# Patient Record
Sex: MALE | Race: WHITE | NOT HISPANIC OR LATINO | Employment: OTHER | URBAN - METROPOLITAN AREA
[De-identification: names, ages, dates, MRNs, and addresses within clinical notes are randomized per-mention and may not be internally consistent; named-entity substitution may affect disease eponyms.]

---

## 2017-02-06 ENCOUNTER — APPOINTMENT (OUTPATIENT)
Dept: LAB | Facility: CLINIC | Age: 72
End: 2017-02-06
Payer: MEDICARE

## 2017-02-06 DIAGNOSIS — I73.9 PERIPHERAL VASCULAR DISEASE (HCC): ICD-10-CM

## 2017-02-06 DIAGNOSIS — E11.29 TYPE 2 DIABETES MELLITUS WITH OTHER DIABETIC KIDNEY COMPLICATION (HCC): ICD-10-CM

## 2017-02-06 DIAGNOSIS — Z12.11 ENCOUNTER FOR SCREENING FOR MALIGNANT NEOPLASM OF COLON: ICD-10-CM

## 2017-02-06 DIAGNOSIS — I10 ESSENTIAL (PRIMARY) HYPERTENSION: ICD-10-CM

## 2017-02-06 DIAGNOSIS — I25.10 ATHEROSCLEROTIC HEART DISEASE OF NATIVE CORONARY ARTERY WITHOUT ANGINA PECTORIS: ICD-10-CM

## 2017-02-06 LAB
ALBUMIN SERPL BCP-MCNC: 3.9 G/DL (ref 3.5–5)
ALP SERPL-CCNC: 68 U/L (ref 46–116)
ALT SERPL W P-5'-P-CCNC: 37 U/L (ref 12–78)
ANION GAP SERPL CALCULATED.3IONS-SCNC: 10 MMOL/L (ref 4–13)
AST SERPL W P-5'-P-CCNC: 24 U/L (ref 5–45)
BILIRUB SERPL-MCNC: 0.66 MG/DL (ref 0.2–1)
BUN SERPL-MCNC: 14 MG/DL (ref 5–25)
CALCIUM SERPL-MCNC: 9 MG/DL (ref 8.3–10.1)
CHLORIDE SERPL-SCNC: 100 MMOL/L (ref 100–108)
CHOLEST SERPL-MCNC: 127 MG/DL (ref 50–200)
CO2 SERPL-SCNC: 24 MMOL/L (ref 21–32)
CREAT SERPL-MCNC: 1.35 MG/DL (ref 0.6–1.3)
EST. AVERAGE GLUCOSE BLD GHB EST-MCNC: 151 MG/DL
GFR SERPL CREATININE-BSD FRML MDRD: 52 ML/MIN/1.73SQ M
GLUCOSE SERPL-MCNC: 120 MG/DL (ref 65–140)
HBA1C MFR BLD: 6.9 % (ref 4.2–6.3)
HDLC SERPL-MCNC: 49 MG/DL (ref 40–60)
LDLC SERPL CALC-MCNC: 62 MG/DL (ref 0–100)
POTASSIUM SERPL-SCNC: 4.6 MMOL/L (ref 3.5–5.3)
PROT SERPL-MCNC: 7.3 G/DL (ref 6.4–8.2)
SODIUM SERPL-SCNC: 134 MMOL/L (ref 136–145)
TRIGL SERPL-MCNC: 79 MG/DL

## 2017-02-06 PROCEDURE — 83036 HEMOGLOBIN GLYCOSYLATED A1C: CPT

## 2017-02-06 PROCEDURE — 80061 LIPID PANEL: CPT

## 2017-02-06 PROCEDURE — 36415 COLL VENOUS BLD VENIPUNCTURE: CPT

## 2017-02-06 PROCEDURE — 80053 COMPREHEN METABOLIC PANEL: CPT

## 2017-02-07 ENCOUNTER — GENERIC CONVERSION - ENCOUNTER (OUTPATIENT)
Dept: OTHER | Facility: OTHER | Age: 72
End: 2017-02-07

## 2017-02-07 ENCOUNTER — ALLSCRIPTS OFFICE VISIT (OUTPATIENT)
Dept: OTHER | Facility: OTHER | Age: 72
End: 2017-02-07

## 2017-02-07 DIAGNOSIS — I25.10 ATHEROSCLEROTIC HEART DISEASE OF NATIVE CORONARY ARTERY WITHOUT ANGINA PECTORIS: ICD-10-CM

## 2017-02-07 DIAGNOSIS — I73.9 PERIPHERAL VASCULAR DISEASE (HCC): ICD-10-CM

## 2017-02-07 DIAGNOSIS — E11.29 TYPE 2 DIABETES MELLITUS WITH OTHER DIABETIC KIDNEY COMPLICATION (HCC): ICD-10-CM

## 2017-02-07 DIAGNOSIS — E11.22 TYPE 2 DIABETES MELLITUS WITH DIABETIC CHRONIC KIDNEY DISEASE (HCC): ICD-10-CM

## 2017-02-07 DIAGNOSIS — I10 ESSENTIAL (PRIMARY) HYPERTENSION: ICD-10-CM

## 2017-02-07 DIAGNOSIS — B35.1 TINEA UNGUIUM: ICD-10-CM

## 2017-02-07 DIAGNOSIS — E11.59 TYPE 2 DIABETES MELLITUS WITH OTHER CIRCULATORY COMPLICATIONS (HCC): ICD-10-CM

## 2017-02-07 DIAGNOSIS — I70.213 ATHEROSCLEROSIS OF NATIVE ARTERY OF BOTH LOWER EXTREMITIES WITH INTERMITTENT CLAUDICATION (HCC): ICD-10-CM

## 2017-02-13 ENCOUNTER — ALLSCRIPTS OFFICE VISIT (OUTPATIENT)
Dept: OTHER | Facility: OTHER | Age: 72
End: 2017-02-13

## 2017-02-15 ENCOUNTER — HOSPITAL ENCOUNTER (OUTPATIENT)
Dept: RADIOLOGY | Facility: HOSPITAL | Age: 72
Discharge: HOME | End: 2017-02-15
Attending: PODIATRIST
Payer: MEDICARE

## 2017-02-15 DIAGNOSIS — B35.1 TINEA UNGUIUM: ICD-10-CM

## 2017-02-15 DIAGNOSIS — I70.213 ATHEROSCLEROSIS OF NATIVE ARTERY OF BOTH LOWER EXTREMITIES WITH INTERMITTENT CLAUDICATION (HCC): ICD-10-CM

## 2017-02-15 PROCEDURE — 93923 UPR/LXTR ART STDY 3+ LVLS: CPT

## 2017-02-15 PROCEDURE — 93925 LOWER EXTREMITY STUDY: CPT

## 2017-04-18 ENCOUNTER — ALLSCRIPTS OFFICE VISIT (OUTPATIENT)
Dept: OTHER | Facility: OTHER | Age: 72
End: 2017-04-18

## 2017-05-01 ENCOUNTER — TRANSCRIBE ORDERS (OUTPATIENT)
Dept: ADMINISTRATIVE | Facility: HOSPITAL | Age: 72
End: 2017-05-01

## 2017-05-01 ENCOUNTER — APPOINTMENT (OUTPATIENT)
Dept: LAB | Facility: HOSPITAL | Age: 72
End: 2017-05-01
Attending: FAMILY MEDICINE
Payer: MEDICARE

## 2017-05-01 DIAGNOSIS — I73.9 PERIPHERAL VASCULAR DISEASE (HCC): ICD-10-CM

## 2017-05-01 DIAGNOSIS — E11.59 TYPE 2 DIABETES MELLITUS WITH OTHER CIRCULATORY COMPLICATIONS (HCC): ICD-10-CM

## 2017-05-01 DIAGNOSIS — E11.22 TYPE 2 DIABETES MELLITUS WITH DIABETIC CHRONIC KIDNEY DISEASE (HCC): ICD-10-CM

## 2017-05-01 DIAGNOSIS — I10 ESSENTIAL (PRIMARY) HYPERTENSION: ICD-10-CM

## 2017-05-01 DIAGNOSIS — E11.29 TYPE 2 DIABETES MELLITUS WITH OTHER DIABETIC KIDNEY COMPLICATION (HCC): ICD-10-CM

## 2017-05-01 DIAGNOSIS — I25.10 ATHEROSCLEROTIC HEART DISEASE OF NATIVE CORONARY ARTERY WITHOUT ANGINA PECTORIS: ICD-10-CM

## 2017-05-01 LAB
ALBUMIN SERPL BCP-MCNC: 3.6 G/DL (ref 3.5–5)
ALP SERPL-CCNC: 68 U/L (ref 46–116)
ALT SERPL W P-5'-P-CCNC: 28 U/L (ref 12–78)
ANION GAP SERPL CALCULATED.3IONS-SCNC: 8 MMOL/L (ref 4–13)
AST SERPL W P-5'-P-CCNC: 23 U/L (ref 5–45)
BILIRUB SERPL-MCNC: 0.7 MG/DL (ref 0.2–1)
BUN SERPL-MCNC: 16 MG/DL (ref 5–25)
CALCIUM SERPL-MCNC: 9.2 MG/DL (ref 8.3–10.1)
CHLORIDE SERPL-SCNC: 102 MMOL/L (ref 100–108)
CO2 SERPL-SCNC: 26 MMOL/L (ref 21–32)
CREAT SERPL-MCNC: 1.45 MG/DL (ref 0.6–1.3)
EST. AVERAGE GLUCOSE BLD GHB EST-MCNC: 160 MG/DL
GFR SERPL CREATININE-BSD FRML MDRD: 47.8 ML/MIN/1.73SQ M
GLUCOSE P FAST SERPL-MCNC: 92 MG/DL (ref 65–99)
HBA1C MFR BLD: 7.2 % (ref 4.2–6.3)
POTASSIUM SERPL-SCNC: 5.2 MMOL/L (ref 3.5–5.3)
PROT SERPL-MCNC: 6.7 G/DL (ref 6.4–8.2)
SODIUM SERPL-SCNC: 136 MMOL/L (ref 136–145)

## 2017-05-01 PROCEDURE — 83036 HEMOGLOBIN GLYCOSYLATED A1C: CPT

## 2017-05-01 PROCEDURE — 80053 COMPREHEN METABOLIC PANEL: CPT

## 2017-05-01 PROCEDURE — 36415 COLL VENOUS BLD VENIPUNCTURE: CPT

## 2017-05-02 ENCOUNTER — GENERIC CONVERSION - ENCOUNTER (OUTPATIENT)
Dept: OTHER | Facility: OTHER | Age: 72
End: 2017-05-02

## 2017-05-08 ENCOUNTER — ALLSCRIPTS OFFICE VISIT (OUTPATIENT)
Dept: OTHER | Facility: OTHER | Age: 72
End: 2017-05-08

## 2017-05-08 DIAGNOSIS — Z12.11 ENCOUNTER FOR SCREENING FOR MALIGNANT NEOPLASM OF COLON: ICD-10-CM

## 2017-05-08 DIAGNOSIS — I25.10 ATHEROSCLEROTIC HEART DISEASE OF NATIVE CORONARY ARTERY WITHOUT ANGINA PECTORIS: ICD-10-CM

## 2017-05-11 ENCOUNTER — APPOINTMENT (OUTPATIENT)
Dept: LAB | Facility: CLINIC | Age: 72
End: 2017-05-11
Payer: MEDICARE

## 2017-05-11 ENCOUNTER — TRANSCRIBE ORDERS (OUTPATIENT)
Dept: LAB | Facility: CLINIC | Age: 72
End: 2017-05-11

## 2017-05-11 DIAGNOSIS — I25.10 ATHEROSCLEROTIC HEART DISEASE OF NATIVE CORONARY ARTERY WITHOUT ANGINA PECTORIS: ICD-10-CM

## 2017-05-11 DIAGNOSIS — Z12.11 ENCOUNTER FOR SCREENING FOR MALIGNANT NEOPLASM OF COLON: ICD-10-CM

## 2017-05-11 LAB
ANION GAP SERPL CALCULATED.3IONS-SCNC: 7 MMOL/L (ref 4–13)
BUN SERPL-MCNC: 11 MG/DL (ref 5–25)
CALCIUM SERPL-MCNC: 9 MG/DL (ref 8.3–10.1)
CHLORIDE SERPL-SCNC: 102 MMOL/L (ref 100–108)
CO2 SERPL-SCNC: 24 MMOL/L (ref 21–32)
CREAT SERPL-MCNC: 1.23 MG/DL (ref 0.6–1.3)
GFR SERPL CREATININE-BSD FRML MDRD: 57.8 ML/MIN/1.73SQ M
GLUCOSE SERPL-MCNC: 113 MG/DL (ref 65–140)
POTASSIUM SERPL-SCNC: 4.8 MMOL/L (ref 3.5–5.3)
SODIUM SERPL-SCNC: 133 MMOL/L (ref 136–145)

## 2017-05-11 PROCEDURE — 80048 BASIC METABOLIC PNL TOTAL CA: CPT

## 2017-05-11 PROCEDURE — 36415 COLL VENOUS BLD VENIPUNCTURE: CPT

## 2017-05-12 ENCOUNTER — GENERIC CONVERSION - ENCOUNTER (OUTPATIENT)
Dept: OTHER | Facility: OTHER | Age: 72
End: 2017-05-12

## 2017-05-30 ENCOUNTER — ALLSCRIPTS OFFICE VISIT (OUTPATIENT)
Dept: OTHER | Facility: OTHER | Age: 72
End: 2017-05-30

## 2017-06-27 ENCOUNTER — ALLSCRIPTS OFFICE VISIT (OUTPATIENT)
Dept: OTHER | Facility: OTHER | Age: 72
End: 2017-06-27

## 2017-07-16 DIAGNOSIS — E11.59 TYPE 2 DIABETES MELLITUS WITH OTHER CIRCULATORY COMPLICATIONS (HCC): ICD-10-CM

## 2017-07-16 DIAGNOSIS — I73.9 PERIPHERAL VASCULAR DISEASE (HCC): ICD-10-CM

## 2017-07-16 DIAGNOSIS — E11.29 TYPE 2 DIABETES MELLITUS WITH OTHER DIABETIC KIDNEY COMPLICATION (HCC): ICD-10-CM

## 2017-07-16 DIAGNOSIS — I10 ESSENTIAL (PRIMARY) HYPERTENSION: ICD-10-CM

## 2017-07-16 DIAGNOSIS — I25.10 ATHEROSCLEROTIC HEART DISEASE OF NATIVE CORONARY ARTERY WITHOUT ANGINA PECTORIS: ICD-10-CM

## 2017-07-16 DIAGNOSIS — I70.213 ATHEROSCLEROSIS OF NATIVE ARTERY OF BOTH LOWER EXTREMITIES WITH INTERMITTENT CLAUDICATION (HCC): ICD-10-CM

## 2017-07-16 DIAGNOSIS — E11.22 TYPE 2 DIABETES MELLITUS WITH DIABETIC CHRONIC KIDNEY DISEASE (HCC): ICD-10-CM

## 2017-07-19 ENCOUNTER — APPOINTMENT (OUTPATIENT)
Dept: LAB | Facility: CLINIC | Age: 72
End: 2017-07-19
Payer: MEDICARE

## 2017-07-19 ENCOUNTER — TRANSCRIBE ORDERS (OUTPATIENT)
Dept: LAB | Facility: CLINIC | Age: 72
End: 2017-07-19

## 2017-07-19 ENCOUNTER — GENERIC CONVERSION - ENCOUNTER (OUTPATIENT)
Dept: OTHER | Facility: OTHER | Age: 72
End: 2017-07-19

## 2017-07-19 DIAGNOSIS — I10 ESSENTIAL (PRIMARY) HYPERTENSION: ICD-10-CM

## 2017-07-19 DIAGNOSIS — I73.9 PERIPHERAL VASCULAR DISEASE (HCC): ICD-10-CM

## 2017-07-19 DIAGNOSIS — I25.10 ATHEROSCLEROTIC HEART DISEASE OF NATIVE CORONARY ARTERY WITHOUT ANGINA PECTORIS: ICD-10-CM

## 2017-07-19 LAB
ANION GAP SERPL CALCULATED.3IONS-SCNC: 7 MMOL/L (ref 4–13)
BASOPHILS # BLD AUTO: 0.02 THOUSANDS/ΜL (ref 0–0.1)
BASOPHILS NFR BLD AUTO: 0 % (ref 0–1)
BUN SERPL-MCNC: 12 MG/DL (ref 5–25)
CALCIUM SERPL-MCNC: 9 MG/DL (ref 8.3–10.1)
CHLORIDE SERPL-SCNC: 101 MMOL/L (ref 100–108)
CO2 SERPL-SCNC: 24 MMOL/L (ref 21–32)
CREAT SERPL-MCNC: 1.32 MG/DL (ref 0.6–1.3)
EOSINOPHIL # BLD AUTO: 0.2 THOUSAND/ΜL (ref 0–0.61)
EOSINOPHIL NFR BLD AUTO: 3 % (ref 0–6)
ERYTHROCYTE [DISTWIDTH] IN BLOOD BY AUTOMATED COUNT: 15.8 % (ref 11.6–15.1)
GFR SERPL CREATININE-BSD FRML MDRD: 53.3 ML/MIN/1.73SQ M
GLUCOSE P FAST SERPL-MCNC: 137 MG/DL (ref 65–99)
HCT VFR BLD AUTO: 32.6 % (ref 36.5–49.3)
HGB BLD-MCNC: 10.8 G/DL (ref 12–17)
INR PPP: 1.04 (ref 0.86–1.16)
LYMPHOCYTES # BLD AUTO: 0.85 THOUSANDS/ΜL (ref 0.6–4.47)
LYMPHOCYTES NFR BLD AUTO: 14 % (ref 14–44)
MCH RBC QN AUTO: 27.7 PG (ref 26.8–34.3)
MCHC RBC AUTO-ENTMCNC: 33.1 G/DL (ref 31.4–37.4)
MCV RBC AUTO: 84 FL (ref 82–98)
MONOCYTES # BLD AUTO: 0.57 THOUSAND/ΜL (ref 0.17–1.22)
MONOCYTES NFR BLD AUTO: 9 % (ref 4–12)
NEUTROPHILS # BLD AUTO: 4.58 THOUSANDS/ΜL (ref 1.85–7.62)
NEUTS SEG NFR BLD AUTO: 74 % (ref 43–75)
NRBC BLD AUTO-RTO: 0 /100 WBCS
PLATELET # BLD AUTO: 234 THOUSANDS/UL (ref 149–390)
PMV BLD AUTO: 9.1 FL (ref 8.9–12.7)
POTASSIUM SERPL-SCNC: 4.6 MMOL/L (ref 3.5–5.3)
PROTHROMBIN TIME: 13.6 SECONDS (ref 12.1–14.4)
RBC # BLD AUTO: 3.9 MILLION/UL (ref 3.88–5.62)
SODIUM SERPL-SCNC: 132 MMOL/L (ref 136–145)
WBC # BLD AUTO: 6.24 THOUSAND/UL (ref 4.31–10.16)

## 2017-07-19 PROCEDURE — 85610 PROTHROMBIN TIME: CPT

## 2017-07-19 PROCEDURE — 85025 COMPLETE CBC W/AUTO DIFF WBC: CPT

## 2017-07-19 PROCEDURE — 36415 COLL VENOUS BLD VENIPUNCTURE: CPT

## 2017-07-19 PROCEDURE — 80048 BASIC METABOLIC PNL TOTAL CA: CPT

## 2017-07-25 ENCOUNTER — GENERIC CONVERSION - ENCOUNTER (OUTPATIENT)
Dept: OTHER | Facility: OTHER | Age: 72
End: 2017-07-25

## 2017-07-25 ENCOUNTER — HOSPITAL ENCOUNTER (OUTPATIENT)
Dept: NON INVASIVE DIAGNOSTICS | Facility: HOSPITAL | Age: 72
Discharge: HOME | End: 2017-07-25
Attending: INTERNAL MEDICINE | Admitting: INTERNAL MEDICINE
Payer: MEDICARE

## 2017-07-25 VITALS
WEIGHT: 190 LBS | OXYGEN SATURATION: 99 % | DIASTOLIC BLOOD PRESSURE: 59 MMHG | HEIGHT: 71 IN | HEART RATE: 57 BPM | TEMPERATURE: 97.4 F | SYSTOLIC BLOOD PRESSURE: 133 MMHG | BODY MASS INDEX: 26.6 KG/M2 | RESPIRATION RATE: 16 BRPM

## 2017-07-25 DIAGNOSIS — I70.213 ATHEROSCLER OF NATIVE ARTERY OF BOTH LEGS WITH INTERMIT CLAUDICATION (HCC): ICD-10-CM

## 2017-07-25 LAB
KCT BLD-ACNC: 166 SEC (ref 89–137)
SPECIMEN SOURCE: ABNORMAL

## 2017-07-25 PROCEDURE — C1887 CATHETER, GUIDING: HCPCS

## 2017-07-25 PROCEDURE — C1769 GUIDE WIRE: HCPCS

## 2017-07-25 PROCEDURE — 37224 HB FEM/POPL REVAS W/TLA: CPT

## 2017-07-25 PROCEDURE — C1894 INTRO/SHEATH, NON-LASER: HCPCS

## 2017-07-25 PROCEDURE — C1725 CATH, TRANSLUMIN NON-LASER: HCPCS

## 2017-07-25 PROCEDURE — 85347 COAGULATION TIME ACTIVATED: CPT

## 2017-07-25 RX ORDER — CARVEDILOL 6.25 MG/1
6.25 TABLET ORAL 2 TIMES DAILY WITH MEALS
COMMUNITY
End: 2018-01-24 | Stop reason: SDUPTHER

## 2017-07-25 RX ORDER — ONDANSETRON 2 MG/ML
4 INJECTION INTRAMUSCULAR; INTRAVENOUS EVERY 6 HOURS PRN
Status: CANCELLED | OUTPATIENT
Start: 2017-07-25

## 2017-07-25 RX ORDER — SODIUM CHLORIDE 9 MG/ML
100 INJECTION, SOLUTION INTRAVENOUS CONTINUOUS
Status: CANCELLED | OUTPATIENT
Start: 2017-07-25

## 2017-07-25 RX ORDER — GABAPENTIN 300 MG/1
100 CAPSULE ORAL 2 TIMES DAILY
COMMUNITY
End: 2018-01-24 | Stop reason: SDUPTHER

## 2017-07-25 RX ORDER — CLOPIDOGREL BISULFATE 75 MG/1
300 TABLET ORAL DAILY
Status: DISCONTINUED | OUTPATIENT
Start: 2017-07-25 | End: 2017-07-26 | Stop reason: HOSPADM

## 2017-07-25 RX ORDER — SIMVASTATIN 40 MG
40 TABLET ORAL
COMMUNITY
End: 2018-01-24 | Stop reason: SDUPTHER

## 2017-07-25 RX ORDER — GLIMEPIRIDE 4 MG/1
4 TABLET ORAL
COMMUNITY
End: 2018-01-24 | Stop reason: SDUPTHER

## 2017-07-25 RX ORDER — CILOSTAZOL 100 MG/1
100 TABLET ORAL 2 TIMES DAILY
COMMUNITY
End: 2018-02-15 | Stop reason: SDUPTHER

## 2017-07-25 RX ORDER — FENTANYL CITRATE 50 UG/ML
INJECTION, SOLUTION INTRAMUSCULAR; INTRAVENOUS CODE/TRAUMA/SEDATION MEDICATION
Status: DISCONTINUED | OUTPATIENT
Start: 2017-07-25 | End: 2017-07-26 | Stop reason: HOSPADM

## 2017-07-25 RX ORDER — MIDAZOLAM HYDROCHLORIDE 1 MG/ML
INJECTION INTRAMUSCULAR; INTRAVENOUS CODE/TRAUMA/SEDATION MEDICATION
Status: DISCONTINUED | OUTPATIENT
Start: 2017-07-25 | End: 2017-07-26 | Stop reason: HOSPADM

## 2017-07-25 RX ORDER — NITROGLYCERIN 80 MG/1
1 PATCH TRANSDERMAL DAILY
COMMUNITY
End: 2018-01-24 | Stop reason: SDUPTHER

## 2017-07-25 RX ORDER — LISINOPRIL 5 MG/1
5 TABLET ORAL DAILY
COMMUNITY
End: 2018-01-24 | Stop reason: SDUPTHER

## 2017-07-25 RX ORDER — OMEPRAZOLE 40 MG/1
40 CAPSULE, DELAYED RELEASE ORAL DAILY
COMMUNITY
End: 2018-02-15 | Stop reason: SDUPTHER

## 2017-07-25 RX ORDER — ASPIRIN 81 MG/1
162 TABLET, CHEWABLE ORAL DAILY
Status: CANCELLED | OUTPATIENT
Start: 2017-07-25

## 2017-07-25 RX ORDER — NITROGLYCERIN 0.4 MG/1
0.4 TABLET SUBLINGUAL
COMMUNITY
End: 2018-02-15 | Stop reason: SDUPTHER

## 2017-07-25 RX ORDER — HEPARIN SODIUM 1000 [USP'U]/ML
INJECTION, SOLUTION INTRAVENOUS; SUBCUTANEOUS CODE/TRAUMA/SEDATION MEDICATION
Status: DISCONTINUED | OUTPATIENT
Start: 2017-07-25 | End: 2017-07-26 | Stop reason: HOSPADM

## 2017-07-25 RX ADMIN — FENTANYL CITRATE 25 MCG: 50 INJECTION, SOLUTION INTRAMUSCULAR; INTRAVENOUS at 09:13

## 2017-07-25 RX ADMIN — HEPARIN SODIUM 5000 UNITS: 1000 INJECTION INTRAVENOUS; SUBCUTANEOUS at 09:49

## 2017-07-25 RX ADMIN — MIDAZOLAM HYDROCHLORIDE 0.5 MG: 1 INJECTION, SOLUTION INTRAMUSCULAR; INTRAVENOUS at 09:18

## 2017-07-25 RX ADMIN — MIDAZOLAM HYDROCHLORIDE 1 MG: 1 INJECTION, SOLUTION INTRAMUSCULAR; INTRAVENOUS at 09:13

## 2017-07-25 RX ADMIN — IODIXANOL 75 ML: 320 INJECTION, SOLUTION INTRAVASCULAR at 12:26

## 2017-07-25 RX ADMIN — CLOPIDOGREL BISULFATE 300 MG: 75 TABLET ORAL at 10:48

## 2017-07-25 RX ADMIN — FENTANYL CITRATE 25 MCG: 50 INJECTION, SOLUTION INTRAMUSCULAR; INTRAVENOUS at 09:18

## 2017-08-05 ENCOUNTER — TRANSCRIBE ORDERS (OUTPATIENT)
Dept: ADMINISTRATIVE | Facility: HOSPITAL | Age: 72
End: 2017-08-05

## 2017-08-05 ENCOUNTER — APPOINTMENT (OUTPATIENT)
Dept: LAB | Facility: HOSPITAL | Age: 72
End: 2017-08-05
Attending: FAMILY MEDICINE
Payer: MEDICARE

## 2017-08-05 DIAGNOSIS — E11.29 TYPE 2 DIABETES MELLITUS WITH OTHER DIABETIC KIDNEY COMPLICATION (HCC): ICD-10-CM

## 2017-08-05 DIAGNOSIS — E11.29 TYPE 2 DIABETES MELLITUS WITH OTHER DIABETIC KIDNEY COMPLICATION, WITHOUT LONG-TERM CURRENT USE OF INSULIN (HCC): ICD-10-CM

## 2017-08-05 DIAGNOSIS — E11.22 TYPE 2 DIABETES MELLITUS WITH ESRD (END-STAGE RENAL DISEASE) (HCC): ICD-10-CM

## 2017-08-05 DIAGNOSIS — E11.22 TYPE 2 DIABETES MELLITUS WITH DIABETIC CHRONIC KIDNEY DISEASE (HCC): ICD-10-CM

## 2017-08-05 DIAGNOSIS — N18.6 TYPE 2 DIABETES MELLITUS WITH ESRD (END-STAGE RENAL DISEASE) (HCC): ICD-10-CM

## 2017-08-05 DIAGNOSIS — I10 ESSENTIAL HYPERTENSION, MALIGNANT: Primary | ICD-10-CM

## 2017-08-05 DIAGNOSIS — I10 ESSENTIAL (PRIMARY) HYPERTENSION: ICD-10-CM

## 2017-08-05 LAB
ALBUMIN SERPL BCP-MCNC: 3.7 G/DL (ref 3.5–5)
ALP SERPL-CCNC: 68 U/L (ref 46–116)
ALT SERPL W P-5'-P-CCNC: 27 U/L (ref 12–78)
ANION GAP SERPL CALCULATED.3IONS-SCNC: 10 MMOL/L (ref 4–13)
AST SERPL W P-5'-P-CCNC: 26 U/L (ref 5–45)
BILIRUB SERPL-MCNC: 0.8 MG/DL (ref 0.2–1)
BUN SERPL-MCNC: 16 MG/DL (ref 5–25)
CALCIUM SERPL-MCNC: 9.2 MG/DL (ref 8.3–10.1)
CHLORIDE SERPL-SCNC: 102 MMOL/L (ref 100–108)
CO2 SERPL-SCNC: 25 MMOL/L (ref 21–32)
CREAT SERPL-MCNC: 1.37 MG/DL (ref 0.6–1.3)
EST. AVERAGE GLUCOSE BLD GHB EST-MCNC: 171 MG/DL
GFR SERPL CREATININE-BSD FRML MDRD: 51 ML/MIN/1.73SQ M
GLUCOSE P FAST SERPL-MCNC: 148 MG/DL (ref 65–99)
HBA1C MFR BLD: 7.6 % (ref 4.2–6.3)
POTASSIUM SERPL-SCNC: 4.9 MMOL/L (ref 3.5–5.3)
PROT SERPL-MCNC: 6.7 G/DL (ref 6.4–8.2)
SODIUM SERPL-SCNC: 137 MMOL/L (ref 136–145)

## 2017-08-05 PROCEDURE — 36415 COLL VENOUS BLD VENIPUNCTURE: CPT

## 2017-08-05 PROCEDURE — 80053 COMPREHEN METABOLIC PANEL: CPT

## 2017-08-05 PROCEDURE — 83036 HEMOGLOBIN GLYCOSYLATED A1C: CPT

## 2017-08-07 ENCOUNTER — GENERIC CONVERSION - ENCOUNTER (OUTPATIENT)
Dept: OTHER | Facility: OTHER | Age: 72
End: 2017-08-07

## 2017-08-14 ENCOUNTER — ALLSCRIPTS OFFICE VISIT (OUTPATIENT)
Dept: OTHER | Facility: OTHER | Age: 72
End: 2017-08-14

## 2017-09-05 ENCOUNTER — ALLSCRIPTS OFFICE VISIT (OUTPATIENT)
Dept: OTHER | Facility: OTHER | Age: 72
End: 2017-09-05

## 2017-09-13 ENCOUNTER — APPOINTMENT (OUTPATIENT)
Dept: LAB | Facility: CLINIC | Age: 72
End: 2017-09-13
Payer: MEDICARE

## 2017-09-13 DIAGNOSIS — I25.10 ATHEROSCLEROTIC HEART DISEASE OF NATIVE CORONARY ARTERY WITHOUT ANGINA PECTORIS: ICD-10-CM

## 2017-09-13 DIAGNOSIS — I70.213 ATHEROSCLEROSIS OF NATIVE ARTERY OF BOTH LOWER EXTREMITIES WITH INTERMITTENT CLAUDICATION (HCC): ICD-10-CM

## 2017-09-13 DIAGNOSIS — E11.22 TYPE 2 DIABETES MELLITUS WITH DIABETIC CHRONIC KIDNEY DISEASE (HCC): ICD-10-CM

## 2017-09-13 DIAGNOSIS — I73.9 PERIPHERAL VASCULAR DISEASE (HCC): ICD-10-CM

## 2017-09-13 DIAGNOSIS — E11.59 TYPE 2 DIABETES MELLITUS WITH OTHER CIRCULATORY COMPLICATIONS (HCC): ICD-10-CM

## 2017-09-13 DIAGNOSIS — E11.29 TYPE 2 DIABETES MELLITUS WITH OTHER DIABETIC KIDNEY COMPLICATION (HCC): ICD-10-CM

## 2017-09-13 LAB
ANION GAP SERPL CALCULATED.3IONS-SCNC: 7 MMOL/L (ref 4–13)
BUN SERPL-MCNC: 11 MG/DL (ref 5–25)
CALCIUM SERPL-MCNC: 8.6 MG/DL (ref 8.3–10.1)
CHLORIDE SERPL-SCNC: 99 MMOL/L (ref 100–108)
CO2 SERPL-SCNC: 26 MMOL/L (ref 21–32)
CREAT SERPL-MCNC: 1.18 MG/DL (ref 0.6–1.3)
GFR SERPL CREATININE-BSD FRML MDRD: 61 ML/MIN/1.73SQ M
GLUCOSE P FAST SERPL-MCNC: 143 MG/DL (ref 65–99)
POTASSIUM SERPL-SCNC: 4.7 MMOL/L (ref 3.5–5.3)
SODIUM SERPL-SCNC: 132 MMOL/L (ref 136–145)

## 2017-09-13 PROCEDURE — 80048 BASIC METABOLIC PNL TOTAL CA: CPT

## 2017-09-13 PROCEDURE — 36415 COLL VENOUS BLD VENIPUNCTURE: CPT

## 2017-09-15 ENCOUNTER — GENERIC CONVERSION - ENCOUNTER (OUTPATIENT)
Dept: OTHER | Facility: OTHER | Age: 72
End: 2017-09-15

## 2017-09-20 ENCOUNTER — ALLSCRIPTS OFFICE VISIT (OUTPATIENT)
Dept: OTHER | Facility: OTHER | Age: 72
End: 2017-09-20

## 2017-09-20 DIAGNOSIS — Z12.5 ENCOUNTER FOR SCREENING FOR MALIGNANT NEOPLASM OF PROSTATE: ICD-10-CM

## 2017-09-20 DIAGNOSIS — E11.22 TYPE 2 DIABETES MELLITUS WITH DIABETIC CHRONIC KIDNEY DISEASE (HCC): ICD-10-CM

## 2017-11-07 ENCOUNTER — GENERIC CONVERSION - ENCOUNTER (OUTPATIENT)
Dept: OTHER | Facility: OTHER | Age: 72
End: 2017-11-07

## 2017-11-07 ENCOUNTER — APPOINTMENT (OUTPATIENT)
Dept: LAB | Facility: CLINIC | Age: 72
End: 2017-11-07
Payer: MEDICARE

## 2017-11-07 DIAGNOSIS — E11.29 TYPE 2 DIABETES MELLITUS WITH OTHER DIABETIC KIDNEY COMPLICATION (HCC): ICD-10-CM

## 2017-11-07 DIAGNOSIS — E11.59 TYPE 2 DIABETES MELLITUS WITH OTHER CIRCULATORY COMPLICATIONS (HCC): ICD-10-CM

## 2017-11-07 DIAGNOSIS — E11.22 TYPE 2 DIABETES MELLITUS WITH DIABETIC CHRONIC KIDNEY DISEASE (HCC): ICD-10-CM

## 2017-11-07 DIAGNOSIS — Z12.5 ENCOUNTER FOR SCREENING FOR MALIGNANT NEOPLASM OF PROSTATE: ICD-10-CM

## 2017-11-07 LAB
ALBUMIN SERPL BCP-MCNC: 3.6 G/DL (ref 3.5–5)
ALP SERPL-CCNC: 70 U/L (ref 46–116)
ALT SERPL W P-5'-P-CCNC: 31 U/L (ref 12–78)
ANION GAP SERPL CALCULATED.3IONS-SCNC: 8 MMOL/L (ref 4–13)
AST SERPL W P-5'-P-CCNC: 21 U/L (ref 5–45)
BILIRUB SERPL-MCNC: 0.57 MG/DL (ref 0.2–1)
BUN SERPL-MCNC: 12 MG/DL (ref 5–25)
CALCIUM SERPL-MCNC: 8.9 MG/DL (ref 8.3–10.1)
CHLORIDE SERPL-SCNC: 98 MMOL/L (ref 100–108)
CO2 SERPL-SCNC: 25 MMOL/L (ref 21–32)
CREAT SERPL-MCNC: 1.24 MG/DL (ref 0.6–1.3)
CREAT UR-MCNC: 26.6 MG/DL
EST. AVERAGE GLUCOSE BLD GHB EST-MCNC: 160 MG/DL
GFR SERPL CREATININE-BSD FRML MDRD: 58 ML/MIN/1.73SQ M
GLUCOSE P FAST SERPL-MCNC: 172 MG/DL (ref 65–99)
HBA1C MFR BLD: 7.2 % (ref 4.2–6.3)
MICROALBUMIN UR-MCNC: 5.3 MG/L (ref 0–20)
MICROALBUMIN/CREAT 24H UR: 20 MG/G CREATININE (ref 0–30)
POTASSIUM SERPL-SCNC: 4.7 MMOL/L (ref 3.5–5.3)
PROT SERPL-MCNC: 6.9 G/DL (ref 6.4–8.2)
PSA SERPL-MCNC: 1.8 NG/ML (ref 0–4)
SODIUM SERPL-SCNC: 131 MMOL/L (ref 136–145)

## 2017-11-07 PROCEDURE — 36415 COLL VENOUS BLD VENIPUNCTURE: CPT

## 2017-11-07 PROCEDURE — G0103 PSA SCREENING: HCPCS

## 2017-11-07 PROCEDURE — 80053 COMPREHEN METABOLIC PANEL: CPT

## 2017-11-07 PROCEDURE — 82570 ASSAY OF URINE CREATININE: CPT

## 2017-11-07 PROCEDURE — 83036 HEMOGLOBIN GLYCOSYLATED A1C: CPT

## 2017-11-07 PROCEDURE — 82043 UR ALBUMIN QUANTITATIVE: CPT

## 2017-11-14 ENCOUNTER — ALLSCRIPTS OFFICE VISIT (OUTPATIENT)
Dept: OTHER | Facility: OTHER | Age: 72
End: 2017-11-14

## 2017-11-14 DIAGNOSIS — E11.40 TYPE 2 DIABETES MELLITUS WITH DIABETIC NEUROPATHY (HCC): ICD-10-CM

## 2017-11-15 NOTE — PROGRESS NOTES
Assessment    1  Atherosclerosis of native artery of both lower extremities with intermittent claudication (440 21) (I70 213)   2  Benign essential hypertension (401 1) (I10)   3  CAD (coronary artery disease) (414 00) (I25 10)   4  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 mellitus (250 40,585 2) (E11 22,N18 2)   5  GE reflux (530 81) (K21 9)   6  Type 2 diabetes mellitus with diabetic neuropathy (250 60,357 2) (E11 40)   7  Type 2 diabetes mellitus with other circulatory complications (965 77) (Z83 05)   8  Type 2 diabetes mellitus with other kidney complication (860 08) (B65 28)   9  Encounter for preventive health examination (V70 0) (Z00 00)    Plan  Atherosclerosis of native artery of both lower extremities with intermittent claudication,CAD (coronary artery disease)    · Clopidogrel Bisulfate 75 MG Oral Tablet; Take 1 tablet daily  Benign essential hypertension    · Carvedilol 12 5 MG Oral Tablet; Take 1 tablet twice a day  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 mellitus    · Lisinopril 2 5 MG Oral Tablet; TAKE 1 TABLET DAILY  Diabetic neuropathy    · Gabapentin 300 MG Oral Capsule; TAKE 1 CAPSULE 3 TIMES DAILY  GE reflux    · Omeprazole 40 MG Oral Capsule Delayed Release; 1 every day  Type 2 diabetes mellitus with diabetic neuropathy    · (1) COMPREHENSIVE METABOLIC PANEL; Status:Active; Requested for:14Nov2017;    · (1) HEMOGLOBIN A1C; Status:Active; Requested for:14Nov2017;   Type 2 diabetes mellitus with other kidney complication    · Acarbose 25 MG Oral Tablet; 1 pill before meals but 2 pills before dinner   · Glimepiride 4 MG Oral Tablet (Amaryl); TAKE ONE TABLET BY MOUTH TWICEA DAY   · MetFORMIN HCl ER (MOD) 500 MG Oral Tablet Extended Release 24 Hour; 4PO QD    Discussion/Summary  The patient was counseled regarding risk factor reductions,-- impressions  Possible side effects of new medications were reviewed with the patient/guardian today  The treatment plan was reviewed with the patient/guardian   The patient/guardian understands and agrees with the treatment plan      Chief Complaint  pt present for 3 month follow up on diabetes, and review blood work done on 11/7/17  af/rma      History of Present Illness  The patient states he has been doing well with his coronary artery disease symptoms since the last visit  Comorbid Illnesses: hypertension  Symptoms: denies fatigue  The patient is being seen for follow-up for and better, largest meal is dinner diabetes mellitus type II  The patient is currently asymptomatic  The patient is being seen for follow-up of gastroesophageal reflux disease  The patient reports doing well and takes every other day  Interval symptoms:  denies abdominal pain  The patient presents for follow-up of essential hypertension  The patient states he has been doing well with his blood pressure control since the last visit  Comorbid Illnesses: coronary artery disease  Symptoms: denies chest pain  Medications: the patient is adherent with his medication regimen  Review of Systems   Constitutional: no fever-- and-- no chills  Cardiovascular: no chest pain-- and-- no extremity edema  Respiratory: no shortness of breath-- and-- no orthopnea  Neurological: no dizziness  Active Problems    1  Allergic rhinitis (477 9) (J30 9)   2  Arthropathy (716 90) (M12 9)   3  Atherosclerosis of native artery of both lower extremities with intermittent claudication (440 21) (I70 213)   4  Benign essential hypertension (401 1) (I10)   5  BMI 25 0-25 9,adult (V85 21) (Z68 25)   6  CAD (coronary artery disease) (414 00) (I25 10)   7  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 mellitus (250 40,585 2) (E11 22,N18 2)   8  Colon cancer screening (V76 51) (Z12 11)   9  Depression screening (V79 0) (Z13 89)   10  Diabetic neuropathy (250 60,357 2) (E11 40)   11  GE reflux (530 81) (K21 9)   12  Immunization due (V05 9) (Z23)   13  Lumbar radiculopathy (724 4) (M54 16)   14  Onychomycosis (110 1) (B35 1)   15  Pain in both feet (729 5) (M79 671,M79 672)   16  Peripheral arterial disease (443 9) (I73 9)   17  Prostate cancer screening (V76 44) (Z12 5)   18  Rosacea (695 3) (L71 9)   19  Screening for cardiovascular, respiratory, and genitourinary diseases  (V81 2,V81 4,V81 6) (Z13 6,Z13 83,Z13 89)   20  Screening for neurological condition (V80 09) (Z13 89)   21  Seborrheic dermatitis (690 10) (L21 9)   22  Type 2 diabetes mellitus with diabetic neuropathy (250 60,357 2) (E11 40)   23  Type 2 diabetes mellitus with other circulatory complications (316 42) (V49 21)   24  Type 2 diabetes mellitus with other kidney complication (121 92) (X54 91)    Past Medical History  1  History of Acquired Hallux Valgus (735 0)   2  History of Acute upper respiratory infection (465 9) (J06 9)   3  History of Atrophy of nail (703 8) (L60 3)   4  History of Bursitis of hip, unspecified laterality   5  History of Callus (700) (L84)   6  History of Callus (700) (L84)   7  History of Callus (700) (L84)   8  History of Cellulitis (682 9) (L03 90)   9  History of Deformity of ankle and foot, acquired (736 70) (M21 969)   10  History of Difficulty walking (719 7) (R26 2)   11  History of Dysesthesia (782 0) (R20 8)   12  History of Edema (782 3) (R60 9)   13  History of Hammer toe, unspecified laterality (735 4) (M20 40)   14  History of acute bronchitis (V12 69) (Z87 09)   15  History of allergic rhinitis (V12 69) (Z87 09)   16  History of anemia (V12 3) (Z86 2)   17  History of backache (V13 59) (Z87 39)   18  History of bursitis (V13 59) (Z87 39)   19  History of pneumonia (V12 61) (Z87 01)   20  History of tendinitis (V13 59) (Z87 39)   21  History of tinea corporis (V12 09) (Z86 19)   22  History of viral warts (V12 09) (Z86 19)   23  History of Late Effects Of Sprain Or Strain (905 7)   24  History of Leg swelling (729 81) (M79 89)   25  History of Limb pain (729 5) (M79 609)   26   History of Limb pain (729 5) (M79 609)   27  History of Metatarsalgia, unspecified laterality (726 70) (M77 40)   28  History of Onychomycosis of toenail (110 1) (B35 1)   29  History of Open wound of foot, excluding toe(s) (892 0) (S91 309A)   30  History of Pain in both feet (729 5) (M79 671,M79 672)   31  History of Pain in both feet (729 5) (M79 671,M79 672)   32  History of Pain in both feet (729 5) (M79 671,M79 672)   33  History of Pain in both feet (729 5) (M79 671,M79 672)   34  History of Pes planus, unspecified laterality (734) (M21 40)   35  History of Piriformis muscle pain (729 1) (M79 1)   36  History of Shoulder joint pain, unspecified laterality   37  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)   38  History of Type 2 diabetes mellitus (250 00) (E11 9)    Surgical History  1  History of Bypass Graft Using Vein: Femoral-popliteal   2  History of Cataract Surgery   3  History of Foot Surgery   4  History of Heart Surgery   5  History of Leg Repair   6  History of Rotator Cuff Repair   7  History of Shoulder Surgery    Family History  Mother    1  Family history of Aortic Aneurysm  Father    2  Family history of acute myocardial infarction (V17 3) (Z82 49)  Sister    3  Family history of acute myocardial infarction (V17 3) (Z82 49)    The family history was reviewed and updated today  Social History     · Being A Social Drinker   · Denied: History of Drug Use   · Never A Smoker   · Retired From Work  The social history was reviewed and updated today  Current Meds   1  Acarbose 25 MG Oral Tablet; TAKE 1 TABLET 3 TIMES DAILY WITH THE FIRST BITE OF EACH MAIN MEAL; Therapy: 35Ucr3536 to (Evaluate:86Qku8869); Last Rx:11Gzs0429 Ordered   2  Carvedilol 12 5 MG Oral Tablet; Take 1 tablet twice a day; Therapy: 11PLP9593 to (Last Rx:87Kyn9370) Ordered   3  Clopidogrel Bisulfate 75 MG Oral Tablet; Take 1 tablet daily;  Therapy: 27AIN1484 to (Last Rx:44Rrz5845)  Requested for: 38UEQ5715 Ordered   4  Fluocinolone Acetonide 0 025 % External Cream; Therapy: 60ZVT5226 to Recorded   5  Gabapentin 300 MG Oral Capsule; TAKE 1 CAPSULE 3 TIMES DAILY; Therapy: 92GIY5654 to (Birdie Jean)  Requested for: 83IEH8597; Last Rx:10Mar2017 Ordered   6  Glimepiride 4 MG Oral Tablet; TAKE ONE TABLET BY MOUTH TWICE A DAY; Therapy: 31ODJ6397 to (Evaluate:12Jan2018)  Requested for: 04XUL0486; Last Rx:17Jan2017 Ordered   7  Halobetasol Propionate 0 05 % External Cream; Therapy: 89LLL7409 to Recorded   8  Lisinopril 2 5 MG Oral Tablet; TAKE 1 TABLET DAILY; Therapy: 79EWZ8954 to (Evaluate:45Yke4778)  Requested for: 96JGH4633; Last Rx:15Dpv3496 Ordered   9  MetFORMIN HCl ER (MOD) 500 MG Oral Tablet Extended Release 24 Hour; 4 PO QD; Therapy: 64CQO4197 to (Last Rx:69Pyl9849)  Requested for: 47Xgs8665 Ordered   10  Nitrostat 0 4 MG Sublingual Tablet Sublingual; PLACE 1 TABLET UNDER THE TONGUE  EVERY 5 MINUTES FOR UP TO 3 DOSES AS NEEDED FOR CHEST PAIN  CALL  911 IF PAIN PERSISTS; Therapy: 23QBO2510 to (Jose David Cochran)  Requested for: 71Iny3551; Last  Rx:42Yzv7953 Ordered   11  Omeprazole 40 MG Oral Capsule Delayed Release; 1 every day; Therapy: 31FST3699 to (Evaluate:18Feb2018)  Requested for: 21Oct2017; Last  Rx:21Oct2017 Ordered   12  Simvastatin 40 MG Oral Tablet; take 1 tablet every day; Therapy: 56Zqs2862 to (Evaluate:11Oct2018)  Requested for: 82EGA0945; Last  Rx:16Oct2017 Ordered   13  TRUEplus Lancets 28G Miscellaneous; test glucose once daily <250 00>; Therapy: 68JYJ7622 to (SOVVJUEY:66BVS7429)  Requested for: 48ZLE3773; Last  Rx:29Jan2015 Ordered   14  TrueTrack Test In Vitro Strip; test 1x/d as directed, <250 00>; Therapy: 75WKH5174 to (Evaluate:30Jan2016)  Requested for: 12PQA1692; Last  Rx:29Jan2015 Ordered    Allergies  1   No Known Drug Allergies    Vitals  Vital Signs    Recorded: 74LZQ3460 10:20AM   Temperature 96 F   Heart Rate 76   Respiration 16   Systolic 625   Diastolic 68   Height 5 ft 11 in Weight 182 lb    BMI Calculated 25 38   BSA Calculated 2 03       Physical Exam   Constitutional  General appearance: No acute distress, well appearing and well nourished  Eyes  Conjunctiva and lids: No swelling, erythema, or discharge  Pupils and irises: Equal, round and reactive to light  Ears, Nose, Mouth, and Throat  External inspection of ears and nose: Normal    Otoscopic examination: Tympanic membrance translucent with normal light reflex  Canals patent without erythema  Nasal mucosa, septum, and turbinates: Normal without edema or erythema  Oropharynx: Normal with no erythema, edema, exudate or lesions  Pulmonary  Respiratory effort: No increased work of breathing or signs of respiratory distress  Auscultation of lungs: Clear to auscultation, equal breath sounds bilaterally, no wheezes, no rales, no rhonci  Cardiovascular  Palpation of heart: Normal PMI, no thrills  Auscultation of heart: Normal rate and rhythm, normal S1 and S2, without murmurs  Examination of extremities for edema and/or varicosities: Normal    Carotid pulses: Normal    Abdomen  Abdomen: Non-tender, no masses  -- SEB done, prostate normal   Liver and spleen: No hepatomegaly or splenomegaly  Lymphatic  Palpation of lymph nodes in neck: No lymphadenopathy  Musculoskeletal  Gait and station: Normal    Digits and nails: Normal without clubbing or cyanosis  Skin  Skin and subcutaneous tissue: Normal without rashes or lesions  Neurologic  Sensation: No sensory loss     Psychiatric  Mood and affect: Normal          Provider Comments    awv-sfor diarrhea if possiblestablestableimproving but adjust acarbose, cost??AWV-s      Future Appointments    Date/Time Provider Specialty Site   02/15/2018 08:30 AM [address] [address]   02/14/2018 09:00 AM John Melgar DPM Podiatry [facility]       Signatures   Electronically signed by : Alexa Cast DO; Nov 14 2017 12:49PM EST (Author)

## 2017-11-15 NOTE — PROGRESS NOTES
Assessment    1  Atherosclerosis of native artery of both lower extremities with intermittent claudication (440 21) (I70 213)   2  Diabetic neuropathy (250 60,357 2) (E11 40)   3  Onychomycosis (110 1) (B35 1)   4  Pain in both feet (729 5) (M79 671,M79 672)    Plan     · *VB - Foot Exam; Status:Complete;   Done: 87JIS9581 09:11AM   · Follow-up visit in 9 weeks Evaluation and Treatment  Follow-up  Status: Hold For -Scheduling  Requested for: 79SPX5267   · Diabetes Foot Exam Performed; Status:Complete;   Done: 95ILS8966 09:11AM   · Inspect your feet daily ; Status:Complete;   Done: 79UJK6707 09:11AM       Follow-up visit in 1 month Evaluation and Treatment  Follow-up  Status: Hold For - Scheduling  Requested for: 16NQH0574 Ordered; For: Lumbar radiculopathy;  Ordered By: Vianney Shin  Performed:   Due: 92ENP0804   Discussion/Summary    Discussed proper diabetic foot care daily foot checks  The patient was counseled regarding instructions for management,-- patient and family education,-- importance of compliance with treatment  The treatment plan was reviewed with the patient/guardian  The patient/guardian understands and agrees with the treatment plan      Chief Complaint  Patient has painful calluses and thick painful nails that hurt when walking and wearing shoes  History of Present Illness  HPI: Patient is a diabetic  History of burning and numbness in feet  Active Problems    1  Allergic rhinitis (477 9) (J30 9)   2  Arthropathy (716 90) (M12 9)   3  Atherosclerosis of native artery of both lower extremities with intermittent claudication (440 21) (I70 213)   4  Benign essential hypertension (401 1) (I10)   5  BMI 25 0-25 9,adult (V85 21) (Z68 25)   6  CAD (coronary artery disease) (414 00) (I25 10)   7  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 mellitus (250 40,585 2) (E11 22,N18 2)   8  Colon cancer screening (V76 51) (Z12 11)   9  Depression screening (V79 0) (Z13 89)   10   Diabetic neuropathy (250 60,357 2) (E11 40)   11  GE reflux (530 81) (K21 9)   12  Immunization due (V05 9) (Z23)   13  Lumbar radiculopathy (724 4) (M54 16)   14  Peripheral arterial disease (443 9) (I73 9)   15  Prostate cancer screening (V76 44) (Z12 5)   16  Rosacea (695 3) (L71 9)   17  Screening for cardiovascular, respiratory, and genitourinary diseases  (V81 2,V81 4,V81 6) (Z13 6,Z13 83,Z13 89)   18  Screening for neurological condition (V80 09) (Z13 89)   19  Seborrheic dermatitis (690 10) (L21 9)   20  Type 2 diabetes mellitus with diabetic neuropathy (250 60,357 2) (E11 40)   21  Type 2 diabetes mellitus with other circulatory complications (092 56) (C22 55)   22   Type 2 diabetes mellitus with other kidney complication (375 33) (A08 39)    Past Medical History   · History of Acquired Hallux Valgus (735 0)   · History of Acute upper respiratory infection (465 9) (J06 9)   · History of Atrophy of nail (703 8) (L60 3)   · History of Bursitis of hip, unspecified laterality   · History of Callus (700) (L84)   · History of Callus (700) (L84)   · History of Callus (700) (L84)   · History of Cellulitis (682 9) (L03 90)   · History of Deformity of ankle and foot, acquired (736 70) (M21 969)   · History of Difficulty walking (719 7) (R26 2)   · History of Dysesthesia (782 0) (R20 8)   · History of Edema (782 3) (R60 9)   · History of Hammer toe, unspecified laterality (735 4) (M20 40)   · History of acute bronchitis (V12 69) (Z87 09)   · History of allergic rhinitis (V12 69) (Z87 09)   · History of anemia (V12 3) (Z86 2)   · History of backache (V13 59) (Z87 39)   · History of bursitis (V13 59) (Z87 39)   · History of pneumonia (V12 61) (Z87 01)   · History of tendinitis (V13 59) (Z87 39)   · History of tinea corporis (V12 09) (Z86 19)   · History of viral warts (V12 09) (Z86 19)   · History of Late Effects Of Sprain Or Strain (905 7)   · History of Leg swelling (729 81) (M79 89)   · History of Limb pain (729 5) (M79 609)   · History of Limb pain (729 5) (M79 609)   · History of Metatarsalgia, unspecified laterality (726 70) (M77 40)   · History of Onychomycosis of toenail (110 1) (B35 1)   · History of Open wound of foot, excluding toe(s) (892 0) (S91 309A)   · History of Pain in both feet (729 5) (M79 671,M79 672)   · History of Pain in both feet (729 5) (M79 671,M79 672)   · History of Pain in both feet (729 5) (M79 671,M79 672)   · History of Pain in both feet (729 5) (M79 671,M79 672)   · History of Pes planus, unspecified laterality (734) (M21 40)   · History of Piriformis muscle pain (729 1) (M79 1)   · History of Shoulder joint pain, unspecified laterality   · 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)   · History of Type 2 diabetes mellitus (250 00) (E11 9)    Surgical History   · History of Bypass Graft Using Vein: Femoral-popliteal   · History of Cataract Surgery   · History of Foot Surgery   · History of Heart Surgery   · History of Leg Repair   · History of Rotator Cuff Repair   · History of Shoulder Surgery    The surgical history was reviewed and updated today  Family History  Mother    · Family history of Aortic Aneurysm  Father    · Family history of acute myocardial infarction (V17 3) (Z82 49)  Sister    · Family history of acute myocardial infarction (V17 3) (Z82 49)    The family history was reviewed and updated today  Social History     · Being A Social Drinker   · Denied: History of Drug Use   · Never A Smoker   · Retired From Work  The social history was reviewed and updated today  Current Meds   1  Acarbose 25 MG Oral Tablet; TAKE 1 TABLET 3 TIMES DAILY WITH THE FIRST BITE OF EACH MAIN MEAL; Therapy: 40Duv3652 to (Evaluate:58Pwr3185); Last Rx:55Cvd2767 Ordered   2  Carvedilol 12 5 MG Oral Tablet; Take 1 tablet twice a day; Therapy: 69HPQ6457 to (Last Rx:94Khx5025) Ordered   3  Clopidogrel Bisulfate 75 MG Oral Tablet; Take 1 tablet daily;  Therapy: 06CPI8021 to (Last Rx: 44SFO4928)  Requested for: 88CSU3017 Ordered   4  Fluocinolone Acetonide 0 025 % External Cream; Therapy: 43QRL2025 to Recorded   5  Gabapentin 300 MG Oral Capsule; TAKE 1 CAPSULE 3 TIMES DAILY; Therapy: 88DHR0643 to (Brianna Hamilton)  Requested for: 00JTF1266; Last Rx:10Mar2017 Ordered   6  Glimepiride 4 MG Oral Tablet; TAKE ONE TABLET BY MOUTH TWICE A DAY; Therapy: 25ZLC8122 to (Evaluate:12Jan2018)  Requested for: 77LNK0814; Last Rx:17Jan2017 Ordered   7  Halobetasol Propionate 0 05 % External Cream; Therapy: 44VTR2650 to Recorded   8  Lisinopril 2 5 MG Oral Tablet; TAKE 1 TABLET DAILY; Therapy: 45PMN3810 to (Evaluate:73Otw8767)  Requested for: 27ZYP9955; Last Rx:69Mkr8752 Ordered   9  MetFORMIN HCl ER (MOD) 500 MG Oral Tablet Extended Release 24 Hour; 4 PO QD; Therapy: 42LQQ9713 to (Last Rx:42Ovt3194)  Requested for: 15Vfm7473 Ordered   10  Nitrostat 0 4 MG Sublingual Tablet Sublingual; PLACE 1 TABLET UNDER THE TONGUE  EVERY 5 MINUTES FOR UP TO 3 DOSES AS NEEDED FOR CHEST PAIN  CALL  911 IF PAIN PERSISTS; Therapy: 91DHP5412 to (Jose David Cochran)  Requested for: 30Mys5612; Last  Rx:46Lns0826 Ordered   11  Omeprazole 40 MG Oral Capsule Delayed Release; 1 every day; Therapy: 28CDQ3292 to (Evaluate:18Feb2018)  Requested for: 21Oct2017; Last  Rx:21Oct2017 Ordered   12  Simvastatin 40 MG Oral Tablet; take 1 tablet every day; Therapy: 14Ejg8812 to (Evaluate:11Oct2018)  Requested for: 43IJN7720; Last  Rx:16Oct2017 Ordered   13  TRUEplus Lancets 28G Miscellaneous; test glucose once daily <250 00>; Therapy: 23ZUQ1858 to (QNBKYISW:48NAK8858)  Requested for: 22LND5689; Last  Rx:29Jan2015 Ordered   14  TrueTrack Test In Vitro Strip; test 1x/d as directed, <250 00>; Therapy: 16ZJM4579 to (Evaluate:30Jan2016)  Requested for: 39HOW9788; Last  Rx:29Jan2015 Ordered    The medication list was reviewed and updated today  Allergies  1   No Known Drug Allergies    Vitals   Recorded: 99IVN2191 25:23PE   Systolic 228 Diastolic 68   Height 5 ft 11 in   Weight 182 lb    BMI Calculated 25 38   BSA Calculated 2 03       Physical Exam   Constitutional: no acute distress, well appearing and well nourished  Cardiovascular: abnormal dorsalis pedis pulse,-- abnormal posterior tibialis pulse-- and-- abnormal capillary refill  Orthopedic/Biomechanical: abnormal foot type-- and-- hammertoe(s)  Skin: abnormal texture-- and-- keratoses  Neurologic: decreased response to light touch,-- decreased vibration sensation-- and-- decreased response to monofilament testing, but-- normal DTRs  Psychiatric: oriented to person, place, and time  Left Foot: Appearance: Normal except as noted: a deformity (ball of foot and distal fifth metatarsal )  Fifth toe deformities include hammer toe  Tenderness: None except the plantar aspect of the foot  All nails thick discolored dystrophic, positive subungual debris, positive pain on palpation  Special Tests: pre-ulcerative hyperkeratotic lesions fifth metatarsal head bilateral  Negative ulceration negative erythema  Right Foot: Appearance: Normal except as noted: a deformity (distal fifth metatarsal )  Plus one edema negative erythema moderate hyperpigmentation and venous stasis bilateral negative calf tenderness  Special Tests: no signs of infection  Moderate xerosis bilateral    Left Ankle: ROM: limited ROM in all planes   Right Ankle: ROM: limited ROM in all planes   Neurological Exam: Light touch was decreased bilaterally  Vibratory sensation was decreased in both first metatarsophalangeal joints  Response to monofilament test was absent bilaterally  Deep tendon reflexes: achilles reflex 1/4 bilaterally  Vascular Exam: performed Dorsalis pedis pulses were 1/4 bilaterally  Posterior tibial pulses were 1/4 bilaterally  Elevation Pallor: diminished bilaterally  Capillary refill time was Q  9, findings bilateral  Negative digital hair noted, positive abnormal cooling  All pre-ulcer, lesions debrided  Today, but-- between 1-3 seconds bilaterally  Edema: mild bilaterally-- and-- All mycotic nails debrided  Today  The patient was given orthotics to help control his feet and at as cushioning and padding on the bottom of his feet q9 findings  Toenails: All of the toenails were elongated,-- hypertrophied,-- discolored,-- ingrown,-- shown to have subungual debris-- and-- tender  Socks and shoes removed, Right Foot Findings: dry  The sensory exam showed diminished vibratory sensation at the level of the toes, but-- normal position sense at the level of the toes  Diminished tactile sensation with monofilament testing throughout the right foot  Socks and shoes removed, Left Foot Findings: dry and with a(n) callus  The sensory exam showed diminished vibratory sensation at the level of the toes, but-- normal position sense at the level of the toes  Diminished tactile sensation with monofilament testing throughout the left foot  Pulses:  1+ in the posterior tibialis on the right  1+ in the dorsalis pedis on the right  Capillary refills findings on the left were delayed in the toes  Pulses:  1+ in the posterior tibialis on the left  1+ in the dorsalis pedis on the left  Assign Risk Category: 2: Loss of protective sensation with or without weakness, deformity, callus, pre-ulcer, or history of ulceration  High risk  Hyperkeratosis: present on both first sub metatarsals,-- present on both fifth sub metatarsals-- and-- Pre-ulcerative lesion, submetatarsal one to 5, bilateral    Shoe Gear Evaluation: performed ()  Recommendation(s): custom inlays  Procedure  Aseptic debridement and planing of nails Ã10, with nail nipper and nail , no complicationsAseptic debridement of pre-trophic hyperkeratotic lesions Ã2 plantar feet bilateral  Debridement with #10 scalpel debrided nonviable tissue down to healthy viable tissue   No complications jonathan feet bilateral      Future Appointments    Date/Time Provider Specialty Site   11/14/2017 10:00 AM Tamanna Mclean, [address] [place] [address]   02/14/2018 09:00 AM Theodore Gardiner DPM Podiat[facility]       Signatures   Electronically signed by :  Sravani Isabel DPM; Nov 14 2017  9:12AM EST                       (Author)

## 2017-11-29 ENCOUNTER — GENERIC CONVERSION - ENCOUNTER (OUTPATIENT)
Dept: OTHER | Facility: OTHER | Age: 72
End: 2017-11-29

## 2018-01-11 NOTE — MISCELLANEOUS
Message   Recorded as Task   Date: 05/02/2016 04:29 PM, Created By: Laquita Monique   Task Name: Follow Up   Assigned To: Felipe Travis   Regarding Patient: Roseanne Tate, Status: Active   Sravan Bailey - 02 May 2016 4:29 PM     TASK CREATED  Caller: Self; Other; [phone] (Home)  Patient would like doctors opinion on which medication to get, between JANUVIA 25 MG or INVOKANA 100 MG  Patient says they are both the same price  The patient uses [employer]  Perfecto Tintah - 02 May 2016 4:46 PM     TASK REASSIGNED: Previously Assigned To [address]  please advise  Felipe Travis - 04 May 2016 10:10 PM     TASK EDITED  If he is willing, i can escribe Januvia 50mg/d to replace the onglyza 5mg/d   Virginia Newsome - 03 May 2016 8:40 AM     TASK EDITED  LM RC  Perfecto Victoriano - 03 May 2016 9:14 AM     TASK REASSIGNED: Previously Assigned To [address]  pt agreed  please send Januvia to [facility] mail order     Felipe Travis - 03 May 2016 11:03 PM     TASK EDITED  done        Plan  Type 2 diabetes mellitus with renal manifestations, controlled    · Onglyza 5 MG Oral Tablet   · Januvia 50 MG Oral Tablet; 1 every day    Signatures   Electronically signed by : Yareli Garcia DO; May  3 2016 11:03PM EST                       (Author)

## 2018-01-11 NOTE — RESULT NOTES
Verified Results  (1) TISSUE EXAM [id number] 12:23PM Kyle Colon     Test Name Result Flag Reference   LAB AP CASE REPORT (Report)     Surgical Pathology Report             Case: [id number]                   Authorizing Provider: Vicki Truong DO    Collected:      08/15/2016 1223        Pathologist:      Rosalie Lane MD        Received:      08/17/2016 1243        Specimen:  Skin, Other, Left forearm   LAB AP FINAL DIAGNOSIS      A  Skin, Left forearm, biopsy:  - Squamous cell carcinoma  Interpretation performed at [facility], [address] [place] [address]        Electronically signed by Rosalie Lane MD on 8/24/2016 at 3:16 PM   LAB AP SURGICAL ADDITIONAL INFORMATION (Report)     These tests were developed and their performance characteristics   determined by [facility] or [address] [place] [address]  They may not be cleared or approved by the [place]  Food and   Drug Administration  The FDA has determined that such clearance or   approval is not necessary  These tests are used for clinical purposes  They should not be regarded as investigational or for research  This   laboratory has been approved by [place] [address], designated as a high-complexity   laboratory and is qualified to perform these tests  LAB AP GROSS DESCRIPTION (Report)     A  The specimen is received in formalin, labeled with the patient's name   and hospital number, and is designated skin punch biopsy left forearm  The specimen consists of a tan portion of skin measuring 0 6 cm in   diameter, excised to a depth of 0 2-0 3 cm  The skin surface appears   keratotic  The margin is inked blue  The skin surface is inked red  The   specimen is bisected perpendicular to the margin/skin surface  Entirely   submitted  One cassette  Note: The estimated total formalin fixation time based upon information   provided by the submitting clinician and the standard processing schedule   is over 72 hours      MAC

## 2018-01-12 VITALS — BODY MASS INDEX: 26.04 KG/M2 | WEIGHT: 186 LBS | HEART RATE: 72 BPM | HEIGHT: 71 IN | RESPIRATION RATE: 16 BRPM

## 2018-01-12 VITALS
HEIGHT: 71 IN | SYSTOLIC BLOOD PRESSURE: 134 MMHG | HEART RATE: 76 BPM | DIASTOLIC BLOOD PRESSURE: 68 MMHG | BODY MASS INDEX: 25.48 KG/M2 | RESPIRATION RATE: 16 BRPM | TEMPERATURE: 96 F | WEIGHT: 182 LBS

## 2018-01-12 NOTE — RESULT NOTES
Verified Results  (1) BASIC METABOLIC PROFILE [id number] 11:31AM Samm Koo Order Number: [id number]     Test Name Result Flag Reference   GLUCOSE,RANDM 113 mg/dL     If the patient is fasting, the ADA then defines impaired fasting glucose as > 100 mg/dL and diabetes as > or equal to 123 mg/dL  SODIUM 133 mmol/L L 136-145   POTASSIUM 4 8 mmol/L  3 5-5 3   CHLORIDE 102 mmol/L  100-108   CARBON DIOXIDE 24 mmol/L  21-32   ANION GAP (CALC) 7 mmol/L  4-13   BLOOD UREA NITROGEN 11 mg/dL  5-25   CREATININE 1 23 mg/dL  0 60-1 30   Standardized to IDMS reference method   CALCIUM 9 0 mg/dL  8 3-10 1   eGFR Non-African American 57 8 ml/min/1 73sq m     [facility] Disease Education Program recommendations are as follows:  GFR calculation is accurate only with a steady state creatinine  Chronic Kidney disease less than 60 ml/min/1 73 sq  meters  Kidney failure less than 15 ml/min/1 73 sq  meters

## 2018-01-12 NOTE — RESULT NOTES
Verified Results  (1) COMPREHENSIVE METABOLIC PANEL [id number] 70:12ZE Lino Primrose Order Number: [id number]     Order Number: [id number]  National Kidney Disease Education Program recommendations are as follows:  GFR calculation is accurate only with a steady state creatinine  Chronic Kidney disease less than 60 ml/min/1 73 sq  meters  Kidney failure less than 15 ml/min/1 73 sq  meters  Test Name Result Flag Reference   GLUCOSE,RANDM 200 mg/dL H    If the patient is fasting, the ADA then defines impaired fasting glucose as > 100 mg/dL and diabetes as > or equal to 123 mg/dL  SODIUM 135 mmol/L L 136-145   POTASSIUM 4 7 mmol/L  3 5-5 3   CHLORIDE 105 mmol/L  100-108   CARBON DIOXIDE 25 mmol/L  21-32   ANION GAP (CALC) 5 mmol/L  4-13   BLOOD UREA NITROGEN 12 mg/dL  5-25   CREATININE 1 25 mg/dL  0 60-1 30   Standardized to IDMS reference method   CALCIUM 8 9 mg/dL  8 3-10 1   BILI, TOTAL 0 61 mg/dL  0 20-1 00   ALK PHOSPHATAS 85 U/L     ALT (SGPT) 30 U/L  12-78   AST(SGOT) 15 U/L  5-45   ALBUMIN 3 8 g/dL  3 5-5 0   TOTAL PROTEIN 7 0 g/dL  6 4-8 2   eGFR Non-African American 56 9 ml/min/1 73sq m         Discussion/Summary   Morning sugar is high    Kidneys and minerals and liver are normal   Dr Salma Truong

## 2018-01-13 VITALS
TEMPERATURE: 96.9 F | WEIGHT: 182 LBS | HEART RATE: 72 BPM | HEIGHT: 71 IN | SYSTOLIC BLOOD PRESSURE: 136 MMHG | BODY MASS INDEX: 25.48 KG/M2 | DIASTOLIC BLOOD PRESSURE: 70 MMHG | RESPIRATION RATE: 16 BRPM

## 2018-01-13 VITALS
WEIGHT: 190 LBS | DIASTOLIC BLOOD PRESSURE: 77 MMHG | BODY MASS INDEX: 26.6 KG/M2 | SYSTOLIC BLOOD PRESSURE: 129 MMHG | HEIGHT: 71 IN

## 2018-01-13 VITALS
SYSTOLIC BLOOD PRESSURE: 150 MMHG | HEIGHT: 71 IN | OXYGEN SATURATION: 97 % | HEART RATE: 77 BPM | BODY MASS INDEX: 26.19 KG/M2 | DIASTOLIC BLOOD PRESSURE: 70 MMHG | WEIGHT: 187.1 LBS

## 2018-01-13 VITALS
BODY MASS INDEX: 25.48 KG/M2 | WEIGHT: 182 LBS | HEIGHT: 71 IN | SYSTOLIC BLOOD PRESSURE: 130 MMHG | DIASTOLIC BLOOD PRESSURE: 68 MMHG

## 2018-01-13 VITALS
SYSTOLIC BLOOD PRESSURE: 120 MMHG | TEMPERATURE: 97 F | HEIGHT: 71 IN | DIASTOLIC BLOOD PRESSURE: 72 MMHG | BODY MASS INDEX: 26.6 KG/M2 | HEART RATE: 88 BPM | WEIGHT: 190 LBS | RESPIRATION RATE: 16 BRPM

## 2018-01-13 VITALS
SYSTOLIC BLOOD PRESSURE: 126 MMHG | BODY MASS INDEX: 25.9 KG/M2 | DIASTOLIC BLOOD PRESSURE: 70 MMHG | RESPIRATION RATE: 16 BRPM | HEIGHT: 71 IN | HEART RATE: 75 BPM | WEIGHT: 185 LBS

## 2018-01-13 NOTE — RESULT NOTES
Verified Results  (1) CBC/PLT/DIFF [id number] 08:31AM [place] Order Number: [id number]     Test Name Result Flag Reference   WBC COUNT 6 24 Thousand/uL  4 31-10 16   RBC COUNT 3 90 Million/uL  3 88-5 62   HEMOGLOBIN 10 8 g/dL L 12 0-17 0   HEMATOCRIT 32 6 % L 36 5-49 3   MCV 84 fL  82-98   MCH 27 7 pg  26 8-34 3   MCHC 33 1 g/dL  31 4-37 4   RDW 15 8 % H 11 6-15 1   MPV 9 1 fL  8 9-12 7   PLATELET COUNT 817 Thousands/uL  149-390   nRBC AUTOMATED 0 /100 WBCs     NEUTROPHILS RELATIVE PERCENT 74 %  43-75   LYMPHOCYTES RELATIVE PERCENT 14 %  14-44   MONOCYTES RELATIVE PERCENT 9 %  4-12   EOSINOPHILS RELATIVE PERCENT 3 %  0-6   BASOPHILS RELATIVE PERCENT 0 %  0-1   NEUTROPHILS ABSOLUTE COUNT 4 58 Thousands/? ??L  1 85-7 62   LYMPHOCYTES ABSOLUTE COUNT 0 85 Thousands/? ??L  0 60-4 47   MONOCYTES ABSOLUTE COUNT 0 57 Thousand/? ??L  0 17-1 22   EOSINOPHILS ABSOLUTE COUNT 0 20 Thousand/? ??L  0 00-0 61   BASOPHILS ABSOLUTE COUNT 0 02 Thousands/? ??L  0 00-0 10

## 2018-01-13 NOTE — RESULT NOTES
Verified Results  (1) COMPREHENSIVE METABOLIC PANEL [id number] 34:91LB [employer] Order Number: [id number]     Test Name Result Flag Reference   GLUCOSE,RANDM 120 mg/dL     If the patient is fasting, the ADA then defines impaired fasting glucose as > 100 mg/dL and diabetes as > or equal to 123 mg/dL  SODIUM 134 mmol/L L 136-145   POTASSIUM 4 6 mmol/L  3 5-5 3   CHLORIDE 100 mmol/L  100-108   CARBON DIOXIDE 24 mmol/L  21-32   ANION GAP (CALC) 10 mmol/L  4-13   BLOOD UREA NITROGEN 14 mg/dL  5-25   CREATININE 1 35 mg/dL H 0 60-1 30   Standardized to IDMS reference method   CALCIUM 9 0 mg/dL  8 3-10 1   BILI, TOTAL 0 66 mg/dL  0 20-1 00   ALK PHOSPHATAS 68 U/L     ALT (SGPT) 37 U/L  12-78   AST(SGOT) 24 U/L  5-45   ALBUMIN 3 9 g/dL  3 5-5 0   TOTAL PROTEIN 7 3 g/dL  6 4-8 2   eGFR Non-African American 52 0 ml/min/1 73sq m     - Patient Instructions: This is a fasting blood test  Water, black tea or black coffee only after 9:00pm the night before test Drink 2 glasses of water the morning of test   National Kidney Disease Education Program recommendations are as follows:  GFR calculation is accurate only with a steady state creatinine  Chronic Kidney disease less than 60 ml/min/1 73 sq  meters  Kidney failure less than 15 ml/min/1 73 sq  meters  (1) LIPID PANEL FASTING W DIRECT LDL REFLEX [id number] 07:23AM K[employer]idy [employer] Order Number: [id number]     Test Name Result Flag Reference   CHOLESTEROL 127 mg/dL     LDL CHOLESTEROL CALCULATED 62 mg/dL  0-100   - Patient Instructions: This is a fasting blood test  Water, black tea or black coffee only after 9:00pm the night before test   Drink 2 glasses of water the morning of test     - Patient Instructions:  This is a fasting blood test  Water, black tea or black coffee only after 9:00pm the night before test Drink 2 glasses of water the morning of test   Triglyceride:         Normal              <150 mg/dl Borderline High    150-199 mg/dl       High               200-499 mg/dl       Very High          >499 mg/dl  Cholesterol:         Desirable        <200 mg/dl      Borderline High  200-239 mg/dl      High             >239 mg/dl  HDL Cholesterol:        High    >59 mg/dL      Low     <41 mg/dL  LDL Cholesterol:        Optimal          <100 mg/dl        Near Optimal     100-129 mg/dl        Above Optimal          Borderline High   130-159 mg/dl          High              160-189 mg/dl          Very High        >189 mg/dl  LDL CALCULATED:    This screening LDL is a calculated result  It does not have the accuracy of the Direct Measured LDL in the monitoring of patients with hyperlipidemia and/or statin therapy  Direct Measure LDL (GFO250) must be ordered separately in these patients  TRIGLYCERIDES 79 mg/dL  <=150   Specimen collection should occur prior to N-Acetylcysteine or Metamizole administration due to the potential for falsely depressed results  HDL,DIRECT 49 mg/dL  40-60   Specimen collection should occur prior to Metamizole administration due to the potential for falsely depressed results  (1) HEMOGLOBIN A1C [id number] 07:23AM Jose Sanders Order Number: [id number]     Test Name Result Flag Reference   HEMOGLOBIN A1C 6 9 % H 4 2-6 3   EST  AVG  GLUCOSE 151 mg/dl         Discussion/Summary   Please keep your office visit as scheduled     Dr Maximino Lackey

## 2018-01-13 NOTE — RESULT NOTES
Verified Results  * XR CHEST PA & LATERAL [id number] 12:00AM Leti Ugarte     Test Name Result Flag Reference   XR CHEST PA & LATERAL (Report)     CHEST      INDICATION: Cough  Evaluate for pneumonia     COMPARISON: 3/26/2014     VIEWS: Frontal and lateral projections; 2 images     FINDINGS:        Cardiomediastinal silhouette appears unremarkable  The lungs are clear  No pneumothorax or pleural effusion  Visualized osseous structures appear within normal limits for the patient's age  IMPRESSION:     No active pulmonary disease         Workstation performed: [id number]     Signed by:   Kelvin Mckeon MD   2/29/16       Discussion/Summary   Your chest xray is normal   - Dr Constanza Hamilton

## 2018-01-13 NOTE — RESULT NOTES
Verified Results  (1) COMPREHENSIVE METABOLIC PANEL [id number] 93:81ZQ Tonja Valencia Order Number: [id number]     Test Name Result Flag Reference   SODIUM 131 mmol/L L 136-145   POTASSIUM 4 7 mmol/L  3 5-5 3   CHLORIDE 98 mmol/L L 100-108   CARBON DIOXIDE 25 mmol/L  21-32   ANION GAP (CALC) 8 mmol/L  4-13   BLOOD UREA NITROGEN 12 mg/dL  5-25   CREATININE 1 24 mg/dL  0 60-1 30   Standardized to IDMS reference method   CALCIUM 8 9 mg/dL  8 3-10 1   BILI, TOTAL 0 57 mg/dL  0 20-1 00   ALK PHOSPHATAS 70 U/L     ALT (SGPT) 31 U/L  12-78   Specimen collection should occur prior to Sulfasalazine and/or Sulfapyridine administration due to the potential for falsely depressed results  AST(SGOT) 21 U/L  5-45   Specimen collection should occur prior to Sulfasalazine administration due to the potential for falsely depressed results  ALBUMIN 3 6 g/dL  3 5-5 0   TOTAL PROTEIN 6 9 g/dL  6 4-8 2   eGFR 58 ml/min/1 73sq m     National Kidney Disease Education Program recommendations are as follows:  GFR calculation is accurate only with a steady state creatinine  Chronic Kidney disease less than 60 ml/min/1 73 sq  meters  Kidney failure less than 15 ml/min/1 73 sq  meters  GLUCOSE FASTING 172 mg/dL H 65-99   Specimen collection should occur prior to Sulfasalazine administration due to the potential for falsely depressed results  Specimen collection should occur prior to Sulfapyridine administration due to the potential for falsely elevated results

## 2018-01-14 VITALS
TEMPERATURE: 98.2 F | HEIGHT: 71 IN | WEIGHT: 184 LBS | RESPIRATION RATE: 18 BRPM | SYSTOLIC BLOOD PRESSURE: 122 MMHG | DIASTOLIC BLOOD PRESSURE: 60 MMHG | HEART RATE: 76 BPM | BODY MASS INDEX: 25.76 KG/M2

## 2018-01-14 VITALS
WEIGHT: 187 LBS | DIASTOLIC BLOOD PRESSURE: 63 MMHG | SYSTOLIC BLOOD PRESSURE: 163 MMHG | BODY MASS INDEX: 26.18 KG/M2 | HEIGHT: 71 IN

## 2018-01-14 NOTE — PROCEDURES
Procedures by Johan Handy MD  at 7/25/2017 10:45 AM      Author:  Johan Handy MD Service:  Cardiology Author Type:  Physician    Filed:  7/25/2017 10:55 AM Date of Service:  7/25/2017 10:45 AM Status:  Signed    :  Johan Handy MD (Physician)           Raven Martin  [phone]  7/25/2017  Ta Moea, DO      Digital substracted images  of left lower extremity and PTA of left superficial femoral artery      Left common iliac artery angiography  Left superficial artery angiography  Left superficial artery PTA  Fluoroscopy      Cardiologist: Dr Johan Handy MD [facility]    Brief history: 71-year-old male with a past medical history significant for hypertension, coronary artery disease, PVD with previous stent placed in left superficial femoral artery about 5 years ago by me in 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 now came with the complaints about left lower extremity claudication and was found to have abnormal arterial duplex study  He was sent to us by podiatry and as patient was initially managed with medical therapy  and he continued to have symptoms haven't scheduled for left lower extremity angiography and possible PTA  Procedure Details  The risks, benefits, complications, including risk of stroke, heart attack, bleeding and even death but not limited to at along with treatment options, and expected outcomes were discussed with the patient  The patient and/or family concurred with the  proposed plan, giving informed consent  Patient was brought to the cath lab after IV hydration was begun and oral premedication was given  He was further sedated with midazolam and fentanyl  He was prepped and draped in the usual manner  Using the modified  Seldinger access technique, a 5 Belgian sheath was placed in the right common femoral artery without any complications     A contra catheter was placed with the help of J-wire and lower abdominal aorta and with the help of Glidewire a glide catheter was placed in the left common iliac artery  With the help of glide catheter  subtracted images of the left lower extremity were  obtained  We used the same Glidewire to cross tightly stenosed in-stent stenosis lesion in the mid left superficial femoral artery  As a glide catheter was not crossing over did a quick cross catheter was used to cross the lesion and Glidewire was exchanged  with stiff wire  Patient's 5 Tamazight sheath was exchanged with a Balkan sheath  He was given 5000 units of IV heparin  The lesion was dilated with a 5 mm into 60 mm balloon  Balloon inflations were done up to 16 atmospheric pressure  Decrease in the stenosis  of mid SFA from 90% to about 10%  No complications  And the procedure sheath was pulled manually after ACT was checked and FemoStop was applied    Equipment used:   1  Contra catheter  2  [place] catheter  3  Quick cross  4  5 x 60 mm Access UK balloon  5  Balkan sheath    Findings:    1  Abdominal Aorta: Abdominal aortography was not done to save dye as patient has mild CRI    2  Left lower extremity: Left common iliac artery and left external iliac artery is widely patent  Mild atherosclerotic plaque is seen in left common femoral artery as well as left deep profunda femoris artery  Left superficial femoral artery has  a stent placed in the mid male patient has in-stent 2 sequential lesions in the proximal 90% and distal one around 70%  Below the knee patient has only one vessel runoff  Patient's main artery spine is posterior tibial artery  Anterior tibial is 100% occluded and reconstituted at the ankle  Peroneal is very severely diseased  Only suitable artery goes to the ankle as well  as foot area supplying collaterals to the other 2 branches      3   As patient had significant stenosis of his previously placed stent which was successfully treated with a 5 mm into 60 mm balloon and balloon inflations were done for about 16 atmospheric pressure and 45 seconds  3 inflations were performed  No complication  stenosis was decreased to less than 10%  Estimated Blood Loss: Minimal    Complications:  None,  patient tolerated the procedure well  Recommendation: Continue medical therapy  Continue aggressive risk factor modification  Patient will be started on Plavix for about 3 months  Continue aspirin and other medications  Continue hydration  Will check BMP in about 7 days    Disposition: Hemodynamically stable and PACU - hemodynamically stable    Condition: Stable    Portions of the record may have been created with voice recognition software   Occasional wrong word or sound a like substitutions may have occurred due to the inherent limitations of voice recognition software   Read the chart  carefully and recognize, using context, where substitutions have occurred  Dr Lydia Abt, MD Irine Phoenix M D    Jul 25 2017 10:56AM [facility] Standard Time

## 2018-01-15 VITALS
HEART RATE: 88 BPM | HEIGHT: 71 IN | BODY MASS INDEX: 27.16 KG/M2 | SYSTOLIC BLOOD PRESSURE: 140 MMHG | DIASTOLIC BLOOD PRESSURE: 72 MMHG | RESPIRATION RATE: 20 BRPM | WEIGHT: 194 LBS | TEMPERATURE: 97.1 F

## 2018-01-15 NOTE — RESULT NOTES
Verified Results  (1) BASIC METABOLIC PROFILE [id number] 08:26AM Tamanna Mclean     Test Name Result Flag Reference   SODIUM 132 mmol/L L 136-145   POTASSIUM 4 7 mmol/L  3 5-5 3   CHLORIDE 99 mmol/L L 100-108   CARBON DIOXIDE 26 mmol/L  21-32   ANION GAP (CALC) 7 mmol/L  4-13   BLOOD UREA NITROGEN 11 mg/dL  5-25   CREATININE 1 18 mg/dL  0 60-1 30   Standardized to IDMS reference method   CALCIUM 8 6 mg/dL  8 3-10 1   eGFR 61 ml/min/1 73sq m     National Kidney Disease Education Program recommendations are as follows:  GFR calculation is accurate only with a steady state creatinine  Chronic Kidney disease less than 60 ml/min/1 73 sq  meters  Kidney failure less than 15 ml/min/1 73 sq  meters  GLUCOSE FASTING 143 mg/dL H 65-99   Specimen collection should occur prior to Sulfasalazine administration due to the potential for falsely depressed results  Specimen collection should occur prior to Sulfapyridine administration due to the potential for falsely elevated results

## 2018-01-15 NOTE — RESULT NOTES
Discussion/Summary   Oliva Petersen,   Please review your results with Dr Joelle Celestin at your upcoming visit  Dr Kanika Smith      Verified Results  (1) COMPREHENSIVE METABOLIC PANEL [id number] 88:71JV [employer] Order Number: [id number]     Test Name Result Flag Reference   SODIUM 137 mmol/L  136-145   POTASSIUM 4 9 mmol/L  3 5-5 3   CHLORIDE 102 mmol/L  100-108   CARBON DIOXIDE 25 mmol/L  21-32   ANION GAP (CALC) 10 mmol/L  4-13   BLOOD UREA NITROGEN 16 mg/dL  5-25   CREATININE 1 37 mg/dL H 0 60-1 30   Standardized to IDMS reference method   CALCIUM 9 2 mg/dL  8 3-10 1   BILI, TOTAL 0 80 mg/dL  0 20-1 00   ALK PHOSPHATAS 68 U/L     ALT (SGPT) 27 U/L  12-78   AST(SGOT) 26 U/L  5-45   ALBUMIN 3 7 g/dL  3 5-5 0   TOTAL PROTEIN 6 7 g/dL  6 4-8 2   eGFR 51 ml/min/1 73sq m     National Kidney Disease Education Program recommendations are as follows:  GFR calculation is accurate only with a steady state creatinine  Chronic Kidney disease less than 60 ml/min/1 73 sq  meters  Kidney failure less than 15 ml/min/1 73 sq  meters  GLUCOSE FASTING 148 mg/dL H 65-99     (1) HEMOGLOBIN A1C [id number] 08:55AM [employer] Order Number: [id number]     Test Name Result Flag Reference   HEMOGLOBIN A1C 7 6 % H 4 2-6 3   EST  AVG   GLUCOSE 171 mg/dl

## 2018-01-16 NOTE — RESULT NOTES
Verified Results  (1) COMPREHENSIVE METABOLIC PANEL [id number] 70:42BT Thomas Block Order Number: [id number]     Test Name Result Flag Reference   SODIUM 136 mmol/L  136-145   POTASSIUM 5 2 mmol/L  3 5-5 3   CHLORIDE 102 mmol/L  100-108   CARBON DIOXIDE 26 mmol/L  21-32   ANION GAP (CALC) 8 mmol/L  4-13   BLOOD UREA NITROGEN 16 mg/dL  5-25   CREATININE 1 45 mg/dL H 0 60-1 30   Standardized to IDMS reference method   CALCIUM 9 2 mg/dL  8 3-10 1   BILI, TOTAL 0 70 mg/dL  0 20-1 00   ALK PHOSPHATAS 68 U/L     ALT (SGPT) 28 U/L  12-78   AST(SGOT) 23 U/L  5-45   ALBUMIN 3 6 g/dL  3 5-5 0   TOTAL PROTEIN 6 7 g/dL  6 4-8 2   eGFR Non-African American 47 8 ml/min/1 73sq m     National Kidney Disease Education Program recommendations are as follows:  GFR calculation is accurate only with a steady state creatinine  Chronic Kidney disease less than 60 ml/min/1 73 sq  meters  Kidney failure less than 15 ml/min/1 73 sq  meters  GLUCOSE FASTING 92 mg/dL  65-99     (1) HEMOGLOBIN A1C [id number] 08:21AM Thomas Block Order Number: [id number]     Test Name Result Flag Reference   HEMOGLOBIN A1C 7 2 % H 4 2-6 3   EST  AVG   GLUCOSE 160 mg/dl

## 2018-01-17 NOTE — RESULT NOTES
Verified Results  (1) BASIC METABOLIC PROFILE [id number] 11:26AM Juan Olson    Order Number: [id number]      National Kidney Disease Education Program recommendations are as follows:  GFR calculation is accurate only with a steady state creatinine  Chronic Kidney disease less than 60 ml/min/1 73 sq  meters  Kidney failure less than 15 ml/min/1 73 sq  meters       Test Name Result Flag Reference   GLUCOSE,RANDM 203 mg/dL H    SODIUM 134 mmol/L L 136-145   POTASSIUM 5 0 mmol/L  3 5-5 3   CHLORIDE 102 mmol/L  100-108   CARBON DIOXIDE 23 mmol/L  21-32   ANION GAP (CALC) 9 mmol/L  4-13   BLOOD UREA NITROGEN 16 mg/dL  5-25   CREATININE 1 28 mg/dL  0 60-1 30   Standardized to IDMS reference method   CALCIUM 8 8 mg/dL  8 3-10 1   eGFR Non-African American 55 4 ml/min/1 73sq m

## 2018-01-22 VITALS
WEIGHT: 188 LBS | DIASTOLIC BLOOD PRESSURE: 62 MMHG | HEIGHT: 71 IN | SYSTOLIC BLOOD PRESSURE: 124 MMHG | HEART RATE: 72 BPM | TEMPERATURE: 96.6 F | BODY MASS INDEX: 26.32 KG/M2 | RESPIRATION RATE: 16 BRPM

## 2018-01-24 DIAGNOSIS — E11.59 TYPE 2 DIABETES MELLITUS WITH OTHER CIRCULATORY COMPLICATION, WITHOUT LONG-TERM CURRENT USE OF INSULIN (HCC): Primary | ICD-10-CM

## 2018-01-24 RX ORDER — METFORMIN HYDROCHLORIDE 500 MG/1
TABLET, FILM COATED, EXTENDED RELEASE ORAL
COMMUNITY
Start: 2014-01-06 | End: 2018-01-24 | Stop reason: SDUPTHER

## 2018-01-24 RX ORDER — GLUCOSAM/CHON-MSM1/C/MANG/BOSW 500-416.6
TABLET ORAL
COMMUNITY
Start: 2013-09-30 | End: 2018-02-15 | Stop reason: SDUPTHER

## 2018-01-24 RX ORDER — GLIMEPIRIDE 4 MG/1
1 TABLET ORAL 2 TIMES DAILY
COMMUNITY
Start: 2012-06-19 | End: 2018-01-24 | Stop reason: SDUPTHER

## 2018-01-24 RX ORDER — OMEPRAZOLE 40 MG/1
CAPSULE, DELAYED RELEASE ORAL DAILY
COMMUNITY
Start: 2016-02-01 | End: 2018-01-24 | Stop reason: SDUPTHER

## 2018-01-24 RX ORDER — LISINOPRIL 2.5 MG/1
1 TABLET ORAL DAILY
COMMUNITY
Start: 2017-09-20 | End: 2018-08-07 | Stop reason: SDUPTHER

## 2018-01-24 RX ORDER — CARVEDILOL 12.5 MG/1
TABLET ORAL
COMMUNITY
Start: 2017-08-14 | End: 2018-02-15 | Stop reason: SDUPTHER

## 2018-01-24 RX ORDER — GABAPENTIN 300 MG/1
1 CAPSULE ORAL 3 TIMES DAILY
COMMUNITY
Start: 2015-02-16 | End: 2018-02-15 | Stop reason: SDUPTHER

## 2018-01-24 RX ORDER — CILOSTAZOL 100 MG/1
TABLET ORAL
COMMUNITY
End: 2018-01-24 | Stop reason: SDUPTHER

## 2018-01-24 RX ORDER — CLOPIDOGREL BISULFATE 75 MG/1
TABLET ORAL
Refills: 0 | COMMUNITY
Start: 2017-11-24 | End: 2018-02-15

## 2018-01-24 RX ORDER — METFORMIN HYDROCHLORIDE 500 MG/1
TABLET, FILM COATED, EXTENDED RELEASE ORAL
COMMUNITY
Start: 2014-01-06 | End: 2018-02-15 | Stop reason: SDUPTHER

## 2018-01-24 RX ORDER — SIMVASTATIN 40 MG
1 TABLET ORAL DAILY
COMMUNITY
Start: 2012-08-23 | End: 2018-02-15 | Stop reason: SDUPTHER

## 2018-01-24 RX ORDER — FLUOCINOLONE ACETONIDE 0.25 MG/G
CREAM TOPICAL
COMMUNITY
Start: 2017-08-16 | End: 2018-02-15 | Stop reason: SDUPTHER

## 2018-01-24 RX ORDER — GLIMEPIRIDE 4 MG/1
4 TABLET ORAL 2 TIMES DAILY
Qty: 180 TABLET | Refills: 3 | Status: SHIPPED | OUTPATIENT
Start: 2018-01-24 | End: 2019-01-18 | Stop reason: SDUPTHER

## 2018-01-24 RX ORDER — ACARBOSE 25 MG/1
TABLET ORAL
COMMUNITY
Start: 2016-08-01 | End: 2018-02-15 | Stop reason: SDUPTHER

## 2018-01-24 RX ORDER — NITROGLYCERIN 0.4 MG/1
TABLET SUBLINGUAL
COMMUNITY
Start: 2010-07-26 | End: 2018-01-24 | Stop reason: SDUPTHER

## 2018-02-08 ENCOUNTER — APPOINTMENT (OUTPATIENT)
Dept: LAB | Facility: CLINIC | Age: 73
End: 2018-02-08
Payer: MEDICARE

## 2018-02-08 DIAGNOSIS — E11.40 TYPE 2 DIABETES MELLITUS WITH DIABETIC NEUROPATHY (HCC): ICD-10-CM

## 2018-02-08 LAB
ALBUMIN SERPL BCP-MCNC: 4 G/DL (ref 3.5–5)
ALP SERPL-CCNC: 84 U/L (ref 46–116)
ALT SERPL W P-5'-P-CCNC: 26 U/L (ref 12–78)
ANION GAP SERPL CALCULATED.3IONS-SCNC: 6 MMOL/L (ref 4–13)
AST SERPL W P-5'-P-CCNC: 16 U/L (ref 5–45)
BILIRUB SERPL-MCNC: 0.77 MG/DL (ref 0.2–1)
BUN SERPL-MCNC: 8 MG/DL (ref 5–25)
CALCIUM SERPL-MCNC: 8.8 MG/DL (ref 8.3–10.1)
CHLORIDE SERPL-SCNC: 105 MMOL/L (ref 100–108)
CO2 SERPL-SCNC: 27 MMOL/L (ref 21–32)
CREAT SERPL-MCNC: 1.16 MG/DL (ref 0.6–1.3)
EST. AVERAGE GLUCOSE BLD GHB EST-MCNC: 163 MG/DL
GFR SERPL CREATININE-BSD FRML MDRD: 62 ML/MIN/1.73SQ M
GLUCOSE P FAST SERPL-MCNC: 142 MG/DL (ref 65–99)
HBA1C MFR BLD: 7.3 % (ref 4.2–6.3)
POTASSIUM SERPL-SCNC: 4.4 MMOL/L (ref 3.5–5.3)
PROT SERPL-MCNC: 7.6 G/DL (ref 6.4–8.2)
SODIUM SERPL-SCNC: 138 MMOL/L (ref 136–145)

## 2018-02-08 PROCEDURE — 36415 COLL VENOUS BLD VENIPUNCTURE: CPT

## 2018-02-08 PROCEDURE — 80053 COMPREHEN METABOLIC PANEL: CPT

## 2018-02-08 PROCEDURE — 83036 HEMOGLOBIN GLYCOSYLATED A1C: CPT

## 2018-02-14 ENCOUNTER — OFFICE VISIT (OUTPATIENT)
Dept: PODIATRY | Facility: CLINIC | Age: 73
End: 2018-02-14
Payer: MEDICARE

## 2018-02-14 VITALS
HEIGHT: 71 IN | HEART RATE: 72 BPM | RESPIRATION RATE: 16 BRPM | SYSTOLIC BLOOD PRESSURE: 126 MMHG | DIASTOLIC BLOOD PRESSURE: 80 MMHG | WEIGHT: 190 LBS | BODY MASS INDEX: 26.6 KG/M2

## 2018-02-14 DIAGNOSIS — M79.672 PAIN IN BOTH FEET: ICD-10-CM

## 2018-02-14 DIAGNOSIS — I70.209 PERIPHERAL ARTERIOSCLEROSIS (HCC): ICD-10-CM

## 2018-02-14 DIAGNOSIS — E11.42 DIABETIC POLYNEUROPATHY ASSOCIATED WITH TYPE 2 DIABETES MELLITUS (HCC): ICD-10-CM

## 2018-02-14 DIAGNOSIS — M79.671 PAIN IN BOTH FEET: ICD-10-CM

## 2018-02-14 DIAGNOSIS — L84 CORNS: Primary | ICD-10-CM

## 2018-02-14 PROCEDURE — 11056 PARNG/CUTG B9 HYPRKR LES 2-4: CPT | Performed by: PODIATRIST

## 2018-02-14 NOTE — PROGRESS NOTES
Assessment/Plan:  Patient is diabetic  He has neuropathy  He has pain with ambulation  Plan  Diabetic foot exam performed  Plantar calluses debrided  Mycotic nails debrided  We recommend increasing gabapentin dosing to 600 mg b i d  Discussion/Summary  The treatment plan was reviewed with the patient/guardian  The patient/guardian understands and agrees with the treatment plan   The patient was counseled regarding instructions for management, patient and family education, importance of compliance with treatment  Chief Complaint  FOLLOW UP AND NAIL TRIM      History of Present Illness  HPI: Patient is a diabetic  Patient complains of to painful lumps on his feet  Active Problems   1  Acquired Hallux Valgus (735 0)   2  Allergic rhinitis (477 9) (J30 9)   3  Arthropathy (716 90) (M12 9)   4  Backache (724 5) (M54 9)   5  Benign essential hypertension (401 1) (I10)   6  CAD (coronary atherosclerotic disease) (414 00) (I25 10)   7  Callus (700) (L84)   8  Chronic kidney disease, stage 3 (585 3) (N18 3)   9  Colon cancer screening (V76 51) (Z12 11)   10  Deformity of ankle and foot, acquired (736 70) (M21 969)   11  Depression screening (V79 0) (Z13 89)   12  Diabetic neuropathy (250 60,357 2) (E11 40)   13  Encounter for prostate cancer screening (V76 44) (Z12 5)   14  Esophagitis, reflux (530 11) (K21 0)   15  Need for immunization against influenza (V04 81) (Z23)   16  Need for pneumococcal vaccination (V03 82) (Z23)   17  Onychomycosis of toenail (110 1) (B35 1)   18  Pain in both feet (729 5) (M79 671,M79 672)   19  Peripheral arterial disease (443 9) (I73 9)   20  Pneumonia (486) (J18 9)   21  Prostate cancer screening (V76 44) (Z12 5)   22  Rosacea (695 3) (L71 9)   23  Screening for genitourinary condition (V81 6) (Z13 89)   24  Screening for neurological condition (V80 09) (Z13 89)   25  Seborrheic dermatitis (690 10) (L21 9)   26   Type 2 diabetes mellitus with diabetic neuropathy (250 60,357 2) (E11 40)   27  Type 2 diabetes mellitus with other circulatory complications (599 84) (K69 17)   28  Type 2 diabetes mellitus with renal manifestations, controlled (250 40) (E11 29)     Past Medical History   · History of Acute upper respiratory infection (465 9) (J06 9)   · History of Atrophy of nail (703 8) (L60 3)   · History of Bursitis of hip, unspecified laterality (726 5) (M70 70)   · History of Callus (700) (L84)   · History of Callus (700) (L84)   · History of Difficulty walking (719 7) (R26 2)   · History of Edema (782 3) (R60 9)   · History of Hammer toe, unspecified laterality (735 4) (M20 40)   · History of acute bronchitis (V12 69) (Z87 09)   · History of allergic rhinitis (V12 69) (Z87 09)   · History of anemia (V12 3) (Z86 2)   · History of bursitis (V13 59) (Z87 39)   · History of tendinitis (V13 59) (Z87 39)   · History of tinea corporis (V12 09) (Z86 19)   · History of viral warts (V12 09) (Z86 19)   · History of Late Effects Of Sprain Or Strain (905 7)   · History of Limb pain (729 5) (M79 609)   · History of Limb pain (729 5) (M79 609)   · History of Metatarsalgia, unspecified laterality (726 70) (M77 40)   · History of Open wound of foot, excluding toe(s) (892 0) (S91 309A)   · History of Pain in both feet (729 5) (M79 671,M79 672)   · History of Pain in both feet (729 5) (M79 671,M79 672)   · History of Pes planus, unspecified laterality (734) (M21 40)   · History of Shoulder joint pain, unspecified laterality   · History of Type 2 diabetes mellitus (250 00) (E11 9)     Surgical History   · History of Bypass Graft Using Vein: Femoral-popliteal   · History of Cataract Surgery   · History of Foot Surgery   · History of Heart Surgery   · History of Leg Repair   · History of Rotator Cuff Repair   · History of Shoulder Surgery     The surgical history was reviewed and updated today         Family History  Mother    · Family history of Aortic Aneurysm  Father    · Family history of acute myocardial infarction (V17 3) (Z82 49)  Sister    · Family history of acute myocardial infarction (V17 3) (Z82 49)     The family history was reviewed and updated today  Social History   · Being A Social Drinker   · Denied: History of Drug Use   · Never A Smoker   · Retired From Work  The social history was reviewed and updated today  Current Meds   1  Carvedilol 6 25 MG Oral Tablet; 1 two times a day; Therapy: 74VRY4116 to (Last Rx:01Feb2016)  Requested for: 52DOJ3083 Ordered   2  Cilostazol 100 MG Oral Tablet; TAKE 1 TABLET TWICE DAILY  Requested for:   91LDX0390; Last Rx:14Jan2015 Ordered   3  Gabapentin 300 MG Oral Capsule; TAKE 1 CAPSULE 3 TIMES DAILY; Therapy: 11OPW9377 to (Evaluate:26Jan2017)  Requested for: 78UYN4459; Last   Rx:01Feb2016 Ordered   4  Glimepiride 4 MG Oral Tablet; TAKE ONE TABLET BY MOUTH TWICE A DAY; Therapy: 71RMJ3062 to (Evaluate:42Plc4763)  Requested for: 77VXK2815; Last   Rx:12Oct2015 Ordered   5  Lisinopril 10 MG Oral Tablet; 1/2 tab twice daily  Requested for: 95TTZ9979; Last   Rx:01Feb2016 Ordered   6  MetFORMIN HCl ER (OSM) 1000 MG Oral Tablet Extended Release 24 Hour; Take 1   tablet twice daily; Therapy: 83XKX0982 to (Evaluate:10Nov2015)  Requested for: 26MNC8133 Recorded   7  Nitrostat 0 4 MG Sublingual Tablet Sublingual; PLACE 1 TABLET UNDER THE TONGUE   EVERY 5 MINUTES FOR UP TO 3 DOSES AS NEEDED FOR CHEST PAIN  CALL   911 IF PAIN PERSISTS; Therapy: 85YBO2931 to (Dhiraj Campbell)  Requested for: 92ROF0111; Last   Rx:65Icb2062 Ordered   8  Omeprazole 40 MG Oral Capsule Delayed Release; 1 every day; Therapy: 40SDI2093 to (Evaluate:30Jhc7110); Last Rx:01Feb2016 Ordered   9  Onglyza 5 MG Oral Tablet; 1 every day; Therapy: 40OSB4738 to (Last Rx:14Jan2015)  Requested for: 26Oct2015 Ordered   10  Simvastatin 40 MG Oral Tablet; take one tablet by mouth every day;     Therapy: 74Ceg7759 to (Evaluate:62Dde7416)  Requested for: 47RUN9481; Last Rx:54Gir2448 Ordered   11  TRUEplus Lancets 28G Miscellaneous; test glucose once daily <250 00>; Therapy: 36EYD0771 to (DFLCEPRF:93IRB4354)  Requested for: 95SHJ0174; Last    Rx:29Jan2015 Ordered   12  TrueTrack Test In Vitro Strip; test 1x/d as directed, <250 00>; Therapy: 48FNF8264 to (Evaluate:30Jan2016)  Requested for: 55GCH9960; Last    Rx:29Jan2015 Ordered     The medication list was reviewed and updated today  Allergies   1  No Known Drug Allergies     Vitals    Recorded: 25Apr2016 11:31AM   Heart Rate 78   Respiration 16   Systolic 962   Diastolic 80   Height 5 ft 11 in   Weight 189 lb    BMI Calculated 26 36   BSA Calculated 2 06      Physical Exam  Left Foot: Appearance: a deformity (ball of foot and distal fifth metatarsal )  Fifth toe deformities include hammer toe  Tenderness: None except the plantar aspect of the foot  Right Foot: Appearance: a deformity (distal fifth metatarsal )  Left Ankle: ROM: limited ROM in all planes   Right Ankle: ROM: limited ROM in all planes   Neurological Exam: Light touch was decreased bilaterally  Vibratory sensation was decreased in both first metatarsophalangeal joints  Response to monofilament test was absent bilaterally  Deep tendon reflexes: achilles reflex 1/4 bilaterally  Vascular Exam: performed Dorsalis pedis pulses were 1/4 bilaterally  Posterior tibial pulses were 1/4 bilaterally  Elevation Pallor: diminished bilaterally  Capillary refill time was Q  9, findings bilateral  Negative digital hair noted, positive abnormal cooling  All pre-ulcer, lesions debrided  Today, but between 1-3 seconds bilaterally  Edema: mild bilaterally and All mycotic nails debrided  Today  The patient was given orthotics to help control his feet and at as cushioning and padding on the bottom of his feet q9 findings  Toenails:  All of the toenails were elongated, hypertrophied, discolored, ingrown, shown to have subungual debris and tender       Socks and shoes removed, the Right Foot: the foot was dry  The sensory exam showed diminished vibratory sensation at the level of the toes  Diminished tactile sensation with monofilament testing throughout the right foot  Socks and shoes removed, Left Foot: the foot was dry  The sensory exam showed diminished vibratory sensation at the level of the toes  Diminished tactile sensation with monofilament testing throughout the left foot  Pulses:   1+ in the posterior tibialis on the right   1+ in the dorsalis pedis on the right  Capillary refills findings on the left were delayed in the toes  Pulses:   1+ in the posterior tibialis on the left   1+ in the dorsalis pedis on the left  Assign Risk Category: 2: Loss of protective sensation with or without weakness, deformity, callus, pre-ulcer, or history of ulceration  High risk  Hyperkeratosis: present on both first sub metatarsals, present on both fifth sub metatarsals and Pre-ulcerative lesion, submetatarsal one to 5, bilateral    Shoe Gear Evaluation: performed ()  Recommendation(s): custom inlays  Procedure  All mycotic nails debrided  Bilateral lesions debrided  Patient tolerated all procedures without pain or complication  Patient's been educated on care of the diabetic al    No problem-specific Assessment & Plan notes found for this encounter  There are no diagnoses linked to this encounter  Subjective:      Patient ID: Giovanna Livingston is a 67 y o  male  Patient is diabetic  He presents for pedal evaluation  He is taking gabapentin  He still has some numbness in his feet        The following portions of the patient's history were reviewed and updated as appropriate: allergies, current medications, past family history, past medical history, past social history, past surgical history and problem list     Review of Systems      Objective:      Foot Exam    Right Foot/Ankle     Inspection and Palpation  Skin Exam: callus and skin intact; Neurovascular  Dorsalis pedis: 1+  Posterior tibial: 1+      Left Foot/Ankle      Inspection and Palpation  Skin Exam: callus and skin intact; Neurovascular  Dorsalis pedis: 1+  Posterior tibial: 1+        Physical Exam   Cardiovascular: Pulses are weak pulses  Pulses:       Dorsalis pedis pulses are 1+ on the right side, and 1+ on the left side  Posterior tibial pulses are 1+ on the right side, and 1+ on the left side  Feet:   Right Foot:   Skin Integrity: Positive for callus  Left Foot:   Skin Integrity: Positive for callus  Patient's shoes and socks removed  Right Foot/Ankle   Right Foot Inspection  Skin Exam: skin intact, callus and callus                          Toe Exam: erythema  Sensory   Vibration: diminished  Proprioception: diminished   Monofilament testing: diminished  Vascular    The right DP pulse is 1+  The right PT pulse is 1+  Left Foot/Ankle  Left Foot Inspection  Skin Exam: skin intact and callus                         Toe Exam: erythema                   Sensory   Vibration: diminished  Proprioception: diminished  Monofilament: diminished  Vascular  Capillary refills: < 3 seconds  The left DP pulse is 1+  The left PT pulse is 1+  Assign Risk Category:  Deformity present;  Loss of protective sensation; Weak pulses       Risk: 2

## 2018-02-15 ENCOUNTER — OFFICE VISIT (OUTPATIENT)
Dept: FAMILY MEDICINE CLINIC | Facility: CLINIC | Age: 73
End: 2018-02-15
Payer: MEDICARE

## 2018-02-15 VITALS
SYSTOLIC BLOOD PRESSURE: 124 MMHG | DIASTOLIC BLOOD PRESSURE: 66 MMHG | BODY MASS INDEX: 26.88 KG/M2 | WEIGHT: 192 LBS | HEART RATE: 84 BPM | HEIGHT: 71 IN | RESPIRATION RATE: 20 BRPM | TEMPERATURE: 96.6 F

## 2018-02-15 DIAGNOSIS — I10 BENIGN ESSENTIAL HYPERTENSION: ICD-10-CM

## 2018-02-15 DIAGNOSIS — N18.2 CKD STAGE 2 DUE TO TYPE 2 DIABETES MELLITUS (HCC): ICD-10-CM

## 2018-02-15 DIAGNOSIS — K21.9 GASTROESOPHAGEAL REFLUX DISEASE, ESOPHAGITIS PRESENCE NOT SPECIFIED: ICD-10-CM

## 2018-02-15 DIAGNOSIS — E11.42 DIABETIC POLYNEUROPATHY ASSOCIATED WITH TYPE 2 DIABETES MELLITUS (HCC): Primary | ICD-10-CM

## 2018-02-15 DIAGNOSIS — I25.119 CORONARY ARTERY DISEASE INVOLVING NATIVE CORONARY ARTERY OF NATIVE HEART WITH ANGINA PECTORIS (HCC): ICD-10-CM

## 2018-02-15 DIAGNOSIS — I70.209 PERIPHERAL ARTERIOSCLEROSIS (HCC): ICD-10-CM

## 2018-02-15 DIAGNOSIS — I73.9 PERIPHERAL ARTERIAL DISEASE (HCC): ICD-10-CM

## 2018-02-15 DIAGNOSIS — L21.9 SEBORRHEIC DERMATITIS: ICD-10-CM

## 2018-02-15 DIAGNOSIS — E11.40 TYPE 2 DIABETES MELLITUS WITH DIABETIC NEUROPATHY, WITHOUT LONG-TERM CURRENT USE OF INSULIN (HCC): ICD-10-CM

## 2018-02-15 DIAGNOSIS — E11.22 CKD STAGE 2 DUE TO TYPE 2 DIABETES MELLITUS (HCC): ICD-10-CM

## 2018-02-15 PROBLEM — I70.213 ATHEROSCLEROSIS OF NATIVE ARTERY OF BOTH LOWER EXTREMITIES WITH INTERMITTENT CLAUDICATION (HCC): Status: ACTIVE | Noted: 2017-02-07

## 2018-02-15 PROCEDURE — 99214 OFFICE O/P EST MOD 30 MIN: CPT | Performed by: FAMILY MEDICINE

## 2018-02-15 RX ORDER — OMEPRAZOLE 40 MG/1
40 CAPSULE, DELAYED RELEASE ORAL DAILY
Refills: 0
Start: 2018-02-15 | End: 2019-07-10 | Stop reason: SDUPTHER

## 2018-02-15 RX ORDER — CILOSTAZOL 100 MG/1
100 TABLET ORAL 2 TIMES DAILY
Refills: 0
Start: 2018-02-15 | End: 2018-07-11 | Stop reason: ALTCHOICE

## 2018-02-15 RX ORDER — CARVEDILOL 12.5 MG/1
12.5 TABLET ORAL 2 TIMES DAILY WITH MEALS
Qty: 180 TABLET | Refills: 3
Start: 2018-02-15 | End: 2018-04-06 | Stop reason: SDUPTHER

## 2018-02-15 RX ORDER — GABAPENTIN 300 MG/1
300 CAPSULE ORAL 3 TIMES DAILY
Qty: 270 CAPSULE | Refills: 1
Start: 2018-02-15 | End: 2018-05-15

## 2018-02-15 RX ORDER — ACARBOSE 25 MG/1
TABLET ORAL
Qty: 360 TABLET | Refills: 3
Start: 2018-02-15 | End: 2018-05-15 | Stop reason: SDUPTHER

## 2018-02-15 RX ORDER — METFORMIN HYDROCHLORIDE 500 MG/1
2000 TABLET, FILM COATED, EXTENDED RELEASE ORAL
Qty: 360 TABLET | Refills: 3
Start: 2018-02-15 | End: 2018-05-13

## 2018-02-15 RX ORDER — GLUCOSAM/CHON-MSM1/C/MANG/BOSW 500-416.6
TABLET ORAL
Refills: 0
Start: 2018-02-15

## 2018-02-15 RX ORDER — NITROGLYCERIN 0.4 MG/1
0.4 TABLET SUBLINGUAL
Qty: 90 TABLET | Refills: 0 | Status: SHIPPED | OUTPATIENT
Start: 2018-02-15 | End: 2018-02-16 | Stop reason: SDUPTHER

## 2018-02-15 RX ORDER — SIMVASTATIN 40 MG
40 TABLET ORAL DAILY
Refills: 0
Start: 2018-02-15 | End: 2019-03-27 | Stop reason: SDUPTHER

## 2018-02-15 RX ORDER — FLUOCINOLONE ACETONIDE 0.25 MG/G
CREAM TOPICAL 2 TIMES DAILY
Qty: 15 G | Refills: 0
Start: 2018-02-15 | End: 2018-09-13

## 2018-02-15 NOTE — PROGRESS NOTES
Assessment/Plan:     Diagnoses and all orders for this visit:    Diabetic polyneuropathy associated with type 2 diabetes mellitus ([place] [id number] )  -     Comprehensive metabolic panel; Future  -     Hemoglobin A1c; Future    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 mellitus ([place] [address] )    Peripheral arterial disease ([place] [address] )    Type 2 diabetes mellitus with diabetic neuropathy, without long-term current use of insulin (HCC)    Benign essential hypertension        Dm near goal, recheck 3m  Htn/cad/pad/ckd stable  Creat good at present      No Follow-up on file  Subjective:      Patient ID: Negrita Colunga is a 67 y o  male  Chief Complaint   Patient presents with    Follow-up       a1c slightly worse  Tolerates meds but does have diarrhea, nonbloody  No n/v    PAD stable  Off plavix now  Still on pletal?    Neuropathy stable  Has numbness but no pain  Tolerates gabapentin  Aware that could be increased in future for pain if needed    Dry skin  Rash left arm  Takes hot showers  Not using steroids on arm but has  Liquid soap  No moisturizer use  Has seen Dr Zach Garcia    Chol stable  Use of statins for the purposes of CVD/CVA risks reduction was advised according to USPSTF guidelines along with appropriate continued liver monitoring  No myalgias            The following portions of the patient's history were reviewed and updated as appropriate: allergies, current medications, past family history, past medical history, past social history, past surgical history and problem list     Review of Systems   Constitutional: Negative for fatigue  Cardiovascular: Negative for chest pain  Neurological: Negative for syncope           Current Outpatient Prescriptions   Medication Sig Dispense Refill    acarbose (PRECOSE) 25 mg tablet Take by mouth      carvedilol (COREG) 12 5 mg tablet Take by mouth      cilostazol (PLETAL) 100 mg tablet Take 100 mg by mouth 2 (two) times a day      fluocinolone (SYNALAR) 0 025 % cream Fluocinolone Acetonide 0  025 % External Cream   Quantity: 60;  Refills: 0       M D ;  Start 16-Aug-2017  Active      gabapentin (NEURONTIN) 300 mg capsule Take 1 capsule by mouth 3 (three) times a day      glimepiride (AMARYL) 4 mg tablet Take 1 tablet by mouth 2 (two) times a day for 90 days 180 tablet 3    glucose blood (TRUETRACK TEST) test strip TrueTrack Test In Vitro Strip  test 1x/d as directed, <250 00>   Quantity: 1;  Refills: 0      Thaddeus CORDON Blaine Dugan ;  Start 18-Dec-2012  Active  100 Strip Box      halobetasol (ULTRAVATE) 0 05 % cream APPLY TWO TIMES A DAY TO BACK, LEFT LEG  2    lisinopril (ZESTRIL) 2 5 mg tablet Take 1 tablet by mouth daily      metFORMIN (GLUMETZA) 500 MG (MOD) 24 hr tablet Take by mouth      nitroglycerin (NITROSTAT) 0 4 mg SL tablet Place 0 4 mg under the tongue every 5 (five) minutes as needed for chest pain      omeprazole (PriLOSEC) 40 MG capsule Take 40 mg by mouth daily      simvastatin (ZOCOR) 40 mg tablet Take 1 tablet by mouth daily      TRUEPLUS LANCETS 28G MISC TRUEplus Lancets 28G Miscellaneous  test glucose once daily <250 00>   Quantity: 1;  Refills: 0      Blaine Travis DO ;  Start 30-Sep-2013  Active  100 Miscellaneous Box       No current facility-administered medications for this visit  Objective:    /66   Pulse 84   Temp (!) 96 6 °F (35 9 °C)   Resp 20   Ht 5' 11" (1 803 m)   Wt 87 1 kg (192 lb)   BMI 26 78 kg/m²        Physical Exam   Constitutional: He appears well-developed  HENT:   Head: Normocephalic  Eyes: Conjunctivae are normal    Neck: Neck supple  Cardiovascular: Normal rate and intact distal pulses  Pulmonary/Chest: Effort normal  No respiratory distress  Abdominal: Soft  Musculoskeletal: He exhibits no edema or deformity  Neurological: He is alert  Skin: Skin is warm and dry  Psychiatric: His behavior is normal  Thought content normal    Nursing note and vitals reviewed               Danuta Duckworth DO

## 2018-02-16 DIAGNOSIS — I25.119 CORONARY ARTERY DISEASE INVOLVING NATIVE CORONARY ARTERY OF NATIVE HEART WITH ANGINA PECTORIS (HCC): ICD-10-CM

## 2018-02-16 RX ORDER — NITROGLYCERIN 0.4 MG/1
0.4 TABLET SUBLINGUAL
Qty: 100 TABLET | Refills: 3 | Status: ON HOLD | OUTPATIENT
Start: 2018-02-16 | End: 2018-06-19

## 2018-04-06 ENCOUNTER — OFFICE VISIT (OUTPATIENT)
Dept: FAMILY MEDICINE CLINIC | Facility: CLINIC | Age: 73
End: 2018-04-06
Payer: MEDICARE

## 2018-04-06 VITALS
RESPIRATION RATE: 16 BRPM | WEIGHT: 198 LBS | BODY MASS INDEX: 27.72 KG/M2 | TEMPERATURE: 96 F | DIASTOLIC BLOOD PRESSURE: 60 MMHG | HEART RATE: 72 BPM | SYSTOLIC BLOOD PRESSURE: 140 MMHG | HEIGHT: 71 IN

## 2018-04-06 DIAGNOSIS — I10 BENIGN ESSENTIAL HYPERTENSION: ICD-10-CM

## 2018-04-06 DIAGNOSIS — J20.9 ACUTE BRONCHITIS, UNSPECIFIED ORGANISM: Primary | ICD-10-CM

## 2018-04-06 DIAGNOSIS — E11.40 TYPE 2 DIABETES MELLITUS WITH DIABETIC NEUROPATHY, WITHOUT LONG-TERM CURRENT USE OF INSULIN (HCC): ICD-10-CM

## 2018-04-06 DIAGNOSIS — I25.119 CORONARY ARTERY DISEASE INVOLVING NATIVE CORONARY ARTERY OF NATIVE HEART WITH ANGINA PECTORIS (HCC): ICD-10-CM

## 2018-04-06 PROCEDURE — 99214 OFFICE O/P EST MOD 30 MIN: CPT | Performed by: FAMILY MEDICINE

## 2018-04-06 RX ORDER — CARVEDILOL 12.5 MG/1
12.5 TABLET ORAL 2 TIMES DAILY WITH MEALS
Qty: 180 TABLET | Refills: 1
Start: 2018-04-06 | End: 2018-04-11 | Stop reason: SDUPTHER

## 2018-04-06 RX ORDER — AZITHROMYCIN 250 MG/1
TABLET, FILM COATED ORAL
Qty: 6 TABLET | Refills: 0 | Status: SHIPPED | OUTPATIENT
Start: 2018-04-06 | End: 2018-04-11

## 2018-04-06 NOTE — PATIENT INSTRUCTIONS
Over-the-counter products may be used for your symptoms:    <<GUAIFENESIN>> is an expectorant that is useful for thick mucus  <<DEXTROMETHORPHAN>> is a cough suppressant that works in the brain to help reduce bothersome cough  <<ANTI-HISTAMINES>> are useful as allergy medications and to help dry up bothersome thin secretions  <<PSEUDOEPHEDRINE and PHENYLEPHRINE>> are stimulant decongestants that can be used for congestion and sinus pressure, however they be used with caution in those with high blood pressure or heart conditions  mucinex DM is a suggestion today for the next 7-10 days along with the antibiotic for Bronchitis    You need to lose the weight that you gained  Your blood pressure control is FAIR but not great

## 2018-04-06 NOTE — PROGRESS NOTES
Assessment/Plan:    No problem-specific Assessment & Plan notes found for this encounter  Dm not controlled and has gained wt, advised of risks  Wt loss needed  htn borderline  CAD stable  ckd stable  Bronchitis new     Diagnoses and all orders for this visit:    Acute bronchitis, unspecified organism  -     azithromycin (ZITHROMAX) 250 mg tablet; Take 500mg on day 1, 250mg on days 2-5    Benign essential hypertension  -     carvedilol (COREG) 12 5 mg tablet; Take 1 tablet (12 5 mg total) by mouth 2 (two) times a day with meals    Coronary artery disease involving native coronary artery of native heart with angina pectoris (HCC)    Type 2 diabetes mellitus with diabetic neuropathy, without long-term current use of insulin (HCC)              No Follow-up on file  Subjective:      Patient ID: Mary Corona is a 67 y o  male  Chief Complaint   Patient presents with   Valdene Min Like Symptoms     chest congestion--lj       HPI  Has gained wt  Some gas not bad  Cough, congested, chest especially, some nasal congestion, about 5d, getting worse, mucus green, no fever, no cp/sob/ha/dizziness  Noisy breathing  alkaseltzer  No sick contacts in home      Dm not at goal, a1c >7  htn not at goal    The following portions of the patient's history were reviewed and updated as appropriate: allergies, current medications, past family history, past medical history, past social history, past surgical history and problem list     Review of Systems   Respiratory: Negative for shortness of breath  Cardiovascular: Negative for chest pain           Current Outpatient Prescriptions   Medication Sig Dispense Refill    acarbose (PRECOSE) 25 mg tablet 1 before breakfast, 1 before lunch, 2 before dinner 360 tablet 3    carvedilol (COREG) 12 5 mg tablet Take 1 tablet (12 5 mg total) by mouth 2 (two) times a day with meals 180 tablet 1    cilostazol (PLETAL) 100 mg tablet Take 1 tablet (100 mg total) by mouth 2 (two) times a day  0  fluocinolone (SYNALAR) 0 025 % cream Apply topically 2 (two) times a day 15 g 0    gabapentin (NEURONTIN) 300 mg capsule Take 1 capsule (300 mg total) by mouth 3 (three) times a day 270 capsule 1    glimepiride (AMARYL) 4 mg tablet Take 1 tablet by mouth 2 (two) times a day for 90 days 180 tablet 3    glucose blood (TRUETRACK TEST) test strip 1 each by Other route daily Use as instructed for E11 29 100 each 3    halobetasol (ULTRAVATE) 0 05 % cream Bid to leg, per dermatologist 50 g 0    lisinopril (ZESTRIL) 2 5 mg tablet Take 1 tablet by mouth daily      metFORMIN (GLUMETZA) 500 MG (MOD) 24 hr tablet Take 4 tablets (2,000 mg total) by mouth daily with breakfast 360 tablet 3    nitroglycerin (NITROSTAT) 0 4 mg SL tablet Place 1 tablet (0 4 mg total) under the tongue every 5 (five) minutes as needed for chest pain 100 tablet 3    omeprazole (PriLOSEC) 40 MG capsule Take 1 capsule (40 mg total) by mouth daily  0    simvastatin (ZOCOR) 40 mg tablet Take 1 tablet (40 mg total) by mouth daily  0    TRUEPLUS LANCETS 28G MISC Test 1x/d, E11 29  0    azithromycin (ZITHROMAX) 250 mg tablet Take 500mg on day 1, 250mg on days 2-5 6 tablet 0     No current facility-administered medications for this visit  Objective:    /60   Pulse 72   Temp (!) 96 °F (35 6 °C)   Resp 16   Ht 5' 11" (1 803 m)   Wt 89 8 kg (198 lb)   BMI 27 62 kg/m²        Physical Exam   Constitutional: He appears well-developed  HENT:   Head: Normocephalic  Eyes: Conjunctivae are normal    Neck: Neck supple  Cardiovascular: Normal rate and intact distal pulses  Pulmonary/Chest: Effort normal  No respiratory distress  Coarse cough   Abdominal: Soft  Musculoskeletal: He exhibits no edema or deformity  Neurological: He is alert  Skin: Skin is warm and dry  Psychiatric: His behavior is normal  Thought content normal    Nursing note and vitals reviewed        Mucoid cough, occas wheeze, no rales       Ángela Links Thaddeus, DO

## 2018-04-11 DIAGNOSIS — I10 BENIGN ESSENTIAL HYPERTENSION: ICD-10-CM

## 2018-04-11 RX ORDER — CARVEDILOL 12.5 MG/1
TABLET ORAL
Qty: 180 TABLET | Refills: 3 | Status: SHIPPED | OUTPATIENT
Start: 2018-04-11 | End: 2019-03-27 | Stop reason: SDUPTHER

## 2018-04-19 ENCOUNTER — OFFICE VISIT (OUTPATIENT)
Dept: PODIATRY | Facility: CLINIC | Age: 73
End: 2018-04-19
Payer: MEDICARE

## 2018-04-19 VITALS
DIASTOLIC BLOOD PRESSURE: 78 MMHG | RESPIRATION RATE: 17 BRPM | SYSTOLIC BLOOD PRESSURE: 136 MMHG | HEART RATE: 74 BPM | HEIGHT: 71 IN | WEIGHT: 198 LBS | BODY MASS INDEX: 27.72 KG/M2

## 2018-04-19 DIAGNOSIS — M54.16 LUMBAR RADICULOPATHY: ICD-10-CM

## 2018-04-19 DIAGNOSIS — L84 CORNS: ICD-10-CM

## 2018-04-19 DIAGNOSIS — B35.1 ONYCHOMYCOSIS: ICD-10-CM

## 2018-04-19 DIAGNOSIS — M79.671 PAIN IN BOTH FEET: ICD-10-CM

## 2018-04-19 DIAGNOSIS — E11.42 DIABETIC POLYNEUROPATHY ASSOCIATED WITH TYPE 2 DIABETES MELLITUS (HCC): Primary | ICD-10-CM

## 2018-04-19 DIAGNOSIS — M79.672 PAIN IN BOTH FEET: ICD-10-CM

## 2018-04-19 DIAGNOSIS — I70.209 PERIPHERAL ARTERIOSCLEROSIS (HCC): ICD-10-CM

## 2018-04-19 PROCEDURE — 11056 PARNG/CUTG B9 HYPRKR LES 2-4: CPT | Performed by: PODIATRIST

## 2018-04-19 PROCEDURE — 99212 OFFICE O/P EST SF 10 MIN: CPT | Performed by: PODIATRIST

## 2018-04-19 RX ORDER — GABAPENTIN 600 MG/1
600 TABLET ORAL 2 TIMES DAILY
Qty: 60 TABLET | Refills: 1 | Status: SHIPPED | OUTPATIENT
Start: 2018-04-19 | End: 2018-10-09 | Stop reason: SDUPTHER

## 2018-04-19 NOTE — PROGRESS NOTES
Procedures patient is having increasing pain in his feet and legs  Patient is diabetic with known PID  He is getting leg cramps  Diabetic foot exam performed  We will order Doppler testing  Gabapentin will be 600 mg b i d    Foot Exam        Active Problems   1  Acquired Hallux Valgus (735 0)   2  Allergic rhinitis (477 9) (J30 9)   3  Arthropathy (716 90) (M12 9)   4  Backache (724 5) (M54 9)   5  Benign essential hypertension (401 1) (I10)   6  CAD (coronary atherosclerotic disease) (414 00) (I25 10)   7  Callus (700) (L84)   8  Chronic kidney disease, stage 3 (585 3) (N18 3)   9  Colon cancer screening (V76 51) (Z12 11)   10  Deformity of ankle and foot, acquired (736 70) (M21 969)   11  Depression screening (V79 0) (Z13 89)   12  Diabetic neuropathy (250 60,357 2) (E11 40)   13  Encounter for prostate cancer screening (V76 44) (Z12 5)   14  Esophagitis, reflux (530 11) (K21 0)   15  Need for immunization against influenza (V04 81) (Z23)   16  Need for pneumococcal vaccination (V03 82) (Z23)   17  Onychomycosis of toenail (110 1) (B35 1)   18  Pain in both feet (729 5) (M79 671,M79 672)   19  Peripheral arterial disease (443 9) (I73 9)   20  Pneumonia (486) (J18 9)   21  Prostate cancer screening (V76 44) (Z12 5)   22  Rosacea (695 3) (L71 9)   23  Screening for genitourinary condition (V81 6) (Z13 89)   24  Screening for neurological condition (V80 09) (Z13 89)   25  Seborrheic dermatitis (690 10) (L21 9)   26  Type 2 diabetes mellitus with diabetic neuropathy (250 60,357 2) (E11 40)   27  Type 2 diabetes mellitus with other circulatory complications (482 13) (J48 39)   28  Type 2 diabetes mellitus with renal manifestations, controlled (250 40) (E11 29)     Past Medical History   · History of Acute upper respiratory infection (465 9) (J06 9)   · History of Atrophy of nail (703 8) (L60 3)   · History of Bursitis of hip, unspecified laterality (726 5) (M70 70)   · History of Callus (700) (L84)   · History of Callus (700) (L84)   · History of Difficulty walking (719 7) (R26 2)   · History of Edema (782 3) (R60 9)   · History of Hammer toe, unspecified laterality (735 4) (M20 40)   · History of acute bronchitis (V12 69) (Z87 09)   · History of allergic rhinitis (V12 69) (Z87 09)   · History of anemia (V12 3) (Z86 2)   · History of bursitis (V13 59) (Z87 39)   · History of tendinitis (V13 59) (Z87 39)   · History of tinea corporis (V12 09) (Z86 19)   · History of viral warts (V12 09) (Z86 19)   · History of Late Effects Of Sprain Or Strain (905 7)   · History of Limb pain (729 5) (M79 609)   · History of Limb pain (729 5) (M79 609)   · History of Metatarsalgia, unspecified laterality (726 70) (M77 40)   · History of Open wound of foot, excluding toe(s) (892 0) (S91 309A)   · History of Pain in both feet (729 5) (M79 671,M79 672)   · History of Pain in both feet (729 5) (M79 671,M79 672)   · History of Pes planus, unspecified laterality (734) (M21 40)   · History of Shoulder joint pain, unspecified laterality   · History of Type 2 diabetes mellitus (250 00) (E11 9)     Surgical History   · History of Bypass Graft Using Vein: Femoral-popliteal   · History of Cataract Surgery   · History of Foot Surgery   · History of Heart Surgery   · History of Leg Repair   · History of Rotator Cuff Repair   · History of Shoulder Surgery     The surgical history was reviewed and updated today        Family History  Mother    · Family history of Aortic Aneurysm  Father    · Family history of acute myocardial infarction (V17 3) (Z80 55)  Sister    · Family history of acute myocardial infarction (V17 3) (Z82 49)     The family history was reviewed and updated today        Social History   · Being A Social Drinker   · Denied: History of Drug Use   · Never A Smoker   · Retired From Work  The social history was reviewed and updated today       Current Meds   1  Carvedilol 6 25 MG Oral Tablet; 1 two times a day;   Therapy: 66SOX6818 to (Last Rx:01Feb2016)  Requested for: 27OEK3607 Ordered   2  Cilostazol 100 MG Oral Tablet; TAKE 1 TABLET TWICE DAILY  Requested for:   10BDI6703; Last Rx:14Jan2015 Ordered   3  Gabapentin 300 MG Oral Capsule; TAKE 1 CAPSULE 3 TIMES DAILY;   Therapy: 43FYV4444 to (Evaluate:26Jan2017)  Requested for: 82XWV8456; Last   Rx:16Ldp6999 Ordered   4  Glimepiride 4 MG Oral Tablet; TAKE ONE TABLET BY MOUTH TWICE A DAY;   Therapy: 94YGW1937 to (Evaluate:87Diy8486)  Requested for: 48PDU9524; Last   Rx:12Oct2015 Ordered   5  Lisinopril 10 MG Oral Tablet; 1/2 tab twice daily  Requested for: 50KOM3861; Last   Rx:01Feb2016 Ordered   6  MetFORMIN HCl ER (OSM) 1000 MG Oral Tablet Extended Release 24 Hour; Take 1   tablet twice daily;   Therapy: 09KRK0150 to (Evaluate:10Nov2015)  Requested for: 26Oct2015 Recorded   7  Nitrostat 0 4 MG Sublingual Tablet Sublingual; PLACE 1 TABLET UNDER THE TONGUE   EVERY 5 MINUTES FOR UP TO 3 DOSES AS NEEDED FOR CHEST PAIN  CALL   911 IF PAIN PERSISTS;   Therapy: 35ROU2067 to (Lethazeemel Duemagdiel)  Requested for: 20UEL2423; Last   Rx:07Dgz8790 Ordered   8  Omeprazole 40 MG Oral Capsule Delayed Release; 1 every day;   Therapy: 60KBW4648 to (Evaluate:15Lsv6713); Last Rx:72Uol5735 Ordered   9  Onglyza 5 MG Oral Tablet; 1 every day;   Therapy: 38IOM0357 to (Last Rx:14Jan2015)  Requested for: 97MQH8087 Ordered   10  Simvastatin 40 MG Oral Tablet; take one tablet by mouth every day;    Therapy: 76Ndl2243 to (Evaluate:26Jan2017)  Requested for: 94APM5742; Last    Rx:01Feb2016 Ordered   11  TRUEplus Lancets 28G Miscellaneous; test glucose once daily <250 00>;    Therapy: 57SOZ1859 to (Evaluate:13Jan2018)  Requested for: 99QOY7650; Last    RW:09LVK8435 Ordered   12  TrueTrack Test In Vitro Strip; test 1x/d as directed, <250 00>;    Therapy: 52ZMB3042 to (Evaluate:30Jan2016)  Requested for: 70GPA5592;  Last    Rx:29Jan2015 Ordered     The medication list was reviewed and updated today       Allergies   1  No Known Drug Allergies     Vitals       Heart Rate 78   Respiration 16   Systolic 707   Diastolic 80   Height 5 ft 11 in   Weight 189 lb    BMI Calculated 26 36   BSA Calculated 2 06      Physical Exam  Left Foot: Appearance: a deformity (ball of foot and distal fifth metatarsal )  Fifth toe deformities include hammer toe  Tenderness: None except the plantar aspect of the foot  Right Foot: Appearance: a deformity (distal fifth metatarsal )  Left Ankle: ROM: limited ROM in all planes   Right Ankle: ROM: limited ROM in all planes   Neurological Exam: Light touch was decreased bilaterally  Vibratory sensation was decreased in both first metatarsophalangeal joints  Response to monofilament test was absent bilaterally  Deep tendon reflexes: achilles reflex 1/4 bilaterally  Vascular Exam: performed Dorsalis pedis pulses were 1/4 bilaterally  Posterior tibial pulses were 1/4 bilaterally  Elevation Pallor: diminished bilaterally  Capillary refill time was Q  9, findings bilateral  Negative digital hair noted, positive abnormal cooling  All pre-ulcer, lesions debrided  Today, but between 1-3 seconds bilaterally  Edema: mild bilaterally and All mycotic nails debrided  Today  The patient was given orthotics to help control his feet and at as cushioning and padding on the bottom of his feet q9 findings  Toenails: All of the toenails were elongated, hypertrophied, discolored, ingrown, shown to have subungual debris and tender       Socks and shoes removed, the Right Foot: the foot was dry  The sensory exam showed diminished vibratory sensation at the level of the toes  Diminished tactile sensation with monofilament testing throughout the right foot    Socks and shoes removed, Left Foot: the foot was dry  The sensory exam showed diminished vibratory sensation at the level of the toes   Diminished tactile sensation with monofilament testing throughout the left foot    Pulses:   1+ in the posterior tibialis on the right   1+ in the dorsalis pedis on the right  Capillary refills findings on the left were delayed in the toes  Pulses:   1+ in the posterior tibialis on the left   1+ in the dorsalis pedis on the left  Assign Risk Category: 2: Loss of protective sensation with or without weakness, deformity, callus, pre-ulcer, or history of ulceration  High risk  Hyperkeratosis: present on both first sub metatarsals, present on both fifth sub metatarsals and Pre-ulcerative lesion, submetatarsal one to 5, bilateral    Shoe Gear Evaluation: performed ()  Recommendation(s): custom inlays       Procedure  All mycotic nails debrided  Bilateral lesions debrided  Patient tolerated all procedures without pain or complication  Patient's been educated on care of the diabetic al     No problem-specific Assessment & Plan notes found for this encounter          There are no diagnoses linked to this encounter        Subjective:       Patient ID: Renuka Mir is a 67 y o  male      Patient is diabetic  He presents for pedal evaluation  He is taking gabapentin  He still has some numbness in his feet        The following portions of the patient's history were reviewed and updated as appropriate: allergies, current medications, past family history, past medical history, past social history, past surgical history and problem list      Review of Systems       Objective:      Foot Exam     Right Foot/Ankle      Inspection and Palpation  Skin Exam: callus and skin intact;      Neurovascular  Dorsalis pedis: 1+  Posterior tibial: 1+        Left Foot/Ankle       Inspection and Palpation  Skin Exam: callus and skin intact;      Neurovascular  Dorsalis pedis: 1+  Posterior tibial: 1+        Physical Exam   Cardiovascular: Pulses are weak pulses  Pulses:       Dorsalis pedis pulses are 1+ on the right side, and 1+ on the left side  Posterior tibial pulses are 1+ on the right side, and 1+ on the left side     Feet:   Right Foot:   Skin Integrity: Positive for callus  Left Foot:   Skin Integrity: Positive for callus  Patient's shoes and socks removed  Right Foot/Ankle   Right Foot Inspection  Skin Exam: skin intact, callus and callus                           Toe Exam: erythema  Sensory   Vibration: diminished  Proprioception: diminished   Monofilament testing: diminished  Vascular     The right DP pulse is 1+  The right PT pulse is 1+       Left Foot/Ankle  Left Foot Inspection  Skin Exam: skin intact and callus                          Toe Exam: erythema                   Sensory   Vibration: diminished  Proprioception: diminished  Monofilament: diminished  Vascular  Capillary refills: < 3 seconds  The left DP pulse is 1+  The left PT pulse is 1+  Assign Risk Category:  Deformity present;  Loss of protective sensation; Weak pulses       Risk: 2

## 2018-05-08 ENCOUNTER — HOSPITAL ENCOUNTER (OUTPATIENT)
Dept: RADIOLOGY | Facility: HOSPITAL | Age: 73
Discharge: HOME | End: 2018-05-08
Attending: PODIATRIST
Payer: MEDICARE

## 2018-05-08 ENCOUNTER — APPOINTMENT (OUTPATIENT)
Dept: LAB | Facility: CLINIC | Age: 73
End: 2018-05-08
Payer: MEDICARE

## 2018-05-08 DIAGNOSIS — E11.42 DIABETIC POLYNEUROPATHY ASSOCIATED WITH TYPE 2 DIABETES MELLITUS (HCC): ICD-10-CM

## 2018-05-08 DIAGNOSIS — I70.209 PERIPHERAL ARTERIOSCLEROSIS (HCC): ICD-10-CM

## 2018-05-08 LAB
ALBUMIN SERPL BCP-MCNC: 3.7 G/DL (ref 3.5–5)
ALP SERPL-CCNC: 85 U/L (ref 46–116)
ALT SERPL W P-5'-P-CCNC: 20 U/L (ref 12–78)
ANION GAP SERPL CALCULATED.3IONS-SCNC: 8 MMOL/L (ref 4–13)
AST SERPL W P-5'-P-CCNC: 18 U/L (ref 5–45)
BILIRUB SERPL-MCNC: 0.66 MG/DL (ref 0.2–1)
BUN SERPL-MCNC: 9 MG/DL (ref 5–25)
CALCIUM SERPL-MCNC: 9.1 MG/DL (ref 8.3–10.1)
CHLORIDE SERPL-SCNC: 100 MMOL/L (ref 100–108)
CO2 SERPL-SCNC: 24 MMOL/L (ref 21–32)
CREAT SERPL-MCNC: 1.21 MG/DL (ref 0.6–1.3)
EST. AVERAGE GLUCOSE BLD GHB EST-MCNC: 194 MG/DL
GFR SERPL CREATININE-BSD FRML MDRD: 59 ML/MIN/1.73SQ M
GLUCOSE P FAST SERPL-MCNC: 147 MG/DL (ref 65–99)
HBA1C MFR BLD: 8.4 % (ref 4.2–6.3)
POTASSIUM SERPL-SCNC: 4.8 MMOL/L (ref 3.5–5.3)
PROT SERPL-MCNC: 6.8 G/DL (ref 6.4–8.2)
SODIUM SERPL-SCNC: 132 MMOL/L (ref 136–145)

## 2018-05-08 PROCEDURE — 93923 UPR/LXTR ART STDY 3+ LVLS: CPT

## 2018-05-08 PROCEDURE — 93922 UPR/L XTREMITY ART 2 LEVELS: CPT | Performed by: SURGERY

## 2018-05-08 PROCEDURE — 93925 LOWER EXTREMITY STUDY: CPT

## 2018-05-08 PROCEDURE — 80053 COMPREHEN METABOLIC PANEL: CPT

## 2018-05-08 PROCEDURE — 36415 COLL VENOUS BLD VENIPUNCTURE: CPT

## 2018-05-08 PROCEDURE — 83036 HEMOGLOBIN GLYCOSYLATED A1C: CPT

## 2018-05-08 PROCEDURE — 93925 LOWER EXTREMITY STUDY: CPT | Performed by: SURGERY

## 2018-05-09 ENCOUNTER — TELEPHONE (OUTPATIENT)
Dept: PODIATRY | Facility: CLINIC | Age: 73
End: 2018-05-09

## 2018-05-13 RX ORDER — METFORMIN HYDROCHLORIDE 500 MG/1
TABLET, EXTENDED RELEASE ORAL
COMMUNITY
Start: 2018-04-12 | End: 2018-05-15

## 2018-05-15 ENCOUNTER — OFFICE VISIT (OUTPATIENT)
Dept: FAMILY MEDICINE CLINIC | Facility: CLINIC | Age: 73
End: 2018-05-15
Payer: MEDICARE

## 2018-05-15 VITALS
BODY MASS INDEX: 27.02 KG/M2 | HEIGHT: 71 IN | RESPIRATION RATE: 16 BRPM | DIASTOLIC BLOOD PRESSURE: 62 MMHG | HEART RATE: 96 BPM | WEIGHT: 193 LBS | TEMPERATURE: 97.3 F | SYSTOLIC BLOOD PRESSURE: 130 MMHG

## 2018-05-15 DIAGNOSIS — Z12.11 SCREENING FOR COLON CANCER: ICD-10-CM

## 2018-05-15 DIAGNOSIS — Z12.11 COLON CANCER SCREENING: Primary | ICD-10-CM

## 2018-05-15 DIAGNOSIS — I70.213 ATHEROSCLEROSIS OF NATIVE ARTERY OF BOTH LOWER EXTREMITIES WITH INTERMITTENT CLAUDICATION (HCC): ICD-10-CM

## 2018-05-15 DIAGNOSIS — E11.40 TYPE 2 DIABETES MELLITUS WITH DIABETIC NEUROPATHY, WITHOUT LONG-TERM CURRENT USE OF INSULIN (HCC): ICD-10-CM

## 2018-05-15 PROBLEM — J20.9 ACUTE BRONCHITIS: Status: RESOLVED | Noted: 2018-04-06 | Resolved: 2018-05-15

## 2018-05-15 PROCEDURE — 99214 OFFICE O/P EST MOD 30 MIN: CPT | Performed by: FAMILY MEDICINE

## 2018-05-15 PROCEDURE — G0439 PPPS, SUBSEQ VISIT: HCPCS | Performed by: FAMILY MEDICINE

## 2018-05-15 RX ORDER — METFORMIN HYDROCHLORIDE 500 MG/1
2000 TABLET, FILM COATED, EXTENDED RELEASE ORAL
Qty: 360 TABLET | Refills: 3
Start: 2018-05-15 | End: 2018-07-10 | Stop reason: SDUPTHER

## 2018-05-15 RX ORDER — ACARBOSE 25 MG/1
TABLET ORAL
Qty: 450 TABLET | Refills: 3 | Status: SHIPPED | OUTPATIENT
Start: 2018-05-15 | End: 2019-02-21 | Stop reason: SDUPTHER

## 2018-05-15 NOTE — PROGRESS NOTES
Assessment/Plan:    No problem-specific Assessment & Plan notes found for this encounter  Dm worse  New PAD vs worsening on left, duplex reviewed  Diet/exercise/weight loss is recommended  Cad/chol stable  Use of statins for the purposes of CVD/CVA risks reduction was advised according to USPSTF guidelines along with appropriate continued liver monitoring   gerd stable  No n/v  Recent data suggesting increased risk of ischemic CVA and chronic kidney damage with PPI use was advised  Adjust acarbose,increase,recheck 3m       Diagnoses and all orders for this visit:    Colon cancer screening    Type 2 diabetes mellitus with diabetic neuropathy, without long-term current use of insulin (HCC)  -     metFORMIN (GLUMETZA) 500 MG (MOD) 24 hr tablet; Take 4 tablets (2,000 mg total) by mouth daily with breakfast  -     Comprehensive metabolic panel; Future  -     HEMOGLOBIN A1C W/ EAG ESTIMATION; Future  -     acarbose (PRECOSE) 25 mg tablet; 2 before breakfast, 1 before lunch, 2 before dinner [when eaten]    Atherosclerosis of native artery of both lower extremities with intermittent claudication (HCC)  -     Lipid Panel with Direct LDL reflex; Future    Other orders  -     Discontinue: metFORMIN (GLUCOPHAGE-XR) 500 mg 24 hr tablet;               Return in about 3 months (around 8/15/2018) for Recheck  Subjective:      Patient ID: Jack Duarte is a 67 y o  male      Chief Complaint   Patient presents with    Medicare Wellness Visit     klm lpn    Diabetes     review labs       HPI  htn stable  a1c is up  Diet is same  Some eating out lately  No cp or sob  Leg pain on left, new PAD found, report reviewed  Cad/chol stable  The following portions of the patient's history were reviewed and updated as appropriate: allergies, current medications, past family history, past medical history, past social history, past surgical history and problem list     Review of Systems   Constitutional: Negative for chills and fever    Neurological: Negative for syncope  Current Outpatient Prescriptions   Medication Sig Dispense Refill    acarbose (PRECOSE) 25 mg tablet 2 before breakfast, 1 before lunch, 2 before dinner [when eaten] 450 tablet 3    carvedilol (COREG) 12 5 mg tablet TAKE 1 TABLET TWICE DAILY 180 tablet 3    cilostazol (PLETAL) 100 mg tablet Take 1 tablet (100 mg total) by mouth 2 (two) times a day  0    fluocinolone (SYNALAR) 0 025 % cream Apply topically 2 (two) times a day 15 g 0    gabapentin (NEURONTIN) 600 MG tablet Take 1 tablet (600 mg total) by mouth 2 (two) times a day for 30 days 60 tablet 1    glucose blood (TRUETRACK TEST) test strip 1 each by Other route daily Use as instructed for E11 29 100 each 3    halobetasol (ULTRAVATE) 0 05 % cream Bid to leg, per dermatologist 50 g 0    lisinopril (ZESTRIL) 2 5 mg tablet Take 1 tablet by mouth daily      nitroglycerin (NITROSTAT) 0 4 mg SL tablet Place 1 tablet (0 4 mg total) under the tongue every 5 (five) minutes as needed for chest pain 100 tablet 3    omeprazole (PriLOSEC) 40 MG capsule Take 1 capsule (40 mg total) by mouth daily  0    simvastatin (ZOCOR) 40 mg tablet Take 1 tablet (40 mg total) by mouth daily  0    TRUEPLUS LANCETS 28G MISC Test 1x/d, E11 29  0    glimepiride (AMARYL) 4 mg tablet Take 1 tablet by mouth 2 (two) times a day for 90 days 180 tablet 3    metFORMIN (GLUMETZA) 500 MG (MOD) 24 hr tablet Take 4 tablets (2,000 mg total) by mouth daily with breakfast 360 tablet 3     No current facility-administered medications for this visit  Objective:    /62   Pulse 96   Temp (!) 97 3 °F (36 3 °C)   Resp 16   Ht 5' 11" (1 803 m)   Wt 87 5 kg (193 lb)   BMI 26 92 kg/m²        Physical Exam   Constitutional: He appears well-developed  HENT:   Head: Normocephalic  Eyes: Conjunctivae are normal    Neck: Neck supple  Cardiovascular: Normal rate and intact distal pulses      Pulmonary/Chest: Effort normal  No respiratory distress  Abdominal: Soft  Musculoskeletal: He exhibits no edema or deformity  Neurological: He is alert  Skin: Skin is warm and dry  Psychiatric: His behavior is normal  Thought content normal    Nursing note and vitals reviewed  Arelis Haile DO    HPI:  Jomar Diop is a 67 y o  male here for his Subsequent Wellness Visit  Patient Active Problem List   Diagnosis    Corns    Pain in both feet    [facility]ios[facility] [address]    Diabetic polyneuropathy associated with type 2 diabetes mellitus ([facility] [address] [facility]    Allergic rhinitis    Arthropathy    Atherosclerosis of native artery of both lower extremities with intermittent claudication (HCC)    Benign essential hypertension    CAD (coronary artery disease)    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 mellitus ([facility] [address] [facility]    Diabetic neuropathy ([facility] [address] [facility]    GE reflux    Lumbar radiculopathy    [facility]    Rosacea    Seborrheic dermatitis    Type 2 diabetes mellitus with diabetic neuropathy (HCC)    Type 2 diabetes mellitus ([facility] [address] [facility]    Onychomycosis     Past Medical History:   Diagnosis Date    Chronic kidney disease     chronic renal insufficiency    Coronary artery disease     Diabetes mellitus ([facility] [address] [facility]     Hypertension     Peripheral vascular disease ([facility] [address] [facility]     left SFA stent in 2010     No past surgical history on file  No family history on file    History   Smoking Status    Never Smoker   Smokeless Tobacco    Never Used     History   Alcohol use Not on file      History   Drug use: Unknown     /62   Pulse 96   Temp (!) 97 3 °F (36 3 °C)   Resp 16   Ht 5' 11" (1 803 m)   Wt 87 5 kg (193 lb)   BMI 26 92 kg/m²       Current Outpatient Prescriptions   Medication Sig Dispense Refill    acarbose (PRECOSE) 25 mg tablet 2 before breakfast, 1 before lunch, 2 before dinner [when eaten] 450 tablet 3    carvedilol (COREG) 12 5 mg tablet TAKE 1 TABLET TWICE DAILY 180 tablet 3    cilostazol (PLETAL) 100 mg tablet Take 1 tablet (100 mg total) by mouth 2 (two) times a day  0    fluocinolone (SYNALAR) 0 025 % cream Apply topically 2 (two) times a day 15 g 0    gabapentin (NEURONTIN) 600 MG tablet Take 1 tablet (600 mg total) by mouth 2 (two) times a day for 30 days 60 tablet 1    glucose blood (TRUETRACK TEST) test strip 1 each by Other route daily Use as instructed for E11 29 100 each 3    halobetasol (ULTRAVATE) 0 05 % cream Bid to leg, per dermatologist 50 g 0    lisinopril (ZESTRIL) 2 5 mg tablet Take 1 tablet by mouth daily      nitroglycerin (NITROSTAT) 0 4 mg SL tablet Place 1 tablet (0 4 mg total) under the tongue every 5 (five) minutes as needed for chest pain 100 tablet 3    omeprazole (PriLOSEC) 40 MG capsule Take 1 capsule (40 mg total) by mouth daily  0    simvastatin (ZOCOR) 40 mg tablet Take 1 tablet (40 mg total) by mouth daily  0    TRUEPLUS LANCETS 28G MISC Test 1x/d, E11 29  0    glimepiride (AMARYL) 4 mg tablet Take 1 tablet by mouth 2 (two) times a day for 90 days 180 tablet 3    metFORMIN (GLUMETZA) 500 MG (MOD) 24 hr tablet Take 4 tablets (2,000 mg total) by mouth daily with breakfast 360 tablet 3     No current facility-administered medications for this visit        No Known Allergies  Immunization History   Administered Date(s) Administered    Influenza Split High Dose Preservative Free IM 09/18/2012, 09/16/2013, 10/13/2014, 10/07/2016, 09/20/2017    Influenza TIV (IM) 09/21/2011, 09/25/2015    Pneumococcal Conjugate 13-Valent 10/12/2015    Pneumococcal Polysaccharide PPV23 08/25/2010    Tdap 02/22/2012    Zoster 09/18/2012       Patient Care Team:  Rajni Greco DO as PCP - General  MD Rajni Fischer, EM Fam MD Ferol Pew, PAMD Beverly Rivera MD      Medicare Screening Tests and Risk Assessments:  AWV Clinical     ISAR:   Previous hospitalizations?:  No       Once in a Lifetime Medicare Screening:   EKG performed?:  No    AAA screening performed? (if performed, please add date to Health Maintenance):  No       Medicare Screening Tests and Risk Assessment:   AAA Risk Assessment    Age over 72 (males only):  Yes Family history of AAA:  Yes   Osteoporosis Risk Assessment    None indicated:  Yes    HIV Risk Assessment    None indicated:  Yes        Drug and Alcohol Use:   Tobacco use    Cigarettes:  never smoker    Tobacco use duration    Tobacco Cessation Readiness    Alcohol use    Alcohol use:  occasional use    Alcohol Treatment Readiness   Illicit Drug Use    Drug use:  never        Diet & Exercise:   Diet   What is your diet?:  Low Carb   How many servings a day of the following:   Exercise    Do you currently exercise?:  yes    Frequency:  occasional    Type of exercise:  walking       Cognitive Impairment Screening:   Cognitive Impairment Screening    Do you have difficulty learning or retaining new information?:  No        Functional Ability/Level of Safety:   Hearing    Hearing difficulties:  No    Hearing aid:  No    Hearing Impairment Assessment    Current Activities    Help needed with the folllowing:     Transportation:  No   Managing Medications:  No Managing Money:  No   ADL    Fall Risk   Have you fallen in the last 12 months?:  No    Do you have any chronic conditions that may contribute to a fall?:  Diabetes, Neuopathy    Injury History       Home Safety:   Are there hazards in your environment?:  No   Home Safety Risk Factors   Unfamilar with surroundings:  No        Advanced Directives:   Advanced Directives    Living Will:  No Durable POA for healthcare:  No   Patient's End of Life Decisions    Reviewed with patient:  No        Urinary Incontinence:   Do you have urinary incontinence?:  No        Glaucoma:            Provider Screening     Preventative Screening/Counseling:   Cardiovascular Screening/Counseling:   (Labs Q5 years, EKG optional one-time)   General:  Risks and Benefits Discussed           Diabetes Screening/Counseling:   (2 tests/year if Pre-Diabetes or 1 test/year if no Diabetes)   General:  Screening Not Indicated           Colorectal Cancer Screening/Counseling:   (FOBT Q1 yr; Flex Sig Q4 yrs or Q10 yrs after Screening Colonoscopy; Screening Colonoscpy Q2 yrs High Risk or Q10 yrs Low Risk; Barium Enema Q2 yrs High Risk or Q4 yrs Low Risk)   General:  Risks and Benefits Discussed           Prostate Cancer Screening/Counseling:   (Annual)    General:  Risks and Benefits Discussed          Breast Cancer Screening/Counseling:   (Baseline Age 28 - 43; Annual Age 36+)         Cervical Cancer Screening/Counseling:   (Annual for High Risk or Childbearing Age with Abnormal Pap in Last 3 yrs; Every 2 all others)         Osteoporosis Screening/Counseling:   (Every 2 Yrs if at risk or more if medically necessary)   General:  Screening Not Indicated           AAA Screening/Counseling:   (Once per Lifetime with risk factors)    Family History of AAA:  Yes Age over 72 (males only):  Yes          Glaucoma Screening/Counseling:   (Annual)         HIV Screening/Counseling:   (Voluntary; Once annually for high risk OR 3 times for Pregnancy at diagnosis of IUP; 3rd trimester; and at Labor         Hepatitis C Screening:             Immunizations:   Pneumococcal (Once in a Lifetime):  Lifetime Vaccine Completed       Other Preventative Couseling (Non-Medicare Wellness Visit Required):       Referrals (Non-Medicare Wellness Visit Required):       Medical Equipment/Suppliers:           No exam data present  Reviewed Updated [facility] Prior Wellness Visits:   Last Medicare wellness visit information was reviewed, patient interviewed , no change since last AWVyes  Last Medicare wellness visit information was reviewed, patient interviewed and updates made to the record today yes    Assessment and Plan:  1  Colon cancer screening     2   Type 2 diabetes mellitus with diabetic neuropathy, without long-term current use of insulin (HCC)  metFORMIN (GLUMETZA) 500 MG (MOD) 24 hr tablet    Comprehensive metabolic panel    HEMOGLOBIN A1C W/ EAG ESTIMATION    acarbose (PRECOSE) 25 mg tablet   3   Atherosclerosis of native artery of both lower extremities with intermittent claudication ([facility] [address] )  Lipid Panel with Direct LDL reflex       Health Maintenance Due   Topic Date Due    COLONOSCOPY  1945    GLAUCOMA SCREENING 67+ YR  11/16/2011    OPHTHALMOLOGY EXAM  01/22/2017

## 2018-05-18 ENCOUNTER — OFFICE VISIT (OUTPATIENT)
Dept: VASCULAR SURGERY | Facility: CLINIC | Age: 73
End: 2018-05-18
Payer: MEDICARE

## 2018-05-18 VITALS
WEIGHT: 194 LBS | DIASTOLIC BLOOD PRESSURE: 78 MMHG | HEART RATE: 74 BPM | SYSTOLIC BLOOD PRESSURE: 118 MMHG | TEMPERATURE: 96.8 F | RESPIRATION RATE: 16 BRPM | HEIGHT: 71 IN | BODY MASS INDEX: 27.16 KG/M2

## 2018-05-18 DIAGNOSIS — M79.671 PAIN IN BOTH FEET: ICD-10-CM

## 2018-05-18 DIAGNOSIS — I70.213 ATHEROSCLEROSIS OF NATIVE ARTERY OF BOTH LOWER EXTREMITIES WITH INTERMITTENT CLAUDICATION (HCC): Primary | ICD-10-CM

## 2018-05-18 DIAGNOSIS — T82.898D OCCLUSION OF FEMOROPOPLITEAL BYPASS GRAFT, SUBSEQUENT ENCOUNTER: ICD-10-CM

## 2018-05-18 DIAGNOSIS — M79.672 PAIN IN BOTH FEET: ICD-10-CM

## 2018-05-18 PROBLEM — T82.898A: Status: ACTIVE | Noted: 2018-05-18

## 2018-05-18 PROCEDURE — 99204 OFFICE O/P NEW MOD 45 MIN: CPT | Performed by: PHYSICIAN ASSISTANT

## 2018-05-18 RX ORDER — ASPIRIN 81 MG/1
81 TABLET ORAL DAILY
Qty: 30 TABLET | Refills: 6 | Status: SHIPPED | OUTPATIENT
Start: 2018-05-18 | End: 2021-08-12 | Stop reason: SDUPTHER

## 2018-05-18 RX ORDER — CLOPIDOGREL BISULFATE 75 MG/1
75 TABLET ORAL DAILY
Qty: 30 TABLET | Refills: 3 | Status: ON HOLD | OUTPATIENT
Start: 2018-05-18 | End: 2018-06-19

## 2018-05-18 NOTE — LETTER
May 18, 2018     [facility], [address] [id number]    Patient: Mary Corona   YOB: 1945   Date of Visit: 5/18/2018     Dear Dr Yayo Zaidi      Thank you for referring Emily Olmedo to me for evaluation  Below are the relevant portions of my assessment and plan of care  If you have questions, please do not hesitate to call me  I look forward to following Romayne Orem along with you  Assessment/Plan:    Atherosclerosis of native artery of both lower extremities with intermittent claudication (HCC)  Peripheral arterial disease with lifestyle liming L calf claudication and occluded L SFA stent    Exam: Feet are warm; no rubor or major vascular skin changes; no wounds; + (weak, monophasic) dopperable signals in L foot  Rec: We discussed his complex vascular history, as well as the pathophysiology and treatment for arterial disease  In the absence of wounds and rest pain, treatment would be symptom driven  The patient and wife feel that the symptoms have progressed to the point that they wanted to discuss intervention with vascular surgeon  I re-iterated that the cornerstone treatment includes antiplatelet therapy, statin and regular walking program to develop collaterals  The patient is willing to start antiplts and he will continue to walk  Given moderate and progressive symptoms which they feel are lifestyle limited, we discussed diagnostic A-gram +/- intervention  Perhaps ISR could be attributed to not being on aspirin  However, I did explain that recurrent stenosis of L SFA stent further angioplasty may not be long term solution      -  Start  Aspirin 81 daily - as discussed he should remain on aspirin lifelong unless there is a reason he should not  -  Start clopidogrel 75 daily for now; should he have L SFA angioplasty, we could consider keeping it 6 -12 months      -  Continue with statin therapy  Aggressive RF modification  Good DM control is needed  -  LEFT lower extremity angiogram to be set up at earliest convenience with Vascular surgeon or Interventional Radiologist with possible eye toward intervention on left SFA  - eGRF is about 61; will add hydration brigid-procedurally as a precaution      -  Follow up after procedure             Sincerely,        Cheryle Gaw, PA-C        CC: Era Late, DPM    Progress Notes:

## 2018-05-18 NOTE — PATIENT INSTRUCTIONS
Peripheral  Arterial disease with moderate claudication LEFT leg  Occluded left SFA stent (duration unknown likely in the past 6 months)  R LE Fem-pop bypass chronically occluded [place] Mike around 2013)      Known severe peripheral arterial disease with interventions to both legs  Most recent intervention, balloon angioplasty to InStent restenosis of L SFA stent  Patient has not been on antiplatelet therapy  He is now at developed progressive claudication which he describes as Lifestyle limiting and would like evaluation for intervention      -  Start  Aspirin 81 daily - as discussed you should remain on aspirin lifelong unless there is a reason not to do so  -  Start clopidogrel 75 daily for now  I am concerned that recurrent stenosis (blockage) of the L SFA stent may require surgical intervention  However, we should start asprin and obtain angiogram for evaluation  Will set up for LEFT lower extremity angiogram at earliest convenience with   Vascular surgeon or Interventional Radiologist with possible eye toward intervention on left SFA  Call to be seen sooner if you develop any worsening pain, pain at rest or foot wounds

## 2018-05-18 NOTE — ASSESSMENT & PLAN NOTE
Peripheral arterial disease with lifestyle liming L calf claudication and occluded L SFA stent    Exam: Feet are warm; no rubor or major vascular skin changes; no wounds; + (weak, monophasic) dopperable signals in L foot  Rec: We discussed his complex vascular history, as well as the pathophysiology and treatment for arterial disease  In the absence of wounds and rest pain, treatment would be symptom driven  The patient and wife feel that the symptoms have progressed to the point that they wanted to discuss intervention with vascular surgeon  I re-iterated that the cornerstone treatment includes antiplatelet therapy, statin and regular walking program to develop collaterals  The patient is willing to start antiplts and he will continue to walk  Given moderate and progressive symptoms which they feel are lifestyle limited, we discussed diagnostic A-gram +/- intervention  Perhaps ISR could be attributed to not being on aspirin  However, I did explain that recurrent stenosis of L SFA stent further angioplasty may not be long term solution      -  Start  Aspirin 81 daily - as discussed he should remain on aspirin lifelong unless there is a reason he should not  -  Start clopidogrel 75 daily for now; should he have L SFA angioplasty, we could consider keeping it 6 -12 months      -  Continue with statin therapy  Aggressive RF modification  Good DM control is needed  -  LEFT lower extremity angiogram to be set up at earliest convenience with Vascular surgeon or Interventional Radiologist with possible eye toward intervention on left SFA  - eGRF is about 61; will add hydration brigid-procedurally as a precaution      -  Follow up after procedure

## 2018-05-18 NOTE — ASSESSMENT & PLAN NOTE
Known chronic occlusion of R Fem-pop bypass and prox and mid SFA with distal SFA reconstitution via collats  - Mildly symptomatic; stable exam, R ALEKSANDR R 0 70/98/89  - C/w OMT asa, Plavix, statin, cilostazol

## 2018-05-18 NOTE — PROGRESS NOTES
Assessment/Plan:    Atherosclerosis of native artery of both lower extremities with intermittent claudication (HCC)  Peripheral arterial disease with lifestyle liming L calf claudication and occluded L SFA stent    Exam: Feet are warm; no rubor or major vascular skin changes; no wounds; + (weak, monophasic) dopperable signals in L foot  Rec: We discussed his complex vascular history, as well as the pathophysiology and treatment for arterial disease  In the absence of wounds and rest pain, treatment would be symptom driven  The patient and wife feel that the symptoms have progressed to the point that they wanted to discuss intervention with vascular surgeon  I re-iterated that the cornerstone treatment includes antiplatelet therapy, statin and regular walking program to develop collaterals  The patient is willing to start antiplts and he will continue to walk  Given moderate and progressive symptoms which they feel are lifestyle limited, we discussed diagnostic A-gram +/- intervention  Perhaps ISR could be attributed to not being on aspirin  However, I did explain that recurrent stenosis of L SFA stent further angioplasty may not be long term solution      -  Start  Aspirin 81 daily - as discussed he should remain on aspirin lifelong unless there is a reason he should not  -  Start clopidogrel 75 daily for now; should he have L SFA angioplasty, we could consider keeping it 6 -12 months      -  Continue with statin therapy  Aggressive RF modification  Good DM control is needed  -  LEFT lower extremity angiogram to be set up at earliest convenience with Vascular surgeon or Interventional Radiologist with possible eye toward intervention on left SFA  - eGRF is about 61; will add hydration brigid-procedurally as a precaution      -  Follow up after procedure       Diagnoses and all orders for this visit:    Atherosclerosis of native artery of both lower extremities with intermittent claudication ([address] )  - aspirin (ECOTRIN LOW STRENGTH) 81 mg EC tablet; Take 1 tablet (81 mg total) by mouth daily  -     clopidogrel (PLAVIX) 75 mg tablet; Take 1 tablet (75 mg total) by mouth daily  -     IR abdominal angiography / intervention; Future  -     Basic metabolic panel; Future        Operative Scheduling Information:    Hospital: [facility] (anywhere, first avail)    Physician: Any VS; or IR, if no VS is available    Surgery: LEFT LE angiogram +/- intervention    Urgency: Standard -  Next weeks    Case Length: Normal - Level 2    Post-op Bed:    OR Table:    Equipment Needs: None known    Medication Instructions: No holds    Hydration: 100 mL for 2 to 3 hrs pre and post    Subjective:  " I am here for my results"      Patient ID: Samson Day is a 67 y o  male  Patient is new to our practice and was referred by Dr Lucia Styles  Pt had DENVER done 5/8/18  HPI     Perfecto Mack 63W CAD, DM requiring insulin, HLP, significant PAD prior R Fem-Pop bypass Alf May around 2013) which is known with be chronically occluded, bilateral SFA stents  The patient was referred for abnormal  LE arterial duplex suggesting occlusion of the L SFA stent  L SFA stent originally placed by Dr Lisa Nino about 6 years ago at [facility]  He underwent  angiography 7/25/2017 by Dr Lisa Nino who found ISR to left SFA which was treated with balloon angioplasties  Routine duplex surveillance now suggests high grade stenosis v  occlusion of mid SFA stent with  reconstitution in the distal SFA  The patient gives history of bilateral L> R claudication  He began getting L calf claudication with heavy activity around Dec 2017/Jan 2018 which has continued to progress to symptoms walking up one flight of steps  He is surprised by how much worse and more frequent the symptoms have become in a predicable pattern  His wife states that if they can't even walk flat at slow pace, since he has to stop every 10 minutes due to L calf pain  His pain is much sooner if he walks hills or does steps  He rests for 5 - 10 minutes and the pain resolves and he resumes walking  He also has some R calf discomfort but at much higher workload that isn't bothering him much  No wounds  No rest pain  No chest limiting symptoms  Pt reports that he is not on any antiplatelet therapy  He stopped aspirin to go on Plavix in 7/2017 which he took for 6 months  He didn't take dual antiplt due to bruising of arms  He was not instructed that he should be on lifelong aspirin  DENVER 05/08/18  R 0 70/98/89 Occlusion R Fem-pop bypass, prox and mid SFA with reconstitution distal SFA  L 0 54/71/43 High grade stenosis v occlusion of midSFA stent with reconstitution in distal SFA  DENVER 02/15/17  R 0 57/50/50 Chronic occlusion of Fem-pop, and prox and mid SFA with distal SFA reconstitution via collats  L 0 80/65/54 > 75% stenosis at mid and distal SFA stent  T-P disease  AT  flow is retrograde  AIC 8 4, SCr 1 21; no recent lipids    The following portions of the patient's history were reviewed and updated as appropriate: allergies, current medications, past family history, past medical history, past social history, past surgical history and problem list     + DM with neuropathy - we discussed foot care  No fevers  No chest pain, pressure, SOB  Review of Systems   Constitutional: Negative  HENT: Negative  Eyes: Negative  Respiratory: Negative  Cardiovascular: Negative  Gastrointestinal: Negative  Endocrine: Negative  Genitourinary: Negative  Musculoskeletal: Negative  Leg pain   Leg pain when walking   Skin: Negative  Allergic/Immunologic: Negative  Neurological: Negative  Hematological: Negative  Psychiatric/Behavioral: Negative          Objective:    /78 (BP Location: Left arm, Patient Position: Sitting)   Pulse 74   Temp (!) 96 8 °F (36 °C) (Tympanic)   Resp 16   Ht 5' 11" (1 803 m)   Wt 88 kg (194 lb)   BMI 27 06 kg/m²      Physical Exam   Constitutional: He is oriented to person, place, and time  He appears well-developed and well-nourished  He is cooperative  HENT:   Head: Normocephalic and atraumatic  Eyes: EOM are normal  Pupils are equal, round, and reactive to light  Neck: Trachea normal  Neck supple  No JVD present  No carotid bruits   Cardiovascular: Normal rate, regular rhythm, S1 normal and S2 normal   Exam reveals no gallop and no friction rub  Murmur (2/6 sm) heard  Pulses:       Carotid pulses are 2+ on the right side, and 2+ on the left side  Radial pulses are 2+ on the right side, and 2+ on the left side  Femoral pulses are 2+ on the right side, and 2+ on the left side  2 - 3 + R> L pitting LE edema (ankle and pre-tibial)    Good Femoral pulses bilaterally    There are no major vascular skin changes in the legs  Feet are warm; no wounds; no pulses in feet  R - fair to good AT/DP/PT signals  L - weak/monophasic AT/DP/PT signals     Pulmonary/Chest: Effort normal  No accessory muscle usage  No respiratory distress  He has no wheezes  He has rales  Resp: non-labored    Good air mvt; trace crackles at the bases   Abdominal: Soft  Bowel sounds are normal  He exhibits no distension  There is no hepatosplenomegaly  There is no tenderness  Musculoskeletal: Normal range of motion  He exhibits edema  He exhibits no deformity  Neurological: He is alert and oriented to person, place, and time  Grossly normal    Skin: Skin is warm and dry  Rash (L leg  mild eczema) noted  No lesion noted  No cyanosis  Nails show no clubbing  Psychiatric: He has a normal mood and affect  Nursing note and vitals reviewed          Vitals:    05/18/18 1109   BP: 118/78   BP Location: Left arm   Patient Position: Sitting   Pulse: 74   Resp: 16   Temp: (!) 96 8 °F (36 °C)   TempSrc: Tympanic   Weight: 88 kg (194 lb)   Height: 5' 11" (1 803 m)       Patient Active Problem List   Diagnosis    Corns  Pain in both feet    Peripheral arteriosclerosis ([address] [address] )    Diabetic polyneuropathy associated with type 2 diabetes mellitus ([facility])    Allergic rhinitis    Arthropathy    Atherosclerosis of native artery of both lower extremities with intermittent claudication ([facility])    Benign essential hypertension    CAD (coronary artery disease)    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 mellitus ([address] [place] [address] )    Diabetic neuropathy ([facility])    GE reflux    Lumbar radiculopathy    Peripheral arterial disease ([facility])    Rosacea    Seborrheic dermatitis    Type 2 diabetes mellitus with diabetic neuropathy ([facility])    Type 2 diabetes mellitus ([address] [place] [address] )    Onychomycosis       History reviewed  No pertinent surgical history  Family History   Problem Relation Age of Onset    Heart disease Father     Heart disease Sister     Heart disease Paternal Grandfather        Social History     Social History    Marital status: /Civil Union     Spouse name: N/A    Number of children: N/A    Years of education: N/A     Occupational History    Not on file       Social History Main Topics    Smoking status: Never Smoker    Smokeless tobacco: Never Used    Alcohol use Not on file    Drug use: Unknown    Sexual activity: Not on file     Other Topics Concern    Not on file     Social History Narrative    No narrative on file       No Known Allergies      Current Outpatient Prescriptions:     acarbose (PRECOSE) 25 mg tablet, 2 before breakfast, 1 before lunch, 2 before dinner [when eaten], Disp: 450 tablet, Rfl: 3    carvedilol (COREG) 12 5 mg tablet, TAKE 1 TABLET TWICE DAILY, Disp: 180 tablet, Rfl: 3    cilostazol (PLETAL) 100 mg tablet, Take 1 tablet (100 mg total) by mouth 2 (two) times a day, Disp: , Rfl: 0    fluocinolone (SYNALAR) 0 025 % cream, Apply topically 2 (two) times a day, Disp: 15 g, Rfl: 0    gabapentin (NEURONTIN) 600 MG tablet, Take 1 tablet (600 mg total) by mouth 2 (two) times a day for 30 days, Disp: 60 tablet, Rfl: 1    glucose blood (TRUETRACK TEST) test strip, 1 each by Other route daily Use as instructed for E11 29, Disp: 100 each, Rfl: 3    halobetasol (ULTRAVATE) 0 05 % cream, Bid to leg, per dermatologist, Disp: 50 g, Rfl: 0    lisinopril (ZESTRIL) 2 5 mg tablet, Take 1 tablet by mouth daily, Disp: , Rfl:     metFORMIN (GLUMETZA) 500 MG (MOD) 24 hr tablet, Take 4 tablets (2,000 mg total) by mouth daily with breakfast, Disp: 360 tablet, Rfl: 3    nitroglycerin (NITROSTAT) 0 4 mg SL tablet, Place 1 tablet (0 4 mg total) under the tongue every 5 (five) minutes as needed for chest pain, Disp: 100 tablet, Rfl: 3    omeprazole (PriLOSEC) 40 MG capsule, Take 1 capsule (40 mg total) by mouth daily, Disp: , Rfl: 0    simvastatin (ZOCOR) 40 mg tablet, Take 1 tablet (40 mg total) by mouth daily, Disp: , Rfl: 0    TRUEPLUS LANCETS 28G MISC, Test 1x/d, E11 29, Disp: , Rfl: 0    aspirin (ECOTRIN LOW STRENGTH) 81 mg EC tablet, Take 1 tablet (81 mg total) by mouth daily, Disp: 30 tablet, Rfl: 6    clopidogrel (PLAVIX) 75 mg tablet, Take 1 tablet (75 mg total) by mouth daily, Disp: 30 tablet, Rfl: 3    glimepiride (AMARYL) 4 mg tablet, Take 1 tablet by mouth 2 (two) times a day for 90 days, Disp: 180 tablet, Rfl: 3

## 2018-05-21 ENCOUNTER — TELEPHONE (OUTPATIENT)
Dept: VASCULAR SURGERY | Facility: CLINIC | Age: 73
End: 2018-05-21

## 2018-05-21 NOTE — TELEPHONE ENCOUNTER
Patient called, KVNG Garcia , wrote a script  For Plavix and pharmacy will not fill because patient is already on pletal    I will send Brenda a message to advise

## 2018-05-22 ENCOUNTER — TELEPHONE (OUTPATIENT)
Dept: VASCULAR SURGERY | Facility: CLINIC | Age: 73
End: 2018-05-22

## 2018-05-22 NOTE — TELEPHONE ENCOUNTER
LMOM for patient to call back and schedule belenam  Dr Diana Padron is able to do on 6/5/18 at [facility]  Patient is on Pletal  I have emailed her on if/when this needs to be held

## 2018-05-23 NOTE — TELEPHONE ENCOUNTER
I spoke to pt and confirmed date of Agram on 6/5/18 with Dr Qing Joyce at [facility]  He will go for pre admission labs ordered  I told him per Dr Qing Joyce he did not need Plavix and he could stop his Pletal  He can continue ASA  Pt understands all instructions

## 2018-05-24 ENCOUNTER — OFFICE VISIT (OUTPATIENT)
Dept: PODIATRY | Facility: CLINIC | Age: 73
End: 2018-05-24
Payer: MEDICARE

## 2018-05-24 VITALS
BODY MASS INDEX: 27.16 KG/M2 | SYSTOLIC BLOOD PRESSURE: 146 MMHG | WEIGHT: 194 LBS | HEIGHT: 71 IN | DIASTOLIC BLOOD PRESSURE: 79 MMHG

## 2018-05-24 DIAGNOSIS — I70.209 PERIPHERAL ARTERIOSCLEROSIS (HCC): ICD-10-CM

## 2018-05-24 DIAGNOSIS — E11.40 TYPE 2 DIABETES MELLITUS WITH DIABETIC NEUROPATHY, WITHOUT LONG-TERM CURRENT USE OF INSULIN (HCC): Primary | ICD-10-CM

## 2018-05-24 DIAGNOSIS — M79.671 PAIN IN BOTH FEET: ICD-10-CM

## 2018-05-24 DIAGNOSIS — B35.1 ONYCHOMYCOSIS: ICD-10-CM

## 2018-05-24 DIAGNOSIS — M12.9 ARTHROPATHY: ICD-10-CM

## 2018-05-24 DIAGNOSIS — M79.672 PAIN IN BOTH FEET: ICD-10-CM

## 2018-05-24 PROCEDURE — 99213 OFFICE O/P EST LOW 20 MIN: CPT | Performed by: PODIATRIST

## 2018-05-24 NOTE — PROGRESS NOTES
Assessment/Plan:  Patient has peripheral artery disease  He is working with vascular surgery  Patient has diabetic neuropathy  He has pain in his feet with ambulation  Plan  Continue gabapentin  Nails debrided  Calluses debrided without pain or complication  No problem-specific Assessment & Plan notes found for this encounter  There are no diagnoses linked to this encounter  Subjective:      Patient ID: Domingo Buck is a 67 y o  male      HPI    The following portions of the patient's history were reviewed and updated as appropriate: allergies, current medications, past family history, past medical history, past social history, past surgical history and problem list     Review of Systems      Objective:     Active Problems   1  Acquired Hallux Valgus (735 0)   2  Allergic rhinitis (477 9) (J30 9)   3  Arthropathy (716 90) (M12 9)   4  Backache (724 5) (M54 9)   5  Benign essential hypertension (401 1) (I10)   6  CAD (coronary atherosclerotic disease) (414 00) (I25 10)   7  Callus (700) (L84)   8  Chronic kidney disease, stage 3 (585 3) (N18 3)   9  Colon cancer screening (V76 51) (Z12 11)   10  Deformity of ankle and foot, acquired (736 70) (M21 969)   11  Depression screening (V79 0) (Z13 89)   12  Diabetic neuropathy (250 60,357 2) (E11 40)   13  Encounter for prostate cancer screening (V76 44) (Z12 5)   14  Esophagitis, reflux (530 11) (K21 0)   15  Need for immunization against influenza (V04 81) (Z23)   16  Need for pneumococcal vaccination (V03 82) (Z23)   17  Onychomycosis of toenail (110 1) (B35 1)   18  Pain in both feet (729 5) (M79 671,M79 672)   19  Peripheral arterial disease (443 9) (I73 9)   20  Pneumonia (486) (J18 9)   21  Prostate cancer screening (V76 44) (Z12 5)   22  Rosacea (695 3) (L71 9)   23  Screening for genitourinary condition (V81 6) (Z13 89)   24  Screening for neurological condition (V80 09) (Z13 89)   25  Seborrheic dermatitis (690 10) (L21 9)   26  Type 2 diabetes mellitus with diabetic neuropathy (250 60,357 2) (E11 40)   27  Type 2 diabetes mellitus with other circulatory complications (701 79) (J26 71)   28  Type 2 diabetes mellitus with renal manifestations, controlled (250 40) (E11 29)     Past Medical History   · History of Acute upper respiratory infection (465 9) (J06 9)   · History of Atrophy of nail (703 8) (L60 3)   · History of Bursitis of hip, unspecified laterality (726 5) (M70 70)   · History of Callus (700) (L84)   · History of Callus (700) (L84)   · History of Difficulty walking (719 7) (R26 2)   · History of Edema (782 3) (R60 9)   · History of Hammer toe, unspecified laterality (735 4) (M20 40)   · History of acute bronchitis (V12 69) (Z87 09)   · History of allergic rhinitis (V12 69) (Z87 09)   · History of anemia (V12 3) (Z86 2)   · History of bursitis (V13 59) (Z87 39)   · History of tendinitis (V13 59) (Z87 39)   · History of tinea corporis (V12 09) (Z86 19)   · History of viral warts (V12 09) (Z86 19)   · History of Late Effects Of Sprain Or Strain (905 7)   · History of Limb pain (729 5) (M79 609)   · History of Limb pain (729 5) (M79 609)   · History of Metatarsalgia, unspecified laterality (726 70) (M77 40)   · History of Open wound of foot, excluding toe(s) (892 0) (S91 309A)   · History of Pain in both feet (729 5) (M79 671,M79 672)   · History of Pain in both feet (729 5) (M79 671,M79 672)   · History of Pes planus, unspecified laterality (734) (M21 40)   · History of Shoulder joint pain, unspecified laterality   · History of Type 2 diabetes mellitus (250 00) (E11 9)     Surgical History   · History of Bypass Graft Using Vein: Femoral-popliteal   · History of Cataract Surgery   · History of Foot Surgery   · History of Heart Surgery   · History of Leg Repair   · History of Rotator Cuff Repair   · History of Shoulder Surgery     The surgical history was reviewed and updated today        Family History  Mother    · Family history of Aortic Aneurysm  Father    · Family history of acute myocardial infarction (V17 3) (Z82 49)  Sister    · Family history of acute myocardial infarction (V17 3) (Z82 49)     The family history was reviewed and updated today        Social History   · Being A Social Drinker   · Denied: History of Drug Use   · Never A Smoker   · Retired From Work  The social history was reviewed and updated today       Current Meds   1  Carvedilol 6 25 MG Oral Tablet; 1 two times a day;   Therapy: 54KYS4162 to (Last Rx:01Feb2016)  Requested for: 30IHR3527 Ordered   2  Cilostazol 100 MG Oral Tablet; TAKE 1 TABLET TWICE DAILY  Requested for:   96FTQ3762; Last Rx:14Jan2015 Ordered   3  Gabapentin 300 MG Oral Capsule; TAKE 1 CAPSULE 3 TIMES DAILY;   Therapy: 79AIV5090 to (Evaluate:26Jan2017)  Requested for: 44BFI5821; Last   Rx:01Feb2016 Ordered   4  Glimepiride 4 MG Oral Tablet; TAKE ONE TABLET BY MOUTH TWICE A DAY;   Therapy: 30UNX5689 to (Evaluate:29Les4939)  Requested for: 39SBC9020; Last   Rx:12Oct2015 Ordered   5  Lisinopril 10 MG Oral Tablet; 1/2 tab twice daily  Requested for: 41WBS7640; Last   Rx:01Feb2016 Ordered   6  MetFORMIN HCl ER (OSM) 1000 MG Oral Tablet Extended Release 24 Hour; Take 1   tablet twice daily;   Therapy: 14NMM0181 to (Evaluate:10Nov2015)  Requested for: 26Oct2015 Recorded   7  Nitrostat 0 4 MG Sublingual Tablet Sublingual; PLACE 1 TABLET UNDER THE TONGUE   EVERY 5 MINUTES FOR UP TO 3 DOSES AS NEEDED FOR CHEST PAIN  CALL   911 IF PAIN PERSISTS;   Therapy: 75FRP8625 to (Camposbecca Prather)  Requested for: 47LVL3170; Last   Rx:64Fbm4280 Ordered   8  Omeprazole 40 MG Oral Capsule Delayed Release; 1 every day;   Therapy: 96HXJ2269 to (Evaluate:87Hqz0311);  Last Rx:01Feb2016 Ordered   9  Onglyza 5 MG Oral Tablet; 1 every day;   Therapy: 07FJH9128 to (Last Rx:14Jan2015)  Requested for: 93LEB6636 Ordered   10  Simvastatin 40 MG Oral Tablet; take one tablet by mouth every day;    Therapy: 29Ybw9152 to (Evaluate:26Jan2017)  Requested for: 65EXL3102; Last    Rx:23Uzq6306 Ordered   11  TRUEplus Lancets 28G Miscellaneous; test glucose once daily <250 00>;    Therapy: 11OAA6965 to (Evaluate:13Jan2018)  Requested for: 14NOQ7332; Last    GY:30BWI4065 Ordered   12  TrueTrack Test In Vitro Strip; test 1x/d as directed, <250 00>;    Therapy: 65LOA7355 to (Evaluate:30Jan2016)  Requested for: 48ESD4907; Last    Rx:29Jan2015 Ordered     The medication list was reviewed and updated today       Allergies   1  No Known Drug Allergies     Vitals        Heart Rate 78   Respiration 16   Systolic 193   Diastolic 80   Height 5 ft 11 in   Weight 189 lb    BMI Calculated 26 36   BSA Calculated 2 06      Physical Exam  Left Foot: Appearance: a deformity (ball of foot and distal fifth metatarsal )  Fifth toe deformities include hammer toe  Tenderness: None except the plantar aspect of the foot  Right Foot: Appearance: a deformity (distal fifth metatarsal )  Left Ankle: ROM: limited ROM in all planes   Right Ankle: ROM: limited ROM in all planes   Neurological Exam: Light touch was decreased bilaterally  Vibratory sensation was decreased in both first metatarsophalangeal joints  Response to monofilament test was absent bilaterally  Deep tendon reflexes: achilles reflex 1/4 bilaterally  Vascular Exam: performed Dorsalis pedis pulses were 1/4 bilaterally  Posterior tibial pulses were 1/4 bilaterally  Elevation Pallor: diminished bilaterally  Capillary refill time was Q  9, findings bilateral  Negative digital hair noted, positive abnormal cooling  All pre-ulcer, lesions debrided  Today, but between 1-3 seconds bilaterally  Edema: mild bilaterally and All mycotic nails debrided  Today  The patient was given orthotics to help control his feet and at as cushioning and padding on the bottom of his feet q9 findings  Toenails:  All of the toenails were elongated, hypertrophied, discolored, ingrown, shown to have subungual debris and tender       Socks and shoes removed, the Right Foot: the foot was dry  The sensory exam showed diminished vibratory sensation at the level of the toes  Diminished tactile sensation with monofilament testing throughout the right foot    Socks and shoes removed, Left Foot: the foot was dry  The sensory exam showed diminished vibratory sensation at the level of the toes  Diminished tactile sensation with monofilament testing throughout the left foot    Pulses:   1+ in the posterior tibialis on the right   1+ in the dorsalis pedis on the right  Capillary refills findings on the left were delayed in the toes  Pulses:   1+ in the posterior tibialis on the left   1+ in the dorsalis pedis on the left  Assign Risk Category: 2: Loss of protective sensation with or without weakness, deformity, callus, pre-ulcer, or history of ulceration  High risk  Hyperkeratosis: present on both first sub metatarsals, present on both fifth sub metatarsals and Pre-ulcerative lesion, submetatarsal one to 5, bilateral    Shoe Gear Evaluation: performed ()  Recommendation(s): custom inlays       Procedure  All mycotic nails debrided  Bilateral lesions debrided  Patient tolerated all procedures without pain or complication  Patient's been educated on care of the diabetic al     No problem-specific Assessment & Plan notes found for this encounter          There are no diagnoses linked to this encounter        Subjective:       Patient ID: Pernell Covarrubias is a 67 y  o  male      Patient is diabetic  Henchris Crawley presents for pedal evaluation  Stevie Crawley is taking gabapentin  Stevie Crawley still has some numbness in his feet        The following portions of the patient's history were reviewed and updated as appropriate: allergies, current medications, past family history, past medical history, past social history, past surgical history and problem list      Review of Systems       Objective:      Foot Exam     Right Foot/Ankle      Inspection and Palpation  Skin Exam: callus and skin intact;      Neurovascular  Dorsalis pedis: 1+  Posterior tibial: 1+        Left Foot/Ankle       Inspection and Palpation  Skin Exam: callus and skin intact;      Neurovascular  Dorsalis pedis: 1+  Posterior tibial: 1+        Physical Exam   Cardiovascular: Pulses are weak pulses  Pulses:       Dorsalis pedis pulses are 1+ on the right side, and 1+ on the left side         Posterior tibial pulses are 1+ on the right side, and 1+ on the left side  Feet:   Right Foot:   Skin Integrity: Positive for callus  Left Foot:   Skin Integrity: Positive for callus  Patient's shoes and socks removed  Right Foot/Ankle   Right Foot Inspection  Skin Exam: skin intact, callus and callus               Toe Exam: erythema  Sensory   Vibration: diminished  Proprioception: diminished   Monofilament testing: diminished  Vascular     The right DP pulse is 1+  The right PT pulse is 1+       Left Foot/Ankle  Left Foot Inspection  Skin Exam: skin intact and callus              Toe Exam: erythema                   Sensory   Vibration: diminished  Proprioception: diminished  Monofilament: diminished  Vascular  Capillary refills: < 3 seconds  The left DP pulse is 1+  The left PT pulse is 1+  Assign Risk Category:  Deformity present;  Loss of protective sensation; Weak pulses       Risk: 2        Foot ExamPhysical Exam

## 2018-05-24 NOTE — TELEPHONE ENCOUNTER
It is ok, he doesn't need the clopidogrel (Plavix) and we planned to hold off on clopidogrel for now anyway     (Though it would be ok if he were on cilostazol and clopidogrel together as they work differently )

## 2018-05-31 ENCOUNTER — TELEPHONE (OUTPATIENT)
Dept: RADIOLOGY | Facility: HOSPITAL | Age: 73
End: 2018-05-31

## 2018-05-31 RX ORDER — SODIUM CHLORIDE 9 MG/ML
75 INJECTION, SOLUTION INTRAVENOUS CONTINUOUS
Status: CANCELLED | OUTPATIENT
Start: 2018-05-31

## 2018-06-01 ENCOUNTER — APPOINTMENT (OUTPATIENT)
Dept: LAB | Facility: CLINIC | Age: 73
End: 2018-06-01
Payer: MEDICARE

## 2018-06-01 DIAGNOSIS — I70.213 ATHEROSCLEROSIS OF NATIVE ARTERY OF BOTH LOWER EXTREMITIES WITH INTERMITTENT CLAUDICATION (HCC): ICD-10-CM

## 2018-06-01 LAB
ANION GAP SERPL CALCULATED.3IONS-SCNC: 6 MMOL/L (ref 4–13)
BUN SERPL-MCNC: 13 MG/DL (ref 5–25)
CALCIUM SERPL-MCNC: 9.3 MG/DL (ref 8.3–10.1)
CHLORIDE SERPL-SCNC: 105 MMOL/L (ref 100–108)
CO2 SERPL-SCNC: 27 MMOL/L (ref 21–32)
CREAT SERPL-MCNC: 1.48 MG/DL (ref 0.6–1.3)
GFR SERPL CREATININE-BSD FRML MDRD: 46 ML/MIN/1.73SQ M
GLUCOSE P FAST SERPL-MCNC: 132 MG/DL (ref 65–99)
POTASSIUM SERPL-SCNC: 5 MMOL/L (ref 3.5–5.3)
SODIUM SERPL-SCNC: 138 MMOL/L (ref 136–145)

## 2018-06-01 PROCEDURE — 36415 COLL VENOUS BLD VENIPUNCTURE: CPT

## 2018-06-01 PROCEDURE — 80048 BASIC METABOLIC PNL TOTAL CA: CPT

## 2018-06-04 ENCOUNTER — TELEPHONE (OUTPATIENT)
Dept: VASCULAR SURGERY | Facility: CLINIC | Age: 73
End: 2018-06-04

## 2018-06-04 NOTE — TELEPHONE ENCOUNTER
Called nephrology and spoke to Blank to set up clearance  She stated that she needs to look at the schedule and she will call us back with an appt date

## 2018-06-04 NOTE — TELEPHONE ENCOUNTER
Spoke with Dr Jamal Greene regarding pt's elevated creat level  Dr Jamal Greene would like us to get nephrology clearance and cancel a-gram until pt is cleared by nephrology  Informed pt, answered all questions, and told him that we will be in contact with him when we have the details of the neph clearance  Pt verbalized understanding

## 2018-06-05 ENCOUNTER — TELEPHONE (OUTPATIENT)
Dept: VASCULAR SURGERY | Facility: CLINIC | Age: 73
End: 2018-06-05

## 2018-06-05 NOTE — TELEPHONE ENCOUNTER
Received call back from [place]  Pt is scheduled for neph clearance 6/6 at 1:30  Will call pt to update him

## 2018-06-06 ENCOUNTER — OFFICE VISIT (OUTPATIENT)
Dept: NEPHROLOGY | Facility: CLINIC | Age: 73
End: 2018-06-06
Payer: MEDICARE

## 2018-06-06 VITALS
HEART RATE: 70 BPM | DIASTOLIC BLOOD PRESSURE: 70 MMHG | HEIGHT: 71 IN | SYSTOLIC BLOOD PRESSURE: 156 MMHG | BODY MASS INDEX: 27.02 KG/M2 | WEIGHT: 193 LBS

## 2018-06-06 DIAGNOSIS — N18.30 CKD (CHRONIC KIDNEY DISEASE) STAGE 3, GFR 30-59 ML/MIN (HCC): Primary | ICD-10-CM

## 2018-06-06 DIAGNOSIS — I12.9 HYPERTENSIVE CHRONIC KIDNEY DISEASE WITH STAGE 1 THROUGH STAGE 4 CHRONIC KIDNEY DISEASE, OR UNSPECIFIED CHRONIC KIDNEY DISEASE: ICD-10-CM

## 2018-06-06 DIAGNOSIS — I70.209 PERIPHERAL ARTERIOSCLEROSIS (HCC): ICD-10-CM

## 2018-06-06 PROCEDURE — 99204 OFFICE O/P NEW MOD 45 MIN: CPT | Performed by: INTERNAL MEDICINE

## 2018-06-06 NOTE — PATIENT INSTRUCTIONS
ASSESSMENT and PLAN:  1  Chronic kidney disease, stage III, baseline creatinine fluctuant between 1 2 and 1 5, current creatinine 1 48 with an estimated GFR 46  2  Extensive peripheral vascular disease, significant claudication a left lower extremity, anticipated angiogram  3  Diabetes, since 1994, no reported retinopathy  Last hemoglobin A1c 8 4  4  History of coronary disease with PTCA in the past  5  Hypertension, patient suggesting possible situational hypertension although recommend home blood pressure monitoring for adequate control, continue with current regimen for now      · Overall his kidney function is fairly stable, again baseline between 1 2 in 1 5  Stable for proceeding with angiogram   With pre hydrate with normal saline pre and post contrast study  · Avoid lisinopril 2 days prior to the angiogram  · Start checking blood pressures at home and records for diary  , call if blood pressure greater than 140/90  · Renal ultrasound for size and echogenicity  · Repeat BMP 2 weeks after angiogram to ensure stability  · Assuming renal function and blood pressure stable, follow-up in 6 months    ·

## 2018-06-06 NOTE — LETTER
June 6, 2018     [address], [address]  [facility] [address]    Patient: Elpidio Stafford   YOB: 1945   Date of Visit: 6/6/2018     Dear Dr Krissy Farley Recipients      Thank you for referring [place] to me for evaluation  Below are the relevant portions of my assessment and plan of care  If you have questions, please do not hesitate to call me  I look forward to following Yfn Marquez along with you  Sincerely,        Emma Oropeza,         CC: No Recipients    Progress Notes:    ASSESSMENT and PLAN:  1  Chronic kidney disease, stage III, baseline creatinine fluctuant between 1 2 and 1 5, current creatinine 1 48 with an estimated GFR 46  2  Extensive peripheral vascular disease, significant claudication a left lower extremity, anticipated angiogram  3  Diabetes, since 1994, no reported retinopathy  Last hemoglobin A1c 8 4  4  History of coronary disease with PTCA in the past  5  Hypertension, patient suggesting possible situational hypertension although recommend home blood pressure monitoring for adequate control, continue with current regimen for now      · Overall his kidney function is fairly stable, again baseline between 1 2 in 1 5  Stable for proceeding with angiogram   With pre hydrate with normal saline pre and post contrast study  · Avoid lisinopril 2 days prior to the angiogram  · Start checking blood pressures at home and records for diary  , call if blood pressure greater than 140/90  · Renal ultrasound for size and echogenicity  · Repeat BMP 2 weeks after angiogram to ensure stability  · Assuming renal function and blood pressure stable, follow-up in 6 months

## 2018-06-06 NOTE — PROGRESS NOTES
NEPHROLOGY OUTPATIENT CONSULTATION   Leila Gamboa 67 y o  male MRN: [id number]    Reason for consultation:  Elevated creatinine    ASSESSMENT and PLAN:  1  Chronic kidney disease, stage III, baseline creatinine fluctuant between 1 2 and 1 5, current creatinine 1 48 with an estimated GFR 46  2  Extensive peripheral vascular disease, significant claudication a left lower extremity, anticipated angiogram  3  Diabetes, since 1994, no reported retinopathy  Last hemoglobin A1c 8 4  4  History of coronary disease with PTCA in the past  5  Hypertension, patient suggesting possible situational hypertension although recommend home blood pressure monitoring for adequate control, continue with current regimen for now  · Overall his kidney function is fairly stable, again baseline between 1 2 in 1 5  Stable for proceeding with angiogram   With pre hydrate with normal saline pre and post contrast study  · Avoid lisinopril 2 days prior to the angiogram  · Start checking blood pressures at home and records for diary  , call if blood pressure greater than 140/90  · Renal ultrasound for size and echogenicity  · Repeat BMP 2 weeks after angiogram to ensure stability  · Assuming renal function and blood pressure stable, follow-up in 6 months  HISTORY OF PRESENT ILLNESS:  Pino Frederick as we know is a 67-year-old gentleman with a known history of peripheral vascular disease with an extensive past surgical history  Currently patient is complaining of significant left lower leg claudication and this anticipating an angiogram   Denies any chest pain shortness of breath or worsening lower extremity swelling  Denies any appetite changes, nausea vomiting diarrhea  Denies any chest pain or shortness of breath  Denies any dizziness or lightheadedness  Review of Systems   Constitutional: Negative for appetite change and fatigue  Eyes: Negative for visual disturbance  Respiratory: Negative for cough and shortness of breath  Cardiovascular: Positive for leg swelling  Negative for chest pain  Gastrointestinal: Positive for diarrhea  Negative for abdominal distention, abdominal pain and nausea  Genitourinary: Negative for dysuria, flank pain, frequency and hematuria  Musculoskeletal: Positive for arthralgias  Negative for back pain and myalgias  Skin: Negative for wound  Neurological: Negative for dizziness, syncope, weakness and light-headedness  Psychiatric/Behavioral: Negative for confusion  All other systems reviewed and are negative  PAST MEDICAL HISTORY:  Past Medical History:   Diagnosis Date    Chronic kidney disease     chronic renal insufficiency    Coronary artery disease     Diabetes mellitus ([facility] )     Hypertension     Peripheral vascular disease ([facility] )     left SFA stent in 2010       PAST SURGICAL HISTORY:  No past surgical history on file      ALLERGIES:  No Known Allergies    SOCIAL HISTORY:  History   Alcohol use Not on file     History   Drug use: Unknown     History   Smoking Status    Never Smoker   Smokeless Tobacco    Never Used       FAMILY HISTORY:  Family History   Problem Relation Age of Onset    Heart disease Father     Heart disease Sister     Heart disease Paternal Grandfather        MEDICATIONS:    Current Outpatient Prescriptions:     acarbose (PRECOSE) 25 mg tablet, 2 before breakfast, 1 before lunch, 2 before dinner [when eaten], Disp: 450 tablet, Rfl: 3    aspirin (ECOTRIN LOW STRENGTH) 81 mg EC tablet, Take 1 tablet (81 mg total) by mouth daily, Disp: 30 tablet, Rfl: 6    carvedilol (COREG) 12 5 mg tablet, TAKE 1 TABLET TWICE DAILY, Disp: 180 tablet, Rfl: 3    cilostazol (PLETAL) 100 mg tablet, Take 1 tablet (100 mg total) by mouth 2 (two) times a day, Disp: , Rfl: 0    clopidogrel (PLAVIX) 75 mg tablet, Take 1 tablet (75 mg total) by mouth daily, Disp: 30 tablet, Rfl: 3    fluocinolone (SYNALAR) 0 025 % cream, Apply topically 2 (two) times a day, Disp: 15 g, Rfl: 0   glucose blood (TRUETRACK TEST) test strip, 1 each by Other route daily Use as instructed for E11 29, Disp: 100 each, Rfl: 3    halobetasol (ULTRAVATE) 0 05 % cream, Bid to leg, per dermatologist, Disp: 50 g, Rfl: 0    lisinopril (ZESTRIL) 2 5 mg tablet, Take 1 tablet by mouth daily, Disp: , Rfl:     metFORMIN (GLUMETZA) 500 MG (MOD) 24 hr tablet, Take 4 tablets (2,000 mg total) by mouth daily with breakfast, Disp: 360 tablet, Rfl: 3    nitroglycerin (NITROSTAT) 0 4 mg SL tablet, Place 1 tablet (0 4 mg total) under the tongue every 5 (five) minutes as needed for chest pain, Disp: 100 tablet, Rfl: 3    omeprazole (PriLOSEC) 40 MG capsule, Take 1 capsule (40 mg total) by mouth daily, Disp: , Rfl: 0    simvastatin (ZOCOR) 40 mg tablet, Take 1 tablet (40 mg total) by mouth daily, Disp: , Rfl: 0    TRUEPLUS LANCETS 28G MISC, Test 1x/d, E11 29, Disp: , Rfl: 0    gabapentin (NEURONTIN) 600 MG tablet, Take 1 tablet (600 mg total) by mouth 2 (two) times a day for 30 days, Disp: 60 tablet, Rfl: 1    glimepiride (AMARYL) 4 mg tablet, Take 1 tablet by mouth 2 (two) times a day for 90 days, Disp: 180 tablet, Rfl: 3        PHYSICAL EXAM:  Vitals:    06/06/18 1329   BP: (!) 184/82   BP Location: Left arm   Patient Position: Sitting   Cuff Size: Adult   Pulse: 70   Weight: 87 5 kg (193 lb)   Height: 5' 11" (1 803 m)       Physical Exam   Constitutional: He is oriented to person, place, and time  No distress  HENT:   Head: Normocephalic  Eyes: Conjunctivae are normal  No scleral icterus  Neck: Neck supple  Cardiovascular: Normal rate and regular rhythm  Pulmonary/Chest: Effort normal and breath sounds normal    Abdominal: Soft  Bowel sounds are normal  He exhibits no distension  There is no tenderness  Musculoskeletal: He exhibits edema (Trace to 1+ edema)  Neurological: He is alert and oriented to person, place, and time  Skin: Skin is dry  Psychiatric: He has a normal mood and affect           Laboratory results:   Results for orders placed or performed in visit on 46/28/27   Basic metabolic panel   Result Value Ref Range    Sodium 138 136 - 145 mmol/L    Potassium 5 0 3 5 - 5 3 mmol/L    Chloride 105 100 - 108 mmol/L    CO2 27 21 - 32 mmol/L    Anion Gap 6 4 - 13 mmol/L    BUN 13 5 - 25 mg/dL    Creatinine 1 48 (H) 0 60 - 1 30 mg/dL    Glucose, Fasting 132 (H) 65 - 99 mg/dL    Calcium 9 3 8 3 - 10 1 mg/dL    eGFR 46 ml/min/1 73sq m

## 2018-06-07 ENCOUNTER — TELEPHONE (OUTPATIENT)
Dept: VASCULAR SURGERY | Facility: CLINIC | Age: 73
End: 2018-06-07

## 2018-06-07 DIAGNOSIS — I70.213 ATHEROSCLEROSIS OF NATIVE ARTERY OF BOTH LOWER EXTREMITIES WITH INTERMITTENT CLAUDICATION (HCC): Primary | ICD-10-CM

## 2018-06-07 NOTE — TELEPHONE ENCOUNTER
I spoke to pt and we confirmed date of Agram for 6/19/18 with Dr Coty Lo at [facility]  Pt will go for some blood work ordered prior  All other instructions reviewed and understood

## 2018-06-12 ENCOUNTER — APPOINTMENT (OUTPATIENT)
Dept: LAB | Facility: CLINIC | Age: 73
End: 2018-06-12
Payer: MEDICARE

## 2018-06-12 DIAGNOSIS — N18.30 CKD (CHRONIC KIDNEY DISEASE) STAGE 3, GFR 30-59 ML/MIN (HCC): ICD-10-CM

## 2018-06-12 DIAGNOSIS — I70.209 PERIPHERAL ARTERIOSCLEROSIS (HCC): ICD-10-CM

## 2018-06-12 DIAGNOSIS — I12.9 HYPERTENSIVE CHRONIC KIDNEY DISEASE WITH STAGE 1 THROUGH STAGE 4 CHRONIC KIDNEY DISEASE, OR UNSPECIFIED CHRONIC KIDNEY DISEASE: ICD-10-CM

## 2018-06-12 LAB
ANION GAP SERPL CALCULATED.3IONS-SCNC: 6 MMOL/L (ref 4–13)
BUN SERPL-MCNC: 10 MG/DL (ref 5–25)
CALCIUM SERPL-MCNC: 8.8 MG/DL (ref 8.3–10.1)
CHLORIDE SERPL-SCNC: 103 MMOL/L (ref 100–108)
CO2 SERPL-SCNC: 23 MMOL/L (ref 21–32)
CREAT SERPL-MCNC: 1.31 MG/DL (ref 0.6–1.3)
GFR SERPL CREATININE-BSD FRML MDRD: 54 ML/MIN/1.73SQ M
GLUCOSE P FAST SERPL-MCNC: 212 MG/DL (ref 65–99)
POTASSIUM SERPL-SCNC: 5.6 MMOL/L (ref 3.5–5.3)
SODIUM SERPL-SCNC: 132 MMOL/L (ref 136–145)

## 2018-06-12 PROCEDURE — 80048 BASIC METABOLIC PNL TOTAL CA: CPT

## 2018-06-12 PROCEDURE — 36415 COLL VENOUS BLD VENIPUNCTURE: CPT

## 2018-06-14 ENCOUNTER — TELEPHONE (OUTPATIENT)
Dept: RADIOLOGY | Facility: HOSPITAL | Age: 73
End: 2018-06-14

## 2018-06-14 RX ORDER — SODIUM CHLORIDE 9 MG/ML
100 INJECTION, SOLUTION INTRAVENOUS CONTINUOUS
Status: CANCELLED | OUTPATIENT
Start: 2018-06-14 | End: 2018-06-14

## 2018-06-14 NOTE — TELEPHONE ENCOUNTER
I called pt a few times and got his vm  So I left message to inform him that his Agram will be performed by one of the IR Drs  and not by Dr Ruthy Martinez as she is unavailable at that time  His date, time and instructions all remain the same  He will follow up with an appt with Dr Ruthy Martinez to review the Agram on 7/11/18

## 2018-06-19 ENCOUNTER — HOSPITAL ENCOUNTER (OUTPATIENT)
Dept: RADIOLOGY | Facility: HOSPITAL | Age: 73
Discharge: HOME HEALTH | End: 2018-06-19
Attending: RADIOLOGY | Admitting: RADIOLOGY
Payer: MEDICARE

## 2018-06-19 VITALS
HEART RATE: 62 BPM | RESPIRATION RATE: 18 BRPM | OXYGEN SATURATION: 99 % | DIASTOLIC BLOOD PRESSURE: 63 MMHG | TEMPERATURE: 97.5 F | WEIGHT: 190 LBS | BODY MASS INDEX: 26.6 KG/M2 | SYSTOLIC BLOOD PRESSURE: 142 MMHG | HEIGHT: 71 IN

## 2018-06-19 DIAGNOSIS — I70.213 ATHEROSCLEROSIS OF NATIVE ARTERY OF BOTH LOWER EXTREMITIES WITH INTERMITTENT CLAUDICATION (HCC): ICD-10-CM

## 2018-06-19 DIAGNOSIS — I70.209 PERIPHERAL ARTERIOSCLEROSIS (HCC): Primary | ICD-10-CM

## 2018-06-19 LAB
ERYTHROCYTE [DISTWIDTH] IN BLOOD BY AUTOMATED COUNT: 16.6 % (ref 11.6–15.1)
HCT VFR BLD AUTO: 34.8 % (ref 36.5–49.3)
HGB BLD-MCNC: 10.9 G/DL (ref 12–17)
KCT BLD-ACNC: 172 SEC (ref 89–137)
MCH RBC QN AUTO: 26.8 PG (ref 26.8–34.3)
MCHC RBC AUTO-ENTMCNC: 31.3 G/DL (ref 31.4–37.4)
MCV RBC AUTO: 86 FL (ref 82–98)
PLATELET # BLD AUTO: 192 THOUSANDS/UL (ref 149–390)
PMV BLD AUTO: 10.6 FL (ref 8.9–12.7)
RBC # BLD AUTO: 4.07 MILLION/UL (ref 3.88–5.62)
SPECIMEN SOURCE: ABNORMAL
WBC # BLD AUTO: 6.5 THOUSAND/UL (ref 4.31–10.16)

## 2018-06-19 PROCEDURE — C1874 STENT, COATED/COV W/DEL SYS: HCPCS

## 2018-06-19 PROCEDURE — 75625 CONTRAST EXAM ABDOMINL AORTA: CPT | Performed by: RADIOLOGY

## 2018-06-19 PROCEDURE — 85347 COAGULATION TIME ACTIVATED: CPT

## 2018-06-19 PROCEDURE — 99152 MOD SED SAME PHYS/QHP 5/>YRS: CPT | Performed by: RADIOLOGY

## 2018-06-19 PROCEDURE — 37226 PR REVASCULARIZE FEM/POP ARTERY,ANGIOPLASTY/STENT: CPT | Performed by: RADIOLOGY

## 2018-06-19 PROCEDURE — 75898 FOLLOW-UP ANGIOGRAPHY: CPT

## 2018-06-19 PROCEDURE — C1769 GUIDE WIRE: HCPCS

## 2018-06-19 PROCEDURE — 37226 HB FEM/POPL REVASC W/STENT: CPT

## 2018-06-19 PROCEDURE — 76937 US GUIDE VASCULAR ACCESS: CPT

## 2018-06-19 PROCEDURE — 36246 INS CATH ABD/L-EXT ART 2ND: CPT

## 2018-06-19 PROCEDURE — C1725 CATH, TRANSLUMIN NON-LASER: HCPCS

## 2018-06-19 PROCEDURE — 75710 ARTERY X-RAYS ARM/LEG: CPT

## 2018-06-19 PROCEDURE — C1887 CATHETER, GUIDING: HCPCS

## 2018-06-19 PROCEDURE — C1894 INTRO/SHEATH, NON-LASER: HCPCS

## 2018-06-19 PROCEDURE — 75710 ARTERY X-RAYS ARM/LEG: CPT | Performed by: RADIOLOGY

## 2018-06-19 PROCEDURE — C1760 CLOSURE DEV, VASC: HCPCS

## 2018-06-19 PROCEDURE — 85027 COMPLETE CBC AUTOMATED: CPT | Performed by: SURGERY

## 2018-06-19 PROCEDURE — 75630 X-RAY AORTA LEG ARTERIES: CPT

## 2018-06-19 PROCEDURE — 76937 US GUIDE VASCULAR ACCESS: CPT | Performed by: RADIOLOGY

## 2018-06-19 RX ORDER — SODIUM CHLORIDE 9 MG/ML
100 INJECTION, SOLUTION INTRAVENOUS CONTINUOUS
Status: DISPENSED | OUTPATIENT
Start: 2018-06-19 | End: 2018-06-19

## 2018-06-19 RX ORDER — CLOPIDOGREL BISULFATE 75 MG/1
75 TABLET ORAL DAILY
Qty: 30 TABLET | Refills: 3 | Status: SHIPPED | OUTPATIENT
Start: 2018-06-20 | End: 2018-09-13

## 2018-06-19 RX ORDER — FENTANYL CITRATE 50 UG/ML
INJECTION, SOLUTION INTRAMUSCULAR; INTRAVENOUS CODE/TRAUMA/SEDATION MEDICATION
Status: COMPLETED | OUTPATIENT
Start: 2018-06-19 | End: 2018-06-19

## 2018-06-19 RX ORDER — CLOPIDOGREL BISULFATE 75 MG/1
300 TABLET ORAL ONCE
Status: COMPLETED | OUTPATIENT
Start: 2018-06-19 | End: 2018-06-19

## 2018-06-19 RX ORDER — MIDAZOLAM HYDROCHLORIDE 1 MG/ML
INJECTION INTRAMUSCULAR; INTRAVENOUS CODE/TRAUMA/SEDATION MEDICATION
Status: COMPLETED | OUTPATIENT
Start: 2018-06-19 | End: 2018-06-19

## 2018-06-19 RX ORDER — HEPARIN SODIUM 1000 [USP'U]/ML
INJECTION, SOLUTION INTRAVENOUS; SUBCUTANEOUS CODE/TRAUMA/SEDATION MEDICATION
Status: COMPLETED | OUTPATIENT
Start: 2018-06-19 | End: 2018-06-19

## 2018-06-19 RX ORDER — ASPIRIN 81 MG/1
81 TABLET, CHEWABLE ORAL DAILY
Status: DISCONTINUED | OUTPATIENT
Start: 2018-06-19 | End: 2018-06-19 | Stop reason: HOSPADM

## 2018-06-19 RX ORDER — HYDRALAZINE HYDROCHLORIDE 20 MG/ML
INJECTION INTRAMUSCULAR; INTRAVENOUS CODE/TRAUMA/SEDATION MEDICATION
Status: COMPLETED | OUTPATIENT
Start: 2018-06-19 | End: 2018-06-19

## 2018-06-19 RX ADMIN — CLOPIDOGREL BISULFATE 300 MG: 75 TABLET ORAL at 14:36

## 2018-06-19 RX ADMIN — MIDAZOLAM 1 MG: 1 INJECTION INTRAMUSCULAR; INTRAVENOUS at 10:46

## 2018-06-19 RX ADMIN — FENTANYL CITRATE 25 MCG: 50 INJECTION, SOLUTION INTRAMUSCULAR; INTRAVENOUS at 11:49

## 2018-06-19 RX ADMIN — FENTANYL CITRATE 50 MCG: 50 INJECTION, SOLUTION INTRAMUSCULAR; INTRAVENOUS at 10:46

## 2018-06-19 RX ADMIN — ASPIRIN 81 MG 81 MG: 81 TABLET ORAL at 14:35

## 2018-06-19 RX ADMIN — HYDRALAZINE HYDROCHLORIDE 10 MG: 20 INJECTION INTRAMUSCULAR; INTRAVENOUS at 11:25

## 2018-06-19 RX ADMIN — MIDAZOLAM 0.5 MG: 1 INJECTION INTRAMUSCULAR; INTRAVENOUS at 11:44

## 2018-06-19 RX ADMIN — IODIXANOL 148 ML: 320 INJECTION, SOLUTION INTRAVASCULAR at 14:01

## 2018-06-19 RX ADMIN — SODIUM CHLORIDE 100 ML/HR: 0.9 INJECTION, SOLUTION INTRAVENOUS at 09:09

## 2018-06-19 RX ADMIN — FENTANYL CITRATE 25 MCG: 50 INJECTION, SOLUTION INTRAMUSCULAR; INTRAVENOUS at 12:32

## 2018-06-19 RX ADMIN — MIDAZOLAM 0.5 MG: 1 INJECTION INTRAMUSCULAR; INTRAVENOUS at 11:54

## 2018-06-19 RX ADMIN — FENTANYL CITRATE 25 MCG: 50 INJECTION, SOLUTION INTRAMUSCULAR; INTRAVENOUS at 11:44

## 2018-06-19 RX ADMIN — FENTANYL CITRATE 25 MCG: 50 INJECTION, SOLUTION INTRAMUSCULAR; INTRAVENOUS at 12:22

## 2018-06-19 RX ADMIN — HEPARIN SODIUM 5000 UNITS: 1000 INJECTION INTRAVENOUS; SUBCUTANEOUS at 11:36

## 2018-06-19 NOTE — PROGRESS NOTES
Vascular and Interventional Radiology Brief Note    Went to see patient in recovery  Unfortunately, the patient was discharged prior to my arrival, despite the order in the computer that he was to be seen by interventional radiology physician  Patient received his prescriptions and apparently had no questions  The right groin was intact without bleeding  I have tried to contact the patient for follow-up but have been unsuccessful at this time

## 2018-06-19 NOTE — BRIEF OP NOTE (RAD/CATH)
IR ABDOMINAL ANGIOGRAPHY / INTERVENTION  Procedure Note    PATIENT NAME: Yao Luther  : 1945  MRN: [id number]     Pre-op Diagnosis:   1  Atherosclerosis of native artery of both lower extremities with intermittent claudication (HCC)      Post-op Diagnosis:   1  Atherosclerosis of native artery of both lower extremities with intermittent claudication [facility])        Surgeon:   Cori Oates MD  Assistants:     No qualified resident was available, Resident is only observing    Estimated Blood Loss:  2 mL  Findings:   Patent aorta and bilateral external iliac arteries  Long segment occlusion of left superficial femoral artery extending from the proximal segment through the distal segment  The pre-existing distal left SFA stent is occluded  There is immediate reconstitution distal to the stent  Successful recanalization of left superficial femoral artery  Successful balloon angioplasty utilizing 7 mm balloon from the distal superficial femoral artery to the proximal segment  Improved patency following balloon to plasty, however the angiographic appearance was suboptimal   In addition, there was a dissection within the proximal superficial femoral artery  Successful placement of 6 mm drug eluting   zilver stents x3 from the distal superficial femoral artery to the proximal superficial femoral artery, post dilated with 6 mm balloon  Excellent angiographic appearance and blood flow through the left superficial femoral artery  There is in-line flow via a patent posterior tibial artery  There is extensive tibial disease involving the peroneal artery with multifocal occlusions and the anterior tibial artery which appears to be chronically occluded throughout its length  As the patient has a prior nonvascular exam that documents healing range within the toes, no tibial intervention was performed today  Right common femoral arteriotomy closed with Mynx device    There was immediate excellent hemostasis  Postprocedure pulse examination:    Right common femoral artery:  2+ above and below puncture site  Left common femoral artery:  2+  Right DP/PT:  Doppler, though DP is weak  Left DP/PT:  Dopplerable, improved    Patient will be taken to recovery for 4 hr observation bed rest   Patient will be discharged later today if he meets criteria  Patient will be initiated on clopidogrel with loading dose 300 mg orally today, and to continue clopidogrel 75 mg tomorrow  In addition, patient should continue to take aspirin 81 mg daily  Repeat noninvasive vascular imaging in one-month at clinic follow-up  Results discussed with Dr Mary Chung Doctor immediately following the procedure      Specimens:  None requested    Complications:  Nothing immediately apparent    Anesthesia: Conscious sedation and Local    Viktor Seymour MD     Date: 6/19/2018  Time: 1:18 PM

## 2018-06-19 NOTE — PROGRESS NOTES
Vascular and Interventional Radiology Pre Procedure Note    Diagnosis:  Left calf claudication, lifestyle limiting    Procedure: Aorto peripheral angiogram with possible intervention, including stenting, balloon angioplasty, thrombolysis    HPI:  56-year-old male with chronic history of peripheral arterial disease presenting with worsening left calf pain  Patient reports having left calf pain that developed in March of this year  This has subsequently progressed  Patient is now able to walk 1-2 blocks before having severe pain and having to stop  Patient cannot walk a mi  Once the patient stops and rests for 5 min, the pain resolves and he may continue walking again for 1-2 blocks  This has begun to limit his ability to perform tasks at home such as mowing the lawn or helping around the house  Patient denies rest pain and denies nonhealing wounds or ulcers of the left foot  Patient also reports having prior bypass of the right lower extremity that has subsequently occluded and is not being treated at this time  Patient was seen in the office by Dr Yadira Hamilton of vascular surgery  Noninvasive imaging demonstrated a high-grade stenosis versus occlusion of previously placed stent  Patient reports stent was placed in 2011 at an outside institution  The noninvasive exam demonstrated metatarsal pressures and great toe pressures within healing range  Ankle-brachial index of 0 54, previously 0 8  There is evidence of significant tibioperoneal disease on the noninvasive imaging  Today, patient denies chest pain, shortness of breath, fever, chills, nausea and vomiting  Exam:  General:  Awake, alert, oriented x3, no acute distress  Neck:  Soft, supple, nontender  Chest:  Nontender, no deformity  Abdomen:  Soft, nontender, nondistended, no guarding, no rebound  Bilateral groins:  Clean, dry, intact  Surgical scar overlying the right groin    Pulse examination:  Bilateral common femoral artery:  2+  Right dorsal pedal:  Absent  Right posterior tibial:  Doppler, monophasic versus biphasic  Left dorsal pedal:  Dopplerable, monophasic  Left posterior tibial:  Dopplerable, monophasic versus biphasic  Bilateral radial:  2+  Extremities:  Warm, well perfused, no clubbing, cyanosis, or edema  Capillary refill less than 2 sec  Cardiac:  S1-S2, regular rate and rhythm  Lungs:  Bilateral air entry, non labored breathing      Labs:  Hematology:  WBC 6 5, hemoglobin 10 9, hematocrit 34 8, platelets 254  Chemistry:  Sodium 132, potassium 5 6, chloride 103, carbon dioxide 23, BUN 10, creatinine 1 31, glucose 212  Coagulation:  07/19/2017 INR 1 04, prothrombin time 13 6    Imaging:  Noninvasive imaging 05/08/2018 demonstrates high-grade stenosis versus occlusion of left superficial femoral artery stent with reconstitution distally  There is tibioperoneal disease and a decreased acral brachial index of 0 54, down from 0 8  Great toe pressure 43 is felt to be within the healing range  Plan: Will proceed with aorto peripheral angiogram and possible intervention, including balloon angioplasty of the left superficial femoral artery stent  Suspect in stent restenosis given the findings and description  At this time, we will plan for intervention of the superficial femoral artery only, as the toe pressures appear to be within the healing range  This may change depending on visualization of the tibioperoneal vessels and following intervention  There is some risk of distal emboli which was discussed with the patient and may necessitate tPA thrombolysis or further intervention such as thrombectomy  I discussed the procedure, its details, risks, benefits and alternatives with the patient  All questions were answered  Patient elects to proceed with the procedure  H&P was reviewed

## 2018-06-19 NOTE — DISCHARGE INSTRUCTIONS
Follow-up in 1 month in the vascular Center  Will need repeat noninvasive imaging with treadmill           ARTERIOGRAM    WHAT YOU SHOULD KNOW:   An angiogram is a procedure to look at arteries in your body  Arteries are the blood vessels that carry blood from your heart to your body  AFTER YOU LEAVE:     Self-care:   · Limit activity: Rest for the remainder of the day of your procedure  Have some one with you until the next morning  Keep your arm or leg straight as much as possible  Rest as much as possible, sitting lying or reclining  Walk only to go to the bathroom, to bed or to eat  If the angiogram catheter was put in your leg, use the stairs as little as possible  No driving  · Keep your wound clean and dry  You may shower 24 hours after your procedure  The bandage you have on should fall off in 2-3 days  If there is any drainage from the puncture site, you should put on a clean bandage  · Watch for bleeding and bruising: It is normal to have a bruise and soreness where the angiogram catheter went in  · Diet:   · You may resume your regular diet, Sips of flat soda will help with mild nausea  · Drink more liquids than usual for the next 24 hours      · IMMEDIATELY Contact Interventional Radiology at [phone] [place] PATIENTS: Contact Interventional Radiology at [phone]) Italia Franz PATIENTS: Contact Interventional Radiology at [phone]) if any of the following occur:  · If your bruise gets larger or if you notice any active bleeding  APPLY DIRECT PRESSURE TO THE BLEEDING SITE  · If you notice increased swelling or have increased pain at the puncture site   · If you have any numbness or pain in the extremity of the puncture site   · If that extremity seems cold or pale      · You have fever greater than 101  · Persistent nausea or vomitting    Follow up with your primary healthcare provider  as directed: Write down your questions so you remember to ask them during your visits

## 2018-06-27 ENCOUNTER — TELEPHONE (OUTPATIENT)
Dept: VASCULAR SURGERY | Facility: CLINIC | Age: 73
End: 2018-06-27

## 2018-06-27 NOTE — TELEPHONE ENCOUNTER
Vascular and Interventional Radiology brief note    Called patient and discussed his progress over the phone  Patient denies any acute complications  Patient reports his calf pain has resolved and he has returned to his normal daily activities pain-free  Patient has no complaints  Patient reports continue to take his medications including the Plavix  He does not need any prescriptions  Patient understands he needs to follow-up 1 month after the procedure

## 2018-06-29 ENCOUNTER — TELEPHONE (OUTPATIENT)
Dept: FAMILY MEDICINE CLINIC | Facility: CLINIC | Age: 73
End: 2018-06-29

## 2018-06-29 NOTE — TELEPHONE ENCOUNTER
PA was done with [facility]s [facility]  Key [id number] Forms completed    Decision was Approved Case ID [id number]  No further action needed  Task Complete  rmkln

## 2018-07-03 ENCOUNTER — HOSPITAL ENCOUNTER (OUTPATIENT)
Dept: RADIOLOGY | Facility: HOSPITAL | Age: 73
Discharge: HOME | End: 2018-07-03
Attending: RADIOLOGY
Payer: MEDICARE

## 2018-07-03 DIAGNOSIS — I70.209 PERIPHERAL ARTERIOSCLEROSIS (HCC): ICD-10-CM

## 2018-07-03 PROCEDURE — 93923 UPR/LXTR ART STDY 3+ LVLS: CPT | Performed by: SURGERY

## 2018-07-03 PROCEDURE — 93923 UPR/LXTR ART STDY 3+ LVLS: CPT

## 2018-07-09 NOTE — PROGRESS NOTES
Assessment  1  Atherosclerotic peripheral vascular disease (440 20) (I70 209)   2  Diabetic neuropathy (250 60,357 2) (E11 40)   3  Onychomycosis (110 1) (B35 1)   4  Pain in both feet (729 5) (M79 671,M79 672)    Plan   · Follow-up visit in 9 weeks Evaluation and Treatment  Follow-up  Status: Hold For -  Scheduling  Requested for: 42Sng5875   · Inspect your feet and legs daily if you have vascular disease ; Status:Complete;   Done:  02KUB4435 09:06AM            Follow-up visit in 1 month Evaluation and Treatment  Follow-up  Status: Hold For - Scheduling  Requested for: 39YBJ1613  Ordered; For: Lumbar radiculopathy;  Ordered By: Guzman Enriquez  Performed:   Due: 09TIH5611     Discussion/Summary    Daily foot checks, stressed emollient use daily  The patient was counseled regarding instructions for management,-- patient and family education,-- importance of compliance with treatment  The treatment plan was reviewed with the patient/guardian  The patient/guardian understands and agrees with the treatment plan      Chief Complaint  Patient has painful calluses and thick painful nails that hurt when walking and wearing shoes  History of Present Illness  HPI: Patient is a diabetic  History of burning and numbness in feet  Active Problems  1  Allergic rhinitis (477 9) (J30 9)   2  Arthropathy (716 90) (M12 9)   3  Atherosclerosis of native artery of both lower extremities with intermittent claudication   (440 21) (I70 213)   4  Atherosclerotic peripheral vascular disease (440 20) (I70 209)   5  Benign essential hypertension (401 1) (I10)   6  BMI 25 0-25 9,adult (V85 21) (Z68 25)   7  CAD (coronary artery disease) (414 00) (I25 10)   8  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,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,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)   9  Colon cancer screening (V76 51) (Z12 11)   10  Depression screening (V79 0) (Z13 89)   11  Diabetic neuropathy (250 60,357 2) (E11 40)   12  Esophagitis, reflux (530 11) (K21 0)   13  Immunization due (V05 9) (Z23)   14  Lumbar radiculopathy (724 4) (M54 16)   15  Onychomycosis (110 1) (B35 1)   16  Pain in both feet (729 5) (M79 671,M79 672)   17  Peripheral arterial disease (443 9) (I73 9)   18  Prostate cancer screening (V76 44) (Z12 5)   19  Rosacea (695 3) (L71 9)   20  Screening for cardiovascular, respiratory, and genitourinary diseases    (V81 2,V81 4,V81 6) (Z13 6,Z13 83,Z13 89)   21  Screening for neurological condition (V80 09) (Z13 89)   22  Seborrheic dermatitis (690 10) (L21 9)   23  Type 2 diabetes mellitus with diabetic neuropathy (250 60,357 2) (E11 40)   24  Type 2 diabetes mellitus with other circulatory complications (975 68) (U56 21)   25   Type 2 diabetes mellitus with other kidney complication (669 24) (Q71 12)    Past Medical History   · History of Acquired Hallux Valgus (735 0)   · History of Acute upper respiratory infection (465 9) (J06 9)   · History of Atrophy of nail (703 8) (L60 3)   · History of Bursitis of hip, unspecified laterality   · History of Callus (700) (L84)   · History of Callus (700) (L84)   · History of Callus (700) (L84)   · History of Cellulitis (682 9) (L03 90)   · History of Deformity of ankle and foot, acquired (736 70) (M21 969)   · History of Difficulty walking (719 7) (R26 2)   · History of Dysesthesia (782 0) (R20 8)   · History of Edema (782 3) (R60 9)   · History of Hammer toe, unspecified laterality (735 4) (M20 40)   · History of acute bronchitis (V12 69) (Z87 09)   · History of allergic rhinitis (V12 69) (Z87 09)   · History of anemia (V12 3) (Z86 2)   · History of backache (V13 59) (Z87 39)   · History of bursitis (V13 59) (Z87 39)   · History of pneumonia (V12 61) (Z87 01)   · History of tendinitis (V13 59) (Z87 39)   · History of tinea corporis (V12 09) (Z86 19)   · History of viral warts (V12 09) (Z86 19)   · History of Late Effects Of Sprain Or Strain (905 7)   · History of Leg swelling (229 81) (M32 89)   · History of Limb pain (729 5) (M79 609)   · History of Limb pain (729 5) (M79 609)   · History of Metatarsalgia, unspecified laterality (726 70) (M77 40)   · History of Onychomycosis of toenail (110 1) (B35 1)   · History of Open wound of foot, excluding toe(s) (892 0) (S91 309A)   · History of Pain in both feet (729 5) (M79 671,M79 672)   · History of Pain in both feet (729 5) (M79 671,M79 672)   · History of Pain in both feet (729 5) (M79 671,M79 672)   · History of Pain in both feet (729 5) (M79 671,M79 672)   · History of Pes planus, unspecified laterality (734) (M21 40)   · History of Piriformis muscle pain (729 1) (M79 1)   · History of Shoulder joint pain, unspecified laterality   · 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)   · History of Type 2 diabetes mellitus (250 00) (E11 9)    Surgical History   · History of Bypass Graft Using Vein: Femoral-popliteal   · History of Cataract Surgery   · History of Foot Surgery   · History of Heart Surgery   · History of Leg Repair   · History of Rotator Cuff Repair   · History of Shoulder Surgery    The surgical history was reviewed and updated today  Family History  Mother    · Family history of Aortic Aneurysm  Father    · Family history of acute myocardial infarction (V17 3) (Z82 49)  Sister    · Family history of acute myocardial infarction (V17 3) (Z82 49)    The family history was reviewed and updated today  Social History   · Being A Social Drinker   · Denied: History of Drug Use   · Never A Smoker   · Retired From Work  The social history was reviewed and updated today  Current Meds   1  Acarbose 25 MG Oral Tablet; TAKE 1 TABLET 3 TIMES DAILY WITH THE FIRST BITE OF   EACH MAIN MEAL; Therapy: 39Ory2752 to (Evaluate:66Vfq7548); Last Rx:68Zpq3741 Ordered   2  Carvedilol 12 5 MG Oral Tablet; Take 1 tablet twice a day; Therapy: 58EJG9818 to (Last Rx:70Lll2606) Ordered   3  Clopidogrel Bisulfate 75 MG Oral Tablet;  Take 1 tablet daily; Therapy: 01LNV5950 to (Last Rx:59Afg0098)  Requested for: 35Kfg4323 Ordered   4  Gabapentin 300 MG Oral Capsule; TAKE 1 CAPSULE 3 TIMES DAILY; Therapy: 37LZS5164 to (Viktoria Malone)  Requested for: 40WDI6813; Last   Rx:10Mar2017 Ordered   5  Glimepiride 4 MG Oral Tablet; TAKE ONE TABLET BY MOUTH TWICE A DAY; Therapy: 07WVJ9033 to (Evaluate:12Jan2018)  Requested for: 52NLP2830; Last   Rx:17Jan2017 Ordered   6  Halobetasol Propionate 0 05 % External Cream;   Therapy: 70JGT5753 to Recorded   7  MetFORMIN HCl ER (MOD) 500 MG Oral Tablet Extended Release 24 Hour; 4 PO QD; Therapy: 21RJL5880 to (Last Rx:58Uqw9586)  Requested for: 57Cay5773 Ordered   8  Nitrostat 0 4 MG Sublingual Tablet Sublingual; PLACE 1 TABLET UNDER THE TONGUE   EVERY 5 MINUTES FOR UP TO 3 DOSES AS NEEDED FOR CHEST PAIN  CALL   911 IF PAIN PERSISTS; Therapy: 10TTC0861 to (Samnia Acosta)  Requested for: 31Glq3980; Last   Rx:17Sep2012 Ordered   9  Omeprazole 40 MG Oral Capsule Delayed Release; 1 every day; Therapy: 93IHY6112 to (Arianne Edwards)  Requested for: 86ZJQ8137; Last   Rx:03Jan2017 Ordered   10  Simvastatin 40 MG Oral Tablet; take one tablet by mouth every day; Therapy: 56Awz4978 to (Viktoria Malone)  Requested for: 82WFG1421; Last    Rx:10Mar2017 Ordered   11  TRUEplus Lancets 28G Miscellaneous; test glucose once daily <250 00>; Therapy: 04NGE8289 to (LWESRZAZ:86HDK9648)  Requested for: 48SRZ1663; Last    Rx:29Jan2015 Ordered   12  TrueTrack Test In Vitro Strip; test 1x/d as directed, <250 00>; Therapy: 76ZIA2789 to (Evaluate:30Jan2016)  Requested for: 55XIM4740; Last    Rx:29Jan2015 Ordered    The medication list was reviewed and updated today  Allergies  1   No Known Drug Allergies    Vitals   Recorded: 05Sep2017 08:58AM   Heart Rate 75   Respiration 16   Systolic 557   Diastolic 70   Height 5 ft 11 in   Weight 185 lb    BMI Calculated 25 8   BSA Calculated 2 04     Physical Exam    Constitutional: no acute distress, well appearing and well nourished  Cardiovascular: abnormal dorsalis pedis pulse,-- abnormal posterior tibialis pulse-- and-- abnormal capillary refill  Orthopedic/Biomechanical: abnormal foot type-- and-- hammertoe(s)  Skin: abnormal texture-- and-- keratoses  Neurologic: decreased response to light touch,-- decreased vibration sensation-- and-- decreased response to monofilament testing, but-- normal DTRs  Psychiatric: oriented to person, place, and time  Left Foot: Appearance: Normal except as noted: a deformity (ball of foot and distal fifth metatarsal )  Fifth toe deformities include hammer toe  Tenderness: None except the plantar aspect of the foot  All nails thick discolored dystrophic, positive subungual debris, positive pain on palpation  Special Tests: pre-ulcerative hyperkeratotic lesions fifth metatarsal head bilateral  Negative ulceration negative erythema  Right Foot: Appearance: Normal except as noted: a deformity (distal fifth metatarsal )  Mild xerosis negative erythema mild erythema no signs of infection  Special Tests: no maceration ulceration no signs of infection  Left Ankle: ROM: limited ROM in all planes   Right Ankle: ROM: limited ROM in all planes   Neurological Exam: Light touch was decreased bilaterally  Vibratory sensation was decreased in both first metatarsophalangeal joints  Response to monofilament test was absent bilaterally  Deep tendon reflexes: achilles reflex 1/4 bilaterally  Vascular Exam: performed Dorsalis pedis pulses were 1/4 bilaterally  Posterior tibial pulses were 1/4 bilaterally  Elevation Pallor: diminished bilaterally  Capillary refill time was Q  9, findings bilateral  Negative digital hair noted, positive abnormal cooling  All pre-ulcer, lesions debrided  Today, but-- between 1-3 seconds bilaterally  Edema: mild bilaterally-- and-- All mycotic nails debrided  Today   The patient was given orthotics to help Quality 131: Pain Assessment And Follow-Up: Pain assessment using a standardized tool is documented as negative, no follow-up plan required control his feet and at as cushioning and padding on the bottom of his feet q9 findings  Toenails: All of the toenails were elongated,-- hypertrophied,-- discolored,-- ingrown,-- shown to have subungual debris-- and-- tender  Diminished tactile sensation with monofilament testing throughout the right foot  Diminished tactile sensation with monofilament testing throughout the left foot  Pulses:   1+ in the posterior tibialis on the right   1+ in the dorsalis pedis on the right  Capillary refills findings on the left were delayed in the toes  Pulses:   1+ in the posterior tibialis on the left   1+ in the dorsalis pedis on the left  Assign Risk Category: 2: Loss of protective sensation with or without weakness, deformity, callus, pre-ulcer, or history of ulceration  High risk  Hyperkeratosis: present on both first sub metatarsals,-- present on both fifth sub metatarsals-- and-- Pre-ulcerative lesion, submetatarsal one to 5, bilateral    Shoe Gear Evaluation: performed ()  Recommendation(s): custom inlays  Procedure  Aseptic debridement and planing of nails ? 10, with nail nipper and nail , no complicationsdebridement of pre-trophic hyperkeratotic lesions ? 2 plantar feet bilateral  Debridement with #10 scalpel debrided nonviable tissue down to healthy viable tissue  No complications jonathan feet bilateral      Future Appointments    Date/Time Provider Specialty Site   09/20/2017 09:15 AM Crispin Arreguin[address]   11/14/2017 10:00 AM Crispin Arreguin[address]     Signatures   Electronically signed by :  Gino Tong DPM; Sep  5 2017  9:07AM EST                       (Author) Quality 431: Preventive Care And Screening: Unhealthy Alcohol Use - Screening: Patient screened for unhealthy alcohol use using a single question and scores less than 2 times per year Detail Level: Detailed Quality 226: Preventive Care And Screening: Tobacco Use: Screening And Cessation Intervention: Patient screened for tobacco and never smoked Quality 130: Documentation Of Current Medications In The Medical Record: Current Medications Documented

## 2018-07-10 DIAGNOSIS — E11.40 TYPE 2 DIABETES MELLITUS WITH DIABETIC NEUROPATHY, WITHOUT LONG-TERM CURRENT USE OF INSULIN (HCC): ICD-10-CM

## 2018-07-10 RX ORDER — METFORMIN HYDROCHLORIDE 500 MG/1
2000 TABLET, FILM COATED, EXTENDED RELEASE ORAL
Qty: 360 TABLET | Refills: 3 | Status: SHIPPED | OUTPATIENT
Start: 2018-07-10 | End: 2019-05-25 | Stop reason: SDUPTHER

## 2018-07-11 ENCOUNTER — OFFICE VISIT (OUTPATIENT)
Dept: VASCULAR SURGERY | Facility: CLINIC | Age: 73
End: 2018-07-11
Payer: MEDICARE

## 2018-07-11 VITALS
DIASTOLIC BLOOD PRESSURE: 70 MMHG | RESPIRATION RATE: 16 BRPM | HEIGHT: 71 IN | HEART RATE: 72 BPM | BODY MASS INDEX: 25.2 KG/M2 | TEMPERATURE: 97.5 F | WEIGHT: 180 LBS | SYSTOLIC BLOOD PRESSURE: 150 MMHG

## 2018-07-11 DIAGNOSIS — I70.213 ATHEROSCLEROSIS OF NATIVE ARTERY OF BOTH LOWER EXTREMITIES WITH INTERMITTENT CLAUDICATION (HCC): Primary | ICD-10-CM

## 2018-07-11 DIAGNOSIS — T82.898D OCCLUSION OF FEMOROPOPLITEAL BYPASS GRAFT, SUBSEQUENT ENCOUNTER: ICD-10-CM

## 2018-07-11 DIAGNOSIS — M54.16 LUMBAR RADICULOPATHY: ICD-10-CM

## 2018-07-11 PROCEDURE — 99215 OFFICE O/P EST HI 40 MIN: CPT | Performed by: SURGERY

## 2018-07-11 RX ORDER — CLOPIDOGREL BISULFATE 75 MG/1
75 TABLET ORAL DAILY
Qty: 90 TABLET | Refills: 4 | Status: SHIPPED | OUTPATIENT
Start: 2018-07-11 | End: 2019-01-22

## 2018-07-11 NOTE — PROGRESS NOTES
Assessment/Plan:    Pt is a 68 yo M w/ GERD, CKD, peripheral neuropathy, DM, HTN, CAD, PAD w/ hx of R fem-AK pop bypass ([facility]ik, occluded), and B SFA stenting, now s/p angiogram and recannalization of L SFA/stent occlusion 6/19/18 (Kj)    Atherosclerosis of native artery of both lower extremities with intermittent claudication (HCC)  Occlusion of femoropopliteal bypass graft, subsequent encounter  -     clopidogrel (PLAVIX) 75 mg tablet; Take 1 tablet (75 mg total) by mouth daily  -     VAS lower limb arterial duplex, complete bilateral; Future  -reviewed preprocedure LEADs which showed R: 0 7/98/89   L: 0 54/71/43 and B SFA occlusions  -reviewed postprocedure ABIs which showed L: 1 04/203/112  -patient is doing very well with complete resolution of LLE claudication  -will repeat LEADs in 3 mos  -f/u in 1 year or sooner if symptoms recur    Lumbar radiculopathy  -ambulation is now mostly limited by back pain and nerve pain; this is unrelated to his PAD and should be worked up separately    Medications  -will cont ASA/statin; will cont plavix for life for stent patency given past reocclusions and multiple interventions (new Rx given so that they can do mail order)    Subjective:      Patient ID: Samson Day is a 67 y o  male  Patient is s/p A-gram LLE omn 6/19  Patient had ALEKSANDR & waveforms on 7/3  Patient feels the LLE calf cramping has completely resolved  He has some numbness in the leg that has always been there  He denies foot coldness  He denies any open wounds sores  He takes ASA, Plavix and statin daily  He is no longer taking Pletal and wants to know if he should resume it  HPI:    Patient returns after successful agram   Patient initially seen by Brenda and tammy done by Kj  Patient has longstanding PAD with claudication  He has hx of R fem-AK pop bypass by Jermaine Crabtree which has since thrombosed  He has very mild symptoms on the right side    On the left, he was having life-limiting claudication  Since the procedure, he is symptoms free  He is able to mow the whole lawn  His walking is now limited by his lumbar back pain and generalized fatigue/deconditioning  No access site issues  Fam hx of AAA in his mom  Non-smoker        The following portions of the patient's history were reviewed and updated as appropriate: allergies, current medications, past family history, past medical history, past social history, past surgical history and problem list     Review of Systems   Constitutional: Positive for fatigue  HENT: Negative  Eyes: Negative  Respiratory: Negative  Cardiovascular: Negative  Gastrointestinal: Negative  Endocrine: Negative  Genitourinary: Negative  Musculoskeletal: Positive for back pain  Negative for gait problem  Skin: Negative  Allergic/Immunologic: Negative  Neurological: Negative  Hematological: Negative  Psychiatric/Behavioral: Negative  Objective:      /70 (BP Location: Left arm, Patient Position: Sitting, Cuff Size: Adult)   Pulse 72   Temp 97 5 °F (36 4 °C) (Tympanic)   Resp 16   Ht 5' 11" (1 803 m)   Wt 81 6 kg (180 lb)   BMI 25 10 kg/m²          Physical Exam   Constitutional: He is oriented to person, place, and time  He appears well-developed and well-nourished  HENT:   Head: Normocephalic and atraumatic  Eyes: Conjunctivae are normal    Neck: Normal range of motion  Neck supple  Cardiovascular: Normal rate, regular rhythm and normal heart sounds  No murmur heard  Pulses:       Radial pulses are 2+ on the right side, and 2+ on the left side  Popliteal pulses are 0 on the right side, and 0 on the left side  Dorsalis pedis pulses are 0 on the right side, and 0 on the left side  Posterior tibial pulses are 0 on the right side, and 2+ on the left side     No carotid bruits B  R fem access site C/D/I, no hematoma or ecchymosis   Pulmonary/Chest: Effort normal and breath sounds normal    Abdominal: Soft  He exhibits no distension and no mass  There is no tenderness  There is no rebound  Obese abdomen   Musculoskeletal: Normal range of motion  He exhibits edema (mild BLE edema, L>R, mostly lower legs and ankles and feet)  Neurological: He is alert and oriented to person, place, and time  Skin: Skin is warm and dry  Psychiatric: He has a normal mood and affect  His behavior is normal    Nursing note and vitals reviewed  Vitals:    07/11/18 1333   BP: 150/70   BP Location: Left arm   Patient Position: Sitting   Cuff Size: Adult   Pulse: 72   Resp: 16   Temp: 97 5 °F (36 4 °C)   TempSrc: Tympanic   Weight: 81 6 kg (180 lb)   Height: 5' 11" (1 803 m)       Patient Active Problem List   Diagnosis    Corns    Diabetic polyneuropathy associated with type 2 diabetes mellitus (HCC)    Allergic rhinitis    Arthropathy    Atherosclerosis of native artery of both lower extremities with intermittent claudication (HCC)    Benign essential hypertension    CAD (coronary artery disease)    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)    Diabetic neuropathy (HCC)    GE reflux    Lumbar radiculopathy    Rosacea    Seborrheic dermatitis    Type 2 diabetes mellitus with diabetic neuropathy (HCC)    Type 2 diabetes mellitus (HCC)    Onychomycosis    Occlusion of femoral-popliteal bypass graft ([facility] [address] )       History reviewed  No pertinent surgical history  Family History   Problem Relation Age of Onset    Heart disease Father     Heart disease Sister     Heart disease Paternal Grandfather        Social History     Social History    Marital status: /Civil Union     Spouse name: N/A    Number of children: N/A    Years of education: N/A     Occupational History    Not on file       Social History Main Topics    Smoking status: Never Smoker    Smokeless tobacco: Never Used    Alcohol use Not on file    Drug use: Unknown    Sexual activity: Not on file     Other Topics Concern    Not on file     Social History Narrative    No narrative on file       No Known Allergies      Current Outpatient Prescriptions:     acarbose (PRECOSE) 25 mg tablet, 2 before breakfast, 1 before lunch, 2 before dinner [when eaten], Disp: 450 tablet, Rfl: 3    aspirin (ECOTRIN LOW STRENGTH) 81 mg EC tablet, Take 1 tablet (81 mg total) by mouth daily, Disp: 30 tablet, Rfl: 6    carvedilol (COREG) 12 5 mg tablet, TAKE 1 TABLET TWICE DAILY, Disp: 180 tablet, Rfl: 3    clopidogrel (PLAVIX) 75 mg tablet, Take 1 tablet (75 mg total) by mouth daily, Disp: 30 tablet, Rfl: 3    fluocinolone (SYNALAR) 0 025 % cream, Apply topically 2 (two) times a day, Disp: 15 g, Rfl: 0    glucose blood (TRUETRACK TEST) test strip, 1 each by Other route daily Use as instructed for E11 29, Disp: 100 each, Rfl: 3    halobetasol (ULTRAVATE) 0 05 % cream, Bid to leg, per dermatologist, Disp: 50 g, Rfl: 0    lisinopril (ZESTRIL) 2 5 mg tablet, Take 1 tablet by mouth daily, Disp: , Rfl:     metFORMIN (GLUMETZA) 500 MG (MOD) 24 hr tablet, Take 4 tablets (2,000 mg total) by mouth daily with breakfast, Disp: 360 tablet, Rfl: 3    omeprazole (PriLOSEC) 40 MG capsule, Take 1 capsule (40 mg total) by mouth daily, Disp: , Rfl: 0    simvastatin (ZOCOR) 40 mg tablet, Take 1 tablet (40 mg total) by mouth daily, Disp: , Rfl: 0    TRUEPLUS LANCETS 28G MISC, Test 1x/d, E11 29, Disp: , Rfl: 0    clopidogrel (PLAVIX) 75 mg tablet, Take 1 tablet (75 mg total) by mouth daily, Disp: 90 tablet, Rfl: 4    gabapentin (NEURONTIN) 600 MG tablet, Take 1 tablet (600 mg total) by mouth 2 (two) times a day for 30 days, Disp: 60 tablet, Rfl: 1    glimepiride (AMARYL) 4 mg tablet, Take 1 tablet by mouth 2 (two) times a day for 90 days, Disp: 180 tablet, Rfl: 3

## 2018-07-11 NOTE — PATIENT INSTRUCTIONS
1) PAD  -you had a successful angiogram procedure with Dr Laurie Elise with excellent results  -try to increase your walking and activity  -continue to take you ASA and plavix indefinitely  -we will get a full ultrasound in 3 months  -f/u in 1 year or sooner if you develop new symptoms    Peripheral Vascular Disease   WHAT YOU NEED TO KNOW:   What is peripheral vascular disease (PVD)? PVD is a condition that causes decreased blood flow to your limbs because of blocked blood vessels  The blockage is usually caused by atherosclerosis  This is when material, such as cholesterol, sticks to the inside of your blood vessels and makes them narrow  What increases my risk for PVD? · Smoking    · Obesity or a lack of activity or exercise    · A medical condition, such as high blood pressure, high cholesterol, or diabetes    · History of a blood clot, kidney disease, or heart failure    · Older age    · A family history of peripheral artery disease, heart disease, or stroke  What are the signs and symptoms of PVD? · Painful cramps in your hip, thigh, or calf muscles, especially after you walk or climb stairs    · Burning pain in your hands, fingers, feet, or toes    · Shiny, tight, cold skin, and uneven hair growth on your skin    · A change in your skin color    · Sores on your skin that do not heal    · Weakness or numbness in your hands or feet  How is PVD diagnosed? Your healthcare provider will ask about your symptoms and examine you  You may need any of the following tests:  · Blood or urine tests  may be used to get information about how your body is working  · An ankle brachial index  compares the blood pressure in your arms to the blood pressure in your legs  A lower blood pressure in your legs may mean you have blocked blood vessels  · Doppler ultrasound  pictures may show narrow or blocked blood vessels  · Angiography  pictures may show blood flow and blockage   You may be given contrast liquid to help the pictures show up better  Tell the healthcare provider if you have ever had an allergic reaction to contrast liquid  How is PVD treated? · Medicines  may be given to help decrease your cholesterol level, open blood vessels, or prevent blood clots  · Procedures  may be used to open blocked blood vessels  Metal or plastic stents (tubes) may be put in where the artery was blocked to keep it open  Surgery may be used to place a new blood vessel near the one that is blocked, or replace the area of the blood vessel  How can I manage PVD? · Do not smoke  Nicotine and other chemicals in cigarettes and cigars can damage your blood vessels  Ask your healthcare provider for information if you currently smoke and need help to quit  E-cigarettes or smokeless tobacco still contain nicotine  Talk to your healthcare provider before you use these products  · Get regular exercise  Ask about the best exercise plan for you  Walking is a low-impact way to exercise and increase your blood flow  Stop and rest if you have pain in your legs  · Care for your feet  Look closely at your feet every day  Check for cracks or sores  Wash your feet daily with mild soap and dry them well  Do not walk barefoot in case you step on a hard or sharp object  · Change your sleep position  You may have pain in your legs or feet when you sleep  Raise the head of your bed 4 inches, or use pillows to prop your upper body higher than your legs  This may help more blood go to your feet, decreasing pain  · Protect and cushion your feet and hands  If you have ulcers on your feet, you may need to wear bandages with heel pads  You may also wear foam rubber booties  Hand or foot warmers may decrease pain in your hands or feet  How can I prevent PVD? · Eat a variety of healthy foods  Healthy foods include fruits, vegetables, whole-grain breads, low-fat dairy products, beans, lean meats, and fish   Ask if you need to be on a special diet     · Maintain a healthy weight  Ask your healthcare provider how much you should weigh  Ask him to help you create a weight loss plan if you are overweight  · Manage diabetes  Keep your blood sugar level in the correct range  Check your blood sugar level often  Ask your healthcare provider if you should make changes to your diet, exercise, or medications  Call 911 for any of the following:   · You have any of the following signs of a heart attack:      ¨ Squeezing, pressure, or pain in your chest that lasts longer than 5 minutes or returns    ¨ Discomfort or pain in your back, neck, jaw, stomach, or arm     ¨ Trouble breathing    ¨ Nausea or vomiting    ¨ Lightheadedness or a sudden cold sweat, especially with chest pain or trouble breathing    · You have any of the following signs of a stroke:      ¨ Numbness or drooping on one side of your face     ¨ Weakness in an arm or leg    ¨ Confusion or difficulty speaking    ¨ Dizziness, a severe headache, or vision loss  When should I seek immediate care? · Your arm or leg feels warm, tender, and painful  It may look swollen and red  · You have pain in your legs that does not go away with rest     · You have dark areas on the skin of your legs  · You cannot see out of one or both of your eyes  When should I contact my healthcare provider? · Your signs and symptoms get worse or do not get better, even after treatment  · You have a sore or ulcer that is not healing or gets worse  · You have questions or concerns about your condition or care  CARE AGREEMENT:   You have the right to help plan your care  Learn about your health condition and how it may be treated  Discuss treatment options with your caregivers to decide what care you want to receive  You always have the right to refuse treatment  The above information is an  only  It is not intended as medical advice for individual conditions or treatments   Talk to your doctor, nurse or pharmacist before following any medical regimen to see if it is safe and effective for you  © 2017 [address] Information is for End User's use only and may not be sold, redistributed or otherwise used for commercial purposes  All illustrations and images included in CareNotes® are the copyrighted property of [employer]  or [employer]

## 2018-07-30 ENCOUNTER — OFFICE VISIT (OUTPATIENT)
Dept: PODIATRY | Facility: CLINIC | Age: 73
End: 2018-07-30
Payer: MEDICARE

## 2018-07-30 VITALS
SYSTOLIC BLOOD PRESSURE: 152 MMHG | WEIGHT: 180 LBS | RESPIRATION RATE: 16 BRPM | HEIGHT: 71 IN | DIASTOLIC BLOOD PRESSURE: 71 MMHG | BODY MASS INDEX: 25.2 KG/M2 | HEART RATE: 71 BPM

## 2018-07-30 DIAGNOSIS — M79.671 PAIN IN BOTH FEET: ICD-10-CM

## 2018-07-30 DIAGNOSIS — I70.213 ATHEROSCLEROSIS OF NATIVE ARTERY OF BOTH LOWER EXTREMITIES WITH INTERMITTENT CLAUDICATION (HCC): ICD-10-CM

## 2018-07-30 DIAGNOSIS — M79.672 PAIN IN BOTH FEET: ICD-10-CM

## 2018-07-30 DIAGNOSIS — L84 CORNS: ICD-10-CM

## 2018-07-30 DIAGNOSIS — E11.40 TYPE 2 DIABETES MELLITUS WITH DIABETIC NEUROPATHY, WITHOUT LONG-TERM CURRENT USE OF INSULIN (HCC): Primary | ICD-10-CM

## 2018-07-30 PROCEDURE — 11056 PARNG/CUTG B9 HYPRKR LES 2-4: CPT | Performed by: PODIATRIST

## 2018-07-30 NOTE — PROGRESS NOTES
Assessment/Plan:  Patient has peripheral artery disease  He is working with vascular surgery  Patient has diabetic neuropathy  He has pain in his feet with ambulation      Plan  Continue gabapentin  Nails debrided    Calluses debrided without pain or complication  No problem-specific Assessment & Plan notes found for this encounter          There are no diagnoses linked to this encounter        Subjective:       Patient ID: Amando Medel is a 67 y o  male      HPI     The following portions of the patient's history were reviewed and updated as appropriate: allergies, current medications, past family history, past medical history, past social history, past surgical history and problem list      Review of Systems       Objective:     Active Problems   1  Acquired Hallux Valgus (735 0)   2  Allergic rhinitis (477 9) (J30 9)   3  Arthropathy (716 90) (M12 9)   4  Backache (724 5) (M54 9)   5  Benign essential hypertension (401 1) (I10)   6  CAD (coronary atherosclerotic disease) (414 00) (I25 10)   7  Callus (700) (L84)   8  Chronic kidney disease, stage 3 (585 3) (N18 3)   9  Colon cancer screening (V76 51) (Z12 11)   10  Deformity of ankle and foot, acquired (736 70) (M21 969)   11  Depression screening (V79 0) (Z13 89)   12  Diabetic neuropathy (250 60,357 2) (E11 40)   13  Encounter for prostate cancer screening (V76 44) (Z12 5)   14  Esophagitis, reflux (530 11) (K21 0)   15  Need for immunization against influenza (V04 81) (Z23)   16  Need for pneumococcal vaccination (V03 82) (Z23)   17  Onychomycosis of toenail (110 1) (B35 1)   18  Pain in both feet (729 5) (M79 671,M79 672)   19  Peripheral arterial disease (443 9) (I73 9)   20  Pneumonia (486) (J18 9)   21  Prostate cancer screening (V76 44) (Z12 5)   22  Rosacea (695 3) (L71 9)   23  Screening for genitourinary condition (V81 6) (Z13 89)   24  Screening for neurological condition (V80 09) (Z13 89)   25  Seborrheic dermatitis (690 10) (L21 9)   26  Type 2 diabetes mellitus with diabetic neuropathy (250 60,357 2) (E11 40)   27  Type 2 diabetes mellitus with other circulatory complications (268 70) (M58 33)   28  Type 2 diabetes mellitus with renal manifestations, controlled (250 40) (E11 29)     Past Medical History   · History of Acute upper respiratory infection (465 9) (J06 9)   · History of Atrophy of nail (703 8) (L60 3)   · History of Bursitis of hip, unspecified laterality (726 5) (M70 70)   · History of Callus (700) (L84)   · History of Callus (700) (L84)   · History of Difficulty walking (719 7) (R26 2)   · History of Edema (782 3) (R60 9)   · History of Hammer toe, unspecified laterality (735 4) (M20 40)   · History of acute bronchitis (V12 69) (Z87 09)   · History of allergic rhinitis (V12 69) (Z87 09)   · History of anemia (V12 3) (Z86 2)   · History of bursitis (V13 59) (Z87 39)   · History of tendinitis (V13 59) (Z87 39)   · History of tinea corporis (V12 09) (Z86 19)   · History of viral warts (V12 09) (Z86 19)   · History of Late Effects Of Sprain Or Strain (905 7)   · History of Limb pain (729 5) (M79 609)   · History of Limb pain (729 5) (M79 609)   · History of Metatarsalgia, unspecified laterality (726 70) (M77 40)   · History of Open wound of foot, excluding toe(s) (892 0) (S91 309A)   · History of Pain in both feet (729 5) (M79 671,M79 672)   · History of Pain in both feet (729 5) (M79 671,M79 672)   · History of Pes planus, unspecified laterality (734) (M21 40)   · History of Shoulder joint pain, unspecified laterality   · History of Type 2 diabetes mellitus (250 00) (E11 9)     Surgical History   · History of Bypass Graft Using Vein: Femoral-popliteal   · History of Cataract Surgery   · History of Foot Surgery   · History of Heart Surgery   · History of Leg Repair   · History of Rotator Cuff Repair   · History of Shoulder Surgery     The surgical history was reviewed and updated today        Family History  Mother  · Family history of Aortic Aneurysm  Father    · Family history of acute myocardial infarction (V17 3) (Z82 49)  Sister    · Family history of acute myocardial infarction (V17 3) (Z82 49)     The family history was reviewed and updated today        Social History   · Being A Social Drinker   · Denied: History of Drug Use   · Never A Smoker   · Retired From Work  The social history was reviewed and updated today       Current Meds   1  Carvedilol 6 25 MG Oral Tablet; 1 two times a day;   Therapy: 86OCC2944 to (Last Rx:01Feb2016)  Requested for: 95IQG7974 Ordered   2  Cilostazol 100 MG Oral Tablet; TAKE 1 TABLET TWICE DAILY  Requested for:   94UQJ2465; Last Rx:14Jan2015 Ordered   3  Gabapentin 300 MG Oral Capsule; TAKE 1 CAPSULE 3 TIMES DAILY;   Therapy: 56KTI4324 to (Evaluate:26Jan2017)  Requested for: 47FKG5574; Last   Rx:72Zfw7491 Ordered   4  Glimepiride 4 MG Oral Tablet; TAKE ONE TABLET BY MOUTH TWICE A DAY;   Therapy: 95YBD6169 to (Evaluate:83Xef3484)  Requested for: 90UPC3837; Last   Rx:12Oct2015 Ordered   5  Lisinopril 10 MG Oral Tablet; 1/2 tab twice daily  Requested for: 21INU4492; Last   Rx:01Feb2016 Ordered   6  MetFORMIN HCl ER (OSM) 1000 MG Oral Tablet Extended Release 24 Hour; Take 1   tablet twice daily;   Therapy: 02TJY6297 to (Evaluate:10Nov2015)  Requested for: 26Oct2015 Recorded   7  Nitrostat 0 4 MG Sublingual Tablet Sublingual; PLACE 1 TABLET UNDER THE TONGUE   EVERY 5 MINUTES FOR UP TO 3 DOSES AS NEEDED FOR CHEST PAIN  CALL   911 IF PAIN PERSISTS;   Therapy: 15JFH2529 to (Tauna Nyhan)  Requested for: 39LYO8108; Last   Rx:77Dcn4222 Ordered   8  Omeprazole 40 MG Oral Capsule Delayed Release; 1 every day;   Therapy: 40RVQ2178 to (Evaluate:55Xtc2616);  Last Rx:01Feb2016 Ordered   9  Onglyza 5 MG Oral Tablet; 1 every day;   Therapy: 80OKY9359 to (Last Rx:14Jan2015)  Requested for: 81ERZ8557 Ordered   10  Simvastatin 40 MG Oral Tablet; take one tablet by mouth every day;    Therapy: 94Wci7197 to (Evaluate:26Jan2017)  Requested for: 18VEV3740; Last    Rx:75Hdv4281 Ordered   11  TRUEplus Lancets 28G Miscellaneous; test glucose once daily <250 00>;    Therapy: 29HPP8315 to (Evaluate:13Jan2018)  Requested for: 76UGY4881; Last    TU:93DXN6994 Ordered   12  TrueTrack Test In Vitro Strip; test 1x/d as directed, <250 00>;    Therapy: 81UKA2556 to (Evaluate:30Jan2016)  Requested for: 98SQC3708; Last    Rx:29Jan2015 Ordered     The medication list was reviewed and updated today       Allergies   1  No Known Drug Allergies     Vitals        Heart Rate 78   Respiration 16   Systolic 547   Diastolic 80   Height 5 ft 11 in   Weight 189 lb    BMI Calculated 26 36   BSA Calculated 2 06      Physical Exam  Left Foot: Appearance: a deformity (ball of foot and distal fifth metatarsal )  Fifth toe deformities include hammer toe  Tenderness: None except the plantar aspect of the foot  Right Foot: Appearance: a deformity (distal fifth metatarsal )  Left Ankle: ROM: limited ROM in all planes   Right Ankle: ROM: limited ROM in all planes   Neurological Exam: Light touch was decreased bilaterally  Vibratory sensation was decreased in both first metatarsophalangeal joints  Response to monofilament test was absent bilaterally  Deep tendon reflexes: achilles reflex 1/4 bilaterally  Vascular Exam: performed Dorsalis pedis pulses were 1/4 bilaterally  Posterior tibial pulses were 1/4 bilaterally  Elevation Pallor: diminished bilaterally  Capillary refill time was Q  9, findings bilateral  Negative digital hair noted, positive abnormal cooling  All pre-ulcer, lesions debrided  Today, but between 1-3 seconds bilaterally  Edema: mild bilaterally and All mycotic nails debrided  Today  The patient was given orthotics to help control his feet and at as cushioning and padding on the bottom of his feet q9 findings  Toenails:  All of the toenails were elongated, hypertrophied, discolored, ingrown, shown to have subungual debris and tender       Socks and shoes removed, the Right Foot: the foot was dry  The sensory exam showed diminished vibratory sensation at the level of the toes  Diminished tactile sensation with monofilament testing throughout the right foot    Socks and shoes removed, Left Foot: the foot was dry  The sensory exam showed diminished vibratory sensation at the level of the toes  Diminished tactile sensation with monofilament testing throughout the left foot    Pulses:   1+ in the posterior tibialis on the right   1+ in the dorsalis pedis on the right  Capillary refills findings on the left were delayed in the toes  Pulses:   1+ in the posterior tibialis on the left   1+ in the dorsalis pedis on the left  Assign Risk Category: 2: Loss of protective sensation with or without weakness, deformity, callus, pre-ulcer, or history of ulceration  High risk  Hyperkeratosis: present on both first sub metatarsals, present on both fifth sub metatarsals and Pre-ulcerative lesion, submetatarsal one to 5, bilateral    Shoe Gear Evaluation: performed ()  Recommendation(s): custom inlays       Procedure  All mycotic nails debrided  Bilateral lesions debrided  Patient tolerated all procedures without pain or complication   Patient's been educated on care of the diabetic al

## 2018-08-01 DIAGNOSIS — I70.209 PERIPHERAL ARTERIOSCLEROSIS (HCC): ICD-10-CM

## 2018-08-01 DIAGNOSIS — T82.898D OCCLUSION OF FEMOROPOPLITEAL BYPASS GRAFT, SUBSEQUENT ENCOUNTER: ICD-10-CM

## 2018-08-01 DIAGNOSIS — I70.213 ATHEROSCLEROSIS OF NATIVE ARTERY OF BOTH LOWER EXTREMITIES WITH INTERMITTENT CLAUDICATION (HCC): ICD-10-CM

## 2018-08-02 ENCOUNTER — TELEPHONE (OUTPATIENT)
Dept: VASCULAR SURGERY | Facility: CLINIC | Age: 73
End: 2018-08-02

## 2018-08-02 RX ORDER — CLOPIDOGREL BISULFATE 75 MG/1
75 TABLET ORAL DAILY
Qty: 30 TABLET | Refills: 0 | OUTPATIENT
Start: 2018-08-02

## 2018-08-02 RX ORDER — CLOPIDOGREL BISULFATE 75 MG/1
75 TABLET ORAL DAILY
Qty: 90 TABLET | Refills: 0 | OUTPATIENT
Start: 2018-08-02

## 2018-08-02 NOTE — TELEPHONE ENCOUNTER
Pt called and said that his pharamacy is denying the Plavix stating that the insurance co has to investigate why he is on the medication  I called his insurance company and was told that it was being denied because he is already on Pletal  I told the insurance that he is no longer on the Pletal, he was switched to Plavix  They did not know this so they updated their records and sent it through to get approved  She said it can take up to 2 hours for the approval and I will know via fax  I called the pt and made him aware of this

## 2018-08-07 DIAGNOSIS — I10 BENIGN ESSENTIAL HYPERTENSION: Primary | ICD-10-CM

## 2018-08-07 RX ORDER — LISINOPRIL 2.5 MG/1
TABLET ORAL
Qty: 90 TABLET | Refills: 3 | Status: SHIPPED | OUTPATIENT
Start: 2018-08-07 | End: 2019-01-22 | Stop reason: SDUPTHER

## 2018-08-08 ENCOUNTER — APPOINTMENT (OUTPATIENT)
Dept: LAB | Facility: CLINIC | Age: 73
End: 2018-08-08
Payer: MEDICARE

## 2018-08-08 DIAGNOSIS — E11.40 TYPE 2 DIABETES MELLITUS WITH DIABETIC NEUROPATHY, WITHOUT LONG-TERM CURRENT USE OF INSULIN (HCC): ICD-10-CM

## 2018-08-08 DIAGNOSIS — I70.213 ATHEROSCLEROSIS OF NATIVE ARTERY OF BOTH LOWER EXTREMITIES WITH INTERMITTENT CLAUDICATION (HCC): ICD-10-CM

## 2018-08-08 LAB
ALBUMIN SERPL BCP-MCNC: 3.8 G/DL (ref 3.5–5)
ALP SERPL-CCNC: 73 U/L (ref 46–116)
ALT SERPL W P-5'-P-CCNC: 32 U/L (ref 12–78)
ANION GAP SERPL CALCULATED.3IONS-SCNC: 4 MMOL/L (ref 4–13)
AST SERPL W P-5'-P-CCNC: 24 U/L (ref 5–45)
BILIRUB SERPL-MCNC: 0.7 MG/DL (ref 0.2–1)
BUN SERPL-MCNC: 13 MG/DL (ref 5–25)
CALCIUM SERPL-MCNC: 9 MG/DL (ref 8.3–10.1)
CHLORIDE SERPL-SCNC: 104 MMOL/L (ref 100–108)
CHOLEST SERPL-MCNC: 118 MG/DL (ref 50–200)
CO2 SERPL-SCNC: 27 MMOL/L (ref 21–32)
CREAT SERPL-MCNC: 1.2 MG/DL (ref 0.6–1.3)
EST. AVERAGE GLUCOSE BLD GHB EST-MCNC: 163 MG/DL
GFR SERPL CREATININE-BSD FRML MDRD: 60 ML/MIN/1.73SQ M
GLUCOSE P FAST SERPL-MCNC: 139 MG/DL (ref 65–99)
HBA1C MFR BLD: 7.3 % (ref 4.2–6.3)
HDLC SERPL-MCNC: 43 MG/DL (ref 40–60)
LDLC SERPL CALC-MCNC: 57 MG/DL (ref 0–100)
POTASSIUM SERPL-SCNC: 5.2 MMOL/L (ref 3.5–5.3)
PROT SERPL-MCNC: 7.2 G/DL (ref 6.4–8.2)
SODIUM SERPL-SCNC: 135 MMOL/L (ref 136–145)
TRIGL SERPL-MCNC: 91 MG/DL

## 2018-08-08 PROCEDURE — 80061 LIPID PANEL: CPT

## 2018-08-08 PROCEDURE — 83036 HEMOGLOBIN GLYCOSYLATED A1C: CPT

## 2018-08-08 PROCEDURE — 80053 COMPREHEN METABOLIC PANEL: CPT

## 2018-08-08 PROCEDURE — 36415 COLL VENOUS BLD VENIPUNCTURE: CPT

## 2018-08-15 ENCOUNTER — OFFICE VISIT (OUTPATIENT)
Dept: FAMILY MEDICINE CLINIC | Facility: CLINIC | Age: 73
End: 2018-08-15
Payer: MEDICARE

## 2018-08-15 VITALS
SYSTOLIC BLOOD PRESSURE: 122 MMHG | HEIGHT: 71 IN | BODY MASS INDEX: 26.04 KG/M2 | TEMPERATURE: 96.7 F | HEART RATE: 74 BPM | DIASTOLIC BLOOD PRESSURE: 60 MMHG | WEIGHT: 186 LBS | RESPIRATION RATE: 20 BRPM

## 2018-08-15 DIAGNOSIS — I25.119 CORONARY ARTERY DISEASE INVOLVING NATIVE CORONARY ARTERY OF NATIVE HEART WITH ANGINA PECTORIS (HCC): ICD-10-CM

## 2018-08-15 DIAGNOSIS — E11.40 TYPE 2 DIABETES MELLITUS WITH DIABETIC NEUROPATHY, WITHOUT LONG-TERM CURRENT USE OF INSULIN (HCC): ICD-10-CM

## 2018-08-15 DIAGNOSIS — Z12.5 PROSTATE CANCER SCREENING: Primary | ICD-10-CM

## 2018-08-15 PROCEDURE — 99214 OFFICE O/P EST MOD 30 MIN: CPT | Performed by: FAMILY MEDICINE

## 2018-08-15 NOTE — PROGRESS NOTES
Assessment/Plan:    No problem-specific Assessment & Plan notes found for this encounter  Dm improving  ckd stable  Chol good  Cardio f/u for CAD  PAD stable     Diagnoses and all orders for this visit:    Prostate cancer screening  -     PSA, Total Screen; Future    Coronary artery disease involving native coronary artery of native heart with angina pectoris [facility])  -     Ambulatory referral to Cardiology; Future    Type 2 diabetes mellitus with diabetic neuropathy, without long-term current use of insulin (HCC)  -     Comprehensive metabolic panel; Future  -     Hemoglobin A1C; Future  -     Microalbumin / creatinine urine ratio              Return in about 3 months (around 11/15/2018) for Recheck  Subjective:      Patient ID: Dennis Alegria is a 67 y o  male  Chief Complaint   Patient presents with    Follow-up     3 month follow up labs in chart, drhlpn       HPI  C1B down to 7 3 from 8 4  Creat stable 1 2  Chol good  Has more gas but can live with it  Use of statins for the purposes of CVD/CVA risks reduction was advised according to USPSTF guidelines along with appropriate continued liver monitoring  The following portions of the patient's history were reviewed and updated as appropriate: allergies, current medications, past family history, past medical history, past social history, past surgical history and problem list     Review of Systems   Respiratory: Negative for shortness of breath  Cardiovascular: Positive for leg swelling  Negative for chest pain  Neurological: Negative for syncope           Current Outpatient Prescriptions   Medication Sig Dispense Refill    acarbose (PRECOSE) 25 mg tablet 2 before breakfast, 1 before lunch, 2 before dinner [when eaten] 450 tablet 3    aspirin (ECOTRIN LOW STRENGTH) 81 mg EC tablet Take 1 tablet (81 mg total) by mouth daily 30 tablet 6    carvedilol (COREG) 12 5 mg tablet TAKE 1 TABLET TWICE DAILY 180 tablet 3    clopidogrel (PLAVIX) 75 mg tablet Take 1 tablet (75 mg total) by mouth daily 90 tablet 4    fluocinolone (SYNALAR) 0 025 % cream Apply topically 2 (two) times a day 15 g 0    gabapentin (NEURONTIN) 600 MG tablet Take 1 tablet (600 mg total) by mouth 2 (two) times a day for 30 days 60 tablet 1    glimepiride (AMARYL) 4 mg tablet Take 1 tablet by mouth 2 (two) times a day for 90 days 180 tablet 3    glucose blood (TRUETRACK TEST) test strip 1 each by Other route daily Use as instructed for E11 29 100 each 3    halobetasol (ULTRAVATE) 0 05 % cream Bid to leg, per dermatologist 50 g 0    lisinopril (ZESTRIL) 2 5 mg tablet TAKE 1 TABLET EVERY DAY 90 tablet 3    metFORMIN (GLUMETZA) 500 MG (MOD) 24 hr tablet Take 4 tablets (2,000 mg total) by mouth daily with breakfast 360 tablet 3    omeprazole (PriLOSEC) 40 MG capsule Take 1 capsule (40 mg total) by mouth daily  0    simvastatin (ZOCOR) 40 mg tablet Take 1 tablet (40 mg total) by mouth daily  0    TRUEPLUS LANCETS 28G MISC Test 1x/d, E11 29  0    clopidogrel (PLAVIX) 75 mg tablet Take 1 tablet (75 mg total) by mouth daily (Patient not taking: Reported on 8/15/2018 ) 30 tablet 3     No current facility-administered medications for this visit  Objective:    /60   Pulse 74   Temp (!) 96 7 °F (35 9 °C)   Resp 20   Ht 5' 11" (1 803 m)   Wt 84 4 kg (186 lb)   BMI 25 94 kg/m²        Physical Exam   Constitutional: He appears well-developed  No distress  HENT:   Head: Normocephalic  Mouth/Throat: No oropharyngeal exudate  Eyes: Conjunctivae are normal  No scleral icterus  Neck: Neck supple  No thyromegaly present  Cardiovascular: Normal rate and intact distal pulses  No murmur heard  Pulmonary/Chest: Effort normal  No respiratory distress  He has no wheezes  Abdominal: Soft  He exhibits no distension  There is no tenderness  Musculoskeletal: He exhibits no edema, tenderness or deformity  Neurological: He is alert  He exhibits normal muscle tone     Skin: Skin is warm and dry  No rash noted  He is not diaphoretic  No pallor  Psychiatric: His behavior is normal  Thought content normal    Nursing note and vitals reviewed               Cecilia Bauer DO

## 2018-09-09 NOTE — PROGRESS NOTES
Progress Note - Cardiology Office  [facility]    Raven Martin 67 y o  male MRN: [id number]  : 1945  Encounter: [id number]      Assessment:     1  Benign essential hypertension    2  Coronary artery disease involving native coronary artery of native heart with angina pectoris ([facility] [address] [address] )    3  Atherosclerosis of native artery of both lower extremities with intermittent claudication ([facility] [address] )    4  Lumbar radiculopathy    5  Diabetic polyneuropathy associated with type 2 diabetes mellitus ([facility] [address] )    6  Dyslipidemia    7  Bruit of right carotid artery        Discussion Summary and Plan:  1  PVD with right femoropopliteal which is occluded and left SFA which has stent placed by me in , had repeat procedure done with known right SFA stent stenosis underwent balloon angioplasty  Now feeling much better  Continue antiplatelet lesion and dual platelet therapy  He can walk more distance  2  Coronary artery disease  Status post multivessel stenting  Patient now want to follow up with [facility] cardiology  He has no recent cardiac workup done  Due to multiple risk factors will update nuclear stress test   He may not get target heart rate due to significant PVD on the left side  Hence Lexiscan stress test is ordered  3  Hypertension  Well controlled  Continue current medications    4  Dyslipidemia  Patient follows up with PMD he's on simvastatin  He is tolerating that medication very well    5  CRI  Stable  He had CKD stage 2     6   Carotid bruit  Patient has right carotid bruit  Will check carotid Doppler  He does have history of significant peripheral vascular disease  Counseling :  A description of the counseling  All issues regarding tight sugar control, taking cholesterol medications, regular exercise, symptoms about coronary artery disease discussed with patient at length  Previous echo from  reviewed  Goals and Barriers    Patient's ability to self care: Yes  Medication side effect reviewed with patient in detail and all their questions answered to their satisfaction  HPI :     Francis Mclean is a 67y o  year old male who came for follow up  Patient has a past medical history significant for Coronary Artery artery disease, CK D stage III, Diabetes mellitus with renal manifestations, PVD with right femoropopliteal which has occluded and left SFA stent by me in 2010, hypertension, diabetic neuropathy who came to see us after his vascular study was found to be abnormal  For cardiac problem he generally sees Dr Shayy Samuels  His vascular study was ordered by his podiatrist as he was having pain in his foot  He also had a lot of back problems sometime pain radiates from his back to the thighs  Here a stent placed in his left SFA in 2010  It's already known that his right femoropopliteal is occluded  He has no fever no chills no nausea no vomiting no other significant complaint     09/13/2018  Above reviewed  Patient is doing well  He had underwent bilateral lower extremity angiography with PTA and balloon dilatation of right SFA for in stent stenosis  He has single vessel below the knee  He had a good result and is doing well  He denies any chest pain or any shortness of breath  No dizziness no lightheadedness no palpitations  He want to switch his cardiac care to us also  As he want to stay in [facility]  His compliant with his medication  He has his previous echo in [facility] done in 2014  Patient does have history of coronary artery disease and stenting done by me and Dr Shayy Samuels few years ago  He does not remember when did he have last stress test   His echo from 2014 is available  No fever no chills no nausea no vomiting no other significant complaint  since his angioplasty his leg feels lot better  He is able to walk more distance  He is very compliant with his medication  His blood pressure is acceptable    No other significant complaint    Review of Systems   Constitutional: Negative for activity change, chills, diaphoresis, fever and unexpected weight change  HENT: Negative for congestion  Eyes: Negative for discharge and redness  Respiratory: Negative for cough, chest tightness, shortness of breath and wheezing  Cardiovascular: Negative  Negative for chest pain, palpitations and leg swelling  Gastrointestinal: Negative for abdominal pain, diarrhea and nausea  Endocrine: Negative  Genitourinary: Negative for decreased urine volume and urgency  Musculoskeletal: Negative  Negative for arthralgias, back pain and gait problem  Skin: Negative for rash and wound  Allergic/Immunologic: Negative  Neurological: Negative for dizziness, seizures, syncope, weakness, light-headedness and headaches  Hematological: Negative  Psychiatric/Behavioral: Negative for agitation and confusion  The patient is not nervous/anxious          Historical Information   Past Medical History:   Diagnosis Date    Acquired hallux valgus     last assessed: 8/1/2013    Allergic rhinitis     Anemia 10/26/2010    Atrophy of nail     last assessed: 7/7/2015    Chronic kidney disease     chronic renal insufficiency    Coronary artery disease     Deformity of ankle and foot, acquired     last assessed: 2/22/2016    Diabetes mellitus ([facility] )     Difficulty walking     last assessed: 12/14/2015    Dysesthesia     last assessed: 11/25/2016    Hammer toe     unspecified laterality; last assessed: 8/1/2013    Hypertension     Onychomycosis of toenail     last assessed: 2/22/2016    Peripheral vascular disease ([facility] [address] )     left SFA stent in 2010    Pes planus     unspecified laterality; last assessed: 8/1/2013    Pneumonia     last assessed: 2/27/2016    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     last assessed: 8/15/2016    Type 2 diabetes mellitus ([facility] ) 07/26/2010    Viral warts     last assessed: 7/24/2015     Past Surgical History:   Procedure Laterality Date    CARDIAC CATHETERIZATION  07/29/2010    CATARACT EXTRACTION, BILATERAL Bilateral     R 1999, L 2008    FEMORAL ARTERY - POPLITEAL ARTERY BYPASS GRAFT  09/23/2013    FOOT SURGERY      bone spur removed    LEG SURGERY Bilateral     repair; L 8/20/2010 and 7/26/2012    ROTATOR CUFF REPAIR Left 2009    SHOULDER SURGERY Right 2005    collar bone     History   Alcohol Use    Yes     Comment: being a social drinker     History   Drug Use No     History   Smoking Status    Never Smoker   Smokeless Tobacco    Never Used     Family History:   Family History   Problem Relation Age of Onset    Heart disease Father     Heart attack Father         acute myocardial infarction    Heart disease Sister     Heart attack Sister         acute myocardial infarction    Heart disease Paternal Grandfather     Aortic aneurysm Mother        Meds/Allergies     No Known Allergies    Current Outpatient Prescriptions:     acarbose (PRECOSE) 25 mg tablet, 2 before breakfast, 1 before lunch, 2 before dinner [when eaten], Disp: 450 tablet, Rfl: 3    aspirin (ECOTRIN LOW STRENGTH) 81 mg EC tablet, Take 1 tablet (81 mg total) by mouth daily, Disp: 30 tablet, Rfl: 6    carvedilol (COREG) 12 5 mg tablet, TAKE 1 TABLET TWICE DAILY, Disp: 180 tablet, Rfl: 3    clopidogrel (PLAVIX) 75 mg tablet, Take 1 tablet (75 mg total) by mouth daily, Disp: 90 tablet, Rfl: 4    gabapentin (NEURONTIN) 600 MG tablet, Take 1 tablet (600 mg total) by mouth 2 (two) times a day for 30 days, Disp: 60 tablet, Rfl: 1    glucose blood (TRUETRACK TEST) test strip, 1 each by Other route daily Use as instructed for E11 29, Disp: 100 each, Rfl: 3    lisinopril (ZESTRIL) 2 5 mg tablet, TAKE 1 TABLET EVERY DAY, Disp: 90 tablet, Rfl: 3    metFORMIN (GLUMETZA) 500 MG (MOD) 24 hr tablet, Take 4 tablets (2,000 mg total) by mouth daily with breakfast, Disp: 360 tablet, Rfl: 3    omeprazole (PriLOSEC) 40 MG capsule, Take 1 capsule (40 mg total) by mouth daily, Disp: , Rfl: 0    simvastatin (ZOCOR) 40 mg tablet, Take 1 tablet (40 mg total) by mouth daily, Disp: , Rfl: 0    TRUEPLUS LANCETS 28G MISC, Test 1x/d, E11 29, Disp: , Rfl: 0    glimepiride (AMARYL) 4 mg tablet, Take 1 tablet by mouth 2 (two) times a day for 90 days, Disp: 180 tablet, Rfl: 3    Vitals: Blood pressure 138/74, pulse 74, weight 84 8 kg (187 lb)  Body mass index is 26 08 kg/m²  Vitals:    09/13/18 1441   Weight: 84 8 kg (187 lb)     BP Readings from Last 3 Encounters:   09/13/18 138/74   08/15/18 122/60   07/30/18 152/71         Physical Exam    Neurologic:  Alert & oriented x 3, no new focal deficits, Not in any acute distress,  Constitutional:  Well developed, well nourished, non-toxic appearance   Eyes:  Pupil equal and reacting to light, conjunctiva normal, No JVP, No LNP   HENT:  Atraumatic, oropharynx moist, Neck- normal range of motion, no tenderness,  Neck supple, 2/for right carotid bruit   Respiratory:  Bilateral air entry, mostly clear to auscultation  Cardiovascular: S1-S2 regular with a 2/6 ejection systolic murmur and S4 is present  GI:  Soft, nondistended, normal bowel sounds, nontender, no hepatosplenomegaly appreciated  Musculoskeletal:  No edema, no tenderness, no deformities  Skin:  Well hydrated, no rash   Lymphatic:  No lymphadenopathy noted   Extremities:  No edema and distal pulses are present      Diagnostic Studies Review Cardio:   no recent cardiac work    EKG:    Twelve lead EKG done on 09/13/2018 shows normal sinus rhythm heart rate 60 beats per minute  No significant ST changes    Cardiac testing:   Results for orders placed in visit on 04/15/14   Echo complete with contrast if indicated    Narrative [facility] [address] [place] [address]   Phone: [phone]   TRANSTHORACIC ECHOCARDIOGRAM   2D, M-MODE, DOPPLER, AND COLOR DOPPLER   Study date:  16-Apr-2014   Patient: Dez Eye LINH   MR number: [id number]   Account number: [de-identified]   : 1945   Age: 70 years   Gender: Male   Status: Inpatient   Location: [facility]  Echo lab   Height: 73 in   Weight: 140 8 lb   BP: 124/ 59 mmHg   Indications: Atrial Fibrillation   Diagnoses: 427 31 - ATRIAL FIBRILLATION   Sonographer:  Isela MERRILL   Referring Physician:  Lam Hays MD   Group:  [facility]   Interpreting Physician:  Jaquelin Steve MD   SUMMARY   LEFT VENTRICLE:   Systolic function was normal  Ejection fraction was estimated to be 62 %  There were no regional wall motion abnormalities  Wall thickness was mildly increased  MITRAL VALVE:   There was mild annular calcification  AORTIC VALVE:   There was mild regurgitation  HISTORY: PRIOR HISTORY: CAD, Cardiac Stent, PVD, PAD, Atrial Fibrillation,    HTN,   Diabetes   PROCEDURE: The procedure was performed in the echo lab  This was a routine   study  The transthoracic approach was used  The study included complete 2D   imaging, M-mode, complete spectral Doppler, and color Doppler  The heart rate   was 60 bpm, at the start of the study  Images were obtained from the   parasternal, apical, subcostal, and suprasternal notch acoustic windows  Image   quality was adequate  LEFT VENTRICLE: Size was normal  Systolic function was normal  Ejection   TRANSTHORACIC ECHOCARDIOGRAM   Patient: Ruchi Main   MR number: [id number]    ------ Page 2   fraction was estimated to be 62 %  There were no regional wall motion   abnormalities  Wall thickness was mildly increased  DOPPLER: Left ventricular   diastolic function parameters were normal    RIGHT VENTRICLE: The size was normal  Systolic function was normal  Wall   thickness was normal    LEFT ATRIUM: Size was normal    RIGHT ATRIUM: Size was normal    MITRAL VALVE: There was mild annular calcification  Valve structure was    normal    There was normal leaflet separation   DOPPLER: The transmitral velocity was   within the normal range  There was no evidence for stenosis  There was no   regurgitation  AORTIC VALVE: The valve was trileaflet  Leaflets exhibited normal thickness    and   normal cuspal separation  DOPPLER: Transaortic velocity was within the normal   range  There was no evidence for stenosis  There was mild regurgitation  TRICUSPID VALVE: The valve structure was normal  There was normal leaflet   separation  DOPPLER: The transtricuspid velocity was within the normal range  There was no evidence for stenosis  There was no regurgitation  PULMONIC VALVE: Leaflets exhibited normal thickness, no calcification, and   normal cuspal separation  DOPPLER: The transpulmonic velocity was within the   normal range  There was no regurgitation  PERICARDIUM: There was no pericardial effusion  AORTA: The root exhibited normal size  SYSTEMIC VEINS: IVC: The inferior vena cava was normal in size   Respirophasic   changes were normal    SYSTEM MEASUREMENT TABLES   2D   %FS: 33 56 %   AV Diam: 3 43 cm   EDV(Teich): 73 79 ml   EF(Teich): 62 8 %   ESV(Teich): 27 45 ml   HR_4Ch_Q: 60 45 BPM   IVSd: 1 23 cm   LA Area: 17 45 cm2   LA Diam: 3 88 cm   LVCO_4Ch_Q: 3 8 L/min   LVEF_4Ch_Q: 56 5 %   LVIDd: 4 09 cm   TRANSTHORACIC ECHOCARDIOGRAM   Patient: sAhly Brito   MR number: [id number]    ------ Page 3   LVIDs: 2 72 cm   LVLd_4Ch_Q: 9 61 cm   LVLs_4Ch_Q: 7 95 cm   LVPWd: 1 11 cm   LVSV_4Ch_Q: 62 92 ml   LVVED_4Ch_Q: 111 36 ml   LVVES_4Ch_Q: 48 45 ml   RA Area: 10 69 cm2   RVIDd: 3 54 cm   SV(Teich): 46 34 ml   CW   AR Dec [place]: 2 07 m/s2   AR Dec Time: 1874 44 ms   AR PHT: 543 59 ms   AR Vmax: 3 88 m/s   AR maxP 32 mmHg   MM   TAPSE: 2 53 cm   PW   AVC: 428 26 ms   MV A [place]: 0 54 m/s   MV Dec [place]: 2 38 m/s2   MV DecT: 253 3 ms   MV E [place]: 0 6 m/s   MV E/A Ratio: 1 12   MV PHT: 73 46 ms   MVA By PHT: 3 cm2   [facility] [place] [facility] Accredited Echocardiography Laboratory   Prepared and electronically signed by   Sergio Potter MD   Signed 16-Apr-2014 16:21:13      No results found for this or any previous visit  Lab Review   Lab Results   Component Value Date    WBC 6 50 06/19/2018    HGB 10 9 (L) 06/19/2018    HCT 34 8 (L) 06/19/2018    MCV 86 06/19/2018    RDW 16 6 (H) 06/19/2018     06/19/2018     BMP:  Lab Results   Component Value Date     (L) 08/08/2018    K 5 2 08/08/2018     08/08/2018    CO2 27 08/08/2018    ANIONGAP 8 04/17/2014    BUN 13 08/08/2018    CREATININE 1 20 08/08/2018    GLUCOSE 137 04/17/2014    GLUF 139 (H) 08/08/2018    CALCIUM 9 0 08/08/2018    EGFR 60 08/08/2018    MG 1 8 04/17/2014     LFT:  Lab Results   Component Value Date    AST 24 08/08/2018    ALT 32 08/08/2018    ALKPHOS 73 08/08/2018       Lab Results   Component Value Date    HGBA1C 7 3 (H) 08/08/2018     Lipid Profile:   Lab Results   Component Value Date    CHOL 106 07/19/2014    HDL 43 08/08/2018    LDLCALC 57 08/08/2018    TRIG 91 08/08/2018     Lab Results   Component Value Date    CHOL 106 07/19/2014       Dr Celeste Lion MD [facility]      "This note has been constructed using a voice recognition system  Therefore there may be syntax, spelling, and/or grammatical errors   Please call if you have any questions  "

## 2018-09-13 ENCOUNTER — OFFICE VISIT (OUTPATIENT)
Dept: CARDIOLOGY CLINIC | Facility: CLINIC | Age: 73
End: 2018-09-13
Payer: MEDICARE

## 2018-09-13 VITALS
WEIGHT: 187 LBS | HEART RATE: 74 BPM | BODY MASS INDEX: 26.08 KG/M2 | DIASTOLIC BLOOD PRESSURE: 74 MMHG | SYSTOLIC BLOOD PRESSURE: 138 MMHG

## 2018-09-13 DIAGNOSIS — I10 BENIGN ESSENTIAL HYPERTENSION: ICD-10-CM

## 2018-09-13 DIAGNOSIS — I25.119 CORONARY ARTERY DISEASE INVOLVING NATIVE CORONARY ARTERY OF NATIVE HEART WITH ANGINA PECTORIS (HCC): ICD-10-CM

## 2018-09-13 DIAGNOSIS — E11.42 DIABETIC POLYNEUROPATHY ASSOCIATED WITH TYPE 2 DIABETES MELLITUS (HCC): ICD-10-CM

## 2018-09-13 DIAGNOSIS — M54.16 LUMBAR RADICULOPATHY: ICD-10-CM

## 2018-09-13 DIAGNOSIS — E78.5 DYSLIPIDEMIA: ICD-10-CM

## 2018-09-13 DIAGNOSIS — R09.89 BRUIT OF RIGHT CAROTID ARTERY: ICD-10-CM

## 2018-09-13 DIAGNOSIS — I70.213 ATHEROSCLEROSIS OF NATIVE ARTERY OF BOTH LOWER EXTREMITIES WITH INTERMITTENT CLAUDICATION (HCC): ICD-10-CM

## 2018-09-13 PROCEDURE — 99214 OFFICE O/P EST MOD 30 MIN: CPT | Performed by: INTERNAL MEDICINE

## 2018-09-13 PROCEDURE — 93000 ELECTROCARDIOGRAM COMPLETE: CPT | Performed by: INTERNAL MEDICINE

## 2018-09-18 ENCOUNTER — HOSPITAL ENCOUNTER (OUTPATIENT)
Dept: RADIOLOGY | Facility: HOSPITAL | Age: 73
Discharge: HOME | End: 2018-09-18
Attending: INTERNAL MEDICINE
Payer: MEDICARE

## 2018-09-18 DIAGNOSIS — I10 BENIGN ESSENTIAL HYPERTENSION: ICD-10-CM

## 2018-09-18 DIAGNOSIS — I25.119 CORONARY ARTERY DISEASE INVOLVING NATIVE CORONARY ARTERY OF NATIVE HEART WITH ANGINA PECTORIS (HCC): ICD-10-CM

## 2018-09-18 DIAGNOSIS — R09.89 BRUIT OF RIGHT CAROTID ARTERY: ICD-10-CM

## 2018-09-18 PROCEDURE — 93880 EXTRACRANIAL BILAT STUDY: CPT

## 2018-09-18 PROCEDURE — 93880 EXTRACRANIAL BILAT STUDY: CPT | Performed by: SURGERY

## 2018-09-19 ENCOUNTER — TELEPHONE (OUTPATIENT)
Dept: CARDIOLOGY CLINIC | Facility: CLINIC | Age: 73
End: 2018-09-19

## 2018-09-19 NOTE — TELEPHONE ENCOUNTER
----- Message from Priscila Albright MD sent at 9/19/2018  2:35 PM EDT -----  Please call the patient regarding his carotid Doppler  No significant carotid stenosis noted

## 2018-09-24 ENCOUNTER — IMMUNIZATION (OUTPATIENT)
Dept: FAMILY MEDICINE CLINIC | Facility: CLINIC | Age: 73
End: 2018-09-24
Payer: MEDICARE

## 2018-09-24 DIAGNOSIS — Z23 NEED FOR VACCINATION WITH DIPHTHERIA-TETANUS-PERTUSSIS (DTP), HAEMOPHILUS INFLUENZAE TYPE B CONJUGATE, AND HEPATITIS B VACCINE: Primary | ICD-10-CM

## 2018-09-24 PROCEDURE — G0008 ADMIN INFLUENZA VIRUS VAC: HCPCS

## 2018-09-24 PROCEDURE — 90662 IIV NO PRSV INCREASED AG IM: CPT

## 2018-09-27 ENCOUNTER — TELEPHONE (OUTPATIENT)
Dept: CARDIOLOGY CLINIC | Facility: CLINIC | Age: 73
End: 2018-09-27

## 2018-09-27 ENCOUNTER — HOSPITAL ENCOUNTER (OUTPATIENT)
Dept: RADIOLOGY | Facility: HOSPITAL | Age: 73
Discharge: HOME | End: 2018-09-27
Attending: INTERNAL MEDICINE
Payer: MEDICARE

## 2018-09-27 ENCOUNTER — HOSPITAL ENCOUNTER (OUTPATIENT)
Dept: NON INVASIVE DIAGNOSTICS | Facility: HOSPITAL | Age: 73
Discharge: HOME | End: 2018-09-27
Attending: INTERNAL MEDICINE
Payer: MEDICARE

## 2018-09-27 DIAGNOSIS — E78.5 DYSLIPIDEMIA: ICD-10-CM

## 2018-09-27 DIAGNOSIS — I10 BENIGN ESSENTIAL HYPERTENSION: ICD-10-CM

## 2018-09-27 DIAGNOSIS — E11.42 DIABETIC POLYNEUROPATHY ASSOCIATED WITH TYPE 2 DIABETES MELLITUS (HCC): ICD-10-CM

## 2018-09-27 DIAGNOSIS — I70.213 ATHEROSCLEROSIS OF NATIVE ARTERY OF BOTH LOWER EXTREMITIES WITH INTERMITTENT CLAUDICATION (HCC): ICD-10-CM

## 2018-09-27 DIAGNOSIS — I25.119 CORONARY ARTERY DISEASE INVOLVING NATIVE CORONARY ARTERY OF NATIVE HEART WITH ANGINA PECTORIS (HCC): ICD-10-CM

## 2018-09-27 PROCEDURE — 93018 CV STRESS TEST I&R ONLY: CPT | Performed by: INTERNAL MEDICINE

## 2018-09-27 PROCEDURE — 78452 HT MUSCLE IMAGE SPECT MULT: CPT

## 2018-09-27 PROCEDURE — A9502 TC99M TETROFOSMIN: HCPCS

## 2018-09-27 PROCEDURE — 93016 CV STRESS TEST SUPVJ ONLY: CPT | Performed by: INTERNAL MEDICINE

## 2018-09-27 PROCEDURE — 78452 HT MUSCLE IMAGE SPECT MULT: CPT | Performed by: INTERNAL MEDICINE

## 2018-09-27 PROCEDURE — 93017 CV STRESS TEST TRACING ONLY: CPT

## 2018-09-27 RX ADMIN — REGADENOSON 0.4 MG: 0.08 INJECTION, SOLUTION INTRAVENOUS at 10:40

## 2018-09-27 NOTE — TELEPHONE ENCOUNTER
----- Message from Loco Perez MD sent at 9/27/2018  4:31 PM EDT -----  Pt's Patient's stress test is normal    Patient can keep regular appointment  Please call patient with the result

## 2018-09-28 LAB
CHEST PAIN STATEMENT: NORMAL
MAX DIASTOLIC BP: 72 MMHG
MAX HEART RATE: 106 BPM
MAX PREDICTED HEART RATE: 147 BPM
MAX. SYSTOLIC BP: 160 MMHG
PROTOCOL NAME: NORMAL
REASON FOR TERMINATION: NORMAL
TARGET HR FORMULA: NORMAL
TEST INDICATION: NORMAL
TIME IN EXERCISE PHASE: NORMAL

## 2018-10-09 ENCOUNTER — OFFICE VISIT (OUTPATIENT)
Dept: PODIATRY | Facility: CLINIC | Age: 73
End: 2018-10-09
Payer: MEDICARE

## 2018-10-09 VITALS
RESPIRATION RATE: 16 BRPM | HEART RATE: 71 BPM | BODY MASS INDEX: 26.18 KG/M2 | SYSTOLIC BLOOD PRESSURE: 134 MMHG | DIASTOLIC BLOOD PRESSURE: 65 MMHG | HEIGHT: 71 IN | WEIGHT: 187 LBS

## 2018-10-09 DIAGNOSIS — M79.672 PAIN IN BOTH FEET: ICD-10-CM

## 2018-10-09 DIAGNOSIS — M79.671 PAIN IN BOTH FEET: ICD-10-CM

## 2018-10-09 DIAGNOSIS — E11.42 DIABETIC POLYNEUROPATHY ASSOCIATED WITH TYPE 2 DIABETES MELLITUS (HCC): Primary | ICD-10-CM

## 2018-10-09 DIAGNOSIS — L84 CORNS: ICD-10-CM

## 2018-10-09 DIAGNOSIS — M54.16 LUMBAR RADICULOPATHY: ICD-10-CM

## 2018-10-09 DIAGNOSIS — B35.1 ONYCHOMYCOSIS: ICD-10-CM

## 2018-10-09 DIAGNOSIS — I70.213 ATHEROSCLEROSIS OF NATIVE ARTERY OF BOTH LOWER EXTREMITIES WITH INTERMITTENT CLAUDICATION (HCC): ICD-10-CM

## 2018-10-09 PROCEDURE — 11056 PARNG/CUTG B9 HYPRKR LES 2-4: CPT | Performed by: PODIATRIST

## 2018-10-09 PROCEDURE — 99211 OFF/OP EST MAY X REQ PHY/QHP: CPT | Performed by: PODIATRIST

## 2018-10-09 RX ORDER — GABAPENTIN 600 MG/1
600 TABLET ORAL 2 TIMES DAILY
Qty: 60 TABLET | Refills: 0 | Status: SHIPPED | OUTPATIENT
Start: 2018-10-09 | End: 2019-04-10 | Stop reason: SDUPTHER

## 2018-10-09 NOTE — PROGRESS NOTES
Procedures   Foot Exam       Assessment/Plan:  Patient has peripheral artery disease   He is working with vascular surgery  Payton Francisco has diabetic neuropathy   He has pain in his feet with ambulation      Plan   Continue gabapentin   Nails debrided   Calluses debrided without pain or complication  No problem-specific Assessment & Plan notes found for this encounter          There are no diagnoses linked to this encounter        Subjective:       Patient ID: Pernell Covarrubias is a 67 y  o  male      HPI     The following portions of the patient's history were reviewed and updated as appropriate: allergies, current medications, past family history, past medical history, past social history, past surgical history and problem list      Review of Systems       Objective:     Active Problems   1  Acquired Hallux Valgus (735 0)   2  Allergic rhinitis (477 9) (J30 9)   3  Arthropathy (716 90) (M12 9)   4  Backache (724 5) (M54 9)   5  Benign essential hypertension (401 1) (I10)   6  CAD (coronary atherosclerotic disease) (414 00) (I25 10)   7  Callus (700) (L84)   8  Chronic kidney disease, stage 3 (585 3) (N18 3)   9  Colon cancer screening (V76 51) (Z12 11)   10  Deformity of ankle and foot, acquired (736 70) (M21 969)   11  Depression screening (V79 0) (Z13 89)   12  Diabetic neuropathy (250 60,357 2) (E11 40)   13  Encounter for prostate cancer screening (V76 44) (Z12 5)   14  Esophagitis, reflux (530 11) (K21 0)   15  Need for immunization against influenza (V04 81) (Z23)   16  Need for pneumococcal vaccination (V03 82) (Z23)   17  Onychomycosis of toenail (110 1) (B35 1)   18  Pain in both feet (729 5) (M79 671,M79 672)   19  Peripheral arterial disease (443 9) (I73 9)   20  Pneumonia (486) (J18 9)   21  Prostate cancer screening (V76 44) (Z12 5)   22  Rosacea (695 3) (L71 9)   23  Screening for genitourinary condition (V81 6) (Z13 89)   24  Screening for neurological condition (V80 09) (Z13 89)   25  Seborrheic dermatitis (690 10) (L21 9)   26  Type 2 diabetes mellitus with diabetic neuropathy (250 60,357 2) (E11 40)   27  Type 2 diabetes mellitus with other circulatory complications (561 98) (L74 02)   28  Type 2 diabetes mellitus with renal manifestations, controlled (250 40) (E11 29)     Past Medical History   · History of Acute upper respiratory infection (465 9) (J06 9)   · History of Atrophy of nail (703 8) (L60 3)   · History of Bursitis of hip, unspecified laterality (726 5) (M70 70)   · History of Callus (700) (L84)   · History of Callus (700) (L84)   · History of Difficulty walking (719 7) (R26 2)   · History of Edema (782 3) (R60 9)   · History of Hammer toe, unspecified laterality (735 4) (M20 40)   · History of acute bronchitis (V12 69) (Z87 09)   · History of allergic rhinitis (V12 69) (Z87 09)   · History of anemia (V12 3) (Z86 2)   · History of bursitis (V13 59) (Z87 39)   · History of tendinitis (V13 59) (Z87 39)   · History of tinea corporis (V12 09) (Z86 19)   · History of viral warts (V12 09) (Z86 19)   · History of Late Effects Of Sprain Or Strain (905 7)   · History of Limb pain (729 5) (M79 609)   · History of Limb pain (729 5) (M79 609)   · History of Metatarsalgia, unspecified laterality (726 70) (M77 40)   · History of Open wound of foot, excluding toe(s) (892 0) (S91 309A)   · History of Pain in both feet (729 5) (M79 671,M79 672)   · History of Pain in both feet (729 5) (M79 671,M79 672)   · History of Pes planus, unspecified laterality (734) (M21 40)   · History of Shoulder joint pain, unspecified laterality   · History of Type 2 diabetes mellitus (250 00) (E11 9)     Surgical History   · History of Bypass Graft Using Vein: Femoral-popliteal   · History of Cataract Surgery   · History of Foot Surgery   · History of Heart Surgery   · History of Leg Repair   · History of Rotator Cuff Repair   · History of Shoulder Surgery     The surgical history was reviewed and updated today        Family History  Mother    · Family history of Aortic Aneurysm  Father    · Family history of acute myocardial infarction (V17 3) (Z80 55)  Sister    · Family history of acute myocardial infarction (V17 3) (Z82 49)     The family history was reviewed and updated today        Social History   · Being A Social Drinker   · Denied: History of Drug Use   · Never A Smoker   · Retired From Work  The social history was reviewed and updated today       Current Meds   1  Carvedilol 6 25 MG Oral Tablet; 1 two times a day;   Therapy: 90PJR5903 to (Last Rx:01Feb2016)  Requested for: 56LHM0603 Ordered   2  Cilostazol 100 MG Oral Tablet; TAKE 1 TABLET TWICE DAILY  Requested for:   49TVU6366; Last Rx:14Jan2015 Ordered   3  Gabapentin 300 MG Oral Capsule; TAKE 1 CAPSULE 3 TIMES DAILY;   Therapy: 77KZK7244 to (Evaluate:26Jan2017)  Requested for: 13XNX5992; Last   Rx:01Feb2016 Ordered   4  Glimepiride 4 MG Oral Tablet; TAKE ONE TABLET BY MOUTH TWICE A DAY;   Therapy: 36SKJ2451 to (Evaluate:98Qvc1796)  Requested for: 73GMX1946; Last   Rx:12Oct2015 Ordered   5  Lisinopril 10 MG Oral Tablet; 1/2 tab twice daily  Requested for: 68BXA6970; Last   Rx:01Feb2016 Ordered   6  MetFORMIN HCl ER (OSM) 1000 MG Oral Tablet Extended Release 24 Hour; Take 1   tablet twice daily;   Therapy: 40GYV9798 to (Evaluate:10Nov2015)  Requested for: 26Oct2015 Recorded   7  Nitrostat 0 4 MG Sublingual Tablet Sublingual; PLACE 1 TABLET UNDER THE TONGUE   EVERY 5 MINUTES FOR UP TO 3 DOSES AS NEEDED FOR CHEST PAIN  CALL   911 IF PAIN PERSISTS;   Therapy: 12QBC9407 to ([place])  Requested for: 96KYR1949; Last   Rx:33Mwn3000 Ordered   8  Omeprazole 40 MG Oral Capsule Delayed Release; 1 every day;   Therapy: 09IQY4851 to (Evaluate:85Dbw6915);  Last Rx:01Feb2016 Ordered   9  Onglyza 5 MG Oral Tablet; 1 every day;   Therapy: 41NDR5396 to (Last Rx:14Jan2015)  Requested for: 40SUP1356 Ordered   10  Simvastatin 40 MG Oral Tablet; take one tablet by mouth every day;    Therapy: 56Fri4938 to (Evaluate:26Jan2017)  Requested for: 85COI4276; Last    Rx:95Jlp1240 Ordered   11  TRUEplus Lancets 28G Miscellaneous; test glucose once daily <250 00>;    Therapy: 03HRJ2549 to (Evaluate:13Jan2018)  Requested for: 36QOG8186; Last    JF:60TBH5946 Ordered   12  TrueTrack Test In Vitro Strip; test 1x/d as directed, <250 00>;    Therapy: 84JBW4831 to (Evaluate:30Jan2016)  Requested for: 24IQN8626; Last    Rx:29Jan2015 Ordered     The medication list was reviewed and updated today       Allergies   1  No Known Drug Allergies     Vitals        Heart Rate 78   Respiration 16   Systolic 820   Diastolic 80   Height 5 ft 11 in   Weight 189 lb    BMI Calculated 26 36   BSA Calculated 2 06      Physical Exam  Left Foot: Appearance: a deformity (ball of foot and distal fifth metatarsal )  Fifth toe deformities include hammer toe  Tenderness: None except the plantar aspect of the foot  Right Foot: Appearance: a deformity (distal fifth metatarsal )  Left Ankle: ROM: limited ROM in all planes   Right Ankle: ROM: limited ROM in all planes   Neurological Exam: Light touch was decreased bilaterally  Vibratory sensation was decreased in both first metatarsophalangeal joints  Response to monofilament test was absent bilaterally  Deep tendon reflexes: achilles reflex 1/4 bilaterally  Vascular Exam: performed Dorsalis pedis pulses were 1/4 bilaterally  Posterior tibial pulses were 1/4 bilaterally  Elevation Pallor: diminished bilaterally  Capillary refill time was Q  9, findings bilateral  Negative digital hair noted, positive abnormal cooling  All pre-ulcer, lesions debrided  Today, but between 1-3 seconds bilaterally  Edema: mild bilaterally and All mycotic nails debrided  Today  The patient was given orthotics to help control his feet and at as cushioning and padding on the bottom of his feet q9 findings  Toenails:  All of the toenails were elongated, hypertrophied, discolored, ingrown, shown to have subungual debris and tender       Socks and shoes removed, the Right Foot: the foot was dry  The sensory exam showed diminished vibratory sensation at the level of the toes  Diminished tactile sensation with monofilament testing throughout the right foot    Socks and shoes removed, Left Foot: the foot was dry  The sensory exam showed diminished vibratory sensation at the level of the toes  Diminished tactile sensation with monofilament testing throughout the left foot    Pulses:   1+ in the posterior tibialis on the right   1+ in the dorsalis pedis on the right  Capillary refills findings on the left were delayed in the toes  Pulses:   1+ in the posterior tibialis on the left   1+ in the dorsalis pedis on the left  Assign Risk Category: 2: Loss of protective sensation with or without weakness, deformity, callus, pre-ulcer, or history of ulceration  High risk  Hyperkeratosis: present on both first sub metatarsals, present on both fifth sub metatarsals and Pre-ulcerative lesion, submetatarsal one to 5, bilateral    Shoe Gear Evaluation: performed ()  Recommendation(s): custom inlays       Procedure  All mycotic nails debrided  Bilateral lesions debrided  Patient tolerated all procedures without pain or complication   Patient's been educated on care of the diabetic al

## 2018-10-11 ENCOUNTER — HOSPITAL ENCOUNTER (OUTPATIENT)
Dept: RADIOLOGY | Facility: HOSPITAL | Age: 73
Discharge: HOME | End: 2018-10-11
Attending: SURGERY
Payer: MEDICARE

## 2018-10-11 DIAGNOSIS — T82.898D OCCLUSION OF FEMOROPOPLITEAL BYPASS GRAFT, SUBSEQUENT ENCOUNTER: ICD-10-CM

## 2018-10-11 DIAGNOSIS — I70.213 ATHEROSCLEROSIS OF NATIVE ARTERY OF BOTH LOWER EXTREMITIES WITH INTERMITTENT CLAUDICATION (HCC): ICD-10-CM

## 2018-10-11 PROCEDURE — 93923 UPR/LXTR ART STDY 3+ LVLS: CPT

## 2018-10-11 PROCEDURE — 93925 LOWER EXTREMITY STUDY: CPT | Performed by: SURGERY

## 2018-10-11 PROCEDURE — 93925 LOWER EXTREMITY STUDY: CPT

## 2018-10-11 PROCEDURE — 93922 UPR/L XTREMITY ART 2 LEVELS: CPT | Performed by: SURGERY

## 2018-11-08 ENCOUNTER — APPOINTMENT (OUTPATIENT)
Dept: LAB | Facility: CLINIC | Age: 73
End: 2018-11-08
Payer: MEDICARE

## 2018-11-08 DIAGNOSIS — E11.40 TYPE 2 DIABETES MELLITUS WITH DIABETIC NEUROPATHY, WITHOUT LONG-TERM CURRENT USE OF INSULIN (HCC): ICD-10-CM

## 2018-11-08 DIAGNOSIS — Z12.5 PROSTATE CANCER SCREENING: ICD-10-CM

## 2018-11-08 LAB
ALBUMIN SERPL BCP-MCNC: 3.9 G/DL (ref 3.5–5)
ALP SERPL-CCNC: 78 U/L (ref 46–116)
ALT SERPL W P-5'-P-CCNC: 29 U/L (ref 12–78)
ANION GAP SERPL CALCULATED.3IONS-SCNC: 6 MMOL/L (ref 4–13)
AST SERPL W P-5'-P-CCNC: 23 U/L (ref 5–45)
BILIRUB SERPL-MCNC: 0.84 MG/DL (ref 0.2–1)
BUN SERPL-MCNC: 13 MG/DL (ref 5–25)
CALCIUM SERPL-MCNC: 9 MG/DL (ref 8.3–10.1)
CHLORIDE SERPL-SCNC: 102 MMOL/L (ref 100–108)
CO2 SERPL-SCNC: 26 MMOL/L (ref 21–32)
CREAT SERPL-MCNC: 1.28 MG/DL (ref 0.6–1.3)
EST. AVERAGE GLUCOSE BLD GHB EST-MCNC: 160 MG/DL
GFR SERPL CREATININE-BSD FRML MDRD: 55 ML/MIN/1.73SQ M
GLUCOSE P FAST SERPL-MCNC: 129 MG/DL (ref 65–99)
HBA1C MFR BLD: 7.2 % (ref 4.2–6.3)
POTASSIUM SERPL-SCNC: 4.6 MMOL/L (ref 3.5–5.3)
PROT SERPL-MCNC: 7.3 G/DL (ref 6.4–8.2)
PSA SERPL-MCNC: 2 NG/ML (ref 0–4)
SODIUM SERPL-SCNC: 134 MMOL/L (ref 136–145)

## 2018-11-08 PROCEDURE — G0103 PSA SCREENING: HCPCS

## 2018-11-08 PROCEDURE — 80053 COMPREHEN METABOLIC PANEL: CPT

## 2018-11-08 PROCEDURE — 36415 COLL VENOUS BLD VENIPUNCTURE: CPT

## 2018-11-08 PROCEDURE — 83036 HEMOGLOBIN GLYCOSYLATED A1C: CPT

## 2018-11-09 ENCOUNTER — APPOINTMENT (OUTPATIENT)
Dept: LAB | Facility: CLINIC | Age: 73
End: 2018-11-09
Payer: MEDICARE

## 2018-11-09 LAB
CREAT UR-MCNC: 55.7 MG/DL
MICROALBUMIN UR-MCNC: 5.4 MG/L (ref 0–20)
MICROALBUMIN/CREAT 24H UR: 10 MG/G CREATININE (ref 0–30)

## 2018-11-09 PROCEDURE — 82043 UR ALBUMIN QUANTITATIVE: CPT | Performed by: FAMILY MEDICINE

## 2018-11-09 PROCEDURE — 82570 ASSAY OF URINE CREATININE: CPT | Performed by: FAMILY MEDICINE

## 2018-11-15 ENCOUNTER — OFFICE VISIT (OUTPATIENT)
Dept: FAMILY MEDICINE CLINIC | Facility: CLINIC | Age: 73
End: 2018-11-15
Payer: MEDICARE

## 2018-11-15 VITALS
SYSTOLIC BLOOD PRESSURE: 130 MMHG | HEART RATE: 72 BPM | WEIGHT: 182 LBS | HEIGHT: 71 IN | RESPIRATION RATE: 18 BRPM | BODY MASS INDEX: 25.48 KG/M2 | DIASTOLIC BLOOD PRESSURE: 70 MMHG | TEMPERATURE: 98 F

## 2018-11-15 DIAGNOSIS — I25.119 CORONARY ARTERY DISEASE INVOLVING NATIVE CORONARY ARTERY OF NATIVE HEART WITH ANGINA PECTORIS (HCC): ICD-10-CM

## 2018-11-15 DIAGNOSIS — E11.42 DIABETIC POLYNEUROPATHY ASSOCIATED WITH TYPE 2 DIABETES MELLITUS (HCC): Primary | ICD-10-CM

## 2018-11-15 DIAGNOSIS — N18.2 CKD STAGE 2 DUE TO TYPE 2 DIABETES MELLITUS (HCC): ICD-10-CM

## 2018-11-15 DIAGNOSIS — I10 BENIGN ESSENTIAL HYPERTENSION: ICD-10-CM

## 2018-11-15 DIAGNOSIS — E11.22 CKD STAGE 2 DUE TO TYPE 2 DIABETES MELLITUS (HCC): ICD-10-CM

## 2018-11-15 PROCEDURE — 99214 OFFICE O/P EST MOD 30 MIN: CPT | Performed by: FAMILY MEDICINE

## 2018-11-15 NOTE — PROGRESS NOTES
Assessment/Plan:    No problem-specific Assessment & Plan notes found for this encounter  megan done  Work on diet more for 3m to get a1c better  Chol/pad stable  Cad stable     Diagnoses and all orders for this visit:    Diabetic polyneuropathy associated with type 2 diabetes mellitus ([facility] )    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 mellitus ([facility] )    Coronary artery disease involving native coronary artery of native heart with angina pectoris ([facility] )    Benign essential hypertension              No Follow-up on file  Subjective:      Patient ID: Chi Teran is a 67 y o  male  Chief Complaint   Patient presents with    Follow-up     lab work review and medication check  [id number]       HPI  meds reviewed  Minimal lunch  a1c slightly better but not much  Has gas from meds  PAD stable    Can do more with diet, avoids sugar  Could exercise more  Precose is $$    The following portions of the patient's history were reviewed and updated as appropriate: allergies, current medications, past family history, past medical history, past social history, past surgical history and problem list     Review of Systems   Respiratory: Negative for shortness of breath  Cardiovascular: Negative for chest pain  Neurological: Negative for dizziness           Current Outpatient Prescriptions   Medication Sig Dispense Refill    acarbose (PRECOSE) 25 mg tablet 2 before breakfast, 1 before lunch, 2 before dinner [when eaten] 450 tablet 3    aspirin (ECOTRIN LOW STRENGTH) 81 mg EC tablet Take 1 tablet (81 mg total) by mouth daily 30 tablet 6    carvedilol (COREG) 12 5 mg tablet TAKE 1 TABLET TWICE DAILY 180 tablet 3    clopidogrel (PLAVIX) 75 mg tablet Take 1 tablet (75 mg total) by mouth daily 90 tablet 4    gabapentin (NEURONTIN) 600 MG tablet Take 1 tablet (600 mg total) by mouth 2 (two) times a day for 30 days 60 tablet 0    glimepiride (AMARYL) 4 mg tablet Take 1 tablet by mouth 2 (two) times a day for 90 days 180 tablet 3  glucose blood (TRUETRACK TEST) test strip 1 each by Other route daily Use as instructed for E11 29 100 each 3    lisinopril (ZESTRIL) 2 5 mg tablet TAKE 1 TABLET EVERY DAY 90 tablet 3    metFORMIN (GLUMETZA) 500 MG (MOD) 24 hr tablet Take 4 tablets (2,000 mg total) by mouth daily with breakfast 360 tablet 3    omeprazole (PriLOSEC) 40 MG capsule Take 1 capsule (40 mg total) by mouth daily  0    simvastatin (ZOCOR) 40 mg tablet Take 1 tablet (40 mg total) by mouth daily  0    TRUEPLUS LANCETS 28G MISC Test 1x/d, E11 29  0     No current facility-administered medications for this visit  Objective:    /70   Pulse 72   Temp 98 °F (36 7 °C)   Resp 18   Ht 5' 11" (1 803 m)   Wt 82 6 kg (182 lb)   BMI 25 38 kg/m²        Physical Exam   Constitutional: He appears well-developed  HENT:   Head: Normocephalic  Mouth/Throat: No oropharyngeal exudate  Eyes: Conjunctivae are normal  No scleral icterus  Neck: Neck supple  Cardiovascular: Normal rate and intact distal pulses  No murmur heard  Pulmonary/Chest: Effort normal  No respiratory distress  He has no wheezes  Abdominal: Soft  There is no tenderness  There is no rebound  Hernia confirmed negative in the right inguinal area and confirmed negative in the left inguinal area  Genitourinary: Prostate normal  No penile tenderness  Musculoskeletal: He exhibits no edema or deformity  Lymphadenopathy:     He has no cervical adenopathy  Neurological: He is alert  He exhibits normal muscle tone  Skin: Skin is warm and dry  No rash noted  No pallor  Psychiatric: His behavior is normal  Thought content normal    Nursing note and vitals reviewed               Declan Laura DO

## 2018-12-07 ENCOUNTER — OFFICE VISIT (OUTPATIENT)
Dept: NEPHROLOGY | Facility: CLINIC | Age: 73
End: 2018-12-07
Payer: MEDICARE

## 2018-12-07 VITALS
BODY MASS INDEX: 19.91 KG/M2 | SYSTOLIC BLOOD PRESSURE: 152 MMHG | DIASTOLIC BLOOD PRESSURE: 64 MMHG | HEIGHT: 71 IN | WEIGHT: 142.2 LBS

## 2018-12-07 DIAGNOSIS — I12.9 HYPERTENSIVE CHRONIC KIDNEY DISEASE WITH STAGE 1 THROUGH STAGE 4 CHRONIC KIDNEY DISEASE, OR UNSPECIFIED CHRONIC KIDNEY DISEASE: ICD-10-CM

## 2018-12-07 DIAGNOSIS — E11.22 CKD STAGE 3 DUE TO TYPE 2 DIABETES MELLITUS (HCC): Primary | ICD-10-CM

## 2018-12-07 DIAGNOSIS — N18.30 CKD STAGE 3 DUE TO TYPE 2 DIABETES MELLITUS (HCC): Primary | ICD-10-CM

## 2018-12-07 PROCEDURE — 99213 OFFICE O/P EST LOW 20 MIN: CPT | Performed by: INTERNAL MEDICINE

## 2018-12-07 NOTE — PATIENT INSTRUCTIONS
ASSESSMENT and PLAN:  1  Chronic kidney disease, stage III, baseline creatinine between 1 2 and 1 5, current creatinine 1 28 with an estimated GFR 55, underlying etiology is likely secondary to diabetes, peripheral vascular disease  2  Peripheral vascular disease status post stent placement  3  Diabetes, last hemoglobin A1c 7 2    · Overall renal function remains stable  · Blood pressure under good control  · Continue with low-dose ACE-inhibitor, no significant proteinuria  · Follow-up in approximately 1 year with repeat laboratory studies at that time

## 2018-12-07 NOTE — LETTER
December 7, 2018     [address] Marquez, [address]  [facility] [address]    Patient: Daren Raygoza   YOB: 1945   Date of Visit: 12/7/2018     Dear Dr Ac Koo      Thank you for referring Kadie Veronica to me for evaluation  Below are the relevant portions of my assessment and plan of care  If you have questions, please do not hesitate to call me  I look forward to following Daya Medeiros along with you  Sincerely,        Rashmi Pink, DO        CC: No Recipients    Progress Notes:    ASSESSMENT and PLAN:  1  Chronic kidney disease, stage III, baseline creatinine between 1 2 and 1 5, current creatinine 1 28 with an estimated GFR 55, underlying etiology is likely secondary to diabetes, peripheral vascular disease  2  Peripheral vascular disease status post stent placement  3  Diabetes, last hemoglobin A1c 7 2    · Overall renal function remains stable  · Blood pressure under good control  · Continue with low-dose ACE-inhibitor, no significant proteinuria  · Follow-up in approximately 1 year with repeat laboratory studies at that time

## 2018-12-07 NOTE — PROGRESS NOTES
NEPHROLOGY OUTPATIENT PROGRESS NOTE   Sumanth Hull 75 y o  male MRN: [id number]  Reason for visit:  Chronic kidney disease    ASSESSMENT and PLAN:  1  Chronic kidney disease, stage III, baseline creatinine between 1 2 and 1 5, current creatinine 1 28 with an estimated GFR 55, underlying etiology is likely secondary to diabetes, peripheral vascular disease  2  Peripheral vascular disease status post stent placement  3  Diabetes, last hemoglobin A1c 7 2    · Overall renal function remains stable  · Blood pressure under good control  · Continue with low-dose ACE-inhibitor, no significant proteinuria  · Follow-up in approximately 1 year with repeat laboratory studies at that time  SUBJECTIVE / INTERVAL HISTORY:  He has been doing well  Recently had a stent placed in June with improvement in left lower claudication  No wounds  Denies any chest pain shortness of breath nausea vomiting  Appetite has been stable      Review of Systems      OBJECTIVE:  /64 (BP Location: Left arm, Patient Position: Sitting, Cuff Size: Large)   Ht 5' 11" (1 803 m)   Wt 64 5 kg (142 lb 3 2 oz)   BMI 19 83 kg/m²   Vitals:    12/07/18 0859   Weight: 64 5 kg (142 lb 3 2 oz)       Physical Exam   Constitutional: No distress  HENT:   Head: Normocephalic  Eyes: No scleral icterus  Neck: Neck supple  Cardiovascular: Normal rate and regular rhythm  Pulmonary/Chest: Effort normal and breath sounds normal    Abdominal: Soft  He exhibits no distension  Musculoskeletal: He exhibits no edema  Neurological: He is alert  Skin: Skin is warm and dry  Psychiatric: He has a normal mood and affect           Medications:    Current Outpatient Prescriptions:     acarbose (PRECOSE) 25 mg tablet, 2 before breakfast, 1 before lunch, 2 before dinner [when eaten], Disp: 450 tablet, Rfl: 3    aspirin (ECOTRIN LOW STRENGTH) 81 mg EC tablet, Take 1 tablet (81 mg total) by mouth daily, Disp: 30 tablet, Rfl: 6    carvedilol (COREG) 12 5 mg tablet, TAKE 1 TABLET TWICE DAILY, Disp: 180 tablet, Rfl: 3    clopidogrel (PLAVIX) 75 mg tablet, Take 1 tablet (75 mg total) by mouth daily, Disp: 90 tablet, Rfl: 4    glucose blood (TRUETRACK TEST) test strip, 1 each by Other route daily Use as instructed for E11 29, Disp: 100 each, Rfl: 3    lisinopril (ZESTRIL) 2 5 mg tablet, TAKE 1 TABLET EVERY DAY, Disp: 90 tablet, Rfl: 3    metFORMIN (GLUMETZA) 500 MG (MOD) 24 hr tablet, Take 4 tablets (2,000 mg total) by mouth daily with breakfast (Patient taking differently: Take 1,000 mg by mouth 2 (two) times a day with meals  ), Disp: 360 tablet, Rfl: 3    omeprazole (PriLOSEC) 40 MG capsule, Take 1 capsule (40 mg total) by mouth daily, Disp: , Rfl: 0    simvastatin (ZOCOR) 40 mg tablet, Take 1 tablet (40 mg total) by mouth daily, Disp: , Rfl: 0    TRUEPLUS LANCETS 28G MISC, Test 1x/d, E11 29, Disp: , Rfl: 0    gabapentin (NEURONTIN) 600 MG tablet, Take 1 tablet (600 mg total) by mouth 2 (two) times a day for 30 days, Disp: 60 tablet, Rfl: 0    glimepiride (AMARYL) 4 mg tablet, Take 1 tablet by mouth 2 (two) times a day for 90 days, Disp: 180 tablet, Rfl: 3    Laboratory Results:  Results for orders placed or performed in visit on 11/08/18   Comprehensive metabolic panel   Result Value Ref Range    Sodium 134 (L) 136 - 145 mmol/L    Potassium 4 6 3 5 - 5 3 mmol/L    Chloride 102 100 - 108 mmol/L    CO2 26 21 - 32 mmol/L    ANION GAP 6 4 - 13 mmol/L    BUN 13 5 - 25 mg/dL    Creatinine 1 28 0 60 - 1 30 mg/dL    Glucose, Fasting 129 (H) 65 - 99 mg/dL    Calcium 9 0 8 3 - 10 1 mg/dL    AST 23 5 - 45 U/L    ALT 29 12 - 78 U/L    Alkaline Phosphatase 78 46 - 116 U/L    Total Protein 7 3 6 4 - 8 2 g/dL    Albumin 3 9 3 5 - 5 0 g/dL    Total Bilirubin 0 84 0 20 - 1 00 mg/dL    eGFR 55 ml/min/1 73sq m   Hemoglobin A1C   Result Value Ref Range    Hemoglobin A1C 7 2 (H) 4 2 - 6 3 %     mg/dl   PSA, Total Screen   Result Value Ref Range    PSA 2 0 0 0 - 4 0 ng/mL

## 2018-12-18 ENCOUNTER — OFFICE VISIT (OUTPATIENT)
Dept: PODIATRY | Facility: CLINIC | Age: 73
End: 2018-12-18
Payer: MEDICARE

## 2018-12-18 VITALS
SYSTOLIC BLOOD PRESSURE: 132 MMHG | BODY MASS INDEX: 19.88 KG/M2 | HEART RATE: 71 BPM | HEIGHT: 71 IN | WEIGHT: 142 LBS | RESPIRATION RATE: 16 BRPM | DIASTOLIC BLOOD PRESSURE: 65 MMHG

## 2018-12-18 DIAGNOSIS — M79.671 PAIN IN BOTH FEET: Primary | ICD-10-CM

## 2018-12-18 DIAGNOSIS — M54.16 LUMBAR RADICULOPATHY: ICD-10-CM

## 2018-12-18 DIAGNOSIS — I70.213 ATHEROSCLEROSIS OF NATIVE ARTERY OF BOTH LOWER EXTREMITIES WITH INTERMITTENT CLAUDICATION (HCC): ICD-10-CM

## 2018-12-18 DIAGNOSIS — E11.42 DIABETIC POLYNEUROPATHY ASSOCIATED WITH TYPE 2 DIABETES MELLITUS (HCC): ICD-10-CM

## 2018-12-18 DIAGNOSIS — L84 CORNS: ICD-10-CM

## 2018-12-18 DIAGNOSIS — B35.1 ONYCHOMYCOSIS: ICD-10-CM

## 2018-12-18 DIAGNOSIS — M79.672 PAIN IN BOTH FEET: Primary | ICD-10-CM

## 2018-12-18 PROCEDURE — 99212 OFFICE O/P EST SF 10 MIN: CPT | Performed by: PODIATRIST

## 2018-12-18 PROCEDURE — 11056 PARNG/CUTG B9 HYPRKR LES 2-4: CPT | Performed by: PODIATRIST

## 2018-12-18 RX ORDER — GABAPENTIN 600 MG/1
600 TABLET ORAL 2 TIMES DAILY
Qty: 180 TABLET | Refills: 0 | Status: SHIPPED | OUTPATIENT
Start: 2018-12-18 | End: 2019-02-21 | Stop reason: SDUPTHER

## 2018-12-18 NOTE — ADDENDUM NOTE
Addended by: Librado Mckinney on: 12/18/2018 09:55 AM     Modules accepted: Orders, Level of Service

## 2018-12-18 NOTE — PROGRESS NOTES
Procedures   Foot Exam     Assessment/Plan:  Patient has peripheral artery disease   He is working with vascular surgery  Saurabh Kelley has diabetic neuropathy   He has pain in his feet with ambulation      Plan   Continue gabapentin   Nails debrided   Calluses debrided without pain or complication  No problem-specific Assessment & Plan notes found for this encounter          There are no diagnoses linked to this encounter        Subjective:       Patient ID: Pernell Covarrubias is a 67 y  o  male      HPI     The following portions of the patient's history were reviewed and updated as appropriate: allergies, current medications, past family history, past medical history, past social history, past surgical history and problem list      Review of Systems       Objective:     Active Problems   1  Acquired Hallux Valgus (735 0)   2  Allergic rhinitis (477 9) (J30 9)   3  Arthropathy (716 90) (M12 9)   4  Backache (724 5) (M54 9)   5  Benign essential hypertension (401 1) (I10)   6  CAD (coronary atherosclerotic disease) (414 00) (I25 10)   7  Callus (700) (L84)   8  Chronic kidney disease, stage 3 (585 3) (N18 3)   9  Colon cancer screening (V76 51) (Z12 11)   10  Deformity of ankle and foot, acquired (736 70) (M21 969)   11  Depression screening (V79 0) (Z13 89)   12  Diabetic neuropathy (250 60,357 2) (E11 40)   13  Encounter for prostate cancer screening (V76 44) (Z12 5)   14  Esophagitis, reflux (530 11) (K21 0)   15  Need for immunization against influenza (V04 81) (Z23)   16  Need for pneumococcal vaccination (V03 82) (Z23)   17  Onychomycosis of toenail (110 1) (B35 1)   18  Pain in both feet (729 5) (M79 671,M79 672)   19  Peripheral arterial disease (443 9) (I73 9)   20  Pneumonia (486) (J18 9)   21  Prostate cancer screening (V76 44) (Z12 5)   22  Rosacea (695 3) (L71 9)   23  Screening for genitourinary condition (V81 6) (Z13 89)   24  Screening for neurological condition (V80 09) (Z13 89)   25  Seborrheic dermatitis (690 10) (L21 9)   26  Type 2 diabetes mellitus with diabetic neuropathy (250 60,357 2) (E11 40)   27  Type 2 diabetes mellitus with other circulatory complications (632 58) (P21 91)   28  Type 2 diabetes mellitus with renal manifestations, controlled (250 40) (E11 29)     Past Medical History   · History of Acute upper respiratory infection (465 9) (J06 9)   · History of Atrophy of nail (703 8) (L60 3)   · History of Bursitis of hip, unspecified laterality (726 5) (M70 70)   · History of Callus (700) (L84)   · History of Callus (700) (L84)   · History of Difficulty walking (719 7) (R26 2)   · History of Edema (782 3) (R60 9)   · History of Hammer toe, unspecified laterality (735 4) (M20 40)   · History of acute bronchitis (V12 69) (Z87 09)   · History of allergic rhinitis (V12 69) (Z87 09)   · History of anemia (V12 3) (Z86 2)   · History of bursitis (V13 59) (Z87 39)   · History of tendinitis (V13 59) (Z87 39)   · History of tinea corporis (V12 09) (Z86 19)   · History of viral warts (V12 09) (Z86 19)   · History of Late Effects Of Sprain Or Strain (905 7)   · History of Limb pain (729 5) (M79 609)   · History of Limb pain (729 5) (M79 609)   · History of Metatarsalgia, unspecified laterality (726 70) (M77 40)   · History of Open wound of foot, excluding toe(s) (892 0) (S91 309A)   · History of Pain in both feet (729 5) (M79 671,M79 672)   · History of Pain in both feet (729 5) (M79 671,M79 672)   · History of Pes planus, unspecified laterality (734) (M21 40)   · History of Shoulder joint pain, unspecified laterality   · History of Type 2 diabetes mellitus (250 00) (E11 9)     Surgical History   · History of Bypass Graft Using Vein: Femoral-popliteal   · History of Cataract Surgery   · History of Foot Surgery   · History of Heart Surgery   · History of Leg Repair   · History of Rotator Cuff Repair   · History of Shoulder Surgery     The surgical history was reviewed and updated today        Family History  Mother    · Family history of Aortic Aneurysm  Father    · Family history of acute myocardial infarction (V17 3) (Z80 55)  Sister    · Family history of acute myocardial infarction (V17 3) (Z82 49)     The family history was reviewed and updated today        Social History   · Being A Social Drinker   · Denied: History of Drug Use   · Never A Smoker   · Retired From Work  The social history was reviewed and updated today       Current Meds   1  Carvedilol 6 25 MG Oral Tablet; 1 two times a day;   Therapy: 31RQA3793 to (Last Rx:01Feb2016)  Requested for: 39DUP4981 Ordered   2  Cilostazol 100 MG Oral Tablet; TAKE 1 TABLET TWICE DAILY  Requested for:   64PKV2305; Last Rx:14Jan2015 Ordered   3  Gabapentin 300 MG Oral Capsule; TAKE 1 CAPSULE 3 TIMES DAILY;   Therapy: 34DNJ2745 to (Evaluate:26Jan2017)  Requested for: 49TRU7398; Last   Rx:01Feb2016 Ordered   4  Glimepiride 4 MG Oral Tablet; TAKE ONE TABLET BY MOUTH TWICE A DAY;   Therapy: 32EXU3128 to (Evaluate:99Wgu1569)  Requested for: 54EMI7348; Last   Rx:12Oct2015 Ordered   5  Lisinopril 10 MG Oral Tablet; 1/2 tab twice daily  Requested for: 52NKD7079; Last   Rx:01Feb2016 Ordered   6  MetFORMIN HCl ER (OSM) 1000 MG Oral Tablet Extended Release 24 Hour; Take 1   tablet twice daily;   Therapy: 39QXZ3148 to (Evaluate:10Nov2015)  Requested for: 26Oct2015 Recorded   7  Nitrostat 0 4 MG Sublingual Tablet Sublingual; PLACE 1 TABLET UNDER THE TONGUE   EVERY 5 MINUTES FOR UP TO 3 DOSES AS NEEDED FOR CHEST PAIN  CALL   911 IF PAIN PERSISTS;   Therapy: 80GNZ2603 to ([facility] [facility])  Requested for: 72JIB0074; Last   Rx:47Qhy6977 Ordered   8  Omeprazole 40 MG Oral Capsule Delayed Release; 1 every day;   Therapy: 34JOI3194 to (Evaluate:53Aqk4839);  Last Rx:01Feb2016 Ordered   9  Onglyza 5 MG Oral Tablet; 1 every day;   Therapy: 75BTW1783 to (Last Rx:14Jan2015)  Requested for: 58TKS7036 Ordered   10  Simvastatin 40 MG Oral Tablet; take one tablet by mouth every day;    Therapy: 10Vnh5370 to (Evaluate:26Jan2017)  Requested for: 28LXZ4387; Last    Rx:46Wji9248 Ordered   11  TRUEplus Lancets 28G Miscellaneous; test glucose once daily <250 00>;    Therapy: 45NVZ7520 to (Evaluate:13Jan2018)  Requested for: 65JPW3492; Last    XB:08XQG5216 Ordered   12  TrueTrack Test In Vitro Strip; test 1x/d as directed, <250 00>;    Therapy: 40VNU7931 to (Evaluate:30Jan2016)  Requested for: 26OCX1952; Last    Rx:29Jan2015 Ordered     The medication list was reviewed and updated today       Allergies   1  No Known Drug Allergies     Vitals        Heart Rate 78   Respiration 16   Systolic 867   Diastolic 80   Height 5 ft 11 in   Weight 189 lb    BMI Calculated 26 36   BSA Calculated 2 06      Physical Exam  Left Foot: Appearance: a deformity (ball of foot and distal fifth metatarsal )  Fifth toe deformities include hammer toe  Tenderness: None except the plantar aspect of the foot  Right Foot: Appearance: a deformity (distal fifth metatarsal )  Left Ankle: ROM: limited ROM in all planes   Right Ankle: ROM: limited ROM in all planes   Neurological Exam: Light touch was decreased bilaterally  Vibratory sensation was decreased in both first metatarsophalangeal joints  Response to monofilament test was absent bilaterally  Deep tendon reflexes: achilles reflex 1/4 bilaterally  Vascular Exam: performed Dorsalis pedis pulses were 1/4 bilaterally  Posterior tibial pulses were 1/4 bilaterally  Elevation Pallor: diminished bilaterally  Capillary refill time was Q  9, findings bilateral  Negative digital hair noted, positive abnormal cooling  All pre-ulcer, lesions debrided  Today, but between 1-3 seconds bilaterally  Edema: mild bilaterally and All mycotic nails debrided  Today  The patient was given orthotics to help control his feet and at as cushioning and padding on the bottom of his feet q9 findings  Toenails:  All of the toenails were elongated, hypertrophied, discolored, ingrown, shown to have subungual debris and tender       Socks and shoes removed, the Right Foot: the foot was dry  The sensory exam showed diminished vibratory sensation at the level of the toes  Diminished tactile sensation with monofilament testing throughout the right foot    Socks and shoes removed, Left Foot: the foot was dry  The sensory exam showed diminished vibratory sensation at the level of the toes  Diminished tactile sensation with monofilament testing throughout the left foot    Pulses:   1+ in the posterior tibialis on the right   1+ in the dorsalis pedis on the right  Capillary refills findings on the left were delayed in the toes  Pulses:   1+ in the posterior tibialis on the left   1+ in the dorsalis pedis on the left  Assign Risk Category: 2: Loss of protective sensation with or without weakness, deformity, callus, pre-ulcer, or history of ulceration  High risk  Hyperkeratosis: present on both first sub metatarsals, present on both fifth sub metatarsals and Pre-ulcerative lesion, submetatarsal one to 5, bilateral    Shoe Gear Evaluation: performed ()  Recommendation(s): custom inlays       Procedure  All mycotic nails debrided  Bilateral lesions debrided  Patient tolerated all procedures without pain or complication   Patient's been educated on care of the diabetic al

## 2019-01-15 ENCOUNTER — TRANSCRIBE ORDERS (OUTPATIENT)
Dept: ADMINISTRATIVE | Facility: HOSPITAL | Age: 74
End: 2019-01-15

## 2019-01-18 DIAGNOSIS — E11.59 TYPE 2 DIABETES MELLITUS WITH OTHER CIRCULATORY COMPLICATION, WITHOUT LONG-TERM CURRENT USE OF INSULIN (HCC): ICD-10-CM

## 2019-01-18 RX ORDER — GLIMEPIRIDE 4 MG/1
4 TABLET ORAL 2 TIMES DAILY
Qty: 180 TABLET | Refills: 1 | Status: SHIPPED | OUTPATIENT
Start: 2019-01-18 | End: 2019-07-20 | Stop reason: SDUPTHER

## 2019-01-20 NOTE — PROGRESS NOTES
Progress Note - Cardiology Office  [facility]    Emmanuelle Cole 75 y o  male MRN: [id number]  : 1945  Encounter: [id number]      Assessment:     1  Benign essential hypertension    2  Coronary artery disease involving native coronary artery of native heart with angina pectoris ([facility] )    3  Atherosclerosis of native artery of both lower extremities with intermittent claudication ([facility] )    4  Dyslipidemia    5  Type 2 diabetes mellitus with diabetic neuropathy, without long-term current use of insulin ([facility])    6  Carotid bruit    7  CKD stage 3 due to type 2 diabetes mellitus ([facility] )        Discussion Summary and Plan:  1  PVD with right femoropopliteal which is occluded and left SFA which has stent placed by me in , had repeat procedure done with known right SFA stent stenosis underwent balloon angioplasty  Now feeling much better  Continue antiplatelet lesion and dual platelet therapy  He can walk more distance  Below the knee patient has single-vessel runoff  No new issues     Lower extremity vascular study done on 2018 reviewed  2  Coronary artery disease  Status post multivessel stenting  Patient now want to follow up with [facility] cardiology  Nuclear stress test shows no ischemia  Normal LV systolic function  Stress test reviewed with the patient  3  Hypertension  Patient blood pressure has been elevated  Need to better control  Increase lisinopril to 5 mg daily  Prescriptions written  Labs from previous visit reviewed the creatinine was acceptable  4  Dyslipidemia  Patient follows up with PMD he's on simvastatin  He is tolerating that medication very well  Continue same doses    5  CRI  Stable  He had CKD stage 2     6   Carotid bruit     Carotid Doppler shows less than 50% stenosis on the left  No evidence of stenosis on the right side  Continue aspirin and statin  7  Diabetes mellitus  Acceptable HbA1c 7 2          Counseling :  A description of the counseling  All issues regarding tight sugar control, taking cholesterol medications, regular exercise, symptoms about coronary artery disease discussed with patient at length  Previous echo from 2014 reviewed  Goals and Barriers  Patient's ability to self care: Yes  Medication side effect reviewed with patient in detail and all their questions answered to their satisfaction  HPI :     Destin Shearer is a 75y o  year old male who came for follow up  Patient has a past medical history significant for Coronary Artery artery disease, CK D stage III, Diabetes mellitus with renal manifestations, PVD with right femoropopliteal which has occluded and left SFA stent by me in 2010, hypertension, diabetic neuropathy who came to see us after his vascular study was found to be abnormal  For cardiac problem he generally sees Dr Obdulia Payton  His vascular study was ordered by his podiatrist as he was having pain in his foot  He also had a lot of back problems sometime pain radiates from his back to the thighs  Here a stent placed in his left SFA in 2010  It's already known that his right femoropopliteal is occluded  He has no fever no chills no nausea no vomiting no other significant complaint       01/22/2019  Above reviewed  Patient came for follow-up  He is doing much better since his balloon dilatation of right SFA for in stent stenosis  He has single-vessel below the knees  He had a good result  He does have claudication if he walks a long distance  But he is able to walk significantly more than before  He denies any chest pain or any shortness of breath  With he is compliant with his medications  His blood pressure has been noted to be high  He was on lisinopril 2 5 mg daily  Has recent labs which are reviewed  No nausea no vomiting no fever no chills no other significant complaint  He is pretty compliant with his medications      Review of Systems   Constitutional: Negative for activity change, chills, diaphoresis, fever and unexpected weight change  HENT: Negative for congestion  Eyes: Negative for discharge and redness  Respiratory: Negative for cough, chest tightness, shortness of breath and wheezing  Cardiovascular: Negative  Negative for chest pain, palpitations and leg swelling  Gastrointestinal: Negative for abdominal pain, diarrhea and nausea  Endocrine: Negative  Genitourinary: Negative for decreased urine volume and urgency  Musculoskeletal: Negative  Negative for arthralgias, back pain and gait problem  Leg pain when he walks long distance, much better than before   Skin: Negative for rash and wound  Allergic/Immunologic: Negative  Neurological: Negative for dizziness, seizures, syncope, weakness, light-headedness and headaches  Hematological: Negative  Psychiatric/Behavioral: Negative for agitation and confusion  The patient is not nervous/anxious          Historical Information   Past Medical History:   Diagnosis Date    Acquired hallux valgus     last assessed: 8/1/2013    Allergic rhinitis     Anemia 10/26/2010    Atrophy of nail     last assessed: 7/7/2015    Chronic kidney disease     chronic renal insufficiency    Coronary artery disease     Deformity of ankle and foot, acquired     last assessed: 2/22/2016    Diabetes mellitus [facility] )     Difficulty walking     last assessed: 12/14/2015    Dysesthesia     last assessed: 11/25/2016    Hammer toe     unspecified laterality; last assessed: 8/1/2013    Hypertension     Onychomycosis of toenail     last assessed: 2/22/2016    Per[facility] vascular [facility] )     left SFA stent in 2010    Pes planus     unspecified laterality; last assessed: 8/1/2013    Pneumonia     last assessed: 2/27/2016    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     last assessed: 8/15/2016    Type 2 diabetes mellitus ([facility] ) 07/26/2010    Viral warts     last assessed: 7/24/2015     Past Surgical History:   Procedure Laterality Date    CARDIAC CATHETERIZATION  07/29/2010    CATARACT EXTRACTION, BILATERAL Bilateral     R 1999, L 2008    FEMORAL ARTERY - POPLITEAL ARTERY BYPASS GRAFT  09/23/2013    FOOT SURGERY      bone spur removed    LEG SURGERY Bilateral     repair; L 8/20/2010 and 7/26/2012    ROTATOR CUFF REPAIR Left 2009    SHOULDER SURGERY Right 2005    collar bone     History   Alcohol Use    Yes     Comment: being a social drinker     History   Drug Use No     History   Smoking Status    Never Smoker   Smokeless Tobacco    Never Used     Family History:   Family History   Problem Relation Age of Onset    Heart disease Father     Heart attack Father         acute myocardial infarction    Heart disease Sister     Heart attack Sister         acute myocardial infarction    Heart disease Paternal Grandfather     Aortic aneurysm Mother        Meds/Allergies     No Known Allergies    Current Outpatient Prescriptions:     acarbose (PRECOSE) 25 mg tablet, 2 before breakfast, 1 before lunch, 2 before dinner [when eaten], Disp: 450 tablet, Rfl: 3    aspirin (ECOTRIN LOW STRENGTH) 81 mg EC tablet, Take 1 tablet (81 mg total) by mouth daily, Disp: 30 tablet, Rfl: 6    carvedilol (COREG) 12 5 mg tablet, TAKE 1 TABLET TWICE DAILY, Disp: 180 tablet, Rfl: 3    gabapentin (NEURONTIN) 600 MG tablet, Take 1 tablet (600 mg total) by mouth 2 (two) times a day for 90 days, Disp: 180 tablet, Rfl: 0    glimepiride (AMARYL) 4 mg tablet, Take 1 tablet (4 mg total) by mouth 2 (two) times a day for 90 days, Disp: 180 tablet, Rfl: 1    glucose blood (TRUETRACK TEST) test strip, 1 each by Other route daily Use as instructed for E11 29, Disp: 100 each, Rfl: 3    lisinopril (ZESTRIL) 5 mg tablet, Take 1 tablet (5 mg total) by mouth daily, Disp: 90 tablet, Rfl: 3    metFORMIN (GLUMETZA) 500 MG (MOD) 24 hr tablet, Take 4 tablets (2,000 mg total) by mouth daily with breakfast (Patient taking differently: Take 1,000 mg by mouth 2 (two) times a day with meals  ), Disp: 360 tablet, Rfl: 3    omeprazole (PriLOSEC) 40 MG capsule, Take 1 capsule (40 mg total) by mouth daily, Disp: , Rfl: 0    simvastatin (ZOCOR) 40 mg tablet, Take 1 tablet (40 mg total) by mouth daily, Disp: , Rfl: 0    TRUEPLUS LANCETS 28G MISC, Test 1x/d, E11 29, Disp: , Rfl: 0    gabapentin (NEURONTIN) 600 MG tablet, Take 1 tablet (600 mg total) by mouth 2 (two) times a day for 30 days, Disp: 60 tablet, Rfl: 0    Vitals: Blood pressure 148/74, pulse 68, height 5' 9" (1 753 m), weight 88 2 kg (194 lb 6 4 oz), SpO2 99 %  Body mass index is 28 71 kg/m²  Vitals:    01/22/19 1552   Weight: 88 2 kg (194 lb 6 4 oz)     BP Readings from Last 3 Encounters:   01/22/19 148/74   12/18/18 132/65   12/07/18 152/64         Physical Exam   Constitutional: He is oriented to person, place, and time  He appears well-developed  No distress  HENT:   Head: Normocephalic and atraumatic  Eyes: Pupils are equal, round, and reactive to light  Neck: Normal range of motion  Neck supple  No JVD present  No thyromegaly present  Cardiovascular: Normal rate and regular rhythm  Murmur heard  S1-S2 regular with a 2/6 ejection systolic murmur  Pulmonary/Chest: Effort normal and breath sounds normal  No respiratory distress  He has no wheezes  He has no rales  Abdominal: Soft  Bowel sounds are normal  He exhibits no distension  There is no tenderness  There is no rebound  Musculoskeletal: Normal range of motion  He exhibits no edema or tenderness  Neurological: He is alert and oriented to person, place, and time  Skin: Skin is warm and dry  He is not diaphoretic  Psychiatric: He has a normal mood and affect  His behavior is normal  Judgment normal        Diagnostic Studies Review Cardio:    Nuclear stress test   Nuclear stress test done 09/27/2018 was a normal study with EF around 60%    No ischemia noted it was Lexiscan stress test     EKG: Twelve lead EKG done on 2018 shows normal sinus rhythm heart rate 60 beats per minute  No significant ST changes  Cardiac testing:   Results for orders placed in visit on 04/15/14   Echo complete with contrast if indicated    [address]   [address]ian Nogueira, [address] [facility]   Phone: [phone]   TRANSTHORACIC ECHOCARDIOGRAM   2D, M-MODE, DOPPLER, AND COLOR DOPPLER   Study date:  2014   Patient: Fabiana Tripathi   MR number: [id number]   Account number: [de-identified]   : [id number]   Age: 70 years   Gender: Male   Status: Inpatient   Location: [facility]  Echo lab   Height: 73 in   Weight: 140 8 lb   BP: 124/ 59 mmHg   Indications: Atrial Fibrillation   Diagnoses: 427 31 - ATRIAL FIBRILLATION   Sonographer:  Sriram MERRILL   Referring Physician:  Jennifer Loya MD   Group:  [facility]   Interpreting Physician:  Hollie Springer MD   SUMMARY   LEFT VENTRICLE:   Systolic function was normal  Ejection fraction was estimated to be 62 %  There were no regional wall motion abnormalities  Wall thickness was mildly increased  MITRAL VALVE:   There was mild annular calcification  AORTIC VALVE:   There was mild regurgitation  HISTORY: PRIOR HISTORY: CAD, Cardiac Stent, PVD, PAD, Atrial Fibrillation,    HTN,   Diabetes   PROCEDURE: The procedure was performed in the echo lab  This was a routine   study  The transthoracic approach was used  The study included complete 2D   imaging, M-mode, complete spectral Doppler, and color Doppler  The heart rate   was 60 bpm, at the start of the study  Images were obtained from the   parasternal, apical, subcostal, and suprasternal notch acoustic windows  Image   quality was adequate  LEFT VENTRICLE: Size was normal  Systolic function was normal  Ejection   TRANSTHORACIC ECHOCARDIOGRAM   Patient: Fabiana Tripathi   MR number: [id number]    ------ Page 2   fraction was estimated to be 62 %   There were no regional wall motion   abnormalities  Wall thickness was mildly increased  DOPPLER: Left ventricular   diastolic function parameters were normal    RIGHT VENTRICLE: The size was normal  Systolic function was normal  Wall   thickness was normal    LEFT ATRIUM: Size was normal    RIGHT ATRIUM: Size was normal    MITRAL VALVE: There was mild annular calcification  Valve structure was    normal    There was normal leaflet separation  DOPPLER: The transmitral velocity was   within the normal range  There was no evidence for stenosis  There was no   regurgitation  AORTIC VALVE: The valve was trileaflet  Leaflets exhibited normal thickness    and   normal cuspal separation  DOPPLER: Transaortic velocity was within the normal   range  There was no evidence for stenosis  There was mild regurgitation  TRICUSPID VALVE: The valve structure was normal  There was normal leaflet   separation  DOPPLER: The transtricuspid velocity was within the normal range  There was no evidence for stenosis  There was no regurgitation  PULMONIC VALVE: Leaflets exhibited normal thickness, no calcification, and   normal cuspal separation  DOPPLER: The transpulmonic velocity was within the   normal range  There was no regurgitation  PERICARDIUM: There was no pericardial effusion  AORTA: The root exhibited normal size  SYSTEMIC VEINS: IVC: The inferior vena cava was normal in size   Respirophasic   changes were normal    SYSTEM MEASUREMENT TABLES   2D   %FS: 33 56 %   AV Diam: 3 43 cm   EDV(Teich): 73 79 ml   EF(Teich): 62 8 %   ESV(Teich): 27 45 ml   HR_4Ch_Q: 60 45 BPM   IVSd: 1 23 cm   LA Area: 17 45 cm2   LA Diam: 3 88 cm   LVCO_4Ch_Q: 3 8 L/min   LVEF_4Ch_Q: 56 5 %   LVIDd: 4 09 cm   TRANSTHORACIC ECHOCARDIOGRAM   Patient: Avila Gibbs   MR number: [id number]    ------ Page 3   LVIDs: 2 72 cm   LVLd_4Ch_Q: 9 61 cm   LVLs_4Ch_Q: 7 95 cm   LVPWd: 1 11 cm   LVSV_4Ch_Q: 62 92 ml   LVVED_4Ch_Q: 111 36 ml   LVVES_4Ch_Q: 48 45 ml   RA Area: 10 69 cm2   RVIDd: 3 54 cm   SV(Teich): 46 34 ml   CW   AR Dec [place]: 2 07 m/s2   AR Dec Time: 1874 44 ms   AR PHT: 543 59 ms   AR Vmax: 3 88 m/s   AR maxP 32 mmHg   MM   TAPSE: 2 53 cm   PW   AVC: 428 26 ms   MV A [place]: 0 54 m/s   MV Dec [place]: 2 38 m/s2   MV DecT: 253 3 ms   MV E [place]: 0 6 m/s   MV E/A Ratio: 1 12   MV PHT: 73 46 ms   MVA By PHT: 3 cm2   [employer] Accredited Echocardiography Laboratory   Prepared and electronically signed by   Neil Max MD   Signed 2014 16:21:13      No results found for this or any previous visit  Lab Review   Lab Results   Component Value Date    WBC 6 50 2018    HGB 10 9 (L) 2018    HCT 34 8 (L) 2018    MCV 86 2018    RDW 16 6 (H) 2018     2018     BMP:  Lab Results   Component Value Date     2014    K 4 6 2018     2018    CO2 26 2018    ANIONGAP 8 2014    BUN 13 2018    CREATININE 1 28 2018    GLUCOSE 137 2014    GLUF 129 (H) 2018    CALCIUM 9 0 2018    EGFR 55 2018    MG 1 8 2014     LFT:  Lab Results   Component Value Date    AST 23 2018    ALT 29 2018    ALKPHOS 78 2018       Lab Results   Component Value Date    HGBA1C 7 2 (H) 2018     Lipid Profile:   Lab Results   Component Value Date    CHOL 106 2014    HDL 43 2018    LDLCALC 57 2018    TRIG 91 2018     Lab Results   Component Value Date    CHOL 106 2014       Dr Leopoldo Boas, MD [facility]      "This note has been constructed using a voice recognition system  Therefore there may be syntax, spelling, and/or grammatical errors   Please call if you have any questions  "

## 2019-01-22 ENCOUNTER — OFFICE VISIT (OUTPATIENT)
Dept: CARDIOLOGY CLINIC | Facility: CLINIC | Age: 74
End: 2019-01-22
Payer: MEDICARE

## 2019-01-22 VITALS
DIASTOLIC BLOOD PRESSURE: 74 MMHG | WEIGHT: 194.4 LBS | OXYGEN SATURATION: 99 % | SYSTOLIC BLOOD PRESSURE: 148 MMHG | HEIGHT: 69 IN | BODY MASS INDEX: 28.79 KG/M2 | HEART RATE: 68 BPM

## 2019-01-22 DIAGNOSIS — R09.89 BRUIT OF RIGHT CAROTID ARTERY: ICD-10-CM

## 2019-01-22 DIAGNOSIS — I10 BENIGN ESSENTIAL HYPERTENSION: ICD-10-CM

## 2019-01-22 DIAGNOSIS — E11.22 CKD STAGE 3 DUE TO TYPE 2 DIABETES MELLITUS (HCC): ICD-10-CM

## 2019-01-22 DIAGNOSIS — I25.119 CORONARY ARTERY DISEASE INVOLVING NATIVE CORONARY ARTERY OF NATIVE HEART WITH ANGINA PECTORIS (HCC): ICD-10-CM

## 2019-01-22 DIAGNOSIS — I70.213 ATHEROSCLEROSIS OF NATIVE ARTERY OF BOTH LOWER EXTREMITIES WITH INTERMITTENT CLAUDICATION (HCC): ICD-10-CM

## 2019-01-22 DIAGNOSIS — E78.5 DYSLIPIDEMIA: ICD-10-CM

## 2019-01-22 DIAGNOSIS — E11.40 TYPE 2 DIABETES MELLITUS WITH DIABETIC NEUROPATHY, WITHOUT LONG-TERM CURRENT USE OF INSULIN (HCC): ICD-10-CM

## 2019-01-22 DIAGNOSIS — N18.30 CKD STAGE 3 DUE TO TYPE 2 DIABETES MELLITUS (HCC): ICD-10-CM

## 2019-01-22 PROCEDURE — 99214 OFFICE O/P EST MOD 30 MIN: CPT | Performed by: INTERNAL MEDICINE

## 2019-01-22 RX ORDER — LISINOPRIL 5 MG/1
5 TABLET ORAL DAILY
Qty: 90 TABLET | Refills: 3 | Status: SHIPPED | OUTPATIENT
Start: 2019-01-22 | End: 2020-06-15 | Stop reason: SDUPTHER

## 2019-02-14 ENCOUNTER — APPOINTMENT (OUTPATIENT)
Dept: LAB | Facility: CLINIC | Age: 74
End: 2019-02-14
Payer: MEDICARE

## 2019-02-14 DIAGNOSIS — E11.42 DIABETIC POLYNEUROPATHY ASSOCIATED WITH TYPE 2 DIABETES MELLITUS (HCC): ICD-10-CM

## 2019-02-14 LAB
ALBUMIN SERPL BCP-MCNC: 4 G/DL (ref 3.5–5)
ALP SERPL-CCNC: 79 U/L (ref 46–116)
ALT SERPL W P-5'-P-CCNC: 30 U/L (ref 12–78)
ANION GAP SERPL CALCULATED.3IONS-SCNC: 7 MMOL/L (ref 4–13)
AST SERPL W P-5'-P-CCNC: 22 U/L (ref 5–45)
BILIRUB SERPL-MCNC: 0.77 MG/DL (ref 0.2–1)
BUN SERPL-MCNC: 17 MG/DL (ref 5–25)
CALCIUM SERPL-MCNC: 9 MG/DL (ref 8.3–10.1)
CHLORIDE SERPL-SCNC: 104 MMOL/L (ref 100–108)
CO2 SERPL-SCNC: 25 MMOL/L (ref 21–32)
CREAT SERPL-MCNC: 1.21 MG/DL (ref 0.6–1.3)
EST. AVERAGE GLUCOSE BLD GHB EST-MCNC: 177 MG/DL
GFR SERPL CREATININE-BSD FRML MDRD: 59 ML/MIN/1.73SQ M
GLUCOSE P FAST SERPL-MCNC: 190 MG/DL (ref 65–99)
HBA1C MFR BLD: 7.8 % (ref 4.2–6.3)
POTASSIUM SERPL-SCNC: 4.9 MMOL/L (ref 3.5–5.3)
PROT SERPL-MCNC: 7.2 G/DL (ref 6.4–8.2)
SODIUM SERPL-SCNC: 136 MMOL/L (ref 136–145)

## 2019-02-14 PROCEDURE — 36415 COLL VENOUS BLD VENIPUNCTURE: CPT

## 2019-02-14 PROCEDURE — 83036 HEMOGLOBIN GLYCOSYLATED A1C: CPT

## 2019-02-14 PROCEDURE — 80053 COMPREHEN METABOLIC PANEL: CPT

## 2019-02-21 ENCOUNTER — OFFICE VISIT (OUTPATIENT)
Dept: FAMILY MEDICINE CLINIC | Facility: CLINIC | Age: 74
End: 2019-02-21
Payer: MEDICARE

## 2019-02-21 VITALS
WEIGHT: 194.6 LBS | DIASTOLIC BLOOD PRESSURE: 70 MMHG | BODY MASS INDEX: 28.82 KG/M2 | HEART RATE: 72 BPM | RESPIRATION RATE: 18 BRPM | TEMPERATURE: 98.2 F | HEIGHT: 69 IN | SYSTOLIC BLOOD PRESSURE: 150 MMHG

## 2019-02-21 DIAGNOSIS — E11.40 TYPE 2 DIABETES MELLITUS WITH DIABETIC NEUROPATHY, WITHOUT LONG-TERM CURRENT USE OF INSULIN (HCC): ICD-10-CM

## 2019-02-21 DIAGNOSIS — N18.30 CKD STAGE 3 DUE TO TYPE 2 DIABETES MELLITUS (HCC): ICD-10-CM

## 2019-02-21 DIAGNOSIS — N18.6 TYPE 2 DIABETES MELLITUS WITH ESRD (END-STAGE RENAL DISEASE) (HCC): ICD-10-CM

## 2019-02-21 DIAGNOSIS — E11.22 TYPE 2 DIABETES MELLITUS WITH ESRD (END-STAGE RENAL DISEASE) (HCC): ICD-10-CM

## 2019-02-21 DIAGNOSIS — E11.22 CKD STAGE 3 DUE TO TYPE 2 DIABETES MELLITUS (HCC): ICD-10-CM

## 2019-02-21 DIAGNOSIS — I25.119 CORONARY ARTERY DISEASE INVOLVING NATIVE CORONARY ARTERY OF NATIVE HEART WITH ANGINA PECTORIS (HCC): Primary | ICD-10-CM

## 2019-02-21 DIAGNOSIS — I70.25 ATHEROSCLEROSIS OF NATIVE ARTERIES OF THE EXTREMITIES WITH ULCERATION (HCC): ICD-10-CM

## 2019-02-21 PROCEDURE — 99214 OFFICE O/P EST MOD 30 MIN: CPT | Performed by: FAMILY MEDICINE

## 2019-02-21 RX ORDER — DIPHENOXYLATE HYDROCHLORIDE AND ATROPINE SULFATE 2.5; .025 MG/1; MG/1
1 TABLET ORAL DAILY
COMMUNITY
End: 2021-08-12 | Stop reason: SDUPTHER

## 2019-02-21 RX ORDER — ACARBOSE 25 MG/1
75 TABLET ORAL
Qty: 810 TABLET | Refills: 3 | Status: SHIPPED | OUTPATIENT
Start: 2019-02-21 | End: 2019-05-24 | Stop reason: SDUPTHER

## 2019-02-21 RX ORDER — CHOLECALCIFEROL (VITAMIN D3) 10 MCG
1 TABLET ORAL DAILY
COMMUNITY
End: 2021-07-21 | Stop reason: HOSPADM

## 2019-02-21 NOTE — PATIENT INSTRUCTIONS
To better to control your diabetes, in addition to your diet efforts, the acarbose can be increased up to 100mg 3x/d before meals (to block carbs), as the maximum dose  We should try to get your blood pressure closer to 130/80 or less in the near future

## 2019-02-21 NOTE — PROGRESS NOTES
Assessment/Plan:    No problem-specific Assessment & Plan notes found for this encounter  Cad stable  DM slightly worse  htn near goal but advised close f/u  ckd stable     Diagnoses and all orders for this visit:    Coronary artery disease involving native coronary artery of native heart with angina pectoris ([facility] )    Type 2 diabetes mellitus with diabetic neuropathy, without long-term current use of insulin (HCC)  -     acarbose (PRECOSE) 25 mg tablet; Take 3 tablets (75 mg total) by mouth 3 (three) times a day with meals  -     Comprehensive metabolic panel; Future  -     Hemoglobin A1C; Future    Atherosclerosis of native arteries of the extremities with ulceration ([facility])    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) ([place] [address] )    CKD stage 3 due to type 2 diabetes mellitus ([place] [address] )    Other orders  -     b complex-vitamin C-folic acid (NEPHROCAPS) 1 mg capsule; Take 1 capsule by mouth daily  -     multivitamin (THERAGRAN) TABS; Take 1 tablet by mouth daily        Patient's shoes and socks removed  Right Foot/Ankle   Right Foot Inspection  Skin Exam: skin normal and skin intact no dry skin, no warmth, no callus, no erythema, no maceration, no abnormal color, no pre-ulcer, no ulcer and no callus                            Sensory       Monofilament testing: intact  Vascular    The right DP pulse is 2+  Left Foot/Ankle  Left Foot Inspection  Skin Exam: skin normal and skin intactno dry skin, no warmth, no erythema, no maceration, normal color, no pre-ulcer, no ulcer and no callus                                         Sensory       Monofilament: intact  Vascular    The left DP pulse is 2+  Assign Risk Category:  No deformity present; No loss of protective sensation; No weak pulses       Risk: 0    BMI Counseling: Body mass index is 28 74 kg/m²  Discussed the patient's BMI with him  The BMI is above average  BMI counseling and education was provided to the patient   Nutrition recommendations include decreasing overall calorie intake  Return in about 3 months (around 5/21/2019) for Recheck  Subjective:      Patient ID: Romero Monroe is a 75 y o  male  Chief Complaint   Patient presents with    GERD    Diabetes    Hypertension    Back Pain     [facility]       HPI  Labs reviewed  Has not been good with diet  Tolerating meds  No excess gas  Does not eat too much per pt, small lunch  Thought that taking acarbose w/o meds would work still  Recently had lisinopril 2 5 increased to 5mg for better bp control by cardio  Has had creatinine elevation near 1 5 in past with higher doses of lisinopril  Creatinine stable thus far  bp goal advised    The following portions of the patient's history were reviewed and updated as appropriate: allergies, current medications, past family history, past medical history, past social history, past surgical history and problem list     Review of Systems   Respiratory: Negative for shortness of breath  Cardiovascular: Negative for chest pain  Neurological: Negative for dizziness           Current Outpatient Medications   Medication Sig Dispense Refill    acarbose (PRECOSE) 25 mg tablet Take 3 tablets (75 mg total) by mouth 3 (three) times a day with meals 810 tablet 3    aspirin (ECOTRIN LOW STRENGTH) 81 mg EC tablet Take 1 tablet (81 mg total) by mouth daily 30 tablet 6    b complex-vitamin C-folic acid (NEPHROCAPS) 1 mg capsule Take 1 capsule by mouth daily      carvedilol (COREG) 12 5 mg tablet TAKE 1 TABLET TWICE DAILY 180 tablet 3    gabapentin (NEURONTIN) 600 MG tablet Take 1 tablet (600 mg total) by mouth 2 (two) times a day for 30 days 60 tablet 0    glimepiride (AMARYL) 4 mg tablet Take 1 tablet (4 mg total) by mouth 2 (two) times a day for 90 days 180 tablet 1    glucose blood (TRUETRACK TEST) test strip 1 each by Other route daily Use as instructed for E11 29 100 each 3    lisinopril (ZESTRIL) 5 mg tablet Take 1 tablet (5 mg total) by mouth daily 90 tablet 3    metFORMIN (GLUMETZA) 500 MG (MOD) 24 hr tablet Take 4 tablets (2,000 mg total) by mouth daily with breakfast (Patient taking differently: Take 1,000 mg by mouth 2 (two) times a day with meals  ) 360 tablet 3    multivitamin (THERAGRAN) TABS Take 1 tablet by mouth daily      omeprazole (PriLOSEC) 40 MG capsule Take 1 capsule (40 mg total) by mouth daily  0    simvastatin (ZOCOR) 40 mg tablet Take 1 tablet (40 mg total) by mouth daily  0    TRUEPLUS LANCETS 28G MISC Test 1x/d, E11 29  0     No current facility-administered medications for this visit  Objective:    /70   Pulse 72   Temp 98 2 °F (36 8 °C)   Resp 18   Ht 5' 9" (1 753 m)   Wt 88 3 kg (194 lb 9 6 oz)   BMI 28 74 kg/m²        Physical Exam   Constitutional: He appears well-developed  No distress  HENT:   Head: Normocephalic  Eyes: Conjunctivae are normal  No scleral icterus  Neck: Neck supple  Cardiovascular: Normal rate and intact distal pulses  Pulses are no weak pulses  No murmur heard  Pulses:       Dorsalis pedis pulses are 2+ on the right side, and 2+ on the left side  Pulmonary/Chest: Effort normal  No respiratory distress  Abdominal: Soft  There is no tenderness  Musculoskeletal: He exhibits no edema or deformity  Feet:   Right Foot:   Skin Integrity: Negative for ulcer, skin breakdown, erythema, warmth, callus or dry skin  Left Foot:   Skin Integrity: Negative for ulcer, skin breakdown, erythema, warmth, callus or dry skin  Neurological: He is alert  Skin: Skin is warm and dry  No pallor  Psychiatric: His behavior is normal  Thought content normal    Nursing note and vitals reviewed               Britton Klein DO

## 2019-02-26 ENCOUNTER — OFFICE VISIT (OUTPATIENT)
Dept: PODIATRY | Facility: CLINIC | Age: 74
End: 2019-02-26
Payer: MEDICARE

## 2019-02-26 VITALS
HEIGHT: 69 IN | DIASTOLIC BLOOD PRESSURE: 85 MMHG | BODY MASS INDEX: 28.73 KG/M2 | WEIGHT: 194 LBS | SYSTOLIC BLOOD PRESSURE: 135 MMHG | RESPIRATION RATE: 17 BRPM | HEART RATE: 78 BPM

## 2019-02-26 DIAGNOSIS — M79.672 PAIN IN BOTH FEET: ICD-10-CM

## 2019-02-26 DIAGNOSIS — L84 CORNS: ICD-10-CM

## 2019-02-26 DIAGNOSIS — E11.42 DIABETIC POLYNEUROPATHY ASSOCIATED WITH TYPE 2 DIABETES MELLITUS (HCC): Primary | ICD-10-CM

## 2019-02-26 DIAGNOSIS — M79.671 PAIN IN BOTH FEET: ICD-10-CM

## 2019-02-26 DIAGNOSIS — I70.213 ATHEROSCLEROSIS OF NATIVE ARTERY OF BOTH LOWER EXTREMITIES WITH INTERMITTENT CLAUDICATION (HCC): ICD-10-CM

## 2019-02-26 PROCEDURE — 11056 PARNG/CUTG B9 HYPRKR LES 2-4: CPT | Performed by: PODIATRIST

## 2019-02-26 NOTE — PROGRESS NOTES
Procedures   Foot Exam    Right Foot/Ankle     Inspection and Palpation  Skin Exam: callus; Left Foot/Ankle      Inspection and Palpation  Skin Exam: callus;                 Assessment/Plan:  Patient has peripheral artery disease   He is working with vascular surgery  Claude Gums has diabetic neuropathy   He has pain in his feet with ambulation      Plan   Continue gabapentin   Nails debrided   Calluses debrided without pain or complication  No problem-specific Assessment & Plan notes found for this encounter          There are no diagnoses linked to this encounter        Subjective:       Patient ID: Pernell Covarrubias is a 67 y  o  male      HPI     The following portions of the patient's history were reviewed and updated as appropriate: allergies, current medications, past family history, past medical history, past social history, past surgical history and problem list      Review of Systems       Objective:     Active Problems   1  Acquired Hallux Valgus (735 0)   2  Allergic rhinitis (477 9) (J30 9)   3  Arthropathy (716 90) (M12 9)   4  Backache (724 5) (M54 9)   5  Benign essential hypertension (401 1) (I10)   6  CAD (coronary atherosclerotic disease) (414 00) (I25 10)   7  Callus (700) (L84)   8  Chronic kidney disease, stage 3 (585 3) (N18 3)   9  Colon cancer screening (V76 51) (Z12 11)   10  Deformity of ankle and foot, acquired (736 70) (M21 969)   11  Depression screening (V79 0) (Z13 89)   12  Diabetic neuropathy (250 60,357 2) (E11 40)   13  Encounter for prostate cancer screening (V76 44) (Z12 5)   14  Esophagitis, reflux (530 11) (K21 0)   15  Need for immunization against influenza (V04 81) (Z23)   16  Need for pneumococcal vaccination (V03 82) (Z23)   17  Onychomycosis of toenail (110 1) (B35 1)   18  Pain in both feet (729 5) (M79 671,M79 672)   19  Peripheral arterial disease (443 9) (I73 9)   20  Pneumonia (486) (J18 9)   21  Prostate cancer screening (V76 44) (Z12 5)   22  Rosacea (695 3) (L71 9)   23  Screening for genitourinary condition (V81 6) (Z13 89)   24  Screening for neurological condition (V80 09) (Z13 89)   25  Seborrheic dermatitis (690 10) (L21 9)   26  Type 2 diabetes mellitus with diabetic neuropathy (250 60,357 2) (E11 40)   27  Type 2 diabetes mellitus with other circulatory complications (483 64) (I44 07)   28  Type 2 diabetes mellitus with renal manifestations, controlled (250 40) (E11 29)     Past Medical History   · History of Acute upper respiratory infection (465 9) (J06 9)   · History of Atrophy of nail (703 8) (L60 3)   · History of Bursitis of hip, unspecified laterality (726 5) (M70 70)   · History of Callus (700) (L84)   · History of Callus (700) (L84)   · History of Difficulty walking (719 7) (R26 2)   · History of Edema (782 3) (R60 9)   · History of Hammer toe, unspecified laterality (735 4) (M20 40)   · History of acute bronchitis (V12 69) (Z87 09)   · History of allergic rhinitis (V12 69) (Z87 09)   · History of anemia (V12 3) (Z86 2)   · History of bursitis (V13 59) (Z87 39)   · History of tendinitis (V13 59) (Z87 39)   · History of tinea corporis (V12 09) (Z86 19)   · History of viral warts (V12 09) (Z86 19)   · History of Late Effects Of Sprain Or Strain (905 7)   · History of Limb pain (729 5) (M79 609)   · History of Limb pain (729 5) (M79 609)   · History of Metatarsalgia, unspecified laterality (726 70) (M77 40)   · History of Open wound of foot, excluding toe(s) (892 0) (S91 309A)   · History of Pain in both feet (729 5) (M79 671,M79 672)   · History of Pain in both feet (729 5) (M79 671,M79 672)   · History of Pes planus, unspecified laterality (734) (M21 40)   · History of Shoulder joint pain, unspecified laterality   · History of Type 2 diabetes mellitus (250 00) (E11 9)     Surgical History   · History of Bypass Graft Using Vein: Femoral-popliteal   · History of Cataract Surgery   · History of Foot Surgery   · History of Heart Surgery   · History of Leg Repair   · History of Rotator Cuff Repair   · History of Shoulder Surgery     The surgical history was reviewed and updated today        Family History  Mother    · Family history of Aortic Aneurysm  Father    · Family history of acute myocardial infarction (V17 3) (Z80 55)  Sister    · Family history of acute myocardial infarction (V17 3) (Z82 49)     The family history was reviewed and updated today        Social History   · Being A Social Drinker   · Denied: History of Drug Use   · Never A Smoker   · Retired From Work  The social history was reviewed and updated today       Current Meds   1  Carvedilol 6 25 MG Oral Tablet; 1 two times a day;   Therapy: 00DLI3454 to (Last Rx:01Feb2016)  Requested for: 60ZGQ6610 Ordered   2  Cilostazol 100 MG Oral Tablet; TAKE 1 TABLET TWICE DAILY  Requested for:   51ZKJ5513; Last Rx:14Jan2015 Ordered   3  Gabapentin 300 MG Oral Capsule; TAKE 1 CAPSULE 3 TIMES DAILY;   Therapy: 46OPJ9886 to (Evaluate:26Jan2017)  Requested for: 94YZI1410; Last   Rx:01Feb2016 Ordered   4  Glimepiride 4 MG Oral Tablet; TAKE ONE TABLET BY MOUTH TWICE A DAY;   Therapy: 75NCV0740 to (Evaluate:81Vks2566)  Requested for: 30NZW2280; Last   Rx:12Oct2015 Ordered   5  Lisinopril 10 MG Oral Tablet; 1/2 tab twice daily  Requested for: 88LBI8813; Last   Rx:01Feb2016 Ordered   6  MetFORMIN HCl ER (OSM) 1000 MG Oral Tablet Extended Release 24 Hour; Take 1   tablet twice daily;   Therapy: 79VNG2595 to (Evaluate:10Nov2015)  Requested for: 26Oct2015 Recorded   7  Nitrostat 0 4 MG Sublingual Tablet Sublingual; PLACE 1 TABLET UNDER THE TONGUE   EVERY 5 MINUTES FOR UP TO 3 DOSES AS NEEDED FOR CHEST PAIN  CALL   911 IF PAIN PERSISTS;   Therapy: 45CEV6777 to (Annamary Horse)  Requested for: 02YMA4075; Last   Rx:39Zzb2528 Ordered   8  Omeprazole 40 MG Oral Capsule Delayed Release; 1 every day;   Therapy: 17RCL3643 to (Evaluate:62Jif7068);  Last Rx:01Feb2016 Ordered   9  Onglyza 5 MG Oral Tablet; 1 every day;   Therapy: 92LAW6406 to (Last Rx:14Jan2015)  Requested for: 58Apv1813 Ordered   10  Simvastatin 40 MG Oral Tablet; take one tablet by mouth every day;    Therapy: 26Mlx5568 to (Evaluate:26Jan2017)  Requested for: 69EJH0067; Last    Rx:27Dxg7717 Ordered   11  TRUEplus Lancets 28G Miscellaneous; test glucose once daily <250 00>;    Therapy: 45PSZ8540 to (Evaluate:13Jan2018)  Requested for: 13PDW1484; Last    ZE:05ZDZ6044 Ordered   12  TrueTrack Test In Vitro Strip; test 1x/d as directed, <250 00>;    Therapy: 33TXO4413 to (Evaluate:30Jan2016)  Requested for: 63MON7015; Last    Rx:29Jan2015 Ordered     The medication list was reviewed and updated today       Allergies   1  No Known Drug Allergies     Vitals     Physical Exam  Left Foot: Appearance: a deformity (ball of foot and distal fifth metatarsal )  Fifth toe deformities include hammer toe  Tenderness: None except the plantar aspect of the foot  Right Foot: Appearance: a deformity (distal fifth metatarsal )  Left Ankle: ROM: limited ROM in all planes   Right Ankle: ROM: limited ROM in all planes   Neurological Exam: Light touch was decreased bilaterally  Vibratory sensation was decreased in both first metatarsophalangeal joints  Response to monofilament test was absent bilaterally  Deep tendon reflexes: achilles reflex 1/4 bilaterally  Vascular Exam: performed Dorsalis pedis pulses were 1/4 bilaterally  Posterior tibial pulses were 1/4 bilaterally  Elevation Pallor: diminished bilaterally  Capillary refill time was Q  9, findings bilateral  Negative digital hair noted, positive abnormal cooling  All pre-ulcer, lesions debrided  Today, but between 1-3 seconds bilaterally  Edema: mild bilaterally and All mycotic nails debrided  Today  The patient was given orthotics to help control his feet and at as cushioning and padding on the bottom of his feet q9 findings  Toenails:  All of the toenails were elongated, hypertrophied, discolored, ingrown, shown to have subungual debris and tender       Socks and shoes removed, the Right Foot: the foot was dry  The sensory exam showed diminished vibratory sensation at the level of the toes  Diminished tactile sensation with monofilament testing throughout the right foot    Socks and shoes removed, Left Foot: the foot was dry  The sensory exam showed diminished vibratory sensation at the level of the toes  Diminished tactile sensation with monofilament testing throughout the left foot    Pulses:   1+ in the posterior tibialis on the right   1+ in the dorsalis pedis on the right  Capillary refills findings on the left were delayed in the toes  Pulses:   1+ in the posterior tibialis on the left   1+ in the dorsalis pedis on the left  Assign Risk Category: 2: Loss of protective sensation with or without weakness, deformity, callus, pre-ulcer, or history of ulceration  High risk  Hyperkeratosis: present on both first sub metatarsals, present on both fifth sub metatarsals and Pre-ulcerative lesion, submetatarsal one to 5, bilateral    Shoe Gear Evaluation: performed ()  Recommendation(s): custom inlays  Patient's shoes and socks removed  Right Foot/Ankle   Right Foot Inspection  Skin Exam: callus and callus                          Toe Exam: swelling and erythema  Sensory   Vibration: diminished  Proprioception: diminished     Vascular  Capillary refills: elevated      Left Foot/Ankle  Left Foot Inspection  Skin Exam: callus                         Toe Exam: swelling and erythema                   Sensory   Vibration: diminished  Proprioception: diminished    Vascular  Capillary refills: elevated    Assign Risk Category:  Deformity present; Loss of protective sensation; Weak pulses       Risk: 2         Procedure  All mycotic nails debrided  Bilateral lesions debrided  Patient tolerated all procedures without pain or complication   Patient's been educated on care of the diabetic al

## 2019-03-27 DIAGNOSIS — I10 BENIGN ESSENTIAL HYPERTENSION: ICD-10-CM

## 2019-03-27 DIAGNOSIS — I25.119 CORONARY ARTERY DISEASE INVOLVING NATIVE CORONARY ARTERY OF NATIVE HEART WITH ANGINA PECTORIS (HCC): ICD-10-CM

## 2019-03-27 RX ORDER — SIMVASTATIN 40 MG
40 TABLET ORAL DAILY
Qty: 90 TABLET | Refills: 1 | Status: SHIPPED | OUTPATIENT
Start: 2019-03-27 | End: 2019-08-14 | Stop reason: SDUPTHER

## 2019-03-27 RX ORDER — SIMVASTATIN 40 MG
40 TABLET ORAL DAILY
Qty: 90 TABLET | Refills: 1
Start: 2019-03-27 | End: 2019-03-27 | Stop reason: SDUPTHER

## 2019-03-27 RX ORDER — CARVEDILOL 12.5 MG/1
12.5 TABLET ORAL 2 TIMES DAILY
Qty: 180 TABLET | Refills: 1 | Status: SHIPPED | OUTPATIENT
Start: 2019-03-27 | End: 2019-08-14 | Stop reason: SDUPTHER

## 2019-03-27 NOTE — TELEPHONE ENCOUNTER
Please refill his Carvedilol 12 5mg and Simvastatin 40mg, please send to [facility] mail order

## 2019-03-29 ENCOUNTER — TELEPHONE (OUTPATIENT)
Dept: ADMINISTRATIVE | Facility: HOSPITAL | Age: 74
End: 2019-03-29

## 2019-03-29 DIAGNOSIS — I70.213 ATHEROSCLEROSIS OF NATIVE ARTERY OF BOTH LOWER EXTREMITIES WITH INTERMITTENT CLAUDICATION (HCC): Primary | ICD-10-CM

## 2019-04-10 DIAGNOSIS — E11.42 DIABETIC POLYNEUROPATHY ASSOCIATED WITH TYPE 2 DIABETES MELLITUS (HCC): ICD-10-CM

## 2019-04-10 RX ORDER — GABAPENTIN 600 MG/1
600 TABLET ORAL 2 TIMES DAILY
Qty: 180 TABLET | Refills: 0 | Status: SHIPPED | OUTPATIENT
Start: 2019-04-10 | End: 2019-09-24

## 2019-04-15 ENCOUNTER — HOSPITAL ENCOUNTER (OUTPATIENT)
Dept: RADIOLOGY | Facility: HOSPITAL | Age: 74
Discharge: HOME | End: 2019-04-15
Attending: SURGERY
Payer: MEDICARE

## 2019-04-15 DIAGNOSIS — I70.213 ATHEROSCLEROSIS OF NATIVE ARTERY OF BOTH LOWER EXTREMITIES WITH INTERMITTENT CLAUDICATION (HCC): ICD-10-CM

## 2019-04-15 PROCEDURE — 93923 UPR/LXTR ART STDY 3+ LVLS: CPT

## 2019-04-15 PROCEDURE — 93925 LOWER EXTREMITY STUDY: CPT

## 2019-04-16 PROCEDURE — 93925 LOWER EXTREMITY STUDY: CPT | Performed by: SURGERY

## 2019-04-16 PROCEDURE — 93922 UPR/L XTREMITY ART 2 LEVELS: CPT | Performed by: SURGERY

## 2019-04-22 ENCOUNTER — TELEPHONE (OUTPATIENT)
Dept: ADMINISTRATIVE | Facility: HOSPITAL | Age: 74
End: 2019-04-22

## 2019-05-07 ENCOUNTER — OFFICE VISIT (OUTPATIENT)
Dept: PODIATRY | Facility: CLINIC | Age: 74
End: 2019-05-07
Payer: MEDICARE

## 2019-05-07 VITALS
BODY MASS INDEX: 28.73 KG/M2 | DIASTOLIC BLOOD PRESSURE: 85 MMHG | RESPIRATION RATE: 16 BRPM | HEART RATE: 78 BPM | WEIGHT: 194 LBS | HEIGHT: 69 IN | SYSTOLIC BLOOD PRESSURE: 135 MMHG

## 2019-05-07 DIAGNOSIS — I70.213 ATHEROSCLEROSIS OF NATIVE ARTERY OF BOTH LOWER EXTREMITIES WITH INTERMITTENT CLAUDICATION (HCC): ICD-10-CM

## 2019-05-07 DIAGNOSIS — M79.671 PAIN IN BOTH FEET: ICD-10-CM

## 2019-05-07 DIAGNOSIS — L84 CORNS: ICD-10-CM

## 2019-05-07 DIAGNOSIS — M79.672 PAIN IN BOTH FEET: ICD-10-CM

## 2019-05-07 DIAGNOSIS — E11.42 DIABETIC POLYNEUROPATHY ASSOCIATED WITH TYPE 2 DIABETES MELLITUS (HCC): Primary | ICD-10-CM

## 2019-05-07 PROCEDURE — 11056 PARNG/CUTG B9 HYPRKR LES 2-4: CPT | Performed by: PODIATRIST

## 2019-05-17 ENCOUNTER — TELEPHONE (OUTPATIENT)
Dept: ADMINISTRATIVE | Facility: HOSPITAL | Age: 74
End: 2019-05-17

## 2019-05-17 ENCOUNTER — APPOINTMENT (OUTPATIENT)
Dept: LAB | Facility: CLINIC | Age: 74
End: 2019-05-17
Payer: MEDICARE

## 2019-05-17 ENCOUNTER — OFFICE VISIT (OUTPATIENT)
Dept: CARDIOLOGY CLINIC | Facility: CLINIC | Age: 74
End: 2019-05-17
Payer: MEDICARE

## 2019-05-17 ENCOUNTER — OFFICE VISIT (OUTPATIENT)
Dept: VASCULAR SURGERY | Facility: CLINIC | Age: 74
End: 2019-05-17
Payer: MEDICARE

## 2019-05-17 VITALS
HEIGHT: 70 IN | BODY MASS INDEX: 27.44 KG/M2 | OXYGEN SATURATION: 98 % | SYSTOLIC BLOOD PRESSURE: 134 MMHG | WEIGHT: 191.7 LBS | HEART RATE: 68 BPM | DIASTOLIC BLOOD PRESSURE: 70 MMHG

## 2019-05-17 VITALS
TEMPERATURE: 98.4 F | DIASTOLIC BLOOD PRESSURE: 64 MMHG | HEIGHT: 70 IN | HEART RATE: 74 BPM | BODY MASS INDEX: 27.77 KG/M2 | SYSTOLIC BLOOD PRESSURE: 118 MMHG | WEIGHT: 194 LBS | RESPIRATION RATE: 18 BRPM

## 2019-05-17 DIAGNOSIS — N18.6 TYPE 2 DIABETES MELLITUS WITH ESRD (END-STAGE RENAL DISEASE) (HCC): ICD-10-CM

## 2019-05-17 DIAGNOSIS — E11.22 CKD STAGE 3 DUE TO TYPE 2 DIABETES MELLITUS (HCC): ICD-10-CM

## 2019-05-17 DIAGNOSIS — I10 BENIGN ESSENTIAL HYPERTENSION: ICD-10-CM

## 2019-05-17 DIAGNOSIS — N18.30 CKD STAGE 3 DUE TO TYPE 2 DIABETES MELLITUS (HCC): ICD-10-CM

## 2019-05-17 DIAGNOSIS — M54.16 LUMBAR RADICULOPATHY: ICD-10-CM

## 2019-05-17 DIAGNOSIS — T82.898D OCCLUSION OF FEMOROPOPLITEAL BYPASS GRAFT, SUBSEQUENT ENCOUNTER: ICD-10-CM

## 2019-05-17 DIAGNOSIS — I70.213 ATHEROSCLEROSIS OF NATIVE ARTERY OF BOTH LOWER EXTREMITIES WITH INTERMITTENT CLAUDICATION (HCC): Primary | ICD-10-CM

## 2019-05-17 DIAGNOSIS — E11.22 TYPE 2 DIABETES MELLITUS WITH ESRD (END-STAGE RENAL DISEASE) (HCC): ICD-10-CM

## 2019-05-17 DIAGNOSIS — I70.213 ATHEROSCLEROSIS OF NATIVE ARTERY OF BOTH LOWER EXTREMITIES WITH INTERMITTENT CLAUDICATION (HCC): ICD-10-CM

## 2019-05-17 DIAGNOSIS — Z13.6 SCREENING FOR AAA (ABDOMINAL AORTIC ANEURYSM): ICD-10-CM

## 2019-05-17 DIAGNOSIS — I25.119 CORONARY ARTERY DISEASE INVOLVING NATIVE CORONARY ARTERY OF NATIVE HEART WITH ANGINA PECTORIS (HCC): ICD-10-CM

## 2019-05-17 DIAGNOSIS — E11.40 TYPE 2 DIABETES MELLITUS WITH DIABETIC NEUROPATHY, WITHOUT LONG-TERM CURRENT USE OF INSULIN (HCC): ICD-10-CM

## 2019-05-17 DIAGNOSIS — E78.5 DYSLIPIDEMIA: ICD-10-CM

## 2019-05-17 DIAGNOSIS — I70.25 ATHEROSCLEROSIS OF NATIVE ARTERIES OF THE EXTREMITIES WITH ULCERATION (HCC): ICD-10-CM

## 2019-05-17 LAB
ALBUMIN SERPL BCP-MCNC: 3.9 G/DL (ref 3.5–5)
ALP SERPL-CCNC: 80 U/L (ref 46–116)
ALT SERPL W P-5'-P-CCNC: 31 U/L (ref 12–78)
ANION GAP SERPL CALCULATED.3IONS-SCNC: 5 MMOL/L (ref 4–13)
AST SERPL W P-5'-P-CCNC: 23 U/L (ref 5–45)
BILIRUB SERPL-MCNC: 0.67 MG/DL (ref 0.2–1)
BUN SERPL-MCNC: 13 MG/DL (ref 5–25)
CALCIUM SERPL-MCNC: 9.1 MG/DL (ref 8.3–10.1)
CHLORIDE SERPL-SCNC: 104 MMOL/L (ref 100–108)
CO2 SERPL-SCNC: 25 MMOL/L (ref 21–32)
CREAT SERPL-MCNC: 1.44 MG/DL (ref 0.6–1.3)
EST. AVERAGE GLUCOSE BLD GHB EST-MCNC: 183 MG/DL
GFR SERPL CREATININE-BSD FRML MDRD: 47 ML/MIN/1.73SQ M
GLUCOSE P FAST SERPL-MCNC: 192 MG/DL (ref 65–99)
HBA1C MFR BLD: 8 % (ref 4.2–6.3)
POTASSIUM SERPL-SCNC: 5 MMOL/L (ref 3.5–5.3)
PROT SERPL-MCNC: 7.3 G/DL (ref 6.4–8.2)
SODIUM SERPL-SCNC: 134 MMOL/L (ref 136–145)

## 2019-05-17 PROCEDURE — 83036 HEMOGLOBIN GLYCOSYLATED A1C: CPT

## 2019-05-17 PROCEDURE — 80053 COMPREHEN METABOLIC PANEL: CPT

## 2019-05-17 PROCEDURE — 36415 COLL VENOUS BLD VENIPUNCTURE: CPT

## 2019-05-17 PROCEDURE — 99214 OFFICE O/P EST MOD 30 MIN: CPT | Performed by: INTERNAL MEDICINE

## 2019-05-17 PROCEDURE — 99214 OFFICE O/P EST MOD 30 MIN: CPT | Performed by: SURGERY

## 2019-05-17 RX ORDER — AMLODIPINE BESYLATE 2.5 MG/1
2.5 TABLET ORAL DAILY
Qty: 90 TABLET | Refills: 3 | Status: SHIPPED | OUTPATIENT
Start: 2019-05-17 | End: 2020-03-24

## 2019-05-17 RX ORDER — AMLODIPINE BESYLATE 2.5 MG/1
2.5 TABLET ORAL DAILY
Qty: 30 TABLET | Refills: 2 | Status: SHIPPED | OUTPATIENT
Start: 2019-05-17 | End: 2019-05-17 | Stop reason: SDUPTHER

## 2019-05-24 ENCOUNTER — OFFICE VISIT (OUTPATIENT)
Dept: FAMILY MEDICINE CLINIC | Facility: CLINIC | Age: 74
End: 2019-05-24
Payer: MEDICARE

## 2019-05-24 VITALS
BODY MASS INDEX: 27.72 KG/M2 | HEART RATE: 72 BPM | TEMPERATURE: 97.9 F | WEIGHT: 193.6 LBS | SYSTOLIC BLOOD PRESSURE: 136 MMHG | HEIGHT: 70 IN | DIASTOLIC BLOOD PRESSURE: 60 MMHG | RESPIRATION RATE: 18 BRPM

## 2019-05-24 DIAGNOSIS — I73.9 PAD (PERIPHERAL ARTERY DISEASE) (HCC): ICD-10-CM

## 2019-05-24 DIAGNOSIS — Z00.00 MEDICARE ANNUAL WELLNESS VISIT, SUBSEQUENT: ICD-10-CM

## 2019-05-24 DIAGNOSIS — I25.119 CORONARY ARTERY DISEASE INVOLVING NATIVE CORONARY ARTERY OF NATIVE HEART WITH ANGINA PECTORIS (HCC): ICD-10-CM

## 2019-05-24 DIAGNOSIS — E11.22 CKD STAGE 3 DUE TO TYPE 2 DIABETES MELLITUS (HCC): Primary | ICD-10-CM

## 2019-05-24 DIAGNOSIS — E11.22 TYPE 2 DIABETES MELLITUS WITH STAGE 3 CHRONIC KIDNEY DISEASE, WITHOUT LONG-TERM CURRENT USE OF INSULIN (HCC): ICD-10-CM

## 2019-05-24 DIAGNOSIS — I10 BENIGN ESSENTIAL HYPERTENSION: ICD-10-CM

## 2019-05-24 DIAGNOSIS — N18.30 TYPE 2 DIABETES MELLITUS WITH STAGE 3 CHRONIC KIDNEY DISEASE, WITHOUT LONG-TERM CURRENT USE OF INSULIN (HCC): ICD-10-CM

## 2019-05-24 DIAGNOSIS — N18.30 CKD STAGE 3 DUE TO TYPE 2 DIABETES MELLITUS (HCC): Primary | ICD-10-CM

## 2019-05-24 DIAGNOSIS — E11.40 TYPE 2 DIABETES MELLITUS WITH DIABETIC NEUROPATHY, WITHOUT LONG-TERM CURRENT USE OF INSULIN (HCC): ICD-10-CM

## 2019-05-24 PROCEDURE — 99214 OFFICE O/P EST MOD 30 MIN: CPT | Performed by: FAMILY MEDICINE

## 2019-05-24 PROCEDURE — G0439 PPPS, SUBSEQ VISIT: HCPCS | Performed by: FAMILY MEDICINE

## 2019-05-24 RX ORDER — ACARBOSE 100 MG/1
100 TABLET ORAL
Qty: 270 TABLET | Refills: 1 | Status: SHIPPED | OUTPATIENT
Start: 2019-05-24 | End: 2020-03-12 | Stop reason: SDUPTHER

## 2019-05-25 PROBLEM — Z00.00 MEDICARE ANNUAL WELLNESS VISIT, SUBSEQUENT: Status: ACTIVE | Noted: 2019-05-25

## 2019-05-25 PROBLEM — E11.22 TYPE 2 DIABETES MELLITUS WITH ESRD (END-STAGE RENAL DISEASE) (HCC): Status: RESOLVED | Noted: 2019-02-21 | Resolved: 2019-05-25

## 2019-05-25 PROBLEM — N18.6 TYPE 2 DIABETES MELLITUS WITH ESRD (END-STAGE RENAL DISEASE) (HCC): Status: RESOLVED | Noted: 2019-02-21 | Resolved: 2019-05-25

## 2019-05-25 PROBLEM — N18.30 TYPE 2 DIABETES MELLITUS WITH STAGE 3 CHRONIC KIDNEY DISEASE, WITHOUT LONG-TERM CURRENT USE OF INSULIN (HCC): Status: ACTIVE | Noted: 2019-05-25

## 2019-05-25 PROBLEM — I73.9 PAD (PERIPHERAL ARTERY DISEASE) (HCC): Status: ACTIVE | Noted: 2017-02-07

## 2019-05-25 PROBLEM — E11.22 TYPE 2 DIABETES MELLITUS WITH STAGE 3 CHRONIC KIDNEY DISEASE, WITHOUT LONG-TERM CURRENT USE OF INSULIN (HCC): Status: ACTIVE | Noted: 2019-05-25

## 2019-05-25 RX ORDER — METFORMIN HYDROCHLORIDE 500 MG/1
1000 TABLET, FILM COATED, EXTENDED RELEASE ORAL 2 TIMES DAILY WITH MEALS
Start: 2019-05-25 | End: 2019-07-06 | Stop reason: SDUPTHER

## 2019-05-29 ENCOUNTER — VBI (OUTPATIENT)
Dept: ADMINISTRATIVE | Facility: OTHER | Age: 74
End: 2019-05-29

## 2019-06-11 ENCOUNTER — HOSPITAL ENCOUNTER (OUTPATIENT)
Dept: RADIOLOGY | Facility: HOSPITAL | Age: 74
Discharge: HOME | End: 2019-06-11
Attending: SURGERY
Payer: MEDICARE

## 2019-06-11 DIAGNOSIS — I70.213 ATHEROSCLEROSIS OF NATIVE ARTERY OF BOTH LOWER EXTREMITIES WITH INTERMITTENT CLAUDICATION (HCC): ICD-10-CM

## 2019-06-11 DIAGNOSIS — Z13.6 SCREENING FOR AAA (ABDOMINAL AORTIC ANEURYSM): ICD-10-CM

## 2019-06-11 PROCEDURE — 93978 VASCULAR STUDY: CPT | Performed by: SURGERY

## 2019-06-11 PROCEDURE — 93978 VASCULAR STUDY: CPT

## 2019-07-06 DIAGNOSIS — E11.40 TYPE 2 DIABETES MELLITUS WITH DIABETIC NEUROPATHY, WITHOUT LONG-TERM CURRENT USE OF INSULIN (HCC): ICD-10-CM

## 2019-07-07 RX ORDER — METFORMIN HYDROCHLORIDE 500 MG/1
1000 TABLET, FILM COATED, EXTENDED RELEASE ORAL 2 TIMES DAILY WITH MEALS
Qty: 120 TABLET | Refills: 5 | Status: SHIPPED | OUTPATIENT
Start: 2019-07-07 | End: 2020-09-25 | Stop reason: SDUPTHER

## 2019-07-10 DIAGNOSIS — K21.9 GASTROESOPHAGEAL REFLUX DISEASE, ESOPHAGITIS PRESENCE NOT SPECIFIED: ICD-10-CM

## 2019-07-10 RX ORDER — OMEPRAZOLE 40 MG/1
40 CAPSULE, DELAYED RELEASE ORAL DAILY
Qty: 90 CAPSULE | Refills: 1 | Status: SHIPPED | OUTPATIENT
Start: 2019-07-10 | End: 2019-11-27 | Stop reason: SDUPTHER

## 2019-07-16 ENCOUNTER — OFFICE VISIT (OUTPATIENT)
Dept: PODIATRY | Facility: CLINIC | Age: 74
End: 2019-07-16
Payer: MEDICARE

## 2019-07-16 VITALS
DIASTOLIC BLOOD PRESSURE: 60 MMHG | HEART RATE: 78 BPM | HEIGHT: 70 IN | SYSTOLIC BLOOD PRESSURE: 121 MMHG | RESPIRATION RATE: 17 BRPM | WEIGHT: 193 LBS | BODY MASS INDEX: 27.63 KG/M2

## 2019-07-16 DIAGNOSIS — I73.9 PAD (PERIPHERAL ARTERY DISEASE) (HCC): ICD-10-CM

## 2019-07-16 DIAGNOSIS — M79.671 PAIN IN BOTH FEET: ICD-10-CM

## 2019-07-16 DIAGNOSIS — M79.672 PAIN IN BOTH FEET: ICD-10-CM

## 2019-07-16 DIAGNOSIS — L84 CORNS: ICD-10-CM

## 2019-07-16 DIAGNOSIS — E11.42 DIABETIC POLYNEUROPATHY ASSOCIATED WITH TYPE 2 DIABETES MELLITUS (HCC): Primary | ICD-10-CM

## 2019-07-16 PROCEDURE — 11056 PARNG/CUTG B9 HYPRKR LES 2-4: CPT | Performed by: PODIATRIST

## 2019-07-16 NOTE — PROGRESS NOTES
Assessment/Plan:  Patient has peripheral artery disease   He is working with vascular surgery  [facility] has diabetic neuropathy  [facility] has pain in his feet with ambulation      Plan   Continue gabapentin   Nails debrided   Calluses debrided without pain or complication  No problem-specific Assessment & Plan notes found for this encounter       Subjective:  Patient is diabetic  He has pain in his feet and toes with ambulation  No history of trauma      Patient ID: Pernell Covarrubias is a 73y  o  male      HPI     The following portions of the patient's history were reviewed and updated as appropriate: allergies, current medications, past family history, past medical history, past social history, past surgical history and problem list      Review of Systems       Objective:     Active Problems   1  Acquired Hallux Valgus (735 0)   2  Allergic rhinitis (477 9) (J30 9)   3  Arthropathy (716 90) (M12 9)   4  Backache (724 5) (M54 9)   5  Benign essential hypertension (401 1) (I10)   6  CAD (coronary atherosclerotic disease) (414 00) (I25 10)   7  Callus (700) (L84)   8  Chronic kidney disease, stage 3 (585 3) (N18 3)   9  Colon cancer screening (V76 51) (Z12 11)   10  Deformity of ankle and foot, acquired (736 70) (M21 969)   11  Depression screening (V79 0) (Z13 89)   12  Diabetic neuropathy (250 60,357 2) (E11 40)   13  Encounter for prostate cancer screening (V76 44) (Z12 5)   14  Esophagitis, reflux (530 11) (K21 0)   15  Need for immunization against influenza (V04 81) (Z23)   16  Need for pneumococcal vaccination (V03 82) (Z23)   17  Onychomycosis of toenail (110 1) (B35 1)   18  Pain in both feet (729 5) (M79 671,M79 672)   19  Peripheral arterial disease (443 9) (I73 9)   20  Pneumonia (486) (J18 9)   21  Prostate cancer screening (V76 44) (Z12 5)   22  Rosacea (695 3) (L71 9)   23  Screening for genitourinary condition (V81 6) (Z13 89)   24  Screening for neurological condition (V80 09) (Z13 89)   25  Seborrheic dermatitis (690 10) (L21 9)   26  Type 2 diabetes mellitus with diabetic neuropathy (250 60,357 2) (E11 40)   27  Type 2 diabetes mellitus with other circulatory complications (566 35) (U02 21)   28  Type 2 diabetes mellitus with renal manifestations, controlled (250 40) (E11 29)     Past Medical History   · History of Acute upper respiratory infection (465 9) (J06 9)   · History of Atrophy of nail (703 8) (L60 3)   · History of Bursitis of hip, unspecified laterality (726 5) (M70 70)   · History of Callus (700) (L84)   · History of Callus (700) (L84)   · History of Difficulty walking (719 7) (R26 2)   · History of Edema (782 3) (R60 9)   · History of Hammer toe, unspecified laterality (735 4) (M20 40)   · History of acute bronchitis (V12 69) (Z87 09)   · History of allergic rhinitis (V12 69) (Z87 09)   · History of anemia (V12 3) (Z86 2)   · History of bursitis (V13 59) (Z87 39)   · History of tendinitis (V13 59) (Z87 39)   · History of tinea corporis (V12 09) (Z86 19)   · History of viral warts (V12 09) (Z86 19)   · History of Late Effects Of Sprain Or Strain (905 7)   · History of Limb pain (729 5) (M79 609)   · History of Limb pain (729 5) (M79 609)   · History of Metatarsalgia, unspecified laterality (726 70) (M77 40)   · History of Open wound of foot, excluding toe(s) (892 0) (S91 309A)   · History of Pain in both feet (729 5) (M79 671,M79 672)   · History of Pain in both feet (729 5) (M79 671,M79 672)   · History of Pes planus, unspecified laterality (734) (M21 40)   · History of Shoulder joint pain, unspecified laterality   · History of Type 2 diabetes mellitus (250 00) (E11 9)     Surgical History   · History of Bypass Graft Using Vein: Femoral-popliteal   · History of Cataract Surgery   · History of Foot Surgery   · History of Heart Surgery   · History of Leg Repair   · History of Rotator Cuff Repair   · History of Shoulder Surgery     The surgical history was reviewed and updated today        Family History  Mother    · Family history of Aortic Aneurysm  Father    · Family history of acute myocardial infarction (V17 3) (Z80 55)  Sister    · Family history of acute myocardial infarction (V17 3) (Z82 49)     The family history was reviewed and updated today        Social History   · Being A Social Drinker   · Denied: History of Drug Use   · Never A Smoker   · Retired From Work  The social history was reviewed and updated today     The medication list was reviewed and updated today       Allergies   1  No Known Drug Allergies     Physical Exam  Left Foot: Appearance: a deformity (ball of foot and distal fifth metatarsal )  Fifth toe deformities include hammer toe  Tenderness: None except the plantar aspect of the foot  Right Foot: Appearance: a deformity (distal fifth metatarsal )  Left Ankle: ROM: limited ROM in all planes   Right Ankle: ROM: limited ROM in all planes   Neurological Exam: Light touch was decreased bilaterally  Vibratory sensation was decreased in both first metatarsophalangeal joints  Response to monofilament test was absent bilaterally  Deep tendon reflexes: achilles reflex 1/4 bilaterally  Vascular Exam: performed Dorsalis pedis pulses were 1/4 bilaterally  Posterior tibial pulses were 1/4 bilaterally  Elevation Pallor: diminished bilaterally  Capillary refill time was Q  9, findings bilateral  Negative digital hair noted, positive abnormal cooling  All pre-ulcer, lesions debrided  Today, but between 1-3 seconds bilaterally  Edema: mild bilaterally and All mycotic nails debrided  Today  The patient was given orthotics to help control his feet and at as cushioning and padding on the bottom of his feet q9 findings  Toenails: All of the toenails were elongated, hypertrophied, discolored, ingrown, shown to have subungual debris and tender       Socks and shoes removed, the Right Foot: the foot was dry     The sensory exam showed diminished vibratory sensation at the level of the toes  Diminished tactile sensation with monofilament testing throughout the right foot    Socks and shoes removed, Left Foot: the foot was dry  The sensory exam showed diminished vibratory sensation at the level of the toes  Diminished tactile sensation with monofilament testing throughout the left foot    Pulses:   1+ in the posterior tibialis on the right   1+ in the dorsalis pedis on the right  Capillary refills findings on the left were delayed in the toes  Pulses:   1+ in the posterior tibialis on the left   1+ in the dorsalis pedis on the left  Assign Risk Category: 2: Loss of protective sensation with or without weakness, deformity, callus, pre-ulcer, or history of ulceration  High risk  Hyperkeratosis: present on both first sub metatarsals, present on both fifth sub metatarsals and Pre-ulcerative lesion, submetatarsal one to 5, bilateral    Shoe Gear Evaluation: performed ()  Recommendation(s): custom inlays       Patient's shoes and socks removed  Right Foot/Ankle   Right Foot Inspection  Skin Exam: callus and callus               Toe Exam: swelling and erythema  Sensory   Vibration: diminished  Proprioception: diminished      Vascular  Capillary refills: elevated        Left Foot/Ankle  Left Foot Inspection  Skin Exam: callus              Toe Exam: swelling and erythema                   Sensory   Vibration: diminished  Proprioception: diminished     Vascular  Capillary refills: elevated     Assign Risk Category:  Deformity present; Loss of protective sensation; Weak pulses       Risk: 2

## 2019-07-20 DIAGNOSIS — E11.59 TYPE 2 DIABETES MELLITUS WITH OTHER CIRCULATORY COMPLICATION, WITHOUT LONG-TERM CURRENT USE OF INSULIN (HCC): ICD-10-CM

## 2019-07-22 RX ORDER — GLIMEPIRIDE 4 MG/1
4 TABLET ORAL 2 TIMES DAILY
Qty: 180 TABLET | Refills: 0 | Status: SHIPPED | OUTPATIENT
Start: 2019-07-22 | End: 2020-01-27 | Stop reason: SDUPTHER

## 2019-08-14 DIAGNOSIS — I10 BENIGN ESSENTIAL HYPERTENSION: ICD-10-CM

## 2019-08-14 DIAGNOSIS — I25.119 CORONARY ARTERY DISEASE INVOLVING NATIVE CORONARY ARTERY OF NATIVE HEART WITH ANGINA PECTORIS (HCC): ICD-10-CM

## 2019-08-14 RX ORDER — CARVEDILOL 12.5 MG/1
TABLET ORAL
Qty: 180 TABLET | Refills: 1 | Status: SHIPPED | OUTPATIENT
Start: 2019-08-14 | End: 2020-03-16

## 2019-08-14 RX ORDER — SIMVASTATIN 40 MG
40 TABLET ORAL DAILY
Qty: 90 TABLET | Refills: 1 | Status: SHIPPED | OUTPATIENT
Start: 2019-08-14 | End: 2020-03-12

## 2019-08-20 ENCOUNTER — APPOINTMENT (OUTPATIENT)
Dept: LAB | Facility: CLINIC | Age: 74
End: 2019-08-20
Payer: MEDICARE

## 2019-08-20 DIAGNOSIS — E11.40 TYPE 2 DIABETES MELLITUS WITH DIABETIC NEUROPATHY, WITHOUT LONG-TERM CURRENT USE OF INSULIN (HCC): ICD-10-CM

## 2019-08-20 LAB
ALBUMIN SERPL BCP-MCNC: 4.3 G/DL (ref 3.5–5)
ALP SERPL-CCNC: 69 U/L (ref 46–116)
ALT SERPL W P-5'-P-CCNC: 25 U/L (ref 12–78)
ANION GAP SERPL CALCULATED.3IONS-SCNC: 8 MMOL/L (ref 4–13)
AST SERPL W P-5'-P-CCNC: 17 U/L (ref 5–45)
BILIRUB SERPL-MCNC: 0.65 MG/DL (ref 0.2–1)
BUN SERPL-MCNC: 14 MG/DL (ref 5–25)
CALCIUM SERPL-MCNC: 9.2 MG/DL (ref 8.3–10.1)
CHLORIDE SERPL-SCNC: 104 MMOL/L (ref 100–108)
CO2 SERPL-SCNC: 24 MMOL/L (ref 21–32)
CREAT SERPL-MCNC: 1.16 MG/DL (ref 0.6–1.3)
EST. AVERAGE GLUCOSE BLD GHB EST-MCNC: 148 MG/DL
GFR SERPL CREATININE-BSD FRML MDRD: 62 ML/MIN/1.73SQ M
GLUCOSE P FAST SERPL-MCNC: 162 MG/DL (ref 65–99)
HBA1C MFR BLD: 6.8 % (ref 4.2–6.3)
POTASSIUM SERPL-SCNC: 4.7 MMOL/L (ref 3.5–5.3)
PROT SERPL-MCNC: 7.2 G/DL (ref 6.4–8.2)
SODIUM SERPL-SCNC: 136 MMOL/L (ref 136–145)

## 2019-08-20 PROCEDURE — 36415 COLL VENOUS BLD VENIPUNCTURE: CPT

## 2019-08-20 PROCEDURE — 80053 COMPREHEN METABOLIC PANEL: CPT

## 2019-08-20 PROCEDURE — 83036 HEMOGLOBIN GLYCOSYLATED A1C: CPT

## 2019-08-25 PROBLEM — L84 CORNS: Status: RESOLVED | Noted: 2018-02-14 | Resolved: 2019-08-25

## 2019-08-25 PROBLEM — M79.671 PAIN IN BOTH FEET: Status: RESOLVED | Noted: 2018-07-30 | Resolved: 2019-08-25

## 2019-08-25 PROBLEM — M79.672 PAIN IN BOTH FEET: Status: RESOLVED | Noted: 2018-07-30 | Resolved: 2019-08-25

## 2019-08-25 RX ORDER — BETAMETHASONE DIPROPIONATE 0.5 MG/G
CREAM TOPICAL
COMMUNITY
Start: 2019-08-15 | End: 2020-05-01 | Stop reason: ALTCHOICE

## 2019-08-26 ENCOUNTER — OFFICE VISIT (OUTPATIENT)
Dept: FAMILY MEDICINE CLINIC | Facility: CLINIC | Age: 74
End: 2019-08-26
Payer: MEDICARE

## 2019-08-26 VITALS
HEIGHT: 70 IN | HEART RATE: 68 BPM | SYSTOLIC BLOOD PRESSURE: 122 MMHG | BODY MASS INDEX: 25.34 KG/M2 | TEMPERATURE: 97.1 F | RESPIRATION RATE: 16 BRPM | WEIGHT: 177 LBS | DIASTOLIC BLOOD PRESSURE: 62 MMHG

## 2019-08-26 DIAGNOSIS — N18.2 CKD STAGE 2 DUE TO TYPE 2 DIABETES MELLITUS (HCC): ICD-10-CM

## 2019-08-26 DIAGNOSIS — E11.22 TYPE 2 DIABETES MELLITUS WITH STAGE 3 CHRONIC KIDNEY DISEASE, WITHOUT LONG-TERM CURRENT USE OF INSULIN (HCC): ICD-10-CM

## 2019-08-26 DIAGNOSIS — N18.30 TYPE 2 DIABETES MELLITUS WITH STAGE 3 CHRONIC KIDNEY DISEASE, WITHOUT LONG-TERM CURRENT USE OF INSULIN (HCC): ICD-10-CM

## 2019-08-26 DIAGNOSIS — E11.22 CKD STAGE 2 DUE TO TYPE 2 DIABETES MELLITUS (HCC): ICD-10-CM

## 2019-08-26 DIAGNOSIS — I73.9 PAD (PERIPHERAL ARTERY DISEASE) (HCC): ICD-10-CM

## 2019-08-26 DIAGNOSIS — I25.119 CORONARY ARTERY DISEASE INVOLVING NATIVE CORONARY ARTERY OF NATIVE HEART WITH ANGINA PECTORIS (HCC): ICD-10-CM

## 2019-08-26 DIAGNOSIS — I10 BENIGN ESSENTIAL HYPERTENSION: ICD-10-CM

## 2019-08-26 DIAGNOSIS — E11.42 DIABETIC POLYNEUROPATHY ASSOCIATED WITH TYPE 2 DIABETES MELLITUS (HCC): Primary | ICD-10-CM

## 2019-08-26 DIAGNOSIS — Z12.5 PROSTATE CANCER SCREENING: ICD-10-CM

## 2019-08-26 PROCEDURE — 99214 OFFICE O/P EST MOD 30 MIN: CPT | Performed by: FAMILY MEDICINE

## 2019-08-26 NOTE — PROGRESS NOTES
Assessment/Plan:    No problem-specific Assessment & Plan notes found for this encounter  Dm improved, tolerating precose, cont same TLC  htn stable, no edema on amlodipine  Creatinine very good for some reason, cont hydration and low dose lisinopril 5mg  PAD stable for now, denies claudication  DPN stable     Diagnoses and all orders for this visit:    Diabetic polyneuropathy associated with type 2 diabetes mellitus ([facility] )    Coronary artery disease involving native coronary artery of native heart with angina pectoris ([facility] )  -     Lipid Panel with Direct LDL reflex; Future    PAD (peripheral artery disease) ([facility])    Benign essential hypertension  -     Comprehensive metabolic panel; Future    Type 2 diabetes mellitus with stage 3 chronic kidney disease, without long-term current use of insulin ([facility])  -     Microalbumin / creatinine urine ratio; Future  -     Hemoglobin A1C; Future    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 mellitus ([facility])    Prostate cancer screening  -     PSA, Total Screen; Future    Other orders  -     betamethasone dipropionate (DIPROSONE) 0 05 % cream; Use as dir twice daily          Return in about 3 months (around 11/26/2019) for Recheck  Subjective:      Patient ID: Steven Garcia is a 75 y o  male  Chief Complaint   Patient presents with    Follow-up     BP check Rocio HARMON       HPI  Worked harder in past month on diet  Some wt loss  Staying active  Tolerating 100mg precose qac, some gas  a1c 6 8  norvasc 2 5mg added by cardiologist  occas dizziness, does not check sugar or bp, lasts 1 minute, better with rest  3-4x/week    The following portions of the patient's history were reviewed and updated as appropriate: allergies, current medications, past family history, past medical history, past social history, past surgical history and problem list     Review of Systems   Respiratory: Negative for shortness of breath  Cardiovascular: Negative for chest pain     Neurological: Positive for light-headedness  Negative for syncope  Current Outpatient Medications   Medication Sig Dispense Refill    acarbose (PRECOSE) 100 MG tablet Take 1 tablet (100 mg total) by mouth 3 (three) times a day with meals 270 tablet 1    amLODIPine (NORVASC) 2 5 mg tablet Take 1 tablet (2 5 mg total) by mouth daily 90 tablet 3    aspirin (ECOTRIN LOW STRENGTH) 81 mg EC tablet Take 1 tablet (81 mg total) by mouth daily 30 tablet 6    b complex-vitamin C-folic acid (NEPHROCAPS) 1 mg capsule Take 1 capsule by mouth daily      betamethasone dipropionate (DIPROSONE) 0 05 % cream Use as dir twice daily      carvedilol (COREG) 12 5 mg tablet TAKE 1 TABLET TWICE DAILY 180 tablet 1    gabapentin (NEURONTIN) 600 MG tablet Take 1 tablet (600 mg total) by mouth 2 (two) times a day for 90 days 180 tablet 0    glimepiride (AMARYL) 4 mg tablet Take 1 tablet (4 mg total) by mouth 2 (two) times a day for 126 days 180 tablet 0    glucose blood (TRUETRACK TEST) test strip 1 each by Other route daily Use as instructed for E11 29 100 each 3    lisinopril (ZESTRIL) 5 mg tablet Take 1 tablet (5 mg total) by mouth daily 90 tablet 3    metFORMIN (GLUMETZA) 500 MG (MOD) 24 hr tablet Take 2 tablets (1,000 mg total) by mouth 2 (two) times a day with meals 120 tablet 5    multivitamin (THERAGRAN) TABS Take 1 tablet by mouth daily      omeprazole (PriLOSEC) 40 MG capsule Take 1 capsule (40 mg total) by mouth daily 90 capsule 1    simvastatin (ZOCOR) 40 mg tablet TAKE 1 TABLET (40 MG TOTAL) BY MOUTH DAILY 90 tablet 1    TRUEPLUS LANCETS 28G MISC Test 1x/d, E11 29  0     No current facility-administered medications for this visit  Objective:    /62   Pulse 68   Temp (!) 97 1 °F (36 2 °C)   Resp 16   Ht 5' 10" (1 778 m)   Wt 80 3 kg (177 lb)   BMI 25 40 kg/m²        Physical Exam   Constitutional: He appears well-developed  No distress  HENT:   Head: Normocephalic     Right Ear: External ear normal    Left Ear: External ear normal    Eyes: Conjunctivae are normal  No scleral icterus  Neck: Neck supple  Carotid bruit is not present  Cardiovascular: Normal rate, regular rhythm, normal heart sounds and intact distal pulses  No murmur heard  Pulmonary/Chest: Effort normal and breath sounds normal  No respiratory distress  He has no wheezes  Abdominal: Soft  Bowel sounds are normal  He exhibits no distension  There is no tenderness  Musculoskeletal: He exhibits no edema or deformity  Neurological: He is alert  He exhibits normal muscle tone  Skin: Skin is warm and dry  No pallor  Psychiatric: His behavior is normal  Thought content normal    Nursing note and vitals reviewed               Dalia Colon DO

## 2019-09-21 NOTE — PROGRESS NOTES
Progress Note - Cardiology Office  [facility]    Luis Alfredo Artist 75 y o  male MRN: [id number]  : 1945  Encounter: [id number]      Assessment:     1  Benign essential hypertension    2  Coronary artery disease involving native coronary artery of native heart with angina pectoris ([facility] )    3  Dyslipidemia    4  Type 2 diabetes mellitus with stage 3 chronic kidney disease, without long-term current use of insulin ([facility] )    5  PAD (peripheral artery disease) ([facility] )    6  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 mellitus ([facility] )        Discussion Summary and Plan:  1  PVD with right femoropopliteal which is occluded and left SFA which has stent placed by me in , had repeat procedure done with known right SFA stent stenosis underwent balloon angioplasty  Now feeling much better  Continue antiplatelet lesion and dual platelet therapy  He can walk more distance  Below the knee patient has single-vessel runoff  No new issues     Lower extremity vascular study done on 2018 reviewed  Patient is referred to vascular surgery again  He had 100% occluded right SFA  2  Coronary artery disease  Status post multivessel stenting  Patient now want to follow up with [facility] cardiology  His nuclear stress test done in 2018 which shows no ischemia normal LV systolic function he has no symptoms of angina  3  Hypertension  Patient blood pressure has been up and down  Blood pressure need to be checked on the right arm all the time  Left arm has 20 mm pressure gradient  His potassium was high hence he is on low-dose lisinopril but added low-dose amlodipine patient has not been taking it continue Coreg as before  He will call us back with list of his medications  4  Dyslipidemia  Continue statins  Patient still taking simvastatin 40 mg  Cholesterol profile was acceptable in 2018   He is scheduled to follow up with his primary care doctor for annual visit       5  CRI  Stable  He had CKD stage 2  Monitor electrolytes labs from August 2019 reviewed  Potassium 4 7 creatinine stable 1  16  LFTs were normal     6   Carotid bruit     Carotid Doppler shows less than 50% stenosis on the left  No evidence of stenosis on the right side  He is on medical Rx with statins  7  Diabetes mellitus  Acceptable HbA1c 7 2  Counseling :  A description of the counseling  All issues regarding tight sugar control, taking cholesterol medications, regular exercise, symptoms about coronary artery disease discussed with patient at length  Previous echo from 2014 reviewed  Goals and Barriers  Patient's ability to self care: Yes  Medication side effect reviewed with patient in detail and all their questions answered to their satisfaction  HPI :     Raven Martin is a 75y o  year old male who came for follow up  Patient has a past medical history significant for Coronary Artery artery disease, CK D stage III, Diabetes mellitus with renal manifestations, PVD with right femoropopliteal which has occluded and left SFA stent by me in 2010, hypertension, diabetic neuropathy who came to see us after his vascular study was found to be abnormal  For cardiac problem he generally sees Dr Megha Harper  His vascular study was ordered by his podiatrist as he was having pain in his foot  He also had a lot of back problems sometime pain radiates from his back to the thighs  Here a stent placed in his left SFA in 2010  It's already known that his right femoropopliteal is occluded  He has no fever no chills no nausea no vomiting no other significant complaint       09/26/2019  Above reviewed  Patient came for follow-up  He still have issues with his legs  He is supposed to see the vascular surgery  Blood pressure is stable now around 140/70 as compared to few days ago  He is able to walk some distance now  He is pretty compliant with his medication    He may also have left subclavian stenosis  No nausea no vomiting no PND no orthopnea  His nuclear stress test done in September of 2018 was normal with EF around 60%  Review of Systems   Constitutional: Negative for activity change, chills, diaphoresis, fever and unexpected weight change  HENT: Negative for congestion  Eyes: Negative for discharge and redness  Respiratory: Negative for cough, chest tightness, shortness of breath and wheezing  Cardiovascular: Negative  Negative for chest pain, palpitations and leg swelling  Gastrointestinal: Negative for abdominal pain, diarrhea and nausea  Endocrine: Negative  Genitourinary: Negative for decreased urine volume and urgency  Musculoskeletal: Positive for gait problem  Negative for arthralgias and back pain  Due to PVD   Skin: Negative for rash and wound  Allergic/Immunologic: Negative  Neurological: Negative for dizziness, seizures, syncope, weakness, light-headedness and headaches  Hematological: Negative  Psychiatric/Behavioral: Negative for agitation and confusion  The patient is not nervous/anxious          Historical Information   Past Medical History:   Diagnosis Date    Acquired hallux valgus     last assessed: 8/1/2013    Allergic rhinitis     Anemia 10/26/2010    Atrophy of nail     last assessed: 7/7/2015    Chronic kidney disease     chronic renal insufficiency    Coronary artery disease     Deformity of ankle and foot, acquired     last assessed: 2/22/2016    Diabetes mellitus ([facility] )     Difficulty walking     last assessed: 12/14/2015    Dysesthesia     last assessed: 11/25/2016    Hammer toe     unspecified laterality; last assessed: 8/1/2013    Hypertension     Onychomycosis of toenail     last assessed: 2/22/2016    Peripheral vascular disease ([facility] )     left SFA stent in 2010    Pes planus     unspecified laterality; last assessed: 8/1/2013    Pneumonia     last assessed: 2/27/2016    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     last assessed: 8/15/2016    Type 2 diabetes mellitus ([facility] ) 07/26/2010    Viral warts     last assessed: 7/24/2015     Past Surgical History:   Procedure Laterality Date    CARDIAC CATHETERIZATION  07/29/2010    CATARACT EXTRACTION, BILATERAL Bilateral     R 1999, L 2008    FEMORAL ARTERY - POPLITEAL ARTERY BYPASS GRAFT  09/23/2013    FOOT SURGERY      bone spur removed    LEG SURGERY Bilateral     repair; L 8/20/2010 and 7/26/2012    ROTATOR CUFF REPAIR Left 2009    SHOULDER SURGERY Right 2005    collar bone     Social History     Substance and Sexual Activity   Alcohol Use Yes    Comment: being a social drinker     Social History     Substance and Sexual Activity   Drug Use No     Social History     Tobacco Use   Smoking Status Never Smoker   Smokeless Tobacco Never Used     Family History:   Family History   Problem Relation Age of Onset    Heart disease Father     Heart attack Father         acute myocardial infarction    Heart disease Sister     Heart attack Sister         acute myocardial infarction    Heart disease Paternal Grandfather     Aortic aneurysm Mother        Meds/Allergies     No Known Allergies    Current Outpatient Medications:     acarbose (PRECOSE) 100 MG tablet, Take 1 tablet (100 mg total) by mouth 3 (three) times a day with meals, Disp: 270 tablet, Rfl: 1    aspirin (ECOTRIN LOW STRENGTH) 81 mg EC tablet, Take 1 tablet (81 mg total) by mouth daily, Disp: 30 tablet, Rfl: 6    betamethasone dipropionate (DIPROSONE) 0 05 % cream, Use as dir twice daily, Disp: , Rfl:     carvedilol (COREG) 12 5 mg tablet, TAKE 1 TABLET TWICE DAILY, Disp: 180 tablet, Rfl: 1    gabapentin (NEURONTIN) 600 MG tablet, Take 1 tablet (600 mg total) by mouth 2 (two) times a day, Disp: 180 tablet, Rfl: 0    glimepiride (AMARYL) 4 mg tablet, Take 1 tablet (4 mg total) by mouth 2 (two) times a day for 126 days, Disp: 180 tablet, Rfl: 0    glucose blood (TRUETRACK TEST) test strip, 1 each by Other route daily Use as instructed for E11 29, Disp: 100 each, Rfl: 3    lisinopril (ZESTRIL) 5 mg tablet, Take 1 tablet (5 mg total) by mouth daily (Patient taking differently: Take 2 5 mg by mouth daily ), Disp: 90 tablet, Rfl: 3    metFORMIN (GLUMETZA) 500 MG (MOD) 24 hr tablet, Take 2 tablets (1,000 mg total) by mouth 2 (two) times a day with meals, Disp: 120 tablet, Rfl: 5    omeprazole (PriLOSEC) 40 MG capsule, Take 1 capsule (40 mg total) by mouth daily, Disp: 90 capsule, Rfl: 1    simvastatin (ZOCOR) 40 mg tablet, TAKE 1 TABLET (40 MG TOTAL) BY MOUTH DAILY, Disp: 90 tablet, Rfl: 1    TRUEPLUS LANCETS 28G MISC, Test 1x/d, E11 29, Disp: , Rfl: 0    amLODIPine (NORVASC) 2 5 mg tablet, Take 1 tablet (2 5 mg total) by mouth daily (Patient not taking: Reported on 9/26/2019), Disp: 90 tablet, Rfl: 3    b complex-vitamin C-folic acid (NEPHROCAPS) 1 mg capsule, Take 1 capsule by mouth daily, Disp: , Rfl:     multivitamin (THERAGRAN) TABS, Take 1 tablet by mouth daily, Disp: , Rfl:     Vitals: Blood pressure 140/70, pulse 66, height 5' 10" (1 778 m), weight 79 4 kg (175 lb), SpO2 99 %  Body mass index is 25 11 kg/m²  Vitals:    09/26/19 1532   Weight: 79 4 kg (175 lb)     BP Readings from Last 3 Encounters:   09/26/19 140/70   09/24/19 156/89   08/26/19 122/62         Physical Exam   Constitutional: He is oriented to person, place, and time  He appears well-developed and well-nourished  No distress  HENT:   Head: Normocephalic and atraumatic  Eyes: Pupils are equal, round, and reactive to light  Neck: Neck supple  No JVD present  No tracheal deviation present  No thyromegaly present  Cardiovascular: Normal rate, regular rhythm, S1 normal and S2 normal  Exam reveals no gallop, no S3, no S4, no distant heart sounds and no friction rub  Murmur heard  Systolic (ejection) murmur is present with a grade of 2/6    S1-S2 regular with a 2/6 ejection systolic murmur   Pulmonary/Chest: Effort normal and breath sounds normal  No respiratory distress  He has no wheezes  He has no rales  He exhibits no tenderness  Abdominal: Soft  Bowel sounds are normal  He exhibits no distension  There is no tenderness  Musculoskeletal: He exhibits no edema or deformity  Neurological: He is alert and oriented to person, place, and time  Skin: Skin is warm and dry  No rash noted  He is not diaphoretic  No pallor  Psychiatric: He has a normal mood and affect  His behavior is normal  Judgment normal        Diagnostic Studies Review Cardio:    Nuclear stress test   Nuclear stress test done 2018 was a normal study with EF around 60%  No ischemia noted it was Lexiscan stress test     EKG:    Twelve lead EKG done on 2018 shows normal sinus rhythm heart rate 60 beats per minute  No significant ST changes  Twelve lead EKG done 2019 shows normal sinus rhythm heart rate 60 beats per minute  No change from old EKG  Cardiac testing:   Results for orders placed in visit on 04/15/14   Echo complete with contrast if indicated    Narrative [address], [address] [place] [address]   Phone: [phone]   TRANSTHORACIC ECHOCARDIOGRAM   2D, M-MODE, DOPPLER, AND COLOR DOPPLER   Study date:  2014   Patient: Kerry Sanford   MR number: [id number]   Account number: [de-identified]   : [id number]   Age: 70 years   Gender: Male   Status: Inpatient   Location: [facility]  Echo lab   Height: 73 in   Weight: 140 8 lb   BP: 124/ 59 mmHg   Indications: Atrial Fibrillation   Diagnoses: 427 31 - ATRIAL FIBRILLATION   Sonographer:  Vania MERRILL   Referring Physician:  Amy Kulkarni MD   Group:  [facility]   Interpreting Physician:  Dl Gonsalez MD   SUMMARY   LEFT VENTRICLE:   Systolic function was normal  Ejection fraction was estimated to be 62 %  There were no regional wall motion abnormalities  Wall thickness was mildly increased     MITRAL VALVE:   There was mild annular calcification  AORTIC VALVE:   There was mild regurgitation  HISTORY: PRIOR HISTORY: CAD, Cardiac Stent, PVD, PAD, Atrial Fibrillation,    HTN,   Diabetes   PROCEDURE: The procedure was performed in the echo lab  This was a routine   study  The transthoracic approach was used  The study included complete 2D   imaging, M-mode, complete spectral Doppler, and color Doppler  The heart rate   was 60 bpm, at the start of the study  Images were obtained from the   parasternal, apical, subcostal, and suprasternal notch acoustic windows  Image   quality was adequate  LEFT VENTRICLE: Size was normal  Systolic function was normal  Ejection   TRANSTHORACIC ECHOCARDIOGRAM   Patient: Ruchi Main   MR number: [id number]    ------ Page 2   fraction was estimated to be 62 %  There were no regional wall motion   abnormalities  Wall thickness was mildly increased  DOPPLER: Left ventricular   diastolic function parameters were normal    RIGHT VENTRICLE: The size was normal  Systolic function was normal  Wall   thickness was normal    LEFT ATRIUM: Size was normal    RIGHT ATRIUM: Size was normal    MITRAL VALVE: There was mild annular calcification  Valve structure was    normal    There was normal leaflet separation  DOPPLER: The transmitral velocity was   within the normal range  There was no evidence for stenosis  There was no   regurgitation  AORTIC VALVE: The valve was trileaflet  Leaflets exhibited normal thickness    and   normal cuspal separation  DOPPLER: Transaortic velocity was within the normal   range  There was no evidence for stenosis  There was mild regurgitation  TRICUSPID VALVE: The valve structure was normal  There was normal leaflet   separation  DOPPLER: The transtricuspid velocity was within the normal range  There was no evidence for stenosis  There was no regurgitation     PULMONIC VALVE: Leaflets exhibited normal thickness, no calcification, and   normal cuspal separation  DOPPLER: The transpulmonic velocity was within the   normal range  There was no regurgitation  PERICARDIUM: There was no pericardial effusion  AORTA: The root exhibited normal size  SYSTEMIC VEINS: IVC: The inferior vena cava was normal in size  Respirophasic   changes were normal    SYSTEM MEASUREMENT TABLES   2D   %FS: 33 56 %   AV Diam: 3 43 cm   EDV(Teich): 73 79 ml   EF(Teich): 62 8 %   ESV(Teich): 27 45 ml   HR_4Ch_Q: 60 45 BPM   IVSd: 1 23 cm   LA Area: 17 45 cm2   LA Diam: 3 88 cm   LVCO_4Ch_Q: 3 8 L/min   LVEF_4Ch_Q: 56 5 %   LVIDd: 4 09 cm   TRANSTHORACIC ECHOCARDIOGRAM   Patient: Earl Barros   MR number: [id number]    ------ Page 3   LVIDs: 2 72 cm   LVLd_4Ch_Q: 9 61 cm   LVLs_4Ch_Q: 7 95 cm   LVPWd: 1 11 cm   LVSV_4Ch_Q: 62 92 ml   LVVED_4Ch_Q: 111 36 ml   LVVES_4Ch_Q: 48 45 ml   RA Area: 10 69 cm2   RVIDd: 3 54 cm   SV(Teich): 46 34 ml   CW   AR Dec [place]: 2 07 m/s2   AR Dec Time: 1874 44 ms   AR PHT: 543 59 ms   AR Vmax: 3 88 m/s   AR maxP 32 mmHg   MM   TAPSE: 2 53 cm   PW   AVC: 428 26 ms   MV A [place]: 0 54 m/s   MV Dec [place]: 2 38 m/s2   MV DecT: 253 3 ms   MV E Lud[place]: 0 6 m/s   MV E/A Ratio: 1 12   MV PHT: 73 46 ms   MVA By PHT: 3 cm2   [employer] Accredited Echocardiography Laboratory   Prepared and electronically signed by   Alejandra Owens MD   Signed 2014 16:21:13      No results found for this or any previous visit        Lab Review   Lab Results   Component Value Date    WBC 6 50 2018    HGB 10 9 (L) 2018    HCT 34 8 (L) 2018    MCV 86 2018    RDW 16 6 (H) 2018     2018     BMP:  Lab Results   Component Value Date     2014    K 4 7 2019     2019    CO2 24 2019    ANIONGAP 8 2014    BUN 14 2019    CREATININE 1 16 2019    GLUCOSE 137 2014    GLUF 162 (H) 2019    CALCIUM 9 2 2019    EGFR 62 2019    MG 1 8 2014     LFT:  Lab Results   Component Value Date    AST 17 08/20/2019    ALT 25 08/20/2019    ALKPHOS 69 08/20/2019       Lab Results   Component Value Date    HGBA1C 6 8 (H) 08/20/2019     Lipid Profile:   Lab Results   Component Value Date    CHOL 106 07/19/2014    HDL 43 08/08/2018    LDLCALC 57 08/08/2018    TRIG 91 08/08/2018     Lab Results   Component Value Date    CHOL 106 07/19/2014       Dr Manish Tracy MD [facility]      "This note has been constructed using a voice recognition system  Therefore there may be syntax, spelling, and/or grammatical errors   Please call if you have any questions  "

## 2019-09-24 ENCOUNTER — OFFICE VISIT (OUTPATIENT)
Dept: PODIATRY | Facility: CLINIC | Age: 74
End: 2019-09-24
Payer: MEDICARE

## 2019-09-24 VITALS — DIASTOLIC BLOOD PRESSURE: 89 MMHG | RESPIRATION RATE: 16 BRPM | HEART RATE: 68 BPM | SYSTOLIC BLOOD PRESSURE: 156 MMHG

## 2019-09-24 DIAGNOSIS — E11.42 DIABETIC POLYNEUROPATHY ASSOCIATED WITH TYPE 2 DIABETES MELLITUS (HCC): ICD-10-CM

## 2019-09-24 DIAGNOSIS — B35.1 ONYCHOMYCOSIS: ICD-10-CM

## 2019-09-24 DIAGNOSIS — L84 CORNS: ICD-10-CM

## 2019-09-24 DIAGNOSIS — M79.672 PAIN IN BOTH FEET: ICD-10-CM

## 2019-09-24 DIAGNOSIS — B07.0 PLANTAR WARTS: ICD-10-CM

## 2019-09-24 DIAGNOSIS — M79.671 PAIN IN BOTH FEET: ICD-10-CM

## 2019-09-24 DIAGNOSIS — I73.9 PAD (PERIPHERAL ARTERY DISEASE) (HCC): Primary | ICD-10-CM

## 2019-09-24 LAB
LEFT EYE DIABETIC RETINOPATHY: NORMAL
RIGHT EYE DIABETIC RETINOPATHY: NORMAL

## 2019-09-24 PROCEDURE — 11056 PARNG/CUTG B9 HYPRKR LES 2-4: CPT | Performed by: PODIATRIST

## 2019-09-24 PROCEDURE — 17110 DESTRUCTION B9 LES UP TO 14: CPT | Performed by: PODIATRIST

## 2019-09-24 PROCEDURE — 99212 OFFICE O/P EST SF 10 MIN: CPT | Performed by: PODIATRIST

## 2019-09-24 RX ORDER — GABAPENTIN 600 MG/1
600 TABLET ORAL 2 TIMES DAILY
Qty: 180 TABLET | Refills: 0 | Status: SHIPPED | OUTPATIENT
Start: 2019-09-24 | End: 2019-11-26 | Stop reason: SDUPTHER

## 2019-09-24 NOTE — PROGRESS NOTES
Assessment/Plan:  Patient has peripheral artery disease   He is working with vascular surgery  Gilsanjana Ring has diabetic neuropathy  Chasity Lopez has pain in his feet with ambulation  Plantar verruca left foot      Plan   Continue gabapentin   Nails debrided   Calluses debrided without pain or complication  Left lesion debrided  Cantharone applied        Subjective:  Patient is diabetic  Chasity Lopez has pain in his feet and toes with ambulation   No history of trauma      Patient ID: Pernell Covarrubias is a 73y  o  male      HPI     The following portions of the patient's history were reviewed and updated as appropriate: allergies, current medications, past family history, past medical history, past social history, past surgical history and problem list         Active Problems   1  Acquired Hallux Valgus (735 0)   2  Allergic rhinitis (477 9) (J30 9)   3  Arthropathy (716 90) (M12 9)   4  Backache (724 5) (M54 9)   5  Benign essential hypertension (401 1) (I10)   6  CAD (coronary atherosclerotic disease) (414 00) (I25 10)   7  Callus (700) (L84)   8  Chronic kidney disease, stage 3 (585 3) (N18 3)   9  Colon cancer screening (V76 51) (Z12 11)   10  Deformity of ankle and foot, acquired (736 70) (M21 969)   11  Depression screening (V79 0) (Z13 89)   12  Diabetic neuropathy (250 60,357 2) (E11 40)   13  Encounter for prostate cancer screening (V76 44) (Z12 5)   14  Esophagitis, reflux (530 11) (K21 0)   15  Need for immunization against influenza (V04 81) (Z23)   16  Need for pneumococcal vaccination (V03 82) (Z23)   17  Onychomycosis of toenail (110 1) (B35 1)   18  Pain in both feet (729 5) (M79 671,M79 672)   19  Peripheral arterial disease (443 9) (I73 9)   20  Pneumonia (486) (J18 9)   21  Prostate cancer screening (V76 44) (Z12 5)   22  Rosacea (695 3) (L71 9)   23  Screening for genitourinary condition (V81 6) (Z13 89)   24  Screening for neurological condition (V80 09) (Z13 89)   25  Seborrheic dermatitis (690 10) (L21 9)   26  Type 2 diabetes mellitus with diabetic neuropathy (250 60,357 2) (E11 40)   27  Type 2 diabetes mellitus with other circulatory complications (371 63) (A65 98)   28  Type 2 diabetes mellitus with renal manifestations, controlled (250 40) (E11 29)     Past Medical History   · History of Acute upper respiratory infection (465 9) (J06 9)   · History of Atrophy of nail (703 8) (L60 3)   · History of Bursitis of hip, unspecified laterality (726 5) (M70 70)   · History of Callus (700) (L84)   · History of Callus (700) (L84)   · History of Difficulty walking (719 7) (R26 2)   · History of Edema (782 3) (R60 9)   · History of Hammer toe, unspecified laterality (735 4) (M20 40)   · History of acute bronchitis (V12 69) (Z87 09)   · History of allergic rhinitis (V12 69) (Z87 09)   · History of anemia (V12 3) (Z86 2)   · History of bursitis (V13 59) (Z87 39)   · History of tendinitis (V13 59) (Z87 39)   · History of tinea corporis (V12 09) (Z86 19)   · History of viral warts (V12 09) (Z86 19)   · History of Late Effects Of Sprain Or Strain (905 7)   · History of Limb pain (729 5) (M79 609)   · History of Limb pain (729 5) (M79 609)   · History of Metatarsalgia, unspecified laterality (726 70) (M77 40)   · History of Open wound of foot, excluding toe(s) (892 0) (S91 309A)   · History of Pain in both feet (729 5) (M79 671,M79 672)   · History of Pain in both feet (729 5) (M79 671,M79 672)   · History of Pes planus, unspecified laterality (734) (M21 40)   · History of Shoulder joint pain, unspecified laterality   · History of Type 2 diabetes mellitus (250 00) (E11 9)     Surgical History   · History of Bypass Graft Using Vein: Femoral-popliteal   · History of Cataract Surgery   · History of Foot Surgery   · History of Heart Surgery   · History of Leg Repair   · History of Rotator Cuff Repair   · History of Shoulder Surgery     The surgical history was reviewed and updated today        Family History  Mother  · Family history of Aortic Aneurysm  Father    · Family history of acute myocardial infarction (V17 3) (Z82 49)  Sister    · Family history of acute myocardial infarction (V17 3) (Z82 49)     The family history was reviewed and updated today        Social History   · Being A Social Drinker   · Denied: History of Drug Use   · Never A Smoker   · Retired From Work  The social history was reviewed and updated today     The medication list was reviewed and updated today       Allergies   1  No Known Drug Allergies     Physical Exam  Left Foot: Appearance: a deformity (ball of foot and distal fifth metatarsal )  Fifth toe deformities include hammer toe  Tenderness: None except the plantar aspect of the foot  Right Foot: Appearance: a deformity (distal fifth metatarsal )  Left Ankle: ROM: limited ROM in all planes   Right Ankle: ROM: limited ROM in all planes   Neurological Exam: Light touch was decreased bilaterally  Vibratory sensation was decreased in both first metatarsophalangeal joints  Response to monofilament test was absent bilaterally  Deep tendon reflexes: achilles reflex 1/4 bilaterally  Vascular Exam: performed Dorsalis pedis pulses were 1/4 bilaterally  Posterior tibial pulses were 1/4 bilaterally  Elevation Pallor: diminished bilaterally  Capillary refill time was Q  9, findings bilateral  Negative digital hair noted, positive abnormal cooling  All pre-ulcer, lesions debrided  Today, but between 1-3 seconds bilaterally  Edema: mild bilaterally and All mycotic nails debrided  Today  The patient was given orthotics to help control his feet and at as cushioning and padding on the bottom of his feet q9 findings  Toenails: All of the toenails were elongated, hypertrophied, discolored, ingrown, shown to have subungual debris and tender       Socks and shoes removed, the Right Foot: the foot was dry  The sensory exam showed diminished vibratory sensation at the level of the toes   Diminished tactile sensation with monofilament testing throughout the right foot    Socks and shoes removed, Left Foot: the foot was dry  The sensory exam showed diminished vibratory sensation at the level of the toes  Diminished tactile sensation with monofilament testing throughout the left foot    Pulses:   1+ in the posterior tibialis on the right   1+ in the dorsalis pedis on the right  Capillary refills findings on the left were delayed in the toes  Pulses:   1+ in the posterior tibialis on the left   1+ in the dorsalis pedis on the left  Assign Risk Category: 2: Loss of protective sensation with or without weakness, deformity, callus, pre-ulcer, or history of ulceration  High risk  Hyperkeratosis: present on both first sub metatarsals, present on both fifth sub metatarsals and Pre-ulcerative lesion, submetatarsal one to 5, bilateral    Shoe Gear Evaluation: performed ()  Recommendation(s): custom inlays       Patient's shoes and socks removed  Right Foot/Ankle   Right Foot Inspection  Skin Exam: callus and callus               Toe Exam: swelling and erythema  Sensory   Vibration: diminished  Proprioception: diminished      Vascular  Capillary refills: elevated        Left Foot/Ankle  Left Foot Inspection  Skin Exam: callus    Submetatarsal 5 of the left foot demonstrates 0 5 centimeter squared plantar verruca               Toe Exam: swelling and erythema                   Sensory   Vibration: diminished  Proprioception: diminished     Vascular  Capillary refills: elevated     Assign Risk Category:  Deformity present; Loss of protective sensation; Weak pulses       Risk: 2

## 2019-09-26 ENCOUNTER — OFFICE VISIT (OUTPATIENT)
Dept: CARDIOLOGY CLINIC | Facility: CLINIC | Age: 74
End: 2019-09-26
Payer: MEDICARE

## 2019-09-26 VITALS
WEIGHT: 175 LBS | BODY MASS INDEX: 25.05 KG/M2 | DIASTOLIC BLOOD PRESSURE: 70 MMHG | HEART RATE: 66 BPM | SYSTOLIC BLOOD PRESSURE: 140 MMHG | HEIGHT: 70 IN | OXYGEN SATURATION: 99 %

## 2019-09-26 DIAGNOSIS — E11.22 CKD STAGE 2 DUE TO TYPE 2 DIABETES MELLITUS (HCC): ICD-10-CM

## 2019-09-26 DIAGNOSIS — E11.22 TYPE 2 DIABETES MELLITUS WITH STAGE 3 CHRONIC KIDNEY DISEASE, WITHOUT LONG-TERM CURRENT USE OF INSULIN (HCC): ICD-10-CM

## 2019-09-26 DIAGNOSIS — I73.9 PAD (PERIPHERAL ARTERY DISEASE) (HCC): ICD-10-CM

## 2019-09-26 DIAGNOSIS — I10 BENIGN ESSENTIAL HYPERTENSION: ICD-10-CM

## 2019-09-26 DIAGNOSIS — E78.5 DYSLIPIDEMIA: ICD-10-CM

## 2019-09-26 DIAGNOSIS — N18.2 CKD STAGE 2 DUE TO TYPE 2 DIABETES MELLITUS (HCC): ICD-10-CM

## 2019-09-26 DIAGNOSIS — I25.119 CORONARY ARTERY DISEASE INVOLVING NATIVE CORONARY ARTERY OF NATIVE HEART WITH ANGINA PECTORIS (HCC): ICD-10-CM

## 2019-09-26 DIAGNOSIS — N18.30 TYPE 2 DIABETES MELLITUS WITH STAGE 3 CHRONIC KIDNEY DISEASE, WITHOUT LONG-TERM CURRENT USE OF INSULIN (HCC): ICD-10-CM

## 2019-09-26 PROCEDURE — 99214 OFFICE O/P EST MOD 30 MIN: CPT | Performed by: INTERNAL MEDICINE

## 2019-10-02 ENCOUNTER — TELEPHONE (OUTPATIENT)
Dept: NEPHROLOGY | Facility: CLINIC | Age: 74
End: 2019-10-02

## 2019-10-15 ENCOUNTER — OFFICE VISIT (OUTPATIENT)
Dept: PODIATRY | Facility: CLINIC | Age: 74
End: 2019-10-15
Payer: MEDICARE

## 2019-10-15 VITALS
BODY MASS INDEX: 25.05 KG/M2 | HEART RATE: 66 BPM | HEIGHT: 70 IN | SYSTOLIC BLOOD PRESSURE: 142 MMHG | WEIGHT: 175 LBS | DIASTOLIC BLOOD PRESSURE: 68 MMHG

## 2019-10-15 DIAGNOSIS — I73.9 PAD (PERIPHERAL ARTERY DISEASE) (HCC): ICD-10-CM

## 2019-10-15 DIAGNOSIS — M79.672 PAIN IN BOTH FEET: ICD-10-CM

## 2019-10-15 DIAGNOSIS — M79.671 PAIN IN BOTH FEET: ICD-10-CM

## 2019-10-15 DIAGNOSIS — M21.962 ACQUIRED DEFORMITY OF LEFT FOOT: ICD-10-CM

## 2019-10-15 DIAGNOSIS — E11.42 DIABETIC POLYNEUROPATHY ASSOCIATED WITH TYPE 2 DIABETES MELLITUS (HCC): Primary | ICD-10-CM

## 2019-10-15 PROCEDURE — 99213 OFFICE O/P EST LOW 20 MIN: CPT | Performed by: PODIATRIST

## 2019-10-15 NOTE — PROGRESS NOTES
Assessment/Plan:  Deformity of foot  Diabetic neuropathy  Pain upon ambulation  Plantar verruca  Plan  Lesion debrided  Gentian violet silver nitrate applied  Patient will bandage daily  He will return for follow-up    No problem-specific Assessment & Plan notes found for this encounter  There are no diagnoses linked to this encounter  Subjective:  Patient did have painful reaction to last treatment  He presents for evaluation  Patient is diabetic      Past Medical History:   Diagnosis Date    Acquired hallux valgus     last assessed: 8/1/2013    Allergic rhinitis     Anemia 10/26/2010    Atrophy of nail     last assessed: 7/7/2015    Chronic kidney disease     chronic renal insufficiency    Coronary artery disease     Deformity of ankle and foot, acquired     last assessed: 2/22/2016    Diabetes mellitus ([facility] [address] )     Difficulty walking     last assessed: 12/14/2015    Dysesthesia     last assessed: 11/25/2016    Hammer toe     unspecified laterality; last assessed: 8/1/2013    Hypertension     Onychomycosis of toenail     last assessed: 2/22/2016    Peripheral vascular disease ([facility] [address] )     left SFA stent in 2010    Pes planus     unspecified laterality; last assessed: 8/1/2013    Pneumonia     last assessed: 2/27/2016    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     last assessed: 8/15/2016    Type 2 diabetes mellitus ([facility] [address] ) 07/26/2010    Viral warts     last assessed: 7/24/2015       Past Surgical History:   Procedure Laterality Date    CARDIAC CATHETERIZATION  07/29/2010    CATARACT EXTRACTION, BILATERAL Bilateral     R 1999, L 2008    FEMORAL ARTERY - POPLITEAL ARTERY BYPASS GRAFT  09/23/2013    FOOT SURGERY      bone spur removed    LEG SURGERY Bilateral     repair; L 8/20/2010 and 7/26/2012    ROTATOR CUFF REPAIR Left 2009    SHOULDER SURGERY Right 2005    collar bone       No Known Allergies      Current Outpatient Medications:     acarbose (PRECOSE) 100 MG tablet, Take 1 tablet (100 mg total) by mouth 3 (three) times a day with meals, Disp: 270 tablet, Rfl: 1    amLODIPine (NORVASC) 2 5 mg tablet, Take 1 tablet (2 5 mg total) by mouth daily (Patient not taking: Reported on 9/26/2019), Disp: 90 tablet, Rfl: 3    aspirin (ECOTRIN LOW STRENGTH) 81 mg EC tablet, Take 1 tablet (81 mg total) by mouth daily, Disp: 30 tablet, Rfl: 6    b complex-vitamin C-folic acid (NEPHROCAPS) 1 mg capsule, Take 1 capsule by mouth daily, Disp: , Rfl:     betamethasone dipropionate (DIPROSONE) 0 05 % cream, Use as dir twice daily, Disp: , Rfl:     carvedilol (COREG) 12 5 mg tablet, TAKE 1 TABLET TWICE DAILY, Disp: 180 tablet, Rfl: 1    gabapentin (NEURONTIN) 600 MG tablet, Take 1 tablet (600 mg total) by mouth 2 (two) times a day, Disp: 180 tablet, Rfl: 0    glimepiride (AMARYL) 4 mg tablet, Take 1 tablet (4 mg total) by mouth 2 (two) times a day for 126 days, Disp: 180 tablet, Rfl: 0    glucose blood (TRUETRACK TEST) test strip, 1 each by Other route daily Use as instructed for E11 29, Disp: 100 each, Rfl: 3    lisinopril (ZESTRIL) 5 mg tablet, Take 1 tablet (5 mg total) by mouth daily (Patient taking differently: Take 2 5 mg by mouth daily ), Disp: 90 tablet, Rfl: 3    metFORMIN (GLUMETZA) 500 MG (MOD) 24 hr tablet, Take 2 tablets (1,000 mg total) by mouth 2 (two) times a day with meals, Disp: 120 tablet, Rfl: 5    multivitamin (THERAGRAN) TABS, Take 1 tablet by mouth daily, Disp: , Rfl:     omeprazole (PriLOSEC) 40 MG capsule, Take 1 capsule (40 mg total) by mouth daily, Disp: 90 capsule, Rfl: 1    simvastatin (ZOCOR) 40 mg tablet, TAKE 1 TABLET (40 MG TOTAL) BY MOUTH DAILY, Disp: 90 tablet, Rfl: 1    TRUEPLUS LANCETS 28G MISC, Test 1x/d, E11 29, Disp: , Rfl: 0    Patient Active Problem List   Diagnosis    Diabetic polyneuropathy associated with type 2 diabetes mellitus (HCC)    Allergic rhinitis    Arthropathy    PAD (peripheral artery disease) (HCC)    Benign essential hypertension    CAD (coronary artery disease)    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)    Diabetic neuropathy (HCC)    GE reflux    Lumbar radiculopathy    Rosacea    Seborrheic dermatitis    Type 2 diabetes mellitus with diabetic neuropathy (HCC)    Onychomycosis    Occlusion of femoral-popliteal bypass graft (HCC)    Prostate cancer screening    Dyslipidemia    Screening for AAA (aortic abdominal aneurysm)    Medicare annual wellness visit, subsequent    Type 2 diabetes mellitus with stage 3 chronic kidney disease, without long-term current use of insulin ([facility])    Pain in both feet    Corns    Plantar warts          Patient ID: Orestes Garber is a 75 y o  male  HPI    The following portions of the patient's history were reviewed and updated as appropriate:     family history includes Aortic aneurysm in his mother; Heart attack in his father and sister; Heart disease in his father, paternal grandfather, and sister  reports that he has never smoked  He has never used smokeless tobacco  He reports that he drinks alcohol  He reports that he does not use drugs  Vitals:    10/15/19 1344   BP: 142/68   Pulse: 66       Review of Systems      Objective:  Patient's shoes and socks removed  Foot ExamPhysical Exam        Physical Exam  Left Foot: Appearance: a deformity (ball of foot and distal fifth metatarsal )  Fifth toe deformities include hammer toe  Tenderness: None except the plantar aspect of the foot  Right Foot: Appearance: a deformity (distal fifth metatarsal )  Left Ankle: ROM: limited ROM in all planes   Right Ankle: ROM: limited ROM in all planes   Neurological Exam: Light touch was decreased bilaterally  Vibratory sensation was decreased in both first metatarsophalangeal joints  Response to monofilament test was absent bilaterally  Deep tendon reflexes: achilles reflex 1/4 bilaterally     Vascular Exam: performed Dorsalis pedis pulses were 1/4 bilaterally  Posterior tibial pulses were 1/4 bilaterally  Elevation Pallor: diminished bilaterally  Capillary refill time was Q  9, findings bilateral  Negative digital hair noted, positive abnormal cooling  All pre-ulcer, lesions debrided  Today, but between 1-3 seconds bilaterally  Edema: mild bilaterally and All mycotic nails debrided  Today  The patient was given orthotics to help control his feet and at as cushioning and padding on the bottom of his feet q9 findings  Toenails: All of the toenails were elongated, hypertrophied, discolored, ingrown, shown to have subungual debris and tender       Socks and shoes removed, the Right Foot: the foot was dry  The sensory exam showed diminished vibratory sensation at the level of the toes  Diminished tactile sensation with monofilament testing throughout the right foot    Socks and shoes removed, Left Foot: the foot was dry  The sensory exam showed diminished vibratory sensation at the level of the toes  Diminished tactile sensation with monofilament testing throughout the left foot    Pulses:   1+ in the posterior tibialis on the right   1+ in the dorsalis pedis on the right  Capillary refills findings on the left were delayed in the toes  Pulses:   1+ in the posterior tibialis on the left   1+ in the dorsalis pedis on the left  Assign Risk Category: 2: Loss of protective sensation with or without weakness, deformity, callus, pre-ulcer, or history of ulceration  High risk  Hyperkeratosis: present on both first sub metatarsals, present on both fifth sub metatarsals and Pre-ulcerative lesion, submetatarsal one to 5, bilateral    Shoe Gear Evaluation: performed ()  Recommendation(s): custom inlays       Patient's shoes and socks removed  Right Foot/Ankle   Right Foot Inspection  Skin Exam: callus and callus               Toe Exam: swelling and erythema  Sensory   Vibration: diminished  Proprioception: diminished      Vascular  Capillary refills: elevated        Left Foot/Ankle  Left Foot Inspection  Skin Exam: callus  Submetatarsal 5 of the left foot demonstrates 0 5 centimeter squared plantar verruca  It is in capsulated in a bullae  Debrided    20% remaining               Toe Exam: swelling and erythema                   Sensory   Vibration: diminished  Proprioception: diminished     Vascular  Capillary refills: elevated     Assign Risk Category:  Deformity present; Loss of protective sensation; Weak pulses       Risk: 2          Procedures

## 2019-10-25 ENCOUNTER — CLINICAL SUPPORT (OUTPATIENT)
Dept: FAMILY MEDICINE CLINIC | Facility: CLINIC | Age: 74
End: 2019-10-25
Payer: MEDICARE

## 2019-10-25 DIAGNOSIS — Z23 IMMUNIZATION DUE: Primary | ICD-10-CM

## 2019-10-25 DIAGNOSIS — Z23 NEED FOR VACCINATION: Primary | ICD-10-CM

## 2019-10-25 PROCEDURE — 90662 IIV NO PRSV INCREASED AG IM: CPT

## 2019-10-25 PROCEDURE — G0008 ADMIN INFLUENZA VIRUS VAC: HCPCS

## 2019-11-26 ENCOUNTER — OFFICE VISIT (OUTPATIENT)
Dept: PODIATRY | Facility: CLINIC | Age: 74
End: 2019-11-26
Payer: MEDICARE

## 2019-11-26 VITALS
BODY MASS INDEX: 25.05 KG/M2 | HEIGHT: 70 IN | SYSTOLIC BLOOD PRESSURE: 142 MMHG | DIASTOLIC BLOOD PRESSURE: 68 MMHG | WEIGHT: 175 LBS

## 2019-11-26 DIAGNOSIS — M21.962 ACQUIRED DEFORMITY OF LEFT FOOT: ICD-10-CM

## 2019-11-26 DIAGNOSIS — B35.1 ONYCHOMYCOSIS: ICD-10-CM

## 2019-11-26 DIAGNOSIS — M79.671 PAIN IN BOTH FEET: ICD-10-CM

## 2019-11-26 DIAGNOSIS — I73.9 PAD (PERIPHERAL ARTERY DISEASE) (HCC): ICD-10-CM

## 2019-11-26 DIAGNOSIS — M79.672 PAIN IN BOTH FEET: ICD-10-CM

## 2019-11-26 DIAGNOSIS — E11.42 DIABETIC POLYNEUROPATHY ASSOCIATED WITH TYPE 2 DIABETES MELLITUS (HCC): ICD-10-CM

## 2019-11-26 DIAGNOSIS — E11.42 DIABETIC POLYNEUROPATHY ASSOCIATED WITH TYPE 2 DIABETES MELLITUS (HCC): Primary | ICD-10-CM

## 2019-11-26 PROCEDURE — 99213 OFFICE O/P EST LOW 20 MIN: CPT | Performed by: PODIATRIST

## 2019-11-26 RX ORDER — GABAPENTIN 600 MG/1
600 TABLET ORAL 2 TIMES DAILY
Qty: 180 TABLET | Refills: 0 | Status: SHIPPED | OUTPATIENT
Start: 2019-11-26 | End: 2020-04-10 | Stop reason: SDUPTHER

## 2019-11-26 NOTE — PROGRESS NOTES
Assessment/Plan:  Deformity of foot  Diabetic neuropathy  Pain upon ambulation  Plantar verruca  Pain upon ambulation  Mycosis of nail     Plan  Lesion debrided  all mycotic nails debrided without pain or complication  Foot exam performed  Patient educated on care of the diabetic foot      No problem-specific Assessment & Plan notes found for this encounter          There are no diagnoses linked to this encounter        Subjective:   patient is diabetic  He has pain upon ambulation  He has pain with shoe wear    No history of trauma          Past Medical History:   Diagnosis Date    Acquired hallux valgus       last assessed: 8/1/2013    Allergic rhinitis      Anemia 10/26/2010    Atrophy of nail       last assessed: 7/7/2015    Chronic kidney disease       chronic renal insufficiency    Coronary artery disease      Deformity of ankle and foot, acquired       last assessed: 2/22/2016    Diabetes mellitus ([facility] [address] )      Difficulty walking       last assessed: 12/14/2015    Dysesthesia       last assessed: 11/25/2016    Hammer toe       unspecified laterality; last assessed: 8/1/2013    Hypertension      Onychomycosis of toenail       last assessed: 2/22/2016    Peripheral vascular disease ([facility] [address] )       left SFA stent in 2010    Pes planus       unspecified laterality; last assessed: 8/1/2013    Pneumonia       last assessed: 2/27/2016    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       last assessed: 8/15/2016    Type 2 diabetes mellitus ([facility] [address] ) 07/26/2010    Viral warts       last assessed: 7/24/2015               Past Surgical History:   Procedure Laterality Date    CARDIAC CATHETERIZATION   07/29/2010    CATARACT EXTRACTION, BILATERAL Bilateral       R 1999, L 2008    FEMORAL ARTERY - POPLITEAL ARTERY BYPASS GRAFT   09/23/2013    FOOT SURGERY         bone spur removed    LEG SURGERY Bilateral       repair; L 8/20/2010 and 7/26/2012    ROTATOR CUFF REPAIR Left 2009    SHOULDER SURGERY Right 2005     collar bone         No Known Allergies        Current Outpatient Medications:     acarbose (PRECOSE) 100 MG tablet, Take 1 tablet (100 mg total) by mouth 3 (three) times a day with meals, Disp: 270 tablet, Rfl: 1    amLODIPine (NORVASC) 2 5 mg tablet, Take 1 tablet (2 5 mg total) by mouth daily (Patient not taking: Reported on 9/26/2019), Disp: 90 tablet, Rfl: 3    aspirin (ECOTRIN LOW STRENGTH) 81 mg EC tablet, Take 1 tablet (81 mg total) by mouth daily, Disp: 30 tablet, Rfl: 6    b complex-vitamin C-folic acid (NEPHROCAPS) 1 mg capsule, Take 1 capsule by mouth daily, Disp: , Rfl:     betamethasone dipropionate (DIPROSONE) 0 05 % cream, Use as dir twice daily, Disp: , Rfl:     carvedilol (COREG) 12 5 mg tablet, TAKE 1 TABLET TWICE DAILY, Disp: 180 tablet, Rfl: 1    gabapentin (NEURONTIN) 600 MG tablet, Take 1 tablet (600 mg total) by mouth 2 (two) times a day, Disp: 180 tablet, Rfl: 0    glimepiride (AMARYL) 4 mg tablet, Take 1 tablet (4 mg total) by mouth 2 (two) times a day for 126 days, Disp: 180 tablet, Rfl: 0    glucose blood (TRUETRACK TEST) test strip, 1 each by Other route daily Use as instructed for E11 29, Disp: 100 each, Rfl: 3    lisinopril (ZESTRIL) 5 mg tablet, Take 1 tablet (5 mg total) by mouth daily (Patient taking differently: Take 2 5 mg by mouth daily ), Disp: 90 tablet, Rfl: 3    metFORMIN (GLUMETZA) 500 MG (MOD) 24 hr tablet, Take 2 tablets (1,000 mg total) by mouth 2 (two) times a day with meals, Disp: 120 tablet, Rfl: 5    multivitamin (THERAGRAN) TABS, Take 1 tablet by mouth daily, Disp: , Rfl:     omeprazole (PriLOSEC) 40 MG capsule, Take 1 capsule (40 mg total) by mouth daily, Disp: 90 capsule, Rfl: 1    simvastatin (ZOCOR) 40 mg tablet, TAKE 1 TABLET (40 MG TOTAL) BY MOUTH DAILY, Disp: 90 tablet, Rfl: 1    TRUEPLUS LANCETS 28G MISC, Test 1x/d, E11 29, Disp: , Rfl: 0         Patient Active Problem List   Diagnosis    Diabetic polyneuropathy associated with type 2 diabetes mellitus (HCC)    Allergic rhinitis    Arthropathy    PAD (peripheral artery disease) (HCC)    Benign essential hypertension    CAD (coronary artery disease)    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)    Diabetic neuropathy (HCC)    GE reflux    Lumbar radiculopathy    Rosacea    Seborrheic dermatitis    Type 2 diabetes mellitus with diabetic neuropathy (HCC)    Onychomycosis    Occlusion of femoral-popliteal bypass graft (HCC)    Prostate cancer screening    Dyslipidemia    Screening for AAA (aortic abdominal aneurysm)    Medicare annual wellness visit, subsequent    Type 2 diabetes mellitus with stage 3 chronic kidney disease, without long-term current use of insulin (HCC)    Pain in both feet    Corns    Plantar warts             Patient ID: Stu Sandoval is a 75 y o  male      HPI     The following portions of the patient's history were reviewed and updated as appropriate:      family history includes Aortic aneurysm in his mother; Heart attack in his father and sister; Heart disease in his father, paternal grandfather, and sister        reports that he has never smoked  He has never used smokeless tobacco  He reports that he drinks alcohol  He reports that he does not use drugs          Vitals:     10/15/19 1344   BP: 142/68   Pulse: 66         Review of Systems       Objective:  Patient's shoes and socks removed  Foot ExamPhysical Exam          Physical Exam  Left Foot: Appearance: a deformity (ball of foot and distal fifth metatarsal )  Fifth toe deformities include hammer toe  Tenderness: None except the plantar aspect of the foot  Right Foot: Appearance: a deformity (distal fifth metatarsal )  Left Ankle: ROM: limited ROM in all planes   Right Ankle: ROM: limited ROM in all planes   Neurological Exam: Light touch was decreased bilaterally  Vibratory sensation was decreased in both first metatarsophalangeal joints   Response to monofilament test was absent bilaterally  Deep tendon reflexes: achilles reflex 1/4 bilaterally  Vascular Exam: performed Dorsalis pedis pulses were 1/4 bilaterally  Posterior tibial pulses were 1/4 bilaterally  Elevation Pallor: diminished bilaterally  Capillary refill time was Q  9, findings bilateral  Negative digital hair noted, positive abnormal cooling  All pre-ulcer, lesions debrided  Today, but between 1-3 seconds bilaterally  Edema: mild bilaterally and All mycotic nails debrided  Today  The patient was given orthotics to help control his feet and at as cushioning and padding on the bottom of his feet q9 findings  Toenails: All of the toenails were elongated, hypertrophied, discolored, ingrown, shown to have subungual debris and tender       Socks and shoes removed, the Right Foot: the foot was dry  The sensory exam showed diminished vibratory sensation at the level of the toes  Diminished tactile sensation with monofilament testing throughout the right foot    Socks and shoes removed, Left Foot: the foot was dry  The sensory exam showed diminished vibratory sensation at the level of the toes  Diminished tactile sensation with monofilament testing throughout the left foot    Pulses:   1+ in the posterior tibialis on the right   1+ in the dorsalis pedis on the right  Capillary refills findings on the left were delayed in the toes  Pulses:   1+ in the posterior tibialis on the left   1+ in the dorsalis pedis on the left  Assign Risk Category: 2: Loss of protective sensation with or without weakness, deformity, callus, pre-ulcer, or history of ulceration  High risk  Hyperkeratosis: present on both first sub metatarsals, present on both fifth sub metatarsals and Pre-ulcerative lesion, submetatarsal one to 5, bilateral    Shoe Gear Evaluation: performed ()  Recommendation(s): custom inlays       Patient's shoes and socks removed  Right Foot/Ankle   Right Foot Inspection  Skin Exam: callus and callus               Toe Exam: swelling and erythema  Sensory   Vibration: diminished  Proprioception: diminished      Vascular  Capillary refills: elevated        Left Foot/Ankle  Left Foot Inspection  Skin Exam: callus   Submetatarsal 5 of the left foot demonstrates 0 5 centimeter squared plantar verruca  It is in capsulated in a bullae  Debrided    20% remaining               Toe Exam: swelling and erythema                   Sensory   Vibration: diminished  Proprioception: diminished     Vascular  Capillary refills: elevated     Assign Risk Category:  Deformity present; Loss of protective sensation; Weak pulses       Risk: 2

## 2019-11-27 DIAGNOSIS — K21.9 GASTROESOPHAGEAL REFLUX DISEASE, ESOPHAGITIS PRESENCE NOT SPECIFIED: ICD-10-CM

## 2019-12-01 RX ORDER — OMEPRAZOLE 40 MG/1
CAPSULE, DELAYED RELEASE ORAL
Qty: 90 CAPSULE | Refills: 1 | Status: SHIPPED | OUTPATIENT
Start: 2019-12-01 | End: 2020-04-20

## 2019-12-03 ENCOUNTER — APPOINTMENT (OUTPATIENT)
Dept: LAB | Facility: CLINIC | Age: 74
End: 2019-12-03
Payer: MEDICARE

## 2019-12-03 DIAGNOSIS — E11.22 TYPE 2 DIABETES MELLITUS WITH STAGE 3 CHRONIC KIDNEY DISEASE, WITHOUT LONG-TERM CURRENT USE OF INSULIN (HCC): ICD-10-CM

## 2019-12-03 DIAGNOSIS — E11.22 CKD STAGE 3 DUE TO TYPE 2 DIABETES MELLITUS (HCC): ICD-10-CM

## 2019-12-03 DIAGNOSIS — N18.30 CKD STAGE 3 DUE TO TYPE 2 DIABETES MELLITUS (HCC): ICD-10-CM

## 2019-12-03 DIAGNOSIS — N18.30 TYPE 2 DIABETES MELLITUS WITH STAGE 3 CHRONIC KIDNEY DISEASE, WITHOUT LONG-TERM CURRENT USE OF INSULIN (HCC): ICD-10-CM

## 2019-12-03 DIAGNOSIS — I12.9 HYPERTENSIVE CHRONIC KIDNEY DISEASE WITH STAGE 1 THROUGH STAGE 4 CHRONIC KIDNEY DISEASE, OR UNSPECIFIED CHRONIC KIDNEY DISEASE: ICD-10-CM

## 2019-12-03 DIAGNOSIS — I10 BENIGN ESSENTIAL HYPERTENSION: ICD-10-CM

## 2019-12-03 DIAGNOSIS — I25.119 CORONARY ARTERY DISEASE INVOLVING NATIVE CORONARY ARTERY OF NATIVE HEART WITH ANGINA PECTORIS (HCC): ICD-10-CM

## 2019-12-03 DIAGNOSIS — Z12.5 PROSTATE CANCER SCREENING: ICD-10-CM

## 2019-12-03 LAB
ALBUMIN SERPL BCP-MCNC: 3.6 G/DL (ref 3.5–5)
ALP SERPL-CCNC: 81 U/L (ref 46–116)
ALT SERPL W P-5'-P-CCNC: 23 U/L (ref 12–78)
ANION GAP SERPL CALCULATED.3IONS-SCNC: 4 MMOL/L (ref 4–13)
AST SERPL W P-5'-P-CCNC: 16 U/L (ref 5–45)
BILIRUB SERPL-MCNC: 0.63 MG/DL (ref 0.2–1)
BUN SERPL-MCNC: 15 MG/DL (ref 5–25)
CALCIUM SERPL-MCNC: 9.2 MG/DL (ref 8.3–10.1)
CHLORIDE SERPL-SCNC: 107 MMOL/L (ref 100–108)
CHOLEST SERPL-MCNC: 151 MG/DL (ref 50–200)
CO2 SERPL-SCNC: 26 MMOL/L (ref 21–32)
CREAT SERPL-MCNC: 1.22 MG/DL (ref 0.6–1.3)
CREAT UR-MCNC: 18.3 MG/DL
ERYTHROCYTE [DISTWIDTH] IN BLOOD BY AUTOMATED COUNT: 14 % (ref 11.6–15.1)
EST. AVERAGE GLUCOSE BLD GHB EST-MCNC: 157 MG/DL
GFR SERPL CREATININE-BSD FRML MDRD: 58 ML/MIN/1.73SQ M
GLUCOSE P FAST SERPL-MCNC: 163 MG/DL (ref 65–99)
HBA1C MFR BLD: 7.1 % (ref 4.2–6.3)
HCT VFR BLD AUTO: 34.1 % (ref 36.5–49.3)
HDLC SERPL-MCNC: 51 MG/DL
HGB BLD-MCNC: 10.6 G/DL (ref 12–17)
LDLC SERPL CALC-MCNC: 81 MG/DL (ref 0–100)
MCH RBC QN AUTO: 28.8 PG (ref 26.8–34.3)
MCHC RBC AUTO-ENTMCNC: 31.1 G/DL (ref 31.4–37.4)
MCV RBC AUTO: 93 FL (ref 82–98)
MICROALBUMIN UR-MCNC: <5 MG/L (ref 0–20)
MICROALBUMIN/CREAT 24H UR: <27 MG/G CREATININE (ref 0–30)
PHOSPHATE SERPL-MCNC: 2.9 MG/DL (ref 2.3–4.1)
PLATELET # BLD AUTO: 233 THOUSANDS/UL (ref 149–390)
PMV BLD AUTO: 9.3 FL (ref 8.9–12.7)
POTASSIUM SERPL-SCNC: 4.8 MMOL/L (ref 3.5–5.3)
PROT SERPL-MCNC: 6.7 G/DL (ref 6.4–8.2)
PSA SERPL-MCNC: 4 NG/ML (ref 0–4)
PTH-INTACT SERPL-MCNC: 79.9 PG/ML (ref 18.4–80.1)
RBC # BLD AUTO: 3.68 MILLION/UL (ref 3.88–5.62)
SODIUM SERPL-SCNC: 137 MMOL/L (ref 136–145)
TRIGL SERPL-MCNC: 96 MG/DL
WBC # BLD AUTO: 6.36 THOUSAND/UL (ref 4.31–10.16)

## 2019-12-03 PROCEDURE — 80053 COMPREHEN METABOLIC PANEL: CPT

## 2019-12-03 PROCEDURE — 82570 ASSAY OF URINE CREATININE: CPT

## 2019-12-03 PROCEDURE — 82043 UR ALBUMIN QUANTITATIVE: CPT

## 2019-12-03 PROCEDURE — G0103 PSA SCREENING: HCPCS

## 2019-12-03 PROCEDURE — 83970 ASSAY OF PARATHORMONE: CPT

## 2019-12-03 PROCEDURE — 83036 HEMOGLOBIN GLYCOSYLATED A1C: CPT

## 2019-12-03 PROCEDURE — 80061 LIPID PANEL: CPT

## 2019-12-03 PROCEDURE — 36415 COLL VENOUS BLD VENIPUNCTURE: CPT

## 2019-12-03 PROCEDURE — 84100 ASSAY OF PHOSPHORUS: CPT

## 2019-12-03 PROCEDURE — 85027 COMPLETE CBC AUTOMATED: CPT

## 2019-12-05 ENCOUNTER — OFFICE VISIT (OUTPATIENT)
Dept: FAMILY MEDICINE CLINIC | Facility: CLINIC | Age: 74
End: 2019-12-05
Payer: MEDICARE

## 2019-12-05 VITALS
TEMPERATURE: 97.3 F | SYSTOLIC BLOOD PRESSURE: 124 MMHG | DIASTOLIC BLOOD PRESSURE: 62 MMHG | WEIGHT: 190 LBS | HEART RATE: 72 BPM | HEIGHT: 70 IN | BODY MASS INDEX: 27.2 KG/M2 | RESPIRATION RATE: 16 BRPM

## 2019-12-05 DIAGNOSIS — E11.40 TYPE 2 DIABETES MELLITUS WITH DIABETIC NEUROPATHY, WITHOUT LONG-TERM CURRENT USE OF INSULIN (HCC): ICD-10-CM

## 2019-12-05 DIAGNOSIS — E11.42 DIABETIC POLYNEUROPATHY ASSOCIATED WITH TYPE 2 DIABETES MELLITUS (HCC): Primary | ICD-10-CM

## 2019-12-05 DIAGNOSIS — I73.9 PAD (PERIPHERAL ARTERY DISEASE) (HCC): ICD-10-CM

## 2019-12-05 DIAGNOSIS — I10 BENIGN ESSENTIAL HYPERTENSION: ICD-10-CM

## 2019-12-05 DIAGNOSIS — R97.20 ELEVATED PSA: ICD-10-CM

## 2019-12-05 PROCEDURE — 99214 OFFICE O/P EST MOD 30 MIN: CPT | Performed by: FAMILY MEDICINE

## 2019-12-05 NOTE — PROGRESS NOTES
Assessment/Plan:    No problem-specific Assessment & Plan notes found for this encounter  Dm control fair at 7 1  He will work on TLC    psa elevation, 4 0, was 2 0 last year, agrees on urology eval    Pad stable, vasc surgeon f/u Dr Elizabeth Holden    htn stable    ckd stable     Diagnoses and all orders for this visit:    Diabetic polyneuropathy associated with type 2 diabetes mellitus ([address] )    Benign essential hypertension    Elevated PSA  -     Ambulatory referral to Urology; Future    PAD (peripheral artery disease) (HCC)    Type 2 diabetes mellitus with diabetic neuropathy, without long-term current use of insulin ([facility])  -     Comprehensive metabolic panel; Future  -     Hemoglobin A1C; Future          Return in about 3 months (around 3/5/2020) for Recheck  Subjective:      Patient ID: Cass Givens is a 75 y o  male  Chief Complaint   Patient presents with    Follow-up     ac/cma     Results    Blood Pressure Check    Diabetes       HPI    Was in podiatry office and THEY DID NOT TAKE HIS BP OR WEIGHT THOUGH IT IS RECORDED AS SUCH    Eating ok, not so good at times  Walking ok, does get cramps  Better with rest  Vascular surgeon in     The following portions of the patient's history were reviewed and updated as appropriate: allergies, current medications, past family history, past medical history, past social history, past surgical history and problem list     Review of Systems   Respiratory: Negative for shortness of breath  Cardiovascular: Negative for chest pain           Current Outpatient Medications   Medication Sig Dispense Refill    acarbose (PRECOSE) 100 MG tablet Take 1 tablet (100 mg total) by mouth 3 (three) times a day with meals 270 tablet 1    amLODIPine (NORVASC) 2 5 mg tablet Take 1 tablet (2 5 mg total) by mouth daily 90 tablet 3    aspirin (ECOTRIN LOW STRENGTH) 81 mg EC tablet Take 1 tablet (81 mg total) by mouth daily 30 tablet 6    b complex-vitamin C-folic acid (NEPHROCAPS) 1 mg capsule Take 1 capsule by mouth daily      betamethasone dipropionate (DIPROSONE) 0 05 % cream Use as dir twice daily      carvedilol (COREG) 12 5 mg tablet TAKE 1 TABLET TWICE DAILY 180 tablet 1    gabapentin (NEURONTIN) 600 MG tablet Take 1 tablet (600 mg total) by mouth 2 (two) times a day 180 tablet 0    glucose blood (TRUETRACK TEST) test strip 1 each by Other route daily Use as instructed for E11 29 100 each 3    lisinopril (ZESTRIL) 5 mg tablet Take 1 tablet (5 mg total) by mouth daily (Patient taking differently: Take 2 5 mg by mouth daily ) 90 tablet 3    metFORMIN (GLUMETZA) 500 MG (MOD) 24 hr tablet Take 2 tablets (1,000 mg total) by mouth 2 (two) times a day with meals 120 tablet 5    multivitamin (THERAGRAN) TABS Take 1 tablet by mouth daily      omeprazole (PriLOSEC) 40 MG capsule TAKE 1 CAPSULE EVERY DAY 90 capsule 1    simvastatin (ZOCOR) 40 mg tablet TAKE 1 TABLET (40 MG TOTAL) BY MOUTH DAILY 90 tablet 1    TRUEPLUS LANCETS 28G MISC Test 1x/d, E11 29  0    glimepiride (AMARYL) 4 mg tablet Take 1 tablet (4 mg total) by mouth 2 (two) times a day for 126 days 180 tablet 0     No current facility-administered medications for this visit  Objective:    /62   Pulse 72   Temp (!) 97 3 °F (36 3 °C)   Resp 16   Ht 5' 10" (1 778 m)   Wt 86 2 kg (190 lb)   BMI 27 26 kg/m²        Physical Exam   Constitutional: He appears well-developed  No distress  HENT:   Head: Normocephalic  Right Ear: External ear normal    Left Ear: External ear normal    Mouth/Throat: No oropharyngeal exudate  Eyes: Conjunctivae are normal  No scleral icterus  Neck: Neck supple  Cardiovascular: Normal rate, regular rhythm and intact distal pulses  Pulmonary/Chest: Effort normal and breath sounds normal  No respiratory distress  He has no wheezes  He has no rales  Abdominal: Soft  Bowel sounds are normal    Musculoskeletal: He exhibits no edema or deformity     Neurological: He is alert  Skin: Skin is warm and dry  No pallor  Psychiatric: His behavior is normal  Thought content normal    Nursing note and vitals reviewed               Jere Magallanes DO

## 2020-01-06 NOTE — PROGRESS NOTES
Assessment and plan:       1  Elevated PSA  - Patient had PSA elevation from 2 0 to 4 0 in 1 year  - I discussed with the patient the discovery of the PSA molecule and its original use in determining the return of prostate cancer after definitive therapy  I described the normal function of the PSA molecule in the reproductive process and also discussed the detection of PSA in the blood  We discussed the controversial history of PSA screening for prostate cancer in the [place] as well as the risk of over detection and over treatment of prostate cancer by way of PSA screening  The patient understands that PSA blood testing is an imperfect way to screen for prostate cancer and that elevated PSA levels in the blood may also be caused by infection, inflammation, prostatic trauma or manipulation, urological procedures, or by benign prostatic enlargement  The role of the digital rectal examination in prostate cancer screening was also discussed and I discussed with him that there is large interobserver variability in the findings of digital rectal examination  We discussed the continued workup of elevated PSA or abnormal digital rectal examination in the form of the performance of a prostate biopsy  The preparation for, and steps of, an office-based transrectal ultrasound of prostate biopsy were described to the patient  Benefits of obtaining tissue for pathologic analysis were discussed with him and the risks of prostate biopsy were also discussed at length  These risks include but are not limited to infection, bleeding, pain, sepsis with need for admission to hospital, risk of change in sexual function, and risk of diagnosis with prostate cancer  Alternatives to prostate biopsy in the form of continued PSA and SEB surveillance were also offered to him  All of his questions and concerns were answered and addressed with regard to that detailed above  Prostate examination today was benign      Going forward we will plan to see him back in 1 month with a PSA prior  2  Lower urinary tract symptoms  - Patient has mildly bothersome urgency and frequency  He has nocturia 3 times nightly which is his baseline   - He is not interested in treatment for the symptoms at this time  Rashawn Tyler PA-C      Chief Complaint     Chief Complaint   Patient presents with    Elevated PSA         History of Present Illness     Kerry Kulkarni is a 75 y o  male patient referred to [facility] by his primary care provider, Dr Nathen Kohler, for an elevated PSA  A copy of today's note will be sent to this provider in the name of continuity of care  The patient has been undergoing annual prostate cancer screenings with his primary care provider and was recently found to have a PSA of 4 0 on 12/03/2019  This is elevated from 2 0 1 year prior  Patient denies any family history of prostate cancer  He is urinating well  He does endorse some frequency and urgency  He has 3 episodes of nocturia nightly which he reports has been consistent throughout his whole life  He does report that he urinates very infrequently throughout the day  Bladder scan today was 79 mL but the patient urinated approximately 2 hours prior to his appointment  This is not a true PVR  Laboratory     Lab Results   Component Value Date    CREATININE 1 22 12/03/2019       Lab Results   Component Value Date    PSA 4 0 12/03/2019    PSA 2 0 11/08/2018    PSA 1 8 11/07/2017       Recent Results (from the past 1 hour(s))   POCT Measure PVR    Collection Time: 01/09/20  9:16 AM   Result Value Ref Range    POST-VOID RESIDUAL VOLUME, ML POC 79 mL         Review of Systems     Review of Systems   Constitutional: Negative for chills and fever  HENT: Negative  Eyes: Negative  Respiratory: Negative for shortness of breath  Cardiovascular: Negative for chest pain     Gastrointestinal: Negative for constipation, diarrhea, nausea and vomiting  Genitourinary: Negative for difficulty urinating, dysuria, enuresis, flank pain, frequency, hematuria and urgency  Musculoskeletal: Negative  Allergies     No Known Allergies    Physical Exam     Physical Exam   Constitutional: He is oriented to person, place, and time  He appears well-developed and well-nourished  No distress  HENT:   Head: Normocephalic and atraumatic  Right Ear: External ear normal    Left Ear: External ear normal    Nose: Nose normal    Eyes: Right eye exhibits no discharge  Left eye exhibits no discharge  No scleral icterus  Cardiovascular: Normal rate  Pulmonary/Chest: Effort normal    Genitourinary:   Genitourinary Comments: Digital rectal exam reveals an approximately 40g prostate that is smooth, nontender, and without nodules  Musculoskeletal:   Ambulates independently   Neurological: He is alert and oriented to person, place, and time  Skin: Skin is warm and dry  He is not diaphoretic  Psychiatric: He has a normal mood and affect  His behavior is normal  Judgment and thought content normal    Nursing note and vitals reviewed          Vital Signs     Vitals:    01/09/20 0913   BP: 112/66   BP Location: Left arm   Patient Position: Sitting   Cuff Size: Adult   Pulse: 76   Weight: 85 4 kg (188 lb 3 2 oz)   Height: 5' 10" (1 778 m)         Current Medications       Current Outpatient Medications:     acarbose (PRECOSE) 100 MG tablet, Take 1 tablet (100 mg total) by mouth 3 (three) times a day with meals, Disp: 270 tablet, Rfl: 1    amLODIPine (NORVASC) 2 5 mg tablet, Take 1 tablet (2 5 mg total) by mouth daily, Disp: 90 tablet, Rfl: 3    aspirin (ECOTRIN LOW STRENGTH) 81 mg EC tablet, Take 1 tablet (81 mg total) by mouth daily, Disp: 30 tablet, Rfl: 6    b complex-vitamin C-folic acid (NEPHROCAPS) 1 mg capsule, Take 1 capsule by mouth daily, Disp: , Rfl:     betamethasone dipropionate (DIPROSONE) 0 05 % cream, Use as dir twice daily, Disp: , Rfl:     carvedilol (COREG) 12 5 mg tablet, TAKE 1 TABLET TWICE DAILY, Disp: 180 tablet, Rfl: 1    gabapentin (NEURONTIN) 600 MG tablet, Take 1 tablet (600 mg total) by mouth 2 (two) times a day, Disp: 180 tablet, Rfl: 0    glucose blood (TRUETRACK TEST) test strip, 1 each by Other route daily Use as instructed for E11 29, Disp: 100 each, Rfl: 3    lisinopril (ZESTRIL) 5 mg tablet, Take 1 tablet (5 mg total) by mouth daily (Patient taking differently: Take 2 5 mg by mouth daily ), Disp: 90 tablet, Rfl: 3    metFORMIN (GLUMETZA) 500 MG (MOD) 24 hr tablet, Take 2 tablets (1,000 mg total) by mouth 2 (two) times a day with meals, Disp: 120 tablet, Rfl: 5    multivitamin (THERAGRAN) TABS, Take 1 tablet by mouth daily, Disp: , Rfl:     omeprazole (PriLOSEC) 40 MG capsule, TAKE 1 CAPSULE EVERY DAY, Disp: 90 capsule, Rfl: 1    simvastatin (ZOCOR) 40 mg tablet, TAKE 1 TABLET (40 MG TOTAL) BY MOUTH DAILY, Disp: 90 tablet, Rfl: 1    TRUEPLUS LANCETS 28G MISC, Test 1x/d, E11 29, Disp: , Rfl: 0    glimepiride (AMARYL) 4 mg tablet, Take 1 tablet (4 mg total) by mouth 2 (two) times a day for 126 days, Disp: 180 tablet, Rfl: 0      Active Problems     Patient Active Problem List   Diagnosis    Diabetic polyneuropathy associated with type 2 diabetes mellitus (HCC)    Allergic rhinitis    Arthropathy    PAD (peripheral artery disease) (HCC)    Benign essential hypertension    CAD (coronary artery disease)    CKD stage 3 due to type 2 diabetes mellitus (HCC)    Diabetic neuropathy (HCC)    GE reflux    Lumbar radiculopathy    Rosacea    Seborrheic dermatitis    Type 2 diabetes mellitus with diabetic neuropathy (HCC)    Onychomycosis    Occlusion of femoral-popliteal bypass graft (HCC)    Prostate cancer screening    Dyslipidemia    Screening for AAA (aortic abdominal aneurysm)    Medicare annual wellness visit, subsequent    Type 2 diabetes mellitus with stage 3 chronic kidney disease, without long-term current use of insulin ([facility] [address] [facility]    Pain in both feet    Corns    Plantar warts    Elevated PSA         Past Medical History     Past Medical History:   Diagnosis Date    Acquired hallux valgus     last assessed: 8/1/2013    Allergic rhinitis     Anemia 10/26/2010    Atrophy of nail     last assessed: 7/7/2015    Chronic kidney disease     chronic renal insufficiency    Coronary artery disease     Deformity of ankle and foot, acquired     last assessed: 2/22/2016    Diabetes mellitus ([facility] [address] [facility]     Difficulty walking     last assessed: 12/14/2015    Dysesthesia     last assessed: 11/25/2016    Hammer toe     unspecified laterality; last assessed: 8/1/2013    Hypertension     Onychomycosis of toenail     last assessed: 2/22/2016    Peripheral vascular disease ([facility] [address] )     left SFA stent in 2010    Pes planus     unspecified laterality; last assessed: 8/1/2013    Pneumonia     last assessed: 2/27/2016    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     last assessed: 8/15/2016    Type 2 diabetes mellitus ([facility] [address] ) 07/26/2010    Viral warts     last assessed: 7/24/2015         Surgical History     Past Surgical History:   Procedure Laterality Date    CARDIAC CATHETERIZATION  07/29/2010    CATARACT EXTRACTION, BILATERAL Bilateral     R 1999, L 2008    FEMORAL ARTERY - POPLITEAL ARTERY BYPASS GRAFT  09/23/2013    FOOT SURGERY      bone spur removed    LEG SURGERY Bilateral     repair; L 8/20/2010 and 7/26/2012    ROTATOR CUFF REPAIR Left 2009    SHOULDER SURGERY Right 2005    collar bone         Family History     Family History   Problem Relation Age of Onset    Heart disease Father     Heart attack Father         acute myocardial infarction    Heart disease Sister     Heart attack Sister         acute myocardial infarction    Heart disease Paternal Grandfather     Aortic aneurysm Mother          Social History     Social History       Radiology

## 2020-01-09 ENCOUNTER — CONSULT (OUTPATIENT)
Dept: UROLOGY | Facility: CLINIC | Age: 75
End: 2020-01-09
Payer: MEDICARE

## 2020-01-09 VITALS
WEIGHT: 188.2 LBS | SYSTOLIC BLOOD PRESSURE: 112 MMHG | BODY MASS INDEX: 26.94 KG/M2 | DIASTOLIC BLOOD PRESSURE: 66 MMHG | HEIGHT: 70 IN | HEART RATE: 76 BPM

## 2020-01-09 DIAGNOSIS — R97.20 ELEVATED PSA: Primary | ICD-10-CM

## 2020-01-09 LAB — POST-VOID RESIDUAL VOLUME, ML POC: 79 ML

## 2020-01-09 PROCEDURE — 99203 OFFICE O/P NEW LOW 30 MIN: CPT | Performed by: PHYSICIAN ASSISTANT

## 2020-01-09 PROCEDURE — 51798 US URINE CAPACITY MEASURE: CPT | Performed by: PHYSICIAN ASSISTANT

## 2020-01-22 ENCOUNTER — OFFICE VISIT (OUTPATIENT)
Dept: NEPHROLOGY | Facility: CLINIC | Age: 75
End: 2020-01-22
Payer: MEDICARE

## 2020-01-22 VITALS
BODY MASS INDEX: 27.35 KG/M2 | HEIGHT: 70 IN | DIASTOLIC BLOOD PRESSURE: 72 MMHG | SYSTOLIC BLOOD PRESSURE: 146 MMHG | HEART RATE: 71 BPM | WEIGHT: 191 LBS | RESPIRATION RATE: 16 BRPM

## 2020-01-22 DIAGNOSIS — E11.22 CKD STAGE 3 DUE TO TYPE 2 DIABETES MELLITUS (HCC): Primary | ICD-10-CM

## 2020-01-22 DIAGNOSIS — N18.30 BENIGN HYPERTENSION WITH CKD (CHRONIC KIDNEY DISEASE) STAGE III (HCC): ICD-10-CM

## 2020-01-22 DIAGNOSIS — N18.30 CKD STAGE 3 DUE TO TYPE 2 DIABETES MELLITUS (HCC): Primary | ICD-10-CM

## 2020-01-22 DIAGNOSIS — I12.9 BENIGN HYPERTENSION WITH CKD (CHRONIC KIDNEY DISEASE) STAGE III (HCC): ICD-10-CM

## 2020-01-22 DIAGNOSIS — I73.9 PAD (PERIPHERAL ARTERY DISEASE) (HCC): ICD-10-CM

## 2020-01-22 DIAGNOSIS — E78.5 DYSLIPIDEMIA: ICD-10-CM

## 2020-01-22 PROCEDURE — 99213 OFFICE O/P EST LOW 20 MIN: CPT | Performed by: INTERNAL MEDICINE

## 2020-01-22 RX ORDER — LACTOBACILLUS RHAMNOSUS GG 10B CELL
CAPSULE ORAL DAILY
COMMUNITY

## 2020-01-22 RX ORDER — UBIDECARENONE 100 MG
100 CAPSULE ORAL DAILY
COMMUNITY
End: 2021-08-12 | Stop reason: SDUPTHER

## 2020-01-22 NOTE — LETTER
January 22, 2020     DO Fani Grimaldo [facility] [id number]    Patient: Kelsi Sullivan   YOB: 1945   Date of Visit: 1/22/2020       Dear Dr Jameel Ribeiro: Thank you for referring Naomie Phoenix to me for evaluation  Below are the relevant portions of my assessment and plan of care  If you have questions, please do not hesitate to call me  I look forward to following Kimberly Cummins along with you  Sincerely,        Juanito Araujo DO        CC: No Recipients                         ASSESSMENT and PLAN:  1  Chronic kidney disease, baseline creatinine 1 2-1 4, most recent creatinine 1 22 with an estimated GFR 58, underlying disease secondary diabetes hypertension plus vascular disease  2  Hypertension, blood pressure slightly elevated today, recommend continue home blood pressure monitoring  3  Diabetes recent hemoglobin A1c is 7 1  4  Peripheral vascular disease, continue to follow with vascular surgery  5  Dyslipidemia, continue with current statin dosing    · Overall renal function remains quite stable, creatinine 1 22  · Recommend home blood pressure monitoring  · Cough blood pressure greater than 130/80  · Given stability in renal function, follow-up in 1 year with repeat labs at that time

## 2020-01-22 NOTE — PROGRESS NOTES
NEPHROLOGY OUTPATIENT PROGRESS NOTE   Apollo Wilder 75 y o  male MRN: [id number]  Reason for visit:  Chronic kidney disease    ASSESSMENT and PLAN:  1  Chronic kidney disease, baseline creatinine 1 2-1 4, most recent creatinine 1 22 with an estimated GFR 58, underlying disease secondary diabetes hypertension plus vascular disease  2  Hypertension, blood pressure slightly elevated today, recommend continue home blood pressure monitoring  3  Diabetes recent hemoglobin A1c is 7 1  4  Peripheral vascular disease, continue to follow with vascular surgery  5  Dyslipidemia, continue with current statin dosing    · Overall renal function remains quite stable, creatinine 1 22  · Recommend home blood pressure monitoring  · Cough blood pressure greater than 130/80  · Given stability in renal function, follow-up in 1 year with repeat labs at that time  SUBJECTIVE / INTERVAL HISTORY:  He has been doing reasonably well  Denies any chest pain shortness of breath  Denies any lower extremity swelling  Occasional cramping in his left leg  Denies any appetite changes  Denies any dizziness lightheadedness or falls  Denies any hospitalizations  Review of Systems      OBJECTIVE:  /72 (BP Location: Left arm, Patient Position: Sitting, Cuff Size: Standard)   Pulse 71   Resp 16   Ht 5' 10" (1 778 m)   Wt 86 6 kg (191 lb)   BMI 27 41 kg/m²   Vitals:    01/22/20 1521   Weight: 86 6 kg (191 lb)       Physical Exam   Constitutional: He is oriented to person, place, and time  No distress  HENT:   Head: Normocephalic  Neck: Neck supple  Cardiovascular: Normal rate and regular rhythm  Pulmonary/Chest: Effort normal and breath sounds normal    Abdominal: Soft  Musculoskeletal: He exhibits no edema  Neurological: He is alert and oriented to person, place, and time  Skin: Skin is warm and dry  No rash noted           Medications:    Current Outpatient Medications:     acarbose (PRECOSE) 100 MG tablet, Take 1 tablet (100 mg total) by mouth 3 (three) times a day with meals, Disp: 270 tablet, Rfl: 1    amLODIPine (NORVASC) 2 5 mg tablet, Take 1 tablet (2 5 mg total) by mouth daily, Disp: 90 tablet, Rfl: 3    aspirin (ECOTRIN LOW STRENGTH) 81 mg EC tablet, Take 1 tablet (81 mg total) by mouth daily, Disp: 30 tablet, Rfl: 6    b complex-vitamin C-folic acid (NEPHROCAPS) 1 mg capsule, Take 1 capsule by mouth daily, Disp: , Rfl:     betamethasone dipropionate (DIPROSONE) 0 05 % cream, Use as dir twice daily, Disp: , Rfl:     carvedilol (COREG) 12 5 mg tablet, TAKE 1 TABLET TWICE DAILY, Disp: 180 tablet, Rfl: 1    co-enzyme Q-10 100 mg capsule, Take 100 mg by mouth daily, Disp: , Rfl:     gabapentin (NEURONTIN) 600 MG tablet, Take 1 tablet (600 mg total) by mouth 2 (two) times a day, Disp: 180 tablet, Rfl: 0    glucose blood (TRUETRACK TEST) test strip, 1 each by Other route daily Use as instructed for E11 29, Disp: 100 each, Rfl: 3    lisinopril (ZESTRIL) 5 mg tablet, Take 1 tablet (5 mg total) by mouth daily (Patient taking differently: Take 2 5 mg by mouth daily ), Disp: 90 tablet, Rfl: 3    metFORMIN (GLUMETZA) 500 MG (MOD) 24 hr tablet, Take 2 tablets (1,000 mg total) by mouth 2 (two) times a day with meals, Disp: 120 tablet, Rfl: 5    multivitamin (THERAGRAN) TABS, Take 1 tablet by mouth daily, Disp: , Rfl:     omeprazole (PriLOSEC) 40 MG capsule, TAKE 1 CAPSULE EVERY DAY, Disp: 90 capsule, Rfl: 1    Probiotic Product (CULTURELLE PROBIOTICS) CHEW, Chew daily, Disp: , Rfl:     simvastatin (ZOCOR) 40 mg tablet, TAKE 1 TABLET (40 MG TOTAL) BY MOUTH DAILY, Disp: 90 tablet, Rfl: 1    TRUEPLUS LANCETS 28G MISC, Test 1x/d, E11 29, Disp: , Rfl: 0    glimepiride (AMARYL) 4 mg tablet, Take 1 tablet (4 mg total) by mouth 2 (two) times a day for 126 days, Disp: 180 tablet, Rfl: 0    Laboratory Results:  Results for orders placed or performed in visit on 01/09/20   POCT Measure PVR   Result Value Ref Range POST-VOID RESIDUAL VOLUME, ML POC 79 mL

## 2020-01-27 DIAGNOSIS — E11.59 TYPE 2 DIABETES MELLITUS WITH OTHER CIRCULATORY COMPLICATION, WITHOUT LONG-TERM CURRENT USE OF INSULIN (HCC): ICD-10-CM

## 2020-01-27 RX ORDER — GLIMEPIRIDE 4 MG/1
4 TABLET ORAL 2 TIMES DAILY
Qty: 180 TABLET | Refills: 1 | Status: SHIPPED | OUTPATIENT
Start: 2020-01-27 | End: 2020-07-24

## 2020-01-27 NOTE — TELEPHONE ENCOUNTER
Pt is req a rx refill for glimepiride 4mg tablets sent to [facility]   pls advise

## 2020-02-04 ENCOUNTER — OFFICE VISIT (OUTPATIENT)
Dept: PODIATRY | Facility: CLINIC | Age: 75
End: 2020-02-04
Payer: MEDICARE

## 2020-02-04 VITALS
HEIGHT: 70 IN | SYSTOLIC BLOOD PRESSURE: 136 MMHG | BODY MASS INDEX: 27.35 KG/M2 | DIASTOLIC BLOOD PRESSURE: 92 MMHG | WEIGHT: 191 LBS

## 2020-02-04 DIAGNOSIS — M79.672 PAIN IN BOTH FEET: ICD-10-CM

## 2020-02-04 DIAGNOSIS — E11.42 DIABETIC POLYNEUROPATHY ASSOCIATED WITH TYPE 2 DIABETES MELLITUS (HCC): Primary | ICD-10-CM

## 2020-02-04 DIAGNOSIS — M79.671 PAIN IN BOTH FEET: ICD-10-CM

## 2020-02-04 DIAGNOSIS — I73.9 PAD (PERIPHERAL ARTERY DISEASE) (HCC): ICD-10-CM

## 2020-02-04 DIAGNOSIS — M21.962 ACQUIRED DEFORMITY OF LEFT FOOT: ICD-10-CM

## 2020-02-04 DIAGNOSIS — B35.1 ONYCHOMYCOSIS: ICD-10-CM

## 2020-02-04 PROCEDURE — 99213 OFFICE O/P EST LOW 20 MIN: CPT | Performed by: PODIATRIST

## 2020-02-04 NOTE — PROGRESS NOTES
Assessment/Plan:  Deformity of foot   Diabetic neuropathy   Pain upon ambulation  Pain upon ambulation  Mycosis of nail  Callus formation     Plan   Lesion debrided    all mycotic nails debrided without pain or complication  Foot exam performed  Patient educated on care of the diabetic foot  Plantar calluses debrided as well              There are no diagnoses linked to this encounter        Subjective:   patient is diabetic  He has pain upon ambulation  He has pain with shoe wear    No history of trauma             Past Medical History:   Diagnosis Date    Acquired hallux valgus       last assessed: 8/1/2013    Allergic rhinitis      Anemia 10/26/2010    Atrophy of nail       last assessed: 7/7/2015    Chronic kidney disease       chronic renal insufficiency    Coronary artery disease      Deformity of ankle and foot, acquired       last assessed: 2/22/2016    Diabetes mellitus ([facility] [address] [facility]      Difficulty walking       last assessed: 12/14/2015    Dysesthesia       last assessed: 11/25/2016    Hammer toe       unspecified laterality; last assessed: 8/1/2013    Hypertension      Onychomycosis of toenail       last assessed: 2/22/2016    Peripheral vascular disease ([facility] [address] [facility]       left SFA stent in 2010    Pes planus       unspecified laterality; last assessed: 8/1/2013    Pneumonia       last assessed: 2/27/2016    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       last assessed: 8/15/2016    Type 2 diabetes mellitus ([facility] [address] [facility] 07/26/2010    Viral warts       last assessed: 7/24/2015                   Past Surgical History:   Procedure Laterality Date    CARDIAC CATHETERIZATION   07/29/2010    CATARACT EXTRACTION, BILATERAL Bilateral       R 1999, L 2008    FEMORAL ARTERY - POPLITEAL ARTERY BYPASS GRAFT   09/23/2013    FOOT SURGERY         bone spur removed    LEG SURGERY Bilateral       repair; L 8/20/2010 and 7/26/2012    ROTATOR CUFF REPAIR Left 2009    SHOULDER SURGERY Right 2005     collar bone         No Known Allergies        Current Outpatient Medications:     acarbose (PRECOSE) 100 MG tablet, Take 1 tablet (100 mg total) by mouth 3 (three) times a day with meals, Disp: 270 tablet, Rfl: 1    amLODIPine (NORVASC) 2 5 mg tablet, Take 1 tablet (2 5 mg total) by mouth daily (Patient not taking: Reported on 9/26/2019), Disp: 90 tablet, Rfl: 3    aspirin (ECOTRIN LOW STRENGTH) 81 mg EC tablet, Take 1 tablet (81 mg total) by mouth daily, Disp: 30 tablet, Rfl: 6    b complex-vitamin C-folic acid (NEPHROCAPS) 1 mg capsule, Take 1 capsule by mouth daily, Disp: , Rfl:     betamethasone dipropionate (DIPROSONE) 0 05 % cream, Use as dir twice daily, Disp: , Rfl:     carvedilol (COREG) 12 5 mg tablet, TAKE 1 TABLET TWICE DAILY, Disp: 180 tablet, Rfl: 1    gabapentin (NEURONTIN) 600 MG tablet, Take 1 tablet (600 mg total) by mouth 2 (two) times a day, Disp: 180 tablet, Rfl: 0    glimepiride (AMARYL) 4 mg tablet, Take 1 tablet (4 mg total) by mouth 2 (two) times a day for 126 days, Disp: 180 tablet, Rfl: 0    glucose blood (TRUETRACK TEST) test strip, 1 each by Other route daily Use as instructed for E11 29, Disp: 100 each, Rfl: 3    lisinopril (ZESTRIL) 5 mg tablet, Take 1 tablet (5 mg total) by mouth daily (Patient taking differently: Take 2 5 mg by mouth daily ), Disp: 90 tablet, Rfl: 3    metFORMIN (GLUMETZA) 500 MG (MOD) 24 hr tablet, Take 2 tablets (1,000 mg total) by mouth 2 (two) times a day with meals, Disp: 120 tablet, Rfl: 5    multivitamin (THERAGRAN) TABS, Take 1 tablet by mouth daily, Disp: , Rfl:     omeprazole (PriLOSEC) 40 MG capsule, Take 1 capsule (40 mg total) by mouth daily, Disp: 90 capsule, Rfl: 1    simvastatin (ZOCOR) 40 mg tablet, TAKE 1 TABLET (40 MG TOTAL) BY MOUTH DAILY, Disp: 90 tablet, Rfl: 1    TRUEPLUS LANCETS 28G MISC, Test 1x/d, E11 29, Disp: , Rfl: 0           Patient Active Problem List   Diagnosis    Diabetic polyneuropathy associated with type 2 diabetes mellitus ([facility] [address]    Allergic rhinitis    Arthropathy    PAD (peripheral artery disease) (HCC)    Benign essential hypertension    CAD (coronary artery disease)    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)    Diabetic neuropathy (HCC)    GE reflux    Lumbar radiculopathy    Rosacea    Seborrheic dermatitis    Type 2 diabetes mellitus with diabetic neuropathy (HCC)    Onychomycosis    Occlusion of femoral-popliteal bypass graft (HCC)    Prostate cancer screening    Dyslipidemia    Screening for AAA (aortic abdominal aneurysm)    Medicare annual wellness visit, subsequent    Type 2 diabetes mellitus with stage 3 chronic kidney disease, without long-term current use of insulin (HCC)    Pain in both feet    Corns    Plantar warts             Patient ID: Pernell Covarrubias is a 75 y  o  male        The following portions of the patient's history were reviewed and updated as appropriate:      family history includes Aortic aneurysm in his mother; Heart attack in his father and sister; Heart disease in his father, paternal grandfather, and sister        reports that he has never smoked  He has never used smokeless tobacco  He reports that he drinks alcohol  He reports that he does not use drugs         Objective:  Patient's shoes and socks removed    Foot ExamPhysical Exam          Physical Exam  Left Foot: Appearance: a deformity (ball of foot and distal fifth metatarsal )  Fifth toe deformities include hammer toe  Tenderness: None except the plantar aspect of the foot  Right Foot: Appearance: a deformity (distal fifth metatarsal )  Left Ankle: ROM: limited ROM in all planes   Right Ankle: ROM: limited ROM in all planes   Neurological Exam: Light touch was decreased bilaterally  Vibratory sensation was decreased in both first metatarsophalangeal joints  Response to monofilament test was absent bilaterally  Deep tendon reflexes: achilles reflex 1/4 bilaterally     Vascular Exam: performed Dorsalis pedis pulses were 1/4 bilaterally  Posterior tibial pulses were 1/4 bilaterally  Elevation Pallor: diminished bilaterally  Capillary refill time was Q  9, findings bilateral  Negative digital hair noted, positive abnormal cooling  All pre-ulcer, lesions debrided  Today, but between 1-3 seconds bilaterally  Edema: mild bilaterally and All mycotic nails debrided  Today  The patient was given orthotics to help control his feet and at as cushioning and padding on the bottom of his feet q9 findings  Toenails: All of the toenails were elongated, hypertrophied, discolored, ingrown, shown to have subungual debris and tender       Socks and shoes removed, the Right Foot: the foot was dry  The sensory exam showed diminished vibratory sensation at the level of the toes  Diminished tactile sensation with monofilament testing throughout the right foot    Socks and shoes removed, Left Foot: the foot was dry  The sensory exam showed diminished vibratory sensation at the level of the toes  Diminished tactile sensation with monofilament testing throughout the left foot    Pulses:   1+ in the posterior tibialis on the right   1+ in the dorsalis pedis on the right  Capillary refills findings on the left were delayed in the toes  Pulses:   1+ in the posterior tibialis on the left   1+ in the dorsalis pedis on the left  Assign Risk Category: 2: Loss of protective sensation with or without weakness, deformity, callus, pre-ulcer, or history of ulceration  High risk  Hyperkeratosis: present on both first sub metatarsals, present on both fifth sub metatarsals and Pre-ulcerative lesion, submetatarsal one to 5, bilateral    Shoe Gear Evaluation: performed ()  Recommendation(s): custom inlays       Patient's shoes and socks removed  Right Foot/Ankle   Right Foot Inspection  Skin Exam: callus and callus               Toe Exam: swelling and erythema  Sensory   Vibration: diminished  Proprioception: diminished      Vascular  Capillary refills: elevated        Left Foot/Ankle  Left Foot Inspection  Skin Exam: callus   Submetatarsal 5 of the left foot demonstrates 0 5 centimeter squared plantar verruca   It is in capsulated in a bullae   Debrided   20% remaining               Toe Exam: swelling and erythema                   Sensory   Vibration: diminished  Proprioception: diminished     Vascular  Capillary refills: elevated     Patient's shoes and socks removed  Assign Risk Category:  Deformity present;  Loss of protective sensation; Weak pulses       Risk: 2

## 2020-02-10 ENCOUNTER — APPOINTMENT (OUTPATIENT)
Dept: LAB | Facility: CLINIC | Age: 75
End: 2020-02-10
Payer: MEDICARE

## 2020-02-10 DIAGNOSIS — R97.20 ELEVATED PSA: ICD-10-CM

## 2020-02-10 LAB — PSA SERPL-MCNC: 4 NG/ML (ref 0–4)

## 2020-02-10 PROCEDURE — 84153 ASSAY OF PSA TOTAL: CPT

## 2020-02-12 ENCOUNTER — OFFICE VISIT (OUTPATIENT)
Dept: UROLOGY | Facility: CLINIC | Age: 75
End: 2020-02-12
Payer: MEDICARE

## 2020-02-12 VITALS
BODY MASS INDEX: 27.02 KG/M2 | SYSTOLIC BLOOD PRESSURE: 140 MMHG | HEART RATE: 93 BPM | HEIGHT: 71 IN | DIASTOLIC BLOOD PRESSURE: 62 MMHG | WEIGHT: 193 LBS

## 2020-02-12 DIAGNOSIS — R97.20 ELEVATED PSA: Primary | ICD-10-CM

## 2020-02-12 PROCEDURE — 3008F BODY MASS INDEX DOCD: CPT | Performed by: PHYSICIAN ASSISTANT

## 2020-02-12 PROCEDURE — 1036F TOBACCO NON-USER: CPT | Performed by: PHYSICIAN ASSISTANT

## 2020-02-12 PROCEDURE — 3066F NEPHROPATHY DOC TX: CPT | Performed by: PHYSICIAN ASSISTANT

## 2020-02-12 PROCEDURE — 99213 OFFICE O/P EST LOW 20 MIN: CPT | Performed by: PHYSICIAN ASSISTANT

## 2020-02-12 PROCEDURE — 4040F PNEUMOC VAC/ADMIN/RCVD: CPT | Performed by: PHYSICIAN ASSISTANT

## 2020-02-12 PROCEDURE — 1160F RVW MEDS BY RX/DR IN RCRD: CPT | Performed by: PHYSICIAN ASSISTANT

## 2020-02-12 PROCEDURE — 3078F DIAST BP <80 MM HG: CPT | Performed by: PHYSICIAN ASSISTANT

## 2020-02-12 PROCEDURE — 3077F SYST BP >= 140 MM HG: CPT | Performed by: PHYSICIAN ASSISTANT

## 2020-02-12 NOTE — PROGRESS NOTES
Assessment and plan:       1  Elevated PSA  - Patient's PSA remains elevated but stable at 4 0   - We had a nice discussion today regarding these findings  We discussed multiple options moving forward including continued observation, multiparametric prostate MRI, as well as prostate biopsy  The risks and benefits of each of these procedures were discussed in detail   - At this time the patient would like to continue with close observation of his PSA  Should his PSA continue to rise, he would be interested in taking further steps  - He will follow up in 6 months with PSA prior to visit  Radha Cochran PA-C      Chief Complaint     Chief Complaint   Patient presents with    Elevated PSA         History of Present Illness     Albert Olson is a 75 y o  male patient recently seen in consultation for an elevated PSA  Patient had an increase in PSA from 2 0 on 11/08/2018 to 4 0 on 12/13/2019  Recent prostate examination was benign  PSA was repeated and remains elevated at 4 0  He is completely asymptomatic  He has no health concerns at today's visit  Laboratory     Lab Results   Component Value Date    CREATININE 1 22 12/03/2019       Lab Results   Component Value Date    PSA 4 0 02/10/2020    PSA 4 0 12/03/2019    PSA 2 0 11/08/2018       No results found for this or any previous visit (from the past 1 hour(s))  Review of Systems     Review of Systems   Constitutional: Negative for chills and fever  HENT: Negative  Eyes: Negative  Respiratory: Negative for shortness of breath  Cardiovascular: Negative for chest pain  Gastrointestinal: Negative for constipation, diarrhea, nausea and vomiting  Genitourinary: Negative for difficulty urinating, dysuria, enuresis, flank pain, frequency, hematuria, penile pain and urgency  Musculoskeletal: Negative  Urinary Incontinence Screening      Most Recent Value   Urinary Incontinence   Urinary Incontinence?   No Incomplete emptying? No   Urinary frequency? No   Urinary urgency? No   Urinary hesitancy? No   Dysuria (painful difficult urination)? No   Nocturia (waking up to use the bathroom)? Yes [2x]   Straining (having to push to go)? No   Weak stream?  No   Intermittent stream?  No   Post void dribbling? No            Allergies     No Known Allergies    Physical Exam     Physical Exam   Constitutional: He is oriented to person, place, and time  He appears well-developed and well-nourished  No distress  HENT:   Head: Normocephalic and atraumatic  Right Ear: External ear normal    Left Ear: External ear normal    Nose: Nose normal    Eyes: Right eye exhibits no discharge  Left eye exhibits no discharge  No scleral icterus  Cardiovascular: Normal rate  Pulmonary/Chest: Effort normal    Musculoskeletal:   Ambulates independently   Neurological: He is alert and oriented to person, place, and time  Skin: Skin is warm and dry  He is not diaphoretic  Psychiatric: He has a normal mood and affect  His behavior is normal  Judgment and thought content normal    Nursing note and vitals reviewed          Vital Signs     Vitals:    02/12/20 1338   BP: 140/62   Pulse: 93   Weight: 87 5 kg (193 lb)   Height: 5' 11" (1 803 m)         Current Medications       Current Outpatient Medications:     acarbose (PRECOSE) 100 MG tablet, Take 1 tablet (100 mg total) by mouth 3 (three) times a day with meals, Disp: 270 tablet, Rfl: 1    amLODIPine (NORVASC) 2 5 mg tablet, Take 1 tablet (2 5 mg total) by mouth daily, Disp: 90 tablet, Rfl: 3    aspirin (ECOTRIN LOW STRENGTH) 81 mg EC tablet, Take 1 tablet (81 mg total) by mouth daily, Disp: 30 tablet, Rfl: 6    b complex-vitamin C-folic acid (NEPHROCAPS) 1 mg capsule, Take 1 capsule by mouth daily, Disp: , Rfl:     betamethasone dipropionate (DIPROSONE) 0 05 % cream, Use as dir twice daily, Disp: , Rfl:     carvedilol (COREG) 12 5 mg tablet, TAKE 1 TABLET TWICE DAILY, Disp: 180 tablet, Rfl: 1    co-enzyme Q-10 100 mg capsule, Take 100 mg by mouth daily, Disp: , Rfl:     gabapentin (NEURONTIN) 600 MG tablet, Take 1 tablet (600 mg total) by mouth 2 (two) times a day, Disp: 180 tablet, Rfl: 0    glimepiride (AMARYL) 4 mg tablet, Take 1 tablet (4 mg total) by mouth 2 (two) times a day, Disp: 180 tablet, Rfl: 1    glucose blood (TRUETRACK TEST) test strip, 1 each by Other route daily Use as instructed for E11 29, Disp: 100 each, Rfl: 3    lisinopril (ZESTRIL) 5 mg tablet, Take 1 tablet (5 mg total) by mouth daily (Patient taking differently: Take 2 5 mg by mouth daily ), Disp: 90 tablet, Rfl: 3    metFORMIN (GLUMETZA) 500 MG (MOD) 24 hr tablet, Take 2 tablets (1,000 mg total) by mouth 2 (two) times a day with meals, Disp: 120 tablet, Rfl: 5    multivitamin (THERAGRAN) TABS, Take 1 tablet by mouth daily, Disp: , Rfl:     omeprazole (PriLOSEC) 40 MG capsule, TAKE 1 CAPSULE EVERY DAY, Disp: 90 capsule, Rfl: 1    Probiotic Product (CULTURELLE PROBIOTICS) CHEW, Chew daily, Disp: , Rfl:     simvastatin (ZOCOR) 40 mg tablet, TAKE 1 TABLET (40 MG TOTAL) BY MOUTH DAILY, Disp: 90 tablet, Rfl: 1    TRUEPLUS LANCETS 28G MISC, Test 1x/d, E11 29, Disp: , Rfl: 0      Active Problems     Patient Active Problem List   Diagnosis    Diabetic polyneuropathy associated with type 2 diabetes mellitus (HCC)    Allergic rhinitis    Arthropathy    PAD (peripheral artery disease) (HCC)    Benign hypertension with CKD (chronic kidney disease) stage III (HCC)    CAD (coronary artery disease)    CKD stage 3 due to type 2 diabetes mellitus (HCC)    Diabetic neuropathy (HCC)    GE reflux    Lumbar radiculopathy    Rosacea    Seborrheic dermatitis    Type 2 diabetes mellitus with diabetic neuropathy (HCC)    Onychomycosis    Occlusion of femoral-popliteal bypass graft (HCC)    Prostate cancer screening    Dyslipidemia    Screening for AAA (aortic abdominal aneurysm)    Medicare annual wellness visit, subsequent    Type 2 diabetes mellitus with stage 3 chronic kidney disease, without long-term current use of insulin (HCC)    Pain in both feet    Corns    Plantar warts    Elevated PSA         Past Medical History     Past Medical History:   Diagnosis Date    Acquired hallux valgus     last assessed: 8/1/2013    Allergic rhinitis     Anemia 10/26/2010    Atrophy of nail     last assessed: 7/7/2015    Chronic kidney disease     chronic renal insufficiency    Coronary artery disease     Deformity of ankle and foot, acquired     last assessed: 2/22/2016    Diabetes mellitus ([facility] )     Difficulty walking     last assessed: 12/14/2015    Dysesthesia     last assessed: 11/25/2016    Hammer toe     unspecified laterality; last assessed: 8/1/2013    Hypertension     Onychomycosis of toenail     last assessed: 2/22/2016    Peripheral vascular disease ([facility] )     left SFA stent in 2010    Pes planus     unspecified laterality; last assessed: 8/1/2013    Pneumonia     last assessed: 2/27/2016    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     last assessed: 8/15/2016    Type 2 diabetes mellitus ([facility] ) 07/26/2010    Viral warts     last assessed: 7/24/2015         Surgical History     Past Surgical History:   Procedure Laterality Date    CARDIAC CATHETERIZATION  07/29/2010    CATARACT EXTRACTION, BILATERAL Bilateral     R 1999, L 2008    COLONOSCOPY  2011    FEMORAL ARTERY - POPLITEAL ARTERY BYPASS GRAFT  09/23/2013    FOOT SURGERY      bone spur removed    LEG SURGERY Bilateral     repair; L 8/20/2010 and 7/26/2012    ROTATOR CUFF REPAIR Left 2009    SHOULDER SURGERY Right 2005    collar bone         Family History     Family History   Problem Relation Age of Onset    Heart disease Father     Heart attack Father         acute myocardial infarction    Heart disease Sister     Heart attack Sister         acute myocardial infarction    Heart disease Paternal Grandfather     Aortic aneurysm Mother          Social History     Social History       Radiology

## 2020-03-07 NOTE — PROGRESS NOTES
Progress Note - Cardiology Office  [facility]    Hayley Soto 75 y o  male MRN: [id number]  : 1945  Encounter: [id number]      Assessment:     1  Coronary artery disease involving native coronary artery of native heart with angina pectoris ([facility] )    2  Benign hypertension with CKD (chronic kidney disease) stage III ([facility] )    3  PAD (peripheral artery disease) ([facility] )    4  Dyslipidemia    5  CKD stage 3 due to type 2 diabetes mellitus ([place] [address] )    6  Type 2 diabetes mellitus with diabetic neuropathy, without long-term current use of insulin ([place] [address] )        Discussion Summary and Plan:  1  PVD with right femoropopliteal which is occluded and left SFA which has stent placed by me in , had repeat procedure done with known right SFA stent stenosis underwent balloon angioplasty  He is able to walk he has seen the vascular  He may have InStent stenosis on the left and has occlusion of his bypass on the right  Advised him to follow up with vascular and keep walking  No wound noted on the lower  lower extremity  2  Coronary artery disease  Status post multivessel stenting  Patient now want to follow up with [facility] cardiology  His nuclear stress test done in 2018 which shows no ischemia normal LV systolic function   He has decently active no symptoms of chest pain    3  Hypertension  His blood pressure has improved with addition of low-dose amlodipine  Continue Coreg as before  His potassium runs high dense he is on low-dose lisinopril potassium on the last labs done yesterday is acceptable  He may have left subclavian stenosis  4  Dyslipidemia  Continue statins  Cholesterol profile labs done on 2020 is acceptable      5  Chronic renal insufficiency  Patient's creatinine is stable around 1 23 potassium is 5 0  Labs reviewed with him done on 2020    6  Carotid bruit     Carotid Doppler shows less than 50% stenosis on the left    No evidence of stenosis on the right side  Continue medical Rx    7  Diabetes mellitus  Management as per primary care doctor last HbA1c 7 2  Counseling :  A description of the counseling  All issues regarding tight sugar control, taking cholesterol medications, regular exercise, symptoms about coronary artery disease discussed with patient at length  Previous echo from 2014 reviewed  Goals and Barriers  Patient's ability to self care: Yes  Medication side effect reviewed with patient in detail and all their questions answered to their satisfaction  HPI :     Max Lombardi is a 75y o  year old male who came for follow up  Patient has a past medical history significant for Coronary Artery artery disease, CK D stage III, Diabetes mellitus with renal manifestations, PVD with right femoropopliteal which has occluded and left SFA stent by me in 2010, hypertension, diabetic neuropathy who came to see us after his vascular study was found to be abnormal  For cardiac problem he generally sees Dr Burton Standing  His vascular study was ordered by his podiatrist as he was having pain in his foot  He also had a lot of back problems sometime pain radiates from his back to the thighs  Here a stent placed in his left SFA in 2010  It's already known that his right femoropopliteal is occluded  He has no fever no chills no nausea no vomiting no other significant complaint     03/10/2020  Above reviewed  Patient came for follow-up  He is doing well from cardiac point of view other than his legs bother him  He was supposed to see vascular surgery  Blood pressure is now much better 132/82 today  He may have also left subclavian stenosis  No  Nausea no vomiting no fever no chills  Nuclear stress test done in September was nonischemic with normal LV systolic function  It a blood test done 03/09/2020 which was reviewed acceptable    No other significant complaint today heart rate is 60 beats per minute on EKG and it shows no new changes  He has known PVD with bypass on the right leg and stent of the left and follows up with vascular  Has not seen them for some time Doppler study done in 2019 reviewed with him  Review of Systems   Constitutional: Negative for activity change, chills, diaphoresis, fever and unexpected weight change  HENT: Negative for congestion  Eyes: Negative for discharge and redness  Respiratory: Negative for cough, chest tightness, shortness of breath and wheezing  Cardiovascular: Negative  Negative for chest pain, palpitations and leg swelling  Gastrointestinal: Negative for abdominal pain, diarrhea and nausea  Endocrine: Negative  Genitourinary: Negative for decreased urine volume and urgency  Musculoskeletal: Positive for gait problem  Negative for arthralgias and back pain  Difficulty in walking due to PVD and DJD   Skin: Negative for rash and wound  Allergic/Immunologic: Negative  Neurological: Negative for dizziness, seizures, syncope, weakness, light-headedness and headaches  Hematological: Negative  Psychiatric/Behavioral: Negative for agitation and confusion  The patient is not nervous/anxious          Historical Information   Past Medical History:   Diagnosis Date    Acquired hallux valgus     last assessed: 8/1/2013    Allergic rhinitis     Anemia 10/26/2010    Atrophy of nail     last assessed: 7/7/2015    Chronic kidney disease     chronic renal insufficiency    Coronary artery disease     Deformity of ankle and foot, acquired     last assessed: 2/22/2016    Diabetes mellitus ([facility] )     Difficulty walking     last assessed: 12/14/2015    Dysesthesia     last assessed: 11/25/2016    Hammer toe     unspecified laterality; last assessed: 8/1/2013    Hypertension     Onychomycosis of toenail     last assessed: 2/22/2016    [facility] ([facility] )     left SFA stent in 2010    Pes planus     unspecified laterality; last assessed: 8/1/2013   Margaret Mcguire Pneumonia     last assessed: 2/27/2016    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     last assessed: 8/15/2016    Type 2 diabetes mellitus ([facility] ) 07/26/2010    Viral warts     last assessed: 7/24/2015     Past Surgical History:   Procedure Laterality Date    CARDIAC CATHETERIZATION  07/29/2010    CATARACT EXTRACTION, BILATERAL Bilateral     R 1999, L 2008    COLONOSCOPY  2011    FEMORAL ARTERY - POPLITEAL ARTERY BYPASS GRAFT  09/23/2013    FOOT SURGERY      bone spur removed    LEG SURGERY Bilateral     repair; L 8/20/2010 and 7/26/2012    ROTATOR CUFF REPAIR Left 2009    SHOULDER SURGERY Right 2005    collar bone     Social History     Substance and Sexual Activity   Alcohol Use Yes    Comment: being a social drinker     Social History     Substance and Sexual Activity   Drug Use No     Social History     Tobacco Use   Smoking Status Never Smoker   Smokeless Tobacco Never Used     Family History:   Family History   Problem Relation Age of Onset    Heart disease Father     Heart attack Father         acute myocardial infarction    Heart disease Sister     Heart attack Sister         acute myocardial infarction    Heart disease Paternal Grandfather     Aortic aneurysm Mother        Meds/Allergies     No Known Allergies    Current Outpatient Medications:     acarbose (PRECOSE) 100 MG tablet, Take 1 tablet (100 mg total) by mouth 3 (three) times a day with meals, Disp: 270 tablet, Rfl: 1    amLODIPine (NORVASC) 2 5 mg tablet, Take 1 tablet (2 5 mg total) by mouth daily, Disp: 90 tablet, Rfl: 3    aspirin (ECOTRIN LOW STRENGTH) 81 mg EC tablet, Take 1 tablet (81 mg total) by mouth daily, Disp: 30 tablet, Rfl: 6    b complex-vitamin C-folic acid (NEPHROCAPS) 1 mg capsule, Take 1 capsule by mouth daily, Disp: , Rfl:     betamethasone dipropionate (DIPROSONE) 0 05 % cream, Use as dir twice daily, Disp: , Rfl:     carvedilol (COREG) 12 5 mg tablet, TAKE 1 TABLET TWICE DAILY, Disp: 180 tablet, Rfl: 1    co-enzyme Q-10 100 mg capsule, Take 100 mg by mouth daily, Disp: , Rfl:     gabapentin (NEURONTIN) 600 MG tablet, Take 1 tablet (600 mg total) by mouth 2 (two) times a day, Disp: 180 tablet, Rfl: 0    glimepiride (AMARYL) 4 mg tablet, Take 1 tablet (4 mg total) by mouth 2 (two) times a day, Disp: 180 tablet, Rfl: 1    glucose blood (TRUETRACK TEST) test strip, 1 each by Other route daily Use as instructed for E11 29, Disp: 100 each, Rfl: 3    lisinopril (ZESTRIL) 5 mg tablet, Take 1 tablet (5 mg total) by mouth daily (Patient taking differently: Take 2 5 mg by mouth daily ), Disp: 90 tablet, Rfl: 3    metFORMIN (GLUMETZA) 500 MG (MOD) 24 hr tablet, Take 2 tablets (1,000 mg total) by mouth 2 (two) times a day with meals, Disp: 120 tablet, Rfl: 5    multivitamin (THERAGRAN) TABS, Take 1 tablet by mouth daily, Disp: , Rfl:     omeprazole (PriLOSEC) 40 MG capsule, TAKE 1 CAPSULE EVERY DAY, Disp: 90 capsule, Rfl: 1    Probiotic Product (CULTURELLE PROBIOTICS) CHEW, Chew daily, Disp: , Rfl:     simvastatin (ZOCOR) 40 mg tablet, TAKE 1 TABLET (40 MG TOTAL) BY MOUTH DAILY, Disp: 90 tablet, Rfl: 1    TRUEPLUS LANCETS 28G MISC, Test 1x/d, E11 29, Disp: , Rfl: 0    Vitals: Blood pressure 132/82, pulse 77, height 5' 11" (1 803 m), weight 86 6 kg (191 lb), SpO2 99 %  Body mass index is 26 64 kg/m²  Vitals:    03/10/20 1603   Weight: 86 6 kg (191 lb)     BP Readings from Last 3 Encounters:   03/10/20 132/82   02/12/20 140/62   02/04/20 136/92         Physical Exam   Constitutional: He is oriented to person, place, and time  He appears well-developed and well-nourished  No distress  HENT:   Head: Normocephalic and atraumatic  Eyes: Pupils are equal, round, and reactive to light  Neck: Neck supple  No JVD present  No tracheal deviation present  No thyromegaly present     Cardiovascular: Normal rate, regular rhythm, S1 normal and S2 normal  Exam reveals no gallop, no S3, no S4, no distant heart sounds and no friction rub  Murmur heard  Systolic (ejection) murmur is present with a grade of 2/6  S1-S2 regular with 2/6 murmur S4   Pulmonary/Chest: Effort normal and breath sounds normal  No respiratory distress  He has no wheezes  He has no rales  He exhibits no tenderness  Abdominal: Soft  Bowel sounds are normal  He exhibits no distension  There is no tenderness  Musculoskeletal: He exhibits no edema or deformity  Neurological: He is alert and oriented to person, place, and time  Skin: Skin is warm and dry  No rash noted  He is not diaphoretic  No pallor  Psychiatric: He has a normal mood and affect  His behavior is normal  Judgment normal        Diagnostic Studies Review Cardio:    Nuclear stress test   Nuclear stress test done 2018 was a normal study with EF around 60%  No ischemia noted it was Lexiscan stress test     EKG:    Twelve lead EKG done on 2018 shows normal sinus rhythm heart rate 60 beats per minute  No significant ST changes  Twelve lead EKG done 2019 shows normal sinus rhythm heart rate 60 beats per minute  No change from old EKG  Twelve lead EKG 03/10/2020 shows normal sinus rhythm LVH by voltage heart rate 60 beats per minute no change from old EKG    Cardiac testing:   Results for orders placed in visit on 04/15/14   Echo complete with contrast if indicated    Narrative [address]   [address] [facility], [address] [facility]   Phone: [phone]   TRANSTHORACIC ECHOCARDIOGRAM   2D, M-MODE, DOPPLER, AND COLOR DOPPLER   Study date:  2014   Patient: Najma Rider   MR number: [id number]   Account number: [de-identified]   : [id number]   Age: 70 years   Gender: Male   Status: Inpatient   Location: [facility]  Echo lab   Height: 73 in   Weight: 140 8 lb   BP: 124/ 59 mmHg   Indications: Atrial Fibrillation   Diagnoses: 427 31 - ATRIAL FIBRILLATION   Sonographer:  Emanuel MERRILL   Referring Physician:  Meche Peralta MD Group:  [facility]   Interpreting Physician:  Jose Caraballo MD   SUMMARY   LEFT VENTRICLE:   Systolic function was normal  Ejection fraction was estimated to be 62 %  There were no regional wall motion abnormalities  Wall thickness was mildly increased  MITRAL VALVE:   There was mild annular calcification  AORTIC VALVE:   There was mild regurgitation  HISTORY: PRIOR HISTORY: CAD, Cardiac Stent, PVD, PAD, Atrial Fibrillation,    HTN,   Diabetes   PROCEDURE: The procedure was performed in the echo lab  This was a routine   study  The transthoracic approach was used  The study included complete 2D   imaging, M-mode, complete spectral Doppler, and color Doppler  The heart rate   was 60 bpm, at the start of the study  Images were obtained from the   parasternal, apical, subcostal, and suprasternal notch acoustic windows  Image   quality was adequate  LEFT VENTRICLE: Size was normal  Systolic function was normal  Ejection   TRANSTHORACIC ECHOCARDIOGRAM   Patient: Jason Thompson   MR number: [id number]    ------ Page 2   fraction was estimated to be 62 %  There were no regional wall motion   abnormalities  Wall thickness was mildly increased  DOPPLER: Left ventricular   diastolic function parameters were normal    RIGHT VENTRICLE: The size was normal  Systolic function was normal  Wall   thickness was normal    LEFT ATRIUM: Size was normal    RIGHT ATRIUM: Size was normal    MITRAL VALVE: There was mild annular calcification  Valve structure was    normal    There was normal leaflet separation  DOPPLER: The transmitral velocity was   within the normal range  There was no evidence for stenosis  There was no   regurgitation  AORTIC VALVE: The valve was trileaflet  Leaflets exhibited normal thickness    and   normal cuspal separation  DOPPLER: Transaortic velocity was within the normal   range  There was no evidence for stenosis  There was mild regurgitation     TRICUSPID VALVE: The valve structure was normal  There was normal leaflet   separation  DOPPLER: The transtricuspid velocity was within the normal range  There was no evidence for stenosis  There was no regurgitation  PULMONIC VALVE: Leaflets exhibited normal thickness, no calcification, and   normal cuspal separation  DOPPLER: The transpulmonic velocity was within the   normal range  There was no regurgitation  PERICARDIUM: There was no pericardial effusion  AORTA: The root exhibited normal size  SYSTEMIC VEINS: IVC: The inferior vena cava was normal in size  Respirophasic   changes were normal    SYSTEM MEASUREMENT TABLES   2D   %FS: 33 56 %   AV Diam: 3 43 cm   EDV(Teich): 73 79 ml   EF(Teich): 62 8 %   ESV(Teich): 27 45 ml   HR_4Ch_Q: 60 45 BPM   IVSd: 1 23 cm   LA Area: 17 45 cm2   LA Diam: 3 88 cm   LVCO_4Ch_Q: 3 8 L/min   LVEF_4Ch_Q: 56 5 %   LVIDd: 4 09 cm   TRANSTHORACIC ECHOCARDIOGRAM   Patient: Kenneth Sinclair   MR number: [id number]    ------ Page 3   LVIDs: 2 72 cm   LVLd_4Ch_Q: 9 61 cm   LVLs_4Ch_Q: 7 95 cm   LVPWd: 1 11 cm   LVSV_4Ch_Q: 62 92 ml   LVVED_4Ch_Q: 111 36 ml   LVVES_4Ch_Q: 48 45 ml   RA Area: 10 69 cm2   RVIDd: 3 54 cm   SV(Teich): 46 34 ml   CW   AR Dec [place]: 2 07 m/s2   AR Dec Time: 1874 44 ms   AR PHT: 543 59 ms   AR Vmax: 3 88 m/s   AR maxP 32 mmHg   MM   TAPSE: 2 53 cm   PW   AVC: 428 26 ms   MV A [place]: 0 54 m/s   MV Dec [place]: 2 38 m/s2   MV DecT: 253 3 ms   MV E [place]: 0 6 m/s   MV E/A Ratio: 1 12   MV PHT: 73 46 ms   MVA By PHT: 3 cm2   [employer] Accredited Echocardiography [facility]   Prepared and electronically signed by   Trev Quinones MD   Signed 2014 16:21:13      No results found for this or any previous visit        Lab Review   Lab Results   Component Value Date    WBC 6 36 2019    HGB 10 6 (L) 2019    HCT 34 1 (L) 2019    MCV 93 2019    RDW 14 0 2019     2019     BMP:  Lab Results   Component Value Date     04/17/2014    K 5 0 03/09/2020     03/09/2020    CO2 26 03/09/2020    ANIONGAP 8 04/17/2014    BUN 10 03/09/2020    CREATININE 1 23 03/09/2020    GLUCOSE 137 04/17/2014    GLUF 137 (H) 03/09/2020    CALCIUM 9 1 03/09/2020    EGFR 57 03/09/2020    MG 1 8 04/17/2014     LFT:  Lab Results   Component Value Date    AST 32 03/09/2020    ALT 32 03/09/2020    ALKPHOS 87 03/09/2020       Lab Results   Component Value Date    HGBA1C 8 0 (H) 03/09/2020     Lipid Profile:   Lab Results   Component Value Date    CHOL 106 07/19/2014    HDL 51 12/03/2019    LDLCALC 81 12/03/2019    TRIG 96 12/03/2019     Lab Results   Component Value Date    CHOL 106 07/19/2014       Dr Ángela Victoria MD [facility]      "This note has been constructed using a voice recognition system  Therefore there may be syntax, spelling, and/or grammatical errors   Please call if you have any questions  "

## 2020-03-09 ENCOUNTER — APPOINTMENT (OUTPATIENT)
Dept: LAB | Facility: CLINIC | Age: 75
End: 2020-03-09
Payer: MEDICARE

## 2020-03-09 ENCOUNTER — TRANSCRIBE ORDERS (OUTPATIENT)
Dept: LAB | Facility: CLINIC | Age: 75
End: 2020-03-09

## 2020-03-09 DIAGNOSIS — E11.40 TYPE 2 DIABETES MELLITUS WITH DIABETIC NEUROPATHY, WITHOUT LONG-TERM CURRENT USE OF INSULIN (HCC): ICD-10-CM

## 2020-03-09 LAB
ALBUMIN SERPL BCP-MCNC: 3.9 G/DL (ref 3.5–5)
ALP SERPL-CCNC: 87 U/L (ref 46–116)
ALT SERPL W P-5'-P-CCNC: 32 U/L (ref 12–78)
ANION GAP SERPL CALCULATED.3IONS-SCNC: 3 MMOL/L (ref 4–13)
AST SERPL W P-5'-P-CCNC: 32 U/L (ref 5–45)
BILIRUB SERPL-MCNC: 0.61 MG/DL (ref 0.2–1)
BUN SERPL-MCNC: 10 MG/DL (ref 5–25)
CALCIUM SERPL-MCNC: 9.1 MG/DL (ref 8.3–10.1)
CHLORIDE SERPL-SCNC: 108 MMOL/L (ref 100–108)
CO2 SERPL-SCNC: 26 MMOL/L (ref 21–32)
CREAT SERPL-MCNC: 1.23 MG/DL (ref 0.6–1.3)
EST. AVERAGE GLUCOSE BLD GHB EST-MCNC: 183 MG/DL
GFR SERPL CREATININE-BSD FRML MDRD: 57 ML/MIN/1.73SQ M
GLUCOSE P FAST SERPL-MCNC: 137 MG/DL (ref 65–99)
HBA1C MFR BLD: 8 %
POTASSIUM SERPL-SCNC: 5 MMOL/L (ref 3.5–5.3)
PROT SERPL-MCNC: 7 G/DL (ref 6.4–8.2)
SODIUM SERPL-SCNC: 137 MMOL/L (ref 136–145)

## 2020-03-09 PROCEDURE — 83036 HEMOGLOBIN GLYCOSYLATED A1C: CPT

## 2020-03-09 PROCEDURE — 36415 COLL VENOUS BLD VENIPUNCTURE: CPT

## 2020-03-09 PROCEDURE — 80053 COMPREHEN METABOLIC PANEL: CPT

## 2020-03-10 ENCOUNTER — OFFICE VISIT (OUTPATIENT)
Dept: CARDIOLOGY CLINIC | Facility: CLINIC | Age: 75
End: 2020-03-10
Payer: MEDICARE

## 2020-03-10 VITALS
WEIGHT: 191 LBS | HEIGHT: 71 IN | HEART RATE: 77 BPM | BODY MASS INDEX: 26.74 KG/M2 | SYSTOLIC BLOOD PRESSURE: 132 MMHG | OXYGEN SATURATION: 99 % | DIASTOLIC BLOOD PRESSURE: 82 MMHG

## 2020-03-10 DIAGNOSIS — I12.9 BENIGN HYPERTENSION WITH CKD (CHRONIC KIDNEY DISEASE) STAGE III (HCC): ICD-10-CM

## 2020-03-10 DIAGNOSIS — E78.5 DYSLIPIDEMIA: ICD-10-CM

## 2020-03-10 DIAGNOSIS — E11.22 CKD STAGE 3 DUE TO TYPE 2 DIABETES MELLITUS (HCC): ICD-10-CM

## 2020-03-10 DIAGNOSIS — E11.40 TYPE 2 DIABETES MELLITUS WITH DIABETIC NEUROPATHY, WITHOUT LONG-TERM CURRENT USE OF INSULIN (HCC): ICD-10-CM

## 2020-03-10 DIAGNOSIS — I25.119 CORONARY ARTERY DISEASE INVOLVING NATIVE CORONARY ARTERY OF NATIVE HEART WITH ANGINA PECTORIS (HCC): ICD-10-CM

## 2020-03-10 DIAGNOSIS — N18.30 CKD STAGE 3 DUE TO TYPE 2 DIABETES MELLITUS (HCC): ICD-10-CM

## 2020-03-10 DIAGNOSIS — I73.9 PAD (PERIPHERAL ARTERY DISEASE) (HCC): ICD-10-CM

## 2020-03-10 DIAGNOSIS — N18.30 BENIGN HYPERTENSION WITH CKD (CHRONIC KIDNEY DISEASE) STAGE III (HCC): ICD-10-CM

## 2020-03-10 PROCEDURE — 3075F SYST BP GE 130 - 139MM HG: CPT | Performed by: INTERNAL MEDICINE

## 2020-03-10 PROCEDURE — 93000 ELECTROCARDIOGRAM COMPLETE: CPT | Performed by: INTERNAL MEDICINE

## 2020-03-10 PROCEDURE — 3079F DIAST BP 80-89 MM HG: CPT | Performed by: INTERNAL MEDICINE

## 2020-03-10 PROCEDURE — 3052F HG A1C>EQUAL 8.0%<EQUAL 9.0%: CPT | Performed by: INTERNAL MEDICINE

## 2020-03-10 PROCEDURE — 1160F RVW MEDS BY RX/DR IN RCRD: CPT | Performed by: INTERNAL MEDICINE

## 2020-03-10 PROCEDURE — 1036F TOBACCO NON-USER: CPT | Performed by: INTERNAL MEDICINE

## 2020-03-10 PROCEDURE — 3008F BODY MASS INDEX DOCD: CPT | Performed by: INTERNAL MEDICINE

## 2020-03-10 PROCEDURE — 3066F NEPHROPATHY DOC TX: CPT | Performed by: INTERNAL MEDICINE

## 2020-03-10 PROCEDURE — 99214 OFFICE O/P EST MOD 30 MIN: CPT | Performed by: INTERNAL MEDICINE

## 2020-03-10 PROCEDURE — 4040F PNEUMOC VAC/ADMIN/RCVD: CPT | Performed by: INTERNAL MEDICINE

## 2020-03-12 ENCOUNTER — OFFICE VISIT (OUTPATIENT)
Dept: FAMILY MEDICINE CLINIC | Facility: CLINIC | Age: 75
End: 2020-03-12
Payer: MEDICARE

## 2020-03-12 VITALS
BODY MASS INDEX: 26.74 KG/M2 | HEIGHT: 71 IN | SYSTOLIC BLOOD PRESSURE: 128 MMHG | RESPIRATION RATE: 16 BRPM | OXYGEN SATURATION: 97 % | WEIGHT: 191 LBS | HEART RATE: 84 BPM | DIASTOLIC BLOOD PRESSURE: 60 MMHG | TEMPERATURE: 97.5 F

## 2020-03-12 DIAGNOSIS — E11.42 DIABETIC POLYNEUROPATHY ASSOCIATED WITH TYPE 2 DIABETES MELLITUS (HCC): ICD-10-CM

## 2020-03-12 DIAGNOSIS — E11.40 TYPE 2 DIABETES MELLITUS WITH DIABETIC NEUROPATHY, WITHOUT LONG-TERM CURRENT USE OF INSULIN (HCC): ICD-10-CM

## 2020-03-12 DIAGNOSIS — N18.30 TYPE 2 DIABETES MELLITUS WITH STAGE 3 CHRONIC KIDNEY DISEASE, WITHOUT LONG-TERM CURRENT USE OF INSULIN (HCC): Primary | ICD-10-CM

## 2020-03-12 DIAGNOSIS — I73.9 PAD (PERIPHERAL ARTERY DISEASE) (HCC): ICD-10-CM

## 2020-03-12 DIAGNOSIS — E11.22 CKD STAGE 3 DUE TO TYPE 2 DIABETES MELLITUS (HCC): ICD-10-CM

## 2020-03-12 DIAGNOSIS — E11.22 TYPE 2 DIABETES MELLITUS WITH STAGE 3 CHRONIC KIDNEY DISEASE, WITHOUT LONG-TERM CURRENT USE OF INSULIN (HCC): Primary | ICD-10-CM

## 2020-03-12 DIAGNOSIS — I12.9 BENIGN HYPERTENSION WITH CKD (CHRONIC KIDNEY DISEASE) STAGE III (HCC): ICD-10-CM

## 2020-03-12 DIAGNOSIS — N18.30 BENIGN HYPERTENSION WITH CKD (CHRONIC KIDNEY DISEASE) STAGE III (HCC): ICD-10-CM

## 2020-03-12 DIAGNOSIS — N18.30 CKD STAGE 3 DUE TO TYPE 2 DIABETES MELLITUS (HCC): ICD-10-CM

## 2020-03-12 PROBLEM — I74.3 EMBOLISM AND THROMBOSIS OF ARTERIES OF THE LOWER EXTREMITIES (HCC): Status: ACTIVE | Noted: 2020-03-12

## 2020-03-12 PROCEDURE — 3074F SYST BP LT 130 MM HG: CPT | Performed by: FAMILY MEDICINE

## 2020-03-12 PROCEDURE — 3078F DIAST BP <80 MM HG: CPT | Performed by: FAMILY MEDICINE

## 2020-03-12 PROCEDURE — 1036F TOBACCO NON-USER: CPT | Performed by: FAMILY MEDICINE

## 2020-03-12 PROCEDURE — 2022F DILAT RTA XM EVC RTNOPTHY: CPT | Performed by: FAMILY MEDICINE

## 2020-03-12 PROCEDURE — 3008F BODY MASS INDEX DOCD: CPT | Performed by: FAMILY MEDICINE

## 2020-03-12 PROCEDURE — 3066F NEPHROPATHY DOC TX: CPT | Performed by: FAMILY MEDICINE

## 2020-03-12 PROCEDURE — 3052F HG A1C>EQUAL 8.0%<EQUAL 9.0%: CPT | Performed by: FAMILY MEDICINE

## 2020-03-12 PROCEDURE — 4040F PNEUMOC VAC/ADMIN/RCVD: CPT | Performed by: FAMILY MEDICINE

## 2020-03-12 PROCEDURE — 99214 OFFICE O/P EST MOD 30 MIN: CPT | Performed by: FAMILY MEDICINE

## 2020-03-12 PROCEDURE — 1160F RVW MEDS BY RX/DR IN RCRD: CPT | Performed by: FAMILY MEDICINE

## 2020-03-12 RX ORDER — ACARBOSE 100 MG/1
100 TABLET ORAL
Qty: 270 TABLET | Refills: 1 | Status: SHIPPED | OUTPATIENT
Start: 2020-03-12 | End: 2020-07-21

## 2020-03-12 RX ORDER — ATORVASTATIN CALCIUM 20 MG/1
20 TABLET, FILM COATED ORAL DAILY
Qty: 90 TABLET | Refills: 1 | Status: SHIPPED | OUTPATIENT
Start: 2020-03-12 | End: 2020-09-24

## 2020-03-12 NOTE — PATIENT INSTRUCTIONS
Please change simvastatin 40mg to atorvastatin 20mg when you run out, as it is theoretically safer with your amlodipine

## 2020-03-12 NOTE — PROGRESS NOTES
Assessment/Plan:    No problem-specific Assessment & Plan notes found for this encounter  DM2 worse at 8, fluctuates, he can do more with diet, recheck 3m, cont same meds, creat and metformin risk aware    ckd stable, did see nephrology, maintain hydration, avoid nsaids  ckd3    Pad stable    Neuropathy unchanged    Change zocor to lipitor for safety with amlodipine     Diagnoses and all orders for this visit:    Type 2 diabetes mellitus with stage 3 chronic kidney disease, without long-term current use of insulin ([facility])  -     Comprehensive metabolic panel; Future  -     Hemoglobin A1C; Future    CKD stage 3 due to type 2 diabetes mellitus ([facility])    Benign hypertension with CKD (chronic kidney disease) stage III ([facility])    PAD (peripheral artery disease) ([facility])  -     atorvastatin (LIPITOR) 20 mg tablet; Take 1 tablet (20 mg total) by mouth daily    Diabetic polyneuropathy associated with type 2 diabetes mellitus [facility]    Type 2 diabetes mellitus with diabetic neuropathy, without long-term current use of insulin ([facility])  -     acarbose (PRECOSE) 100 MG tablet; Take 1 tablet (100 mg total) by mouth 3 (three) times a day with meals        Return in about 3 months (around 6/12/2020) for Recheck  Subjective:      Patient ID: Margarito Seymour is a 75 y o  male  Chief Complaint   Patient presents with    Follow-up     3 month [id number]       HPI  Not eating as well due to some hypoglycemic episodes  Been better  Still exercising indoors and outdoors  Tolerating meds but has gas  Neuropathy same, on gabapentin    The following portions of the patient's history were reviewed and updated as appropriate: allergies, current medications, past family history, past medical history, past social history, past surgical history and problem list     Review of Systems   Constitutional: Negative for fever  Respiratory: Negative for shortness of breath  Cardiovascular: Negative for chest pain           Current Outpatient Medications   Medication Sig Dispense Refill    acarbose (PRECOSE) 100 MG tablet Take 1 tablet (100 mg total) by mouth 3 (three) times a day with meals 270 tablet 1    amLODIPine (NORVASC) 2 5 mg tablet Take 1 tablet (2 5 mg total) by mouth daily 90 tablet 3    aspirin (ECOTRIN LOW STRENGTH) 81 mg EC tablet Take 1 tablet (81 mg total) by mouth daily 30 tablet 6    b complex-vitamin C-folic acid (NEPHROCAPS) 1 mg capsule Take 1 capsule by mouth daily      betamethasone dipropionate (DIPROSONE) 0 05 % cream Use as dir twice daily      carvedilol (COREG) 12 5 mg tablet TAKE 1 TABLET TWICE DAILY 180 tablet 1    co-enzyme Q-10 100 mg capsule Take 100 mg by mouth daily      gabapentin (NEURONTIN) 600 MG tablet Take 1 tablet (600 mg total) by mouth 2 (two) times a day 180 tablet 0    glimepiride (AMARYL) 4 mg tablet Take 1 tablet (4 mg total) by mouth 2 (two) times a day 180 tablet 1    glucose blood (TRUETRACK TEST) test strip 1 each by Other route daily Use as instructed for E11 29 100 each 3    lisinopril (ZESTRIL) 5 mg tablet Take 1 tablet (5 mg total) by mouth daily (Patient taking differently: Take 2 5 mg by mouth daily ) 90 tablet 3    metFORMIN (GLUMETZA) 500 MG (MOD) 24 hr tablet Take 2 tablets (1,000 mg total) by mouth 2 (two) times a day with meals 120 tablet 5    multivitamin (THERAGRAN) TABS Take 1 tablet by mouth daily      omeprazole (PriLOSEC) 40 MG capsule TAKE 1 CAPSULE EVERY DAY 90 capsule 1    Probiotic Product (CULTURELLE PROBIOTICS) CHEW Chew daily      TRUEPLUS LANCETS 28G MISC Test 1x/d, E11 29  0    atorvastatin (LIPITOR) 20 mg tablet Take 1 tablet (20 mg total) by mouth daily 90 tablet 1     No current facility-administered medications for this visit  Objective:    /60   Pulse 84   Temp 97 5 °F (36 4 °C)   Resp 16   Ht 5' 11" (1 803 m)   Wt 86 6 kg (191 lb)   SpO2 97%   BMI 26 64 kg/m²        Physical Exam   Constitutional: He appears well-developed   No distress  HENT:   Head: Normocephalic  Right Ear: External ear normal    Left Ear: External ear normal    Mouth/Throat: No oropharyngeal exudate  Eyes: Conjunctivae are normal  No scleral icterus  Neck: Neck supple  Cardiovascular: Normal rate, regular rhythm and intact distal pulses  Pulmonary/Chest: Effort normal and breath sounds normal  No respiratory distress  Abdominal: Soft  Bowel sounds are normal  There is no tenderness  Musculoskeletal: He exhibits no edema or deformity  Neurological: He is alert  Skin: Skin is warm and dry  No pallor  Psychiatric: His behavior is normal  Thought content normal    Nursing note and vitals reviewed  BMI Counseling: Body mass index is 26 64 kg/m²  The BMI is above normal  Nutrition recommendations include decreasing portion sizes and moderation in carbohydrate intake  Exercise recommendations include exercising 3-5 times per week  No pharmacotherapy was ordered                Anant Deluna DO

## 2020-03-14 DIAGNOSIS — I10 BENIGN ESSENTIAL HYPERTENSION: ICD-10-CM

## 2020-03-16 RX ORDER — CARVEDILOL 12.5 MG/1
TABLET ORAL
Qty: 180 TABLET | Refills: 1 | Status: SHIPPED | OUTPATIENT
Start: 2020-03-16 | End: 2020-07-27

## 2020-03-23 DIAGNOSIS — I10 BENIGN ESSENTIAL HYPERTENSION: ICD-10-CM

## 2020-03-24 RX ORDER — AMLODIPINE BESYLATE 2.5 MG/1
2.5 TABLET ORAL DAILY
Qty: 90 TABLET | Refills: 3 | Status: SHIPPED | OUTPATIENT
Start: 2020-03-24 | End: 2021-01-24

## 2020-04-07 ENCOUNTER — OFFICE VISIT (OUTPATIENT)
Dept: PODIATRY | Facility: CLINIC | Age: 75
End: 2020-04-07
Payer: MEDICARE

## 2020-04-07 VITALS
SYSTOLIC BLOOD PRESSURE: 128 MMHG | DIASTOLIC BLOOD PRESSURE: 60 MMHG | RESPIRATION RATE: 17 BRPM | BODY MASS INDEX: 26.74 KG/M2 | WEIGHT: 191 LBS | HEIGHT: 71 IN | HEART RATE: 84 BPM

## 2020-04-07 DIAGNOSIS — I73.9 PAD (PERIPHERAL ARTERY DISEASE) (HCC): ICD-10-CM

## 2020-04-07 DIAGNOSIS — L84 CORNS: ICD-10-CM

## 2020-04-07 DIAGNOSIS — M21.962 ACQUIRED DEFORMITY OF LEFT FOOT: ICD-10-CM

## 2020-04-07 DIAGNOSIS — M79.672 PAIN IN BOTH FEET: ICD-10-CM

## 2020-04-07 DIAGNOSIS — B35.1 ONYCHOMYCOSIS: ICD-10-CM

## 2020-04-07 DIAGNOSIS — M79.671 PAIN IN BOTH FEET: ICD-10-CM

## 2020-04-07 DIAGNOSIS — E11.42 DIABETIC POLYNEUROPATHY ASSOCIATED WITH TYPE 2 DIABETES MELLITUS (HCC): Primary | ICD-10-CM

## 2020-04-07 PROCEDURE — 99213 OFFICE O/P EST LOW 20 MIN: CPT | Performed by: PODIATRIST

## 2020-04-10 DIAGNOSIS — E11.42 DIABETIC POLYNEUROPATHY ASSOCIATED WITH TYPE 2 DIABETES MELLITUS (HCC): ICD-10-CM

## 2020-04-10 RX ORDER — GABAPENTIN 600 MG/1
600 TABLET ORAL 2 TIMES DAILY
Qty: 180 TABLET | Refills: 0 | Status: SHIPPED | OUTPATIENT
Start: 2020-04-10 | End: 2020-06-16 | Stop reason: SDUPTHER

## 2020-04-18 DIAGNOSIS — K21.9 GASTROESOPHAGEAL REFLUX DISEASE, ESOPHAGITIS PRESENCE NOT SPECIFIED: ICD-10-CM

## 2020-04-20 RX ORDER — OMEPRAZOLE 40 MG/1
CAPSULE, DELAYED RELEASE ORAL
Qty: 90 CAPSULE | Refills: 1 | Status: SHIPPED | OUTPATIENT
Start: 2020-04-20 | End: 2020-08-26

## 2020-05-01 ENCOUNTER — OFFICE VISIT (OUTPATIENT)
Dept: VASCULAR SURGERY | Facility: CLINIC | Age: 75
End: 2020-05-01
Payer: MEDICARE

## 2020-05-01 VITALS
WEIGHT: 192 LBS | BODY MASS INDEX: 26.88 KG/M2 | DIASTOLIC BLOOD PRESSURE: 68 MMHG | HEIGHT: 71 IN | RESPIRATION RATE: 16 BRPM | SYSTOLIC BLOOD PRESSURE: 150 MMHG | TEMPERATURE: 97.6 F | HEART RATE: 95 BPM

## 2020-05-01 DIAGNOSIS — E11.22 TYPE 2 DIABETES MELLITUS WITH STAGE 3 CHRONIC KIDNEY DISEASE, WITHOUT LONG-TERM CURRENT USE OF INSULIN (HCC): ICD-10-CM

## 2020-05-01 DIAGNOSIS — N18.30 TYPE 2 DIABETES MELLITUS WITH STAGE 3 CHRONIC KIDNEY DISEASE, WITHOUT LONG-TERM CURRENT USE OF INSULIN (HCC): ICD-10-CM

## 2020-05-01 DIAGNOSIS — I12.9 BENIGN HYPERTENSION WITH CKD (CHRONIC KIDNEY DISEASE) STAGE III (HCC): ICD-10-CM

## 2020-05-01 DIAGNOSIS — Z13.6 SCREENING FOR AAA (AORTIC ABDOMINAL ANEURYSM): ICD-10-CM

## 2020-05-01 DIAGNOSIS — N18.30 BENIGN HYPERTENSION WITH CKD (CHRONIC KIDNEY DISEASE) STAGE III (HCC): ICD-10-CM

## 2020-05-01 DIAGNOSIS — E11.42 DIABETIC POLYNEUROPATHY ASSOCIATED WITH TYPE 2 DIABETES MELLITUS (HCC): ICD-10-CM

## 2020-05-01 DIAGNOSIS — I73.9 PAD (PERIPHERAL ARTERY DISEASE) (HCC): Primary | ICD-10-CM

## 2020-05-01 DIAGNOSIS — I70.219 ATHEROSCLEROSIS OF ARTERY OF EXTREMITY WITH INTERMITTENT CLAUDICATION (HCC): ICD-10-CM

## 2020-05-01 DIAGNOSIS — T82.898D OCCLUSION OF FEMOROPOPLITEAL BYPASS GRAFT, SUBSEQUENT ENCOUNTER: ICD-10-CM

## 2020-05-01 PROCEDURE — 1036F TOBACCO NON-USER: CPT | Performed by: PHYSICIAN ASSISTANT

## 2020-05-01 PROCEDURE — 3066F NEPHROPATHY DOC TX: CPT | Performed by: PHYSICIAN ASSISTANT

## 2020-05-01 PROCEDURE — 99215 OFFICE O/P EST HI 40 MIN: CPT | Performed by: PHYSICIAN ASSISTANT

## 2020-05-01 PROCEDURE — 4040F PNEUMOC VAC/ADMIN/RCVD: CPT | Performed by: PHYSICIAN ASSISTANT

## 2020-05-01 PROCEDURE — 3008F BODY MASS INDEX DOCD: CPT | Performed by: PHYSICIAN ASSISTANT

## 2020-05-01 PROCEDURE — 3052F HG A1C>EQUAL 8.0%<EQUAL 9.0%: CPT | Performed by: PHYSICIAN ASSISTANT

## 2020-05-01 PROCEDURE — 1160F RVW MEDS BY RX/DR IN RCRD: CPT | Performed by: PHYSICIAN ASSISTANT

## 2020-05-01 PROCEDURE — 3077F SYST BP >= 140 MM HG: CPT | Performed by: PHYSICIAN ASSISTANT

## 2020-05-01 PROCEDURE — 3078F DIAST BP <80 MM HG: CPT | Performed by: PHYSICIAN ASSISTANT

## 2020-05-01 PROCEDURE — 2022F DILAT RTA XM EVC RTNOPTHY: CPT | Performed by: PHYSICIAN ASSISTANT

## 2020-05-01 RX ORDER — SIMVASTATIN 40 MG
40 TABLET ORAL
COMMUNITY
End: 2021-02-22

## 2020-05-12 ENCOUNTER — HOSPITAL ENCOUNTER (OUTPATIENT)
Dept: RADIOLOGY | Facility: HOSPITAL | Age: 75
Discharge: HOME | End: 2020-05-12
Payer: MEDICARE

## 2020-05-12 DIAGNOSIS — T82.898D OCCLUSION OF FEMOROPOPLITEAL BYPASS GRAFT, SUBSEQUENT ENCOUNTER: ICD-10-CM

## 2020-05-12 DIAGNOSIS — I12.9 BENIGN HYPERTENSION WITH CKD (CHRONIC KIDNEY DISEASE) STAGE III (HCC): ICD-10-CM

## 2020-05-12 DIAGNOSIS — I73.9 PAD (PERIPHERAL ARTERY DISEASE) (HCC): ICD-10-CM

## 2020-05-12 DIAGNOSIS — N18.30 BENIGN HYPERTENSION WITH CKD (CHRONIC KIDNEY DISEASE) STAGE III (HCC): ICD-10-CM

## 2020-05-12 PROCEDURE — 93925 LOWER EXTREMITY STUDY: CPT | Performed by: SURGERY

## 2020-05-12 PROCEDURE — 93922 UPR/L XTREMITY ART 2 LEVELS: CPT | Performed by: SURGERY

## 2020-05-12 PROCEDURE — 93923 UPR/LXTR ART STDY 3+ LVLS: CPT

## 2020-05-12 PROCEDURE — 93925 LOWER EXTREMITY STUDY: CPT

## 2020-06-09 ENCOUNTER — APPOINTMENT (OUTPATIENT)
Dept: LAB | Facility: CLINIC | Age: 75
End: 2020-06-09
Payer: MEDICARE

## 2020-06-09 DIAGNOSIS — E11.22 TYPE 2 DIABETES MELLITUS WITH STAGE 3 CHRONIC KIDNEY DISEASE, WITHOUT LONG-TERM CURRENT USE OF INSULIN (HCC): ICD-10-CM

## 2020-06-09 DIAGNOSIS — N18.30 TYPE 2 DIABETES MELLITUS WITH STAGE 3 CHRONIC KIDNEY DISEASE, WITHOUT LONG-TERM CURRENT USE OF INSULIN (HCC): ICD-10-CM

## 2020-06-09 LAB
ALBUMIN SERPL BCP-MCNC: 3.6 G/DL (ref 3.5–5)
ALP SERPL-CCNC: 97 U/L (ref 46–116)
ALT SERPL W P-5'-P-CCNC: 35 U/L (ref 12–78)
ANION GAP SERPL CALCULATED.3IONS-SCNC: 6 MMOL/L (ref 4–13)
AST SERPL W P-5'-P-CCNC: 22 U/L (ref 5–45)
BILIRUB SERPL-MCNC: 0.82 MG/DL (ref 0.2–1)
BUN SERPL-MCNC: 17 MG/DL (ref 5–25)
CALCIUM SERPL-MCNC: 8.9 MG/DL (ref 8.3–10.1)
CHLORIDE SERPL-SCNC: 104 MMOL/L (ref 100–108)
CO2 SERPL-SCNC: 25 MMOL/L (ref 21–32)
CREAT SERPL-MCNC: 1.21 MG/DL (ref 0.6–1.3)
EST. AVERAGE GLUCOSE BLD GHB EST-MCNC: 180 MG/DL
GFR SERPL CREATININE-BSD FRML MDRD: 58 ML/MIN/1.73SQ M
GLUCOSE P FAST SERPL-MCNC: 187 MG/DL (ref 65–99)
HBA1C MFR BLD: 7.9 %
POTASSIUM SERPL-SCNC: 5.1 MMOL/L (ref 3.5–5.3)
PROT SERPL-MCNC: 7.1 G/DL (ref 6.4–8.2)
SODIUM SERPL-SCNC: 135 MMOL/L (ref 136–145)

## 2020-06-09 PROCEDURE — 80053 COMPREHEN METABOLIC PANEL: CPT

## 2020-06-09 PROCEDURE — 83036 HEMOGLOBIN GLYCOSYLATED A1C: CPT

## 2020-06-09 PROCEDURE — 36415 COLL VENOUS BLD VENIPUNCTURE: CPT

## 2020-06-15 ENCOUNTER — OFFICE VISIT (OUTPATIENT)
Dept: FAMILY MEDICINE CLINIC | Facility: CLINIC | Age: 75
End: 2020-06-15
Payer: MEDICARE

## 2020-06-15 ENCOUNTER — TELEPHONE (OUTPATIENT)
Dept: FAMILY MEDICINE CLINIC | Facility: CLINIC | Age: 75
End: 2020-06-15

## 2020-06-15 VITALS
RESPIRATION RATE: 16 BRPM | HEART RATE: 83 BPM | OXYGEN SATURATION: 98 % | TEMPERATURE: 98 F | DIASTOLIC BLOOD PRESSURE: 60 MMHG | SYSTOLIC BLOOD PRESSURE: 140 MMHG | HEIGHT: 71 IN | WEIGHT: 197 LBS | BODY MASS INDEX: 27.58 KG/M2

## 2020-06-15 DIAGNOSIS — I70.219 ATHEROSCLEROSIS OF ARTERY OF EXTREMITY WITH INTERMITTENT CLAUDICATION (HCC): ICD-10-CM

## 2020-06-15 DIAGNOSIS — E11.42 DIABETIC POLYNEUROPATHY ASSOCIATED WITH TYPE 2 DIABETES MELLITUS (HCC): ICD-10-CM

## 2020-06-15 DIAGNOSIS — E11.22 CKD STAGE 3 DUE TO TYPE 2 DIABETES MELLITUS (HCC): ICD-10-CM

## 2020-06-15 DIAGNOSIS — E11.40 TYPE 2 DIABETES MELLITUS WITH DIABETIC NEUROPATHY, WITHOUT LONG-TERM CURRENT USE OF INSULIN (HCC): ICD-10-CM

## 2020-06-15 DIAGNOSIS — I10 BENIGN ESSENTIAL HYPERTENSION: ICD-10-CM

## 2020-06-15 DIAGNOSIS — I25.119 CORONARY ARTERY DISEASE INVOLVING NATIVE CORONARY ARTERY OF NATIVE HEART WITH ANGINA PECTORIS (HCC): ICD-10-CM

## 2020-06-15 DIAGNOSIS — N18.30 CKD STAGE 3 DUE TO TYPE 2 DIABETES MELLITUS (HCC): ICD-10-CM

## 2020-06-15 DIAGNOSIS — R29.898 BILATERAL LEG WEAKNESS: ICD-10-CM

## 2020-06-15 DIAGNOSIS — Z00.00 MEDICARE ANNUAL WELLNESS VISIT, SUBSEQUENT: Primary | ICD-10-CM

## 2020-06-15 PROCEDURE — 1125F AMNT PAIN NOTED PAIN PRSNT: CPT | Performed by: FAMILY MEDICINE

## 2020-06-15 PROCEDURE — 1036F TOBACCO NON-USER: CPT | Performed by: FAMILY MEDICINE

## 2020-06-15 PROCEDURE — 1170F FXNL STATUS ASSESSED: CPT | Performed by: FAMILY MEDICINE

## 2020-06-15 PROCEDURE — 3008F BODY MASS INDEX DOCD: CPT | Performed by: FAMILY MEDICINE

## 2020-06-15 PROCEDURE — 3066F NEPHROPATHY DOC TX: CPT | Performed by: FAMILY MEDICINE

## 2020-06-15 PROCEDURE — 1160F RVW MEDS BY RX/DR IN RCRD: CPT | Performed by: FAMILY MEDICINE

## 2020-06-15 PROCEDURE — 4040F PNEUMOC VAC/ADMIN/RCVD: CPT | Performed by: FAMILY MEDICINE

## 2020-06-15 PROCEDURE — 4010F ACE/ARB THERAPY RXD/TAKEN: CPT | Performed by: FAMILY MEDICINE

## 2020-06-15 PROCEDURE — 1123F ACP DISCUSS/DSCN MKR DOCD: CPT | Performed by: FAMILY MEDICINE

## 2020-06-15 PROCEDURE — 3078F DIAST BP <80 MM HG: CPT | Performed by: FAMILY MEDICINE

## 2020-06-15 PROCEDURE — 3077F SYST BP >= 140 MM HG: CPT | Performed by: FAMILY MEDICINE

## 2020-06-15 PROCEDURE — 2022F DILAT RTA XM EVC RTNOPTHY: CPT | Performed by: FAMILY MEDICINE

## 2020-06-15 PROCEDURE — 99214 OFFICE O/P EST MOD 30 MIN: CPT | Performed by: FAMILY MEDICINE

## 2020-06-15 PROCEDURE — 3051F HG A1C>EQUAL 7.0%<8.0%: CPT | Performed by: FAMILY MEDICINE

## 2020-06-15 PROCEDURE — G0439 PPPS, SUBSEQ VISIT: HCPCS | Performed by: FAMILY MEDICINE

## 2020-06-15 RX ORDER — LISINOPRIL 2.5 MG/1
2.5 TABLET ORAL DAILY
Qty: 90 TABLET | Refills: 1 | Status: SHIPPED | OUTPATIENT
Start: 2020-06-15 | End: 2020-11-12

## 2020-06-16 ENCOUNTER — OFFICE VISIT (OUTPATIENT)
Dept: PODIATRY | Facility: CLINIC | Age: 75
End: 2020-06-16
Payer: MEDICARE

## 2020-06-16 VITALS
HEART RATE: 83 BPM | SYSTOLIC BLOOD PRESSURE: 140 MMHG | HEIGHT: 71 IN | BODY MASS INDEX: 27.58 KG/M2 | RESPIRATION RATE: 17 BRPM | WEIGHT: 197 LBS | DIASTOLIC BLOOD PRESSURE: 60 MMHG

## 2020-06-16 DIAGNOSIS — M79.672 PAIN IN BOTH FEET: ICD-10-CM

## 2020-06-16 DIAGNOSIS — B35.1 ONYCHOMYCOSIS: ICD-10-CM

## 2020-06-16 DIAGNOSIS — L84 CORNS: ICD-10-CM

## 2020-06-16 DIAGNOSIS — M21.962 ACQUIRED DEFORMITY OF LEFT FOOT: ICD-10-CM

## 2020-06-16 DIAGNOSIS — I73.9 PAD (PERIPHERAL ARTERY DISEASE) (HCC): Primary | ICD-10-CM

## 2020-06-16 DIAGNOSIS — E11.42 DIABETIC POLYNEUROPATHY ASSOCIATED WITH TYPE 2 DIABETES MELLITUS (HCC): ICD-10-CM

## 2020-06-16 DIAGNOSIS — M79.671 PAIN IN BOTH FEET: ICD-10-CM

## 2020-06-16 PROCEDURE — 11056 PARNG/CUTG B9 HYPRKR LES 2-4: CPT | Performed by: PODIATRIST

## 2020-06-16 PROCEDURE — 99212 OFFICE O/P EST SF 10 MIN: CPT | Performed by: PODIATRIST

## 2020-06-16 RX ORDER — GABAPENTIN 600 MG/1
600 TABLET ORAL 2 TIMES DAILY
Qty: 180 TABLET | Refills: 0 | Status: SHIPPED | OUTPATIENT
Start: 2020-06-16 | End: 2020-06-25 | Stop reason: SDUPTHER

## 2020-06-25 DIAGNOSIS — E11.42 DIABETIC POLYNEUROPATHY ASSOCIATED WITH TYPE 2 DIABETES MELLITUS (HCC): ICD-10-CM

## 2020-06-25 RX ORDER — GABAPENTIN 600 MG/1
600 TABLET ORAL 2 TIMES DAILY
Qty: 180 TABLET | Refills: 0 | Status: SHIPPED | OUTPATIENT
Start: 2020-06-25 | End: 2021-02-09 | Stop reason: SDUPTHER

## 2020-07-20 DIAGNOSIS — E11.40 TYPE 2 DIABETES MELLITUS WITH DIABETIC NEUROPATHY, WITHOUT LONG-TERM CURRENT USE OF INSULIN (HCC): ICD-10-CM

## 2020-07-21 RX ORDER — ACARBOSE 100 MG/1
TABLET ORAL
Qty: 270 TABLET | Refills: 1 | Status: SHIPPED | OUTPATIENT
Start: 2020-07-21 | End: 2020-12-18

## 2020-07-24 DIAGNOSIS — E11.59 TYPE 2 DIABETES MELLITUS WITH OTHER CIRCULATORY COMPLICATION, WITHOUT LONG-TERM CURRENT USE OF INSULIN (HCC): ICD-10-CM

## 2020-07-24 RX ORDER — GLIMEPIRIDE 4 MG/1
TABLET ORAL
Qty: 180 TABLET | Refills: 1 | Status: SHIPPED | OUTPATIENT
Start: 2020-07-24 | End: 2020-09-25 | Stop reason: SDUPTHER

## 2020-07-26 DIAGNOSIS — I10 BENIGN ESSENTIAL HYPERTENSION: ICD-10-CM

## 2020-07-27 RX ORDER — CARVEDILOL 12.5 MG/1
TABLET ORAL
Qty: 180 TABLET | Refills: 1 | Status: SHIPPED | OUTPATIENT
Start: 2020-07-27 | End: 2020-12-18

## 2020-08-11 NOTE — PROGRESS NOTES
Assessment and plan:       1  Elevation in PSA  - Patient did not have PSA drawn prior to his appointment today with plans to have this done tomorrow  - If PSA has shown any further elevation, multiparametric prostate MRI is recommended  Patient is aware of this  - He will plan to follow up in 6 months with PSA prior   - We will call him with results of PSA being drawn in the near future to either confirm six-month follow-up plan or planned for MRI  Maryann Cheema PA-C      Chief Complaint     Chief Complaint   Patient presents with    Elevated PSA     Last PSA 4 0 (02/10/20)         History of Present Illness     Adore Hunter is a 75 y o  male patient previously seen for elevation of PSA  PSA drawn in November of 2018 was 2 0  Rechecked on 12/03/2019 was 4 0  This was repeated 2 months later and remained at 4 0  Digital rectal exam at consultation in January revealed a 40 g prostate without any concerning findings for malignancy  He did not have his PSA performed prior to his appointment today  He plans to have this done tomorrow  He has no bothersome lower urinary tract symptoms at this time and is feeling healthy  He has no health complaints  Laboratory     Lab Results   Component Value Date    CREATININE 1 21 06/09/2020       Lab Results   Component Value Date    PSA 4 0 02/10/2020    PSA 4 0 12/03/2019    PSA 2 0 11/08/2018       No results found for this or any previous visit (from the past 1 hour(s))  Review of Systems     Review of Systems   Constitutional: Negative for chills and fever  HENT: Negative  Eyes: Negative  Respiratory: Negative for cough and shortness of breath  Cardiovascular: Negative for chest pain  Gastrointestinal: Negative for constipation, diarrhea, nausea and vomiting  Endocrine: Negative  Genitourinary: Negative for difficulty urinating, dysuria, enuresis, flank pain, frequency, hematuria and urgency     Musculoskeletal: Negative  Skin: Negative  AUA SYMPTOM SCORE      Most Recent Value   AUA SYMPTOM SCORE   How often have you had a sensation of not emptying your bladder completely after you finished urinating? 0   How often have you had to urinate again less than two hours after you finished urinating? 3   How often have you found you stopped and started again several times when you urinate?  0   How often have you found it difficult to postpone urination? 2   How often have you had a weak urinary stream?  0   How often have you had to push or strain to begin urination? 0   How many times did you most typically get up to urinate from the time you went to bed at night until the time you got up in the morning? 3   Quality of Life: If you were to spend the rest of your life with your urinary condition just the way it is now, how would you feel about that?  2   AUA SYMPTOM SCORE  8                Allergies     No Known Allergies    Physical Exam     Physical Exam  Vitals signs and nursing note reviewed  Constitutional:       General: He is not in acute distress  Appearance: He is well-developed  He is not diaphoretic  HENT:      Head: Normocephalic and atraumatic  Right Ear: External ear normal       Left Ear: External ear normal       Nose: Nose normal    Eyes:      General: No scleral icterus  Right eye: No discharge  Left eye: No discharge  Cardiovascular:      Rate and Rhythm: Normal rate  Pulmonary:      Effort: Pulmonary effort is normal    Musculoskeletal:      Comments: Ambulates independently   Skin:     General: Skin is warm and dry  Neurological:      Mental Status: He is alert and oriented to person, place, and time  Psychiatric:         Behavior: Behavior normal          Thought Content:  Thought content normal          Judgment: Judgment normal            Vital Signs     Vitals:    08/13/20 1301   BP: 122/62   BP Location: Left arm   Patient Position: Sitting   Cuff Size: Adult   Temp: 98 °F (36 7 °C)   Weight: 89 4 kg (197 lb)   Height: 5' 11" (1 803 m)         Current Medications       Current Outpatient Medications:     acarbose (PRECOSE) 100 MG tablet, TAKE 1 TABLET THREE TIMES DAILY WITH MEALS, Disp: 270 tablet, Rfl: 1    amLODIPine (NORVASC) 2 5 mg tablet, TAKE 1 TABLET (2 5 MG TOTAL) BY MOUTH DAILY, Disp: 90 tablet, Rfl: 3    aspirin (ECOTRIN LOW STRENGTH) 81 mg EC tablet, Take 1 tablet (81 mg total) by mouth daily, Disp: 30 tablet, Rfl: 6    atorvastatin (LIPITOR) 20 mg tablet, Take 1 tablet (20 mg total) by mouth daily, Disp: 90 tablet, Rfl: 1    b complex-vitamin C-folic acid (NEPHROCAPS) 1 mg capsule, Take 1 capsule by mouth daily, Disp: , Rfl:     carvedilol (COREG) 12 5 mg tablet, TAKE 1 TABLET TWICE DAILY, Disp: 180 tablet, Rfl: 1    co-enzyme Q-10 100 mg capsule, Take 100 mg by mouth daily, Disp: , Rfl:     gabapentin (NEURONTIN) 600 MG tablet, Take 1 tablet (600 mg total) by mouth 2 (two) times a day, Disp: 180 tablet, Rfl: 0    glimepiride (AMARYL) 4 mg tablet, TAKE ONE TABLET BY MOUTH TWICE A DAY, Disp: 180 tablet, Rfl: 1    glucose blood (TRUETRACK TEST) test strip, 1 each by Other route daily Use as instructed for E11 29, Disp: 100 each, Rfl: 3    lisinopril (ZESTRIL) 2 5 mg tablet, Take 1 tablet (2 5 mg total) by mouth daily, Disp: 90 tablet, Rfl: 1    metFORMIN (GLUMETZA) 500 MG (MOD) 24 hr tablet, Take 2 tablets (1,000 mg total) by mouth 2 (two) times a day with meals, Disp: 120 tablet, Rfl: 5    multivitamin (THERAGRAN) TABS, Take 1 tablet by mouth daily, Disp: , Rfl:     omeprazole (PriLOSEC) 40 MG capsule, TAKE 1 CAPSULE EVERY DAY, Disp: 90 capsule, Rfl: 1    Probiotic Product (CULTURELLE PROBIOTICS) CHEW, Chew daily, Disp: , Rfl:     simvastatin (ZOCOR) 40 mg tablet, Take 40 mg by mouth daily at bedtime, Disp: , Rfl:     TRUEPLUS LANCETS 28G MISC, Test 1x/d, E11 29, Disp: , Rfl: 0      Active Problems     Patient Active Problem List Diagnosis    Diabetic polyneuropathy associated with type 2 diabetes mellitus (HCC)    Allergic rhinitis    Arthropathy    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)    CAD (coronary artery disease)    CKD stage 3 due to type 2 diabetes mellitus (HCC)    Diabetic neuropathy ([address] )    GE reflux    Lumbar radiculopathy    Rosacea    Seborrheic dermatitis    Type 2 diabetes mellitus with diabetic neuropathy (HCC)    Onychomycosis    Occlusion of femoral-popliteal bypass graft (HCC)    Prostate cancer screening    Dyslipidemia    Screening for AAA (aortic abdominal aneurysm)    Medicare annual wellness visit, subsequent    Type 2 diabetes mellitus with stage 3 chronic kidney disease, without long-term current use of insulin (HCC)    Pain in both feet    Corns    Plantar warts    Elevated PSA    Embolism and thrombosis of arteries of the lower extremities (HCC)    Benign essential hypertension    Bilateral leg weakness         Past Medical History     Past Medical History:   Diagnosis Date    Acquired hallux valgus     last assessed: 8/1/2013    Allergic rhinitis     Anemia 10/26/2010    Atrophy of nail     last assessed: 7/7/2015    Chronic kidney disease     chronic renal insufficiency    Coronary artery disease     Deformity of ankle and foot, acquired     last assessed: 2/22/2016    Diabetes mellitus ([facility] [address] )     Difficulty walking     last assessed: 12/14/2015    Dysesthesia     last assessed: 11/25/2016    Hammer toe     unspecified laterality; last assessed: 8/1/2013    Hypertension     Onychomycosis of toenail     last assessed: 2/22/2016    Peripheral vascular disease (HCC)     left SFA stent in 2010    Pes planus     unspecified laterality; last assessed: 8/1/2013    Pneumonia     last assessed: 2/27/2016    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     last assessed: 8/15/2016    Type 2 diabetes mellitus ([facility] ) 07/26/2010    Viral warts     last assessed: 7/24/2015         Surgical History     Past Surgical History:   Procedure Laterality Date    CARDIAC CATHETERIZATION  07/29/2010    CATARACT EXTRACTION, BILATERAL Bilateral     R 1999, L 2008    COLONOSCOPY  2011    FEMORAL ARTERY - POPLITEAL ARTERY BYPASS GRAFT  09/23/2013    FOOT SURGERY      bone spur removed    LEG SURGERY Bilateral     repair; L 8/20/2010 and 7/26/2012    ROTATOR CUFF REPAIR Left 2009    SHOULDER SURGERY Right 2005    collar bone         Family History     Family History   Problem Relation Age of Onset    Heart disease Father     Heart attack Father         acute myocardial infarction    Heart disease Sister     Heart attack Sister         acute myocardial infarction    Heart disease Paternal Grandfather     Aortic aneurysm Mother          Social History     Social History       Radiology

## 2020-08-13 ENCOUNTER — OFFICE VISIT (OUTPATIENT)
Dept: UROLOGY | Facility: CLINIC | Age: 75
End: 2020-08-13
Payer: MEDICARE

## 2020-08-13 VITALS
WEIGHT: 197 LBS | DIASTOLIC BLOOD PRESSURE: 62 MMHG | HEIGHT: 71 IN | SYSTOLIC BLOOD PRESSURE: 122 MMHG | TEMPERATURE: 98 F | BODY MASS INDEX: 27.58 KG/M2

## 2020-08-13 DIAGNOSIS — R97.20 ELEVATED PSA: Primary | ICD-10-CM

## 2020-08-13 PROCEDURE — 1036F TOBACCO NON-USER: CPT | Performed by: PHYSICIAN ASSISTANT

## 2020-08-13 PROCEDURE — 3066F NEPHROPATHY DOC TX: CPT | Performed by: PHYSICIAN ASSISTANT

## 2020-08-13 PROCEDURE — 99213 OFFICE O/P EST LOW 20 MIN: CPT | Performed by: PHYSICIAN ASSISTANT

## 2020-08-13 PROCEDURE — 3008F BODY MASS INDEX DOCD: CPT | Performed by: PHYSICIAN ASSISTANT

## 2020-08-13 PROCEDURE — 3078F DIAST BP <80 MM HG: CPT | Performed by: PHYSICIAN ASSISTANT

## 2020-08-13 PROCEDURE — 3074F SYST BP LT 130 MM HG: CPT | Performed by: PHYSICIAN ASSISTANT

## 2020-08-13 PROCEDURE — 3051F HG A1C>EQUAL 7.0%<8.0%: CPT | Performed by: PHYSICIAN ASSISTANT

## 2020-08-13 PROCEDURE — 1160F RVW MEDS BY RX/DR IN RCRD: CPT | Performed by: PHYSICIAN ASSISTANT

## 2020-08-13 PROCEDURE — 4040F PNEUMOC VAC/ADMIN/RCVD: CPT | Performed by: PHYSICIAN ASSISTANT

## 2020-08-13 PROCEDURE — 2022F DILAT RTA XM EVC RTNOPTHY: CPT | Performed by: PHYSICIAN ASSISTANT

## 2020-08-14 ENCOUNTER — APPOINTMENT (OUTPATIENT)
Dept: LAB | Facility: CLINIC | Age: 75
End: 2020-08-14
Payer: MEDICARE

## 2020-08-14 DIAGNOSIS — R97.20 ELEVATED PSA: ICD-10-CM

## 2020-08-14 LAB — PSA SERPL-MCNC: 6.2 NG/ML (ref 0–4)

## 2020-08-14 PROCEDURE — 84153 ASSAY OF PSA TOTAL: CPT

## 2020-08-17 ENCOUNTER — TELEPHONE (OUTPATIENT)
Dept: UROLOGY | Facility: CLINIC | Age: 75
End: 2020-08-17

## 2020-08-17 DIAGNOSIS — R97.20 ELEVATED PSA: Primary | ICD-10-CM

## 2020-08-17 NOTE — TELEPHONE ENCOUNTER
Since patient's note was generated, it was cosigned by Dr Agatha Batista recommending on holding off on MRI for now with a higher threshold if SPA were to broach 10   Will hold off on MRI and f/u 3-4 months with diagnostic PSA prior to visit

## 2020-08-17 NOTE — TELEPHONE ENCOUNTER
----- Message from Cole Ron PA-C sent at 8/14/2020  2:15 PM EDT -----  Please let patient know that his PSA had post progressed up to 6 2  As discussed at his appointment yesterday, recommendations are to obtain a multiparametric prostate MRI with office follow-up for discussion of potential biopsy  Thank you

## 2020-08-18 NOTE — TELEPHONE ENCOUNTER
Called and left detailed message peer communication consent  Advised patient's PSA was 6 2 which is elevated  Per our providers recommendations are for repeat PSA in 3-4 months and a follow up appointment  Asking for a call back to schedule  When patient returns call please assist in scheduling follow up with [facility] in 3-4 months  He will need PSA done prior, order has been placed

## 2020-08-20 NOTE — TELEPHONE ENCOUNTER
Attempted to call patient at this time no answer left message with office number for call back     When patient returns call please assist in scheduling follow up with [facility] in 3-4 months  He will need PSA done prior, order has been placed

## 2020-08-25 ENCOUNTER — OFFICE VISIT (OUTPATIENT)
Dept: PODIATRY | Facility: CLINIC | Age: 75
End: 2020-08-25
Payer: MEDICARE

## 2020-08-25 VITALS
SYSTOLIC BLOOD PRESSURE: 122 MMHG | BODY MASS INDEX: 27.58 KG/M2 | WEIGHT: 197 LBS | HEART RATE: 83 BPM | DIASTOLIC BLOOD PRESSURE: 62 MMHG | HEIGHT: 71 IN | RESPIRATION RATE: 17 BRPM

## 2020-08-25 DIAGNOSIS — L84 CORNS: ICD-10-CM

## 2020-08-25 DIAGNOSIS — M79.672 PAIN IN BOTH FEET: ICD-10-CM

## 2020-08-25 DIAGNOSIS — M54.16 LUMBAR RADICULOPATHY: ICD-10-CM

## 2020-08-25 DIAGNOSIS — E11.42 DIABETIC POLYNEUROPATHY ASSOCIATED WITH TYPE 2 DIABETES MELLITUS (HCC): Primary | ICD-10-CM

## 2020-08-25 DIAGNOSIS — M79.671 PAIN IN BOTH FEET: ICD-10-CM

## 2020-08-25 DIAGNOSIS — I73.9 PAD (PERIPHERAL ARTERY DISEASE) (HCC): ICD-10-CM

## 2020-08-25 DIAGNOSIS — B35.1 ONYCHOMYCOSIS: ICD-10-CM

## 2020-08-25 PROCEDURE — 99212 OFFICE O/P EST SF 10 MIN: CPT | Performed by: PODIATRIST

## 2020-08-25 PROCEDURE — 11056 PARNG/CUTG B9 HYPRKR LES 2-4: CPT | Performed by: PODIATRIST

## 2020-08-25 NOTE — PROGRESS NOTES
Assessment/Plan:  Deformity of foot   Diabetic neuropathy   Pain upon ambulation   Pain upon ambulation   Mycosis of nail   Callus formation     Plan   Lesion debrided    all mycotic nails debrided without pain or complication   Foot exam performed   Patient educated on care of the diabetic foot   Plantar calluses debrided as well           Subjective:   patient is diabetic   He has pain upon ambulation   He has pain with shoe wear   No history of trauma             Past Medical History:   Diagnosis Date    Acquired hallux valgus       last assessed: 8/1/2013    Allergic rhinitis      Anemia 10/26/2010    Atrophy of nail       last assessed: 7/7/2015    Chronic kidney disease       chronic renal insufficiency    Coronary artery disease      Deformity of ankle and foot, acquired       last assessed: 2/22/2016    Diabetes mellitus ([facility] [address] )      Difficulty walking       last assessed: 12/14/2015    Dysesthesia       last assessed: 11/25/2016    Hammer toe       unspecified laterality; last assessed: 8/1/2013    Hypertension      Onychomycosis of toenail       last assessed: 2/22/2016    Peripheral vascular disease ([facility] [address] )       left SFA stent in 2010    Pes planus       unspecified laterality; last assessed: 8/1/2013    Pneumonia       last assessed: 2/27/2016    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       last assessed: 8/15/2016    Type 2 diabetes mellitus ([facility] [address] ) 07/26/2010    Viral warts       last assessed: 7/24/2015                   Past Surgical History:   Procedure Laterality Date    CARDIAC CATHETERIZATION   07/29/2010    CATARACT EXTRACTION, BILATERAL Bilateral       R 1999, L 2008    FEMORAL ARTERY - POPLITEAL ARTERY BYPASS GRAFT   09/23/2013    FOOT SURGERY         bone spur removed    LEG SURGERY Bilateral       repair; L 8/20/2010 and 7/26/2012    ROTATOR CUFF REPAIR Left 2009    SHOULDER SURGERY Right 2005     collar bone         No Known Allergies        Current Outpatient Medications:     acarbose (PRECOSE) 100 MG tablet, Take 1 tablet (100 mg total) by mouth 3 (three) times a day with meals, Disp: 270 tablet, Rfl: 1    amLODIPine (NORVASC) 2 5 mg tablet, Take 1 tablet (2 5 mg total) by mouth daily (Patient not taking: Reported on 9/26/2019), Disp: 90 tablet, Rfl: 3    aspirin (ECOTRIN LOW STRENGTH) 81 mg EC tablet, Take 1 tablet (81 mg total) by mouth daily, Disp: 30 tablet, Rfl: 6    b complex-vitamin C-folic acid (NEPHROCAPS) 1 mg capsule, Take 1 capsule by mouth daily, Disp: , Rfl:     betamethasone dipropionate (DIPROSONE) 0 05 % cream, Use as dir twice daily, Disp: , Rfl:     carvedilol (COREG) 12 5 mg tablet, TAKE 1 TABLET TWICE DAILY, Disp: 180 tablet, Rfl: 1    gabapentin (NEURONTIN) 600 MG tablet, Take 1 tablet (600 mg total) by mouth 2 (two) times a day, Disp: 180 tablet, Rfl: 0    glimepiride (AMARYL) 4 mg tablet, Take 1 tablet (4 mg total) by mouth 2 (two) times a day for 126 days, Disp: 180 tablet, Rfl: 0    glucose blood (TRUETRACK TEST) test strip, 1 each by Other route daily Use as instructed for E11 29, Disp: 100 each, Rfl: 3    lisinopril (ZESTRIL) 5 mg tablet, Take 1 tablet (5 mg total) by mouth daily (Patient taking differently: Take 2 5 mg by mouth daily ), Disp: 90 tablet, Rfl: 3    metFORMIN (GLUMETZA) 500 MG (MOD) 24 hr tablet, Take 2 tablets (1,000 mg total) by mouth 2 (two) times a day with meals, Disp: 120 tablet, Rfl: 5    multivitamin (THERAGRAN) TABS, Take 1 tablet by mouth daily, Disp: , Rfl:     omeprazole (PriLOSEC) 40 MG capsule, Take 1 capsule (40 mg total) by mouth daily, Disp: 90 capsule, Rfl: 1    simvastatin (ZOCOR) 40 mg tablet, TAKE 1 TABLET (40 MG TOTAL) BY MOUTH DAILY, Disp: 90 tablet, Rfl: 1    TRUEPLUS LANCETS 28G MISC, Test 1x/d, E11 29, Disp: , Rfl: 0           Patient Active Problem List   Diagnosis    Diabetic polyneuropathy associated with type 2 diabetes mellitus (HCC)    Allergic rhinitis    Arthropathy    PAD (peripheral artery disease) (HCC)    Benign essential hypertension    CAD (coronary artery disease)    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)    Diabetic neuropathy ([address] )    GE reflux    Lumbar radiculopathy    Rosacea    Seborrheic dermatitis    Type 2 diabetes mellitus with diabetic neuropathy (HCC)    Onychomycosis    Occlusion of femoral-popliteal bypass graft (HCC)    Prostate cancer screening    Dyslipidemia    Screening for AAA (aortic abdominal aneurysm)    Medicare annual wellness visit, subsequent    Type 2 diabetes mellitus with stage 3 chronic kidney disease, without long-term current use of insulin ([facility])    Pain in both feet    Corns    Plantar warts             Patient ID: Prenell Covarrubias is a 74 y  o  male         The following portions of the patient's history were reviewed and updated as appropriate:      family history includes Aortic aneurysm in his mother; Heart attack in his father and sister; Heart disease in his father, paternal grandfather, and sister        reports that he has never smoked  He has never used smokeless tobacco  He reports that he drinks alcohol  He reports that he does not use drugs         Objective:  Patient's shoes and socks removed    Foot ExamPhysical Exam          Physical Exam  Left Foot: Appearance: a deformity (ball of foot and distal fifth metatarsal )  Fifth toe deformities include hammer toe  Tenderness: None except the plantar aspect of the foot  Right Foot: Appearance: a deformity (distal fifth metatarsal )  Left Ankle: ROM: limited ROM in all planes   Right Ankle: ROM: limited ROM in all planes   Neurological Exam: Light touch was decreased bilaterally  Vibratory sensation was decreased in both first metatarsophalangeal joints  Response to monofilament test was absent bilaterally  Deep tendon reflexes: achilles reflex 1/4 bilaterally  Vascular Exam: performed Dorsalis pedis pulses were 1/4 bilaterally   Posterior tibial pulses were 1/4 bilaterally  Elevation Pallor: diminished bilaterally  Capillary refill time was Q  9, findings bilateral  Negative digital hair noted, positive abnormal cooling  All pre-ulcer, lesions debrided  Today, but between 1-3 seconds bilaterally  Edema: mild bilaterally and All mycotic nails debrided  Today  The patient was given orthotics to help control his feet and at as cushioning and padding on the bottom of his feet q9 findings  Toenails: All of the toenails were elongated, hypertrophied, discolored, ingrown, shown to have subungual debris and tender       Socks and shoes removed, the Right Foot: the foot was dry  The sensory exam showed diminished vibratory sensation at the level of the toes  Diminished tactile sensation with monofilament testing throughout the right foot    Socks and shoes removed, Left Foot: the foot was dry  The sensory exam showed diminished vibratory sensation at the level of the toes  Diminished tactile sensation with monofilament testing throughout the left foot    Pulses:   1+ in the posterior tibialis on the right   1+ in the dorsalis pedis on the right  Capillary refills findings on the left were delayed in the toes  Pulses:   1+ in the posterior tibialis on the left   1+ in the dorsalis pedis on the left  Assign Risk Category: 2: Loss of protective sensation with or without weakness, deformity, callus, pre-ulcer, or history of ulceration  High risk  Hyperkeratosis: present on both first sub metatarsals, present on both fifth sub metatarsals and Pre-ulcerative lesion, submetatarsal one to 5, bilateral    Shoe Gear Evaluation: performed ()  Recommendation(s): custom inlays       Patient's shoes and socks removed  Right Foot/Ankle   Right Foot Inspection  Skin Exam: callus and callus               Toe Exam: swelling and erythema  Sensory   Vibration: diminished  Proprioception: diminished      Vascular  Capillary refills: elevated        Left Foot/Ankle  Left Foot Inspection  Skin Exam: callus   Submetatarsal 5 of the left foot demonstrates 0 5 centimeter squared plantar verruca   It is in capsulated in a bullae   Debrided   20% remaining               Toe Exam: swelling and erythema                   Sensory   Vibration: diminished  Proprioception: diminished     Vascular  Capillary refills: elevated     Patient's shoes and socks removed  Assign Risk Category:  Deformity present; Loss of protective sensation; Weak pulses       Risk: 2

## 2020-08-26 DIAGNOSIS — K21.9 GASTROESOPHAGEAL REFLUX DISEASE, ESOPHAGITIS PRESENCE NOT SPECIFIED: ICD-10-CM

## 2020-08-26 RX ORDER — OMEPRAZOLE 40 MG/1
CAPSULE, DELAYED RELEASE ORAL
Qty: 90 CAPSULE | Refills: 1 | Status: SHIPPED | OUTPATIENT
Start: 2020-08-26 | End: 2021-01-25

## 2020-09-18 ENCOUNTER — APPOINTMENT (OUTPATIENT)
Dept: LAB | Facility: CLINIC | Age: 75
End: 2020-09-18
Payer: MEDICARE

## 2020-09-18 DIAGNOSIS — E11.40 TYPE 2 DIABETES MELLITUS WITH DIABETIC NEUROPATHY, WITHOUT LONG-TERM CURRENT USE OF INSULIN (HCC): ICD-10-CM

## 2020-09-18 LAB
ALBUMIN SERPL BCP-MCNC: 3.8 G/DL (ref 3.5–5)
ALP SERPL-CCNC: 114 U/L (ref 46–116)
ALT SERPL W P-5'-P-CCNC: 36 U/L (ref 12–78)
ANION GAP SERPL CALCULATED.3IONS-SCNC: 5 MMOL/L (ref 4–13)
AST SERPL W P-5'-P-CCNC: 22 U/L (ref 5–45)
BILIRUB SERPL-MCNC: 0.69 MG/DL (ref 0.2–1)
BUN SERPL-MCNC: 17 MG/DL (ref 5–25)
CALCIUM SERPL-MCNC: 9.5 MG/DL (ref 8.3–10.1)
CHLORIDE SERPL-SCNC: 104 MMOL/L (ref 100–108)
CO2 SERPL-SCNC: 27 MMOL/L (ref 21–32)
CREAT SERPL-MCNC: 1.47 MG/DL (ref 0.6–1.3)
EST. AVERAGE GLUCOSE BLD GHB EST-MCNC: 332 MG/DL
GFR SERPL CREATININE-BSD FRML MDRD: 46 ML/MIN/1.73SQ M
GLUCOSE P FAST SERPL-MCNC: 333 MG/DL (ref 65–99)
HBA1C MFR BLD: 13.2 %
POTASSIUM SERPL-SCNC: 5.2 MMOL/L (ref 3.5–5.3)
PROT SERPL-MCNC: 7.2 G/DL (ref 6.4–8.2)
SODIUM SERPL-SCNC: 136 MMOL/L (ref 136–145)

## 2020-09-18 PROCEDURE — 80053 COMPREHEN METABOLIC PANEL: CPT

## 2020-09-18 PROCEDURE — 83036 HEMOGLOBIN GLYCOSYLATED A1C: CPT

## 2020-09-18 PROCEDURE — 36415 COLL VENOUS BLD VENIPUNCTURE: CPT

## 2020-09-19 NOTE — PROGRESS NOTES
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 [facility])        Discussion Summary and Plan:  1  PVD with right femoropopliteal which is occluded and left SFA which has stent placed by me in , had repeat procedure done with known right SFA stent stenosis underwent balloon angioplasty  He is able to walk he has seen the vascular  He may have InStent stenosis on the left and has occlusion of his bypass on the right  Advised him to follow up with vascular and keep walking  His ABIs are around 0 6  No new symptoms    2  Coronary artery disease  Status post multivessel stenting  Patient now want to follow up with [facility] cardiology  His nuclear stress test done in 2018 which shows no ischemia normal LV systolic function  He has no symptoms of chest pain  3  Hypertension  His blood pressure has improved with addition of low-dose amlodipine  Continue Coreg as before  His potassium runs high dense he is on low-dose lisinopril  Last lab also shows potassium 5 2  Done on 2020      4  Dyslipidemia  Continue statins  Cholesterol profile labs done on 2020 is acceptable      5  Chronic renal insufficiency  Patient blood test from 2020 reviewed creatinine 1 4 send potassium 5 2  Monitor creatinine closely    6  Carotid bruit     Carotid Doppler shows less than 50% stenosis on the left  No evidence of stenosis on the right side  Continue medical Rx follows up with vascular    7  Diabetes mellitus  Management as per primary care doctor last HbA1c 7 2    Blood sugars are better as per patient      Counseling :  A description of the counseling  All issues regarding tight sugar control, taking cholesterol medications, regular exercise, symptoms about coronary artery disease discussed with patient at length  Previous echo from 2014 reviewed  Goals and Barriers  Patient's ability to self care: Yes  Medication side effect reviewed with patient in detail and all their questions answered to their satisfaction  HPI :     Hernan Jones is a 75y o  year old male who came for follow up  Patient has a past medical history significant for Coronary Artery artery disease, CK D stage III, Diabetes mellitus with renal manifestations, PVD with right femoropopliteal which has occluded and left SFA stent by me in 2010, hypertension, diabetic neuropathy who came to see us after his vascular study was found to be abnormal  For cardiac problem he generally sees Dr Ronny Dent  His vascular study was ordered by his podiatrist as he was having pain in his foot  He also had a lot of back problems sometime pain radiates from his back to the thighs  Here a stent placed in his left SFA in 2010  It's already known that his right femoropopliteal is occluded  He has no fever no chills no nausea no vomiting no other significant complaint     09/23/2020  Above reviewed  Patient came for follow-up  He is doing well from cardiac point of view  Legs are bothering but he is better  He follows up with vascular  Blood pressure is now much better  He had a blood test done in September which was acceptable  He has history of PVD with possible left subclavian stenosis  He had history of known PVD with bypass on the right leg and stent on the left and follows with vascular  No nausea no vomiting no fever no chills no PND no orthopnea  Today EKG shows heart rate 75 beats per minute 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  He is no longer getting the cramps when he walks    His Doppler study done in May 2020  He is a very 0 63 on the right and 0 6 year on the left  Review of Systems   Constitutional: Negative for activity change, chills, diaphoresis, fever and unexpected weight change  HENT: Negative for congestion  Eyes: Negative for discharge and redness  Respiratory: Negative for cough, chest tightness, shortness of breath and wheezing  Cardiovascular: Negative  Negative for chest pain, palpitations and leg swelling  Gastrointestinal: Negative for abdominal pain, diarrhea and nausea  Endocrine: Negative  Genitourinary: Negative for decreased urine volume and urgency  Musculoskeletal: Positive for arthralgias and gait problem  Negative for back pain  Skin: Negative for rash and wound  Allergic/Immunologic: Negative  Neurological: Negative for dizziness, seizures, syncope, weakness, light-headedness and headaches  Hematological: Negative  Psychiatric/Behavioral: Negative for agitation and confusion  The patient is nervous/anxious          Historical Information   Past Medical History:   Diagnosis Date    Acquired hallux valgus     last assessed: 8/1/2013    Allergic rhinitis     Anemia 10/26/2010    Atrophy of nail     last assessed: 7/7/2015    Chronic kidney disease     chronic renal insufficiency    Coronary artery disease     Deformity of ankle and foot, acquired     last assessed: 2/22/2016    Diabetes mellitus ([facility] )     Difficulty walking     last assessed: 12/14/2015    Dysesthesia     last assessed: 11/25/2016    Hammer toe     unspecified laterality; last assessed: 8/1/2013    Hypertension     Onychomycosis of toenail     last assessed: 2/22/2016    Peripheral vascular disease ([facility] [address] )     left SFA stent in 2010    Pes planus     unspecified laterality; last assessed: 8/1/2013    Pneumonia     last assessed: 2/27/2016    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     last assessed: 8/15/2016    Type 2 diabetes mellitus ([facility] ) 07/26/2010    Viral warts     last assessed: 7/24/2015     Past Surgical History:   Procedure Laterality Date    CARDIAC CATHETERIZATION  07/29/2010    CATARACT EXTRACTION, BILATERAL Bilateral     R 1999, L 2008    COLONOSCOPY  2011    FEMORAL ARTERY - POPLITEAL ARTERY BYPASS GRAFT  09/23/2013    FOOT SURGERY      bone spur removed    LEG SURGERY Bilateral     repair; L 8/20/2010 and 7/26/2012    ROTATOR CUFF REPAIR Left 2009    SHOULDER SURGERY Right 2005    collar bone     Social History     Substance and Sexual Activity   Alcohol Use Yes    Comment: being a social drinker     Social History     Substance and Sexual Activity   Drug Use No     Social History     Tobacco Use   Smoking Status Never Smoker   Smokeless Tobacco Never Used     Family History:   Family History   Problem Relation Age of Onset    Heart disease Father     Heart attack Father         acute myocardial infarction    Heart disease Sister     Heart attack Sister         acute myocardial infarction    Heart disease Paternal Grandfather     Aortic aneurysm Mother        Meds/Allergies     No Known Allergies    Current Outpatient Medications:     acarbose (PRECOSE) 100 MG tablet, TAKE 1 TABLET THREE TIMES DAILY WITH MEALS, Disp: 270 tablet, Rfl: 1    amLODIPine (NORVASC) 2 5 mg tablet, TAKE 1 TABLET (2 5 MG TOTAL) BY MOUTH DAILY, Disp: 90 tablet, Rfl: 3    aspirin (ECOTRIN LOW STRENGTH) 81 mg EC tablet, Take 1 tablet (81 mg total) by mouth daily, Disp: 30 tablet, Rfl: 6    atorvastatin (LIPITOR) 20 mg tablet, Take 1 tablet (20 mg total) by mouth daily, Disp: 90 tablet, Rfl: 1    b complex-vitamin C-folic acid (NEPHROCAPS) 1 mg capsule, Take 1 capsule by mouth daily, Disp: , Rfl:     carvedilol (COREG) 12 5 mg tablet, TAKE 1 TABLET TWICE DAILY, Disp: 180 tablet, Rfl: 1    co-enzyme Q-10 100 mg capsule, Take 100 mg by mouth daily, Disp: , Rfl:     gabapentin (NEURONTIN) 600 MG tablet, Take 1 tablet (600 mg total) by mouth 2 (two) times a day, Disp: 180 tablet, Rfl: 0    glimepiride (AMARYL) 4 mg tablet, TAKE ONE TABLET BY MOUTH TWICE A DAY, Disp: 180 tablet, Rfl: 1    glucose blood (TRUETRACK TEST) test strip, 1 each by Other route daily Use as instructed for E11 29, Disp: 100 each, Rfl: 3    lisinopril (ZESTRIL) 2 5 mg tablet, Take 1 tablet (2 5 mg total) by mouth daily, Disp: 90 tablet, Rfl: 1    metFORMIN (GLUMETZA) 500 MG (MOD) 24 hr tablet, Take 2 tablets (1,000 mg total) by mouth 2 (two) times a day with meals, Disp: 120 tablet, Rfl: 5    multivitamin (THERAGRAN) TABS, Take 1 tablet by mouth daily, Disp: , Rfl:     omeprazole (PriLOSEC) 40 MG capsule, TAKE 1 CAPSULE EVERY DAY, Disp: 90 capsule, Rfl: 1    Probiotic Product (CULTURELLE PROBIOTICS) CHEW, Chew daily, Disp: , Rfl:     TRUEPLUS LANCETS 28G MISC, Test 1x/d, E11 29, Disp: , Rfl: 0    simvastatin (ZOCOR) 40 mg tablet, Take 40 mg by mouth daily at bedtime, Disp: , Rfl:     Vitals: Blood pressure 124/60, pulse 75, temperature 98 °F (36 7 °C), temperature source Temporal, height 5' 11" (1 803 m), weight 89 4 kg (197 lb), SpO2 97 %  Body mass index is 27 48 kg/m²  Vitals:    09/23/20 1305   Weight: 89 4 kg (197 lb)     BP Readings from Last 3 Encounters:   09/23/20 124/60   08/25/20 122/62   08/13/20 122/62         Physical Exam   Constitutional: He is oriented to person, place, and time  He appears well-developed and well-nourished  No distress  HENT:   Head: Normocephalic and atraumatic  Eyes: Pupils are equal, round, and reactive to light  Neck: Neck supple  No JVD present  No tracheal deviation present  No thyromegaly present  Cardiovascular: Normal rate, regular rhythm, S1 normal and S2 normal  Exam reveals no gallop, no S3, no S4, no distant heart sounds and no friction rub  Murmur heard  Systolic (ejection) murmur is present with a grade of 2/6  Pulmonary/Chest: Effort normal and breath sounds normal  No respiratory distress  He has no wheezes  He has no rales  He exhibits no tenderness  Abdominal: Soft  Bowel sounds are normal  He exhibits no distension  There is no abdominal tenderness  Musculoskeletal:         General: No deformity or edema  Neurological: He is alert and oriented to person, place, and time  Skin: Skin is warm and dry  No rash noted  He is not diaphoretic  No pallor  Psychiatric: He has a normal mood and affect  His behavior is normal  Judgment normal        Diagnostic Studies Review Cardio:    Nuclear stress test   Nuclear stress test done 09/27/2018 was a normal study with EF around 60%  No ischemia noted it was Lexiscan stress test     EKG:    Twelve lead EKG done on 09/13/2018 shows normal sinus rhythm heart rate 60 beats per minute  No significant ST changes  Twelve lead EKG done 05/17/2019 shows normal sinus rhythm heart rate 60 beats per minute  No change from old EKG  Twelve lead EKG 03/10/2020 shows normal sinus rhythm LVH by voltage heart rate 60 beats per minute no change from old EKG  Twelve lead EKG 09/23/2020 shows normal sinus rhythm LVH by voltage  Q-waves in inferior leads cannot rule out 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  No results found for this or any previous visit        Lab Review   Lab Results   Component Value Date    WBC 6 36 12/03/2019    HGB 10 6 (L) 12/03/2019    HCT 34 1 (L) 12/03/2019    MCV 93 12/03/2019    RDW 14 0 12/03/2019     12/03/2019     BMP:  Lab Results   Component Value Date     04/17/2014    K 5 2 09/18/2020     09/18/2020    CO2 27 09/18/2020    ANIONGAP 8 04/17/2014    BUN 17 09/18/2020    CREATININE 1 47 (H) 09/18/2020    GLUCOSE 137 04/17/2014    GLUF 333 (H) 09/18/2020    CALCIUM 9 5 09/18/2020    EGFR 46 09/18/2020    MG 1 8 04/17/2014     LFT:  Lab Results   Component Value Date    AST 22 09/18/2020    ALT 36 09/18/2020    ALKPHOS 114 09/18/2020       Lab Results   Component Value Date    HGBA1C 13 2 (H) 09/18/2020     Lipid Profile:   Lab Results   Component Value Date    CHOL 106 07/19/2014    HDL 51 12/03/2019    LDLCALC 81 12/03/2019    TRIG 96 12/03/2019     Lab Results   Component Value Date    CHOL 106 07/19/2014       Dr Carmen Vanegas MD [facility]      "This note has been constructed using a voice recognition system  Therefore there may be syntax, spelling, and/or grammatical errors   Please call if you have any questions  "

## 2020-09-23 ENCOUNTER — OFFICE VISIT (OUTPATIENT)
Dept: CARDIOLOGY CLINIC | Facility: CLINIC | Age: 75
End: 2020-09-23
Payer: MEDICARE

## 2020-09-23 VITALS
TEMPERATURE: 98 F | HEIGHT: 71 IN | DIASTOLIC BLOOD PRESSURE: 60 MMHG | OXYGEN SATURATION: 97 % | WEIGHT: 197 LBS | BODY MASS INDEX: 27.58 KG/M2 | SYSTOLIC BLOOD PRESSURE: 124 MMHG | HEART RATE: 75 BPM

## 2020-09-23 DIAGNOSIS — I70.219 ATHEROSCLEROSIS OF ARTERY OF EXTREMITY WITH INTERMITTENT CLAUDICATION (HCC): ICD-10-CM

## 2020-09-23 DIAGNOSIS — E78.5 DYSLIPIDEMIA: ICD-10-CM

## 2020-09-23 DIAGNOSIS — I25.119 CORONARY ARTERY DISEASE INVOLVING NATIVE CORONARY ARTERY OF NATIVE HEART WITH ANGINA PECTORIS (HCC): ICD-10-CM

## 2020-09-23 DIAGNOSIS — E11.22 CKD STAGE 3 DUE TO TYPE 2 DIABETES MELLITUS (HCC): ICD-10-CM

## 2020-09-23 DIAGNOSIS — I10 BENIGN ESSENTIAL HYPERTENSION: ICD-10-CM

## 2020-09-23 DIAGNOSIS — N18.30 CKD STAGE 3 DUE TO TYPE 2 DIABETES MELLITUS (HCC): ICD-10-CM

## 2020-09-23 PROCEDURE — 99214 OFFICE O/P EST MOD 30 MIN: CPT | Performed by: INTERNAL MEDICINE

## 2020-09-23 PROCEDURE — 93000 ELECTROCARDIOGRAM COMPLETE: CPT | Performed by: INTERNAL MEDICINE

## 2020-09-24 DIAGNOSIS — I73.9 PAD (PERIPHERAL ARTERY DISEASE) (HCC): ICD-10-CM

## 2020-09-24 RX ORDER — ATORVASTATIN CALCIUM 20 MG/1
TABLET, FILM COATED ORAL
Qty: 90 TABLET | Refills: 1 | Status: SHIPPED | OUTPATIENT
Start: 2020-09-24 | End: 2021-02-22

## 2020-09-25 ENCOUNTER — OFFICE VISIT (OUTPATIENT)
Dept: FAMILY MEDICINE CLINIC | Facility: CLINIC | Age: 75
End: 2020-09-25
Payer: MEDICARE

## 2020-09-25 VITALS
HEART RATE: 71 BPM | RESPIRATION RATE: 18 BRPM | WEIGHT: 191 LBS | HEIGHT: 71 IN | SYSTOLIC BLOOD PRESSURE: 130 MMHG | DIASTOLIC BLOOD PRESSURE: 80 MMHG | OXYGEN SATURATION: 98 % | BODY MASS INDEX: 26.74 KG/M2 | TEMPERATURE: 98 F

## 2020-09-25 DIAGNOSIS — E78.5 DYSLIPIDEMIA: ICD-10-CM

## 2020-09-25 DIAGNOSIS — I10 BENIGN ESSENTIAL HYPERTENSION: ICD-10-CM

## 2020-09-25 DIAGNOSIS — E11.59 TYPE 2 DIABETES MELLITUS WITH OTHER CIRCULATORY COMPLICATION, WITHOUT LONG-TERM CURRENT USE OF INSULIN (HCC): ICD-10-CM

## 2020-09-25 DIAGNOSIS — E11.22 CKD STAGE 3 DUE TO TYPE 2 DIABETES MELLITUS (HCC): ICD-10-CM

## 2020-09-25 DIAGNOSIS — E11.40 TYPE 2 DIABETES MELLITUS WITH DIABETIC NEUROPATHY, WITHOUT LONG-TERM CURRENT USE OF INSULIN (HCC): Primary | ICD-10-CM

## 2020-09-25 DIAGNOSIS — N18.30 CKD STAGE 3 DUE TO TYPE 2 DIABETES MELLITUS (HCC): ICD-10-CM

## 2020-09-25 DIAGNOSIS — K21.9 GASTROESOPHAGEAL REFLUX DISEASE, ESOPHAGITIS PRESENCE NOT SPECIFIED: ICD-10-CM

## 2020-09-25 PROCEDURE — 99214 OFFICE O/P EST MOD 30 MIN: CPT | Performed by: FAMILY MEDICINE

## 2020-09-25 RX ORDER — GLIMEPIRIDE 4 MG/1
4 TABLET ORAL 2 TIMES DAILY
Qty: 180 TABLET | Refills: 1 | Status: SHIPPED | OUTPATIENT
Start: 2020-09-25 | End: 2021-02-03

## 2020-09-25 RX ORDER — METFORMIN HYDROCHLORIDE 500 MG/1
1000 TABLET, FILM COATED, EXTENDED RELEASE ORAL 2 TIMES DAILY WITH MEALS
Qty: 360 TABLET | Refills: 1 | Status: SHIPPED | OUTPATIENT
Start: 2020-09-25 | End: 2021-05-21

## 2020-09-25 NOTE — PROGRESS NOTES
Assessment/Plan:    No problem-specific Assessment & Plan notes found for this encounter  DM2 poor control, presumably since he ran out of metformin a few wks ago and did not call for refill and diet indiscretion    Gave him 3m to try to correct significantly diet/rx compliance else refer to endocrinology, pt agreeable    CKD3, risks aware, hydrate, cont meds    PAD unchanged, cont meds    Gerd/cholesterol stable     Diagnoses and all orders for this visit:    Type 2 diabetes mellitus with diabetic neuropathy, without long-term current use of insulin ([facility] [address] )  -     Comprehensive metabolic panel; Future  -     Hemoglobin A1C; Future  -     metFORMIN (GLUMETZA) 500 MG (MOD) 24 hr tablet; Take 2 tablets (1,000 mg total) by mouth 2 (two) times a day with meals    Type 2 diabetes mellitus with other circulatory complication, without long-term current use of insulin ([facility]  -     glimepiride (AMARYL) 4 mg tablet; Take 1 tablet (4 mg total) by mouth 2 (two) times a day    CKD stage 3 due to type 2 diabetes mellitus (HCC)    Dyslipidemia    Benign essential hypertension    Gastroesophageal reflux disease, esophagitis presence not specified              Return in about 3 months (around 12/25/2020)  Subjective:      Patient ID: Penny Beatty is a 75 y o  male  Chief Complaint   Patient presents with    Follow-up     lab work review  [id number]       HPI  Diet noncompliance for few weeks  Polyuria, nocturia  Ran out of metformin for a few weeks  Wt loss  a1c very high to 13 2 from 7 9  fbs 333    Gi Sydney, due next year per pt    The following portions of the patient's history were reviewed and updated as appropriate: allergies, current medications, past family history, past medical history, past social history, past surgical history and problem list     Review of Systems   Constitutional: Negative for fever  Respiratory: Negative for shortness of breath  Gastrointestinal: Negative for abdominal pain  Current Outpatient Medications   Medication Sig Dispense Refill    acarbose (PRECOSE) 100 MG tablet TAKE 1 TABLET THREE TIMES DAILY WITH MEALS 270 tablet 1    amLODIPine (NORVASC) 2 5 mg tablet TAKE 1 TABLET (2 5 MG TOTAL) BY MOUTH DAILY 90 tablet 3    aspirin (ECOTRIN LOW STRENGTH) 81 mg EC tablet Take 1 tablet (81 mg total) by mouth daily 30 tablet 6    atorvastatin (LIPITOR) 20 mg tablet TAKE 1 TABLET EVERY DAY 90 tablet 1    b complex-vitamin C-folic acid (NEPHROCAPS) 1 mg capsule Take 1 capsule by mouth daily      carvedilol (COREG) 12 5 mg tablet TAKE 1 TABLET TWICE DAILY 180 tablet 1    co-enzyme Q-10 100 mg capsule Take 100 mg by mouth daily      gabapentin (NEURONTIN) 600 MG tablet Take 1 tablet (600 mg total) by mouth 2 (two) times a day 180 tablet 0    glimepiride (AMARYL) 4 mg tablet Take 1 tablet (4 mg total) by mouth 2 (two) times a day 180 tablet 1    glucose blood (TRUETRACK TEST) test strip 1 each by Other route daily Use as instructed for E11 29 100 each 3    lisinopril (ZESTRIL) 2 5 mg tablet Take 1 tablet (2 5 mg total) by mouth daily 90 tablet 1    metFORMIN (GLUMETZA) 500 MG (MOD) 24 hr tablet Take 2 tablets (1,000 mg total) by mouth 2 (two) times a day with meals 360 tablet 1    multivitamin (THERAGRAN) TABS Take 1 tablet by mouth daily      omeprazole (PriLOSEC) 40 MG capsule TAKE 1 CAPSULE EVERY DAY 90 capsule 1    Probiotic Product (CULTURELLE PROBIOTICS) CHEW Chew daily      TRUEPLUS LANCETS 28G MISC Test 1x/d, E11 29  0    simvastatin (ZOCOR) 40 mg tablet Take 40 mg by mouth daily at bedtime       No current facility-administered medications for this visit  Objective:    /80   Pulse 71   Temp 98 °F (36 7 °C)   Resp 18   Ht 5' 11" (1 803 m)   Wt 86 6 kg (191 lb)   SpO2 98%   BMI 26 64 kg/m²        Physical Exam  Vitals signs and nursing note reviewed  Constitutional:       Appearance: Normal appearance  He is well-developed   He is not ill-appearing  HENT:      Head: Normocephalic  Comments: Some nonobstructive cerumen b/l     Right Ear: Tympanic membrane normal       Left Ear: Tympanic membrane normal       Mouth/Throat:      Pharynx: No posterior oropharyngeal erythema  Eyes:      General: No scleral icterus  Conjunctiva/sclera: Conjunctivae normal    Neck:      Musculoskeletal: Neck supple  No neck rigidity  Cardiovascular:      Rate and Rhythm: Normal rate and regular rhythm  Heart sounds: No murmur  Pulmonary:      Effort: Pulmonary effort is normal  No respiratory distress  Breath sounds: No wheezing  Abdominal:      General: Bowel sounds are normal  There is no distension  Palpations: Abdomen is soft  Musculoskeletal:         General: No deformity  Skin:     General: Skin is warm and dry  Coloration: Skin is not pale  Neurological:      General: No focal deficit present  Mental Status: He is alert  Psychiatric:         Behavior: Behavior normal          Thought Content: Thought content normal          BMI Counseling: Body mass index is 26 64 kg/m²  The BMI is above normal  Nutrition recommendations include decreasing portion sizes and moderation in carbohydrate intake  Exercise recommendations include exercising 3-5 times per week  No pharmacotherapy was ordered                Bridget Ruiz DO

## 2020-09-28 ENCOUNTER — TELEPHONE (OUTPATIENT)
Dept: ADMINISTRATIVE | Facility: OTHER | Age: 75
End: 2020-09-28

## 2020-10-14 ENCOUNTER — HOSPITAL ENCOUNTER (OUTPATIENT)
Dept: NON INVASIVE DIAGNOSTICS | Facility: HOSPITAL | Age: 75
Discharge: HOME | End: 2020-10-14
Attending: INTERNAL MEDICINE
Payer: MEDICARE

## 2020-10-14 DIAGNOSIS — I70.219 ATHEROSCLEROSIS OF ARTERY OF EXTREMITY WITH INTERMITTENT CLAUDICATION (HCC): ICD-10-CM

## 2020-10-14 DIAGNOSIS — E78.5 DYSLIPIDEMIA: ICD-10-CM

## 2020-10-14 DIAGNOSIS — I25.119 CORONARY ARTERY DISEASE INVOLVING NATIVE CORONARY ARTERY OF NATIVE HEART WITH ANGINA PECTORIS (HCC): ICD-10-CM

## 2020-10-14 DIAGNOSIS — I10 BENIGN ESSENTIAL HYPERTENSION: ICD-10-CM

## 2020-10-14 DIAGNOSIS — N18.30 CKD STAGE 3 DUE TO TYPE 2 DIABETES MELLITUS (HCC): ICD-10-CM

## 2020-10-14 DIAGNOSIS — E11.22 CKD STAGE 3 DUE TO TYPE 2 DIABETES MELLITUS (HCC): ICD-10-CM

## 2020-10-14 PROCEDURE — 93306 TTE W/DOPPLER COMPLETE: CPT

## 2020-10-15 PROCEDURE — 93306 TTE W/DOPPLER COMPLETE: CPT | Performed by: INTERNAL MEDICINE

## 2020-10-16 ENCOUNTER — TELEPHONE (OUTPATIENT)
Dept: CARDIOLOGY CLINIC | Facility: CLINIC | Age: 75
End: 2020-10-16

## 2020-10-27 ENCOUNTER — OFFICE VISIT (OUTPATIENT)
Dept: PODIATRY | Facility: CLINIC | Age: 75
End: 2020-10-27
Payer: MEDICARE

## 2020-10-27 VITALS
BODY MASS INDEX: 26.74 KG/M2 | DIASTOLIC BLOOD PRESSURE: 57 MMHG | WEIGHT: 191 LBS | HEART RATE: 90 BPM | HEIGHT: 71 IN | SYSTOLIC BLOOD PRESSURE: 118 MMHG | RESPIRATION RATE: 16 BRPM

## 2020-10-27 DIAGNOSIS — I73.9 PAD (PERIPHERAL ARTERY DISEASE) (HCC): ICD-10-CM

## 2020-10-27 DIAGNOSIS — E11.42 DIABETIC POLYNEUROPATHY ASSOCIATED WITH TYPE 2 DIABETES MELLITUS (HCC): Primary | ICD-10-CM

## 2020-10-27 DIAGNOSIS — L84 CORNS: ICD-10-CM

## 2020-10-27 DIAGNOSIS — M79.671 PAIN IN BOTH FEET: ICD-10-CM

## 2020-10-27 DIAGNOSIS — M79.672 PAIN IN BOTH FEET: ICD-10-CM

## 2020-10-27 PROCEDURE — 11056 PARNG/CUTG B9 HYPRKR LES 2-4: CPT | Performed by: PODIATRIST

## 2020-11-11 DIAGNOSIS — I10 BENIGN ESSENTIAL HYPERTENSION: ICD-10-CM

## 2020-11-12 RX ORDER — LISINOPRIL 2.5 MG/1
2.5 TABLET ORAL DAILY
Qty: 90 TABLET | Refills: 1 | Status: SHIPPED | OUTPATIENT
Start: 2020-11-12 | End: 2021-06-14 | Stop reason: SDUPTHER

## 2020-11-20 ENCOUNTER — TELEPHONE (OUTPATIENT)
Dept: NEPHROLOGY | Facility: CLINIC | Age: 75
End: 2020-11-20

## 2020-12-10 ENCOUNTER — LAB (OUTPATIENT)
Dept: LAB | Facility: CLINIC | Age: 75
End: 2020-12-10
Payer: MEDICARE

## 2020-12-10 DIAGNOSIS — R97.20 ELEVATED PSA: ICD-10-CM

## 2020-12-10 LAB — PSA SERPL-MCNC: 8 NG/ML (ref 0–4)

## 2020-12-10 PROCEDURE — 84153 ASSAY OF PSA TOTAL: CPT

## 2020-12-16 ENCOUNTER — OFFICE VISIT (OUTPATIENT)
Dept: UROLOGY | Facility: CLINIC | Age: 75
End: 2020-12-16
Payer: MEDICARE

## 2020-12-16 VITALS
HEIGHT: 71 IN | BODY MASS INDEX: 28.14 KG/M2 | DIASTOLIC BLOOD PRESSURE: 78 MMHG | HEART RATE: 89 BPM | SYSTOLIC BLOOD PRESSURE: 138 MMHG | WEIGHT: 201 LBS

## 2020-12-16 DIAGNOSIS — R97.20 ELEVATED PSA: Primary | ICD-10-CM

## 2020-12-16 PROCEDURE — 99213 OFFICE O/P EST LOW 20 MIN: CPT | Performed by: NURSE PRACTITIONER

## 2020-12-17 DIAGNOSIS — I10 BENIGN ESSENTIAL HYPERTENSION: ICD-10-CM

## 2020-12-17 DIAGNOSIS — E11.40 TYPE 2 DIABETES MELLITUS WITH DIABETIC NEUROPATHY, WITHOUT LONG-TERM CURRENT USE OF INSULIN (HCC): ICD-10-CM

## 2020-12-18 RX ORDER — CARVEDILOL 12.5 MG/1
TABLET ORAL
Qty: 180 TABLET | Refills: 1 | Status: SHIPPED | OUTPATIENT
Start: 2020-12-18 | End: 2021-08-12 | Stop reason: SDUPTHER

## 2020-12-18 RX ORDER — ACARBOSE 100 MG/1
TABLET ORAL
Qty: 270 TABLET | Refills: 1 | Status: SHIPPED | OUTPATIENT
Start: 2020-12-18 | End: 2021-09-15 | Stop reason: SDUPTHER

## 2021-01-06 ENCOUNTER — OFFICE VISIT (OUTPATIENT)
Dept: PODIATRY | Facility: CLINIC | Age: 76
End: 2021-01-06
Payer: MEDICARE

## 2021-01-06 VITALS
HEART RATE: 73 BPM | BODY MASS INDEX: 28.14 KG/M2 | WEIGHT: 201 LBS | DIASTOLIC BLOOD PRESSURE: 87 MMHG | HEIGHT: 71 IN | RESPIRATION RATE: 16 BRPM | SYSTOLIC BLOOD PRESSURE: 137 MMHG

## 2021-01-06 DIAGNOSIS — L84 CORNS: ICD-10-CM

## 2021-01-06 DIAGNOSIS — I73.9 PAD (PERIPHERAL ARTERY DISEASE) (HCC): ICD-10-CM

## 2021-01-06 DIAGNOSIS — E11.42 DIABETIC POLYNEUROPATHY ASSOCIATED WITH TYPE 2 DIABETES MELLITUS (HCC): Primary | ICD-10-CM

## 2021-01-06 DIAGNOSIS — M79.671 PAIN IN BOTH FEET: ICD-10-CM

## 2021-01-06 DIAGNOSIS — M79.672 PAIN IN BOTH FEET: ICD-10-CM

## 2021-01-06 PROCEDURE — 11056 PARNG/CUTG B9 HYPRKR LES 2-4: CPT | Performed by: PODIATRIST

## 2021-01-06 NOTE — PROGRESS NOTES
Assessment/Plan:  Deformity of foot   Diabetic neuropathy   Pain upon ambulation   Pain upon ambulation   Mycosis of nail   Callus formation     Plan   Lesion debrided    all mycotic nails debrided without pain or complication   Foot exam performed   Patient educated on care of the diabetic foot   Plantar calluses debrided as well           Subjective:   patient is diabetic   He has pain upon ambulation   He has pain with shoe wear   No history of trauma             Past Medical History:   Diagnosis Date    Acquired hallux valgus       last assessed: 8/1/2013    Allergic rhinitis      Anemia 10/26/2010    Atrophy of nail       last assessed: 7/7/2015    Chronic kidney disease       chronic renal insufficiency    Coronary artery disease      Deformity of ankle and foot, acquired       last assessed: 2/22/2016    Diabetes mellitus ([facility] )      Difficulty walking       last assessed: 12/14/2015    Dysesthesia       last assessed: 11/25/2016    Hammer toe       unspecified laterality; last assessed: 8/1/2013    Hypertension      Onychomycosis of toenail       last assessed: 2/22/2016    Peripheral vascular disease ([facility] [address] )       left SFA stent in 2010    Pes planus       unspecified laterality; last assessed: 8/1/2013    Pneumonia       last assessed: 2/27/2016    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       last assessed: 8/15/2016    Type 2 diabetes mellitus ([facility] [address] ) 07/26/2010    Viral warts       last assessed: 7/24/2015                   Past Surgical History:   Procedure Laterality Date    CARDIAC CATHETERIZATION   07/29/2010    CATARACT EXTRACTION, BILATERAL Bilateral       R 1999, L 2008    FEMORAL ARTERY - POPLITEAL ARTERY BYPASS GRAFT   09/23/2013    FOOT SURGERY         bone spur removed    LEG SURGERY Bilateral       repair; L 8/20/2010 and 7/26/2012    ROTATOR CUFF REPAIR Left 2009    SHOULDER SURGERY Right 2005     collar bone         No Known Allergies        Current Outpatient Medications:     acarbose (PRECOSE) 100 MG tablet, Take 1 tablet (100 mg total) by mouth 3 (three) times a day with meals, Disp: 270 tablet, Rfl: 1    amLODIPine (NORVASC) 2 5 mg tablet, Take 1 tablet (2 5 mg total) by mouth daily (Patient not taking: Reported on 9/26/2019), Disp: 90 tablet, Rfl: 3    aspirin (ECOTRIN LOW STRENGTH) 81 mg EC tablet, Take 1 tablet (81 mg total) by mouth daily, Disp: 30 tablet, Rfl: 6    b complex-vitamin C-folic acid (NEPHROCAPS) 1 mg capsule, Take 1 capsule by mouth daily, Disp: , Rfl:     betamethasone dipropionate (DIPROSONE) 0 05 % cream, Use as dir twice daily, Disp: , Rfl:     carvedilol (COREG) 12 5 mg tablet, TAKE 1 TABLET TWICE DAILY, Disp: 180 tablet, Rfl: 1    gabapentin (NEURONTIN) 600 MG tablet, Take 1 tablet (600 mg total) by mouth 2 (two) times a day, Disp: 180 tablet, Rfl: 0    glimepiride (AMARYL) 4 mg tablet, Take 1 tablet (4 mg total) by mouth 2 (two) times a day for 126 days, Disp: 180 tablet, Rfl: 0    glucose blood (TRUETRACK TEST) test strip, 1 each by Other route daily Use as instructed for E11 29, Disp: 100 each, Rfl: 3    lisinopril (ZESTRIL) 5 mg tablet, Take 1 tablet (5 mg total) by mouth daily (Patient taking differently: Take 2 5 mg by mouth daily ), Disp: 90 tablet, Rfl: 3    metFORMIN (GLUMETZA) 500 MG (MOD) 24 hr tablet, Take 2 tablets (1,000 mg total) by mouth 2 (two) times a day with meals, Disp: 120 tablet, Rfl: 5    multivitamin (THERAGRAN) TABS, Take 1 tablet by mouth daily, Disp: , Rfl:     omeprazole (PriLOSEC) 40 MG capsule, Take 1 capsule (40 mg total) by mouth daily, Disp: 90 capsule, Rfl: 1    simvastatin (ZOCOR) 40 mg tablet, TAKE 1 TABLET (40 MG TOTAL) BY MOUTH DAILY, Disp: 90 tablet, Rfl: 1    TRUEPLUS LANCETS 28G MISC, Test 1x/d, E11 29, Disp: , Rfl: 0           Patient Active Problem List   Diagnosis    Diabetic polyneuropathy associated with type 2 diabetes mellitus (HCC)    Allergic rhinitis    Arthropathy    PAD (peripheral artery disease) (HCC)    Benign essential hypertension    CAD (coronary artery disease)    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)    Diabetic neuropathy ([address] )    GE reflux    Lumbar radiculopathy    Rosacea    Seborrheic dermatitis    Type 2 diabetes mellitus with diabetic neuropathy (HCC)    Onychomycosis    Occlusion of femoral-popliteal bypass graft (HCC)    Prostate cancer screening    Dyslipidemia    Screening for AAA (aortic abdominal aneurysm)    Medicare annual wellness visit, subsequent    Type 2 diabetes mellitus with stage 3 chronic kidney disease, without long-term current use of insulin ([facility])    Pain in both feet    Corns    Plantar warts             Patient ID: Pernell Covarrubias is a 74 y  o  male         The following portions of the patient's history were reviewed and updated as appropriate:      family history includes Aortic aneurysm in his mother; Heart attack in his father and sister; Heart disease in his father, paternal grandfather, and sister        reports that he has never smoked  He has never used smokeless tobacco  He reports that he drinks alcohol  He reports that he does not use drugs         Objective:  Patient's shoes and socks removed    Foot ExamPhysical Exam          Physical Exam  Left Foot: Appearance: a deformity (ball of foot and distal fifth metatarsal )  Fifth toe deformities include hammer toe  Tenderness: None except the plantar aspect of the foot  Right Foot: Appearance: a deformity (distal fifth metatarsal )  Left Ankle: ROM: limited ROM in all planes   Right Ankle: ROM: limited ROM in all planes   Neurological Exam: Light touch was decreased bilaterally  Vibratory sensation was decreased in both first metatarsophalangeal joints  Response to monofilament test was absent bilaterally  Deep tendon reflexes: achilles reflex 1/4 bilaterally  Vascular Exam: performed Dorsalis pedis pulses were 1/4 bilaterally   Posterior tibial pulses were 1/4 bilaterally  Elevation Pallor: diminished bilaterally  Capillary refill time was Q  9, findings bilateral  Negative digital hair noted, positive abnormal cooling  All pre-ulcer, lesions debrided  Today, but between 1-3 seconds bilaterally  Edema: mild bilaterally and All mycotic nails debrided  Today  The patient was given orthotics to help control his feet and at as cushioning and padding on the bottom of his feet q9 findings  Toenails: All of the toenails were elongated, hypertrophied, discolored, ingrown, shown to have subungual debris and tender       Socks and shoes removed, the Right Foot: the foot was dry  The sensory exam showed diminished vibratory sensation at the level of the toes  Diminished tactile sensation with monofilament testing throughout the right foot    Socks and shoes removed, Left Foot: the foot was dry  The sensory exam showed diminished vibratory sensation at the level of the toes  Diminished tactile sensation with monofilament testing throughout the left foot    Pulses:   1+ in the posterior tibialis on the right   1+ in the dorsalis pedis on the right  Capillary refills findings on the left were delayed in the toes  Pulses:   1+ in the posterior tibialis on the left   1+ in the dorsalis pedis on the left  Assign Risk Category: 2: Loss of protective sensation with or without weakness, deformity, callus, pre-ulcer, or history of ulceration  High risk  Hyperkeratosis: present on both first sub metatarsals, present on both fifth sub metatarsals and Pre-ulcerative lesion, submetatarsal one to 5, bilateral    Shoe Gear Evaluation: performed ()  Recommendation(s): custom inlays       Patient's shoes and socks removed  Right Foot/Ankle   Right Foot Inspection  Skin Exam: callus and callus               Toe Exam: swelling and erythema  Sensory   Vibration: diminished  Proprioception: diminished      Vascular  Capillary refills: elevated        Left Foot/Ankle  Left Foot Inspection  Skin Exam: callus   Submetatarsal 5 of the left foot demonstrates 0 5 centimeter squared plantar verruca   It is in capsulated in a bullae   Debrided   20% remaining               Toe Exam: swelling and erythema                   Sensory   Vibration: diminished  Proprioception: diminished     Vascular  Capillary refills: elevated  Patient's shoes and socks removed  Assign Risk Category:  Deformity present;  Loss of protective sensation; Weak pulses       Risk: 2

## 2021-01-24 DIAGNOSIS — I10 BENIGN ESSENTIAL HYPERTENSION: ICD-10-CM

## 2021-01-24 DIAGNOSIS — K21.9 GASTROESOPHAGEAL REFLUX DISEASE: ICD-10-CM

## 2021-01-24 RX ORDER — AMLODIPINE BESYLATE 2.5 MG/1
2.5 TABLET ORAL DAILY
Qty: 90 TABLET | Refills: 3 | Status: SHIPPED | OUTPATIENT
Start: 2021-01-24 | End: 2021-05-21 | Stop reason: SDUPTHER

## 2021-01-25 ENCOUNTER — LAB (OUTPATIENT)
Dept: LAB | Facility: CLINIC | Age: 76
End: 2021-01-25
Payer: MEDICARE

## 2021-01-25 ENCOUNTER — TELEPHONE (OUTPATIENT)
Dept: NEPHROLOGY | Facility: CLINIC | Age: 76
End: 2021-01-25

## 2021-01-25 DIAGNOSIS — E78.5 DYSLIPIDEMIA: ICD-10-CM

## 2021-01-25 DIAGNOSIS — N18.30 CKD STAGE 3 DUE TO TYPE 2 DIABETES MELLITUS (HCC): ICD-10-CM

## 2021-01-25 DIAGNOSIS — I12.9 BENIGN HYPERTENSION WITH CKD (CHRONIC KIDNEY DISEASE) STAGE III (HCC): ICD-10-CM

## 2021-01-25 DIAGNOSIS — E11.22 CKD STAGE 3 DUE TO TYPE 2 DIABETES MELLITUS (HCC): ICD-10-CM

## 2021-01-25 DIAGNOSIS — I73.9 PAD (PERIPHERAL ARTERY DISEASE) (HCC): ICD-10-CM

## 2021-01-25 DIAGNOSIS — N18.30 BENIGN HYPERTENSION WITH CKD (CHRONIC KIDNEY DISEASE) STAGE III (HCC): ICD-10-CM

## 2021-01-25 LAB
ALBUMIN SERPL BCP-MCNC: 3.8 G/DL (ref 3.5–5)
ANION GAP SERPL CALCULATED.3IONS-SCNC: 7 MMOL/L (ref 4–13)
BUN SERPL-MCNC: 14 MG/DL (ref 5–25)
CALCIUM SERPL-MCNC: 9.3 MG/DL (ref 8.3–10.1)
CHLORIDE SERPL-SCNC: 109 MMOL/L (ref 100–108)
CO2 SERPL-SCNC: 25 MMOL/L (ref 21–32)
CREAT SERPL-MCNC: 1.41 MG/DL (ref 0.6–1.3)
CREAT UR-MCNC: 93.4 MG/DL
ERYTHROCYTE [DISTWIDTH] IN BLOOD BY AUTOMATED COUNT: 14.6 % (ref 11.6–15.1)
GFR SERPL CREATININE-BSD FRML MDRD: 48 ML/MIN/1.73SQ M
GLUCOSE SERPL-MCNC: 362 MG/DL (ref 65–140)
HCT VFR BLD AUTO: 37.4 % (ref 36.5–49.3)
HGB BLD-MCNC: 11.7 G/DL (ref 12–17)
MCH RBC QN AUTO: 29 PG (ref 26.8–34.3)
MCHC RBC AUTO-ENTMCNC: 31.3 G/DL (ref 31.4–37.4)
MCV RBC AUTO: 93 FL (ref 82–98)
MICROALBUMIN UR-MCNC: 21.6 MG/L (ref 0–20)
MICROALBUMIN/CREAT 24H UR: 23 MG/G CREATININE (ref 0–30)
PHOSPHATE SERPL-MCNC: 2.4 MG/DL (ref 2.3–4.1)
PLATELET # BLD AUTO: 238 THOUSANDS/UL (ref 149–390)
PMV BLD AUTO: 10.4 FL (ref 8.9–12.7)
POTASSIUM SERPL-SCNC: 4.9 MMOL/L (ref 3.5–5.3)
PTH-INTACT SERPL-MCNC: 110.8 PG/ML (ref 18.4–80.1)
RBC # BLD AUTO: 4.04 MILLION/UL (ref 3.88–5.62)
SODIUM SERPL-SCNC: 141 MMOL/L (ref 136–145)
URATE SERPL-MCNC: 4.9 MG/DL (ref 4.2–8)
WBC # BLD AUTO: 6.81 THOUSAND/UL (ref 4.31–10.16)

## 2021-01-25 PROCEDURE — 82570 ASSAY OF URINE CREATININE: CPT

## 2021-01-25 PROCEDURE — 80069 RENAL FUNCTION PANEL: CPT

## 2021-01-25 PROCEDURE — 82043 UR ALBUMIN QUANTITATIVE: CPT

## 2021-01-25 PROCEDURE — 83970 ASSAY OF PARATHORMONE: CPT

## 2021-01-25 PROCEDURE — 85027 COMPLETE CBC AUTOMATED: CPT

## 2021-01-25 PROCEDURE — 84550 ASSAY OF BLOOD/URIC ACID: CPT

## 2021-01-25 PROCEDURE — 36415 COLL VENOUS BLD VENIPUNCTURE: CPT

## 2021-01-25 RX ORDER — OMEPRAZOLE 40 MG/1
CAPSULE, DELAYED RELEASE ORAL
Qty: 90 CAPSULE | Refills: 1 | Status: SHIPPED | OUTPATIENT
Start: 2021-01-25 | End: 2021-06-10

## 2021-01-25 NOTE — TELEPHONE ENCOUNTER
A message has been left reminding pt of f/u appointment with Jennie Yarbrough as well as lab work that is needed for this appointment

## 2021-01-26 NOTE — PATIENT INSTRUCTIONS
All questions asked and answered  The patient has been instructed to call office at [phone] with any questions or concerns      The patient has been instructed to obtain prescribed blood work and urine studies prior to next appointment  Avoid NSAID products to include Motrin, Ibuprofen, Aleve, Naprosyn, or naproxen   Low potassium diet-information given  Low-sodium diet-more than 2000 mg daily  Return in one year kidney follow-up

## 2021-01-26 NOTE — PROGRESS NOTES
[facility] FOLLOW UP - Nephrology   Slime Blackburn 67 y o  male MRN: [id number]       ASSESSMENT and PLAN:  Trey Mancilla was seen today for follow-up, chronic kidney disease and hypertension  Diagnoses and all orders for this visit:    CKD stage 3 due to type 2 diabetes mellitus ([facility] [address] )  -     Basic metabolic panel; Future  -     CBC and differential; Future  -     Magnesium; Future  -     Phosphorus; Future  -     PTH, intact; Future  -     Vitamin D 25 hydroxy; Future  -     Protein / creatinine ratio, urine;  Future    Type 2 diabetes mellitus with stage 3b chronic kidney disease, without long-term current use of insulin ([facility])    Benign essential hypertension    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 ([facility])    Coronary artery disease involving native coronary artery of native heart with angina pectoris ([facility] )    Dyslipidemia       Chronic kidney disease III:  Suspect secondary to diabetic kidney disease, hypertensive nephrosclerosis arterial nephrosclerosis  -after review medical records baseline creatinine 1 2-1 4  -most recent creatinine 01/25/2021 1 41  -avoid nephrotoxins, IV contrast, NSAID  -will patient return in six months with updated blood work and urine studies    Hypertension:  BP better after 10   -currently on amlodipine 2 5 mg daily, lisinopril 2 5 mg daily, coreg 12 5 mg BID  -no change in current regimen  -maintain low-sodium diet no more than 2000 mg of salt daily  -low sodium information given    Mild hyperkalemia:  Appears potassium runs on the higher side  -current potassium 4 9  -encourage low-potassium diet  -information give on Potassium diet    Anemia Chronic Kidney Disease:  -most recent hemoglobin 11 7  -check iron stores with next office visit    Bone mineral disease Chronic Kidney Disease:  -most recent  1, phosphorus 2 4  -will continue to monitor PTH phosphorus and we will check a vitamin-D at next office visit    Dyslipidemia:  Per primary physician  -continues with statin    PAD:  Follows with vascular as outpatient    CAD:  Follows with cardiology as outpatient    Patient Instructions   All questions asked and answered  The patient has been instructed to call office at [phone] with any questions or concerns  The patient has been instructed to obtain prescribed blood work and urine studies prior to next appointment  Avoid NSAID products to include Motrin, Ibuprofen, Aleve, Naprosyn, or naproxen   Low potassium diet-information given  Low-sodium diet-more than 2000 mg daily  Return in one year kidney follow-up        HPI: Casey Bhatia is a 67 y o  male who is here for Follow-up, Chronic Kidney Disease, and Hypertension  Patient returns to office for CKD follow-up  He was last seen on 1/22/2020 and renal function stable  Baseline creatinine 1 2-1 4  Most recent BMP on 1/25/2021 reviewed and reveals creatinine 1 41, potassium 4 9 and elevated glucose  Medication list reviewed  On discussion, he is feeling well  Denies chest pain, shortness of breath, dizziness, lightheadedness, nausea, vomiting urinary issues, fevers, chills  He reports no illnesses or hospitalizations since last seen  He continues with lower extremity edema with his no change due to vascular disease  With elevated potassium discussed potassium diet and appears patient does eat a lot of fruit high in potassium  Encouraged moderation information given to patient and wife        ROS:   All the systems were reviewed and were negative except as documented on the HPI  Allergies: Patient has no known allergies      Medications:   Current Outpatient Medications:     acarbose (PRECOSE) 100 MG tablet, TAKE 1 TABLET THREE TIMES DAILY WITH MEALS, Disp: 270 tablet, Rfl: 1    amLODIPine (NORVASC) 2 5 mg tablet, TAKE 1 TABLET (2 5 MG TOTAL) BY MOUTH DAILY, Disp: 90 tablet, Rfl: 3    aspirin (ECOTRIN LOW STRENGTH) 81 mg EC tablet, Take 1 tablet (81 mg total) by mouth daily, Disp: 30 tablet, Rfl: 6    atorvastatin (LIPITOR) 20 mg tablet, TAKE 1 TABLET EVERY DAY, Disp: 90 tablet, Rfl: 1    b complex-vitamin C-folic acid (NEPHROCAPS) 1 mg capsule, Take 1 capsule by mouth daily, Disp: , Rfl:     carvedilol (COREG) 12 5 mg tablet, TAKE 1 TABLET TWICE DAILY, Disp: 180 tablet, Rfl: 1    co-enzyme Q-10 100 mg capsule, Take 100 mg by mouth daily, Disp: , Rfl:     gabapentin (NEURONTIN) 600 MG tablet, Take 1 tablet (600 mg total) by mouth 2 (two) times a day, Disp: 180 tablet, Rfl: 0    glimepiride (AMARYL) 4 mg tablet, Take 1 tablet (4 mg total) by mouth 2 (two) times a day, Disp: 180 tablet, Rfl: 1    glucose blood (TRUETRACK TEST) test strip, 1 each by Other route daily Use as instructed for E11 29, Disp: 100 each, Rfl: 3    lisinopril (ZESTRIL) 2 5 mg tablet, TAKE 1 TABLET (2 5 MG TOTAL) BY MOUTH DAILY, Disp: 90 tablet, Rfl: 1    metFORMIN (GLUMETZA) 500 MG (MOD) 24 hr tablet, Take 2 tablets (1,000 mg total) by mouth 2 (two) times a day with meals, Disp: 360 tablet, Rfl: 1    multivitamin (THERAGRAN) TABS, Take 1 tablet by mouth daily, Disp: , Rfl:     omeprazole (PriLOSEC) 40 MG capsule, TAKE 1 CAPSULE EVERY DAY, Disp: 90 capsule, Rfl: 1    Probiotic Product (CULTURELLE PROBIOTICS) CHEW, Chew daily, Disp: , Rfl:     TRUEPLUS LANCETS 28G MISC, Test 1x/d, E11 29, Disp: , Rfl: 0    simvastatin (ZOCOR) 40 mg tablet, Take 40 mg by mouth daily at bedtime, Disp: , Rfl:     Past Medical History:   Diagnosis Date    Acquired hallux valgus     last assessed: 8/1/2013    Allergic rhinitis     Anemia 10/26/2010    Atrophy of nail     last assessed: 7/7/2015    Chronic kidney disease     chronic renal insufficiency    Coronary artery disease     Deformity of ankle and foot, acquired     last assessed: 2/22/2016    Diabetes mellitus ([facility] [address] )     Difficulty walking     last assessed: 12/14/2015    Dysesthesia     last assessed: 11/25/2016    Hammer toe     unspecified laterality; last assessed: 8/1/2013    Hypertension     Onychomycosis of toenail     last assessed: 2/22/2016    Peripheral vascular disease ([facility] )     left SFA stent in 2010    Pes planus     unspecified laterality; last assessed: 8/1/2013    Pneumonia     last assessed: 2/27/2016    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     last assessed: 8/15/2016    Type 2 diabetes mellitus ([facility] [address] ) 07/26/2010    Viral warts     last assessed: 7/24/2015     Past Surgical History:   Procedure Laterality Date    CARDIAC CATHETERIZATION  07/29/2010    CATARACT EXTRACTION, BILATERAL Bilateral     R 1999, L 2008    COLONOSCOPY  2011    FEMORAL ARTERY - POPLITEAL ARTERY BYPASS GRAFT  09/23/2013    FOOT SURGERY      bone spur removed    LEG SURGERY Bilateral     repair; L 8/20/2010 and 7/26/2012    ROTATOR CUFF REPAIR Left 2009    SHOULDER SURGERY Right 2005    collar bone     Family History   Problem Relation Age of Onset    Heart disease Father     Heart attack Father         acute myocardial infarction    Heart disease Sister     Heart attack Sister         acute myocardial infarction    Heart disease Paternal Grandfather     Aortic aneurysm Mother       reports that he has never smoked  He has never used smokeless tobacco  He reports current alcohol use  He reports that he does not use drugs  Physical Exam:   Vitals:    01/27/21 1458 01/27/21 1522   BP: (!) 178/74 148/72   BP Location:  Left arm   Patient Position:  Sitting   Cuff Size:  Standard   Pulse: 74    SpO2: 98%    Weight: 89 8 kg (198 lb)    Height: 5' 11" (1 803 m)      Body mass index is 27 62 kg/m²      General: cooperative, in not acute distress  Eyes: conjunctivae pink, anicteric sclerae  ENT: lips and mucous membranes moist  Neck: supple, no JVD  Chest: clear breath sounds bilateral, no crackles, ronchus or wheezings  CVS: distinct S1 & S2, normal rate, regular rhythm  Abdomen: soft, non-tender, non-distended, normoactive bowel sounds  Back: no CVA tenderness  Extremities: bilateral lower extremity edema of both legs  Skin: no rash  Neuro: awake, alert, oriented      Lab Results:  Results for orders placed or performed in visit on 01/25/21   CBC   Result Value Ref Range    WBC 6 81 4 31 - 10 16 Thousand/uL    RBC 4 04 3 88 - 5 62 Million/uL    Hemoglobin 11 7 (L) 12 0 - 17 0 g/dL    Hematocrit 37 4 36 5 - 49 3 %    MCV 93 82 - 98 fL    MCH 29 0 26 8 - 34 3 pg    MCHC 31 3 (L) 31 4 - 37 4 g/dL    RDW 14 6 11 6 - 15 1 %    Platelets 298 366 - 142 Thousands/uL    MPV 10 4 8 9 - 12 7 fL   Renal function panel   Result Value Ref Range    Albumin 3 8 3 5 - 5 0 g/dL    Calcium 9 3 8 3 - 10 1 mg/dL    Phosphorus 2 4 2 3 - 4 1 mg/dL    Glucose 362 (H) 65 - 140 mg/dL    BUN 14 5 - 25 mg/dL    Creatinine 1 41 (H) 0 60 - 1 30 mg/dL    Sodium 141 136 - 145 mmol/L    Potassium 4 9 3 5 - 5 3 mmol/L    Chloride 109 (H) 100 - 108 mmol/L    CO2 25 21 - 32 mmol/L    ANION GAP 7 4 - 13 mmol/L    eGFR 48 ml/min/1 73sq m   PTH, intact   Result Value Ref Range     8 (H) 18 4 - 80 1 pg/mL   Microalbumin / creatinine urine ratio   Result Value Ref Range    Creatinine, Ur 93 4 mg/dL    Microalbum  ,U,Random 21 6 (H) 0 0 - 20 0 mg/L    Microalb Creat Ratio 23 0 - 30 mg/g creatinine   Uric acid   Result Value Ref Range    Uric Acid 4 9 4 2 - 8 0 mg/dL       Results from last 7 days   Lab Units 01/25/21  1300   WBC Thousand/uL 6 81   HEMOGLOBIN g/dL 11 7*   HEMATOCRIT % 37 4   PLATELETS Thousands/uL 238   SODIUM mmol/L 141   POTASSIUM mmol/L 4 9   CHLORIDE mmol/L 109*   CO2 mmol/L 25   BUN mg/dL 14   CREATININE mg/dL 1 41*   CALCIUM mg/dL 9 3   PHOSPHORUS mg/dL 2 4           Portions of the record may have been created with voice recognition software  Occasional wrong word or "sound a like" substitutions may have occurred due to the inherent limitations of voice recognition software   Read the chart carefully and recognize,

## 2021-01-27 ENCOUNTER — OFFICE VISIT (OUTPATIENT)
Dept: NEPHROLOGY | Facility: CLINIC | Age: 76
End: 2021-01-27
Payer: MEDICARE

## 2021-01-27 VITALS
HEIGHT: 71 IN | OXYGEN SATURATION: 98 % | BODY MASS INDEX: 27.72 KG/M2 | HEART RATE: 74 BPM | SYSTOLIC BLOOD PRESSURE: 148 MMHG | WEIGHT: 198 LBS | DIASTOLIC BLOOD PRESSURE: 72 MMHG

## 2021-01-27 DIAGNOSIS — I25.119 CORONARY ARTERY DISEASE INVOLVING NATIVE CORONARY ARTERY OF NATIVE HEART WITH ANGINA PECTORIS (HCC): ICD-10-CM

## 2021-01-27 DIAGNOSIS — N18.30 CKD STAGE 3 DUE TO TYPE 2 DIABETES MELLITUS (HCC): Primary | ICD-10-CM

## 2021-01-27 DIAGNOSIS — E11.22 CKD STAGE 3 DUE TO TYPE 2 DIABETES MELLITUS (HCC): Primary | ICD-10-CM

## 2021-01-27 DIAGNOSIS — E78.5 DYSLIPIDEMIA: ICD-10-CM

## 2021-01-27 DIAGNOSIS — E11.22 TYPE 2 DIABETES MELLITUS WITH STAGE 3B CHRONIC KIDNEY DISEASE, WITHOUT LONG-TERM CURRENT USE OF INSULIN (HCC): ICD-10-CM

## 2021-01-27 DIAGNOSIS — I10 BENIGN ESSENTIAL HYPERTENSION: ICD-10-CM

## 2021-01-27 DIAGNOSIS — N18.32 TYPE 2 DIABETES MELLITUS WITH STAGE 3B CHRONIC KIDNEY DISEASE, WITHOUT LONG-TERM CURRENT USE OF INSULIN (HCC): ICD-10-CM

## 2021-01-27 DIAGNOSIS — I70.219 ATHEROSCLEROSIS OF ARTERY OF EXTREMITY WITH INTERMITTENT CLAUDICATION (HCC): ICD-10-CM

## 2021-01-27 PROBLEM — N18.31 ANEMIA DUE TO STAGE 3A CHRONIC KIDNEY DISEASE (HCC): Status: ACTIVE | Noted: 2021-01-27

## 2021-01-27 PROBLEM — D63.1 ANEMIA DUE TO STAGE 3A CHRONIC KIDNEY DISEASE (HCC): Status: ACTIVE | Noted: 2021-01-27

## 2021-01-27 PROCEDURE — 99213 OFFICE O/P EST LOW 20 MIN: CPT | Performed by: NURSE PRACTITIONER

## 2021-01-29 DIAGNOSIS — I10 BENIGN ESSENTIAL HYPERTENSION: ICD-10-CM

## 2021-01-30 RX ORDER — LISINOPRIL 2.5 MG/1
2.5 TABLET ORAL DAILY
Qty: 90 TABLET | Refills: 1 | OUTPATIENT
Start: 2021-01-30

## 2021-02-03 DIAGNOSIS — E11.59 TYPE 2 DIABETES MELLITUS WITH OTHER CIRCULATORY COMPLICATION, WITHOUT LONG-TERM CURRENT USE OF INSULIN (HCC): ICD-10-CM

## 2021-02-03 RX ORDER — GLIMEPIRIDE 4 MG/1
TABLET ORAL
Qty: 180 TABLET | Refills: 1 | Status: SHIPPED | OUTPATIENT
Start: 2021-02-03 | End: 2021-07-31 | Stop reason: HOSPADM

## 2021-02-09 DIAGNOSIS — E11.42 DIABETIC POLYNEUROPATHY ASSOCIATED WITH TYPE 2 DIABETES MELLITUS (HCC): ICD-10-CM

## 2021-02-09 RX ORDER — GABAPENTIN 600 MG/1
600 TABLET ORAL 2 TIMES DAILY
Qty: 180 TABLET | Refills: 0 | Status: SHIPPED | OUTPATIENT
Start: 2021-02-09 | End: 2022-02-24 | Stop reason: HOSPADM

## 2021-02-10 ENCOUNTER — IMMUNIZATIONS (OUTPATIENT)
Dept: FAMILY MEDICINE CLINIC | Facility: HOSPITAL | Age: 76
End: 2021-02-10

## 2021-02-10 DIAGNOSIS — Z23 ENCOUNTER FOR IMMUNIZATION: Primary | ICD-10-CM

## 2021-02-10 PROCEDURE — 0011A SARS-COV-2 / COVID-19 MRNA VACCINE (MODERNA) 100 MCG: CPT

## 2021-02-10 PROCEDURE — 91301 SARS-COV-2 / COVID-19 MRNA VACCINE (MODERNA) 100 MCG: CPT

## 2021-02-15 ENCOUNTER — TELEPHONE (OUTPATIENT)
Dept: UROLOGY | Facility: AMBULATORY SURGERY CENTER | Age: 76
End: 2021-02-15

## 2021-02-15 DIAGNOSIS — R97.20 ELEVATED PSA: Primary | ICD-10-CM

## 2021-02-15 RX ORDER — CIPROFLOXACIN 500 MG/1
500 TABLET, FILM COATED ORAL 2 TIMES DAILY
Qty: 2 TABLET | Refills: 0 | Status: SHIPPED | OUTPATIENT
Start: 2021-02-15 | End: 2021-02-16

## 2021-02-15 NOTE — TELEPHONE ENCOUNTER
Patient scheduled for biopsy with Dr Davon Lomas) 2/17/21, patient called in regarding when antibiotic will be order prior   Medication can be sent to:  [facility]  [address] [place] [address]  Phone: [phone]    Patient can be reached at [phone]

## 2021-02-17 ENCOUNTER — PROCEDURE VISIT (OUTPATIENT)
Dept: UROLOGY | Facility: CLINIC | Age: 76
End: 2021-02-17
Payer: MEDICARE

## 2021-02-17 VITALS
SYSTOLIC BLOOD PRESSURE: 132 MMHG | DIASTOLIC BLOOD PRESSURE: 80 MMHG | HEART RATE: 75 BPM | BODY MASS INDEX: 26.26 KG/M2 | HEIGHT: 71 IN | WEIGHT: 187.6 LBS

## 2021-02-17 DIAGNOSIS — R97.20 ELEVATED PSA: Primary | ICD-10-CM

## 2021-02-17 PROCEDURE — G0416 PROSTATE BIOPSY, ANY MTHD: HCPCS | Performed by: PATHOLOGY

## 2021-02-17 PROCEDURE — 96372 THER/PROPH/DIAG INJ SC/IM: CPT

## 2021-02-17 PROCEDURE — 55700 PR BIOPSY OF PROSTATE,NEEDLE/PUNCH: CPT | Performed by: UROLOGY

## 2021-02-17 PROCEDURE — 76942 ECHO GUIDE FOR BIOPSY: CPT | Performed by: UROLOGY

## 2021-02-17 RX ORDER — CEFTRIAXONE 1 G/1
1000 INJECTION, POWDER, FOR SOLUTION INTRAMUSCULAR; INTRAVENOUS ONCE
Status: COMPLETED | OUTPATIENT
Start: 2021-02-17 | End: 2021-02-17

## 2021-02-17 RX ADMIN — CEFTRIAXONE 1000 MG: 1 INJECTION, POWDER, FOR SOLUTION INTRAMUSCULAR; INTRAVENOUS at 09:39

## 2021-02-17 NOTE — PROGRESS NOTES
Office TRUS-guided Prostate Biopsy Procedure Note    Indication    Elevated PSA , 8 0,   Abnormal rectal exam    Informed consent   The risks, benefits and alternatives to TRUS-guided prostate biopsy were conveyed to the patient prior to performing the procedure  A discussion of the risks of the procedure included, but was not limited to: pain, hematuria, hematochezia, hematospermia, infection, and the possibility of a non-diagnostic biopsy  The patient was given the opportunity to have his questions answered and there was no perceived barrier to education  Antibiotic prophylaxis   The patient received the following antibiotics at least 30 minutes prior to undergoing biopsy: Ciprofloxacin 500 mg PO x2   1 g intramuscular Rocephin    Rectal cleansing  The patient was instructed to perform an evacuating rectal enema 1-2 hours prior to biopsy  Local anesthesia  Topical 2% lidocaine jelly was applied liberally to the anus and rectum and allowed to dwell for at least 5 min prior to starting the procedure  After insertion of the TRUS probe, 10 mL of 2% lidocaine solution was injected with ultrasound guidance at the  junction of the prostate and seminal vesicles  The anesthetic was allowed to dwell for at least 2 minutes prior to biopsy  Transrectal ultrasonography  The patient was placed in the left lateral decubitus position  After an attentive digital rectal examination, a 7 5 mHz sidefire ultrasound probe was gently inserted into the rectum and biplanar imaging of the prostate was done with the findings noted below  Images were taken of any abnormal findings and also to document prostate size      Bladder  The bladder base appeared normal     Prostate  Digital rectal exam findings:  -  Firm fixed, left side    Ultrasound size measurements:  -Volume:   30 of cm3    Ultrasound findings:  -Cysts: None  -Masses:  a bulky hypoechoic mass is visualized on the left side corresponding to the site of palpable disease  -Median lobe: absent    Clinical stage (assuming a positive biopsy):   -T3b     TRUS-guided needle biopsy  Using an 18 gauge biopsy needle and ultrasound guidance, the following biopsies were taken:    1 core(s) from the left lateral base  1 core(s) from the left lateral mid-gland  1 core(s) from the right middle base  1 core(s) from the right lateral base  1 core(s) from the left lateral mid-gland  1 core(s) from the left middle mid-gland  1 core(s) from the right middle mid-gland  1 core(s) from the right lateral mid-gland  1 core(s) from the left lateral apex  1 core(s) from the left middle apex  1 core(s) from the right middle apex  1 core(s) from the right lateral apex    Total number of cores: 12                Complications  There were no procedural complications  Disposition  The patient was dismissed to home after 30 minutes of observation in stable condition  Post-procedure instructions: Today he underwent an uncomplicated transrectal ultrasound-guided biopsy of the prostate, following a periprosthetic nerve block  I reviewed the normal postprocedure a course including bleeding per recutm, hematuria, and hematospermia  I instructed him to complete his course of antibiotics as prescribed  Instructed him to call with fever greater than 101, chills, nausea, vomiting, poorly controlled pain  His followup was scheduled in approximately 2 weeks' time to review the pathology

## 2021-02-18 DIAGNOSIS — I73.9 PAD (PERIPHERAL ARTERY DISEASE) (HCC): Primary | ICD-10-CM

## 2021-02-21 DIAGNOSIS — I73.9 PAD (PERIPHERAL ARTERY DISEASE) (HCC): ICD-10-CM

## 2021-02-22 RX ORDER — ATORVASTATIN CALCIUM 20 MG/1
TABLET, FILM COATED ORAL
Qty: 90 TABLET | Refills: 1 | Status: ON HOLD | OUTPATIENT
Start: 2021-02-22 | End: 2021-07-21

## 2021-02-25 ENCOUNTER — TELEPHONE (OUTPATIENT)
Dept: UROLOGY | Facility: MEDICAL CENTER | Age: 76
End: 2021-02-25

## 2021-02-26 ENCOUNTER — TELEPHONE (OUTPATIENT)
Dept: VASCULAR SURGERY | Facility: CLINIC | Age: 76
End: 2021-02-26

## 2021-02-26 NOTE — TELEPHONE ENCOUNTER
Left message     Please schedule patient for the following appointments    [] Carotid doppler (CV)  [] Abdominal Aorta Iliac (AOIL)  [x] Lower Limb Arterial ([place])  [] Renal Artery  [] Upper Limb (ULEA)  [] EVAR  [] Advanced Imaging (CT/CTA) NOT NEEDED  [x] Office Visit Follow-up

## 2021-02-26 NOTE — TELEPHONE ENCOUNTER
Called and spoke with patient at this time  Advised he should make every effort to keep this appt as Dr Niecy Smalls does not have any availability in the same time frame  He reports he and his wife only have one car  She has a physical therapy appointment at the same time  Advised she should try and move her apt before we cancel biopsy results appt with Dr Niecy Smalls  Advised we do not want to delay his care in any way  Patient verbalized understanding and will keep as scheduled

## 2021-03-03 ENCOUNTER — OFFICE VISIT (OUTPATIENT)
Dept: UROLOGY | Facility: CLINIC | Age: 76
End: 2021-03-03
Payer: MEDICARE

## 2021-03-03 VITALS
WEIGHT: 195.8 LBS | HEART RATE: 75 BPM | HEIGHT: 71 IN | DIASTOLIC BLOOD PRESSURE: 70 MMHG | BODY MASS INDEX: 27.41 KG/M2 | SYSTOLIC BLOOD PRESSURE: 142 MMHG

## 2021-03-03 DIAGNOSIS — C61 PROSTATE CANCER (HCC): Primary | ICD-10-CM

## 2021-03-03 LAB
LEFT EYE DIABETIC RETINOPATHY: NORMAL
RIGHT EYE DIABETIC RETINOPATHY: NORMAL

## 2021-03-03 PROCEDURE — 99215 OFFICE O/P EST HI 40 MIN: CPT | Performed by: UROLOGY

## 2021-03-03 NOTE — PROGRESS NOTES
Referring Physician: Matt Fernandez DO  A copy of this note was sent to the referring physician  Diagnoses and all orders for this visit:    Prostate cancer ([facility] [address] )  -     CT abdomen pelvis w contrast; Future  -     NM bone scan whole body; Future  -     Creatinine, serum; Future  -     Ambulatory referral to Radiation Oncology; Future  -     degarelix acetate (FIRMAGON) injection 240 mg  -     PSA Total, Diagnostic; Future            Assessment and plan:       1  Newly diagnosed high risk locally advanced prostate cancer  - cT3b,  Mikki 5+5=10, 6 of 12 total  Positive cores, pretreatment PSA 8 6     2  Medical comorbidities:  Diabetes with polyneuropathy, stage III, kidney disease, coronary artery disease,  Peripheral vascular disease of lower extremity status post endovascular stent placement as well as right lower extremity bypass      We discussed the natural history of prostate cancer, the [place] grading system, and the patient's current staging  He was provided with a copy of his biopsy report  We discussed the options for managing newly diagnosed prostate cancer including active surveillance for low risk disease, radical prostatectomy, and pelvic radiation therapy  We discussed each of these management strategies in detail, with particular emphasis to how they do or do not apply to the patient's current staging  A discussion was guided using criteria for [address] [place] [address] (NCCN) to stratify patient's according to low, intermediate, or high-risk prostate cancer  We discussed the goals of care as #1 being cancer cure, and #2 being preservation of urinary control and erectile function  given the patient's high risk disease my recommendation was for active treatment versus active surveillance  We discussed the pros and cons of robotic prostatectomy versus upfront radiation therapy combined with 2 or 3 years of ongoing androgen deprivation therapy    Between the 2 modalities, given the patient's underlying medical and surgical risk factors as well as a high likelihood of requiring radiation treatment following surgical resection, my recommendation was for upfront radiation treatment combined with 2-3 years of androgen deprivation  Regarding ADT, The patient understands that hormonal ablation can be achieved either surgically with orchiectomy or medically with [facility] agonists or antagonists  He understands that hormone treatment may control the cancer but will probably not cure it  We discussed the risks, benefits, possible side effects and alternatives to hormonal therapy  The possible side effects include but are not limited to weight gain (usually subcutaneous fat), osteopenia, hot flashes/temperature dysregulation, impotence, decreased libido, heart attack, insulin-resistance, fatigue, hyperlipidemia, metabolic syndrome and its attendant risks, the emergence of hormone-independent prostate cancer or the need for further treatment  He also understands there may be some association with androgen deprivation therapy and cardiovascular disease  In addition to discussing this, I gave him the opportunity to explore this further with an internist or cardiologist first before beginning therapy  We also spoke of quality of life issues, costs and the need to temporarily take an oral anti-androgen when starting [facility] therapy  His questions were answered to his satisfaction and he voiced understanding  comprehensive plan is as follows:  - CT and bone scan ordered for staging   -referral placed to Radiation Oncology   - he will follow up with my nursing team shortly to initiate androgen deprivation    Given his cardiovascular risk factors, we will plan for long-term Firmagon instead of Lupron  - he will follow up with my advanced practitioner to 3 months with an updated PSA   -Again we will plan for 2-3 years of Justin Collins given his high risk disease    I have spent 55 minutes with Patient and family today in which greater than 50% of this time was spent in counseling/coordination of care regarding Diagnostic results, Prognosis, Risks and benefits of tx options, Intructions for management, Risk factor reductions and Impressions  Ajay Pastor MD      Chief Complaint      biopsy follow-up      History of Present Illness     Emmanuel Ace is a 67 y o  Male returns in follow-up  He is a very pleasant 50-year-old male with a history of coronary artery disease and peripheral vascular disease who initially was referred for a PSA elevation of 8 6  He was noted to have an abnormal rectal examination and is now status post prostate biopsy  The time the procedure a bulky hypoechoic lesion was identified, consistent with T3 disease  Surgical pathology unfortunately has confirmed Mikki 5+5=10 prostate cancer in 6 of 12 total cores  Patient is asymptomatic from a urinary standpoint  He is accompanied by his purnima wife today  Detailed Urologic History     - please refer to HPI    Review of Systems     Review of Systems   Constitutional: Negative for activity change and fatigue  HENT: Negative for congestion  Eyes: Negative for visual disturbance  Respiratory: Negative for shortness of breath and wheezing  Cardiovascular: Negative for chest pain and leg swelling  Gastrointestinal: Negative for abdominal pain  Endocrine: Negative for polyuria  Genitourinary: Negative for dysuria, flank pain, hematuria and urgency  Musculoskeletal: Negative for back pain  Allergic/Immunologic: Negative for immunocompromised state  Neurological: Negative for dizziness and numbness  Psychiatric/Behavioral: Negative for dysphoric mood  All other systems reviewed and are negative  Allergies     No Known Allergies    Physical Exam     Physical Exam  Constitutional:       General: He is not in acute distress  Appearance: He is well-developed     HENT:      Head: Normocephalic and atraumatic  Neck:      Musculoskeletal: Normal range of motion  Cardiovascular:      Comments: Negative lower extremity edema  Pulmonary:      Effort: Pulmonary effort is normal       Breath sounds: Normal breath sounds  Abdominal:      Palpations: Abdomen is soft  Genitourinary:     Penis: Normal     Musculoskeletal: Normal range of motion  Skin:     General: Skin is warm  Neurological:      Mental Status: He is alert and oriented to person, place, and time     Psychiatric:         Behavior: Behavior normal              Vital Signs  Vitals:    03/03/21 1028   Height: 5' 11" (1 803 m)         Current Medications       Current Outpatient Medications:     acarbose (PRECOSE) 100 MG tablet, TAKE 1 TABLET THREE TIMES DAILY WITH MEALS, Disp: 270 tablet, Rfl: 1    amLODIPine (NORVASC) 2 5 mg tablet, TAKE 1 TABLET (2 5 MG TOTAL) BY MOUTH DAILY, Disp: 90 tablet, Rfl: 3    aspirin (ECOTRIN LOW STRENGTH) 81 mg EC tablet, Take 1 tablet (81 mg total) by mouth daily, Disp: 30 tablet, Rfl: 6    atorvastatin (LIPITOR) 20 mg tablet, TAKE 1 TABLET EVERY DAY, Disp: 90 tablet, Rfl: 1    b complex-vitamin C-folic acid (NEPHROCAPS) 1 mg capsule, Take 1 capsule by mouth daily, Disp: , Rfl:     carvedilol (COREG) 12 5 mg tablet, TAKE 1 TABLET TWICE DAILY, Disp: 180 tablet, Rfl: 1    co-enzyme Q-10 100 mg capsule, Take 100 mg by mouth daily, Disp: , Rfl:     gabapentin (NEURONTIN) 600 MG tablet, Take 1 tablet (600 mg total) by mouth 2 (two) times a day, Disp: 180 tablet, Rfl: 0    glimepiride (AMARYL) 4 mg tablet, TAKE 1 TABLET TWICE DAILY, Disp: 180 tablet, Rfl: 1    glucose blood (TRUETRACK TEST) test strip, 1 each by Other route daily Use as instructed for E11 29, Disp: 100 each, Rfl: 3    lisinopril (ZESTRIL) 2 5 mg tablet, TAKE 1 TABLET (2 5 MG TOTAL) BY MOUTH DAILY, Disp: 90 tablet, Rfl: 1    metFORMIN (GLUMETZA) 500 MG (MOD) 24 hr tablet, Take 2 tablets (1,000 mg total) by mouth 2 (two) times a day with meals, Disp: 360 tablet, Rfl: 1    multivitamin (THERAGRAN) TABS, Take 1 tablet by mouth daily, Disp: , Rfl:     omeprazole (PriLOSEC) 40 MG capsule, TAKE 1 CAPSULE EVERY DAY, Disp: 90 capsule, Rfl: 1    Probiotic Product (CULTURELLE PROBIOTICS) CHEW, Chew daily, Disp: , Rfl:     TRUEPLUS LANCETS 28G MISC, Test 1x/d, E11 29, Disp: , Rfl: 0      Active Problems     Patient Active Problem List   Diagnosis    Diabetic polyneuropathy associated with type 2 diabetes mellitus ([facility] [address] )    Allergic rhinitis    Arthropathy    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 ([facility] [address] )    CAD (coronary artery disease)    CKD stage 3 due to type 2 diabetes mellitus ([facility] [address] )    Diabetic neuropathy ([facility] [address] )    GE reflux    Lumbar radiculopathy    Rosacea    Seborrheic dermatitis    Type 2 diabetes mellitus with diabetic neuropathy (HCC)    Onychomycosis    Occlusion of femoral-popliteal bypass graft (HCC)    Prostate cancer screening    Dyslipidemia    Screening for AAA (aortic abdominal aneurysm)    Medicare annual wellness visit, subsequent    Type 2 diabetes mellitus with stage 3 chronic kidney disease, without long-term current use of insulin (HCC)    Pain in both feet    Corns    Plantar warts    Elevated PSA    Embolism and thrombosis of arteries of the lower extremities (HCC)    Benign essential hypertension    Bilateral leg weakness    Anemia due to stage 3a chronic kidney disease         Past Medical History     Past Medical History:   Diagnosis Date    Acquired hallux valgus     last assessed: 8/1/2013    Allergic rhinitis     Anemia 10/26/2010    Atrophy of nail     last assessed: 7/7/2015    Chronic kidney disease     chronic renal insufficiency    Coronary artery disease     Deformity of ankle and foot, acquired     last assessed: 2/22/2016    Diabetes mellitus ([facility] [address] )     Difficulty walking     last assessed: 12/14/2015    Dysesthesia     last assessed: 11/25/2016    Hammer toe     unspecified laterality; last assessed: 8/1/2013    Hypertension     Onychomycosis of toenail     last assessed: 2/22/2016    Peripheral vascular disease ([facility] )     left SFA stent in 2010    Pes planus     unspecified laterality; last assessed: 8/1/2013    Pneumonia     last assessed: 2/27/2016    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     last assessed: 8/15/2016    Type 2 diabetes mellitus ([facility] ) 07/26/2010    Viral warts     last assessed: 7/24/2015         Surgical History     Past Surgical History:   Procedure Laterality Date    CARDIAC CATHETERIZATION  07/29/2010    CATARACT EXTRACTION, BILATERAL Bilateral     R 1999, L 2008    COLONOSCOPY  2011    FEMORAL ARTERY - POPLITEAL ARTERY BYPASS GRAFT  09/23/2013    FOOT SURGERY      bone spur removed    LEG SURGERY Bilateral     repair; L 8/20/2010 and 7/26/2012    ROTATOR CUFF REPAIR Left 2009    SHOULDER SURGERY Right 2005    collar bone         Family History     Family History   Problem Relation Age of Onset    Heart disease Father     Heart attack Father         acute myocardial infarction    Heart disease Sister     Heart attack Sister         acute myocardial infarction    Heart disease Paternal Grandfather     Aortic aneurysm Mother          Social History     Social History     Social History     Tobacco Use   Smoking Status Never Smoker   Smokeless Tobacco Never Used         Pertinent Lab Values     Lab Results   Component Value Date    CREATININE 1 41 (H) 01/25/2021       Lab Results   Component Value Date    PSA 8 0 (H) 12/10/2020    PSA 6 2 (H) 08/14/2020    PSA 4 0 02/10/2020       @RESULTRCNT(1H])@      Pertinent Imaging      - n/a    Portions of the record may have been created with voice recognition software   Occasional wrong word or "sound a like" substitutions may have occurred due to the inherent limitations of voice recognition software   Read the chart carefully and recognize, using context, where substitutions have occurred

## 2021-03-08 ENCOUNTER — OFFICE VISIT (OUTPATIENT)
Dept: CARDIOLOGY CLINIC | Facility: CLINIC | Age: 76
End: 2021-03-08
Payer: MEDICARE

## 2021-03-08 VITALS
HEIGHT: 71 IN | DIASTOLIC BLOOD PRESSURE: 70 MMHG | OXYGEN SATURATION: 99 % | TEMPERATURE: 97.7 F | BODY MASS INDEX: 27.58 KG/M2 | WEIGHT: 197 LBS | SYSTOLIC BLOOD PRESSURE: 140 MMHG | HEART RATE: 67 BPM

## 2021-03-08 DIAGNOSIS — E78.5 DYSLIPIDEMIA: ICD-10-CM

## 2021-03-08 DIAGNOSIS — E11.22 CKD STAGE 3 DUE TO TYPE 2 DIABETES MELLITUS (HCC): ICD-10-CM

## 2021-03-08 DIAGNOSIS — I70.219 ATHEROSCLEROSIS OF ARTERY OF EXTREMITY WITH INTERMITTENT CLAUDICATION (HCC): ICD-10-CM

## 2021-03-08 DIAGNOSIS — N18.30 CKD STAGE 3 DUE TO TYPE 2 DIABETES MELLITUS (HCC): ICD-10-CM

## 2021-03-08 DIAGNOSIS — I10 BENIGN ESSENTIAL HYPERTENSION: ICD-10-CM

## 2021-03-08 DIAGNOSIS — I25.119 CORONARY ARTERY DISEASE INVOLVING NATIVE CORONARY ARTERY OF NATIVE HEART WITH ANGINA PECTORIS (HCC): ICD-10-CM

## 2021-03-08 PROCEDURE — 99214 OFFICE O/P EST MOD 30 MIN: CPT | Performed by: INTERNAL MEDICINE

## 2021-03-08 PROCEDURE — 93000 ELECTROCARDIOGRAM COMPLETE: CPT | Performed by: INTERNAL MEDICINE

## 2021-03-08 NOTE — PROGRESS NOTES
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 ([facility] )    4  Dyslipidemia    5  CKD stage 3 due to type 2 diabetes mellitus ([facility] )        Discussion Summary and Plan:  1  PVD with right femoropopliteal which is occluded and left SFA which has stent placed by me in , had repeat procedure done with known right SFA stent stenosis underwent balloon angioplasty  He is able to walk he has seen the vascular  He may have InStent stenosis on the left and has occlusion of his bypass on the right  Advised him to follow up with vascular and keep walking  His ABIs are around 0 6  He follows up with vascular     2  Coronary artery disease  Status post multivessel stenting  Patient now want to follow up with [facility] cardiology  His nuclear stress test done in 2018 which shows no ischemia normal LV systolic function  He has no symptoms of chest pain  He has decently active    3  Hypertension  His blood pressure has improved with addition of low-dose amlodipine  Continue Coreg as before  His potassium runs high dense he is on low-dose lisinopril  Last lab shows in January potassium was 4 9 hence we could not increase ACE-inhibitor  But blood pressure has improved      4  Dyslipidemia  Continue statins  Cholesterol profile labs done on 2020 is acceptable  Continue current Rx      5  Chronic renal insufficiency  Last blood test shows that patient has creatinine was 1 4 with potassium 4 9    6  Carotid bruit     Carotid Doppler shows less than 50% stenosis on the left  No evidence of stenosis on the right side    Continue medical Rx follows up with vascular    7  Diabetes mellitus  Patient blood sugar has been very high HbA1c 13 2  Used to be 7 2 he is aware of it  Advised him to follow up with medical team       Counseling :  A description of the counseling  All issues regarding tight sugar control, taking cholesterol medications, regular exercise, symptoms about coronary artery disease discussed with patient at length  Previous echo from 2014 reviewed  Goals and Barriers  Patient's ability to self care: Yes  Medication side effect reviewed with patient in detail and all their questions answered to their satisfaction  HPI :     Racheal Santiago is a 67y o  year old male who came for follow up  Patient has a past medical history significant for Coronary Artery artery disease, CK D stage III, Diabetes mellitus with renal manifestations, PVD with right femoropopliteal which has occluded and left SFA stent by me in 2010, hypertension, diabetic neuropathy who came to see us after his vascular study was found to be abnormal  For cardiac problem he generally sees Dr Makeda Crain  His vascular study was ordered by his podiatrist as he was having pain in his foot  He also had a lot of back problems sometime pain radiates from his back to the thighs  Here a stent placed in his left SFA in 2010  It's already known that his right femoropopliteal is occluded  He has no fever no chills no nausea no vomiting no other significant complaint     03/08/2021  Above reviewed  Patient came for follow-up  He is doing well from cardiac point of view  His legs are still bothering him  He follows up with vascular  He does have history of PVD with possible left subclavian stenosis, history of bypass on the right leg and stent on the left and follows up with vascular  Today EKG shows heart rate 67 beats per minute  And 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  He is no longer getting cramps when he walks  His study from May 2020 reviewed    ABIs were 0 63 on the right and 0 6 on the left  He denies any history of chest pain, shortness breath, dizziness, lightheadedness, nausea vomiting PND orthopnea or cardiovascular issues  He had a blood test of January 2021 with creatinine was 1 41  But blood glucose was elevated  HbA1c 13 4  Review of Systems   Constitutional: Negative for activity change, chills, diaphoresis, fever and unexpected weight change  HENT: Negative for congestion  Eyes: Negative for discharge and redness  Respiratory: Negative for cough, chest tightness, shortness of breath and wheezing  Cardiovascular: Negative  Negative for chest pain, palpitations and leg swelling  Gastrointestinal: Negative for abdominal pain, diarrhea and nausea  Endocrine: Negative  Genitourinary: Negative for decreased urine volume and urgency  Musculoskeletal: Positive for arthralgias, back pain and gait problem  Skin: Negative for rash and wound  Allergic/Immunologic: Negative  Neurological: Negative for dizziness, seizures, syncope, weakness, light-headedness and headaches  Hematological: Negative  Psychiatric/Behavioral: Negative for agitation and confusion  The patient is nervous/anxious          Historical Information   Past Medical History:   Diagnosis Date    Acquired hallux valgus     last assessed: 8/1/2013    Allergic rhinitis     Anemia 10/26/2010    Atrophy of nail     last assessed: 7/7/2015    Chronic kidney disease     chronic renal insufficiency    Coronary artery disease     Deformity of ankle and foot, acquired     last assessed: 2/22/2016    Diabetes mellitus ([facility] )     Difficulty walking     last assessed: 12/14/2015    Dysesthesia     last assessed: 11/25/2016    Hammer toe     unspecified laterality; last assessed: 8/1/2013    Hypertension     Onychomycosis of toenail     last assessed: 2/22/2016    Peripheral vascular disease ([facility] )     left SFA stent in 2010    Pes planus     unspecified laterality; last assessed: 8/1/2013    Pneumonia     last assessed: 2/27/2016    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     last assessed: 8/15/2016    Type 2 diabetes mellitus ([facility] ) 07/26/2010    Viral warts     last assessed: 7/24/2015     Past Surgical History:   Procedure Laterality Date    CARDIAC CATHETERIZATION  07/29/2010    CATARACT EXTRACTION, BILATERAL Bilateral     R 1999, L 2008    COLONOSCOPY  2011    FEMORAL ARTERY - POPLITEAL ARTERY BYPASS GRAFT  09/23/2013    FOOT SURGERY      bone spur removed    LEG SURGERY Bilateral     repair; L 8/20/2010 and 7/26/2012    ROTATOR CUFF REPAIR Left 2009    SHOULDER SURGERY Right 2005    collar bone     Social History     Substance and Sexual Activity   Alcohol Use Yes    Frequency: 2-4 times a month    Comment: being a social drinker     Social History     Substance and Sexual Activity   Drug Use No     Social History     Tobacco Use   Smoking Status Never Smoker   Smokeless Tobacco Never Used     Family History:   Family History   Problem Relation Age of Onset    Heart disease Father     Heart attack Father         acute myocardial infarction    Heart disease Sister     Heart attack Sister         acute myocardial infarction    Heart disease Paternal Grandfather     Aortic aneurysm Mother        Meds/Allergies     No Known Allergies    Current Outpatient Medications:     acarbose (PRECOSE) 100 MG tablet, TAKE 1 TABLET THREE TIMES DAILY WITH MEALS, Disp: 270 tablet, Rfl: 1    amLODIPine (NORVASC) 2 5 mg tablet, TAKE 1 TABLET (2 5 MG TOTAL) BY MOUTH DAILY, Disp: 90 tablet, Rfl: 3    aspirin (ECOTRIN LOW STRENGTH) 81 mg EC tablet, Take 1 tablet (81 mg total) by mouth daily, Disp: 30 tablet, Rfl: 6    atorvastatin (LIPITOR) 20 mg tablet, TAKE 1 TABLET EVERY DAY, Disp: 90 tablet, Rfl: 1    b complex-vitamin C-folic acid (NEPHROCAPS) 1 mg capsule, Take 1 capsule by mouth daily, Disp: , Rfl:     carvedilol (COREG) 12 5 mg tablet, TAKE 1 TABLET TWICE DAILY, Disp: 180 tablet, Rfl: 1    co-enzyme Q-10 100 mg capsule, Take 100 mg by mouth daily, Disp: , Rfl:     gabapentin (NEURONTIN) 600 MG tablet, Take 1 tablet (600 mg total) by mouth 2 (two) times a day, Disp: 180 tablet, Rfl: 0    glimepiride (AMARYL) 4 mg tablet, TAKE 1 TABLET TWICE DAILY, Disp: 180 tablet, Rfl: 1    glucose blood (TRUETRACK TEST) test strip, 1 each by Other route daily Use as instructed for E11 29, Disp: 100 each, Rfl: 3    lisinopril (ZESTRIL) 2 5 mg tablet, TAKE 1 TABLET (2 5 MG TOTAL) BY MOUTH DAILY, Disp: 90 tablet, Rfl: 1    metFORMIN (GLUMETZA) 500 MG (MOD) 24 hr tablet, Take 2 tablets (1,000 mg total) by mouth 2 (two) times a day with meals, Disp: 360 tablet, Rfl: 1    multivitamin (THERAGRAN) TABS, Take 1 tablet by mouth daily, Disp: , Rfl:     omeprazole (PriLOSEC) 40 MG capsule, TAKE 1 CAPSULE EVERY DAY, Disp: 90 capsule, Rfl: 1    Probiotic Product (CULTURELLE PROBIOTICS) CHEW, Chew daily, Disp: , Rfl:     TRUEPLUS LANCETS 28G MISC, Test 1x/d, E11 29, Disp: , Rfl: 0    Current Facility-Administered Medications:     degarelix acetate (FIRMAGON) injection 240 mg, 240 mg, Subcutaneous, Once, Nicki Rooney MD    Vitals: Blood pressure 140/70, pulse 67, temperature 97 7 °F (36 5 °C), temperature source Temporal, height 5' 11" (1 803 m), weight 89 4 kg (197 lb), SpO2 99 %  Body mass index is 27 48 kg/m²  Vitals:    03/08/21 1524   Weight: 89 4 kg (197 lb)     BP Readings from Last 3 Encounters:   03/08/21 140/70   03/03/21 142/70   02/17/21 132/80         Physical Exam   Constitutional: He is oriented to person, place, and time  He appears well-developed and well-nourished  No distress  HENT:   Head: Normocephalic and atraumatic  Eyes: Pupils are equal, round, and reactive to light  Neck: Neck supple  No JVD present  No tracheal deviation present  No thyromegaly present     Cardiovascular: Normal rate, regular rhythm, S1 normal, S2 normal and intact distal pulses  Exam reveals no gallop, no S3, no S4, no distant heart sounds and no friction rub  Murmur heard  Systolic (ejection) murmur is present with a grade of 2/6  Pulmonary/Chest: Effort normal and breath sounds normal  No respiratory distress  He has no wheezes  He has no rales  He exhibits no tenderness  Abdominal: Soft  Bowel sounds are normal  He exhibits no distension  There is no abdominal tenderness  Musculoskeletal:         General: No deformity or edema  Neurological: He is alert and oriented to person, place, and time  Skin: Skin is warm and dry  No rash noted  He is not diaphoretic  No pallor  Psychiatric: He has a normal mood and affect  His behavior is normal  Judgment normal        Diagnostic Studies Review Cardio:    Nuclear stress test   Nuclear stress test done 09/27/2018 was a normal study with EF around 60%  No ischemia noted it was Lexiscan stress test     Echo Doppler  Echo Doppler done 10/14/2020 shows EF 55 60%, mild MR, trace to mild PI, pressure half time was 660 millisecond    EKG:    Twelve lead EKG done on 09/13/2018 shows normal sinus rhythm heart rate 60 beats per minute  No significant ST changes  Twelve lead EKG done 05/17/2019 shows normal sinus rhythm heart rate 60 beats per minute  No change from old EKG  Twelve lead EKG 03/10/2020 shows normal sinus rhythm LVH by voltage heart rate 60 beats per minute no change from old EKG  Twelve lead EKG 09/23/2020 shows normal sinus rhythm LVH by voltage  Q-waves in inferior leads cannot rule out 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  Twelve lead EKG 03/08/2021 shows normal sinus rhythm LVH by voltage and Q-waves in inferior leads cannot rule out 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ion          Lab Review   Lab Results   Component Value Date    WBC 6 81 01/25/2021    HGB 11 7 (L) 01/25/2021    HCT 37 4 01/25/2021    MCV 93 01/25/2021    RDW 14 6 01/25/2021     01/25/2021     BMP:  Lab Results   Component Value Date     04/17/2014    K 4 9 01/25/2021     (H) 01/25/2021    CO2 25 01/25/2021    ANIONGAP 8 04/17/2014    BUN 14 01/25/2021    CREATININE 1 41 (H) 01/25/2021    GLUCOSE 137 04/17/2014    GLUF 333 (H) 09/18/2020    CALCIUM 9 3 01/25/2021    EGFR 48 01/25/2021    MG 1 8 04/17/2014     LFT:  Lab Results   Component Value Date    AST 22 09/18/2020    ALT 36 09/18/2020    ALKPHOS 114 09/18/2020       Lab Results   Component Value Date    HGBA1C 13 2 (H) 09/18/2020     Lipid Profile:   Lab Results   Component Value Date    CHOL 106 07/19/2014    HDL 51 12/03/2019    LDLCALC 81 12/03/2019    TRIG 96 12/03/2019     Lab Results   Component Value Date    CHOL 106 07/19/2014       Dr Shivam Rolon MD [facility]      "This note has been constructed using a voice recognition system  Therefore there may be syntax, spelling, and/or grammatical errors   Please call if you have any questions  "

## 2021-03-09 ENCOUNTER — LAB (OUTPATIENT)
Dept: LAB | Facility: CLINIC | Age: 76
End: 2021-03-09
Payer: MEDICARE

## 2021-03-09 DIAGNOSIS — C61 PROSTATE CANCER (HCC): ICD-10-CM

## 2021-03-09 LAB
CREAT SERPL-MCNC: 1.42 MG/DL (ref 0.6–1.3)
GFR SERPL CREATININE-BSD FRML MDRD: 48 ML/MIN/1.73SQ M

## 2021-03-09 PROCEDURE — 36415 COLL VENOUS BLD VENIPUNCTURE: CPT

## 2021-03-09 PROCEDURE — 82565 ASSAY OF CREATININE: CPT

## 2021-03-10 ENCOUNTER — IMMUNIZATIONS (OUTPATIENT)
Dept: FAMILY MEDICINE CLINIC | Facility: HOSPITAL | Age: 76
End: 2021-03-10

## 2021-03-10 DIAGNOSIS — Z23 ENCOUNTER FOR IMMUNIZATION: Primary | ICD-10-CM

## 2021-03-10 PROCEDURE — 0012A SARS-COV-2 / COVID-19 MRNA VACCINE (MODERNA) 100 MCG: CPT

## 2021-03-10 PROCEDURE — 91301 SARS-COV-2 / COVID-19 MRNA VACCINE (MODERNA) 100 MCG: CPT

## 2021-03-11 ENCOUNTER — HOSPITAL ENCOUNTER (OUTPATIENT)
Dept: RADIOLOGY | Facility: HOSPITAL | Age: 76
Discharge: HOME | End: 2021-03-11
Payer: MEDICARE

## 2021-03-11 DIAGNOSIS — I73.9 PAD (PERIPHERAL ARTERY DISEASE) (HCC): ICD-10-CM

## 2021-03-11 PROCEDURE — 93925 LOWER EXTREMITY STUDY: CPT | Performed by: SURGERY

## 2021-03-11 PROCEDURE — 93925 LOWER EXTREMITY STUDY: CPT

## 2021-03-11 PROCEDURE — 93922 UPR/L XTREMITY ART 2 LEVELS: CPT | Performed by: SURGERY

## 2021-03-11 PROCEDURE — 93923 UPR/LXTR ART STDY 3+ LVLS: CPT

## 2021-03-15 ENCOUNTER — HOSPITAL ENCOUNTER (OUTPATIENT)
Dept: RADIOLOGY | Facility: HOSPITAL | Age: 76
Discharge: HOME | End: 2021-03-15
Payer: MEDICARE

## 2021-03-15 DIAGNOSIS — C61 PROSTATE CANCER (HCC): ICD-10-CM

## 2021-03-15 PROCEDURE — 74177 CT ABD & PELVIS W/CONTRAST: CPT

## 2021-03-15 PROCEDURE — A9503 TC99M MEDRONATE: HCPCS

## 2021-03-15 PROCEDURE — 78306 BONE IMAGING WHOLE BODY: CPT

## 2021-03-15 PROCEDURE — G1004 CDSM NDSC: HCPCS

## 2021-03-15 RX ADMIN — IOHEXOL 100 ML: 350 INJECTION, SOLUTION INTRAVENOUS at 08:52

## 2021-03-17 ENCOUNTER — PROCEDURE VISIT (OUTPATIENT)
Dept: UROLOGY | Facility: CLINIC | Age: 76
End: 2021-03-17
Payer: MEDICARE

## 2021-03-17 VITALS
HEIGHT: 71 IN | HEART RATE: 75 BPM | DIASTOLIC BLOOD PRESSURE: 74 MMHG | SYSTOLIC BLOOD PRESSURE: 168 MMHG | BODY MASS INDEX: 27.72 KG/M2 | WEIGHT: 198 LBS

## 2021-03-17 DIAGNOSIS — C61 PROSTATE CANCER (HCC): ICD-10-CM

## 2021-03-17 PROCEDURE — 96402 CHEMO HORMON ANTINEOPL SQ/IM: CPT

## 2021-03-17 NOTE — PROGRESS NOTES
3/17/2021  Jana Gong is a 67 y o  male  [id number]    Diagnosis:  Chief Complaint     Prostate Cancer          Patient presents for Firmagon subcutaneous 240 mg injection managed by Dr Neal Flight:  · Patient to follow up as scheduled for monthly Firmagon  · Patient to call office with questions or concerns in the interim  Medication administration:    Patient arrived to office with spouse  A&O x 4 and ambulates with steady gait  Discussed Melissa Dawn, possible side effects, ER precautions, and follow up in depth  All questions answered to Patient's satisfaction  ABN form signed  Firmagon administered bilaterally to abdomen subcutaneously by myself and Vee Juan RN BSN  Patient denies complaints at this time and aware may have lump, pain, redness, swelling at site of injections  Encouraged to hydrate well with water and apply ice packs as needed  Patient knows to call office with questions or concerns        Administrations This Visit     degarelix acetate [facility]) injection 240 mg     Admin Date  03/17/2021 Action  Given Dose  240 mg Route  Subcutaneous Administered By  Андрей Soliman RN                Vitals:    03/17/21 1042   BP: 168/74   BP Location: Left arm   Patient Position: Sitting   Cuff Size: Adult   Pulse: 75   Weight: 89 8 kg (198 lb)   Height: 5' 11" (1 803 m)         Андрей Soliman RN, BSN

## 2021-03-18 ENCOUNTER — OFFICE VISIT (OUTPATIENT)
Dept: PODIATRY | Facility: CLINIC | Age: 76
End: 2021-03-18
Payer: MEDICARE

## 2021-03-18 VITALS — RESPIRATION RATE: 17 BRPM | WEIGHT: 198 LBS | HEIGHT: 71 IN | BODY MASS INDEX: 27.72 KG/M2

## 2021-03-18 DIAGNOSIS — I73.9 PAD (PERIPHERAL ARTERY DISEASE) (HCC): ICD-10-CM

## 2021-03-18 DIAGNOSIS — M79.672 PAIN IN BOTH FEET: ICD-10-CM

## 2021-03-18 DIAGNOSIS — M79.671 PAIN IN BOTH FEET: ICD-10-CM

## 2021-03-18 DIAGNOSIS — E11.42 DIABETIC POLYNEUROPATHY ASSOCIATED WITH TYPE 2 DIABETES MELLITUS (HCC): Primary | ICD-10-CM

## 2021-03-18 DIAGNOSIS — L84 CORNS: ICD-10-CM

## 2021-03-18 PROCEDURE — 11056 PARNG/CUTG B9 HYPRKR LES 2-4: CPT | Performed by: PODIATRIST

## 2021-03-18 NOTE — PROGRESS NOTES
Assessment/Plan:  Deformity of foot   Diabetic neuropathy   Pain upon ambulation   Pain upon ambulation   Mycosis of nail   Callus formation     Plan   Lesion debrided    all mycotic nails debrided without pain or complication   Foot exam performed   Patient educated on care of the diabetic foot   Plantar calluses debrided as well           Subjective:   patient is diabetic   He has pain upon ambulation   He has pain with shoe wear   No history of trauma             Past Medical History:   Diagnosis Date    Acquired hallux valgus       last assessed: 8/1/2013    Allergic rhinitis      Anemia 10/26/2010    Atrophy of nail       last assessed: 7/7/2015    Chronic kidney disease       chronic renal insufficiency    Coronary artery disease      Deformity of ankle and foot, acquired       last assessed: 2/22/2016    Diabetes mellitus ([facility] )      Difficulty walking       last assessed: 12/14/2015    Dysesthesia       last assessed: 11/25/2016    Hammer toe       unspecified laterality; last assessed: 8/1/2013    Hypertension      Onychomycosis of toenail       last assessed: 2/22/2016    Peripheral vascular disease ([facility] [address] )       left SFA stent in 2010    Pes planus       unspecified laterality; last assessed: 8/1/2013    Pneumonia       last assessed: 2/27/2016    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       last assessed: 8/15/2016    Type 2 diabetes mellitus ([facility] [address] ) 07/26/2010    Viral warts       last assessed: 7/24/2015                   Past Surgical History:   Procedure Laterality Date    CARDIAC CATHETERIZATION   07/29/2010    CATARACT EXTRACTION, BILATERAL Bilateral       R 1999, L 2008    FEMORAL ARTERY - POPLITEAL ARTERY BYPASS GRAFT   09/23/2013    FOOT SURGERY         bone spur removed    LEG SURGERY Bilateral       repair; L 8/20/2010 and 7/26/2012    ROTATOR CUFF REPAIR Left 2009    SHOULDER SURGERY Right 2005     collar bone         No Known Allergies        Current Outpatient Medications:     acarbose (PRECOSE) 100 MG tablet, Take 1 tablet (100 mg total) by mouth 3 (three) times a day with meals, Disp: 270 tablet, Rfl: 1    amLODIPine (NORVASC) 2 5 mg tablet, Take 1 tablet (2 5 mg total) by mouth daily (Patient not taking: Reported on 9/26/2019), Disp: 90 tablet, Rfl: 3    aspirin (ECOTRIN LOW STRENGTH) 81 mg EC tablet, Take 1 tablet (81 mg total) by mouth daily, Disp: 30 tablet, Rfl: 6    b complex-vitamin C-folic acid (NEPHROCAPS) 1 mg capsule, Take 1 capsule by mouth daily, Disp: , Rfl:     betamethasone dipropionate (DIPROSONE) 0 05 % cream, Use as dir twice daily, Disp: , Rfl:     carvedilol (COREG) 12 5 mg tablet, TAKE 1 TABLET TWICE DAILY, Disp: 180 tablet, Rfl: 1    gabapentin (NEURONTIN) 600 MG tablet, Take 1 tablet (600 mg total) by mouth 2 (two) times a day, Disp: 180 tablet, Rfl: 0    glimepiride (AMARYL) 4 mg tablet, Take 1 tablet (4 mg total) by mouth 2 (two) times a day for 126 days, Disp: 180 tablet, Rfl: 0    glucose blood (TRUETRACK TEST) test strip, 1 each by Other route daily Use as instructed for E11 29, Disp: 100 each, Rfl: 3    lisinopril (ZESTRIL) 5 mg tablet, Take 1 tablet (5 mg total) by mouth daily (Patient taking differently: Take 2 5 mg by mouth daily ), Disp: 90 tablet, Rfl: 3    metFORMIN (GLUMETZA) 500 MG (MOD) 24 hr tablet, Take 2 tablets (1,000 mg total) by mouth 2 (two) times a day with meals, Disp: 120 tablet, Rfl: 5    multivitamin (THERAGRAN) TABS, Take 1 tablet by mouth daily, Disp: , Rfl:     omeprazole (PriLOSEC) 40 MG capsule, Take 1 capsule (40 mg total) by mouth daily, Disp: 90 capsule, Rfl: 1    simvastatin (ZOCOR) 40 mg tablet, TAKE 1 TABLET (40 MG TOTAL) BY MOUTH DAILY, Disp: 90 tablet, Rfl: 1    TRUEPLUS LANCETS 28G MISC, Test 1x/d, E11 29, Disp: , Rfl: 0           Patient Active Problem List   Diagnosis    Diabetic polyneuropathy associated with type 2 diabetes mellitus (HCC)    Allergic rhinitis    Arthropathy    PAD (peripheral artery disease) (HCC)    Benign essential hypertension    CAD (coronary artery disease)    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)    Diabetic neuropathy ([address] )    GE reflux    Lumbar radiculopathy    Rosacea    Seborrheic dermatitis    Type 2 diabetes mellitus with diabetic neuropathy (HCC)    Onychomycosis    Occlusion of femoral-popliteal bypass graft (HCC)    Prostate cancer screening    Dyslipidemia    Screening for AAA (aortic abdominal aneurysm)    Medicare annual wellness visit, subsequent    Type 2 diabetes mellitus with stage 3 chronic kidney disease, without long-term current use of insulin ([facility])    Pain in both feet    Corns    Plantar warts             Patient ID: Pernell Covarrubias is a 74 y  o  male         The following portions of the patient's history were reviewed and updated as appropriate:      family history includes Aortic aneurysm in his mother; Heart attack in his father and sister; Heart disease in his father, paternal grandfather, and sister        reports that he has never smoked  He has never used smokeless tobacco  He reports that he drinks alcohol  He reports that he does not use drugs         Objective:  Patient's shoes and socks removed    Foot ExamPhysical Exam          Physical Exam  Left Foot: Appearance: a deformity (ball of foot and distal fifth metatarsal )  Fifth toe deformities include hammer toe  Tenderness: None except the plantar aspect of the foot  Right Foot: Appearance: a deformity (distal fifth metatarsal )  Left Ankle: ROM: limited ROM in all planes   Right Ankle: ROM: limited ROM in all planes   Neurological Exam: Light touch was decreased bilaterally  Vibratory sensation was decreased in both first metatarsophalangeal joints  Response to monofilament test was absent bilaterally  Deep tendon reflexes: achilles reflex 1/4 bilaterally  Vascular Exam: performed Dorsalis pedis pulses were 1/4 bilaterally   Posterior tibial pulses were 1/4 bilaterally  Elevation Pallor: diminished bilaterally  Capillary refill time was Q  9, findings bilateral  Negative digital hair noted, positive abnormal cooling  All pre-ulcer, lesions debrided  Today, but between 1-3 seconds bilaterally  Edema: mild bilaterally and All mycotic nails debrided  Today  The patient was given orthotics to help control his feet and at as cushioning and padding on the bottom of his feet q9 findings  Toenails: All of the toenails were elongated, hypertrophied, discolored, ingrown, shown to have subungual debris and tender       Socks and shoes removed, the Right Foot: the foot was dry  The sensory exam showed diminished vibratory sensation at the level of the toes  Diminished tactile sensation with monofilament testing throughout the right foot    Socks and shoes removed, Left Foot: the foot was dry  The sensory exam showed diminished vibratory sensation at the level of the toes  Diminished tactile sensation with monofilament testing throughout the left foot    Pulses:   1+ in the posterior tibialis on the right   1+ in the dorsalis pedis on the right  Capillary refills findings on the left were delayed in the toes  Pulses:   1+ in the posterior tibialis on the left   1+ in the dorsalis pedis on the left  Assign Risk Category: 2: Loss of protective sensation with or without weakness, deformity, callus, pre-ulcer, or history of ulceration  High risk  Hyperkeratosis: present on both first sub metatarsals, present on both fifth sub metatarsals and Pre-ulcerative lesion, submetatarsal one to 5, bilateral    Shoe Gear Evaluation: performed ()  Recommendation(s): custom inlays       Patient's shoes and socks removed  Right Foot/Ankle   Right Foot Inspection  Skin Exam: callus and callus               Toe Exam: swelling and erythema  Sensory   Vibration: diminished  Proprioception: diminished      Vascular  Capillary refills: elevated        Left Foot/Ankle  Left Foot Inspection  Skin Exam: callus   Submetatarsal 5 of the left foot demonstrates 0 5 centimeter squared plantar verruca   It is in capsulated in a bullae   Debrided   20% remaining               Toe Exam: swelling and erythema                   Sensory   Vibration: diminished  Proprioception: diminished     Vascular  Capillary refills: elevated      Patient's shoes and socks removed  Assign Risk Category:  Deformity present;  Loss of protective sensation; Weak pulses       Risk: 2

## 2021-03-22 ENCOUNTER — TELEPHONE (OUTPATIENT)
Dept: UROLOGY | Facility: CLINIC | Age: 76
End: 2021-03-22

## 2021-03-22 NOTE — TELEPHONE ENCOUNTER
Call placed to patient, left detailed message per communication consent form  Advised per provider of results of CT scan and recommendation to follow up as scheduled for Firmagon injection and Rad Onc appointment  Office number provided on voicemail for any questions

## 2021-03-22 NOTE — TELEPHONE ENCOUNTER
Please let the patient know the excellent news that his CT scan and bone scan are both negative for any metastatic disease  His cancer is confined to the prostate thankfully    This is great news    He should follow-up as scheduled for Firmagon administration, and with Radiation Oncology    Thanks very much

## 2021-03-26 ENCOUNTER — OFFICE VISIT (OUTPATIENT)
Dept: VASCULAR SURGERY | Facility: CLINIC | Age: 76
End: 2021-03-26
Payer: MEDICARE

## 2021-03-26 VITALS
BODY MASS INDEX: 27.72 KG/M2 | WEIGHT: 198 LBS | TEMPERATURE: 96.8 F | HEART RATE: 76 BPM | DIASTOLIC BLOOD PRESSURE: 78 MMHG | HEIGHT: 71 IN | SYSTOLIC BLOOD PRESSURE: 180 MMHG

## 2021-03-26 DIAGNOSIS — I70.219 ATHEROSCLEROSIS OF ARTERY OF EXTREMITY WITH INTERMITTENT CLAUDICATION (HCC): ICD-10-CM

## 2021-03-26 DIAGNOSIS — T82.898D OCCLUSION OF FEMOROPOPLITEAL BYPASS GRAFT, SUBSEQUENT ENCOUNTER: Primary | ICD-10-CM

## 2021-03-26 DIAGNOSIS — N18.31 ANEMIA DUE TO STAGE 3A CHRONIC KIDNEY DISEASE (HCC): ICD-10-CM

## 2021-03-26 DIAGNOSIS — E11.42 DIABETIC POLYNEUROPATHY ASSOCIATED WITH TYPE 2 DIABETES MELLITUS (HCC): ICD-10-CM

## 2021-03-26 DIAGNOSIS — D63.1 ANEMIA DUE TO STAGE 3A CHRONIC KIDNEY DISEASE (HCC): ICD-10-CM

## 2021-03-26 PROCEDURE — 99215 OFFICE O/P EST HI 40 MIN: CPT | Performed by: PHYSICIAN ASSISTANT

## 2021-03-26 RX ORDER — BRIMONIDINE TARTRATE 0.1 %
DROPS OPHTHALMIC (EYE)
Status: ON HOLD | COMMUNITY
Start: 2021-03-25 | End: 2021-07-30

## 2021-03-26 NOTE — PROGRESS NOTES
Assessment/Plan:    68 y/o M hypertension, DM, neuropathy, CKD 3, CAD, PAD s/p R Fem to AK pop bypass [place], 2013, known occluded within 1 year of surgery) and B SFA occlusions s/p B SFA stenting (L 6/19/2018 )  Unfortunately, despite dual antiplatelets he developed recurrent SFA stenoses (L < 1 year and R < 2 years) after the intervention  Peripheral arterial disease with claudication  Occluded R Fem-pop bypass (chronic)  Occluded B SFA stents (chronic)    -Non lifestyle limiting claudication  -No rest pain or wounds  -A1c 13 2 (9/2020); unable to get metformin for a while    -DENVER 3/10/2021:     Right 0 55/99/82: Known fem-pop bypass occlusion; known prox and mid SFA occlusion with distal SFA reconstitution; 50-75% distal femoral artery stenosis    Left 0 55/91/60: SFA stent occluded with collaterals and reconstitution at the distal SFA    No significant change    Discussion:      Patient with complicated vascular history  He has known occluded R femoral to popliteal bypass, as well as chronically occluded bilateral SFA reconstructions  We reviewed his duplex study which shows reconstitution of blood flow at the distal SFA is bilaterally  He reports chronic leg fatigue at short distance but this is unchanged  He feels better in the grocery store where he can walk and hold onto the cart  He has no rest pain or wounds  We discussed the importance of a regular, progressive exercise program   He should start walking 10 minutes a day and try to build up to 20 minutes daily as long has he can do so safely  He can stop as often as he needs to stop  He is eager to walk  He is maintained on aspirin and atorvastatin  There are no recent lipids in his chart            Given his complicated vascular history and SFA reconstructions which quickly occluded, we would unlikely recommend further intervention unless he have ischemic rest pain or wounds     -Regular exercise / walking program  -Follow good, heart healthy, diabetic diet  -Continue with aspirin 81 and atorvastatin 20  -Routine arterial duplex in 1 year; OV in 6 months (pt request)   -Check feet every day for any changes; we discussed reasons to return sooner            Subjective:      Patient ID: Randolph Sandoval is a 67 y o  male  Patient is here to rev DENVER done on 3/11/21  Patient reports that he is doing well  He has no new symptoms since he was seen 1 year ago  He continues to walk but does have some difficulty due to thigh pain  The legs become tired after he walks for about 5 minutes requiring him to rest for couple of minutes and then he can walk some more  He feels better when he has something to hold on to which helps him walk even further  He has no new symptoms  He has no rest pain or wounds  No calf cramping  He has B leg edema with varicose veins  Legs feel better elevated  Patient is on ASA 81 mg and Lipitor  He is a lifelong non-smoker  He received 2 doses of Moderna Covid vaccine at [facility]  We reviewed his lower extremity duplex study which shows slightly decreased ABIs compared with last year  The study is overall unchanged  We discussed the importance of monitoring his feet every day  He does see a podiatrist every 10 weeks -  Dr Ruiz Jennifer  His last A1c was over 13  He tells me that there was an issue with obtaining metformin which he went without for a while  He tells me that his sugar is better controlled now  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            A1c 13 2 (9/2020)      VAS LEAD 3/11/21  FINDINGS:     Segment                Right                        Left                                            Impression  PSV (cm/s)  EDV  Impression  PSV (cm/s)  EDV    Common Femoral Artery                     114    0                      87    3    Prox Profunda          50-75%             250    4  <50%               164    7    Prox SFA Occluded            14    4                                 Prox SFA - Stent                                    Occluded                       Mid SFA                Occluded                                                    Mid SFA - Stent                                     Occluded                       Dist SFA                                   93    0                                 Dist SFA - Stent                                    Occluded            71    0    Proximal Pop                               39    7                      48    0    Distal Pop                                 52    7                      40    5    Tibioperoneal                              51    6                      40         Prox Post Tibial                           63    6                      43    0    Dist Post Tibial                           47    8                      53    2    Dist  Ant  Tibial                          24    5                      24    0    Prox Peroneal                              30    3                      45    0    Dist Peroneal                              31    2                      17    0             CONCLUSION:  Impression:  RIGHT LOWER LIMB:  Known occlusion of the fem-pop bypass graft  Known occlusion of the proximal to mid superficial femoral artery with  reconstitution at the distal SFA  50-75% stenosis of deep femoral artery noted  Ankle/Brachial index:  0 55, which is within the severe range  Prior 0 63  PVR/ PPG tracings are dampened  Metatarsal pressure of 99 mmHg  Great toe pressure of 82 mmHg, within the healing range  LEFT LOWER LIMB:  Occlusion of the superficial femoral artery stent with collaterals noted and  reconstitution at the distal SFA  <50% stenosis of deep femoral artery noted  Ankle/Brachial index: 0 55, which is within the normal range  Prior 0 60  PVR/ PPG tracings are dampened  Metatarsal pressure of 91 mmHg    Great toe pressure of 60 mmHg, within the healing range  Compared to previous study on 5/12/2020, there is no significant change  Recommend repeat testing in 1 year as per protocol unless otherwise indicated  The following portions of the patient's history were reviewed and updated as appropriate: allergies, current medications, past family history, past medical history, past social history, past surgical history and problem list     Review of Systems   Constitutional: Negative  HENT: Negative  Eyes: Negative  Respiratory: Negative  Cardiovascular: Positive for leg swelling (b/l)  Gastrointestinal: Negative  Endocrine: Negative  Genitourinary: Negative  Musculoskeletal: Negative  Skin: Negative  Allergic/Immunologic: Negative  Neurological: Positive for weakness (B/l legs)  Hematological: Bruises/bleeds easily  Psychiatric/Behavioral: Negative  Objective:      BP (!) 180/78 (BP Location: Right arm, Patient Position: Sitting, Cuff Size: Standard)   Pulse 76   Temp (!) 96 8 °F (36 °C) (Tympanic)   Ht 5' 11" (1 803 m)   Wt 89 8 kg (198 lb)   BMI 27 62 kg/m²          Physical Exam  Vitals signs and nursing note reviewed  Constitutional:       Appearance: He is well-developed  HENT:      Head: Normocephalic and atraumatic  Eyes:      Pupils: Pupils are equal, round, and reactive to light  Neck:      Musculoskeletal: Neck supple  Thyroid: No thyromegaly  Vascular: No JVD  Trachea: Trachea normal    Cardiovascular:      Rate and Rhythm: Normal rate and regular rhythm  Pulses:           Carotid pulses are 2+ on the right side and 2+ on the left side  Radial pulses are 2+ on the right side and 2+ on the left side  Dorsalis pedis pulses are detected w/ Doppler on the right side and detected w/ Doppler on the left side  Posterior tibial pulses are detected w/ Doppler on the right side and detected w/ Doppler on the left side        Heart sounds: Normal heart sounds, S1 normal and S2 normal  No murmur  No friction rub  No gallop  Comments:     1-2+ LE and pedal pitting edema    Pulses in the feet, but Doppler signals are present  Decreased protective sensation in feet  Cap refill 2-3 secs    Mild venous varicosities    Pulmonary:      Effort: Pulmonary effort is normal  No accessory muscle usage or respiratory distress  Breath sounds: Normal breath sounds  No wheezing or rales  Abdominal:      General: Bowel sounds are normal  There is no distension  Palpations: Abdomen is soft  Tenderness: There is no abdominal tenderness  Musculoskeletal: Normal range of motion  General: No deformity  Right lower leg: Pitting Edema present  Left lower leg: Pitting Edema present  Skin:     General: Skin is warm and dry  Capillary Refill: Capillary refill takes 2 to 3 seconds  Findings: No lesion or rash  Nails: There is no clubbing  Neurological:      Mental Status: He is alert and oriented to person, place, and time  Comments: Grossly normal    Psychiatric:         Behavior: Behavior is cooperative  I have reviewed and made appropriate changes to the review of systems input by the medical assistant      Vitals:    03/26/21 1324   BP: (!) 180/78   BP Location: Right arm   Patient Position: Sitting   Cuff Size: Standard   Pulse: 76   Temp: (!) 96 8 °F (36 °C)   TempSrc: Tympanic   Weight: 89 8 kg (198 lb)   Height: 5' 11" (1 803 m)       Patient Active Problem List   Diagnosis    Diabetic polyneuropathy associated with type 2 diabetes mellitus (HCC)    Allergic rhinitis    Arthropathy    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)    CAD (coronary artery disease)    CKD stage 3 due to type 2 diabetes mellitus (HCC)    Diabetic neuropathy (HCC)    GE reflux    Lumbar radiculopathy    Rosacea    Seborrheic dermatitis    Type 2 diabetes mellitus with diabetic neuropathy ([facility] [address] )    Onychomycosis    Occlusion of femoral-popliteal bypass graft (HCC)    Prostate cancer screening    Dyslipidemia    Screening for AAA (aortic abdominal aneurysm)    Medicare annual wellness visit, subsequent    Type 2 diabetes mellitus with stage 3 chronic kidney disease, without long-term current use of insulin (HCC)    Pain in both feet    Corns    Plantar warts    Elevated PSA    Embolism and thrombosis of arteries of the lower extremities (HCC)    Benign essential hypertension    Bilateral leg weakness    Anemia due to stage 3a chronic kidney disease    Prostate cancer [facility])       Past Surgical History:   Procedure Laterality Date    CARDIAC CATHETERIZATION  07/29/2010    CATARACT EXTRACTION, BILATERAL Bilateral     R 1999, L 2008    COLONOSCOPY  2011    FEMORAL ARTERY - POPLITEAL ARTERY BYPASS GRAFT  09/23/2013    FOOT SURGERY      bone spur removed    LEG SURGERY Bilateral     repair; L 8/20/2010 and 7/26/2012    ROTATOR CUFF REPAIR Left 2009    SHOULDER SURGERY Right 2005    collar bone       Family History   Problem Relation Age of Onset    Heart disease Father     Heart attack Father         acute myocardial infarction    Heart disease Sister     Heart attack Sister         acute myocardial infarction    Heart disease Paternal Grandfather     Aortic aneurysm Mother        Social History     Socioeconomic History    Marital status: /Civil Union     Spouse name: Not on file    Number of children: Not on file    Years of education: Not on file    Highest education level: Not on file   Occupational History    Occupation: retired from  work   Social Needs    Financial resource strain: Not on file    Food insecurity     Worry: Not on file     Inability: Not on file   [employer] needs     Medical: Not on file     Non-medical: Not on file   Tobacco Use    Smoking status: Never Smoker    Smokeless tobacco: Never Used   Substance and Sexual Activity    Alcohol use: Yes     Frequency: 2-4 times a month     Comment: being a social drinker    Drug use: No    Sexual activity: Not on file   Lifestyle    Physical activity     Days per week: Not on file     Minutes per session: Not on file    Stress: Not on file   Relationships    Social connections     Talks on phone: Not on file     Gets together: Not on file     Attends Mosque service: Not on file     Active member of club or organization: Not on file     Attends meetings of clubs or organizations: Not on file     Relationship status: Not on file    Intimate partner violence     Fear of current or ex partner: Not on file     Emotionally abused: Not on file     Physically abused: Not on file     Forced sexual activity: Not on file   Other Topics Concern    Not on file   Social History Narrative    Not on file       No Known Allergies      Current Outpatient Medications:     acarbose (PRECOSE) 100 MG tablet, TAKE 1 TABLET THREE TIMES DAILY WITH MEALS, Disp: 270 tablet, Rfl: 1    Alphagan P 0 1 %, , Disp: , Rfl:     amLODIPine (NORVASC) 2 5 mg tablet, TAKE 1 TABLET (2 5 MG TOTAL) BY MOUTH DAILY, Disp: 90 tablet, Rfl: 3    aspirin (ECOTRIN LOW STRENGTH) 81 mg EC tablet, Take 1 tablet (81 mg total) by mouth daily, Disp: 30 tablet, Rfl: 6    atorvastatin (LIPITOR) 20 mg tablet, TAKE 1 TABLET EVERY DAY, Disp: 90 tablet, Rfl: 1    b complex-vitamin C-folic acid (NEPHROCAPS) 1 mg capsule, Take 1 capsule by mouth daily, Disp: , Rfl:     carvedilol (COREG) 12 5 mg tablet, TAKE 1 TABLET TWICE DAILY, Disp: 180 tablet, Rfl: 1    co-enzyme Q-10 100 mg capsule, Take 100 mg by mouth daily, Disp: , Rfl:     gabapentin (NEURONTIN) 600 MG tablet, Take 1 tablet (600 mg total) by mouth 2 (two) times a day, Disp: 180 tablet, Rfl: 0    glimepiride (AMARYL) 4 mg tablet, TAKE 1 TABLET TWICE DAILY, Disp: 180 tablet, Rfl: 1    glucose blood (TRUETRACK TEST) test strip, 1 each by Other route daily Use as instructed for E11 29, Disp: 100 each, Rfl: 3    lisinopril (ZESTRIL) 2 5 mg tablet, TAKE 1 TABLET (2 5 MG TOTAL) BY MOUTH DAILY, Disp: 90 tablet, Rfl: 1    metFORMIN (GLUMETZA) 500 MG (MOD) 24 hr tablet, Take 2 tablets (1,000 mg total) by mouth 2 (two) times a day with meals, Disp: 360 tablet, Rfl: 1    multivitamin (THERAGRAN) TABS, Take 1 tablet by mouth daily, Disp: , Rfl:     omeprazole (PriLOSEC) 40 MG capsule, TAKE 1 CAPSULE EVERY DAY, Disp: 90 capsule, Rfl: 1    Probiotic Product (CULTURELLE PROBIOTICS) CHEW, Chew daily, Disp: , Rfl:     TRUEPLUS LANCETS 28G MISC, Test 1x/d, E11 29, Disp: , Rfl: 0

## 2021-03-26 NOTE — PATIENT INSTRUCTIONS
Peripheral arterial disease    We discussed the importance of a regular, progressive exercise program   He should start walking 10 minutes a day and build up to 20 minutes daily  He can stop as often as he needs to stop   Given his complicated vascular history and SFA reconstructions occluded in less than 1 or 2 years, we would likely only recommend further intervention should he have ischemic rest pain or wounds     -Regular exercise / walking program  -Follow good, heart healthy, diabetic diet  -Continue with aspirin 81 and atorvastatin 20  -Routine arterial duplex in 1 year with office visit, or sooner if needd  -Check feet every day for any changes  -Call or return sooner for increased pain in your legs; constant numbness, tingling or coldness in the legs; or if you develop wounds on your toes/feet that do not heal

## 2021-03-29 ENCOUNTER — TELEPHONE (OUTPATIENT)
Dept: RADIATION ONCOLOGY | Facility: HOSPITAL | Age: 76
End: 2021-03-29

## 2021-03-29 ENCOUNTER — CLINICAL SUPPORT (OUTPATIENT)
Dept: RADIATION ONCOLOGY | Facility: HOSPITAL | Age: 76
End: 2021-03-29
Attending: RADIOLOGY
Payer: MEDICARE

## 2021-03-29 VITALS
HEART RATE: 94 BPM | BODY MASS INDEX: 27.2 KG/M2 | WEIGHT: 195 LBS | TEMPERATURE: 98.2 F | OXYGEN SATURATION: 97 % | RESPIRATION RATE: 18 BRPM

## 2021-03-29 DIAGNOSIS — C61 PROSTATE CANCER (HCC): ICD-10-CM

## 2021-03-29 DIAGNOSIS — C61 PROSTATE CANCER (HCC): Primary | ICD-10-CM

## 2021-03-29 PROCEDURE — 99211 OFF/OP EST MAY X REQ PHY/QHP: CPT | Performed by: RADIOLOGY

## 2021-03-29 NOTE — PROGRESS NOTES
Consultation - Radiation Oncology      JFB:[id number] : 1945  Encounter: [id number]  Patient Information: [address]  Chief Complaint   Patient presents with    Consult     Radiation Oncology      Cancer Staging  Prostate cancer [facility])  Staging form: Prostate, AJCC 8th Edition  - Clinical: Stage IIIC (cT3b, cN0, cM0, PSA: 8 6, Grade Group: 5) - Signed by Santi Campos MD on 3/29/2021  Prostate specific antigen (PSA) range: Less than 10  Histologic grading system: 5 grade system           History of Present Illness   Lesly Lan is a 67y o  year old male who presents with  Recently diagnosed high risk [place] 10 adenocarcinoma of the prostate  He presents today accompanied by his wife to discuss definitive radiation therapy  He has already initiated androgen deprivation  Workup to date as below:    Patient presents for radiation consult for [place] 10 prostate cancer, referred by Dr Willy Persaud  67year old male with medical history including DM II, CKD III, CAD, PVD s/p stent placement and RLE bypass  He was referred to Urology in 2020 for elevated PSA from 2 0 to 4 0 in 1 year  He has been following with them and recently has PSA elevation to 8  PSA trend:  2016: PSA 1 6  2017: PSA 1 8  2018: PSA 2 0  12/3/2019: PSA 4 0  2/10/2020: PSA 4 0  2020: PSA 6 2  12/10/2020: PSA 8 0      21 Prostate Biopsy   positive cores, prostatic adenocarcinoma  GS 10 (5+5) in 2 cores (left lat mid and left mid)  GS 9 (5+4) in 3 cores (left base, left lat apex and left apex)  GS 8 (5+3) in 1 core (left lat base)      3/3/21 Urology, Dr Willy Persaud  Discussed biopsy report and treatment options  Given high risk disease, recommend active treatment  Given underlying medical and surgical risk factors and high likelihood of requiring RT after surgery, recommend upfront RT combined with 2-3 years of ADT  PLAN: CT, Bone scan for staging   Referral to Rad Onc  Return shortly for long-term Firmagon instead of Lupron (given cardiovascular risk factors)  3/15/21 CT abd pelvis with contrast  1  No evidence of metastatic disease in the abdomen or pelvis  2  Prostatomegaly with heterogeneous glandular density  No evidence of regional adenopathy    3/15/21 Bone scan   No evidence of osseous metastasis    3/17/21 1st Firmagon SQ injection    3/22/21 urology office called pt with scan results, negative for metastatic disease     4/14/21 Urology follow-up        Historical Information   Oncology History   Prostate cancer [facility])   2/2021 Initial Diagnosis    Prostate cancer ([facility] [address] )     2/17/2021 Biopsy    A  Prostate, Right Lateral Base, Biopsy:  - Benign prostatic tissue  B  Prostate, Right Base, Biopsy:  - Benign prostatic tissue  C  Prostate, Right Lateral Mid, Biopsy:  - Benign prostatic tissue  D  Prostate, Right Mid, Biopsy:  - Benign prostatic tissue  E  Prostate, Right Lateral [place], Biopsy:  - Benign prostatic tissue  F  Prostate, Right [place], Biopsy:  - Benign prostatic tissue       G  Prostate, Left Lateral Base, Biopsy:  - Prostatic adenocarcinoma, Nettie Score 5 + 3 = 8, Grade Group 4      - Percentage Nettie pattern 5: 95%     - Total number of cores identified: 1     - Total number of cores with carcinoma: 1     - Percentage of tissue with carcinoma: 92%     - Linear amount of tissue with carcinoma: 11 mm total     H  Prostate, Left Base, Biopsy:  - Prostatic adenocarcinoma, Nettie Score 5 + 4 = 9, Grade Group 5      - Percentage Nettie pattern 5: 95%     - Total number of cores identified: 1     - Total number of cores with carcinoma: 1     - Percentage of tissue with carcinoma: 92%     - Linear amount of tissue with carcinoma: 11 mm total     I  Prostate, Left Lateral Mid, Biopsy:  - Prostatic adenocarcinoma, Nettie Score 5 + 5 = 10, Grade Group 5      - Total number of cores identified: 1     - Total number of cores with carcinoma: 1     - Percentage of tissue with carcinoma: 100%     - Linear amount of tissue with carcinoma: 15 mm total  - Focus of suspected intraductal carcinoma  J  Prostate, Left Mid, Biopsy:  - Prostatic adenocarcinoma, [place] Score 5 + 5 = 10, Grade Group 5      - Total number of cores identified: 1     - Total number of cores with carcinoma: 1     - Percentage of tissue with carcinoma: 93%     - Linear amount of tissue with carcinoma: 14 mm total     K  Prostate, Left Lateral [place], Biopsy:  - Prostatic adenocarcinoma, Mikki Score 5 + 4 = 9, Grade Group 5      - Percentage Jessup pattern 5: 70%     - Total number of cores identified: 1     - Total number of cores with carcinoma: 1     - Percentage of tissue with carcinoma: 100%     - Linear amount of tissue with carcinoma: 10 mm total     L  Prostate, Left [place], Biopsy:  - Prostatic adenocarcinoma, Mikki Score 5 + 4 = 9, Grade Group 5      - Percentage Jessup pattern 5: 95%     - Total number of cores identified: 1     - Total number of cores with carcinoma: 1     - Percentage of tissue with carcinoma: 94%     - Linear amount of tissue with carcinoma: 15 mm total     3/17/2021 -  Hormone Therapy    Firmagon 240 mg SQ injection - started 3/17/21  Plan for 2-3 years of therapy       3/29/2021 -  Cancer Staged    Staging form: Prostate, AJCC 8th Edition  - Clinical: Stage IIIC (cT3b, cN0, cM0, PSA: 8 6, Grade Group: 5) - Signed by Isela Rios MD on 3/29/2021  Prostate specific antigen (PSA) range: Less than 10  Histologic grading system: 5 grade system             Past Medical History:   Diagnosis Date    Acquired hallux valgus     last assessed: 8/1/2013    Allergic rhinitis     Anemia 10/26/2010    Atrophy of nail     last assessed: 7/7/2015    Chronic kidney disease     chronic renal insufficiency    Coronary artery disease     Deformity of ankle and foot, acquired     last assessed: 2/22/2016    Diabetes mellitus (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     last assessed: 8/15/2016    Type 2 diabetes mellitus ([facility] ) 07/26/2010    Viral warts     last assessed: 7/24/2015     Past Surgical History:   Procedure Laterality Date    CARDIAC CATHETERIZATION  07/29/2010    CATARACT EXTRACTION, BILATERAL Bilateral     R 1999, L 2008    COLONOSCOPY  2011    FEMORAL ARTERY - POPLITEAL ARTERY BYPASS GRAFT  09/23/2013    FOOT SURGERY      bone spur removed    LEG SURGERY Bilateral     repair; L 8/20/2010 and 7/26/2012    ROTATOR CUFF REPAIR Left 2009    SHOULDER SURGERY Right 2005    collar bone       Family History   Problem Relation Age of Onset    Heart disease Father     Heart attack Father         acute myocardial infarction    Heart disease Sister     Heart attack Sister         acute myocardial infarction    Heart disease Paternal Grandfather     Aortic aneurysm Mother     Throat cancer Maternal Grandfather        Social History   Social History     Substance and Sexual Activity   Alcohol Use Yes    Frequency: 2-4 times a month    Comment: being a social drinker     Social History     Substance and Sexual Activity   Drug Use No     Social History     Tobacco Use   Smoking Status Never Smoker   Smokeless Tobacco Never Used         Meds/Allergies     Current Outpatient Medications:     acarbose (PRECOSE) 100 MG tablet, TAKE 1 TABLET THREE TIMES DAILY WITH MEALS, Disp: 270 tablet, Rfl: 1    Alphagan P 0 1 %, , Disp: , Rfl:     amLODIPine (NORVASC) 2 5 mg tablet, TAKE 1 TABLET (2 5 MG TOTAL) BY MOUTH DAILY, Disp: 90 tablet, Rfl: 3    aspirin (ECOTRIN LOW STRENGTH) 81 mg EC tablet, Take 1 tablet (81 mg total) by mouth daily, Disp: 30 tablet, Rfl: 6    atorvastatin (LIPITOR) 20 mg tablet, TAKE 1 TABLET EVERY DAY, Disp: 90 tablet, Rfl: 1    b complex-vitamin C-folic acid (NEPHROCAPS) 1 mg capsule, Take 1 capsule by mouth daily, Disp: , Rfl:     carvedilol (COREG) 12 5 mg tablet, TAKE 1 TABLET TWICE DAILY, Disp: 180 tablet, Rfl: 1    co-enzyme Q-10 100 mg capsule, Take 100 mg by mouth daily, Disp: , Rfl:     gabapentin (NEURONTIN) 600 MG tablet, Take 1 tablet (600 mg total) by mouth 2 (two) times a day, Disp: 180 tablet, Rfl: 0    glimepiride (AMARYL) 4 mg tablet, TAKE 1 TABLET TWICE DAILY, Disp: 180 tablet, Rfl: 1    glucose blood (TRUETRACK TEST) test strip, 1 each by Other route daily Use as instructed for E11 29, Disp: 100 each, Rfl: 3    lisinopril (ZESTRIL) 2 5 mg tablet, TAKE 1 TABLET (2 5 MG TOTAL) BY MOUTH DAILY, Disp: 90 tablet, Rfl: 1    metFORMIN (GLUMETZA) 500 MG (MOD) 24 hr tablet, Take 2 tablets (1,000 mg total) by mouth 2 (two) times a day with meals, Disp: 360 tablet, Rfl: 1    multivitamin (THERAGRAN) TABS, Take 1 tablet by mouth daily, Disp: , Rfl:     omeprazole (PriLOSEC) 40 MG capsule, TAKE 1 CAPSULE EVERY DAY, Disp: 90 capsule, Rfl: 1    Probiotic Product (CULTURELLE PROBIOTICS) CHEW, Chew daily, Disp: , Rfl:     TRUEPLUS LANCETS 28G MISC, Test 1x/d, E11 29, Disp: , Rfl: 0  No Known Allergies      Review of Systems   Review of Systems   Constitutional: Positive for fatigue  HENT: Negative  Eyes: Negative  Cardiovascular: Negative  Gastrointestinal: Positive for diarrhea (looser stool with his medications)  Endocrine: Negative  Genitourinary: Positive for frequency (nocturia every 2 hours )  Slower, weaker stream with some frequency during the day   Musculoskeletal: Negative  Skin: Negative  Allergic/Immunologic: Negative  Hematological: Negative  Psychiatric/Behavioral: Positive for sleep disturbance (does not sleep well at night, naps all day long)  OBJECTIVE:   Pulse 94   Temp 98 2 °F (36 8 °C) (Temporal)   Resp 18   Wt 88 5 kg (195 lb)   SpO2 97%   BMI 27 20 kg/m²   Pain Assessment:  0  Performance Status: Karnofsky: 90 - Able to carry on normal activity; minor signs or symptoms of disease     Physical Exam    the patient presents today no apparent distress  Sclera anicteric  Normal respiratory effort  Normal speech  Normal affect  Normal rectal tone  Moderately large prostate with diffuse induration throughout the left gland  No blood on the examining finger  RESULTS  Lab Results    Chemistry        Component Value Date/Time     04/17/2014 0446    K 4 9 01/25/2021 1300    K 3 9 04/17/2014 0446     (H) 01/25/2021 1300     04/17/2014 0446    CO2 25 01/25/2021 1300    CO2 24 04/17/2014 0446    BUN 14 01/25/2021 1300    BUN 18 04/17/2014 0446    CREATININE 1 42 (H) 03/09/2021 1108    CREATININE 0 94 04/17/2014 0446        Component Value Date/Time    CALCIUM 9 3 01/25/2021 1300    CALCIUM 8 6 04/17/2014 0446    ALKPHOS 114 09/18/2020 0919    AST 22 09/18/2020 0919    ALT 36 09/18/2020 0919            Lab Results   Component Value Date    WBC 6 81 01/25/2021    HGB 11 7 (L) 01/25/2021    HCT 37 4 01/25/2021    MCV 93 01/25/2021     01/25/2021         Imaging Studies  Nm Bone Scan Whole Body    Result Date: 3/16/2021  Narrative: BONE SCAN  WHOLE BODY INDICATION: Newly diagnosed [place] 10 prostate cancer  Initial staging  C61: Malignant neoplasm of prostate PREVIOUS FILM CORRELATION:    CT abdomen pelvis 3/15/2021 TECHNIQUE:   This study was performed following the intravenous administration of 27 mCi Tc-99m labeled MDP  Delayed, anterior and posterior whole body images were acquired, 2-3 hours after radiopharmaceutical administration   Additional delayed oblique static images acquired of the bilateral ribs and pelvis  FINDINGS:  Scattered foci of radiotracer uptake noted in the thoracolumbar spine  There are linear areas of radiotracer uptake in the lumbar spine most concentrated in the upper lumbar spine at L1 and L2  In the lumbar spine, this corresponds to degenerative changes on CT of the same day  Curvature noted of the lumbar spine to the right  Foci of radiotracer uptake at the bilateral shoulders, wrists, left knee and feet are likely related to arthritic changes given the distribution  There are no foci suspicious for osseous metastasis  The renal activity is symmetric  Impression: 1  No scintigraphic evidence of osseous metastasis  Workstation performed: [id number]     Vas Lower Limb Arterial Duplex, Complete Bilateral    Result Date: 3/11/2021  Narrative:  [facility] REPORT CLINICAL: Indications: Patient presents with known PVD  Patient states he gets a weakness and tired feeling in his legs after walking about a 1/2 block  Denies any open wounds or ulcers at this time  Operative History: 2018-06-18 Left SFA Stent Angioplasty Left SFA stents Right fem-pop bypass Right SFA stent Risk Factors: The patient has history of HTN, Diabetes (Yes), HLD, CKD, PAD and CAD    FINDINGS:  Segment                Right                        Left                                          Impression  PSV (cm/s)  EDV  Impression  PSV (cm/s)  EDV  Common Femoral Artery                     114    0                      87    3  Prox Profunda          50-75%             250    4  <50%               164    7  Prox SFA               Occluded            14    4                               Prox SFA - Stent                                    Occluded                     Mid SFA                Occluded                                                  Mid SFA - Stent                                     Occluded                     Dist SFA                                   93    0 Dist SFA - Stent                                    Occluded            71    0  Proximal Pop                               39    7                      48    0  Distal Pop                                 52    7                      40    5  Tibioperoneal                              51    6                      40       Prox Post Tibial                           63    6                      43    0  Dist Post Tibial                           47    8                      53    2  Dist  Ant  Tibial                          24    5                      24    0  Prox Peroneal                              30    3                      45    0  Dist Peroneal                              31    2                      17    0     CONCLUSION: Impression: RIGHT LOWER LIMB: Known occlusion of the fem-pop bypass graft  Known occlusion of the proximal to mid superficial femoral artery with reconstitution at the distal SFA  50-75% stenosis of deep femoral artery noted  Ankle/Brachial index:  0 55, which is within the severe range  Prior 0 63  PVR/ PPG tracings are dampened  Metatarsal pressure of 99 mmHg  Great toe pressure of 82 mmHg, within the healing range  LEFT LOWER LIMB: Occlusion of the superficial femoral artery stent with collaterals noted and reconstitution at the distal SFA  <50% stenosis of deep femoral artery noted  Ankle/Brachial index: 0 55, which is within the normal range  Prior 0 60  PVR/ PPG tracings are dampened  Metatarsal pressure of 91 mmHg  Great toe pressure of 60 mmHg, within the healing range  Compared to previous study on 5/12/2020, there is no significant change  Recommend repeat testing in 1 year as per protocol unless otherwise indicated  SIGNATURE: Electronically Signed by: Kassie Cruz MD, RPVI on 2021-03-11 03:26:08 PM    Ct Abdomen Pelvis W Contrast    Result Date: 3/19/2021  Narrative: CT ABDOMEN AND PELVIS WITH IV CONTRAST INDICATION:   C61: Malignant neoplasm of prostate    Initial staging exam COMPARISON:  None  TECHNIQUE:  CT examination of the abdomen and pelvis was performed  Axial, sagittal, and coronal 2D reformatted images were created from the source data and submitted for interpretation  Radiation dose length product (DLP) for this visit:  627 53 mGy-cm   This examination, like all CT scans performed in the [facility], was performed utilizing techniques to minimize radiation dose exposure, including the use of iterative  reconstruction and automated exposure control  IV Contrast:  100 mL of iohexol (OMNIPAQUE) Enteric Contrast:  Enteric contrast was not administered  FINDINGS: ABDOMEN LOWER CHEST:  No clinically significant abnormality identified in the visualized lower chest  LIVER/BILIARY TREE:  Unremarkable  GALLBLADDER:  No calcified gallstones  No pericholecystic inflammatory change  SPLEEN:  Unremarkable  PANCREAS:  Unremarkable  ADRENAL GLANDS:  Unremarkable  KIDNEYS/URETERS:  6 mm hypodense nodule left kidney posterior cortex too small for accurate characterization  Otherwise unremarkable  STOMACH AND BOWEL:  Sigmoid diverticulosis  Moderately increased stool  APPENDIX:  No findings to suggest appendicitis  ABDOMINOPELVIC CAVITY:  No ascites  No pneumoperitoneum  No lymphadenopathy  VESSELS:  Unremarkable for patient's age  PELVIS REPRODUCTIVE ORGANS:  Prostate gland measures approximately 5 6 x 3 4 x 3 7 cm  Heterogeneous density, the right aspect of the prostate gland peripheral zone appearing hypodense compared to the left  No evidence of regional adenopathy URINARY BLADDER:  Unremarkable  ABDOMINAL WALL/INGUINAL REGIONS:  Unremarkable  OSSEOUS STRUCTURES:  No acute fracture or destructive osseous lesion  Severe degenerative changes of the lumbar spine     Impression: 1  No evidence of metastatic disease in the abdomen or pelvis 2  Prostatomegaly with heterogeneous glandular density    No evidence of regional adenopathy Workstation performed: [id number]         ASSESSMENT  1  Prostate cancer [facility])  Ambulatory referral to Radiation Oncology     Cancer Staging  Prostate cancer [facility])  Staging form: Prostate, AJCC 8th Edition  - Clinical: Stage IIIC (cT3b, cN0, cM0, PSA: 8 6, Grade Group: 5) - Signed by Samual Gilford, MD on 3/29/2021  Prostate specific antigen (PSA) range: Less than 10  Histologic grading system: 5 grade system        PLAN/DISCUSSION  No orders of the defined types were placed in this encounter  Agustin Noe is a 67y o  year old male with recently diagnosed high risk adenocarcinoma of the prostate,Stage IIIC (cT3b, cN0, cM0, PSA: 8 6, Grade Group: 5)  A staging workup was negative  He has already initiated androgen deprivation  We agree with the recommendation from Urology for definitive radiation therapy to the prostate and pelvis with neoadjuvant and concurrent long-term androgen deprivation for a total of likely to years  We would anticipate initiating radiation approximately 2 months  From initiation of androgen deprivation  He will be referred back to Urology for fiducial marker and rectal spacer placement  Once these are placed, he would then return to our department for CT planning with treatment directed at the prostate, seminal vesicles, and regional pelvic lymphatics, delivered over the course of approximately 9 weeks  The associated risks and toxicities of treatment were discussed with the patient in detail, including, not limited to, fatigue, increased frequency of urination, dysuria, hematuria, diarrhea, rectal bleeding, impotence, incontinence, dry ejaculation, and long-term radiation cystitis / proctitis  Samual Gilford, MD  1/41/4334,1:91 PM      Portions of the record may have been created with voice recognition software  Occasional wrong word or "sound a like" substitutions may have occurred due to the inherent limitations of voice recognition software    Read the chart carefully and recognize, using context, where substitutions have occurred

## 2021-03-29 NOTE — PROGRESS NOTES
Plastic surgery clinic note    Reason for visit: office visit, discuss left sided breast pain    CC: breast pain, breast asymmetry    Referring Provider: Alison De Santiago PA-C     HPI:  Angela Reyes-Galarza is a 25 year old female with a history of bilateral augmentation mastopexy, lipoabdominoplasty, and BBL in [place] (2/2020) who comes into my plastic surgery clinic to discuss left sided breast pain and asymmetry.  She states the pain started 4 months after the procedure, is constant and sharp, and located to the upper outer quadrant.  She also states that the left implant appears different from when it was first inserted.  She is displeased by the asymmetry of the breasts and their position on the chest wall.  She denies any palpable lumps, changes in her nipple, or nipple discharge.  She denies a personal or family history of breast cancer.    Past Medical History:   Diagnosis Date   • Anemia    • Chlamydia 2012   • NO HX OF     CA, HTN, DM, CAD, CVA, DVT, MI, or liver disease   • Trichomonas vaginitis 2012     Past Surgical History:   Procedure Laterality Date   • Hb etonogestrel (nexplanon) implant  09/2018   • Iud mirena      Change to Nexplanon      Family History   Problem Relation Age of Onset   • Patient is unaware of any medical problems Mother    • Patient is unaware of any medical problems Father    • Patient is unaware of any medical problems Sister    • Patient is unaware of any medical problems Brother    • Diabetes Maternal Grandmother    • Patient is unaware of any medical problems Brother    • Patient is unaware of any medical problems Brother    • Patient is unaware of any medical problems Sister    • Diabetes Maternal Aunt    • Diabetes Paternal Uncle    • Patient is unaware of any medical problems Daughter    • Patient is unaware of any medical problems Son       Current Outpatient Medications   Medication Sig Dispense Refill   • Probiotic Product (PROBIOTIC DAILY PO)      • Multiple  [facility] [birth year] is a 67 y o  male    Oncology History Overview Note   Patient presents for radiation consult for Mikki 10 prostate cancer, referred by Dr Josh Quintana  67year old male with medical history including DM II, CKD III, CAD, PVD s/p stent placement and RLE bypass  He was referred to Urology in January 2020 for elevated PSA from 2 0 to 4 0 in 1 year  He has been following with them and recently has PSA elevation to 8  PSA trend:  11/2/2016: PSA 1 6  11/7/2017: PSA 1 8  11/8/2018: PSA 2 0  12/3/2019: PSA 4 0  2/10/2020: PSA 4 0  8/14/2020: PSA 6 2  12/10/2020: PSA 8 0      2/17/21 Prostate Biopsy  6/12 positive cores, prostatic adenocarcinoma  GS 10 (5+5) in 2 cores (left lat mid and left mid)  GS 9 (5+4) in 3 cores (left base, left lat apex and left apex)  GS 8 (5+3) in 1 core (left lat base)      3/3/21 Urology, Dr Josh Quintana  Discussed biopsy report and treatment options  Given high risk disease, recommend active treatment  Given underlying medical and surgical risk factors and high likelihood of requiring RT after surgery, recommend upfront RT combined with 2-3 years of ADT  PLAN: CT, Bone scan for staging  Referral to Rad Onc  Return shortly for long-term Firmagon instead of Lupron (given cardiovascular risk factors)  3/15/21 CT abd pelvis with contrast  1  No evidence of metastatic disease in the abdomen or pelvis  2  Prostatomegaly with heterogeneous glandular density  No evidence of regional adenopathy    3/15/21 Bone scan   No evidence of osseous metastasis    3/17/21 1st Firmagon SQ injection    3/22/21 urology office called pt with scan results, negative for metastatic disease     4/14/21 Urology follow-up       Prostate cancer [facility])   2/2021 Initial Diagnosis    Prostate cancer ([facility] [address] )     2/17/2021 Biopsy    A  Prostate, Right Lateral Base, Biopsy:  - Benign prostatic tissue  B  Prostate, Right Base, Biopsy:  - Benign prostatic tissue       C  Prostate, Right Lateral Mid, Biopsy:  - Benign prostatic tissue  D  Prostate, Right Mid, Biopsy:  - Benign prostatic tissue  E  Prostate, Right Lateral [place], Biopsy:  - Benign prostatic tissue  F  Prostate, Right [place], Biopsy:  - Benign prostatic tissue  G  Prostate, Left Lateral Base, Biopsy:  - Prostatic adenocarcinoma, Brunswick Score 5 + 3 = 8, Grade Group 4      - Percentage Mikki pattern 5: 95%     - Total number of cores identified: 1     - Total number of cores with carcinoma: 1     - Percentage of tissue with carcinoma: 92%     - Linear amount of tissue with carcinoma: 11 mm total     H  Prostate, Left Base, Biopsy:  - Prostatic adenocarcinoma, Mikki Score 5 + 4 = 9, Grade Group 5      - Percentage [place] pattern 5: 95%     - Total number of cores identified: 1     - Total number of cores with carcinoma: 1     - Percentage of tissue with carcinoma: 92%     - Linear amount of tissue with carcinoma: 11 mm total     I  Prostate, Left Lateral Mid, Biopsy:  - Prostatic adenocarcinoma, Mikki Score 5 + 5 = 10, Grade Group 5      - Total number of cores identified: 1     - Total number of cores with carcinoma: 1     - Percentage of tissue with carcinoma: 100%     - Linear amount of tissue with carcinoma: 15 mm total  - Focus of suspected intraductal carcinoma  J  Prostate, Left Mid, Biopsy:  - Prostatic adenocarcinoma, Brunswick Score 5 + 5 = 10, Grade Group 5      - Total number of cores identified: 1     - Total number of cores with carcinoma: 1     - Percentage of tissue with carcinoma: 93%     - Linear amount of tissue with carcinoma: 14 mm total     K  Prostate, Left Lateral [place], Biopsy:  - Prostatic adenocarcinoma, Brunswick Score 5 + 4 = 9, Grade Group 5      - Percentage [place] pattern 5: 70%     - Total number of cores identified: 1     - Total number of cores with carcinoma: 1     - Percentage of tissue with carcinoma: 100%     - Linear amount of tissue with carcinoma: 10 mm total     L   Prostate, Vitamins-Minerals (vitamin - therapeutic multivitamins w/minerals) tablet        No current facility-administered medications for this visit.       Relationships   Social connections   • Talks on phone: Not on file   • Gets together: Not on file   • Attends Jew service: Not on file   • Active member of club or organization: Not on file   • Attends meetings of clubs or organizations: Not on file   • Relationship status: Not on file      ALLERGIES:  No Known Allergies     Review of System:   All pertinent positive and negative ROS (Review of Systems) as outlined in the HPI (History of Present Illess).  Constitutional: Loss of energy  Eyes: Positive for, Dry Eyes  Ears, Nose, and Throat: Negative for Difficulty hearing, ringing in ears, runny nose, or sore throat  Cardiovascular: Negative for Rapid heart rate, Palpitations, Chest Pain, or Swollen ankles  Respiratory: Negative for Chronic cough, wheezing, or shortness of breath  Gastrointestinal: Negative for abdominal pain, diarrhea, constipation, blood in stool, heart burn, nausea, abdominal bloating  Musculoskeletal: Negative for multiple joint pains, chronic muscle aches, back pain, or neck pain  Skin/breast: left breast pain  Neurological: Negative for Numbness, Tingling, Seizures, or tremor  Mind: Negative   Endocrine: Negative for hot or cold intolerance, unexplained weight loss or gain, abnormal milk production  Hematologic/Lymphatic: Positive for, Easy bruising/bleeding  Allergic/Immunologic: Negative        Exam:  Nurse present as chaperone  Visit Vitals  /80   Resp 16   Ht 5' 6\" (1.676 m)   Wt 62 kg   LMP 02/02/2021   BMI 22.06 kg/m²     General: NAD, Alert and Healthy appearing  Pulm:  Normal pulmonary effort  Cardiac: palpable femoral pulses  Right breast: capsule is soft, pseudoptosis with bottomed out implant. Notch to nipple: 24.  Well healed wise pattern scar  Left breast: soft, ttp superior lateral quadrant, no palpable masses,  Left Noel, Biopsy:  - Prostatic adenocarcinoma, Mikki Score 5 + 4 = 9, Grade Group 5      - Percentage [place] pattern 5: 95%     - Total number of cores identified: 1     - Total number of cores with carcinoma: 1     - Percentage of tissue with carcinoma: 94%     - Linear amount of tissue with carcinoma: 15 mm total     3/17/2021 -  Hormone Therapy    Firmagon 240 mg SQ injection - started 3/17/21  Plan for 2-3 years of therapy           Clinical Trial: no      Health Maintenance   Topic Date Due    Colonoscopy Surveillance  06/14/2016    Influenza Vaccine (1) 09/01/2020    HEMOGLOBIN A1C  03/18/2021    Fall Risk  06/15/2021    Medicare Annual Wellness Visit (AWV)  06/15/2021    Depression Screening PHQ  09/25/2021    BMI: Followup Plan  09/25/2021    DM Eye Exam  09/24/2021    Diabetic Foot Exam  01/06/2022    DTaP,Tdap,and Td Vaccines (2 - Td) 02/22/2022    BMI: Adult  03/26/2022    Pneumococcal Vaccine: 65+ Years  Completed    COVID-19 Vaccine  Completed    HIB Vaccine  Aged Out    Hepatitis B Vaccine  Aged Out    IPV Vaccine  Aged Out    Hepatitis A Vaccine  Aged Out    Meningococcal ACWY Vaccine  Aged Out    HPV Vaccine  Aged Out       Past Medical History:   Diagnosis Date    Acquired hallux valgus     last assessed: 8/1/2013    Allergic rhinitis     Anemia 10/26/2010    Atrophy of nail     last assessed: 7/7/2015    Chronic kidney disease     chronic renal insufficiency    Coronary artery disease     Deformity of ankle and foot, acquired     last assessed: 2/22/2016    Diabetes mellitus ([address] )     Difficulty walking     last assessed: 12/14/2015    Dysesthesia     last assessed: 11/25/2016    Hammer toe     unspecified laterality; last assessed: 8/1/2013    Hypertension     Onychomycosis of toenail     last assessed: 2/22/2016    Peripheral vascular disease ([address] )     left SFA stent in 2010    Pes planus     unspecified laterality; last assessed: 8/1/2013    Pneumonia pseudoptotic.  Notch to nipple: 22 Well healed wise pattern scar    A/P  -Left breast pain  -Breast asymmetry following cosmetic procedure    Plan:  -We will send her for an MRI to evaluate for implant rupture    -We discussed implant removal/capsulectomy, however she is not interested in this at this time.  She is interested in pursuing revision augmentation mastopexy to correct the bottomed out implants and improve symmetry.  I will refer her to my partner, Dr. 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     last assessed: 8/15/2016    Type 2 diabetes mellitus ([facility] [address] ) 07/26/2010    Viral warts     last assessed: 7/24/2015       Past Surgical History:   Procedure Laterality Date    CARDIAC CATHETERIZATION  07/29/2010    CATARACT EXTRACTION, BILATERAL Bilateral     R 1999, L 2008    COLONOSCOPY  2011    FEMORAL ARTERY - POPLITEAL ARTERY BYPASS GRAFT  09/23/2013    FOOT SURGERY      bone spur removed    LEG SURGERY Bilateral     repair; L 8/20/2010 and 7/26/2012    ROTATOR CUFF REPAIR Left 2009    SHOULDER SURGERY Right 2005    collar bone       Family History   Problem Relation Age of Onset    Heart disease Father     Heart attack Father         acute myocardial infarction    Heart disease Sister     Heart attack Sister         acute myocardial infarction    Heart disease Paternal Grandfather     Aortic aneurysm Mother     Throat cancer Maternal Grandfather        Social History     Tobacco Use    Smoking status: Never Smoker    Smokeless tobacco: Never Used   Substance Use Topics    Alcohol use: Yes     Frequency: 2-4 times a month     Comment: being a social drinker    Drug use: No          Current Outpatient Medications:     acarbose (PRECOSE) 100 MG tablet, TAKE 1 TABLET THREE TIMES DAILY WITH MEALS, Disp: 270 tablet, Rfl: 1    Alphagan P 0 1 %, , Disp: , Rfl:     amLODIPine (NORVASC) 2 5 mg tablet, TAKE 1 TABLET (2 5 MG TOTAL) BY MOUTH DAILY, Disp: 90 tablet, Rfl: 3    aspirin (ECOTRIN LOW STRENGTH) 81 mg EC tablet, Take 1 tablet (81 mg total) by mouth daily, Disp: 30 tablet, Rfl: 6    atorvastatin (LIPITOR) 20 mg tablet, TAKE 1 TABLET EVERY DAY, Disp: 90 tablet, Rfl: 1    b complex-vitamin C-folic acid (NEPHROCAPS) 1 mg capsule, Take 1 capsule by mouth daily, Disp: , Rfl:     carvedilol (COREG) 12 5 mg tablet, TAKE 1 TABLET TWICE DAILY, Disp: 180 tablet, Rfl: 1    co-enzyme Q-10 100 mg capsule, Take 100 mg by mouth daily, Disp: , Rfl:     gabapentin (NEURONTIN) 600 MG tablet, Take 1 tablet (600 mg total) by mouth 2 (two) times a day, Disp: 180 tablet, Rfl: 0    glimepiride (AMARYL) 4 mg tablet, TAKE 1 TABLET TWICE DAILY, Disp: 180 tablet, Rfl: 1    glucose blood (TRUETRACK TEST) test strip, 1 each by Other route daily Use as instructed for E11 29, Disp: 100 each, Rfl: 3    lisinopril (ZESTRIL) 2 5 mg tablet, TAKE 1 TABLET (2 5 MG TOTAL) BY MOUTH DAILY, Disp: 90 tablet, Rfl: 1    metFORMIN (GLUMETZA) 500 MG (MOD) 24 hr tablet, Take 2 tablets (1,000 mg total) by mouth 2 (two) times a day with meals, Disp: 360 tablet, Rfl: 1    multivitamin (THERAGRAN) TABS, Take 1 tablet by mouth daily, Disp: , Rfl:     omeprazole (PriLOSEC) 40 MG capsule, TAKE 1 CAPSULE EVERY DAY, Disp: 90 capsule, Rfl: 1    Probiotic Product (CULTURELLE PROBIOTICS) CHEW, Chew daily, Disp: , Rfl:     TRUEPLUS LANCETS 28G MISC, Test 1x/d, E11 29, Disp: , Rfl: 0    No Known Allergies     Review of Systems:  Review of Systems   Constitutional: Positive for fatigue  HENT: Negative  Eyes: Negative  Cardiovascular: Negative  Gastrointestinal: Positive for diarrhea (looser stool with his medications)  Endocrine: Negative  Genitourinary: Positive for frequency (nocturia every 2 hours )  Slower, weaker stream with some frequency during the day   Musculoskeletal: Negative  Skin: Negative  Allergic/Immunologic: Negative  Hematological: Negative  Psychiatric/Behavioral: Positive for sleep disturbance (does not sleep well at night, naps all day long)  Vitals:    03/29/21 1036   Pulse: 94   Resp: 18   Temp: 98 2 °F (36 8 °C)   TempSrc: Temporal   SpO2: 97%   Weight: 88 5 kg (195 lb)            Pain assessment: 0    PFT: n/a    Imaging:No images are attached to the encounter       Teaching: NCI radiation packet, prostate radiation     MST: Completed     Implantable Devices (Port, pacemaker, pain stimulator): no    Hip Replacement: no

## 2021-03-29 NOTE — TELEPHONE ENCOUNTER
Jessica Guerra,   Dr Cherie Joseph consulted with Yaima Umana today for radiation  He received first [place] injection on 3/17  Please coordinate markers/[employer] and then we will schedule CT simulation  Pt would like treatment at [facility]  Thanks!   Janna Oliveros

## 2021-03-30 NOTE — TELEPHONE ENCOUNTER
Patient scheduled for 6/22/2021 at [facility] with Dr Madeleine Epps     -instructions mailed  -CBC, CMP, and EKG 7-10 days prior  patient will need to stop his Aspirin 7 days prior  -MCR/AARP - no auth required

## 2021-03-31 ENCOUNTER — PATIENT OUTREACH (OUTPATIENT)
Dept: UROLOGY | Facility: AMBULATORY SURGERY CENTER | Age: 76
End: 2021-03-31

## 2021-03-31 NOTE — PROGRESS NOTES
Thuy Bolton has been scheduled for [employer]  Called him  Introduced myself and gave him my direct contact number  Reviewed timeline for Nisha Huitron, Radiation SIM and start of RT  He was encouraged to call if he has questions or needs assistance

## 2021-04-01 ENCOUNTER — OFFICE VISIT (OUTPATIENT)
Dept: PHYSICAL THERAPY | Facility: CLINIC | Age: 76
End: 2021-04-01
Payer: MEDICARE

## 2021-04-01 DIAGNOSIS — R53.1 WEAKNESS: Primary | ICD-10-CM

## 2021-04-01 DIAGNOSIS — R26.89 OTHER ABNORMALITIES OF GAIT AND MOBILITY: ICD-10-CM

## 2021-04-01 DIAGNOSIS — R26.89 BALANCE DISORDER: ICD-10-CM

## 2021-04-01 PROCEDURE — 97163 PT EVAL HIGH COMPLEX 45 MIN: CPT

## 2021-04-01 NOTE — PROGRESS NOTES
PT Evaluation   Today's date: 2021  Patient name: Ady Dye  :   MRN: [id number]  Referring provider: Self, Referral  Dx:   Encounter Diagnosis     ICD-10-CM    1  Weakness  R53 1    2  Balance disorder  R26 89    3  Other abnormalities of gait and mobility  R26 89          Assessment  Assessment details: Patient is a 67 y o  Male who presents to skilled outpatient PT with deficits in LE strength and balance impacting his ability to perform ADLs and ambulation safely  Patient has demonstrated reduced overall LE strength as displayed via MMT scoring with L > R strength  Coordination screen was unremarkable  Sensation was intact to deep pressure, however reported deficits with light touch beginning 1 inch above B/L malleoli which could further contribute to potential risk for falls  Patient at increased risk for falls per results associated with 5xSTS, 10 meter, FGA, DGI, and TUG with cut off scores taken from APTA and Rehab Measures  Deferred functional cardio capacity testing via 6 Minute Walk Test today as patient with increased overall fatigue following safety tests and measures with plan to assess next visit  Reduced overall somatosensory awareness as indicated by (+) postural sway with exercises on foam  He will benefit from skilled outpatient PT in order to maximize his function and reduce risk for falls  Patient in good verbal understanding and agreement with POC  Impairments: Abnormal coordination, Abnormal gait, Abnormal muscle tone, Abnormal or restricted ROM, Impaired balance, Impaired physical strength, Lacks appropriate HEP, Poor posture, Safety issue, Weight-bearing intolerance and Abnormal movement  Understanding of Dx/Px/POC: Good  Prognosis: Good    Patient verbalized understanding of POC  Please contact me if you have any questions or recommendations   Thank you for the referral and the opportunity to share in [address]  care          Plan  Plan details: General balance    Patient would benefit from: PT Eval and Skilled PT  Planned modality interventions: Biofeedback, Cryotherapy, TENS and Thermotherapy: Hydrocollator Packs  Planned therapy interventions: Abdominal trunk stabilization, ADL training, Balance, Balance/WB training, Breathing training, Body mechanics training, Coordination, Functional ROM exercises, Gait training, HEP, Joint mobilization, Manual therapy, Soto taping, Motor coordination training, Neuromuscular re-education, Patient education, Postural training, Strengthening, Stretching, Therapeutic activities, Therapeutic exercises and Therapeutic training  Frequency: 2x/wk  Duration in weeks: 12  Plan of Care beginning date: 4/1/2021  Plan of Care expiration date: 12 weeks - 6/24/2021  Treatment plan discussed with: Patient       Goals  Short Term Goals (4 weeks):    - Patient will improve time on TUG by 2 9 seconds from 14 90 seconds to 12 0 seconds to facilitate improved safety in all ambulation  - Patient will improve scoring on DGI by 2 6 points from 16/24 to 19/24 to progress safety  - Patient will be independent in basic HEP 2-3 weeks  - Patient will improve 5xSTS score by 2 3 seconds from 15 82 seconds to 13 52 seconds to promote improved LE functional strength needed for ADLs  - Patient will complete components of HiMAT to promote agility necessary for sports related tasks  - Patient will complete 6 Minute Walk Test to promote improved cardiovascular endurance    Long Term Goals (12 weeks):  - Patient will be independent in a comprehensive home exercise program  - Patient will improve gait speed by 0 59 ft/sec from 2 87 ft/sec to 3 46 ft/sec to improve safety with community ambulation  - Patient will improve scoring on FGA by 4 points from 18/30 to 22/30 to progress safety with dynamic tasks  - Patient will be able to demonstrate HT in gait without veering  - Patient will report 50% reduction in near falls in order to improve safety with functional tasks and reduce his risk for falls  - Patient will report going on walks at least 3 days per week to promote independence and improved cardiovascular endurance  - Patient will be able to ascend/descend stairs reciprocally without UE assist to promote independence and safety with ADLs  - Patient will report 50% reduction in near falls when ambulating on uneven terrain      Cut off score    All date taken from APTA Neuro Section or Rehab Measures      Méndez/64  MDC: 6 pts  Age Norms:  61-76: M - 54   F - 55  70-79: M - 47   F - 53  80-89: M - 48   F - 50 5xSTS: Radha et al 2010  MDC: 2 3 sec  Age Norms:  60-69: 11 1 sec  -79: 12 6 sec  80-89: 14 8 sec   TUG  MDC: 4 14 sec  Cut off score:  >13 5 sec community dwelling adults  >32 2 frail elderly  <20 I for basic transfers  >30 dependent on transfers 10 Meter Walk Test: Sneha delgado   20-29: M - 1 35 m   F - 1 34 m  30-39: M - 1 43 m   F - 1 34 m  40-49: M - 1 43 m   F - 1 39 m  50-59: M - 1 43 m   F - 1 31 m  60-69: M - 1 34 m   F - 1 24 m  70-79: M - 1 26 m   F - 1 13 m  80-89: M - 0 97 m   F - 0 94 m   FGA  MCID: 4 pts  Geriatrics/community < 22/30 fall risk  Geriatrics/community < 20/30 unexplained falls DGI  MDC: vestibular - 4 pts  MDC: geriatric/community - 3 pts  Falls risk <19/24   6 Minute Walk Test  Age Norms  61-76: M - 1876 ft (571 80 m)  F - 1765 ft (537 98 m)  70-79: M - 1729 ft (527 00 m)  F - 1545 ft (470 92 m)  80-89: M - 1368 ft (416 97 m)  F - 1286 ft (391 97 m) mCTSIB  Norm: 20-60 yrs  Eyes open firm: norm sway 0 21-0 48  Eyes closed firm: norm sway 0 48-0 99  Eyes open foam: norm sway 0 38-0 71  Eyes closed foam: norm sway 0 70-2 22         Subjective    History of Present Illness  Mechanism of injury: Patient is a 68 y/o male with complaints of imbalance and overall LE weakness  Recent diagnosis of Stage 3 Prostate Cancer   No recent falls  Patient to start radiation over the summer, however does not have specific start date  Primary AD: None  Assist level at home: Independent      Pain  Current pain ratin/10  At best pain ratin/10  At worst pain ratin-6/10 during bending activities  Location: low back  Aggravating factors: bending activities    Social Support   Steps to enter house: Yes, 1 HR  Stairs in house: Into the basement, 1 HR   Lives in: [address] [place] [address] with: Wife    Employment status: Retired  Hand dominance: R    Treatments  Previous treatment: None  Current treatment: PT  Diagnostic Testing: CT Scan and Bone Scan      Objective     LE MMT  - R Hip Flexion: 4-/5  L Hip Flexion: 4/5  - R Hip Extension: 4-/5 L Hip Extension: 4/5  - R Knee Extension: 4+/5 L Knee Extension: 5/5  - R Knee Flexion: 4+/5  L Knee Flexion: 5/5  - R Ankle DF: 2/5  L Ankle DF: 3-/5  - R Ankle PF: 3/5  L Ankle PF: 3/5    Sensation  - Light touch: reduced in B/L feet/ankles (1 inch above malleoli)  - Deep pressure: intact  - Pain: intact    Coordination  - Dysmetria: intact  - Dysdiadochokinesia: intact  - Alternating Toe Taps: intact        Outcome Measures Initial Eval  2021        5xSTS 15 82 sec, 2 UE        TUG 14 90 sec        10 meter 2 87 ft/sec  0 87 m/s        FGA         DGI         mCTSIB  - FTEO (firm)  - FTEC (firm)  - FTEO (foam)  - FTEC (foam)   30 sec  30 sec  30 sec (+)  30 sec (+)        6MWT Defer                               Precautions:  Active Stage 3 Prostate Cancer, Falls  Past Medical History:   Diagnosis Date    Acquired hallux valgus     last assessed: 2013    Allergic rhinitis     Anemia 10/26/2010    Atrophy of nail     last assessed: 2015    Chronic kidney disease     chronic renal insufficiency    Coronary artery disease     Deformity of ankle and foot, acquired     last assessed: 2016    Diabetes mellitus (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

## 2021-04-02 NOTE — TELEPHONE ENCOUNTER
Dion Parson, Please schedule CT simulation for Pernell, he will have markers/[employer] placed 6/22  Please schedule simulation week of 6/29  Thanks!

## 2021-04-07 ENCOUNTER — OFFICE VISIT (OUTPATIENT)
Dept: PHYSICAL THERAPY | Facility: CLINIC | Age: 76
End: 2021-04-07
Payer: MEDICARE

## 2021-04-07 DIAGNOSIS — R26.89 BALANCE DISORDER: ICD-10-CM

## 2021-04-07 DIAGNOSIS — R26.89 OTHER ABNORMALITIES OF GAIT AND MOBILITY: ICD-10-CM

## 2021-04-07 DIAGNOSIS — R53.1 WEAKNESS: Primary | ICD-10-CM

## 2021-04-07 PROCEDURE — 97112 NEUROMUSCULAR REEDUCATION: CPT

## 2021-04-07 PROCEDURE — 97530 THERAPEUTIC ACTIVITIES: CPT

## 2021-04-07 NOTE — PROGRESS NOTES
Daily Note  IE: 2021 (POC: 2021)    Today's date: 2021  Patient name: Zoran Cartwright  :   MRN: [id number]  Referring provider: Self, Referral  Dx:   Encounter Diagnosis     ICD-10-CM    1  Weakness  R53 1    2  Balance disorder  R26 89    3  Other abnormalities of gait and mobility  R26 89                   Subjective: Patient reports no new changes, complaints, or falls  Objective: See treatment diary below    TA:  - 6MWT: see chart below for results  - STS: 10 reps, 0 UE    NMR:  Vibration Plate: 5 minutes, level 50  - Static standing  - FAEC  - Squats w/ 1 UE     - Tandem ambulation: 4 cycles, 10 ft down/back, light 1 UE  - Ambulation w/ alternate hand to knee taps: 2 cycles  - Fwd/bwd ambulation while walking out to lobby: 1 rep, PTCS        Assessment: Patient able to tolerate treatment session well today with initiation of exercise program  Increased fatigue with 5 minutes of vibration plate training and subjective reporting noting "my feet feel pretty good actually " Decreased hip and knee flexion throughout swing phase of gait that improved following alternate hand to knee taps  Challenged with SLS activities and required PT contact guard throughout  He will continue to benefit from skilled outpatient PT in order to maximize his function and reduce his risk for falls  Plan: Continue per plan of care        Outcome Measures Initial Eval  2021       5xSTS 15 82 sec, 2 UE        TUG 14 90 sec        10 meter 2 87 ft/sec  0 87 m/s        FGA         DGI         mCTSIB  - FTEO (firm)  - FTEC (firm)  - FTEO (foam)  - FTEC (foam)   30 sec  30 sec  30 sec (+)  30 sec (+)        6MWT Defer 720 ft, no AD

## 2021-04-09 ENCOUNTER — OFFICE VISIT (OUTPATIENT)
Dept: PHYSICAL THERAPY | Facility: CLINIC | Age: 76
End: 2021-04-09
Payer: MEDICARE

## 2021-04-09 DIAGNOSIS — R26.89 OTHER ABNORMALITIES OF GAIT AND MOBILITY: ICD-10-CM

## 2021-04-09 DIAGNOSIS — R53.1 WEAKNESS: Primary | ICD-10-CM

## 2021-04-09 DIAGNOSIS — R26.89 BALANCE DISORDER: ICD-10-CM

## 2021-04-09 PROCEDURE — 97112 NEUROMUSCULAR REEDUCATION: CPT

## 2021-04-12 ENCOUNTER — OFFICE VISIT (OUTPATIENT)
Dept: PHYSICAL THERAPY | Facility: CLINIC | Age: 76
End: 2021-04-12
Payer: MEDICARE

## 2021-04-12 DIAGNOSIS — R26.89 BALANCE DISORDER: ICD-10-CM

## 2021-04-12 DIAGNOSIS — R53.1 WEAKNESS: Primary | ICD-10-CM

## 2021-04-12 DIAGNOSIS — R26.89 OTHER ABNORMALITIES OF GAIT AND MOBILITY: ICD-10-CM

## 2021-04-12 PROCEDURE — 97112 NEUROMUSCULAR REEDUCATION: CPT | Performed by: PHYSICAL THERAPIST

## 2021-04-12 NOTE — PROGRESS NOTES
Daily Note  IE: 2021 (POC: 2021)    Today's date: 2021  Patient name: Mitra Liu  :   MRN: [id number]  Referring provider: Self, Referral  Dx:   Encounter Diagnosis     ICD-10-CM    1  Weakness  R53 1    2  Balance disorder  R26 89    3  Other abnormalities of gait and mobility  R26 89                   Subjective: Pt reports no new complaints, denies any falls       Objective: See treatment diary below      NMR:  Vibration Plate: 5 minutes, level 50  - Static standing  - FAEC  - Squats w/ 1 UE  - Heel raises    - STS: 10 reps, 2 sets , 0 UE  - Tandem ambulation: 4 cycles, 10 ft down/back, light 1 UE  - Ambulation w/ alternate hand to knee taps: 2 cycles  - Sidestepping on foam: 4 cycles, 10 ft down/back, 0 UE  - Sidestepping w/ cone taps: 4 cycles, 10 ft down/back, 0 UE  - Fwd/bwd ambulation while walking out to lobby: 1 rep, PTCS  - P-ball bounce with walking forward and backwards walking 3 cycles   - P-ball dribble 50 feet down and back 3 cycles        Assessment: challenged with endurance with p ball activities this date  Patient had improvement in side stepping on foam beam with decrease in tapping and use of UE support  Increased veering and min A for LOB with backwards direction  He will continue to benefit from skilled outpatient PT in order to maximize his function and reduce his risk for falls  Plan: Continue per plan of care        Outcome Measures Initial Eval  2021       5xSTS 15 82 sec, 2 UE        TUG 14 90 sec        10 meter 2 87 ft/sec  0 87 m/s        FGA         DGI         mCTSIB  - FTEO (firm)  - FTEC (firm)  - FTEO (foam)  - FTEC (foam)   30 sec  30 sec  30 sec (+)  30 sec (+)        6MWT Defer 720 ft, no AD

## 2021-04-14 ENCOUNTER — PROCEDURE VISIT (OUTPATIENT)
Dept: UROLOGY | Facility: CLINIC | Age: 76
End: 2021-04-14
Payer: MEDICARE

## 2021-04-14 VITALS
DIASTOLIC BLOOD PRESSURE: 80 MMHG | WEIGHT: 198 LBS | HEIGHT: 71 IN | RESPIRATION RATE: 18 BRPM | BODY MASS INDEX: 27.72 KG/M2 | SYSTOLIC BLOOD PRESSURE: 158 MMHG

## 2021-04-14 DIAGNOSIS — C61 PROSTATE CANCER (HCC): ICD-10-CM

## 2021-04-14 DIAGNOSIS — C61 PROSTATE CANCER (HCC): Primary | ICD-10-CM

## 2021-04-14 PROCEDURE — 96402 CHEMO HORMON ANTINEOPL SQ/IM: CPT

## 2021-04-14 NOTE — PROGRESS NOTES
4/14/2021  Adore Hunter is a 67 y o  male  [id number]    Diagnosis:  Chief Complaint     Prostate Cancer          Patient presents for monthly firmagon managed by Dr Yessy Tripathi:  -Patient will follow up as scheduled next month for next firmagon injection   -Patient will follow up as scheduled in June for provider follow up, PSA prior and monthly firmagon  -Patient has spaceOAR and fiducial marker placement on 6/22/21      Medication administration:    Patient reports minimal side effects from loading dose firmagon last month  He states only injection site discomfort  He had no questions prior to todays administration  80mg firmagon administered subcutaneously  Patient tolerated well       Administrations This Visit     degarelix acetate [facility]) injection 80 mg     Admin Date  04/14/2021 Action  Given Dose  80 mg Route  Subcutaneous Administered By  Elisa Medina RN                Vitals:    04/14/21 0815   BP: 158/80   BP Location: Right arm   Patient Position: Sitting   Cuff Size: Standard   Resp: 18   Weight: 89 8 kg (198 lb)   Height: 5' 11" (1 803 m)         Elisa Medina RN BSN

## 2021-04-15 ENCOUNTER — OFFICE VISIT (OUTPATIENT)
Dept: PHYSICAL THERAPY | Facility: CLINIC | Age: 76
End: 2021-04-15
Payer: MEDICARE

## 2021-04-15 DIAGNOSIS — R53.1 WEAKNESS: Primary | ICD-10-CM

## 2021-04-15 DIAGNOSIS — R26.89 OTHER ABNORMALITIES OF GAIT AND MOBILITY: ICD-10-CM

## 2021-04-15 DIAGNOSIS — R26.89 BALANCE DISORDER: ICD-10-CM

## 2021-04-15 PROCEDURE — 97112 NEUROMUSCULAR REEDUCATION: CPT | Performed by: PHYSICAL THERAPIST

## 2021-04-15 NOTE — PROGRESS NOTES
Daily Note  IE: 2021 (POC: 2021)    Today's date: 4/15/2021  Patient name: Tres Chopra  :   MRN: [id number]  Referring provider: Self, Referral  Dx:   Encounter Diagnosis     ICD-10-CM    1  Weakness  R53 1    2  Balance disorder  R26 89    3  Other abnormalities of gait and mobility  R26 89                   Subjective: Patient reports to therapy feeling good today       Objective: See treatment diary below      NMR:  Vibration Plate: 5 minutes, level 50, HL  - Static standing  - FAEC  - Squats   - Heel raises    - STS: 10 reps, 2 sets , 0 UE  - Tandem ambulation: 4 cycles, 10 ft down/back, light 1 UE   - Ambulation w/ alternate hand to knee taps: 2 cycles  - Sidestepping on foam: 4 cycles, 10 ft down/back, 0 UE  - Fwd/bwd ambulation while walking out to lobby: 1 rep, PTCS        Assessment: Patient tolerated treatment fairly  He is challenged with his endurance, requiring frequent rest breaks between exercises  Patient challenged with balance during vibration plate, having 2 LOB anteriorly with heel raises  During backward walking he displays reduced speed and shorter step length, which improved with verbal cueing  He will continue to benefit from skilled PT services to maximize his function and reduce his risk for falls  Plan: Continue per plan of care        Outcome Measures Initial Eval  2021       5xSTS 15 82 sec, 2 UE        TUG 14 90 sec        10 meter 2 87 ft/sec  0 87 m/s        FGA         DGI         mCTSIB  - FTEO (firm)  - FTEC (firm)  - FTEO (foam)  - FTEC (foam)   30 sec  30 sec  30 sec (+)  30 sec (+)        6MWT Defer 720 ft, no AD

## 2021-04-19 ENCOUNTER — OFFICE VISIT (OUTPATIENT)
Dept: PHYSICAL THERAPY | Facility: CLINIC | Age: 76
End: 2021-04-19
Payer: MEDICARE

## 2021-04-19 DIAGNOSIS — R26.89 BALANCE DISORDER: ICD-10-CM

## 2021-04-19 DIAGNOSIS — R53.1 WEAKNESS: Primary | ICD-10-CM

## 2021-04-19 DIAGNOSIS — R26.89 OTHER ABNORMALITIES OF GAIT AND MOBILITY: ICD-10-CM

## 2021-04-19 PROCEDURE — 97112 NEUROMUSCULAR REEDUCATION: CPT | Performed by: PHYSICAL THERAPIST

## 2021-04-19 NOTE — PROGRESS NOTES
Daily Note  IE: 2021 (POC: 2021)    Today's date: 2021  Patient name: Evelia Yoon  :   MRN: [id number]  Referring provider: Self, Referral  Dx:   Encounter Diagnosis     ICD-10-CM    1  Weakness  R53 1    2  Balance disorder  R26 89    3  Other abnormalities of gait and mobility  R26 89                   Subjective: Patient reports to therapy feeling pretty good today       Objective: See treatment diary below    *CGA all exercises    NMR:  Vibration Plate: 5 minutes, level 50, HL  - Static standing  - FAEC  - Squats   - Heel raises    - STS: 10 reps, 2 sets , 0 UE  - Tandem ambulation: 4 cycles, 10 ft down/back, light 1 UE   - Ambulation w/ alternate hand to knee taps: 4 cycles  - Fwd/bwd ambulation while walking out to lobby: 50 ft x 4 laps, PTCS        Assessment: Patient tolerated treatment fairly  Patient challenged with his dynamic balance, usually falling to right side, and required CGA during exercises to maintain balance  He required frequent rest breaks during treatment due to SOB  He will continue to benefit from skilled PT services to maximize his function and reduce his risk for falls  Plan: Continue per plan of care        Outcome Measures Initial Eval  2021       5xSTS 15 82 sec, 2 UE        TUG 14 90 sec        10 meter 2 87 ft/sec  0 87 m/s        FGA         DGI         mCTSIB  - FTEO (firm)  - FTEC (firm)  - FTEO (foam)  - FTEC (foam)   30 sec  30 sec  30 sec (+)  30 sec (+)        6MWT Defer 720 ft, no AD

## 2021-04-21 ENCOUNTER — OFFICE VISIT (OUTPATIENT)
Dept: PHYSICAL THERAPY | Facility: CLINIC | Age: 76
End: 2021-04-21
Payer: MEDICARE

## 2021-04-21 DIAGNOSIS — R26.89 OTHER ABNORMALITIES OF GAIT AND MOBILITY: ICD-10-CM

## 2021-04-21 DIAGNOSIS — R26.89 BALANCE DISORDER: ICD-10-CM

## 2021-04-21 DIAGNOSIS — R53.1 WEAKNESS: Primary | ICD-10-CM

## 2021-04-21 PROCEDURE — 97112 NEUROMUSCULAR REEDUCATION: CPT | Performed by: PHYSICAL THERAPIST

## 2021-04-21 NOTE — PROGRESS NOTES
Daily Note  IE: 2021 (POC: 2021)    Today's date: 2021  Patient name: Chi Teran  :   MRN: [id number]  Referring provider: Self, Referral  Dx:   Encounter Diagnosis     ICD-10-CM    1  Weakness  R53 1    2  Balance disorder  R26 89    3  Other abnormalities of gait and mobility  R26 89                   Subjective: Patient reports legs felt tired and weak today, denies pain  Objective: See treatment diary below    *CGA all exercises    NMR:  Vibration Plate: 8 minutes, level 50, HL   - Static standing  - FAEC  - Squats   - Heel raises  - Marching    - STS: 10 reps, 2 sets , 0 UE  - Tandem ambulation: 4 cycles, 10 ft down/back, light 1 UE   - Ambulation w/ alternate hand to knee taps: 4 cycles  - Fwd/bwd ambulation while walking out to lobby: 25 ft x 4 laps, PTCS  - Sidestepping on foam 4x down and back      Assessment: Patient tolerated treatment fairly  Multiple LOB with sidestepping on foam, mostly in posterior direction  Close guarding required t/o entire session  He will continue to benefit from skilled PT services to maximize his function and reduce his risk for falls  Plan: Continue per plan of care        Outcome Measures Initial Eval  2021     5xSTS 15 82 sec, 2 UE      TUG 14 90 sec      10 meter 2 87 ft/sec  0 87 m/s      FGA       DGI       mCTSIB  - FTEO (firm)  - FTEC (firm)  - FTEO (foam)  - FTEC (foam)   30 sec  30 sec  30 sec (+)  30 sec (+)      6MWT Defer 720 ft, no AD

## 2021-04-26 ENCOUNTER — OFFICE VISIT (OUTPATIENT)
Dept: PHYSICAL THERAPY | Facility: CLINIC | Age: 76
End: 2021-04-26
Payer: MEDICARE

## 2021-04-26 DIAGNOSIS — R53.1 WEAKNESS: Primary | ICD-10-CM

## 2021-04-26 DIAGNOSIS — R26.89 OTHER ABNORMALITIES OF GAIT AND MOBILITY: ICD-10-CM

## 2021-04-26 DIAGNOSIS — R26.89 BALANCE DISORDER: ICD-10-CM

## 2021-04-26 PROCEDURE — 97112 NEUROMUSCULAR REEDUCATION: CPT | Performed by: PHYSICAL THERAPIST

## 2021-04-26 NOTE — PROGRESS NOTES
Daily Note  IE: 2021 (POC: 2021)    Today's date: 2021  Patient name: Mitra Liu  : [id number]  MRN: [id number]  Referring provider: Self, Referral  Dx:   Encounter Diagnosis     ICD-10-CM    1  Weakness  R53 1    2  Balance disorder  R26 89    3  Other abnormalities of gait and mobility  R26 89                   Subjective: Patient reports that he felt tired after his last session, but overall believes he is feeling better       Objective: See treatment diary below    *CGA all exercises    NMR:  Vibration Plate: 10 minutes, level 50, HL   - Static standing  - FAEC  - Squats   - Heel raises  - Marching    - STS: 10 reps, 2 sets , 0 UE  - Tandem ambulation: 4 cycles, 10 ft down/back, light 1 UE   - Ambulation w/ alternate hand to knee taps: 4 cycles        Assessment: Patient tolerated treatment well  Patient challenged with tandem ambulation, requiring 1 UE to maintain balance  He displayed improved balance with vibration plate, having no LOB with exercises  Patient most challenged with balance and endurance during opposite hand to knee ambulation, requiring rest after 4 reps  He will continue to benefit from skilled PT services to maximize his function and reduce his risk of falls  Plan: Continue per plan of care        Outcome Measures Initial Eval  2021     5xSTS 15 82 sec, 2 UE      TUG 14 90 sec      10 meter 2 87 ft/sec  0 87 m/s      FGA       DGI       mCTSIB  - FTEO (firm)  - FTEC (firm)  - FTEO (foam)  - FTEC (foam)   30 sec  30 sec  30 sec (+)  30 sec (+)      6MWT Defer 720 ft, no AD

## 2021-04-28 ENCOUNTER — OFFICE VISIT (OUTPATIENT)
Dept: PHYSICAL THERAPY | Facility: CLINIC | Age: 76
End: 2021-04-28
Payer: MEDICARE

## 2021-04-28 DIAGNOSIS — R26.89 BALANCE DISORDER: ICD-10-CM

## 2021-04-28 DIAGNOSIS — R26.89 OTHER ABNORMALITIES OF GAIT AND MOBILITY: ICD-10-CM

## 2021-04-28 DIAGNOSIS — R53.1 WEAKNESS: Primary | ICD-10-CM

## 2021-04-28 PROCEDURE — 97112 NEUROMUSCULAR REEDUCATION: CPT | Performed by: PHYSICAL THERAPIST

## 2021-04-28 NOTE — PROGRESS NOTES
Daily Note  IE: 2021 (POC: 2021)    Today's date: 2021  Patient name: Kristofer Au  :   MRN: [id number]  Referring provider: Self, Referral  Dx:   Encounter Diagnosis     ICD-10-CM    1  Weakness  R53 1    2  Balance disorder  R26 89    3  Other abnormalities of gait and mobility  R26 89                   Subjective: Patient reports he is doing well today, feels a little tired due to the heat today       Objective: See treatment diary below    *CGA all exercises    NMR:  Vibration Plate: 10 minutes, level 50, HL   - Static standing   - FAEC  - Squats   - Heel raises  - Marching    - STS: 10 reps, 2 sets , 0 UE  - Tandem ambulation: 4 cycles, 10 ft down/back, light 1 UE   - Ambulation w/ alternate hand to knee taps: 4 cycles  - braiding in // bars 3 cycles         Assessment: challenged with endurance during session with tandem walking, braiding, and alt hand to knee tapping  He will continue to benefit from skilled PT services to maximize his function and reduce his risk of falls  Plan: Continue per plan of care        Outcome Measures Initial Eval  2021     5xSTS 15 82 sec, 2 UE      TUG 14 90 sec      10 meter 2 87 ft/sec  0 87 m/s      FGA       DGI       mCTSIB  - FTEO (firm)  - FTEC (firm)  - FTEO (foam)  - FTEC (foam)   30 sec  30 sec  30 sec (+)  30 sec (+)      6MWT Defer 720 ft, no AD

## 2021-05-03 ENCOUNTER — OFFICE VISIT (OUTPATIENT)
Dept: PHYSICAL THERAPY | Facility: CLINIC | Age: 76
End: 2021-05-03
Payer: MEDICARE

## 2021-05-03 DIAGNOSIS — R53.1 WEAKNESS: Primary | ICD-10-CM

## 2021-05-03 DIAGNOSIS — R26.89 BALANCE DISORDER: ICD-10-CM

## 2021-05-03 DIAGNOSIS — R26.89 OTHER ABNORMALITIES OF GAIT AND MOBILITY: ICD-10-CM

## 2021-05-03 PROCEDURE — 97530 THERAPEUTIC ACTIVITIES: CPT

## 2021-05-03 NOTE — PROGRESS NOTES
PT Re-Evaluation /Progress Note  Today's date: 5/3/2021  Patient name: Alba Chavez  :   MRN: [id number]  Referring provider: Self, Referral  Dx:   Encounter Diagnosis     ICD-10-CM    1  Weakness  R53 1    2  Balance disorder  R26 89    3  Other abnormalities of gait and mobility  R26 89          Assessment  Assessment details: Patient is a 67 y o  Male who has been reporting to skilled outpatient PT with deficits in LE strength and balance impacting his ability to perform ADLs and ambulation safely  He has demonstrated improvements in these areas as indicated by scores associated with 5xSTS, TUG, 10 meter, FGA, and DGI with results of 5xSTS increasing to low falls risk levels  However, per scores on remainder of dynamic balance and safety assessments he is at high risk for falls via TUG, 10 meter, FGA, and DGI  Patient's functional endurance falls below age matched normative values per cut off scores taken from APTA and Rehab Measures for 6 Minute Walk Test  Discussed with patient about overall improvements and plan to continue with dynamic balance therapy to further promote increased safety with ADLs and ambulation within the home and the community and he was in good verbal understanding and agreement  Impairments: Abnormal coordination, Abnormal gait, Abnormal muscle tone, Abnormal or restricted ROM, Impaired balance, Impaired physical strength, Lacks appropriate HEP, Poor posture, Safety issue, Weight-bearing intolerance and Abnormal movement  Understanding of Dx/Px/POC: Good  Prognosis: Good    Patient verbalized understanding of POC  Please contact me if you have any questions or recommendations  Thank you for the referral and the opportunity to share in [address] care          Plan  Plan details: General balance    Patient would benefit from: PT Eval and Skilled PT  Planned modality interventions: Biofeedback, Cryotherapy, TENS and Thermotherapy: Hydrocollator Packs  Planned therapy interventions: Abdominal trunk stabilization, ADL training, Balance, Balance/WB training, Breathing training, Body mechanics training, Coordination, Functional ROM exercises, Gait training, HEP, Joint mobilization, Manual therapy, Soto taping, Motor coordination training, Neuromuscular re-education, Patient education, Postural training, Strengthening, Stretching, Therapeutic activities, Therapeutic exercises and Therapeutic training  Frequency: 2x/wk  Duration in weeks: 12  Plan of Care beginning date: 4-1-2021  Plan of Care expiration date: 12 weeks - 6-  Treatment plan discussed with: Patient       Goals  Short Term Goals (4 weeks):    - Patient will improve time on TUG by 2 9 seconds from 14 90 seconds to 12 0 seconds to facilitate improved safety in all ambulation - NOT MET  - Patient will improve scoring on DGI by 2 6 points from 16/24 to 19/24 to progress safety - NOT MET (progressing towards)  - Patient will be independent in basic HEP 2-3 weeks - MET  - Patient will improve 5xSTS score by 2 3 seconds from 15 82 seconds to 13 52 seconds to promote improved LE functional strength needed for ADLs - MET  - Patient will complete components of HiMAT to promote agility necessary for sports related tasks - NOT MET  - Patient will complete 6 Minute Walk Test to promote improved cardiovascular endurance - MET    Long Term Goals (12 weeks):  - Patient will be independent in a comprehensive home exercise program  - Patient will improve gait speed by 0 59 ft/sec from 2 87 ft/sec to 3 46 ft/sec to improve safety with community ambulation  - Patient will improve scoring on FGA by 4 points from 18/30 to 22/30 to progress safety with dynamic tasks  - Patient will be able to demonstrate HT in gait without veering  - Patient will report 50% reduction in near falls in order to improve safety with functional tasks and reduce his risk for falls  - Patient will report going on walks at least 3 days per week to promote independence and improved cardiovascular endurance  - Patient will be able to ascend/descend stairs reciprocally without UE assist to promote independence and safety with ADLs  - Patient will report 50% reduction in near falls when ambulating on uneven terrain      Cut off score    All date taken from APTA Neuro Section or Rehab Measures      Méndez/64  MDC: 6 pts  Age Norms:  61-76: M - 54   F - 55  70-79: M - 47   F - 53  80-89: M - 48   F - 50 5xSTS: Radha et al 2010  MDC: 2 3 sec  Age Norms:  60-69: 11 1 sec  70-79: 12 6 sec  80-89: 14 8 sec   TUG  MDC: 4 14 sec  Cut off score:  >13 5 sec community dwelling adults  >32 2 frail elderly  <20 I for basic transfers  >30 dependent on transfers 10 Meter Walk Test: Moses delgado 2011  20-29: M - 1 35 m   F - 1 34 m  30-39: M - 1 43 m   F - 1 34 m  40-49: M - 1 43 m   F - 1 39 m  50-59: M - 1 43 m   F - 1 31 m  60-69: M - 1 34 m   F - 1 24 m  70-79: M - 1 26 m   F - 1 13 m  80-89: M - 0 97 m   F - 0 94 m   FGA  MCID: 4 pts  Geriatrics/community < 22/30 fall risk  Geriatrics/community < 20/30 unexplained falls DGI  MDC: vestibular - 4 pts  MDC: geriatric/community - 3 pts  Falls risk <19/24   6 Minute Walk Test  Age Norms  61-76: M - 1876 ft (571 80 m)  F - 1765 ft (537 98 m)  70-79: M - 1729 ft (527 00 m)  F - 1545 ft (470 92 m)  80-89: M - 1368 ft (416 97 m)  F - 1286 ft (391 97 m) mCTSIB  Norm: 20-60 yrs  Eyes open firm: norm sway 0 21-0 48  Eyes closed firm: norm sway 0 48-0 99  Eyes open foam: norm sway 0 38-0 71  Eyes closed foam: norm sway 0 70-2 22         Subjective    History of Present Illness  Mechanism of injury: Patient is a 66 y/o male with complaints of imbalance and overall LE weakness  Recent diagnosis of Stage 3 Prostate Cancer  No recent falls  Patient to start radiation over the summer, however does not have specific start date    Primary AD: None  Assist level at home: Independent      Pain  Current pain ratin/10  At best pain ratin/10  At worst pain ratin-6/10 during bending activities  Location: low back  Aggravating factors: bending activities    Social Support   Steps to enter house: Yes, 1 HR  Stairs in house: Into the basement, 1 HR   Lives in: [address] [place] [address] with: Wife    Employment status: Retired  Hand dominance: R    Treatments  Previous treatment: None  Current treatment: PT  Diagnostic Testing: CT Scan and Bone Scan      Objective     LE MMT  - R Hip Flexion: 4-/5  L Hip Flexion: 4/5  - R Hip Extension: 4-/5 L Hip Extension: 4/5  - R Knee Extension: 4+/5 L Knee Extension: 5/5  - R Knee Flexion: 4+/5  L Knee Flexion: 5/5  - R Ankle DF: 2/5  L Ankle DF: 3-/5  - R Ankle PF: 3/5  L Ankle PF: 3/5    Sensation  - Light touch: reduced in B/L feet/ankles (1 inch above malleoli)  - Deep pressure: intact  - Pain: intact    Coordination  - Dysmetria: intact  - Dysdiadochokinesia: intact  - Alternating Toe Taps: intact        Outcome Measures Initial Eval  2021 PN  5-3-2021       5xSTS 15 82 sec, 2 UE 13 42 sec, 0 UE       TUG 14 90 sec 13 87 sec, no AD       10 meter 2 87 ft/sec  0 87 m/s 3 85 ft/sec  1 17 m/s       FGA 1830 20/30       DGI  18       mCTSIB  - FTEO (firm)  - FTEC (firm)  - FTEO (foam)  - FTEC (foam)   30 sec  30 sec  30 sec (+)  30 sec (+)   30 sec  30 sec  30 sec (+)  30 sec (+)       6MWT Defer 730 ft                              Precautions:  Active Stage 3 Prostate Cancer, Falls  Past Medical History:   Diagnosis Date    Acquired hallux valgus     last assessed: 2013    Allergic rhinitis     Anemia 10/26/2010    Atrophy of nail     last assessed: 2015    Chronic kidney disease     chronic renal insufficiency    Coronary artery disease     Deformity of ankle and foot, acquired     last assessed: 2016    Diabetes mellitus ([address] )     Difficulty walking     last assessed: 2015    Dysesthesia     last assessed: 11/25/2016    Hammer toe     unspecified laterality; last assessed: 8/1/2013    Hypertension     Onychomycosis of toenail     last assessed: 2/22/2016    Peripheral vascular disease ([facility] )     left SFA stent in 2010    Pes planus     unspecified laterality; last assessed: 8/1/2013    Pneumonia     last assessed: 2/27/2016    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

## 2021-05-05 ENCOUNTER — OFFICE VISIT (OUTPATIENT)
Dept: PHYSICAL THERAPY | Facility: CLINIC | Age: 76
End: 2021-05-05
Payer: MEDICARE

## 2021-05-05 DIAGNOSIS — R26.89 BALANCE DISORDER: ICD-10-CM

## 2021-05-05 DIAGNOSIS — R26.89 OTHER ABNORMALITIES OF GAIT AND MOBILITY: ICD-10-CM

## 2021-05-05 DIAGNOSIS — R53.1 WEAKNESS: Primary | ICD-10-CM

## 2021-05-05 PROCEDURE — 97112 NEUROMUSCULAR REEDUCATION: CPT | Performed by: PHYSICAL THERAPIST

## 2021-05-05 NOTE — PROGRESS NOTES
Daily Note  IE: 2021 (POC: 2021)    Today's date: 2021  Patient name: Daren Raygoza  : [birth year]  MRN: [id number]  Referring provider: Self, Referral  Dx:   Encounter Diagnosis     ICD-10-CM    1  Weakness  R53 1    2  Balance disorder  R26 89    3  Other abnormalities of gait and mobility  R26 89                   Subjective: Pt reports feeling unsteady when standing up and would like to work on that     Objective: See treatment diary below    *CGA all exercises    NMR:  Vibration Plate: 10 minutes, level 50, HL   - Static standing   - FAEC  - Squats   - Heel raises  - Marching    - STS: 10 reps, 2 sets , 0 UE  - Tandem ambulation: 4 cycles, 10 ft down/back  - Ambulation w/ alternate hand to knee taps: 4 cycles  - braiding in // bars 3 cycles   - standing p ball extension with over head reach 10 reps   - standing p ball rotation 10 reps         Assessment: improvement in upright posture post P ball exercises  Improvement in sit to stands with sit to stand with VCs to maintain 1st ray pressure in toes  Alesha Livingston He will continue to benefit from skilled PT services to maximize his function and reduce his risk of falls  Plan: Continue per plan of care        Outcome Measures Initial Eval  2021     5xSTS 15 82 sec, 2 UE      TUG 14 90 sec      10 meter 2 87 ft/sec  0 87 m/s      FGA       DGI       mCTSIB  - FTEO (firm)  - FTEC (firm)  - FTEO (foam)  - FTEC (foam)   30 sec  30 sec  30 sec (+)  30 sec (+)      6MWT Defer 720 ft, no AD

## 2021-05-10 ENCOUNTER — OFFICE VISIT (OUTPATIENT)
Dept: PHYSICAL THERAPY | Facility: CLINIC | Age: 76
End: 2021-05-10
Payer: MEDICARE

## 2021-05-10 DIAGNOSIS — R26.89 OTHER ABNORMALITIES OF GAIT AND MOBILITY: ICD-10-CM

## 2021-05-10 DIAGNOSIS — R26.89 BALANCE DISORDER: ICD-10-CM

## 2021-05-10 DIAGNOSIS — R53.1 WEAKNESS: Primary | ICD-10-CM

## 2021-05-10 PROCEDURE — 97112 NEUROMUSCULAR REEDUCATION: CPT | Performed by: PHYSICAL THERAPIST

## 2021-05-10 NOTE — PROGRESS NOTES
Daily Note  IE: 2021 (POC: 2021)    Today's date: 5/10/2021  Patient name: Heena Santo  :   MRN: [id number]  Referring provider: Self, Referral  Dx:   Encounter Diagnosis     ICD-10-CM    1  Weakness  R53 1    2  Balance disorder  R26 89    3  Other abnormalities of gait and mobility  R26 89                   Subjective: Pt reports that he continues to feel "somewhat" unsteady when standing up  Objective: See treatment diary below    *CGA all exercises    NMR:  Vibration Plate: 10 minutes, level 50, HL   - Static standing   - FAEC  - Squats   - Marching    - STS: 10 reps, 2 sets , 0 UE  - Tandem ambulation: 5 cycles, 10 ft down/back  - Ambulation w/ alternate hand to knee taps: 5 cycles  - braiding in // bars 5 cycles   - standing p ball extension with over head reach 10 reps   - sidestep on foam, 6x   - FTEC on foam, 10" x6         Assessment: Patient tolerated session well today  Pt was fatigued following each set of STS and required break  Pt struggled with sequencing of braiding but showed good balance throughout this exercise  Overall pt showed mildly improved balance compared to previous sessions, but pt did have one posterior LOB during side stepping on foam  Pt struggled with static balance on foam with eyes closed  Pt will benefit from continued skilled therapy to improve static and dynamic balance for maximal daily function and safety  Plan: Continue per plan of care        Outcome Measures Initial Eval  2021     5xSTS 15 82 sec, 2 UE      TUG 14 90 sec      10 meter 2 87 ft/sec  0 87 m/s      FGA       DGI       mCTSIB  - FTEO (firm)  - FTEC (firm)  - FTEO (foam)  - FTEC (foam)   30 sec  30 sec  30 sec (+)  30 sec (+)      6MWT Defer 720 ft, no AD

## 2021-05-12 ENCOUNTER — OFFICE VISIT (OUTPATIENT)
Dept: PHYSICAL THERAPY | Facility: CLINIC | Age: 76
End: 2021-05-12
Payer: MEDICARE

## 2021-05-12 ENCOUNTER — PROCEDURE VISIT (OUTPATIENT)
Dept: UROLOGY | Facility: CLINIC | Age: 76
End: 2021-05-12
Payer: MEDICARE

## 2021-05-12 VITALS
WEIGHT: 195 LBS | HEIGHT: 71 IN | BODY MASS INDEX: 27.3 KG/M2 | SYSTOLIC BLOOD PRESSURE: 103 MMHG | HEART RATE: 78 BPM | DIASTOLIC BLOOD PRESSURE: 60 MMHG

## 2021-05-12 DIAGNOSIS — C61 PROSTATE CANCER (HCC): Primary | ICD-10-CM

## 2021-05-12 DIAGNOSIS — R26.89 OTHER ABNORMALITIES OF GAIT AND MOBILITY: ICD-10-CM

## 2021-05-12 DIAGNOSIS — R53.1 WEAKNESS: Primary | ICD-10-CM

## 2021-05-12 DIAGNOSIS — R26.89 BALANCE DISORDER: ICD-10-CM

## 2021-05-12 PROCEDURE — 97112 NEUROMUSCULAR REEDUCATION: CPT | Performed by: PHYSICAL THERAPIST

## 2021-05-12 PROCEDURE — 96402 CHEMO HORMON ANTINEOPL SQ/IM: CPT

## 2021-05-12 NOTE — PROGRESS NOTES
Daily Note  IE: 5-3-2021 (POC: 2021)    Today's date: 2021  Patient name: Cielo Vail  :   MRN: [id number]  Referring provider: Self, Referral  Dx:   Encounter Diagnosis     ICD-10-CM    1  Weakness  R53 1    2  Balance disorder  R26 89    3  Other abnormalities of gait and mobility  R26 89                   Subjective: Pt reports that he continues to feel "somewhat" unsteady when standing up  Objective: See treatment diary below    *CGA all exercises    NMR:  Vibration Plate: 10 minutes, level 50, HL   - Static standing   - FAEC  - Squats   - Marching    - STS: 10 reps, 2 sets , 0 UE  - Tandem ambulation: 5 cycles, 10 ft down/back  - Ambulation w/ alternate hand to knee taps: 5 cycles  - braiding in // bars 5 cycles   - standing p ball extension with over head reach 10 reps   - sidestep on foam, 6x   - FTEC on foam, 10" x6         Assessment: Patient tolerated session well today  Pt was fatigued following each set of STS and required break  Pt struggled with sequencing of braiding but showed good balance throughout this exercise  Overall pt showed mildly improved balance compared to previous sessions, but pt did have one posterior LOB during side stepping on foam  Pt struggled with static balance on foam with eyes closed  Pt will benefit from continued skilled therapy to improve static and dynamic balance for maximal daily function and safety  Plan: Continue per plan of care        Outcome Measures Initial Eval  2021     5xSTS 15 82 sec, 2 UE      TUG 14 90 sec      10 meter 2 87 ft/sec  0 87 m/s      FGA       DGI       mCTSIB  - FTEO (firm)  - FTEC (firm)  - FTEO (foam)  - FTEC (foam)   30 sec  30 sec  30 sec (+)  30 sec (+)      6MWT Defer 720 ft, no AD

## 2021-05-12 NOTE — PROGRESS NOTES
Daily Note  IE: 2021 (POC: 2021)    Today's date: 2021  Patient name: Isaiah Resendiz  :   MRN: [id number]  Referring provider: Self, Referral  Dx:   Encounter Diagnosis     ICD-10-CM    1  Weakness  R53 1    2  Balance disorder  R26 89    3  Other abnormalities of gait and mobility  R26 89                   Subjective: Pt reports that he continues to feel "somewhat" unsteady when standing up  Objective: See treatment diary below    *CGA all exercises    NMR:  Vibration Plate: 10 minutes, level 50, HL   - Static standing   - FAEC  - Squats   - Marching    - STS: 10 reps, 2 sets , 0 UE  - Tandem ambulation: 5 cycles, 10 ft down/back  - Ambulation w/ alternate hand to knee taps: 5 cycles  - braiding in // bars 5 cycles   - standing p ball extension with over head reach 10 reps   - sidestep on foam, 3 cycles ( 2 foam beams airex)    - FTEC on foam, 10" x6   - side stepping over cones with focus on leg clearance         Assessment: Patient tolerated session well today  Challenged with progression of program with high knee stepping over cones, coordination with braiding activity  Required cuing via demonstrate for both exercises  Decrease in seated rest break during session with increase tolerance with exercises  Continues to have loss of balance and instability with outdoor activity and higher level balance  Due to these reasons pt will continue to benefit from continued skilled therapy to improve static and dynamic balance for maximal daily function and safety  Plan: Continue per plan of care        Outcome Measures Initial Eval  2021     5xSTS 15 82 sec, 2 UE      TUG 14 90 sec      10 meter 2 87 ft/sec  0 87 m/s      FGA       DGI       mCTSIB  - FTEO (firm)  - FTEC (firm)  - FTEO (foam)  - FTEC (foam)   30 sec  30 sec  30 sec (+)  30 sec (+)      6MWT Defer 720 ft, no AD

## 2021-05-12 NOTE — PROGRESS NOTES
5/12/2021  Chi Teran is a 67 y o  male  [id number]    Diagnosis:  Chief Complaint     Prostate Cancer          Patient presents for monthly firmagon managed by Dr Mendel Kothari:  - Patient will follow up as scheduled next month for AP visit with PSA ptv and for next firmagon injection  - patient has spaceOAR and fiducial marker placement on 6/22/2021      Medication administration:    Patient reports minimal side effects from the last injection  He states the first injection caused more soreness at the injection site than the second injection  Patient states he has been more sleepy since starting the injections     80mg firmagon administered subcutaneously   Patient tolerated well and denies any immediate concerns or complaints     Administrations This Visit     degarelix acetate [facility]) injection 80 mg     Admin Date  05/12/2021 Action  Given Dose  80 mg Route  Subcutaneous Administered By  Richy Amaral RN                Vitals:    05/12/21 0802   BP: 103/60   BP Location: Left arm   Patient Position: Sitting   Cuff Size: Standard   Pulse: 78   Weight: 88 5 kg (195 lb)   Height: 5' 11" (1 803 m)         Richy Amaral RN

## 2021-05-17 ENCOUNTER — APPOINTMENT (OUTPATIENT)
Dept: PHYSICAL THERAPY | Facility: CLINIC | Age: 76
End: 2021-05-17
Payer: MEDICARE

## 2021-05-19 ENCOUNTER — OFFICE VISIT (OUTPATIENT)
Dept: PHYSICAL THERAPY | Facility: CLINIC | Age: 76
End: 2021-05-19
Payer: MEDICARE

## 2021-05-19 ENCOUNTER — TELEPHONE (OUTPATIENT)
Dept: UROLOGY | Facility: CLINIC | Age: 76
End: 2021-05-19

## 2021-05-19 DIAGNOSIS — R53.1 WEAKNESS: Primary | ICD-10-CM

## 2021-05-19 DIAGNOSIS — R26.89 BALANCE DISORDER: ICD-10-CM

## 2021-05-19 DIAGNOSIS — R26.89 OTHER ABNORMALITIES OF GAIT AND MOBILITY: ICD-10-CM

## 2021-05-19 PROCEDURE — 97530 THERAPEUTIC ACTIVITIES: CPT

## 2021-05-19 PROCEDURE — 97112 NEUROMUSCULAR REEDUCATION: CPT

## 2021-05-19 NOTE — PROGRESS NOTES
Daily Note  IE: 5-3-2021 (POC: 2021)    Today's date: 2021  Patient name: Hernan Jones  :   MRN: [id number]  Referring provider: Self, Referral  Dx:   Encounter Diagnosis     ICD-10-CM    1  Weakness  R53 1    2  Balance disorder  R26 89    3  Other abnormalities of gait and mobility  R26 89                   Subjective: Patient notes he has been feeling pretty tired since his last radiation injection from last Wednesday and is unsure if that's why he has been feeling tired  Objective: See treatment diary below    *CGA all exercises    - STS: 10 reps, 2 sets , 0 UE  - Tandem ambulation: 5 cycles, 10 ft down/back  - Ambulation w/ alternate hand to knee taps: 5 cycles  - Braiding in // bars 5 cycles   - Standing p ball extension with over head reach 10 reps   - Sidestep on foam, 3 cycles ( 2 foam beams airex)    - FTEC on foam, 10" x6   - Side stepping over cones with focus on leg clearance         Assessment: Patient tolerated session well today  Challenged with progression of program with high knee stepping over cones, coordination with braiding activity  Required cuing via demonstrate for both exercises  Decrease in seated rest break during session with increase tolerance with exercises  Continues to have loss of balance and instability with outdoor activity and higher level balance  Due to these reasons pt will continue to benefit from continued skilled therapy to improve static and dynamic balance for maximal daily function and safety  Plan: Continue per plan of care        Outcome Measures Initial Eval  2021     5xSTS 15 82 sec, 2 UE      TUG 14 90 sec      10 meter 2 87 ft/sec  0 87 m/s      FGA       DGI       mCTSIB  - FTEO (firm)  - FTEC (firm)  - FTEO (foam)  - FTEC (foam)   30 sec  30 sec  30 sec (+)  30 sec (+)      6MWT Defer 720 ft, no AD

## 2021-05-19 NOTE — TELEPHONE ENCOUNTER
Pt would like a call back, pt think he is having a reaction to his injection he received (firmagon)    Reaction: legs are tired and can not sleep      Please call Pt back at

## 2021-05-19 NOTE — TELEPHONE ENCOUNTER
Called and spoke with patient and wife at this time  He states he feels like his legs are getting very tired easily and he is having trouble sleeping  He states he was not sure if it is from the [place]  Reviewed this is not a typical side effect of firmagon but there are many side effects for medications  Reviewed as he has been on this for a few months now it is unlikely  Advised trying tylenol and hydration  He states he will likely try tylenol PM  Patient states he is doing PT for his ambulation and does not seem to see any improvement  Advised he discuss this with PT  He also states he thinks he may need a walker  Reviewed PT could also coordinate this for him  Patient verbalized understanding and was thankful for call back

## 2021-05-21 ENCOUNTER — OFFICE VISIT (OUTPATIENT)
Dept: FAMILY MEDICINE CLINIC | Facility: CLINIC | Age: 76
End: 2021-05-21
Payer: MEDICARE

## 2021-05-21 VITALS
TEMPERATURE: 97.4 F | DIASTOLIC BLOOD PRESSURE: 70 MMHG | HEART RATE: 76 BPM | HEIGHT: 71 IN | SYSTOLIC BLOOD PRESSURE: 150 MMHG | BODY MASS INDEX: 25.76 KG/M2 | RESPIRATION RATE: 16 BRPM | WEIGHT: 184 LBS

## 2021-05-21 DIAGNOSIS — Z13.89 SCREENING FOR CARDIOVASCULAR, RESPIRATORY, AND GENITOURINARY DISEASES: ICD-10-CM

## 2021-05-21 DIAGNOSIS — E11.22 CKD STAGE 3 DUE TO TYPE 2 DIABETES MELLITUS (HCC): ICD-10-CM

## 2021-05-21 DIAGNOSIS — Z13.83 SCREENING FOR CARDIOVASCULAR, RESPIRATORY, AND GENITOURINARY DISEASES: ICD-10-CM

## 2021-05-21 DIAGNOSIS — G47.00 INSOMNIA, PERSISTENT: Primary | ICD-10-CM

## 2021-05-21 DIAGNOSIS — C61 PROSTATE CANCER (HCC): ICD-10-CM

## 2021-05-21 DIAGNOSIS — I10 BENIGN ESSENTIAL HYPERTENSION: ICD-10-CM

## 2021-05-21 DIAGNOSIS — Z13.6 SCREENING FOR CARDIOVASCULAR, RESPIRATORY, AND GENITOURINARY DISEASES: ICD-10-CM

## 2021-05-21 DIAGNOSIS — E11.40 TYPE 2 DIABETES MELLITUS WITH DIABETIC NEUROPATHY, WITHOUT LONG-TERM CURRENT USE OF INSULIN (HCC): ICD-10-CM

## 2021-05-21 DIAGNOSIS — E11.42 DIABETIC POLYNEUROPATHY ASSOCIATED WITH TYPE 2 DIABETES MELLITUS (HCC): ICD-10-CM

## 2021-05-21 DIAGNOSIS — N18.30 CKD STAGE 3 DUE TO TYPE 2 DIABETES MELLITUS (HCC): ICD-10-CM

## 2021-05-21 PROBLEM — M79.671 PAIN IN BOTH FEET: Status: RESOLVED | Noted: 2019-09-24 | Resolved: 2021-05-21

## 2021-05-21 PROBLEM — L84 CORNS: Status: RESOLVED | Noted: 2019-09-24 | Resolved: 2021-05-21

## 2021-05-21 PROBLEM — M79.672 PAIN IN BOTH FEET: Status: RESOLVED | Noted: 2019-09-24 | Resolved: 2021-05-21

## 2021-05-21 PROBLEM — B07.0 PLANTAR WARTS: Status: RESOLVED | Noted: 2019-09-24 | Resolved: 2021-05-21

## 2021-05-21 PROCEDURE — 99214 OFFICE O/P EST MOD 30 MIN: CPT | Performed by: FAMILY MEDICINE

## 2021-05-21 RX ORDER — TRAZODONE HYDROCHLORIDE 50 MG/1
50 TABLET ORAL
Qty: 90 TABLET | Refills: 0 | Status: SHIPPED | OUTPATIENT
Start: 2021-05-21 | End: 2021-06-08

## 2021-05-21 RX ORDER — AMLODIPINE BESYLATE 5 MG/1
5 TABLET ORAL DAILY
Qty: 90 TABLET | Refills: 1 | Status: SHIPPED | OUTPATIENT
Start: 2021-05-21 | End: 2021-08-12 | Stop reason: SDUPTHER

## 2021-05-21 NOTE — PATIENT INSTRUCTIONS
To better to control your blood pressure, please increase the amlodipine from 2 5mg per day to 5mg per day  We would like to check your blood pressure in about 2 weeks  For your insomnia, there is a medication called trazodone  Start with 50mg at bedtime  You can increase it to 100mg at bedtime if does not help after 5-7 days

## 2021-05-21 NOTE — PROGRESS NOTES
Assessment/Plan:    No problem-specific Assessment & Plan notes found for this encounter  Watch for edema with norvasc increased, htn not controlled  Increase norvasc 2 5mg to 5mg    Insomnia, no symptoms of CONY or RLS  Start trazodone  Will titrate in future    DM2, unchanged, labs overdue  Reminded of q3m f/u    CKD3 stable    Prostate ca, new, under urology care     Diagnoses and all orders for this visit:    Insomnia, persistent  -     traZODone (DESYREL) 50 mg tablet; Take 1 tablet (50 mg total) by mouth daily at bedtime For insomnia    CKD stage 3 due to type 2 diabetes mellitus ([facility] )    Diabetic polyneuropathy associated with type 2 diabetes mellitus ([place] [id number] )    Type 2 diabetes mellitus with diabetic neuropathy, without long-term current use of insulin ([facility])  -     Comprehensive metabolic panel; Future  -     Hemoglobin A1C; Future  -     metFORMIN (GLUCOPHAGE) 1000 MG tablet; Take 1 tablet (1,000 mg total) by mouth 2 (two) times a day with meals    Benign essential hypertension  -     amLODIPine (NORVASC) 5 mg tablet; Take 1 tablet (5 mg total) by mouth daily    Screening for cardiovascular, respiratory, and genitourinary diseases  -     Lipid Panel with Direct LDL reflex; Future    Prostate cancer ([facility] )        Return in about 1 month (around 6/21/2021), or if symptoms worsen or fail to improve  Subjective:      Patient ID: Mariah Lowe is a 67 y o  male  Chief Complaint   Patient presents with    trouble sleeping     xyear  [place] [facility]       HPI  dm2  Last ov Sept 2020? Seeing urologist Dr Kellen Méndez for prostate CA    Ran out of metformin in Jan 2021  Did not get Dec 2020    Taking all meds but not metformin    Trouble sleeping  Lays there  DFA  No RLS  Tired though  Also DSA  No sob or malaise  About Nov 2020  About 6 months  No new meds at onset  No herbals    Nightmares?     No snoring  Sleeps better on side  No pnd  Can sleep in daytime  Nods off  No witnessed apnea    Tylenol pm did not Yes, we are just increasing the dose work    The following portions of the patient's history were reviewed and updated as appropriate: allergies, current medications, past family history, past medical history, past social history, past surgical history and problem list     Review of Systems   Constitutional: Negative for fever  Respiratory: Negative for shortness of breath            Current Outpatient Medications   Medication Sig Dispense Refill    acarbose (PRECOSE) 100 MG tablet TAKE 1 TABLET THREE TIMES DAILY WITH MEALS 270 tablet 1    Alphagan P 0 1 %       amLODIPine (NORVASC) 5 mg tablet Take 1 tablet (5 mg total) by mouth daily 90 tablet 1    aspirin (ECOTRIN LOW STRENGTH) 81 mg EC tablet Take 1 tablet (81 mg total) by mouth daily 30 tablet 6    atorvastatin (LIPITOR) 20 mg tablet TAKE 1 TABLET EVERY DAY 90 tablet 1    b complex-vitamin C-folic acid (NEPHROCAPS) 1 mg capsule Take 1 capsule by mouth daily      carvedilol (COREG) 12 5 mg tablet TAKE 1 TABLET TWICE DAILY 180 tablet 1    co-enzyme Q-10 100 mg capsule Take 100 mg by mouth daily      gabapentin (NEURONTIN) 600 MG tablet Take 1 tablet (600 mg total) by mouth 2 (two) times a day 180 tablet 0    glimepiride (AMARYL) 4 mg tablet TAKE 1 TABLET TWICE DAILY 180 tablet 1    glucose blood (TRUETRACK TEST) test strip 1 each by Other route daily Use as instructed for E11 29 100 each 3    lisinopril (ZESTRIL) 2 5 mg tablet TAKE 1 TABLET (2 5 MG TOTAL) BY MOUTH DAILY 90 tablet 1    multivitamin (THERAGRAN) TABS Take 1 tablet by mouth daily      omeprazole (PriLOSEC) 40 MG capsule TAKE 1 CAPSULE EVERY DAY 90 capsule 1    Probiotic Product (CULTURELLE PROBIOTICS) CHEW Chew daily      TRUEPLUS LANCETS 28G MISC Test 1x/d, E11 29  0    metFORMIN (GLUCOPHAGE) 1000 MG tablet Take 1 tablet (1,000 mg total) by mouth 2 (two) times a day with meals 180 tablet 1    traZODone (DESYREL) 50 mg tablet Take 1 tablet (50 mg total) by mouth daily at bedtime For insomnia 90 tablet 0     No current facility-administered medications for this visit  Objective:    /70   Pulse 76   Temp (!) 97 4 °F (36 3 °C)   Resp 16   Ht 5' 11" (1 803 m)   Wt 83 5 kg (184 lb)   BMI 25 66 kg/m²        Physical Exam  Vitals signs and nursing note reviewed  Constitutional:       General: He is not in acute distress  Appearance: He is well-developed  He is not ill-appearing  HENT:      Head: Normocephalic  Right Ear: Ear canal and external ear normal       Left Ear: Ear canal and external ear normal    Eyes:      General: No scleral icterus  Conjunctiva/sclera: Conjunctivae normal    Neck:      Musculoskeletal: Neck supple  Cardiovascular:      Rate and Rhythm: Normal rate and regular rhythm  Heart sounds: No murmur  Pulmonary:      Effort: Pulmonary effort is normal  No respiratory distress  Abdominal:      Palpations: Abdomen is soft  Musculoskeletal:         General: No deformity  Right lower leg: Edema present  Left lower leg: Edema present  Skin:     General: Skin is warm and dry  Coloration: Skin is not pale  Neurological:      Mental Status: He is alert  Motor: No weakness  Gait: Gait normal    Psychiatric:         Behavior: Behavior normal          Thought Content:  Thought content normal                 Radha Josue DO

## 2021-05-21 NOTE — LETTER
May 21, 2021    [place]  [address]  [place] [id number]      Dear Mr Malini Rosado:    ***    If you have any questions or concerns, please don't hesitate to call      Sincerely,             Sangeetha Blanco DO        CC: No Recipients

## 2021-05-24 ENCOUNTER — OFFICE VISIT (OUTPATIENT)
Dept: PHYSICAL THERAPY | Facility: CLINIC | Age: 76
End: 2021-05-24
Payer: MEDICARE

## 2021-05-24 DIAGNOSIS — R26.89 BALANCE DISORDER: ICD-10-CM

## 2021-05-24 DIAGNOSIS — R26.89 OTHER ABNORMALITIES OF GAIT AND MOBILITY: ICD-10-CM

## 2021-05-24 DIAGNOSIS — R53.1 WEAKNESS: Primary | ICD-10-CM

## 2021-05-24 PROCEDURE — 97112 NEUROMUSCULAR REEDUCATION: CPT | Performed by: PHYSICAL THERAPIST

## 2021-05-24 NOTE — PROGRESS NOTES
Daily Note  IE: 5-3-2021 (POC: 2021)    Today's date: 2021  Patient name: Mitra Liu  :   MRN: [id number]  Referring provider: Self, Referral  Dx:   Encounter Diagnosis     ICD-10-CM    1  Weakness  R53 1    2  Balance disorder  R26 89    3  Other abnormalities of gait and mobility  R26 89                   Subjective: Patient reports no new changes or falls since previous session      Objective: See treatment diary below    *Gait belt and CGA all exercises    - STS: 10 reps, 2 sets , 0 UE  - Tandem ambulation: 5 cycles, 10 ft down/back  - Ambulation w/ alternate hand to knee taps: 5 cycles  - Braidin laps x 10 ft    - Sidestep on foam, 8 cycles, 1 foam beam    - FTEC on foam, 10" x6   - Hurdles FWD: 5 laps x 10 ft, 6" rosey  - Hurdles LAT: 3 laps x 10 ft, 6" rosey        Assessment: Patient tolerated treatment fairly  Patient required gait belt and CGA for all exercises  Patient challenged with sidestepping on foam, having multiple LOB posteriorly, requiring PT assist to prevent fall  He required frequent rest breaks due to SOB  During hurdles, patient occasionally loses balance laterally, using wall support to maintain balance  He will benefit from skilled PT services to improve static and dynamic balance for maximal daily function and safety  Plan: Continue per plan of care        Outcome Measures Initial Eval  2021     5xSTS 15 82 sec, 2 UE      TUG 14 90 sec      10 meter 2 87 ft/sec  0 87 m/s      FGA       DGI       mCTSIB  - FTEO (firm)  - FTEC (firm)  - FTEO (foam)  - FTEC (foam)   30 sec  30 sec  30 sec (+)  30 sec (+)      6MWT Defer 720 ft, no AD

## 2021-05-25 ENCOUNTER — TELEPHONE (OUTPATIENT)
Dept: FAMILY MEDICINE CLINIC | Facility: CLINIC | Age: 76
End: 2021-05-25

## 2021-05-25 NOTE — TELEPHONE ENCOUNTER
Please let him know that, as I had mentioned, we can try adjusting the dose gradually  He can go from 50mg at bedtime to 100mg at bedtime of the trazodone and let us know how it works in 1 week

## 2021-05-25 NOTE — TELEPHONE ENCOUNTER
05/21 Pernell saw Dr Felice Booker  He is calling with some questions  He stated he is still having trouble sleeping    Please call patient back     Thank you

## 2021-05-25 NOTE — TELEPHONE ENCOUNTER
Patient says Dr RYAN gave him some medication at his recent visit to help him sleep(trazadone)  he  says the it has not helped at all and wants to know if there is something else that may work   He would like to speak to Dr Jose Eduardo Chau, MA

## 2021-05-26 ENCOUNTER — APPOINTMENT (OUTPATIENT)
Dept: PHYSICAL THERAPY | Facility: CLINIC | Age: 76
End: 2021-05-26
Payer: MEDICARE

## 2021-05-27 ENCOUNTER — OFFICE VISIT (OUTPATIENT)
Dept: PODIATRY | Facility: CLINIC | Age: 76
End: 2021-05-27
Payer: MEDICARE

## 2021-05-27 VITALS — RESPIRATION RATE: 17 BRPM | BODY MASS INDEX: 25.76 KG/M2 | WEIGHT: 184 LBS | HEIGHT: 71 IN

## 2021-05-27 DIAGNOSIS — M79.671 PAIN IN BOTH FEET: ICD-10-CM

## 2021-05-27 DIAGNOSIS — I73.9 PAD (PERIPHERAL ARTERY DISEASE) (HCC): ICD-10-CM

## 2021-05-27 DIAGNOSIS — E11.42 DIABETIC POLYNEUROPATHY ASSOCIATED WITH TYPE 2 DIABETES MELLITUS (HCC): Primary | ICD-10-CM

## 2021-05-27 DIAGNOSIS — L84 CORNS: ICD-10-CM

## 2021-05-27 DIAGNOSIS — M79.672 PAIN IN BOTH FEET: ICD-10-CM

## 2021-05-27 PROCEDURE — 11056 PARNG/CUTG B9 HYPRKR LES 2-4: CPT | Performed by: PODIATRIST

## 2021-05-27 NOTE — PROGRESS NOTES
Assessment/Plan:  Deformity of foot   Diabetic neuropathy   Pain upon ambulation   Pain upon ambulation   Mycosis of nail   Callus formation     Plan   Lesion debrided    all mycotic nails debrided without pain or complication   Foot exam performed   Patient educated on care of the diabetic foot   Plantar calluses debrided as well           Subjective:   patient is diabetic   He has pain upon ambulation   He has pain with shoe wear   No history of trauma             Past Medical History:   Diagnosis Date    Acquired hallux valgus       last assessed: 8/1/2013    Allergic rhinitis      Anemia 10/26/2010    Atrophy of nail       last assessed: 7/7/2015    Chronic kidney disease       chronic renal insufficiency    Coronary artery disease      Deformity of ankle and foot, acquired       last assessed: 2/22/2016    Diabetes mellitus ([facility] [address] )      Difficulty walking       last assessed: 12/14/2015    Dysesthesia       last assessed: 11/25/2016    Hammer toe       unspecified laterality; last assessed: 8/1/2013    Hypertension      Onychomycosis of toenail       last assessed: 2/22/2016    Peripheral vascular disease ([facility] [address] )       left SFA stent in 2010    Pes planus       unspecified laterality; last assessed: 8/1/2013    Pneumonia       last assessed: 2/27/2016    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       last assessed: 8/15/2016    Type 2 diabetes mellitus ([facility] [address] ) 07/26/2010    Viral warts       last assessed: 7/24/2015                   Past Surgical History:   Procedure Laterality Date    CARDIAC CATHETERIZATION   07/29/2010    CATARACT EXTRACTION, BILATERAL Bilateral       R 1999, L 2008    FEMORAL ARTERY - POPLITEAL ARTERY BYPASS GRAFT   09/23/2013    FOOT SURGERY         bone spur removed    LEG SURGERY Bilateral       repair; L 8/20/2010 and 7/26/2012    ROTATOR CUFF REPAIR Left 2009    SHOULDER SURGERY Right 2005     collar bone         No Known Allergies        Current Outpatient Medications:     acarbose (PRECOSE) 100 MG tablet, Take 1 tablet (100 mg total) by mouth 3 (three) times a day with meals, Disp: 270 tablet, Rfl: 1    amLODIPine (NORVASC) 2 5 mg tablet, Take 1 tablet (2 5 mg total) by mouth daily (Patient not taking: Reported on 9/26/2019), Disp: 90 tablet, Rfl: 3    aspirin (ECOTRIN LOW STRENGTH) 81 mg EC tablet, Take 1 tablet (81 mg total) by mouth daily, Disp: 30 tablet, Rfl: 6    b complex-vitamin C-folic acid (NEPHROCAPS) 1 mg capsule, Take 1 capsule by mouth daily, Disp: , Rfl:     betamethasone dipropionate (DIPROSONE) 0 05 % cream, Use as dir twice daily, Disp: , Rfl:     carvedilol (COREG) 12 5 mg tablet, TAKE 1 TABLET TWICE DAILY, Disp: 180 tablet, Rfl: 1    gabapentin (NEURONTIN) 600 MG tablet, Take 1 tablet (600 mg total) by mouth 2 (two) times a day, Disp: 180 tablet, Rfl: 0    glimepiride (AMARYL) 4 mg tablet, Take 1 tablet (4 mg total) by mouth 2 (two) times a day for 126 days, Disp: 180 tablet, Rfl: 0    glucose blood (TRUETRACK TEST) test strip, 1 each by Other route daily Use as instructed for E11 29, Disp: 100 each, Rfl: 3    lisinopril (ZESTRIL) 5 mg tablet, Take 1 tablet (5 mg total) by mouth daily (Patient taking differently: Take 2 5 mg by mouth daily ), Disp: 90 tablet, Rfl: 3    metFORMIN (GLUMETZA) 500 MG (MOD) 24 hr tablet, Take 2 tablets (1,000 mg total) by mouth 2 (two) times a day with meals, Disp: 120 tablet, Rfl: 5    multivitamin (THERAGRAN) TABS, Take 1 tablet by mouth daily, Disp: , Rfl:     omeprazole (PriLOSEC) 40 MG capsule, Take 1 capsule (40 mg total) by mouth daily, Disp: 90 capsule, Rfl: 1    simvastatin (ZOCOR) 40 mg tablet, TAKE 1 TABLET (40 MG TOTAL) BY MOUTH DAILY, Disp: 90 tablet, Rfl: 1    TRUEPLUS LANCETS 28G MISC, Test 1x/d, E11 29, Disp: , Rfl: 0           Patient Active Problem List   Diagnosis    Diabetic polyneuropathy associated with type 2 diabetes mellitus (HCC)    Allergic rhinitis    Arthropathy    PAD (peripheral artery disease) (HCC)    Benign essential hypertension    CAD (coronary artery disease)    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)    Diabetic neuropathy ([address] )    GE reflux    Lumbar radiculopathy    Rosacea    Seborrheic dermatitis    Type 2 diabetes mellitus with diabetic neuropathy (HCC)    Onychomycosis    Occlusion of femoral-popliteal bypass graft (HCC)    Prostate cancer screening    Dyslipidemia    Screening for AAA (aortic abdominal aneurysm)    Medicare annual wellness visit, subsequent    Type 2 diabetes mellitus with stage 3 chronic kidney disease, without long-term current use of insulin ([facility])    Pain in both feet    Corns    Plantar warts             Patient ID: Pernell Covarrubias is a 74 y  o  male         The following portions of the patient's history were reviewed and updated as appropriate:      family history includes Aortic aneurysm in his mother; Heart attack in his father and sister; Heart disease in his father, paternal grandfather, and sister        reports that he has never smoked  He has never used smokeless tobacco  He reports that he drinks alcohol  He reports that he does not use drugs         Objective:  Patient's shoes and socks removed    Foot ExamPhysical Exam          Physical Exam  Left Foot: Appearance: a deformity (ball of foot and distal fifth metatarsal )  Fifth toe deformities include hammer toe  Tenderness: None except the plantar aspect of the foot  Right Foot: Appearance: a deformity (distal fifth metatarsal )  Left Ankle: ROM: limited ROM in all planes   Right Ankle: ROM: limited ROM in all planes   Neurological Exam: Light touch was decreased bilaterally  Vibratory sensation was decreased in both first metatarsophalangeal joints  Response to monofilament test was absent bilaterally  Deep tendon reflexes: achilles reflex 1/4 bilaterally  Vascular Exam: performed Dorsalis pedis pulses were 1/4 bilaterally   Posterior tibial pulses were 1/4 bilaterally  Elevation Pallor: diminished bilaterally  Capillary refill time was Q  9, findings bilateral  Negative digital hair noted, positive abnormal cooling  All pre-ulcer, lesions debrided  Today, but between 1-3 seconds bilaterally  Edema: mild bilaterally and All mycotic nails debrided  Today  The patient was given orthotics to help control his feet and at as cushioning and padding on the bottom of his feet q9 findings  Toenails: All of the toenails were elongated, hypertrophied, discolored, ingrown, shown to have subungual debris and tender       Socks and shoes removed, the Right Foot: the foot was dry  The sensory exam showed diminished vibratory sensation at the level of the toes  Diminished tactile sensation with monofilament testing throughout the right foot    Socks and shoes removed, Left Foot: the foot was dry  The sensory exam showed diminished vibratory sensation at the level of the toes  Diminished tactile sensation with monofilament testing throughout the left foot    Pulses:   1+ in the posterior tibialis on the right   1+ in the dorsalis pedis on the right  Capillary refills findings on the left were delayed in the toes  Pulses:   1+ in the posterior tibialis on the left   1+ in the dorsalis pedis on the left  Assign Risk Category: 2: Loss of protective sensation with or without weakness, deformity, callus, pre-ulcer, or history of ulceration  High risk  Hyperkeratosis: present on both first sub metatarsals, present on both fifth sub metatarsals and Pre-ulcerative lesion, submetatarsal one to 5, bilateral    Shoe Gear Evaluation: performed ()  Recommendation(s): custom inlays       Patient's shoes and socks removed  Right Foot/Ankle   Right Foot Inspection  Skin Exam: callus and callus               Toe Exam: swelling and erythema  Sensory   Vibration: diminished  Proprioception: diminished      Vascular  Capillary refills: elevated        Left Foot/Ankle  Left Foot Inspection  Skin Exam: callus   Submetatarsal 5 of the left foot demonstrates 0 5 centimeter squared plantar verruca   It is in capsulated in a bullae   Debrided   20% remaining               Toe Exam: swelling and erythema                   Sensory   Vibration: diminished  Proprioception: diminished     Vascular  Capillary refills: elevated      Patient's shoes and socks removed  Assign Risk Category:  Deformity present; Loss of protective sensation; Weak pulses       Risk: 2

## 2021-06-02 ENCOUNTER — APPOINTMENT (OUTPATIENT)
Dept: PHYSICAL THERAPY | Facility: CLINIC | Age: 76
End: 2021-06-02
Payer: MEDICARE

## 2021-06-03 ENCOUNTER — APPOINTMENT (OUTPATIENT)
Dept: LAB | Facility: CLINIC | Age: 76
End: 2021-06-03
Payer: MEDICARE

## 2021-06-03 DIAGNOSIS — Z13.89 SCREENING FOR CARDIOVASCULAR, RESPIRATORY, AND GENITOURINARY DISEASES: ICD-10-CM

## 2021-06-03 DIAGNOSIS — Z13.83 SCREENING FOR CARDIOVASCULAR, RESPIRATORY, AND GENITOURINARY DISEASES: ICD-10-CM

## 2021-06-03 DIAGNOSIS — Z13.6 SCREENING FOR CARDIOVASCULAR, RESPIRATORY, AND GENITOURINARY DISEASES: ICD-10-CM

## 2021-06-03 DIAGNOSIS — E11.40 TYPE 2 DIABETES MELLITUS WITH DIABETIC NEUROPATHY, WITHOUT LONG-TERM CURRENT USE OF INSULIN (HCC): ICD-10-CM

## 2021-06-03 DIAGNOSIS — E11.40 TYPE 2 DIABETES MELLITUS WITH DIABETIC NEUROPATHY, WITHOUT LONG-TERM CURRENT USE OF INSULIN (HCC): Primary | ICD-10-CM

## 2021-06-03 LAB
ALBUMIN SERPL BCP-MCNC: 3.3 G/DL (ref 3.5–5)
ALP SERPL-CCNC: 81 U/L (ref 46–116)
ALT SERPL W P-5'-P-CCNC: 26 U/L (ref 12–78)
ANION GAP SERPL CALCULATED.3IONS-SCNC: 5 MMOL/L (ref 4–13)
AST SERPL W P-5'-P-CCNC: 18 U/L (ref 5–45)
BILIRUB SERPL-MCNC: 0.88 MG/DL (ref 0.2–1)
BUN SERPL-MCNC: 14 MG/DL (ref 5–25)
CALCIUM ALBUM COR SERPL-MCNC: 10.5 MG/DL (ref 8.3–10.1)
CALCIUM SERPL-MCNC: 9.9 MG/DL (ref 8.3–10.1)
CHLORIDE SERPL-SCNC: 100 MMOL/L (ref 100–108)
CHOLEST SERPL-MCNC: 118 MG/DL (ref 50–200)
CO2 SERPL-SCNC: 26 MMOL/L (ref 21–32)
CREAT SERPL-MCNC: 1.11 MG/DL (ref 0.6–1.3)
EST. AVERAGE GLUCOSE BLD GHB EST-MCNC: 303 MG/DL
GFR SERPL CREATININE-BSD FRML MDRD: 64 ML/MIN/1.73SQ M
GLUCOSE P FAST SERPL-MCNC: 181 MG/DL (ref 65–99)
HBA1C MFR BLD: 12.2 %
HDLC SERPL-MCNC: 47 MG/DL
LDLC SERPL CALC-MCNC: 53 MG/DL (ref 0–100)
POTASSIUM SERPL-SCNC: 5.1 MMOL/L (ref 3.5–5.3)
PROT SERPL-MCNC: 6.9 G/DL (ref 6.4–8.2)
SODIUM SERPL-SCNC: 131 MMOL/L (ref 136–145)
TRIGL SERPL-MCNC: 92 MG/DL

## 2021-06-03 PROCEDURE — 80053 COMPREHEN METABOLIC PANEL: CPT

## 2021-06-03 PROCEDURE — 83036 HEMOGLOBIN GLYCOSYLATED A1C: CPT

## 2021-06-03 PROCEDURE — 80061 LIPID PANEL: CPT

## 2021-06-03 PROCEDURE — 36415 COLL VENOUS BLD VENIPUNCTURE: CPT

## 2021-06-08 ENCOUNTER — APPOINTMENT (OUTPATIENT)
Dept: LAB | Facility: CLINIC | Age: 76
End: 2021-06-08
Payer: MEDICARE

## 2021-06-08 ENCOUNTER — TELEPHONE (OUTPATIENT)
Dept: FAMILY MEDICINE CLINIC | Facility: CLINIC | Age: 76
End: 2021-06-08

## 2021-06-08 DIAGNOSIS — C61 PROSTATE CANCER (HCC): ICD-10-CM

## 2021-06-08 DIAGNOSIS — G47.00 INSOMNIA, PERSISTENT: Primary | ICD-10-CM

## 2021-06-08 DIAGNOSIS — C61 MALIGNANT NEOPLASM OF PROSTATE (HCC): ICD-10-CM

## 2021-06-08 LAB
ALBUMIN SERPL BCP-MCNC: 3.4 G/DL (ref 3.5–5)
ALP SERPL-CCNC: 83 U/L (ref 46–116)
ALT SERPL W P-5'-P-CCNC: 27 U/L (ref 12–78)
ANION GAP SERPL CALCULATED.3IONS-SCNC: 7 MMOL/L (ref 4–13)
AST SERPL W P-5'-P-CCNC: 18 U/L (ref 5–45)
BASOPHILS # BLD AUTO: 0.03 THOUSANDS/ΜL (ref 0–0.1)
BASOPHILS NFR BLD AUTO: 0 % (ref 0–1)
BILIRUB SERPL-MCNC: 0.59 MG/DL (ref 0.2–1)
BUN SERPL-MCNC: 16 MG/DL (ref 5–25)
CALCIUM ALBUM COR SERPL-MCNC: 9.6 MG/DL (ref 8.3–10.1)
CALCIUM SERPL-MCNC: 9.1 MG/DL (ref 8.3–10.1)
CHLORIDE SERPL-SCNC: 95 MMOL/L (ref 100–108)
CO2 SERPL-SCNC: 24 MMOL/L (ref 21–32)
CREAT SERPL-MCNC: 1.09 MG/DL (ref 0.6–1.3)
EOSINOPHIL # BLD AUTO: 0.11 THOUSAND/ΜL (ref 0–0.61)
EOSINOPHIL NFR BLD AUTO: 1 % (ref 0–6)
ERYTHROCYTE [DISTWIDTH] IN BLOOD BY AUTOMATED COUNT: 13.9 % (ref 11.6–15.1)
GFR SERPL CREATININE-BSD FRML MDRD: 66 ML/MIN/1.73SQ M
GLUCOSE SERPL-MCNC: 127 MG/DL (ref 65–140)
HCT VFR BLD AUTO: 33 % (ref 36.5–49.3)
HGB BLD-MCNC: 11 G/DL (ref 12–17)
IMM GRANULOCYTES # BLD AUTO: 0.06 THOUSAND/UL (ref 0–0.2)
IMM GRANULOCYTES NFR BLD AUTO: 1 % (ref 0–2)
LYMPHOCYTES # BLD AUTO: 1.23 THOUSANDS/ΜL (ref 0.6–4.47)
LYMPHOCYTES NFR BLD AUTO: 15 % (ref 14–44)
MCH RBC QN AUTO: 29.3 PG (ref 26.8–34.3)
MCHC RBC AUTO-ENTMCNC: 33.3 G/DL (ref 31.4–37.4)
MCV RBC AUTO: 88 FL (ref 82–98)
MONOCYTES # BLD AUTO: 0.82 THOUSAND/ΜL (ref 0.17–1.22)
MONOCYTES NFR BLD AUTO: 10 % (ref 4–12)
NEUTROPHILS # BLD AUTO: 5.96 THOUSANDS/ΜL (ref 1.85–7.62)
NEUTS SEG NFR BLD AUTO: 73 % (ref 43–75)
NRBC BLD AUTO-RTO: 0 /100 WBCS
PLATELET # BLD AUTO: 274 THOUSANDS/UL (ref 149–390)
PMV BLD AUTO: 9.7 FL (ref 8.9–12.7)
POTASSIUM SERPL-SCNC: 4.2 MMOL/L (ref 3.5–5.3)
PROT SERPL-MCNC: 7.1 G/DL (ref 6.4–8.2)
PSA SERPL-MCNC: 0.4 NG/ML (ref 0–4)
RBC # BLD AUTO: 3.76 MILLION/UL (ref 3.88–5.62)
SODIUM SERPL-SCNC: 126 MMOL/L (ref 136–145)
WBC # BLD AUTO: 8.21 THOUSAND/UL (ref 4.31–10.16)

## 2021-06-08 PROCEDURE — 36415 COLL VENOUS BLD VENIPUNCTURE: CPT

## 2021-06-08 PROCEDURE — 84153 ASSAY OF PSA TOTAL: CPT

## 2021-06-08 PROCEDURE — 80053 COMPREHEN METABOLIC PANEL: CPT

## 2021-06-08 PROCEDURE — 85025 COMPLETE CBC W/AUTO DIFF WBC: CPT

## 2021-06-08 RX ORDER — MIRTAZAPINE 15 MG/1
15 TABLET, FILM COATED ORAL
Qty: 30 TABLET | Refills: 1 | Status: SHIPPED | OUTPATIENT
Start: 2021-06-08 | End: 2021-06-16 | Stop reason: SDUPTHER

## 2021-06-08 NOTE — TELEPHONE ENCOUNTER
Called wife Margene Fabry, she states Pio Green is taking 2 tablets of Trazodone 50 mg 1/2 hour to 1 hour before bed  They are not working the longest time she has witnessed him sleep is 15 min to 30 min  He has begun not to be able to walk without stopping all the time to sit  He now uses a walker and a Rolator  Wife thinks this is more a sleep disorder than a behavioral problem  Patients wife would still like a call back from you    Annie Ellis

## 2021-06-08 NOTE — TELEPHONE ENCOUNTER
DR Baird Book      Patients wife needs to speak to you prior than making an appt  She said he never sleeps and would like to talk to you  Please call back

## 2021-06-09 ENCOUNTER — OFFICE VISIT (OUTPATIENT)
Dept: PHYSICAL THERAPY | Facility: CLINIC | Age: 76
End: 2021-06-09
Payer: MEDICARE

## 2021-06-09 ENCOUNTER — OFFICE VISIT (OUTPATIENT)
Dept: UROLOGY | Facility: CLINIC | Age: 76
End: 2021-06-09
Payer: MEDICARE

## 2021-06-09 ENCOUNTER — OFFICE VISIT (OUTPATIENT)
Dept: URGENT CARE | Facility: CLINIC | Age: 76
End: 2021-06-09
Payer: MEDICARE

## 2021-06-09 VITALS
HEART RATE: 72 BPM | DIASTOLIC BLOOD PRESSURE: 65 MMHG | SYSTOLIC BLOOD PRESSURE: 120 MMHG | WEIGHT: 182.2 LBS | BODY MASS INDEX: 25.51 KG/M2 | TEMPERATURE: 98.1 F | HEIGHT: 71 IN | OXYGEN SATURATION: 98 % | RESPIRATION RATE: 18 BRPM

## 2021-06-09 VITALS
DIASTOLIC BLOOD PRESSURE: 64 MMHG | HEART RATE: 68 BPM | HEIGHT: 71 IN | BODY MASS INDEX: 25.48 KG/M2 | SYSTOLIC BLOOD PRESSURE: 120 MMHG | WEIGHT: 182 LBS

## 2021-06-09 DIAGNOSIS — C61 PROSTATE CANCER (HCC): Primary | ICD-10-CM

## 2021-06-09 DIAGNOSIS — R26.89 OTHER ABNORMALITIES OF GAIT AND MOBILITY: ICD-10-CM

## 2021-06-09 DIAGNOSIS — S61.402A EXTENSOR TENDON LACERATION OF LEFT HAND WITH OPEN WOUND, INITIAL ENCOUNTER: Primary | ICD-10-CM

## 2021-06-09 DIAGNOSIS — R26.89 BALANCE DISORDER: ICD-10-CM

## 2021-06-09 DIAGNOSIS — R53.1 WEAKNESS: Primary | ICD-10-CM

## 2021-06-09 DIAGNOSIS — S66.822A EXTENSOR TENDON LACERATION OF LEFT HAND WITH OPEN WOUND, INITIAL ENCOUNTER: Primary | ICD-10-CM

## 2021-06-09 DIAGNOSIS — N40.0 PROSTATE HYPERTROPHY: Primary | ICD-10-CM

## 2021-06-09 PROCEDURE — 99213 OFFICE O/P EST LOW 20 MIN: CPT | Performed by: PHYSICIAN ASSISTANT

## 2021-06-09 PROCEDURE — 97112 NEUROMUSCULAR REEDUCATION: CPT | Performed by: PHYSICAL THERAPIST

## 2021-06-09 PROCEDURE — 99213 OFFICE O/P EST LOW 20 MIN: CPT | Performed by: FAMILY MEDICINE

## 2021-06-09 PROCEDURE — 12042 INTMD RPR N-HF/GENIT2.6-7.5: CPT | Performed by: FAMILY MEDICINE

## 2021-06-09 PROCEDURE — 96402 CHEMO HORMON ANTINEOPL SQ/IM: CPT

## 2021-06-09 RX ORDER — TRAZODONE HYDROCHLORIDE 50 MG/1
50 TABLET ORAL
COMMUNITY
End: 2021-06-16

## 2021-06-09 RX ORDER — TAMSULOSIN HYDROCHLORIDE 0.4 MG/1
0.4 CAPSULE ORAL
Qty: 90 CAPSULE | Refills: 3 | Status: SHIPPED | OUTPATIENT
Start: 2021-06-09 | End: 2021-06-17

## 2021-06-09 NOTE — PROGRESS NOTES
UROLOGY PROGRESS NOTE   Patient Identifiers: Yina Maier (MRN [id number])  Date of Service: 6/9/2021    Subjective:      26-year-old male with history Mikki 10 stage T3 prostate cancer  He has been started on androgen deprivation therapy  His current PSA is 0  4  He complains of some leg weakness and insomnia since beginning [address]  His family doctor has been prescribing sleep aid  He denies any hot flashes  Reason for visit:  Prostate cancer follow-up    Objective:     VITALS:    There were no vitals filed for this visit          LABS:  Lab Results   Component Value Date    HGB 11 0 (L) 06/08/2021    HCT 33 0 (L) 06/08/2021    WBC 8 21 06/08/2021     06/08/2021   ]    Lab Results   Component Value Date     04/17/2014    K 4 2 06/08/2021    CL 95 (L) 06/08/2021    CO2 24 06/08/2021    BUN 16 06/08/2021    CREATININE 1 09 06/08/2021    CALCIUM 9 1 06/08/2021    GLUCOSE 137 04/17/2014   ]        INPATIENT MEDS:    Current Outpatient Medications:     acarbose (PRECOSE) 100 MG tablet, TAKE 1 TABLET THREE TIMES DAILY WITH MEALS, Disp: 270 tablet, Rfl: 1    Alphagan P 0 1 %, , Disp: , Rfl:     amLODIPine (NORVASC) 5 mg tablet, Take 1 tablet (5 mg total) by mouth daily, Disp: 90 tablet, Rfl: 1    aspirin (ECOTRIN LOW STRENGTH) 81 mg EC tablet, Take 1 tablet (81 mg total) by mouth daily, Disp: 30 tablet, Rfl: 6    atorvastatin (LIPITOR) 20 mg tablet, TAKE 1 TABLET EVERY DAY, Disp: 90 tablet, Rfl: 1    b complex-vitamin C-folic acid (NEPHROCAPS) 1 mg capsule, Take 1 capsule by mouth daily, Disp: , Rfl:     carvedilol (COREG) 12 5 mg tablet, TAKE 1 TABLET TWICE DAILY, Disp: 180 tablet, Rfl: 1    co-enzyme Q-10 100 mg capsule, Take 100 mg by mouth daily, Disp: , Rfl:     gabapentin (NEURONTIN) 600 MG tablet, Take 1 tablet (600 mg total) by mouth 2 (two) times a day, Disp: 180 tablet, Rfl: 0    glimepiride (AMARYL) 4 mg tablet, TAKE 1 TABLET TWICE DAILY, Disp: 180 tablet, Rfl: 1   glucose blood (TRUETRACK TEST) test strip, 1 each by Other route daily Use as instructed for E11 29, Disp: 100 each, Rfl: 3    lisinopril (ZESTRIL) 2 5 mg tablet, TAKE 1 TABLET (2 5 MG TOTAL) BY MOUTH DAILY, Disp: 90 tablet, Rfl: 1    metFORMIN (GLUCOPHAGE) 1000 MG tablet, Take 1 tablet (1,000 mg total) by mouth 2 (two) times a day with meals, Disp: 180 tablet, Rfl: 1    mirtazapine (REMERON) 15 mg tablet, Take 1 tablet (15 mg total) by mouth daily at bedtime, Disp: 30 tablet, Rfl: 1    multivitamin (THERAGRAN) TABS, Take 1 tablet by mouth daily, Disp: , Rfl:     omeprazole (PriLOSEC) 40 MG capsule, TAKE 1 CAPSULE EVERY DAY, Disp: 90 capsule, Rfl: 1    Probiotic Product (CULTURELLE PROBIOTICS) CHEW, Chew daily, Disp: , Rfl:     TRUEPLUS LANCETS 28G MISC, Test 1x/d, E11 29, Disp: , Rfl: 0    Current Facility-Administered Medications:     degarelix acetate (FIRMAGON) injection 80 mg, 80 mg, Subcutaneous, Once, Eliud Branch PA-C      Physical Exam:   There were no vitals taken for this visit  GEN: no acute distress    RESP: breathing comfortably with no accessory muscle use    ABD: soft, non-tender, non-distended   INCISION:    EXT: no significant peripheral edema         RADIOLOGY:   CT ABDOMEN AND PELVIS WITH IV CONTRAST     IMPRESSION:     1  No evidence of metastatic disease in the abdomen or pelvis  2  Prostatomegaly with heterogeneous glandular density  No evidence of regional adenopathy  BONE SCAN WHOLE BODY   IMPRESSION:     1  No scintigraphic evidence of osseous metastasis  Assessment:    1   Prostate cancer- Mikki 10 stage T3     Plan:   - scheduled for gold fiducial markers and [employer]  -  Firmagon 80 mg given today  - he will start Lupron 45 mg next month  - I asked him to also start calcium plus D 600 mg twice a day for bone support

## 2021-06-09 NOTE — PROGRESS NOTES
Daily Note  IE: 5-3-2021 (POC: 2021)    Today's date: 2021  Patient name: Perlita Pruett  :   MRN: [id number]  Referring provider: Self, Referral  Dx:   Encounter Diagnosis     ICD-10-CM    1  Weakness  R53 1    2  Balance disorder  R26 89    3  Other abnormalities of gait and mobility  R26 89                   Subjective: Patient reports no new changes or falls since previous session      Objective: See treatment diary below    *Gait belt and CGA all exercises    - STS: 10 reps, use of foam pad, 2 sets , 0 UE  - Tandem ambulation: 5 cycles, 10 ft down/back  - Ambulation w/ alternate hand to knee taps: 5 cycles  - Braidin laps x 10 ft  0  - Sidestep on foam, 8 cycles, 1 foam beam    - FTEC on foam, 10" x6   - Hurdles FWD: 5 laps x 10 ft, 6" rosey  - Hurdles LAT: 3 laps x 10 ft, 6" rosey        Assessment: Patient tolerated treatment fairly  Patient required gait belt and CGA for all exercises  Patient challenged with sidestepping on foam, having multiple LOB posteriorly, requiring PT assist to prevent fall  Improvement in coordination with braiding with decrease in LOB  He will benefit from skilled PT services to improve static and dynamic balance for maximal daily function and safety  Plan: Continue per plan of care        Outcome Measures Initial Eval  2021     5xSTS 15 82 sec, 2 UE      TUG 14 90 sec      10 meter 2 87 ft/sec  0 87 m/s      FGA       DGI       mCTSIB  - FTEO (firm)  - FTEC (firm)  - FTEO (foam)  - FTEC (foam)   30 sec  30 sec  30 sec (+)  30 sec (+)      6MWT Defer 720 ft, no AD

## 2021-06-09 NOTE — PROGRESS NOTES
[facility]        NAME: Jennifer Pierre is a 67 y o  male  : 1945    MRN: [id number]  DATE: 2021  TIME: 1:12 PM    Assessment and Plan   Extensor tendon laceration of left hand with open wound, initial encounter [K77 816O, S61 402A]  1  Extensor tendon laceration of left hand with open wound, initial encounter  Laceration repair     Laceration repair    Date/Time: 2021 1:05 PM  Performed by: Pratima Patricio MD  Authorized by: Pratima Patricio MD   Consent: The procedure was performed in an emergent situation  Verbal consent obtained  Risks and benefits: risks, benefits and alternatives were discussed  Consent given by: patient  Patient understanding: patient states understanding of the procedure being performed  Patient consent: the patient's understanding of the procedure matches consent given  Required items: required blood products, implants, devices, and special equipment available  Patient identity confirmed: verbally with patient  Time out: Immediately prior to procedure a "time out" was called to verify the correct patient, procedure, equipment, support staff and site/side marked as required  Body area: upper extremity  Location details: left hand  Laceration length: 3 cm  Foreign bodies: no foreign bodies  Tendon involvement: none  Nerve involvement: none  Vascular damage: no    Sedation:  Patient sedated: no        Procedure Details:  Amount of cleaning: standard  Debridement: minimal  Degree of undermining: minimal  Skin closure: Steri-Strips  Approximation: close  Approximation difficulty: complex  Patient tolerance: patient tolerated the procedure well with no immediate complications  Comments: Folded skin tear  Undermined unfolded  Wound edges reapproximated with Steri-Strips  Instructed on wound care  May remove dressings after 2 days but allow Steri-Strips to remain and fall off on their own  No antibiotics necessary at this time    Last Tdap 9 years ago         Patient Instructions     Follow up with PCP in 3-5 days  Proceed to  ER if symptoms worsen  Chief Complaint     Chief Complaint   Patient presents with    Laceration     skin tear left hand from Tuesday  cleaning with peroxide, using bacitracin         History of Present Illness       69-year-old right-hand-dominant male presents today with a left hand traumatic skin tear sustained about 1-2 days ago  Was in his home when he accidentally banged it against the table and injured the left hand  Has been applying hydrogen peroxide and bacitracin after attempting to reapproximate the tear  Denies any other symptoms  Takes aspirin  Review of Systems   Review of Systems   Constitutional: Negative for chills and fever  Respiratory: Negative for cough and shortness of breath  Cardiovascular: Negative for chest pain  Gastrointestinal: Negative for abdominal pain and nausea  Skin: Positive for wound  Neurological: Negative for headaches         Current Medications       Current Outpatient Medications:     acarbose (PRECOSE) 100 MG tablet, TAKE 1 TABLET THREE TIMES DAILY WITH MEALS, Disp: 270 tablet, Rfl: 1    Alphagan P 0 1 %, , Disp: , Rfl:     amLODIPine (NORVASC) 5 mg tablet, Take 1 tablet (5 mg total) by mouth daily, Disp: 90 tablet, Rfl: 1    aspirin (ECOTRIN LOW STRENGTH) 81 mg EC tablet, Take 1 tablet (81 mg total) by mouth daily, Disp: 30 tablet, Rfl: 6    atorvastatin (LIPITOR) 20 mg tablet, TAKE 1 TABLET EVERY DAY, Disp: 90 tablet, Rfl: 1    b complex-vitamin C-folic acid (NEPHROCAPS) 1 mg capsule, Take 1 capsule by mouth daily, Disp: , Rfl:     carvedilol (COREG) 12 5 mg tablet, TAKE 1 TABLET TWICE DAILY, Disp: 180 tablet, Rfl: 1    co-enzyme Q-10 100 mg capsule, Take 100 mg by mouth daily, Disp: , Rfl:     gabapentin (NEURONTIN) 600 MG tablet, Take 1 tablet (600 mg total) by mouth 2 (two) times a day, Disp: 180 tablet, Rfl: 0    glimepiride (AMARYL) 4 mg tablet, TAKE 1 TABLET TWICE DAILY, Disp: 180 tablet, Rfl: 1    glucose blood (TRUETRACK TEST) test strip, 1 each by Other route daily Use as instructed for E11 29, Disp: 100 each, Rfl: 3    lisinopril (ZESTRIL) 2 5 mg tablet, TAKE 1 TABLET (2 5 MG TOTAL) BY MOUTH DAILY, Disp: 90 tablet, Rfl: 1    metFORMIN (GLUCOPHAGE) 1000 MG tablet, Take 1 tablet (1,000 mg total) by mouth 2 (two) times a day with meals, Disp: 180 tablet, Rfl: 1    multivitamin (THERAGRAN) TABS, Take 1 tablet by mouth daily, Disp: , Rfl:     omeprazole (PriLOSEC) 40 MG capsule, TAKE 1 CAPSULE EVERY DAY, Disp: 90 capsule, Rfl: 1    Probiotic Product (CULTURELLE PROBIOTICS) CHEW, Chew daily, Disp: , Rfl:     traZODone (DESYREL) 50 mg tablet, Take 50 mg by mouth daily at bedtime, Disp: , Rfl:     TRUEPLUS LANCETS 28G MISC, Test 1x/d, E11 29, Disp: , Rfl: 0    mirtazapine (REMERON) 15 mg tablet, Take 1 tablet (15 mg total) by mouth daily at bedtime (Patient not taking: Reported on 6/9/2021), Disp: 30 tablet, Rfl: 1    tamsulosin (FLOMAX) 0 4 mg, Take 1 capsule (0 4 mg total) by mouth daily with dinner (Patient not taking: Reported on 6/9/2021), Disp: 90 capsule, Rfl: 3  No current facility-administered medications for this visit       Current Allergies     Allergies as of 06/09/2021    (No Known Allergies)            The following portions of the patient's history were reviewed and updated as appropriate: allergies, current medications, past family history, past medical history, past social history, past surgical history and problem list      Past Medical History:   Diagnosis Date    Acquired hallux valgus     last assessed: 8/1/2013    Allergic rhinitis     Anemia 10/26/2010    Atrophy of nail     last assessed: 7/7/2015    Chronic kidney disease     chronic renal insufficiency    Coronary artery disease     Deformity of ankle and foot, acquired     last assessed: 2/22/2016    Diabetes mellitus ([facility] [address] [facility]     Difficulty walking     last assessed: 12/14/2015    Dysesthesia     last assessed: 11/25/2016    Hammer toe     unspecified laterality; last assessed: 8/1/2013    Hypertension     Onychomycosis of toenail     last assessed: 2/22/2016    Peripheral vascular disease ([facility] )     left SFA stent in 2010    Pes planus     unspecified laterality; last assessed: 8/1/2013    Pneumonia     last assessed: 2/27/2016    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     last assessed: 8/15/2016    Type 2 diabetes mellitus ([facility] ) 07/26/2010    Viral warts     last assessed: 7/24/2015       Past Surgical History:   Procedure Laterality Date    CARDIAC CATHETERIZATION  07/29/2010    CATARACT EXTRACTION, BILATERAL Bilateral     R 1999, L 2008    COLONOSCOPY  2011    FEMORAL ARTERY - POPLITEAL ARTERY BYPASS GRAFT  09/23/2013    FOOT SURGERY      bone spur removed    LEG SURGERY Bilateral     repair; L 8/20/2010 and 7/26/2012    ROTATOR CUFF REPAIR Left 2009    SHOULDER SURGERY Right 2005    collar bone       Family History   Problem Relation Age of Onset    Heart disease Father     Heart attack Father         acute myocardial infarction    Heart disease Sister     Heart attack Sister         acute myocardial infarction    Heart disease Paternal Grandfather     Aortic aneurysm Mother     Throat cancer Maternal Grandfather          Medications have been verified  Objective   /65   Pulse 72   Temp 98 1 °F (36 7 °C)   Resp 18   Ht 5' 11" (1 803 m)   Wt 82 6 kg (182 lb 3 2 oz)   SpO2 98%   BMI 25 41 kg/m²   No LMP for male patient  Physical Exam     Physical Exam  Vitals signs and nursing note reviewed  Constitutional:       General: He is not in acute distress  Appearance: Normal appearance  He is normal weight  He is not ill-appearing, toxic-appearing or diaphoretic  HENT:      Head: Normocephalic and atraumatic  Eyes:      General:         Right eye: No discharge  Left eye: No discharge  Conjunctiva/sclera: Conjunctivae normal    Pulmonary:      Effort: Pulmonary effort is normal    Musculoskeletal:         General: Tenderness and signs of injury present  No swelling  Skin:     General: Skin is warm  Findings: Bruising and lesion (3-4 cm curvilinear laceration/ skin tear on the posterior aspect of the 1st web space, left hand ) present  No erythema  Neurological:      General: No focal deficit present  Mental Status: He is alert and oriented to person, place, and time  Psychiatric:         Mood and Affect: Mood normal          Behavior: Behavior normal          Thought Content:  Thought content normal          Judgment: Judgment normal

## 2021-06-10 DIAGNOSIS — K21.9 GASTROESOPHAGEAL REFLUX DISEASE: ICD-10-CM

## 2021-06-10 RX ORDER — OMEPRAZOLE 40 MG/1
CAPSULE, DELAYED RELEASE ORAL
Qty: 90 CAPSULE | Refills: 1 | Status: SHIPPED | OUTPATIENT
Start: 2021-06-10 | End: 2021-08-12 | Stop reason: SDUPTHER

## 2021-06-14 ENCOUNTER — APPOINTMENT (OUTPATIENT)
Dept: PHYSICAL THERAPY | Facility: CLINIC | Age: 76
End: 2021-06-14
Payer: MEDICARE

## 2021-06-14 DIAGNOSIS — I10 BENIGN ESSENTIAL HYPERTENSION: ICD-10-CM

## 2021-06-14 RX ORDER — LISINOPRIL 2.5 MG/1
2.5 TABLET ORAL DAILY
Qty: 90 TABLET | Refills: 1 | Status: SHIPPED | OUTPATIENT
Start: 2021-06-14 | End: 2021-08-12 | Stop reason: SDUPTHER

## 2021-06-16 ENCOUNTER — APPOINTMENT (OUTPATIENT)
Dept: PHYSICAL THERAPY | Facility: CLINIC | Age: 76
End: 2021-06-16
Payer: MEDICARE

## 2021-06-16 ENCOUNTER — OFFICE VISIT (OUTPATIENT)
Dept: FAMILY MEDICINE CLINIC | Facility: CLINIC | Age: 76
End: 2021-06-16
Payer: MEDICARE

## 2021-06-16 VITALS
RESPIRATION RATE: 20 BRPM | DIASTOLIC BLOOD PRESSURE: 56 MMHG | OXYGEN SATURATION: 98 % | WEIGHT: 181 LBS | TEMPERATURE: 96.1 F | BODY MASS INDEX: 25.34 KG/M2 | HEIGHT: 71 IN | HEART RATE: 66 BPM | SYSTOLIC BLOOD PRESSURE: 100 MMHG

## 2021-06-16 DIAGNOSIS — I25.119 CORONARY ARTERY DISEASE INVOLVING NATIVE CORONARY ARTERY OF NATIVE HEART WITH ANGINA PECTORIS (HCC): ICD-10-CM

## 2021-06-16 DIAGNOSIS — E11.22 CKD STAGE 2 DUE TO TYPE 2 DIABETES MELLITUS (HCC): ICD-10-CM

## 2021-06-16 DIAGNOSIS — N18.2 CKD STAGE 2 DUE TO TYPE 2 DIABETES MELLITUS (HCC): ICD-10-CM

## 2021-06-16 DIAGNOSIS — Z00.00 MEDICARE ANNUAL WELLNESS VISIT, SUBSEQUENT: Primary | ICD-10-CM

## 2021-06-16 DIAGNOSIS — G47.00 INSOMNIA, PERSISTENT: ICD-10-CM

## 2021-06-16 DIAGNOSIS — E11.40 TYPE 2 DIABETES MELLITUS WITH DIABETIC NEUROPATHY, WITHOUT LONG-TERM CURRENT USE OF INSULIN (HCC): ICD-10-CM

## 2021-06-16 DIAGNOSIS — I10 BENIGN ESSENTIAL HYPERTENSION: ICD-10-CM

## 2021-06-16 PROCEDURE — 99214 OFFICE O/P EST MOD 30 MIN: CPT | Performed by: FAMILY MEDICINE

## 2021-06-16 PROCEDURE — G0438 PPPS, INITIAL VISIT: HCPCS | Performed by: FAMILY MEDICINE

## 2021-06-16 RX ORDER — MIRTAZAPINE 7.5 MG/1
7.5 TABLET, FILM COATED ORAL
Qty: 90 TABLET | Refills: 1
Start: 2021-06-16 | End: 2021-07-21 | Stop reason: HOSPADM

## 2021-06-16 RX ORDER — EMPAGLIFLOZIN 10 MG/1
10 TABLET, FILM COATED ORAL EVERY MORNING
Qty: 30 TABLET | Refills: 5 | Status: SHIPPED | OUTPATIENT
Start: 2021-06-16 | End: 2021-07-21 | Stop reason: HOSPADM

## 2021-06-16 NOTE — PROGRESS NOTES
Assessment/Plan:    No problem-specific Assessment & Plan notes found for this encounter  DM2 uncontrolled, may need insulin  Keep appt with endo but willing to consider jardiance  They are concerned about cost  MOA and risk of genital yeast infections advised    Insomnia ongoing  Excess sedation on 15mg  They could try 1/2 tab of remeron  Stronger sedatives discouraged due to age/safety    Ckd2, stay hydrated    CAD/PAD unchanged, continue statin and understands importance of chol/bp/dm control     Diagnoses and all orders for this visit:    Medicare annual wellness visit, subsequent    Insomnia, persistent  -     mirtazapine (REMERON) 7 5 MG tablet; Take 1 tablet (7 5 mg total) by mouth daily at bedtime    Type 2 diabetes mellitus with diabetic neuropathy, without long-term current use of insulin (HCC)  -     Empagliflozin (Jardiance) 10 MG TABS; Take 1 tablet (10 mg total) by mouth every morning    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 mellitus ([facility] [address] )    Coronary artery disease involving native coronary artery of native heart with angina pectoris ([facility])    Benign essential hypertension        Lab Results   Component Value Date    HGBA1C 12 2 (H) 06/03/2021    HGBA1C 13 2 (H) 09/18/2020    HGBA1C 7 9 (H) 06/09/2020     Lab Results   Component Value Date    GLUF 181 (H) 06/03/2021    LDLCALC 53 06/03/2021    CREATININE 1 09 06/08/2021           Return in about 3 months (around 9/16/2021) for Recheck  Subjective:      Patient ID: Tres Chopra is a 67 y o  male      Chief Complaint   Patient presents with    Follow-up     follow up on meds, Loida Scale        HPI  Went to [facility]etic[facility]  Diabetic diet ed per pt    Taking norvasc 5mg/d  No new swelling  Not dizzy    Tried the remeron 15mg one time  Drowsy for 2d per pt  Trazodone not effective    The following portions of the patient's history were reviewed and updated as appropriate: allergies, current medications, past family history, past medical history, past social history, past surgical history and problem list     Review of Systems   Constitutional: Negative for fever  Respiratory: Negative for shortness of breath  Current Outpatient Medications   Medication Sig Dispense Refill    acarbose (PRECOSE) 100 MG tablet TAKE 1 TABLET THREE TIMES DAILY WITH MEALS 270 tablet 1    Alphagan P 0 1 %       amLODIPine (NORVASC) 5 mg tablet Take 1 tablet (5 mg total) by mouth daily 90 tablet 1    aspirin (ECOTRIN LOW STRENGTH) 81 mg EC tablet Take 1 tablet (81 mg total) by mouth daily 30 tablet 6    atorvastatin (LIPITOR) 20 mg tablet TAKE 1 TABLET EVERY DAY 90 tablet 1    b complex-vitamin C-folic acid (NEPHROCAPS) 1 mg capsule Take 1 capsule by mouth daily      carvedilol (COREG) 12 5 mg tablet TAKE 1 TABLET TWICE DAILY 180 tablet 1    co-enzyme Q-10 100 mg capsule Take 100 mg by mouth daily      gabapentin (NEURONTIN) 600 MG tablet Take 1 tablet (600 mg total) by mouth 2 (two) times a day 180 tablet 0    glimepiride (AMARYL) 4 mg tablet TAKE 1 TABLET TWICE DAILY 180 tablet 1    glucose blood (TRUETRACK TEST) test strip 1 each by Other route daily Use as instructed for E11 29 100 each 3    lisinopril (ZESTRIL) 2 5 mg tablet Take 1 tablet (2 5 mg total) by mouth daily 90 tablet 1    metFORMIN (GLUCOPHAGE) 1000 MG tablet Take 1 tablet (1,000 mg total) by mouth 2 (two) times a day with meals 180 tablet 1    multivitamin (THERAGRAN) TABS Take 1 tablet by mouth daily      omeprazole (PriLOSEC) 40 MG capsule TAKE 1 CAPSULE EVERY DAY 90 capsule 1    Probiotic Product (CULTURELLE PROBIOTICS) CHEW Chew daily      TRUEPLUS LANCETS 28G MISC Test 1x/d, E11 29  0    Empagliflozin (Jardiance) 10 MG TABS Take 1 tablet (10 mg total) by mouth every morning 30 tablet 5    mirtazapine (REMERON) 7 5 MG tablet Take 1 tablet (7 5 mg total) by mouth daily at bedtime 90 tablet 1     No current facility-administered medications for this visit         Objective:    /56 Pulse 66   Temp (!) 96 1 °F (35 6 °C)   Resp 20   Ht 5' 11" (1 803 m)   Wt 82 1 kg (181 lb)   SpO2 98%   BMI 25 24 kg/m²        Physical Exam  Vitals and nursing note reviewed  Constitutional:       Appearance: He is well-developed  He is not ill-appearing  HENT:      Head: Normocephalic  Eyes:      General: No scleral icterus  Conjunctiva/sclera: Conjunctivae normal    Cardiovascular:      Rate and Rhythm: Normal rate and regular rhythm  Heart sounds: No murmur heard  Pulmonary:      Effort: Pulmonary effort is normal  No respiratory distress  Abdominal:      Palpations: Abdomen is soft  Musculoskeletal:         General: No deformity  Cervical back: Neck supple  Skin:     General: Skin is warm and dry  Coloration: Skin is not pale  Neurological:      Mental Status: He is alert  Psychiatric:         Mood and Affect: Mood normal          Behavior: Behavior normal          Thought Content: Thought content normal          BMI Counseling: Body mass index is 25 24 kg/m²  The BMI is above normal  Nutrition recommendations include decreasing portion sizes and moderation in carbohydrate intake  Exercise recommendations include exercising 3-5 times per week  No pharmacotherapy was ordered                Lexis Walters DO

## 2021-06-16 NOTE — PATIENT INSTRUCTIONS
You could try 1/2 of the 15mg mirtazapine pill for sleep to see if it is better tolerated    SHINGRIX is the new, more effective vaccine for Shingles  It is more than 90% effective  You should check with your local pharmacy and insurance company for availability and coverage  It is a 2 shot series, with the second shot given between 2-6 months after the first, and is approved for ages 48 and up  You can add a medication called Jardiance to help your diabetes  Medicare Preventive Visit Patient Instructions  Thank you for completing your Welcome to Medicare Visit or Medicare Annual Wellness Visit today  Your next wellness visit will be due in one year (6/18/2022)  The screening/preventive services that you may require over the next 5-10 years are detailed below  Some tests may not apply to you based off risk factors and/or age  Screening tests ordered at today's visit but not completed yet may show as past due  Also, please note that scanned in results may not display below  Preventive Screenings:  Service Recommendations Previous Testing/Comments   Colorectal Cancer Screening  · Colonoscopy    · Fecal Occult Blood Test (FOBT)/Fecal Immunochemical Test (FIT)  · Fecal DNA/Cologuard Test  · Flexible Sigmoidoscopy Age: 54-65 years old   Colonoscopy: every 10 years (May be performed more frequently if at higher risk)  OR  FOBT/FIT: every 1 year  OR  Cologuard: every 3 years  OR  Sigmoidoscopy: every 5 years  Screening may be recommended earlier than age 48 if at higher risk for colorectal cancer  Also, an individualized decision between you and your healthcare provider will decide whether screening between the ages of 74-80 would be appropriate   Colonoscopy: 06/14/2011  FOBT/FIT: Not on file  Cologuard: Not on file  Sigmoidoscopy: Not on file    Screening Not Indicated     Prostate Cancer Screening Individualized decision between patient and health care provider in men between ages of 53-78   Medicare will cover every 12 months beginning on the day after your 50th birthday PSA: 0 4 ng/mL     History Prostate Cancer  Screening Not Indicated     Hepatitis C Screening Once for adults born between 1945 and 1965  More frequently in patients at high risk for Hepatitis C Hep C Antibody: Not on file    Screening Not Indicated   Diabetes Screening 1-2 times per year if you're at risk for diabetes or have pre-diabetes Fasting glucose: 181 mg/dL   A1C: 12 2 %    Screening Not Indicated  History Diabetes   Cholesterol Screening Once every 5 years if you don't have a lipid disorder  May order more often based on risk factors  Lipid panel: 06/03/2021    Screening Current      Other Preventive Screenings Covered by Medicare:  1  Abdominal Aortic Aneurysm (AAA) Screening: covered once if your at risk  You're considered to be at risk if you have a family history of AAA or a male between the age of 73-68 who smoking at least 100 cigarettes in your lifetime  2  Lung Cancer Screening: covers low dose CT scan once per year if you meet all of the following conditions: (1) Age 50-69; (2) No signs or symptoms of lung cancer; (3) Current smoker or have quit smoking within the last 15 years; (4) You have a tobacco smoking history of at least 30 pack years (packs per day x number of years you smoked); (5) You get a written order from a healthcare provider  3  Glaucoma Screening: covered annually if you're considered high risk: (1) You have diabetes OR (2) Family history of glaucoma OR (3)  aged 48 and older OR (3)  American aged 72 and older  3  Osteoporosis Screening: covered every 2 years if you meet one of the following conditions: (1) Have a vertebral abnormality; (2) On glucocorticoid therapy for more than 3 months; (3) Have primary hyperparathyroidism; (4) On osteoporosis medications and need to assess response to drug therapy  5  HIV Screening: covered annually if you're between the age of 12-76   Also covered annually if you are younger than 13 and older than 72 with risk factors for HIV infection  For pregnant patients, it is covered up to 3 times per pregnancy  Immunizations:  Immunization Recommendations   Influenza Vaccine Annual influenza vaccination during flu season is recommended for all persons aged >= 6 months who do not have contraindications   Pneumococcal Vaccine (Prevnar and Pneumovax)  * Prevnar = PCV13  * Pneumovax = PPSV23 Adults 25-60 years old: 1-3 doses may be recommended based on certain risk factors  Adults 72 years old: Prevnar (PCV13) vaccine recommended followed by Pneumovax (PPSV23) vaccine  If already received PPSV23 since turning 65, then PCV13 recommended at least one year after PPSV23 dose  Hepatitis B Vaccine 3 dose series if at intermediate or high risk (ex: diabetes, end stage renal disease, liver disease)   Tetanus (Td) Vaccine - COST NOT COVERED BY MEDICARE PART B Following completion of primary series, a booster dose should be given every 10 years to maintain immunity against tetanus  Td may also be given as tetanus wound prophylaxis  Tdap Vaccine - COST NOT COVERED BY MEDICARE PART B Recommended at least once for all adults  For pregnant patients, recommended with each pregnancy  Shingles Vaccine (Shingrix) - COST NOT COVERED BY MEDICARE PART B  2 shot series recommended in those aged 48 and above     Health Maintenance Due:      Topic Date Due    Hepatitis C Screening  Never done    Colorectal Cancer Screening  07/17/2021 (Originally [birth year]/1994)     Immunizations Due:      Topic Date Due    Influenza Vaccine (Season Ended) 09/01/2021     Advance Directives   What are advance directives? Advance directives are legal documents that state your wishes and plans for medical care  These plans are made ahead of time in case you lose your ability to make decisions for yourself   Advance directives can apply to any medical decision, such as the treatments you want, and if you want to donate organs  What are the types of advance directives? There are many types of advance directives, and each state has rules about how to use them  You may choose a combination of any of the following:  · Living will: This is a written record of the treatment you want  You can also choose which treatments you do not want, which to limit, and which to stop at a certain time  This includes surgery, medicine, IV fluid, and tube feedings  · Durable power of  for healthcare [facility]): This is a written record that states who you want to make healthcare choices for you when you are unable to make them for yourself  This person, called a proxy, is usually a family member or a friend  You may choose more than 1 proxy  · Do not resuscitate (DNR) order:  A DNR order is used in case your heart stops beating or you stop breathing  It is a request not to have certain forms of treatment, such as CPR  A DNR order may be included in other types of advance directives  · Medical directive: This covers the care that you want if you are in a coma, near death, or unable to make decisions for yourself  You can list the treatments you want for each condition  Treatment may include pain medicine, surgery, blood transfusions, dialysis, IV or tube feedings, and a ventilator (breathing machine)  · Values history: This document has questions about your views, beliefs, and how you feel and think about life  This information can help others choose the care that you would choose  Why are advance directives important? An advance directive helps you control your care  Although spoken wishes may be used, it is better to have your wishes written down  Spoken wishes can be misunderstood, or not followed  Treatments may be given even if you do not want them  An advance directive may make it easier for your family to make difficult choices about your care     Weight Management   Why it is important to manage your weight: Being overweight increases your risk of health conditions such as heart disease, high blood pressure, type 2 diabetes, and certain types of cancer  It can also increase your risk for osteoarthritis, sleep apnea, and other respiratory problems  Aim for a slow, steady weight loss  Even a small amount of weight loss can lower your risk of health problems  How to lose weight safely:  A safe and healthy way to lose weight is to eat fewer calories and get regular exercise  You can lose up about 1 pound a week by decreasing the number of calories you eat by 500 calories each day  Healthy meal plan for weight management:  A healthy meal plan includes a variety of foods, contains fewer calories, and helps you stay healthy  A healthy meal plan includes the following:  · Eat whole-grain foods more often  A healthy meal plan should contain fiber  Fiber is the part of grains, fruits, and vegetables that is not broken down by your body  Whole-grain foods are healthy and provide extra fiber in your diet  Some examples of whole-grain foods are whole-wheat breads and pastas, oatmeal, brown rice, and bulgur  · Eat a variety of vegetables every day  Include dark, leafy greens such as spinach, kale, elle greens, and mustard greens  Eat yellow and orange vegetables such as carrots, sweet potatoes, and winter squash  · Eat a variety of fruits every day  Choose fresh or canned fruit (canned in its own juice or light syrup) instead of juice  Fruit juice has very little or no fiber  · Eat low-fat dairy foods  Drink fat-free (skim) milk or 1% milk  Eat fat-free yogurt and low-fat cottage cheese  Try low-fat cheeses such as mozzarella and other reduced-fat cheeses  · Choose meat and other protein foods that are low in fat  Choose beans or other legumes such as split peas or lentils  Choose fish, skinless poultry (chicken or turkey), or lean cuts of red meat (beef or pork)   Before you cook meat or poultry, cut off any visible fat    · Use less fat and oil  Try baking foods instead of frying them  Add less fat, such as margarine, sour cream, regular salad dressing and mayonnaise to foods  Eat fewer high-fat foods  Some examples of high-fat foods include french fries, doughnuts, ice cream, and cakes  · Eat fewer sweets  Limit foods and drinks that are high in sugar  This includes candy, cookies, regular soda, and sweetened drinks  Exercise:  Exercise at least 30 minutes per day on most days of the week  Some examples of exercise include walking, biking, dancing, and swimming  You can also fit in more physical activity by taking the stairs instead of the elevator or parking farther away from stores  Ask your healthcare provider about the best exercise plan for you  © Copyright [employer] 2018 Information is for End User's use only and may not be sold, redistributed or otherwise used for commercial purposes   All illustrations and images included in CareNotes® are the copyrighted property of [employer]  or [address] [place] [address]

## 2021-06-17 NOTE — PROGRESS NOTES
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)    Relevant Medications    Empagliflozin (Jardiance) 10 MG TABS           Preventive health issues were discussed with patient, and age appropriate screening tests were ordered as noted in patient's After Visit Summary  Personalized health advice and appropriate referrals for health education or preventive services given if needed, as noted in patient's After Visit Summary       History of Present Illness:     Patient presents for Medicare Annual Wellness visit    Patient Care Team:  Samantha Cohn DO as PCP - Stephen Rodriguez MD (Gastroenterology)  Erin Burger, DO Georgena Saint, DPM (Podiatry)  Cecilio Escalona MD (Ophthalmology)  Maximilian Cornell MD (Cardiology)  Candido Aragon MD (Vascular Surgery)  Mary Merchant MD (Urology)  Phil Gomes MD (Radiation Oncology)     Problem List:     Patient Active Problem List   Diagnosis    Allergic rhinitis    Arthropathy    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 ([address]    CAD (coronary artery disease)    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 mellitus ([address] )    Diabetic neuropathy ([address] )    GE reflux    Lumbar radiculopathy    Rosacea    Seborrheic dermatitis    Type 2 diabetes mellitus with diabetic neuropathy ([address] )    Onychomycosis    Occlusion of femoral-popliteal bypass graft ([address] )    Prostate cancer screening    Dyslipidemia    Screening for AAA (aortic abdominal aneurysm)    Medicare annual wellness visit, subsequent    Type 2 diabetes mellitus with stage 2 chronic kidney disease, without long-term current use of insulin (HCC)    Elevated PSA    Embolism and thrombosis of arteries of the lower extremities (HCC)    Benign essential hypertension    Bilateral leg weakness    Anemia due to stage 3a chronic kidney disease (HCC)    Prostate cancer [facility]    Screening for cardiovascular, respiratory, and genitourinary diseases    Insomnia, persistent      Past Medical and Surgical History:     Past Medical History:   Diagnosis Date    Acquired hallux valgus     last assessed: 8/1/2013    Allergic rhinitis     Anemia 10/26/2010    Atrophy of nail     last assessed: 7/7/2015    Chronic kidney disease     chronic renal insufficiency    Coronary artery disease     Deformity of ankle and foot, acquired     last assessed: 2/22/2016    Diabetes mellitus ([facility]     Difficulty walking     last assessed: 12/14/2015    Dysesthesia     last assessed: 11/25/2016    Hammer toe     unspecified laterality; last assessed: 8/1/2013    Hypertension     Onychomycosis of toenail     last assessed: 2/22/2016    Peripheral vascular disease ([facility] )     left SFA stent in 2010    Pes planus     unspecified laterality; last assessed: 8/1/2013    Pneumonia     last assessed: 2/27/2016    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     last assessed: 8/15/2016    Type 2 diabetes mellitus ([facility] ) 07/26/2010    Viral warts     last assessed: 7/24/2015     Past Surgical History:   Procedure Laterality Date    CARDIAC CATHETERIZATION  07/29/2010    CATARACT EXTRACTION, BILATERAL Bilateral     R 1999, L 2008    COLONOSCOPY  2011    FEMORAL ARTERY - POPLITEAL ARTERY BYPASS GRAFT  09/23/2013    FOOT SURGERY      bone spur removed    LEG SURGERY Bilateral     repair; L 8/20/2010 and 7/26/2012    ROTATOR CUFF REPAIR Left 2009    SHOULDER SURGERY Right 2005    collar bone      Family History:     Family History   Problem Relation Age of Onset    Heart disease Father     Heart attack Father         acute myocardial infarction    Heart disease Sister     Heart attack Sister         acute myocardial infarction    Heart disease Paternal Grandfather     Aortic aneurysm Mother     Throat cancer Maternal Grandfather       Social History:     Social History     Socioeconomic History    Marital status: /Civil Union     Spouse name: None    Number of children: None    Years of education: None    Highest education level: None   Occupational History    Occupation: retired from  work   Tobacco Use    Smoking status: Never Smoker    Smokeless tobacco: Never Used   Vaping Use    Vaping Use: Never used   Substance and Sexual Activity    Alcohol use: Yes     Comment: being a social drinker    Drug use: No    Sexual activity: None   Other Topics Concern    None   Social History Narrative    None     Social Determinants of Health     Financial Resource Strain:     Difficulty of Paying Living Expenses:    Food Insecurity:     Worried About Running Out of Food in the Last Year:     [address] in the Last Year:    Transportation Needs:     Lack of Transportation (Medical):      Lack of Transportation (Non-Medical):    Physical Activity:     Days of Exercise per Week:     Minutes of Exercise per Session:    Stress:     Feeling of Stress :    Social Connections:     Frequency of Communication with Friends and Family:     Frequency of Social Gatherings with Friends and Family:     Attends [employer] Services:     Active Member of Clubs or Organizations:     Attends Club or Organization Meetings:     Marital Status:    Intimate Partner Violence:     Fear of Current or Ex-Partner:     Emotionally Abused:     Physically Abused:     Sexually Abused:       Medications and Allergies:     Current Outpatient Medications   Medication Sig Dispense Refill    acarbose (PRECOSE) 100 MG tablet TAKE 1 TABLET THREE TIMES DAILY WITH MEALS 270 tablet 1    Alphagan P 0 1 %       amLODIPine (NORVASC) 5 mg tablet Take 1 tablet (5 mg total) by mouth daily 90 tablet 1    aspirin (ECOTRIN LOW STRENGTH) 81 mg EC tablet Take 1 tablet (81 mg total) by mouth daily 30 tablet 6    atorvastatin (LIPITOR) 20 mg tablet TAKE 1 TABLET EVERY DAY 90 tablet 1    b complex-vitamin C-folic acid (NEPHROCAPS) 1 mg capsule Take 1 capsule by mouth daily      carvedilol (COREG) 12 5 mg tablet TAKE 1 TABLET TWICE DAILY 180 tablet 1    co-enzyme Q-10 100 mg capsule Take 100 mg by mouth daily      gabapentin (NEURONTIN) 600 MG tablet Take 1 tablet (600 mg total) by mouth 2 (two) times a day 180 tablet 0    glimepiride (AMARYL) 4 mg tablet TAKE 1 TABLET TWICE DAILY 180 tablet 1    glucose blood (TRUETRACK TEST) test strip 1 each by Other route daily Use as instructed for E11 29 100 each 3    lisinopril (ZESTRIL) 2 5 mg tablet Take 1 tablet (2 5 mg total) by mouth daily 90 tablet 1    metFORMIN (GLUCOPHAGE) 1000 MG tablet Take 1 tablet (1,000 mg total) by mouth 2 (two) times a day with meals 180 tablet 1    multivitamin (THERAGRAN) TABS Take 1 tablet by mouth daily      omeprazole (PriLOSEC) 40 MG capsule TAKE 1 CAPSULE EVERY DAY 90 capsule 1    Probiotic Product (CULTURELLE PROBIOTICS) CHEW Chew daily      TRUEPLUS LANCETS 28G MISC Test 1x/d, E11 29  0    Empagliflozin (Jardiance) 10 MG TABS Take 1 tablet (10 mg total) by mouth every morning 30 tablet 5    mirtazapine (REMERON) 7 5 MG tablet Take 1 tablet (7 5 mg total) by mouth daily at bedtime 90 tablet 1     No current facility-administered medications for this visit       No Known Allergies   Immunizations:     Immunization History   Administered Date(s) Administered    Influenza Split High Dose Preservative Free IM 09/18/2012, 09/16/2013, 10/13/2014, 10/07/2016, 09/20/2017    Influenza, high dose seasonal 0 7 mL 09/24/2018, 10/25/2019    Influenza, seasonal, injectable 09/21/2011, 09/25/2015    Pneumococcal Conjugate 13-Valent 10/12/2015    Pneumococcal Polysaccharide PPV23 08/25/2010    SARS-CoV-2 / COVID-19 mRNA IM (Moderna) 02/10/2021, 03/10/2021    Tdap 02/22/2012    Zoster 09/18/2012      Health Maintenance:         Topic Date Due    Hepatitis C Screening  Never done    Colorectal Cancer Screening  07/17/2021 (Originally 11/16/1995)         Topic Date Due    Influenza Vaccine (Season Ended) 09/01/2021      Medicare Health Risk Assessment:     /56   Pulse 66   Temp (!) 96 1 °F (35 6 °C)   Resp 20   Ht 5' 11" (1 803 m)   Wt 82 1 kg (181 lb)   SpO2 98%   BMI 25 24 kg/m²      Berenice Murillo is here for his Subsequent Wellness visit  Last Medicare Wellness visit information reviewed, patient interviewed and updates made to the record today  Health Risk Assessment:   Patient rates overall health as fair  Patient feels that their physical health rating is much worse  Patient is satisfied with their life  Eyesight was rated as same  Hearing was rated as same  Patient feels that their emotional and mental health rating is same  Patients states they are never, rarely angry  Patient states they are always unusually tired/fatigued  Pain experienced in the last 7 days has been some  Patient's pain rating has been 8/10  Patient states that he has experienced weight loss or gain in last 6 months  Depression Screening:   PHQ-2 Score: 0      Fall Risk Screening: In the past year, patient has experienced: no history of falling in past year      Home Safety:  Patient has trouble with stairs inside or outside of their home  Patient has working smoke alarms and has no working carbon monoxide detector  Home safety hazards include: none  Nutrition:   Current diet is No Added Salt and Diabetic  Medications:   Patient is currently taking over-the-counter supplements  OTC medications include: see medication list  Patient is able to manage medications       Activities of Daily Living (ADLs)/Instrumental Activities of Daily Living (IADLs):   Walk and transfer into and out of bed and chair?: Yes  Dress and groom yourself?: Yes    Bathe or shower yourself?: Yes    Feed yourself? Yes  Do your laundry/housekeeping?: Yes  Manage your money, pay your bills and track your expenses?: Yes  Make your own meals?: Yes    Do your own shopping?: Yes    Previous Hospitalizations:   Any hospitalizations or ED visits within the last 12 months?: No      Advance Care Planning:   Living will: No    Durable POA for healthcare: No    End of Life Decisions reviewed with patient: No      Cognitive Screening:   Provider or family/friend/caregiver concerned regarding cognition?: No    PREVENTIVE SCREENINGS      Cardiovascular Screening:    General: Screening Current      Diabetes Screening:     General: Screening Not Indicated and History Diabetes      Colorectal Cancer Screening:     General: Screening Not Indicated      Prostate Cancer Screening:    General: History Prostate Cancer and Screening Not Indicated      Osteoporosis Screening:    General: Screening Not Indicated      Abdominal Aortic Aneurysm (AAA) Screening:        General: Screening Not Indicated      Lung Cancer Screening:     General: Screening Not Indicated      Hepatitis C Screening:    General: Screening Not Indicated    Hep C Screening Accepted: No     Screening, Brief Intervention, and Referral to Treatment (SBIRT)    Screening  Typical number of drinks in a day: 0  Typical number of drinks in a week: 0  Interpretation: Low risk drinking behavior  Single Item Drug Screening:  How often have you used an illegal drug (including marijuana) or a prescription medication for non-medical reasons in the past year? never    Single Item Drug Screen Score: 0  Interpretation: Negative screen for possible drug use disorder    Other Counseling Topics:   Car/seat belt/driving safety and regular weightbearing exercise         Danuta Duckworth,

## 2021-06-21 ENCOUNTER — APPOINTMENT (OUTPATIENT)
Dept: PHYSICAL THERAPY | Facility: CLINIC | Age: 76
End: 2021-06-21
Payer: MEDICARE

## 2021-06-21 PROCEDURE — NC001 PR NO CHARGE: Performed by: UROLOGY

## 2021-06-21 PROCEDURE — 55876 PLACE RT DEVICE/MARKER PROS: CPT | Performed by: UROLOGY

## 2021-06-21 NOTE — H&P
UROLOGY PREOPERATIVE H&P NOTE     CHIEF COMPLAINT   Isaiah Resendiz is a 67 y o  male with a complaint of No chief complaint on file  History of Present Illness:     67 y o  male known to Dr Fany Kirkland, diagnosed with high risk prostate cancer  Patient was induced with hormone ablation therapy and presents today for fiducial marker placement and SpaceOAR perirectal gel in advance of his radiation therapy      Lab Results   Component Value Date    PSA 0 4 06/08/2021    PSA 8 0 (H) 12/10/2020    PSA 6 2 (H) 08/14/2020     Past Medical History:     Past Medical History:   Diagnosis Date    Acquired hallux valgus     last assessed: 8/1/2013    Allergic rhinitis     Anemia 10/26/2010    Atrophy of nail     last assessed: 7/7/2015    Chronic kidney disease     chronic renal insufficiency    Coronary artery disease     Deformity of ankle and foot, acquired     last assessed: 2/22/2016    Diabetes mellitus ([facility] [address] )     Difficulty walking     last assessed: 12/14/2015    Dysesthesia     last assessed: 11/25/2016    Hammer toe     unspecified laterality; last assessed: 8/1/2013    Hypertension     Onychomycosis of toenail     last assessed: 2/22/2016    Peripheral vascular disease ([facility] [address] )     left SFA stent in 2010    Pes planus     unspecified laterality; last assessed: 8/1/2013    Pneumonia     last assessed: 2/27/2016    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     last assessed: 8/15/2016    Type 2 diabetes mellitus ([facility] [address] ) 07/26/2010    Viral warts     last assessed: 7/24/2015       PAST SURGICAL HISTORY:     Past Surgical History:   Procedure Laterality Date    CARDIAC CATHETERIZATION  07/29/2010    CATARACT EXTRACTION, BILATERAL Bilateral     R 1999, L 2008    COLONOSCOPY  2011    FEMORAL ARTERY - POPLITEAL ARTERY BYPASS GRAFT  09/23/2013    FOOT SURGERY      bone spur removed    LEG SURGERY Bilateral     repair; L 8/20/2010 and 7/26/2012    ROTATOR CUFF REPAIR Left 2009    SHOULDER SURGERY Right 2005    collar bone       CURRENT MEDICATIONS:     Current Facility-Administered Medications   Medication Dose Route Frequency Provider Last Rate Last Admin    ceftriaxone (ROCEPHIN) 1 g/50 mL in dextrose IVPB  1,000 mg Intravenous Once Bello Ruffin  mL/hr at 06/22/21 1033 1,000 mg at 06/22/21 1033       ALLERGIES:   No Known Allergies    SOCIAL HISTORY:     Social History     Socioeconomic History    Marital status: /Civil Union     Spouse name: None    Number of children: None    Years of education: None    Highest education level: None   Occupational History    Occupation: retired from  work   Tobacco Use    Smoking status: Never Smoker    Smokeless tobacco: Never Used   Vaping Use    Vaping Use: Never used   Substance and Sexual Activity    Alcohol use: Yes     Comment: being a social drinker    Drug use: No    Sexual activity: None   Other Topics Concern    None   Social History Narrative    None     Social Determinants of Health     Financial Resource Strain:     Difficulty of Paying Living Expenses:    Food Insecurity:     Worried About Running Out of Food in the Last Year:     [address] in the Last Year:    Transportation Needs:     Lack of Transportation (Medical):      Lack of Transportation (Non-Medical):    Physical Activity:     Days of Exercise per Week:     Minutes of Exercise per Session:    Stress:     Feeling of Stress :    Social Connections:     Frequency of Communication with Friends and Family:     Frequency of Social Gatherings with Friends and Family:     Attends Holiness Services:     Active Member of Clubs or Organizations:     Attends Club or Organization Meetings:     Marital Status:    Intimate Partner Violence:     Fear of Current or Ex-Partner:     Emotionally Abused:     Physically Abused:     Sexually Abused:        SOCIAL HISTORY:     Family History   Problem Relation Age of Onset    Heart disease Father    Gabriele Ayala Heart attack Father         acute myocardial infarction    Heart disease Sister     Heart attack Sister         acute myocardial infarction    Heart disease Paternal Grandfather     Aortic aneurysm Mother     Throat cancer Maternal Grandfather        REVIEW OF SYSTEMS:     Review of Systems   Constitutional: Negative  Respiratory: Negative  Cardiovascular: Negative  Gastrointestinal: Negative  Genitourinary: Negative  Musculoskeletal: Negative  Skin: Negative  Psychiatric/Behavioral: Negative  PHYSICAL EXAM:     /71   Pulse 69   Temp 98 1 °F (36 7 °C) (Tympanic)   Resp 20   Ht 5' 11" (1 803 m)   Wt 81 6 kg (180 lb)   SpO2 99%   BMI 25 10 kg/m²     General:  Healthy appearing male in no acute distress  They have a normal affect  There is not appear to be any gross neurologic defects or abnormalities  HEENT:  Normocephalic, atraumatic  Neck is supple without any palpable lymphadenopathy  Cardiovascular:  Patient has normal palpable distal radial pulses  There is no significant peripheral edema  No JVD is noted  Respiratory:  Patient has unlabored respirations  There is no audible wheeze or rhonchi  Abdomen:   Abdomen is soft and nontender  There is no tympany  Inguinal and umbilical hernia are not appreciated  Genitourinary: Deferred until procedure    Musculoskeletal:  Patient does not have significant CVA tenderness in the  flank with palpation or percussion  They full range of motion in all 4 extremities  Strength in all 4 extremities appears congruent  Patient is able to ambulate without assistance or difficulty  Dermatologic:  Patient has no skin abnormalities or rashes        LABS:     CBC:   Lab Results   Component Value Date    WBC 8 21 06/08/2021    HGB 11 0 (L) 06/08/2021    HCT 33 0 (L) 06/08/2021    MCV 88 06/08/2021     06/08/2021       BMP:   Lab Results   Component Value Date    GLUCOSE 137 04/17/2014    CALCIUM 9 1 06/08/2021    NA 138 04/17/2014    K 4 2 06/08/2021    CO2 24 06/08/2021    CL 95 (L) 06/08/2021    BUN 16 06/08/2021    CREATININE 1 09 06/08/2021       IMAGING:     3/15/21  CT ABDOMEN AND PELVIS WITH IV CONTRAST     INDICATION:   C61: Malignant neoplasm of prostate  Initial staging exam     COMPARISON:  None      TECHNIQUE:  CT examination of the abdomen and pelvis was performed  Axial, sagittal, and coronal 2D reformatted images were created from the source data and submitted for interpretation      Radiation dose length product (DLP) for this visit:  627 53 mGy-cm   This examination, like all CT scans performed in the [facility], was performed utilizing techniques to minimize radiation dose exposure, including the use of iterative   reconstruction and automated exposure control      IV Contrast:  100 mL of iohexol (OMNIPAQUE)  Enteric Contrast:  Enteric contrast was not administered      FINDINGS:     ABDOMEN     LOWER CHEST:  No clinically significant abnormality identified in the visualized lower chest      LIVER/BILIARY TREE:  Unremarkable      GALLBLADDER:  No calcified gallstones  No pericholecystic inflammatory change      SPLEEN:  Unremarkable      PANCREAS:  Unremarkable      ADRENAL GLANDS:  Unremarkable      KIDNEYS/URETERS:  6 mm hypodense nodule left kidney posterior cortex too small for accurate characterization  Otherwise unremarkable      STOMACH AND BOWEL:  Sigmoid diverticulosis  Moderately increased stool      APPENDIX:  No findings to suggest appendicitis      ABDOMINOPELVIC CAVITY:  No ascites  No pneumoperitoneum  No lymphadenopathy      VESSELS:  Unremarkable for patient's age      PELVIS     REPRODUCTIVE ORGANS:  Prostate gland measures approximately 5 6 x 3 4 x 3 7 cm  Heterogeneous density, the right aspect of the prostate gland peripheral zone appearing hypodense compared to the left    No evidence of regional adenopathy     URINARY BLADDER:  Unremarkable      ABDOMINAL WALL/INGUINAL REGIONS:  Unremarkable      OSSEOUS STRUCTURES:  No acute fracture or destructive osseous lesion  Severe degenerative changes of the lumbar spine     IMPRESSION:     1  No evidence of metastatic disease in the abdomen or pelvis  2  Prostatomegaly with heterogeneous glandular density  No evidence of regional adenopathy    3/15/21  BONE SCAN  WHOLE BODY     INDICATION: Newly diagnosed Mikki 10 prostate cancer  Initial staging  C61: Malignant neoplasm of prostate     PREVIOUS FILM CORRELATION:    CT abdomen pelvis 3/15/2021     TECHNIQUE:   This study was performed following the intravenous administration of 27 mCi Tc-99m labeled MDP  Delayed, anterior and posterior whole body images were acquired, 2-3 hours after radiopharmaceutical administration  Additional delayed oblique   static images acquired of the bilateral ribs and pelvis       FINDINGS:     Scattered foci of radiotracer uptake noted in the thoracolumbar spine  There are linear areas of radiotracer uptake in the lumbar spine most concentrated in the upper lumbar spine at L1 and L2  In the lumbar spine, this corresponds to degenerative   changes on CT of the same day  Curvature noted of the lumbar spine to the right      Foci of radiotracer uptake at the bilateral shoulders, wrists, left knee and feet are likely related to arthritic changes given the distribution        There are no foci suspicious for osseous metastasis      The renal activity is symmetric      IMPRESSION:     1  No scintigraphic evidence of osseous metastasis  PATHOLOGY:     2/17/21  Final Diagnosis   A  Prostate, Right Lateral Base, Biopsy:  - Benign prostatic tissue      B  Prostate, Right Base, Biopsy:  - Benign prostatic tissue      C  Prostate, Right Lateral Mid, Biopsy:  - Benign prostatic tissue      D  Prostate, Right Mid, Biopsy:  - Benign prostatic tissue      E  Prostate, Right Lateral [place], Biopsy:  - Benign prostatic tissue      F   Prostate, Right Romayor, Biopsy:  - Benign prostatic tissue      G  Prostate, Left Lateral Base, Biopsy:  - Prostatic adenocarcinoma, Mikki Score 5 + 3 = 8, Grade Group 4      - Percentage Mikki pattern 5: 95%     - Total number of cores identified: 1     - Total number of cores with carcinoma: 1     - Percentage of tissue with carcinoma: 92%     - Linear amount of tissue with carcinoma: 11 mm total     H  Prostate, Left Base, Biopsy:  - Prostatic adenocarcinoma, Mikki Score 5 + 4 = 9, Grade Group 5      - Percentage Livermore pattern 5: 95%     - Total number of cores identified: 1     - Total number of cores with carcinoma: 1     - Percentage of tissue with carcinoma: 92%     - Linear amount of tissue with carcinoma: 11 mm total     I  Prostate, Left Lateral Mid, Biopsy:  - Prostatic adenocarcinoma, Livermore Score 5 + 5 = 10, Grade Group 5      - Total number of cores identified: 1     - Total number of cores with carcinoma: 1     - Percentage of tissue with carcinoma: 100%     - Linear amount of tissue with carcinoma: 15 mm total  - Focus of suspected intraductal carcinoma      J  Prostate, Left Mid, Biopsy:  - Prostatic adenocarcinoma, Livermore Score 5 + 5 = 10, Grade Group 5      - Total number of cores identified: 1     - Total number of cores with carcinoma: 1     - Percentage of tissue with carcinoma: 93%     - Linear amount of tissue with carcinoma: 14 mm total     K  Prostate, Left Lateral Romayor, Biopsy:  - Prostatic adenocarcinoma, Livermore Score 5 + 4 = 9, Grade Group 5      - Percentage Livermore pattern 5: 70%     - Total number of cores identified: 1     - Total number of cores with carcinoma: 1     - Percentage of tissue with carcinoma: 100%     - Linear amount of tissue with carcinoma: 10 mm total     L   Prostate, Left Romayor, Biopsy:  - Prostatic adenocarcinoma, Livermore Score 5 + 4 = 9, Grade Group 5      - Percentage Livermore pattern 5: 95%     - Total number of cores identified: 1     - Total number of cores with carcinoma: 1     - Percentage of tissue with carcinoma: 94%     - Linear amount of tissue with carcinoma: 15 mm total     ASSESSMENT:     67 y o  male with high risk prostate cancer planning for definitive radiation therapy    PLAN:     Discussed plan with patient for transrectal ultrasound, transperineal placement of fiducial markers and SpaceOAR perirectal gel  Risks and benefits of the procedure discussed and reviewed with the patient who agrees to proceed and has signed informed consent

## 2021-06-22 ENCOUNTER — HOSPITAL ENCOUNTER (OUTPATIENT)
Facility: HOSPITAL | Age: 76
Setting detail: OUTPATIENT SURGERY
Discharge: HOME | End: 2021-06-22
Attending: UROLOGY | Admitting: UROLOGY
Payer: MEDICARE

## 2021-06-22 ENCOUNTER — ANESTHESIA (OUTPATIENT)
Dept: GASTROENTEROLOGY | Facility: HOSPITAL | Age: 76
End: 2021-06-22
Payer: MEDICARE

## 2021-06-22 ENCOUNTER — ANESTHESIA EVENT (OUTPATIENT)
Dept: GASTROENTEROLOGY | Facility: HOSPITAL | Age: 76
End: 2021-06-22
Payer: MEDICARE

## 2021-06-22 VITALS
WEIGHT: 180 LBS | HEIGHT: 71 IN | DIASTOLIC BLOOD PRESSURE: 62 MMHG | TEMPERATURE: 97.1 F | OXYGEN SATURATION: 100 % | BODY MASS INDEX: 25.2 KG/M2 | HEART RATE: 77 BPM | SYSTOLIC BLOOD PRESSURE: 135 MMHG | RESPIRATION RATE: 16 BRPM

## 2021-06-22 LAB — GLUCOSE SERPL-MCNC: 166 MG/DL (ref 65–140)

## 2021-06-22 PROCEDURE — A4648 IMPLANTABLE TISSUE MARKER: HCPCS | Performed by: UROLOGY

## 2021-06-22 PROCEDURE — 55874 TPRNL PLMT BIODEGRDABL MATRL: CPT | Performed by: UROLOGY

## 2021-06-22 PROCEDURE — 82948 REAGENT STRIP/BLOOD GLUCOSE: CPT

## 2021-06-22 DEVICE — STERILE PLACEMENT NEEDLES (17GA ETW X 20CM) WITH BONE WAX AND (1.2 X 3MM) SOFT TISSUE GOLD MARKER [3]
Type: IMPLANTABLE DEVICE | Site: PROSTATE | Status: FUNCTIONAL
Brand: FIDUCIAL MARKER KIT

## 2021-06-22 RX ORDER — ONDANSETRON 2 MG/ML
4 INJECTION INTRAMUSCULAR; INTRAVENOUS ONCE AS NEEDED
Status: CANCELLED | OUTPATIENT
Start: 2021-06-22

## 2021-06-22 RX ORDER — SODIUM CHLORIDE 9 MG/ML
20 INJECTION, SOLUTION INTRAVENOUS CONTINUOUS
Status: CANCELLED | OUTPATIENT
Start: 2021-06-22

## 2021-06-22 RX ORDER — BUPIVACAINE HYDROCHLORIDE 5 MG/ML
INJECTION, SOLUTION EPIDURAL; INTRACAUDAL AS NEEDED
Status: DISCONTINUED | OUTPATIENT
Start: 2021-06-22 | End: 2021-06-22 | Stop reason: HOSPADM

## 2021-06-22 RX ORDER — LIDOCAINE HYDROCHLORIDE 10 MG/ML
INJECTION, SOLUTION EPIDURAL; INFILTRATION; INTRACAUDAL; PERINEURAL AS NEEDED
Status: DISCONTINUED | OUTPATIENT
Start: 2021-06-22 | End: 2021-06-22 | Stop reason: HOSPADM

## 2021-06-22 RX ORDER — PROPOFOL 10 MG/ML
INJECTION, EMULSION INTRAVENOUS AS NEEDED
Status: DISCONTINUED | OUTPATIENT
Start: 2021-06-22 | End: 2021-06-22

## 2021-06-22 RX ORDER — SODIUM CHLORIDE 9 MG/ML
INJECTION, SOLUTION INTRAVENOUS CONTINUOUS PRN
Status: DISCONTINUED | OUTPATIENT
Start: 2021-06-22 | End: 2021-06-22

## 2021-06-22 RX ORDER — SODIUM CHLORIDE 9 MG/ML
125 INJECTION, SOLUTION INTRAVENOUS CONTINUOUS
Status: CANCELLED | OUTPATIENT
Start: 2021-06-22

## 2021-06-22 RX ORDER — PROPOFOL 10 MG/ML
INJECTION, EMULSION INTRAVENOUS CONTINUOUS PRN
Status: DISCONTINUED | OUTPATIENT
Start: 2021-06-22 | End: 2021-06-22

## 2021-06-22 RX ADMIN — PROPOFOL 40 MG: 10 INJECTION, EMULSION INTRAVENOUS at 11:08

## 2021-06-22 RX ADMIN — PROPOFOL 50 MCG/KG/MIN: 10 INJECTION, EMULSION INTRAVENOUS at 11:08

## 2021-06-22 RX ADMIN — CEFTRIAXONE SODIUM 1000 MG: 10 INJECTION, POWDER, FOR SOLUTION INTRAVENOUS at 10:33

## 2021-06-22 RX ADMIN — SODIUM CHLORIDE: 9 INJECTION, SOLUTION INTRAVENOUS at 10:58

## 2021-06-22 NOTE — ANESTHESIA PREPROCEDURE EVALUATION
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)   (+) Prostate cancer (HCC)      HEMATOLOGY   (+) Anemia due to stage 3a chronic kidney disease (HCC)      NEURO/PSYCH   (+) 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)   (+) Diabetic neuropathy (HCC)   (+) Type 2 diabetes mellitus with diabetic neuropathy (HCC)      Other   (+) Dyslipidemia        Physical Exam    Airway    Mallampati score: III  TM Distance: >3 FB  Neck ROM: full     Dental       Cardiovascular      Pulmonary      Other Findings        Anesthesia Plan  ASA Score- 3     Anesthesia Type- IV sedation with anesthesia with ASA Monitors  Additional Monitors:   Airway Plan:           Plan Factors-    Chart reviewed  Existing labs reviewed  Patient summary reviewed  Induction- intravenous  Postoperative Plan-     Informed Consent- Anesthetic plan and risks discussed with patient  I personally reviewed this patient with the CRNA  Discussed and agreed on the Anesthesia Plan with the CRNA  Gillian Hubbard

## 2021-06-22 NOTE — DISCHARGE INSTRUCTIONS
Pernell-there is a small area of abrasion on the head of your penis  Keep clean with soap and water and apply a petroleum based product (Vaseline) daily to allow to heal     Prostate Biopsy   WHAT YOU NEED TO KNOW:   What do I need to know about a prostate biopsy? A prostate biopsy is a procedure to remove samples of tissue from your prostate gland  The prostate is a male sex gland that makes fluid found in semen  It is located just below the bladder  After the samples are removed, they are sent to a lab and tested for cancer  How do I prepare for a prostate biopsy? · Your healthcare provider will talk to you about how to prepare for this procedure  You will need to stop taking blood thinners several days before your procedure  Examples of blood thinners include warfarin and NSAIDs  You may also need to stop taking herbal supplements before your procedure  Your provider may tell you to shower the night before your surgery  He or she may tell you to use a certain soap to help prevent a surgical site infection  Your provider may tell you not to eat or drink anything after midnight on the day of your surgery  He or she will tell you what other medicines to take or not take on the day of your procedure  · You will be given an antibiotic to help prevent a bacterial infection  You may need to give yourself an enema (liquid medicine put in your rectum) to help empty your bowel before your procedure  What will happen during a prostate biopsy? · You may need to lie on your side with your knees pulled up toward your chest  Numbing cream or gel may be put into your rectum, or numbing medicine may be injected near your prostate  You may instead be given general anesthesia to keep you asleep and free from pain during the procedure  A biopsy sample may be taken through your rectum, urethra, or perineum  The perineum is the area between your scrotum and rectum   Most of the time, biopsy samples are taken through the rectum  · If your healthcare provider takes a sample through your rectum, he will insert a small ultrasound probe into your rectum  The ultrasound shows pictures of your prostate on a monitor, and is used to help guide the biopsy needle  Your healthcare provider will push the biopsy needle through the wall of your rectum and into your prostate gland  Your healthcare provider will remove between 6 to 12 samples of tissue from different areas of your prostate gland  The samples will be sent to a lab and tested for cancer  What will happen after a prostate biopsy? You may feel soreness at the biopsy site  You may need to take antibiotics for up to 2 days after your procedure to help prevent an infection  You may have some bleeding from your rectum  You may also have blood in your urine, bowel movements, or semen  What are the risks of a prostate biopsy? You may bleed more than expected or get an infection in your urinary tract or prostate gland  The infection may spread to your blood and the rest of your body  Your bladder may not empty completely when you urinate  You may need a catheter to help empty your bladder for a short period of time  Cancer cells may be missed during your biopsy procedure  You may need another prostate biopsy to check for cancer again  CARE AGREEMENT:   You have the right to help plan your care  Learn about your health condition and how it may be treated  Discuss treatment options with your healthcare providers to decide what care you want to receive  You always have the right to refuse treatment  The above information is an  only  It is not intended as medical advice for individual conditions or treatments  Talk to your doctor, nurse or pharmacist before following any medical regimen to see if it is safe and effective for you    © Copyright [address] Information is for End User's use only and may not be sold, redistributed or otherwise used for commercial purposes   All illustrations and images included in CareNotes® are the copyrighted property of A D A M , Inc  or Niya Ocampo

## 2021-06-22 NOTE — OP NOTE
OPERATIVE REPORT  PATIENT NAME: Zee Hays    :    MRN: [id number]  Pt Location: [facility] GI ROOM 01    SURGERY DATE: 2021    Surgeon(s) and Role:     * Claudene Bradford, MD - Primary    Preop Diagnosis:  Malignant neoplasm of prostate ([facility] [address] ) Luannefanny Barcenas    Post-Op Diagnosis Codes:     * Malignant neoplasm of prostate ([facility] [address] ) [C61]    Procedure(s) (LRB):  INSERTION OF FIDUCIAL MARKER (TRANSRECTAL ULTRASOUND GUIDANCE), SPACEOAR (N/A)    Specimen(s):  * No specimens in log *    Estimated Blood Loss:   Minimal    Drains:  * No LDAs found *    Anesthesia Type:   IV Sedation with Anesthesia    Operative Indications:  Malignant neoplasm of prostate ([facility] [address] ) [C61]      Operative Findings:  1  Fiducial markers placed in mid to RIGHT gland anterior, LEFT apex and RIGHT base  2  Good implant of SpaceOAR  3  Small area of abrasion on the gland penis from poor hygiene, prep and Ioband drape  Complications:   None    Procedure and Technique:  The patient has prostate cancer and has elected to undergo radiation therapy  To shorten the duration of treatment, radiation oncology has requested fiducial marker placement and transperineal SpaceOar (R) Hydrogel placement transperineally  I have explained to the patient the procedure, and the risks of bleeding, infection and pelvic pain due to the gel  Also if I cannot develop a plane properly between the prostate and the rectum or if there is perforation of the rectum with the needle the procedure will have to be aborted  He understands and gives informed consent  The patient was brought to the operating room and identified properly  IV sedation was induced, he was prepped and draped in the dorsal lithotomy position  Care was taken to ensure that all pressure points were addressed  Patient did have some irritation and particulate debris around the corona radiate of the head of the penis   He was prepped with Chloraprep, and the scrotum was elevated above the perineum with Iodoband  A time-out was performed, and the biplanar transrectal ultrasound probe was placed into the rectum and sagittal and axial views were obtained of the prostate  First, the fiducial markers were placed  Three markers were placed with 3 individual needles and ultrasound guidance both sagittally and axially was used to place a gold marker in the center of each lobe of the prostate and 1 at the base  Once this was done, the Space Oar kit was opened and the individual syringes were prepared and placed into the device, taking care not to mix hard inner with the Hydrogel which could cause occlusion of the needle  Once this was done, the needle itself was introduced in the midline of the perineum approximately 1 inch from the anus into the prostate under sagittal guidance-the needle was advanced above the rectal tent into the fat planes that lie between the prostate and the rectum and I advanced the needle to approximately detention along the length of the gland  Sterile saline was injected for approximately 0 5 cc to make sure I was in the proper area and not in the prostate capsule nor in the wall the rectum  This confirmed that I was indeed in the right area, and I performed hydrodissection with a couple cc of saline  I then attached the 2 syringes with the Luer Lock (the "Space Oar") and reconfirmed that I was in the right area, then injected both syringes simultaneously with the device containing the Hydrogel and the hardener into the space over a 15-30 second time  and watched it spread out posteriorly and anteriorly to the apex  It also spread out laterally and covered space over the anterior rectum nicely  Once I was satisfied with this, I withdrew the needle and held pressure against the perineum for a minute or 2  The patient tolerated the procedure well  With removal of the drape, small area of abrasion on the glans penis  Lubrication placed       I was present for the entire procedure    Patient Disposition:  PACU     SIGNATURE: Amna Chacon MD  DATE: June 22, 2021  TIME: 11:32 AM

## 2021-06-22 NOTE — ANESTHESIA POSTPROCEDURE EVALUATION
Post-Op Assessment Note    CV Status:  Stable  Pain Score: 0       Mental Status:  Lethargic and sleepy   Hydration Status:  Stable   PONV Controlled:  None   Airway Patency:  Patent      Post Op Vitals Reviewed: Yes      Staff: CRNA   Comments: vss        No complications documented      BP      Temp      Pulse     Resp      SpO2

## 2021-06-23 ENCOUNTER — APPOINTMENT (OUTPATIENT)
Dept: PHYSICAL THERAPY | Facility: CLINIC | Age: 76
End: 2021-06-23
Payer: MEDICARE

## 2021-06-28 ENCOUNTER — APPOINTMENT (OUTPATIENT)
Dept: PHYSICAL THERAPY | Facility: CLINIC | Age: 76
End: 2021-06-28
Payer: MEDICARE

## 2021-06-29 ENCOUNTER — APPOINTMENT (OUTPATIENT)
Dept: RADIATION ONCOLOGY | Facility: HOSPITAL | Age: 76
End: 2021-06-29
Attending: RADIOLOGY
Payer: MEDICARE

## 2021-06-29 PROCEDURE — 77334 RADIATION TREATMENT AID(S): CPT | Performed by: RADIOLOGY

## 2021-06-30 ENCOUNTER — APPOINTMENT (OUTPATIENT)
Dept: PHYSICAL THERAPY | Facility: CLINIC | Age: 76
End: 2021-06-30
Payer: MEDICARE

## 2021-07-01 ENCOUNTER — APPOINTMENT (OUTPATIENT)
Dept: RADIATION ONCOLOGY | Facility: HOSPITAL | Age: 76
End: 2021-07-01
Payer: MEDICARE

## 2021-07-01 ENCOUNTER — APPOINTMENT (OUTPATIENT)
Dept: RADIATION ONCOLOGY | Facility: HOSPITAL | Age: 76
End: 2021-07-01
Attending: RADIOLOGY
Payer: MEDICARE

## 2021-07-01 PROCEDURE — 77301 RADIOTHERAPY DOSE PLAN IMRT: CPT | Performed by: RADIOLOGY

## 2021-07-01 PROCEDURE — 77338 DESIGN MLC DEVICE FOR IMRT: CPT | Performed by: RADIOLOGY

## 2021-07-01 PROCEDURE — 77300 RADIATION THERAPY DOSE PLAN: CPT | Performed by: RADIOLOGY

## 2021-07-02 ENCOUNTER — APPOINTMENT (OUTPATIENT)
Dept: LAB | Facility: CLINIC | Age: 76
End: 2021-07-02
Payer: MEDICARE

## 2021-07-02 DIAGNOSIS — R97.20 ELEVATED PSA: ICD-10-CM

## 2021-07-02 LAB — PSA SERPL-MCNC: 0.2 NG/ML (ref 0–4)

## 2021-07-02 PROCEDURE — 84153 ASSAY OF PSA TOTAL: CPT

## 2021-07-07 ENCOUNTER — OFFICE VISIT (OUTPATIENT)
Dept: UROLOGY | Facility: CLINIC | Age: 76
End: 2021-07-07
Payer: MEDICARE

## 2021-07-07 ENCOUNTER — TELEPHONE (OUTPATIENT)
Dept: UROLOGY | Facility: CLINIC | Age: 76
End: 2021-07-07

## 2021-07-07 VITALS
DIASTOLIC BLOOD PRESSURE: 50 MMHG | SYSTOLIC BLOOD PRESSURE: 122 MMHG | WEIGHT: 170 LBS | BODY MASS INDEX: 23.8 KG/M2 | HEIGHT: 71 IN

## 2021-07-07 DIAGNOSIS — C61 PROSTATE CANCER (HCC): Primary | ICD-10-CM

## 2021-07-07 PROCEDURE — 96402 CHEMO HORMON ANTINEOPL SQ/IM: CPT

## 2021-07-07 PROCEDURE — 99213 OFFICE O/P EST LOW 20 MIN: CPT | Performed by: PHYSICIAN ASSISTANT

## 2021-07-07 NOTE — PROGRESS NOTES
UROLOGY PROGRESS NOTE   Patient Identifiers: Candido Downs (MRN [id number])  Date of Service: 7/7/2021    Subjective:     76-year-old man with history of [place] 10 at least stage III prostate cancer  He is started on androgen deprivation therapy his PSA 0 2  He does have some leg weakness and insomnia  He is starting radiation treatments tomorrow  He has lost about 10 lb in the last month        Reason for visit:  Prostate cancer follow-up     Objective:     VITALS:    Vitals:    07/07/21 1014   BP: 122/50           LABS:  Lab Results   Component Value Date    HGB 11 0 (L) 06/08/2021    HCT 33 0 (L) 06/08/2021    WBC 8 21 06/08/2021     06/08/2021   ]    Lab Results   Component Value Date     04/17/2014    K 4 2 06/08/2021    CL 95 (L) 06/08/2021    CO2 24 06/08/2021    BUN 16 06/08/2021    CREATININE 1 09 06/08/2021    CALCIUM 9 1 06/08/2021    GLUCOSE 137 04/17/2014   ]        INPATIENT MEDS:    Current Outpatient Medications:     acarbose (PRECOSE) 100 MG tablet, TAKE 1 TABLET THREE TIMES DAILY WITH MEALS, Disp: 270 tablet, Rfl: 1    Alphagan P 0 1 %, , Disp: , Rfl:     amLODIPine (NORVASC) 5 mg tablet, Take 1 tablet (5 mg total) by mouth daily, Disp: 90 tablet, Rfl: 1    aspirin (ECOTRIN LOW STRENGTH) 81 mg EC tablet, Take 1 tablet (81 mg total) by mouth daily, Disp: 30 tablet, Rfl: 6    atorvastatin (LIPITOR) 20 mg tablet, TAKE 1 TABLET EVERY DAY, Disp: 90 tablet, Rfl: 1    b complex-vitamin C-folic acid (NEPHROCAPS) 1 mg capsule, Take 1 capsule by mouth daily, Disp: , Rfl:     carvedilol (COREG) 12 5 mg tablet, TAKE 1 TABLET TWICE DAILY, Disp: 180 tablet, Rfl: 1    co-enzyme Q-10 100 mg capsule, Take 100 mg by mouth daily, Disp: , Rfl:     Empagliflozin (Jardiance) 10 MG TABS, Take 1 tablet (10 mg total) by mouth every morning, Disp: 30 tablet, Rfl: 5    glimepiride (AMARYL) 4 mg tablet, TAKE 1 TABLET TWICE DAILY, Disp: 180 tablet, Rfl: 1    glucose blood (TRUETRACK TEST) test strip, 1 each by Other route daily Use as instructed for E11 29, Disp: 100 each, Rfl: 3    lisinopril (ZESTRIL) 2 5 mg tablet, Take 1 tablet (2 5 mg total) by mouth daily, Disp: 90 tablet, Rfl: 1    metFORMIN (GLUCOPHAGE) 1000 MG tablet, Take 1 tablet (1,000 mg total) by mouth 2 (two) times a day with meals, Disp: 180 tablet, Rfl: 1    mirtazapine (REMERON) 7 5 MG tablet, Take 1 tablet (7 5 mg total) by mouth daily at bedtime, Disp: 90 tablet, Rfl: 1    multivitamin (THERAGRAN) TABS, Take 1 tablet by mouth daily, Disp: , Rfl:     omeprazole (PriLOSEC) 40 MG capsule, TAKE 1 CAPSULE EVERY DAY, Disp: 90 capsule, Rfl: 1    Probiotic Product (CULTURELLE PROBIOTICS) CHEW, Chew daily, Disp: , Rfl:     TRUEPLUS LANCETS 28G MISC, Test 1x/d, E11 29, Disp: , Rfl: 0    gabapentin (NEURONTIN) 600 MG tablet, Take 1 tablet (600 mg total) by mouth 2 (two) times a day, Disp: 180 tablet, Rfl: 0  No current facility-administered medications for this visit  Physical Exam:   /50   Ht 5' 11" (1 803 m)   Wt 77 1 kg (170 lb)   BMI 23 71 kg/m²   GEN: no acute distress    RESP: breathing comfortably with no accessory muscle use    ABD: soft, non-tender, non-distended   INCISION:    EXT: no significant peripheral edema       RADIOLOGY:     None     Assessment:    1   Prostate cancer     Plan:   - follow-up in 6 months with PSA prior to visit for next Lupron injection  -  -  -

## 2021-07-08 ENCOUNTER — APPOINTMENT (OUTPATIENT)
Dept: RADIATION ONCOLOGY | Facility: HOSPITAL | Age: 76
End: 2021-07-08
Attending: RADIOLOGY
Payer: MEDICARE

## 2021-07-08 PROCEDURE — 77427 RADIATION TX MANAGEMENT X5: CPT | Performed by: RADIOLOGY

## 2021-07-08 PROCEDURE — 77014 CHG CT GUIDANCE PLACEMENT RAD THERAPY FIELDS: CPT | Performed by: RADIOLOGY

## 2021-07-08 PROCEDURE — 77385 HB NTSTY MODUL RAD TX DLVR SMPL: CPT | Performed by: RADIOLOGY

## 2021-07-09 ENCOUNTER — APPOINTMENT (OUTPATIENT)
Dept: RADIATION ONCOLOGY | Facility: HOSPITAL | Age: 76
End: 2021-07-09
Attending: RADIOLOGY
Payer: MEDICARE

## 2021-07-09 PROCEDURE — 77385 HB NTSTY MODUL RAD TX DLVR SMPL: CPT | Performed by: RADIOLOGY

## 2021-07-09 PROCEDURE — 77014 CHG CT GUIDANCE PLACEMENT RAD THERAPY FIELDS: CPT | Performed by: RADIOLOGY

## 2021-07-12 ENCOUNTER — APPOINTMENT (OUTPATIENT)
Dept: RADIATION ONCOLOGY | Facility: HOSPITAL | Age: 76
End: 2021-07-12
Attending: RADIOLOGY
Payer: MEDICARE

## 2021-07-12 PROCEDURE — 77014 CHG CT GUIDANCE PLACEMENT RAD THERAPY FIELDS: CPT | Performed by: RADIOLOGY

## 2021-07-12 PROCEDURE — 77385 HB NTSTY MODUL RAD TX DLVR SMPL: CPT | Performed by: RADIOLOGY

## 2021-07-13 ENCOUNTER — APPOINTMENT (OUTPATIENT)
Dept: LAB | Facility: CLINIC | Age: 76
End: 2021-07-13
Payer: MEDICARE

## 2021-07-13 ENCOUNTER — APPOINTMENT (OUTPATIENT)
Dept: RADIATION ONCOLOGY | Facility: HOSPITAL | Age: 76
End: 2021-07-13
Attending: RADIOLOGY
Payer: MEDICARE

## 2021-07-13 DIAGNOSIS — C61 PROSTATE CANCER (HCC): ICD-10-CM

## 2021-07-13 DIAGNOSIS — C61 PROSTATE CANCER (HCC): Primary | ICD-10-CM

## 2021-07-13 LAB
BASOPHILS # BLD AUTO: 0.02 THOUSANDS/ΜL (ref 0–0.1)
BASOPHILS NFR BLD AUTO: 0 % (ref 0–1)
EOSINOPHIL # BLD AUTO: 0.05 THOUSAND/ΜL (ref 0–0.61)
EOSINOPHIL NFR BLD AUTO: 1 % (ref 0–6)
ERYTHROCYTE [DISTWIDTH] IN BLOOD BY AUTOMATED COUNT: 14.9 % (ref 11.6–15.1)
HCT VFR BLD AUTO: 28.9 % (ref 36.5–49.3)
HGB BLD-MCNC: 9.9 G/DL (ref 12–17)
IMM GRANULOCYTES # BLD AUTO: 0.04 THOUSAND/UL (ref 0–0.2)
IMM GRANULOCYTES NFR BLD AUTO: 1 % (ref 0–2)
LYMPHOCYTES # BLD AUTO: 0.76 THOUSANDS/ΜL (ref 0.6–4.47)
LYMPHOCYTES NFR BLD AUTO: 14 % (ref 14–44)
MCH RBC QN AUTO: 30 PG (ref 26.8–34.3)
MCHC RBC AUTO-ENTMCNC: 34.3 G/DL (ref 31.4–37.4)
MCV RBC AUTO: 88 FL (ref 82–98)
MONOCYTES # BLD AUTO: 0.46 THOUSAND/ΜL (ref 0.17–1.22)
MONOCYTES NFR BLD AUTO: 9 % (ref 4–12)
NEUTROPHILS # BLD AUTO: 4.06 THOUSANDS/ΜL (ref 1.85–7.62)
NEUTS SEG NFR BLD AUTO: 75 % (ref 43–75)
NRBC BLD AUTO-RTO: 0 /100 WBCS
PLATELET # BLD AUTO: 264 THOUSANDS/UL (ref 149–390)
PMV BLD AUTO: 8.9 FL (ref 8.9–12.7)
PSA SERPL-MCNC: 0.3 NG/ML (ref 0–4)
RBC # BLD AUTO: 3.3 MILLION/UL (ref 3.88–5.62)
WBC # BLD AUTO: 5.39 THOUSAND/UL (ref 4.31–10.16)

## 2021-07-13 PROCEDURE — 85025 COMPLETE CBC W/AUTO DIFF WBC: CPT

## 2021-07-13 PROCEDURE — 77385 HB NTSTY MODUL RAD TX DLVR SMPL: CPT | Performed by: RADIOLOGY

## 2021-07-13 PROCEDURE — 77014 CHG CT GUIDANCE PLACEMENT RAD THERAPY FIELDS: CPT | Performed by: RADIOLOGY

## 2021-07-13 PROCEDURE — 84153 ASSAY OF PSA TOTAL: CPT

## 2021-07-13 PROCEDURE — 36415 COLL VENOUS BLD VENIPUNCTURE: CPT

## 2021-07-14 ENCOUNTER — CONSULT (OUTPATIENT)
Dept: ENDOCRINOLOGY | Facility: CLINIC | Age: 76
End: 2021-07-14
Payer: MEDICARE

## 2021-07-14 ENCOUNTER — APPOINTMENT (OUTPATIENT)
Dept: RADIATION ONCOLOGY | Facility: HOSPITAL | Age: 76
End: 2021-07-14
Attending: RADIOLOGY
Payer: MEDICARE

## 2021-07-14 VITALS
WEIGHT: 168.2 LBS | DIASTOLIC BLOOD PRESSURE: 72 MMHG | HEART RATE: 76 BPM | HEIGHT: 71 IN | SYSTOLIC BLOOD PRESSURE: 130 MMHG | TEMPERATURE: 98 F | BODY MASS INDEX: 23.55 KG/M2

## 2021-07-14 DIAGNOSIS — I10 BENIGN ESSENTIAL HYPERTENSION: ICD-10-CM

## 2021-07-14 DIAGNOSIS — E78.5 DYSLIPIDEMIA: ICD-10-CM

## 2021-07-14 DIAGNOSIS — E16.2 HYPOGLYCEMIA: ICD-10-CM

## 2021-07-14 DIAGNOSIS — R63.4 WEIGHT LOSS: ICD-10-CM

## 2021-07-14 DIAGNOSIS — N18.2 CKD STAGE 2 DUE TO TYPE 2 DIABETES MELLITUS (HCC): ICD-10-CM

## 2021-07-14 DIAGNOSIS — G47.00 INSOMNIA, PERSISTENT: ICD-10-CM

## 2021-07-14 DIAGNOSIS — E11.22 CKD STAGE 2 DUE TO TYPE 2 DIABETES MELLITUS (HCC): ICD-10-CM

## 2021-07-14 DIAGNOSIS — E11.40 TYPE 2 DIABETES MELLITUS WITH DIABETIC NEUROPATHY, WITHOUT LONG-TERM CURRENT USE OF INSULIN (HCC): Primary | ICD-10-CM

## 2021-07-14 PROCEDURE — 77014 CHG CT GUIDANCE PLACEMENT RAD THERAPY FIELDS: CPT | Performed by: RADIOLOGY

## 2021-07-14 PROCEDURE — 77336 RADIATION PHYSICS CONSULT: CPT | Performed by: RADIOLOGY

## 2021-07-14 PROCEDURE — 99205 OFFICE O/P NEW HI 60 MIN: CPT | Performed by: INTERNAL MEDICINE

## 2021-07-14 PROCEDURE — 77385 HB NTSTY MODUL RAD TX DLVR SMPL: CPT | Performed by: RADIOLOGY

## 2021-07-14 RX ORDER — TRAZODONE HYDROCHLORIDE 50 MG/1
50 TABLET ORAL
COMMUNITY
End: 2021-07-21 | Stop reason: HOSPADM

## 2021-07-14 NOTE — PROGRESS NOTES
Mao Resendiz 67 y o  male MRN: [id number]    Encounter: [id number]  Referring Provider  Anthony Gandhi, Do  [address] [facility] [address],  [place] [address]    Assessment/Plan     1  Type 2 diabetes mellitus not on long-term insulin therapy with hyperglycemia   2  Hypoglycemia  3  CKD stage II   4  Neuropathy   poorly controlled with last A1c 12 2%  Based on blood sugar log, sugars have improved, now having hypoglycemia    Says jardiance is expensive   Discussed different medications that can be used for glycemic management-oral hypoglycemic agents as well as injectable insulin and non-insulin medications (GLP 1 agonist) along with risks, benefits, side effect profile  Recommend the following at this time  Continue metformin 1 g twice a day   Continue acarbose at current dose  Continue glimepiride 4 mg orally twice a day   Discontinue Jardiance     - check blood sugars qAC and HS   - referred for diabetes education/medical nutrition therapy   Send log in 2-3 weeks   -  Will inquire about coverage of S GLT 2 inhibitors, DPP 4 inhibitors  Ideally would avoid sulfonylureas in this elderly patient with comorbidities   Avoid GLP 1 agonist due to recent weight loss   - Repeat A1c, BMP in 3 months     5  Weight loss   6  Insomnia  -check thyroid function tests   -follow-up with primary care, will try melatonin     7  Hyperlipidemia  - continue statin therapy  Repeat fasting lipid panel in 3 months    8  Hypertension  Blood pressure at goal  - continue ACE-I/ ARB     9  Prostate cancer-following up with Urology    Follow up in 3months     CC: Diabetes    History of Present Illness     HPI:  Mao Resendiz is a 67 y o  male presents for a new visit regarding diabetes management  Also has a h/o Hypertension, hyperlipidemia, CKD, CAD, prostate cancer, GERD, neuropathy     DM history:   Diagnosed in 1994  Last Eye exam:     Current regimen:  The the be taking   metformin 1 g twice a day   jardiance 10 mg orally daily - started last month    acarbose 100 mg  3 times a day with meals  glimiperide 4 mg twice a day     Was out of metformin for 3 months and resumed in March    Statin:  lipitor   ACE-I/ARB:  lisinopril    Appetite is low; x 1 week   Met with nutritionist at [address]  Previously was not following a consistent carbohydrate diet    started radiation therapy or the week okay from you or the the physicianspy for prostate cancer last week   lost 13 lbs   Does not sleep well, feels jittery     Denies numbness or tingling in the hands or feet  Denies changes in vision  No known retinopathy  No chest pain/ SOB  Has diarrhea but better since he has been trying to eat better  Has increased frequqncy of utination but small volumes  Exercise:  [place], limited   C/o feeling tired    Home glucose monitoring: fasting only:  mg/dl   Symptoms of hypoglycemia :  None   2-3 years ago - hypoglycemia requiring third party intervention     All other systems were reviewed and are negative      Review of Systems      Historical Information   Past Medical History:   Diagnosis Date    Acquired hallux valgus     last assessed: 8/1/2013    Allergic rhinitis     Anemia 10/26/2010    Atrophy of nail     last assessed: 7/7/2015    Chronic kidney disease     chronic renal insufficiency    Coronary artery disease     Deformity of ankle and foot, acquired     last assessed: 2/22/2016    Diabetes mellitus [facility] [address] [facility]     Difficulty walking     last assessed: 12/14/2015    Dysesthesia     last assessed: 11/25/2016    Hammer toe     unspecified laterality; last assessed: 8/1/2013    Hypertension     Onychomycosis of toenail     last assessed: 2/22/2016    Peripheral vascular disease (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     last assessed: 8/15/2016    Type 2 diabetes mellitus ([facility] [address] ) 07/26/2010    Viral warts     last assessed: 7/24/2015     Past Surgical History:   Procedure Laterality Date    CARDIAC CATHETERIZATION  07/29/2010    CATARACT EXTRACTION, BILATERAL Bilateral     R 1999, L 2008    COLONOSCOPY  2011    FEMORAL ARTERY - POPLITEAL ARTERY BYPASS GRAFT  09/23/2013    FOOT SURGERY      bone spur removed    LEG SURGERY Bilateral     repair; L 8/20/2010 and 7/26/2012    MD PLACE RADIOTHER DEVICE/MARKER, PROSTATE N/A 6/22/2021    Procedure: INSERTION OF FIDUCIAL MARKER (TRANSRECTAL ULTRASOUND GUIDANCE), SPACEOAR;  Surgeon: Dontae Klein MD;  Location: [facility];  Service: Urology    ROTATOR CUFF REPAIR Left 2009    SHOULDER SURGERY Right 2005    collar bone     Social History   Social History     Substance and Sexual Activity   Alcohol Use Yes    Comment: being a social drinker     Social History     Substance and Sexual Activity   Drug Use No     Social History     Tobacco Use   Smoking Status Never Smoker   Smokeless Tobacco Never Used     Family History:   Family History   Problem Relation Age of Onset    Heart disease Father     Heart attack Father         acute myocardial infarction    Heart disease Sister     Heart attack Sister         acute myocardial infarction    Heart disease Paternal Grandfather     Aortic aneurysm Mother     Throat cancer Maternal Grandfather        Meds/Allergies   Current Outpatient Medications   Medication Sig Dispense Refill    acarbose (PRECOSE) 100 MG tablet TAKE 1 TABLET THREE TIMES DAILY WITH MEALS 270 tablet 1    Alphagan P 0 1 %       amLODIPine (NORVASC) 5 mg tablet Take 1 tablet (5 mg total) by mouth daily 90 tablet 1    aspirin (ECOTRIN LOW STRENGTH) 81 mg EC tablet Take 1 tablet (81 mg total) by mouth daily 30 tablet 6    atorvastatin (LIPITOR) 20 mg tablet TAKE 1 TABLET EVERY DAY 90 tablet 1    b complex-vitamin C-folic acid (NEPHROCAPS) 1 mg capsule Take 1 capsule by mouth daily      carvedilol (COREG) 12 5 mg tablet TAKE 1 TABLET TWICE DAILY 180 tablet 1    co-enzyme Q-10 100 mg capsule Take 100 mg by mouth daily      Empagliflozin (Jardiance) 10 MG TABS Take 1 tablet (10 mg total) by mouth every morning 30 tablet 5    gabapentin (NEURONTIN) 600 MG tablet Take 1 tablet (600 mg total) by mouth 2 (two) times a day 180 tablet 0    glimepiride (AMARYL) 4 mg tablet TAKE 1 TABLET TWICE DAILY 180 tablet 1    glucose blood (TRUETRACK TEST) test strip 1 each by Other route daily Use as instructed for E11 29 100 each 3    lisinopril (ZESTRIL) 2 5 mg tablet Take 1 tablet (2 5 mg total) by mouth daily 90 tablet 1    metFORMIN (GLUCOPHAGE) 1000 MG tablet Take 1 tablet (1,000 mg total) by mouth 2 (two) times a day with meals 180 tablet 1    mirtazapine (REMERON) 7 5 MG tablet Take 1 tablet (7 5 mg total) by mouth daily at bedtime 90 tablet 1    multivitamin (THERAGRAN) TABS Take 1 tablet by mouth daily      omeprazole (PriLOSEC) 40 MG capsule TAKE 1 CAPSULE EVERY DAY 90 capsule 1    Probiotic Product (CULTURELLE PROBIOTICS) CHEW Chew daily      traZODone (DESYREL) 50 mg tablet Take 50 mg by mouth daily at bedtime      TRUEPLUS LANCETS 28G MISC Test 1x/d, E11 29  0     No current facility-administered medications for this visit  No Known Allergies    Objective   Vitals: Blood pressure 130/72, pulse 76, temperature 98 °F (36 7 °C), height 5' 11" (1 803 m), weight 76 3 kg (168 lb 3 2 oz)  Physical Exam  Constitutional:       General: He is not in acute distress  Appearance: He is well-developed  He is not diaphoretic  HENT:      Head: Normocephalic and atraumatic  Eyes:      Conjunctiva/sclera: Conjunctivae normal       Pupils: Pupils are equal, round, and reactive to light  Cardiovascular:      Rate and Rhythm: Normal rate and regular rhythm  Heart sounds: Normal heart sounds  No murmur heard  Pulmonary:      Effort: Pulmonary effort is normal  No respiratory distress        Breath sounds: Normal breath sounds  No wheezing  Abdominal:      General: There is no distension  Palpations: Abdomen is soft  Tenderness: There is no abdominal tenderness  There is no guarding  Musculoskeletal:      Cervical back: Normal range of motion and neck supple  Right lower leg: Edema present  Left lower leg: Edema present  Skin:     General: Skin is warm and dry  Findings: No erythema or rash  Neurological:      Mental Status: He is alert and oriented to person, place, and time  Psychiatric:         Behavior: Behavior normal          Thought Content: Thought content normal          The history was obtained from the review of the chart, patient and family      Lab Results:   Lab Results   Component Value Date/Time    Hemoglobin A1C 12 2 (H) 06/03/2021 07:47 AM    Hemoglobin A1C 13 2 (H) 09/18/2020 09:19 AM    WBC 5 39 07/13/2021 11:11 AM    WBC 8 21 06/08/2021 09:13 AM    WBC 6 81 01/25/2021 01:00 PM    Hemoglobin 9 9 (L) 07/13/2021 11:11 AM    Hemoglobin 11 0 (L) 06/08/2021 09:13 AM    Hemoglobin 11 7 (L) 01/25/2021 01:00 PM    Hematocrit 28 9 (L) 07/13/2021 11:11 AM    Hematocrit 33 0 (L) 06/08/2021 09:13 AM    Hematocrit 37 4 01/25/2021 01:00 PM    MCV 88 07/13/2021 11:11 AM    MCV 88 06/08/2021 09:13 AM    MCV 93 01/25/2021 01:00 PM    Platelets 485 49/26/4155 11:11 AM    Platelets 795 48/34/7866 09:13 AM    Platelets 795 89/06/4198 01:00 PM    BUN 16 06/08/2021 09:13 AM    BUN 14 06/03/2021 07:47 AM    BUN 14 01/25/2021 01:00 PM    Potassium 4 2 06/08/2021 09:13 AM    Potassium 5 1 06/03/2021 07:47 AM    Potassium 4 9 01/25/2021 01:00 PM    Chloride 95 (L) 06/08/2021 09:13 AM    Chloride 100 06/03/2021 07:47 AM    Chloride 109 (H) 01/25/2021 01:00 PM    CO2 24 06/08/2021 09:13 AM    CO2 26 06/03/2021 07:47 AM    CO2 25 01/25/2021 01:00 PM    Creatinine 1 09 06/08/2021 09:13 AM    Creatinine 1 11 06/03/2021 07:47 AM    Creatinine 1 42 (H) 03/09/2021 11:08 AM    AST 18 06/08/2021 09:13 AM    AST 18 06/03/2021 07:47 AM    AST 22 09/18/2020 09:19 AM    ALT 27 06/08/2021 09:13 AM    ALT 26 06/03/2021 07:47 AM    ALT 36 09/18/2020 09:19 AM    Albumin 3 4 (L) 06/08/2021 09:13 AM    Albumin 3 3 (L) 06/03/2021 07:47 AM    Albumin 3 8 01/25/2021 01:00 PM    HDL, Direct 47 06/03/2021 07:47 AM    Triglycerides 92 06/03/2021 07:47 AM   lImaging Studies: I have personally reviewed pertinent reports  Portions of the record may have been created with voice recognition software  Occasional wrong word or "sound a like" substitutions may have occurred due to the inherent limitations of voice recognition software  Read the chart carefully and recognize, using context, where substitutions have occurred

## 2021-07-14 NOTE — PATIENT INSTRUCTIONS
Continue metformin 1 g twice a day   Continue acarbose at current dose  Continue glimepiride 4 mg orally twice a day   Discontinue Jardiance     Since Princses Cordova is expensive, please inquire about coverage of similar medications and with me know     Please call your insurance and inquire which of the following would be covered  - SGLT 2 inhibitors -jardiance, farxiga, Invokana, steglarto  - DDP4 inhibitor: Januvia, linagliptin, saxagliptin, alogliptin     please check blood sugars before meals and at bedtime keep a log -try to check at least twice a day at different times   Recent log for review in 2-3 weeks   Thyroid function tests to be checked with next set of labs  Follow-up in 3 months with repeat labs

## 2021-07-15 ENCOUNTER — APPOINTMENT (OUTPATIENT)
Dept: RADIATION ONCOLOGY | Facility: HOSPITAL | Age: 76
End: 2021-07-15
Attending: RADIOLOGY
Payer: MEDICARE

## 2021-07-15 DIAGNOSIS — E11.40 TYPE 2 DIABETES MELLITUS WITH DIABETIC NEUROPATHY, WITHOUT LONG-TERM CURRENT USE OF INSULIN (HCC): Primary | ICD-10-CM

## 2021-07-15 PROCEDURE — 77014 CHG CT GUIDANCE PLACEMENT RAD THERAPY FIELDS: CPT | Performed by: RADIOLOGY

## 2021-07-15 PROCEDURE — 77385 HB NTSTY MODUL RAD TX DLVR SMPL: CPT | Performed by: RADIOLOGY

## 2021-07-15 PROCEDURE — 77427 RADIATION TX MANAGEMENT X5: CPT | Performed by: RADIOLOGY

## 2021-07-16 ENCOUNTER — APPOINTMENT (OUTPATIENT)
Dept: RADIATION ONCOLOGY | Facility: HOSPITAL | Age: 76
End: 2021-07-16
Attending: RADIOLOGY
Payer: MEDICARE

## 2021-07-16 DIAGNOSIS — E11.40 TYPE 2 DIABETES MELLITUS WITH DIABETIC NEUROPATHY, WITHOUT LONG-TERM CURRENT USE OF INSULIN (HCC): ICD-10-CM

## 2021-07-16 PROCEDURE — 77014 CHG CT GUIDANCE PLACEMENT RAD THERAPY FIELDS: CPT | Performed by: RADIOLOGY

## 2021-07-16 PROCEDURE — 77385 HB NTSTY MODUL RAD TX DLVR SMPL: CPT | Performed by: RADIOLOGY

## 2021-07-16 NOTE — PROGRESS NOTES
Self Discharge     Patient has not participated in therapy for approximately 1 month, attempts to call back and schedule have previously been made but no scheduling has occurred  Patient will be considered a self discharge at this time and will be discharged from skilled PT services per hospital policy

## 2021-07-19 ENCOUNTER — APPOINTMENT (OUTPATIENT)
Dept: RADIATION ONCOLOGY | Facility: HOSPITAL | Age: 76
End: 2021-07-19
Attending: RADIOLOGY
Payer: MEDICARE

## 2021-07-19 PROCEDURE — 77385 HB NTSTY MODUL RAD TX DLVR SMPL: CPT | Performed by: RADIOLOGY

## 2021-07-19 PROCEDURE — 77014 CHG CT GUIDANCE PLACEMENT RAD THERAPY FIELDS: CPT | Performed by: RADIOLOGY

## 2021-07-20 ENCOUNTER — APPOINTMENT (OUTPATIENT)
Dept: LAB | Facility: CLINIC | Age: 76
End: 2021-07-20
Payer: MEDICARE

## 2021-07-20 ENCOUNTER — APPOINTMENT (OUTPATIENT)
Dept: RADIATION ONCOLOGY | Facility: HOSPITAL | Age: 76
End: 2021-07-20
Attending: RADIOLOGY
Payer: MEDICARE

## 2021-07-20 ENCOUNTER — HOSPITAL ENCOUNTER (INPATIENT)
Facility: HOSPITAL | Age: 76
LOS: 1 days | Discharge: HOME | DRG: 645 | End: 2021-07-21
Attending: EMERGENCY MEDICINE | Admitting: INTERNAL MEDICINE
Payer: MEDICARE

## 2021-07-20 ENCOUNTER — TELEPHONE (OUTPATIENT)
Dept: ENDOCRINOLOGY | Facility: CLINIC | Age: 76
End: 2021-07-20

## 2021-07-20 ENCOUNTER — TELEPHONE (OUTPATIENT)
Dept: RADIATION ONCOLOGY | Facility: HOSPITAL | Age: 76
End: 2021-07-20

## 2021-07-20 DIAGNOSIS — C61 PROSTATE CANCER (HCC): Primary | ICD-10-CM

## 2021-07-20 DIAGNOSIS — C61 PROSTATE CANCER (HCC): ICD-10-CM

## 2021-07-20 DIAGNOSIS — I73.9 PAD (PERIPHERAL ARTERY DISEASE) (HCC): ICD-10-CM

## 2021-07-20 DIAGNOSIS — G47.00 INSOMNIA, PERSISTENT: ICD-10-CM

## 2021-07-20 DIAGNOSIS — E87.1 HYPONATREMIA: Primary | ICD-10-CM

## 2021-07-20 DIAGNOSIS — R63.4 WEIGHT LOSS: ICD-10-CM

## 2021-07-20 PROBLEM — E83.42 HYPOMAGNESEMIA: Status: ACTIVE | Noted: 2021-07-20

## 2021-07-20 LAB
ALBUMIN SERPL BCP-MCNC: 3.3 G/DL (ref 3.5–5)
ALBUMIN SERPL BCP-MCNC: 3.6 G/DL (ref 3.5–5)
ALP SERPL-CCNC: 49 U/L (ref 46–116)
ALP SERPL-CCNC: 61 U/L (ref 46–116)
ALT SERPL W P-5'-P-CCNC: 25 U/L (ref 12–78)
ALT SERPL W P-5'-P-CCNC: 30 U/L (ref 12–78)
ANION GAP SERPL CALCULATED.3IONS-SCNC: 11 MMOL/L (ref 4–13)
ANION GAP SERPL CALCULATED.3IONS-SCNC: 9 MMOL/L (ref 4–13)
AST SERPL W P-5'-P-CCNC: 18 U/L (ref 5–45)
AST SERPL W P-5'-P-CCNC: 59 U/L (ref 5–45)
BASOPHILS # BLD AUTO: 0.01 THOUSANDS/ΜL (ref 0–0.1)
BASOPHILS # BLD AUTO: 0.02 THOUSANDS/ΜL (ref 0–0.1)
BASOPHILS NFR BLD AUTO: 0 % (ref 0–1)
BASOPHILS NFR BLD AUTO: 0 % (ref 0–1)
BILIRUB SERPL-MCNC: 0.71 MG/DL (ref 0.2–1)
BILIRUB SERPL-MCNC: 0.97 MG/DL (ref 0.2–1)
BILIRUB UR QL STRIP: NEGATIVE
BILIRUB UR QL STRIP: NEGATIVE
BUN SERPL-MCNC: 10 MG/DL (ref 5–25)
BUN SERPL-MCNC: 11 MG/DL (ref 5–25)
CALCIUM ALBUM COR SERPL-MCNC: 9.2 MG/DL (ref 8.3–10.1)
CALCIUM SERPL-MCNC: 8.6 MG/DL (ref 8.3–10.1)
CALCIUM SERPL-MCNC: 9 MG/DL (ref 8.3–10.1)
CHLORIDE SERPL-SCNC: 90 MMOL/L (ref 100–108)
CHLORIDE SERPL-SCNC: 92 MMOL/L (ref 100–108)
CLARITY UR: CLEAR
CLARITY UR: CLEAR
CO2 SERPL-SCNC: 22 MMOL/L (ref 21–32)
CO2 SERPL-SCNC: 23 MMOL/L (ref 21–32)
COLOR UR: ABNORMAL
COLOR UR: YELLOW
CREAT SERPL-MCNC: 0.77 MG/DL (ref 0.6–1.3)
CREAT SERPL-MCNC: 0.95 MG/DL (ref 0.6–1.3)
EOSINOPHIL # BLD AUTO: 0.18 THOUSAND/ΜL (ref 0–0.61)
EOSINOPHIL # BLD AUTO: 0.2 THOUSAND/ΜL (ref 0–0.61)
EOSINOPHIL NFR BLD AUTO: 3 % (ref 0–6)
EOSINOPHIL NFR BLD AUTO: 4 % (ref 0–6)
ERYTHROCYTE [DISTWIDTH] IN BLOOD BY AUTOMATED COUNT: 15 % (ref 11.6–15.1)
ERYTHROCYTE [DISTWIDTH] IN BLOOD BY AUTOMATED COUNT: 15.2 % (ref 11.6–15.1)
GFR SERPL CREATININE-BSD FRML MDRD: 77 ML/MIN/1.73SQ M
GFR SERPL CREATININE-BSD FRML MDRD: 88 ML/MIN/1.73SQ M
GLUCOSE SERPL-MCNC: 162 MG/DL (ref 65–140)
GLUCOSE SERPL-MCNC: 56 MG/DL (ref 65–140)
GLUCOSE SERPL-MCNC: 66 MG/DL (ref 65–140)
GLUCOSE SERPL-MCNC: 72 MG/DL (ref 65–140)
GLUCOSE UR STRIP-MCNC: ABNORMAL MG/DL
GLUCOSE UR STRIP-MCNC: ABNORMAL MG/DL
HCT VFR BLD AUTO: 27 % (ref 36.5–49.3)
HCT VFR BLD AUTO: 28.9 % (ref 36.5–49.3)
HGB BLD-MCNC: 9.3 G/DL (ref 12–17)
HGB BLD-MCNC: 9.7 G/DL (ref 12–17)
HGB UR QL STRIP.AUTO: NEGATIVE
HGB UR QL STRIP.AUTO: NEGATIVE
IMM GRANULOCYTES # BLD AUTO: 0.03 THOUSAND/UL (ref 0–0.2)
IMM GRANULOCYTES # BLD AUTO: 0.04 THOUSAND/UL (ref 0–0.2)
IMM GRANULOCYTES NFR BLD AUTO: 1 % (ref 0–2)
IMM GRANULOCYTES NFR BLD AUTO: 1 % (ref 0–2)
KETONES UR STRIP-MCNC: NEGATIVE MG/DL
KETONES UR STRIP-MCNC: NEGATIVE MG/DL
LEUKOCYTE ESTERASE UR QL STRIP: NEGATIVE
LEUKOCYTE ESTERASE UR QL STRIP: NEGATIVE
LIPASE SERPL-CCNC: 106 U/L (ref 73–393)
LYMPHOCYTES # BLD AUTO: 0.47 THOUSANDS/ΜL (ref 0.6–4.47)
LYMPHOCYTES # BLD AUTO: 0.54 THOUSANDS/ΜL (ref 0.6–4.47)
LYMPHOCYTES NFR BLD AUTO: 11 % (ref 14–44)
LYMPHOCYTES NFR BLD AUTO: 8 % (ref 14–44)
MAGNESIUM SERPL-MCNC: 1.5 MG/DL (ref 1.6–2.6)
MCH RBC QN AUTO: 29.9 PG (ref 26.8–34.3)
MCH RBC QN AUTO: 30.2 PG (ref 26.8–34.3)
MCHC RBC AUTO-ENTMCNC: 33.6 G/DL (ref 31.4–37.4)
MCHC RBC AUTO-ENTMCNC: 34.4 G/DL (ref 31.4–37.4)
MCV RBC AUTO: 88 FL (ref 82–98)
MCV RBC AUTO: 89 FL (ref 82–98)
MONOCYTES # BLD AUTO: 0.54 THOUSAND/ΜL (ref 0.17–1.22)
MONOCYTES # BLD AUTO: 0.61 THOUSAND/ΜL (ref 0.17–1.22)
MONOCYTES NFR BLD AUTO: 10 % (ref 4–12)
MONOCYTES NFR BLD AUTO: 11 % (ref 4–12)
NEUTROPHILS # BLD AUTO: 3.68 THOUSANDS/ΜL (ref 1.85–7.62)
NEUTROPHILS # BLD AUTO: 4.6 THOUSANDS/ΜL (ref 1.85–7.62)
NEUTS SEG NFR BLD AUTO: 73 % (ref 43–75)
NEUTS SEG NFR BLD AUTO: 78 % (ref 43–75)
NITRITE UR QL STRIP: NEGATIVE
NITRITE UR QL STRIP: NEGATIVE
NRBC BLD AUTO-RTO: 0 /100 WBCS
NRBC BLD AUTO-RTO: 0 /100 WBCS
OSMOLALITY UR/SERPL-RTO: 258 MMOL/KG (ref 282–298)
PH UR STRIP.AUTO: 6 [PH]
PH UR STRIP.AUTO: 6.5 [PH]
PLATELET # BLD AUTO: 204 THOUSANDS/UL (ref 149–390)
PLATELET # BLD AUTO: 219 THOUSANDS/UL (ref 149–390)
PMV BLD AUTO: 8.5 FL (ref 8.9–12.7)
PMV BLD AUTO: 8.7 FL (ref 8.9–12.7)
POTASSIUM SERPL-SCNC: 4.3 MMOL/L (ref 3.5–5.3)
POTASSIUM SERPL-SCNC: 5.3 MMOL/L (ref 3.5–5.3)
PROT SERPL-MCNC: 6.8 G/DL (ref 6.4–8.2)
PROT SERPL-MCNC: 6.8 G/DL (ref 6.4–8.2)
PROT UR STRIP-MCNC: NEGATIVE MG/DL
PROT UR STRIP-MCNC: NEGATIVE MG/DL
RBC # BLD AUTO: 3.08 MILLION/UL (ref 3.88–5.62)
RBC # BLD AUTO: 3.24 MILLION/UL (ref 3.88–5.62)
SODIUM SERPL-SCNC: 123 MMOL/L (ref 136–145)
SODIUM SERPL-SCNC: 124 MMOL/L (ref 136–145)
SP GR UR STRIP.AUTO: 1.01 (ref 1–1.03)
SP GR UR STRIP.AUTO: <=1.005 (ref 1–1.03)
T4 FREE SERPL-MCNC: 1.09 NG/DL (ref 0.76–1.46)
TROPONIN I SERPL-MCNC: <0.02 NG/ML
TSH SERPL DL<=0.05 MIU/L-ACNC: 1.39 UIU/ML (ref 0.36–3.74)
UROBILINOGEN UR QL STRIP.AUTO: 0.2 E.U./DL
UROBILINOGEN UR QL STRIP.AUTO: 0.2 E.U./DL
WBC # BLD AUTO: 4.98 THOUSAND/UL (ref 4.31–10.16)
WBC # BLD AUTO: 5.94 THOUSAND/UL (ref 4.31–10.16)

## 2021-07-20 PROCEDURE — 85025 COMPLETE CBC W/AUTO DIFF WBC: CPT

## 2021-07-20 PROCEDURE — 80053 COMPREHEN METABOLIC PANEL: CPT | Performed by: EMERGENCY MEDICINE

## 2021-07-20 PROCEDURE — 77014 CHG CT GUIDANCE PLACEMENT RAD THERAPY FIELDS: CPT | Performed by: RADIOLOGY

## 2021-07-20 PROCEDURE — 84484 ASSAY OF TROPONIN QUANT: CPT | Performed by: EMERGENCY MEDICINE

## 2021-07-20 PROCEDURE — 82948 REAGENT STRIP/BLOOD GLUCOSE: CPT

## 2021-07-20 PROCEDURE — 84439 ASSAY OF FREE THYROXINE: CPT

## 2021-07-20 PROCEDURE — 84300 ASSAY OF URINE SODIUM: CPT | Performed by: INTERNAL MEDICINE

## 2021-07-20 PROCEDURE — 84443 ASSAY THYROID STIM HORMONE: CPT

## 2021-07-20 PROCEDURE — 99284 EMERGENCY DEPT VISIT MOD MDM: CPT

## 2021-07-20 PROCEDURE — 99223 1ST HOSP IP/OBS HIGH 75: CPT | Performed by: INTERNAL MEDICINE

## 2021-07-20 PROCEDURE — 83735 ASSAY OF MAGNESIUM: CPT | Performed by: EMERGENCY MEDICINE

## 2021-07-20 PROCEDURE — 36415 COLL VENOUS BLD VENIPUNCTURE: CPT

## 2021-07-20 PROCEDURE — 81003 URINALYSIS AUTO W/O SCOPE: CPT

## 2021-07-20 PROCEDURE — 85025 COMPLETE CBC W/AUTO DIFF WBC: CPT | Performed by: EMERGENCY MEDICINE

## 2021-07-20 PROCEDURE — 81003 URINALYSIS AUTO W/O SCOPE: CPT | Performed by: EMERGENCY MEDICINE

## 2021-07-20 PROCEDURE — 83930 ASSAY OF BLOOD OSMOLALITY: CPT | Performed by: INTERNAL MEDICINE

## 2021-07-20 PROCEDURE — 99285 EMERGENCY DEPT VISIT HI MDM: CPT | Performed by: EMERGENCY MEDICINE

## 2021-07-20 PROCEDURE — 93005 ELECTROCARDIOGRAM TRACING: CPT

## 2021-07-20 PROCEDURE — 80048 BASIC METABOLIC PNL TOTAL CA: CPT | Performed by: NURSE PRACTITIONER

## 2021-07-20 PROCEDURE — 83690 ASSAY OF LIPASE: CPT | Performed by: EMERGENCY MEDICINE

## 2021-07-20 PROCEDURE — 80053 COMPREHEN METABOLIC PANEL: CPT

## 2021-07-20 PROCEDURE — 77385 HB NTSTY MODUL RAD TX DLVR SMPL: CPT | Performed by: RADIOLOGY

## 2021-07-20 RX ORDER — ACETAMINOPHEN 325 MG/1
650 TABLET ORAL EVERY 6 HOURS PRN
Status: DISCONTINUED | OUTPATIENT
Start: 2021-07-20 | End: 2021-07-21 | Stop reason: HOSPADM

## 2021-07-20 RX ORDER — ACARBOSE 50 MG/1
100 TABLET ORAL
Status: DISCONTINUED | OUTPATIENT
Start: 2021-07-20 | End: 2021-07-20

## 2021-07-20 RX ORDER — AMLODIPINE BESYLATE 5 MG/1
5 TABLET ORAL DAILY
Status: DISCONTINUED | OUTPATIENT
Start: 2021-07-21 | End: 2021-07-21 | Stop reason: HOSPADM

## 2021-07-20 RX ORDER — SODIUM CHLORIDE 9 MG/ML
50 INJECTION, SOLUTION INTRAVENOUS CONTINUOUS
Status: DISCONTINUED | OUTPATIENT
Start: 2021-07-20 | End: 2021-07-20

## 2021-07-20 RX ORDER — CHOLECALCIFEROL (VITAMIN D3) 125 MCG
100 CAPSULE ORAL DAILY
Status: DISCONTINUED | OUTPATIENT
Start: 2021-07-21 | End: 2021-07-21 | Stop reason: HOSPADM

## 2021-07-20 RX ORDER — ASPIRIN 81 MG/1
81 TABLET ORAL DAILY
Status: DISCONTINUED | OUTPATIENT
Start: 2021-07-21 | End: 2021-07-21 | Stop reason: HOSPADM

## 2021-07-20 RX ORDER — CARVEDILOL 12.5 MG/1
12.5 TABLET ORAL 2 TIMES DAILY
Status: DISCONTINUED | OUTPATIENT
Start: 2021-07-20 | End: 2021-07-21 | Stop reason: HOSPADM

## 2021-07-20 RX ORDER — BRIMONIDINE TARTRATE 2 MG/ML
1 SOLUTION/ DROPS OPHTHALMIC 3 TIMES DAILY
Status: DISCONTINUED | OUTPATIENT
Start: 2021-07-20 | End: 2021-07-21 | Stop reason: HOSPADM

## 2021-07-20 RX ORDER — BRIMONIDINE TARTRATE 2 MG/ML
1 SOLUTION/ DROPS OPHTHALMIC 3 TIMES DAILY
Status: DISCONTINUED | OUTPATIENT
Start: 2021-07-20 | End: 2021-07-20

## 2021-07-20 RX ORDER — GABAPENTIN 300 MG/1
600 CAPSULE ORAL 2 TIMES DAILY
Status: DISCONTINUED | OUTPATIENT
Start: 2021-07-20 | End: 2021-07-21 | Stop reason: HOSPADM

## 2021-07-20 RX ORDER — ACARBOSE 50 MG/1
100 TABLET ORAL
Status: DISCONTINUED | OUTPATIENT
Start: 2021-07-21 | End: 2021-07-21 | Stop reason: HOSPADM

## 2021-07-20 RX ORDER — ONDANSETRON 2 MG/ML
4 INJECTION INTRAMUSCULAR; INTRAVENOUS EVERY 6 HOURS PRN
Status: DISCONTINUED | OUTPATIENT
Start: 2021-07-20 | End: 2021-07-21 | Stop reason: HOSPADM

## 2021-07-20 RX ORDER — PANTOPRAZOLE SODIUM 20 MG/1
20 TABLET, DELAYED RELEASE ORAL
Status: DISCONTINUED | OUTPATIENT
Start: 2021-07-21 | End: 2021-07-21 | Stop reason: HOSPADM

## 2021-07-20 RX ORDER — LISINOPRIL 2.5 MG/1
2.5 TABLET ORAL DAILY
Status: DISCONTINUED | OUTPATIENT
Start: 2021-07-21 | End: 2021-07-21 | Stop reason: HOSPADM

## 2021-07-20 RX ORDER — ATORVASTATIN CALCIUM 20 MG/1
20 TABLET, FILM COATED ORAL
Status: DISCONTINUED | OUTPATIENT
Start: 2021-07-20 | End: 2021-07-21 | Stop reason: HOSPADM

## 2021-07-20 RX ORDER — FUROSEMIDE 10 MG/ML
20 INJECTION INTRAMUSCULAR; INTRAVENOUS ONCE
Status: COMPLETED | OUTPATIENT
Start: 2021-07-20 | End: 2021-07-20

## 2021-07-20 RX ORDER — MAGNESIUM SULFATE HEPTAHYDRATE 40 MG/ML
2 INJECTION, SOLUTION INTRAVENOUS ONCE
Status: COMPLETED | OUTPATIENT
Start: 2021-07-20 | End: 2021-07-21

## 2021-07-20 RX ADMIN — ATORVASTATIN CALCIUM 20 MG: 20 TABLET, FILM COATED ORAL at 17:55

## 2021-07-20 RX ADMIN — CARVEDILOL 12.5 MG: 12.5 TABLET, FILM COATED ORAL at 17:55

## 2021-07-20 RX ADMIN — SODIUM CHLORIDE 50 ML/HR: 0.9 INJECTION, SOLUTION INTRAVENOUS at 15:15

## 2021-07-20 RX ADMIN — FUROSEMIDE 20 MG: 10 INJECTION, SOLUTION INTRAMUSCULAR; INTRAVENOUS at 17:56

## 2021-07-20 RX ADMIN — MAGNESIUM SULFATE HEPTAHYDRATE 2 G: 40 INJECTION, SOLUTION INTRAVENOUS at 15:15

## 2021-07-20 RX ADMIN — GABAPENTIN 600 MG: 300 CAPSULE ORAL at 17:56

## 2021-07-20 NOTE — ED PROCEDURE NOTE
PROCEDURE  ECG 12 Lead Documentation Only    Date/Time: 7/20/2021 2:14 PM  Performed by: Cruz Ceron DO  Authorized by: Cruz Ceron DO     ECG reviewed by me, the ED Provider: yes    Patient location:  ED  Interpretation:     Interpretation: normal    Rate:     ECG rate:  71    ECG rate assessment: normal    Rhythm:     Rhythm: sinus rhythm    Ectopy:     Ectopy: none    ST segments:     ST segments:  Normal  T waves:     T waves: normal           Cruz Ceron DO  07/20/21 1414

## 2021-07-20 NOTE — ASSESSMENT & PLAN NOTE
Status post multivessel stenting  Stress test in 2018 showed no ischemia with normal LV systolic function

## 2021-07-20 NOTE — ASSESSMENT & PLAN NOTE
Lab Results   Component Value Date    HGBA1C 12 2 (H) 06/03/2021       Recent Labs     07/20/21  1353   POCGLU 72       Blood Sugar Average: Last 72 hrs:  (P) 72   Patient noted to have hypoglycemia this morning  Follows up with Endocrinology  Hold metformin, acarbose and glimepiride for now  Patient has been off [place]

## 2021-07-20 NOTE — H&P
Lucina 44  H&P- Jomar Diop [birth year], 67 y o  male MRN: [id number]  Unit/Bed#: [address] Encounter: [id number]  Primary Care Provider: Arelis Haile DO   Date and time admitted to hospital: 7/20/2021  1:43 PM    * Hyponatremia  Assessment & Plan  Patient's sodium has been in the normal range last year  In June patient's sodium level has been in the range of 126-131  Likely secondary to SIADH versus secondary to fluid overload  Patient will be placed on 1500 milliliters fluid restriction and will be given a dose of Lasix 20 milligram IV  BMP q 8 hours  Check urine sodium and osmolality    Hypomagnesemia  Assessment & Plan  Patient's magnesium level was 1 5  Patient received 2 grams of IV magnesium sulfate in the ED    Prostate cancer [facility])  Assessment & Plan  History of stage III prostate cancer-started on androgen deprivation therapy  Patient has been and radiation treatment    Benign essential hypertension  Assessment & Plan  Continue Norvasc 5 milligram p o  Daily, Coreg 12 5 milligram p o  B i d  and lisinopril 2 5 milligram p o   Daily    Dyslipidemia  Assessment & Plan  Continue statin    Type 2 diabetes mellitus with diabetic neuropathy [facility])  Assessment & Plan  Lab Results   Component Value Date    HGBA1C 12 2 (H) 06/03/2021       Recent Labs     07/20/21  1353   POCGLU 72       Blood Sugar Average: Last 72 hrs:  (P) 72   Continue Neurontin    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 mellitus ([facility] [address] )  Assessment & Plan  Lab Results   Component Value Date    HGBA1C 12 2 (H) 06/03/2021       Recent Labs     07/20/21  1353   POCGLU 72       Blood Sugar Average: Last 72 hrs:  (P) 72   Patient noted to have hypoglycemia this morning  Follows up with Endocrinology  Hold metformin, acarbose and glimepiride for now  Patient has been off Jardiance    CAD (coronary artery disease)  Assessment & Plan  Status post multivessel stenting  Stress test in 2018 showed no ischemia with normal LV systolic function    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 [facility])  Assessment & Plan  History of PID status post left SFA stent in 2010 following which patient had repeat procedure done for known right SFA stent stenosis status post balloon angioplasty  Outpatient follow-up with vascular      VTE Prophylaxis: Enoxaparin (Lovenox)  / sequential compression device   Code Status:  Full code  *    Anticipated Length of Stay:  Patient will be admitted on an Inpatient basis with an anticipated length of stay of  more than 2 midnights  Justification for Hospital Stay:  Hyponatremia    Total Time for Visit, including Counseling / Coordination of Care: 70 minutes  Greater than 50% of this total time spent on direct patient counseling and coordination of care  Chief Complaint:   Generalized weakness    History of Present Illness:    Yo Coles is a 67 y o  male with past medical history of CAD status post PCI, hypertension, diabetes with neuropathy, peripheral vascular disease, prostate cancer on radiation treatment who presents with generalized weakness  Patient reports he has been getting radiation treatment for his prostate cancer and patient had blood work which showed a sodium level of 123  Patient reports he drinks lot of water up to 24 ounces sided but has been eating poorly due to poor appetite  Patient might have lost about 30 pounds over the past few months  Patient denies any nausea, vomiting, diarrhea  Patient also reports bilateral leg swelling which is chronic  In the emergency room patient's vital signs were stable and labs showed a sodium level of 123    Review of Systems:    Review of Systems   Constitutional: Negative for chills, diaphoresis, fatigue and unexpected weight change     HENT: Negative for congestion, ear discharge, ear pain, facial swelling, hearing loss, mouth sores, nosebleeds, postnasal drip, rhinorrhea, sinus pressure, sneezing, sore throat, tinnitus, trouble swallowing and voice change  Eyes: Negative for photophobia, discharge, redness and visual disturbance  Respiratory: Negative for cough, chest tightness, shortness of breath, wheezing and stridor  Cardiovascular: Positive for leg swelling  Negative for chest pain and palpitations  Gastrointestinal: Negative for abdominal distention, abdominal pain, anal bleeding, blood in stool, constipation, diarrhea, nausea and vomiting  Endocrine: Negative for polydipsia, polyphagia and polyuria  Genitourinary: Negative for decreased urine volume, difficulty urinating, dysuria, flank pain, frequency, hematuria and urgency  Musculoskeletal: Negative for arthralgias, back pain and neck stiffness  Skin: Negative for pallor and rash  Neurological: Positive for weakness  Negative for dizziness, seizures, facial asymmetry, speech difficulty, light-headedness, numbness and headaches  Hematological: Negative for adenopathy  Does not bruise/bleed easily  Psychiatric/Behavioral: Negative for agitation and confusion         Past Medical and Surgical History:     Past Medical History:   Diagnosis Date    Acquired hallux valgus     last assessed: 8/1/2013    Allergic rhinitis     Anemia 10/26/2010    Atrophy of nail     last assessed: 7/7/2015    Chronic kidney disease     chronic renal insufficiency    Coronary artery disease     Deformity of ankle and foot, acquired     last assessed: 2/22/2016    Diabetes mellitus ([facility] )     Difficulty walking     last assessed: 12/14/2015    Dysesthesia     last assessed: 11/25/2016    Hammer toe     unspecified laterality; last assessed: 8/1/2013    Hypertension     Onychomycosis of toenail     last assessed: 2/22/2016    Peripheral vascular disease ([facility] )     left SFA stent in 2010    Pes planus     unspecified laterality; last assessed: 8/1/2013    Pneumonia     last assessed: 2/27/2016    Prostate cancer ([facility] )     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 [address] ) 07/26/2010    Viral warts     last assessed: 7/24/2015       Past Surgical History:   Procedure Laterality Date    CARDIAC CATHETERIZATION  07/29/2010    CATARACT EXTRACTION, BILATERAL Bilateral     R 1999, L 2008    COLONOSCOPY  2011    FEMORAL ARTERY - POPLITEAL ARTERY BYPASS GRAFT  09/23/2013    FOOT SURGERY      bone spur removed    LEG SURGERY Bilateral     repair; L 8/20/2010 and 7/26/2012    MO PLACE RADIOTHER DEVICE/MARKER, PROSTATE N/A 6/22/2021    Procedure: INSERTION OF FIDUCIAL MARKER (TRANSRECTAL ULTRASOUND GUIDANCE), SPACEOAR;  Surgeon: Grady Simmons MD;  Location: [facility];  Service: Urology    ROTATOR CUFF REPAIR Left 2009    SHOULDER SURGERY Right 2005    collar bone       Meds/Allergies:    Prior to Admission medications    Medication Sig Start Date End Date Taking?  Authorizing Provider   acarbose (PRECOSE) 100 MG tablet TAKE 1 TABLET THREE TIMES DAILY WITH MEALS 12/18/20  Yes Lorri Velazco,    Alphagan P 0 1 %  3/25/21  Yes Historical Provider, MD   amLODIPine (NORVASC) 5 mg tablet Take 1 tablet (5 mg total) by mouth daily 5/21/21  Yes Lorri Velazco DO   aspirin (ECOTRIN LOW STRENGTH) 81 mg EC tablet Take 1 tablet (81 mg total) by mouth daily 5/18/18  Yes Kacey Pina PA-C   atorvastatin (LIPITOR) 20 mg tablet TAKE 1 TABLET EVERY DAY 2/22/21  Yes Lorri Velazco DO   carvedilol (COREG) 12 5 mg tablet TAKE 1 TABLET TWICE DAILY 12/18/20  Yes Lorri Velazco DO   co-enzyme Q-10 100 mg capsule Take 100 mg by mouth daily   Yes Historical Provider, MD   glimepiride (AMARYL) 4 mg tablet TAKE 1 TABLET TWICE DAILY 2/3/21  Yes Lorri Velazco DO   glucose blood test strip Test blood sugar twice a day 7/16/21  Yes Marcello Dakin, MD   lisinopril (ZESTRIL) 2 5 mg tablet Take 1 tablet (2 5 mg total) by mouth daily 6/14/21  Yes Lorri Velaczo DO   metFORMIN (GLUCOPHAGE) 1000 MG tablet Take 1 tablet (1,000 mg total) by mouth 2 (two) times a day with meals 5/21/21  Yes Halima Sparks DO   multivitamin [facility]) TABS Take 1 tablet by mouth daily   Yes Historical Provider, MD   omeprazole (PriLOSEC) 40 MG capsule TAKE 1 CAPSULE EVERY DAY 6/10/21  Yes Halima Sparks DO   Probiotic Product ([employer]) CHEW Chew daily   Yes Historical Provider, MD   TRUEPLUS LANCETS 28G MISC Test 1x/d, E11 29 2/15/18  Yes Halima Sparks DO   b complex-vitamin C-folic acid (NEPHROCAPS) 1 mg capsule Take 1 capsule by mouth daily  Patient not taking: Reported on 7/20/2021    Historical Provider, MD   Empagliflozin (Jardiance) 10 MG TABS Take 1 tablet (10 mg total) by mouth every morning  Patient not taking: Reported on 7/20/2021 6/16/21   Halima Sparks DO   gabapentin (NEURONTIN) 600 MG tablet Take 1 tablet (600 mg total) by mouth 2 (two) times a day 2/9/21 7/14/21  Mayra Campbell DPM   mirtazapine (REMERON) 7 5 MG tablet Take 1 tablet (7 5 mg total) by mouth daily at bedtime  Patient not taking: Reported on 7/20/2021 6/16/21   Halima Sparks DO   traZODone (DESYREL) 50 mg tablet Take 50 mg by mouth daily at bedtime  Patient not taking: Reported on 7/20/2021    Historical Provider, MD     I have reviewed home medications with patient personally      Allergies: No Known Allergies    Social History:     Marital Status: /Civil Union     Substance Use History:   Social History     Substance and Sexual Activity   Alcohol Use Yes    Comment: being a social drinker     Social History     Tobacco Use   Smoking Status Never Smoker   Smokeless Tobacco Never Used     Social History     Substance and Sexual Activity   Drug Use No       Family History:    Family History   Problem Relation Age of Onset    Heart disease Father     Heart attack Father         acute myocardial infarction    Heart disease Sister     Heart attack Sister         acute myocardial infarction    Heart disease Paternal Grandfather     Aortic aneurysm Mother     Throat cancer Maternal Grandfather        Physical Exam:     Vitals:   Blood Pressure: 142/60 (07/20/21 1607)  Pulse: 83 (07/20/21 1607)  Temperature: 98 1 °F (36 7 °C) (07/20/21 1607)  Temp Source: Oral (07/20/21 1607)  Respirations: 17 (07/20/21 1607)  Height: 5' 11" (180 3 cm) (07/20/21 1350)  Weight - Scale: 78 2 kg (172 lb 6 4 oz) (07/20/21 1350)  SpO2: 98 % (07/20/21 1607)    Physical Exam  Constitutional:       Appearance: Normal appearance  HENT:      Head: Normocephalic and atraumatic  Eyes:      Extraocular Movements: Extraocular movements intact  Pupils: Pupils are equal, round, and reactive to light  Cardiovascular:      Rate and Rhythm: Normal rate and regular rhythm  Heart sounds: No murmur heard  No gallop  Pulmonary:      Effort: Pulmonary effort is normal       Breath sounds: Normal breath sounds  Abdominal:      General: Bowel sounds are normal       Palpations: Abdomen is soft  Tenderness: There is no abdominal tenderness  Musculoskeletal:         General: No swelling or deformity  Normal range of motion  Cervical back: Normal range of motion and neck supple  Right lower leg: Edema present  Left lower leg: Edema present  Comments: Bilateral +2 pedal edema   Skin:     General: Skin is warm and dry  Neurological:      General: No focal deficit present  Mental Status: He is alert             Additional Data:     Lab Results: I have personally reviewed pertinent films in PACS    Results from last 7 days   Lab Units 07/20/21  1421   WBC Thousand/uL 4 98   HEMOGLOBIN g/dL 9 3*   HEMATOCRIT % 27 0*   PLATELETS Thousands/uL 204   NEUTROS PCT % 73   LYMPHS PCT % 11*   MONOS PCT % 11   EOS PCT % 4     Results from last 7 days   Lab Units 07/20/21  1410   SODIUM mmol/L 123*   POTASSIUM mmol/L 5 3   CHLORIDE mmol/L 92*   CO2 mmol/L 22   BUN mg/dL 11   CREATININE mg/dL 0 77   ANION GAP mmol/L 9   CALCIUM mg/dL 8 6   ALBUMIN g/dL 3 3* TOTAL BILIRUBIN mg/dL 0 97   ALK PHOS U/L 49   ALT U/L 30   AST U/L 59*   GLUCOSE RANDOM mg/dL 66         Results from last 7 days   Lab Units 07/20/21  1353   POC GLUCOSE mg/dl 72               Imaging: I have personally reviewed pertinent reports  No orders to display       EKG, Pathology, and Other Studies Reviewed on Admission:   · EKG:  EKG showed normal sinus rhythm without any acute ST-T changes    Allscripts / Epic Records Reviewed: Yes     ** Please Note: This note has been constructed using a voice recognition system   **

## 2021-07-20 NOTE — TELEPHONE ENCOUNTER
Patient reported feeling weak at his radiation appt today, stated last night he was unable to get up off of the couch for a few hours  Blood work was ordered  Sodium 124 and glucose 56  Dr Adonis Dawson recommends he be evaluated in the ED  Relayed this to patient who is agreeable  He will go to [facility] ED

## 2021-07-20 NOTE — ASSESSMENT & PLAN NOTE
Lab Results   Component Value Date    HGBA1C 12 2 (H) 06/03/2021       Recent Labs     07/20/21  1353   POCGLU 72       Blood Sugar Average: Last 72 hrs:  (P) 72   Continue Neurontin

## 2021-07-20 NOTE — ASSESSMENT & PLAN NOTE
Patient's sodium has been in the normal range last year  In June patient's sodium level has been in the range of 126-131  Likely secondary to SIADH versus secondary to fluid overload  Patient will be placed on 1500 milliliters fluid restriction and will be given a dose of Lasix 20 milligram IV  BMP q 8 hours  Check urine sodium and osmolality

## 2021-07-20 NOTE — PLAN OF CARE
Problem: Potential for Falls  Goal: Patient will remain free of falls  Description: INTERVENTIONS:  - Educate patient/family on patient safety including physical limitations  - Instruct patient to call for assistance with activity   - Consult OT/PT to assist with strengthening/mobility   - Keep Call bell within reach  - Keep bed low and locked with side rails adjusted as appropriate  - Keep care items and personal belongings within reach  - Initiate and maintain comfort rounds  - Make Fall Risk Sign visible to staff  - Offer Toileting every x Hours, in advance of need  - Initiate/Maintain xalarm  - Obtain necessary fall risk management equipment: x  - Apply yellow socks and bracelet for high fall risk patients  - Consider moving patient to room near nurses station  Outcome: Progressing     Problem: MOBILITY - ADULT  Goal: Maintain or return to baseline ADL function  Description: INTERVENTIONS:  -  Assess patient's ability to carry out ADLs; assess patient's baseline for ADL function and identify physical deficits which impact ability to perform ADLs (bathing, care of mouth/teeth, toileting, grooming, dressing, etc )  - Assess/evaluate cause of self-care deficits   - Assess range of motion  - Assess patient's mobility; develop plan if impaired  - Assess patient's need for assistive devices and provide as appropriate  - Encourage maximum independence but intervene and supervise when necessary  - Involve family in performance of ADLs  - Assess for home care needs following discharge   - Consider OT consult to assist with ADL evaluation and planning for discharge  - Provide patient education as appropriate  Outcome: Progressing  Goal: Maintains/Returns to pre admission functional level  Description: INTERVENTIONS:  - Perform BMAT or MOVE assessment daily    - Set and communicate daily mobility goal to care team and patient/family/caregiver     - Collaborate with rehabilitation services on mobility goals if consulted  - Perform Range of Motion x times a day  - Reposition patient every x hours    - Dangle patient x times a day  - Stand patient x times a day  - Ambulate patient x times a day  - Out of bed to chair x times a day   - Out of bed for meals x times a day  - Out of bed for toileting  - Record patient progress and toleration of activity level   Outcome: Progressing

## 2021-07-20 NOTE — ASSESSMENT & PLAN NOTE
History of stage III prostate cancer-started on androgen deprivation therapy  Patient has been and radiation treatment

## 2021-07-20 NOTE — TELEPHONE ENCOUNTER
[facility]  KeyMendel Abbe - PA Case ID: [id number] - Rx #: [id number]  [employer] denied your request under your Prescription Drug benefit (Medicare Part D) supplies may be covered under your Part B Medicare benefit  Will call pharmacy to notify them

## 2021-07-20 NOTE — ASSESSMENT & PLAN NOTE
Continue Norvasc 5 milligram p o  Daily, Coreg 12 5 milligram p o  B i d  and lisinopril 2 5 milligram p o   Daily

## 2021-07-20 NOTE — ASSESSMENT & PLAN NOTE
History of PID status post left SFA stent in 2010 following which patient had repeat procedure done for known right SFA stent stenosis status post balloon angioplasty  Outpatient follow-up with vascular

## 2021-07-21 ENCOUNTER — TELEPHONE (OUTPATIENT)
Dept: ENDOCRINOLOGY | Facility: CLINIC | Age: 76
End: 2021-07-21

## 2021-07-21 ENCOUNTER — TELEPHONE (OUTPATIENT)
Dept: FAMILY MEDICINE CLINIC | Facility: CLINIC | Age: 76
End: 2021-07-21

## 2021-07-21 ENCOUNTER — TRANSITIONAL CARE MANAGEMENT (OUTPATIENT)
Dept: FAMILY MEDICINE CLINIC | Facility: CLINIC | Age: 76
End: 2021-07-21

## 2021-07-21 ENCOUNTER — APPOINTMENT (OUTPATIENT)
Dept: RADIATION ONCOLOGY | Facility: HOSPITAL | Age: 76
End: 2021-07-21
Attending: RADIOLOGY
Payer: MEDICARE

## 2021-07-21 VITALS
DIASTOLIC BLOOD PRESSURE: 59 MMHG | HEIGHT: 71 IN | WEIGHT: 172.4 LBS | RESPIRATION RATE: 18 BRPM | HEART RATE: 80 BPM | SYSTOLIC BLOOD PRESSURE: 136 MMHG | OXYGEN SATURATION: 97 % | TEMPERATURE: 97.8 F | BODY MASS INDEX: 24.14 KG/M2

## 2021-07-21 DIAGNOSIS — I73.9 PAD (PERIPHERAL ARTERY DISEASE) (HCC): ICD-10-CM

## 2021-07-21 LAB
ANION GAP SERPL CALCULATED.3IONS-SCNC: 10 MMOL/L (ref 4–13)
ANION GAP SERPL CALCULATED.3IONS-SCNC: 10 MMOL/L (ref 4–13)
BUN SERPL-MCNC: 6 MG/DL (ref 5–25)
BUN SERPL-MCNC: 9 MG/DL (ref 5–25)
CALCIUM SERPL-MCNC: 8.3 MG/DL (ref 8.3–10.1)
CALCIUM SERPL-MCNC: 8.7 MG/DL (ref 8.3–10.1)
CHLORIDE SERPL-SCNC: 93 MMOL/L (ref 100–108)
CHLORIDE SERPL-SCNC: 98 MMOL/L (ref 100–108)
CO2 SERPL-SCNC: 23 MMOL/L (ref 21–32)
CO2 SERPL-SCNC: 24 MMOL/L (ref 21–32)
CREAT SERPL-MCNC: 0.77 MG/DL (ref 0.6–1.3)
CREAT SERPL-MCNC: 0.8 MG/DL (ref 0.6–1.3)
GFR SERPL CREATININE-BSD FRML MDRD: 87 ML/MIN/1.73SQ M
GFR SERPL CREATININE-BSD FRML MDRD: 88 ML/MIN/1.73SQ M
GLUCOSE SERPL-MCNC: 135 MG/DL (ref 65–140)
GLUCOSE SERPL-MCNC: 170 MG/DL (ref 65–140)
POTASSIUM SERPL-SCNC: 3.9 MMOL/L (ref 3.5–5.3)
POTASSIUM SERPL-SCNC: 4 MMOL/L (ref 3.5–5.3)
SODIUM 24H UR-SCNC: 23 MOL/L
SODIUM SERPL-SCNC: 126 MMOL/L (ref 136–145)
SODIUM SERPL-SCNC: 132 MMOL/L (ref 136–145)

## 2021-07-21 PROCEDURE — 99239 HOSP IP/OBS DSCHRG MGMT >30: CPT | Performed by: INTERNAL MEDICINE

## 2021-07-21 PROCEDURE — 80048 BASIC METABOLIC PNL TOTAL CA: CPT | Performed by: INTERNAL MEDICINE

## 2021-07-21 RX ORDER — BRIMONIDINE TARTRATE 2 MG/ML
1 SOLUTION/ DROPS OPHTHALMIC 2 TIMES DAILY
COMMUNITY
Start: 2021-07-19 | End: 2021-08-12 | Stop reason: SDUPTHER

## 2021-07-21 RX ORDER — FUROSEMIDE 20 MG/1
20 TABLET ORAL DAILY
Status: DISCONTINUED | OUTPATIENT
Start: 2021-07-21 | End: 2021-07-21 | Stop reason: HOSPADM

## 2021-07-21 RX ORDER — ATORVASTATIN CALCIUM 20 MG/1
20 TABLET, FILM COATED ORAL DAILY
Qty: 90 TABLET | Refills: 0 | Status: SHIPPED | OUTPATIENT
Start: 2021-07-21 | End: 2021-08-12 | Stop reason: SDUPTHER

## 2021-07-21 RX ORDER — ATORVASTATIN CALCIUM 20 MG/1
TABLET, FILM COATED ORAL
Qty: 90 TABLET | Refills: 1 | Status: SHIPPED | OUTPATIENT
Start: 2021-07-21 | End: 2021-07-21 | Stop reason: HOSPADM

## 2021-07-21 RX ORDER — FUROSEMIDE 20 MG/1
20 TABLET ORAL DAILY
Qty: 30 TABLET | Refills: 0 | Status: SHIPPED | OUTPATIENT
Start: 2021-07-22 | End: 2021-07-31 | Stop reason: HOSPADM

## 2021-07-21 RX ADMIN — GABAPENTIN 600 MG: 300 CAPSULE ORAL at 08:53

## 2021-07-21 RX ADMIN — ENOXAPARIN SODIUM 40 MG: 40 INJECTION SUBCUTANEOUS at 08:54

## 2021-07-21 RX ADMIN — FUROSEMIDE 20 MG: 20 TABLET ORAL at 10:42

## 2021-07-21 RX ADMIN — Medication 100 MG: at 09:08

## 2021-07-21 RX ADMIN — PANTOPRAZOLE SODIUM 20 MG: 20 TABLET, DELAYED RELEASE ORAL at 05:44

## 2021-07-21 RX ADMIN — ASPIRIN 81 MG: 81 TABLET, COATED ORAL at 08:52

## 2021-07-21 RX ADMIN — CARVEDILOL 12.5 MG: 12.5 TABLET, FILM COATED ORAL at 08:53

## 2021-07-21 RX ADMIN — LISINOPRIL 2.5 MG: 2.5 TABLET ORAL at 08:54

## 2021-07-21 RX ADMIN — AMLODIPINE BESYLATE 5 MG: 5 TABLET ORAL at 08:52

## 2021-07-21 NOTE — DISCHARGE SUMMARY
Lucina 44  Discharge- Jack Duarte [birth year], 67 y o  male MRN: [id number]  Unit/Bed#: [facility] Encounter: [id number]  Primary Care Provider: Des Patel DO   Date and time admitted to hospital: 7/20/2021  1:43 PM    * Hyponatremia  Assessment & Plan  Patient's sodium has been in the normal range last year  In June patient's sodium level has been in the range of 126-131  Likely secondary to SIADH versus secondary to fluid overload  Patient will be placed on 1500 milliliters fluid restriction and will be given a dose of Lasix 20 milligram IV high on admission  Sodium level has improved   urine sodium was 23 and serum osmolality was 298  Patient to be discharged on Lasix 20 milligram p o  Daily with fluid restriction and follow-up BMP in 5 days  Results from last 7 days   Lab Units 07/21/21  0606 07/20/21  2315 07/20/21  1410 07/20/21  0948   SODIUM mmol/L 132* 126* 123* 124*         Hypomagnesemia  Assessment & Plan  Patient's magnesium level was 1 5  Patient received 2 grams of IV magnesium sulfate in the ED    Prostate cancer [facility])  Assessment & Plan  History of stage III prostate cancer-started on androgen deprivation therapy  Patient has been on radiation treatment    Benign essential hypertension  Assessment & Plan  Continue Norvasc 5 milligram p o  Daily, Coreg 12 5 milligram p o  B i d  and lisinopril 2 5 milligram p o   Daily    Dyslipidemia  Assessment & Plan  Continue statin    Type 2 diabetes mellitus with diabetic neuropathy [facility])  Assessment & Plan  Lab Results   Component Value Date    HGBA1C 12 2 (H) 06/03/2021       Recent Labs     07/20/21  1353 07/20/21 2054   POCGLU 72 162*       Blood Sugar Average: Last 72 hrs:  (P) 117   Continue Neurontin    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 mellitus [facility])  Assessment & Plan  Lab Results   Component Value Date    HGBA1C 12 2 (H) 06/03/2021       Recent Labs     07/20/21  1353 07/20/21  2054   POCGLU 72 162*       Blood Sugar Average: Last 72 hrs:  (P) 117   Patient noted to have hypoglycemia yesterday morning  Follows up with Endocrinology  Continue metformin Acarbose and glimepiride with close monitoring of blood sugars at home  Patient has been off Jardiance    CAD (coronary artery disease)  Assessment & Plan  Status post multivessel stenting  Stress test in 2018 showed no ischemia with normal LV systolic function    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 [facility])  Assessment & Plan  History of PID status post left SFA stent in 2010 following which patient had repeat procedure done for known right SFA stent stenosis status post balloon angioplasty  Outpatient follow-up with vascular surgery        Discharging Physician / Practitioner: Sánchez Gautam MD  PCP: Dalia Colon DO  Admission Date:   Admission Orders (From admission, onward)     Ordered        07/20/21 [address]  Once                   Discharge Date: 07/21/21    Medical Problems     Resolved Problems  Date Reviewed: 7/21/2021    None                 Outpatient Tests Requested:  · BMP in 5 days and follow-up with PCP    Complications:  None    Reason for Admission:  Weakness and abnormal labs    Hospital Course:     Marlon Castro is a 67 y o  male patient with past medical history of CAD status post PCI, hypertension, diabetes, PVD, prostate cancer who originally presented to the hospital on 7/20/2021 due to does weakness and abnormal labs  Patient's sodium level was noted to be 123  Patient was placed on fluid restriction received Lasix with significant improvement of leg swelling and improved sodium level  Nephrology was consulted and was later later discontinued  Patient to be discharged on Lasix 20 milligram p o  Daily and fluid restriction with follow-up BMP in 5 days        Please see above list of diagnoses and related plan for additional information       Condition at Discharge: stable     Discharge Day Visit / Exam:     Subjective:  Patient is feeling well  Appetite is also much better today and patient had is hold breakfast   Improved leg swelling  Vitals: Blood Pressure: 136/59 (07/21/21 0800)  Pulse: 80 (07/21/21 0800)  Temperature: 97 8 °F (36 6 °C) (07/21/21 0800)  Temp Source: Oral (07/21/21 0800)  Respirations: 18 (07/21/21 0800)  Height: 5' 11" (180 3 cm) (07/20/21 1350)  Weight - Scale: 78 2 kg (172 lb 6 4 oz) (07/20/21 1350)  SpO2: 97 % (07/21/21 0800)  Exam:   Physical Exam  Constitutional:       Appearance: Normal appearance  HENT:      Head: Normocephalic and atraumatic  Eyes:      Extraocular Movements: Extraocular movements intact  Pupils: Pupils are equal, round, and reactive to light  Cardiovascular:      Rate and Rhythm: Normal rate and regular rhythm  Heart sounds: No murmur heard  No gallop  Pulmonary:      Effort: Pulmonary effort is normal       Breath sounds: Normal breath sounds  Abdominal:      General: Bowel sounds are normal       Palpations: Abdomen is soft  Tenderness: There is no abdominal tenderness  Musculoskeletal:         General: No swelling or deformity  Normal range of motion  Cervical back: Normal range of motion and neck supple  Right lower leg: Edema present  Left lower leg: Edema present  Comments: Trace edema   Skin:     General: Skin is warm and dry  Neurological:      General: No focal deficit present  Mental Status: He is alert  Discussion with Family:  Wife at bedside in detail    Discharge instructions/Information to patient and family:   See after visit summary for information provided to patient and family  Provisions for Follow-Up Care:  See after visit summary for information related to follow-up care and any pertinent home health orders  Disposition:     Home    Planned Readmission: No     Discharge Statement:  I spent 35 minutes discharging the patient   This time was spent on the day of discharge  I had direct contact with the patient on the day of discharge  Greater than 50% of the total time was spent examining patient, answering all patient questions, arranging and discussing plan of care with patient as well as directly providing post-discharge instructions  Additional time then spent on discharge activities  Discharge Medications:  See after visit summary for reconciled discharge medications provided to patient and family        ** Please Note: This note has been constructed using a voice recognition system **

## 2021-07-21 NOTE — ASSESSMENT & PLAN NOTE
Lab Results   Component Value Date    HGBA1C 12 2 (H) 06/03/2021       Recent Labs     07/20/21  1353 07/20/21 2054   POCGLU 72 162*       Blood Sugar Average: Last 72 hrs:  (P) 117   Patient noted to have hypoglycemia yesterday morning  Follows up with Endocrinology  Continue metformin Acarbose and glimepiride with close monitoring of blood sugars at home    Patient has been off [place]

## 2021-07-21 NOTE — TELEPHONE ENCOUNTER
----- Message from Mila Bruno MD sent at 7/21/2021 11:21 AM EDT -----  Thank you for allowing us to participate in the care of your patient, Luly Schmitz, who was hospitalized from 7/20/2021 through 7/21/2021 with the admitting diagnosis of hyponatremia thought to be secondary to SIADH  Patient was treated with fluid restriction and Lasix with improved sodium level  Patient will be discharged on p o  Lasix and fluid restriction with follow-up BMP in 5 days  If you have any additional questions or would like to discuss further, please feel free to contact me      Mila Bruno MD  [place] [address] Internal Medicine, Hospitalist  [phone]

## 2021-07-21 NOTE — ASSESSMENT & PLAN NOTE
Lab Results   Component Value Date    HGBA1C 12 2 (H) 06/03/2021       Recent Labs     07/20/21  1353 07/20/21 2054   POCGLU 72 162*       Blood Sugar Average: Last 72 hrs:  (P) 117   Continue Neurontin

## 2021-07-21 NOTE — ASSESSMENT & PLAN NOTE
Patient's sodium has been in the normal range last year  In June patient's sodium level has been in the range of 126-131  Likely secondary to SIADH versus secondary to fluid overload  Patient will be placed on 1500 milliliters fluid restriction and will be given a dose of Lasix 20 milligram IV high on admission  Sodium level has improved   urine sodium was 23 and serum osmolality was 298  Patient to be discharged on Lasix 20 milligram p o   Daily with fluid restriction and follow-up BMP in 5 days  Results from last 7 days   Lab Units 07/21/21  0606 07/20/21  2315 07/20/21  1410 07/20/21  0948   SODIUM mmol/L 132* 126* 123* 124*

## 2021-07-21 NOTE — ASSESSMENT & PLAN NOTE
History of stage III prostate cancer-started on androgen deprivation therapy  Patient has been on radiation treatment

## 2021-07-21 NOTE — PLAN OF CARE
Problem: Potential for Falls  Goal: Patient will remain free of falls  Description: INTERVENTIONS:  - Educate patient/family on patient safety including physical limitations  - Instruct patient to call for assistance with activity   - Consult OT/PT to assist with strengthening/mobility   - Keep Call bell within reach  - Keep bed low and locked with side rails adjusted as appropriate  - Keep care items and personal belongings within reach  - Initiate and maintain comfort rounds  - Make Fall Risk Sign visible to staff  - Offer Toileting every 2 Hours, in advance of need  - Initiate/Maintain bed alarm  - Obtain necessary fall risk management equipment: walker  - Apply yellow socks and bracelet for high fall risk patients  - Consider moving patient to room near nurses station  Outcome: Progressing     Problem: MOBILITY - ADULT  Goal: Maintain or return to baseline ADL function  Description: INTERVENTIONS:  -  Assess patient's ability to carry out ADLs; assess patient's baseline for ADL function and identify physical deficits which impact ability to perform ADLs (bathing, care of mouth/teeth, toileting, grooming, dressing, etc )  - Assess/evaluate cause of self-care deficits   - Assess range of motion  - Assess patient's mobility; develop plan if impaired  - Assess patient's need for assistive devices and provide as appropriate  - Encourage maximum independence but intervene and supervise when necessary  - Involve family in performance of ADLs  - Assess for home care needs following discharge   - Consider OT consult to assist with ADL evaluation and planning for discharge  - Provide patient education as appropriate  Outcome: Progressing  Goal: Maintains/Returns to pre admission functional level  Description: INTERVENTIONS:  - Perform BMAT or MOVE assessment daily    - Set and communicate daily mobility goal to care team and patient/family/caregiver     - Collaborate with rehabilitation services on mobility goals if consulted  - Perform Range of Motion 2 times a day      - Dangle patient 3 times a day  - Stand patient 3 times a day    - Out of bed to chair 3 times a day   - Out of bed for meals 3 times a day  - Out of bed for toileting  - Record patient progress and toleration of activity level   Outcome: Progressing

## 2021-07-21 NOTE — TELEPHONE ENCOUNTER
Pt's wife called  Informed of lab results  she verbalized understanding     She wanted to let Dr Corinne Guard know pt admitted to hospital on 7/20/21

## 2021-07-21 NOTE — ASSESSMENT & PLAN NOTE
History of PID status post left SFA stent in 2010 following which patient had repeat procedure done for known right SFA stent stenosis status post balloon angioplasty  Outpatient follow-up with vascular surgery

## 2021-07-21 NOTE — TELEPHONE ENCOUNTER
----- Message from Roxi Zamarripa MD sent at 7/20/2021  4:12 PM EDT -----  Thyroid function tests within normal limits

## 2021-07-22 ENCOUNTER — APPOINTMENT (OUTPATIENT)
Dept: RADIATION ONCOLOGY | Facility: HOSPITAL | Age: 76
End: 2021-07-22
Attending: RADIOLOGY
Payer: MEDICARE

## 2021-07-22 NOTE — ED PROVIDER NOTES
History  Chief Complaint   Patient presents with    Abnormal Lab     pt reports abnormal labs from [facility] during radiation  pt report of weakness of B/L legs     Patient presents for evaluation of abnormal blood work  Patient was [facility] in [place] is receiving radiation treatment  He reporting weakness and trouble standing secondary to this weakness  They did blood work outpatient which showed hyponatremia and advised to come to the ER for further evaluation  Patient states he has been getting progressively weaker for several days now  Denies any pain  Poor oral intake  Not eating much solid foods with drinking electrolyte water such as propel  History provided by:  Patient and spouse   used: No        Prior to Admission Medications   Prescriptions Last Dose Informant Patient Reported? Taking?    Alphagan P 0 1 % 7/20/2021 at Unknown time Self Yes Yes   Patient not taking: Reported on 7/21/2021   Empagliflozin (Jardiance) 10 MG TABS Not Taking at Unknown time  No No   Sig: Take 1 tablet (10 mg total) by mouth every morning   Patient not taking: Reported on 7/20/2021   Probiotic Product (CULTURELLE PROBIOTICS) CHEW 7/20/2021 at Unknown time Self Yes Yes   Sig: Chew daily   TRUEPLUS LANCETS 28G MISC 7/20/2021 at Unknown time Self No Yes   Sig: Test 1x/d, E11 29   acarbose (PRECOSE) 100 MG tablet 7/20/2021 at Unknown time Self No Yes   Sig: TAKE 1 TABLET THREE TIMES DAILY WITH MEALS   amLODIPine (NORVASC) 5 mg tablet 7/20/2021 at Unknown time Self No Yes   Sig: Take 1 tablet (5 mg total) by mouth daily   aspirin (ECOTRIN LOW STRENGTH) 81 mg EC tablet 7/20/2021 at Unknown time Self No Yes   Sig: Take 1 tablet (81 mg total) by mouth daily   atorvastatin (LIPITOR) 20 mg tablet 7/19/2021 at Unknown time Self No Yes   Sig: TAKE 1 TABLET EVERY DAY   b complex-vitamin C-folic acid (NEPHROCAPS) 1 mg capsule Not Taking at Unknown time Self Yes No   Sig: Take 1 capsule by mouth daily Patient not taking: Reported on 7/20/2021   carvedilol (COREG) 12 5 mg tablet 7/20/2021 at Unknown time Self No Yes   Sig: TAKE 1 TABLET TWICE DAILY   co-enzyme Q-10 100 mg capsule 7/20/2021 at Unknown time Self Yes Yes   Sig: Take 100 mg by mouth daily   gabapentin (NEURONTIN) 600 MG tablet  Self No No   Sig: Take 1 tablet (600 mg total) by mouth 2 (two) times a day   glimepiride (AMARYL) 4 mg tablet 7/20/2021 at Unknown time Self No Yes   Sig: TAKE 1 TABLET TWICE DAILY   glucose blood test strip 7/20/2021 at Unknown time  No Yes   Sig: Test blood sugar twice a day   lisinopril (ZESTRIL) 2 5 mg tablet 7/19/2021 at Unknown time  No Yes   Sig: Take 1 tablet (2 5 mg total) by mouth daily   metFORMIN (GLUCOPHAGE) 1000 MG tablet 7/20/2021 at Unknown time Self No Yes   Sig: Take 1 tablet (1,000 mg total) by mouth 2 (two) times a day with meals   mirtazapine (REMERON) 7 5 MG tablet Not Taking at Unknown time  No No   Sig: Take 1 tablet (7 5 mg total) by mouth daily at bedtime   Patient not taking: Reported on 7/20/2021   multivitamin (THERAGRAN) TABS 7/20/2021 at Unknown time Self Yes Yes   Sig: Take 1 tablet by mouth daily   omeprazole (PriLOSEC) 40 MG capsule 7/20/2021 at Unknown time  No Yes   Sig: TAKE 1 CAPSULE EVERY DAY   traZODone (DESYREL) 50 mg tablet Not Taking at Unknown time  Yes No   Sig: Take 50 mg by mouth daily at bedtime   Patient not taking: Reported on 7/20/2021      Facility-Administered Medications: None       Past Medical History:   Diagnosis Date    Acquired hallux valgus     last assessed: 8/1/2013    Allergic rhinitis     Anemia 10/26/2010    Atrophy of nail     last assessed: 7/7/2015    Chronic kidney disease     chronic renal insufficiency    Coronary artery disease     Deformity of ankle and foot, acquired     last assessed: 2/22/2016    Diabetes mellitus ([facility] [address] )     Difficulty walking     last assessed: 12/14/2015    Dysesthesia     last assessed: 11/25/2016    Hammer toe unspecified laterality; last assessed: 8/1/2013    Hypertension     Onychomycosis of toenail     last assessed: 2/22/2016    Peripheral vascular disease ([facility] )     left SFA stent in 2010    Pes planus     unspecified laterality; last assessed: 8/1/2013    Pneumonia     last assessed: 2/27/2016    Prostate cancer ([facility] )     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     last assessed: 8/15/2016    Type 2 diabetes mellitus ([facility] ) 07/26/2010    Viral warts     last assessed: 7/24/2015       Past Surgical History:   Procedure Laterality Date    CARDIAC CATHETERIZATION  07/29/2010    CATARACT EXTRACTION, BILATERAL Bilateral     R 1999, L 2008    COLONOSCOPY  2011    FEMORAL ARTERY - POPLITEAL ARTERY BYPASS GRAFT  09/23/2013    FOOT SURGERY      bone spur removed    LEG SURGERY Bilateral     repair; L 8/20/2010 and 7/26/2012    AR PLACE RADIOTHER DEVICE/MARKER, PROSTATE N/A 6/22/2021    Procedure: INSERTION OF FIDUCIAL MARKER (TRANSRECTAL ULTRASOUND GUIDANCE), SPACEOAR;  Surgeon: Ashtyn Duarte MD;  Location: [facility];  Service: Urology    ROTATOR CUFF REPAIR Left 2009    SHOULDER SURGERY Right 2005    collar bone       Family History   Problem Relation Age of Onset    Heart disease Father     Heart attack Father         acute myocardial infarction    Heart disease Sister     Heart attack Sister         acute myocardial infarction    Heart disease Paternal Grandfather     Aortic aneurysm Mother     Throat cancer Maternal Grandfather      I have reviewed and agree with the history as documented      E-Cigarette/Vaping    E-Cigarette Use Never User      E-Cigarette/Vaping Substances    Nicotine No     THC No     CBD No     Flavoring No     Other No     Unknown No      Social History     Tobacco Use    Smoking status: Never Smoker    Smokeless tobacco: Never Used   Vaping Use    Vaping Use: Never used   Substance Use Topics    Alcohol use: Yes     Comment: being a social drinker    Drug use: No       Review of Systems   Constitutional: Positive for appetite change and fatigue  Negative for chills and fever  HENT: Negative for ear pain and sore throat  Eyes: Negative for pain and visual disturbance  Respiratory: Negative for cough and shortness of breath  Cardiovascular: Negative for chest pain and palpitations  Gastrointestinal: Negative for abdominal pain and vomiting  Genitourinary: Negative for dysuria and hematuria  Musculoskeletal: Positive for gait problem  Negative for arthralgias and back pain  Skin: Negative for color change and rash  Neurological: Negative for seizures and syncope  All other systems reviewed and are negative  Physical Exam  Physical Exam  Vitals and nursing note reviewed  Constitutional:       General: He is not in acute distress  Appearance: Normal appearance  HENT:      Head: Atraumatic  Right Ear: External ear normal       Left Ear: External ear normal       Nose: Nose normal       Mouth/Throat:      Mouth: Mucous membranes are moist       Pharynx: Oropharynx is clear  Eyes:      General: No scleral icterus  Conjunctiva/sclera: Conjunctivae normal    Cardiovascular:      Rate and Rhythm: Normal rate and regular rhythm  Pulses: Normal pulses  Pulmonary:      Effort: Pulmonary effort is normal  No respiratory distress  Breath sounds: Normal breath sounds  No wheezing, rhonchi or rales  Abdominal:      General: Abdomen is flat  Bowel sounds are normal  There is no distension  Palpations: Abdomen is soft  Tenderness: There is no abdominal tenderness  There is no guarding or rebound  Musculoskeletal:         General: No deformity  Normal range of motion  Skin:     Capillary Refill: Capillary refill takes less than 2 seconds  Findings: No rash  Neurological:      General: No focal deficit present  Mental Status: He is alert and oriented to person, place, and time           Vital Signs  ED Triage Vitals   Temperature Pulse Respirations Blood Pressure SpO2   07/20/21 1350 07/20/21 1350 07/20/21 1350 07/20/21 1350 07/20/21 1350   97 8 °F (36 6 °C) 85 16 161/70 99 %      Temp Source Heart Rate Source Patient Position - Orthostatic VS BP Location FiO2 (%)   07/20/21 1350 07/20/21 1350 07/20/21 1350 07/20/21 1350 --   Oral Monitor Lying Right arm       Pain Score       07/20/21 1546       No Pain           Vitals:    07/20/21 2244 07/21/21 0253 07/21/21 0603 07/21/21 0800   BP: 128/58 132/60 134/60 136/59   Pulse: 74 77 73 80   Patient Position - Orthostatic VS:   Lying Sitting         Visual Acuity  Visual Acuity      Most Recent Value   L Pupil Size (mm)  3   R Pupil Size (mm)  3          ED Medications  Medications   magnesium sulfate 2 g/50 mL IVPB (premix) 2 g (0 g Intravenous Stopped 7/21/21 0700)   furosemide (LASIX) injection 20 mg (20 mg Intravenous Given 7/20/21 1756)       Diagnostic Studies  Results Reviewed     Procedure Component Value Units Date/Time    UA (URINE) with reflex to Scope [[id number]]  (Abnormal) Collected: 07/20/21 1452    Lab Status: Final result Specimen: Urine, Clean Catch Updated: 07/20/21 1458     Color, UA Light Yellow     Clarity, UA Clear     Specific Gravity, UA <=1 005     pH, UA 6 0     Leukocytes, UA Negative     Nitrite, UA Negative     Protein, UA Negative mg/dl      Glucose,  (1/10%) mg/dl      Ketones, UA Negative mg/dl      Urobilinogen, UA 0 2 E U /dl      Bilirubin, UA Negative     Blood, UA Negative    Troponin I [[id number]]  (Normal) Collected: 07/20/21 1410    Lab Status: Final result Specimen: Blood from Arm, Left Updated: 07/20/21 1438     Troponin I <0 02 ng/mL     Comprehensive metabolic panel [[id number]]  (Abnormal) Collected: 07/20/21 1410    Lab Status: Final result Specimen: Blood from Arm, Left Updated: 07/20/21 1437     Sodium 123 mmol/L      Potassium 5 3 mmol/L      Chloride 92 mmol/L      CO2 22 mmol/L      ANION GAP 9 mmol/L BUN 11 mg/dL      Creatinine 0 77 mg/dL      Glucose 66 mg/dL      Calcium 8 6 mg/dL      Corrected Calcium 9 2 mg/dL      AST 59 U/L      ALT 30 U/L      Alkaline Phosphatase 49 U/L      Total Protein 6 8 g/dL      Albumin 3 3 g/dL      Total Bilirubin 0 97 mg/dL      eGFR 88 ml/min/1 73sq m     Narrative:      National Kidney Disease Foundation guidelines for Chronic Kidney Disease (CKD):     Stage 1 with normal or high GFR (GFR > 90 mL/min/1 73 square meters)    Stage 2 Mild CKD (GFR = 60-89 mL/min/1 73 square meters)    Stage 3A Moderate CKD (GFR = 45-59 mL/min/1 73 square meters)    Stage 3B Moderate CKD (GFR = 30-44 mL/min/1 73 square meters)    Stage 4 Severe CKD (GFR = 15-29 mL/min/1 73 square meters)    Stage 5 End Stage CKD (GFR <15 mL/min/1 73 square meters)  Note: GFR calculation is accurate only with a steady state creatinine    Lipase [[id number]]  (Normal) Collected: 07/20/21 1410    Lab Status: Final result Specimen: Blood from Arm, Left Updated: 07/20/21 1437     Lipase 106 u/L     Magnesium [[id number]]  (Abnormal) Collected: 07/20/21 1410    Lab Status: Final result Specimen: Blood from Arm, Left Updated: 07/20/21 1437     Magnesium 1 5 mg/dL     CBC and differential [[id number]]  (Abnormal) Collected: 07/20/21 1421    Lab Status: Final result Specimen: Blood from Arm, Right Updated: 07/20/21 1430     WBC 4 98 Thousand/uL      RBC 3 08 Million/uL      Hemoglobin 9 3 g/dL      Hematocrit 27 0 %      MCV 88 fL      MCH 30 2 pg      MCHC 34 4 g/dL      RDW 15 0 %      MPV 8 5 fL      Platelets 876 Thousands/uL      nRBC 0 /100 WBCs      Neutrophils Relative 73 %      Immat GRANS % 1 %      Lymphocytes Relative 11 %      Monocytes Relative 11 %      Eosinophils Relative 4 %      Basophils Relative 0 %      Neutrophils Absolute 3 68 Thousands/µL      Immature Grans Absolute 0 03 Thousand/uL      Lymphocytes Absolute 0 54 Thousands/µL      Monocytes Absolute 0 54 Thousand/µL      Eosinophils Absolute 0 18 Thousand/µL      Basophils Absolute 0 01 Thousands/µL     Fingerstick Glucose (POCT) [[id number]]  (Normal) Collected: 07/20/21 1353    Lab Status: Final result Updated: 07/20/21 1354     POC Glucose 72 mg/dl                  No orders to display              Procedures  Procedures         ED Course                             SBIRT 20yo+      Most Recent Value   SBIRT (24 yo +)   In order to provide better care to our patients, we are screening all of our patients for alcohol and drug use  Would it be okay to ask you these screening questions? No Filed at: 07/20/2021 1433   Initial Alcohol Screen: US AUDIT-C    1  How often do you have a drink containing alcohol?  0 Filed at: 07/20/2021 1433   2  How many drinks containing alcohol do you have on a typical day you are drinking? 0 Filed at: 07/20/2021 1433   3a  Male UNDER 65: How often do you have five or more drinks on one occasion? 0 Filed at: 07/20/2021 1433   3b  FEMALE Any Age, or MALE 65+: How often do you have 4 or more drinks on one occassion? 0 Filed at: 07/20/2021 1433   Audit-C Score  0 Filed at: 07/20/2021 1433   TUAN: How many times in the past year have you    Used an illegal drug or used a prescription medication for non-medical reasons?   Never Filed at: 07/20/2021 1433                    MDM  Number of Diagnoses or Management Options  Hyponatremia  Diagnosis management comments: Pulse ox 97% on room air indicating adequate oxygen oxygen           Amount and/or Complexity of Data Reviewed  Clinical lab tests: ordered and reviewed  Decide to obtain previous medical records or to obtain history from someone other than the patient: yes  Review and summarize past medical records: yes  Discuss the patient with other providers: yes    Patient Progress  Patient progress: stable      Disposition  Final diagnoses:   Hyponatremia     Time reflects when diagnosis was documented in both MDM as applicable and the Disposition within this note Time User Action Codes Description Comment    7/20/2021  2:57 PM Melva Dent Add [E87 1] Hyponatremia     7/21/2021 11:20 AM MananKristen Add [I73 9] PAD (peripheral artery disease) [facility])       ED Disposition     ED Disposition Condition Date/Time Comment    Admit Stable Tue Jul 20, 2021  2:57 PM Case was discussed with Dr Jose Guillen and the patient's admission status was agreed to be Admission Status: inpatient status to the service of Dr Alvarez Most          Follow-up Information    None         Discharge Medication List as of 7/21/2021 12:25 PM      START taking these medications    Details   furosemide (LASIX) 20 mg tablet Take 1 tablet (20 mg total) by mouth daily, Starting Thu 7/22/2021, Until Sat 8/21/2021, Print         CONTINUE these medications which have CHANGED    Details   atorvastatin (LIPITOR) 20 mg tablet Take 1 tablet (20 mg total) by mouth daily, Starting Wed 7/21/2021, Normal         CONTINUE these medications which have NOT CHANGED    Details   acarbose (PRECOSE) 100 MG tablet TAKE 1 TABLET THREE TIMES DAILY WITH MEALS, Normal      Alphagan P 0 1 % Starting Thu 3/25/2021, Historical Med      amLODIPine (NORVASC) 5 mg tablet Take 1 tablet (5 mg total) by mouth daily, Starting Fri 5/21/2021, Normal      aspirin (ECOTRIN LOW STRENGTH) 81 mg EC tablet Take 1 tablet (81 mg total) by mouth daily, Starting Fri 5/18/2018, Normal      carvedilol (COREG) 12 5 mg tablet TAKE 1 TABLET TWICE DAILY, Normal      co-enzyme Q-10 100 mg capsule Take 100 mg by mouth daily, Historical Med      glimepiride (AMARYL) 4 mg tablet TAKE 1 TABLET TWICE DAILY, Normal      glucose blood test strip Test blood sugar twice a day, Normal      lisinopril (ZESTRIL) 2 5 mg tablet Take 1 tablet (2 5 mg total) by mouth daily, Starting Mon 6/14/2021, Normal      metFORMIN (GLUCOPHAGE) 1000 MG tablet Take 1 tablet (1,000 mg total) by mouth 2 (two) times a day with meals, Starting Fri 5/21/2021, Normal      multivitamin [facility]) TABS Take 1 tablet by mouth daily, Historical Med      omeprazole (PriLOSEC) 40 MG capsule TAKE 1 CAPSULE EVERY DAY, Normal      Probiotic Product (CULTURELLE PROBIOTICS) CHEW Chew daily, Historical Med      TRUEPLUS LANCETS 28G MISC Test 1x/d, E11 29, No Print      gabapentin (NEURONTIN) 600 MG tablet Take 1 tablet (600 mg total) by mouth 2 (two) times a day, Starting Tue 2/9/2021, Until Wed 7/14/2021, Normal         STOP taking these medications       b complex-vitamin C-folic acid (NEPHROCAPS) 1 mg capsule Comments:   Reason for Stopping:         Empagliflozin (Jardiance) 10 MG TABS Comments:   Reason for Stopping:         mirtazapine (REMERON) 7 5 MG tablet Comments:   Reason for Stopping:         traZODone (DESYREL) 50 mg tablet Comments:   Reason for Stopping:             Outpatient Discharge Orders   Basic metabolic panel   Standing Status: Future Standing Exp   Date: 07/21/22     Discharge Diet     Activity as tolerated       PDMP Review     None          ED Provider  Electronically Signed by           Rashawn Lemus DO  07/22/21 [id number]

## 2021-07-23 ENCOUNTER — APPOINTMENT (OUTPATIENT)
Dept: RADIATION ONCOLOGY | Facility: HOSPITAL | Age: 76
End: 2021-07-23
Attending: RADIOLOGY
Payer: MEDICARE

## 2021-07-23 LAB
ATRIAL RATE: 71 BPM
P AXIS: 46 DEGREES
PR INTERVAL: 206 MS
QRS AXIS: -22 DEGREES
QRSD INTERVAL: 92 MS
QT INTERVAL: 378 MS
QTC INTERVAL: 410 MS
T WAVE AXIS: 22 DEGREES
VENTRICULAR RATE: 71 BPM

## 2021-07-23 PROCEDURE — 77014 CHG CT GUIDANCE PLACEMENT RAD THERAPY FIELDS: CPT | Performed by: RADIOLOGY

## 2021-07-23 PROCEDURE — 77385 HB NTSTY MODUL RAD TX DLVR SMPL: CPT | Performed by: RADIOLOGY

## 2021-07-23 PROCEDURE — 93010 ELECTROCARDIOGRAM REPORT: CPT | Performed by: INTERNAL MEDICINE

## 2021-07-23 PROCEDURE — 77336 RADIATION PHYSICS CONSULT: CPT | Performed by: RADIOLOGY

## 2021-07-26 ENCOUNTER — APPOINTMENT (OUTPATIENT)
Dept: RADIATION ONCOLOGY | Facility: HOSPITAL | Age: 76
End: 2021-07-26
Attending: RADIOLOGY
Payer: MEDICARE

## 2021-07-26 PROCEDURE — 77427 RADIATION TX MANAGEMENT X5: CPT | Performed by: STUDENT IN AN ORGANIZED HEALTH CARE EDUCATION/TRAINING PROGRAM

## 2021-07-26 PROCEDURE — 77385 HB NTSTY MODUL RAD TX DLVR SMPL: CPT | Performed by: STUDENT IN AN ORGANIZED HEALTH CARE EDUCATION/TRAINING PROGRAM

## 2021-07-26 PROCEDURE — 77014 CHG CT GUIDANCE PLACEMENT RAD THERAPY FIELDS: CPT | Performed by: STUDENT IN AN ORGANIZED HEALTH CARE EDUCATION/TRAINING PROGRAM

## 2021-07-27 ENCOUNTER — APPOINTMENT (OUTPATIENT)
Dept: RADIATION ONCOLOGY | Facility: HOSPITAL | Age: 76
End: 2021-07-27
Attending: RADIOLOGY
Payer: MEDICARE

## 2021-07-27 PROCEDURE — 77014 CHG CT GUIDANCE PLACEMENT RAD THERAPY FIELDS: CPT | Performed by: STUDENT IN AN ORGANIZED HEALTH CARE EDUCATION/TRAINING PROGRAM

## 2021-07-27 PROCEDURE — 77385 HB NTSTY MODUL RAD TX DLVR SMPL: CPT | Performed by: STUDENT IN AN ORGANIZED HEALTH CARE EDUCATION/TRAINING PROGRAM

## 2021-07-28 ENCOUNTER — APPOINTMENT (OUTPATIENT)
Dept: RADIATION ONCOLOGY | Facility: HOSPITAL | Age: 76
End: 2021-07-28
Attending: RADIOLOGY
Payer: MEDICARE

## 2021-07-28 PROCEDURE — 77014 CHG CT GUIDANCE PLACEMENT RAD THERAPY FIELDS: CPT | Performed by: STUDENT IN AN ORGANIZED HEALTH CARE EDUCATION/TRAINING PROGRAM

## 2021-07-28 PROCEDURE — 77385 HB NTSTY MODUL RAD TX DLVR SMPL: CPT | Performed by: STUDENT IN AN ORGANIZED HEALTH CARE EDUCATION/TRAINING PROGRAM

## 2021-07-29 ENCOUNTER — APPOINTMENT (OUTPATIENT)
Dept: RADIATION ONCOLOGY | Facility: HOSPITAL | Age: 76
End: 2021-07-29
Attending: RADIOLOGY
Payer: MEDICARE

## 2021-07-29 PROCEDURE — 77014 CHG CT GUIDANCE PLACEMENT RAD THERAPY FIELDS: CPT | Performed by: STUDENT IN AN ORGANIZED HEALTH CARE EDUCATION/TRAINING PROGRAM

## 2021-07-29 PROCEDURE — 77385 HB NTSTY MODUL RAD TX DLVR SMPL: CPT | Performed by: STUDENT IN AN ORGANIZED HEALTH CARE EDUCATION/TRAINING PROGRAM

## 2021-07-30 ENCOUNTER — APPOINTMENT (OUTPATIENT)
Dept: RADIATION ONCOLOGY | Facility: HOSPITAL | Age: 76
End: 2021-07-30
Attending: RADIOLOGY
Payer: MEDICARE

## 2021-07-30 ENCOUNTER — HOSPITAL ENCOUNTER (OUTPATIENT)
Facility: HOSPITAL | Age: 76
Setting detail: OBSERVATION
Discharge: HOME | End: 2021-07-31
Attending: EMERGENCY MEDICINE | Admitting: FAMILY MEDICINE
Payer: MEDICARE

## 2021-07-30 ENCOUNTER — APPOINTMENT (EMERGENCY)
Dept: RADIOLOGY | Facility: HOSPITAL | Age: 76
End: 2021-07-30
Payer: MEDICARE

## 2021-07-30 DIAGNOSIS — R63.4 WEIGHT LOSS: ICD-10-CM

## 2021-07-30 DIAGNOSIS — C61 PROSTATE CANCER (HCC): ICD-10-CM

## 2021-07-30 DIAGNOSIS — E16.2 HYPOGLYCEMIA: Primary | ICD-10-CM

## 2021-07-30 DIAGNOSIS — R73.9 HYPERGLYCEMIA: ICD-10-CM

## 2021-07-30 DIAGNOSIS — R29.898 BILATERAL LEG WEAKNESS: ICD-10-CM

## 2021-07-30 DIAGNOSIS — R53.1 GENERALIZED WEAKNESS: ICD-10-CM

## 2021-07-30 DIAGNOSIS — N18.2 TYPE 2 DIABETES MELLITUS WITH STAGE 2 CHRONIC KIDNEY DISEASE, WITHOUT LONG-TERM CURRENT USE OF INSULIN (HCC): ICD-10-CM

## 2021-07-30 DIAGNOSIS — E87.1 HYPONATREMIA: ICD-10-CM

## 2021-07-30 DIAGNOSIS — E11.22 TYPE 2 DIABETES MELLITUS WITH STAGE 2 CHRONIC KIDNEY DISEASE, WITHOUT LONG-TERM CURRENT USE OF INSULIN (HCC): ICD-10-CM

## 2021-07-30 DIAGNOSIS — E83.42 HYPOMAGNESEMIA: ICD-10-CM

## 2021-07-30 DIAGNOSIS — E11.40 TYPE 2 DIABETES MELLITUS WITH DIABETIC NEUROPATHY, WITHOUT LONG-TERM CURRENT USE OF INSULIN (HCC): ICD-10-CM

## 2021-07-30 LAB
ALBUMIN SERPL BCP-MCNC: 3.4 G/DL (ref 3.5–5)
ALP SERPL-CCNC: 55 U/L (ref 46–116)
ALT SERPL W P-5'-P-CCNC: 33 U/L (ref 12–78)
ANION GAP SERPL CALCULATED.3IONS-SCNC: 11 MMOL/L (ref 4–13)
ANION GAP SERPL CALCULATED.3IONS-SCNC: 11 MMOL/L (ref 4–13)
APTT PPP: 24 SECONDS (ref 23–37)
AST SERPL W P-5'-P-CCNC: 25 U/L (ref 5–45)
BASOPHILS # BLD AUTO: 0.02 THOUSANDS/ΜL (ref 0–0.1)
BASOPHILS NFR BLD AUTO: 0 % (ref 0–1)
BILIRUB SERPL-MCNC: 0.74 MG/DL (ref 0.2–1)
BILIRUB UR QL STRIP: NEGATIVE
BUN SERPL-MCNC: 10 MG/DL (ref 5–25)
BUN SERPL-MCNC: 12 MG/DL (ref 5–25)
CALCIUM ALBUM COR SERPL-MCNC: 8.8 MG/DL (ref 8.3–10.1)
CALCIUM SERPL-MCNC: 8.3 MG/DL (ref 8.3–10.1)
CALCIUM SERPL-MCNC: 8.4 MG/DL (ref 8.3–10.1)
CHLORIDE SERPL-SCNC: 92 MMOL/L (ref 100–108)
CHLORIDE SERPL-SCNC: 97 MMOL/L (ref 100–108)
CLARITY UR: CLEAR
CO2 SERPL-SCNC: 22 MMOL/L (ref 21–32)
CO2 SERPL-SCNC: 23 MMOL/L (ref 21–32)
COLOR UR: NORMAL
CREAT SERPL-MCNC: 0.82 MG/DL (ref 0.6–1.3)
CREAT SERPL-MCNC: 1.11 MG/DL (ref 0.6–1.3)
EOSINOPHIL # BLD AUTO: 0.1 THOUSAND/ΜL (ref 0–0.61)
EOSINOPHIL NFR BLD AUTO: 2 % (ref 0–6)
ERYTHROCYTE [DISTWIDTH] IN BLOOD BY AUTOMATED COUNT: 14.8 % (ref 11.6–15.1)
EST. AVERAGE GLUCOSE BLD GHB EST-MCNC: 174 MG/DL
GFR SERPL CREATININE-BSD FRML MDRD: 64 ML/MIN/1.73SQ M
GFR SERPL CREATININE-BSD FRML MDRD: 86 ML/MIN/1.73SQ M
GLUCOSE SERPL-MCNC: 111 MG/DL (ref 65–140)
GLUCOSE SERPL-MCNC: 148 MG/DL (ref 65–140)
GLUCOSE SERPL-MCNC: 58 MG/DL (ref 65–140)
GLUCOSE SERPL-MCNC: 83 MG/DL (ref 65–140)
GLUCOSE UR STRIP-MCNC: NEGATIVE MG/DL
HBA1C MFR BLD: 7.7 %
HCT VFR BLD AUTO: 27.3 % (ref 36.5–49.3)
HGB BLD-MCNC: 9.4 G/DL (ref 12–17)
HGB UR QL STRIP.AUTO: NEGATIVE
IMM GRANULOCYTES # BLD AUTO: 0.05 THOUSAND/UL (ref 0–0.2)
IMM GRANULOCYTES NFR BLD AUTO: 1 % (ref 0–2)
INR PPP: 0.95 (ref 0.84–1.19)
KETONES UR STRIP-MCNC: NEGATIVE MG/DL
LEUKOCYTE ESTERASE UR QL STRIP: NEGATIVE
LYMPHOCYTES # BLD AUTO: 0.29 THOUSANDS/ΜL (ref 0.6–4.47)
LYMPHOCYTES NFR BLD AUTO: 5 % (ref 14–44)
MAGNESIUM SERPL-MCNC: 1.1 MG/DL (ref 1.6–2.6)
MCH RBC QN AUTO: 30.1 PG (ref 26.8–34.3)
MCHC RBC AUTO-ENTMCNC: 34.4 G/DL (ref 31.4–37.4)
MCV RBC AUTO: 88 FL (ref 82–98)
MONOCYTES # BLD AUTO: 0.66 THOUSAND/ΜL (ref 0.17–1.22)
MONOCYTES NFR BLD AUTO: 12 % (ref 4–12)
NEUTROPHILS # BLD AUTO: 4.23 THOUSANDS/ΜL (ref 1.85–7.62)
NEUTS SEG NFR BLD AUTO: 80 % (ref 43–75)
NITRITE UR QL STRIP: NEGATIVE
NRBC BLD AUTO-RTO: 0 /100 WBCS
PH UR STRIP.AUTO: 6.5 [PH]
PLATELET # BLD AUTO: 189 THOUSANDS/UL (ref 149–390)
PMV BLD AUTO: 8.6 FL (ref 8.9–12.7)
POTASSIUM SERPL-SCNC: 3.7 MMOL/L (ref 3.5–5.3)
POTASSIUM SERPL-SCNC: 4.2 MMOL/L (ref 3.5–5.3)
PROT SERPL-MCNC: 6.7 G/DL (ref 6.4–8.2)
PROT UR STRIP-MCNC: NEGATIVE MG/DL
PROTHROMBIN TIME: 12.6 SECONDS (ref 11.6–14.5)
RBC # BLD AUTO: 3.12 MILLION/UL (ref 3.88–5.62)
SODIUM SERPL-SCNC: 126 MMOL/L (ref 136–145)
SODIUM SERPL-SCNC: 130 MMOL/L (ref 136–145)
SP GR UR STRIP.AUTO: 1.01 (ref 1–1.03)
TROPONIN I SERPL-MCNC: <0.02 NG/ML
UROBILINOGEN UR QL STRIP.AUTO: 0.2 E.U./DL
WBC # BLD AUTO: 5.35 THOUSAND/UL (ref 4.31–10.16)

## 2021-07-30 PROCEDURE — 96360 HYDRATION IV INFUSION INIT: CPT

## 2021-07-30 PROCEDURE — 93005 ELECTROCARDIOGRAM TRACING: CPT

## 2021-07-30 PROCEDURE — 83935 ASSAY OF URINE OSMOLALITY: CPT | Performed by: INTERNAL MEDICINE

## 2021-07-30 PROCEDURE — 71045 X-RAY EXAM CHEST 1 VIEW: CPT

## 2021-07-30 PROCEDURE — 82948 REAGENT STRIP/BLOOD GLUCOSE: CPT

## 2021-07-30 PROCEDURE — 1124F ACP DISCUSS-NO DSCNMKR DOCD: CPT | Performed by: EMERGENCY MEDICINE

## 2021-07-30 PROCEDURE — 83735 ASSAY OF MAGNESIUM: CPT | Performed by: EMERGENCY MEDICINE

## 2021-07-30 PROCEDURE — 81003 URINALYSIS AUTO W/O SCOPE: CPT | Performed by: EMERGENCY MEDICINE

## 2021-07-30 PROCEDURE — 85730 THROMBOPLASTIN TIME PARTIAL: CPT | Performed by: EMERGENCY MEDICINE

## 2021-07-30 PROCEDURE — 77385 HB NTSTY MODUL RAD TX DLVR SMPL: CPT | Performed by: STUDENT IN AN ORGANIZED HEALTH CARE EDUCATION/TRAINING PROGRAM

## 2021-07-30 PROCEDURE — 84484 ASSAY OF TROPONIN QUANT: CPT | Performed by: EMERGENCY MEDICINE

## 2021-07-30 PROCEDURE — 77336 RADIATION PHYSICS CONSULT: CPT | Performed by: STUDENT IN AN ORGANIZED HEALTH CARE EDUCATION/TRAINING PROGRAM

## 2021-07-30 PROCEDURE — 99220 PR INITIAL OBSERVATION CARE/DAY 70 MINUTES: CPT | Performed by: INTERNAL MEDICINE

## 2021-07-30 PROCEDURE — 80048 BASIC METABOLIC PNL TOTAL CA: CPT | Performed by: INTERNAL MEDICINE

## 2021-07-30 PROCEDURE — 83036 HEMOGLOBIN GLYCOSYLATED A1C: CPT | Performed by: INTERNAL MEDICINE

## 2021-07-30 PROCEDURE — 85025 COMPLETE CBC W/AUTO DIFF WBC: CPT | Performed by: EMERGENCY MEDICINE

## 2021-07-30 PROCEDURE — 36415 COLL VENOUS BLD VENIPUNCTURE: CPT | Performed by: EMERGENCY MEDICINE

## 2021-07-30 PROCEDURE — 85610 PROTHROMBIN TIME: CPT | Performed by: EMERGENCY MEDICINE

## 2021-07-30 PROCEDURE — 80053 COMPREHEN METABOLIC PANEL: CPT | Performed by: EMERGENCY MEDICINE

## 2021-07-30 PROCEDURE — 99285 EMERGENCY DEPT VISIT HI MDM: CPT

## 2021-07-30 PROCEDURE — 83930 ASSAY OF BLOOD OSMOLALITY: CPT | Performed by: INTERNAL MEDICINE

## 2021-07-30 PROCEDURE — 99285 EMERGENCY DEPT VISIT HI MDM: CPT | Performed by: EMERGENCY MEDICINE

## 2021-07-30 PROCEDURE — 77014 CHG CT GUIDANCE PLACEMENT RAD THERAPY FIELDS: CPT | Performed by: STUDENT IN AN ORGANIZED HEALTH CARE EDUCATION/TRAINING PROGRAM

## 2021-07-30 PROCEDURE — 84300 ASSAY OF URINE SODIUM: CPT | Performed by: INTERNAL MEDICINE

## 2021-07-30 RX ORDER — SODIUM CHLORIDE 9 MG/ML
75 INJECTION, SOLUTION INTRAVENOUS CONTINUOUS
Status: DISCONTINUED | OUTPATIENT
Start: 2021-07-30 | End: 2021-07-31

## 2021-07-30 RX ORDER — MELATONIN
2000 DAILY
COMMUNITY
End: 2021-08-12 | Stop reason: SDUPTHER

## 2021-07-30 RX ORDER — MELATONIN
2000 DAILY
Status: DISCONTINUED | OUTPATIENT
Start: 2021-07-31 | End: 2021-07-31 | Stop reason: HOSPADM

## 2021-07-30 RX ORDER — CHOLECALCIFEROL (VITAMIN D3) 125 MCG
100 CAPSULE ORAL DAILY
Status: DISCONTINUED | OUTPATIENT
Start: 2021-07-31 | End: 2021-07-31 | Stop reason: HOSPADM

## 2021-07-30 RX ORDER — ATORVASTATIN CALCIUM 20 MG/1
20 TABLET, FILM COATED ORAL DAILY
Status: DISCONTINUED | OUTPATIENT
Start: 2021-07-31 | End: 2021-07-31 | Stop reason: HOSPADM

## 2021-07-30 RX ORDER — ONDANSETRON 2 MG/ML
4 INJECTION INTRAMUSCULAR; INTRAVENOUS EVERY 6 HOURS PRN
Status: DISCONTINUED | OUTPATIENT
Start: 2021-07-30 | End: 2021-07-31 | Stop reason: HOSPADM

## 2021-07-30 RX ORDER — LISINOPRIL 2.5 MG/1
2.5 TABLET ORAL DAILY
Status: DISCONTINUED | OUTPATIENT
Start: 2021-07-31 | End: 2021-07-31 | Stop reason: HOSPADM

## 2021-07-30 RX ORDER — AMLODIPINE BESYLATE 5 MG/1
5 TABLET ORAL DAILY
Status: DISCONTINUED | OUTPATIENT
Start: 2021-07-31 | End: 2021-07-31 | Stop reason: HOSPADM

## 2021-07-30 RX ORDER — PANTOPRAZOLE SODIUM 40 MG/1
40 TABLET, DELAYED RELEASE ORAL
Status: DISCONTINUED | OUTPATIENT
Start: 2021-07-31 | End: 2021-07-31 | Stop reason: HOSPADM

## 2021-07-30 RX ORDER — GABAPENTIN 300 MG/1
600 CAPSULE ORAL 2 TIMES DAILY
Status: DISCONTINUED | OUTPATIENT
Start: 2021-07-30 | End: 2021-07-31 | Stop reason: HOSPADM

## 2021-07-30 RX ORDER — ASCORBIC ACID 500 MG
500 TABLET ORAL DAILY
Status: DISCONTINUED | OUTPATIENT
Start: 2021-07-31 | End: 2021-07-31 | Stop reason: HOSPADM

## 2021-07-30 RX ORDER — CARVEDILOL 12.5 MG/1
12.5 TABLET ORAL 2 TIMES DAILY
Status: DISCONTINUED | OUTPATIENT
Start: 2021-07-30 | End: 2021-07-31 | Stop reason: HOSPADM

## 2021-07-30 RX ORDER — ACETAMINOPHEN 325 MG/1
650 TABLET ORAL EVERY 6 HOURS PRN
Status: DISCONTINUED | OUTPATIENT
Start: 2021-07-30 | End: 2021-07-31 | Stop reason: HOSPADM

## 2021-07-30 RX ORDER — BRIMONIDINE TARTRATE 0.15 %
1 DROPS OPHTHALMIC (EYE) EVERY 12 HOURS
Status: DISCONTINUED | OUTPATIENT
Start: 2021-07-30 | End: 2021-07-31 | Stop reason: HOSPADM

## 2021-07-30 RX ORDER — MULTIVIT WITH MINERALS/LUTEIN
500 TABLET ORAL DAILY
COMMUNITY
End: 2021-08-12 | Stop reason: SDUPTHER

## 2021-07-30 RX ORDER — MAGNESIUM SULFATE HEPTAHYDRATE 40 MG/ML
2 INJECTION, SOLUTION INTRAVENOUS ONCE
Status: COMPLETED | OUTPATIENT
Start: 2021-07-30 | End: 2021-07-30

## 2021-07-30 RX ORDER — DIPHENOXYLATE HYDROCHLORIDE AND ATROPINE SULFATE 2.5; .025 MG/1; MG/1
1 TABLET ORAL DAILY
Status: DISCONTINUED | OUTPATIENT
Start: 2021-07-31 | End: 2021-07-30 | Stop reason: SDUPTHER

## 2021-07-30 RX ORDER — BRIMONIDINE TARTRATE 2 MG/ML
1 SOLUTION/ DROPS OPHTHALMIC 2 TIMES DAILY
Status: DISCONTINUED | OUTPATIENT
Start: 2021-07-30 | End: 2021-07-30 | Stop reason: SDUPTHER

## 2021-07-30 RX ORDER — DEXTROSE AND SODIUM CHLORIDE 5; .9 G/100ML; G/100ML
75 INJECTION, SOLUTION INTRAVENOUS CONTINUOUS
Status: DISCONTINUED | OUTPATIENT
Start: 2021-07-30 | End: 2021-07-30

## 2021-07-30 RX ORDER — ASPIRIN 81 MG/1
81 TABLET ORAL DAILY
Status: DISCONTINUED | OUTPATIENT
Start: 2021-07-31 | End: 2021-07-31 | Stop reason: HOSPADM

## 2021-07-30 RX ADMIN — SODIUM CHLORIDE 500 ML: 0.9 INJECTION, SOLUTION INTRAVENOUS at 16:04

## 2021-07-30 RX ADMIN — MAGNESIUM SULFATE HEPTAHYDRATE 2 G: 40 INJECTION, SOLUTION INTRAVENOUS at 17:42

## 2021-07-30 RX ADMIN — DEXTROSE AND SODIUM CHLORIDE 75 ML/HR: 5; .9 INJECTION, SOLUTION INTRAVENOUS at 17:38

## 2021-07-30 RX ADMIN — GABAPENTIN 600 MG: 300 CAPSULE ORAL at 20:47

## 2021-07-30 RX ADMIN — SODIUM CHLORIDE 75 ML/HR: 0.9 INJECTION, SOLUTION INTRAVENOUS at 22:20

## 2021-07-30 RX ADMIN — CARVEDILOL 12.5 MG: 12.5 TABLET, FILM COATED ORAL at 20:47

## 2021-07-30 NOTE — ED PROVIDER NOTES
History  Chief Complaint   Patient presents with    Hypoglycemia - Symptomatic     patient was not himself, BS of 41, given D10 in the field with improvement of mental status and BS (now 134)  Patient takes oral DM medications, no insulin , Had radiation for prostate CA today  62-year-old male with past history of prostate cancer currently undergoing radiation therapy, type 2 diabetes, CAD, hypertension, CKD, GERD, presents to the ED for evaluation hypoglycemia  About 45 minutes prior to arrival patient was found by wife leaning against the bathroom door with minimal response  Wife subsequently called EMS  EMS arrived to find patient's blood glucose to be 40  Patient given D10 in the field and patient is now currently back to his baseline mental status  Patient is alert and oriented x3 in the ED  Patient states that he has been feeling very weak over the past week and has had decreased p o  Intake  Patient's last radiation treatment was earlier this morning  History provided by:  Patient and EMS personnel  Hypoglycemia - Symptomatic  Associated symptoms: weakness    Associated symptoms: no dizziness, no shortness of breath and no vomiting        Prior to Admission Medications   Prescriptions Last Dose Informant Patient Reported? Taking?    Alphagan P 0 1 %  Self Yes No   Patient not taking: Reported on 7/21/2021   Probiotic Product (CULTURELLE PROBIOTICS) CHEW  Self Yes No   Sig: Chew daily   TRUEPLUS LANCETS 28G MISC  Self No No   Sig: Test 1x/d, E11 29   acarbose (PRECOSE) 100 MG tablet  Self No No   Sig: TAKE 1 TABLET THREE TIMES DAILY WITH MEALS   amLODIPine (NORVASC) 5 mg tablet  Self No No   Sig: Take 1 tablet (5 mg total) by mouth daily   aspirin (ECOTRIN LOW STRENGTH) 81 mg EC tablet  Self No No   Sig: Take 1 tablet (81 mg total) by mouth daily   atorvastatin (LIPITOR) 20 mg tablet   No No   Sig: Take 1 tablet (20 mg total) by mouth daily   brimonidine tartrate 0 2 % ophthalmic solution   Yes No   Sig: Administer 1 drop into the left eye 2 (two) times a day    carvedilol (COREG) 12 5 mg tablet  Self No No   Sig: TAKE 1 TABLET TWICE DAILY   co-enzyme Q-10 100 mg capsule  Self Yes No   Sig: Take 100 mg by mouth daily   furosemide (LASIX) 20 mg tablet   No No   Sig: Take 1 tablet (20 mg total) by mouth daily   gabapentin (NEURONTIN) 600 MG tablet  Self No No   Sig: Take 1 tablet (600 mg total) by mouth 2 (two) times a day   glimepiride (AMARYL) 4 mg tablet  Self No No   Sig: TAKE 1 TABLET TWICE DAILY   glucose blood test strip   No No   Sig: Test blood sugar twice a day   lisinopril (ZESTRIL) 2 5 mg tablet   No No   Sig: Take 1 tablet (2 5 mg total) by mouth daily   metFORMIN (GLUCOPHAGE) 1000 MG tablet  Self No No   Sig: Take 1 tablet (1,000 mg total) by mouth 2 (two) times a day with meals   multivitamin (THERAGRAN) TABS  Self Yes No   Sig: Take 1 tablet by mouth daily   omeprazole (PriLOSEC) 40 MG capsule   No No   Sig: TAKE 1 CAPSULE EVERY DAY      Facility-Administered Medications: None       Past Medical History:   Diagnosis Date    Acquired hallux valgus     last assessed: 8/1/2013    Allergic rhinitis     Anemia 10/26/2010    Atrophy of nail     last assessed: 7/7/2015    Chronic kidney disease     chronic renal insufficiency    Coronary artery disease     Deformity of ankle and foot, acquired     last assessed: 2/22/2016    Diabetes mellitus ([facility] )     Difficulty walking     last assessed: 12/14/2015    Dysesthesia     last assessed: 11/25/2016    Hammer toe     unspecified laterality; last assessed: 8/1/2013    Hypertension     Onychomycosis of toenail     last assessed: 2/22/2016    Peripheral vascular disease ([facility] )     left SFA stent in 2010    Pes planus     unspecified laterality; last assessed: 8/1/2013    Pneumonia     last assessed: 2/27/2016    [facility]     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     last assessed: 8/15/2016    Type 2 diabetes mellitus ([facility] ) 07/26/2010    Viral warts     last assessed: 7/24/2015       Past Surgical History:   Procedure Laterality Date    CARDIAC CATHETERIZATION  07/29/2010    CATARACT EXTRACTION, BILATERAL Bilateral     R 1999, L 2008    COLONOSCOPY  2011    FEMORAL ARTERY - POPLITEAL ARTERY BYPASS GRAFT  09/23/2013    FOOT SURGERY      bone spur removed    LEG SURGERY Bilateral     repair; L 8/20/2010 and 7/26/2012    [place] PLACE RADIOTHER DEVICE/MARKER, PROSTATE N/A 6/22/2021    Procedure: INSERTION OF FIDUCIAL MARKER (TRANSRECTAL ULTRASOUND GUIDANCE), SPACEOAR;  Surgeon: Valerie Rey MD;  [facility];  Service: Urology    ROTATOR CUFF REPAIR Left 2009    SHOULDER SURGERY Right 2005    collar bone       Family History   Problem Relation Age of Onset    Heart disease Father     Heart attack Father         acute myocardial infarction    Heart disease Sister     Heart attack Sister         acute myocardial infarction    Heart disease Paternal Grandfather     Aortic aneurysm Mother     Throat cancer Maternal Grandfather      I have reviewed and agree with the history as documented  E-Cigarette/Vaping    E-Cigarette Use Never User      E-Cigarette/Vaping Substances    Nicotine No     THC No     CBD No     Flavoring No     Other No     Unknown No      Social History     Tobacco Use    Smoking status: Never Smoker    Smokeless tobacco: Never Used   Vaping Use    Vaping Use: Never used   Substance Use Topics    Alcohol use: Yes     Comment: being a social drinker    Drug use: No       Review of Systems   Constitutional: Negative for activity change, fatigue and fever  HENT: Negative for congestion, ear discharge and sore throat  Eyes: Negative for pain and redness  Respiratory: Negative for cough, chest tightness, shortness of breath and wheezing  Cardiovascular: Negative for chest pain  Gastrointestinal: Negative for abdominal pain, diarrhea, nausea and vomiting  Endocrine: Negative for cold intolerance  Hypoglycemia   Genitourinary: Negative for dysuria and urgency  Musculoskeletal: Negative for arthralgias and back pain  Neurological: Positive for weakness  Negative for dizziness and headaches  Psychiatric/Behavioral: Negative for agitation and behavioral problems  Physical Exam  Physical Exam  Vitals and nursing note reviewed  Constitutional:       Appearance: He is well-developed  HENT:      Head: Normocephalic and atraumatic  Nose: Nose normal    Eyes:      Conjunctiva/sclera: Conjunctivae normal    Cardiovascular:      Rate and Rhythm: Normal rate and regular rhythm  Heart sounds: Normal heart sounds  Pulmonary:      Effort: Pulmonary effort is normal       Breath sounds: Normal breath sounds  Abdominal:      General: Bowel sounds are normal  There is no distension  Palpations: Abdomen is soft  Tenderness: There is no abdominal tenderness  Musculoskeletal:         General: Normal range of motion  Cervical back: Normal range of motion and neck supple  Skin:     General: Skin is warm  Neurological:      General: No focal deficit present  Mental Status: He is alert and oriented to person, place, and time  Cranial Nerves: No cranial nerve deficit  Comments: Patient is alert and oriented x3  Visual field intact bilateral   Finger-to-nose intact bilateral   Heel-to-shin intact bilateral   Sensory and motor strength intact bilateral   Generalized weakness noted No focal neuro deficits noted  Psychiatric:         Mood and Affect: Mood normal          Behavior: Behavior normal          Thought Content:  Thought content normal          Judgment: Judgment normal          Vital Signs  ED Triage Vitals [07/30/21 1549]   Temperature Pulse Respirations Blood Pressure SpO2   (!) 96 9 °F (36 1 °C) 77 18 149/79 99 %      Temp Source Heart Rate Source Patient Position - Orthostatic VS BP Location FiO2 (%) Tympanic Monitor Sitting Right arm --      Pain Score       --           Vitals:    07/30/21 1549   BP: 149/79   Pulse: 77   Patient Position - Orthostatic VS: Sitting         Visual Acuity      ED Medications  Medications   magnesium sulfate 2 g/50 mL IVPB (premix) 2 g (has no administration in time range)   dextrose 5 % and sodium chloride 0 9 % infusion (has no administration in time range)   sodium chloride 0 9 % bolus 500 mL (500 mL Intravenous New Bag 7/30/21 1604)       Diagnostic Studies  Results Reviewed     Procedure Component Value Units Date/Time    Fingerstick Glucose (POCT) [[id number]]  (Abnormal) Collected: 07/30/21 1724    Lab Status: Final result Updated: 07/30/21 1733     POC Glucose 58 mg/dl     Troponin I [[id number]]  (Normal) Collected: 07/30/21 1603    Lab Status: Final result Specimen: Blood from Arm, Right Updated: 07/30/21 1655     Troponin I <0 02 ng/mL     UA w Reflex to Microscopic w Reflex to Culture [[id number]] Collected: 07/30/21 1631    Lab Status: Final result Specimen: Urine, Clean Catch Updated: 07/30/21 1636     Color, UA Light Yellow     Clarity, UA Clear     Specific Gravity, UA 1 010     pH, UA 6 5     Leukocytes, UA Negative     Nitrite, UA Negative     Protein, UA Negative mg/dl      Glucose, UA Negative mg/dl      Ketones, UA Negative mg/dl      Urobilinogen, UA 0 2 E U /dl      Bilirubin, UA Negative     Blood, UA Negative    Comprehensive metabolic panel [[id number]]  (Abnormal) Collected: 07/30/21 1603    Lab Status: Final result Specimen: Blood from Arm, Right Updated: 07/30/21 1633     Sodium 126 mmol/L      Potassium 3 7 mmol/L      Chloride 92 mmol/L      CO2 23 mmol/L      ANION GAP 11 mmol/L      BUN 12 mg/dL      Creatinine 1 11 mg/dL      Glucose 83 mg/dL      Calcium 8 3 mg/dL      Corrected Calcium 8 8 mg/dL      AST 25 U/L      ALT 33 U/L      Alkaline Phosphatase 55 U/L      Total Protein 6 7 g/dL      Albumin 3 4 g/dL      Total Bilirubin 0 74 mg/dL eGFR 64 ml/min/1 73sq m     Narrative:      [place] guidelines for Chronic Kidney Disease (CKD):     Stage 1 with normal or high GFR (GFR > 90 mL/min/1 73 square meters)    Stage 2 Mild CKD (GFR = 60-89 mL/min/1 73 square meters)    Stage 3A Moderate CKD (GFR = 45-59 mL/min/1 73 square meters)    Stage 3B Moderate CKD (GFR = 30-44 mL/min/1 73 square meters)    Stage 4 Severe CKD (GFR = 15-29 mL/min/1 73 square meters)    Stage 5 End Stage CKD (GFR <15 mL/min/1 73 square meters)  Note: GFR calculation is accurate only with a steady state creatinine    Magnesium [[id number]]  (Abnormal) Collected: 07/30/21 1603    Lab Status: Final result Specimen: Blood from Arm, Right Updated: 07/30/21 1633     Magnesium 1 1 mg/dL     Protime-INR [[id number]]  (Normal) Collected: 07/30/21 1603    Lab Status: Final result Specimen: Blood from Arm, Right Updated: 07/30/21 1626     Protime 12 6 seconds      INR 0 95    APTT [[id number]]  (Normal) Collected: 07/30/21 1603    Lab Status: Final result Specimen: Blood from Arm, Right Updated: 07/30/21 1626     PTT 24 seconds     CBC and differential [[id number]]  (Abnormal) Collected: 07/30/21 1603    Lab Status: Final result Specimen: Blood from Arm, Right Updated: 07/30/21 1613     WBC 5 35 Thousand/uL      RBC 3 12 Million/uL      Hemoglobin 9 4 g/dL      Hematocrit 27 3 %      MCV 88 fL      MCH 30 1 pg      MCHC 34 4 g/dL      RDW 14 8 %      MPV 8 6 fL      Platelets 240 Thousands/uL      nRBC 0 /100 WBCs      Neutrophils Relative 80 %      Immat GRANS % 1 %      Lymphocytes Relative 5 %      Monocytes Relative 12 %      Eosinophils Relative 2 %      Basophils Relative 0 %      Neutrophils Absolute 4 23 Thousands/µL      Immature Grans Absolute 0 05 Thousand/uL      Lymphocytes Absolute 0 29 Thousands/µL      Monocytes Absolute 0 66 Thousand/µL      Eosinophils Absolute 0 10 Thousand/µL      Basophils Absolute 0 02 Thousands/µL                  XR chest 1 view portable    (Results Pending)              Procedures  ECG 12 Lead Documentation Only    Date/Time: 7/30/2021 4:32 PM  Performed by: Otto Scheuermann, DO  Authorized by: Otto Scheuermann, DO     Indications / Diagnosis:  Weakness  ECG reviewed by me, the ED Provider: yes    Patient location:  ED  Previous ECG:     Previous ECG:  Compared to current    Similarity:  No change    Comparison to cardiac monitor: Yes    Interpretation:     Interpretation: normal    Comments:      Sinus rhythm, rate 73, normal axis, normal intervals, no acute ST/T-wave abnormalities noted, otherwise unremarkable EKG, unchanged from previous study  ED Course                                           MDM  Number of Diagnoses or Management Options  Generalized weakness: new and requires workup  Hyperglycemia: new and requires workup  Hypomagnesemia: new and requires workup  Hyponatremia: new and requires workup  Diagnosis management comments: Obtain blood work, UA, EKG, chest x-ray  Continue to monitor patient's blood glucose level       Amount and/or Complexity of Data Reviewed  Clinical lab tests: ordered and reviewed  Tests in the radiology section of CPT®: ordered and reviewed  Tests in the medicine section of CPT®: ordered and reviewed  Review and summarize past medical records: yes  Independent visualization of images, tracings, or specimens: yes    Risk of Complications, Morbidity, and/or Mortality  General comments: Lab work showed concern for hypomagnesemia, hyponatremia  Patient was admitted for hyponatremia about 10 days ago  Patient's blood glucose went down to 58 again during his ED stay  D5NS drip started along with magnesium repletion and sodium repletion  Patient is admitted for further management  Patient agrees with admission plans      Patient Progress  Patient progress: stable      Disposition  Final diagnoses:   Generalized weakness   Hyperglycemia   Hypomagnesemia   Hyponatremia     Time reflects when diagnosis was documented in both MDM as applicable and the Disposition within this note     Time User Action Codes Description Comment    7/30/2021  5:19 PM Pullman Seat Add [R53 1] Generalized weakness     7/30/2021  5:19 PM Andrea Seat Add [R73 9] Hyperglycemia     7/30/2021  5:19 PM FerAnjana lopez Lines Add [E83 42] Hypomagnesemia     7/30/2021  5:19 PM Pullman Seat Add [E87 1] Hyponatremia       ED Disposition     ED Disposition Condition Date/Time Comment    Admit Stable Fri Jul 30, 2021  5:21 PM Case was discussed with Dr Adelia Quick and the patient's admission status was agreed to be Admission Status: inpatient status to the service of Dr Adelia Quick  Follow-up Information    None         Patient's Medications   Discharge Prescriptions    No medications on file     No discharge procedures on file      PDMP Review     None          ED Provider  Electronically Signed by           Dakotah Peoples DO  07/30/21 1970

## 2021-07-30 NOTE — PHYSICIAN ADVISOR
Current patient class: Inpatient  The patient is currently on Hospital Day: 2 at [address] [place] [address]      The patient was admitted to the hospital  on 7/20/21 at  2:57 PM for the following diagnosis:  Hyponatremia [E87 1]  Abnormal laboratory test result [R89 9]     After review of the relevant documentation, labs, vital signs and test results, this is a PROVIDER LIABLE case  In this particular case the patient was admitted to the hospital as an inpatient  The patient however failed to satisfy the 2 midnight benchmark and closer scrutiny of the case was warranted  After review of the patient presentation and relevant labs the patient was most appropriate for observation or outpatient class at the time of admission  Given that this patient has already been discharged prior to this review they become a provider liable case  Rationale is as follows: The patient presented with generalized weakness  He was found to have hyponatremia with sodium 123  Baseline was normal although over the last several weeks his sodium had been lower  In June, his sodium was 126 with normal glucose  The patient responded to fluid restriction and a dose of intravenous Lasix  Sodium gradually improved to 132  He was discharged within 24 hours  Hospitalization included one midnight  Based on his length of stay, observation class would have been appropriate to determine response to treatment for hyponatremia which was subacute      The patients vitals on arrival were   ED Triage Vitals   Temperature Pulse Respirations Blood Pressure SpO2   07/20/21 1350 07/20/21 1350 07/20/21 1350 07/20/21 1350 07/20/21 1350   97 8 °F (36 6 °C) 85 16 161/70 99 %      Temp Source Heart Rate Source Patient Position - Orthostatic VS BP Location FiO2 (%)   07/20/21 1350 07/20/21 1350 07/20/21 1350 07/20/21 1350 --   Oral Monitor Lying Right arm       Pain Score       07/20/21 1546       No Pain           Past Medical History: Diagnosis Date    Acquired hallux valgus     last assessed: 8/1/2013    Allergic rhinitis     Anemia 10/26/2010    Atrophy of nail     last assessed: 7/7/2015    Chronic kidney disease     chronic renal insufficiency    Coronary artery disease     Deformity of ankle and foot, acquired     last assessed: 2/22/2016    Diabetes mellitus ([facility] )     Difficulty walking     last assessed: 12/14/2015    Dysesthesia     last assessed: 11/25/2016    Hammer toe     unspecified laterality; last assessed: 8/1/2013    Hypertension     Onychomycosis of toenail     last assessed: 2/22/2016    Peripheral vascular disease ([facility] )     left SFA stent in 2010    Pes planus     unspecified laterality; last assessed: 8/1/2013    Pneumonia     last assessed: 2/27/2016    Prostate cancer (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 ) 07/26/2010    Viral warts     last assessed: 7/24/2015     Past Surgical History:   Procedure Laterality Date    CARDIAC CATHETERIZATION  07/29/2010    CATARACT EXTRACTION, BILATERAL Bilateral     R 1999, L 2008    COLONOSCOPY  2011    FEMORAL ARTERY - POPLITEAL ARTERY BYPASS GRAFT  09/23/2013    FOOT SURGERY      bone spur removed    LEG SURGERY Bilateral     repair; L 8/20/2010 and 7/26/2012    [place] PLACE RADIOTHER DEVICE/MARKER, PROSTATE N/A 6/22/2021    Procedure: INSERTION OF FIDUCIAL MARKER (TRANSRECTAL ULTRASOUND GUIDANCE), SPACEOAR;  Surgeon: Yaritza Singer MD;  [facility];  Service: Urology    ROTATOR CUFF REPAIR Left 2009    SHOULDER SURGERY Right 2005    collar bone           Consults have been placed to:   None    Vitals:    07/20/21 2244 07/21/21 0253 07/21/21 0603 07/21/21 0800   BP: 128/58 132/60 134/60 136/59   BP Location:   Left arm Left arm   Pulse: 74 77 73 80   Resp: 16 18 16 18   Temp: 97 7 °F (36 5 °C) 97 8 °F (36 6 °C) 98 °F (36 7 °C) 97 8 °F (36 6 °C)   TempSrc:   Oral Oral SpO2: 97% 96% 97% 97%   Weight:       Height:           Most recent labs:    No results for input(s): WBC, HGB, HCT, PLT, K, NA, CALCIUM, BUN, CREATININE, LIPASE, AMYLASE, INR, TROPONINI, CKTOTAL, AST, ALT, ALKPHOS, BILITOT in the last 72 hours  Scheduled Meds:  Continuous Infusions:No current facility-administered medications for this encounter  PRN Meds:      Surgical procedures (if appropriate):

## 2021-07-30 NOTE — H&P
History and Physical - [facility] Internal Medicine    Patient Information: Mary Corona 67 y o  male MRN: [id number]  Unit/Bed#: [address] [place] [place] [address] Encounter: [id number]  Admitting Physician: Kaylen Romero MD  PCP: Heron Hall DO  Date of Admission:  07/30/21        Hospital Problem List:     Principal Problem:    Hypoglycemia  Active Problems:    Hyponatremia    Type 2 diabetes mellitus with diabetic neuropathy (HCC)    Prostate cancer ([facility] )    Hypomagnesemia    CAD (coronary artery disease)    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 mellitus ([facility] )    GE reflux    Dyslipidemia    Benign essential hypertension    Anemia due to stage 3a chronic kidney disease (HCC)      Assessment/Plan:    Hyponatremia  Assessment & Plan  Recently hospitalized for the same with a sodium level of 123  Thought to be secondary to SIADH or fluid overload  Improved with fluid restriction and IV Lasix  Patient was subsequently discharged on Lasix 20 milligram daily    Sodium level noted to be 126 on presentation, patient received IV  cc in ED and subsequently was started on D5 NS for persistent hypoglycemia  · Recent thyroid profile within normal limit  · Check urine osmolality, urine sodium  · Serum osmolality   · Cortisol level  · Recheck BMP now  · Fluid restriction  · Hold furosemide at present    * Hypoglycemia  Assessment & Plan  Patient was noted to have symptomatic hypoglycemia at home, blood sugar noted to be 40  Symptoms improved promptly after receiving D10  Reports intermittent hypoglycemia recently   Reports recent weight loss and following strict diabetic diet  Noted to be hypoglycemic again in ED was started on dextrose drip  Currently blood glucose level has improved  · Will discontinue dextrose drip  · Monitor blood sugar  · Hold glimepiride, metformin and acarbose  · Hemoglobin A1c  · Nutrition evaluation      Hypomagnesemia  Assessment & Plan  Magnesium 1 1 on presentation, noted to be low previously   Status post magnesium sulfate in ED  · Start oral magnesium replacement  · Monitor magnesium level    Prostate cancer [facility])  Assessment & Plan  Stage IIIC prostate cancer, on Lupron  Recently undergoing radiation treatment    Type 2 diabetes mellitus with diabetic neuropathy [facility])  Assessment & Plan  Lab Results   Component Value Date    HGBA1C 12 2 (H) 06/03/2021       Recent Labs     07/30/21  1724 07/30/21  1808   POCGLU 58* 111       Blood Sugar Average: Last 72 hrs:  (P) 84 5     Presented with hypoglycemia, reports intermittent hypoglycemia  Currently on acarbose, metformin, glimepiride  Previously on Jardiance  Recently discontinued  · Check hemoglobin A1c   · Hold oral hypoglycemic agent at present    Anemia due to stage 3a chronic kidney disease (HCC)  Assessment & Plan  Stable  Monitor    Benign essential hypertension  Assessment & Plan  Stable  Monitor on amlodipine, carvedilol and lisinopril    Dyslipidemia  Assessment & Plan  Continue statin    GE reflux  Assessment & Plan  On PPI    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 mellitus ([facility] )  Assessment & Plan  Appears to be at baseline  Monitor    CAD (coronary artery disease)  Assessment & Plan  With history of PCI  · Continue aspirin, carvedilol, statin          VTE Prophylaxis: Enoxaparin (Lovenox)  / sequential compression device   Code Status: Level 1 - Full Code    Anticipated Length of Stay:  Patient will be admitted on an Observation basis with an anticipated length of stay of  < 2 midnights  Justification for Hospital Stay:  Hypoglycemia, hyponatremia    Total Time for Visit, including Counseling / Coordination of Care: 45 minutes  Greater than 50% of this total time spent on direct patient counseling and coordination of care  Chief Complaint:     Hypoglycemia - Symptomatic (patient was not himself, BS of 41, given D10 in the field with improvement of mental status and BS (now 134)   Patient takes oral DM medications, no insulin , Had radiation for prostate CA today )    History of Present Illness:    Samson Day is a 67 y o  male CAD status post remote PCI, hypertension, diabetes mellitus type 2, peripheral neuropathy, peripheral vascular disease, prostate cancer currently undergoing radiation who presents with hypoglycemia  Patient was found by the wife in the bathroom leaning against the wall  Patient reported that he went to the bathroom and he felt weak and dizzy  Patient was noted to be minimally responsive, EMS was called, blood sugar noted to be 40, patient received D10 with improvement and subsequently patient was brought to ED  Patient reported that he was in usual state of health today morning, underwent his scheduled radiation in the morning  He also reported that his sugar has been running low recently, today at lunchtime it was 69 mg/dL  Over last week blood sugar has ranged from  mg per dL  Patient reported that he also lost about 20 pounds weight in last 6 weeks  His diet has also significantly changed recently and currently his following strict diet plan with calorie count  Patient denies any changes in medication and reported that he has been taking his diabetic medications without any changes in despite episodes of hypoglycemia  In ED, patient was afebrile with stable vital signs  Alert oriented x3  Lab revealed chronic anemia  Patient was also noted to hyponatremia and hypomagnesemia  While in ED blood sugar again dropped to 58 and patient was started on D5 NS and subsequently admitted  Currently patient is lying comfortably in bed alert oriented x3 able to recall all the events that led to his hospitalization and provide complete details of his medical history    Review of Systems:    Review of Systems   Constitutional: Positive for unexpected weight change (Lost about 20 pound with change in diet last 6 weeks)  Negative for activity change, appetite change, chills and fever     HENT: Negative for congestion, sore throat and trouble swallowing  Respiratory: Negative for cough and shortness of breath  Cardiovascular: Negative for chest pain  Gastrointestinal: Negative for abdominal pain, diarrhea, nausea and vomiting  Genitourinary: Positive for frequency  Negative for dysuria and flank pain  Neurological: Negative for light-headedness and headaches  Psychiatric/Behavioral: Negative for behavioral problems         Past Medical and Surgical History:     Past Medical History:   Diagnosis Date    Acquired hallux valgus     last assessed: 8/1/2013    Allergic rhinitis     Anemia 10/26/2010    Atrophy of nail     last assessed: 7/7/2015    Chronic kidney disease     chronic renal insufficiency    Coronary artery disease     Deformity of ankle and foot, acquired     last assessed: 2/22/2016    Diabetes mellitus ([facility] )     Difficulty walking     last assessed: 12/14/2015    Dysesthesia     last assessed: 11/25/2016    Hammer toe     unspecified laterality; last assessed: 8/1/2013    Hypertension     Onychomycosis of toenail     last assessed: 2/22/2016    Peripheral vascular disease (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 ) 07/26/2010    Viral warts     last assessed: 7/24/2015       Past Surgical History:   Procedure Laterality Date    CARDIAC CATHETERIZATION  07/29/2010    CATARACT EXTRACTION, BILATERAL Bilateral     R 1999, L 2008    COLONOSCOPY  2011    FEMORAL ARTERY - POPLITEAL ARTERY BYPASS GRAFT  09/23/2013    FOOT SURGERY      bone spur removed    LEG SURGERY Bilateral     repair; L 8/20/2010 and 7/26/2012    ND PLACE RADIOTHER DEVICE/MARKER, PROSTATE N/A 6/22/2021    Procedure: INSERTION OF FIDUCIAL MARKER (TRANSRECTAL ULTRASOUND GUIDANCE), [email or web address];  Surgeon: Roosevelt Leos MD;  Location: [facility];  Service: Urology    ROTATOR CUFF REPAIR Left 2009    SHOULDER SURGERY Right 2005    collar bone       Meds/Allergies:    PTA meds:   Prior to Admission Medications   Prescriptions Last Dose Informant Patient Reported? Taking?    Probiotic Product (CULTURELLE PROBIOTICS) CHEW 7/30/2021 at 0900 Self Yes Yes   Sig: Chew daily   TRUEPLUS LANCETS 28G MISC  Self No No   Sig: Test 1x/d, E11 29   acarbose (PRECOSE) 100 MG tablet 7/30/2021 at 1200 Self No Yes   Sig: TAKE 1 TABLET THREE TIMES DAILY WITH MEALS   amLODIPine (NORVASC) 5 mg tablet 7/30/2021 at 0900 Self No Yes   Sig: Take 1 tablet (5 mg total) by mouth daily   ascorbic acid (VITAMIN C) 250 mg tablet 7/30/2021 at 0900  Yes Yes   Sig: Take 500 mg by mouth daily   aspirin (ECOTRIN LOW STRENGTH) 81 mg EC tablet 7/30/2021 at 0900 Self No Yes   Sig: Take 1 tablet (81 mg total) by mouth daily   atorvastatin (LIPITOR) 20 mg tablet 7/29/2021 at 2100  No Yes   Sig: Take 1 tablet (20 mg total) by mouth daily   brimonidine tartrate 0 2 % ophthalmic solution 7/30/2021 at 0900  Yes Yes   Sig: Administer 1 drop into the left eye 2 (two) times a day    carvedilol (COREG) 12 5 mg tablet 7/30/2021 at 1200 Self No Yes   Sig: TAKE 1 TABLET TWICE DAILY   cholecalciferol (VITAMIN D3) 1,000 units tablet 7/30/2021 at 0900  Yes Yes   Sig: Take 2,000 Units by mouth daily   co-enzyme Q-10 100 mg capsule 7/30/2021 at 0900 Self Yes Yes   Sig: Take 100 mg by mouth daily   furosemide (LASIX) 20 mg tablet 7/30/2021 at 0900  No Yes   Sig: Take 1 tablet (20 mg total) by mouth daily   gabapentin (NEURONTIN) 600 MG tablet More than a month at Unknown time Self No No   Sig: Take 1 tablet (600 mg total) by mouth 2 (two) times a day   glimepiride (AMARYL) 4 mg tablet 7/30/2021 at 1400 Self No Yes   Sig: TAKE 1 TABLET TWICE DAILY   glucose blood test strip   No No   Sig: Test blood sugar twice a day   lisinopril (ZESTRIL) 2 5 mg tablet 7/29/2021 at 2100  No Yes   Sig: Take 1 tablet (2 5 mg total) by mouth daily   metFORMIN (GLUCOPHAGE) 1000 MG tablet 7/30/2021 at 1400 Self No Yes   Sig: Take 1 tablet (1,000 mg total) by mouth 2 (two) times a day with meals   multivitamin (THERAGRAN) TABS 7/30/2021 at 0900 Self Yes Yes   Sig: Take 1 tablet by mouth daily   omeprazole (PriLOSEC) 40 MG capsule 7/30/2021 at 0600  No Yes   Sig: TAKE 1 CAPSULE EVERY DAY      Facility-Administered Medications: None       Allergies: No Known Allergies  History:     Marital Status: /Civil Union     Substance Use History:   Social History     Substance and Sexual Activity   Alcohol Use Yes    Comment: being a social drinker     Social History     Tobacco Use   Smoking Status Never Smoker   Smokeless Tobacco Never Used     Social History     Substance and Sexual Activity   Drug Use No       Family History:    Family History   Problem Relation Age of Onset    Heart disease Father     Heart attack Father         acute myocardial infarction    Heart disease Sister     Heart attack Sister         acute myocardial infarction    Heart disease Paternal Grandfather     Aortic aneurysm Mother     Throat cancer Maternal Grandfather        Physical Exam:     Vitals:   Blood Pressure: 149/79 (07/30/21 1549)  Pulse: 77 (07/30/21 1549)  Temperature: (!) 96 9 °F (36 1 °C) (07/30/21 1549)  Temp Source: Tympanic (07/30/21 1549)  Respirations: 18 (07/30/21 1549)  SpO2: 99 % (07/30/21 1549) room air    Physical Exam  Constitutional:       General: He is not in acute distress  HENT:      Head: Normocephalic and atraumatic  Cardiovascular:      Rate and Rhythm: Normal rate  Pulmonary:      Effort: Pulmonary effort is normal  No respiratory distress  Breath sounds: No wheezing, rhonchi or rales  Chest:      Chest wall: No tenderness  Abdominal:      General: Bowel sounds are normal  There is no distension  Palpations: Abdomen is soft  Tenderness:  There is no abdominal tenderness  There is no guarding or rebound  Musculoskeletal:      Right lower leg: Edema (1+ chronic secondary to prior vascular disease/intervention) present  Left lower leg: Edema (Trace) present  Skin:     General: Skin is warm and dry  Findings: No rash  Neurological:      General: No focal deficit present  Mental Status: He is alert and oriented to person, place, and time  Mental status is at baseline  Cranial Nerves: No cranial nerve deficit  Psychiatric:         Mood and Affect: Mood normal            Lab Results: I have personally reviewed pertinent reports  Results from last 7 days   Lab Units 07/30/21  1603   WBC Thousand/uL 5 35   HEMOGLOBIN g/dL 9 4*   HEMATOCRIT % 27 3*   PLATELETS Thousands/uL 189   NEUTROS PCT % 80*   LYMPHS PCT % 5*   MONOS PCT % 12   EOS PCT % 2     Results from last 7 days   Lab Units 07/30/21  1603   POTASSIUM mmol/L 3 7   CHLORIDE mmol/L 92*   CO2 mmol/L 23   BUN mg/dL 12   CREATININE mg/dL 1 11   CALCIUM mg/dL 8 3   ALK PHOS U/L 55   ALT U/L 33   AST U/L 25     Results from last 7 days   Lab Units 07/30/21  1603   INR  0 95       Imaging: I have personally reviewed pertinent reports  No results found  XR chest 1 view portable    (Results Pending)       EKG, Pathology, and Other Studies Reviewed on Admission:   · EKG-sinus rhythm at 73 beats without any acute ST T-wave changes,     [facility] Yes     ** Please Note: "This note has been constructed using a voice recognition system  Therefore there may be syntax, spelling, and/or grammatical errors   Please call if you have any questions  "**

## 2021-07-31 VITALS
WEIGHT: 173 LBS | TEMPERATURE: 97.3 F | HEART RATE: 71 BPM | SYSTOLIC BLOOD PRESSURE: 157 MMHG | BODY MASS INDEX: 24.22 KG/M2 | HEIGHT: 71 IN | OXYGEN SATURATION: 99 % | DIASTOLIC BLOOD PRESSURE: 60 MMHG | RESPIRATION RATE: 16 BRPM

## 2021-07-31 PROBLEM — E83.42 HYPOMAGNESEMIA: Status: RESOLVED | Noted: 2021-07-20 | Resolved: 2021-07-31

## 2021-07-31 PROBLEM — E87.1 HYPONATREMIA: Status: RESOLVED | Noted: 2021-07-20 | Resolved: 2021-07-31

## 2021-07-31 PROBLEM — E16.2 HYPOGLYCEMIA: Status: RESOLVED | Noted: 2021-07-14 | Resolved: 2021-07-31

## 2021-07-31 LAB
ANION GAP SERPL CALCULATED.3IONS-SCNC: 8 MMOL/L (ref 4–13)
BUN SERPL-MCNC: 7 MG/DL (ref 5–25)
CALCIUM SERPL-MCNC: 8.7 MG/DL (ref 8.3–10.1)
CHLORIDE SERPL-SCNC: 102 MMOL/L (ref 100–108)
CO2 SERPL-SCNC: 27 MMOL/L (ref 21–32)
CORTIS SERPL-MCNC: 13.1 UG/DL
CREAT SERPL-MCNC: 0.91 MG/DL (ref 0.6–1.3)
ERYTHROCYTE [DISTWIDTH] IN BLOOD BY AUTOMATED COUNT: 14.7 % (ref 11.6–15.1)
GFR SERPL CREATININE-BSD FRML MDRD: 82 ML/MIN/1.73SQ M
GLUCOSE SERPL-MCNC: 118 MG/DL (ref 65–140)
GLUCOSE SERPL-MCNC: 137 MG/DL (ref 65–140)
GLUCOSE SERPL-MCNC: 148 MG/DL (ref 65–140)
GLUCOSE SERPL-MCNC: 181 MG/DL (ref 65–140)
GLUCOSE SERPL-MCNC: 228 MG/DL (ref 65–140)
HCT VFR BLD AUTO: 28.7 % (ref 36.5–49.3)
HGB BLD-MCNC: 9.6 G/DL (ref 12–17)
MAGNESIUM SERPL-MCNC: 1.8 MG/DL (ref 1.6–2.6)
MCH RBC QN AUTO: 29.8 PG (ref 26.8–34.3)
MCHC RBC AUTO-ENTMCNC: 33.4 G/DL (ref 31.4–37.4)
MCV RBC AUTO: 89 FL (ref 82–98)
OSMOLALITY UR/SERPL-RTO: 272 MMOL/KG (ref 282–298)
OSMOLALITY UR: 118 MMOL/KG
PLATELET # BLD AUTO: 192 THOUSANDS/UL (ref 149–390)
PMV BLD AUTO: 8.5 FL (ref 8.9–12.7)
POTASSIUM SERPL-SCNC: 4.4 MMOL/L (ref 3.5–5.3)
RBC # BLD AUTO: 3.22 MILLION/UL (ref 3.88–5.62)
SODIUM 24H UR-SCNC: 45 MOL/L
SODIUM SERPL-SCNC: 137 MMOL/L (ref 136–145)
URATE SERPL-MCNC: 4.1 MG/DL (ref 4.2–8)
WBC # BLD AUTO: 4.87 THOUSAND/UL (ref 4.31–10.16)

## 2021-07-31 PROCEDURE — 82948 REAGENT STRIP/BLOOD GLUCOSE: CPT

## 2021-07-31 PROCEDURE — 80048 BASIC METABOLIC PNL TOTAL CA: CPT | Performed by: INTERNAL MEDICINE

## 2021-07-31 PROCEDURE — 83735 ASSAY OF MAGNESIUM: CPT | Performed by: INTERNAL MEDICINE

## 2021-07-31 PROCEDURE — 99239 HOSP IP/OBS DSCHRG MGMT >30: CPT | Performed by: INTERNAL MEDICINE

## 2021-07-31 PROCEDURE — 82533 TOTAL CORTISOL: CPT | Performed by: INTERNAL MEDICINE

## 2021-07-31 PROCEDURE — 97116 GAIT TRAINING THERAPY: CPT

## 2021-07-31 PROCEDURE — 97167 OT EVAL HIGH COMPLEX 60 MIN: CPT

## 2021-07-31 PROCEDURE — 84550 ASSAY OF BLOOD/URIC ACID: CPT | Performed by: INTERNAL MEDICINE

## 2021-07-31 PROCEDURE — 97163 PT EVAL HIGH COMPLEX 45 MIN: CPT

## 2021-07-31 PROCEDURE — 85027 COMPLETE CBC AUTOMATED: CPT | Performed by: INTERNAL MEDICINE

## 2021-07-31 PROCEDURE — 97535 SELF CARE MNGMENT TRAINING: CPT

## 2021-07-31 RX ORDER — ACARBOSE 50 MG/1
100 TABLET ORAL
Status: DISCONTINUED | OUTPATIENT
Start: 2021-07-31 | End: 2021-07-31 | Stop reason: HOSPADM

## 2021-07-31 RX ADMIN — MAGNESIUM OXIDE TAB 400 MG (241.3 MG ELEMENTAL MG) 400 MG: 400 (241.3 MG) TAB at 12:02

## 2021-07-31 RX ADMIN — ASPIRIN 81 MG: 81 TABLET, COATED ORAL at 08:37

## 2021-07-31 RX ADMIN — Medication 100 MG: at 08:56

## 2021-07-31 RX ADMIN — OXYCODONE HYDROCHLORIDE AND ACETAMINOPHEN 500 MG: 500 TABLET ORAL at 08:37

## 2021-07-31 RX ADMIN — ATORVASTATIN CALCIUM 20 MG: 20 TABLET, FILM COATED ORAL at 08:36

## 2021-07-31 RX ADMIN — AMLODIPINE BESYLATE 5 MG: 5 TABLET ORAL at 08:37

## 2021-07-31 RX ADMIN — GABAPENTIN 600 MG: 300 CAPSULE ORAL at 08:37

## 2021-07-31 RX ADMIN — Medication 1 TABLET: at 08:36

## 2021-07-31 RX ADMIN — METFORMIN HYDROCHLORIDE 500 MG: 500 TABLET ORAL at 12:02

## 2021-07-31 RX ADMIN — INSULIN LISPRO 2 UNITS: 100 INJECTION, SOLUTION INTRAVENOUS; SUBCUTANEOUS at 12:02

## 2021-07-31 RX ADMIN — PANTOPRAZOLE SODIUM 40 MG: 40 TABLET, DELAYED RELEASE ORAL at 05:59

## 2021-07-31 RX ADMIN — BRIMONIDINE TARTRATE 1 DROP: 1.5 SOLUTION OPHTHALMIC at 08:56

## 2021-07-31 RX ADMIN — LISINOPRIL 2.5 MG: 2.5 TABLET ORAL at 08:36

## 2021-07-31 RX ADMIN — CARVEDILOL 12.5 MG: 12.5 TABLET, FILM COATED ORAL at 08:36

## 2021-07-31 RX ADMIN — INSULIN LISPRO 1 UNITS: 100 INJECTION, SOLUTION INTRAVENOUS; SUBCUTANEOUS at 08:55

## 2021-07-31 RX ADMIN — Medication 2000 UNITS: at 08:37

## 2021-07-31 RX ADMIN — ENOXAPARIN SODIUM 40 MG: 40 INJECTION SUBCUTANEOUS at 08:37

## 2021-07-31 RX ADMIN — BRIMONIDINE TARTRATE 1 DROP: 1.5 SOLUTION OPHTHALMIC at 02:11

## 2021-07-31 NOTE — CASE MANAGEMENT
LOS: 1 DAY  UNPLANNED RA RISK SCORE: N/A OBS  RA: NO  BUNDLE: NO    CM met with patient and discussed dc planning, available services and the role of CM  Patient states he lives with his wife in a ranch house  There are 4 steps to enter and then all one floor  He states he is normally independent of all ADL's and ambulates with a walker  He owns no other DME  He has never had HHS  He has [facility]  [facility]  He states he does not feel he needs it  He is currently undergoing radiation therapy every day and does not want to mess it up  His wife is at home to help with whatever he needs and transports him to all appointments and radiation tx  Discussed the option and benefits of HHS and to this he is agreeable  Patient was provided with freedom of choice and he has no preference  A referral was sent to [address] [place] [address] in [place]  Patient denies any h/o MH or D&A abuse  Patient has medication coverage and uses the [facility] in [address]  His PCP is Dr Eulalia PÉREZ notice was discussed and he states understanding and a copy provided  CM attempted to call patients wife Rhina Bolton [phone] to discuss above dc plan  There was no answer and a message was left  [facility]

## 2021-07-31 NOTE — ASSESSMENT & PLAN NOTE
Lab Results   Component Value Date    HGBA1C 7 7 (H) 07/30/2021       Recent Labs     07/30/21  1724 07/30/21  1808   POCGLU 58* 111       Blood Sugar Average: Last 72 hrs:  (P) 562 [id number]     Presented with hypoglycemia, reports intermittent hypoglycemia  Currently on acarbose, metformin, glimepiride  Previously on Jardiance    Recently discontinued  Hemoglobin A1c significantly declined  · Discontinued glimepiride as above   · Patient will continue on metformin and acarbose  · Close blood sugar monitoring was recommended  · Follow-up with endocrinology

## 2021-07-31 NOTE — DISCHARGE INSTRUCTIONS
Monitor blood glucose liver 4 times a day, before meals and at bedtime  Maintain diabetic log to review with endocrinology early next week  Continue acarbose, continue metformin 500 mg twice a day, can increase to 1000 b i d  If blood sugar remains more than 200 mg dL    Follow-up blood test, to check sodium and magnesium level early next week    Results will be forwarded to PCP

## 2021-07-31 NOTE — ASSESSMENT & PLAN NOTE
Recently hospitalized for the same with a sodium level of 123  Thought to be secondary to SIADH or fluid overload  Patient was subsequently discharged on Lasix 20 milligram daily  Sodium level noted to be 126 on presentation, patient received IV  cc in ED and subsequently was started on D5 NS for persistent hypoglycemia, sodium level normalized to 137  Recent thyroid profile within normal limit  Good urine output despite not being on Lasix  · Follow-up pending urine osmolality, urine sodium, serum osmolality-though they were collected after receiving saline load  · Follow cortisol level  · Will hold furosemide  · Recommended adequate p o   Intake  · Discontinue glimepiride  · Follow-up BMP next week

## 2021-07-31 NOTE — DISCHARGE SUMMARY
Discharge Summary - [facility]    Patient Information: Luly Schmitz 67 y o  male MRN: [id number]  Unit/Bed#: [address] [place] [address] Encounter: [id number]    Discharging Physician / Practitioner: Ermias Diane MD  PCP: Israel Robb DO  Admission Date: 7/30/2021  Discharge Date: 07/31/21    Reason for Admission: Hypoglycemia - Symptomatic (patient was not himself, BS of 41, given D10 in the field with improvement of mental status and BS (now 134)  Patient takes oral DM medications, no insulin , Had radiation for prostate CA today )      Discharge Diagnoses:     Principal Problem (Resolved): Hypoglycemia  Active Problems:    Type 2 diabetes mellitus with diabetic neuropathy (HCC)    Prostate cancer (HCC)    CAD (coronary artery disease)    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)    GE reflux    Dyslipidemia    Benign essential hypertension    Anemia due to stage 3a chronic kidney disease ([facility] [address] )  Resolved Problems:    Hyponatremia    Hypomagnesemia        Hyponatremia-resolved as of 7/31/2021  Assessment & Plan  Recently hospitalized for the same with a sodium level of 123  Thought to be secondary to SIADH or fluid overload  Patient was subsequently discharged on Lasix 20 milligram daily  Sodium level noted to be 126 on presentation, patient received IV  cc in ED and subsequently was started on D5 NS for persistent hypoglycemia, sodium level normalized to 137  Recent thyroid profile within normal limit  Good urine output despite not being on Lasix  · Follow-up pending urine osmolality, urine sodium, serum osmolality-though they were collected after receiving saline load  · Follow cortisol level  · Will hold furosemide  · Recommended adequate p o   Intake  · Discontinue glimepiride  · Follow-up BMP next week    * Hypoglycemia-resolved as of 7/31/2021  Assessment & Plan  Patient was noted to have symptomatic hypoglycemia at home, blood sugar noted to be 40  Symptoms improved promptly after receiving D10  Reports intermittent hypoglycemia recently   Reports recent weight loss and following strict diabetic diet  Noted to be hypoglycemic again in ED was started on dextrose drip   Currently blood glucose level has improved, now over 200 off dextrose drip overnight  Tolerating diet  · Resume metformin 500 b i d  And acarbose  · Discontinue glimepiride for now  · Monitor blood sugar 4 times a day at home  · Advised close follow-up with endocrinology      Prostate cancer [facility])  Assessment & Plan  Stage IIIC prostate cancer, on Lupron  Recently undergoing radiation treatment    Type 2 diabetes mellitus with diabetic neuropathy [facility])  Assessment & Plan  Lab Results   Component Value Date    HGBA1C 7 7 (H) 07/30/2021       Recent Labs     07/30/21  1724 07/30/21  1808   POCGLU 58* 111       Blood Sugar Average: Last 72 hrs:  (P) 688 [id number]     Presented with hypoglycemia, reports intermittent hypoglycemia  Currently on acarbose, metformin, glimepiride  Previously on Jardiance    Recently discontinued  Hemoglobin A1c significantly declined  · Discontinued glimepiride as above   · Patient will continue on metformin and acarbose  · Close blood sugar monitoring was recommended  · Follow-up with endocrinology    Hypomagnesemia-resolved as of 7/31/2021  Assessment & Plan  Magnesium 1 1 on presentation, noted to be low previously   Status post magnesium sulfate in ED  · Started oral magnesium replacement  · Monitor magnesium level    Anemia due to stage 3a chronic kidney disease (HCC)  Assessment & Plan  Stable  Monitor    Benign essential hypertension  Assessment & Plan  Stable  Monitor on amlodipine, carvedilol and lisinopril    Dyslipidemia  Assessment & Plan  Continue statin    GE reflux  Assessment & Plan  On PPI    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 mellitus ([facility] [address] )  Assessment & Plan  Appears to be at baseline  Monitor    CAD (coronary artery disease)  Assessment & Plan  With history of PCI  · Continue aspirin, carvedilol, statin      Consultations During Hospital Stay:  [phone]    Procedures Performed:     · None    Significant Findings:     · Refer to hospital course and above listed diagnosis related plan for details    Imaging while in hospital:    No results found  Incidental Findings:   · None    Test Results Pending at Discharge (will require follow up):   · As per After Visit Summary  · Follow-up pending urine sodium, urine osmolality and serum osmolality     Outpatient Tests Requested:  · BMP, magnesium level  · Blood glucose monitoring    Complications:  Refer to hospital course and above listed diagnosis related plan, if any    Hospital Course: As per HPI  Yuval Reyes is a 67 y o  male patient with multiple comorbidities including CAD status post PCI, hypertension, diabetes mellitus type 2, peripheral neuropathy, peripheral vascular disease, prostate cancer who originally presented to the hospital on 7/30/2021 due to  hypoglycemia  Patient was found by the wife in the bathroom leaning against the wall  Patient reported that he went to the bathroom and he felt weak and dizzy  Patient was noted to be minimally responsive, EMS was called, blood sugar noted to be 40, patient received D10 with improvement and subsequently patient was brought to ED  Patient reported that he was in usual state of health today morning, underwent his scheduled radiation in the morning  He also reported that his sugar has been running low recently, today at lunchtime it was 69 mg/dL  Over last week blood sugar has ranged from  mg per dL  Patient reported that he also lost about 20 pounds weight in last 6 weeks  His diet has also significantly changed recently and currently his following strict diet plan with calorie count    Patient denies any changes in medication and reported that he has been taking his diabetic medications without any changes in despite episodes of hypoglycemia  In ED, patient was afebrile with stable vital signs  Alert oriented x3  Lab revealed chronic anemia  Patient was also noted to hyponatremia and hypomagnesemia  While in ED blood sugar again dropped to 58 and patient was started on D5 NS and subsequently admitted  At the time of exam, patient is lying comfortably in bed alert oriented x3 able to recall all the events that led to his hospitalization and provide complete details of his medical history    Please see above list of diagnoses and related plan for additional information  Condition at Discharge: stable     Discharge Day Visit / Exam:     Subjective:  Sitting up in chair  Offers no complaints  Requesting to go home  Denies chest pain shortness of breath cough or any GI symptoms  Reports significant urine output despite not being on Lasix    Vitals: Blood Pressure: 157/60 (07/31/21 0721)  Pulse: 71 (07/31/21 0721)  Temperature: (!) 97 3 °F (36 3 °C) (07/31/21 0721)  Temp Source: Oral (07/31/21 0721)  Respirations: 16 (07/31/21 0721)  Height: 5' 11" (180 3 cm) (07/30/21 1907)  Weight - Scale: 78 5 kg (173 lb) (07/30/21 1907)  SpO2: 99 % (07/31/21 0721)  Exam:   Physical Exam  Constitutional:       General: He is not in acute distress  HENT:      Head: Normocephalic and atraumatic  Cardiovascular:      Rate and Rhythm: Normal rate  Pulmonary:      Effort: Pulmonary effort is normal  No respiratory distress  Breath sounds: No wheezing, rhonchi or rales  Chest:      Chest wall: No tenderness  Abdominal:      General: Bowel sounds are normal  There is no distension  Palpations: Abdomen is soft  Tenderness: There is no abdominal tenderness  There is no guarding or rebound  Musculoskeletal:      Comments: Trace edema lower extremity, chronic   Skin:     General: Skin is warm and dry  Findings: No rash  Neurological:      General: No focal deficit present        Mental Status: He is alert and oriented to person, place, and time  Mental status is at baseline  Cranial Nerves: No cranial nerve deficit  Psychiatric:         Mood and Affect: Mood normal          Discharge instructions/Information to patient and family:(Discharge Medications and Follow up):   See after visit summary for information provided to patient and family  Provisions for Follow-Up Care:  See after visit summary for information related to follow-up care and any pertinent home health orders  Disposition: Home    Planned Readmission:  No     Discharge Statement:  I spent 45 minutes discharging the patient  This time was spent on the day of discharge  I had direct contact with the patient on the day of discharge  Greater than 50% of the total time was spent examining patient, answering all patient questions, arranging and discussing plan of care with patient as well as directly providing post-discharge instructions  Additional time then spent on discharge activities  Discharge Medications:  See after visit summary for reconciled discharge medications provided to patient and family  ** Please Note: "This note has been constructed using a voice recognition system  Therefore there may be syntax, spelling, and/or grammatical errors   Please call if you have any questions  "**

## 2021-07-31 NOTE — PLAN OF CARE
Problem: Nutrition/Hydration-ADULT  Goal: Nutrient/Hydration intake appropriate for improving, restoring or maintaining nutritional needs  Description: Monitor and assess patient's nutrition/hydration status for malnutrition  Collaborate with interdisciplinary team and initiate plan and interventions as ordered  Monitor patient's weight and dietary intake as ordered or per policy  Utilize nutrition screening tool and intervene as necessary  Determine patient's food preferences and provide high-protein, high-caloric foods as appropriate       INTERVENTIONS:  - Monitor oral intake, urinary output, labs, and treatment plans  - Assess nutrition and hydration status and recommend course of action  - Evaluate amount of meals eaten  - Assist patient with eating if necessary   - Allow adequate time for meals  - Recommend/ encourage appropriate diets, oral nutritional supplements, and vitamin/mineral supplements  - Order, calculate, and assess calorie counts as needed  - Recommend, monitor, and adjust tube feedings and TPN/PPN based on assessed needs  - Assess need for intravenous fluids  - Provide specific nutrition/hydration education as appropriate  - Include patient/family/caregiver in decisions related to nutrition  Outcome: Progressing     Problem: MOBILITY - ADULT  Goal: Maintain or return to baseline ADL function  Description: INTERVENTIONS:  -  Assess patient's ability to carry out ADLs; assess patient's baseline for ADL function and identify physical deficits which impact ability to perform ADLs (bathing, care of mouth/teeth, toileting, grooming, dressing, etc )  - Assess/evaluate cause of self-care deficits   - Assess range of motion  - Assess patient's mobility; develop plan if impaired  - Assess patient's need for assistive devices and provide as appropriate  - Encourage maximum independence but intervene and supervise when necessary  - Involve family in performance of ADLs  - Assess for home care needs following discharge   - Consider OT consult to assist with ADL evaluation and planning for discharge  - Provide patient education as appropriate  Outcome: Progressing  Goal: Maintains/Returns to pre admission functional level  Description: INTERVENTIONS:  - Perform BMAT or MOVE assessment daily    - Set and communicate daily mobility goal to care team and patient/family/caregiver     - Collaborate with rehabilitation services on mobility goals if consulted  - Out of bed for toileting  - Record patient progress and toleration of activity level   Outcome: Progressing     Problem: Potential for Falls  Goal: Patient will remain free of falls  Description: INTERVENTIONS:  - Educate patient/family on patient safety including physical limitations  - Instruct patient to call for assistance with activity   - Consult OT/PT to assist with strengthening/mobility   - Keep Call bell within reach  - Keep bed low and locked with side rails adjusted as appropriate  - Keep care items and personal belongings within reach  - Initiate and maintain comfort rounds  - Make Fall Risk Sign visible to staff  - Apply yellow socks and bracelet for high fall risk patients  - Consider moving patient to room near nurses station  Outcome: Progressing

## 2021-07-31 NOTE — ASSESSMENT & PLAN NOTE
Magnesium 1 1 on presentation, noted to be low previously   Status post magnesium sulfate in ED  · Started oral magnesium replacement  · Monitor magnesium level

## 2021-07-31 NOTE — ASSESSMENT & PLAN NOTE
Patient was noted to have symptomatic hypoglycemia at home, blood sugar noted to be 40  Symptoms improved promptly after receiving D10  Reports intermittent hypoglycemia recently   Reports recent weight loss and following strict diabetic diet  Noted to be hypoglycemic again in ED was started on dextrose drip   Currently blood glucose level has improved, now over 200 off dextrose drip overnight  Tolerating diet  · Resume metformin 500 b i d   And acarbose  · Discontinue glimepiride for now  · Monitor blood sugar 4 times a day at home  · Advised close follow-up with endocrinology

## 2021-07-31 NOTE — OCCUPATIONAL THERAPY NOTE
Occupational Therapy Evaluation/Treatment       07/31/21 1020   Note Type   Note type Evaluation   Restrictions/Precautions   Other Precautions Chair Alarm; Bed Alarm; Fall Risk   Pain Assessment   Pain Assessment Tool Pain Assessment not indicated - pt denies pain   Home Living   Type of [facility] One level  (4 steps to enter )   Bathroom Shower/Tub Walk-in shower   Bathroom Toilet Raised   Bathroom Equipment Grab bars in [place] [id number]  (rollator when outside )   Prior Function   Level of White Needs assistance with IADLs; Independent with ADLs and functional mobility   Lives With Spouse   Receives Help From Family   ADL Assistance Needs assistance  (wife assists at times with ADLS)   IADLs Needs assistance   Lifestyle   Intrinsic Gratification "going out"   ADL   Eating Assistance 5  Supervision/Setup   Grooming Assistance 5  Supervision/Setup   UB Bathing Assistance 4  Minimal Assistance   LB Bathing Assistance 3  Moderate Assistance   [address] 4  Minimal Assistance    [facility] [address] 3  Moderate [address]  3  Moderate Assistance   Transfers   Sit to Stand 4  Minimal assistance   Additional items Assist x 1;Verbal cues   Stand to Sit 4  Minimal assistance   Additional items Assist x 1;Verbal cues   Functional Mobility   Functional Mobility 4  Minimal assistance   Additional Comments 15 feet, short steppage gait, increased time to complete, guarded    Additional items Rolling walker   Balance   Static Sitting Fair +   Dynamic Sitting Fair   Static Standing Fair   Dynamic Standing Fair -   Activity Tolerance   Activity Tolerance Patient limited by fatigue   Nurse Made Aware yes   RUE Assessment   RUE Assessment WFL  (grossly 3+/5 MMT, shoulder flexion to 80 degrees)   LUE Assessment   LUE Assessment WFL  (grossly 3+/5 MMT, shoulder flexion to 80 degrees)   Hand Function   Gross Motor Coordination Functional   Fine Motor Coordination Functional   Cognition   Overall Cognitive Status WFL   Arousal/Participation Cooperative   Attention Within functional limits   Orientation Level Oriented X4   Following Commands Follows all commands and directions without difficulty   Assessment   Limitation Decreased ADL status; Decreased UE strength;Decreased Safe judgement during ADL;Decreased endurance;Decreased self-care trans;Decreased high-level ADLs  (decreased balance and mobility )   Prognosis Good   Assessment Patient evaluated by Occupational Therapy  Patient admitted with Hypoglycemia  The patients occupational profile, medical and therapy history includes a extensive additional review of physical, cognitive, or psychosocial history related to current functional performance  Comorbidities affecting functional mobility and ADLS include: anemia, cancer, CKD, diabetes and hypertension  Prior to admission, patient was independent with functional mobility with walker, requiring assist for ADLS and requiring assist for IADLS  The evaluation identifies the following performance deficits: weakness, impaired balance, decreased endurance, increased fall risk, new onset of impairment of functional mobility, decreased ADLS, decreased IADLS, decreased activity tolerance, decreased safety awareness, impaired judgement and decreased strength, that result in activity limitations and/or participation restrictions  This evaluation requires clinical decision making of high complexity, because the patient presents with comorbidites that affect occupational performance and required significant modification of tasks or assistance with consideration of multiple treatment options  The Barthel Index was used as a functional outcome tool presenting with a score of 55, indicating marked limitations of functional mobility and ADLS  The patient's raw score on the -PAC Daily Activity inpatient short form is 16, standardized score is 35 96, less than 39 4   Patients at this level are likely to benefit from DC to post-acute rehabilitation services  Please refer to the recommendation of the Occupational Therapist for safe DC planning  Patient will benefit from skilled Occupational Therapy services to address above deficits and facilitate a safe return to prior level of function  Goals   Patient Goals go home    STG Time Frame   (1-7 days)   Short Term Goal  Goals established to promote patient goal of to go home:  Patient will increase standing tolerance to 3 minutes during ADL task to decrease assistance level and decrease fall risk; Patient will increase bed mobility to independent in preparation for ADLS and transfers; Patient will increase functional mobility to and from bathroom with rolling walker with supervision to increase performance with ADLS and to use a toilet; Patient will tolerate 10 minutes of UE ROM/strengthening to increase general activity tolerance and performance in ADLS/IADLS; Patient will improve functional activity tolerance to 10 minutes of sustained functional tasks to increase participation in basic self-care and decrease assistance level;   Patient will increase dynamic sitting balance to fair+ to improve the ability to sit at edge of bed or on a chair for ADLS;  Patient will increase dynamic standing balance to fair to improve postural stability and decrease fall risk during standing ADLS and transfers       LTG Time Frame   (8-14 days)   Long Term Goal Patient will increase standing tolerance to 6 minutes during ADL task to decrease assistance level and decrease fall risk; Patient will increase functional mobility to and from bathroom with rolling walker independently to increase performance with ADLS and to use a toilet; Patient will tolerate 20 minutes of UE ROM/strengthening to increase general activity tolerance and performance in ADLS/IADLS; Patient will improve functional activity tolerance to 20 minutes of sustained functional tasks to increase participation in basic self-care and decrease assistance level;   Patient will increase dynamic sitting balance to good to improve the ability to sit at edge of bed or on a chair for ADLS;  Patient will increase dynamic standing balance to fair+ to improve postural stability and decrease fall risk during standing ADLS and transfers  Pt will score >/= 20/24 on AM-PAC Daily Activity Inpatient scale to promote safe independence with ADLs and functional mobility; Pt will score >/= 85/100 on Barthel Index in order to decrease caregiver assistance needed and increase ability to perform ADLs and functional mobility  Functional Transfer Goals   Pt Will Perform All Functional Transfers   (STG supervision LTG independent )   ADL Goals   Pt Will Perform Grooming Standing at sink  (STG supervision LTG Independent )   Pt Will Perform Bathing   (STG min assist LTG supervision )   Pt Will Perform UE Dressing   (STG supervision LTG Independent )   Pt Will Perform LE Dressing   (STG min assist LTG supervision )   Pt Will Perform Toileting   (STG min assist LTG supervision )   Plan   Treatment Interventions ADL retraining;Functional transfer training;UE strengthening/ROM; Endurance training;Patient/family training;Equipment evaluation/education; Activityengagement; Compensatory technique education   Goal Expiration Date 08/14/21   OT Frequency 3-5x/wk   Additional Treatment Session   Start Time 1010   End Time 1020   Treatment Assessment S: Denies pain, c/o weakness O: Toilet transfer mod assist with grab bar use  Functional mobility 15 feet with RW min assist   Stand to sit min assist   A:  Patient is generally weak and deconditioned  Patient requires mod assist for toilet transfers from a standard toilet  Patient will benefit from STR, case management aware and continued OT services to maximize functional perfomance with ADLS   P STR   Recommendation   OT Discharge Recommendation Post acute rehabilitation services   AM-PAC Daily Activity Inpatient   Lower Body Dressing 2   Bathing 2   Toileting 2   Upper Body Dressing 3   Grooming 3   Eating 4   Daily Activity Raw Score 16   Daily Activity Standardized Score (Calc for Raw Score >=11) 35 96   AM-PAC Applied Cognition Inpatient   Following a Speech/Presentation 4   Understanding Ordinary Conversation 4   Taking Medications 4   Remembering Where Things Are Placed or Put Away 4   Remembering List of 4-5 Errands 4   Taking Care of Complicated Tasks 4   Applied Cognition Raw Score 24   Applied Cognition Standardized Score 62 21   Barthel Index   Feeding 10   Bathing 0   Grooming Score 0   Dressing Score 5   Bladder Score 10   Bowels Score 10   Toilet Use Score 5   Transfers (Bed/Chair) Score 10   Mobility (Level Surface) Score 0   Stairs Score 5   Barthel Index Score 54   Licensure   [place] License Number  [facility] [address] [address] OTR/L [id number]

## 2021-08-01 LAB
ATRIAL RATE: 73 BPM
P AXIS: 40 DEGREES
PR INTERVAL: 180 MS
QRS AXIS: -19 DEGREES
QRSD INTERVAL: 92 MS
QT INTERVAL: 384 MS
QTC INTERVAL: 423 MS
T WAVE AXIS: 23 DEGREES
VENTRICULAR RATE: 73 BPM

## 2021-08-01 PROCEDURE — 93010 ELECTROCARDIOGRAM REPORT: CPT | Performed by: INTERNAL MEDICINE

## 2021-08-02 ENCOUNTER — APPOINTMENT (OUTPATIENT)
Dept: RADIATION ONCOLOGY | Facility: HOSPITAL | Age: 76
End: 2021-08-02
Attending: RADIOLOGY
Payer: MEDICARE

## 2021-08-02 ENCOUNTER — APPOINTMENT (OUTPATIENT)
Dept: LAB | Facility: CLINIC | Age: 76
End: 2021-08-02
Payer: MEDICARE

## 2021-08-02 DIAGNOSIS — E87.1 HYPONATREMIA: ICD-10-CM

## 2021-08-02 PROCEDURE — 77385 HB NTSTY MODUL RAD TX DLVR SMPL: CPT | Performed by: RADIOLOGY

## 2021-08-02 PROCEDURE — 77427 RADIATION TX MANAGEMENT X5: CPT | Performed by: RADIOLOGY

## 2021-08-02 PROCEDURE — 77014 CHG CT GUIDANCE PLACEMENT RAD THERAPY FIELDS: CPT | Performed by: RADIOLOGY

## 2021-08-03 ENCOUNTER — APPOINTMENT (OUTPATIENT)
Dept: RADIATION ONCOLOGY | Facility: HOSPITAL | Age: 76
End: 2021-08-03
Attending: RADIOLOGY
Payer: MEDICARE

## 2021-08-03 ENCOUNTER — APPOINTMENT (OUTPATIENT)
Dept: LAB | Facility: CLINIC | Age: 76
End: 2021-08-03
Payer: MEDICARE

## 2021-08-03 DIAGNOSIS — E87.1 HYPONATREMIA: ICD-10-CM

## 2021-08-03 DIAGNOSIS — E83.42 HYPOMAGNESEMIA: ICD-10-CM

## 2021-08-03 LAB
ANION GAP SERPL CALCULATED.3IONS-SCNC: 8 MMOL/L (ref 4–13)
BUN SERPL-MCNC: 13 MG/DL (ref 5–25)
CALCIUM SERPL-MCNC: 8.8 MG/DL (ref 8.3–10.1)
CHLORIDE SERPL-SCNC: 97 MMOL/L (ref 100–108)
CO2 SERPL-SCNC: 26 MMOL/L (ref 21–32)
CREAT SERPL-MCNC: 0.97 MG/DL (ref 0.6–1.3)
GFR SERPL CREATININE-BSD FRML MDRD: 76 ML/MIN/1.73SQ M
GLUCOSE P FAST SERPL-MCNC: 170 MG/DL (ref 65–99)
MAGNESIUM SERPL-MCNC: 1.4 MG/DL (ref 1.6–2.6)
POTASSIUM SERPL-SCNC: 4.5 MMOL/L (ref 3.5–5.3)
SODIUM SERPL-SCNC: 131 MMOL/L (ref 136–145)

## 2021-08-03 PROCEDURE — 77385 HB NTSTY MODUL RAD TX DLVR SMPL: CPT | Performed by: RADIOLOGY

## 2021-08-03 PROCEDURE — 80048 BASIC METABOLIC PNL TOTAL CA: CPT

## 2021-08-03 PROCEDURE — 36415 COLL VENOUS BLD VENIPUNCTURE: CPT

## 2021-08-03 PROCEDURE — 83735 ASSAY OF MAGNESIUM: CPT

## 2021-08-03 PROCEDURE — 77014 CHG CT GUIDANCE PLACEMENT RAD THERAPY FIELDS: CPT | Performed by: RADIOLOGY

## 2021-08-04 ENCOUNTER — APPOINTMENT (OUTPATIENT)
Dept: RADIATION ONCOLOGY | Facility: HOSPITAL | Age: 76
End: 2021-08-04
Attending: RADIOLOGY
Payer: MEDICARE

## 2021-08-04 PROCEDURE — 77014 CHG CT GUIDANCE PLACEMENT RAD THERAPY FIELDS: CPT | Performed by: RADIOLOGY

## 2021-08-04 PROCEDURE — 77385 HB NTSTY MODUL RAD TX DLVR SMPL: CPT | Performed by: RADIOLOGY

## 2021-08-05 ENCOUNTER — OFFICE VISIT (OUTPATIENT)
Dept: PODIATRY | Facility: CLINIC | Age: 76
End: 2021-08-05
Payer: MEDICARE

## 2021-08-05 ENCOUNTER — APPOINTMENT (OUTPATIENT)
Dept: RADIATION ONCOLOGY | Facility: HOSPITAL | Age: 76
End: 2021-08-05
Attending: RADIOLOGY
Payer: MEDICARE

## 2021-08-05 VITALS
SYSTOLIC BLOOD PRESSURE: 126 MMHG | DIASTOLIC BLOOD PRESSURE: 60 MMHG | HEIGHT: 71 IN | WEIGHT: 173 LBS | BODY MASS INDEX: 24.22 KG/M2 | RESPIRATION RATE: 17 BRPM | HEART RATE: 72 BPM

## 2021-08-05 DIAGNOSIS — M79.671 PAIN IN BOTH FEET: ICD-10-CM

## 2021-08-05 DIAGNOSIS — E11.42 DIABETIC POLYNEUROPATHY ASSOCIATED WITH TYPE 2 DIABETES MELLITUS (HCC): Primary | ICD-10-CM

## 2021-08-05 DIAGNOSIS — M79.672 PAIN IN BOTH FEET: ICD-10-CM

## 2021-08-05 DIAGNOSIS — I73.9 PAD (PERIPHERAL ARTERY DISEASE) (HCC): ICD-10-CM

## 2021-08-05 DIAGNOSIS — L84 CORNS: ICD-10-CM

## 2021-08-05 PROCEDURE — 77014 CHG CT GUIDANCE PLACEMENT RAD THERAPY FIELDS: CPT | Performed by: RADIOLOGY

## 2021-08-05 PROCEDURE — 11056 PARNG/CUTG B9 HYPRKR LES 2-4: CPT | Performed by: PODIATRIST

## 2021-08-05 PROCEDURE — 77385 HB NTSTY MODUL RAD TX DLVR SMPL: CPT | Performed by: RADIOLOGY

## 2021-08-05 NOTE — PROGRESS NOTES
Assessment/Plan:  Deformity of foot   Diabetic neuropathy   Pain upon ambulation   Pain upon ambulation   Mycosis of nail   Callus formation     Plan   Lesion debrided    all mycotic nails debrided without pain or complication   Foot exam performed   Patient educated on care of the diabetic foot   Plantar calluses debrided as well           Subjective:   patient is diabetic   He has pain upon ambulation   He has pain with shoe wear   No history of trauma             Past Medical History:   Diagnosis Date    Acquired hallux valgus       last assessed: 8/1/2013    Allergic rhinitis      Anemia 10/26/2010    Atrophy of nail       last assessed: 7/7/2015    Chronic kidney disease       chronic renal insufficiency    Coronary artery disease      Deformity of ankle and foot, acquired       last assessed: 2/22/2016    Diabetes mellitus ([facility] [address] )      Difficulty walking       last assessed: 12/14/2015    Dysesthesia       last assessed: 11/25/2016    Hammer toe       unspecified laterality; last assessed: 8/1/2013    Hypertension      Onychomycosis of toenail       last assessed: 2/22/2016    Peripheral vascular disease (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 [address] ) 07/26/2010    Viral warts       last assessed: 7/24/2015                   Past Surgical History:   Procedure Laterality Date    CARDIAC CATHETERIZATION   07/29/2010    CATARACT EXTRACTION, BILATERAL Bilateral       R 1999, L 2008    FEMORAL ARTERY - POPLITEAL ARTERY BYPASS GRAFT   09/23/2013    FOOT SURGERY         bone spur removed    LEG SURGERY Bilateral       repair; L 8/20/2010 and 7/26/2012    ROTATOR CUFF REPAIR Left 2009    SHOULDER SURGERY Right 2005     collar bone         No Known Allergies        Current Outpatient Medications:     acarbose (PRECOSE) 100 MG tablet, Take 1 tablet (100 mg total) by mouth 3 (three) times a day with meals, Disp: 270 tablet, Rfl: 1    amLODIPine (NORVASC) 2 5 mg tablet, Take 1 tablet (2 5 mg total) by mouth daily (Patient not taking: Reported on 9/26/2019), Disp: 90 tablet, Rfl: 3    aspirin (ECOTRIN LOW STRENGTH) 81 mg EC tablet, Take 1 tablet (81 mg total) by mouth daily, Disp: 30 tablet, Rfl: 6    b complex-vitamin C-folic acid (NEPHROCAPS) 1 mg capsule, Take 1 capsule by mouth daily, Disp: , Rfl:     betamethasone dipropionate (DIPROSONE) 0 05 % cream, Use as dir twice daily, Disp: , Rfl:     carvedilol (COREG) 12 5 mg tablet, TAKE 1 TABLET TWICE DAILY, Disp: 180 tablet, Rfl: 1    gabapentin (NEURONTIN) 600 MG tablet, Take 1 tablet (600 mg total) by mouth 2 (two) times a day, Disp: 180 tablet, Rfl: 0    glimepiride (AMARYL) 4 mg tablet, Take 1 tablet (4 mg total) by mouth 2 (two) times a day for 126 days, Disp: 180 tablet, Rfl: 0    glucose blood (TRUETRACK TEST) test strip, 1 each by Other route daily Use as instructed for E11 29, Disp: 100 each, Rfl: 3    lisinopril (ZESTRIL) 5 mg tablet, Take 1 tablet (5 mg total) by mouth daily (Patient taking differently: Take 2 5 mg by mouth daily ), Disp: 90 tablet, Rfl: 3    metFORMIN (GLUMETZA) 500 MG (MOD) 24 hr tablet, Take 2 tablets (1,000 mg total) by mouth 2 (two) times a day with meals, Disp: 120 tablet, Rfl: 5    multivitamin (THERAGRAN) TABS, Take 1 tablet by mouth daily, Disp: , Rfl:     omeprazole (PriLOSEC) 40 MG capsule, Take 1 capsule (40 mg total) by mouth daily, Disp: 90 capsule, Rfl: 1    simvastatin (ZOCOR) 40 mg tablet, TAKE 1 TABLET (40 MG TOTAL) BY MOUTH DAILY, Disp: 90 tablet, Rfl: 1    TRUEPLUS LANCETS 28G MISC, Test 1x/d, E11 29, Disp: , Rfl: 0           Patient Active Problem List   Diagnosis    Diabetic polyneuropathy associated with type 2 diabetes mellitus (HCC)    Allergic rhinitis    Arthropathy    PAD (peripheral artery disease) (HCC)    Benign essential hypertension    CAD (coronary artery disease)    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)    Diabetic neuropathy ([address] )    GE reflux    Lumbar radiculopathy    Rosacea    Seborrheic dermatitis    Type 2 diabetes mellitus with diabetic neuropathy (HCC)    Onychomycosis    Occlusion of femoral-popliteal bypass graft (HCC)    Prostate cancer screening    Dyslipidemia    Screening for AAA (aortic abdominal aneurysm)    Medicare annual wellness visit, subsequent    Type 2 diabetes mellitus with stage 3 chronic kidney disease, without long-term current use of insulin ([facility])    Pain in both feet    Corns    Plantar warts             Patient ID: Pernell Covarrubias is a 74 y  o  male         The following portions of the patient's history were reviewed and updated as appropriate:      family history includes Aortic aneurysm in his mother; Heart attack in his father and sister; Heart disease in his father, paternal grandfather, and sister        reports that he has never smoked  He has never used smokeless tobacco  He reports that he drinks alcohol  He reports that he does not use drugs         Objective:  Patient's shoes and socks removed    Foot ExamPhysical Exam          Physical Exam  Left Foot: Appearance: a deformity (ball of foot and distal fifth metatarsal )  Fifth toe deformities include hammer toe  Tenderness: None except the plantar aspect of the foot  Right Foot: Appearance: a deformity (distal fifth metatarsal )  Left Ankle: ROM: limited ROM in all planes   Right Ankle: ROM: limited ROM in all planes   Neurological Exam: Light touch was decreased bilaterally  Vibratory sensation was decreased in both first metatarsophalangeal joints  Response to monofilament test was absent bilaterally  Deep tendon reflexes: achilles reflex 1/4 bilaterally  Vascular Exam: performed Dorsalis pedis pulses were 1/4 bilaterally   Posterior tibial pulses were 1/4 bilaterally  Elevation Pallor: diminished bilaterally  Capillary refill time was Q  9, findings bilateral  Negative digital hair noted, positive abnormal cooling  All pre-ulcer, lesions debrided  Today, but between 1-3 seconds bilaterally  Edema: mild bilaterally and All mycotic nails debrided  Today  The patient was given orthotics to help control his feet and at as cushioning and padding on the bottom of his feet q9 findings  Toenails: All of the toenails were elongated, hypertrophied, discolored, ingrown, shown to have subungual debris and tender       Socks and shoes removed, the Right Foot: the foot was dry  The sensory exam showed diminished vibratory sensation at the level of the toes  Diminished tactile sensation with monofilament testing throughout the right foot    Socks and shoes removed, Left Foot: the foot was dry  The sensory exam showed diminished vibratory sensation at the level of the toes  Diminished tactile sensation with monofilament testing throughout the left foot    Pulses:   1+ in the posterior tibialis on the right   1+ in the dorsalis pedis on the right  Capillary refills findings on the left were delayed in the toes  Pulses:   1+ in the posterior tibialis on the left   1+ in the dorsalis pedis on the left  Assign Risk Category: 2: Loss of protective sensation with or without weakness, deformity, callus, pre-ulcer, or history of ulceration  High risk  Hyperkeratosis: present on both first sub metatarsals, present on both fifth sub metatarsals and Pre-ulcerative lesion, submetatarsal one to 5, bilateral    Shoe Gear Evaluation: performed ()  Recommendation(s): custom inlays       Patient's shoes and socks removed  Right Foot/Ankle   Right Foot Inspection  Skin Exam: callus and callus               Toe Exam: swelling and erythema  Sensory   Vibration: diminished  Proprioception: diminished      Vascular  Capillary refills: elevated        Left Foot/Ankle  Left Foot Inspection  Skin Exam: callus   Submetatarsal 5 of the left foot demonstrates 0 5 centimeter squared plantar verruca   It is in capsulated in a bullae   Debrided   20% remaining               Toe Exam: swelling and erythema                   Sensory   Vibration: diminished  Proprioception: diminished     Vascular  Capillary refills: elevated      Patient's shoes and socks removed  Assign Risk Category:  Deformity present; Loss of protective sensation; Weak pulses       Risk: 2

## 2021-08-06 ENCOUNTER — OFFICE VISIT (OUTPATIENT)
Dept: ENDOCRINOLOGY | Facility: CLINIC | Age: 76
End: 2021-08-06
Payer: MEDICARE

## 2021-08-06 ENCOUNTER — APPOINTMENT (OUTPATIENT)
Dept: RADIATION ONCOLOGY | Facility: HOSPITAL | Age: 76
End: 2021-08-06
Attending: RADIOLOGY
Payer: MEDICARE

## 2021-08-06 VITALS
HEIGHT: 71 IN | HEART RATE: 80 BPM | BODY MASS INDEX: 23.2 KG/M2 | WEIGHT: 165.7 LBS | SYSTOLIC BLOOD PRESSURE: 130 MMHG | DIASTOLIC BLOOD PRESSURE: 88 MMHG | TEMPERATURE: 99.4 F

## 2021-08-06 DIAGNOSIS — I10 BENIGN ESSENTIAL HYPERTENSION: ICD-10-CM

## 2021-08-06 DIAGNOSIS — E11.22 CKD STAGE 2 DUE TO TYPE 2 DIABETES MELLITUS (HCC): ICD-10-CM

## 2021-08-06 DIAGNOSIS — N18.2 CKD STAGE 2 DUE TO TYPE 2 DIABETES MELLITUS (HCC): ICD-10-CM

## 2021-08-06 DIAGNOSIS — E11.40 TYPE 2 DIABETES MELLITUS WITH DIABETIC NEUROPATHY, WITHOUT LONG-TERM CURRENT USE OF INSULIN (HCC): Primary | ICD-10-CM

## 2021-08-06 DIAGNOSIS — E78.5 DYSLIPIDEMIA: ICD-10-CM

## 2021-08-06 DIAGNOSIS — E16.2 HYPOGLYCEMIA: ICD-10-CM

## 2021-08-06 DIAGNOSIS — E11.65 TYPE 2 DIABETES MELLITUS WITH HYPERGLYCEMIA, WITHOUT LONG-TERM CURRENT USE OF INSULIN (HCC): ICD-10-CM

## 2021-08-06 PROCEDURE — 77014 CHG CT GUIDANCE PLACEMENT RAD THERAPY FIELDS: CPT | Performed by: RADIOLOGY

## 2021-08-06 PROCEDURE — 77385 HB NTSTY MODUL RAD TX DLVR SMPL: CPT | Performed by: RADIOLOGY

## 2021-08-06 PROCEDURE — 99214 OFFICE O/P EST MOD 30 MIN: CPT | Performed by: INTERNAL MEDICINE

## 2021-08-06 PROCEDURE — 77336 RADIATION PHYSICS CONSULT: CPT | Performed by: RADIOLOGY

## 2021-08-06 NOTE — PATIENT INSTRUCTIONS
Continue acarbose 100 mg orally 3 times a day with meals   Increase metformin to 3 tablets daily   Please send me on blood sugar log for review in 2-3 weeks   If you have persistent high or low blood sugars, please let me know sooner   Follow-up in 3 months with repeat labs         Hypoglycemia instructions   Maureen Amayapankaj  8/6/2021  [id number]    Low Blood Sugar    Steps to treat low blood sugar  1  Test blood sugar if you have symptoms of low blood sugar:   Low Blood Sugar Symptoms:  o Sweaty  o Dizzy  o Rapid heartbeat  o Shaky    o Bad mood  o Hungry      2  Treat blood sugar less than 70 with 15 grams of fast-acting carbohydrate:   Examples of 15 grams Fast-Acting Carbohydrate:  o 4 oz juice  o 4 oz regular soda  o 3-4 glucose tablets (chew)  o 3-4 hard candies (chew)              3    Wait 15 minutes and test your blood sugar again           4   If blood sugar is less than 100, repeat steps 2-3       5  When your blood sugar is 100 or more, eat a snack if it will be longer than one hour until your next meal  The snack should be 15 grams of carbohydrate and a protein:   Examples of snacks:  o ½ sandwich  o 6 crackers with cheese  o Piece of fruit with cheese or peanut butter  o 6 crackers with peanut butter

## 2021-08-06 NOTE — PROGRESS NOTES
Enmanuel Botello 67 y o  male MRN: [id number]    Encounter: [id number]      Assessment/Plan     1  Type 2 diabetes mellitus not on long-term insulin therapy with hyperglycemia   2  Hypoglycemia-requiring 3rd party intervention, resolved  3  CKD stage 3  4  Neuropathy   Fairly controlled with last A1c 7 7% however does concerning that this is in the setting of multiple episodes of hypoglycemia  Sulfonylureas have been discontinued     Recommend the following at this time  - increase metformin to 500 mg orally, 3 tablets daily  - continue acarbose at current dose   - check blood sugars qAC and HS   - send log in 2 weeks, if any persistent high or low blood sugars   After titration of metformin dose, glycemic management is not at goal , will plan to start a DPP 4 inhibitor if covered   -repeat  A1c, BMP, check urine microalbumin in 3 month    5  Hyperlipidemia  - continue statin therapy  - lipid panel in 3 months     6  Hypertension  Blood pressure at goal   - continue current medications     7  Prostate cancer-following up with Urology      CC: Diabetes    History of Present Illness     HPI:  Enmanuel Botello is a 67 y o  male presents for a follow-up visit regarding diabetes management  Also has CKD, neuropathy, hypertension, hyperlipidemia    Last seen in 2021    Recently admitted to the hospital for hypoglycemia, sulfonylureas were discontinued   Also hyponatremia - lasix discontinued     Current regimen:   Metformin 500 mg twice a day   Acarbose 100 mg orally 3 times a day with meals     Eating well, consistent carbohydrates  < 60 gm per meal  Has fatigue but feels it is related to radiation   Swelling in the feet   Occasional diarrhea   Has urinary frequency but noit  Has fluid restriction to 1500 ml/day   Appetite is low but eating 3 m4eals   Weight stable getting OT/PT    Statin:   lipitor   ACE-I/ARB:  Lisinopril     Home glucose monitorin-4 times a day   Since discharge from the hospital on 07/31, blood sugars have been ranging 136-203 mg/dL   No further hypoglycemia    All other systems were reviewed and are negative      Review of Systems      Historical Information   Past Medical History:   Diagnosis Date    Acquired hallux valgus     last assessed: 8/1/2013    Allergic rhinitis     Anemia 10/26/2010    Atrophy of nail     last assessed: 7/7/2015    Chronic kidney disease     chronic renal insufficiency    Coronary artery disease     Deformity of ankle and foot, acquired     last assessed: 2/22/2016    Diabetes mellitus ([facility] [address] )     Difficulty walking     last assessed: 12/14/2015    Dysesthesia     last assessed: 11/25/2016    Hammer toe     unspecified laterality; last assessed: 8/1/2013    Hypertension     Onychomycosis of toenail     last assessed: 2/22/2016    Peripheral vascular disease ([facility] [address] )     left SFA stent in 2010    Pes planus     unspecified laterality; last assessed: 8/1/2013    Pneumonia     last assessed: 2/27/2016    [facility]     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     last assessed: 8/15/2016    Type 2 diabetes mellitus ([facility] [address] ) 07/26/2010    Viral warts     last assessed: 7/24/2015     Past Surgical History:   Procedure Laterality Date    CARDIAC CATHETERIZATION  07/29/2010    CATARACT EXTRACTION, BILATERAL Bilateral     R 1999, L 2008    COLONOSCOPY  2011    FEMORAL ARTERY - POPLITEAL ARTERY BYPASS GRAFT  09/23/2013    FOOT SURGERY      bone spur removed    LEG SURGERY Bilateral     repair; L 8/20/2010 and 7/26/2012    IA PLACE RADIOTHER DEVICE/MARKER, PROSTATE N/A 6/22/2021    Procedure: INSERTION OF FIDUCIAL MARKER (TRANSRECTAL ULTRASOUND GUIDANCE), SPACEOAR;  Surgeon: Dany Verduzco MD;  Location: [facility];  Service: Urology    ROTATOR CUFF REPAIR Left 2009    SHOULDER SURGERY Right 2005    collar bone     Social History   Social History     Substance and Sexual Activity   Alcohol Use Yes    Comment: being a social drinker Social History     Substance and Sexual Activity   Drug Use No     Social History     Tobacco Use   Smoking Status Never Smoker   Smokeless Tobacco Never Used     Family History:   Family History   Problem Relation Age of Onset    Heart disease Father     Heart attack Father         acute myocardial infarction    Heart disease Sister     Heart attack Sister         acute myocardial infarction    Heart disease Paternal Grandfather     Aortic aneurysm Mother     Throat cancer Maternal Grandfather        Meds/Allergies   Current Outpatient Medications   Medication Sig Dispense Refill    acarbose (PRECOSE) 100 MG tablet TAKE 1 TABLET THREE TIMES DAILY WITH MEALS 270 tablet 1    amLODIPine (NORVASC) 5 mg tablet Take 1 tablet (5 mg total) by mouth daily 90 tablet 1    ascorbic acid (VITAMIN C) 250 mg tablet Take 500 mg by mouth daily      aspirin (ECOTRIN LOW STRENGTH) 81 mg EC tablet Take 1 tablet (81 mg total) by mouth daily 30 tablet 6    atorvastatin (LIPITOR) 20 mg tablet Take 1 tablet (20 mg total) by mouth daily 90 tablet 0    brimonidine tartrate 0 2 % ophthalmic solution Administer 1 drop into the left eye 2 (two) times a day       carvedilol (COREG) 12 5 mg tablet TAKE 1 TABLET TWICE DAILY 180 tablet 1    cholecalciferol (VITAMIN D3) 1,000 units tablet Take 2,000 Units by mouth daily      co-enzyme Q-10 100 mg capsule Take 100 mg by mouth daily      gabapentin (NEURONTIN) 600 MG tablet Take 1 tablet (600 mg total) by mouth 2 (two) times a day 180 tablet 0    glucose blood test strip Test blood sugar twice a day 100 each 3    lisinopril (ZESTRIL) 2 5 mg tablet Take 1 tablet (2 5 mg total) by mouth daily 90 tablet 1    magnesium oxide (MAG-OX) 400 mg Take 1 tablet (400 mg total) by mouth 2 (two) times a day 60 tablet 0    metFORMIN (GLUCOPHAGE) 1000 MG tablet Take 0 5 tablets (500 mg total) by mouth 2 (two) times a day with meals 180 tablet 0    multivitamin (THERAGRAN) TABS Take 1 tablet by mouth daily      omeprazole (PriLOSEC) 40 MG capsule TAKE 1 CAPSULE EVERY DAY 90 capsule 1    Probiotic Product (CULTURELLE PROBIOTICS) CHEW Chew daily      TRUEPLUS LANCETS 28G MISC Test 1x/d, E11 29  0     No current facility-administered medications for this visit  No Known Allergies    Objective   Vitals: Blood pressure 130/88, pulse 80, temperature 99 4 °F (37 4 °C), height 5' 11" (1 803 m), weight 75 2 kg (165 lb 11 2 oz)  Physical Exam  Constitutional:       General: He is not in acute distress  Appearance: He is well-developed  He is not diaphoretic  HENT:      Head: Normocephalic and atraumatic  Eyes:      Conjunctiva/sclera: Conjunctivae normal       Pupils: Pupils are equal, round, and reactive to light  Cardiovascular:      Rate and Rhythm: Normal rate and regular rhythm  Heart sounds: Normal heart sounds  No murmur heard  Pulmonary:      Effort: Pulmonary effort is normal  No respiratory distress  Breath sounds: Normal breath sounds  No wheezing  Abdominal:      General: There is no distension  Palpations: Abdomen is soft  Tenderness: There is no abdominal tenderness  There is no guarding  Musculoskeletal:      Cervical back: Normal range of motion and neck supple  Right lower leg: Edema present  Left lower leg: Edema present  Skin:     General: Skin is warm and dry  Findings: No erythema or rash  Neurological:      Mental Status: He is alert and oriented to person, place, and time  Psychiatric:         Behavior: Behavior normal          Thought Content: Thought content normal          The history was obtained from the review of the chart, patient and family      Lab Results:   Lab Results   Component Value Date/Time    Hemoglobin A1C 7 7 (H) 07/30/2021 04:03 PM    Hemoglobin A1C 12 2 (H) 06/03/2021 07:47 AM    Hemoglobin A1C 13 2 (H) 09/18/2020 09:19 AM    WBC 6 24 08/02/2021 09:35 AM    WBC 4 87 07/31/2021 05:10 AM    WBC 5 35 07/30/2021 04:03 PM    Hemoglobin 9 6 (L) 08/02/2021 09:35 AM    Hemoglobin 9 6 (L) 07/31/2021 05:10 AM    Hemoglobin 9 4 (L) 07/30/2021 04:03 PM    Hematocrit 28 1 (L) 08/02/2021 09:35 AM    Hematocrit 28 7 (L) 07/31/2021 05:10 AM    Hematocrit 27 3 (L) 07/30/2021 04:03 PM    MCV 90 08/02/2021 09:35 AM    MCV 89 07/31/2021 05:10 AM    MCV 88 07/30/2021 04:03 PM    Platelets 044 91/07/9009 09:35 AM    Platelets 448 72/16/6867 05:10 AM    Platelets 842 36/05/6132 04:03 PM    BUN 13 08/03/2021 09:51 AM    BUN 7 07/31/2021 05:10 AM    BUN 10 07/30/2021 09:23 PM    Potassium 4 5 08/03/2021 09:51 AM    Potassium 4 4 07/31/2021 05:10 AM    Potassium 4 2 07/30/2021 09:23 PM    Chloride 97 (L) 08/03/2021 09:51 AM    Chloride 102 07/31/2021 05:10 AM    Chloride 97 (L) 07/30/2021 09:23 PM    CO2 26 08/03/2021 09:51 AM    CO2 27 07/31/2021 05:10 AM    CO2 22 07/30/2021 09:23 PM    Creatinine 0 97 08/03/2021 09:51 AM    Creatinine 0 91 07/31/2021 05:10 AM    Creatinine 0 82 07/30/2021 09:23 PM    AST 25 07/30/2021 04:03 PM    AST 59 (H) 07/20/2021 02:10 PM    AST 18 07/20/2021 09:48 AM    ALT 33 07/30/2021 04:03 PM    ALT 30 07/20/2021 02:10 PM    ALT 25 07/20/2021 09:48 AM    Albumin 3 4 (L) 07/30/2021 04:03 PM    Albumin 3 3 (L) 07/20/2021 02:10 PM    Albumin 3 6 07/20/2021 09:48 AM    HDL, Direct 47 06/03/2021 07:47 AM    Triglycerides 92 06/03/2021 07:47 AM         Imaging Studies: I have personally reviewed pertinent reports  Portions of the record may have been created with voice recognition software  Occasional wrong word or "sound a like" substitutions may have occurred due to the inherent limitations of voice recognition software  Read the chart carefully and recognize, using context, where substitutions have occurred

## 2021-08-09 ENCOUNTER — APPOINTMENT (OUTPATIENT)
Dept: RADIATION ONCOLOGY | Facility: HOSPITAL | Age: 76
End: 2021-08-09
Attending: RADIOLOGY
Payer: MEDICARE

## 2021-08-09 PROCEDURE — 77014 CHG CT GUIDANCE PLACEMENT RAD THERAPY FIELDS: CPT | Performed by: RADIOLOGY

## 2021-08-09 PROCEDURE — 77427 RADIATION TX MANAGEMENT X5: CPT | Performed by: RADIOLOGY

## 2021-08-09 PROCEDURE — 77385 HB NTSTY MODUL RAD TX DLVR SMPL: CPT | Performed by: RADIOLOGY

## 2021-08-10 ENCOUNTER — TELEPHONE (OUTPATIENT)
Dept: FAMILY MEDICINE CLINIC | Facility: CLINIC | Age: 76
End: 2021-08-10

## 2021-08-10 ENCOUNTER — APPOINTMENT (OUTPATIENT)
Dept: RADIATION ONCOLOGY | Facility: HOSPITAL | Age: 76
End: 2021-08-10
Attending: RADIOLOGY
Payer: MEDICARE

## 2021-08-10 PROCEDURE — 77014 CHG CT GUIDANCE PLACEMENT RAD THERAPY FIELDS: CPT | Performed by: RADIOLOGY

## 2021-08-10 PROCEDURE — 77385 HB NTSTY MODUL RAD TX DLVR SMPL: CPT | Performed by: RADIOLOGY

## 2021-08-11 ENCOUNTER — OFFICE VISIT (OUTPATIENT)
Dept: DIABETES SERVICES | Facility: CLINIC | Age: 76
End: 2021-08-11
Payer: MEDICARE

## 2021-08-11 ENCOUNTER — APPOINTMENT (OUTPATIENT)
Dept: RADIATION ONCOLOGY | Facility: HOSPITAL | Age: 76
End: 2021-08-11
Attending: RADIOLOGY
Payer: MEDICARE

## 2021-08-11 VITALS — WEIGHT: 165 LBS | HEIGHT: 71 IN | BODY MASS INDEX: 23.1 KG/M2

## 2021-08-11 DIAGNOSIS — E11.40 TYPE 2 DIABETES MELLITUS WITH DIABETIC NEUROPATHY, WITHOUT LONG-TERM CURRENT USE OF INSULIN (HCC): ICD-10-CM

## 2021-08-11 PROCEDURE — 77014 CHG CT GUIDANCE PLACEMENT RAD THERAPY FIELDS: CPT | Performed by: RADIOLOGY

## 2021-08-11 PROCEDURE — 97802 MEDICAL NUTRITION INDIV IN: CPT | Performed by: DIETITIAN, REGISTERED

## 2021-08-11 PROCEDURE — 77385 HB NTSTY MODUL RAD TX DLVR SMPL: CPT | Performed by: RADIOLOGY

## 2021-08-11 NOTE — PATIENT INSTRUCTIONS
45 grams of carbohydrate per meal  15 grams of carbohydrate per snack    3 meals per day, 4-5 hours apart   3 snacks per day, at least 2 hours apart from meals    Use the Portion Book and label reading to determine carbohydrate amounts in food and beverages  Please complete 3 day food record prior to follow-up appointment in 6 weeks  Send blood sugar log to to Dr Nisa Duvall within 1 week of starting to follow new meal plan

## 2021-08-11 NOTE — PROGRESS NOTES
Medical Nutrition Therapy        Assessment    Visit Type: Initial visit    Chief complaint T2DM    HPI: 67year old male with T2DM accompanied by his wife  Most recent HbA1c 7 7% but noted by Endo to have had multiple hypoglycemic episodes that contributed to this lab result  Since his Endo appointment 5 days ago his glimepiride was stopped to prevent hypoglycemia  Pernell's wife reports that since glimepiride was stopped that BG has been mostly in 160-180 mg/dL range fasting and preprandial     Fatou Queen and his wife report that they have been trying to follow a previous recommendation to have 45-60 g CHO per meal   [place] diet history reveals that majority of his meal may only contain 15-20 g CHO  Currently meals reported during his diet recall range from 15 to 45 grams of carbohydrate  Episodes of hypoglycemia were likely caused by inadequate/inconsistent carbohydrate intake at meal in relation to being on glimepiride  Explained basic pathophysiology of diabetes and impact of diet on blood glucose levels  Together we discussed what foods contain CHO, reading a food label, timing of meals and snacks, serving sizes, the role of fiber, and the importance of consistent carbohydrate intake in glycemic control  Used the portion booklet to teach Fatou Queen more about food groups and basic carbohydrate counting  Created an individualized meal plan for Pernell with 3 meals and 3 snacks providing 45 g carb per meal and 15 g carb per snack  Put together sample meals for Pernell's reference  Soham Velasquez and his wife that since 45 g CHO per meal is more than he has been eating at most meals that they will likely see an increase in BG levels when starting to follow this meal plan and advised them to send CG log to Dr Odilon Irwin within a week so that she can make changes to his medication regimen if needed  Fatou Queen demonstrated good understanding     Fatou Queen will call with questions prior to follow-up appointment in 6 weeks  Ht Readings from Last 1 Encounters:   08/06/21 5' 11" (1 803 m)     Wt Readings from Last 2 Encounters:   08/06/21 75 2 kg (165 lb 11 2 oz)   08/05/21 78 5 kg (173 lb)     Weight Change: No    Medical Diagnosis/ICD10: E11 40 (ICD-10-CM) - Type 2 diabetes mellitus with diabetic neuropathy, without long-term current use of insulin     Barriers to Learning: no barriers    Do you follow any special diet presently?: Yes - report trying to follow 45-60 g CHO per meal  Who shops: spouse  Who cooks: spouse    Food Log: Completed via the method of food recall    Breakfast:6:30 - 7:30 am before going to radiation  Half english muffin with peanut butter and jelly OR bowl of cereal (2/3 cup honey nut cheerios) with small amount of blueberries or strawberries or 1/2 banana  Coffee with artifical sweetener wit ha splash of 1% milk   Morning Snack:10 am - 10:30fruit (grapes 10, 1 halo)  Lunch:12-12:30 pm yogurt and dipped 10 crackers (flipside he htinks totaled around 30 g CHO) in the yogurt and 5 cherries (large)  A little bit of water, on water restriction  Afternoon Snack:2:30 - 3 pm fruit same as morning snack  Dinner: 5-6 pm  Mushroom ommolette (egg and mushroom) 1 piece toast (whole wheat) with sugar free jelly, and fruit (a few cherries or grapes)  A little bit of water  Evening Snack: a little before 8 pm reduced sugar ice cream (2/3 cup)  Beverages: coffee, water   Eating out/Take out:none  Exercise PT came to house for the first time yesterday and gave list of exercises      Calorie needs 1,800 kcals/day Carbs: 45 g/meal, 15 g/snack         Nutrition Diagnosis:  Inconsistent carbohydrate intake  intake related to Food and nutrition related knowledge deficit concerning appropriate amount and timing of carbohydrate intake as evidenced by  Estimated carbohydrate intake that is different from recommended types or ingested on an irregular basis    Intervention: plate method, increased fiber intake, label reading, carbohydrate counting, increased plant based foods, meal timing, meal planning, individualized meal plan and food diary     Treatment Goals: Patient understands education and recommendations, Patient will consume 25-35 grams of fiber a day, Patient will increase their intake of plant based foods and Patient will count carbohydrates    Monitoring and evaluation:    Term code indicator  FH 1 6 3 Carbohydrate Intake Criteria: 45 g CHO per meal, 15 g CHO per snack  Term code indicator  FH 4 4 Mealtime Behavior Criteria: 3 meals per day, 4-5 hours apart  3 snacks per day, at least 2 hours apart from meals    Patients Response to Instruction:  Comprehensiongood  Motivationgood  Expected Compliancegood    Thank you for coming to the [facility] for education today  Please feel free to call with any questions or concerns      Start: 10:15 am  Stop: 11:18 am  Referred by: MD Eulogio Butt, [address] [place] [address] Sowmya Luu  [address] [place] [phone]

## 2021-08-12 ENCOUNTER — OFFICE VISIT (OUTPATIENT)
Dept: URGENT CARE | Facility: CLINIC | Age: 76
End: 2021-08-12
Payer: MEDICARE

## 2021-08-12 ENCOUNTER — TELEPHONE (OUTPATIENT)
Dept: RADIATION ONCOLOGY | Facility: HOSPITAL | Age: 76
End: 2021-08-12

## 2021-08-12 ENCOUNTER — APPOINTMENT (OUTPATIENT)
Dept: RADIATION ONCOLOGY | Facility: HOSPITAL | Age: 76
End: 2021-08-12
Attending: RADIOLOGY
Payer: MEDICARE

## 2021-08-12 VITALS — HEART RATE: 77 BPM | RESPIRATION RATE: 14 BRPM | OXYGEN SATURATION: 100 % | TEMPERATURE: 97.3 F

## 2021-08-12 DIAGNOSIS — S80.211A ABRASION, RIGHT KNEE, INITIAL ENCOUNTER: Primary | ICD-10-CM

## 2021-08-12 PROCEDURE — 77300 RADIATION THERAPY DOSE PLAN: CPT | Performed by: RADIOLOGY

## 2021-08-12 PROCEDURE — 77338 DESIGN MLC DEVICE FOR IMRT: CPT | Performed by: RADIOLOGY

## 2021-08-12 PROCEDURE — 77385 HB NTSTY MODUL RAD TX DLVR SMPL: CPT | Performed by: RADIOLOGY

## 2021-08-12 PROCEDURE — 99213 OFFICE O/P EST LOW 20 MIN: CPT | Performed by: PHYSICIAN ASSISTANT

## 2021-08-12 PROCEDURE — 77014 CHG CT GUIDANCE PLACEMENT RAD THERAPY FIELDS: CPT | Performed by: RADIOLOGY

## 2021-08-12 RX ORDER — LISINOPRIL 2.5 MG/1
2.5 TABLET ORAL DAILY
COMMUNITY
Start: 2021-08-04 | End: 2021-09-28 | Stop reason: SDUPTHER

## 2021-08-12 RX ORDER — BRIMONIDINE TARTRATE 2 MG/ML
0.2 SOLUTION/ DROPS OPHTHALMIC 2 TIMES DAILY
COMMUNITY
Start: 2021-08-04

## 2021-08-12 RX ORDER — OMEPRAZOLE 40 MG/1
40 CAPSULE, DELAYED RELEASE ORAL DAILY
COMMUNITY
Start: 2021-08-04 | End: 2022-06-21 | Stop reason: SDUPTHER

## 2021-08-12 RX ORDER — CARVEDILOL 12.5 MG/1
12.5 TABLET ORAL 2 TIMES DAILY
COMMUNITY
Start: 2021-08-04 | End: 2022-01-31 | Stop reason: SDUPTHER

## 2021-08-12 RX ORDER — MULTIVIT-MIN/IRON/FOLIC ACID/K 18-600-40
500 CAPSULE ORAL DAILY
COMMUNITY
Start: 2021-08-04

## 2021-08-12 RX ORDER — ASPIRIN 81 MG/1
81 TABLET ORAL DAILY
COMMUNITY
Start: 2021-08-04

## 2021-08-12 RX ORDER — ATORVASTATIN CALCIUM 20 MG/1
20 TABLET, FILM COATED ORAL DAILY
COMMUNITY
Start: 2021-08-04 | End: 2022-02-17 | Stop reason: SDUPTHER

## 2021-08-12 RX ORDER — CEPHALEXIN 500 MG/1
500 CAPSULE ORAL EVERY 6 HOURS SCHEDULED
Qty: 20 CAPSULE | Refills: 0 | Status: SHIPPED | OUTPATIENT
Start: 2021-08-12 | End: 2021-08-17

## 2021-08-12 RX ORDER — UBIDECARENONE 100 MG
100 CAPSULE ORAL DAILY
COMMUNITY
Start: 2021-08-04

## 2021-08-12 RX ORDER — MAGNESIUM OXIDE 400 MG/1
400 TABLET ORAL 2 TIMES DAILY
COMMUNITY
Start: 2021-08-04 | End: 2021-12-25

## 2021-08-12 RX ORDER — AMLODIPINE BESYLATE 2.5 MG/1
5 TABLET ORAL DAILY
COMMUNITY
Start: 2021-08-04 | End: 2021-08-30

## 2021-08-12 RX ORDER — CHOLECALCIFEROL (VITAMIN D3) 125 MCG
2000 CAPSULE ORAL DAILY
COMMUNITY
Start: 2021-08-04

## 2021-08-12 NOTE — TELEPHONE ENCOUNTER
Thank you  I will call his wife and let them know  Called patient back at 1020  Made him aware that Dr Bell Sandoval ordered Flomax and had prescription sent to [facility] in [facility] per pt request   Patient appreciative and states he will call pharmacy before going to  prescription

## 2021-08-12 NOTE — PATIENT INSTRUCTIONS
Keep wound clean and dry, keep area covered through day while wound is healing, allow periods of exposure to air  Monitor for signs of infection, fill antibiotic if area becomes increasingly more red or there is discharge/drainage  Follow up with PCP  Return or be seen in ER with any worsening or progressing symptoms

## 2021-08-12 NOTE — TELEPHONE ENCOUNTER
Patient requested to be seen by RN after radiation treatment today  States he was up 8 times overnight from about 2330 to 0600 to urinate  Denies incontinence, hematuria and dysuria  States he is voiding small frequent amounts  States Dr Juanita Morin offered a medication if this would happen  Reports difficulty sleeping due to urinating despite fatigue  Uses [facility] pharmacy in [address] [place] [address]

## 2021-08-12 NOTE — PROGRESS NOTES
[facility]        NAME: Susi Monte is a 67 y o  male  : 1945    MRN: [id number]  DATE: 2021  TIME: 7:12 PM    Assessment and Plan   Abrasion, right knee, initial encounter [S80 211A]  1  Abrasion, right knee, initial encounter  cephalexin (KEFLEX) 500 mg capsule         Patient Instructions     Patient Instructions   Keep wound clean and dry, keep area covered through day while wound is healing, allow periods of exposure to air  Monitor for signs of infection, fill antibiotic if area becomes increasingly more red or there is discharge/drainage  Follow up with PCP  Return or be seen in ER with any worsening or progressing symptoms  Follow up with PCP in 3-5 days  Proceed to  ER if symptoms worsen  Chief Complaint     Chief Complaint   Patient presents with    Abrasion     pt presents with anabrasion on the right knee r/t falling on 21         History of Present Illness       Patient is a 75yo M presenting today with R knee abrasion x 12 days  Patient notes while going through physical therapy today at home his PT told him to be evaluated for possible infection to abrasion of R knee in which he sustained after a trip and fall accident which he scraped his R knee and R elbow, denies any trauma to head or LOC  States he cleaned the area with soap and water follow the incident and has been applying neosporin and covering with gauze at all hours  Denies redness, swelling, discharge/drainage, fever, chills, weakness, numbness or tingling  Review of Systems   Review of Systems   Constitutional: Negative for chills and fever  HENT: Negative for ear pain and sore throat  Eyes: Negative for pain and visual disturbance  Respiratory: Negative for cough and shortness of breath  Cardiovascular: Negative for chest pain and palpitations  Gastrointestinal: Negative for abdominal pain and vomiting  Genitourinary: Negative for dysuria and hematuria  Musculoskeletal: Negative for arthralgias and back pain  Skin: Positive for wound (R knee)  Negative for rash  Neurological: Negative for seizures and syncope  All other systems reviewed and are negative          Current Medications       Current Outpatient Medications:     acarbose (PRECOSE) 100 MG tablet, TAKE 1 TABLET THREE TIMES DAILY WITH MEALS, Disp: 270 tablet, Rfl: 1    amLODIPine (NORVASC) 2 5 mg tablet, Take 5 mg by mouth daily, Disp: , Rfl:     Ascorbic Acid (Vitamin C) 500 MG CAPS, Take 500 mg by mouth daily, Disp: , Rfl:     aspirin (ECOTRIN LOW STRENGTH) 81 mg EC tablet, Take 81 mg by mouth daily, Disp: , Rfl:     atorvastatin (LIPITOR) 20 mg tablet, Take 20 mg by mouth daily, Disp: , Rfl:     brimonidine tartrate 0 2 % ophthalmic solution, Inject 0 2 % into the eye 2 (two) times a day, Disp: , Rfl:     carvedilol (COREG) 12 5 mg tablet, Take 12 5 mg by mouth 2 (two) times a day, Disp: , Rfl:     Cholecalciferol (Vitamin D3) 50 MCG (2000 UT) TABS, Take 2,000 Units by mouth daily, Disp: , Rfl:     co-enzyme Q-10 100 mg capsule, Take 100 mg by mouth daily, Disp: , Rfl:     glucose blood test strip, Test blood sugar twice a day, Disp: 100 each, Rfl: 3    lisinopril (ZESTRIL) 2 5 mg tablet, Take 2 5 mg by mouth daily, Disp: , Rfl:     magnesium oxide (MAG-OX) 400 mg tablet, Take 400 mg by mouth 2 (two) times a day, Disp: , Rfl:     metFORMIN (GLUCOPHAGE) 1000 MG tablet, Take 0 5 tablets (500 mg total) by mouth 2 (two) times a day with meals, Disp: 180 tablet, Rfl: 0    omeprazole (PriLOSEC) 40 MG capsule, Take 40 mg by mouth daily, Disp: , Rfl:     PRENATAL MULTIVIT-MIN-FE-FA PO, Take 1 tablet by mouth daily, Disp: , Rfl:     Probiotic Product (CULTURELLE PROBIOTICS) CHEW, Chew daily, Disp: , Rfl:     tamsulosin (FLOMAX) 0 4 mg, Take 1 capsule (0 4 mg total) by mouth daily with dinner, Disp: 30 capsule, Rfl: 11    TRUEPLUS LANCETS 28G MISC, Test 1x/d, E11 29, Disp: , Rfl: 0   cephalexin (KEFLEX) 500 mg capsule, Take 1 capsule (500 mg total) by mouth every 6 (six) hours for 5 days, Disp: 20 capsule, Rfl: 0    gabapentin (NEURONTIN) 600 MG tablet, Take 1 tablet (600 mg total) by mouth 2 (two) times a day, Disp: 180 tablet, Rfl: 0    Current Allergies     Allergies as of 08/12/2021    (No Known Allergies)            The following portions of the patient's history were reviewed and updated as appropriate: allergies, current medications, past family history, past medical history, past social history, past surgical history and problem list      Past Medical History:   Diagnosis Date    Acquired hallux valgus     last assessed: 8/1/2013    Allergic rhinitis     Anemia 10/26/2010    Atrophy of nail     last assessed: 7/7/2015    Chronic kidney disease     chronic renal insufficiency    Coronary artery disease     Deformity of ankle and foot, acquired     last assessed: 2/22/2016    Diabetes mellitus ([facility] [address] )     Difficulty walking     last assessed: 12/14/2015    Dysesthesia     last assessed: 11/25/2016    Hammer toe     unspecified laterality; last assessed: 8/1/2013    Hypertension     Onychomycosis of toenail     last assessed: 2/22/2016    Peripheral vascular disease ([facility] [address] )     left SFA stent in 2010    Pes planus     unspecified laterality; last assessed: 8/1/2013    Pneumonia     last assessed: 2/27/2016    Prostate cancer ([facility] [address] )     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     last assessed: 8/15/2016    Type 2 diabetes mellitus ([facility] [address] [facility] ) 07/26/2010    Viral warts     last assessed: 7/24/2015       Past Surgical History:   Procedure Laterality Date    CARDIAC CATHETERIZATION  07/29/2010    CATARACT EXTRACTION, BILATERAL Bilateral     R 1999, L 2008    COLONOSCOPY  2011    FEMORAL ARTERY - POPLITEAL ARTERY BYPASS GRAFT  09/23/2013    FOOT SURGERY      bone spur removed    LEG SURGERY Bilateral     repair; L 8/20/2010 and 7/26/2012    DE PLACE Sharla Esquivel DEVICE/MARKER, PROSTATE N/A 6/22/2021    Procedure: INSERTION OF FIDUCIAL MARKER (TRANSRECTAL ULTRASOUND GUIDANCE), SPACEOAR;  Surgeon: Bernie Brown MD;  Location: [facility];  Service: Urology    ROTATOR CUFF REPAIR Left 2009    SHOULDER SURGERY Right 2005    collar bone       Family History   Problem Relation Age of Onset    Heart disease Father     Heart attack Father         acute myocardial infarction    Heart disease Sister     Heart attack Sister         acute myocardial infarction    Heart disease Paternal Grandfather     Aortic aneurysm Mother     Throat cancer Maternal Grandfather          Medications have been verified  Objective   Pulse 77   Temp (!) 97 3 °F (36 3 °C)   Resp 14   SpO2 100%        Physical Exam     Physical Exam  Vitals reviewed  Constitutional:       Appearance: Normal appearance  HENT:      Head: Normocephalic and atraumatic  Right Ear: External ear normal       Left Ear: External ear normal       Nose: Nose normal       Mouth/Throat:      Mouth: Mucous membranes are moist       Pharynx: Oropharynx is clear  Eyes:      Conjunctiva/sclera: Conjunctivae normal       Pupils: Pupils are equal, round, and reactive to light  Cardiovascular:      Rate and Rhythm: Normal rate and regular rhythm  Pulses: Normal pulses  Heart sounds: Normal heart sounds  Pulmonary:      Effort: Pulmonary effort is normal       Breath sounds: Normal breath sounds  Musculoskeletal:      Cervical back: Normal range of motion  No tenderness  Comments: No obvious deformity to R knee, no swelling, full active ROM of lower extremities bilaterally, 5/5 strength of lower extremities bilaterally, gross sensation intact, 2+ distal pulses  Lymphadenopathy:      Cervical: No cervical adenopathy  Skin:     Capillary Refill: Capillary refill takes less than 2 seconds        Comments: Superficial 3cm abrasion on anterior surface of R knee, area appears clean and healing well, no surrounding erythema, no surrounding tenderness to palpation, no active bleeding, no discharge or drainage  2cm scabbed abrasion of R elbow, healing well, no signs of infection  Neurological:      General: No focal deficit present  Mental Status: He is alert and oriented to person, place, and time     Psychiatric:         Mood and Affect: Mood normal          Behavior: Behavior normal

## 2021-08-13 ENCOUNTER — APPOINTMENT (OUTPATIENT)
Dept: RADIATION ONCOLOGY | Facility: HOSPITAL | Age: 76
End: 2021-08-13
Attending: RADIOLOGY
Payer: MEDICARE

## 2021-08-13 ENCOUNTER — OFFICE VISIT (OUTPATIENT)
Dept: CARDIOLOGY CLINIC | Facility: CLINIC | Age: 76
End: 2021-08-13
Payer: MEDICARE

## 2021-08-13 VITALS
DIASTOLIC BLOOD PRESSURE: 78 MMHG | OXYGEN SATURATION: 98 % | HEART RATE: 77 BPM | WEIGHT: 164 LBS | BODY MASS INDEX: 22.96 KG/M2 | SYSTOLIC BLOOD PRESSURE: 120 MMHG | HEIGHT: 71 IN | TEMPERATURE: 97.7 F

## 2021-08-13 DIAGNOSIS — N18.30 BENIGN HYPERTENSION WITH CKD (CHRONIC KIDNEY DISEASE) STAGE III (HCC): ICD-10-CM

## 2021-08-13 DIAGNOSIS — E11.40 TYPE 2 DIABETES MELLITUS WITH DIABETIC NEUROPATHY, WITHOUT LONG-TERM CURRENT USE OF INSULIN (HCC): ICD-10-CM

## 2021-08-13 DIAGNOSIS — I10 BENIGN ESSENTIAL HYPERTENSION: ICD-10-CM

## 2021-08-13 DIAGNOSIS — I25.119 CORONARY ARTERY DISEASE INVOLVING NATIVE CORONARY ARTERY OF NATIVE HEART WITH ANGINA PECTORIS (HCC): ICD-10-CM

## 2021-08-13 DIAGNOSIS — E11.22 CKD STAGE 3 DUE TO TYPE 2 DIABETES MELLITUS (HCC): ICD-10-CM

## 2021-08-13 DIAGNOSIS — E78.5 DYSLIPIDEMIA: ICD-10-CM

## 2021-08-13 DIAGNOSIS — N18.30 CKD STAGE 3 DUE TO TYPE 2 DIABETES MELLITUS (HCC): ICD-10-CM

## 2021-08-13 DIAGNOSIS — I12.9 BENIGN HYPERTENSION WITH CKD (CHRONIC KIDNEY DISEASE) STAGE III (HCC): ICD-10-CM

## 2021-08-13 PROCEDURE — 99214 OFFICE O/P EST MOD 30 MIN: CPT | Performed by: INTERNAL MEDICINE

## 2021-08-13 PROCEDURE — 77336 RADIATION PHYSICS CONSULT: CPT | Performed by: RADIOLOGY

## 2021-08-13 PROCEDURE — 77014 CHG CT GUIDANCE PLACEMENT RAD THERAPY FIELDS: CPT | Performed by: RADIOLOGY

## 2021-08-13 PROCEDURE — 77385 HB NTSTY MODUL RAD TX DLVR SMPL: CPT | Performed by: RADIOLOGY

## 2021-08-13 NOTE — PROGRESS NOTES
Progress Note - Cardiology Office  [facility]    Nelly Santos 67 y o  male MRN: [id number]  : 1945  Encounter: [id number]      Assessment:     1  Coronary artery disease involving native coronary artery of native heart with angina pectoris ([facility] )    2  Benign essential hypertension    3  Dyslipidemia    4  CKD stage 3 due to type 2 diabetes mellitus ([facility] )    5  Benign hypertension with CKD (chronic kidney disease) stage III ([facility] )    6  Type 2 diabetes mellitus with diabetic neuropathy, without long-term current use of insulin ([place] [id number] )        Discussion Summary and Plan:  1  PVD with right femoropopliteal which is occluded and left SFA which has stent placed by me in , had repeat procedure done with known right SFA stent stenosis underwent balloon angioplasty  He is able to walk he has seen the vascular  He may have InStent stenosis on the left and has occlusion of his bypass on the right  Advised him to follow up with vascular and keep walking  His ABIs are around 0 6  He follows up with vascular   Has still chronic pain management as per     2  Coronary artery disease  Status post multivessel stenting  Patient now want to follow up with [facility] cardiology  His nuclear stress test done in 2018 which shows no ischemia normal LV systolic function  He has no symptoms of chest pain and echo shows normal LV systolic function echo from 2020 reviewed  3  Hypertension  Patient blood pressure significantly improved  Continue low-dose amlodipine Coreg and lisinopril  Vitals has been stable labs done 2021 are acceptable  4  Dyslipidemia  Continue statins labs done 2021 shows cholesterol profile is acceptable  5  Chronic renal insufficiency  Blood work done in August 3, 2021 shows creatinine 1 4 which is same as before and sodium was 131  Fluid restriction up to 1500 cc     6   Carotid bruit     Carotid Doppler shows less than 50% stenosis on the left  No evidence of stenosis on the right side  Continue medical Rx follows up with vascular    7  Diabetes mellitus  His blood sugar has been up and down but his HB on used to be 13 2 which has significantly improved to 7 7  But he has been admitted with hypoglycemia management as per endocrinology     8  History of prostate cancer  On radiation therapy as well as injectable medicine  9  History of anemia of chronic disease  Counseling :  A description of the counseling  All issues regarding tight sugar control, taking cholesterol medications, regular exercise, symptoms about coronary artery disease discussed with patient at length  Previous echo from 2014 reviewed  Goals and Barriers  Patient's ability to self care: Yes  Medication side effect reviewed with patient in detail and all their questions answered to their satisfaction  HPI :     Edil Hinds is a 67y o  year old male who came for follow up  Patient has a past medical history significant for Coronary Artery artery disease, CK D stage III, Diabetes mellitus with renal manifestations, PVD with right femoropopliteal which has occluded and left SFA stent by me in 2010, hypertension, diabetic neuropathy who came to see us after his vascular study was found to be abnormal  For cardiac problem he generally sees Dr Terrell Muñoz  His vascular study was ordered by his podiatrist as he was having pain in his foot  He also had a lot of back problems sometime pain radiates from his back to the thighs  Here a stent placed in his left SFA in 2010  It's already known that his right femoropopliteal is occluded  He has no fever no chills no nausea no vomiting no other significant complaint     03/08/2021  Above reviewed  Patient came for follow-up  He is doing well from cardiac point of view  His legs are still bothering him  He follows up with vascular    He does have history of PVD with possible left subclavian stenosis, history of bypass on the right leg and stent on the left and follows up with vascular  Today EKG shows heart rate 67 beats per minute  And 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  He is no longer getting cramps when he walks  His study from May 2020 reviewed  ABIs were 0 63 on the right and 0 6 on the left  He denies any history of chest pain, shortness breath, dizziness, lightheadedness, nausea vomiting PND orthopnea or cardiovascular issues  He had a blood test of January 2021 with creatinine was 1 41  But blood glucose was elevated  HbA1c 13 4      08/13/2021     above reviewed  Patient came for follow-up  He has lot of issues but there were noncardiac  He was admitted in July with hypoglycemia and his sugar medications were adjusted  But HbA1c has significantly improved from 13 4-7 7  He has history of PVD with possible left subclavian stenosis, history of bypass on his right leg and stent on the left leg and a follow-up with vascular  He also was diagnosed to have prostate cancer which is stage III and he is getting radiation therapy and as well as injectable medicine  He had 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 with T-wave inversions  He does get leg cramps when he walks  Otherwise no cardiovascular issues at this time  He had a fall recently and he was in the urgent care center  Labs done from August 2021 reviewed were acceptable no other issues at this time from cardiac point of view  Review of Systems   Constitutional: Negative for activity change, chills, diaphoresis, fever and unexpected weight change  HENT: Negative for congestion  Eyes: Negative for discharge and redness  Respiratory: Negative for cough, chest tightness, shortness of breath and wheezing  Cardiovascular: Negative  Negative for chest pain, palpitations and leg swelling          No episodes of chest pain, shortness of breath, dizziness or palpitations Gastrointestinal: Negative for abdominal pain, diarrhea and nausea  Endocrine: Negative  Blood sugar is up and down   Genitourinary: Negative for decreased urine volume and urgency  Diagnosed to have prostate cancer   Musculoskeletal: Positive for arthralgias, back pain and gait problem  Skin: Negative for rash and wound  Allergic/Immunologic: Negative  Neurological: Negative for dizziness, seizures, syncope, weakness, light-headedness and headaches  Hematological: Negative  Psychiatric/Behavioral: Negative for agitation and confusion  The patient is nervous/anxious          Historical Information   Past Medical History:   Diagnosis Date    Acquired hallux valgus     last assessed: 8/1/2013    Allergic rhinitis     Anemia 10/26/2010    Atrophy of nail     last assessed: 7/7/2015    Chronic kidney disease     chronic renal insufficiency    Coronary artery disease     Deformity of ankle and foot, acquired     last assessed: 2/22/2016    Diabetes mellitus ([facility] )     Difficulty walking     last assessed: 12/14/2015    Dysesthesia     last assessed: 11/25/2016    Hammer toe     unspecified laterality; last assessed: 8/1/2013    Hypertension     Onychomycosis of toenail     last assessed: 2/22/2016    Peripheral vascular disease ([facility] )     left SFA stent in 2010    Pes planus     unspecified laterality; last assessed: 8/1/2013    Pneumonia     last assessed: 2/27/2016    Prostate cancer ([facility] )     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     last assessed: 8/15/2016    Type 2 diabetes mellitus ([facility] ) 07/26/2010    Viral warts     last assessed: 7/24/2015     Past Surgical History:   Procedure Laterality Date    CARDIAC CATHETERIZATION  07/29/2010    CATARACT EXTRACTION, BILATERAL Bilateral     R 1999, L 2008    COLONOSCOPY  2011    FEMORAL ARTERY - POPLITEAL ARTERY BYPASS GRAFT  09/23/2013    FOOT SURGERY      bone spur removed    LEG SURGERY Bilateral     repair; L 8/20/2010 and 7/26/2012    GA PLACE RADIOTHER DEVICE/MARKER, PROSTATE N/A 6/22/2021    Procedure: INSERTION OF FIDUCIAL MARKER (TRANSRECTAL ULTRASOUND GUIDANCE), SPACEOAR;  Surgeon: Monica Drew MD;  Location: [facility];  Service: Urology    ROTATOR CUFF REPAIR Left 2009    SHOULDER SURGERY Right 2005    collar bone     Social History     Substance and Sexual Activity   Alcohol Use Yes    Comment: being a social drinker     Social History     Substance and Sexual Activity   Drug Use No     Social History     Tobacco Use   Smoking Status Never Smoker   Smokeless Tobacco Never Used     Family History:   Family History   Problem Relation Age of Onset    Heart disease Father     Heart attack Father         acute myocardial infarction    Heart disease Sister     Heart attack Sister         acute myocardial infarction    Heart disease Paternal Grandfather     Aortic aneurysm Mother     Throat cancer Maternal Grandfather        Meds/Allergies     No Known Allergies    Current Outpatient Medications:     acarbose (PRECOSE) 100 MG tablet, TAKE 1 TABLET THREE TIMES DAILY WITH MEALS, Disp: 270 tablet, Rfl: 1    amLODIPine (NORVASC) 2 5 mg tablet, Take 5 mg by mouth daily, Disp: , Rfl:     Ascorbic Acid (Vitamin C) 500 MG CAPS, Take 500 mg by mouth daily, Disp: , Rfl:     aspirin (ECOTRIN LOW STRENGTH) 81 mg EC tablet, Take 81 mg by mouth daily, Disp: , Rfl:     atorvastatin (LIPITOR) 20 mg tablet, Take 20 mg by mouth daily, Disp: , Rfl:     brimonidine tartrate 0 2 % ophthalmic solution, Inject 0 2 % into the eye 2 (two) times a day, Disp: , Rfl:     carvedilol (COREG) 12 5 mg tablet, Take 12 5 mg by mouth 2 (two) times a day, Disp: , Rfl:     Cholecalciferol (Vitamin D3) 50 MCG (2000 UT) TABS, Take 2,000 Units by mouth daily, Disp: , Rfl:     co-enzyme Q-10 100 mg capsule, Take 100 mg by mouth daily, Disp: , Rfl:     gabapentin (NEURONTIN) 600 MG tablet, Take 1 tablet (600 mg total) by mouth 2 (two) times a day, Disp: 180 tablet, Rfl: 0    glucose blood test strip, Test blood sugar twice a day, Disp: 100 each, Rfl: 3    lisinopril (ZESTRIL) 2 5 mg tablet, Take 2 5 mg by mouth daily, Disp: , Rfl:     magnesium oxide (MAG-OX) 400 mg tablet, Take 400 mg by mouth 2 (two) times a day, Disp: , Rfl:     metFORMIN (GLUCOPHAGE) 1000 MG tablet, Take 0 5 tablets (500 mg total) by mouth 2 (two) times a day with meals, Disp: 180 tablet, Rfl: 0    omeprazole (PriLOSEC) 40 MG capsule, Take 40 mg by mouth daily, Disp: , Rfl:     PRENATAL MULTIVIT-MIN-FE-FA PO, Take 1 tablet by mouth daily, Disp: , Rfl:     Probiotic Product (CULTURELLE PROBIOTICS) CHEW, Chew daily, Disp: , Rfl:     TRUEPLUS LANCETS 28G MISC, Test 1x/d, E11 29, Disp: , Rfl: 0    cephalexin (KEFLEX) 500 mg capsule, Take 1 capsule (500 mg total) by mouth every 6 (six) hours for 5 days (Patient not taking: Reported on 8/13/2021), Disp: 20 capsule, Rfl: 0    tamsulosin (FLOMAX) 0 4 mg, Take 1 capsule (0 4 mg total) by mouth daily with dinner (Patient not taking: Reported on 8/13/2021), Disp: 30 capsule, Rfl: 11    Vitals: Blood pressure 120/78, pulse 77, temperature 97 7 °F (36 5 °C), height 5' 11" (1 803 m), weight 74 4 kg (164 lb), SpO2 98 %  Body mass index is 22 87 kg/m²  Vitals:    08/13/21 1056   Weight: 74 4 kg (164 lb)     BP Readings from Last 3 Encounters:   08/13/21 120/78   08/06/21 130/88   08/05/21 126/60         Physical Exam  Constitutional:       General: He is not in acute distress  Appearance: He is well-developed  He is not diaphoretic  HENT:      Head: Normocephalic and atraumatic  Eyes:      Pupils: Pupils are equal, round, and reactive to light  Neck:      Thyroid: No thyromegaly  Vascular: No JVD  Trachea: No tracheal deviation  Cardiovascular:      Rate and Rhythm: Normal rate and regular rhythm  Heart sounds: S1 normal and S2 normal  Heart sounds not distant  Murmur heard     Systolic (ejection) murmur is present with a grade of 2/6  No friction rub  No gallop  No S3 or S4 sounds  Pulmonary:      Effort: Pulmonary effort is normal  No respiratory distress  Breath sounds: Normal breath sounds  No wheezing or rales  Chest:      Chest wall: No tenderness  Abdominal:      General: Bowel sounds are normal  There is no distension  Palpations: Abdomen is soft  Tenderness: There is no abdominal tenderness  Musculoskeletal:         General: No deformity  Cervical back: Neck supple  Skin:     General: Skin is warm and dry  Coloration: Skin is not pale  Findings: No rash  Neurological:      Mental Status: He is alert and oriented to person, place, and time  Psychiatric:         Behavior: Behavior normal          Judgment: Judgment normal          Diagnostic Studies Review Cardio:    Nuclear stress test   Nuclear stress test done 09/27/2018 was a normal study with EF around 60%  No ischemia noted it was Lexiscan stress test     Echo Doppler  Echo Doppler done 10/14/2020 shows EF 55 60%, mild MR, trace to mild PI, pressure half time was 660 millisecond    EKG:    Twelve lead EKG done on 09/13/2018 shows normal sinus rhythm heart rate 60 beats per minute  No significant ST changes  Twelve lead EKG done 05/17/2019 shows normal sinus rhythm heart rate 60 beats per minute  No change from old EKG  Twelve lead EKG 03/10/2020 shows normal sinus rhythm LVH by voltage heart rate 60 beats per minute no change from old EKG  Twelve lead EKG 09/23/2020 shows normal sinus rhythm LVH by voltage  Q-waves in inferior leads cannot rule out 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  Twelve lead EKG 03/08/2021 shows normal sinus rhythm LVH by voltage and Q-waves in inferior leads cannot rule out 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ion          Lab Review   Lab Results   Component Value Date    WBC 6 24 08/02/2021    HGB 9 6 (L) 08/02/2021    HCT 28 1 (L) 08/02/2021    MCV 90 08/02/2021 RDW 15 4 (H) 08/02/2021     08/02/2021     BMP:  Lab Results   Component Value Date     04/17/2014    K 4 5 08/03/2021    CL 97 (L) 08/03/2021    CO2 26 08/03/2021    ANIONGAP 8 04/17/2014    BUN 13 08/03/2021    CREATININE 0 97 08/03/2021    GLUCOSE 137 04/17/2014    GLUF 170 (H) 08/03/2021    CALCIUM 8 8 08/03/2021    CORRECTEDCA 8 8 07/30/2021    EGFR 76 08/03/2021    MG 1 4 (L) 08/03/2021     LFT:  Lab Results   Component Value Date    AST 25 07/30/2021    ALT 33 07/30/2021    ALKPHOS 55 07/30/2021       Lab Results   Component Value Date    HGBA1C 7 7 (H) 07/30/2021     Lipid Profile:   Lab Results   Component Value Date    CHOL 106 07/19/2014    HDL 47 06/03/2021    LDLCALC 53 06/03/2021    TRIG 92 06/03/2021     Lab Results   Component Value Date    CHOL 106 07/19/2014       Dr Ronan Mcfarland MD [facility] [place]      "This note has been constructed using a voice recognition system  Therefore there may be syntax, spelling, and/or grammatical errors   Please call if you have any questions  "

## 2021-08-16 ENCOUNTER — APPOINTMENT (OUTPATIENT)
Dept: RADIATION ONCOLOGY | Facility: HOSPITAL | Age: 76
End: 2021-08-16
Attending: RADIOLOGY
Payer: MEDICARE

## 2021-08-16 ENCOUNTER — APPOINTMENT (OUTPATIENT)
Dept: LAB | Facility: CLINIC | Age: 76
End: 2021-08-16
Payer: MEDICARE

## 2021-08-16 DIAGNOSIS — C61 PROSTATE CANCER (HCC): ICD-10-CM

## 2021-08-16 LAB
ALBUMIN SERPL BCP-MCNC: 3.4 G/DL (ref 3.5–5)
ALP SERPL-CCNC: 70 U/L (ref 46–116)
ALT SERPL W P-5'-P-CCNC: 27 U/L (ref 12–78)
ANION GAP SERPL CALCULATED.3IONS-SCNC: 7 MMOL/L (ref 4–13)
AST SERPL W P-5'-P-CCNC: 16 U/L (ref 5–45)
BASOPHILS # BLD AUTO: 0.04 THOUSANDS/ΜL (ref 0–0.1)
BASOPHILS NFR BLD AUTO: 1 % (ref 0–1)
BILIRUB SERPL-MCNC: 0.74 MG/DL (ref 0.2–1)
BUN SERPL-MCNC: 11 MG/DL (ref 5–25)
CALCIUM ALBUM COR SERPL-MCNC: 9.8 MG/DL (ref 8.3–10.1)
CALCIUM SERPL-MCNC: 9.3 MG/DL (ref 8.3–10.1)
CHLORIDE SERPL-SCNC: 97 MMOL/L (ref 100–108)
CO2 SERPL-SCNC: 25 MMOL/L (ref 21–32)
CREAT SERPL-MCNC: 1.07 MG/DL (ref 0.6–1.3)
EOSINOPHIL # BLD AUTO: 0.19 THOUSAND/ΜL (ref 0–0.61)
EOSINOPHIL NFR BLD AUTO: 3 % (ref 0–6)
ERYTHROCYTE [DISTWIDTH] IN BLOOD BY AUTOMATED COUNT: 15.9 % (ref 11.6–15.1)
GFR SERPL CREATININE-BSD FRML MDRD: 67 ML/MIN/1.73SQ M
GLUCOSE SERPL-MCNC: 179 MG/DL (ref 65–140)
HCT VFR BLD AUTO: 30.1 % (ref 36.5–49.3)
HGB BLD-MCNC: 10 G/DL (ref 12–17)
IMM GRANULOCYTES # BLD AUTO: 0.05 THOUSAND/UL (ref 0–0.2)
IMM GRANULOCYTES NFR BLD AUTO: 1 % (ref 0–2)
LYMPHOCYTES # BLD AUTO: 0.26 THOUSANDS/ΜL (ref 0.6–4.47)
LYMPHOCYTES NFR BLD AUTO: 5 % (ref 14–44)
MCH RBC QN AUTO: 30.7 PG (ref 26.8–34.3)
MCHC RBC AUTO-ENTMCNC: 33.2 G/DL (ref 31.4–37.4)
MCV RBC AUTO: 92 FL (ref 82–98)
MONOCYTES # BLD AUTO: 0.5 THOUSAND/ΜL (ref 0.17–1.22)
MONOCYTES NFR BLD AUTO: 9 % (ref 4–12)
NEUTROPHILS # BLD AUTO: 4.67 THOUSANDS/ΜL (ref 1.85–7.62)
NEUTS SEG NFR BLD AUTO: 81 % (ref 43–75)
NRBC BLD AUTO-RTO: 0 /100 WBCS
PLATELET # BLD AUTO: 252 THOUSANDS/UL (ref 149–390)
PMV BLD AUTO: 9.8 FL (ref 8.9–12.7)
POTASSIUM SERPL-SCNC: 5.1 MMOL/L (ref 3.5–5.3)
PROT SERPL-MCNC: 6.7 G/DL (ref 6.4–8.2)
RBC # BLD AUTO: 3.26 MILLION/UL (ref 3.88–5.62)
SODIUM SERPL-SCNC: 129 MMOL/L (ref 136–145)
WBC # BLD AUTO: 5.71 THOUSAND/UL (ref 4.31–10.16)

## 2021-08-16 PROCEDURE — 77385 HB NTSTY MODUL RAD TX DLVR SMPL: CPT | Performed by: RADIOLOGY

## 2021-08-16 PROCEDURE — 80053 COMPREHEN METABOLIC PANEL: CPT

## 2021-08-16 PROCEDURE — 77427 RADIATION TX MANAGEMENT X5: CPT | Performed by: RADIOLOGY

## 2021-08-16 PROCEDURE — 77014 CHG CT GUIDANCE PLACEMENT RAD THERAPY FIELDS: CPT | Performed by: RADIOLOGY

## 2021-08-16 PROCEDURE — 85025 COMPLETE CBC W/AUTO DIFF WBC: CPT

## 2021-08-16 PROCEDURE — 36415 COLL VENOUS BLD VENIPUNCTURE: CPT

## 2021-08-17 ENCOUNTER — TELEPHONE (OUTPATIENT)
Dept: FAMILY MEDICINE CLINIC | Facility: CLINIC | Age: 76
End: 2021-08-17

## 2021-08-17 ENCOUNTER — APPOINTMENT (OUTPATIENT)
Dept: RADIATION ONCOLOGY | Facility: HOSPITAL | Age: 76
End: 2021-08-17
Attending: RADIOLOGY
Payer: MEDICARE

## 2021-08-17 ENCOUNTER — TELEPHONE (OUTPATIENT)
Dept: NEPHROLOGY | Facility: CLINIC | Age: 76
End: 2021-08-17

## 2021-08-17 NOTE — TELEPHONE ENCOUNTER
I left message for patient to call the office to schedule a follow up appointment  Patient needs to be as soon as possible  Please transfer to call the me if possible

## 2021-08-18 ENCOUNTER — OFFICE VISIT (OUTPATIENT)
Dept: FAMILY MEDICINE CLINIC | Facility: CLINIC | Age: 76
End: 2021-08-18
Payer: MEDICARE

## 2021-08-18 ENCOUNTER — APPOINTMENT (OUTPATIENT)
Dept: RADIATION ONCOLOGY | Facility: HOSPITAL | Age: 76
End: 2021-08-18
Attending: RADIOLOGY
Payer: MEDICARE

## 2021-08-18 VITALS
HEIGHT: 71 IN | SYSTOLIC BLOOD PRESSURE: 138 MMHG | RESPIRATION RATE: 17 BRPM | WEIGHT: 161.6 LBS | TEMPERATURE: 96.1 F | BODY MASS INDEX: 22.62 KG/M2 | HEART RATE: 72 BPM | DIASTOLIC BLOOD PRESSURE: 66 MMHG

## 2021-08-18 DIAGNOSIS — I25.119 CORONARY ARTERY DISEASE INVOLVING NATIVE CORONARY ARTERY OF NATIVE HEART WITH ANGINA PECTORIS (HCC): ICD-10-CM

## 2021-08-18 DIAGNOSIS — I95.1 ORTHOSTATIC HYPOTENSION: Primary | ICD-10-CM

## 2021-08-18 DIAGNOSIS — C61 PROSTATE CANCER (HCC): ICD-10-CM

## 2021-08-18 DIAGNOSIS — E22.2 SIADH (SYNDROME OF INAPPROPRIATE ADH PRODUCTION) (HCC): ICD-10-CM

## 2021-08-18 PROBLEM — E16.2 HYPOGLYCEMIA: Status: RESOLVED | Noted: 2021-07-14 | Resolved: 2021-08-18

## 2021-08-18 PROCEDURE — 99214 OFFICE O/P EST MOD 30 MIN: CPT | Performed by: FAMILY MEDICINE

## 2021-08-18 RX ORDER — FLUDROCORTISONE ACETATE 0.1 MG/1
0.1 TABLET ORAL DAILY
Qty: 90 TABLET | Refills: 1 | Status: SHIPPED | OUTPATIENT
Start: 2021-08-18 | End: 2021-08-30 | Stop reason: SDUPTHER

## 2021-08-18 NOTE — PATIENT INSTRUCTIONS
Since your blood pressure drops significantly when you stand, I recommend you stand slowly, wear compression stockings and continue drinking water to the amount you are allowed  Please stay on the blood pressure medication but we will add a pill that prevents your blood pressure from dropping as much during position changes, called fludrocortisone  We can start wit 0 1mg once a day in the morning  You may also be liberal with salt in your diet

## 2021-08-18 NOTE — PROGRESS NOTES
Assessment/Plan:    No problem-specific Assessment & Plan notes found for this encounter  Orthostatic hypotension, start florinef, has a fluid restriction for SIADH, liberal po salt, compression socks suggested  Monitor home bp  F/u 10d or so    CAD stable, htn stable, cardiology f/u, continue meds    SIADH, suggest nephrology eval, they have appt    smbg elevated, continue monitoring, endo f/u     Diagnoses and all orders for this visit:    Orthostatic hypotension  -     fludrocortisone (FLORINEF) 0 1 mg tablet; Take 1 tablet (0 1 mg total) by mouth daily    Coronary artery disease involving native coronary artery of native heart with angina pectoris (HCC)    Prostate cancer (HCC)    SIADH (syndrome of inappropriate ADH production) ([facility] )        Return in about 10 days (around 8/28/2021) for Recheck  Subjective:      Patient ID: Steven Garcia is a 67 y o  male  Chief Complaint   Patient presents with    Follow-up     ER nm/lpn       HPI  Eat/drink ok  Stool and urine ok  Feels good today  Feels unsteady  No falls in past week  Very careful  Dizzy sometimes, not all the time  Has cardio f/u 6m with cardio    1500ml fluid restrictions  No diuretics  [place] dietary salt    Considering compression stockings  smbg high  fbs 187-210  5 meals per day, 3 meals and 2 snacks    Saw cardiologist recently  The following portions of the patient's history were reviewed and updated as appropriate: allergies, current medications, past family history, past medical history, past social history, past surgical history and problem list     Review of Systems   Neurological: Positive for dizziness and weakness           Current Outpatient Medications   Medication Sig Dispense Refill    acarbose (PRECOSE) 100 MG tablet TAKE 1 TABLET THREE TIMES DAILY WITH MEALS 270 tablet 1    amLODIPine (NORVASC) 2 5 mg tablet Take 5 mg by mouth daily      Ascorbic Acid (Vitamin C) 500 MG CAPS Take 500 mg by mouth daily      aspirin (ECOTRIN LOW STRENGTH) 81 mg EC tablet Take 81 mg by mouth daily      atorvastatin (LIPITOR) 20 mg tablet Take 20 mg by mouth daily      brimonidine tartrate 0 2 % ophthalmic solution Inject 0 2 % into the eye 2 (two) times a day      carvedilol (COREG) 12 5 mg tablet Take 12 5 mg by mouth 2 (two) times a day      Cholecalciferol (Vitamin D3) 50 MCG (2000 UT) TABS Take 2,000 Units by mouth daily      co-enzyme Q-10 100 mg capsule Take 100 mg by mouth daily      glucose blood test strip Test blood sugar twice a day 100 each 3    lisinopril (ZESTRIL) 2 5 mg tablet Take 2 5 mg by mouth daily      magnesium oxide (MAG-OX) 400 mg tablet Take 400 mg by mouth 2 (two) times a day      metFORMIN (GLUCOPHAGE) 1000 MG tablet Take 0 5 tablets (500 mg total) by mouth 2 (two) times a day with meals 180 tablet 0    omeprazole (PriLOSEC) 40 MG capsule Take 40 mg by mouth daily      PRENATAL MULTIVIT-MIN-FE-FA PO Take 1 tablet by mouth daily      Probiotic Product (CULTURELLE PROBIOTICS) CHEW Chew daily      tamsulosin (FLOMAX) 0 4 mg Take 1 capsule (0 4 mg total) by mouth daily with dinner 30 capsule 11    TRUEPLUS LANCETS 28G MISC Test 1x/d, E11 29  0    fludrocortisone (FLORINEF) 0 1 mg tablet Take 1 tablet (0 1 mg total) by mouth daily 90 tablet 1    gabapentin (NEURONTIN) 600 MG tablet Take 1 tablet (600 mg total) by mouth 2 (two) times a day (Patient not taking: Reported on 8/18/2021) 180 tablet 0     No current facility-administered medications for this visit  Objective:    /66   Pulse 72   Temp (!) 96 1 °F (35 6 °C)   Resp 17   Ht 5' 11" (1 803 m)   Wt 73 3 kg (161 lb 9 6 oz)   BMI 22 54 kg/m²        Physical Exam  Vitals and nursing note reviewed  Constitutional:       Appearance: He is well-developed  HENT:      Head: Normocephalic  Eyes:      Conjunctiva/sclera: Conjunctivae normal    Cardiovascular:      Rate and Rhythm: Normal rate and regular rhythm     Pulmonary:      Effort: Pulmonary effort is normal  No respiratory distress  Breath sounds: No wheezing  Abdominal:      Palpations: Abdomen is soft  Musculoskeletal:         General: No deformity  Cervical back: Neck supple  Right lower leg: No edema  Left lower leg: No edema  Skin:     General: Skin is warm and dry  Coloration: Skin is not jaundiced  Neurological:      Mental Status: He is alert  Motor: Weakness present  Psychiatric:         Behavior: Behavior normal          Thought Content:  Thought content normal                 Heron Hall, DO

## 2021-08-18 NOTE — TELEPHONE ENCOUNTER
Sw wife  Advised that sodium low again and nephrology referral suggested  She does have appt in future  She reports he was taken off lasix for SIADH and advised 1600cc fluid restriction  Advised he continue his bp meds  She reports they saw Cardio very recently and all was ok  For orthostasis, suggest getting up slowly, increase sodium intake, compression stockings  Unable to advise to increase fluid intake due to SIADH  May need florinef add on      She is aware

## 2021-08-19 ENCOUNTER — TELEPHONE (OUTPATIENT)
Dept: ADMINISTRATIVE | Facility: OTHER | Age: 76
End: 2021-08-19

## 2021-08-19 ENCOUNTER — APPOINTMENT (OUTPATIENT)
Dept: RADIATION ONCOLOGY | Facility: HOSPITAL | Age: 76
End: 2021-08-19
Attending: RADIOLOGY
Payer: MEDICARE

## 2021-08-19 PROCEDURE — 77385 HB NTSTY MODUL RAD TX DLVR SMPL: CPT | Performed by: STUDENT IN AN ORGANIZED HEALTH CARE EDUCATION/TRAINING PROGRAM

## 2021-08-19 PROCEDURE — 77014 CHG CT GUIDANCE PLACEMENT RAD THERAPY FIELDS: CPT | Performed by: STUDENT IN AN ORGANIZED HEALTH CARE EDUCATION/TRAINING PROGRAM

## 2021-08-19 NOTE — TELEPHONE ENCOUNTER
Upon review of the In Basket request and the patient's chart, initial outreach has been made via fax, please see Contacts section for details       Thank you  Cici Petit MA

## 2021-08-19 NOTE — LETTER
Diabetic Eye Exam Form    Date Requested: 21  Patient: Ayse Colon  Patient : 1945   Referring Provider: Cecilia Bauer,     Dilated Retinal Exam, Optomap-Iris Exam, or Fundus Photography Done         Yes ([place] one above)         No     Date of Diabetic Eye Exam ______________________________  Left Eye      Exam did show retinopathy    Exam did not show retinopathy         Mild       Moderate       None       Proliferative       Severe     Right Eye     Exam did show retinopathy    Exam did not show retinopathy         Mild       Moderate       None       Proliferative       Severe     Comments __________________________________________________________    Practice Providing Exam ______________________________________________    Exam Performed By (print name) _______________________________________      Provider Signature ___________________________________________________      These reports are needed for  compliance    Please fax this completed form and a copy of the Diabetic Eye Exam report to our office located at [place] [address] as soon as possible to [phone] attention Chantal Nagel: Phone [phone]    We thank you for your assistance in treating our mutual patient

## 2021-08-19 NOTE — TELEPHONE ENCOUNTER
----- Message from Dustin Hendrickson DO sent at 8/18/2021  9:13 PM EDT -----  08/18/21 9:13 PM    Hello, our patient Dennis Alegria has had Diabetic Eye Exam completed/performed  Please assist in updating the patient chart by making an External outreach to Dr Lili Acosta facility located in [place]  The date of service is recent      Thank you,  Dustin Hendrickson DO  [place] [address]

## 2021-08-20 ENCOUNTER — RADIATION THERAPY TREATMENT (OUTPATIENT)
Dept: RADIATION ONCOLOGY | Facility: HOSPITAL | Age: 76
End: 2021-08-20
Attending: RADIOLOGY
Payer: MEDICARE

## 2021-08-20 PROCEDURE — 77014 CHG CT GUIDANCE PLACEMENT RAD THERAPY FIELDS: CPT | Performed by: RADIOLOGY

## 2021-08-20 PROCEDURE — 77385 HB NTSTY MODUL RAD TX DLVR SMPL: CPT | Performed by: RADIOLOGY

## 2021-08-20 NOTE — TELEPHONE ENCOUNTER
Upon review of the In Basket request we were able to locate, review, and update the patient chart as requested for Diabetic Eye Exam     Any additional questions or concerns should be emailed to the Practice Liaisons via [email or web address] email, please do not reply via In Basket      Thank you  Ning Joseph MA

## 2021-08-23 ENCOUNTER — APPOINTMENT (OUTPATIENT)
Dept: RADIATION ONCOLOGY | Facility: HOSPITAL | Age: 76
End: 2021-08-23
Attending: RADIOLOGY
Payer: MEDICARE

## 2021-08-23 PROCEDURE — 77385 HB NTSTY MODUL RAD TX DLVR SMPL: CPT | Performed by: RADIOLOGY

## 2021-08-23 PROCEDURE — 77014 CHG CT GUIDANCE PLACEMENT RAD THERAPY FIELDS: CPT | Performed by: RADIOLOGY

## 2021-08-24 ENCOUNTER — APPOINTMENT (OUTPATIENT)
Dept: RADIATION ONCOLOGY | Facility: HOSPITAL | Age: 76
End: 2021-08-24
Attending: RADIOLOGY
Payer: MEDICARE

## 2021-08-24 DIAGNOSIS — E11.40 TYPE 2 DIABETES MELLITUS WITH DIABETIC NEUROPATHY, WITHOUT LONG-TERM CURRENT USE OF INSULIN (HCC): Primary | ICD-10-CM

## 2021-08-24 PROCEDURE — 77336 RADIATION PHYSICS CONSULT: CPT | Performed by: RADIOLOGY

## 2021-08-24 PROCEDURE — 77014 CHG CT GUIDANCE PLACEMENT RAD THERAPY FIELDS: CPT | Performed by: RADIOLOGY

## 2021-08-24 PROCEDURE — 77385 HB NTSTY MODUL RAD TX DLVR SMPL: CPT | Performed by: RADIOLOGY

## 2021-08-24 NOTE — TELEPHONE ENCOUNTER
Reviewed    Has hyperglycemia   Recommend starting Tradjenta 5 mg orally daily in addition to metformin

## 2021-08-25 ENCOUNTER — APPOINTMENT (OUTPATIENT)
Dept: LAB | Facility: CLINIC | Age: 76
End: 2021-08-25
Payer: MEDICARE

## 2021-08-25 ENCOUNTER — APPOINTMENT (OUTPATIENT)
Dept: RADIATION ONCOLOGY | Facility: HOSPITAL | Age: 76
End: 2021-08-25
Attending: RADIOLOGY
Payer: MEDICARE

## 2021-08-25 PROCEDURE — 77427 RADIATION TX MANAGEMENT X5: CPT | Performed by: RADIOLOGY

## 2021-08-25 PROCEDURE — 77014 CHG CT GUIDANCE PLACEMENT RAD THERAPY FIELDS: CPT | Performed by: RADIOLOGY

## 2021-08-25 PROCEDURE — 77385 HB NTSTY MODUL RAD TX DLVR SMPL: CPT | Performed by: RADIOLOGY

## 2021-08-25 NOTE — TELEPHONE ENCOUNTER
Spoke with patient and reviewed bg log  He understood  He will call the pharmacy to check on the price of tradjenta before ordering

## 2021-08-26 ENCOUNTER — APPOINTMENT (OUTPATIENT)
Dept: RADIATION ONCOLOGY | Facility: HOSPITAL | Age: 76
End: 2021-08-26
Attending: RADIOLOGY
Payer: MEDICARE

## 2021-08-26 PROCEDURE — 77014 CHG CT GUIDANCE PLACEMENT RAD THERAPY FIELDS: CPT | Performed by: RADIOLOGY

## 2021-08-26 PROCEDURE — 77385 HB NTSTY MODUL RAD TX DLVR SMPL: CPT | Performed by: RADIOLOGY

## 2021-08-26 NOTE — PROGRESS NOTES
11-year-old male who was previously followed by Dr Lety Buckner for management of CKD and hypertension  Was last seen in the nephrology clinic by nurse practitioner Hue Kemp on 01/27/2021  Renal function was noted to be stable  Blood pressure was acceptable  He was recently hospitalized at [facility] for hypoglycemia  Just prior to that admission he was hospitalized with hyponatremia and seen by our service for management  Since last office visit  Last labs obtained on 08/16/2021 reviewed with patient  Assessment and plan:  1  Chronic kidney disease, stage III:    · Current creatinine 1 07 mg/dL  Baseline creatinine:  1 2-1 4 mg/dL  · Etiology:  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, hypertensive nephrosclerosis and arteriolar nephrosclerosis  ·   2  Hyponatremia:  Hypoosmolar  · Hospitalized in July x2  · Acute on chronic  Intermittent hypoglycemia since 2016, generally mild in the low 130s  In June 2021 sodium level between 126-131  · On hospital admission 7/20 sodium level between 123-124  · He was discharged the following day, sodium level up to 130  · Subsequent readings generally in the low 130s  · Last sodium level obtained on 08/16/2021, 129  · Workup:    · Serum osmolality 258, repeat 272, urine sodium 45, urine osmolality 118  · Uric acid 4 1  · Cortisol level acceptable 13 1  · CT of the abdomen and pelvis:  Imaging negative for metastatic disease  · History of prostate cancer on radiation therapy and androgen deprivation therapy  · Nuclear medicine scan no osseous metastasis  · Chest x-ray normal  · Etiology:  Low urine osmolality suggests low solute intake, possibly reset Osmo stat  · Treatment plan:  3  Hypertension:    · Prior antihypertensive regiment amlodipine 2 5 mg daily, lisinopril 2 5 mg daily, Coreg 12 5 mg b i d  Annabelle Fisher Low-salt diet stressed  4  Electrolytes:    · History of mild hyperkalemia  · Continue magnesium supplement  5  Anemia of chronic disease:  Iron stores:  Last hemoglobin  6   CKD MBD:  · Follow-up  7  Diabetes mellitus type 2:  Complicated by neuropathy  8  Dyslipidemia:  On statin  Managed per primary physician  9  PAD:  Status post left SFA stent and right SFA stent/angioplasty  follows with vascular  10  CAD:  Multivessel disease stenting  Stress test in 2018:  No ischemia and normal systolic function  follows with cardiology  11   Prostate cancer:  Treated with radiation therapy and androgen  deprivation therapy

## 2021-08-27 ENCOUNTER — OFFICE VISIT (OUTPATIENT)
Dept: NEPHROLOGY | Facility: CLINIC | Age: 76
End: 2021-08-27
Payer: MEDICARE

## 2021-08-27 ENCOUNTER — APPOINTMENT (OUTPATIENT)
Dept: RADIATION ONCOLOGY | Facility: HOSPITAL | Age: 76
End: 2021-08-27
Attending: RADIOLOGY
Payer: MEDICARE

## 2021-08-27 VITALS
HEART RATE: 78 BPM | SYSTOLIC BLOOD PRESSURE: 84 MMHG | DIASTOLIC BLOOD PRESSURE: 44 MMHG | BODY MASS INDEX: 22.4 KG/M2 | HEIGHT: 71 IN | WEIGHT: 160 LBS

## 2021-08-27 DIAGNOSIS — D63.1 ANEMIA DUE TO STAGE 3A CHRONIC KIDNEY DISEASE (HCC): ICD-10-CM

## 2021-08-27 DIAGNOSIS — N18.31 ANEMIA DUE TO STAGE 3A CHRONIC KIDNEY DISEASE (HCC): ICD-10-CM

## 2021-08-27 DIAGNOSIS — E22.2 SIADH (SYNDROME OF INAPPROPRIATE ADH PRODUCTION) (HCC): ICD-10-CM

## 2021-08-27 DIAGNOSIS — N18.2 CKD STAGE 2 DUE TO TYPE 2 DIABETES MELLITUS (HCC): ICD-10-CM

## 2021-08-27 DIAGNOSIS — I95.1 ORTHOSTATIC HYPOTENSION: ICD-10-CM

## 2021-08-27 DIAGNOSIS — E11.22 CKD STAGE 2 DUE TO TYPE 2 DIABETES MELLITUS (HCC): ICD-10-CM

## 2021-08-27 DIAGNOSIS — E78.5 DYSLIPIDEMIA: ICD-10-CM

## 2021-08-27 DIAGNOSIS — I10 BENIGN ESSENTIAL HYPERTENSION: ICD-10-CM

## 2021-08-27 DIAGNOSIS — R29.898 BILATERAL LEG WEAKNESS: ICD-10-CM

## 2021-08-27 DIAGNOSIS — E11.65 TYPE 2 DIABETES MELLITUS WITH HYPERGLYCEMIA, WITHOUT LONG-TERM CURRENT USE OF INSULIN (HCC): Primary | ICD-10-CM

## 2021-08-27 PROCEDURE — 77014 CHG CT GUIDANCE PLACEMENT RAD THERAPY FIELDS: CPT | Performed by: RADIOLOGY

## 2021-08-27 PROCEDURE — 77385 HB NTSTY MODUL RAD TX DLVR SMPL: CPT | Performed by: RADIOLOGY

## 2021-08-27 PROCEDURE — 99214 OFFICE O/P EST MOD 30 MIN: CPT | Performed by: NURSE PRACTITIONER

## 2021-08-27 RX ORDER — DIPHENOXYLATE HYDROCHLORIDE AND ATROPINE SULFATE 2.5; .025 MG/1; MG/1
1 TABLET ORAL DAILY
COMMUNITY

## 2021-08-27 NOTE — PATIENT INSTRUCTIONS
-- KIDNEY FUNCTION IS VERY STABLE AT THIS TIME  --stop amlodipine  --please call if blood pressure remains consistently greater than 140/80  Goal blood pressure optimally is less than 130/80 but not consistently less than 120/60  --continue fluid restriction of 50 oz per day or 1500 mL per day  --continue using electrolyte water instead of all plain water  --Glucerna as needed    Do not count in fluid restriction  --follow-up blood work every month  --follow-up blood work before next appointment in approximately 3-4 months

## 2021-08-30 ENCOUNTER — APPOINTMENT (OUTPATIENT)
Dept: LAB | Facility: CLINIC | Age: 76
End: 2021-08-30
Payer: MEDICARE

## 2021-08-30 ENCOUNTER — APPOINTMENT (OUTPATIENT)
Dept: RADIATION ONCOLOGY | Facility: HOSPITAL | Age: 76
End: 2021-08-30
Attending: RADIOLOGY
Payer: MEDICARE

## 2021-08-30 ENCOUNTER — OFFICE VISIT (OUTPATIENT)
Dept: FAMILY MEDICINE CLINIC | Facility: CLINIC | Age: 76
End: 2021-08-30
Payer: MEDICARE

## 2021-08-30 ENCOUNTER — TELEPHONE (OUTPATIENT)
Dept: OTHER | Facility: HOSPITAL | Age: 76
End: 2021-08-30

## 2021-08-30 VITALS
TEMPERATURE: 97.8 F | HEIGHT: 71 IN | BODY MASS INDEX: 22.46 KG/M2 | RESPIRATION RATE: 18 BRPM | SYSTOLIC BLOOD PRESSURE: 80 MMHG | HEART RATE: 73 BPM | WEIGHT: 160.4 LBS | DIASTOLIC BLOOD PRESSURE: 40 MMHG

## 2021-08-30 DIAGNOSIS — D63.1 ANEMIA DUE TO STAGE 3A CHRONIC KIDNEY DISEASE (HCC): ICD-10-CM

## 2021-08-30 DIAGNOSIS — E11.40 TYPE 2 DIABETES MELLITUS WITH DIABETIC NEUROPATHY, WITHOUT LONG-TERM CURRENT USE OF INSULIN (HCC): ICD-10-CM

## 2021-08-30 DIAGNOSIS — E22.2 SIADH (SYNDROME OF INAPPROPRIATE ADH PRODUCTION) (HCC): ICD-10-CM

## 2021-08-30 DIAGNOSIS — N18.31 ANEMIA DUE TO STAGE 3A CHRONIC KIDNEY DISEASE (HCC): ICD-10-CM

## 2021-08-30 DIAGNOSIS — N18.2 CKD STAGE 2 DUE TO TYPE 2 DIABETES MELLITUS (HCC): ICD-10-CM

## 2021-08-30 DIAGNOSIS — I10 BENIGN ESSENTIAL HYPERTENSION: ICD-10-CM

## 2021-08-30 DIAGNOSIS — E11.22 CKD STAGE 2 DUE TO TYPE 2 DIABETES MELLITUS (HCC): ICD-10-CM

## 2021-08-30 DIAGNOSIS — I95.1 ORTHOSTATIC HYPOTENSION: ICD-10-CM

## 2021-08-30 DIAGNOSIS — I95.1 ORTHOSTATIC HYPOTENSION: Primary | ICD-10-CM

## 2021-08-30 DIAGNOSIS — M54.50 CHRONIC RIGHT-SIDED LOW BACK PAIN WITHOUT SCIATICA: ICD-10-CM

## 2021-08-30 DIAGNOSIS — E78.5 DYSLIPIDEMIA: ICD-10-CM

## 2021-08-30 DIAGNOSIS — R29.898 BILATERAL LEG WEAKNESS: ICD-10-CM

## 2021-08-30 DIAGNOSIS — E11.65 TYPE 2 DIABETES MELLITUS WITH HYPERGLYCEMIA, WITHOUT LONG-TERM CURRENT USE OF INSULIN (HCC): ICD-10-CM

## 2021-08-30 DIAGNOSIS — G89.29 CHRONIC RIGHT-SIDED LOW BACK PAIN WITHOUT SCIATICA: ICD-10-CM

## 2021-08-30 LAB
ALBUMIN SERPL BCP-MCNC: 3.2 G/DL (ref 3.5–5)
ALP SERPL-CCNC: 69 U/L (ref 46–116)
ALT SERPL W P-5'-P-CCNC: 25 U/L (ref 12–78)
ANION GAP SERPL CALCULATED.3IONS-SCNC: 4 MMOL/L (ref 4–13)
AST SERPL W P-5'-P-CCNC: 14 U/L (ref 5–45)
BILIRUB SERPL-MCNC: 0.7 MG/DL (ref 0.2–1)
BUN SERPL-MCNC: 16 MG/DL (ref 5–25)
CALCIUM ALBUM COR SERPL-MCNC: 9.8 MG/DL (ref 8.3–10.1)
CALCIUM SERPL-MCNC: 9.2 MG/DL (ref 8.3–10.1)
CHLORIDE SERPL-SCNC: 103 MMOL/L (ref 100–108)
CO2 SERPL-SCNC: 28 MMOL/L (ref 21–32)
CREAT SERPL-MCNC: 1.09 MG/DL (ref 0.6–1.3)
CREAT UR-MCNC: 155 MG/DL
GFR SERPL CREATININE-BSD FRML MDRD: 66 ML/MIN/1.73SQ M
GLUCOSE P FAST SERPL-MCNC: 144 MG/DL (ref 65–99)
POTASSIUM SERPL-SCNC: 4.4 MMOL/L (ref 3.5–5.3)
PROT SERPL-MCNC: 6.5 G/DL (ref 6.4–8.2)
PROT UR-MCNC: 24 MG/DL
PROT/CREAT UR: 0.15 MG/G{CREAT} (ref 0–0.1)
SODIUM SERPL-SCNC: 135 MMOL/L (ref 136–145)

## 2021-08-30 PROCEDURE — 36415 COLL VENOUS BLD VENIPUNCTURE: CPT

## 2021-08-30 PROCEDURE — 77385 HB NTSTY MODUL RAD TX DLVR SMPL: CPT | Performed by: RADIOLOGY

## 2021-08-30 PROCEDURE — 82570 ASSAY OF URINE CREATININE: CPT

## 2021-08-30 PROCEDURE — 99214 OFFICE O/P EST MOD 30 MIN: CPT | Performed by: FAMILY MEDICINE

## 2021-08-30 PROCEDURE — 80053 COMPREHEN METABOLIC PANEL: CPT

## 2021-08-30 PROCEDURE — 84156 ASSAY OF PROTEIN URINE: CPT

## 2021-08-30 PROCEDURE — 77014 CHG CT GUIDANCE PLACEMENT RAD THERAPY FIELDS: CPT | Performed by: RADIOLOGY

## 2021-08-30 RX ORDER — LINAGLIPTIN 5 MG/1
5 TABLET, FILM COATED ORAL DAILY
COMMUNITY
End: 2021-12-10

## 2021-08-30 RX ORDER — FLUDROCORTISONE ACETATE 0.1 MG/1
0.1 TABLET ORAL 2 TIMES DAILY
Qty: 180 TABLET | Refills: 1 | Status: SHIPPED | OUTPATIENT
Start: 2021-08-30 | End: 2021-09-13 | Stop reason: SDUPTHER

## 2021-08-30 NOTE — PATIENT INSTRUCTIONS
Please continue watching your resting blood pressure and be careful not to fall since your blood pressure is still low  We will increase fludrocortisone from 0 1mg once a day to 0 1mg twice a day

## 2021-08-30 NOTE — TELEPHONE ENCOUNTER
Follow-up blood work reviewed:  Sodium level up to 135 from previous level of 129  Labs are stable  No changes at this time  I left a message telling Max that sodium level was quite good at this time  If you would like to discuss further I encouraged him to call the office and I would be happy to call him back tomorrow

## 2021-08-30 NOTE — PROGRESS NOTES
Assessment/Plan:    No problem-specific Assessment & Plan notes found for this encounter  hypotension, off amlodipine already, increase florinef qd to bid  F/u 2w    Low back pain, chronic, rx back brace, declines PT    Hyponatremia, saw nephrology, fluid restriction given, plans f/u with Dr Sandra Mott    ckd2 stable    DM2 on tradjenta but expensive       Diagnoses and all orders for this visit:    Orthostatic hypotension  -     fludrocortisone (FLORINEF) 0 1 mg tablet; Take 1 tablet (0 1 mg total) by mouth 2 (two) times a day    Chronic right-sided low back pain without sciatica  -     Lumbar Back Brace    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 mellitus [facility] )    Type 2 diabetes mellitus with diabetic neuropathy, without long-term current use of insulin ([facility])    Other orders  -     linaGLIPtin (Tradjenta) 5 MG TABS; Take 5 mg by mouth daily      back brace requested  For stability  Chronic pain        Return in about 2 weeks (around 9/13/2021) for Recheck  Subjective:      Patient ID: Leila Gamboa is a 67 y o  male  Chief Complaint   Patient presents with    Follow-up     10 days nm  lpn       HPI  Still with low bp  Measured at home  Amlodipine stopped by nephrology  No syncope  Fall precautions aware  Uses his walker    The following portions of the patient's history were reviewed and updated as appropriate: allergies, current medications, past family history, past medical history, past social history, past surgical history and problem list     Review of Systems   Constitutional: Negative for fever  Respiratory: Negative for shortness of breath            Current Outpatient Medications   Medication Sig Dispense Refill    acarbose (PRECOSE) 100 MG tablet TAKE 1 TABLET THREE TIMES DAILY WITH MEALS 270 tablet 1    Ascorbic Acid (Vitamin C) 500 MG CAPS Take 500 mg by mouth daily      aspirin (ECOTRIN LOW STRENGTH) 81 mg EC tablet Take 81 mg by mouth daily      atorvastatin (LIPITOR) 20 mg tablet Take 20 mg by mouth daily      brimonidine tartrate 0 2 % ophthalmic solution Inject 0 2 % into the eye 2 (two) times a day      carvedilol (COREG) 12 5 mg tablet Take 12 5 mg by mouth 2 (two) times a day      Cholecalciferol (Vitamin D3) 50 MCG (2000 UT) TABS Take 2,000 Units by mouth daily      co-enzyme Q-10 100 mg capsule Take 100 mg by mouth daily      fludrocortisone (FLORINEF) 0 1 mg tablet Take 1 tablet (0 1 mg total) by mouth 2 (two) times a day 180 tablet 1    glucose blood test strip Test blood sugar twice a day 100 each 3    linaGLIPtin (Tradjenta) 5 MG TABS Take 5 mg by mouth daily      lisinopril (ZESTRIL) 2 5 mg tablet Take 2 5 mg by mouth daily      magnesium oxide (MAG-OX) 400 mg tablet Take 400 mg by mouth 2 (two) times a day      metFORMIN (GLUCOPHAGE) 1000 MG tablet Take one tablet orally 3 times a day (Patient taking differently: TAKE 1000 MG  IN THE AM, TAKE 500 MG  IN THE PM) 270 tablet 1    multivitamin (THERAGRAN) TABS Take 1 tablet by mouth daily      omeprazole (PriLOSEC) 40 MG capsule Take 40 mg by mouth daily      Probiotic Product (CULTURELLE PROBIOTICS) CHEW Chew daily      tamsulosin (FLOMAX) 0 4 mg Take 1 capsule (0 4 mg total) by mouth daily with dinner 30 capsule 11    TRUEPLUS LANCETS 28G MISC Test 1x/d, E11 29  0    gabapentin (NEURONTIN) 600 MG tablet Take 1 tablet (600 mg total) by mouth 2 (two) times a day (Patient not taking: Reported on 8/18/2021) 180 tablet 0    linaGLIPtin 5 MG TABS Take one tablet by mouth daily (Patient not taking: Reported on 8/30/2021) 30 tablet 3    PRENATAL MULTIVIT-MIN-FE-FA PO Take 1 tablet by mouth daily (Patient not taking: Reported on 8/27/2021)       No current facility-administered medications for this visit  Objective:    BP (!) 80/40   Pulse 73   Temp 97 8 °F (36 6 °C)   Resp 18   Ht 5' 11" (1 803 m)   Wt 72 8 kg (160 lb 6 4 oz)   BMI 22 37 kg/m²        Physical Exam  Vitals and nursing note reviewed     Constitutional: General: He is not in acute distress  Appearance: He is well-developed  He is not ill-appearing  HENT:      Head: Normocephalic  Right Ear: Tympanic membrane normal       Left Ear: Tympanic membrane normal    Eyes:      General: No scleral icterus  Conjunctiva/sclera: Conjunctivae normal    Cardiovascular:      Rate and Rhythm: Normal rate and regular rhythm  Pulmonary:      Effort: Pulmonary effort is normal  No respiratory distress  Breath sounds: No wheezing  Abdominal:      Palpations: Abdomen is soft  Musculoskeletal:         General: No deformity  Cervical back: Neck supple  Skin:     General: Skin is warm and dry  Coloration: Skin is not pale  Neurological:      Mental Status: He is alert  Gait: Gait abnormal    Psychiatric:         Behavior: Behavior normal          Thought Content:  Thought content normal                 Des Patel DO

## 2021-08-31 ENCOUNTER — APPOINTMENT (OUTPATIENT)
Dept: RADIATION ONCOLOGY | Facility: HOSPITAL | Age: 76
End: 2021-08-31
Attending: RADIOLOGY
Payer: MEDICARE

## 2021-08-31 DIAGNOSIS — E83.42 HYPOMAGNESEMIA: Primary | ICD-10-CM

## 2021-08-31 DIAGNOSIS — C61 PROSTATE CANCER (HCC): ICD-10-CM

## 2021-08-31 PROCEDURE — 77336 RADIATION PHYSICS CONSULT: CPT | Performed by: RADIOLOGY

## 2021-08-31 PROCEDURE — 77385 HB NTSTY MODUL RAD TX DLVR SMPL: CPT | Performed by: RADIOLOGY

## 2021-08-31 PROCEDURE — 77014 CHG CT GUIDANCE PLACEMENT RAD THERAPY FIELDS: CPT | Performed by: RADIOLOGY

## 2021-09-01 ENCOUNTER — APPOINTMENT (OUTPATIENT)
Dept: RADIATION ONCOLOGY | Facility: HOSPITAL | Age: 76
End: 2021-09-01
Attending: RADIOLOGY
Payer: MEDICARE

## 2021-09-01 PROCEDURE — 77014 CHG CT GUIDANCE PLACEMENT RAD THERAPY FIELDS: CPT | Performed by: RADIOLOGY

## 2021-09-01 PROCEDURE — 77427 RADIATION TX MANAGEMENT X5: CPT | Performed by: RADIOLOGY

## 2021-09-01 PROCEDURE — 77385 HB NTSTY MODUL RAD TX DLVR SMPL: CPT | Performed by: RADIOLOGY

## 2021-09-02 ENCOUNTER — APPOINTMENT (OUTPATIENT)
Dept: RADIATION ONCOLOGY | Facility: HOSPITAL | Age: 76
End: 2021-09-02
Attending: RADIOLOGY
Payer: MEDICARE

## 2021-09-02 PROCEDURE — 77014 CHG CT GUIDANCE PLACEMENT RAD THERAPY FIELDS: CPT | Performed by: STUDENT IN AN ORGANIZED HEALTH CARE EDUCATION/TRAINING PROGRAM

## 2021-09-02 PROCEDURE — 77385 HB NTSTY MODUL RAD TX DLVR SMPL: CPT | Performed by: STUDENT IN AN ORGANIZED HEALTH CARE EDUCATION/TRAINING PROGRAM

## 2021-09-03 ENCOUNTER — APPOINTMENT (OUTPATIENT)
Dept: LAB | Facility: CLINIC | Age: 76
End: 2021-09-03
Payer: MEDICARE

## 2021-09-03 ENCOUNTER — APPOINTMENT (OUTPATIENT)
Dept: RADIATION ONCOLOGY | Facility: HOSPITAL | Age: 76
End: 2021-09-03
Attending: RADIOLOGY
Payer: MEDICARE

## 2021-09-03 DIAGNOSIS — N18.31 ANEMIA DUE TO STAGE 3A CHRONIC KIDNEY DISEASE (HCC): ICD-10-CM

## 2021-09-03 DIAGNOSIS — C61 PROSTATE CANCER (HCC): ICD-10-CM

## 2021-09-03 DIAGNOSIS — I95.1 ORTHOSTATIC HYPOTENSION: ICD-10-CM

## 2021-09-03 DIAGNOSIS — D63.1 ANEMIA DUE TO STAGE 3A CHRONIC KIDNEY DISEASE (HCC): ICD-10-CM

## 2021-09-03 DIAGNOSIS — R29.898 BILATERAL LEG WEAKNESS: ICD-10-CM

## 2021-09-03 DIAGNOSIS — I10 BENIGN ESSENTIAL HYPERTENSION: ICD-10-CM

## 2021-09-03 DIAGNOSIS — E11.65 TYPE 2 DIABETES MELLITUS WITH HYPERGLYCEMIA, WITHOUT LONG-TERM CURRENT USE OF INSULIN (HCC): ICD-10-CM

## 2021-09-03 DIAGNOSIS — E83.42 HYPOMAGNESEMIA: ICD-10-CM

## 2021-09-03 DIAGNOSIS — N18.2 CKD STAGE 2 DUE TO TYPE 2 DIABETES MELLITUS (HCC): ICD-10-CM

## 2021-09-03 DIAGNOSIS — E22.2 SIADH (SYNDROME OF INAPPROPRIATE ADH PRODUCTION) (HCC): ICD-10-CM

## 2021-09-03 DIAGNOSIS — E11.22 CKD STAGE 2 DUE TO TYPE 2 DIABETES MELLITUS (HCC): ICD-10-CM

## 2021-09-03 DIAGNOSIS — E78.5 DYSLIPIDEMIA: ICD-10-CM

## 2021-09-03 LAB
ANION GAP SERPL CALCULATED.3IONS-SCNC: 5 MMOL/L (ref 4–13)
BUN SERPL-MCNC: 13 MG/DL (ref 5–25)
CALCIUM SERPL-MCNC: 8.9 MG/DL (ref 8.3–10.1)
CHLORIDE SERPL-SCNC: 103 MMOL/L (ref 100–108)
CO2 SERPL-SCNC: 27 MMOL/L (ref 21–32)
CREAT SERPL-MCNC: 0.88 MG/DL (ref 0.6–1.3)
GFR SERPL CREATININE-BSD FRML MDRD: 83 ML/MIN/1.73SQ M
GLUCOSE SERPL-MCNC: 111 MG/DL (ref 65–140)
MAGNESIUM SERPL-MCNC: 1.9 MG/DL (ref 1.6–2.6)
POTASSIUM SERPL-SCNC: 4.1 MMOL/L (ref 3.5–5.3)
SODIUM SERPL-SCNC: 135 MMOL/L (ref 136–145)

## 2021-09-03 PROCEDURE — 77014 CHG CT GUIDANCE PLACEMENT RAD THERAPY FIELDS: CPT | Performed by: RADIOLOGY

## 2021-09-03 PROCEDURE — 83735 ASSAY OF MAGNESIUM: CPT

## 2021-09-03 PROCEDURE — 36415 COLL VENOUS BLD VENIPUNCTURE: CPT

## 2021-09-03 PROCEDURE — 80048 BASIC METABOLIC PNL TOTAL CA: CPT

## 2021-09-03 PROCEDURE — 77385 HB NTSTY MODUL RAD TX DLVR SMPL: CPT | Performed by: RADIOLOGY

## 2021-09-07 ENCOUNTER — APPOINTMENT (OUTPATIENT)
Dept: RADIATION ONCOLOGY | Facility: HOSPITAL | Age: 76
End: 2021-09-07
Attending: RADIOLOGY
Payer: MEDICARE

## 2021-09-07 PROCEDURE — 77014 CHG CT GUIDANCE PLACEMENT RAD THERAPY FIELDS: CPT | Performed by: RADIOLOGY

## 2021-09-07 PROCEDURE — 77385 HB NTSTY MODUL RAD TX DLVR SMPL: CPT | Performed by: RADIOLOGY

## 2021-09-08 ENCOUNTER — APPOINTMENT (OUTPATIENT)
Dept: RADIATION ONCOLOGY | Facility: HOSPITAL | Age: 76
End: 2021-09-08
Attending: RADIOLOGY
Payer: MEDICARE

## 2021-09-08 ENCOUNTER — APPOINTMENT (OUTPATIENT)
Dept: RADIATION ONCOLOGY | Facility: HOSPITAL | Age: 76
End: 2021-09-08
Payer: MEDICARE

## 2021-09-08 PROCEDURE — 77014 CHG CT GUIDANCE PLACEMENT RAD THERAPY FIELDS: CPT | Performed by: STUDENT IN AN ORGANIZED HEALTH CARE EDUCATION/TRAINING PROGRAM

## 2021-09-08 PROCEDURE — 77336 RADIATION PHYSICS CONSULT: CPT | Performed by: RADIOLOGY

## 2021-09-08 PROCEDURE — 77385 HB NTSTY MODUL RAD TX DLVR SMPL: CPT | Performed by: STUDENT IN AN ORGANIZED HEALTH CARE EDUCATION/TRAINING PROGRAM

## 2021-09-09 ENCOUNTER — APPOINTMENT (OUTPATIENT)
Dept: RADIATION ONCOLOGY | Facility: HOSPITAL | Age: 76
End: 2021-09-09
Attending: RADIOLOGY
Payer: MEDICARE

## 2021-09-09 PROCEDURE — 77427 RADIATION TX MANAGEMENT X5: CPT | Performed by: RADIOLOGY

## 2021-09-09 PROCEDURE — 77014 CHG CT GUIDANCE PLACEMENT RAD THERAPY FIELDS: CPT | Performed by: STUDENT IN AN ORGANIZED HEALTH CARE EDUCATION/TRAINING PROGRAM

## 2021-09-09 PROCEDURE — 77385 HB NTSTY MODUL RAD TX DLVR SMPL: CPT | Performed by: STUDENT IN AN ORGANIZED HEALTH CARE EDUCATION/TRAINING PROGRAM

## 2021-09-10 ENCOUNTER — APPOINTMENT (OUTPATIENT)
Dept: RADIATION ONCOLOGY | Facility: HOSPITAL | Age: 76
End: 2021-09-10
Payer: MEDICARE

## 2021-09-10 ENCOUNTER — APPOINTMENT (OUTPATIENT)
Dept: RADIATION ONCOLOGY | Facility: HOSPITAL | Age: 76
End: 2021-09-10
Attending: RADIOLOGY
Payer: MEDICARE

## 2021-09-10 PROCEDURE — 77014 CHG CT GUIDANCE PLACEMENT RAD THERAPY FIELDS: CPT | Performed by: RADIOLOGY

## 2021-09-10 PROCEDURE — 77385 HB NTSTY MODUL RAD TX DLVR SMPL: CPT | Performed by: RADIOLOGY

## 2021-09-13 ENCOUNTER — TELEPHONE (OUTPATIENT)
Dept: OTHER | Facility: HOSPITAL | Age: 76
End: 2021-09-13

## 2021-09-13 ENCOUNTER — APPOINTMENT (OUTPATIENT)
Dept: RADIATION ONCOLOGY | Facility: HOSPITAL | Age: 76
End: 2021-09-13
Attending: RADIOLOGY
Payer: MEDICARE

## 2021-09-13 ENCOUNTER — OFFICE VISIT (OUTPATIENT)
Dept: FAMILY MEDICINE CLINIC | Facility: CLINIC | Age: 76
End: 2021-09-13
Payer: MEDICARE

## 2021-09-13 ENCOUNTER — APPOINTMENT (OUTPATIENT)
Dept: LAB | Facility: CLINIC | Age: 76
End: 2021-09-13
Payer: MEDICARE

## 2021-09-13 VITALS
HEIGHT: 71 IN | DIASTOLIC BLOOD PRESSURE: 60 MMHG | RESPIRATION RATE: 16 BRPM | TEMPERATURE: 97.1 F | HEART RATE: 64 BPM | WEIGHT: 160 LBS | BODY MASS INDEX: 22.4 KG/M2 | SYSTOLIC BLOOD PRESSURE: 144 MMHG

## 2021-09-13 DIAGNOSIS — D63.1 ANEMIA DUE TO STAGE 3A CHRONIC KIDNEY DISEASE (HCC): ICD-10-CM

## 2021-09-13 DIAGNOSIS — I95.1 ORTHOSTATIC HYPOTENSION: ICD-10-CM

## 2021-09-13 DIAGNOSIS — I10 BENIGN ESSENTIAL HYPERTENSION: ICD-10-CM

## 2021-09-13 DIAGNOSIS — N18.2 CKD STAGE 2 DUE TO TYPE 2 DIABETES MELLITUS (HCC): ICD-10-CM

## 2021-09-13 DIAGNOSIS — I25.119 CORONARY ARTERY DISEASE INVOLVING NATIVE CORONARY ARTERY OF NATIVE HEART WITH ANGINA PECTORIS (HCC): ICD-10-CM

## 2021-09-13 DIAGNOSIS — E11.22 CKD STAGE 2 DUE TO TYPE 2 DIABETES MELLITUS (HCC): ICD-10-CM

## 2021-09-13 DIAGNOSIS — E11.65 TYPE 2 DIABETES MELLITUS WITH HYPERGLYCEMIA, WITHOUT LONG-TERM CURRENT USE OF INSULIN (HCC): ICD-10-CM

## 2021-09-13 DIAGNOSIS — I95.1 ORTHOSTATIC HYPOTENSION: Primary | ICD-10-CM

## 2021-09-13 DIAGNOSIS — N18.31 ANEMIA DUE TO STAGE 3A CHRONIC KIDNEY DISEASE (HCC): ICD-10-CM

## 2021-09-13 DIAGNOSIS — E22.2 SIADH (SYNDROME OF INAPPROPRIATE ADH PRODUCTION) (HCC): ICD-10-CM

## 2021-09-13 DIAGNOSIS — R29.898 BILATERAL LEG WEAKNESS: ICD-10-CM

## 2021-09-13 DIAGNOSIS — E78.5 DYSLIPIDEMIA: ICD-10-CM

## 2021-09-13 LAB
ANION GAP SERPL CALCULATED.3IONS-SCNC: 4 MMOL/L (ref 4–13)
BUN SERPL-MCNC: 12 MG/DL (ref 5–25)
CALCIUM SERPL-MCNC: 8.8 MG/DL (ref 8.3–10.1)
CHLORIDE SERPL-SCNC: 104 MMOL/L (ref 100–108)
CO2 SERPL-SCNC: 29 MMOL/L (ref 21–32)
CREAT SERPL-MCNC: 0.9 MG/DL (ref 0.6–1.3)
GFR SERPL CREATININE-BSD FRML MDRD: 83 ML/MIN/1.73SQ M
GLUCOSE SERPL-MCNC: 152 MG/DL (ref 65–140)
POTASSIUM SERPL-SCNC: 3.3 MMOL/L (ref 3.5–5.3)
SODIUM SERPL-SCNC: 137 MMOL/L (ref 136–145)

## 2021-09-13 PROCEDURE — 77385 HB NTSTY MODUL RAD TX DLVR SMPL: CPT | Performed by: STUDENT IN AN ORGANIZED HEALTH CARE EDUCATION/TRAINING PROGRAM

## 2021-09-13 PROCEDURE — 80048 BASIC METABOLIC PNL TOTAL CA: CPT

## 2021-09-13 PROCEDURE — 77014 CHG CT GUIDANCE PLACEMENT RAD THERAPY FIELDS: CPT | Performed by: STUDENT IN AN ORGANIZED HEALTH CARE EDUCATION/TRAINING PROGRAM

## 2021-09-13 PROCEDURE — 36415 COLL VENOUS BLD VENIPUNCTURE: CPT

## 2021-09-13 PROCEDURE — 99214 OFFICE O/P EST MOD 30 MIN: CPT | Performed by: FAMILY MEDICINE

## 2021-09-13 RX ORDER — FLUDROCORTISONE ACETATE 0.1 MG/1
0.1 TABLET ORAL 3 TIMES DAILY
Qty: 270 TABLET | Refills: 1 | Status: SHIPPED | OUTPATIENT
Start: 2021-09-13 | End: 2021-09-28 | Stop reason: SDUPTHER

## 2021-09-13 NOTE — PATIENT INSTRUCTIONS
Please increase the fludrocortisone pill from 0 1mg twice a day to 0 1mg three times a day and continue your recordings and stockings

## 2021-09-13 NOTE — TELEPHONE ENCOUNTER
I called Max and left a message regarding recent blood work  Sodium is perfectly normal at this time, 137  Renal function studies are at baseline  Potassium is slightly low, 3 3  Generally is potassium level is normal or on the higher range of normal   I will try calling him tomorrow to discuss further      It may be better to just repeat blood work to reassess rather than

## 2021-09-13 NOTE — PROGRESS NOTES
Assessment/Plan:    No problem-specific Assessment & Plan notes found for this encounter  Still with orthostasis  Increase florinef 0 1 mg bid to tid  Fall cautions  [place] sodium  Fluid restrictions due to SIADH  Continue compression stockings  Cardiology f/u    htn stable but watch to see if needs to restart amlodipine    SIADH unchanged     Diagnoses and all orders for this visit:    Orthostatic hypotension  -     fludrocortisone (FLORINEF) 0 1 mg tablet; Take 1 tablet (0 1 mg total) by mouth 3 (three) times a day    Benign essential hypertension    SIADH (syndrome of inappropriate ADH production) ([facility] )    Coronary artery disease involving native coronary artery of native heart with angina pectoris ([facility] )        Return in about 2 weeks (around 9/27/2021) for Recheck  Subjective:      Patient ID: Edil Hinds is a 67 y o  male  Chief Complaint   Patient presents with    Follow-up     medication ac/lpn    Results       HPI  At home sbp 115 today  Still goes down with standing  Not dizzy  No syncope  No falls  Being careful  Wears compression socks    The following portions of the patient's history were reviewed and updated as appropriate: allergies, current medications, past family history, past medical history, past social history, past surgical history and problem list     Review of Systems   Constitutional: Negative for fever  Neurological: Negative for syncope           Current Outpatient Medications   Medication Sig Dispense Refill    acarbose (PRECOSE) 100 MG tablet TAKE 1 TABLET THREE TIMES DAILY WITH MEALS 270 tablet 1    Ascorbic Acid (Vitamin C) 500 MG CAPS Take 500 mg by mouth daily      aspirin (ECOTRIN LOW STRENGTH) 81 mg EC tablet Take 81 mg by mouth daily      atorvastatin (LIPITOR) 20 mg tablet Take 20 mg by mouth daily      brimonidine tartrate 0 2 % ophthalmic solution Inject 0 2 % into the eye 2 (two) times a day      carvedilol (COREG) 12 5 mg tablet Take 12 5 mg by mouth 2 (two) times a day      Cholecalciferol (Vitamin D3) 50 MCG (2000 UT) TABS Take 2,000 Units by mouth daily      co-enzyme Q-10 100 mg capsule Take 100 mg by mouth daily      fludrocortisone (FLORINEF) 0 1 mg tablet Take 1 tablet (0 1 mg total) by mouth 3 (three) times a day 270 tablet 1    glucose blood test strip Test blood sugar twice a day 100 each 3    linaGLIPtin (Tradjenta) 5 MG TABS Take 5 mg by mouth daily      lisinopril (ZESTRIL) 2 5 mg tablet Take 2 5 mg by mouth daily      magnesium oxide (MAG-OX) 400 mg tablet Take 400 mg by mouth 2 (two) times a day      metFORMIN (GLUCOPHAGE) 1000 MG tablet Take one tablet orally 3 times a day (Patient taking differently: TAKE 1000 MG  IN THE AM, TAKE 500 MG  IN THE PM) 270 tablet 1    multivitamin (THERAGRAN) TABS Take 1 tablet by mouth daily      omeprazole (PriLOSEC) 40 MG capsule Take 40 mg by mouth daily      Probiotic Product (CULTURELLE PROBIOTICS) CHEW Chew daily      tamsulosin (FLOMAX) 0 4 mg Take 1 capsule (0 4 mg total) by mouth daily with dinner 30 capsule 11    TRUEPLUS LANCETS 28G MISC Test 1x/d, E11 29  0    gabapentin (NEURONTIN) 600 MG tablet Take 1 tablet (600 mg total) by mouth 2 (two) times a day (Patient not taking: Reported on 8/18/2021) 180 tablet 0    linaGLIPtin 5 MG TABS Take one tablet by mouth daily (Patient not taking: Reported on 8/30/2021) 30 tablet 3    PRENATAL MULTIVIT-MIN-FE-FA PO Take 1 tablet by mouth daily (Patient not taking: Reported on 8/27/2021)       No current facility-administered medications for this visit  Objective:    /60   Pulse 64   Temp (!) 97 1 °F (36 2 °C)   Resp 16   Ht 5' 11" (1 803 m)   Wt 72 6 kg (160 lb)   BMI 22 32 kg/m²        Physical Exam  Vitals and nursing note reviewed  Constitutional:       Appearance: He is well-developed  He is not ill-appearing  HENT:      Head: Normocephalic  Eyes:      General: No scleral icterus       Conjunctiva/sclera: Conjunctivae normal    Cardiovascular:      Rate and Rhythm: Normal rate and regular rhythm  Pulmonary:      Effort: Pulmonary effort is normal  No respiratory distress  Breath sounds: No wheezing  Abdominal:      General: There is no distension  Palpations: Abdomen is soft  Tenderness: There is no abdominal tenderness  Musculoskeletal:         General: No deformity  Cervical back: Neck supple  Right lower leg: No edema  Left lower leg: No edema  Skin:     General: Skin is warm and dry  Coloration: Skin is not pale  Neurological:      Mental Status: He is alert  Gait: Gait abnormal       Comments: Uses rollator   Psychiatric:         Mood and Affect: Mood normal          Behavior: Behavior normal          Thought Content:  Thought content normal                 Nilo Almaraz DO

## 2021-09-14 ENCOUNTER — APPOINTMENT (OUTPATIENT)
Dept: RADIATION ONCOLOGY | Facility: HOSPITAL | Age: 76
End: 2021-09-14
Attending: RADIOLOGY
Payer: MEDICARE

## 2021-09-14 PROCEDURE — 77385 HB NTSTY MODUL RAD TX DLVR SMPL: CPT | Performed by: RADIOLOGY

## 2021-09-14 PROCEDURE — 77014 CHG CT GUIDANCE PLACEMENT RAD THERAPY FIELDS: CPT | Performed by: RADIOLOGY

## 2021-09-14 PROCEDURE — 77336 RADIATION PHYSICS CONSULT: CPT | Performed by: RADIOLOGY

## 2021-09-15 DIAGNOSIS — E11.40 TYPE 2 DIABETES MELLITUS WITH DIABETIC NEUROPATHY, WITHOUT LONG-TERM CURRENT USE OF INSULIN (HCC): ICD-10-CM

## 2021-09-15 RX ORDER — ACARBOSE 100 MG/1
100 TABLET ORAL
Qty: 270 TABLET | Refills: 1 | Status: SHIPPED | OUTPATIENT
Start: 2021-09-15 | End: 2021-12-10 | Stop reason: SDUPTHER

## 2021-09-16 ENCOUNTER — TELEPHONE (OUTPATIENT)
Dept: NEPHROLOGY | Facility: CLINIC | Age: 76
End: 2021-09-16

## 2021-09-16 DIAGNOSIS — I10 BENIGN ESSENTIAL HYPERTENSION: ICD-10-CM

## 2021-09-16 DIAGNOSIS — N18.2 CKD STAGE 2 DUE TO TYPE 2 DIABETES MELLITUS (HCC): Primary | ICD-10-CM

## 2021-09-16 DIAGNOSIS — E11.22 CKD STAGE 2 DUE TO TYPE 2 DIABETES MELLITUS (HCC): Primary | ICD-10-CM

## 2021-09-16 NOTE — TELEPHONE ENCOUNTER
----- Message from [address] [place] [address] sent at 9/16/2021  2:01 PM EDT -----  I called Zenobia Carvalho to discuss the labs and left a message  Please make sure he goes for repeat BMP since K+ a little low- potassium usually Ok or on the high side so I want to repeat to check   Thank you

## 2021-09-17 NOTE — TELEPHONE ENCOUNTER
I lm for the patient advising that August Jiménez wanted to repeat labs given k was low and wants to have a repeat follow up

## 2021-09-22 ENCOUNTER — OFFICE VISIT (OUTPATIENT)
Dept: DIABETES SERVICES | Facility: CLINIC | Age: 76
End: 2021-09-22
Payer: MEDICARE

## 2021-09-22 VITALS — HEIGHT: 71 IN | WEIGHT: 159.4 LBS | BODY MASS INDEX: 22.31 KG/M2

## 2021-09-22 DIAGNOSIS — E11.40 TYPE 2 DIABETES MELLITUS WITH DIABETIC NEUROPATHY, WITHOUT LONG-TERM CURRENT USE OF INSULIN (HCC): Primary | ICD-10-CM

## 2021-09-22 PROCEDURE — 97803 MED NUTRITION INDIV SUBSEQ: CPT | Performed by: DIETITIAN, REGISTERED

## 2021-09-22 NOTE — PROGRESS NOTES
Medical Nutrition Therapy      Assessment    Chief complaint T2DM    Visit Type: Follow-up visit    HPI: Jacek Mobley returned for follow-up today  No new HbA1c  SMBG log provided by patient today (scanned) shows improvement in glycemic control from previous log submitted on 8/25/21  Patient checking BG before meals and before bed with overall range 127-197 mg/dL  Since last visit patient experienced hypotension and BP meds were changed by physician  Potassium was also noted to be low from metabolic panel, however, physician suspects inaccurate result and ordered repeat  [place]ells food record reveals inadequate carbohydrate intake with most meals containing around 30-35 g CHO per meal  Based on meal plan review and food record provided education about keep carbs as close to 45 g per meal as possible  Reviewed some foods listed in food record that Jacek Mobley and his wife were confused on how to count and whether or not to count carbs in, such as sugar free jelly (1tbsp) and peanut butter (1 tbsp), which in those amounts would not be counted as carbs  Discussed examples of how to modify meals to improve consistency with providing the recommended 45 g CHO at each meal and 15 g CHO per snack  Overall, Jacek Mobley is doing well with incorporating nutrient dense foods including whole grains, high-fiber fruit, vegetables, and lean proteins  Reviewed that as Jacek Mobley starts having recommended 45 g CHO per meal that there is a possibility that his BG may slightly elevate from current readings and stressed the importance of submitting SMBG log regularly to Dr Raul Issa so that she can make adjustments to mediation regimen if needed  Jacek Mobley and his wife verbalized good understanding of topics and recommendations discussed today and will call with any questions prior to follow-up appointment in 3 months      Ht Readings from Last 1 Encounters:   09/13/21 5' 11" (1 803 m)     Wt Readings from Last 2 Encounters:   09/13/21 72 6 kg (160 lb) 08/30/21 72 8 kg (160 lb 6 4 oz)     Weight Change: Yes 6 lb weight loss over last 6 weeks    Medical Diagnosis/reason for visit E11 40 (ICD-10-CM) - Type 2 diabetes mellitus with diabetic neuropathy, without long-term current use of insulin ([facility])    Food Log:  Please see scanned 3 day food diary      Calorie needs 1,800 kcals/day Carbs: 45 g/meal, 15 g/snack     Fat: 5 servings/day    Protein:8 oz/day    Nutrition Diagnosis:  Inadequate carbohydrate intake  related to Food and nutrition related knowledge deficit concerning appropriate amount of dietary carbohydrate as evidenced by  Estimated carbohydrate intake less than recommended amounts    Intervention: carbohydrate counting, meal planning, individualized meal plan and food diary     Treatment Goals: Patient understands education and recommendations and Patient will count carbohydrates    Monitoring and evaluation:    Term code indicator  FH 1 6 3 Carbohydrate Intake Criteria: 45 g CHO per meal and 15 g CHO per snack  Term code indicator  FH 4 4 Mealtime Behavior Criteria: 3 meals per day, 4-5 hours apart  3 snacks per day, at least 2 hours apart from meals  Patients Response to Instruction:  Lavelle Tobias  Expected Compliancegood    Thank you for coming to the [facility] for education today  Please feel free to call with any questions or concerns      Start:9:58 am  Stop: 10:32 am  Referred by: MD Raymundo Cabello, [address] [place] [phone]

## 2021-09-23 ENCOUNTER — LAB (OUTPATIENT)
Dept: LAB | Facility: CLINIC | Age: 76
End: 2021-09-23
Payer: MEDICARE

## 2021-09-23 DIAGNOSIS — E11.22 CKD STAGE 2 DUE TO TYPE 2 DIABETES MELLITUS (HCC): ICD-10-CM

## 2021-09-23 DIAGNOSIS — I10 BENIGN ESSENTIAL HYPERTENSION: ICD-10-CM

## 2021-09-23 DIAGNOSIS — N18.2 CKD STAGE 2 DUE TO TYPE 2 DIABETES MELLITUS (HCC): ICD-10-CM

## 2021-09-23 LAB
ANION GAP SERPL CALCULATED.3IONS-SCNC: 4 MMOL/L (ref 4–13)
BUN SERPL-MCNC: 11 MG/DL (ref 5–25)
CALCIUM SERPL-MCNC: 8.4 MG/DL (ref 8.3–10.1)
CHLORIDE SERPL-SCNC: 106 MMOL/L (ref 100–108)
CO2 SERPL-SCNC: 31 MMOL/L (ref 21–32)
CREAT SERPL-MCNC: 1.03 MG/DL (ref 0.6–1.3)
GFR SERPL CREATININE-BSD FRML MDRD: 70 ML/MIN/1.73SQ M
GLUCOSE SERPL-MCNC: 191 MG/DL (ref 65–140)
POTASSIUM SERPL-SCNC: 2.9 MMOL/L (ref 3.5–5.3)
SODIUM SERPL-SCNC: 141 MMOL/L (ref 136–145)

## 2021-09-23 PROCEDURE — 36415 COLL VENOUS BLD VENIPUNCTURE: CPT

## 2021-09-23 PROCEDURE — 80048 BASIC METABOLIC PNL TOTAL CA: CPT

## 2021-09-24 ENCOUNTER — TELEPHONE (OUTPATIENT)
Dept: NEPHROLOGY | Facility: CLINIC | Age: 76
End: 2021-09-24

## 2021-09-24 DIAGNOSIS — E11.22 CKD STAGE 2 DUE TO TYPE 2 DIABETES MELLITUS (HCC): Primary | ICD-10-CM

## 2021-09-24 DIAGNOSIS — E87.6 HYPOKALEMIA: Primary | ICD-10-CM

## 2021-09-24 DIAGNOSIS — N18.2 CKD STAGE 2 DUE TO TYPE 2 DIABETES MELLITUS (HCC): Primary | ICD-10-CM

## 2021-09-24 RX ORDER — POTASSIUM CHLORIDE 20 MEQ/1
40 TABLET, EXTENDED RELEASE ORAL 2 TIMES DAILY
Qty: 30 TABLET | Refills: 0 | Status: SHIPPED | OUTPATIENT
Start: 2021-09-24 | End: 2021-10-28 | Stop reason: SDUPTHER

## 2021-09-24 NOTE — TELEPHONE ENCOUNTER
Lm for the patient to call the office once he has received the above message to confirm he understood instructions and plan he has to complete

## 2021-09-24 NOTE — TELEPHONE ENCOUNTER
----- Message from [address] [place] [address] sent at 9/24/2021  7:52 AM EDT -----  Please call Max and tell him that his potassium level is lower than before  Please ask him if he is feeling okay  If he is having symptoms such as weakness or palpitations please have him go to the emergency room  If he is feeling okay then I want him to take oral potassium supplement  I prescribed a dose of 2 tablets of potassium twice a day today and tomorrow which is Friday and Saturday than on Sunday start taking 1 pill per day  He needs to go for a BMP on Monday

## 2021-09-24 NOTE — RESULT ENCOUNTER NOTE
Please call Max and tell him that his potassium level is lower than before  Please ask him if he is feeling okay  If he is having symptoms such as weakness or palpitations please have him go to the emergency room  If he is feeling okay then I want him to take oral potassium supplement  I prescribed a dose of 2 tablets of potassium twice a day today and tomorrow which is Friday and Saturday than on Sunday start taking 1 pill per day  He needs to go for a BMP on Monday

## 2021-09-27 ENCOUNTER — TELEPHONE (OUTPATIENT)
Dept: NEPHROLOGY | Facility: CLINIC | Age: 76
End: 2021-09-27

## 2021-09-27 ENCOUNTER — APPOINTMENT (OUTPATIENT)
Dept: LAB | Facility: CLINIC | Age: 76
End: 2021-09-27
Payer: MEDICARE

## 2021-09-27 DIAGNOSIS — E11.22 CKD STAGE 2 DUE TO TYPE 2 DIABETES MELLITUS (HCC): ICD-10-CM

## 2021-09-27 DIAGNOSIS — E11.22 CKD STAGE 2 DUE TO TYPE 2 DIABETES MELLITUS (HCC): Primary | ICD-10-CM

## 2021-09-27 DIAGNOSIS — N18.2 CKD STAGE 2 DUE TO TYPE 2 DIABETES MELLITUS (HCC): ICD-10-CM

## 2021-09-27 DIAGNOSIS — E87.6 HYPOKALEMIA: ICD-10-CM

## 2021-09-27 DIAGNOSIS — N18.2 CKD STAGE 2 DUE TO TYPE 2 DIABETES MELLITUS (HCC): Primary | ICD-10-CM

## 2021-09-27 LAB
ANION GAP SERPL CALCULATED.3IONS-SCNC: 2 MMOL/L (ref 4–13)
BUN SERPL-MCNC: 9 MG/DL (ref 5–25)
CALCIUM SERPL-MCNC: 8.3 MG/DL (ref 8.3–10.1)
CHLORIDE SERPL-SCNC: 108 MMOL/L (ref 100–108)
CO2 SERPL-SCNC: 30 MMOL/L (ref 21–32)
CREAT SERPL-MCNC: 0.87 MG/DL (ref 0.6–1.3)
GFR SERPL CREATININE-BSD FRML MDRD: 84 ML/MIN/1.73SQ M
GLUCOSE P FAST SERPL-MCNC: 174 MG/DL (ref 65–99)
POTASSIUM SERPL-SCNC: 3.5 MMOL/L (ref 3.5–5.3)
SODIUM SERPL-SCNC: 140 MMOL/L (ref 136–145)

## 2021-09-27 PROCEDURE — 36415 COLL VENOUS BLD VENIPUNCTURE: CPT

## 2021-09-27 PROCEDURE — 80048 BASIC METABOLIC PNL TOTAL CA: CPT

## 2021-09-27 NOTE — TELEPHONE ENCOUNTER
----- Message from [address] [place] [address] [place] sent at 9/27/2021  2:06 PM EDT -----  Labs reviewed  Potassium has normalized on supplement  Tums to continue taking potassium supplement 1 tablet per day which is 20 mEq  Repeat BMP in 2 weeks  Thank you

## 2021-09-27 NOTE — TELEPHONE ENCOUNTER
I Lm for the patient advising K was within normal limits and should continue supplements daily dosage of 20 mEq  Labs due again in 2 weeks for follow up

## 2021-09-28 ENCOUNTER — OFFICE VISIT (OUTPATIENT)
Dept: FAMILY MEDICINE CLINIC | Facility: CLINIC | Age: 76
End: 2021-09-28
Payer: MEDICARE

## 2021-09-28 VITALS
BODY MASS INDEX: 22.4 KG/M2 | HEART RATE: 72 BPM | WEIGHT: 160 LBS | HEIGHT: 71 IN | OXYGEN SATURATION: 97 % | SYSTOLIC BLOOD PRESSURE: 110 MMHG | RESPIRATION RATE: 16 BRPM | TEMPERATURE: 97.8 F | DIASTOLIC BLOOD PRESSURE: 50 MMHG

## 2021-09-28 DIAGNOSIS — I95.1 ORTHOSTATIC HYPOTENSION: Primary | ICD-10-CM

## 2021-09-28 DIAGNOSIS — E22.2 SIADH (SYNDROME OF INAPPROPRIATE ADH PRODUCTION) (HCC): ICD-10-CM

## 2021-09-28 DIAGNOSIS — I10 BENIGN ESSENTIAL HYPERTENSION: ICD-10-CM

## 2021-09-28 DIAGNOSIS — E11.65 TYPE 2 DIABETES MELLITUS WITH HYPERGLYCEMIA, WITHOUT LONG-TERM CURRENT USE OF INSULIN (HCC): ICD-10-CM

## 2021-09-28 DIAGNOSIS — E11.40 TYPE 2 DIABETES MELLITUS WITH DIABETIC NEUROPATHY, WITHOUT LONG-TERM CURRENT USE OF INSULIN (HCC): ICD-10-CM

## 2021-09-28 DIAGNOSIS — E11.22 CKD STAGE 2 DUE TO TYPE 2 DIABETES MELLITUS (HCC): ICD-10-CM

## 2021-09-28 DIAGNOSIS — N18.2 CKD STAGE 2 DUE TO TYPE 2 DIABETES MELLITUS (HCC): ICD-10-CM

## 2021-09-28 PROCEDURE — 99214 OFFICE O/P EST MOD 30 MIN: CPT | Performed by: FAMILY MEDICINE

## 2021-09-28 RX ORDER — LISINOPRIL 2.5 MG/1
2.5 TABLET ORAL DAILY
Qty: 90 TABLET | Refills: 1 | Status: SHIPPED | OUTPATIENT
Start: 2021-09-28 | End: 2021-10-28 | Stop reason: ALTCHOICE

## 2021-09-28 RX ORDER — FLUDROCORTISONE ACETATE 0.1 MG/1
0.1 TABLET ORAL 3 TIMES DAILY
Qty: 270 TABLET | Refills: 1 | Status: SHIPPED | OUTPATIENT
Start: 2021-09-28 | End: 2021-10-28

## 2021-09-28 NOTE — TELEPHONE ENCOUNTER
Lm for the patient advising of treatment plan at this time , advised to call if they had any questions

## 2021-09-28 NOTE — PROGRESS NOTES
Assessment/Plan:    No problem-specific Assessment & Plan notes found for this encounter     htn stable, orthostasis better, fall precautions  DM2 improving, now 7 7  Siadh unchanged  ckd2 stable     Diagnoses and all orders for this visit:    Orthostatic hypotension  -     Compression Stocking  -     fludrocortisone (FLORINEF) 0 1 mg tablet; Take 1 tablet (0 1 mg total) by mouth 3 (three) times a day    SIADH (syndrome of inappropriate ADH production) ([facility])    Benign essential hypertension    Type 2 diabetes mellitus with hyperglycemia, without long-term current use of insulin ([facility])    Type 2 diabetes mellitus with diabetic neuropathy, without long-term current use of insulin ([facility] [address] )    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 mellitus ([facility])  -     lisinopril (ZESTRIL) 2 5 mg tablet; Take 1 tablet (2 5 mg total) by mouth daily        Return in about 3 months (around 12/28/2021) for Recheck  Subjective:      Patient ID: Delilah Easley is a 67 y o  male  Chief Complaint   Patient presents with    Follow-up     on blood pressure, jlopezcma        HPI   Taking florinef 0 1mg tid  Drinks his limit 1500ml cc/d  Compression stockings  Transfers slowly  No falls yet  Using walker indoors and rollator outdoors  Feels better overall, since done radiation, less weak and wobbly    Sit  118/60  Stand  110/50    The following portions of the patient's history were reviewed and updated as appropriate: allergies, current medications, past family history, past medical history, past social history, past surgical history and problem list     Review of Systems   Constitutional: Negative for fever  Respiratory: Negative for shortness of breath            Current Outpatient Medications   Medication Sig Dispense Refill    acarbose (PRECOSE) 100 MG tablet Take 1 tablet (100 mg total) by mouth 3 (three) times a day with meals 270 tablet 1    Ascorbic Acid (Vitamin C) 500 MG CAPS Take 500 mg by mouth daily      aspirin (ECOTRIN LOW STRENGTH) 81 mg EC tablet Take 81 mg by mouth daily      atorvastatin (LIPITOR) 20 mg tablet Take 20 mg by mouth daily      brimonidine tartrate 0 2 % ophthalmic solution Inject 0 2 % into the eye 2 (two) times a day      carvedilol (COREG) 12 5 mg tablet Take 12 5 mg by mouth 2 (two) times a day      Cholecalciferol (Vitamin D3) 50 MCG (2000 UT) TABS Take 2,000 Units by mouth daily      co-enzyme Q-10 100 mg capsule Take 100 mg by mouth daily      fludrocortisone (FLORINEF) 0 1 mg tablet Take 1 tablet (0 1 mg total) by mouth 3 (three) times a day 270 tablet 1    glucose blood test strip Test blood sugar twice a day 100 each 3    linaGLIPtin (Tradjenta) 5 MG TABS Take 5 mg by mouth daily      linaGLIPtin 5 MG TABS Take one tablet by mouth daily 30 tablet 3    lisinopril (ZESTRIL) 2 5 mg tablet Take 1 tablet (2 5 mg total) by mouth daily 90 tablet 1    magnesium oxide (MAG-OX) 400 mg tablet Take 400 mg by mouth 2 (two) times a day      metFORMIN (GLUCOPHAGE) 1000 MG tablet Take one tablet orally 3 times a day (Patient taking differently: TAKE 1000 MG  IN THE AM, TAKE 500 MG  IN THE PM) 270 tablet 1    multivitamin (THERAGRAN) TABS Take 1 tablet by mouth daily      omeprazole (PriLOSEC) 40 MG capsule Take 40 mg by mouth daily      potassium chloride (K-DUR,KLOR-CON) 20 mEq tablet Take 2 tablets (40 mEq total) by mouth 2 (two) times a day for 4 doses Then take 2 pills daily 30 tablet 0    PRENATAL MULTIVIT-MIN-FE-FA PO Take 1 tablet by mouth daily       Probiotic Product (CULTURELLE PROBIOTICS) CHEW Chew daily      tamsulosin (FLOMAX) 0 4 mg Take 1 capsule (0 4 mg total) by mouth daily with dinner 30 capsule 11    TRUEPLUS LANCETS 28G MISC Test 1x/d, E11 29  0    gabapentin (NEURONTIN) 600 MG tablet Take 1 tablet (600 mg total) by mouth 2 (two) times a day (Patient not taking: Reported on 8/18/2021) 180 tablet 0     No current facility-administered medications for this visit         Objective:    BP 110/50   Pulse 72   Temp 97 8 °F (36 6 °C)   Resp 16   Ht 5' 11" (1 803 m)   Wt 72 6 kg (160 lb)   SpO2 97%   BMI 22 32 kg/m²        Physical Exam  Vitals and nursing note reviewed  Constitutional:       Appearance: He is well-developed  He is not ill-appearing  HENT:      Head: Normocephalic  Right Ear: Tympanic membrane normal       Left Ear: Tympanic membrane normal    Eyes:      General: No scleral icterus  Conjunctiva/sclera: Conjunctivae normal    Cardiovascular:      Rate and Rhythm: Normal rate  Heart sounds: Normal heart sounds  Pulmonary:      Effort: Pulmonary effort is normal  No respiratory distress  Breath sounds: No wheezing  Abdominal:      General: There is no distension  Palpations: Abdomen is soft  Tenderness: There is no abdominal tenderness  Musculoskeletal:         General: No deformity  Cervical back: Neck supple  Skin:     General: Skin is warm and dry  Coloration: Skin is not pale  Neurological:      Mental Status: He is alert  Gait: Gait abnormal    Psychiatric:         Mood and Affect: Mood normal          Behavior: Behavior normal          Thought Content:  Thought content normal                 Manny Jenkins DO

## 2021-09-28 NOTE — TELEPHONE ENCOUNTER
Patient called the office and he is aware of medication instructions and plan for blood work in 2 weeks  Statement Selected

## 2021-10-13 ENCOUNTER — APPOINTMENT (OUTPATIENT)
Dept: LAB | Facility: CLINIC | Age: 76
End: 2021-10-13
Payer: MEDICARE

## 2021-10-13 DIAGNOSIS — C61 PROSTATE CANCER (HCC): ICD-10-CM

## 2021-10-13 LAB — PSA SERPL-MCNC: <0.1 NG/ML (ref 0–4)

## 2021-10-13 PROCEDURE — 84153 ASSAY OF PSA TOTAL: CPT

## 2021-10-14 ENCOUNTER — OFFICE VISIT (OUTPATIENT)
Dept: PODIATRY | Facility: CLINIC | Age: 76
End: 2021-10-14
Payer: MEDICARE

## 2021-10-14 VITALS
RESPIRATION RATE: 17 BRPM | WEIGHT: 160 LBS | BODY MASS INDEX: 22.4 KG/M2 | HEIGHT: 71 IN | DIASTOLIC BLOOD PRESSURE: 86 MMHG | SYSTOLIC BLOOD PRESSURE: 132 MMHG

## 2021-10-14 DIAGNOSIS — I73.9 PAD (PERIPHERAL ARTERY DISEASE) (HCC): ICD-10-CM

## 2021-10-14 DIAGNOSIS — E11.42 DIABETIC POLYNEUROPATHY ASSOCIATED WITH TYPE 2 DIABETES MELLITUS (HCC): Primary | ICD-10-CM

## 2021-10-14 DIAGNOSIS — M79.671 PAIN IN BOTH FEET: ICD-10-CM

## 2021-10-14 DIAGNOSIS — L84 CORNS: ICD-10-CM

## 2021-10-14 DIAGNOSIS — M79.672 PAIN IN BOTH FEET: ICD-10-CM

## 2021-10-14 PROBLEM — B35.1 ONYCHOMYCOSIS: Status: RESOLVED | Noted: 2018-04-19 | Resolved: 2021-10-14

## 2021-10-14 PROCEDURE — 11056 PARNG/CUTG B9 HYPRKR LES 2-4: CPT | Performed by: PODIATRIST

## 2021-10-15 ENCOUNTER — TELEMEDICINE (OUTPATIENT)
Dept: RADIATION ONCOLOGY | Facility: HOSPITAL | Age: 76
End: 2021-10-15
Attending: RADIOLOGY

## 2021-10-15 DIAGNOSIS — C61 PROSTATE CANCER (HCC): Primary | ICD-10-CM

## 2021-10-15 PROCEDURE — 99024 POSTOP FOLLOW-UP VISIT: CPT | Performed by: RADIOLOGY

## 2021-10-27 ENCOUNTER — APPOINTMENT (OUTPATIENT)
Dept: LAB | Facility: CLINIC | Age: 76
End: 2021-10-27
Payer: MEDICARE

## 2021-10-27 ENCOUNTER — TELEPHONE (OUTPATIENT)
Dept: NEPHROLOGY | Facility: CLINIC | Age: 76
End: 2021-10-27

## 2021-10-27 DIAGNOSIS — E11.22 CKD STAGE 2 DUE TO TYPE 2 DIABETES MELLITUS (HCC): ICD-10-CM

## 2021-10-27 DIAGNOSIS — E87.6 HYPOKALEMIA: ICD-10-CM

## 2021-10-27 DIAGNOSIS — N18.2 CKD STAGE 2 DUE TO TYPE 2 DIABETES MELLITUS (HCC): ICD-10-CM

## 2021-10-27 LAB
ANION GAP SERPL CALCULATED.3IONS-SCNC: 5 MMOL/L (ref 4–13)
BUN SERPL-MCNC: 12 MG/DL (ref 5–25)
CALCIUM SERPL-MCNC: 8.5 MG/DL (ref 8.3–10.1)
CHLORIDE SERPL-SCNC: 103 MMOL/L (ref 100–108)
CO2 SERPL-SCNC: 35 MMOL/L (ref 21–32)
CREAT SERPL-MCNC: 0.96 MG/DL (ref 0.6–1.3)
GFR SERPL CREATININE-BSD FRML MDRD: 76 ML/MIN/1.73SQ M
GLUCOSE P FAST SERPL-MCNC: 140 MG/DL (ref 65–99)
POTASSIUM SERPL-SCNC: 2.3 MMOL/L (ref 3.5–5.3)
SODIUM SERPL-SCNC: 143 MMOL/L (ref 136–145)

## 2021-10-27 PROCEDURE — 36415 COLL VENOUS BLD VENIPUNCTURE: CPT

## 2021-10-27 PROCEDURE — 80048 BASIC METABOLIC PNL TOTAL CA: CPT

## 2021-10-28 ENCOUNTER — OFFICE VISIT (OUTPATIENT)
Dept: NEPHROLOGY | Facility: CLINIC | Age: 76
End: 2021-10-28
Payer: MEDICARE

## 2021-10-28 VITALS
BODY MASS INDEX: 22.79 KG/M2 | DIASTOLIC BLOOD PRESSURE: 62 MMHG | HEIGHT: 71 IN | SYSTOLIC BLOOD PRESSURE: 175 MMHG | HEART RATE: 78 BPM | WEIGHT: 162.8 LBS

## 2021-10-28 DIAGNOSIS — E22.2 SIADH (SYNDROME OF INAPPROPRIATE ADH PRODUCTION) (HCC): Primary | ICD-10-CM

## 2021-10-28 DIAGNOSIS — E11.65 TYPE 2 DIABETES MELLITUS WITH HYPERGLYCEMIA, WITHOUT LONG-TERM CURRENT USE OF INSULIN (HCC): ICD-10-CM

## 2021-10-28 DIAGNOSIS — E87.6 HYPOKALEMIA: ICD-10-CM

## 2021-10-28 DIAGNOSIS — I25.119 CORONARY ARTERY DISEASE INVOLVING NATIVE CORONARY ARTERY OF NATIVE HEART WITH ANGINA PECTORIS (HCC): ICD-10-CM

## 2021-10-28 DIAGNOSIS — I95.1 ORTHOSTATIC HYPOTENSION: ICD-10-CM

## 2021-10-28 DIAGNOSIS — I10 BENIGN ESSENTIAL HYPERTENSION: ICD-10-CM

## 2021-10-28 PROCEDURE — 99214 OFFICE O/P EST MOD 30 MIN: CPT | Performed by: NURSE PRACTITIONER

## 2021-10-28 RX ORDER — POTASSIUM CHLORIDE 20 MEQ/1
40 TABLET, EXTENDED RELEASE ORAL 2 TIMES DAILY
Qty: 30 TABLET | Refills: 4 | Status: SHIPPED | OUTPATIENT
Start: 2021-10-28 | End: 2021-11-02

## 2021-10-28 RX ORDER — FLUDROCORTISONE ACETATE 0.1 MG/1
0.1 TABLET ORAL DAILY
Qty: 270 TABLET | Refills: 1 | Status: SHIPPED | OUTPATIENT
Start: 2021-10-28 | End: 2021-11-02

## 2021-11-02 ENCOUNTER — APPOINTMENT (OUTPATIENT)
Dept: LAB | Facility: CLINIC | Age: 76
End: 2021-11-02
Payer: MEDICARE

## 2021-11-02 ENCOUNTER — TELEPHONE (OUTPATIENT)
Dept: NEPHROLOGY | Facility: CLINIC | Age: 76
End: 2021-11-02

## 2021-11-02 ENCOUNTER — TELEPHONE (OUTPATIENT)
Dept: OTHER | Facility: HOSPITAL | Age: 76
End: 2021-11-02

## 2021-11-02 DIAGNOSIS — N18.2 CKD STAGE 2 DUE TO TYPE 2 DIABETES MELLITUS (HCC): ICD-10-CM

## 2021-11-02 DIAGNOSIS — I95.1 ORTHOSTATIC HYPOTENSION: ICD-10-CM

## 2021-11-02 DIAGNOSIS — N18.31 ANEMIA DUE TO STAGE 3A CHRONIC KIDNEY DISEASE (HCC): ICD-10-CM

## 2021-11-02 DIAGNOSIS — E87.6 HYPOKALEMIA: ICD-10-CM

## 2021-11-02 DIAGNOSIS — D63.1 ANEMIA DUE TO STAGE 3A CHRONIC KIDNEY DISEASE (HCC): ICD-10-CM

## 2021-11-02 DIAGNOSIS — I10 BENIGN ESSENTIAL HYPERTENSION: ICD-10-CM

## 2021-11-02 DIAGNOSIS — E11.22 CKD STAGE 2 DUE TO TYPE 2 DIABETES MELLITUS (HCC): ICD-10-CM

## 2021-11-02 DIAGNOSIS — E78.5 DYSLIPIDEMIA: ICD-10-CM

## 2021-11-02 DIAGNOSIS — E22.2 SIADH (SYNDROME OF INAPPROPRIATE ADH PRODUCTION) (HCC): Primary | ICD-10-CM

## 2021-11-02 DIAGNOSIS — E11.65 TYPE 2 DIABETES MELLITUS WITH HYPERGLYCEMIA, WITHOUT LONG-TERM CURRENT USE OF INSULIN (HCC): ICD-10-CM

## 2021-11-02 DIAGNOSIS — R29.898 BILATERAL LEG WEAKNESS: ICD-10-CM

## 2021-11-02 DIAGNOSIS — E22.2 SIADH (SYNDROME OF INAPPROPRIATE ADH PRODUCTION) (HCC): ICD-10-CM

## 2021-11-02 LAB
25(OH)D3 SERPL-MCNC: 42 NG/ML (ref 30–100)
ANION GAP SERPL CALCULATED.3IONS-SCNC: 4 MMOL/L (ref 4–13)
BACTERIA UR QL AUTO: NORMAL /HPF
BILIRUB UR QL STRIP: NEGATIVE
BUN SERPL-MCNC: 10 MG/DL (ref 5–25)
CALCIUM SERPL-MCNC: 9.1 MG/DL (ref 8.3–10.1)
CHLORIDE SERPL-SCNC: 109 MMOL/L (ref 100–108)
CLARITY UR: CLEAR
CO2 SERPL-SCNC: 28 MMOL/L (ref 21–32)
COLOR UR: YELLOW
CREAT SERPL-MCNC: 0.9 MG/DL (ref 0.6–1.3)
ERYTHROCYTE [DISTWIDTH] IN BLOOD BY AUTOMATED COUNT: 14 % (ref 11.6–15.1)
GFR SERPL CREATININE-BSD FRML MDRD: 83 ML/MIN/1.73SQ M
GLUCOSE P FAST SERPL-MCNC: 141 MG/DL (ref 65–99)
GLUCOSE UR STRIP-MCNC: NEGATIVE MG/DL
HCT VFR BLD AUTO: 32.6 % (ref 36.5–49.3)
HGB BLD-MCNC: 10.4 G/DL (ref 12–17)
HGB UR QL STRIP.AUTO: NEGATIVE
HYALINE CASTS #/AREA URNS LPF: NORMAL /LPF
KETONES UR STRIP-MCNC: NEGATIVE MG/DL
LEUKOCYTE ESTERASE UR QL STRIP: NEGATIVE
MCH RBC QN AUTO: 31.2 PG (ref 26.8–34.3)
MCHC RBC AUTO-ENTMCNC: 31.9 G/DL (ref 31.4–37.4)
MCV RBC AUTO: 98 FL (ref 82–98)
NITRITE UR QL STRIP: NEGATIVE
NON-SQ EPI CELLS URNS QL MICRO: NORMAL /HPF
PH UR STRIP.AUTO: 8 [PH]
PLATELET # BLD AUTO: 245 THOUSANDS/UL (ref 149–390)
PMV BLD AUTO: 10.4 FL (ref 8.9–12.7)
POTASSIUM SERPL-SCNC: 4 MMOL/L (ref 3.5–5.3)
PROT UR STRIP-MCNC: NEGATIVE MG/DL
RBC # BLD AUTO: 3.33 MILLION/UL (ref 3.88–5.62)
RBC #/AREA URNS AUTO: NORMAL /HPF
SODIUM SERPL-SCNC: 141 MMOL/L (ref 136–145)
SP GR UR STRIP.AUTO: 1.01 (ref 1–1.03)
UROBILINOGEN UR QL STRIP.AUTO: 0.2 E.U./DL
WBC # BLD AUTO: 5.79 THOUSAND/UL (ref 4.31–10.16)
WBC #/AREA URNS AUTO: NORMAL /HPF

## 2021-11-02 PROCEDURE — 81001 URINALYSIS AUTO W/SCOPE: CPT

## 2021-11-02 PROCEDURE — 82306 VITAMIN D 25 HYDROXY: CPT

## 2021-11-02 PROCEDURE — 85027 COMPLETE CBC AUTOMATED: CPT

## 2021-11-02 PROCEDURE — 80048 BASIC METABOLIC PNL TOTAL CA: CPT

## 2021-11-02 PROCEDURE — 36415 COLL VENOUS BLD VENIPUNCTURE: CPT

## 2021-11-02 RX ORDER — FLUDROCORTISONE ACETATE 0.1 MG/1
0.1 TABLET ORAL EVERY OTHER DAY
Qty: 270 TABLET | Refills: 0 | Status: SHIPPED | OUTPATIENT
Start: 2021-11-02 | End: 2021-12-02

## 2021-11-02 RX ORDER — POTASSIUM CHLORIDE 20 MEQ/1
20 TABLET, EXTENDED RELEASE ORAL DAILY
Qty: 30 TABLET | Refills: 2 | Status: SHIPPED | OUTPATIENT
Start: 2021-11-02 | End: 2021-12-02

## 2021-11-09 ENCOUNTER — TELEPHONE (OUTPATIENT)
Dept: OTHER | Facility: HOSPITAL | Age: 76
End: 2021-11-09

## 2021-11-09 ENCOUNTER — APPOINTMENT (OUTPATIENT)
Dept: LAB | Facility: CLINIC | Age: 76
End: 2021-11-09
Payer: MEDICARE

## 2021-11-09 DIAGNOSIS — E22.2 SIADH (SYNDROME OF INAPPROPRIATE ADH PRODUCTION) (HCC): ICD-10-CM

## 2021-11-09 DIAGNOSIS — E87.6 HYPOKALEMIA: ICD-10-CM

## 2021-11-09 DIAGNOSIS — E11.22 CKD STAGE 2 DUE TO TYPE 2 DIABETES MELLITUS (HCC): ICD-10-CM

## 2021-11-09 DIAGNOSIS — N18.2 CKD STAGE 2 DUE TO TYPE 2 DIABETES MELLITUS (HCC): ICD-10-CM

## 2021-11-09 LAB
ANION GAP SERPL CALCULATED.3IONS-SCNC: 3 MMOL/L (ref 4–13)
BUN SERPL-MCNC: 12 MG/DL (ref 5–25)
CALCIUM SERPL-MCNC: 9.5 MG/DL (ref 8.3–10.1)
CHLORIDE SERPL-SCNC: 109 MMOL/L (ref 100–108)
CO2 SERPL-SCNC: 28 MMOL/L (ref 21–32)
CREAT SERPL-MCNC: 0.84 MG/DL (ref 0.6–1.3)
GFR SERPL CREATININE-BSD FRML MDRD: 85 ML/MIN/1.73SQ M
GLUCOSE SERPL-MCNC: 116 MG/DL (ref 65–140)
POTASSIUM SERPL-SCNC: 4.2 MMOL/L (ref 3.5–5.3)
SODIUM SERPL-SCNC: 140 MMOL/L (ref 136–145)

## 2021-11-09 PROCEDURE — 36415 COLL VENOUS BLD VENIPUNCTURE: CPT

## 2021-11-09 PROCEDURE — 80048 BASIC METABOLIC PNL TOTAL CA: CPT

## 2021-11-10 ENCOUNTER — TELEPHONE (OUTPATIENT)
Dept: ENDOCRINOLOGY | Facility: CLINIC | Age: 76
End: 2021-11-10

## 2021-11-12 ENCOUNTER — TELEPHONE (OUTPATIENT)
Dept: OTHER | Facility: HOSPITAL | Age: 76
End: 2021-11-12

## 2021-11-12 ENCOUNTER — IMMUNIZATIONS (OUTPATIENT)
Dept: FAMILY MEDICINE CLINIC | Facility: HOSPITAL | Age: 76
End: 2021-11-12

## 2021-11-12 DIAGNOSIS — Z23 ENCOUNTER FOR IMMUNIZATION: Primary | ICD-10-CM

## 2021-11-12 PROCEDURE — 0013A COVID-19 MODERNA VACC 0.25 ML BOOSTER: CPT

## 2021-11-12 PROCEDURE — 91306 COVID-19 MODERNA VACC 0.25 ML BOOSTER: CPT

## 2021-11-26 ENCOUNTER — APPOINTMENT (OUTPATIENT)
Dept: LAB | Facility: CLINIC | Age: 76
End: 2021-11-26
Payer: MEDICARE

## 2021-11-29 ENCOUNTER — TELEPHONE (OUTPATIENT)
Dept: ADMINISTRATIVE | Facility: HOSPITAL | Age: 76
End: 2021-11-29

## 2021-12-01 ENCOUNTER — TELEPHONE (OUTPATIENT)
Dept: ENDOCRINOLOGY | Facility: CLINIC | Age: 76
End: 2021-12-01

## 2021-12-02 ENCOUNTER — OFFICE VISIT (OUTPATIENT)
Dept: NEPHROLOGY | Facility: CLINIC | Age: 76
End: 2021-12-02
Payer: MEDICARE

## 2021-12-02 VITALS
WEIGHT: 162 LBS | SYSTOLIC BLOOD PRESSURE: 170 MMHG | BODY MASS INDEX: 22.68 KG/M2 | HEART RATE: 71 BPM | DIASTOLIC BLOOD PRESSURE: 62 MMHG | HEIGHT: 71 IN

## 2021-12-02 DIAGNOSIS — I95.1 ORTHOSTATIC HYPOTENSION: ICD-10-CM

## 2021-12-02 DIAGNOSIS — N18.2 CKD STAGE 2 DUE TO TYPE 2 DIABETES MELLITUS (HCC): Primary | ICD-10-CM

## 2021-12-02 DIAGNOSIS — E11.22 CKD STAGE 2 DUE TO TYPE 2 DIABETES MELLITUS (HCC): Primary | ICD-10-CM

## 2021-12-02 DIAGNOSIS — E22.2 SIADH (SYNDROME OF INAPPROPRIATE ADH PRODUCTION) (HCC): ICD-10-CM

## 2021-12-02 DIAGNOSIS — E87.6 HYPOKALEMIA: ICD-10-CM

## 2021-12-02 PROCEDURE — 99214 OFFICE O/P EST MOD 30 MIN: CPT | Performed by: INTERNAL MEDICINE

## 2021-12-02 RX ORDER — POTASSIUM CHLORIDE 750 MG/1
10 TABLET, EXTENDED RELEASE ORAL DAILY
Qty: 30 TABLET | Refills: 2
Start: 2021-12-02 | End: 2022-02-24 | Stop reason: HOSPADM

## 2021-12-02 RX ORDER — FERROUS SULFATE TAB EC 324 MG (65 MG FE EQUIVALENT) 324 (65 FE) MG
324 TABLET DELAYED RESPONSE ORAL
Qty: 30 TABLET | Refills: 6 | Status: SHIPPED | OUTPATIENT
Start: 2021-12-02 | End: 2022-02-17

## 2021-12-03 ENCOUNTER — TELEPHONE (OUTPATIENT)
Dept: ENDOCRINOLOGY | Facility: CLINIC | Age: 76
End: 2021-12-03

## 2021-12-10 ENCOUNTER — OFFICE VISIT (OUTPATIENT)
Dept: ENDOCRINOLOGY | Facility: CLINIC | Age: 76
End: 2021-12-10
Payer: MEDICARE

## 2021-12-10 VITALS
TEMPERATURE: 97 F | HEART RATE: 76 BPM | WEIGHT: 162 LBS | DIASTOLIC BLOOD PRESSURE: 60 MMHG | SYSTOLIC BLOOD PRESSURE: 140 MMHG | HEIGHT: 71 IN | BODY MASS INDEX: 22.68 KG/M2

## 2021-12-10 DIAGNOSIS — R63.4 WEIGHT LOSS: ICD-10-CM

## 2021-12-10 DIAGNOSIS — E11.65 TYPE 2 DIABETES MELLITUS WITH HYPERGLYCEMIA, WITHOUT LONG-TERM CURRENT USE OF INSULIN (HCC): ICD-10-CM

## 2021-12-10 DIAGNOSIS — I25.10 CORONARY ARTERY DISEASE INVOLVING NATIVE CORONARY ARTERY OF NATIVE HEART WITHOUT ANGINA PECTORIS: ICD-10-CM

## 2021-12-10 DIAGNOSIS — I10 BENIGN ESSENTIAL HYPERTENSION: ICD-10-CM

## 2021-12-10 DIAGNOSIS — E11.40 TYPE 2 DIABETES MELLITUS WITH DIABETIC NEUROPATHY, WITHOUT LONG-TERM CURRENT USE OF INSULIN (HCC): Primary | ICD-10-CM

## 2021-12-10 DIAGNOSIS — N18.2 CKD STAGE 2 DUE TO TYPE 2 DIABETES MELLITUS (HCC): ICD-10-CM

## 2021-12-10 DIAGNOSIS — E11.22 CKD STAGE 2 DUE TO TYPE 2 DIABETES MELLITUS (HCC): ICD-10-CM

## 2021-12-10 PROCEDURE — 99214 OFFICE O/P EST MOD 30 MIN: CPT | Performed by: INTERNAL MEDICINE

## 2021-12-10 RX ORDER — ACARBOSE 100 MG/1
100 TABLET ORAL
Qty: 270 TABLET | Refills: 3 | Status: SHIPPED | OUTPATIENT
Start: 2021-12-10

## 2021-12-16 NOTE — TELEPHONE ENCOUNTER
Patient of Dr Stephen Mckinney seen in [place] office  Patient needs to change his biopsy result appointment  Patient states he can do same day just later that day  Advised doctor not in office    Unable to find reasonable time frame please assist  General

## 2021-12-22 ENCOUNTER — OFFICE VISIT (OUTPATIENT)
Dept: DIABETES SERVICES | Facility: CLINIC | Age: 76
End: 2021-12-22
Payer: MEDICARE

## 2021-12-22 ENCOUNTER — OFFICE VISIT (OUTPATIENT)
Dept: PODIATRY | Facility: CLINIC | Age: 76
End: 2021-12-22
Payer: MEDICARE

## 2021-12-22 VITALS — WEIGHT: 164.8 LBS | HEIGHT: 71 IN | BODY MASS INDEX: 23.07 KG/M2

## 2021-12-22 VITALS — BODY MASS INDEX: 22.96 KG/M2 | WEIGHT: 164 LBS | HEIGHT: 71 IN | RESPIRATION RATE: 16 BRPM

## 2021-12-22 DIAGNOSIS — M79.671 PAIN IN BOTH FEET: ICD-10-CM

## 2021-12-22 DIAGNOSIS — L84 CORNS: ICD-10-CM

## 2021-12-22 DIAGNOSIS — I73.9 PAD (PERIPHERAL ARTERY DISEASE) (HCC): ICD-10-CM

## 2021-12-22 DIAGNOSIS — M79.672 PAIN IN BOTH FEET: ICD-10-CM

## 2021-12-22 DIAGNOSIS — E11.40 TYPE 2 DIABETES MELLITUS WITH DIABETIC NEUROPATHY, WITHOUT LONG-TERM CURRENT USE OF INSULIN (HCC): Primary | ICD-10-CM

## 2021-12-22 DIAGNOSIS — E11.42 DIABETIC POLYNEUROPATHY ASSOCIATED WITH TYPE 2 DIABETES MELLITUS (HCC): Primary | ICD-10-CM

## 2021-12-22 PROCEDURE — 97803 MED NUTRITION INDIV SUBSEQ: CPT | Performed by: DIETITIAN, REGISTERED

## 2021-12-22 PROCEDURE — 11056 PARNG/CUTG B9 HYPRKR LES 2-4: CPT | Performed by: PODIATRIST

## 2021-12-27 ENCOUNTER — APPOINTMENT (OUTPATIENT)
Dept: LAB | Facility: CLINIC | Age: 76
End: 2021-12-27
Payer: MEDICARE

## 2021-12-27 DIAGNOSIS — I10 BENIGN ESSENTIAL HYPERTENSION: ICD-10-CM

## 2021-12-27 DIAGNOSIS — E87.6 HYPOKALEMIA: ICD-10-CM

## 2021-12-27 DIAGNOSIS — E11.40 TYPE 2 DIABETES MELLITUS WITH DIABETIC NEUROPATHY, WITHOUT LONG-TERM CURRENT USE OF INSULIN (HCC): ICD-10-CM

## 2021-12-27 DIAGNOSIS — E22.2 SIADH (SYNDROME OF INAPPROPRIATE ADH PRODUCTION) (HCC): ICD-10-CM

## 2021-12-27 DIAGNOSIS — E11.22 CKD STAGE 2 DUE TO TYPE 2 DIABETES MELLITUS (HCC): ICD-10-CM

## 2021-12-27 DIAGNOSIS — E11.65 TYPE 2 DIABETES MELLITUS WITH HYPERGLYCEMIA, WITHOUT LONG-TERM CURRENT USE OF INSULIN (HCC): ICD-10-CM

## 2021-12-27 DIAGNOSIS — E11.22 CKD STAGE 3 DUE TO TYPE 2 DIABETES MELLITUS (HCC): ICD-10-CM

## 2021-12-27 DIAGNOSIS — N18.2 CKD STAGE 2 DUE TO TYPE 2 DIABETES MELLITUS (HCC): ICD-10-CM

## 2021-12-27 DIAGNOSIS — N18.30 CKD STAGE 3 DUE TO TYPE 2 DIABETES MELLITUS (HCC): ICD-10-CM

## 2021-12-27 DIAGNOSIS — I95.1 ORTHOSTATIC HYPOTENSION: ICD-10-CM

## 2021-12-27 LAB
25(OH)D3 SERPL-MCNC: 39.3 NG/ML (ref 30–100)
ANION GAP SERPL CALCULATED.3IONS-SCNC: 4 MMOL/L (ref 4–13)
BASOPHILS # BLD AUTO: 0.03 THOUSANDS/ΜL (ref 0–0.1)
BASOPHILS NFR BLD AUTO: 1 % (ref 0–1)
BUN SERPL-MCNC: 15 MG/DL (ref 5–25)
CALCIUM SERPL-MCNC: 8.9 MG/DL (ref 8.3–10.1)
CHLORIDE SERPL-SCNC: 106 MMOL/L (ref 100–108)
CHOLEST SERPL-MCNC: 132 MG/DL
CO2 SERPL-SCNC: 28 MMOL/L (ref 21–32)
CORTIS AM PEAK SERPL-MCNC: 19 UG/DL (ref 4.2–22.4)
CREAT SERPL-MCNC: 0.93 MG/DL (ref 0.6–1.3)
CREAT UR-MCNC: 22.4 MG/DL
EOSINOPHIL # BLD AUTO: 0.16 THOUSAND/ΜL (ref 0–0.61)
EOSINOPHIL NFR BLD AUTO: 3 % (ref 0–6)
ERYTHROCYTE [DISTWIDTH] IN BLOOD BY AUTOMATED COUNT: 15 % (ref 11.6–15.1)
EST. AVERAGE GLUCOSE BLD GHB EST-MCNC: 157 MG/DL
GFR SERPL CREATININE-BSD FRML MDRD: 78 ML/MIN/1.73SQ M
GLUCOSE P FAST SERPL-MCNC: 180 MG/DL (ref 65–99)
HBA1C MFR BLD: 7.1 %
HCT VFR BLD AUTO: 33.3 % (ref 36.5–49.3)
HDLC SERPL-MCNC: 62 MG/DL
HGB BLD-MCNC: 10.5 G/DL (ref 12–17)
IMM GRANULOCYTES # BLD AUTO: 0.02 THOUSAND/UL (ref 0–0.2)
IMM GRANULOCYTES NFR BLD AUTO: 0 % (ref 0–2)
LDLC SERPL CALC-MCNC: 62 MG/DL (ref 0–100)
LYMPHOCYTES # BLD AUTO: 0.44 THOUSANDS/ΜL (ref 0.6–4.47)
LYMPHOCYTES NFR BLD AUTO: 8 % (ref 14–44)
MAGNESIUM SERPL-MCNC: 2 MG/DL (ref 1.6–2.6)
MCH RBC QN AUTO: 29 PG (ref 26.8–34.3)
MCHC RBC AUTO-ENTMCNC: 31.5 G/DL (ref 31.4–37.4)
MCV RBC AUTO: 92 FL (ref 82–98)
MONOCYTES # BLD AUTO: 0.63 THOUSAND/ΜL (ref 0.17–1.22)
MONOCYTES NFR BLD AUTO: 11 % (ref 4–12)
NEUTROPHILS # BLD AUTO: 4.39 THOUSANDS/ΜL (ref 1.85–7.62)
NEUTS SEG NFR BLD AUTO: 77 % (ref 43–75)
NONHDLC SERPL-MCNC: 70 MG/DL
NRBC BLD AUTO-RTO: 0 /100 WBCS
OSMOLALITY UR: 323 MMOL/KG
PHOSPHATE SERPL-MCNC: 3.2 MG/DL (ref 2.3–4.1)
PLATELET # BLD AUTO: 221 THOUSANDS/UL (ref 149–390)
PMV BLD AUTO: 9.8 FL (ref 8.9–12.7)
POTASSIUM SERPL-SCNC: 4.1 MMOL/L (ref 3.5–5.3)
PROT UR-MCNC: 9 MG/DL
PROT/CREAT UR: 0.4 MG/G{CREAT} (ref 0–0.1)
PTH-INTACT SERPL-MCNC: 51.5 PG/ML (ref 18.4–80.1)
RBC # BLD AUTO: 3.62 MILLION/UL (ref 3.88–5.62)
SODIUM 24H UR-SCNC: 84 MOL/L
SODIUM SERPL-SCNC: 138 MMOL/L (ref 136–145)
T4 FREE SERPL-MCNC: 1.08 NG/DL (ref 0.76–1.46)
TRIGL SERPL-MCNC: 38 MG/DL
TSH SERPL DL<=0.05 MIU/L-ACNC: 1.64 UIU/ML (ref 0.36–3.74)
WBC # BLD AUTO: 5.67 THOUSAND/UL (ref 4.31–10.16)

## 2021-12-27 PROCEDURE — 82570 ASSAY OF URINE CREATININE: CPT

## 2021-12-27 PROCEDURE — 83036 HEMOGLOBIN GLYCOSYLATED A1C: CPT

## 2021-12-27 PROCEDURE — 84439 ASSAY OF FREE THYROXINE: CPT

## 2021-12-27 PROCEDURE — 84156 ASSAY OF PROTEIN URINE: CPT

## 2021-12-27 PROCEDURE — 85025 COMPLETE CBC W/AUTO DIFF WBC: CPT

## 2021-12-27 PROCEDURE — 82533 TOTAL CORTISOL: CPT

## 2021-12-27 PROCEDURE — 84300 ASSAY OF URINE SODIUM: CPT

## 2021-12-27 PROCEDURE — 82306 VITAMIN D 25 HYDROXY: CPT

## 2021-12-27 PROCEDURE — 80048 BASIC METABOLIC PNL TOTAL CA: CPT

## 2021-12-27 PROCEDURE — 83735 ASSAY OF MAGNESIUM: CPT

## 2021-12-27 PROCEDURE — 83935 ASSAY OF URINE OSMOLALITY: CPT

## 2021-12-27 PROCEDURE — 82985 ASSAY OF GLYCATED PROTEIN: CPT

## 2021-12-27 PROCEDURE — 80061 LIPID PANEL: CPT

## 2021-12-27 PROCEDURE — 36415 COLL VENOUS BLD VENIPUNCTURE: CPT

## 2021-12-27 PROCEDURE — 83970 ASSAY OF PARATHORMONE: CPT

## 2021-12-27 PROCEDURE — 84443 ASSAY THYROID STIM HORMONE: CPT

## 2021-12-27 PROCEDURE — 84100 ASSAY OF PHOSPHORUS: CPT

## 2021-12-28 ENCOUNTER — OFFICE VISIT (OUTPATIENT)
Dept: FAMILY MEDICINE CLINIC | Facility: CLINIC | Age: 76
End: 2021-12-28
Payer: MEDICARE

## 2021-12-28 ENCOUNTER — TELEPHONE (OUTPATIENT)
Dept: OTHER | Facility: HOSPITAL | Age: 76
End: 2021-12-28

## 2021-12-28 VITALS
DIASTOLIC BLOOD PRESSURE: 70 MMHG | BODY MASS INDEX: 23.38 KG/M2 | HEART RATE: 82 BPM | TEMPERATURE: 96.7 F | WEIGHT: 167 LBS | SYSTOLIC BLOOD PRESSURE: 132 MMHG | HEIGHT: 71 IN | RESPIRATION RATE: 16 BRPM

## 2021-12-28 DIAGNOSIS — I70.219 ATHEROSCLEROSIS OF ARTERY OF EXTREMITY WITH INTERMITTENT CLAUDICATION (HCC): ICD-10-CM

## 2021-12-28 DIAGNOSIS — I25.10 CORONARY ARTERY DISEASE INVOLVING NATIVE CORONARY ARTERY OF NATIVE HEART WITHOUT ANGINA PECTORIS: ICD-10-CM

## 2021-12-28 DIAGNOSIS — I10 BENIGN ESSENTIAL HYPERTENSION: Primary | ICD-10-CM

## 2021-12-28 DIAGNOSIS — E11.65 TYPE 2 DIABETES MELLITUS WITH HYPERGLYCEMIA, WITHOUT LONG-TERM CURRENT USE OF INSULIN (HCC): ICD-10-CM

## 2021-12-28 DIAGNOSIS — E11.40 TYPE 2 DIABETES MELLITUS WITH DIABETIC NEUROPATHY, WITHOUT LONG-TERM CURRENT USE OF INSULIN (HCC): ICD-10-CM

## 2021-12-28 DIAGNOSIS — D63.1 ANEMIA DUE TO STAGE 3A CHRONIC KIDNEY DISEASE (HCC): ICD-10-CM

## 2021-12-28 DIAGNOSIS — N18.31 ANEMIA DUE TO STAGE 3A CHRONIC KIDNEY DISEASE (HCC): ICD-10-CM

## 2021-12-28 LAB — FRUCTOSAMINE SERPL-SCNC: 312 UMOL/L (ref 0–285)

## 2021-12-28 PROCEDURE — 99214 OFFICE O/P EST MOD 30 MIN: CPT | Performed by: FAMILY MEDICINE

## 2022-01-06 ENCOUNTER — APPOINTMENT (OUTPATIENT)
Dept: LAB | Facility: CLINIC | Age: 77
End: 2022-01-06
Payer: MEDICARE

## 2022-01-07 ENCOUNTER — TELEPHONE (OUTPATIENT)
Dept: ENDOCRINOLOGY | Facility: CLINIC | Age: 77
End: 2022-01-07

## 2022-01-07 NOTE — TELEPHONE ENCOUNTER
----- Message from Danuta Barragna MD sent at 1/5/2022 11:30 PM EST -----   A1c has improved to 7 1%, fructosamine 312 which corresponds to an A1c between 7-8%   Thyroid function tests within normal limits   Kidney function stable

## 2022-01-11 ENCOUNTER — OFFICE VISIT (OUTPATIENT)
Dept: UROLOGY | Facility: CLINIC | Age: 77
End: 2022-01-11
Payer: MEDICARE

## 2022-01-11 VITALS — WEIGHT: 168 LBS | HEIGHT: 71 IN | BODY MASS INDEX: 23.52 KG/M2

## 2022-01-11 DIAGNOSIS — C61 PROSTATE CANCER (HCC): Primary | ICD-10-CM

## 2022-01-11 PROCEDURE — 96402 CHEMO HORMON ANTINEOPL SQ/IM: CPT

## 2022-01-11 PROCEDURE — 99213 OFFICE O/P EST LOW 20 MIN: CPT | Performed by: PHYSICIAN ASSISTANT

## 2022-01-11 NOTE — PROGRESS NOTES
UROLOGY PROGRESS NOTE   Patient Identifiers: Dary Marti (MRN [id number])  Date of Service: 1/11/2022    Subjective:     69-year-old man with history of [place] 10 at least stage III prostate cancer  He started on androgen deprivation therapy and is undergoing radiation treatments  His PSA is< 0 1  he is fairly comfortable with no significant weight loss  He denies any hot flashes from his ADT  Completed radiation in September  Reason for visit:  Prostate cancer follow-up     Objective:     VITALS:    There were no vitals filed for this visit          LABS:  Lab Results   Component Value Date    HGB 10 5 (L) 12/27/2021    HCT 33 3 (L) 12/27/2021    WBC 5 67 12/27/2021     12/27/2021   ]    Lab Results   Component Value Date     04/17/2014    K 4 1 12/27/2021     12/27/2021    CO2 28 12/27/2021    BUN 15 12/27/2021    CREATININE 0 93 12/27/2021    CALCIUM 8 9 12/27/2021    GLUCOSE 137 04/17/2014   ]        INPATIENT MEDS:    Current Outpatient Medications:     acarbose (PRECOSE) 100 MG tablet, Take 1 tablet (100 mg total) by mouth 3 (three) times a day with meals, Disp: 270 tablet, Rfl: 3    Ascorbic Acid (Vitamin C) 500 MG CAPS, Take 500 mg by mouth daily, Disp: , Rfl:     aspirin (ECOTRIN LOW STRENGTH) 81 mg EC tablet, Take 81 mg by mouth daily, Disp: , Rfl:     atorvastatin (LIPITOR) 20 mg tablet, Take 20 mg by mouth daily, Disp: , Rfl:     brimonidine tartrate 0 2 % ophthalmic solution, Inject 0 2 % into the eye 2 (two) times a day, Disp: , Rfl:     carvedilol (COREG) 12 5 mg tablet, Take 12 5 mg by mouth 2 (two) times a day, Disp: , Rfl:     Cholecalciferol (Vitamin D3) 50 MCG (2000 UT) TABS, Take 2,000 Units by mouth daily, Disp: , Rfl:     co-enzyme Q-10 100 mg capsule, Take 100 mg by mouth daily, Disp: , Rfl:     glucose blood test strip, Test blood sugar twice a day, Disp: 100 each, Rfl: 3    metFORMIN (GLUCOPHAGE) 1000 MG tablet, Take one tablet orally 3 times a day (Patient taking differently: TAKE 1000 MG  IN THE AM, TAKE 500 MG  IN THE PM), Disp: 270 tablet, Rfl: 1    multivitamin (THERAGRAN) TABS, Take 1 tablet by mouth daily, Disp: , Rfl:     omeprazole (PriLOSEC) 40 MG capsule, Take 40 mg by mouth daily, Disp: , Rfl:     Probiotic Product (CULTURELLE PROBIOTICS) CHEW, Chew daily, Disp: , Rfl:     tamsulosin (FLOMAX) 0 4 mg, Take 1 capsule (0 4 mg total) by mouth daily with dinner, Disp: 30 capsule, Rfl: 11    TRUEPLUS LANCETS 28G MISC, Test 1x/d, E11 29, Disp: , Rfl: 0    ferrous sulfate 324 (65 Fe) mg, Take 1 tablet (324 mg total) by mouth daily before breakfast, Disp: 30 tablet, Rfl: 6    gabapentin (NEURONTIN) 600 MG tablet, Take 1 tablet (600 mg total) by mouth 2 (two) times a day, Disp: 180 tablet, Rfl: 0    potassium chloride (K-DUR,KLOR-CON) 10 mEq tablet, Take 1 tablet (10 mEq total) by mouth daily for 6 doses, Disp: 30 tablet, Rfl: 2      Physical Exam:   Ht 5' 11" (1 803 m)   BMI 23 29 kg/m²   GEN: no acute distress    RESP: breathing comfortably with no accessory muscle use    ABD: soft, non-tender, non-distended   INCISION:    EXT: no significant peripheral edema         RADIOLOGY:    none     Assessment:     1   [place] 10 prostate cancer status post RT with ongoing ADT     Plan:   - treatment time will be 2-3 years minimum  - follow-up in 6 months with PSA prior to visit for his next injection  - calcium plus D twice a day  -

## 2022-01-19 ENCOUNTER — TELEPHONE (OUTPATIENT)
Dept: NEPHROLOGY | Facility: CLINIC | Age: 77
End: 2022-01-19

## 2022-01-31 DIAGNOSIS — I10 BENIGN ESSENTIAL HYPERTENSION: Primary | ICD-10-CM

## 2022-01-31 RX ORDER — CARVEDILOL 12.5 MG/1
12.5 TABLET ORAL 2 TIMES DAILY
Qty: 180 TABLET | Refills: 1 | Status: SHIPPED | OUTPATIENT
Start: 2022-01-31 | End: 2022-05-11 | Stop reason: SDUPTHER

## 2022-01-31 NOTE — TELEPHONE ENCOUNTER
Requested medication(s) are due for refill today: Yes  Patient has already received a courtesy refill: No  Other reason request has been forwarded to provider: Protocol passed but last filled by historical provider

## 2022-02-08 ENCOUNTER — OFFICE VISIT (OUTPATIENT)
Dept: CARDIOLOGY CLINIC | Facility: CLINIC | Age: 77
End: 2022-02-08
Payer: MEDICARE

## 2022-02-08 VITALS
TEMPERATURE: 97.8 F | HEART RATE: 98 BPM | BODY MASS INDEX: 25.48 KG/M2 | WEIGHT: 182 LBS | OXYGEN SATURATION: 98 % | SYSTOLIC BLOOD PRESSURE: 150 MMHG | DIASTOLIC BLOOD PRESSURE: 80 MMHG | HEIGHT: 71 IN

## 2022-02-08 DIAGNOSIS — E78.5 DYSLIPIDEMIA: ICD-10-CM

## 2022-02-08 DIAGNOSIS — I70.219 ATHEROSCLEROSIS OF ARTERY OF EXTREMITY WITH INTERMITTENT CLAUDICATION (HCC): ICD-10-CM

## 2022-02-08 DIAGNOSIS — N18.30 CKD STAGE 3 DUE TO TYPE 2 DIABETES MELLITUS (HCC): ICD-10-CM

## 2022-02-08 DIAGNOSIS — E11.22 CKD STAGE 3 DUE TO TYPE 2 DIABETES MELLITUS (HCC): ICD-10-CM

## 2022-02-08 DIAGNOSIS — E11.40 TYPE 2 DIABETES MELLITUS WITH DIABETIC NEUROPATHY, WITHOUT LONG-TERM CURRENT USE OF INSULIN (HCC): ICD-10-CM

## 2022-02-08 DIAGNOSIS — I25.119 CORONARY ARTERY DISEASE INVOLVING NATIVE CORONARY ARTERY OF NATIVE HEART WITH ANGINA PECTORIS (HCC): ICD-10-CM

## 2022-02-08 DIAGNOSIS — I10 BENIGN ESSENTIAL HYPERTENSION: ICD-10-CM

## 2022-02-08 PROCEDURE — 99214 OFFICE O/P EST MOD 30 MIN: CPT | Performed by: INTERNAL MEDICINE

## 2022-02-08 PROCEDURE — 93000 ELECTROCARDIOGRAM COMPLETE: CPT | Performed by: INTERNAL MEDICINE

## 2022-02-11 ENCOUNTER — CLINICAL SUPPORT (OUTPATIENT)
Dept: CARDIOLOGY CLINIC | Facility: CLINIC | Age: 77
End: 2022-02-11
Payer: MEDICARE

## 2022-02-11 VITALS
BODY MASS INDEX: 25.06 KG/M2 | OXYGEN SATURATION: 98 % | HEIGHT: 71 IN | DIASTOLIC BLOOD PRESSURE: 70 MMHG | TEMPERATURE: 97.2 F | WEIGHT: 179 LBS | SYSTOLIC BLOOD PRESSURE: 164 MMHG | HEART RATE: 86 BPM

## 2022-02-11 DIAGNOSIS — I10 HYPERTENSION, UNSPECIFIED TYPE: Primary | ICD-10-CM

## 2022-02-11 PROCEDURE — 99212 OFFICE O/P EST SF 10 MIN: CPT

## 2022-02-11 NOTE — PROGRESS NOTES
Patient presents today because he was off of carvedilol; restarted taking on Tuesday  Came for BP check  Verbal confirmation that he could go home and this be addressed by Dr Lennox Mckeon

## 2022-02-15 ENCOUNTER — TELEPHONE (OUTPATIENT)
Dept: NEPHROLOGY | Facility: CLINIC | Age: 77
End: 2022-02-15

## 2022-02-17 DIAGNOSIS — I25.10 CORONARY ARTERY DISEASE INVOLVING NATIVE CORONARY ARTERY OF NATIVE HEART WITHOUT ANGINA PECTORIS: Primary | ICD-10-CM

## 2022-02-17 RX ORDER — ATORVASTATIN CALCIUM 20 MG/1
20 TABLET, FILM COATED ORAL DAILY
Qty: 90 TABLET | Refills: 1 | Status: SHIPPED | OUTPATIENT
Start: 2022-02-17 | End: 2022-07-14

## 2022-02-18 ENCOUNTER — APPOINTMENT (EMERGENCY)
Dept: RADIOLOGY | Facility: HOSPITAL | Age: 77
DRG: 291 | End: 2022-02-18
Payer: MEDICARE

## 2022-02-18 ENCOUNTER — HOSPITAL ENCOUNTER (INPATIENT)
Facility: HOSPITAL | Age: 77
LOS: 6 days | Discharge: HOME HEALTH | DRG: 291 | End: 2022-02-24
Attending: EMERGENCY MEDICINE | Admitting: STUDENT IN AN ORGANIZED HEALTH CARE EDUCATION/TRAINING PROGRAM
Payer: MEDICARE

## 2022-02-18 DIAGNOSIS — I50.9 ACUTE ON CHRONIC CONGESTIVE HEART FAILURE, UNSPECIFIED HEART FAILURE TYPE (HCC): Primary | ICD-10-CM

## 2022-02-18 DIAGNOSIS — I95.1 ORTHOSTATIC HYPOTENSION: ICD-10-CM

## 2022-02-18 DIAGNOSIS — E11.40 TYPE 2 DIABETES MELLITUS WITH DIABETIC NEUROPATHY, WITHOUT LONG-TERM CURRENT USE OF INSULIN (HCC): ICD-10-CM

## 2022-02-18 DIAGNOSIS — G89.29 CHRONIC RIGHT-SIDED LOW BACK PAIN WITHOUT SCIATICA: ICD-10-CM

## 2022-02-18 DIAGNOSIS — M54.50 CHRONIC RIGHT-SIDED LOW BACK PAIN WITHOUT SCIATICA: ICD-10-CM

## 2022-02-18 PROBLEM — I50.33 ACUTE ON CHRONIC DIASTOLIC CONGESTIVE HEART FAILURE (HCC): Status: ACTIVE | Noted: 2022-02-18

## 2022-02-18 LAB
2HR DELTA HS TROPONIN: 0 NG/L
4HR DELTA HS TROPONIN: 1 NG/L
ALBUMIN SERPL BCP-MCNC: 3.1 G/DL (ref 3.5–5)
ALP SERPL-CCNC: 89 U/L (ref 46–116)
ALT SERPL W P-5'-P-CCNC: 19 U/L (ref 12–78)
ANION GAP SERPL CALCULATED.3IONS-SCNC: 7 MMOL/L (ref 4–13)
APTT PPP: 27 SECONDS (ref 23–37)
AST SERPL W P-5'-P-CCNC: 10 U/L (ref 5–45)
ATRIAL RATE: 78 BPM
BASOPHILS # BLD AUTO: 0.04 THOUSANDS/ΜL (ref 0–0.1)
BASOPHILS NFR BLD AUTO: 1 % (ref 0–1)
BILIRUB SERPL-MCNC: 0.82 MG/DL (ref 0.2–1)
BUN SERPL-MCNC: 19 MG/DL (ref 5–25)
CALCIUM ALBUM COR SERPL-MCNC: 9.2 MG/DL (ref 8.3–10.1)
CALCIUM SERPL-MCNC: 8.5 MG/DL (ref 8.3–10.1)
CARDIAC TROPONIN I PNL SERPL HS: 4 NG/L
CARDIAC TROPONIN I PNL SERPL HS: 4 NG/L
CARDIAC TROPONIN I PNL SERPL HS: 5 NG/L
CHLORIDE SERPL-SCNC: 99 MMOL/L (ref 100–108)
CO2 SERPL-SCNC: 26 MMOL/L (ref 21–32)
CREAT SERPL-MCNC: 0.92 MG/DL (ref 0.6–1.3)
EOSINOPHIL # BLD AUTO: 0.16 THOUSAND/ΜL (ref 0–0.61)
EOSINOPHIL NFR BLD AUTO: 2 % (ref 0–6)
ERYTHROCYTE [DISTWIDTH] IN BLOOD BY AUTOMATED COUNT: 16.5 % (ref 11.6–15.1)
FLUAV RNA RESP QL NAA+PROBE: NEGATIVE
FLUBV RNA RESP QL NAA+PROBE: NEGATIVE
GFR SERPL CREATININE-BSD FRML MDRD: 79 ML/MIN/1.73SQ M
GLUCOSE SERPL-MCNC: 216 MG/DL (ref 65–140)
GLUCOSE SERPL-MCNC: 218 MG/DL (ref 65–140)
GLUCOSE SERPL-MCNC: 231 MG/DL (ref 65–140)
GLUCOSE SERPL-MCNC: 247 MG/DL (ref 65–140)
HCT VFR BLD AUTO: 30.2 % (ref 36.5–49.3)
HGB BLD-MCNC: 9.3 G/DL (ref 12–17)
IMM GRANULOCYTES # BLD AUTO: 0.07 THOUSAND/UL (ref 0–0.2)
IMM GRANULOCYTES NFR BLD AUTO: 1 % (ref 0–2)
INR PPP: 1.06 (ref 0.84–1.19)
LYMPHOCYTES # BLD AUTO: 0.24 THOUSANDS/ΜL (ref 0.6–4.47)
LYMPHOCYTES NFR BLD AUTO: 3 % (ref 14–44)
MAGNESIUM SERPL-MCNC: 1.6 MG/DL (ref 1.6–2.6)
MCH RBC QN AUTO: 26.8 PG (ref 26.8–34.3)
MCHC RBC AUTO-ENTMCNC: 30.8 G/DL (ref 31.4–37.4)
MCV RBC AUTO: 87 FL (ref 82–98)
MONOCYTES # BLD AUTO: 0.8 THOUSAND/ΜL (ref 0.17–1.22)
MONOCYTES NFR BLD AUTO: 10 % (ref 4–12)
NEUTROPHILS # BLD AUTO: 7.09 THOUSANDS/ΜL (ref 1.85–7.62)
NEUTS SEG NFR BLD AUTO: 83 % (ref 43–75)
NRBC BLD AUTO-RTO: 0 /100 WBCS
NT-PROBNP SERPL-MCNC: 3439 PG/ML
P AXIS: 47 DEGREES
PHOSPHATE SERPL-MCNC: 3.5 MG/DL (ref 2.3–4.1)
PLATELET # BLD AUTO: 266 THOUSANDS/UL (ref 149–390)
PMV BLD AUTO: 9.8 FL (ref 8.9–12.7)
POTASSIUM SERPL-SCNC: 3.9 MMOL/L (ref 3.5–5.3)
PR INTERVAL: 218 MS
PROT SERPL-MCNC: 6.9 G/DL (ref 6.4–8.2)
PROTHROMBIN TIME: 13.6 SECONDS (ref 11.6–14.5)
QRS AXIS: 23 DEGREES
QRSD INTERVAL: 92 MS
QT INTERVAL: 392 MS
QTC INTERVAL: 446 MS
RBC # BLD AUTO: 3.47 MILLION/UL (ref 3.88–5.62)
RSV RNA RESP QL NAA+PROBE: NEGATIVE
SARS-COV-2 RNA RESP QL NAA+PROBE: NEGATIVE
SODIUM SERPL-SCNC: 132 MMOL/L (ref 136–145)
T WAVE AXIS: 26 DEGREES
TSH SERPL DL<=0.05 MIU/L-ACNC: 2.82 UIU/ML (ref 0.36–3.74)
VENTRICULAR RATE: 78 BPM
WBC # BLD AUTO: 8.4 THOUSAND/UL (ref 4.31–10.16)

## 2022-02-18 PROCEDURE — 82948 REAGENT STRIP/BLOOD GLUCOSE: CPT

## 2022-02-18 PROCEDURE — 85730 THROMBOPLASTIN TIME PARTIAL: CPT | Performed by: PHYSICIAN ASSISTANT

## 2022-02-18 PROCEDURE — 71045 X-RAY EXAM CHEST 1 VIEW: CPT

## 2022-02-18 PROCEDURE — 83880 ASSAY OF NATRIURETIC PEPTIDE: CPT | Performed by: PHYSICIAN ASSISTANT

## 2022-02-18 PROCEDURE — 93010 ELECTROCARDIOGRAM REPORT: CPT | Performed by: INTERNAL MEDICINE

## 2022-02-18 PROCEDURE — 93005 ELECTROCARDIOGRAM TRACING: CPT

## 2022-02-18 PROCEDURE — 99222 1ST HOSP IP/OBS MODERATE 55: CPT | Performed by: NURSE PRACTITIONER

## 2022-02-18 PROCEDURE — 99285 EMERGENCY DEPT VISIT HI MDM: CPT | Performed by: PHYSICIAN ASSISTANT

## 2022-02-18 PROCEDURE — 84100 ASSAY OF PHOSPHORUS: CPT | Performed by: PHYSICIAN ASSISTANT

## 2022-02-18 PROCEDURE — 84484 ASSAY OF TROPONIN QUANT: CPT | Performed by: PHYSICIAN ASSISTANT

## 2022-02-18 PROCEDURE — 80053 COMPREHEN METABOLIC PANEL: CPT | Performed by: PHYSICIAN ASSISTANT

## 2022-02-18 PROCEDURE — 0241U HB NFCT DS VIR RESP RNA 4 TRGT: CPT | Performed by: PHYSICIAN ASSISTANT

## 2022-02-18 PROCEDURE — 36415 COLL VENOUS BLD VENIPUNCTURE: CPT | Performed by: PHYSICIAN ASSISTANT

## 2022-02-18 PROCEDURE — 84443 ASSAY THYROID STIM HORMONE: CPT | Performed by: PHYSICIAN ASSISTANT

## 2022-02-18 PROCEDURE — 85610 PROTHROMBIN TIME: CPT | Performed by: PHYSICIAN ASSISTANT

## 2022-02-18 PROCEDURE — 83735 ASSAY OF MAGNESIUM: CPT | Performed by: PHYSICIAN ASSISTANT

## 2022-02-18 PROCEDURE — 96374 THER/PROPH/DIAG INJ IV PUSH: CPT

## 2022-02-18 PROCEDURE — 99285 EMERGENCY DEPT VISIT HI MDM: CPT

## 2022-02-18 PROCEDURE — 99223 1ST HOSP IP/OBS HIGH 75: CPT | Performed by: STUDENT IN AN ORGANIZED HEALTH CARE EDUCATION/TRAINING PROGRAM

## 2022-02-18 PROCEDURE — 85025 COMPLETE CBC W/AUTO DIFF WBC: CPT | Performed by: PHYSICIAN ASSISTANT

## 2022-02-18 RX ORDER — ASCORBIC ACID 500 MG
500 TABLET ORAL DAILY
Status: DISCONTINUED | OUTPATIENT
Start: 2022-02-19 | End: 2022-02-24 | Stop reason: HOSPADM

## 2022-02-18 RX ORDER — MELATONIN
2000 DAILY
Status: DISCONTINUED | OUTPATIENT
Start: 2022-02-19 | End: 2022-02-24 | Stop reason: HOSPADM

## 2022-02-18 RX ORDER — ASPIRIN 81 MG/1
81 TABLET ORAL DAILY
Status: DISCONTINUED | OUTPATIENT
Start: 2022-02-19 | End: 2022-02-24 | Stop reason: HOSPADM

## 2022-02-18 RX ORDER — ATORVASTATIN CALCIUM 20 MG/1
20 TABLET, FILM COATED ORAL
Status: DISCONTINUED | OUTPATIENT
Start: 2022-02-18 | End: 2022-02-24 | Stop reason: HOSPADM

## 2022-02-18 RX ORDER — TAMSULOSIN HYDROCHLORIDE 0.4 MG/1
0.4 CAPSULE ORAL
Status: DISCONTINUED | OUTPATIENT
Start: 2022-02-18 | End: 2022-02-24 | Stop reason: HOSPADM

## 2022-02-18 RX ORDER — ACARBOSE 50 MG/1
100 TABLET ORAL
Status: DISCONTINUED | OUTPATIENT
Start: 2022-02-19 | End: 2022-02-19

## 2022-02-18 RX ORDER — POTASSIUM CHLORIDE 20 MEQ/1
20 TABLET, EXTENDED RELEASE ORAL ONCE
Status: COMPLETED | OUTPATIENT
Start: 2022-02-18 | End: 2022-02-18

## 2022-02-18 RX ORDER — CHOLECALCIFEROL (VITAMIN D3) 125 MCG
100 CAPSULE ORAL DAILY
Status: DISCONTINUED | OUTPATIENT
Start: 2022-02-19 | End: 2022-02-24 | Stop reason: HOSPADM

## 2022-02-18 RX ORDER — FUROSEMIDE 10 MG/ML
40 INJECTION INTRAMUSCULAR; INTRAVENOUS DAILY
Status: DISCONTINUED | OUTPATIENT
Start: 2022-02-19 | End: 2022-02-21

## 2022-02-18 RX ORDER — BRIMONIDINE TARTRATE 2 MG/ML
1 SOLUTION/ DROPS OPHTHALMIC 2 TIMES DAILY
Status: DISCONTINUED | OUTPATIENT
Start: 2022-02-18 | End: 2022-02-24 | Stop reason: HOSPADM

## 2022-02-18 RX ORDER — FUROSEMIDE 10 MG/ML
60 INJECTION INTRAMUSCULAR; INTRAVENOUS ONCE
Status: COMPLETED | OUTPATIENT
Start: 2022-02-18 | End: 2022-02-18

## 2022-02-18 RX ORDER — CARVEDILOL 12.5 MG/1
12.5 TABLET ORAL 2 TIMES DAILY
Status: DISCONTINUED | OUTPATIENT
Start: 2022-02-18 | End: 2022-02-24 | Stop reason: HOSPADM

## 2022-02-18 RX ORDER — HYDRALAZINE HYDROCHLORIDE 10 MG/1
10 TABLET, FILM COATED ORAL EVERY 6 HOURS PRN
Status: DISCONTINUED | OUTPATIENT
Start: 2022-02-18 | End: 2022-02-23

## 2022-02-18 RX ORDER — PANTOPRAZOLE SODIUM 40 MG/1
40 TABLET, DELAYED RELEASE ORAL
Status: DISCONTINUED | OUTPATIENT
Start: 2022-02-19 | End: 2022-02-24 | Stop reason: HOSPADM

## 2022-02-18 RX ORDER — FLUDROCORTISONE ACETATE 0.1 MG/1
0.1 TABLET ORAL DAILY
Status: DISCONTINUED | OUTPATIENT
Start: 2022-02-19 | End: 2022-02-21

## 2022-02-18 RX ORDER — ACETAMINOPHEN 325 MG/1
650 TABLET ORAL EVERY 6 HOURS PRN
Status: DISCONTINUED | OUTPATIENT
Start: 2022-02-18 | End: 2022-02-24 | Stop reason: HOSPADM

## 2022-02-18 RX ORDER — MAGNESIUM SULFATE 1 G/100ML
1 INJECTION INTRAVENOUS ONCE
Status: COMPLETED | OUTPATIENT
Start: 2022-02-18 | End: 2022-02-18

## 2022-02-18 RX ORDER — FLUDROCORTISONE ACETATE 0.1 MG/1
0.1 TABLET ORAL DAILY
COMMUNITY
End: 2022-02-24 | Stop reason: HOSPADM

## 2022-02-18 RX ADMIN — ENOXAPARIN SODIUM 40 MG: 40 INJECTION SUBCUTANEOUS at 17:15

## 2022-02-18 RX ADMIN — POTASSIUM CHLORIDE 20 MEQ: 1500 TABLET, EXTENDED RELEASE ORAL at 17:15

## 2022-02-18 RX ADMIN — ATORVASTATIN CALCIUM 20 MG: 20 TABLET, FILM COATED ORAL at 17:15

## 2022-02-18 RX ADMIN — TAMSULOSIN HYDROCHLORIDE 0.4 MG: 0.4 CAPSULE ORAL at 17:15

## 2022-02-18 RX ADMIN — INSULIN LISPRO 2 UNITS: 100 INJECTION, SOLUTION INTRAVENOUS; SUBCUTANEOUS at 22:07

## 2022-02-18 RX ADMIN — INSULIN LISPRO 2 UNITS: 100 INJECTION, SOLUTION INTRAVENOUS; SUBCUTANEOUS at 17:16

## 2022-02-18 RX ADMIN — FUROSEMIDE 60 MG: 10 INJECTION, SOLUTION INTRAMUSCULAR; INTRAVENOUS at 09:47

## 2022-02-18 RX ADMIN — MAGNESIUM SULFATE HEPTAHYDRATE 1 G: 1 INJECTION, SOLUTION INTRAVENOUS at 17:15

## 2022-02-18 RX ADMIN — BRIMONIDINE TARTRATE 1 DROP: 2 SOLUTION/ DROPS OPHTHALMIC at 18:07

## 2022-02-18 RX ADMIN — CARVEDILOL 12.5 MG: 12.5 TABLET, FILM COATED ORAL at 17:15

## 2022-02-18 NOTE — ED PROVIDER NOTES
History  Chief Complaint   Patient presents with    Shortness of Breath     SOB for 3 weeks, worse today , swelling in legs     Patient is a 14-year-old male with past medical history significant for CKD, coronary artery disease status post CABG, vascular disease status post stenting, type 2 diabetes, prostate cancer, anemia who presents today with complaints of worsening shortness of breath and lower extremity edema worsening over the past 3 weeks, with the past 3 days he has been unable to lie flat to sleep and has been sleeping in recliner  He admits to orthopnea, fatigue and PND  He specifically denies chest pain, diaphoresis, dizziness, or palpitations  Shortness of Breath  Severity:  Moderate  Onset quality:  Gradual  Timing:  Constant  Progression:  Worsening  Chronicity:  New  Context: activity    Context: not URI    Relieved by:  None tried  Worsened by: Activity, coughing and deep breathing (Lying flat)  Ineffective treatments:  None tried  Associated symptoms: PND    Associated symptoms: no abdominal pain, no chest pain, no claudication, no cough, no diaphoresis, no ear pain, no fever, no headaches, no rash, no sore throat, no syncope, no vomiting and no wheezing    Associated symptoms comment:  Orthopnea  Risk factors: hx of cancer and obesity    Risk factors: no recent alcohol use, no family hx of DVT, no hx of PE/DVT, no oral contraceptive use, no prolonged immobilization, no recent surgery and no tobacco use        Prior to Admission Medications   Prescriptions Last Dose Informant Patient Reported? Taking?    Ascorbic Acid (Vitamin C) 500 MG CAPS 2/18/2022 at Unknown time Self Yes Yes   Sig: Take 500 mg by mouth daily   Cholecalciferol (Vitamin D3) 50 MCG (2000 UT) TABS 2/18/2022 at Unknown time Self Yes Yes   Sig: Take 2,000 Units by mouth daily   Probiotic Product (CULTURELLE PROBIOTICS) CHEW 2/18/2022 at Unknown time Self Yes Yes   Sig: Chew daily   TRUEPLUS LANCETS 28G MISC 2/18/2022 at Unknown time Self No Yes   Sig: Test 1x/d, E11 29   acarbose (PRECOSE) 100 MG tablet 2/18/2022 at Unknown time Self No Yes   Sig: Take 1 tablet (100 mg total) by mouth 3 (three) times a day with meals   aspirin (ECOTRIN LOW STRENGTH) 81 mg EC tablet 2/18/2022 at Unknown time Self Yes Yes   Sig: Take 81 mg by mouth daily   atorvastatin (LIPITOR) 20 mg tablet 2/17/2022 at Unknown time  No Yes   Sig: Take 1 tablet (20 mg total) by mouth daily   brimonidine tartrate 0 2 % ophthalmic solution  Self Yes No   Sig: Inject 0 2 % into the eye 2 (two) times a day   carvedilol (COREG) 12 5 mg tablet 2/18/2022 at Unknown time Self No Yes   Sig: Take 1 tablet (12 5 mg total) by mouth 2 (two) times a day   co-enzyme Q-10 100 mg capsule 2/18/2022 at Unknown time Self Yes Yes   Sig: Take 100 mg by mouth daily   fludrocortisone (FLORINEF) 0 1 mg tablet 2/18/2022 at Unknown time  Yes Yes   Sig: Take 0 1 mg by mouth daily   gabapentin (NEURONTIN) 600 MG tablet  Self No No   Sig: Take 1 tablet (600 mg total) by mouth 2 (two) times a day   glucose blood test strip 2/18/2022 at Unknown time Self No Yes   Sig: Test blood sugar twice a day   metFORMIN (GLUCOPHAGE) 1000 MG tablet 2/18/2022 at Unknown time Self No Yes   Sig: Take one tablet orally 3 times a day   Patient taking differently: TAKE 1000 MG  IN THE AM, TAKE 500 MG   IN THE PM   multivitamin (THERAGRAN) TABS 2/18/2022 at Unknown time Self Yes Yes   Sig: Take 1 tablet by mouth daily   omeprazole (PriLOSEC) 40 MG capsule 2/18/2022 at Unknown time Self Yes Yes   Sig: Take 40 mg by mouth daily   potassium chloride (K-DUR,KLOR-CON) 10 mEq tablet   No No   Sig: Take 1 tablet (10 mEq total) by mouth daily for 6 doses   tamsulosin (FLOMAX) 0 4 mg 2/17/2022 at Unknown time Self No Yes   Sig: Take 1 capsule (0 4 mg total) by mouth daily with dinner      Facility-Administered Medications: None       Past Medical History:   Diagnosis Date    Acquired hallux valgus     last assessed: 8/1/2013  Allergic rhinitis     Anemia 10/26/2010    Atrophy of nail     last assessed: 7/7/2015    Chronic kidney disease     chronic renal insufficiency    Coronary artery disease     Deformity of ankle and foot, acquired     last assessed: 2/22/2016    Difficulty walking     last assessed: 12/14/2015    Dysesthesia     last assessed: 11/25/2016    Hammer toe     unspecified laterality; last assessed: 8/1/2013    Hypertension     Onychomycosis of toenail     last assessed: 2/22/2016    Peripheral vascular disease ([facility] )     left SFA stent in 2010    Pes planus     unspecified laterality; last assessed: 8/1/2013    Pneumonia     last assessed: 2/27/2016    Prostate cancer ([facility] )     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     last assessed: 8/15/2016    Type 2 diabetes mellitus ([facility] ) 07/26/2010    Viral warts     last assessed: 7/24/2015       Past Surgical History:   Procedure Laterality Date    CARDIAC CATHETERIZATION  07/29/2010    CATARACT EXTRACTION, BILATERAL Bilateral     R 1999, L 2008    COLONOSCOPY  2011    FEMORAL ARTERY - POPLITEAL ARTERY BYPASS GRAFT  09/23/2013    FOOT SURGERY      bone spur removed    LEG SURGERY Bilateral     repair; L 8/20/2010 and 7/26/2012    AR PLACE RADIOTHER DEVICE/MARKER, PROSTATE N/A 6/22/2021    Procedure: INSERTION OF FIDUCIAL MARKER (TRANSRECTAL ULTRASOUND GUIDANCE), SPACEOAR;  Surgeon: Marc Peace MD;  Location: [facility];  Service: Urology    ROTATOR CUFF REPAIR Left 2009    SHOULDER SURGERY Right 2005    collar bone       Family History   Problem Relation Age of Onset    Heart disease Father     Heart attack Father         acute myocardial infarction    Heart disease Sister     Heart attack Sister         acute myocardial infarction    Heart disease Paternal Grandfather     Aortic aneurysm Mother     Throat cancer Maternal Grandfather      I have reviewed and agree with the history as documented      E-Cigarette/Vaping    E-Cigarette Use Never User      E-Cigarette/Vaping Substances    Nicotine No     THC No     CBD No     Flavoring No     Other No     Unknown No      Social History     Tobacco Use    Smoking status: Never Smoker    Smokeless tobacco: Never Used   Vaping Use    Vaping Use: Never used   Substance Use Topics    Alcohol use: Yes     Comment: being a social drinker    Drug use: No       Review of Systems   Constitutional: Negative for diaphoresis and fever  HENT: Negative for congestion, ear pain and sore throat  Eyes: Negative  Respiratory: Positive for shortness of breath  Negative for cough, wheezing and stridor  Cardiovascular: Positive for leg swelling and PND  Negative for chest pain, palpitations, claudication and syncope  Gastrointestinal: Negative for abdominal distention, abdominal pain, blood in stool, constipation, diarrhea, nausea and vomiting  Endocrine: Negative  Genitourinary: Positive for frequency and urgency  Musculoskeletal: Negative  Skin: Negative for rash  Neurological: Negative for dizziness, tremors, syncope, weakness, light-headedness and headaches  Hematological: Negative  Physical Exam  Physical Exam  Vitals and nursing note reviewed  Constitutional:       General: He is not in acute distress  Appearance: He is well-developed  He is obese  He is ill-appearing  He is not toxic-appearing  HENT:      Head: Normocephalic and atraumatic  Mouth/Throat:      Mouth: Mucous membranes are moist    Eyes:      Extraocular Movements: Extraocular movements intact  Pupils: Pupils are equal, round, and reactive to light  Neck:      Vascular: Hepatojugular reflux and JVD present  Cardiovascular:      Rate and Rhythm: Normal rate and regular rhythm  No extrasystoles are present  Pulses: Decreased pulses  Heart sounds: No murmur heard  Pulmonary:      Effort: Tachypnea present  No accessory muscle usage or respiratory distress  Breath sounds: Examination of the right-lower field reveals rales  Examination of the left-lower field reveals rales  Rales present  No wheezing  Abdominal:      General: Bowel sounds are normal       Palpations: Abdomen is soft  Tenderness: There is no abdominal tenderness  Musculoskeletal:         General: Normal range of motion  Cervical back: Normal range of motion  Right lower leg: Edema present  Left lower leg: Edema present  Comments: +3 to 4 lower extremity pitting edema to the hips   Skin:     General: Skin is warm  Capillary Refill: Capillary refill takes less than 2 seconds  Coloration: Skin is pale  Findings: No ecchymosis or rash  Neurological:      General: No focal deficit present  Mental Status: He is alert and oriented to person, place, and time  Cranial Nerves: No cranial nerve deficit     Psychiatric:         Mood and Affect: Mood normal          Vital Signs  ED Triage Vitals   Temperature Pulse Respirations Blood Pressure SpO2   02/18/22 1330 02/18/22 0914 02/18/22 0914 02/18/22 0914 02/18/22 0914   97 7 °F (36 5 °C) 82 20 (!) 180/75 95 %      Temp Source Heart Rate Source Patient Position - Orthostatic VS BP Location FiO2 (%)   02/18/22 0914 02/18/22 0914 02/18/22 0914 02/18/22 0914 --   Oral Monitor Lying Right arm       Pain Score       02/18/22 1400       No Pain           Vitals:    02/18/22 1245 02/18/22 1300 02/18/22 1315 02/18/22 1330   BP: 170/79 (!) 179/78 168/79 (!) 171/80   Pulse: 76 72 74 74   Patient Position - Orthostatic VS:             Visual Acuity      ED Medications  Medications   furosemide (LASIX) injection 60 mg (60 mg Intravenous Given 2/18/22 0947)       Diagnostic Studies  Results Reviewed     Procedure Component Value Units Date/Time    HS Troponin I 4hr [[id number]]  (Normal) Collected: 02/18/22 1452    Lab Status: Final result Specimen: Blood from Arm, Right Updated: 02/18/22 1525     hs TnI 4hr 5 ng/L      Delta 4hr hsTnI 1 ng/L     HS Troponin I 2hr [[id number]]  (Normal) Collected: 02/18/22 1147    Lab Status: Final result Specimen: Blood from Arm, Left Updated: 02/18/22 1228     hs TnI 2hr 4 ng/L      Delta 2hr hsTnI 0 ng/L     COVID/FLU/RSV - 2 hour TAT [[id number]]  (Normal) Collected: 02/18/22 0945    Lab Status: Final result Specimen: Nares from Nose Updated: 02/18/22 1102     SARS-CoV-2 Negative     INFLUENZA A PCR Negative     INFLUENZA B PCR Negative     RSV PCR Negative    Narrative:      FOR PEDIATRIC PATIENTS - copy/paste COVID Guidelines URL to browser: [email or web address]  [employer]    SARS-CoV-2 assay is a Nucleic Acid Amplification assay intended for the  qualitative detection of nucleic acid from SARS-CoV-2 in nasopharyngeal  swabs  Results are for the presumptive identification of SARS-CoV-2 RNA  Positive results are indicative of infection with SARS-CoV-2, the virus  causing COVID-19, but do not rule out bacterial infection or co-infection  with other viruses  Laboratories within the [place] and its  territories are required to report all positive results to the appropriate  public health authorities  Negative results do not preclude SARS-CoV-2  infection and should not be used as the sole basis for treatment or other  patient management decisions  Negative results must be combined with  clinical observations, patient history, and epidemiological information  This test has not been FDA cleared or approved  This test has been authorized by FDA under an Emergency Use Authorization  (EUA)  This test is only authorized for the duration of time the  declaration that circumstances exist justifying the authorization of the  emergency use of an in vitro diagnostic tests for detection of SARS-CoV-2  virus and/or diagnosis of COVID-19 infection under section 564(b)(1) of  the Act, 21 U  S C  326ZOQ-9(W)(7), unless the authorization is terminated  or revoked sooner  The test has been validated but independent review by FDA  and CLIA is pending  Test performed using [employer] GeneXpert: This RT-PCR assay targets N2,  a region unique to SARS-CoV-2  A conserved region in the E-gene was chosen  for pan-Sarbecovirus detection which includes SARS-CoV-2  HS Troponin 0hr (reflex protocol) [[id number]]  (Normal) Collected: 02/18/22 0944    Lab Status: Final result Specimen: Blood from Arm, Right Updated: 02/18/22 1023     hs TnI 0hr 4 ng/L     TSH [[id number]]  (Normal) Collected: 02/18/22 0944    Lab Status: Final result Specimen: Blood from Arm, Right Updated: 02/18/22 1021     TSH 3RD GENERATON 2 817 uIU/mL     Narrative:      Patients undergoing fluorescein dye angiography may retain small amounts of fluorescein in the body for 48-72 hours post procedure  Samples containing fluorescein can produce falsely depressed TSH values  If the patient had this procedure,a specimen should be resubmitted post fluorescein clearance        Phosphorus [[id number]]  (Normal) Collected: 02/18/22 0944    Lab Status: Final result Specimen: Blood from Arm, Right Updated: 02/18/22 1021     Phosphorus 3 5 mg/dL     Magnesium [[id number]]  (Normal) Collected: 02/18/22 0944    Lab Status: Final result Specimen: Blood from Arm, Right Updated: 02/18/22 1021     Magnesium 1 6 mg/dL     NT-BNP PRO [[id number]]  (Abnormal) Collected: 02/18/22 0944    Lab Status: Final result Specimen: Blood from Arm, Right Updated: 02/18/22 1021     NT-proBNP 3,439 pg/mL     Comprehensive metabolic panel [[id number]]  (Abnormal) Collected: 02/18/22 0944    Lab Status: Final result Specimen: Blood from Arm, Right Updated: 02/18/22 1013     Sodium 132 mmol/L      Potassium 3 9 mmol/L      Chloride 99 mmol/L      CO2 26 mmol/L      ANION GAP 7 mmol/L      BUN 19 mg/dL      Creatinine 0 92 mg/dL      Glucose 247 mg/dL      Calcium 8 5 mg/dL      Corrected Calcium 9 2 mg/dL      AST 10 U/L      ALT 19 U/L      Alkaline Phosphatase 89 U/L      Total Protein 6 9 g/dL      Albumin 3 1 g/dL      Total Bilirubin 0 82 mg/dL      eGFR 79 ml/min/1 73sq m     Narrative:      [place] guidelines for Chronic Kidney Disease (CKD):     Stage 1 with normal or high GFR (GFR > 90 mL/min/1 73 square meters)    Stage 2 Mild CKD (GFR = 60-89 mL/min/1 73 square meters)    Stage 3A Moderate CKD (GFR = 45-59 mL/min/1 73 square meters)    Stage 3B Moderate CKD (GFR = 30-44 mL/min/1 73 square meters)    Stage 4 Severe CKD (GFR = 15-29 mL/min/1 73 square meters)    Stage 5 End Stage CKD (GFR <15 mL/min/1 73 square meters)  Note: GFR calculation is accurate only with a steady state creatinine    Protime-INR [[id number]]  (Normal) Collected: 02/18/22 0945    Lab Status: Final result Specimen: Blood from Arm, Right Updated: 02/18/22 1009     Protime 13 6 seconds      INR 1 06    APTT [[id number]]  (Normal) Collected: 02/18/22 0945    Lab Status: Final result Specimen: Blood from Arm, Right Updated: 02/18/22 1009     PTT 27 seconds     CBC and differential [[id number]]  (Abnormal) Collected: 02/18/22 0944    Lab Status: Final result Specimen: Blood from Arm, Right Updated: 02/18/22 0957     WBC 8 40 Thousand/uL      RBC 3 47 Million/uL      Hemoglobin 9 3 g/dL      Hematocrit 30 2 %      MCV 87 fL      MCH 26 8 pg      MCHC 30 8 g/dL      RDW 16 5 %      MPV 9 8 fL      Platelets 655 Thousands/uL      nRBC 0 /100 WBCs      Neutrophils Relative 83 %      Immat GRANS % 1 %      Lymphocytes Relative 3 %      Monocytes Relative 10 %      Eosinophils Relative 2 %      Basophils Relative 1 %      Neutrophils Absolute 7 09 Thousands/µL      Immature Grans Absolute 0 07 Thousand/uL      Lymphocytes Absolute 0 24 Thousands/µL      Monocytes Absolute 0 80 Thousand/µL      Eosinophils Absolute 0 16 Thousand/µL      Basophils Absolute 0 04 Thousands/µL                  XR chest 1 view portable   ED Interpretation by Xiomara Rhodes, ERIN (02/18 1105)   Pulmonary edema  No osseous abnormalities  Await radiology read        Final Result by Adenike Huber MD (02/18 1123)      Small bilateral pleural effusions and pulmonary edema, new since the prior study  Workstation performed: [id number]                    Procedures  ECG 12 Lead Documentation Only    Date/Time: 2/18/2022 4:09 PM  Performed by: Mekhi Wagner PA-C  Authorized by: Mekhi Wagner PA-C     Indications / Diagnosis:  Normal sinus rhythm at 78--sinus rhythm with first-degree AV block             ED Course  ED Course as of 02/18/22 1609   Fri Feb 18, 2022   1015 CBC and differential(!)   1015 Comprehensive metabolic panel(!)   5420 Creatinine: 0 92   1015 Protime-INR   1103 NT-BNP PRO(!)   1142 NT-BNP PRO(!)                               SBIRT 20yo+      Most Recent Value   SBIRT (23 yo +)    In order to provide better care to our patients, we are screening all of our patients for alcohol and drug use  Would it be okay to ask you these screening questions? Yes Filed at: 02/18/2022 9634   Initial Alcohol Screen: US AUDIT-C     1  How often do you have a drink containing alcohol? 0 Filed at: 02/18/2022 0921   2  How many drinks containing alcohol do you have on a typical day you are drinking? 0 Filed at: 02/18/2022 0921   3a  Male UNDER 65: How often do you have five or more drinks on one occasion? 0 Filed at: 02/18/2022 0921   3b  FEMALE Any Age, or MALE 65+: How often do you have 4 or more drinks on one occassion? 0 Filed at: 02/18/2022 0108   Audit-C Score 0 Filed at: 02/18/2022 8936   TUAN: How many times in the past year have you    Used an illegal drug or used a prescription medication for non-medical reasons?  Never Filed at: 02/18/2022 8899                    MDM  Number of Diagnoses or Management Options  Acute on chronic congestive heart failure, unspecified heart failure type [facility]): new and requires workup  Diagnosis management comments: Patient with shortness of breath related to volume overload likely secondary to congestive heart failure  Chest x-ray with new pleural effusions and pulmonary edema  Given Lasix in the ED  Admission for cardiology evaluation and further diuresis       Amount and/or Complexity of Data Reviewed  Clinical lab tests: ordered and reviewed  Tests in the radiology section of CPT®: reviewed and ordered  Tests in the medicine section of CPT®: reviewed and ordered  Decide to obtain previous medical records or to obtain history from someone other than the patient: yes  Review and summarize past medical records: yes  Discuss the patient with other providers: yes  Independent visualization of images, tracings, or specimens: yes    Risk of Complications, Morbidity, and/or Mortality  Presenting problems: moderate  Diagnostic procedures: moderate  Management options: moderate    Patient Progress  Patient progress: stable      Disposition  Final diagnoses:   Acute on chronic congestive heart failure, unspecified heart failure type ([facility] )     Time reflects when diagnosis was documented in both MDM as applicable and the Disposition within this note     Time User Action Codes Description Comment    2/18/2022 11:43 AM Yadira Saul Add [I50 9] Acute on chronic congestive heart failure, unspecified heart failure type [facility])       ED Disposition     ED Disposition Condition Date/Time Comment    Admit Stable Fri Feb 18, 2022 11:43 AM Case was discussed with МАРИНА and the patient's admission status was agreed to be Admission Status: inpatient status to the service of Dr Kelsi Witt           Follow-up Information    None         Current Discharge Medication List      CONTINUE these medications which have NOT CHANGED    Details   acarbose (PRECOSE) 100 MG tablet Take 1 tablet (100 mg total) by mouth 3 (three) times a day with meals  Qty: 270 tablet, Refills: 3    Associated Diagnoses: Type 2 diabetes mellitus with diabetic neuropathy, without long-term current use of insulin ([facility])      Ascorbic Acid (Vitamin C) 500 MG CAPS Take 500 mg by mouth daily      aspirin (ECOTRIN LOW STRENGTH) 81 mg EC tablet Take 81 mg by mouth daily      atorvastatin (LIPITOR) 20 mg tablet Take 1 tablet (20 mg total) by mouth daily  Qty: 90 tablet, Refills: 1    Associated Diagnoses: Coronary artery disease involving native coronary artery of native heart without angina pectoris      carvedilol (COREG) 12 5 mg tablet Take 1 tablet (12 5 mg total) by mouth 2 (two) times a day  Qty: 180 tablet, Refills: 1    Associated Diagnoses: Benign essential hypertension      Cholecalciferol (Vitamin D3) 50 MCG (2000 UT) TABS Take 2,000 Units by mouth daily      co-enzyme Q-10 100 mg capsule Take 100 mg by mouth daily      fludrocortisone (FLORINEF) 0 1 mg tablet Take 0 1 mg by mouth daily      glucose blood test strip Test blood sugar twice a day  Qty: 100 each, Refills: 3    Comments: Please dispense One touch ultra 2  Dx  E11 40  Associated Diagnoses: Type 2 diabetes mellitus with diabetic neuropathy, without long-term current use of insulin ([facility])      metFORMIN (GLUCOPHAGE) 1000 MG tablet Take one tablet orally 3 times a day  Qty: 270 tablet, Refills: 1    Associated Diagnoses: Type 2 diabetes mellitus with diabetic neuropathy, without long-term current use of insulin ([facility])      multivitamin (THERAGRAN) TABS Take 1 tablet by mouth daily      omeprazole (PriLOSEC) 40 MG capsule Take 40 mg by mouth daily      Probiotic Product (CULTURELLE PROBIOTICS) CHEW Chew daily      tamsulosin (FLOMAX) 0 4 mg Take 1 capsule (0 4 mg total) by mouth daily with dinner  Qty: 30 capsule, Refills: 11    Associated Diagnoses: Prostate cancer ([facility] )      TRUEPLUS LANCETS 28G MISC Test 1x/d, E11 29  Refills: 0    Associated Diagnoses: Type 2 diabetes mellitus with diabetic neuropathy, without long-term current use of insulin ([facility])      brimonidine tartrate 0 2 % ophthalmic solution Inject 0 2 % into the eye 2 (two) times a day      gabapentin (NEURONTIN) 600 MG tablet Take 1 tablet (600 mg total) by mouth 2 (two) times a day  Qty: 180 tablet, Refills: 0    Associated Diagnoses: Diabetic polyneuropathy associated with type 2 diabetes mellitus (HCC)      potassium chloride (K-DUR,KLOR-CON) 10 mEq tablet Take 1 tablet (10 mEq total) by mouth daily for 6 doses  Qty: 30 tablet, Refills: 2    Associated Diagnoses: Hypokalemia             No discharge procedures on file      PDMP Review     None          ED Provider  Electronically Signed by           Kirsten Hale PA-C  02/18/22 6769

## 2022-02-18 NOTE — PLAN OF CARE
Problem: MOBILITY - ADULT  Goal: Maintain or return to baseline ADL function  Description: INTERVENTIONS:  -  Assess patient's ability to carry out ADLs; assess patient's baseline for ADL function and identify physical deficits which impact ability to perform ADLs (bathing, care of mouth/teeth, toileting, grooming, dressing, etc )  - Assess/evaluate cause of self-care deficits   - Assess range of motion  - Assess patient's mobility; develop plan if impaired  - Assess patient's need for assistive devices and provide as appropriate  - Encourage maximum independence but intervene and supervise when necessary  - Involve family in performance of ADLs  - Assess for home care needs following discharge   - Consider OT consult to assist with ADL evaluation and planning for discharge  - Provide patient education as appropriate  Outcome: Progressing  Goal: Maintains/Returns to pre admission functional level  Description: INTERVENTIONS:  - Perform BMAT or MOVE assessment daily    - Set and communicate daily mobility goal to care team and patient/family/caregiver     - Collaborate with rehabilitation services on mobility goals if consulted  - Stand patient 4 times a day  - Ambulate patient 4 times a day  - Out of bed to chair 3 times a day   - Out of bed for meals 2 times a day  - Out of bed for toileting  - Record patient progress and toleration of activity level   Outcome: Progressing     Problem: Potential for Falls  Goal: Patient will remain free of falls  Description: INTERVENTIONS:  - Educate patient/family on patient safety including physical limitations  - Instruct patient to call for assistance with activity   - Consult OT/PT to assist with strengthening/mobility   - Keep Call bell within reach  - Keep bed low and locked with side rails adjusted as appropriate  - Keep care items and personal belongings within reach  - Initiate and maintain comfort rounds  - Make Fall Risk Sign visible to staff  - Offer Toileting every 2 Hours, in advance of need  - Initiate/Maintain bed alarm  - Obtain necessary fall risk management equipment: bed alarm, non-slip socks  - Apply yellow socks and bracelet for high fall risk patients  - Consider moving patient to room near nurses station  Outcome: Progressing     Problem: PAIN - ADULT  Goal: Verbalizes/displays adequate comfort level or baseline comfort level  Description: Interventions:  - Encourage patient to monitor pain and request assistance  - Assess pain using appropriate pain scale  - Administer analgesics based on type and severity of pain and evaluate response  - Implement non-pharmacological measures as appropriate and evaluate response  - Consider cultural and social influences on pain and pain management  - Notify physician/advanced practitioner if interventions unsuccessful or patient reports new pain  Outcome: Progressing     Problem: DISCHARGE PLANNING  Goal: Discharge to home or other facility with appropriate resources  Description: INTERVENTIONS:  - Identify barriers to discharge w/patient and caregiver  - Arrange for needed discharge resources and transportation as appropriate  - Identify discharge learning needs (meds, wound care, etc )  - Arrange for interpretive services to assist at discharge as needed  - Refer to Case Management Department for coordinating discharge planning if the patient needs post-hospital services based on physician/advanced practitioner order or complex needs related to functional status, cognitive ability, or social support system  Outcome: Progressing     Problem: Knowledge Deficit  Goal: Patient/family/caregiver demonstrates understanding of disease process, treatment plan, medications, and discharge instructions  Description: Complete learning assessment and assess knowledge base    Interventions:  - Provide teaching at level of understanding  - Provide teaching via preferred learning methods  Outcome: Progressing     Problem: CARDIOVASCULAR - ADULT  Goal: Maintains optimal cardiac output and hemodynamic stability  Description: INTERVENTIONS:  - Monitor I/O, vital signs and rhythm  - Monitor for S/S and trends of decreased cardiac output  - Administer and titrate ordered vasoactive medications to optimize hemodynamic stability  - Assess quality of pulses, skin color and temperature  - Assess for signs of decreased coronary artery perfusion  - Instruct patient to report change in severity of symptoms  Outcome: Progressing  Goal: Absence of cardiac dysrhythmias or at baseline rhythm  Description: INTERVENTIONS:  - Continuous cardiac monitoring, vital signs, obtain 12 lead EKG if ordered  - Administer antiarrhythmic and heart rate control medications as ordered  - Monitor electrolytes and administer replacement therapy as ordered  Outcome: Progressing     Problem: RESPIRATORY - ADULT  Goal: Achieves optimal ventilation and oxygenation  Description: INTERVENTIONS:  - Assess for changes in respiratory status  - Assess for changes in mentation and behavior  - Position to facilitate oxygenation and minimize respiratory effort  - Oxygen administered by appropriate delivery if ordered  - Initiate smoking cessation education as indicated  - Encourage broncho-pulmonary hygiene including cough, deep breathe, Incentive Spirometry  - Assess the need for suctioning and aspirate as needed  - Assess and instruct to report SOB or any respiratory difficulty  - Respiratory Therapy support as indicated  Outcome: Progressing     Problem: METABOLIC, FLUID AND ELECTROLYTES - ADULT  Goal: Glucose maintained within target range  Description: INTERVENTIONS:  - Monitor Blood Glucose as ordered  - Assess for signs and symptoms of hyperglycemia and hypoglycemia  - Administer ordered medications to maintain glucose within target range  - Assess nutritional intake and initiate nutrition service referral as needed  Outcome: Progressing     Problem: MUSCULOSKELETAL - ADULT  Goal: Maintain or return mobility to safest level of function  Description: INTERVENTIONS:  - Assess patient's ability to carry out ADLs; assess patient's baseline for ADL function and identify physical deficits which impact ability to perform ADLs (bathing, care of mouth/teeth, toileting, grooming, dressing, etc )  - Assess/evaluate cause of self-care deficits   - Assess range of motion  - Assess patient's mobility  - Assess patient's need for assistive devices and provide as appropriate  - Encourage maximum independence but intervene and supervise when necessary  - Involve family in performance of ADLs  - Assess for home care needs following discharge   - Consider OT consult to assist with ADL evaluation and planning for discharge  - Provide patient education as appropriate  Outcome: Progressing

## 2022-02-18 NOTE — ASSESSMENT & PLAN NOTE
Lab Results   Component Value Date    HGBA1C 7 1 (H) 12/27/2021       Recent Labs     02/18/22  1426   POCGLU 218*       Blood Sugar Average: Last 72 hrs:  (P) 218  Glucose 247  Patient is on metformin 1 g in the morning, 500 mg in the evening  Hold while inpatient  On Precose at home as well  Will continue    Last A1c in December last year 7 1  Diabetic diet

## 2022-02-18 NOTE — H&P
Lucina 44  H&P- Lane Robins [id number], 67 y o  male MRN: [id number]  Unit/Bed#: [address] [place] [address] Encounter: [id number]  Primary Care Provider: Kiko Acosta DO   Date and time admitted to hospital: 2/18/2022  9:12 AM    * Acute on chronic diastolic congestive heart failure [facility])  Assessment & Plan  Wt Readings from Last 3 Encounters:   02/18/22 81 9 kg (180 lb 8 9 oz)   02/11/22 81 2 kg (179 lb)   02/08/22 82 6 kg (182 lb)     Patient presents with SOB and leg edema for about one week, worsening  Does not take diuretic at home  2+ lower leg edema bilateral on exam  Chest x-ray today showed- Small bilateral pleural effusions and pulmonary edema, new since the prior study  ProBNP 3439  2D echo in October 2020 showed normal EF, grade 1 diastolic dysfunction, PA pressure 25  Patient received Lasix 60 mg IV in ED  Will order Lasix 40 mg IV daily  Continue Coreg from home  Update 2D echo  Daily weight and I&Os  Check venous started right DVT  Consult Cardiology          Type 2 diabetes mellitus with diabetic neuropathy [facility])  Assessment & Plan  Lab Results   Component Value Date    HGBA1C 7 1 (H) 12/27/2021       Recent Labs     02/18/22  1426   POCGLU 218*       Blood Sugar Average: Last 72 hrs:  (P) 218  Glucose 247  Patient is on metformin 1 g in the morning, 500 mg in the evening  Hold while inpatient  On Precose at home as well  Will continue  Last A1c in December last year 7 1  Diabetic diet      Chronic right-sided low back pain without sciatica  Assessment & Plan  Noted history    Orthostatic hypotension  Assessment & Plan  Continue Florinef  Avoid hypotension    Prostate cancer [facility])  Assessment & Plan  Noted history  Status post RT with ongoing ADT    Outpatient Urology follow-up    Anemia due to stage 3a chronic kidney disease [facility])  Assessment & Plan  Lab Results   Component Value Date    EGFR 79 02/18/2022    EGFR 78 12/27/2021    EGFR 85 11/09/2021    CREATININE 0 92 02/18/2022    CREATININE 0 93 12/27/2021    CREATININE 0 84 11/09/2021   Baseline hemoglobin 9-10  Hemoglobin stable  Monitor    Benign essential hypertension  Assessment & Plan  On Coreg at home  Will continue  BP elevated  Will order hydralazine p r n  Dyslipidemia  Assessment & Plan  Continue statin    GE reflux  Assessment & Plan  Continue PPI    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 mellitus (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)  Assessment & Plan  S/P R Fem to AK pop bypass and B SFA occlusions, status post bilateral SFA stenting with occlusion  Continue aspirin, Lipitor  Follows vascular as outpatient        VTE Prophylaxis: Enoxaparin (Lovenox)  / reason for no mechanical VTE prophylaxis Leg edema   Code Status:  Full code  POLST: POLST form is not discussed and not completed at this time  Anticipated Length of Stay:  Patient will be admitted on an Inpatient basis with an anticipated length of stay of  > 2 midnights  Justification for Hospital Stay:  Acute on chronic CHF    Total Time for Visit, including Counseling / Coordination of Care: 45 minutes  Greater than 50% of this total time spent on direct patient counseling and coordination of care  Chief Complaint:   SOB and leg edema for about 1 week, getting worse    History of Present Illness:    Hernan Jones is a 67 y o  male with PMH of diastolic dysfunction, type 2 diabetes, PAD, CAD, hypertension, orthostatic hypotension , hyperlipidemia, GERD, prostate cancer, CKD 2 who presents with SOB and leg edema for about 1 week, getting worse  Patient reports initially he could not lay flat at night,now feeling SOB at all times    Denies cough, fever, chills, chest pain, headache, dizziness, nausea vomiting  Reports leg edema, denies neck pain  Reports not taking diuretic at home  Patient offered no other complaints  Review of Systems:    Review of Systems   Respiratory: Positive for shortness of breath  Cardiovascular: Positive for leg swelling  All other systems reviewed and are negative        Past Medical and Surgical History:     Past Medical History:   Diagnosis Date    Acquired hallux valgus     last assessed: 8/1/2013    Allergic rhinitis     Anemia 10/26/2010    Atrophy of nail     last assessed: 7/7/2015    Chronic kidney disease     chronic renal insufficiency    Coronary artery disease     Deformity of ankle and foot, acquired     last assessed: 2/22/2016    Difficulty walking     last assessed: 12/14/2015    Dysesthesia     last assessed: 11/25/2016    Hammer toe     unspecified laterality; last assessed: 8/1/2013    Hypertension     Onychomycosis of toenail     last assessed: 2/22/2016    Peripheral vascular disease ([facility] )     left SFA stent in 2010    Pes planus     unspecified laterality; last assessed: 8/1/2013    Pneumonia     last assessed: 2/27/2016    Prostate cancer ([facility] )     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     last assessed: 8/15/2016    Type 2 diabetes mellitus ([facility] ) 07/26/2010    Viral warts     last assessed: 7/24/2015       Past Surgical History:   Procedure Laterality Date    CARDIAC CATHETERIZATION  07/29/2010    CATARACT EXTRACTION, BILATERAL Bilateral     R 1999, L 2008    COLONOSCOPY  2011    FEMORAL ARTERY - POPLITEAL ARTERY BYPASS GRAFT  09/23/2013    FOOT SURGERY      bone spur removed    LEG SURGERY Bilateral     repair; L 8/20/2010 and 7/26/2012    MA PLACE RADIOTHER DEVICE/MARKER, PROSTATE N/A 6/22/2021    Procedure: INSERTION OF FIDUCIAL MARKER (TRANSRECTAL ULTRASOUND GUIDANCE), SPACEOAR;  Surgeon: Leslie Velasquez MD;  Location: [facility];  Service: Urology    ROTATOR CUFF REPAIR Left 2009    SHOULDER SURGERY Right 2005    collar bone       Meds/Allergies:    Prior to Admission medications    Medication Sig Start Date End Date Taking? Authorizing Provider   acarbose (PRECOSE) 100 MG tablet Take 1 tablet (100 mg total) by mouth 3 (three) times a day with meals 12/10/21  Yes Leonela Cooley MD   Ascorbic Acid (Vitamin C) 500 MG CAPS Take 500 mg by mouth daily 8/4/21  Yes Historical Provider, MD   aspirin (ECOTRIN LOW STRENGTH) 81 mg EC tablet Take 81 mg by mouth daily 8/4/21  Yes Historical Provider, MD   atorvastatin (LIPITOR) 20 mg tablet Take 1 tablet (20 mg total) by mouth daily 2/17/22  Yes Garry Duane, DO   carvedilol (COREG) 12 5 mg tablet Take 1 tablet (12 5 mg total) by mouth 2 (two) times a day 1/31/22  Yes Garry Duane, DO   Cholecalciferol (Vitamin D3) 50 MCG (2000 UT) TABS Take 2,000 Units by mouth daily 8/4/21  Yes Historical Provider, MD   co-enzyme Q-10 100 mg capsule Take 100 mg by mouth daily 8/4/21  Yes Historical Provider, MD   fludrocortisone (FLORINEF) 0 1 mg tablet Take 0 1 mg by mouth daily   Yes Historical Provider, MD   glucose blood test strip Test blood sugar twice a day 7/16/21  Yes Leonela Cooley MD   metFORMIN (GLUCOPHAGE) 1000 MG tablet Take one tablet orally 3 times a day  Patient taking differently: TAKE 1000 MG  IN THE AM, TAKE 500 MG   IN THE PM 8/20/21  Yes Leonela Cooley MD   multivitamin [facility]) TABS Take 1 tablet by mouth daily   Yes Historical Provider, MD   omeprazole (PriLOSEC) 40 MG capsule Take 40 mg by mouth daily 8/4/21  Yes Historical Provider, MD   Probiotic Product ([employer]OT[employer]) [address] daily   Yes Historical Provider, MD   tamsulosin (FLOMAX) 0 4 mg Take 1 capsule (0 4 mg total) by mouth daily with dinner 8/12/21  Yes Taylor Ji MD   TRUEPLUS LANCETS 28G MISC Test 1x/d, E11 29 2/15/18  Yes Garry Duane, DO   brimonidine tartrate 0 2 % ophthalmic solution Inject 0 2 % into the eye 2 (two) times a day 8/4/21   Historical Provider, MD   gabapentin (NEURONTIN) 600 MG tablet Take 1 tablet (600 mg total) by mouth 2 (two) times a day 2/9/21 12/2/21  Ruma Avina DPM   potassium chloride (K-DUR,KLOR-CON) 10 mEq tablet Take 1 tablet (10 mEq total) by mouth daily for 6 doses 12/2/21 2/11/22  Nicholas Gonsalves DO     I have reviewed home medications with patient personally  Allergies: No Known Allergies    Social History:     Marital Status: /Civil Union   Occupation:  Retired  Patient Pre-hospital Living Situation:  Lives his wife  Patient Pre-hospital Level of Mobility:  Independent  Patient Pre-hospital Diet Restrictions:  Diabetic cardiac diet  Substance Use History:   Social History     Substance and Sexual Activity   Alcohol Use Yes    Comment: being a social drinker     Social History     Tobacco Use   Smoking Status Never Smoker   Smokeless Tobacco Never Used     Social History     Substance and Sexual Activity   Drug Use No       Family History:    non-contributory    Physical Exam:     Vitals:   Blood Pressure: (!) 171/80 (02/18/22 1330)  Pulse: 74 (02/18/22 1330)  Temperature: (!) 97 3 °F (36 3 °C) (02/18/22 1400)  Temp Source: Oral (02/18/22 1400)  Respirations: 19 (02/18/22 1400)  Height: 5' 11" (180 3 cm) (02/18/22 1400)  Weight - Scale: 81 9 kg (180 lb 8 9 oz) (02/18/22 1400)  SpO2: 98 % (02/18/22 1330)    Physical Exam  Vitals and nursing note reviewed  Constitutional:       Appearance: He is well-developed  HENT:      Head: Normocephalic and atraumatic  Neck:      Thyroid: No thyromegaly  Vascular: No JVD  Trachea: No tracheal deviation  Cardiovascular:      Rate and Rhythm: Normal rate and regular rhythm  Heart sounds: Normal heart sounds  Pulmonary:      Effort: Pulmonary effort is normal  No respiratory distress  Breath sounds: No wheezing or rales  Comments: Diminished breath sounds lower lobes,+ rale LLL, on O2 2 L, satting high 90s      Abdominal: General: Bowel sounds are normal  There is no distension  Palpations: Abdomen is soft  Tenderness: There is no abdominal tenderness  There is no guarding  Musculoskeletal:         General: Swelling present  Cervical back: Neck supple  Comments: 2+ lower leg edema on exam   Skin:     General: Skin is warm and dry  Neurological:      General: No focal deficit present  Mental Status: He is alert and oriented to person, place, and time  Psychiatric:         Mood and Affect: Mood normal          Judgment: Judgment normal          Additional Data:     Lab Results: I have personally reviewed pertinent reports  Results from last 7 days   Lab Units 02/18/22  0944   WBC Thousand/uL 8 40   HEMOGLOBIN g/dL 9 3*   HEMATOCRIT % 30 2*   PLATELETS Thousands/uL 266   NEUTROS PCT % 83*   LYMPHS PCT % 3*   MONOS PCT % 10   EOS PCT % 2     Results from last 7 days   Lab Units 02/18/22  0944   POTASSIUM mmol/L 3 9   CHLORIDE mmol/L 99*   CO2 mmol/L 26   BUN mg/dL 19   CREATININE mg/dL 0 92   CALCIUM mg/dL 8 5   ALK PHOS U/L 89   ALT U/L 19   AST U/L 10     Results from last 7 days   Lab Units 02/18/22  0945   INR  1 06       Imaging: I have personally reviewed pertinent reports  XR chest 1 view portable    Result Date: 2/18/2022  Narrative: CHEST INDICATION:   sob  COMPARISON:  7/30/2021 EXAM PERFORMED/VIEWS:  XR CHEST PORTABLE FINDINGS: Cardiomediastinal silhouette appears unremarkable  Patchy airspace disease in the lungs bilaterally is likely on the basis of pulmonary edema  Findings are new since the prior examination  Small bilateral pleural effusions  Osseous structures appear within normal limits for patient age  Impression: Small bilateral pleural effusions and pulmonary edema, new since the prior study   Workstation performed: [id number]       EKG, Pathology, and Other Studies Reviewed on Admission:   · EKG:  Normal sinus rhythm with first-degree AV block    Allscripts Records Reviewed: Yes     ** Please Note: Dragon 360 Dictation voice to text software may have been used in the creation of this document   **

## 2022-02-18 NOTE — ASSESSMENT & PLAN NOTE
Lab Results   Component Value Date    EGFR 79 02/18/2022    EGFR 78 12/27/2021    EGFR 85 11/09/2021    CREATININE 0 92 02/18/2022    CREATININE 0 93 12/27/2021    CREATININE 0 84 11/09/2021   Baseline hemoglobin 9-10  Hemoglobin stable  Monitor

## 2022-02-18 NOTE — ASSESSMENT & PLAN NOTE
Wt Readings from Last 3 Encounters:   02/18/22 81 9 kg (180 lb 8 9 oz)   02/11/22 81 2 kg (179 lb)   02/08/22 82 6 kg (182 lb)     Patient presents with SOB and leg edema for about one week, worsening  Does not take diuretic at home  2+ lower leg edema bilateral on exam  Chest x-ray today showed- Small bilateral pleural effusions and pulmonary edema, new since the prior study  ProBNP 3439  2D echo in October 2020 showed normal EF, grade 1 diastolic dysfunction, PA pressure 25  Patient received Lasix 60 mg IV in ED  Will order Lasix 40 mg IV daily    Continue Coreg from home  Update 2D echo  Daily weight and I&Os  Check venous started right DVT  Consult Cardiology

## 2022-02-18 NOTE — ASSESSMENT & PLAN NOTE
S/P R Fem to AK pop bypass and B SFA occlusions, status post bilateral SFA stenting with occlusion     Continue aspirin, Lipitor  Follows vascular as outpatient

## 2022-02-19 ENCOUNTER — APPOINTMENT (INPATIENT)
Dept: NON INVASIVE DIAGNOSTICS | Facility: HOSPITAL | Age: 77
DRG: 291 | End: 2022-02-19
Payer: MEDICARE

## 2022-02-19 LAB
ANION GAP SERPL CALCULATED.3IONS-SCNC: 8 MMOL/L (ref 4–13)
AORTIC ROOT: 3.1 CM
APICAL FOUR CHAMBER EJECTION FRACTION: 43 %
AV AREA PEAK VELOCITY: 2.2 CM2
AV LVOT MEAN GRADIENT: 2 MMHG
AV LVOT PEAK GRADIENT: 4 MMHG
AV PEAK GRADIENT: 10 MMHG
BUN SERPL-MCNC: 18 MG/DL (ref 5–25)
CALCIUM SERPL-MCNC: 8.4 MG/DL (ref 8.3–10.1)
CHLORIDE SERPL-SCNC: 99 MMOL/L (ref 100–108)
CO2 SERPL-SCNC: 27 MMOL/L (ref 21–32)
CREAT SERPL-MCNC: 0.85 MG/DL (ref 0.6–1.3)
DOP CALC LVOT AREA: 3.46 CM2
DOP CALC LVOT DIAMETER: 2.1 CM
DOP CALC LVOT PEAK VEL VTI: 22.9 CM
DOP CALC LVOT PEAK VEL: 1.01 M/S
DOP CALC LVOT STROKE INDEX: 39.6 ML/M2
DOP CALC LVOT STROKE VOLUME: 79.28 CM3
DOP CALC MV VTI: 31.76 CM
E WAVE DECELERATION TIME: 127 MS
FRACTIONAL SHORTENING: 27 % (ref 28–44)
GFR SERPL CREATININE-BSD FRML MDRD: 84 ML/MIN/1.73SQ M
GLUCOSE SERPL-MCNC: 183 MG/DL (ref 65–140)
GLUCOSE SERPL-MCNC: 188 MG/DL (ref 65–140)
GLUCOSE SERPL-MCNC: 231 MG/DL (ref 65–140)
GLUCOSE SERPL-MCNC: 252 MG/DL (ref 65–140)
GLUCOSE SERPL-MCNC: 257 MG/DL (ref 65–140)
INTERVENTRICULAR SEPTUM IN DIASTOLE (PARASTERNAL SHORT AXIS VIEW): 1 CM (ref 0.54–1)
INTERVENTRICULAR SEPTUM: 1 CM (ref 0.6–1.1)
LAAS-AP4: 16.3 CM2
LEFT ATRIUM AREA SYSTOLE SINGLE PLANE A4C: 17.8 CM2
LEFT ATRIUM SIZE: 4.6 CM
LEFT INTERNAL DIMENSION IN SYSTOLE: 3.8 CM (ref 3–4.54)
LEFT VENTRICLE DIASTOLIC VOLUME (MOD BIPLANE): 149 ML (ref 95.75–215.64)
LEFT VENTRICLE SYSTOLIC VOLUME (MOD BIPLANE): 88 ML
LEFT VENTRICULAR INTERNAL DIMENSION IN DIASTOLE: 5.2 CM (ref 4.93–7.34)
LEFT VENTRICULAR POSTERIOR WALL IN END DIASTOLE: 1 CM (ref 0.52–0.99)
LEFT VENTRICULAR STROKE VOLUME: 69 ML
LV EF: 41 %
LVSV (TEICH): 69 ML
MAGNESIUM SERPL-MCNC: 1.8 MG/DL (ref 1.6–2.6)
MV E'TISSUE VEL-LAT: 11 CM/S
MV E'TISSUE VEL-SEP: 7 CM/S
MV MEAN GRADIENT: 1 MMHG
MV PEAK A VEL: 0.89 M/S
MV PEAK E VEL: 95 CM/S
MV PEAK GRADIENT: 4 MMHG
MV STENOSIS PRESSURE HALF TIME: 37 MS
MV VALVE AREA BY CONTINUITY EQUATION: 2.5 CM2
MV VALVE AREA P 1/2 METHOD: 5.95 CM2
POTASSIUM SERPL-SCNC: 3.7 MMOL/L (ref 3.5–5.3)
RA PRESSURE ESTIMATED: 8 MMHG
RIGHT ATRIUM AREA SYSTOLE A4C: 18.9 CM2
RIGHT VENTRICLE ID DIMENSION: 3.6 CM
RV PSP: 58 MMHG
SL CV LV DIAS VOL ENDO Z SCORE: -0.22
SL CV LV EF: 45
SL CV PED ECHO LEFT VENTRICLE DIASTOLIC VOLUME (MOD BIPLANE) 2D: 131 ML
SL CV PED ECHO LEFT VENTRICLE SYSTOLIC VOLUME (MOD BIPLANE) 2D: 62 ML
SODIUM SERPL-SCNC: 134 MMOL/L (ref 136–145)
TR MAX PG: 50 MMHG
TR PEAK VELOCITY: 3.6 M/S
TRICUSPID VALVE PEAK REGURGITATION VELOCITY: 3.55 M/S
Z-SCORE OF INTERVENTRICULAR SEPTUM IN END DIASTOLE: 1.93
Z-SCORE OF LEFT VENTRICULAR DIMENSION IN END DIASTOLE: -1.46
Z-SCORE OF LEFT VENTRICULAR DIMENSION IN END SYSTOLE: 0.24
Z-SCORE OF LEFT VENTRICULAR POSTERIOR WALL IN END DIASTOLE: 2.04

## 2022-02-19 PROCEDURE — 82948 REAGENT STRIP/BLOOD GLUCOSE: CPT

## 2022-02-19 PROCEDURE — 93306 TTE W/DOPPLER COMPLETE: CPT

## 2022-02-19 PROCEDURE — 99223 1ST HOSP IP/OBS HIGH 75: CPT | Performed by: INTERNAL MEDICINE

## 2022-02-19 PROCEDURE — 93306 TTE W/DOPPLER COMPLETE: CPT | Performed by: INTERNAL MEDICINE

## 2022-02-19 PROCEDURE — 80048 BASIC METABOLIC PNL TOTAL CA: CPT | Performed by: NURSE PRACTITIONER

## 2022-02-19 PROCEDURE — 99232 SBSQ HOSP IP/OBS MODERATE 35: CPT | Performed by: NURSE PRACTITIONER

## 2022-02-19 PROCEDURE — 83735 ASSAY OF MAGNESIUM: CPT | Performed by: NURSE PRACTITIONER

## 2022-02-19 RX ORDER — INSULIN GLARGINE 100 [IU]/ML
6 INJECTION, SOLUTION SUBCUTANEOUS
Status: DISCONTINUED | OUTPATIENT
Start: 2022-02-19 | End: 2022-02-19

## 2022-02-19 RX ORDER — ACARBOSE 50 MG/1
100 TABLET ORAL
Status: DISCONTINUED | OUTPATIENT
Start: 2022-02-19 | End: 2022-02-19

## 2022-02-19 RX ORDER — POTASSIUM CHLORIDE 20 MEQ/1
40 TABLET, EXTENDED RELEASE ORAL ONCE
Status: COMPLETED | OUTPATIENT
Start: 2022-02-19 | End: 2022-02-19

## 2022-02-19 RX ADMIN — PANTOPRAZOLE SODIUM 40 MG: 40 TABLET, DELAYED RELEASE ORAL at 05:38

## 2022-02-19 RX ADMIN — ENOXAPARIN SODIUM 40 MG: 40 INJECTION SUBCUTANEOUS at 17:20

## 2022-02-19 RX ADMIN — BRIMONIDINE TARTRATE 1 DROP: 2 SOLUTION/ DROPS OPHTHALMIC at 10:23

## 2022-02-19 RX ADMIN — B-COMPLEX W/ C & FOLIC ACID TAB 1 TABLET: TAB at 10:24

## 2022-02-19 RX ADMIN — ATORVASTATIN CALCIUM 20 MG: 20 TABLET, FILM COATED ORAL at 17:21

## 2022-02-19 RX ADMIN — INSULIN LISPRO 3 UNITS: 100 INJECTION, SOLUTION INTRAVENOUS; SUBCUTANEOUS at 17:21

## 2022-02-19 RX ADMIN — TAMSULOSIN HYDROCHLORIDE 0.4 MG: 0.4 CAPSULE ORAL at 17:25

## 2022-02-19 RX ADMIN — INSULIN LISPRO 3 UNITS: 100 INJECTION, SOLUTION INTRAVENOUS; SUBCUTANEOUS at 12:18

## 2022-02-19 RX ADMIN — CARVEDILOL 12.5 MG: 12.5 TABLET, FILM COATED ORAL at 17:21

## 2022-02-19 RX ADMIN — Medication 2000 UNITS: at 10:24

## 2022-02-19 RX ADMIN — FUROSEMIDE 40 MG: 10 INJECTION, SOLUTION INTRAMUSCULAR; INTRAVENOUS at 10:23

## 2022-02-19 RX ADMIN — ASPIRIN 81 MG: 81 TABLET, COATED ORAL at 10:26

## 2022-02-19 RX ADMIN — BRIMONIDINE TARTRATE 1 DROP: 2 SOLUTION/ DROPS OPHTHALMIC at 18:15

## 2022-02-19 RX ADMIN — INSULIN LISPRO 1 UNITS: 100 INJECTION, SOLUTION INTRAVENOUS; SUBCUTANEOUS at 08:43

## 2022-02-19 RX ADMIN — OXYCODONE HYDROCHLORIDE AND ACETAMINOPHEN 500 MG: 500 TABLET ORAL at 10:24

## 2022-02-19 RX ADMIN — MAGNESIUM OXIDE TAB 400 MG (241.3 MG ELEMENTAL MG) 400 MG: 400 (241.3 MG) TAB at 17:21

## 2022-02-19 RX ADMIN — INSULIN LISPRO 2 UNITS: 100 INJECTION, SOLUTION INTRAVENOUS; SUBCUTANEOUS at 22:28

## 2022-02-19 RX ADMIN — CARVEDILOL 12.5 MG: 12.5 TABLET, FILM COATED ORAL at 10:24

## 2022-02-19 RX ADMIN — Medication 100 MG: at 10:24

## 2022-02-19 RX ADMIN — POTASSIUM CHLORIDE 40 MEQ: 1500 TABLET, EXTENDED RELEASE ORAL at 17:21

## 2022-02-19 RX ADMIN — FLUDROCORTISONE ACETATE 0.1 MG: 0.1 TABLET ORAL at 10:23

## 2022-02-19 NOTE — ASSESSMENT & PLAN NOTE
On Coreg at home  Will continue  BP slight elevated  Will order hydralazine p r n    Avoid hypertension due to history of orthostatic hypotension

## 2022-02-19 NOTE — ASSESSMENT & PLAN NOTE
Lab Results   Component Value Date    EGFR 84 02/19/2022    EGFR 79 02/18/2022    EGFR 78 12/27/2021    CREATININE 0 85 02/19/2022    CREATININE 0 92 02/18/2022    CREATININE 0 93 12/27/2021   Baseline hemoglobin 9-10  Hemoglobin stable  Monitor

## 2022-02-19 NOTE — ASSESSMENT & PLAN NOTE
Lab Results   Component Value Date    HGBA1C 7 1 (H) 12/27/2021       Recent Labs     02/18/22  1653 02/18/22 2027 02/19/22  0742 02/19/22  1132   POCGLU 216* 231* 188* 257*       Blood Sugar Average: Last 72 hrs:  (P) 222  Glucose 247 on admission  Patient is on metformin 1 g in the morning, 500 mg in the evening  Hold while inpatient  On Precose at home as well  Continue  Patient brought it from home  Last A1c in December last year 7 1  Continue SSI  Start low-dose Lantus due to elevated glucose     Diabetic diet

## 2022-02-19 NOTE — ASSESSMENT & PLAN NOTE
Noted history  Continue Florinef  Avoid hypotension  Cardiology recommend to consider discontinuing Florinef due to side effect of fluid retention

## 2022-02-19 NOTE — ASSESSMENT & PLAN NOTE
Wt Readings from Last 3 Encounters:   02/19/22 81 6 kg (180 lb)   02/11/22 81 2 kg (179 lb)   02/08/22 82 6 kg (182 lb)     Patient presents with SOB and leg edema for about one week, worsening  Does not take diuretic at home  2+ lower leg edema bilateral on exam  Chest x-ray in ED showed- Small bilateral pleural effusions and pulmonary edema, new since the prior study  ProBNP 3439  2D echo in October 2020 showed normal EF, grade 1 diastolic dysfunction, PA pressure 25  Patient received Lasix 60 mg IV in ED  Ordered Lasix 40 mg IV daily    Continue Coreg from home  Update 2D echo  Daily weight and I&Os  Check venous started right DVT  Consult Cardiology

## 2022-02-19 NOTE — ASSESSMENT & PLAN NOTE
Wt Readings from Last 3 Encounters:   02/19/22 81 6 kg (180 lb)   02/11/22 81 2 kg (179 lb)   02/08/22 82 6 kg (182 lb)     Patient presents with SOB and leg edema for about one week, worsening  Does not take diuretic at home  2+ lower leg edema bilateral on exam  Chest x-ray in ED showed- Small bilateral pleural effusions and pulmonary edema, new since the prior study  ProBNP 3439  2D echo in October 2020 showed normal EF, grade 1 diastolic dysfunction, PA pressure 25  Repeat 2D echo showed EF 28%, grade 1 diastolic dysfunction, mild global hypokinesis, mildly dilated atriums, moderate MR and TR, PA pressure 58  Telemetry normal sinus rhythm  Patient received Lasix 60 mg IV in ED  Continue Lasix 40 mg IV daily  Continue Coreg from home  Daily weight and I&Os  Neg 5L since admission  Lower venous doppler pending  Cardiology following    For nuclear stress test in the morning

## 2022-02-19 NOTE — ASSESSMENT & PLAN NOTE
Status post multivessel stenting  Continue Coreg, Lipitor, aspirin    Troponin x3 unremarkable  For nuclear stress test tomorrow

## 2022-02-19 NOTE — PROGRESS NOTES
Lucina 44  Progress Note Cristine Webb [id number], 67 y o  male MRN: [id number]  Unit/Bed#: [address] [place] [address] Encounter: [id number]  Primary Care Provider: Loretta Moore DO   Date and time admitted to hospital: 2/18/2022  9:12 AM    * Acute on chronic diastolic congestive heart failure [facility])  Assessment & Plan  Wt Readings from Last 3 Encounters:   02/19/22 81 6 kg (180 lb)   02/11/22 81 2 kg (179 lb)   02/08/22 82 6 kg (182 lb)     Patient presents with SOB and leg edema for about one week, worsening  Does not take diuretic at home  2+ lower leg edema bilateral on exam  Chest x-ray in ED showed- Small bilateral pleural effusions and pulmonary edema, new since the prior study  ProBNP 3439  2D echo in October 2020 showed normal EF, grade 1 diastolic dysfunction, PA pressure 25  Patient received Lasix 60 mg IV in ED  Ordered Lasix 40 mg IV daily  Continue Coreg from home  Update 2D echo  Daily weight and I&Os  Check venous started right DVT  Consult Cardiology          Type 2 diabetes mellitus with diabetic neuropathy [facility])  Assessment & Plan  Lab Results   Component Value Date    HGBA1C 7 1 (H) 12/27/2021       Recent Labs     02/18/22  1653 02/18/22 2027 02/19/22  0742 02/19/22  1132   POCGLU 216* 231* 188* 257*       Blood Sugar Average: Last 72 hrs:  (P) 222  Glucose 247  Patient is on metformin 1 g in the morning, 500 mg in the evening  Hold while inpatient  On Precose at home as well  Hold while inpatient  Last A1c in December last year 7 1  Continue SSI  Start low-dose Lantus  Diabetic diet      Chronic right-sided low back pain without sciatica  Assessment & Plan  Noted history    Orthostatic hypotension  Assessment & Plan  Noted history  Continue Florinef  Avoid hypotension    Prostate cancer [facility])  Assessment & Plan  Noted history  Status post RT with ongoing ADT    Outpatient Urology follow-up    Anemia due to stage 3a chronic kidney disease [facility])  Assessment & Plan  Lab Results   Component Value Date    EGFR 84 02/19/2022    EGFR 79 02/18/2022    EGFR 78 12/27/2021    CREATININE 0 85 02/19/2022    CREATININE 0 92 02/18/2022    CREATININE 0 93 12/27/2021   Baseline hemoglobin 9-10  Hemoglobin stable  Monitor    Benign essential hypertension  Assessment & Plan  On Coreg at home  Will continue  BP slight elevated  Will order hydralazine p r n  Avoid hypertension due to history of orthostatic hypotension    Dyslipidemia  Assessment & Plan  Continue statin    GE reflux  Assessment & Plan  Continue PPI    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 mellitus ([facility] [address] )  Assessment & Plan  Baseline creatinine appears to be around 0 8-1 1  Creatinine stable  Monitor    CAD (coronary artery disease)  Assessment & Plan  Status post multivessel stenting  Continue Coreg, Lipitor, aspirin  Troponin x3 unremarkable    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)  Assessment & Plan  S/P R Fem to AK pop bypass and B SFA occlusions, status post bilateral SFA stenting with occlusion  Continue aspirin, Lipitor  Follows vascular as outpatient      VTE Pharmacologic Prophylaxis:   Pharmacologic: Enoxaparin (Lovenox)  Mechanical VTE Prophylaxis in Place: No    Patient Centered Rounds: I have performed bedside rounds with nursing staff today  Discussions with Specialists or Other Care Team Provider:  Yes    Education and Discussions with Family / Patient:  Yes    Time Spent for Care: 20 minutes  More than 50% of total time spent on counseling and coordination of care as described above  Current Length of Stay: 1 day(s)    Current Patient Status: Inpatient   Certification Statement: The patient will continue to require additional inpatient hospital stay due to CHF    Discharge Plan:  Home once medically cleared    Code Status: Prior      Subjective:   Patient reports feeling better  Some SOB with movement    Denies cough, fever, chills, chest pain, headache dizziness, nausea vomiting  Objective:     Vitals:   Temp (24hrs), Av 7 °F (36 5 °C), Min:97 5 °F (36 4 °C), Max:97 9 °F (36 6 °C)    Temp:  [97 5 °F (36 4 °C)-97 9 °F (36 6 °C)] 97 9 °F (36 6 °C)  HR:  [77-90] 77  Resp:  [16-18] 18  BP: (137-157)/(66-71) 157/71  SpO2:  [98 %] 98 %  Body mass index is 25 1 kg/m²  Input and Output Summary (last 24 hours): Intake/Output Summary (Last 24 hours) at 2022 1536  Last data filed at 2022 1246  Gross per 24 hour   Intake --   Output 2250 ml   Net -2250 ml       Physical Exam:     Physical Exam  Vitals and nursing note reviewed  Constitutional:       Appearance: He is well-developed  HENT:      Head: Normocephalic and atraumatic  Neck:      Thyroid: No thyromegaly  Vascular: No JVD  Trachea: No tracheal deviation  Cardiovascular:      Rate and Rhythm: Normal rate and regular rhythm  Heart sounds: Normal heart sounds  Comments: Telemetry NSR  Pulmonary:      Effort: Pulmonary effort is normal  No respiratory distress  Breath sounds: No wheezing or rales  Comments: Diminished breath sounds lower lobes, on O2 1 L, satting high 90s  Abdominal:      General: Bowel sounds are normal  There is no distension  Palpations: Abdomen is soft  Tenderness: There is no abdominal tenderness  There is no guarding  Musculoskeletal:         General: Swelling present  Cervical back: Neck supple  Comments: 2+ lower leg edema below knees  Skin:     General: Skin is warm and dry  Neurological:      General: No focal deficit present  Mental Status: He is alert and oriented to person, place, and time     Psychiatric:         Mood and Affect: Mood normal          Judgment: Judgment normal          Additional Data:     Labs:    Results from last 7 days   Lab Units 22  0944   WBC Thousand/uL 8 40   HEMOGLOBIN g/dL 9 3*   HEMATOCRIT % 30 2*   PLATELETS Thousands/uL 266   NEUTROS PCT % 83*   LYMPHS PCT % 3* MONOS PCT % 10   EOS PCT % 2     Results from last 7 days   Lab Units 02/19/22  0554 02/18/22  0944 02/18/22  0944   POTASSIUM mmol/L 3 7   < > 3 9   CHLORIDE mmol/L 99*   < > 99*   CO2 mmol/L 27   < > 26   BUN mg/dL 18   < > 19   CREATININE mg/dL 0 85   < > 0 92   CALCIUM mg/dL 8 4   < > 8 5   ALK PHOS U/L  --   --  89   ALT U/L  --   --  19   AST U/L  --   --  10    < > = values in this interval not displayed  Results from last 7 days   Lab Units 02/18/22  0945   INR  1 06       * I Have Reviewed All Lab Data Listed Above  * Additional Pertinent Lab Tests Reviewed:  Laxmi 65 Admission Reviewed    Imaging:    Imaging Reports Reviewed Today Include:  None  Imaging Personally Reviewed by Myself Includes:  None    Recent Cultures (last 7 days):           Last 24 Hours Medication List:   Current Facility-Administered Medications   Medication Dose Route Frequency Provider Last Rate    acetaminophen  650 mg Oral Q6H PRN NEO Camacho      ascorbic acid  500 mg Oral Daily Gaston Purcell, CRJESSICA      aspirin  81 mg Oral Daily Cuiona Purcell, CRJESSICA      atorvastatin  20 mg Oral Daily With NEO Fishman      brimonidine tartrate  1 drop Both Eyes BID NEO Camacho      carvedilol  12 5 mg Oral BID Gaston Purcell, CRJESSICA      cholecalciferol  2,000 Units Oral Daily NEO Camacho      co-enzyme Q-10  100 mg Oral Daily NEO Camacho      enoxaparin  40 mg Subcutaneous Q24H [address] Cuiyilali Purcell, CRJESSICA      fludrocortisone  0 1 mg Oral Daily Cuiona Purcell, CRJESSICA      furosemide  40 mg Intravenous Daily NEO Camacho      hydrALAZINE  10 mg Oral Q6H PRN NEO Camacho      insulin glargine  6 Units Subcutaneous HS Gaston Purcell, NEO      insulin lispro  1-5 Units Subcutaneous HS Gaston Purcell, NEO      insulin lispro  1-6 Units Subcutaneous TID AC NEO Camacho      magnesium oxide  400 mg Oral Once Cuideedee Yurik, CRNP      multivitamin stress formula  1 tablet Oral Daily NEO Camacho      pantoprazole  40 mg Oral Early Morning NEO Camacho      potassium chloride  40 mEq Oral Once NEO Camacho      tamsulosin  0 4 mg Oral Daily With Carlos & NEO Manning          Today, Patient Was Seen By: NEO Escobar    ** Please Note: Dragon 360 Dictation voice to text software may have been used in the creation of this document   **

## 2022-02-19 NOTE — CONSULTS
Consultation - Cardiology   Daren Raygoza 67 y o  male MRN: [id number]  Unit/Bed#: [address] [place] [address] Encounter: [id number]  02/19/22  11:00 AM          Physician Requesting Consult: Alfredo Zuñiga DO  Reason for Consult / Principal Problem: CHF      Assessment:  1  Acute on chronic diastolic CHF - patient has no history of CHF exacerbations  Chest x-ray is consistent with pulmonary edema  He does feel improvement with IV Lasix given in the emergency room  Will continue IV Lasix for now  - echocardiogram showed ejection fraction of 45% which is reduced compared to his previous study  He also has elevated pulmonary pressure and a pleural effusion  He does not have pericardial effusion   - recommend repeating chest x-ray in a m  With PA and lateral films to reassess effusion size  Appeared small on portable chest x-ray film  2  Coronary artery disease  - continue aspirin and atorvastatin  No evidence of acute coronary syndrome at this time  3  Dyslipidemia - continue atorvastatin 20 mg daily    4  Orthostatic hypotension - patient does take Florinef 0 1 mg daily which may cause fluid retention  He has been on this medication for a long time  Consider discontinuing as outpatient  5  Severe peripheral vascular disease - patient is overall free of claudication at this time  - continue aspirin and atorvastatin    6  Diabetes - patient on insulin  Plan:  As above      History of Present Illness   HPI: Daren Raygoza is a 67y o  year old male who presents with shortness of breath  Patient notes worsening dyspnea and LE edema along with orthopnea over the past week  In the emergency room, BNP was elevated to 3439  Troponin was negative  ECG showed sinus rhythm with first-degree AV block  Chest x-ray was done which showed pulmonary edema along with small bilateral pleural effusions  Patient has a history of coronary disease and peripheral vascular disease    He was following with   Carlos but recently switched to Dr Emani Winston  He has chronic leg pain secondary to peripheral vascular disease  His last echocardiogram was in October 2020 which showed ejection fraction of 60% with grade 1 diastolic dysfunction and mild valve disease  He also has a history of chronic kidney disease, hyponatremia and orthostatic hypotension  He is on Florinef 0 1 mg daily  Does not use  Diuretics  Review of Systems:    Review of Systems   Respiratory: Positive for shortness of breath  Cardiovascular: Positive for leg swelling  Negative for chest pain and palpitations  Musculoskeletal: Positive for arthralgias and myalgias  All other systems reviewed and are negative        Historical Information   Past Medical History:   Diagnosis Date    Acquired hallux valgus     last assessed: 8/1/2013    Allergic rhinitis     Anemia 10/26/2010    Atrophy of nail     last assessed: 7/7/2015    Chronic kidney disease     chronic renal insufficiency    Coronary artery disease     Deformity of ankle and foot, acquired     last assessed: 2/22/2016    Difficulty walking     last assessed: 12/14/2015    Dysesthesia     last assessed: 11/25/2016    Hammer toe     unspecified laterality; last assessed: 8/1/2013    Hypertension     Onychomycosis of toenail     last assessed: 2/22/2016    Peripheral vascular disease ([facility] )     left SFA stent in 2010    Pes planus     unspecified laterality; last assessed: 8/1/2013    Pneumonia     last assessed: 2/27/2016    Prostate cancer ([facility] )     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     last assessed: 8/15/2016    Type 2 diabetes mellitus ([facility] ) 07/26/2010    Viral warts     last assessed: 7/24/2015     Past Surgical History:   Procedure Laterality Date    CARDIAC CATHETERIZATION  07/29/2010    CATARACT EXTRACTION, BILATERAL Bilateral     R 1999, L 2008    COLONOSCOPY  2011    FEMORAL ARTERY - POPLITEAL ARTERY BYPASS GRAFT  09/23/2013    FOOT SURGERY bone spur removed    LEG SURGERY Bilateral     repair; L 8/20/2010 and 7/26/2012    IN PLACE RADIOTHER DEVICE/MARKER, PROSTATE N/A 6/22/2021    Procedure: INSERTION OF FIDUCIAL MARKER (TRANSRECTAL ULTRASOUND GUIDANCE), SPACEOAR;  Surgeon: Norma Bell MD;  Location: [facility];  Service: Urology    ROTATOR CUFF REPAIR Left 2009    SHOULDER SURGERY Right 2005    collar bone     Social History     Substance and Sexual Activity   Alcohol Use Yes    Comment: being a social drinker     Social History     Substance and Sexual Activity   Drug Use No     Social History     Tobacco Use   Smoking Status Never Smoker   Smokeless Tobacco Never Used       Family History:   Family History   Problem Relation Age of Onset    Heart disease Father     Heart attack Father         acute myocardial infarction    Heart disease Sister     Heart attack Sister         acute myocardial infarction    Heart disease Paternal Grandfather     Aortic aneurysm Mother     Throat cancer Maternal Grandfather        Meds/Allergies   current meds:   Current Facility-Administered Medications   Medication Dose Route Frequency    acarbose (PRECOSE) tablet 100 mg  100 mg Oral TID With Meals    acetaminophen (TYLENOL) tablet 650 mg  650 mg Oral Q6H PRN    ascorbic acid (VITAMIN C) tablet 500 mg  500 mg Oral Daily    aspirin (ECOTRIN LOW STRENGTH) EC tablet 81 mg  81 mg Oral Daily    atorvastatin (LIPITOR) tablet 20 mg  20 mg Oral Daily With Dinner    brimonidine tartrate 0 2 % ophthalmic solution 1 drop  1 drop Both Eyes BID    carvedilol (COREG) tablet 12 5 mg  12 5 mg Oral BID    cholecalciferol (VITAMIN D3) tablet 2,000 Units  2,000 Units Oral Daily    co-enzyme Q-10 capsule 100 mg  100 mg Oral Daily    enoxaparin (LOVENOX) subcutaneous injection 40 mg  40 mg Subcutaneous Q24H [facility]    fludrocortisone (FLORINEF) tablet 0 1 mg  0 1 mg Oral Daily    furosemide (LASIX) injection 40 mg  40 mg Intravenous Daily    hydrALAZINE (APRESOLINE) tablet 10 mg  10 mg Oral Q6H PRN    insulin lispro (HumaLOG) 100 units/mL subcutaneous injection 1-5 Units  1-5 Units Subcutaneous HS    insulin lispro (HumaLOG) 100 units/mL subcutaneous injection 1-6 Units  1-6 Units Subcutaneous TID AC    multivitamin stress formula tablet 1 tablet  1 tablet Oral Daily    pantoprazole (PROTONIX) EC tablet 40 mg  40 mg Oral Early Morning    tamsulosin (FLOMAX) capsule 0 4 mg  0 4 mg Oral Daily With Dinner    and PTA meds:   Prior to Admission Medications   Prescriptions Last Dose Informant Patient Reported? Taking?    Ascorbic Acid (Vitamin C) 500 MG CAPS 2/18/2022 at Unknown time Self Yes Yes   Sig: Take 500 mg by mouth daily   Cholecalciferol (Vitamin D3) 50 MCG (2000 UT) TABS 2/18/2022 at Unknown time Self Yes Yes   Sig: Take 2,000 Units by mouth daily   Probiotic Product (CULTURELLE PROBIOTICS) CHEW 2/18/2022 at Unknown time Self Yes Yes   Sig: Chew daily   TRUEPLUS LANCETS 28G MISC 2/18/2022 at Unknown time Self No Yes   Sig: Test 1x/d, E11 29   acarbose (PRECOSE) 100 MG tablet 2/18/2022 at Unknown time Self No Yes   Sig: Take 1 tablet (100 mg total) by mouth 3 (three) times a day with meals   aspirin (ECOTRIN LOW STRENGTH) 81 mg EC tablet 2/18/2022 at Unknown time Self Yes Yes   Sig: Take 81 mg by mouth daily   atorvastatin (LIPITOR) 20 mg tablet 2/17/2022 at Unknown time  No Yes   Sig: Take 1 tablet (20 mg total) by mouth daily   brimonidine tartrate 0 2 % ophthalmic solution  Self Yes No   Sig: Inject 0 2 % into the eye 2 (two) times a day   carvedilol (COREG) 12 5 mg tablet 2/18/2022 at Unknown time Self No Yes   Sig: Take 1 tablet (12 5 mg total) by mouth 2 (two) times a day   co-enzyme Q-10 100 mg capsule 2/18/2022 at Unknown time Self Yes Yes   Sig: Take 100 mg by mouth daily   fludrocortisone (FLORINEF) 0 1 mg tablet 2/18/2022 at Unknown time  Yes Yes   Sig: Take 0 1 mg by mouth daily   gabapentin (NEURONTIN) 600 MG tablet  Self No No   Sig: Take 1 tablet (600 mg total) by mouth 2 (two) times a day   glucose blood test strip 2/18/2022 at Unknown time Self No Yes   Sig: Test blood sugar twice a day   metFORMIN (GLUCOPHAGE) 1000 MG tablet 2/18/2022 at Unknown time Self No Yes   Sig: Take one tablet orally 3 times a day   Patient taking differently: TAKE 1000 MG  IN THE AM, TAKE 500 MG  IN THE PM   multivitamin (THERAGRAN) TABS 2/18/2022 at Unknown time Self Yes Yes   Sig: Take 1 tablet by mouth daily   omeprazole (PriLOSEC) 40 MG capsule 2/18/2022 at Unknown time Self Yes Yes   Sig: Take 40 mg by mouth daily   potassium chloride (K-DUR,KLOR-CON) 10 mEq tablet   No No   Sig: Take 1 tablet (10 mEq total) by mouth daily for 6 doses   tamsulosin (FLOMAX) 0 4 mg 2/17/2022 at Unknown time Self No Yes   Sig: Take 1 capsule (0 4 mg total) by mouth daily with dinner      Facility-Administered Medications: None     No Known Allergies    Objective   Vitals: Blood pressure 152/69, pulse 78, temperature 97 7 °F (36 5 °C), temperature source Oral, resp  rate 17, height 5' 11" (1 803 m), weight 81 7 kg (180 lb 1 9 oz), SpO2 98 %  , Body mass index is 25 12 kg/m²  Physical Exam   Constitutional: He appears healthy  No distress  Eyes: Pupils are equal, round, and reactive to light  Conjunctivae are normal    Neck: No JVD present  Cardiovascular: Normal rate, regular rhythm and normal heart sounds  Exam reveals no gallop and no friction rub  No murmur heard  Pulmonary/Chest: Effort normal and breath sounds normal  He has no wheezes  He has no rales  Decreased breath sounds bilateral bases   Musculoskeletal:         General: Edema present  No tenderness or deformity  Cervical back: Normal range of motion and neck supple  Neurological: He is alert and oriented to person, place, and time  Skin: Skin is warm and dry         Lab Results:     Troponins:       CBC with diff:   Results from last 7 days   Lab Units 02/18/22  0944   WBC Thousand/uL 8 40   HEMOGLOBIN g/dL 9 3*   HEMATOCRIT % 30 2*   MCV fL 87   PLATELETS Thousands/uL 266   MCH pg 26 8   MCHC g/dL 30 8*   RDW % 16 5*   MPV fL 9 8   NRBC AUTO /100 WBCs 0       CMP:   Results from last 7 days   Lab Units 02/19/22  0554 02/18/22  0944   POTASSIUM mmol/L 3 7 3 9   CHLORIDE mmol/L 99* 99*   CO2 mmol/L 27 26   BUN mg/dL 18 19   CREATININE mg/dL 0 85 0 92   CALCIUM mg/dL 8 4 8 5   AST U/L  --  10   ALT U/L  --  19   ALK PHOS U/L  --  89   EGFR ml/min/1 73sq m 84 79       Magnesium:   Results from last 7 days   Lab Units 02/19/22  0554 02/18/22  0944   MAGNESIUM mg/dL 1 8 1 6       Coags:   Results from last 7 days   Lab Units 02/18/22  0945   PTT seconds 27   INR  1 06       Lipid Profile:         Cardiac testing: All previous testing has been reviewed  See HPI for summary        EKG: Personally reviewed:  Normal sinus rhythm with first-degree AV block    Imaging: I have personally reviewed pertinent films in PACS

## 2022-02-19 NOTE — ASSESSMENT & PLAN NOTE
Lab Results   Component Value Date    HGBA1C 7 1 (H) 12/27/2021       Recent Labs     02/18/22  1653 02/18/22 2027 02/19/22  0742 02/19/22  1132   POCGLU 216* 231* 188* 257*       Blood Sugar Average: Last 72 hrs:  (P) 222  Glucose 247  Patient is on metformin 1 g in the morning, 500 mg in the evening  Hold while inpatient  On Precose at home as well  Not available inpatient    Last A1c in December last year 7 1  Continue SSI  Diabetic diet

## 2022-02-19 NOTE — PLAN OF CARE
Problem: MOBILITY - ADULT  Goal: Maintain or return to baseline ADL function  Description: INTERVENTIONS:  -  Assess patient's ability to carry out ADLs; assess patient's baseline for ADL function and identify physical deficits which impact ability to perform ADLs (bathing, care of mouth/teeth, toileting, grooming, dressing, etc )  - Assess/evaluate cause of self-care deficits   - Assess range of motion  - Assess patient's mobility; develop plan if impaired  - Assess patient's need for assistive devices and provide as appropriate  - Encourage maximum independence but intervene and supervise when necessary  - Involve family in performance of ADLs  - Assess for home care needs following discharge   - Consider OT consult to assist with ADL evaluation and planning for discharge  - Provide patient education as appropriate  Outcome: Progressing     Problem: PAIN - ADULT  Goal: Verbalizes/displays adequate comfort level or baseline comfort level  Description: Interventions:  - Encourage patient to monitor pain and request assistance  - Assess pain using appropriate pain scale  - Administer analgesics based on type and severity of pain and evaluate response  - Implement non-pharmacological measures as appropriate and evaluate response  - Consider cultural and social influences on pain and pain management  - Notify physician/advanced practitioner if interventions unsuccessful or patient reports new pain  Outcome: Progressing     Problem: DISCHARGE PLANNING  Goal: Discharge to home or other facility with appropriate resources  Description: INTERVENTIONS:  - Identify barriers to discharge w/patient and caregiver  - Arrange for needed discharge resources and transportation as appropriate  - Identify discharge learning needs (meds, wound care, etc )  - Arrange for interpretive services to assist at discharge as needed  - Refer to Case Management Department for coordinating discharge planning if the patient needs post-hospital services based on physician/advanced practitioner order or complex needs related to functional status, cognitive ability, or social support system  Outcome: Progressing     Problem: Knowledge Deficit  Goal: Patient/family/caregiver demonstrates understanding of disease process, treatment plan, medications, and discharge instructions  Description: Complete learning assessment and assess knowledge base    Interventions:  - Provide teaching at level of understanding  - Provide teaching via preferred learning methods  Outcome: Progressing     Problem: CARDIOVASCULAR - ADULT  Goal: Maintains optimal cardiac output and hemodynamic stability  Description: INTERVENTIONS:  - Monitor I/O, vital signs and rhythm  - Monitor for S/S and trends of decreased cardiac output  - Administer and titrate ordered vasoactive medications to optimize hemodynamic stability  - Assess quality of pulses, skin color and temperature  - Assess for signs of decreased coronary artery perfusion  - Instruct patient to report change in severity of symptoms  Outcome: Progressing  Goal: Absence of cardiac dysrhythmias or at baseline rhythm  Description: INTERVENTIONS:  - Continuous cardiac monitoring, vital signs, obtain 12 lead EKG if ordered  - Administer antiarrhythmic and heart rate control medications as ordered  - Monitor electrolytes and administer replacement therapy as ordered  Outcome: Progressing     Problem: CARDIOVASCULAR - ADULT  Goal: Absence of cardiac dysrhythmias or at baseline rhythm  Description: INTERVENTIONS:  - Continuous cardiac monitoring, vital signs, obtain 12 lead EKG if ordered  - Administer antiarrhythmic and heart rate control medications as ordered  - Monitor electrolytes and administer replacement therapy as ordered  Outcome: Progressing

## 2022-02-19 NOTE — ASSESSMENT & PLAN NOTE
On Coreg at home  Will continue  BP slight elevated  Will order hydralazine p r n    Avoid hypotension due to history of orthostatic hypotension

## 2022-02-20 ENCOUNTER — APPOINTMENT (INPATIENT)
Dept: RADIOLOGY | Facility: HOSPITAL | Age: 77
DRG: 291 | End: 2022-02-20
Payer: MEDICARE

## 2022-02-20 LAB
ANION GAP SERPL CALCULATED.3IONS-SCNC: 7 MMOL/L (ref 4–13)
BUN SERPL-MCNC: 22 MG/DL (ref 5–25)
CALCIUM SERPL-MCNC: 8.9 MG/DL (ref 8.3–10.1)
CHLORIDE SERPL-SCNC: 101 MMOL/L (ref 100–108)
CO2 SERPL-SCNC: 29 MMOL/L (ref 21–32)
CREAT SERPL-MCNC: 0.94 MG/DL (ref 0.6–1.3)
GFR SERPL CREATININE-BSD FRML MDRD: 77 ML/MIN/1.73SQ M
GLUCOSE SERPL-MCNC: 164 MG/DL (ref 65–140)
GLUCOSE SERPL-MCNC: 207 MG/DL (ref 65–140)
GLUCOSE SERPL-MCNC: 217 MG/DL (ref 65–140)
GLUCOSE SERPL-MCNC: 259 MG/DL (ref 65–140)
GLUCOSE SERPL-MCNC: 263 MG/DL (ref 65–140)
MAGNESIUM SERPL-MCNC: 2.1 MG/DL (ref 1.6–2.6)
POTASSIUM SERPL-SCNC: 4 MMOL/L (ref 3.5–5.3)
SODIUM SERPL-SCNC: 137 MMOL/L (ref 136–145)

## 2022-02-20 PROCEDURE — 80048 BASIC METABOLIC PNL TOTAL CA: CPT | Performed by: NURSE PRACTITIONER

## 2022-02-20 PROCEDURE — 83735 ASSAY OF MAGNESIUM: CPT | Performed by: NURSE PRACTITIONER

## 2022-02-20 PROCEDURE — 71046 X-RAY EXAM CHEST 2 VIEWS: CPT

## 2022-02-20 PROCEDURE — 99232 SBSQ HOSP IP/OBS MODERATE 35: CPT | Performed by: NURSE PRACTITIONER

## 2022-02-20 PROCEDURE — 99232 SBSQ HOSP IP/OBS MODERATE 35: CPT | Performed by: INTERNAL MEDICINE

## 2022-02-20 PROCEDURE — 82948 REAGENT STRIP/BLOOD GLUCOSE: CPT

## 2022-02-20 RX ORDER — INSULIN GLARGINE 100 [IU]/ML
5 INJECTION, SOLUTION SUBCUTANEOUS
Status: DISCONTINUED | OUTPATIENT
Start: 2022-02-20 | End: 2022-02-24 | Stop reason: HOSPADM

## 2022-02-20 RX ADMIN — OXYCODONE HYDROCHLORIDE AND ACETAMINOPHEN 500 MG: 500 TABLET ORAL at 09:08

## 2022-02-20 RX ADMIN — CARVEDILOL 12.5 MG: 12.5 TABLET, FILM COATED ORAL at 09:08

## 2022-02-20 RX ADMIN — INSULIN LISPRO 2 UNITS: 100 INJECTION, SOLUTION INTRAVENOUS; SUBCUTANEOUS at 09:11

## 2022-02-20 RX ADMIN — BRIMONIDINE TARTRATE 1 DROP: 2 SOLUTION/ DROPS OPHTHALMIC at 09:10

## 2022-02-20 RX ADMIN — ATORVASTATIN CALCIUM 20 MG: 20 TABLET, FILM COATED ORAL at 17:41

## 2022-02-20 RX ADMIN — PANTOPRAZOLE SODIUM 40 MG: 40 TABLET, DELAYED RELEASE ORAL at 05:18

## 2022-02-20 RX ADMIN — B-COMPLEX W/ C & FOLIC ACID TAB 1 TABLET: TAB at 09:07

## 2022-02-20 RX ADMIN — BRIMONIDINE TARTRATE 1 DROP: 2 SOLUTION/ DROPS OPHTHALMIC at 17:44

## 2022-02-20 RX ADMIN — ENOXAPARIN SODIUM 40 MG: 40 INJECTION SUBCUTANEOUS at 17:41

## 2022-02-20 RX ADMIN — INSULIN LISPRO 3 UNITS: 100 INJECTION, SOLUTION INTRAVENOUS; SUBCUTANEOUS at 17:45

## 2022-02-20 RX ADMIN — FUROSEMIDE 40 MG: 10 INJECTION, SOLUTION INTRAMUSCULAR; INTRAVENOUS at 09:08

## 2022-02-20 RX ADMIN — TAMSULOSIN HYDROCHLORIDE 0.4 MG: 0.4 CAPSULE ORAL at 17:41

## 2022-02-20 RX ADMIN — INSULIN LISPRO 2 UNITS: 100 INJECTION, SOLUTION INTRAVENOUS; SUBCUTANEOUS at 12:59

## 2022-02-20 RX ADMIN — FLUDROCORTISONE ACETATE 0.1 MG: 0.1 TABLET ORAL at 09:11

## 2022-02-20 RX ADMIN — ASPIRIN 81 MG: 81 TABLET, COATED ORAL at 09:08

## 2022-02-20 RX ADMIN — INSULIN GLARGINE 5 UNITS: 100 INJECTION, SOLUTION SUBCUTANEOUS at 21:18

## 2022-02-20 RX ADMIN — INSULIN LISPRO 2 UNITS: 100 INJECTION, SOLUTION INTRAVENOUS; SUBCUTANEOUS at 21:18

## 2022-02-20 RX ADMIN — Medication 100 MG: at 09:08

## 2022-02-20 RX ADMIN — Medication 2000 UNITS: at 09:07

## 2022-02-20 RX ADMIN — CARVEDILOL 12.5 MG: 12.5 TABLET, FILM COATED ORAL at 17:41

## 2022-02-20 NOTE — PROGRESS NOTES
Lucina 44  Progress Note Kai Bloodgood [birth year], 67 y o  male MRN: [id number]  Unit/Bed#: [address] [place] [address] Encounter: [id number]  Primary Care Provider: Lynn Yang DO   Date and time admitted to hospital: 2/18/2022  9:12 AM    * Acute on chronic diastolic congestive heart failure [facility])  Assessment & Plan  Wt Readings from Last 3 Encounters:   02/19/22 81 6 kg (180 lb)   02/11/22 81 2 kg (179 lb)   02/08/22 82 6 kg (182 lb)     Patient presents with SOB and leg edema for about one week, worsening  Does not take diuretic at home  2+ lower leg edema bilateral on exam  Chest x-ray in ED showed- Small bilateral pleural effusions and pulmonary edema, new since the prior study  ProBNP 3439  2D echo in October 2020 showed normal EF, grade 1 diastolic dysfunction, PA pressure 25  Repeat 2D echo showed EF 36%, grade 1 diastolic dysfunction, mild global hypokinesis, mildly dilated atriums, moderate MR and TR, PA pressure 58  Telemetry normal sinus rhythm  Patient received Lasix 60 mg IV in ED  Continue Lasix 40 mg IV daily  Continue Coreg from home  Daily weight and I&Os  Neg 5L since admission  Lower venous doppler pending  Cardiology following  For nuclear stress test in the morning          Type 2 diabetes mellitus with diabetic neuropathy [facility])  Assessment & Plan  Lab Results   Component Value Date    HGBA1C 7 1 (H) 12/27/2021       Recent Labs     02/18/22  1653 02/18/22 2027 02/19/22  0742 02/19/22  1132   POCGLU 216* 231* 188* 257*       Blood Sugar Average: Last 72 hrs:  (P) 222  Glucose 247 on admission  Patient is on metformin 1 g in the morning, 500 mg in the evening  Hold while inpatient  On Precose at home as well  Continue  Patient brought it from home  Last A1c in December last year 7 1  Continue SSI  Start low-dose Lantus due to elevated glucose     Diabetic diet      Chronic right-sided low back pain without sciatica  Assessment & Plan  Noted history    Orthostatic hypotension  Assessment & Plan  Noted history  Continue Florinef  Avoid hypotension  Cardiology recommend to consider discontinuing Florinef due to side effect of fluid retention  Prostate cancer [facility])  Assessment & Plan  Noted history  Status post RT with ongoing ADT  Outpatient Urology follow-up    Anemia due to stage 3a chronic kidney disease [facility])  Assessment & Plan  Lab Results   Component Value Date    EGFR 84 02/19/2022    EGFR 79 02/18/2022    EGFR 78 12/27/2021    CREATININE 0 85 02/19/2022    CREATININE 0 92 02/18/2022    CREATININE 0 93 12/27/2021   Baseline hemoglobin 9-10  Hemoglobin stable  Monitor    Benign essential hypertension  Assessment & Plan  On Coreg at home  Will continue  BP slight elevated  Will order hydralazine p r n  Avoid hypotension due to history of orthostatic hypotension    Dyslipidemia  Assessment & Plan  Continue statin    GE reflux  Assessment & Plan  Continue PPI    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 mellitus ([facility] [address] )  Assessment & Plan  Baseline creatinine appears to be around 0 8-1 1  Creatinine stable  Monitor    CAD (coronary artery disease)  Assessment & Plan  Status post multivessel stenting  Continue Coreg, Lipitor, aspirin  Troponin x3 unremarkable  For nuclear stress test tomorrow    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)  Assessment & Plan  S/P R Fem to AK pop bypass and B SFA occlusions, status post bilateral SFA stenting with occlusion  Continue aspirin, Lipitor  Follows vascular as outpatient        VTE Pharmacologic Prophylaxis:   Pharmacologic: Enoxaparin (Lovenox)  Mechanical VTE Prophylaxis in Place: No    Patient Centered Rounds: I have performed bedside rounds with nursing staff today  Discussions with Specialists or Other Care Team Provider:  Yes    Education and Discussions with Family / Patient:  Yes    Time Spent for Care: 20 minutes    More than 50% of total time spent on counseling and coordination of care as described above  Current Length of Stay: 2 day(s)    Current Patient Status: Inpatient   Certification Statement: The patient will continue to require additional inpatient hospital stay due to CHF    Discharge Plan:  Pending PT eval    Code Status: Prior      Subjective:   Patient reports feeling better, denies SOB, cough, fever, chills  Denies chest pain, headache dizziness, nausea vomiting  Reports no BM since admission  Reports good appetite  Objective:     Vitals:   Temp (24hrs), Av 8 °F (36 6 °C), Min:97 5 °F (36 4 °C), Max:98 °F (36 7 °C)    Temp:  [97 5 °F (36 4 °C)-98 °F (36 7 °C)] 98 °F (36 7 °C)  HR:  [74-82] 81  Resp:  [18-19] 18  BP: (136-165)/(58-79) 165/79  SpO2:  [95 %-100 %] 98 %  Body mass index is 24 08 kg/m²  Input and Output Summary (last 24 hours): Intake/Output Summary (Last 24 hours) at 2022 1338  Last data filed at 2022 0500  Gross per 24 hour   Intake 10 ml   Output 500 ml   Net -490 ml       Physical Exam:     Physical Exam  Vitals and nursing note reviewed  Constitutional:       Appearance: He is well-developed  HENT:      Head: Normocephalic and atraumatic  Neck:      Thyroid: No thyromegaly  Vascular: No JVD  Trachea: No tracheal deviation  Cardiovascular:      Rate and Rhythm: Normal rate and regular rhythm  Heart sounds: Normal heart sounds  Pulmonary:      Effort: Pulmonary effort is normal  No respiratory distress  Breath sounds: No wheezing or rales  Comments: Diminished breath sounds lower lobes, on room air, respirations easy  Abdominal:      General: Bowel sounds are normal  There is no distension  Palpations: Abdomen is soft  Tenderness: There is no abdominal tenderness  There is no guarding  Musculoskeletal:         General: Swelling present  Cervical back: Neck supple  Comments: 2+ lower leg edema from knee down      Skin:     General: Skin is warm and dry  Neurological:      General: No focal deficit present  Mental Status: He is alert and oriented to person, place, and time  Psychiatric:         Mood and Affect: Mood normal          Judgment: Judgment normal          Additional Data:     Labs:    Results from last 7 days   Lab Units 02/18/22  0944   WBC Thousand/uL 8 40   HEMOGLOBIN g/dL 9 3*   HEMATOCRIT % 30 2*   PLATELETS Thousands/uL 266   NEUTROS PCT % 83*   LYMPHS PCT % 3*   MONOS PCT % 10   EOS PCT % 2     Results from last 7 days   Lab Units 02/20/22  0445 02/19/22  0554 02/18/22  0944   POTASSIUM mmol/L 4 0   < > 3 9   CHLORIDE mmol/L 101   < > 99*   CO2 mmol/L 29   < > 26   BUN mg/dL 22   < > 19   CREATININE mg/dL 0 94   < > 0 92   CALCIUM mg/dL 8 9   < > 8 5   ALK PHOS U/L  --   --  89   ALT U/L  --   --  19   AST U/L  --   --  10    < > = values in this interval not displayed  Results from last 7 days   Lab Units 02/18/22  0945   INR  1 06       * I Have Reviewed All Lab Data Listed Above  * Additional Pertinent Lab Tests Reviewed:  Lax[address] 65 Admission Reviewed    Imaging:    Imaging Reports Reviewed Today Include:  Chest x-ray  Imaging Personally Reviewed by Myself Includes:  None    Recent Cultures (last 7 days):           Last 24 Hours Medication List:   Current Facility-Administered Medications   Medication Dose Route Frequency Provider Last Rate    acetaminophen  650 mg Oral Q6H PRN NEO Camacho      ascorbic acid  500 mg Oral Daily NEO Camacho      aspirin  81 mg Oral Daily NEO Camacho      atorvastatin  20 mg Oral Daily With NEO Fishman      brimonidine tartrate  1 drop Both Eyes BID NEO Camacho      carvedilol  12 5 mg Oral BID NEO Camacho      cholecalciferol  2,000 Units Oral Daily NEO Camacho      co-enzyme Q-10  100 mg Oral Daily NEO Camacho      enoxaparin  40 mg Subcutaneous Q24H [facility] NEO Camacho      fludrocortisone  0 1 mg Oral Daily NEO Camacho      furosemide  40 mg Intravenous Daily NEO Camacho      hydrALAZINE  10 mg Oral Q6H PRN NEO Camacho      insulin glargine  5 Units Subcutaneous HS NEO Camacho      insulin lispro  1-5 Units Subcutaneous HS NEO Camacho      insulin lispro  1-6 Units Subcutaneous TID AC NEO Camacho      multivitamin stress formula  1 tablet Oral Daily NEO Camacho      pantoprazole  40 mg Oral Early Morning NEO Camacho      patient supplied medication  1 each Oral TID With Meals NEO Camacho      tamsulosin  0 4 mg Oral Daily With NEO Fishman          Today, Patient Was Seen By: NEO Knott    ** Please Note: Dragon 360 Dictation voice to text software may have been used in the creation of this document   **

## 2022-02-20 NOTE — PLAN OF CARE
Problem: MOBILITY - ADULT  Goal: Maintain or return to baseline ADL function  Description: INTERVENTIONS:  -  Assess patient's ability to carry out ADLs; assess patient's baseline for ADL function and identify physical deficits which impact ability to perform ADLs (bathing, care of mouth/teeth, toileting, grooming, dressing, etc )  - Assess/evaluate cause of self-care deficits   - Assess range of motion  - Assess patient's mobility; develop plan if impaired  - Assess patient's need for assistive devices and provide as appropriate  - Encourage maximum independence but intervene and supervise when necessary  - Involve family in performance of ADLs  - Assess for home care needs following discharge   - Consider OT consult to assist with ADL evaluation and planning for discharge  - Provide patient education as appropriate  Outcome: Progressing  Goal: Maintains/Returns to pre admission functional level  Description: INTERVENTIONS:  - Perform BMAT or MOVE assessment daily    - Set and communicate daily mobility goal to care team and patient/family/caregiver  - Collaborate with rehabilitation services on mobility goals if consulted  - Perform Range of Motion 2 times a day  - Reposition patient every 2 hours    - Dangle patient 3 times a day  - Stand patient 3 times a day  - Ambulate patient 3 times a day  - Out of bed to chair 3 times a day   - Out of bed for meals 3 times a day  - Out of bed for toileting  - Record patient progress and toleration of activity level   Outcome: Progressing     Problem: Potential for Falls  Goal: Patient will remain free of falls  Description: INTERVENTIONS:  - Educate patient/family on patient safety including physical limitations  - Instruct patient to call for assistance with activity   - Consult OT/PT to assist with strengthening/mobility   - Keep Call bell within reach  - Keep bed low and locked with side rails adjusted as appropriate  - Keep care items and personal belongings within reach  - Initiate and maintain comfort rounds  - Make Fall Risk Sign visible to staff  - Offer Toileting every 2 Hours, in advance of need  - Initiate/Maintain bed/chair alarm  - Obtain necessary fall risk management equipment  - Apply yellow socks and bracelet for high fall risk patients  - Consider moving patient to room near nurses station  Outcome: Progressing     Problem: PAIN - ADULT  Goal: Verbalizes/displays adequate comfort level or baseline comfort level  Description: Interventions:  - Encourage patient to monitor pain and request assistance  - Assess pain using appropriate pain scale  - Administer analgesics based on type and severity of pain and evaluate response  - Implement non-pharmacological measures as appropriate and evaluate response  - Consider cultural and social influences on pain and pain management  - Notify physician/advanced practitioner if interventions unsuccessful or patient reports new pain  Outcome: Progressing     Problem: DISCHARGE PLANNING  Goal: Discharge to home or other facility with appropriate resources  Description: INTERVENTIONS:  - Identify barriers to discharge w/patient and caregiver  - Arrange for needed discharge resources and transportation as appropriate  - Identify discharge learning needs (meds, wound care, etc )  - Arrange for interpretive services to assist at discharge as needed  - Refer to Case Management Department for coordinating discharge planning if the patient needs post-hospital services based on physician/advanced practitioner order or complex needs related to functional status, cognitive ability, or social support system  Outcome: Progressing     Problem: Knowledge Deficit  Goal: Patient/family/caregiver demonstrates understanding of disease process, treatment plan, medications, and discharge instructions  Description: Complete learning assessment and assess knowledge base    Interventions:  - Provide teaching at level of understanding  - Provide teaching via preferred learning methods  Outcome: Progressing     Problem: CARDIOVASCULAR - ADULT  Goal: Maintains optimal cardiac output and hemodynamic stability  Description: INTERVENTIONS:  - Monitor I/O, vital signs and rhythm  - Monitor for S/S and trends of decreased cardiac output  - Administer and titrate ordered vasoactive medications to optimize hemodynamic stability  - Assess quality of pulses, skin color and temperature  - Assess for signs of decreased coronary artery perfusion  - Instruct patient to report change in severity of symptoms  Outcome: Progressing  Goal: Absence of cardiac dysrhythmias or at baseline rhythm  Description: INTERVENTIONS:  - Continuous cardiac monitoring, vital signs, obtain 12 lead EKG if ordered  - Administer antiarrhythmic and heart rate control medications as ordered  - Monitor electrolytes and administer replacement therapy as ordered  Outcome: Progressing     Problem: RESPIRATORY - ADULT  Goal: Achieves optimal ventilation and oxygenation  Description: INTERVENTIONS:  - Assess for changes in respiratory status  - Assess for changes in mentation and behavior  - Position to facilitate oxygenation and minimize respiratory effort  - Oxygen administered by appropriate delivery if ordered  - Initiate smoking cessation education as indicated  - Encourage broncho-pulmonary hygiene including cough, deep breathe, Incentive Spirometry  - Assess the need for suctioning and aspirate as needed  - Assess and instruct to report SOB or any respiratory difficulty  - Respiratory Therapy support as indicated  Outcome: Progressing     Problem: METABOLIC, FLUID AND ELECTROLYTES - ADULT  Goal: Glucose maintained within target range  Description: INTERVENTIONS:  - Monitor Blood Glucose as ordered  - Assess for signs and symptoms of hyperglycemia and hypoglycemia  - Administer ordered medications to maintain glucose within target range  - Assess nutritional intake and initiate nutrition service referral as needed  Outcome: Progressing     Problem: MUSCULOSKELETAL - ADULT  Goal: Maintain or return mobility to safest level of function  Description: INTERVENTIONS:  - Assess patient's ability to carry out ADLs; assess patient's baseline for ADL function and identify physical deficits which impact ability to perform ADLs (bathing, care of mouth/teeth, toileting, grooming, dressing, etc )  - Assess/evaluate cause of self-care deficits   - Assess range of motion  - Assess patient's mobility  - Assess patient's need for assistive devices and provide as appropriate  - Encourage maximum independence but intervene and supervise when necessary  - Involve family in performance of ADLs  - Assess for home care needs following discharge   - Consider OT consult to assist with ADL evaluation and planning for discharge  - Provide patient education as appropriate  Outcome: Progressing

## 2022-02-20 NOTE — PROGRESS NOTES
Daily Cardiology Progress Note              LOS: 2 days     Assessment/Plan   1  Acute on chronic diastolic CHF - patient has no history of CHF exacerbations  Chest x-ray is consistent with pulmonary edema  He does feel improvement with IV Lasix  - echocardiogram showed ejection fraction of 45% which is reduced compared to his previous study  He also has elevated pulmonary pressure and a pleural effusion  He does not have pericardial effusion  Chest Xray ordered  - Will obtain stress test in AM given change in ejection fraction        2  Coronary artery disease  - continue aspirin and atorvastatin  No evidence of acute coronary syndrome at this time      3  Dyslipidemia - continue atorvastatin 20 mg daily     4  Orthostatic hypotension - patient does take Florinef 0 1 mg daily which may cause fluid retention  He has been on this medication for a long time  Consider discontinuing as outpatient      5  Severe peripheral vascular disease - patient is overall free of claudication at this time  - continue aspirin and atorvastatin     6  Diabetes - patient on insulin  Subjective     Interval History: Feels improvement in shortness of breath  Has bilateral LE edema which he feels is close to baseline  Objective     Vital signs in last 24 hours:  Temp:  [97 5 °F (36 4 °C)-98 °F (36 7 °C)] 98 °F (36 7 °C)  HR:  [74-82] 81  Resp:  [18-19] 18  BP: (136-165)/(58-79) 165/79  Weight (last 2 days)     Date/Time Weight    02/20/22 0535 78 3 (172 62)    02/19/22 0943 81 6 (180)    02/19/22 0600 81 7 (180 12)    02/18/22 1400 81 9 (180 56)    02/18/22 0914 85 9 (189 38)           Intake/Output last 3 shifts:  I/O last 3 completed shifts: In: 10 [I V :10]  Out: 2250 [Urine:2250]  Intake/Output this shift:  No intake/output data recorded  Physical Exam:  Physical Exam   Constitutional: He appears healthy  No distress  Eyes: Pupils are equal, round, and reactive to light   Conjunctivae are normal    Neck: No JVD present  Cardiovascular: Normal rate and regular rhythm  Exam reveals no gallop and no friction rub  Murmur heard  Pulmonary/Chest: Effort normal and breath sounds normal  He has no wheezes  He has no rales  Musculoskeletal:         General: Edema present  No tenderness or deformity  Cervical back: Normal range of motion and neck supple  Neurological: He is alert and oriented to person, place, and time  Skin: Skin is warm and dry  Lab Results: I have personally reviewed pertinent lab results  Imaging: I have personally reviewed pertinent films in PACS  EKG/Tele: Reviewed

## 2022-02-21 ENCOUNTER — APPOINTMENT (INPATIENT)
Dept: RADIOLOGY | Facility: HOSPITAL | Age: 77
DRG: 291 | End: 2022-02-21
Payer: MEDICARE

## 2022-02-21 ENCOUNTER — APPOINTMENT (INPATIENT)
Dept: NON INVASIVE DIAGNOSTICS | Facility: HOSPITAL | Age: 77
DRG: 291 | End: 2022-02-21
Payer: MEDICARE

## 2022-02-21 DIAGNOSIS — E11.40 TYPE 2 DIABETES MELLITUS WITH DIABETIC NEUROPATHY, WITHOUT LONG-TERM CURRENT USE OF INSULIN (HCC): ICD-10-CM

## 2022-02-21 LAB
ANION GAP SERPL CALCULATED.3IONS-SCNC: 8 MMOL/L (ref 4–13)
BASELINE ST DEPRESSION: 0 MM
BUN SERPL-MCNC: 27 MG/DL (ref 5–25)
CALCIUM SERPL-MCNC: 8.8 MG/DL (ref 8.3–10.1)
CHEST PAIN STATEMENT: NORMAL
CHLORIDE SERPL-SCNC: 101 MMOL/L (ref 100–108)
CO2 SERPL-SCNC: 30 MMOL/L (ref 21–32)
CREAT SERPL-MCNC: 1.02 MG/DL (ref 0.6–1.3)
ERYTHROCYTE [DISTWIDTH] IN BLOOD BY AUTOMATED COUNT: 16.2 % (ref 11.6–15.1)
GFR SERPL CREATININE-BSD FRML MDRD: 70 ML/MIN/1.73SQ M
GLUCOSE SERPL-MCNC: 178 MG/DL (ref 65–140)
GLUCOSE SERPL-MCNC: 179 MG/DL (ref 65–140)
GLUCOSE SERPL-MCNC: 221 MG/DL (ref 65–140)
GLUCOSE SERPL-MCNC: 270 MG/DL (ref 65–140)
GLUCOSE SERPL-MCNC: 291 MG/DL (ref 65–140)
HCT VFR BLD AUTO: 34 % (ref 36.5–49.3)
HGB BLD-MCNC: 10.7 G/DL (ref 12–17)
MAGNESIUM SERPL-MCNC: 2 MG/DL (ref 1.6–2.6)
MAX DIASTOLIC BP: 72 MMHG
MAX HEART RATE: 91 BPM
MAX HR PERCENT: 63 %
MAX HR: 143 BPM
MAX PREDICTED HEART RATE: 143 BPM
MAX. SYSTOLIC BP: 172 MMHG
MCH RBC QN AUTO: 26.9 PG (ref 26.8–34.3)
MCHC RBC AUTO-ENTMCNC: 31.5 G/DL (ref 31.4–37.4)
MCV RBC AUTO: 85 FL (ref 82–98)
PLATELET # BLD AUTO: 310 THOUSANDS/UL (ref 149–390)
PMV BLD AUTO: 9.3 FL (ref 8.9–12.7)
POTASSIUM SERPL-SCNC: 3.4 MMOL/L (ref 3.5–5.3)
PROTOCOL NAME: NORMAL
RATE PRESSURE PRODUCT: NORMAL
RBC # BLD AUTO: 3.98 MILLION/UL (ref 3.88–5.62)
REASON FOR TERMINATION: NORMAL
SL CV REST NUCLEAR ISOTOPE DOSE: 10.3 MCI
SL CV STRESS NUCLEAR ISOTOPE DOSE: 32.6 MCI
SL CV STRESS RECOVERY BP: NORMAL MMHG
SL CV STRESS RECOVERY HR: 74 BPM
SL CV STRESS RECOVERY O2 SAT: 96 %
SODIUM SERPL-SCNC: 139 MMOL/L (ref 136–145)
STRESS ANGINA INDEX: 0
STRESS BASELINE BP: NORMAL MMHG
STRESS BASELINE HR: 64 BPM
STRESS DUKE TREADMILL SCORE: 1
STRESS O2 SAT REST: 99 %
STRESS PEAK HR: 90 BPM
STRESS PERCENT HR: 63 %
STRESS POST ESTIMATED WORKLOAD: 1 METS
STRESS POST EXERCISE DUR MIN: 1 MIN
STRESS POST O2 SAT PEAK: 98 %
STRESS POST PEAK BP: 160 MMHG
STRESS ST DEPRESSION: 0 MM
STRESS TARGET HR: 91 BPM
TARGET HR FORMULA: NORMAL
TEST INDICATION: NORMAL
TIME IN EXERCISE PHASE: NORMAL
WBC # BLD AUTO: 6.63 THOUSAND/UL (ref 4.31–10.16)

## 2022-02-21 PROCEDURE — 93017 CV STRESS TEST TRACING ONLY: CPT

## 2022-02-21 PROCEDURE — 99232 SBSQ HOSP IP/OBS MODERATE 35: CPT

## 2022-02-21 PROCEDURE — 78452 HT MUSCLE IMAGE SPECT MULT: CPT | Performed by: INTERNAL MEDICINE

## 2022-02-21 PROCEDURE — 93016 CV STRESS TEST SUPVJ ONLY: CPT | Performed by: INTERNAL MEDICINE

## 2022-02-21 PROCEDURE — A9502 TC99M TETROFOSMIN: HCPCS

## 2022-02-21 PROCEDURE — 97530 THERAPEUTIC ACTIVITIES: CPT

## 2022-02-21 PROCEDURE — 83735 ASSAY OF MAGNESIUM: CPT | Performed by: NURSE PRACTITIONER

## 2022-02-21 PROCEDURE — 93018 CV STRESS TEST I&R ONLY: CPT | Performed by: INTERNAL MEDICINE

## 2022-02-21 PROCEDURE — 85027 COMPLETE CBC AUTOMATED: CPT | Performed by: NURSE PRACTITIONER

## 2022-02-21 PROCEDURE — 78452 HT MUSCLE IMAGE SPECT MULT: CPT

## 2022-02-21 PROCEDURE — 93970 EXTREMITY STUDY: CPT

## 2022-02-21 PROCEDURE — 82948 REAGENT STRIP/BLOOD GLUCOSE: CPT

## 2022-02-21 PROCEDURE — 93970 EXTREMITY STUDY: CPT | Performed by: SURGERY

## 2022-02-21 PROCEDURE — G1004 CDSM NDSC: HCPCS

## 2022-02-21 PROCEDURE — 97162 PT EVAL MOD COMPLEX 30 MIN: CPT

## 2022-02-21 PROCEDURE — 80048 BASIC METABOLIC PNL TOTAL CA: CPT | Performed by: NURSE PRACTITIONER

## 2022-02-21 PROCEDURE — 99232 SBSQ HOSP IP/OBS MODERATE 35: CPT | Performed by: INTERNAL MEDICINE

## 2022-02-21 RX ORDER — FUROSEMIDE 10 MG/ML
40 INJECTION INTRAMUSCULAR; INTRAVENOUS
Status: DISCONTINUED | OUTPATIENT
Start: 2022-02-21 | End: 2022-02-22

## 2022-02-21 RX ORDER — POTASSIUM CHLORIDE 20 MEQ/1
40 TABLET, EXTENDED RELEASE ORAL ONCE
Status: COMPLETED | OUTPATIENT
Start: 2022-02-21 | End: 2022-02-21

## 2022-02-21 RX ORDER — POTASSIUM CHLORIDE 20 MEQ/1
20 TABLET, EXTENDED RELEASE ORAL ONCE
Status: COMPLETED | OUTPATIENT
Start: 2022-02-21 | End: 2022-02-21

## 2022-02-21 RX ORDER — DOCUSATE SODIUM 100 MG/1
100 CAPSULE, LIQUID FILLED ORAL 2 TIMES DAILY
Status: DISCONTINUED | OUTPATIENT
Start: 2022-02-21 | End: 2022-02-24 | Stop reason: HOSPADM

## 2022-02-21 RX ADMIN — BRIMONIDINE TARTRATE 1 DROP: 2 SOLUTION/ DROPS OPHTHALMIC at 17:28

## 2022-02-21 RX ADMIN — FUROSEMIDE 40 MG: 10 INJECTION, SOLUTION INTRAMUSCULAR; INTRAVENOUS at 17:26

## 2022-02-21 RX ADMIN — BRIMONIDINE TARTRATE 1 DROP: 2 SOLUTION/ DROPS OPHTHALMIC at 08:25

## 2022-02-21 RX ADMIN — TAMSULOSIN HYDROCHLORIDE 0.4 MG: 0.4 CAPSULE ORAL at 17:27

## 2022-02-21 RX ADMIN — INSULIN LISPRO 1 UNITS: 100 INJECTION, SOLUTION INTRAVENOUS; SUBCUTANEOUS at 08:22

## 2022-02-21 RX ADMIN — REGADENOSON 0.4 MG: 0.08 INJECTION, SOLUTION INTRAVENOUS at 11:35

## 2022-02-21 RX ADMIN — ATORVASTATIN CALCIUM 20 MG: 20 TABLET, FILM COATED ORAL at 17:27

## 2022-02-21 RX ADMIN — INSULIN GLARGINE 5 UNITS: 100 INJECTION, SOLUTION SUBCUTANEOUS at 21:39

## 2022-02-21 RX ADMIN — PANTOPRAZOLE SODIUM 40 MG: 40 TABLET, DELAYED RELEASE ORAL at 05:32

## 2022-02-21 RX ADMIN — INSULIN LISPRO 4 UNITS: 100 INJECTION, SOLUTION INTRAVENOUS; SUBCUTANEOUS at 12:53

## 2022-02-21 RX ADMIN — POTASSIUM CHLORIDE 20 MEQ: 1500 TABLET, EXTENDED RELEASE ORAL at 15:07

## 2022-02-21 RX ADMIN — DOCUSATE SODIUM 100 MG: 100 CAPSULE ORAL at 15:07

## 2022-02-21 RX ADMIN — ENOXAPARIN SODIUM 40 MG: 40 INJECTION SUBCUTANEOUS at 17:27

## 2022-02-21 RX ADMIN — INSULIN LISPRO 1 UNITS: 100 INJECTION, SOLUTION INTRAVENOUS; SUBCUTANEOUS at 21:39

## 2022-02-21 RX ADMIN — B-COMPLEX W/ C & FOLIC ACID TAB 1 TABLET: TAB at 08:23

## 2022-02-21 RX ADMIN — FUROSEMIDE 40 MG: 10 INJECTION, SOLUTION INTRAMUSCULAR; INTRAVENOUS at 08:23

## 2022-02-21 RX ADMIN — INSULIN LISPRO 4 UNITS: 100 INJECTION, SOLUTION INTRAVENOUS; SUBCUTANEOUS at 17:26

## 2022-02-21 RX ADMIN — CARVEDILOL 12.5 MG: 12.5 TABLET, FILM COATED ORAL at 17:27

## 2022-02-21 RX ADMIN — POTASSIUM CHLORIDE 40 MEQ: 1500 TABLET, EXTENDED RELEASE ORAL at 12:53

## 2022-02-21 RX ADMIN — ASPIRIN 81 MG: 81 TABLET, COATED ORAL at 08:23

## 2022-02-21 RX ADMIN — Medication 100 MG: at 08:23

## 2022-02-21 RX ADMIN — Medication 2000 UNITS: at 08:23

## 2022-02-21 RX ADMIN — OXYCODONE HYDROCHLORIDE AND ACETAMINOPHEN 500 MG: 500 TABLET ORAL at 08:23

## 2022-02-21 NOTE — PROGRESS NOTES
Progress Note - Cardiology   [facility]     Ady Dye 67 y o  male MRN: [id number]  : 1945  Unit/Bed#: [address] [place] [address] Encounter: [id number]    Assessment and Plan:   1  Acute on chronic diastolic heart failure:  Echocardiogram shows EF 45%, this is reduced from previous Echo  -  continue Lasix 40 mg IV and will increase to twice a day    -  continue Coreg 12 5 mg twice a day, if patient his orthostatics family changed to Lopressor    -  monitor I&O, daily weights and labs, replete as needed    2  Coronary artery disease:  No complaints of chest discomfort  Continue aspirin and statin therapy    -  for Lexiscan nuclear stress test today    3  Dyslipidemia:  Lipitor 20 mg once a day      4  Diabetes:  Managed per primary team    5  Question of orthostatic hypotension:  Patient has been hypertensive and denies lightheadedness with position changes    -  significant lower extremity edema    -  will hold Florinef and continue to monitor orthostatic vital signs    6  History of severe PUD:  No complaints at this time     Subjective / Objective:   Patient seen and examined  He is sitting comfortably at the bedside  Does not offer any complaints  It is unclear why he is on some of his medications and does not remember who prescribed some of them  Patient has been hypertensive, will hold Florinef at this time and continue to monitor  Echocardiogram performed on 2022 demonstrated an EF of 45% with mild global hypokinesis, biatrial dilatation, moderate tricuspid valve regurgitation, moderate mitral regurgitation and PA pressures of 58 mmHg  Vitals: Blood pressure 163/63, pulse 67, temperature 98 °F (36 7 °C), temperature source Oral, resp  rate 16, height 5' 11" (1 803 m), weight 76 1 kg (167 lb 12 3 oz), SpO2 97 %  Vitals:    22 0535 22 0534   Weight: 78 3 kg (172 lb 9 9 oz) 76 1 kg (167 lb 12 3 oz)     Body mass index is 23 4 kg/m²    BP Readings from Last 3 Encounters:   02/21/22 163/63   02/11/22 164/70   02/08/22 150/80     Orthostatic Blood Pressures      Most Recent Value   Blood Pressure 163/63 filed at 02/21/2022 0745   Patient Position - Orthostatic VS Sitting filed at 02/19/2022 0754        I/O       02/19 0701  02/20 0700 02/20 0701  02/21 0700 02/21 0701  02/22 0700    P  O    400    I V  (mL/kg) 10 (0 1) 10 (0 1) 10 (0 1)    Total Intake(mL/kg) 10 (0 1) 10 (0 1) 410 (5 4)    Urine (mL/kg/hr) 2050 (1 1) 2150 (1 2) 350 (1 5)    Stool   0    Total Output 2050 2150 350    Net -2040 -2140 +60           Unmeasured Stool Occurrence   1 x        Invasive Devices  Report    Peripheral Intravenous Line            Peripheral IV 02/18/22 Left Antecubital 3 days                  Intake/Output Summary (Last 24 hours) at 2/21/2022 1009  Last data filed at 2/21/2022 5737  Gross per 24 hour   Intake 420 ml   Output 2100 ml   Net -1680 ml         Physical Exam:   Physical Exam  Vitals and nursing note reviewed  Constitutional:       Appearance: Normal appearance  He is obese  HENT:      Right Ear: External ear normal       Left Ear: External ear normal       Nose: Nose normal    Eyes:      General: No scleral icterus  Right eye: No discharge  Left eye: No discharge  Conjunctiva/sclera: Conjunctivae normal    Cardiovascular:      Rate and Rhythm: Normal rate and regular rhythm  Pulses: Normal pulses  Heart sounds: Murmur heard  Pulmonary:      Effort: Pulmonary effort is normal  No accessory muscle usage or respiratory distress  Breath sounds: Examination of the right-middle field reveals rales  Examination of the right-lower field reveals rales  Examination of the left-lower field reveals rales  Rales present  No wheezing  Abdominal:      General: Bowel sounds are normal  There is no distension  Palpations: Abdomen is soft  Musculoskeletal:      Right lower leg: 3+ Edema present        Left lower leg: 3+ Edema present  Skin:     General: Skin is warm and dry  Capillary Refill: Capillary refill takes less than 2 seconds  Coloration: Skin is pale  Neurological:      General: No focal deficit present  Mental Status: He is alert and oriented to person, place, and time  Mental status is at baseline     Psychiatric:         Mood and Affect: Mood normal                    Medications/ Allergies:     Current Facility-Administered Medications   Medication Dose Route Frequency Provider Last Rate    acetaminophen  650 mg Oral Q6H PRN NEO Camacho      ascorbic acid  500 mg Oral Daily NEO Camacho      aspirin  81 mg Oral Daily NEO Camacho      atorvastatin  20 mg Oral Daily With NEO Fishman      brimonidine tartrate  1 drop Both Eyes BID NEO Camacho      carvedilol  12 5 mg Oral BID NEO Camacho      cholecalciferol  2,000 Units Oral Daily NEO Camacho      co-enzyme Q-10  100 mg Oral Daily NEO Camacho      enoxaparin  40 mg Subcutaneous Q24H [address] NEO Camacho      furosemide  40 mg Intravenous Daily NEO Camacho      hydrALAZINE  10 mg Oral Q6H PRN NEO Camacho      insulin glargine  5 Units Subcutaneous HS NEO Camacho      insulin lispro  1-5 Units Subcutaneous HS NEO Camacho      insulin lispro  1-6 Units Subcutaneous TID AC NEO Camacho      multivitamin stress formula  1 tablet Oral Daily NEO Camacho      pantoprazole  40 mg Oral Early Morning NEO Camacho      patient supplied medication  1 each Oral TID With Meals NEO Camacho      tamsulosin  0 4 mg Oral Daily With Dinner NEO Camacho       acetaminophen, 650 mg, Q6H PRN  hydrALAZINE, 10 mg, Q6H PRN      No Known Allergies    VTE Pharmacologic Prophylaxis:   Sequential compression device (Venodyne)     Labs:   Troponins:  Results from last 7 days   Lab Units 02/18/22  1452 02/18/22  1147   HSTNI D2 ng/L  --  0   HSTNI D4 ng/L 1  --      CBC with diff:  Results from last 7 days   Lab Units 02/21/22  0538 02/18/22  0944   WBC Thousand/uL 6 63 8 40   HEMOGLOBIN g/dL 10 7* 9 3*   HEMATOCRIT % 34 0* 30 2*   MCV fL 85 87   PLATELETS Thousands/uL 310 266   MCH pg 26 9 26 8   MCHC g/dL 31 5 30 8*   RDW % 16 2* 16 5*   MPV fL 9 3 9 8   NRBC AUTO /100 WBCs  --  0     CMP:  Results from last 7 days   Lab Units 02/21/22  0538 02/20/22  0445 02/19/22  0554 02/18/22  0944   SODIUM mmol/L 139 137 134* 132*   POTASSIUM mmol/L 3 4* 4 0 3 7 3 9   CHLORIDE mmol/L 101 101 99* 99*   CO2 mmol/L 30 29 27 26   ANION GAP mmol/L 8 7 8 7   BUN mg/dL 27* 22 18 19   CREATININE mg/dL 1 02 0 94 0 85 0 92   CALCIUM mg/dL 8 8 8 9 8 4 8 5   AST U/L  --   --   --  10   ALT U/L  --   --   --  19   ALK PHOS U/L  --   --   --  89   TOTAL PROTEIN g/dL  --   --   --  6 9   ALBUMIN g/dL  --   --   --  3 1*   TOTAL BILIRUBIN mg/dL  --   --   --  0 82   EGFR ml/min/1 73sq m 70 77 84 79     Magnesium:  Results from last 7 days   Lab Units 02/21/22  0538 02/20/22  0445 02/19/22  0554 02/18/22  0944   MAGNESIUM mg/dL 2 0 2 1 1 8 1 6     Coags:  Results from last 7 days   Lab Units 02/18/22  0945   PTT seconds 27   INR  1 06     TSH:  Results from last 7 days   Lab Units 02/18/22  0944   TSH 3RD GENERATON uIU/mL 2 817        Imaging & Testing   I have personally reviewed pertinent reports  XR chest 1 view portable    Result Date: 2/18/2022  Narrative: CHEST INDICATION:   sob  COMPARISON:  7/30/2021 EXAM PERFORMED/VIEWS:  XR CHEST PORTABLE FINDINGS: Cardiomediastinal silhouette appears unremarkable  Patchy airspace disease in the lungs bilaterally is likely on the basis of pulmonary edema  Findings are new since the prior examination  Small bilateral pleural effusions  Osseous structures appear within normal limits for patient age  Impression: Small bilateral pleural effusions and pulmonary edema, new since the prior study   Workstation performed: [id number]     XR chest pa & lateral    Result Date: 2/21/2022  Narrative: CHEST INDICATION:   pleural effusion - moderate to large size on echocardiogram  COMPARISON:  February 18, 2022  EXAM PERFORMED/VIEWS:  XR CHEST PA & LATERAL  The frontal view was performed utilizing dual energy radiographic technique  FINDINGS: Heart top normal in size  Resolution of pulmonary vascular congestion since the last exam  Mild right basilar subsegmental atelectasis  Small bilateral pleural effusions  No evidence of consolidation or pneumothorax  Osseous structures appear within normal limits for patient age  Impression: Small bilateral pleural effusions  Mild right basilar subsegmental atelectasis  Workstation performed: [id number]     Echo complete w/ contrast if indicated    Result Date: 2/19/2022  Narrative: Linsey Gunn  Left Ventricle: Left ventricular cavity size is normal  Wall thickness is normal  The left ventricular ejection fraction is 45% by visual estimation  Systolic function is mildly reduced  Diastolic function is mildly abnormal, consistent with grade I (abnormal) relaxation  There is mild global hypokinesis    Left Atrium: The atrium is mildly dilated    Right Atrium: The atrium is mildly dilated    Mitral Valve: There is moderate regurgitation    Tricuspid Valve: There is moderate regurgitation  The right ventricular systolic pressure is moderately elevated  The estimated right ventricular systolic pressure is 06 71 mmHg    Pericardium: There is a large left pleural effusion  Harjinder Schmitt [place]  Cardiology      "This note has been constructed using a voice recognition system  Therefore there may be syntax, spelling, and/or grammatical errors   Please call if you have any questions  "

## 2022-02-21 NOTE — PROGRESS NOTES
[facility]  Progress Note Shiva Parkstiff [id number], 67 y o  male MRN: [id number]  Unit/Bed#: [address] [place] [address] Encounter: [id number]  Primary Care Provider: Javon Lane DO   Date and time admitted to hospital: 2/18/2022  9:12 AM    * Acute on chronic diastolic congestive heart failure [facility])  Assessment & Plan  Wt Readings from Last 3 Encounters:   02/21/22 76 1 kg (167 lb 12 3 oz)   02/11/22 81 2 kg (179 lb)   02/08/22 82 6 kg (182 lb)     Patient presents with SOB and leg edema for about one week, worsening  · Does not take diuretic at home  · Chest x-ray in ED showed- Small bilateral pleural effusions and pulmonary edema, new since the prior study  · ProBNP 3439  · Echo 10/2020 showed normal EF, grade 1 diastolic dysfunction, PA pressure 25  · Repeat 2D echo showed EF 96%, grade 1 diastolic dysfunction, mild global hypokinesis, mildly dilated atriums, moderate MR and TR, PA pressure 58  · Lower venous doppler no evidence of DVT, normal response to augmentation, monophasic waveforms  · Telemetry NSR  · Patient received Lasix 60 mg IV in ED  · Continue Lasix 40 mg IV, increased to BID  · Continue Coreg 12 5mg BID  · Daily weight and I&Os  · Cardiology following  · For nuclear stress test today      CAD (coronary artery disease)  Assessment & Plan  Status post multivessel stenting  · Continue Coreg, Lipitor, aspirin  · Troponin x3 unremarkable  · For nuclear stress today    Anemia due to stage 3a chronic kidney disease (HCC)  Assessment & Plan  Baseline hemoglobin 9-10  · Hemoglobin 10 7, stable  · Monitor with CBC    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)  Assessment & Plan  S/P R Fem to AK pop bypass and B SFA occlusions, status post bilateral SFA stenting with occlusion     · Continue aspirin, Lipitor  · Follows vascular as outpatient    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 mellitus [facility]  Assessment & Plan  Baseline creatinine appears to be around 0 8-1 1   · Creatinine 1 02, stable  · Monitor with BMP    Orthostatic hypotension  Assessment & Plan  · Avoid hypotension  · Hold Florinef due to side effect of fluid retention  Benign essential hypertension  Assessment & Plan  Continue coreg 12 5 mg BID  · BP slight elevated  · Hydralazine p r n  · Avoid hypotension due to history of orthostatic hypotension    Type 2 diabetes mellitus with diabetic neuropathy [facility])  Assessment & Plan  Lab Results   Component Value Date    HGBA1C 7 1 (H) 12/27/2021       Recent Labs     02/20/22  1200 02/20/22  1613 02/20/22  2059 02/21/22  0732   POCGLU 217* 259* 263* 179*       Blood Sugar Average: Last 72 hrs:  (P) 226 5  Glucose 247 on admission  Patient is on metformin 1 g in the morning and 500 mg in the evening  · On Precose at home as well, patient brought it from home  Will continue  · Last A1c in December last year 7 1  · Continue SSI and fingersticks  · Continue lantus 5mg at night      Prostate cancer [facility])  Assessment & Plan  · Status post RT with ongoing ADT  · Outpatient Urology follow-up    Dyslipidemia  Assessment & Plan  Continue statin      VTE Pharmacologic Prophylaxis: VTE Score: 5 High Risk (Score >/= 5) - Pharmacological DVT Prophylaxis Ordered: enoxaparin (Lovenox)  Sequential Compression Devices Ordered  Patient Centered Rounds: I performed bedside rounds with nursing staff today  Discussions with Specialists or Other Care Team Provider:     Education and Discussions with Family / Patient: Updated  (wife) via phone  Time Spent for Care: 45 minutes  More than 50% of total time spent on counseling and coordination of care as described above  Current Length of Stay: 3 day(s)  Current Patient Status: Inpatient   Certification Statement: The patient will continue to require additional inpatient hospital stay due to IV lasix, pending stress test  Discharge Plan: Anticipate discharge in 24-48 hrs to home      Code Status: Prior    Subjective:   Pt is doing well today  He denies any complaints at this time  Pt states he hasnt had a bowel movement since prior to admission, docusate ordered  He denies dizziness, lightheadedness, chest pain, palpitations, SOB, cough, abdominal pain  Objective:     Vitals:   Temp (24hrs), Av 9 °F (36 6 °C), Min:97 4 °F (36 3 °C), Max:98 °F (36 7 °C)    Temp:  [97 4 °F (36 3 °C)-98 °F (36 7 °C)] 98 °F (36 7 °C)  HR:  [66-78] 67  Resp:  [16-19] 16  BP: (141-163)/(60-73) 163/63  SpO2:  [96 %-98 %] 97 %  Body mass index is 23 4 kg/m²  Input and Output Summary (last 24 hours): Intake/Output Summary (Last 24 hours) at 2022 1257  Last data filed at 2022 1113  Gross per 24 hour   Intake 420 ml   Output 1700 ml   Net -1280 ml       Physical Exam:   Physical Exam  Vitals and nursing note reviewed  Constitutional:       Appearance: He is well-developed  HENT:      Head: Normocephalic and atraumatic  Eyes:      Conjunctiva/sclera: Conjunctivae normal    Cardiovascular:      Rate and Rhythm: Normal rate and regular rhythm  Heart sounds: Murmur heard  Pulmonary:      Effort: Pulmonary effort is normal  No respiratory distress  Breath sounds: Normal breath sounds  Abdominal:      Palpations: Abdomen is soft  Tenderness: There is no abdominal tenderness  Musculoskeletal:      Right lower leg: 3+ Edema present  Left lower leg: 3+ Edema present  Skin:     General: Skin is warm and dry  Neurological:      Mental Status: He is alert  Additional Data:     Labs:  Results from last 7 days   Lab Units 22  0538 22  0944 22  0944   WBC Thousand/uL 6 63   < > 8 40   HEMOGLOBIN g/dL 10 7*   < > 9 3*   HEMATOCRIT % 34 0*   < > 30 2*   PLATELETS Thousands/uL 310   < > 266   NEUTROS PCT %  --   --  83*   LYMPHS PCT %  --   --  3*   MONOS PCT %  --   --  10   EOS PCT %  --   --  2    < > = values in this interval not displayed       Results from last 7 days   Lab Units 02/21/22  0538 02/19/22  0554 02/18/22  0944   SODIUM mmol/L 139   < > 132*   POTASSIUM mmol/L 3 4*   < > 3 9   CHLORIDE mmol/L 101   < > 99*   CO2 mmol/L 30   < > 26   BUN mg/dL 27*   < > 19   CREATININE mg/dL 1 02   < > 0 92   ANION GAP mmol/L 8   < > 7   CALCIUM mg/dL 8 8   < > 8 5   ALBUMIN g/dL  --   --  3 1*   TOTAL BILIRUBIN mg/dL  --   --  0 82   ALK PHOS U/L  --   --  89   ALT U/L  --   --  19   AST U/L  --   --  10   GLUCOSE RANDOM mg/dL 178*   < > 247*    < > = values in this interval not displayed       Results from last 7 days   Lab Units 02/18/22  0945   INR  1 06     Results from last 7 days   Lab Units 02/21/22  1247 02/21/22  0732 02/20/22  2059 02/20/22  1613 02/20/22  1200 02/20/22  0715 02/19/22  2005 02/19/22  1611 02/19/22  1132 02/19/22  0742 02/18/22  2027 02/18/22  1653   POC GLUCOSE mg/dl 291* 179* 263* 259* 217* 207* 231* 252* 257* 188* 231* 216*               Lines/Drains:  Invasive Devices  Report    Peripheral Intravenous Line            Peripheral IV 02/18/22 Left Antecubital 3 days                      Imaging: Reviewed radiology reports from this admission including: chest xray, ultrasound(s) and ECHO    Recent Cultures (last 7 days):         Last 24 Hours Medication List:   Current Facility-Administered Medications   Medication Dose Route Frequency Provider Last Rate    acetaminophen  650 mg Oral Q6H PRN NEO Camacho      ascorbic acid  500 mg Oral Daily NEO Camacho      aspirin  81 mg Oral Daily NEO Camacho      atorvastatin  20 mg Oral Daily With NEO Fishman      brimonidine tartrate  1 drop Both Eyes BID NEO Camacho      carvedilol  12 5 mg Oral BID NEO Camacho      cholecalciferol  2,000 Units Oral Daily NEO Camacho      co-enzyme Q-10  100 mg Oral Daily NEO Camacho      enoxaparin  40 mg Subcutaneous Q24H [address] NEO Camacho      furosemide  40 mg Intravenous BID (diuretic) NEO Alvarez      hydrALAZINE  10 mg Oral Q6H PRN NEO Camacho      insulin glargine  5 Units Subcutaneous HS NEO Camacho      insulin lispro  1-5 Units Subcutaneous HS NEO Camacho      insulin lispro  1-6 Units Subcutaneous TID AC NEO Camacho      multivitamin stress formula  1 tablet Oral Daily NEO Camacho      pantoprazole  40 mg Oral Early Morning NEO Camacho      patient supplied medication  1 each Oral TID With Meals NEO Camacho      tamsulosin  0 4 mg Oral Daily With NEO Fishman          Today, Patient Was Seen By: Chasity Overton PA-C    **Please Note: This note may have been constructed using a voice recognition system  **

## 2022-02-21 NOTE — PHYSICAL THERAPY NOTE
PHYSICAL THERAPY EVALUATION/TREATMENT     02/21/22 6505   PT Last Visit   PT Visit Date 02/21/22   Note Type   Note type Evaluation   Pain Assessment   Pain Assessment Tool 0-10   Pain Score 5   Pain Location/Orientation Orientation: Lower; Location: Back   Pain Onset/Description Onset: Ongoing  (Chronic)   Restrictions/Precautions   Other Precautions Pain; Fall Risk;Hard of hearing   Home Living   Type of [address] One level;Stairs to enter with rails  (4 JAMES)   Home Equipment Cane  (RW;rollator)   Prior Function   Level of [place] Independent with ADLs and functional mobility  (wife assists in/out of shower and with bathing)   Lives With Spouse   Receives Help From Family   ADL Assistance Independent   Comments Pt amb w/out AD inside;uses rollator for long distances  General   Additional Pertinent History Pt is adm with SOB, LE edema, getting worse x 1 week   Cognition   Overall Cognitive Status WFL   Arousal/Participation Cooperative   Orientation Level Oriented X4   Following Commands Follows all commands and directions without difficulty   Subjective   Subjective "I've been here a few days"   RLE Assessment   RLE Assessment WFL  (3+ to 4-/5)   LLE Assessment   LLE Assessment WFL  (3+ to 4-/5)   Transfers   Sit to Stand 5  Supervision   Additional items Verbal cues   Stand to Sit 5  Supervision   Additional items Verbal cues   Ambulation/Elevation   Gait pattern Foward flexed; Short stride   Gait Assistance   (close S)   Additional items Assist x 1;Verbal cues; Tactile cues   Assistive Device None   Distance 20 feet in pt's room with change in direction - pt tends to hold the walls and furniture when amb w/out AD   Balance   Static Sitting Good   Static Standing Fair   Dynamic Standing Fair -   Ambulatory Fair -   Activity Tolerance   Activity Tolerance Patient limited by fatigue;Patient limited by pain;Treatment limited secondary to medical complications (Comment)  (min unsteadiness;generalized weakness)   Assessment   Problem List Decreased strength;Decreased range of motion;Decreased endurance; Impaired balance;Decreased mobility; Decreased coordination;Decreased safety awareness;Pain   Assessment Patient seen for Physical Therapy evaluation  Patient admitted with Acute on chronic diastolic congestive heart failure ([facility] )  Comorbidities affecting patient's physical performance include: CAD, CKD, DM2, HTN, anemia, prostate CA, orthostatic hypotension, arthropathy, lumbar radiculopathy  Personal factors affecting patient at time of initial evaluation include: lives in one story house, ambulating with assistive device, stairs to enter home, inability to navigate community distances, inability to navigate level surfaces without external assistance and inability to perform dynamic tasks in community  Prior to admission, patient was independent with functional mobility with rollator, independent with functional mobility without assistive device, requiring assist for ADLS, living with spouse in a one level home with 4 steps to enter and ambulating household distance  Please find objective findings from Physical Therapy assessment regarding body systems outlined above with impairments and limitations including weakness, decreased ROM, impaired balance, decreased endurance, impaired coordination, gait deviations, pain, decreased activity tolerance, decreased functional mobility tolerance, decreased safety awareness and fall risk  The Barthel Index was used as a functional outcome tool presenting with a score of Barthel Index Score: 60 today indicating moderate limitations of functional mobility and ADLS  Patient's clinical presentation is currently evolving as seen in patient's presentation of vital sign response, changing level of pain, increased fall risk, new onset of impairment of functional mobility, decreased endurance and new onset of weakness   Pt would benefit from continued Physical Therapy treatment to address deficits as defined above and maximize level of functional mobility  As demonstrated by objective findings, the assigned level of complexity for this evaluation is moderate  The patient's AM-PAC Basic Mobility Inpatient Short Form Raw Score is 18  A Raw score of greater than 16 suggests the patient may benefit from discharge to home  Please also refer to the recommendation of the Physical Therapist for safe discharge planning  Goals   Patient Goals to go home   STG Expiration Date 02/28/22   Short Term Goal #1 bed mob - S/I; trans - I   Short Term Goal #2 pt will amb without an AD short, functional household distances - S/I; balance w/out AD - F/F+ for safe gait and mobility and to decrease fall risk   LTG Expiration Date 03/07/22   Long Term Goal #1 pt will return to independent gait and mobility, w/wout AD as previous   Long Term Goal #2 up/down 4 steps with rail - I so pt can enter/exit his home; balance  - F+/G for safe gait and mobility; strength LEs - 3+ to 4-/5   Plan   Treatment/Interventions ADL retraining;Functional transfer training;LE strengthening/ROM; Elevations; Therapeutic exercise; Endurance training;Patient/family training;Equipment eval/education; Bed mobility;Gait training; Compensatory technique education   PT Frequency Other (Comment)  (5x/wk)   Recommendation   PT Discharge Recommendation No rehabilitation needs   AM-PAC Basic Mobility Inpatient   Turning in Bed Without Bedrails 3   Lying on Back to Sitting on Edge of Flat Bed 3   Moving Bed to Chair 3   Standing Up From Chair 3   Walk in Room 3   Climb 3-5 Stairs 3   Basic Mobility Inpatient Raw Score 18   Basic Mobility Standardized Score 41 05   Highest Level Of Mobility   JH-HLM Goal 6: Walk 10 steps or more   JH-HLM Highest Level of Mobility 6: Walk 10 steps or more   JH-HLM Goal Achieved Yes   Barthel Index   Feeding 10   Bathing 0   Grooming Score 5   Dressing Score 5   Bladder Score 10   Bowels Score 10   Toilet Use Score 5   Transfers (Bed/Chair) Score 10   Mobility (Level Surface) Score 0   Stairs Score 5   Barthel Index Score 60   Additional Treatment Session   Start Time 1445   End Time 1455   Treatment Assessment S: "I feel pretty good" O: Pt trans sit to stand with S and amb with a  feet with change in direction  Pt trans stand to sit with S  SAO2 at rest on room air is 96% and at end of amb decreased briefly to 87%, pt slightly SOB, but reports fatigue of LEs  A: Pt with good tolerance to trans, amb with and without AD and overall mobility  Pt is near baseline level of mobility and will benefit from cont skilled PT services to return pt to his prior level of function  End of Consult   Patient Position at End of Consult Bedside chair; All needs within reach   [facility] Insurance Number  [address] [place] [address] [id number]

## 2022-02-21 NOTE — CASE MANAGEMENT
Case Management Assessment    Patient name Zoran Cartwright  Location [address] [place] [address] [address]divar-* MRN [id number]  : 1945 Date 2022       Current Admission Date: 2022  Current Admission Diagnosis:Acute on chronic diastolic congestive heart failure [facility])   Patient Active Problem List    Diagnosis Date Noted    Acute on chronic diastolic congestive heart failure ([facility] ) 2022    Hypokalemia 2021    Chronic right-sided low back pain without sciatica 2021    Orthostatic hypotension 2021    SIADH (syndrome of inappropriate ADH production) ([facility] ) 2021    Weight loss 2021    Screening for cardiovascular, respiratory, and genitourinary diseases 2021    Insomnia, persistent 2021    Prostate cancer ([place] [address] ) 2021    Anemia due to stage 3a chronic kidney disease ([place] [address] ) 2021    Benign essential hypertension 06/15/2020    Bilateral leg weakness 06/15/2020    Embolism and thrombosis of arteries of the lower extremities ([place] [address] ) 2020    Elevated PSA 2019    Medicare annual wellness visit, subsequent 2019    Type 2 diabetes mellitus with hyperglycemia, without long-term current use of insulin ([facility] ) 2019    Screening for AAA (aortic abdominal aneurysm) 2019    Dyslipidemia 2018    Prostate cancer screening 08/15/2018    Occlusion of femoral-popliteal bypass graft ([facility] ) 2018    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 mellitus ([facility] ) 2017    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 ([facility] ) 2017    Lumbar radiculopathy 2016    Type 2 diabetes mellitus with diabetic neuropathy ([facility] ) 2015    Diabetic neuropathy ([facility] ) 10/10/2013    GE reflux 2013    CAD (coronary artery disease) 2012    Allergic rhinitis 2012    Arthropathy 2012    Rosacea 2012    Seborrheic dermatitis 2012      LOS (days): 3  Geometric Mean LOS (GMLOS) (days): 3 80  Days to GMLOS:0 6     OBJECTIVE:    Risk of Unplanned Readmission Score: 23         Current admission status: Inpatient       Preferred Pharmacy:   [address], [address]  [place] [address]  Phone: [phone] Fax: [phone]    [facility] [address], [address] [facility] [address] [facility] [address] [address]  Phone: [phone] Fax: [phone]    [facility] [address] [place] [address] [place], [address]  [address] [place] [address]  Phone: [phone] Fax: [phone]    Primary Care Provider: An Kaur DO    Primary Insurance: MEDICARE  Secondary Insurance: AARP    ASSESSMENT:  [address] 26 Agents    There are no active Health Care Agents on file  Readmission Root Cause  30 Day Readmission: No    Patient Information  Admitted from[de-identified] Home  Mental Status: Alert  During Assessment patient was accompanied by: Not accompanied during assessment  Assessment information provided by[de-identified] Patient  Primary Caregiver: Self  Support Systems: Spouse/significant other,Son  [place] of Residence: [address] [place] [address] do you live in?: [employer], [facility] entry access options   Select all that apply : Stairs  Number of steps to enter home : 4  Type of Current [facility]: [facility]  In the last 12 months, was there a time when you were not able to pay the mortgage or rent on time?: No  In the last 12 months, how many places have you lived?: 1  In the last 12 months, was there a time when you did not have a steady place to sleep or slept in a shelter (including now)?: No  Homeless/housing insecurity resource given?: N/A  Living Arrangements: Lives w/ Spouse/significant other    Activities of Daily Living Prior to Admission  Functional Status: Independent  Completes ADLs independently?: Yes (Spouse assists with transfers in/out of shower)  Ambulates independently?: Yes  Does patient use assisted devices?: Yes  Assisted Devices (DME) used: Rollator,Walker  Does patient currently own DME?: Yes  What DME does the patient currently own?: [employer]  Does patient have a history of Outpatient Therapy (PT/OT)?: No  Does the patient have a history of Short-Term Rehab?: No  Does patient have a history of HHC?: Yes [facility])  Does patient currently have [address]?: No     Patient Information Continued  Income Source: Pension/care home  Does patient have prescription coverage?: Yes  Within the past 12 months, you worried that your food would run out before you got the money to buy more : Never true  Within the past 12 months, the food you bought just didnt last and you didnt have money to get more : Never true  Food insecurity resource given?: N/A  Does patient have a history of substance abuse?: No  Does patient have a history of Mental Health Diagnosis?: No     Means of Transportation  [facility] Family transport  In the past 12 months, has lack of transportation kept you from medical appointments or from getting medications?: No  In the past 12 months, has lack of transportation kept you from meetings, work, or from getting things needed for daily living?: No  Was application for public transport provided?: N/A      Patient was admitted for treatment of CHF exacerbation  Patient is planned for stress test today  PT/OT consulted  SW will continue to follow for discharge planning needs

## 2022-02-21 NOTE — ASSESSMENT & PLAN NOTE
Continue coreg 12 5 mg BID  · BP slight elevated  · Hydralazine p r n    · Avoid hypotension due to history of orthostatic hypotension

## 2022-02-21 NOTE — PLAN OF CARE
Problem: MOBILITY - ADULT  Goal: Maintain or return to baseline ADL function  Description: INTERVENTIONS:  -  Assess patient's ability to carry out ADLs; assess patient's baseline for ADL function and identify physical deficits which impact ability to perform ADLs (bathing, care of mouth/teeth, toileting, grooming, dressing, etc )  - Assess/evaluate cause of self-care deficits   - Assess range of motion  - Assess patient's mobility; develop plan if impaired  - Assess patient's need for assistive devices and provide as appropriate  - Encourage maximum independence but intervene and supervise when necessary  - Involve family in performance of ADLs  - Assess for home care needs following discharge   - Consider OT consult to assist with ADL evaluation and planning for discharge  - Provide patient education as appropriate  Outcome: Progressing  Goal: Maintains/Returns to pre admission functional level  Description: INTERVENTIONS:  - Perform BMAT or MOVE assessment daily    - Set and communicate daily mobility goal to care team and patient/family/caregiver  - Collaborate with rehabilitation services on mobility goals if consulted  - Perform Range of Motion 2 times a day  - Reposition patient every 2 hours    - Dangle patient 3 times a day  - Stand patient 3 times a day  - Ambulate patient 3 times a day  - Out of bed to chair 3 times a day   - Out of bed for meals 3 times a day  - Out of bed for toileting  - Record patient progress and toleration of activity level   Outcome: Progressing     Problem: Potential for Falls  Goal: Patient will remain free of falls  Description: INTERVENTIONS:  - Educate patient/family on patient safety including physical limitations  - Instruct patient to call for assistance with activity   - Consult OT/PT to assist with strengthening/mobility   - Keep Call bell within reach  - Keep bed low and locked with side rails adjusted as appropriate  - Keep care items and personal belongings within reach  - Initiate and maintain comfort rounds  - Make Fall Risk Sign visible to staff  - Offer Toileting every 2 Hours, in advance of need  - Initiate/Maintain bed alarm  - Obtain necessary fall risk management equipment  - Apply yellow socks and bracelet for high fall risk patients  - Consider moving patient to room near nurses station  Outcome: Progressing     Problem: PAIN - ADULT  Goal: Verbalizes/displays adequate comfort level or baseline comfort level  Description: Interventions:  - Encourage patient to monitor pain and request assistance  - Assess pain using appropriate pain scale  - Administer analgesics based on type and severity of pain and evaluate response  - Implement non-pharmacological measures as appropriate and evaluate response  - Consider cultural and social influences on pain and pain management  - Notify physician/advanced practitioner if interventions unsuccessful or patient reports new pain  Outcome: Progressing     Problem: DISCHARGE PLANNING  Goal: Discharge to home or other facility with appropriate resources  Description: INTERVENTIONS:  - Identify barriers to discharge w/patient and caregiver  - Arrange for needed discharge resources and transportation as appropriate  - Identify discharge learning needs (meds, wound care, etc )  - Arrange for interpretive services to assist at discharge as needed  - Refer to Case Management Department for coordinating discharge planning if the patient needs post-hospital services based on physician/advanced practitioner order or complex needs related to functional status, cognitive ability, or social support system  Outcome: Progressing     Problem: Knowledge Deficit  Goal: Patient/family/caregiver demonstrates understanding of disease process, treatment plan, medications, and discharge instructions  Description: Complete learning assessment and assess knowledge base    Interventions:  - Provide teaching at level of understanding  - Provide teaching via preferred learning methods  Outcome: Progressing     Problem: CARDIOVASCULAR - ADULT  Goal: Maintains optimal cardiac output and hemodynamic stability  Description: INTERVENTIONS:  - Monitor I/O, vital signs and rhythm  - Monitor for S/S and trends of decreased cardiac output  - Administer and titrate ordered vasoactive medications to optimize hemodynamic stability  - Assess quality of pulses, skin color and temperature  - Assess for signs of decreased coronary artery perfusion  - Instruct patient to report change in severity of symptoms  Outcome: Progressing  Goal: Absence of cardiac dysrhythmias or at baseline rhythm  Description: INTERVENTIONS:  - Continuous cardiac monitoring, vital signs, obtain 12 lead EKG if ordered  - Administer antiarrhythmic and heart rate control medications as ordered  - Monitor electrolytes and administer replacement therapy as ordered  Outcome: Progressing     Problem: RESPIRATORY - ADULT  Goal: Achieves optimal ventilation and oxygenation  Description: INTERVENTIONS:  - Assess for changes in respiratory status  - Assess for changes in mentation and behavior  - Position to facilitate oxygenation and minimize respiratory effort  - Oxygen administered by appropriate delivery if ordered  - Initiate smoking cessation education as indicated  - Encourage broncho-pulmonary hygiene including cough, deep breathe, Incentive Spirometry  - Assess the need for suctioning and aspirate as needed  - Assess and instruct to report SOB or any respiratory difficulty  - Respiratory Therapy support as indicated  Outcome: Progressing     Problem: METABOLIC, FLUID AND ELECTROLYTES - ADULT  Goal: Glucose maintained within target range  Description: INTERVENTIONS:  - Monitor Blood Glucose as ordered  - Assess for signs and symptoms of hyperglycemia and hypoglycemia  - Administer ordered medications to maintain glucose within target range  - Assess nutritional intake and initiate nutrition service referral as needed  Outcome: Progressing     Problem: MUSCULOSKELETAL - ADULT  Goal: Maintain or return mobility to safest level of function  Description: INTERVENTIONS:  - Assess patient's ability to carry out ADLs; assess patient's baseline for ADL function and identify physical deficits which impact ability to perform ADLs (bathing, care of mouth/teeth, toileting, grooming, dressing, etc )  - Assess/evaluate cause of self-care deficits   - Assess range of motion  - Assess patient's mobility  - Assess patient's need for assistive devices and provide as appropriate  - Encourage maximum independence but intervene and supervise when necessary  - Involve family in performance of ADLs  - Assess for home care needs following discharge   - Consider OT consult to assist with ADL evaluation and planning for discharge  - Provide patient education as appropriate  Outcome: Progressing

## 2022-02-21 NOTE — ASSESSMENT & PLAN NOTE
Lab Results   Component Value Date    HGBA1C 7 1 (H) 12/27/2021       Recent Labs     02/20/22  1200 02/20/22  1613 02/20/22 2059 02/21/22  0732   POCGLU 217* 259* 263* 179*       Blood Sugar Average: Last 72 hrs:  (P) 226 5  Glucose 247 on admission  Patient is on metformin 1 g in the morning and 500 mg in the evening  · On Precose at home as well, patient brought it from home   Will continue  · Last A1c in December last year 7 1  · Continue SSI and fingersticks  · Continue lantus 5mg at night

## 2022-02-21 NOTE — ASSESSMENT & PLAN NOTE
S/P R Fem to AK pop bypass and B SFA occlusions, status post bilateral SFA stenting with occlusion     · Continue aspirin, Lipitor  · Follows vascular as outpatient

## 2022-02-21 NOTE — ASSESSMENT & PLAN NOTE
Status post multivessel stenting  · Continue Coreg, Lipitor, aspirin    · Troponin x3 unremarkable  · For nuclear stress today

## 2022-02-21 NOTE — ASSESSMENT & PLAN NOTE
Wt Readings from Last 3 Encounters:   02/21/22 76 1 kg (167 lb 12 3 oz)   02/11/22 81 2 kg (179 lb)   02/08/22 82 6 kg (182 lb)     Patient presents with SOB and leg edema for about one week, worsening  · Does not take diuretic at home  · Chest x-ray in ED showed- Small bilateral pleural effusions and pulmonary edema, new since the prior study  · ProBNP 3439  · Echo 10/2020 showed normal EF, grade 1 diastolic dysfunction, PA pressure 25  · Repeat 2D echo showed EF 08%, grade 1 diastolic dysfunction, mild global hypokinesis, mildly dilated atriums, moderate MR and TR, PA pressure 58  · Lower venous doppler no evidence of DVT, normal response to augmentation, monophasic waveforms  · Telemetry NSR  · Patient received Lasix 60 mg IV in ED  · Continue Lasix 40 mg IV, increased to BID  · Continue Coreg 12 5mg BID  · Daily weight and I&Os  · Cardiology following    · For nuclear stress test today

## 2022-02-21 NOTE — PLAN OF CARE
Problem: MOBILITY - ADULT  Goal: Maintain or return to baseline ADL function  Description: INTERVENTIONS:  -  Assess patient's ability to carry out ADLs; assess patient's baseline for ADL function and identify physical deficits which impact ability to perform ADLs (bathing, care of mouth/teeth, toileting, grooming, dressing, etc )  - Assess/evaluate cause of self-care deficits   - Assess range of motion  - Assess patient's mobility; develop plan if impaired  - Assess patient's need for assistive devices and provide as appropriate  - Encourage maximum independence but intervene and supervise when necessary  - Involve family in performance of ADLs  - Assess for home care needs following discharge   - Consider OT consult to assist with ADL evaluation and planning for discharge  - Provide patient education as appropriate  Outcome: Progressing     Problem: Potential for Falls  Goal: Patient will remain free of falls  Description: INTERVENTIONS:  - Educate patient/family on patient safety including physical limitations  - Instruct patient to call for assistance with activity   - Consult OT/PT to assist with strengthening/mobility   - Keep Call bell within reach  - Keep bed low and locked with side rails adjusted as appropriate  - Keep care items and personal belongings within reach  - Initiate and maintain comfort rounds  - Make Fall Risk Sign visible to staff  - Offer Toileting every 3 Hours, in advance of need  - Initiate/Maintain bed alarm  - Obtain necessary fall risk management equipment: alarms  - Apply yellow socks and bracelet for high fall risk patients  - Consider moving patient to room near nurses station  Outcome: Progressing     Problem: PAIN - ADULT  Goal: Verbalizes/displays adequate comfort level or baseline comfort level  Description: Interventions:  - Encourage patient to monitor pain and request assistance  - Assess pain using appropriate pain scale  - Administer analgesics based on type and severity of pain and evaluate response  - Implement non-pharmacological measures as appropriate and evaluate response  - Consider cultural and social influences on pain and pain management  - Notify physician/advanced practitioner if interventions unsuccessful or patient reports new pain  Outcome: Progressing     Problem: DISCHARGE PLANNING  Goal: Discharge to home or other facility with appropriate resources  Description: INTERVENTIONS:  - Identify barriers to discharge w/patient and caregiver  - Arrange for needed discharge resources and transportation as appropriate  - Identify discharge learning needs (meds, wound care, etc )  - Arrange for interpretive services to assist at discharge as needed  - Refer to Case Management Department for coordinating discharge planning if the patient needs post-hospital services based on physician/advanced practitioner order or complex needs related to functional status, cognitive ability, or social support system  Outcome: Progressing     Problem: CARDIOVASCULAR - ADULT  Goal: Maintains optimal cardiac output and hemodynamic stability  Description: INTERVENTIONS:  - Monitor I/O, vital signs and rhythm  - Monitor for S/S and trends of decreased cardiac output  - Administer and titrate ordered vasoactive medications to optimize hemodynamic stability  - Assess quality of pulses, skin color and temperature  - Assess for signs of decreased coronary artery perfusion  - Instruct patient to report change in severity of symptoms  Outcome: Progressing     Problem: CARDIOVASCULAR - ADULT  Goal: Absence of cardiac dysrhythmias or at baseline rhythm  Description: INTERVENTIONS:  - Continuous cardiac monitoring, vital signs, obtain 12 lead EKG if ordered  - Administer antiarrhythmic and heart rate control medications as ordered  - Monitor electrolytes and administer replacement therapy as ordered  Outcome: Progressing     Problem: METABOLIC, FLUID AND ELECTROLYTES - ADULT  Goal: Glucose maintained within target range  Description: INTERVENTIONS:  - Monitor Blood Glucose as ordered  - Assess for signs and symptoms of hyperglycemia and hypoglycemia  - Administer ordered medications to maintain glucose within target range  - Assess nutritional intake and initiate nutrition service referral as needed  Outcome: Progressing     Problem: RESPIRATORY - ADULT  Goal: Achieves optimal ventilation and oxygenation  Description: INTERVENTIONS:  - Assess for changes in respiratory status  - Assess for changes in mentation and behavior  - Position to facilitate oxygenation and minimize respiratory effort  - Oxygen administered by appropriate delivery if ordered  - Initiate smoking cessation education as indicated  - Encourage broncho-pulmonary hygiene including cough, deep breathe, Incentive Spirometry  - Assess the need for suctioning and aspirate as needed  - Assess and instruct to report SOB or any respiratory difficulty  - Respiratory Therapy support as indicated  Outcome: Progressing

## 2022-02-22 LAB
ANION GAP SERPL CALCULATED.3IONS-SCNC: 8 MMOL/L (ref 4–13)
BASOPHILS # BLD AUTO: 0.07 THOUSANDS/ΜL (ref 0–0.1)
BASOPHILS NFR BLD AUTO: 1 % (ref 0–1)
BUN SERPL-MCNC: 32 MG/DL (ref 5–25)
CALCIUM SERPL-MCNC: 8.8 MG/DL (ref 8.3–10.1)
CHLORIDE SERPL-SCNC: 101 MMOL/L (ref 100–108)
CO2 SERPL-SCNC: 31 MMOL/L (ref 21–32)
CREAT SERPL-MCNC: 1.11 MG/DL (ref 0.6–1.3)
EOSINOPHIL # BLD AUTO: 0.37 THOUSAND/ΜL (ref 0–0.61)
EOSINOPHIL NFR BLD AUTO: 5 % (ref 0–6)
ERYTHROCYTE [DISTWIDTH] IN BLOOD BY AUTOMATED COUNT: 16.2 % (ref 11.6–15.1)
GFR SERPL CREATININE-BSD FRML MDRD: 63 ML/MIN/1.73SQ M
GLUCOSE SERPL-MCNC: 215 MG/DL (ref 65–140)
GLUCOSE SERPL-MCNC: 233 MG/DL (ref 65–140)
GLUCOSE SERPL-MCNC: 247 MG/DL (ref 65–140)
GLUCOSE SERPL-MCNC: 289 MG/DL (ref 65–140)
GLUCOSE SERPL-MCNC: 302 MG/DL (ref 65–140)
HCT VFR BLD AUTO: 33.4 % (ref 36.5–49.3)
HGB BLD-MCNC: 10.4 G/DL (ref 12–17)
IMM GRANULOCYTES # BLD AUTO: 0.05 THOUSAND/UL (ref 0–0.2)
IMM GRANULOCYTES NFR BLD AUTO: 1 % (ref 0–2)
LYMPHOCYTES # BLD AUTO: 0.47 THOUSANDS/ΜL (ref 0.6–4.47)
LYMPHOCYTES NFR BLD AUTO: 6 % (ref 14–44)
MCH RBC QN AUTO: 26.8 PG (ref 26.8–34.3)
MCHC RBC AUTO-ENTMCNC: 31.1 G/DL (ref 31.4–37.4)
MCV RBC AUTO: 86 FL (ref 82–98)
MONOCYTES # BLD AUTO: 1.04 THOUSAND/ΜL (ref 0.17–1.22)
MONOCYTES NFR BLD AUTO: 14 % (ref 4–12)
NEUTROPHILS # BLD AUTO: 5.38 THOUSANDS/ΜL (ref 1.85–7.62)
NEUTS SEG NFR BLD AUTO: 73 % (ref 43–75)
NRBC BLD AUTO-RTO: 0 /100 WBCS
PLATELET # BLD AUTO: 309 THOUSANDS/UL (ref 149–390)
PMV BLD AUTO: 9.3 FL (ref 8.9–12.7)
POTASSIUM SERPL-SCNC: 3.7 MMOL/L (ref 3.5–5.3)
RBC # BLD AUTO: 3.88 MILLION/UL (ref 3.88–5.62)
SODIUM SERPL-SCNC: 140 MMOL/L (ref 136–145)
WBC # BLD AUTO: 7.38 THOUSAND/UL (ref 4.31–10.16)

## 2022-02-22 PROCEDURE — 85025 COMPLETE CBC W/AUTO DIFF WBC: CPT

## 2022-02-22 PROCEDURE — 82948 REAGENT STRIP/BLOOD GLUCOSE: CPT

## 2022-02-22 PROCEDURE — 99232 SBSQ HOSP IP/OBS MODERATE 35: CPT | Performed by: INTERNAL MEDICINE

## 2022-02-22 PROCEDURE — 80048 BASIC METABOLIC PNL TOTAL CA: CPT

## 2022-02-22 PROCEDURE — 99232 SBSQ HOSP IP/OBS MODERATE 35: CPT

## 2022-02-22 RX ORDER — FUROSEMIDE 10 MG/ML
40 INJECTION INTRAMUSCULAR; INTRAVENOUS DAILY
Status: DISCONTINUED | OUTPATIENT
Start: 2022-02-22 | End: 2022-02-23

## 2022-02-22 RX ADMIN — ATORVASTATIN CALCIUM 20 MG: 20 TABLET, FILM COATED ORAL at 17:12

## 2022-02-22 RX ADMIN — BRIMONIDINE TARTRATE 1 DROP: 2 SOLUTION/ DROPS OPHTHALMIC at 17:17

## 2022-02-22 RX ADMIN — BRIMONIDINE TARTRATE 1 DROP: 2 SOLUTION/ DROPS OPHTHALMIC at 08:23

## 2022-02-22 RX ADMIN — CARVEDILOL 12.5 MG: 12.5 TABLET, FILM COATED ORAL at 17:15

## 2022-02-22 RX ADMIN — CARVEDILOL 12.5 MG: 12.5 TABLET, FILM COATED ORAL at 08:18

## 2022-02-22 RX ADMIN — INSULIN LISPRO 5 UNITS: 100 INJECTION, SOLUTION INTRAVENOUS; SUBCUTANEOUS at 13:00

## 2022-02-22 RX ADMIN — INSULIN GLARGINE 5 UNITS: 100 INJECTION, SOLUTION SUBCUTANEOUS at 21:35

## 2022-02-22 RX ADMIN — INSULIN LISPRO 1 UNITS: 100 INJECTION, SOLUTION INTRAVENOUS; SUBCUTANEOUS at 21:35

## 2022-02-22 RX ADMIN — Medication 2000 UNITS: at 08:19

## 2022-02-22 RX ADMIN — DOCUSATE SODIUM 100 MG: 100 CAPSULE ORAL at 08:18

## 2022-02-22 RX ADMIN — INSULIN LISPRO 3 UNITS: 100 INJECTION, SOLUTION INTRAVENOUS; SUBCUTANEOUS at 08:00

## 2022-02-22 RX ADMIN — INSULIN LISPRO 4 UNITS: 100 INJECTION, SOLUTION INTRAVENOUS; SUBCUTANEOUS at 12:21

## 2022-02-22 RX ADMIN — B-COMPLEX W/ C & FOLIC ACID TAB 1 TABLET: TAB at 08:18

## 2022-02-22 RX ADMIN — ENOXAPARIN SODIUM 40 MG: 40 INJECTION SUBCUTANEOUS at 17:16

## 2022-02-22 RX ADMIN — Medication 100 MG: at 08:18

## 2022-02-22 RX ADMIN — INSULIN LISPRO 5 UNITS: 100 INJECTION, SOLUTION INTRAVENOUS; SUBCUTANEOUS at 17:15

## 2022-02-22 RX ADMIN — FUROSEMIDE 40 MG: 10 INJECTION, SOLUTION INTRAMUSCULAR; INTRAVENOUS at 08:20

## 2022-02-22 RX ADMIN — TAMSULOSIN HYDROCHLORIDE 0.4 MG: 0.4 CAPSULE ORAL at 17:16

## 2022-02-22 RX ADMIN — OXYCODONE HYDROCHLORIDE AND ACETAMINOPHEN 500 MG: 500 TABLET ORAL at 08:18

## 2022-02-22 RX ADMIN — INSULIN LISPRO 4 UNITS: 100 INJECTION, SOLUTION INTRAVENOUS; SUBCUTANEOUS at 17:00

## 2022-02-22 RX ADMIN — PANTOPRAZOLE SODIUM 40 MG: 40 TABLET, DELAYED RELEASE ORAL at 05:23

## 2022-02-22 RX ADMIN — ASPIRIN 81 MG: 81 TABLET, COATED ORAL at 08:18

## 2022-02-22 NOTE — ASSESSMENT & PLAN NOTE
Wt Readings from Last 3 Encounters:   02/21/22 76 1 kg (167 lb 12 3 oz)   02/11/22 81 2 kg (179 lb)   02/08/22 82 6 kg (182 lb)     Patient presents with SOB and leg edema for about one week, worsening  · Does not take diuretic at home  · Chest x-ray in ED showed- Small bilateral pleural effusions and pulmonary edema, new since the prior study  · ProBNP 3439  · Echo 10/2020 showed normal EF, grade 1 diastolic dysfunction, PA pressure 25  · Repeat 2D echo showed EF 58%, grade 1 diastolic dysfunction, mild global hypokinesis, mildly dilated atriums, moderate MR and TR, PA pressure 58  · Lower venous doppler no evidence of DVT, normal response to augmentation, monophasic waveforms  · Telemetry NSR  · Nuclear stress test demonstrated LV dilatation with no significant reversible ischemia  · Patient received Lasix 60 mg IV in ED  · Continue Lasix 40 mg IV daily  · Continue Coreg 12 5mg BID  · Daily weight and I&Os  · Cardiology following

## 2022-02-22 NOTE — CASE MANAGEMENT
Case Management Discharge Planning Note    Patient name Alli Romo  Location [address] [facility] 417/4 [address]-* MRN [id number]  : 1945 Date 2022       Current Admission Date: 2022  Current Admission Diagnosis:Acute on chronic diastolic congestive heart failure [facility])   Patient Active Problem List    Diagnosis Date Noted    Acute on chronic diastolic congestive heart failure ([facility] ) 2022    Hypokalemia 2021    Chronic right-sided low back pain without sciatica 2021    Orthostatic hypotension 2021    SIADH (syndrome of inappropriate ADH production) ([place] [address] ) 2021    Weight loss 2021    Screening for cardiovascular, respiratory, and genitourinary diseases 2021    Insomnia, persistent 2021    Prostate cancer ([facility] ) 2021    Anemia due to stage 3a chronic kidney disease ([facility] ) 2021    Benign essential hypertension 06/15/2020    Bilateral leg weakness 06/15/2020    Embolism and thrombosis of arteries of the lower extremities ([place] [address] ) 2020    Elevated PSA 2019    Medicare annual wellness visit, subsequent 2019    Type 2 diabetes mellitus with hyperglycemia, without long-term current use of insulin ([facility] ) 2019    Screening for AAA (aortic abdominal aneurysm) 2019    Dyslipidemia 2018    Prostate cancer screening 08/15/2018    Occlusion of femoral-popliteal bypass graft ([facility] ) 2018    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 mellitus ([facility] ) 2017    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 ([facility] ) 2017    Lumbar radiculopathy 2016    Type 2 diabetes mellitus with diabetic neuropathy ([facility] ) 2015    Diabetic neuropathy ([facility] ) 10/10/2013    GE reflux 2013    CAD (coronary artery disease) 2012    Allergic rhinitis 2012    Arthropathy 2012    Rosacea 2012    Seborrheic dermatitis 2012      LOS (days): 4  Geometric Mean LOS (GMLOS) (days): 3 80  Days to GMLOS:-0 2     OBJECTIVE:  Risk of Unplanned Readmission Score: 23       Current admission status: Inpatient   Preferred Pharmacy:   [address], [address]  [address] [address]  Phone: [phone] Fax: [phone]    [facility] [address] [facility] [address]  Phone: [phone] Fax: [phone]    [facility] [address] [facility]  [address] [place] [address]  Phone: [phone] Fax: [phone]    Primary Care Provider: Ashlyn Gates DO    Primary Insurance: MEDICARE  Secondary Insurance: AARP    DISCHARGE DETAILS:    Discharge planning discussed with[de-identified] Patient  Freedom of Choice: Yes  Comments - Freedom of Choice: SW reviewed [address] with patient  He is in agreement with a referral for [facility] and expressed no preference for agency  Referral placed to [facility] through [place]       Were Treatment Team discharge recommendations reviewed with patient/caregiver?: Yes  Did patient/caregiver verbalize understanding of patient care needs?: Yes  Were patient/caregiver advised of the risks associated with not following Treatment Team discharge recommendations?: Yes    [address]         Is the patient interested in [facility] at discharge?: Yes  [address] requested[de-identified] [address] Name[de-identified] [address] Provider[de-identified] PCP  [address] Needed[de-identified] Heart Failure Management  Homebound Criteria Met[de-identified] Requires the Assistance of Another Person for Safe Ambulation or to Leave the Home  Supporting Clincal Findings[de-identified] Fatigues Easliy in Short Distances,Limited Endurance    DME Referral Provided  Referral made for DME?: No    Other Referral/Resources/Interventions Provided:  Interventions: C    Would you like to participate in our [address] service program?  : No - Declined    Treatment Team Recommendation: Home  Discharge Destination Plan[de-identified] Home with [place] at Discharge : Family      IMM Given (Date):: 02/22/22  IMM Given to[de-identified] Patient (IMM reviewed with and signed by patient  Copy given to patient and copy placed in scan bin for chart )      SW spoke with patient at bedside to discuss discharge plans  Patient is in agreement with a referral for [facility] for SN services and does not have a preference for a particular agency  SW placed referral with [facility] through [address]  SW will continue to follow for discharge planning needs

## 2022-02-22 NOTE — ASSESSMENT & PLAN NOTE
Lab Results   Component Value Date    HGBA1C 7 1 (H) 12/27/2021       Recent Labs     02/21/22  1549 02/21/22  2115 02/22/22  0731 02/22/22  1122   POCGLU 270* 221* 233* 302*       Blood Sugar Average: Last 72 hrs:  (P) 240 [id number]  Glucose 247 on admission  Patient is on metformin 1 g in the morning and 500 mg in the evening  · On Precose at home as well, patient brought it from home   Will continue  · Last A1c in December last year 7 1  · Continue SSI and fingersticks  · Continue lantus 5 units at night  · Start humalog 5 units with meals as blood sugar remains elevated

## 2022-02-22 NOTE — PROGRESS NOTES
[address]  Progress Note Ana Jean-Baptiste [birth year], 67 y o  male MRN: [id number]  Unit/Bed#: [address] [place] [address] Encounter: [id number]  Primary Care Provider: Doris Jaffe DO   Date and time admitted to hospital: 2/18/2022  9:12 AM    * Acute on chronic diastolic congestive heart failure [facility])  Assessment & Plan  Wt Readings from Last 3 Encounters:   02/21/22 76 1 kg (167 lb 12 3 oz)   02/11/22 81 2 kg (179 lb)   02/08/22 82 6 kg (182 lb)     Patient presents with SOB and leg edema for about one week, worsening  · Does not take diuretic at home  · Chest x-ray in ED showed- Small bilateral pleural effusions and pulmonary edema, new since the prior study  · ProBNP 3439  · Echo 10/2020 showed normal EF, grade 1 diastolic dysfunction, PA pressure 25  · Repeat 2D echo showed EF 15%, grade 1 diastolic dysfunction, mild global hypokinesis, mildly dilated atriums, moderate MR and TR, PA pressure 58  · Lower venous doppler no evidence of DVT, normal response to augmentation, monophasic waveforms  · Telemetry NSR  · Nuclear stress test demonstrated LV dilatation with no significant reversible ischemia  · Patient received Lasix 60 mg IV in ED  · Continue Lasix 40 mg IV daily  · Continue Coreg 12 5mg BID  · Daily weight and I&Os  · Cardiology following  CAD (coronary artery disease)  Assessment & Plan  Status post multivessel stenting  · Continue Coreg, Lipitor, aspirin  · Troponin x3 unremarkable  · Nuclear stress test negative for ischemia    Anemia due to stage 3a chronic kidney disease (HCC)  Assessment & Plan  Baseline hemoglobin 9-10  · Hemoglobin 10 4, stable  · Monitor with CBC    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)  Assessment & Plan  S/P R Fem to AK pop bypass and B SFA occlusions, status post bilateral SFA stenting with occlusion     · Continue aspirin, Lipitor  · Follows vascular as outpatient    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 mellitus ([facility] )  Assessment & Plan  Baseline creatinine appears to be around 0 8-1 1   · Creatinine 1 11, stable  · Monitor with BMP    Orthostatic hypotension  Assessment & Plan  · Avoid hypotension  · Hold Florinef due to side effect of fluid retention  · Denies lightheadedness with position change      Benign essential hypertension  Assessment & Plan  Continue coreg 12 5 mg BID  · BP slight elevated  · Hydralazine p r n  · Avoid hypotension due to history of orthostatic hypotension    Type 2 diabetes mellitus with diabetic neuropathy [facility])  Assessment & Plan  Lab Results   Component Value Date    HGBA1C 7 1 (H) 12/27/2021       Recent Labs     02/21/22  1549 02/21/22  2115 02/22/22  0731 02/22/22  1122   POCGLU 270* 221* 233* 302*       Blood Sugar Average: Last 72 hrs:  (P) 240 [id number]  Glucose 247 on admission  Patient is on metformin 1 g in the morning and 500 mg in the evening  · On Precose at home as well, patient brought it from home  Will continue  · Last A1c in December last year 7 1  · Continue SSI and fingersticks  · Continue lantus 5 units at night  · Start humalog 5 units with meals as blood sugar remains elevated      Chronic right-sided low back pain without sciatica  Assessment & Plan  Noted history    Prostate cancer [facility])  Assessment & Plan  · Status post RT with ongoing ADT  · Outpatient Urology follow-up    Dyslipidemia  Assessment & Plan  Continue statin    GE reflux  Assessment & Plan  Continue PPI        VTE Pharmacologic Prophylaxis: VTE Score: 5 High Risk (Score >/= 5) - Pharmacological DVT Prophylaxis Ordered: enoxaparin (Lovenox)  Sequential Compression Devices Ordered  Patient Centered Rounds: I performed bedside rounds with nursing staff today  Discussions with Specialists or Other Care Team Provider: cardiology    Education and Discussions with Family / Patient: Updated  (wife) via phone  Time Spent for Care: 30 minutes   More than 50% of total time spent on counseling and coordination of care as described above  Current Length of Stay: 4 day(s)  Current Patient Status: Inpatient   Certification Statement: The patient will continue to require additional inpatient hospital stay due to IV diuresis, monitoring BP  Discharge Plan: Anticipate discharge tomorrow to home  Code Status: Prior    Subjective:   Pt is doing well today  He denies any SOB, SIMMS, orthopnea  He is ready to go home but understands we need to keep him on IV diuresis to lessen the edema in his legs  He denies any nausea, lightheadedness, dizziness, chest pain, palpitations, cough, abdominal pain  Objective:     Vitals:   Temp (24hrs), Av 7 °F (36 5 °C), Min:97 4 °F (36 3 °C), Max:98 1 °F (36 7 °C)    Temp:  [97 4 °F (36 3 °C)-98 1 °F (36 7 °C)] 97 4 °F (36 3 °C)  HR:  [64-81] 71  Resp:  [17-19] 18  BP: (123-171)/(46-75) 130/57  SpO2:  [95 %-99 %] 98 %  Body mass index is 23 4 kg/m²  Input and Output Summary (last 24 hours): Intake/Output Summary (Last 24 hours) at 2022 1245  Last data filed at 2022 0501  Gross per 24 hour   Intake --   Output 1050 ml   Net -1050 ml       Physical Exam:   Physical Exam  Vitals and nursing note reviewed  Constitutional:       Appearance: He is well-developed  HENT:      Head: Normocephalic and atraumatic  Eyes:      Conjunctiva/sclera: Conjunctivae normal    Cardiovascular:      Rate and Rhythm: Normal rate and regular rhythm  Heart sounds: Murmur heard  Pulmonary:      Effort: Pulmonary effort is normal  No respiratory distress  Breath sounds: Normal breath sounds  Abdominal:      Palpations: Abdomen is soft  Tenderness: There is no abdominal tenderness  Musculoskeletal:      Right lower leg: Edema present  Left lower leg: Edema present  Comments: Improving edema since previous day   Skin:     General: Skin is warm and dry  Neurological:      Mental Status: He is alert           Additional Data:     Labs:  Results from last 7 days   Lab Units 02/22/22  0615   WBC Thousand/uL 7 38   HEMOGLOBIN g/dL 10 4*   HEMATOCRIT % 33 4*   PLATELETS Thousands/uL 309   NEUTROS PCT % 73   LYMPHS PCT % 6*   MONOS PCT % 14*   EOS PCT % 5     Results from last 7 days   Lab Units 02/22/22  0615 02/19/22  0554 02/18/22  0944   SODIUM mmol/L 140   < > 132*   POTASSIUM mmol/L 3 7   < > 3 9   CHLORIDE mmol/L 101   < > 99*   CO2 mmol/L 31   < > 26   BUN mg/dL 32*   < > 19   CREATININE mg/dL 1 11   < > 0 92   ANION GAP mmol/L 8   < > 7   CALCIUM mg/dL 8 8   < > 8 5   ALBUMIN g/dL  --   --  3 1*   TOTAL BILIRUBIN mg/dL  --   --  0 82   ALK PHOS U/L  --   --  89   ALT U/L  --   --  19   AST U/L  --   --  10   GLUCOSE RANDOM mg/dL 247*   < > 247*    < > = values in this interval not displayed       Results from last 7 days   Lab Units 02/18/22  0945   INR  1 06     Results from last 7 days   Lab Units 02/22/22  1122 02/22/22  0731 02/21/22  2115 02/21/22  1549 02/21/22  1247 02/21/22  0732 02/20/22  2059 02/20/22  1613 02/20/22  1200 02/20/22  0715 02/19/22  2005 02/19/22  1611   POC GLUCOSE mg/dl 302* 233* 221* 270* 291* 179* 263* 259* 217* 207* 231* 252*               Lines/Drains:  Invasive Devices  Report    Peripheral Intravenous Line            Peripheral IV 02/18/22 Left Antecubital 4 days                      Imaging: Reviewed radiology reports from this admission including: cardiac cath    Recent Cultures (last 7 days):         Last 24 Hours Medication List:   Current Facility-Administered Medications   Medication Dose Route Frequency Provider Last Rate    acetaminophen  650 mg Oral Q6H PRN NEO Camacho      ascorbic acid  500 mg Oral Daily NEO Camacho      aspirin  81 mg Oral Daily NEO Camacho      atorvastatin  20 mg Oral Daily With NEO Fishman      brimonidine tartrate  1 drop Both Eyes BID NEO Camacho      carvedilol  12 5 mg Oral BID NEO Camacho      cholecalciferol  2,000 Units Oral Daily NEO Camacho      co-enzyme Q-10  100 mg Oral Daily NEO Camacho      docusate sodium  100 mg Oral BID Abbey Saenz PA-C      enoxaparin  40 mg Subcutaneous Q24H [address] NEO Camacho      furosemide  40 mg Intravenous Daily StepanNEO Stewart      hydrALAZINE  10 mg Oral Q6H PRN NEO Camacho      insulin glargine  5 Units Subcutaneous HS NEO Camacho      insulin lispro  1-5 Units Subcutaneous HS NEO Camacho      insulin lispro  1-6 Units Subcutaneous TID AC NEO Camacho      insulin lispro  5 Units Subcutaneous TID With Meals Abbey Saenz PA-C      multivitamin stress formula  1 tablet Oral Daily NEO Camacho      pantoprazole  40 mg Oral Early Morning NEO Camacho      patient supplied medication  1 each Oral TID With Meals NEO Camacho      tamsulosin  0 4 mg Oral Daily With NEO Fishman          Today, Patient Was Seen By: Abbey Saenz PA-C    **Please Note: This note may have been constructed using a voice recognition system  **

## 2022-02-22 NOTE — PLAN OF CARE
Problem: MOBILITY - ADULT  Goal: Maintain or return to baseline ADL function  Description: INTERVENTIONS:  -  Assess patient's ability to carry out ADLs; assess patient's baseline for ADL function and identify physical deficits which impact ability to perform ADLs (bathing, care of mouth/teeth, toileting, grooming, dressing, etc )  - Assess/evaluate cause of self-care deficits   - Assess range of motion  - Assess patient's mobility; develop plan if impaired  - Assess patient's need for assistive devices and provide as appropriate  - Encourage maximum independence but intervene and supervise when necessary  - Involve family in performance of ADLs  - Assess for home care needs following discharge   - Consider OT consult to assist with ADL evaluation and planning for discharge  - Provide patient education as appropriate  Outcome: Progressing  Goal: Maintains/Returns to pre admission functional level  Description: INTERVENTIONS:  - Perform BMAT or MOVE assessment daily    - Set and communicate daily mobility goal to care team and patient/family/caregiver  - Collaborate with rehabilitation services on mobility goals if consulted  - Perform Range of Motion 2 times a day  - Reposition patient every 2 hours    - Dangle patient 2 times a day  - Stand patient 2 times a day  - Ambulate patient 2 times a day  - Out of bed to chair 2 times a day   - Out of bed for meals 2 times a day  - Out of bed for toileting  - Record patient progress and toleration of activity level   Outcome: Progressing     Problem: Potential for Falls  Goal: Patient will remain free of falls  Description: INTERVENTIONS:  - Educate patient/family on patient safety including physical limitations  - Instruct patient to call for assistance with activity   - Consult OT/PT to assist with strengthening/mobility   - Keep Call bell within reach  - Keep bed low and locked with side rails adjusted as appropriate  - Keep care items and personal belongings within reach  - Initiate and maintain comfort rounds  - Make Fall Risk Sign visible to staff  - Offer Toileting every 4 Hours, in advance of need  - Initiate/Maintain bed/chair alarm  - Obtain necessary fall risk management equipment: side rails  - Apply yellow socks and bracelet for high fall risk patients  - Consider moving patient to room near nurses station  Outcome: Progressing     Problem: PAIN - ADULT  Goal: Verbalizes/displays adequate comfort level or baseline comfort level  Description: Interventions:  - Encourage patient to monitor pain and request assistance  - Assess pain using appropriate pain scale  - Administer analgesics based on type and severity of pain and evaluate response  - Implement non-pharmacological measures as appropriate and evaluate response  - Consider cultural and social influences on pain and pain management  - Notify physician/advanced practitioner if interventions unsuccessful or patient reports new pain  Outcome: Progressing     Problem: DISCHARGE PLANNING  Goal: Discharge to home or other facility with appropriate resources  Description: INTERVENTIONS:  - Identify barriers to discharge w/patient and caregiver  - Arrange for needed discharge resources and transportation as appropriate  - Identify discharge learning needs (meds, wound care, etc )  - Arrange for interpretive services to assist at discharge as needed  - Refer to Case Management Department for coordinating discharge planning if the patient needs post-hospital services based on physician/advanced practitioner order or complex needs related to functional status, cognitive ability, or social support system  Outcome: Progressing     Problem: Knowledge Deficit  Goal: Patient/family/caregiver demonstrates understanding of disease process, treatment plan, medications, and discharge instructions  Description: Complete learning assessment and assess knowledge base    Interventions:  - Provide teaching at level of understanding  - Provide teaching via preferred learning methods  Outcome: Progressing     Problem: CARDIOVASCULAR - ADULT  Goal: Maintains optimal cardiac output and hemodynamic stability  Description: INTERVENTIONS:  - Monitor I/O, vital signs and rhythm  - Monitor for S/S and trends of decreased cardiac output  - Administer and titrate ordered vasoactive medications to optimize hemodynamic stability  - Assess quality of pulses, skin color and temperature  - Assess for signs of decreased coronary artery perfusion  - Instruct patient to report change in severity of symptoms  Outcome: Progressing  Goal: Absence of cardiac dysrhythmias or at baseline rhythm  Description: INTERVENTIONS:  - Continuous cardiac monitoring, vital signs, obtain 12 lead EKG if ordered  - Administer antiarrhythmic and heart rate control medications as ordered  - Monitor electrolytes and administer replacement therapy as ordered  Outcome: Progressing     Problem: RESPIRATORY - ADULT  Goal: Achieves optimal ventilation and oxygenation  Description: INTERVENTIONS:  - Assess for changes in respiratory status  - Assess for changes in mentation and behavior  - Position to facilitate oxygenation and minimize respiratory effort  - Oxygen administered by appropriate delivery if ordered  - Initiate smoking cessation education as indicated  - Encourage broncho-pulmonary hygiene including cough, deep breathe, Incentive Spirometry  - Assess the need for suctioning and aspirate as needed  - Assess and instruct to report SOB or any respiratory difficulty  - Respiratory Therapy support as indicated  Outcome: Progressing     Problem: METABOLIC, FLUID AND ELECTROLYTES - ADULT  Goal: Glucose maintained within target range  Description: INTERVENTIONS:  - Monitor Blood Glucose as ordered  - Assess for signs and symptoms of hyperglycemia and hypoglycemia  - Administer ordered medications to maintain glucose within target range  - Assess nutritional intake and initiate nutrition service referral as needed  Outcome: Progressing     Problem: MUSCULOSKELETAL - ADULT  Goal: Maintain or return mobility to safest level of function  Description: INTERVENTIONS:  - Assess patient's ability to carry out ADLs; assess patient's baseline for ADL function and identify physical deficits which impact ability to perform ADLs (bathing, care of mouth/teeth, toileting, grooming, dressing, etc )  - Assess/evaluate cause of self-care deficits   - Assess range of motion  - Assess patient's mobility  - Assess patient's need for assistive devices and provide as appropriate  - Encourage maximum independence but intervene and supervise when necessary  - Involve family in performance of ADLs  - Assess for home care needs following discharge   - Consider OT consult to assist with ADL evaluation and planning for discharge  - Provide patient education as appropriate  Outcome: Progressing

## 2022-02-22 NOTE — ASSESSMENT & PLAN NOTE
Status post multivessel stenting  · Continue Coreg, Lipitor, aspirin    · Troponin x3 unremarkable  · Nuclear stress test negative for ischemia

## 2022-02-22 NOTE — PROGRESS NOTES
Progress Note - Cardiology   [facility]     Hugh Shi 67 y o  male MRN: [id number]  : 1945  Unit/Bed#: [address] [place] [address] Encounter: [id number]    Assessment and Plan:   1  Acute on chronic diastolic heart failure:  Echocardiogram shows EF 45%, this is reduced from previous Echo  -  edema slowly improving    -  continue Lasix 40 mg IV daily and re-evaluate in a m     -  continue Coreg 12 5 mg twice a day, if patient his orthostatics family changed to Lopressor    -  monitor I&O, daily weights and labs, replete as needed     2  Coronary artery disease:  No complaints of chest discomfort  Continue aspirin and statin therapy    -   Lexiscan nuclear stress test done on 2022 demonstrated LV dilatation with no significant reversible ischemia  His EF was gated at approximately 30%     3  Dyslipidemia:  Lipitor 20 mg once a day      4  Diabetes:  Managed per primary team     5  Question of orthostatic hypotension:  Patient has been hypertensive and denies lightheadedness with position changes    -  significant lower extremity edema    -  Florinef discontinued and continue to monitor orthostatic vital signs     6  History of severe PAD:  No complaints at this time      Subjective / Objective:   Patient seen and examined  Lower extremity edema slowly improving  Florinef discontinued on 2022  Orthostatics today, did demonstrate 25 mm hemoglobin drop in systolic, but initial blood pressure was hypertensive  Will continue to monitor while we are diuresing  If needed would start midodrine    Vitals: Blood pressure 165/67, pulse 76, temperature (!) 97 4 °F (36 3 °C), resp  rate 18, height 5' 11" (1 803 m), weight 76 1 kg (167 lb 12 3 oz), SpO2 98 %  Vitals:    22 0535 22 0534   Weight: 78 3 kg (172 lb 9 9 oz) 76 1 kg (167 lb 12 3 oz)     Body mass index is 23 4 kg/m²    BP Readings from Last 3 Encounters:   22 165/67   22 164/70   22 150/80 Orthostatic Blood Pressures      Most Recent Value   Blood Pressure 165/67 filed at 2022 0927   Patient Position - Orthostatic VS Standing - Orthostatic VS  [No dizziness, no complains] filed at 2022 0805        I/O        0701   0700  0701   0700  0701   0700    P  O   400     I V  (mL/kg) 10 (0 1) 10 (0 1)     Total Intake(mL/kg) 10 (0 1) 410 (5 4)     Urine (mL/kg/hr) 2150 (1 2) 2100 (1 1)     Stool  0     Total Output 2150 2100     Net -2140 -1690            Unmeasured Stool Occurrence  1 x         Invasive Devices  Report    Peripheral Intravenous Line            Peripheral IV 22 Left Antecubital 4 days                  Intake/Output Summary (Last 24 hours) at 2022 1034  Last data filed at 2022 0501  Gross per 24 hour   Intake --   Output 1750 ml   Net -1750 ml         Physical Exam:   Physical Exam  Vitals and nursing note reviewed  Constitutional:       General: He is not in acute distress  Appearance: Normal appearance  He is normal weight  HENT:      Head: Normocephalic  Right Ear: External ear normal       Left Ear: External ear normal       Nose: Nose normal    Eyes:      General: No scleral icterus  Right eye: No discharge  Left eye: No discharge  Cardiovascular:      Rate and Rhythm: Normal rate and regular rhythm  Pulses: Normal pulses  Heart sounds: Murmur heard  Pulmonary:      Effort: Pulmonary effort is normal  No bradypnea or respiratory distress  Breath sounds: Examination of the right-lower field reveals rales  Examination of the left-lower field reveals rales  Rales present  No wheezing  Abdominal:      General: Bowel sounds are normal  There is no distension  Palpations: Abdomen is soft  Musculoskeletal:      Right lower le+ Pitting Edema present  Left lower le+ Pitting Edema present        Comments: Edema to the mid calf, slightly improved from yesterday Skin:     General: Skin is warm and dry  Neurological:      General: No focal deficit present  Mental Status: He is alert and oriented to person, place, and time  Mental status is at baseline     Psychiatric:         Mood and Affect: Mood normal                    Medications/ Allergies:     Current Facility-Administered Medications   Medication Dose Route Frequency Provider Last Rate    acetaminophen  650 mg Oral Q6H PRN NEO Camacho      ascorbic acid  500 mg Oral Daily NEO Camacho      aspirin  81 mg Oral Daily NEO Camacho      atorvastatin  20 mg Oral Daily With NEO Fishman      brimonidine tartrate  1 drop Both Eyes BID NEO Camacho      carvedilol  12 5 mg Oral BID NEO Camacho      cholecalciferol  2,000 Units Oral Daily NEO Camacho      co-enzyme Q-10  100 mg Oral Daily NEO Camacho      docusate sodium  100 mg Oral BID Balwinder Sánchez PA-C      enoxaparin  40 mg Subcutaneous Q24H [address] NEO Camacho      furosemide  40 mg Intravenous Daily NEO Andrea      hydrALAZINE  10 mg Oral Q6H PRN NEO Camacho      insulin glargine  5 Units Subcutaneous HS NEO Camacho      insulin lispro  1-5 Units Subcutaneous HS Gaston Purcell, NEO      insulin lispro  1-6 Units Subcutaneous TID AC NEO Camacho      multivitamin stress formula  1 tablet Oral Daily NEO Camacho      pantoprazole  40 mg Oral Early Morning NEO Camacho      patient supplied medication  1 each Oral TID With Meals NEO Camacho      tamsulosin  0 4 mg Oral Daily With Dinner NEO Camacho       acetaminophen, 650 mg, Q6H PRN  hydrALAZINE, 10 mg, Q6H PRN      No Known Allergies    VTE Pharmacologic Prophylaxis:   Sequential compression device (Venodyne)     Labs:   Troponins:  Results from last 7 days   Lab Units 02/18/22  1452 02/18/22  1147   HSTNI D2 ng/L  --  0   HSTNI D4 ng/L 1  --      CBC with diff:  Results from last 7 days   Lab Units 02/22/22  0615 02/21/22  0538 02/18/22  0944   WBC Thousand/uL 7 38 6 63 8 40   HEMOGLOBIN g/dL 10 4* 10 7* 9 3*   HEMATOCRIT % 33 4* 34 0* 30 2*   MCV fL 86 85 87   PLATELETS Thousands/uL 309 310 266   MCH pg 26 8 26 9 26 8   MCHC g/dL 31 1* 31 5 30 8*   RDW % 16 2* 16 2* 16 5*   MPV fL 9 3 9 3 9 8   NRBC AUTO /100 WBCs 0  --  0     CMP:  Results from last 7 days   Lab Units 02/22/22  0615 02/21/22  0538 02/20/22  0445 02/19/22  0554 02/18/22  0944   SODIUM mmol/L 140 139 137 134* 132*   POTASSIUM mmol/L 3 7 3 4* 4 0 3 7 3 9   CHLORIDE mmol/L 101 101 101 99* 99*   CO2 mmol/L 31 30 29 27 26   ANION GAP mmol/L 8 8 7 8 7   BUN mg/dL 32* 27* 22 18 19   CREATININE mg/dL 1 11 1 02 0 94 0 85 0 92   CALCIUM mg/dL 8 8 8 8 8 9 8 4 8 5   AST U/L  --   --   --   --  10   ALT U/L  --   --   --   --  19   ALK PHOS U/L  --   --   --   --  89   TOTAL PROTEIN g/dL  --   --   --   --  6 9   ALBUMIN g/dL  --   --   --   --  3 1*   TOTAL BILIRUBIN mg/dL  --   --   --   --  0 82   EGFR ml/min/1 73sq m 63 70 77 84 79     Magnesium:  Results from last 7 days   Lab Units 02/21/22  0538 02/20/22  0445 02/19/22  0554 02/18/22  0944   MAGNESIUM mg/dL 2 0 2 1 1 8 1 6     Coags:  Results from last 7 days   Lab Units 02/18/22  0945   PTT seconds 27   INR  1 06     TSH:  Results from last 7 days   Lab Units 02/18/22  0944   TSH 3RD GENERATON uIU/mL 2 817       Imaging & Testing   I have personally reviewed pertinent reports  XR chest 1 view portable    Result Date: 2/18/2022  Narrative: CHEST INDICATION:   sob  COMPARISON:  7/30/2021 EXAM PERFORMED/VIEWS:  XR CHEST PORTABLE FINDINGS: Cardiomediastinal silhouette appears unremarkable  Patchy airspace disease in the lungs bilaterally is likely on the basis of pulmonary edema  Findings are new since the prior examination  Small bilateral pleural effusions  Osseous structures appear within normal limits for patient age       Impression: Small bilateral pleural effusions and pulmonary edema, new since the prior study  Workstation performed: [id number]     XR chest pa & lateral    Result Date: 2/21/2022  Narrative: CHEST INDICATION:   pleural effusion - moderate to large size on echocardiogram  COMPARISON:  February 18, 2022  EXAM PERFORMED/VIEWS:  XR CHEST PA & LATERAL  The frontal view was performed utilizing dual energy radiographic technique  FINDINGS: Heart top normal in size  Resolution of pulmonary vascular congestion since the last exam  Mild right basilar subsegmental atelectasis  Small bilateral pleural effusions  No evidence of consolidation or pneumothorax  Osseous structures appear within normal limits for patient age  Impression: Small bilateral pleural effusions  Mild right basilar subsegmental atelectasis  Workstation performed: [id number]     Stress strip    Result Date: 2/21/2022  Narrative: Confirmed by Katy Guido ([id number]),  Galilea Massey ([id number]) on 2/21/2022 3:37:05 PM    VAS lower limb venous duplex study, complete bilateral    Result Date: 2/21/2022  Narrative:  [facility] REPORT CLINICAL: Indications: Patient presents with bilateral lower extremity edema x few days  Operative History: 2018-06-18 Left SFA Stent Angioplasty Left SFA stents Right fem-pop bypass Right SFA stent Risk Factors The patient has history of HTN, Diabetes (Yes), Hyperlipidemia, CKD, PAD and CAD  CONCLUSION: Impression: RIGHT LOWER LIMB: No evidence of acute or chronic deep vein thrombosis  No evidence of superficial thrombophlebitis noted  Doppler evaluation shows a normal response to augmentation maneuvers  Popliteal, posterior tibial and anterior tibial arterial Doppler waveforms are monophasic  LEFT LOWER LIMB: No evidence of acute or chronic deep vein thrombosis  No evidence of superficial thrombophlebitis noted  Doppler evaluation shows a normal response to augmentation maneuvers   Popliteal, posterior tibial and anterior tibial arterial Doppler waveforms are monophasic  Technical findings were sent to chart  SIGNATURE: Electronically Signed by: Bela Vazquez MD, [address] on 2022-02-21 12:00:33 PM    Echo complete w/ contrast if indicated    Result Date: 2/19/2022  Narrative: Kacie Hussain  Left Ventricle: Left ventricular cavity size is normal  Wall thickness is normal  The left ventricular ejection fraction is 45% by visual estimation  Systolic function is mildly reduced  Diastolic function is mildly abnormal, consistent with grade I (abnormal) relaxation  There is mild global hypokinesis    Left Atrium: The atrium is mildly dilated    Right Atrium: The atrium is mildly dilated    Mitral Valve: There is moderate regurgitation    Tricuspid Valve: There is moderate regurgitation  The right ventricular systolic pressure is moderately elevated  The estimated right ventricular systolic pressure is 75 34 mmHg    Pericardium: There is a large left pleural effusion  NM myocardial perfusion spect (rx stress and/or rest)    Result Date: 2/21/2022  Narrative:   Abnormal pharmacologic nuclear stress test  There is severely reduced LV function/ejection fraction  LV is dilated  No significant reversible ischemia seen   Stress ECG: No ST deviation is noted  There were no arrhythmias during recovery    The ECG was negative for ischemia  The stress ECG is negative for ischemia after pharmacologic stress, without reproduction of symptoms    Perfusion: There is no significant reversible ischemia noted  Summed differential score is 3  Diaphragmatic and subdiaphragmatic artifacts are present  LV is dilated    Stress Function: Left ventricular function post-stress is abnormal  Global function is severely reduced  Calculated ejection fraction is 29%   Perfusion Defect Conclusion: There is no evidence of transient ischemic dilation (TID)  Ratio is 1 14          NM myocardial perfusion spect (rx stress and/or rest)    Interpretation Summary    Abnormal pharmacologic nuclear stress test   There is severely reduced LV function/ejection fraction  LV is dilated  No significant reversible ischemia seen    Stress ECG: No ST deviation is noted  There were no arrhythmias during recovery    The ECG was negative for ischemia  The stress ECG is negative for ischemia after pharmacologic stress, without reproduction of symptoms    Perfusion: There is no significant reversible ischemia noted  Summed differential score is 3  Diaphragmatic and subdiaphragmatic artifacts are present  LV is dilated    Stress Function: Left ventricular function post-stress is abnormal  Global function is severely reduced  Calculated ejection fraction is 29%    Perfusion Defect Conclusion: There is no evidence of transient ischemic dilation (TID)  Ratio is 1  14  Sunday [facility], [address] [facility]  Cardiology      "This note has been constructed using a voice recognition system  Therefore there may be syntax, spelling, and/or grammatical errors   Please call if you have any questions  "

## 2022-02-22 NOTE — ASSESSMENT & PLAN NOTE
· Avoid hypotension  · Hold Florinef due to side effect of fluid retention  · Denies lightheadedness with position change

## 2022-02-23 PROBLEM — I50.43 ACUTE ON CHRONIC COMBINED SYSTOLIC AND DIASTOLIC CONGESTIVE HEART FAILURE (HCC): Status: ACTIVE | Noted: 2022-02-18

## 2022-02-23 LAB
ANION GAP SERPL CALCULATED.3IONS-SCNC: 8 MMOL/L (ref 4–13)
BASOPHILS # BLD AUTO: 0.06 THOUSANDS/ΜL (ref 0–0.1)
BASOPHILS NFR BLD AUTO: 1 % (ref 0–1)
BUN SERPL-MCNC: 28 MG/DL (ref 5–25)
CALCIUM SERPL-MCNC: 8.9 MG/DL (ref 8.3–10.1)
CHLORIDE SERPL-SCNC: 99 MMOL/L (ref 100–108)
CO2 SERPL-SCNC: 29 MMOL/L (ref 21–32)
CREAT SERPL-MCNC: 0.98 MG/DL (ref 0.6–1.3)
EOSINOPHIL # BLD AUTO: 0.45 THOUSAND/ΜL (ref 0–0.61)
EOSINOPHIL NFR BLD AUTO: 7 % (ref 0–6)
ERYTHROCYTE [DISTWIDTH] IN BLOOD BY AUTOMATED COUNT: 16 % (ref 11.6–15.1)
GFR SERPL CREATININE-BSD FRML MDRD: 74 ML/MIN/1.73SQ M
GLUCOSE SERPL-MCNC: 115 MG/DL (ref 65–140)
GLUCOSE SERPL-MCNC: 166 MG/DL (ref 65–140)
GLUCOSE SERPL-MCNC: 201 MG/DL (ref 65–140)
GLUCOSE SERPL-MCNC: 210 MG/DL (ref 65–140)
GLUCOSE SERPL-MCNC: 233 MG/DL (ref 65–140)
HCT VFR BLD AUTO: 32.3 % (ref 36.5–49.3)
HGB BLD-MCNC: 10 G/DL (ref 12–17)
IMM GRANULOCYTES # BLD AUTO: 0.04 THOUSAND/UL (ref 0–0.2)
IMM GRANULOCYTES NFR BLD AUTO: 1 % (ref 0–2)
LYMPHOCYTES # BLD AUTO: 0.49 THOUSANDS/ΜL (ref 0.6–4.47)
LYMPHOCYTES NFR BLD AUTO: 7 % (ref 14–44)
MCH RBC QN AUTO: 26.8 PG (ref 26.8–34.3)
MCHC RBC AUTO-ENTMCNC: 31 G/DL (ref 31.4–37.4)
MCV RBC AUTO: 87 FL (ref 82–98)
MONOCYTES # BLD AUTO: 0.86 THOUSAND/ΜL (ref 0.17–1.22)
MONOCYTES NFR BLD AUTO: 13 % (ref 4–12)
NEUTROPHILS # BLD AUTO: 4.79 THOUSANDS/ΜL (ref 1.85–7.62)
NEUTS SEG NFR BLD AUTO: 71 % (ref 43–75)
NRBC BLD AUTO-RTO: 0 /100 WBCS
PLATELET # BLD AUTO: 319 THOUSANDS/UL (ref 149–390)
PMV BLD AUTO: 9.4 FL (ref 8.9–12.7)
POTASSIUM SERPL-SCNC: 3.4 MMOL/L (ref 3.5–5.3)
RBC # BLD AUTO: 3.73 MILLION/UL (ref 3.88–5.62)
SODIUM SERPL-SCNC: 136 MMOL/L (ref 136–145)
WBC # BLD AUTO: 6.69 THOUSAND/UL (ref 4.31–10.16)

## 2022-02-23 PROCEDURE — 99232 SBSQ HOSP IP/OBS MODERATE 35: CPT | Performed by: NURSE PRACTITIONER

## 2022-02-23 PROCEDURE — 99232 SBSQ HOSP IP/OBS MODERATE 35: CPT | Performed by: INTERNAL MEDICINE

## 2022-02-23 PROCEDURE — 85025 COMPLETE CBC W/AUTO DIFF WBC: CPT

## 2022-02-23 PROCEDURE — 97530 THERAPEUTIC ACTIVITIES: CPT

## 2022-02-23 PROCEDURE — 82948 REAGENT STRIP/BLOOD GLUCOSE: CPT

## 2022-02-23 PROCEDURE — 97116 GAIT TRAINING THERAPY: CPT

## 2022-02-23 PROCEDURE — 80048 BASIC METABOLIC PNL TOTAL CA: CPT

## 2022-02-23 RX ORDER — POTASSIUM CHLORIDE 20 MEQ/1
40 TABLET, EXTENDED RELEASE ORAL ONCE
Status: COMPLETED | OUTPATIENT
Start: 2022-02-23 | End: 2022-02-23

## 2022-02-23 RX ORDER — SPIRONOLACTONE 25 MG/1
25 TABLET ORAL DAILY
Status: DISCONTINUED | OUTPATIENT
Start: 2022-02-23 | End: 2022-02-24 | Stop reason: HOSPADM

## 2022-02-23 RX ADMIN — POTASSIUM CHLORIDE 40 MEQ: 1500 TABLET, EXTENDED RELEASE ORAL at 11:04

## 2022-02-23 RX ADMIN — DOCUSATE SODIUM 100 MG: 100 CAPSULE ORAL at 17:23

## 2022-02-23 RX ADMIN — INSULIN GLARGINE 5 UNITS: 100 INJECTION, SOLUTION SUBCUTANEOUS at 21:14

## 2022-02-23 RX ADMIN — B-COMPLEX W/ C & FOLIC ACID TAB 1 TABLET: TAB at 08:23

## 2022-02-23 RX ADMIN — Medication 100 MG: at 08:23

## 2022-02-23 RX ADMIN — INSULIN LISPRO 5 UNITS: 100 INJECTION, SOLUTION INTRAVENOUS; SUBCUTANEOUS at 16:15

## 2022-02-23 RX ADMIN — Medication 2000 UNITS: at 08:23

## 2022-02-23 RX ADMIN — ATORVASTATIN CALCIUM 20 MG: 20 TABLET, FILM COATED ORAL at 16:11

## 2022-02-23 RX ADMIN — SPIRONOLACTONE 25 MG: 25 TABLET ORAL at 11:05

## 2022-02-23 RX ADMIN — CARVEDILOL 12.5 MG: 12.5 TABLET, FILM COATED ORAL at 17:26

## 2022-02-23 RX ADMIN — PANTOPRAZOLE SODIUM 40 MG: 40 TABLET, DELAYED RELEASE ORAL at 05:29

## 2022-02-23 RX ADMIN — FUROSEMIDE 40 MG: 10 INJECTION, SOLUTION INTRAMUSCULAR; INTRAVENOUS at 08:27

## 2022-02-23 RX ADMIN — DOCUSATE SODIUM 100 MG: 100 CAPSULE ORAL at 08:23

## 2022-02-23 RX ADMIN — INSULIN LISPRO 5 UNITS: 100 INJECTION, SOLUTION INTRAVENOUS; SUBCUTANEOUS at 12:10

## 2022-02-23 RX ADMIN — BRIMONIDINE TARTRATE 1 DROP: 2 SOLUTION/ DROPS OPHTHALMIC at 17:23

## 2022-02-23 RX ADMIN — TAMSULOSIN HYDROCHLORIDE 0.4 MG: 0.4 CAPSULE ORAL at 16:11

## 2022-02-23 RX ADMIN — CARVEDILOL 12.5 MG: 12.5 TABLET, FILM COATED ORAL at 08:24

## 2022-02-23 RX ADMIN — OXYCODONE HYDROCHLORIDE AND ACETAMINOPHEN 500 MG: 500 TABLET ORAL at 08:23

## 2022-02-23 RX ADMIN — INSULIN LISPRO 3 UNITS: 100 INJECTION, SOLUTION INTRAVENOUS; SUBCUTANEOUS at 12:09

## 2022-02-23 RX ADMIN — BRIMONIDINE TARTRATE 1 DROP: 2 SOLUTION/ DROPS OPHTHALMIC at 08:26

## 2022-02-23 RX ADMIN — INSULIN LISPRO 1 UNITS: 100 INJECTION, SOLUTION INTRAVENOUS; SUBCUTANEOUS at 21:14

## 2022-02-23 RX ADMIN — INSULIN LISPRO 2 UNITS: 100 INJECTION, SOLUTION INTRAVENOUS; SUBCUTANEOUS at 08:06

## 2022-02-23 RX ADMIN — INSULIN LISPRO 5 UNITS: 100 INJECTION, SOLUTION INTRAVENOUS; SUBCUTANEOUS at 08:06

## 2022-02-23 RX ADMIN — ENOXAPARIN SODIUM 40 MG: 40 INJECTION SUBCUTANEOUS at 17:23

## 2022-02-23 RX ADMIN — ASPIRIN 81 MG: 81 TABLET, COATED ORAL at 08:24

## 2022-02-23 NOTE — PHYSICAL THERAPY NOTE
PT TREATMENT     02/23/22 1100   PT Last Visit   PT Visit Date 02/23/22   Note Type   Note Type Treatment   Pain Assessment   Pain Assessment Tool 0-10   Pain Score No Pain   Restrictions/Precautions   Other Precautions Hard of hearing   General   Chart Reviewed Yes   Subjective   Subjective I have been walking a lot on my own at night   Transfers   Sit to Stand 7  Independent   Stand to Sit 7  Independent   Ambulation/Elevation   Gait pattern Foward flexed  (Slow, easy afua)   Gait Assistance   (S/I)   Assistive Device Rolling walker   Distance 150 feet with change in direction; pt also ambulated in/out of bathroom and stood to urinate using urinal independently   Stair Management Assistance 5  Supervision   Additional items Verbal cues; Tactile cues   Stair Management Technique Step to pattern; One rail L  (Pt holds 1 rail with bilateral upper extremities)   Number of Stairs 3   Balance   Static Sitting Good   Static Standing Good  (With RW)   Dynamic Standing Fair +  (With RW)   Ambulatory Fair  (With RW)   Activity Tolerance   Activity Tolerance Patient tolerated treatment well   Assessment   Assessment Pt has basically returned to prior level of function with and without the RW  Will decrease PT frequency to 3 times a week and next PT session pt would benefit from up/down stairs again  Pt has been ambulating on his own during the evening and PT has observed pt ambulating during the day with S of his wife using the RW  Pt remains I short distances without the RW, but will continue to use the RW for longer distances  The patient's AM-PAC Basic Mobility Inpatient Short Form Raw Score is 23  A Raw score of greater than 16 suggests the patient may benefit from discharge to home  Please also refer to the recommendation of the Physical Therapist for safe discharge planning  Plan   Treatment/Interventions ADL retraining;Functional transfer training;LE strengthening/ROM; Elevations; Therapeutic exercise; Endurance training;Patient/family training;Equipment eval/education; Bed mobility;Gait training; Compensatory technique education   PT Frequency Other (Comment)  (3x/wk)   Recommendation   PT Discharge Recommendation No rehabilitation needs   AM-PAC Basic Mobility Inpatient   Turning in Bed Without Bedrails 4   Lying on Back to Sitting on Edge of Flat Bed 4   Moving Bed to Chair 4   Standing Up From Chair 4   Walk in Room 4   Climb 3-5 Stairs 3   Basic Mobility Inpatient Raw Score 23   Basic Mobility Standardized Score 50 88   Highest Level Of Mobility   JH-HLM Goal 7: Walk 25 feet or more   JH-HLM Highest Level of Mobility 7: Walk 25 feet or more   JH-HLM Goal Achieved Yes   Education   Education Provided Mobility training;Assistive device   Patient Demonstrates acceptance/verbal understanding;Explanation/teachback used;Demonstrates verbal understanding   End of Consult   Patient Position at End of Consult Bedside chair; All needs within reach   [facility] Insurance Number  Félix Teodoror PT [id number]     Portions of the documentation may have been created using voice recognition software  Occasional wrong word or sound alike substitutions may have occurred due to the inherent limitation of the voice recognition software  Read the chart carefully and recognize, using context, where substitutions have occurred

## 2022-02-23 NOTE — ASSESSMENT & PLAN NOTE
Lab Results   Component Value Date    HGBA1C 7 1 (H) 12/27/2021       Recent Labs     02/22/22 2014 02/23/22  0757 02/23/22  1149 02/23/22  1607   POCGLU 215* 210* 233* 115       Blood Sugar Average: Last 72 hrs:  (P) 233 6  Glucose 247 on admission  Patient is on metformin 1 g in the morning and 500 mg in the evening  · On Precose at home as well, patient brought it from home   Will continue  · Last A1c in December last year 7 1  · Continue SSI and fingersticks  · Started on lantus 5 units at night  · Started humalog 5 units with meals as blood sugar remains elevated

## 2022-02-23 NOTE — ASSESSMENT & PLAN NOTE
Wt Readings from Last 3 Encounters:   02/23/22 75 5 kg (166 lb 7 2 oz)   02/11/22 81 2 kg (179 lb)   02/08/22 82 6 kg (182 lb)     Patient presents with SOB and leg edema for about one week, worsening  · Does not take diuretic at home  · Chest x-ray in ED showed- Small bilateral pleural effusions and pulmonary edema, new since the prior study  · ProBNP 3439  · Echo 10/2020 showed normal EF, grade 1 diastolic dysfunction, PA pressure 25  · Repeat 2D echo showed EF 87%, grade 1 diastolic dysfunction, mild global hypokinesis, mildly dilated atriums, moderate MR and TR, PA pressure 58  · Lower venous doppler no evidence of DVT, normal response to augmentation, monophasic waveforms  · Telemetry NSR  · Nuclear stress test demonstrated LV dilatation with no significant reversible ischemia  · Patient received Lasix 60 mg IV in ED  · Initially was on Lasix 40 mg IV daily, transitioned to Aldactone 25mg p o  Daily today  · Continue Coreg 12 5mg BID  · Daily weight and I&Os  Lost 14 lb since admission  · Cardiology following

## 2022-02-23 NOTE — ASSESSMENT & PLAN NOTE
· Avoid hypotension  · Discontinued Florinef due to side effect of fluid retention  · Denies lightheadedness with position change  · Orthostatic vital signs negative this morning  · Repeat Q shift    · Cardiology considering midodrine due to low EF if + orthostasis

## 2022-02-23 NOTE — PLAN OF CARE
Problem: PHYSICAL THERAPY ADULT  Goal: Performs mobility at highest level of function for planned discharge setting  See evaluation for individualized goals  Outcome: Progressing  Note:    Problem List: Decreased strength,Decreased range of motion,Decreased endurance,Impaired balance,Decreased mobility,Decreased coordination,Decreased safety awareness,Pain  Assessment: Pt has basically returned to prior level of function with and without the RW  Will decrease PT frequency to 3 times a week and next PT session pt would benefit from up/down stairs again  Pt has been ambulating on his own during the evening and PT has observed pt ambulating during the day with S of his wife using the RW  Pt remains I short distances without the RW, but will continue to use the RW for longer distances  PT Discharge Recommendation: No rehabilitation needs          See flowsheet documentation for full assessment

## 2022-02-23 NOTE — PLAN OF CARE
Problem: MOBILITY - ADULT  Goal: Maintains/Returns to pre admission functional level  Description: INTERVENTIONS:  - Perform BMAT or MOVE assessment daily    - Set and communicate daily mobility goal to care team and patient/family/caregiver  - Collaborate with rehabilitation services on mobility goals if consulted  - Perform Range of Motion 3 times a day  - Actively turn self  - Out of bed to chair as tolerated    - Out of bed for meals 3 times a day  - Out of bed for toileting  - Record patient progress and toleration of activity level   Outcome: Progressing     Problem: Potential for Falls  Goal: Patient will remain free of falls  Description: INTERVENTIONS:  - Educate patient/family on patient safety including physical limitations  - Instruct patient to call for assistance with activity   - Consult OT/PT to assist with strengthening/mobility   - Keep Call bell within reach  - Keep bed low and locked with side rails adjusted as appropriate  - Keep care items and personal belongings within reach  - Initiate and maintain comfort rounds  - Make Fall Risk Sign visible to staff  - Offer Toileting every 2  Hours, in advance of need  - Apply yellow socks and bracelet for high fall risk patients  - Consider moving patient to room near nurses station  Outcome: Progressing     Problem: PAIN - ADULT  Goal: Verbalizes/displays adequate comfort level or baseline comfort level  Description: Interventions:  - Encourage patient to monitor pain and request assistance  - Assess pain using appropriate pain scale  - Administer analgesics based on type and severity of pain and evaluate response  - Implement non-pharmacological measures as appropriate and evaluate response  - Consider cultural and social influences on pain and pain management  - Notify physician/advanced practitioner if interventions unsuccessful or patient reports new pain  Outcome: Progressing     Problem: DISCHARGE PLANNING  Goal: Discharge to home or other facility with appropriate resources  Description: INTERVENTIONS:  - Identify barriers to discharge w/patient and caregiver  - Arrange for needed discharge resources and transportation as appropriate  - Identify discharge learning needs (meds, wound care, etc )  - Arrange for interpretive services to assist at discharge as needed  - Refer to Case Management Department for coordinating discharge planning if the patient needs post-hospital services based on physician/advanced practitioner order or complex needs related to functional status, cognitive ability, or social support system  Outcome: Progressing     Problem: Knowledge Deficit  Goal: Patient/family/caregiver demonstrates understanding of disease process, treatment plan, medications, and discharge instructions  Description: Complete learning assessment and assess knowledge base    Interventions:  - Provide teaching at level of understanding  - Provide teaching via preferred learning methods  Outcome: Progressing     Problem: CARDIOVASCULAR - ADULT  Goal: Absence of cardiac dysrhythmias or at baseline rhythm  Description: INTERVENTIONS:  - Continuous cardiac monitoring, vital signs, obtain 12 lead EKG if ordered  - Administer antiarrhythmic and heart rate control medications as ordered  - Monitor electrolytes and administer replacement therapy as ordered  Outcome: Progressing     Problem: RESPIRATORY - ADULT  Goal: Achieves optimal ventilation and oxygenation  Description: INTERVENTIONS:  - Assess for changes in respiratory status  - Assess for changes in mentation and behavior  - Position to facilitate oxygenation and minimize respiratory effort  - Oxygen administered by appropriate delivery if ordered  - Initiate smoking cessation education as indicated  - Encourage broncho-pulmonary hygiene including cough, deep breathe, Incentive Spirometry  - Assess the need for suctioning and aspirate as needed  - Assess and instruct to report SOB or any respiratory difficulty  - Respiratory Therapy support as indicated  Outcome: Progressing     Problem: METABOLIC, FLUID AND ELECTROLYTES - ADULT  Goal: Glucose maintained within target range  Description: INTERVENTIONS:  - Monitor Blood Glucose as ordered  - Assess for signs and symptoms of hyperglycemia and hypoglycemia  - Administer ordered medications to maintain glucose within target range  - Assess nutritional intake and initiate nutrition service referral as needed  Outcome: Progressing     Problem: MUSCULOSKELETAL - ADULT  Goal: Maintain or return mobility to safest level of function  Description: INTERVENTIONS:  - Assess patient's ability to carry out ADLs; assess patient's baseline for ADL function and identify physical deficits which impact ability to perform ADLs (bathing, care of mouth/teeth, toileting, grooming, dressing, etc )  - Assess/evaluate cause of self-care deficits   - Assess range of motion  - Assess patient's mobility  - Assess patient's need for assistive devices and provide as appropriate  - Encourage maximum independence but intervene and supervise when necessary  - Involve family in performance of ADLs  - Assess for home care needs following discharge   - Consider OT consult to assist with ADL evaluation and planning for discharge  - Provide patient education as appropriate  Outcome: Progressing

## 2022-02-23 NOTE — PLAN OF CARE
Problem: Potential for Falls  Goal: Patient will remain free of falls  Description: INTERVENTIONS:  - Educate patient/family on patient safety including physical limitations  - Instruct patient to call for assistance with activity   - Consult OT/PT to assist with strengthening/mobility   - Keep Call bell within reach  - Keep bed low and locked with side rails adjusted as appropriate  - Keep care items and personal belongings within reach  - Initiate and maintain comfort rounds  - Make Fall Risk Sign visible to staff  - Offer Toileting every 2 Hours, in advance of need  - Initiate/Maintain bed alarm  - Obtain necessary fall risk management equipment: n/a  - Apply yellow socks and bracelet for high fall risk patients  - Consider moving patient to room near nurses station  Outcome: Progressing     Problem: DISCHARGE PLANNING  Goal: Discharge to home or other facility with appropriate resources  Description: INTERVENTIONS:  - Identify barriers to discharge w/patient and caregiver  - Arrange for needed discharge resources and transportation as appropriate  - Identify discharge learning needs (meds, wound care, etc )  - Arrange for interpretive services to assist at discharge as needed  - Refer to Case Management Department for coordinating discharge planning if the patient needs post-hospital services based on physician/advanced practitioner order or complex needs related to functional status, cognitive ability, or social support system  Outcome: Progressing

## 2022-02-23 NOTE — PROGRESS NOTES
[address]  Progress Note Jamal Guy [id number], 67 y o  male MRN: [id number]  Unit/Bed#: [address] [place] [address] Encounter: [id number]  Primary Care Provider: Gregorio Her DO   Date and time admitted to hospital: 2/18/2022  9:12 AM    * Acute on chronic combined systolic and diastolic congestive heart failure (HCC)  Assessment & Plan  Wt Readings from Last 3 Encounters:   02/23/22 75 5 kg (166 lb 7 2 oz)   02/11/22 81 2 kg (179 lb)   02/08/22 82 6 kg (182 lb)     Patient presents with SOB and leg edema for about one week, worsening  · Does not take diuretic at home  · Chest x-ray in ED showed- Small bilateral pleural effusions and pulmonary edema, new since the prior study  · ProBNP 3439  · Echo 10/2020 showed normal EF, grade 1 diastolic dysfunction, PA pressure 25  · Repeat 2D echo showed EF 17%, grade 1 diastolic dysfunction, mild global hypokinesis, mildly dilated atriums, moderate MR and TR, PA pressure 58  · Lower venous doppler no evidence of DVT, normal response to augmentation, monophasic waveforms  · Telemetry NSR  · Nuclear stress test demonstrated LV dilatation with no significant reversible ischemia  · Patient received Lasix 60 mg IV in ED  · Initially was on Lasix 40 mg IV daily, transitioned to Aldactone 25mg p o  Daily today  · Continue Coreg 12 5mg BID  · Daily weight and I&Os  Lost 14 lb since admission  · Cardiology following  Type 2 diabetes mellitus with diabetic neuropathy [facility])  Assessment & Plan  Lab Results   Component Value Date    HGBA1C 7 1 (H) 12/27/2021       Recent Labs     02/22/22 2014 02/23/22  0757 02/23/22  1149 02/23/22  1607   POCGLU 215* 210* 233* 115       Blood Sugar Average: Last 72 hrs:  (P) 233 6  Glucose 247 on admission  Patient is on metformin 1 g in the morning and 500 mg in the evening  · On Precose at home as well, patient brought it from home   Will continue  · Last A1c in December last year 7 1  · Continue SSI and fingersticks  · Started on lantus 5 units at night  · Started humalog 5 units with meals as blood sugar remains elevated      Chronic right-sided low back pain without sciatica  Assessment & Plan  Noted history    Orthostatic hypotension  Assessment & Plan  · Avoid hypotension  · Discontinued Florinef due to side effect of fluid retention  · Denies lightheadedness with position change  · Orthostatic vital signs negative this morning  · Repeat Q shift  · Cardiology considering midodrine due to low EF if + orthostasis      Prostate cancer [facility])  Assessment & Plan  · Status post RT with ongoing ADT  · Outpatient Urology follow-up    Anemia due to stage 3a chronic kidney disease (HCC)  Assessment & Plan  Baseline hemoglobin 9-10  · Hemoglobin 10 4, stable  · Monitor with CBC    Benign essential hypertension  Assessment & Plan  Continue coreg 12 5 mg BID  · BP slight elevated  · Hydralazine p r n  · Avoid hypotension due to history of orthostatic hypotension    Dyslipidemia  Assessment & Plan  Continue statin    GE reflux  Assessment & Plan  Continue PPI    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 mellitus ([facility] [address] )  Assessment & Plan  Baseline creatinine appears to be around 0 8-1 1  Creatinine stable  Monitor    CAD (coronary artery disease)  Assessment & Plan  Status post multivessel stenting  · Continue Coreg, Lipitor, aspirin  · Troponin x3 unremarkable  · Nuclear stress test negative for ischemia    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)  Assessment & Plan  S/P R Fem to AK pop bypass and B SFA occlusions, status post bilateral SFA stenting with occlusion  · Continue aspirin, Lipitor  · Follows vascular as outpatient      VTE Pharmacologic Prophylaxis:   Pharmacologic: Enoxaparin (Lovenox)  Mechanical VTE Prophylaxis in Place: No    Patient Centered Rounds: I have performed bedside rounds with nursing staff today      Discussions with Specialists or Other Care Team Provider: Cardiology    Education and Discussions with Family / Patient:  Yes    Time Spent for Care: 20 minutes  More than 50% of total time spent on counseling and coordination of care as described above  Current Length of Stay: 5 day(s)    Current Patient Status: Inpatient   Certification Statement: The patient will continue to require additional inpatient hospital stay due to CHF    Discharge Plan:  Home in a m  If remains stable    Code Status: Prior      Subjective:   Patient denies SOB, chest pain, headache dizziness, nausea vomiting  Denies dizziness lightheadedness when getting up  Objective:     Vitals:   Temp (24hrs), Av 7 °F (36 5 °C), Min:97 4 °F (36 3 °C), Max:98 °F (36 7 °C)    Temp:  [97 4 °F (36 3 °C)-98 °F (36 7 °C)] 98 °F (36 7 °C)  HR:  [59-72] 63  Resp:  [18-19] 19  BP: ()/(45-60) 140/50  SpO2:  [95 %-99 %] 99 %  Body mass index is 23 21 kg/m²  Input and Output Summary (last 24 hours): Intake/Output Summary (Last 24 hours) at 2022 1616  Last data filed at 2022 1335  Gross per 24 hour   Intake 1960 ml   Output 3300 ml   Net -1340 ml       Physical Exam:     Physical Exam  Vitals and nursing note reviewed  Constitutional:       Appearance: He is well-developed  HENT:      Head: Normocephalic and atraumatic  Neck:      Thyroid: No thyromegaly  Vascular: No JVD  Trachea: No tracheal deviation  Cardiovascular:      Rate and Rhythm: Normal rate and regular rhythm  Heart sounds: Normal heart sounds  Pulmonary:      Effort: Pulmonary effort is normal  No respiratory distress  Breath sounds: Normal breath sounds  No wheezing or rales  Abdominal:      General: Bowel sounds are normal  There is no distension  Palpations: Abdomen is soft  Tenderness: There is no abdominal tenderness  There is no guarding  Musculoskeletal:         General: Swelling present  Cervical back: Neck supple        Comments: 2+ bilateral pedal and lower leg edema   Skin:     General: Skin is warm and dry  Neurological:      General: No focal deficit present  Mental Status: He is alert and oriented to person, place, and time  Psychiatric:         Mood and Affect: Mood normal          Judgment: Judgment normal          Additional Data:     Labs:    Results from last 7 days   Lab Units 02/23/22  0530   WBC Thousand/uL 6 69   HEMOGLOBIN g/dL 10 0*   HEMATOCRIT % 32 3*   PLATELETS Thousands/uL 319   NEUTROS PCT % 71   LYMPHS PCT % 7*   MONOS PCT % 13*   EOS PCT % 7*     Results from last 7 days   Lab Units 02/23/22  0530 02/19/22  0554 02/18/22  0944   POTASSIUM mmol/L 3 4*   < > 3 9   CHLORIDE mmol/L 99*   < > 99*   CO2 mmol/L 29   < > 26   BUN mg/dL 28*   < > 19   CREATININE mg/dL 0 98   < > 0 92   CALCIUM mg/dL 8 9   < > 8 5   ALK PHOS U/L  --   --  89   ALT U/L  --   --  19   AST U/L  --   --  10    < > = values in this interval not displayed  Results from last 7 days   Lab Units 02/18/22  0945   INR  1 06       * I Have Reviewed All Lab Data Listed Above  * Additional Pertinent Lab Tests Reviewed:  [address] Admission Reviewed    Imaging:    Imaging Reports Reviewed Today Include:  2D echo  Imaging Personally Reviewed by Myself Includes:  None    Recent Cultures (last 7 days):           Last 24 Hours Medication List:   Current Facility-Administered Medications   Medication Dose Route Frequency Provider Last Rate    acetaminophen  650 mg Oral Q6H PRN NEO Camacho      ascorbic acid  500 mg Oral Daily NEO Camacho      aspirin  81 mg Oral Daily NEO Camacho      atorvastatin  20 mg Oral Daily With NEO Fishman      brimonidine tartrate  1 drop Both Eyes BID NEO Camacho      carvedilol  12 5 mg Oral BID NEO Camacho      cholecalciferol  2,000 Units Oral Daily NEO Camacho      co-enzyme Q-10  100 mg Oral Daily NEO Camacho      docusate sodium  100 mg Oral BID Loyd Dianne ERIN Doll      enoxaparin  40 mg Subcutaneous Q24H [facility] NEO Camacho      insulin glargine  5 Units Subcutaneous HS NEO Camacho      insulin lispro  1-5 Units Subcutaneous HS NEO Camacho      insulin lispro  1-6 Units Subcutaneous TID AC NEO Camacho      insulin lispro  5 Units Subcutaneous TID With Meals Chasity Overton PA-C      multivitamin stress formula  1 tablet Oral Daily NEO Camacho      pantoprazole  40 mg Oral Early Morning NEO Camacho      patient supplied medication  1 each Oral TID With Meals NEO Camacho      spironolactone  25 mg Oral Daily NEO Alvarez      tamsulosin  0 4 mg Oral Daily With Carlos & NEO Manning          Today, Patient Was Seen By: NEO Rock    ** Please Note: Dragon 360 Dictation voice to text software may have been used in the creation of this document   **

## 2022-02-24 ENCOUNTER — TELEPHONE (OUTPATIENT)
Dept: FAMILY MEDICINE CLINIC | Facility: CLINIC | Age: 77
End: 2022-02-24

## 2022-02-24 VITALS
SYSTOLIC BLOOD PRESSURE: 151 MMHG | WEIGHT: 161.38 LBS | OXYGEN SATURATION: 100 % | RESPIRATION RATE: 17 BRPM | BODY MASS INDEX: 22.59 KG/M2 | HEART RATE: 68 BPM | TEMPERATURE: 97.5 F | HEIGHT: 71 IN | DIASTOLIC BLOOD PRESSURE: 58 MMHG

## 2022-02-24 LAB
ANION GAP SERPL CALCULATED.3IONS-SCNC: 8 MMOL/L (ref 4–13)
BUN SERPL-MCNC: 27 MG/DL (ref 5–25)
CALCIUM SERPL-MCNC: 8.7 MG/DL (ref 8.3–10.1)
CHLORIDE SERPL-SCNC: 99 MMOL/L (ref 100–108)
CO2 SERPL-SCNC: 26 MMOL/L (ref 21–32)
CREAT SERPL-MCNC: 1.07 MG/DL (ref 0.6–1.3)
GFR SERPL CREATININE-BSD FRML MDRD: 66 ML/MIN/1.73SQ M
GLUCOSE SERPL-MCNC: 206 MG/DL (ref 65–140)
GLUCOSE SERPL-MCNC: 208 MG/DL (ref 65–140)
GLUCOSE SERPL-MCNC: 266 MG/DL (ref 65–140)
MAGNESIUM SERPL-MCNC: 1.8 MG/DL (ref 1.6–2.6)
POTASSIUM SERPL-SCNC: 4.1 MMOL/L (ref 3.5–5.3)
SODIUM SERPL-SCNC: 133 MMOL/L (ref 136–145)

## 2022-02-24 PROCEDURE — 99232 SBSQ HOSP IP/OBS MODERATE 35: CPT | Performed by: INTERNAL MEDICINE

## 2022-02-24 PROCEDURE — 99239 HOSP IP/OBS DSCHRG MGMT >30: CPT | Performed by: NURSE PRACTITIONER

## 2022-02-24 PROCEDURE — 80048 BASIC METABOLIC PNL TOTAL CA: CPT | Performed by: NURSE PRACTITIONER

## 2022-02-24 PROCEDURE — 82948 REAGENT STRIP/BLOOD GLUCOSE: CPT

## 2022-02-24 PROCEDURE — 83735 ASSAY OF MAGNESIUM: CPT | Performed by: NURSE PRACTITIONER

## 2022-02-24 RX ORDER — SPIRONOLACTONE 25 MG/1
25 TABLET ORAL DAILY
Qty: 30 TABLET | Refills: 0 | Status: SHIPPED | OUTPATIENT
Start: 2022-02-25 | End: 2022-02-24

## 2022-02-24 RX ORDER — LISINOPRIL 2.5 MG/1
2.5 TABLET ORAL DAILY
Qty: 30 TABLET | Refills: 0 | Status: SHIPPED | OUTPATIENT
Start: 2022-02-25 | End: 2022-02-24

## 2022-02-24 RX ORDER — LISINOPRIL 5 MG/1
5 TABLET ORAL DAILY
Status: DISCONTINUED | OUTPATIENT
Start: 2022-02-24 | End: 2022-02-24

## 2022-02-24 RX ORDER — ACETAMINOPHEN 325 MG/1
650 TABLET ORAL EVERY 6 HOURS PRN
Refills: 0
Start: 2022-02-24

## 2022-02-24 RX ORDER — LISINOPRIL 2.5 MG/1
2.5 TABLET ORAL DAILY
Qty: 30 TABLET | Refills: 0 | Status: SHIPPED | OUTPATIENT
Start: 2022-02-25 | End: 2022-06-21

## 2022-02-24 RX ORDER — SPIRONOLACTONE 25 MG/1
25 TABLET ORAL DAILY
Qty: 30 TABLET | Refills: 0 | Status: SHIPPED | OUTPATIENT
Start: 2022-02-25 | End: 2022-06-21

## 2022-02-24 RX ORDER — LISINOPRIL 2.5 MG/1
2.5 TABLET ORAL DAILY
Status: DISCONTINUED | OUTPATIENT
Start: 2022-02-25 | End: 2022-02-24 | Stop reason: HOSPADM

## 2022-02-24 RX ADMIN — INSULIN LISPRO 5 UNITS: 100 INJECTION, SOLUTION INTRAVENOUS; SUBCUTANEOUS at 08:23

## 2022-02-24 RX ADMIN — Medication 100 MG: at 08:21

## 2022-02-24 RX ADMIN — B-COMPLEX W/ C & FOLIC ACID TAB 1 TABLET: TAB at 08:22

## 2022-02-24 RX ADMIN — INSULIN LISPRO 3 UNITS: 100 INJECTION, SOLUTION INTRAVENOUS; SUBCUTANEOUS at 11:55

## 2022-02-24 RX ADMIN — INSULIN LISPRO 5 UNITS: 100 INJECTION, SOLUTION INTRAVENOUS; SUBCUTANEOUS at 11:56

## 2022-02-24 RX ADMIN — ASPIRIN 81 MG: 81 TABLET, COATED ORAL at 08:22

## 2022-02-24 RX ADMIN — SPIRONOLACTONE 25 MG: 25 TABLET ORAL at 08:21

## 2022-02-24 RX ADMIN — PANTOPRAZOLE SODIUM 40 MG: 40 TABLET, DELAYED RELEASE ORAL at 05:52

## 2022-02-24 RX ADMIN — BRIMONIDINE TARTRATE 1 DROP: 2 SOLUTION/ DROPS OPHTHALMIC at 08:23

## 2022-02-24 RX ADMIN — Medication 2000 UNITS: at 08:21

## 2022-02-24 RX ADMIN — OXYCODONE HYDROCHLORIDE AND ACETAMINOPHEN 500 MG: 500 TABLET ORAL at 08:22

## 2022-02-24 RX ADMIN — LISINOPRIL 5 MG: 5 TABLET ORAL at 08:22

## 2022-02-24 RX ADMIN — CARVEDILOL 12.5 MG: 12.5 TABLET, FILM COATED ORAL at 08:21

## 2022-02-24 RX ADMIN — INSULIN LISPRO 2 UNITS: 100 INJECTION, SOLUTION INTRAVENOUS; SUBCUTANEOUS at 08:23

## 2022-02-24 RX ADMIN — ACETAMINOPHEN 650 MG: 325 TABLET, FILM COATED ORAL at 02:50

## 2022-02-24 NOTE — PLAN OF CARE
Problem: MOBILITY - ADULT  Goal: Maintain or return to baseline ADL function  Description: INTERVENTIONS:  -  Assess patient's ability to carry out ADLs; assess patient's baseline for ADL function and identify physical deficits which impact ability to perform ADLs (bathing, care of mouth/teeth, toileting, grooming, dressing, etc )  - Assess/evaluate cause of self-care deficits   - Assess range of motion  - Assess patient's mobility; develop plan if impaired  - Assess patient's need for assistive devices and provide as appropriate  - Encourage maximum independence but intervene and supervise when necessary  - Involve family in performance of ADLs  - Assess for home care needs following discharge   - Consider OT consult to assist with ADL evaluation and planning for discharge  - Provide patient education as appropriate  Outcome: Progressing  Goal: Maintains/Returns to pre admission functional level  Description: INTERVENTIONS:  - Perform BMAT or MOVE assessment daily    - Set and communicate daily mobility goal to care team and patient/family/caregiver     - Collaborate with rehabilitation services on mobility goals if consulted  - Out of bed for toileting  - Record patient progress and toleration of activity level   Outcome: Progressing     Problem: Potential for Falls  Goal: Patient will remain free of falls  Description: INTERVENTIONS:  - Educate patient/family on patient safety including physical limitations  - Instruct patient to call for assistance with activity   - Consult OT/PT to assist with strengthening/mobility   - Keep Call bell within reach  - Keep bed low and locked with side rails adjusted as appropriate  - Keep care items and personal belongings within reach  - Initiate and maintain comfort rounds  - Make Fall Risk Sign visible to staff  - Apply yellow socks and bracelet for high fall risk patients  - Consider moving patient to room near nurses station  Outcome: Progressing     Problem: PAIN - ADULT  Goal: Verbalizes/displays adequate comfort level or baseline comfort level  Description: Interventions:  - Encourage patient to monitor pain and request assistance  - Assess pain using appropriate pain scale  - Administer analgesics based on type and severity of pain and evaluate response  - Implement non-pharmacological measures as appropriate and evaluate response  - Consider cultural and social influences on pain and pain management  - Notify physician/advanced practitioner if interventions unsuccessful or patient reports new pain  Outcome: Progressing     Problem: DISCHARGE PLANNING  Goal: Discharge to home or other facility with appropriate resources  Description: INTERVENTIONS:  - Identify barriers to discharge w/patient and caregiver  - Arrange for needed discharge resources and transportation as appropriate  - Identify discharge learning needs (meds, wound care, etc )  - Arrange for interpretive services to assist at discharge as needed  - Refer to Case Management Department for coordinating discharge planning if the patient needs post-hospital services based on physician/advanced practitioner order or complex needs related to functional status, cognitive ability, or social support system  Outcome: Progressing     Problem: CARDIOVASCULAR - ADULT  Goal: Maintains optimal cardiac output and hemodynamic stability  Description: INTERVENTIONS:  - Monitor I/O, vital signs and rhythm  - Monitor for S/S and trends of decreased cardiac output  - Administer and titrate ordered vasoactive medications to optimize hemodynamic stability  - Assess quality of pulses, skin color and temperature  - Assess for signs of decreased coronary artery perfusion  - Instruct patient to report change in severity of symptoms  Outcome: Progressing  Goal: Absence of cardiac dysrhythmias or at baseline rhythm  Description: INTERVENTIONS:  - Continuous cardiac monitoring, vital signs, obtain 12 lead EKG if ordered  - Administer antiarrhythmic and heart rate control medications as ordered  - Monitor electrolytes and administer replacement therapy as ordered  Outcome: Progressing     Problem: RESPIRATORY - ADULT  Goal: Achieves optimal ventilation and oxygenation  Description: INTERVENTIONS:  - Assess for changes in respiratory status  - Assess for changes in mentation and behavior  - Position to facilitate oxygenation and minimize respiratory effort  - Oxygen administered by appropriate delivery if ordered  - Initiate smoking cessation education as indicated  - Encourage broncho-pulmonary hygiene including cough, deep breathe, Incentive Spirometry  - Assess the need for suctioning and aspirate as needed  - Assess and instruct to report SOB or any respiratory difficulty  - Respiratory Therapy support as indicated  Outcome: Progressing     Problem: METABOLIC, FLUID AND ELECTROLYTES - ADULT  Goal: Glucose maintained within target range  Description: INTERVENTIONS:  - Monitor Blood Glucose as ordered  - Assess for signs and symptoms of hyperglycemia and hypoglycemia  - Administer ordered medications to maintain glucose within target range  - Assess nutritional intake and initiate nutrition service referral as needed  Outcome: Progressing

## 2022-02-24 NOTE — ASSESSMENT & PLAN NOTE
Baseline creatinine appears to be around 0 8-1 1    Creatinine stable  Repeat BMP and mag level in 1 week

## 2022-02-24 NOTE — NURSING NOTE
Patient left 4N in stable condition with all belongings at side  Education on diet expectations provided  Scripts for blood work given to pt and home medications returned

## 2022-02-24 NOTE — DISCHARGE SUMMARY
[address]  Discharge- Hugh Shi 1945, 67 y o  male MRN: [id number]  Unit/Bed#: [address] [place] [address] Encounter: [id number]  Primary Care Provider: Annalee Whiteside DO   Date and time admitted to hospital: 2/18/2022  9:12 AM    * Acute on chronic combined systolic and diastolic congestive heart failure (HCC)  Assessment & Plan  Wt Readings from Last 3 Encounters:   02/24/22 73 2 kg (161 lb 6 oz)   02/11/22 81 2 kg (179 lb)   02/08/22 82 6 kg (182 lb)     Patient presents with SOB and leg edema for about one week, worsening  · Does not take diuretic at home  · Chest x-ray in ED showed- Small bilateral pleural effusions and pulmonary edema, new since the prior study  · ProBNP 3439  · Echo 10/2020 showed normal EF, grade 1 diastolic dysfunction, PA pressure 25  · Repeat 2D echo showed EF 09%, grade 1 diastolic dysfunction, mild global hypokinesis, mildly dilated atriums, moderate MR and TR, PA pressure 58  · Lower venous doppler no evidence of DVT, normal response to augmentation, monophasic waveforms  · Appreciate cardiology input  · Telemetry NSR  · Nuclear stress test demonstrated LV dilatation with no significant reversible ischemia  · Patient received Lasix 60 mg IV in ED  · Initially was on Lasix 40 mg IV daily, transitioned to Aldactone 25mg p o  Daily 2/23  · Continue Coreg 12 5mg BID  · Started lisinopril 5mg po daily today  Reduce to 2 5mg po daily per discussion with Cardiology  · Daily weight and I&Os  Lost 19 lb since admission  Weight today 161 lbs  · Discharge patient home today  Continue Coreg, Aldactone, lisinopril  · Repeat BMP, Mag in 1 week  · Follow up PCP in 1 week, follow-up Cardiology in 2 weeks  · Monitor weight at home  Notify Cardiology if weight gain 2 lb in 1 day or 5 lb in 3 days  · Monitor BP at home  Goal -150 in view of hx of orthostatic hypotension           Type 2 diabetes mellitus with diabetic neuropathy (HCC)  Assessment & Plan  Lab Results   Component Value Date    HGBA1C 7 1 (H) 12/27/2021       Recent Labs     02/23/22  1149 02/23/22  1607 02/23/22  1955 02/24/22  0801   POCGLU 233* 115 166* 206*       Blood Sugar Average: Last 72 hrs:  (P) 863 [id number]  Glucose 247 on admission  Patient is on metformin 1 g in the morning and 500 mg in the evening  · On Precose at home as well, patient brought it from home  Will continue  · Last A1c in December last year 7 1  · Received SSI and fingersticks  · Received lantus 5 units at night while inpatient  · Received humalog 5 units with meals as blood sugar remains elevated   · Resume metformin on discharge  · Recommend to monitor glucose a c  HS for now  Goal pre meal BS < 140,random < 180  · Follow up PCP in 1 week  Benign essential hypertension  Assessment & Plan  Continue coreg 12 5 mg BID  · BP labile  · Patient started on Aldactone, lisinopril  · Avoid hypotension due to history of orthostatic hypotension  · Monitor BP at home  Goal -150  · Follow up PCP in 1 week  Follow up Cardiology in 2 week    Orthostatic hypotension  Assessment & Plan  · Avoid hypotension  · Discontinued Florinef due to side effect of fluid retention  · Denies lightheadedness with position change  · Orthostatic vital signs negative this morning  · Cardiology considering midodrine due to low EF if + orthostasis in outpatient setting  · Monitor BP at home  Goal -150  Chronic right-sided low back pain without sciatica  Assessment & Plan  Noted history    Prostate cancer [facility])  Assessment & Plan  · Status post RT with ongoing ADT    · Outpatient Urology follow-up    Anemia due to stage 3a chronic kidney disease (HCC)  Assessment & Plan  Baseline hemoglobin 9-10  · Hemoglobin 10 4, stable  · Monitor with CBC    Dyslipidemia  Assessment & Plan  Continue statin    GE reflux  Assessment & Plan  Continue PPI    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 mellitus ([address] )  Assessment & Plan  Baseline creatinine appears to be around 0 8-1 1  Creatinine stable  Repeat BMP and mag level in 1 week    CAD (coronary artery disease)  Assessment & Plan  Status post multivessel stenting  · Continue Coreg, Lipitor, aspirin  · Troponin x3 unremarkable  · Nuclear stress test negative for ischemia    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)  Assessment & Plan  S/P R Fem to AK pop bypass and B SFA occlusions, status post bilateral SFA stenting with occlusion  · Continue aspirin, Lipitor  · Follows vascular as outpatient      Discharging Physician / Practitioner: NEO Bermeo  PCP: Radha Josue DO  Admission Date: 2/18/2022  Discharge Date: 02/24/22    Reason for Admission: Shortness of Breath (SOB for 3 weeks, worse today , swelling in legs)        Medical Problems             Resolved Problems  Date Reviewed: 2/24/2022    None                Consultations During Hospital Stay:  IP CONSULT TO CARDIOLOGY    Procedures Performed:     · none  Significant Findings / Test Results:     · As below  Results from last 7 days   Lab Units 02/23/22  0530 02/22/22  0615 02/21/22  0538   WBC Thousand/uL 6 69 7 38 6 63   HEMOGLOBIN g/dL 10 0* 10 4* 10 7*   PLATELETS Thousands/uL 319 309 310     Results from last 7 days   Lab Units 02/24/22  0553 02/23/22  0530 02/22/22  0615 02/19/22  0554 02/18/22  0944   SODIUM mmol/L 133* 136 140   < > 132*   POTASSIUM mmol/L 4 1 3 4* 3 7   < > 3 9   CHLORIDE mmol/L 99* 99* 101   < > 99*   CO2 mmol/L 26 29 31   < > 26   BUN mg/dL 27* 28* 32*   < > 19   CREATININE mg/dL 1 07 0 98 1 11   < > 0 92   CALCIUM mg/dL 8 7 8 9 8 8   < > 8 5   TOTAL BILIRUBIN mg/dL  --   --   --   --  0 82   ALK PHOS U/L  --   --   --   --  89   ALT U/L  --   --   --   --  19   AST U/L  --   --   --   --  10    < > = values in this interval not displayed       Results from last 7 days   Lab Units 02/18/22  0945   INR  1 06         Lab Results   Component Value Date/Time    HGBA1C 7 1 (H) 12/27/2021 07:19 AM    HGBA1C 7 3 09/03/2013 12:00 AM     Results from last 7 days   Lab Units 02/24/22  0801 02/23/22  1955 02/23/22  1607 02/23/22  1149 02/23/22  0757 02/22/22 2014 02/22/22  1616 02/22/22  1122 02/22/22  0731 02/21/22  2115 02/21/22  1549 02/21/22  1247   POC GLUCOSE mg/dl 206* 166* 115 233* 210* 215* 289* 302* 233* 221* 270* 291*         Blood Culture: No results found for: BLOODCX  Urine Culture: No results found for: URINECX  Sputum Culture: No components found for: SPUTUMCX  Wound Culture: No results found for: WOUNDCULT     VAS lower limb venous duplex study, complete bilateral   Final Result by Jabari Neal MD (02/21 1200)      XR chest pa & lateral   Final Result by Momo Hitchocck MD (02/21 2632)      Small bilateral pleural effusions  Mild right basilar subsegmental atelectasis  Workstation performed: [id number]         XR chest 1 view portable   ED Interpretation by Claude Schroeder, PA-C (02/18 1105)   Pulmonary edema  No osseous abnormalities  Await radiology read        Final Result by Afia Aranda MD (02/18 1123)      Small bilateral pleural effusions and pulmonary edema, new since the prior study  Workstation performed: [id number]                Incidental Findings:   · None     Test Results Pending at Discharge (will require follow up):    · None     Outpatient Tests Requested:  · BMP, Mag in 1 week    Complications:  None    Reason for Admission:   Chief Complaint   Patient presents with    Shortness of Breath     SOB for 3 weeks, worse today , swelling in legs       Hospital Course:     Per HPI: Alli Romo is a 67 y o  male patient with a PMH of diastolic dysfunction, type 2 diabetes, hypertension, orthostatic hypotension, anemia,PAD, CAD, CKD 2, chronic back pain, hyperlipidemia, GERD, prostate cancer who originally presented to the hospital on 2/18/2022 due to acute on chronic combined CHF  EF 29% on stress test   Patient received IV Lasix, transitioned to Aldactone  Started on ACE-inhibitor today  Nuclear stress test showed no significant reversible ischemia  Discontinue Florinef  Orthostatic vital signs negative prior to discharge  Patient seen by Cardiology  Will need follow-up with Cardiology in 2 weeks  Follow-up PCP 1 week  Repeat BMP, Mag in 1 week  Patient being discharged home with VNA  Advised to monitor BP, blood sugar, weight at home as above  Spoke to patient's wife and son over the phone, all questions answered  Hospital Course:    Please see above list of diagnoses and related plan for additional information  Condition at Discharge: good       Discharge Day Visit / Exam:     Subjective:  Patient denies SOB, chest pain, headache, dizziness, nausea vomiting  Reports right foot pain which is somewhat chronic  Vitals: Blood Pressure: 145/61 (02/24/22 0834)  Pulse: 71 (02/24/22 0834)  Temperature: 97 5 °F (36 4 °C) (02/24/22 0731)  Temp Source: Oral (02/24/22 0731)  Respirations: 17 (02/24/22 0731)  Height: 5' 11" (180 3 cm) (02/19/22 0943)  Weight - Scale: 73 2 kg (161 lb 6 oz) (02/24/22 0600)  SpO2: 97 % (02/24/22 0834)  Exam:   Physical Exam  Vitals and nursing note reviewed  Constitutional:       Appearance: He is well-developed  HENT:      Head: Normocephalic and atraumatic  Neck:      Thyroid: No thyromegaly  Vascular: No JVD  Trachea: No tracheal deviation  Cardiovascular:      Rate and Rhythm: Normal rate and regular rhythm  Heart sounds: Normal heart sounds  Pulmonary:      Effort: Pulmonary effort is normal  No respiratory distress  Breath sounds: Normal breath sounds  No wheezing or rales  Abdominal:      General: Bowel sounds are normal  There is no distension  Palpations: Abdomen is soft  Tenderness: There is no abdominal tenderness  There is no guarding     Musculoskeletal:         General: Swelling present  Cervical back: Neck supple  Comments: 2+ pedal and lower leg edema   Skin:     General: Skin is warm and dry  Neurological:      General: No focal deficit present  Mental Status: He is alert and oriented to person, place, and time  Psychiatric:         Mood and Affect: Mood normal          Judgment: Judgment normal            Discharge instructions/Information to patient and family:   See after visit summary for information provided to patient and family  Provisions for Follow-Up Care:  See after visit summary for information related to follow-up care and any pertinent home health orders  Disposition:     Home with VNA Services (Reminder: Complete face to face encounter)    Planned Readmission: no     Discharge Statement:  I spent 40 minutes discharging the patient  This time was spent on the day of discharge  I had direct contact with the patient on the day of discharge  Greater than 50% of the total time was spent examining patient, answering all patient questions, arranging and discussing plan of care with patient as well as directly providing post-discharge instructions  Additional time then spent on discharge activities  Discharge Medications:  See after visit summary for reconciled discharge medications provided to patient and family        ** Please Note: This note has been constructed using a voice recognition system **

## 2022-02-24 NOTE — PLAN OF CARE
Problem: PAIN - ADULT  Goal: Verbalizes/displays adequate comfort level or baseline comfort level  Description: Interventions:  - Encourage patient to monitor pain and request assistance  - Assess pain using appropriate pain scale  - Administer analgesics based on type and severity of pain and evaluate response  - Implement non-pharmacological measures as appropriate and evaluate response  - Consider cultural and social influences on pain and pain management  - Notify physician/advanced practitioner if interventions unsuccessful or patient reports new pain  Outcome: Progressing     Problem: CARDIOVASCULAR - ADULT  Goal: Maintains optimal cardiac output and hemodynamic stability  Description: INTERVENTIONS:  - Monitor I/O, vital signs and rhythm  - Monitor for S/S and trends of decreased cardiac output  - Administer and titrate ordered vasoactive medications to optimize hemodynamic stability  - Assess quality of pulses, skin color and temperature  - Assess for signs of decreased coronary artery perfusion  - Instruct patient to report change in severity of symptoms  Outcome: Progressing

## 2022-02-24 NOTE — ASSESSMENT & PLAN NOTE
Wt Readings from Last 3 Encounters:   02/24/22 73 2 kg (161 lb 6 oz)   02/11/22 81 2 kg (179 lb)   02/08/22 82 6 kg (182 lb)     Patient presents with SOB and leg edema for about one week, worsening  · Does not take diuretic at home  · Chest x-ray in ED showed- Small bilateral pleural effusions and pulmonary edema, new since the prior study  · ProBNP 3439  · Echo 10/2020 showed normal EF, grade 1 diastolic dysfunction, PA pressure 25  · Repeat 2D echo showed EF 70%, grade 1 diastolic dysfunction, mild global hypokinesis, mildly dilated atriums, moderate MR and TR, PA pressure 58  · Lower venous doppler no evidence of DVT, normal response to augmentation, monophasic waveforms  · Appreciate cardiology input  · Telemetry NSR  · Nuclear stress test demonstrated LV dilatation with no significant reversible ischemia  · Patient received Lasix 60 mg IV in ED  · Initially was on Lasix 40 mg IV daily, transitioned to Aldactone 25mg p o  Daily 2/23  · Continue Coreg 12 5mg BID  · Started lisinopril 5mg po daily today  Reduce to 2 5mg po daily per discussion with Cardiology  · Daily weight and I&Os  Lost 19 lb since admission  Weight today 161 lbs  · Discharge patient home today  Continue Coreg, Aldactone, lisinopril  · Repeat BMP, Mag in 1 week  · Follow up PCP in 1 week, follow-up Cardiology in 2 weeks  · Monitor weight at home  Notify Cardiology if weight gain 2 lb in 1 day or 5 lb in 3 days  · Monitor BP at home  Goal -150 in view of hx of orthostatic hypotension

## 2022-02-24 NOTE — ASSESSMENT & PLAN NOTE
Continue coreg 12 5 mg BID  · BP labile  · Patient started on Aldactone, lisinopril  · Avoid hypotension due to history of orthostatic hypotension  · Monitor BP at home  Goal -150  · Follow up PCP in 1 week   Follow up Cardiology in 2 week

## 2022-02-24 NOTE — ASSESSMENT & PLAN NOTE
· Avoid hypotension  · Discontinued Florinef due to side effect of fluid retention  · Denies lightheadedness with position change  · Orthostatic vital signs negative this morning  · Cardiology considering midodrine due to low EF if + orthostasis in outpatient setting  · Monitor BP at home  Goal -150

## 2022-02-24 NOTE — PROGRESS NOTES
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:  Echocardiogram shows EF 45%, this is reduced from previous Echo     -  edema slowly improving, patient diuresed well, 11 L with IV Lasix  Appears to be reaching euvolemia    -  continue Coreg 12 5 mg twice a day, if patient his orthostatics family changed to Lopressor    -  hesitant to continue p o  Lasix in the outpatient setting due to history of orthostasis  Will start Aldactone 25 mg p o  Once a day    -  lisinopril 2 5 mg p o  Daily    -  monitor I&O, daily weights and labs, replete as needed    -  BMP and magnesium level 1 week in the outpatient setting     2  Coronary artery disease:  No complaints of chest discomfort   Continue aspirin and statin therapy    -   Lexiscan nuclear stress test done on 2022 demonstrated LV dilatation with no significant reversible ischemia   His EF was gated at approximately 30%     3  Dyslipidemia:  Lipitor 20 mg once a day      4  Diabetes:  Managed per primary team     5  Question of orthostatic hypotension:  Patient has been hypertensive and denies lightheadedness with position changes    -  Florinef discontinued and continue to monitor orthostatic vital signs    -  if patient would become orthostatic again in the outpatient setting would use midodrine due to low EF      6  History of severe PAD:  No complaints at this time     Subjective / Objective:   Patient seen examined  He states that he is feeling fabulous  He is hoping for discharge today  He states he was up ambulating with walker and family yesterday without any lightheadedness or dizziness  Vitals: Blood pressure 139/58, pulse 73, temperature 97 5 °F (36 4 °C), temperature source Oral, resp   rate 17, height 5' 11" (1 803 m), weight 73 2 kg (161 lb 6 oz), SpO2 97 %  Vitals:    02/23/22 0600 02/24/22 0600   Weight: 75 5 kg (166 lb 7 2 oz) 73 2 kg (161 lb 6 oz)     Body mass index is 22 51 kg/m²  BP Readings from Last 3 Encounters:   02/24/22 139/58   02/11/22 164/70   02/08/22 150/80     Orthostatic Blood Pressures      Most Recent Value   Blood Pressure 139/58 filed at 02/24/2022 0731   Patient Position - Orthostatic VS Sitting filed at 02/24/2022 0731        I/O       02/22 0701  02/23 0700 02/23 0701  02/24 0700 02/24 0701  02/25 0700    P  O  1460 920     I V  (mL/kg) 20 (0 3) 10 (0 1)     Total Intake(mL/kg) 1480 (19 6) 930 (12 7)     Urine (mL/kg/hr) 2050 (1 1) 2550 (1 5)     Stool       Total Output 2050 2550     Net -570 -1620                Invasive Devices  Report    Peripheral Intravenous Line            Peripheral IV 02/23/22 Left;Ventral (anterior) Forearm <1 day                  Intake/Output Summary (Last 24 hours) at 2/24/2022 0810  Last data filed at 2/24/2022 0252  Gross per 24 hour   Intake 930 ml   Output 2550 ml   Net -1620 ml         Physical Exam:   Physical Exam  Vitals and nursing note reviewed  Constitutional:       General: He is not in acute distress  Appearance: Normal appearance  He is normal weight  HENT:      Head: Normocephalic  Right Ear: External ear normal       Left Ear: External ear normal    Eyes:      General: No scleral icterus  Right eye: No discharge  Left eye: No discharge  Cardiovascular:      Rate and Rhythm: Normal rate and regular rhythm  Pulses: Normal pulses  Heart sounds: Murmur heard  Pulmonary:      Effort: Pulmonary effort is normal  No respiratory distress  Breath sounds: Normal breath sounds  No wheezing, rhonchi or rales  Abdominal:      General: Bowel sounds are normal  There is no distension  Palpations: Abdomen is soft  Musculoskeletal:      Right lower leg: Edema present  Left lower leg: Edema present        Comments: Trace to +1 Skin:     Capillary Refill: Capillary refill takes less than 2 seconds  Neurological:      General: No focal deficit present  Mental Status: He is alert and oriented to person, place, and time  Mental status is at baseline     Psychiatric:         Mood and Affect: Mood normal                    Medications/ Allergies:     Current Facility-Administered Medications   Medication Dose Route Frequency Provider Last Rate    acetaminophen  650 mg Oral Q6H PRN NEO Camacho      ascorbic acid  500 mg Oral Daily NEO Camacho      aspirin  81 mg Oral Daily NEO Camacho      atorvastatin  20 mg Oral Daily With Carlos & NEO Manning      brimonidine tartrate  1 drop Both Eyes BID NEO Camacho      carvedilol  12 5 mg Oral BID NEO Camacho      cholecalciferol  2,000 Units Oral Daily NEO Camacho      co-enzyme Q-10  100 mg Oral Daily NEO Camacho      docusate sodium  100 mg Oral BID Moustapha Wei PA-C      enoxaparin  40 mg Subcutaneous Q24H [address] NEO Camacho      insulin glargine  5 Units Subcutaneous HS NEO Camacho      insulin lispro  1-5 Units Subcutaneous HS NEO Camacho      insulin lispro  1-6 Units Subcutaneous TID AC NEO Camacho      insulin lispro  5 Units Subcutaneous TID With Meals Moustapha Wei PA-C      multivitamin stress formula  1 tablet Oral Daily NEO Camacho      pantoprazole  40 mg Oral Early Morning NEO Camacho      patient supplied medication  1 each Oral TID With Meals NEO Camacho      spironolactone  25 mg Oral Daily Magnus PatNEO      tamsulosin  0 4 mg Oral Daily With Dinner NEO Camacho       acetaminophen, 650 mg, Q6H PRN      No Known Allergies    VTE Pharmacologic Prophylaxis:   Sequential compression device (Venodyne)     Labs:   Troponins:  Results from last 7 days   Lab Units 02/18/22  1452 02/18/22  1147   HSTNI D2 ng/L  --  0   HSTNI D4 ng/L 1  -- CBC with diff:  Results from last 7 days   Lab Units 02/23/22  0530 02/22/22  0615 02/21/22  0538 02/18/22  0944   WBC Thousand/uL 6 69 7 38 6 63 8 40   HEMOGLOBIN g/dL 10 0* 10 4* 10 7* 9 3*   HEMATOCRIT % 32 3* 33 4* 34 0* 30 2*   MCV fL 87 86 85 87   PLATELETS Thousands/uL 319 309 310 266   MCH pg 26 8 26 8 26 9 26 8   MCHC g/dL 31 0* 31 1* 31 5 30 8*   RDW % 16 0* 16 2* 16 2* 16 5*   MPV fL 9 4 9 3 9 3 9 8   NRBC AUTO /100 WBCs 0 0  --  0     CMP:  Results from last 7 days   Lab Units 02/24/22  0553 02/23/22  0530 02/22/22  0615 02/21/22  0538 02/20/22  0445 02/19/22  0554 02/18/22  0944   SODIUM mmol/L 133* 136 140 139 137 134* 132*   POTASSIUM mmol/L 4 1 3 4* 3 7 3 4* 4 0 3 7 3 9   CHLORIDE mmol/L 99* 99* 101 101 101 99* 99*   CO2 mmol/L 26 29 31 30 29 27 26   ANION GAP mmol/L 8 8 8 8 7 8 7   BUN mg/dL 27* 28* 32* 27* 22 18 19   CREATININE mg/dL 1 07 0 98 1 11 1 02 0 94 0 85 0 92   CALCIUM mg/dL 8 7 8 9 8 8 8 8 8 9 8 4 8 5   AST U/L  --   --   --   --   --   --  10   ALT U/L  --   --   --   --   --   --  19   ALK PHOS U/L  --   --   --   --   --   --  89   TOTAL PROTEIN g/dL  --   --   --   --   --   --  6 9   ALBUMIN g/dL  --   --   --   --   --   --  3 1*   TOTAL BILIRUBIN mg/dL  --   --   --   --   --   --  0 82   EGFR ml/min/1 73sq m 66 74 63 70 77 84 79     Magnesium:  Results from last 7 days   Lab Units 02/24/22  0553 02/21/22  0538 02/20/22  0445 02/19/22  0554 02/18/22  0944   MAGNESIUM mg/dL 1 8 2 0 2 1 1 8 1 6     Coags:  Results from last 7 days   Lab Units 02/18/22  0945   PTT seconds 27   INR  1 06     TSH:  Results from last 7 days   Lab Units 02/18/22  0944   TSH 3RD GENERATON uIU/mL 2 817       Imaging & Testing   I have personally reviewed pertinent reports  XR chest 1 view portable    Result Date: 2/18/2022  Narrative: CHEST INDICATION:   sob  COMPARISON:  7/30/2021 EXAM PERFORMED/VIEWS:  XR CHEST PORTABLE FINDINGS: Cardiomediastinal silhouette appears unremarkable   Patchy airspace disease in the lungs bilaterally is likely on the basis of pulmonary edema  Findings are new since the prior examination  Small bilateral pleural effusions  Osseous structures appear within normal limits for patient age  Impression: Small bilateral pleural effusions and pulmonary edema, new since the prior study  Workstation performed: [id number]     XR chest pa & lateral    Result Date: 2/21/2022  Narrative: CHEST INDICATION:   pleural effusion - moderate to large size on echocardiogram  COMPARISON:  February 18, 2022  EXAM PERFORMED/VIEWS:  XR CHEST PA & LATERAL  The frontal view was performed utilizing dual energy radiographic technique  FINDINGS: Heart top normal in size  Resolution of pulmonary vascular congestion since the last exam  Mild right basilar subsegmental atelectasis  Small bilateral pleural effusions  No evidence of consolidation or pneumothorax  Osseous structures appear within normal limits for patient age  Impression: Small bilateral pleural effusions  Mild right basilar subsegmental atelectasis  Workstation performed: [id number]     Stress strip    Result Date: 2/21/2022  Narrative: Confirmed by Maggie Bartholomew ([id number]),  Stephy Mcdonald ([id number]) on 2/21/2022 3:37:05 PM    VAS lower limb venous duplex study, complete bilateral    Result Date: 2/21/2022  Narrative:  [facility] REPORT CLINICAL: Indications: Patient presents with bilateral lower extremity edema x few days  Operative History: 2018-06-18 Left SFA Stent Angioplasty Left SFA stents Right fem-pop bypass Right SFA stent Risk Factors The patient has history of HTN, Diabetes (Yes), Hyperlipidemia, CKD, PAD and CAD  CONCLUSION: Impression: RIGHT LOWER LIMB: No evidence of acute or chronic deep vein thrombosis  No evidence of superficial thrombophlebitis noted  Doppler evaluation shows a normal response to augmentation maneuvers   Popliteal, posterior tibial and anterior tibial arterial Doppler waveforms are monophasic  LEFT LOWER LIMB: No evidence of acute or chronic deep vein thrombosis  No evidence of superficial thrombophlebitis noted  Doppler evaluation shows a normal response to augmentation maneuvers  Popliteal, posterior tibial and anterior tibial arterial Doppler waveforms are monophasic  Technical findings were sent to chart  SIGNATURE: Electronically Signed by: Braeden Farrar MD, [address] on 2022-02-21 12:00:33 PM    Echo complete w/ contrast if indicated    Result Date: 2/19/2022  Narrative: Zach Pert  Left Ventricle: Left ventricular cavity size is normal  Wall thickness is normal  The left ventricular ejection fraction is 45% by visual estimation  Systolic function is mildly reduced  Diastolic function is mildly abnormal, consistent with grade I (abnormal) relaxation  There is mild global hypokinesis    Left Atrium: The atrium is mildly dilated    Right Atrium: The atrium is mildly dilated    Mitral Valve: There is moderate regurgitation    Tricuspid Valve: There is moderate regurgitation  The right ventricular systolic pressure is moderately elevated  The estimated right ventricular systolic pressure is 21 34 mmHg    Pericardium: There is a large left pleural effusion  NM myocardial perfusion spect (rx stress and/or rest)    Result Date: 2/21/2022  Narrative:   Abnormal pharmacologic nuclear stress test  There is severely reduced LV function/ejection fraction  LV is dilated  No significant reversible ischemia seen   Stress ECG: No ST deviation is noted  There were no arrhythmias during recovery    The ECG was negative for ischemia  The stress ECG is negative for ischemia after pharmacologic stress, without reproduction of symptoms    Perfusion: There is no significant reversible ischemia noted  Summed differential score is 3  Diaphragmatic and subdiaphragmatic artifacts are present  LV is dilated     Stress Function: Left ventricular function post-stress is abnormal  Global function is severely reduced  Calculated ejection fraction is 29%   Perfusion Defect Conclusion: There is no evidence of transient ischemic dilation (TID)  Ratio is 1 14  Brea Petty  Cardiology       "This note has been constructed using a voice recognition system  Therefore there may be syntax, spelling, and/or grammatical errors   Please call if you have any questions  "

## 2022-02-24 NOTE — DISCHARGE INSTR - AVS FIRST PAGE
Monitor BP at home  Goal -150  Monitor glucose before each meal and at bedtime for now  Goal pre meal BS < 140,random < 180  Monitor weight at home  Notify Cardiology if weight gain 2 lb in 1 day or 5 lb in 3 days  Notify cardiology if feeling dizzy or lightheaded when sitting or standing  Check BMP, Mag in 1 week  Follow up PCP in 1 week, follow-up Cardiology in 2 weeks  Low potassium diet at home

## 2022-02-24 NOTE — ASSESSMENT & PLAN NOTE
Lab Results   Component Value Date    HGBA1C 7 1 (H) 12/27/2021       Recent Labs     02/23/22  1149 02/23/22  1607 02/23/22  1955 02/24/22  0801   POCGLU 233* 115 166* 206*       Blood Sugar Average: Last 72 hrs:  (P) 569 [id number]  Glucose 247 on admission  Patient is on metformin 1 g in the morning and 500 mg in the evening  · On Precose at home as well, patient brought it from home  Will continue  · Last A1c in December last year 7 1  · Received SSI and fingersticks  · Received lantus 5 units at night while inpatient  · Received humalog 5 units with meals as blood sugar remains elevated   · Resume metformin on discharge  · Recommend to monitor glucose a c  HS for now  Goal pre meal BS < 140,random < 180  · Follow up PCP in 1 week

## 2022-02-25 ENCOUNTER — PATIENT OUTREACH (OUTPATIENT)
Dept: FAMILY MEDICINE CLINIC | Facility: CLINIC | Age: 77
End: 2022-02-25

## 2022-02-25 ENCOUNTER — TRANSITIONAL CARE MANAGEMENT (OUTPATIENT)
Dept: FAMILY MEDICINE CLINIC | Facility: CLINIC | Age: 77
End: 2022-02-25

## 2022-02-25 NOTE — PROGRESS NOTES
Riki notification received for new Medicare  Bundle patient  Chart review completed  Outside records through [facility] requested and refreshed  Call placed to patient's cell number  LM on  with CM contact information and request for return call

## 2022-02-25 NOTE — TELEPHONE ENCOUNTER
----- Message from Audie Valdes, Danuta Tita Ocampo sent at 2/24/2022  6:18 PM EST -----  Regarding: Hospitalization  Thank you for allowing us to participate in the care of your patient, Oralia Villarreal, who was hospitalized from 2/18/2022 through 2/24/2022 with the admitting diagnosis of acute on chronic combined CHF  Patient was treated with IV Lasix, discontinued Florinef, started on Aldactone/lisinopril  Orthostatic vital signs negative  Patient was seen by Cardiology  Will need follow-up with Cardiology in 2 weeks  Follow-up with me in 1 week  Repeat BMP, Mag in 1 week  Patient was discharged home with visiting nurse  If you have any additional questions or would like to discuss further, please feel free to contact me      Manish Lopez [facility], Hospitalist  [phone]

## 2022-02-26 RX ORDER — FERROUS SULFATE TAB EC 324 MG (65 MG FE EQUIVALENT) 324 (65 FE) MG
TABLET DELAYED RESPONSE ORAL
COMMUNITY
Start: 2022-02-17

## 2022-02-27 PROBLEM — I50.1 HEART FAILURE, LEFT, WITH LVEF <=30% (HCC): Status: ACTIVE | Noted: 2022-02-27

## 2022-02-28 ENCOUNTER — PATIENT OUTREACH (OUTPATIENT)
Dept: FAMILY MEDICINE CLINIC | Facility: CLINIC | Age: 77
End: 2022-02-28

## 2022-02-28 DIAGNOSIS — E11.40 TYPE 2 DIABETES MELLITUS WITH DIABETIC NEUROPATHY, WITHOUT LONG-TERM CURRENT USE OF INSULIN (HCC): ICD-10-CM

## 2022-02-28 NOTE — PROGRESS NOTES
Inbasket notification received for new Medicare  Bundle patient  Chart review completed  [facility]  Patient hospitalized   2/18/22-2/24/22-[facility]      Past Medical History  See problem list for complete list of medical diagnosis  Future appointments:  3/2/22-[facility]    CM contacted patient to introduce self, explain the role of the OP CM and purpose of this phone call is to assess patient's general wellbeing or for any assistance needed with follow-up care, medication costs, or financial difficulty  Explanation given regarding the free Medicare BPCI-A program that allows for added telephonic nursing support for 90 days to help coordinate and manage further care, as needed  In addition, CM explained there would be a one time telephonic assessment to collect a medical and social history intake  CM added that outreach calls would be weekly/ biweekly/ monthly to ensure patient/caregiver is progressing  CM would communicate with other healthcare providers on the patient care team to ensure their care is coordinated  Patient consented to  future outreach of CM  Anjali King reports he is doing well  Anjali King tells this CM his insurance company stopped coverage for his Precose medication  He states he has enough for at least a month  Anjali King says he has an appointment with Dr Gilford Lana on 3/2/22 and will bring this up to him  Anjali King reports his BS has was 202 this am, and that he watches his carbs and sugar intake  Anjali King keeps a journal of his BS  This CM encouraged Anjali King to take journal to his PCP appointment  Anjali King agrees with this  He states he adheres to a fluid restriction of 1500 ml daily and weighs himself daily  He reports he weighs 164 lbs this am  Anjali King denies SOB and edema and  states he does not use sodium in his diet  Anjali King thanked this CM for calling  Anjali King agrees to outreach in 1-2 weeks        CM provided contact information and encouraged patient to contact CM as needed

## 2022-03-01 ENCOUNTER — TELEPHONE (OUTPATIENT)
Dept: ENDOCRINOLOGY | Facility: CLINIC | Age: 77
End: 2022-03-01

## 2022-03-01 ENCOUNTER — APPOINTMENT (OUTPATIENT)
Dept: LAB | Facility: CLINIC | Age: 77
End: 2022-03-01
Payer: MEDICARE

## 2022-03-01 DIAGNOSIS — E11.22 CKD STAGE 2 DUE TO TYPE 2 DIABETES MELLITUS (HCC): ICD-10-CM

## 2022-03-01 DIAGNOSIS — I95.1 ORTHOSTATIC HYPOTENSION: ICD-10-CM

## 2022-03-01 DIAGNOSIS — E87.6 HYPOKALEMIA: ICD-10-CM

## 2022-03-01 DIAGNOSIS — E22.2 SIADH (SYNDROME OF INAPPROPRIATE ADH PRODUCTION) (HCC): ICD-10-CM

## 2022-03-01 DIAGNOSIS — N18.2 CKD STAGE 2 DUE TO TYPE 2 DIABETES MELLITUS (HCC): ICD-10-CM

## 2022-03-01 LAB
ALBUMIN SERPL BCP-MCNC: 3.5 G/DL (ref 3.5–5)
ALP SERPL-CCNC: 84 U/L (ref 46–116)
ALT SERPL W P-5'-P-CCNC: 21 U/L (ref 12–78)
ANION GAP SERPL CALCULATED.3IONS-SCNC: 9 MMOL/L (ref 4–13)
AST SERPL W P-5'-P-CCNC: 14 U/L (ref 5–45)
BILIRUB SERPL-MCNC: 0.53 MG/DL (ref 0.2–1)
BUN SERPL-MCNC: 13 MG/DL (ref 5–25)
CALCIUM SERPL-MCNC: 9.7 MG/DL (ref 8.3–10.1)
CHLORIDE SERPL-SCNC: 105 MMOL/L (ref 100–108)
CO2 SERPL-SCNC: 22 MMOL/L (ref 21–32)
CREAT SERPL-MCNC: 0.97 MG/DL (ref 0.6–1.3)
ERYTHROCYTE [DISTWIDTH] IN BLOOD BY AUTOMATED COUNT: 16.1 % (ref 11.6–15.1)
FERRITIN SERPL-MCNC: 63 NG/ML (ref 8–388)
GFR SERPL CREATININE-BSD FRML MDRD: 74 ML/MIN/1.73SQ M
GLUCOSE P FAST SERPL-MCNC: 223 MG/DL (ref 65–99)
HCT VFR BLD AUTO: 33.4 % (ref 36.5–49.3)
HGB BLD-MCNC: 10.3 G/DL (ref 12–17)
IRON SATN MFR SERPL: 16 % (ref 20–50)
IRON SERPL-MCNC: 51 UG/DL (ref 65–175)
MCH RBC QN AUTO: 26.7 PG (ref 26.8–34.3)
MCHC RBC AUTO-ENTMCNC: 30.8 G/DL (ref 31.4–37.4)
MCV RBC AUTO: 87 FL (ref 82–98)
PLATELET # BLD AUTO: 286 THOUSANDS/UL (ref 149–390)
PMV BLD AUTO: 10.1 FL (ref 8.9–12.7)
POTASSIUM SERPL-SCNC: 3.9 MMOL/L (ref 3.5–5.3)
PROT SERPL-MCNC: 7.6 G/DL (ref 6.4–8.2)
RBC # BLD AUTO: 3.86 MILLION/UL (ref 3.88–5.62)
SODIUM SERPL-SCNC: 136 MMOL/L (ref 136–145)
TIBC SERPL-MCNC: 314 UG/DL (ref 250–450)
URATE SERPL-MCNC: 4.1 MG/DL (ref 4.2–8)
WBC # BLD AUTO: 6.21 THOUSAND/UL (ref 4.31–10.16)

## 2022-03-01 PROCEDURE — 36415 COLL VENOUS BLD VENIPUNCTURE: CPT

## 2022-03-01 PROCEDURE — 85027 COMPLETE CBC AUTOMATED: CPT

## 2022-03-01 PROCEDURE — 84165 PROTEIN E-PHORESIS SERUM: CPT | Performed by: PATHOLOGY

## 2022-03-01 PROCEDURE — 82728 ASSAY OF FERRITIN: CPT

## 2022-03-01 PROCEDURE — 83550 IRON BINDING TEST: CPT

## 2022-03-01 PROCEDURE — 84166 PROTEIN E-PHORESIS/URINE/CSF: CPT

## 2022-03-01 PROCEDURE — 84550 ASSAY OF BLOOD/URIC ACID: CPT

## 2022-03-01 PROCEDURE — 83540 ASSAY OF IRON: CPT

## 2022-03-01 PROCEDURE — 84166 PROTEIN E-PHORESIS/URINE/CSF: CPT | Performed by: PATHOLOGY

## 2022-03-01 PROCEDURE — 80053 COMPREHEN METABOLIC PANEL: CPT

## 2022-03-01 PROCEDURE — 84165 PROTEIN E-PHORESIS SERUM: CPT

## 2022-03-01 NOTE — TELEPHONE ENCOUNTER
Please ask patient when he resumed metformin, acarbose?   lisinopril, Aldactone should not raise blood sugars  Any other new medications? I do not see any steroid medications, has patient been on any?

## 2022-03-01 NOTE — TELEPHONE ENCOUNTER
Spoke to pt's wife [facility], she advised pt was just released from hospital  He was sent home with following mediations added:  Lisinopril 2 5 mg 1 tablet daily  Aldactone 25 mg 1 tablet daily  BG has been in the 200s wife is concerned medications have caused the increased  Diabetes medications:  Metformin 1000 mg 1 full tablet in am and half tablet 500 mg in pm  Acarbose 100 mg 1 tablet 3xs a day with meals  I took BG readings over the phone    BG log placed on Dr Oswaldo Day desk for review

## 2022-03-02 ENCOUNTER — OFFICE VISIT (OUTPATIENT)
Dept: FAMILY MEDICINE CLINIC | Facility: CLINIC | Age: 77
End: 2022-03-02
Payer: MEDICARE

## 2022-03-02 ENCOUNTER — OFFICE VISIT (OUTPATIENT)
Dept: PODIATRY | Facility: CLINIC | Age: 77
End: 2022-03-02
Payer: MEDICARE

## 2022-03-02 VITALS
HEIGHT: 71 IN | SYSTOLIC BLOOD PRESSURE: 136 MMHG | WEIGHT: 164 LBS | OXYGEN SATURATION: 91 % | TEMPERATURE: 98.8 F | DIASTOLIC BLOOD PRESSURE: 60 MMHG | HEART RATE: 71 BPM | RESPIRATION RATE: 18 BRPM | BODY MASS INDEX: 22.96 KG/M2

## 2022-03-02 VITALS — BODY MASS INDEX: 22.54 KG/M2 | RESPIRATION RATE: 16 BRPM | WEIGHT: 161 LBS | HEIGHT: 71 IN

## 2022-03-02 DIAGNOSIS — I10 BENIGN ESSENTIAL HYPERTENSION: ICD-10-CM

## 2022-03-02 DIAGNOSIS — M79.671 PAIN IN BOTH FEET: ICD-10-CM

## 2022-03-02 DIAGNOSIS — I73.9 PAD (PERIPHERAL ARTERY DISEASE) (HCC): ICD-10-CM

## 2022-03-02 DIAGNOSIS — I50.1 HEART FAILURE, LEFT, WITH LVEF <=30% (HCC): ICD-10-CM

## 2022-03-02 DIAGNOSIS — E11.65 TYPE 2 DIABETES MELLITUS WITH HYPERGLYCEMIA, WITHOUT LONG-TERM CURRENT USE OF INSULIN (HCC): Primary | ICD-10-CM

## 2022-03-02 DIAGNOSIS — M54.16 LUMBAR RADICULOPATHY: ICD-10-CM

## 2022-03-02 DIAGNOSIS — L84 CORNS: ICD-10-CM

## 2022-03-02 DIAGNOSIS — M79.672 PAIN IN BOTH FEET: ICD-10-CM

## 2022-03-02 DIAGNOSIS — E11.22 CKD STAGE 2 DUE TO TYPE 2 DIABETES MELLITUS (HCC): ICD-10-CM

## 2022-03-02 DIAGNOSIS — E11.42 DIABETIC POLYNEUROPATHY ASSOCIATED WITH TYPE 2 DIABETES MELLITUS (HCC): Primary | ICD-10-CM

## 2022-03-02 DIAGNOSIS — I50.43 ACUTE ON CHRONIC COMBINED SYSTOLIC AND DIASTOLIC CONGESTIVE HEART FAILURE (HCC): ICD-10-CM

## 2022-03-02 DIAGNOSIS — E11.40 TYPE 2 DIABETES MELLITUS WITH DIABETIC NEUROPATHY, WITHOUT LONG-TERM CURRENT USE OF INSULIN (HCC): ICD-10-CM

## 2022-03-02 DIAGNOSIS — B35.1 ONYCHOMYCOSIS: ICD-10-CM

## 2022-03-02 DIAGNOSIS — N18.2 CKD STAGE 2 DUE TO TYPE 2 DIABETES MELLITUS (HCC): ICD-10-CM

## 2022-03-02 LAB
ALBUMIN SERPL ELPH-MCNC: 4.1 G/DL (ref 3.5–5)
ALBUMIN SERPL ELPH-MCNC: 57 % (ref 52–65)
ALBUMIN UR ELPH-MCNC: 100 %
ALPHA1 GLOB MFR UR ELPH: 0 %
ALPHA1 GLOB SERPL ELPH-MCNC: 0.37 G/DL (ref 0.1–0.4)
ALPHA1 GLOB SERPL ELPH-MCNC: 5.2 % (ref 2.5–5)
ALPHA2 GLOB MFR UR ELPH: 0 %
ALPHA2 GLOB SERPL ELPH-MCNC: 0.96 G/DL (ref 0.4–1.2)
ALPHA2 GLOB SERPL ELPH-MCNC: 13.3 % (ref 7–13)
B-GLOBULIN MFR UR ELPH: 0 %
BETA GLOB ABNORMAL SERPL ELPH-MCNC: 0.47 G/DL (ref 0.4–0.8)
BETA1 GLOB SERPL ELPH-MCNC: 6.5 % (ref 5–13)
BETA2 GLOB SERPL ELPH-MCNC: 6.1 % (ref 2–8)
BETA2+GAMMA GLOB SERPL ELPH-MCNC: 0.44 G/DL (ref 0.2–0.5)
GAMMA GLOB ABNORMAL SERPL ELPH-MCNC: 0.86 G/DL (ref 0.5–1.6)
GAMMA GLOB MFR UR ELPH: 0 %
GAMMA GLOB SERPL ELPH-MCNC: 11.9 % (ref 12–22)
IGG/ALB SER: 1.33 {RATIO} (ref 1.1–1.8)
PROT PATTERN SERPL ELPH-IMP: ABNORMAL
PROT PATTERN UR ELPH-IMP: ABNORMAL
PROT SERPL-MCNC: 7.2 G/DL (ref 6.4–8.2)
PROT UR-MCNC: 25 MG/DL

## 2022-03-02 PROCEDURE — 11056 PARNG/CUTG B9 HYPRKR LES 2-4: CPT | Performed by: PODIATRIST

## 2022-03-02 PROCEDURE — 99212 OFFICE O/P EST SF 10 MIN: CPT | Performed by: PODIATRIST

## 2022-03-02 PROCEDURE — 99496 TRANSJ CARE MGMT HIGH F2F 7D: CPT | Performed by: FAMILY MEDICINE

## 2022-03-02 RX ORDER — GABAPENTIN 600 MG/1
600 TABLET ORAL 2 TIMES DAILY
Qty: 180 TABLET | Refills: 0 | Status: SHIPPED | OUTPATIENT
Start: 2022-03-02 | End: 2022-05-04 | Stop reason: SDUPTHER

## 2022-03-02 NOTE — TELEPHONE ENCOUNTER
Spoke with patient and wife regarding medication changes  The wife will call insurance and let us know how may tablets to order and where to send it

## 2022-03-02 NOTE — PROGRESS NOTES
Assessment/Plan:    No problem-specific Assessment & Plan notes found for this encounter  DM2 with high readings in hospital, endo called to suggest starting Januvia    CKD2 stable, on new diuretics due to CHF, cardio f/u for monitoring    htn stable    New chf, watch wt, warned of gynecomastia on aldactone, cardio f/u     Diagnoses and all orders for this visit:    Type 2 diabetes mellitus with hyperglycemia, without long-term current use of insulin ([facility] )    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 mellitus ([facility] )    Type 2 diabetes mellitus with diabetic neuropathy, without long-term current use of insulin (HCC)    Benign essential hypertension    Acute on chronic combined systolic and diastolic congestive heart failure (HCC)    Heart failure, left, 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% ([facility])    Other orders  -     ferrous sulfate 324 (65 Fe) mg; TAKE ONE TABLET BY MOUTH BEFORE BREAKFAST        Return in about 6 months (around 9/2/2022), or if symptoms worsen or fail to improve  Subjective:      Patient ID: Lane Robins is a 67 y o  male  Chief Complaint   Patient presents with    Transition of Care Management     sas/cma       HPI  New chf  Orthopnea  Leg swelling but usual  On fluid restriction 1800ml/d  On acarbose and metformin    TCM Call (since 1/30/2022)     Date and time call was made  2/25/2022 11:43 AM    Hospital care reviewed  Records reviewed        Patient was hospitialized at  [address] [place] [address]        Date of Admission  02/18/22    Date of discharge  02/24/22    Diagnosis  Acute on chronic combined systolic and diastolic congestive heart failure    Disposition  Home    Were the patients medications reviewed and updated  Yes    Current Symptoms  None      TCM Call (since 1/30/2022)     Post hospital issues  None  He denies any complaints at this time    Should patient be enrolled in anticoag monitoring? No    Scheduled for follow up?   Yes    Patients specialists  Cardiologist; Endocrinologist    Cardiologist name  Dr Fabien Saba    Did you obtain your prescribed medications  Yes    Do you need help managing your prescriptions or medications  No    Is transportation to your appointment needed  No    I have advised the patient to call PCP with any new or worsening symptoms  [address] or [facility] other    Support System  Family    The type of support provided  Physical; Emotional    Do you have social support  Yes, as much as I need    Are you recieving any outpatient services  No    Are you recieving home care services  Yes    Types of home care services  Nurse visit    Interperter language line needed  No    Counseling  Family    Counseling topics  Activities of daily living; Importance of RX compliance; patient and family education; instructions for management; Risk factor reduction; Home health agency benefits    Comments  BP this am  156/73 161 lbs today  BS ranges 200's  this am  Yesterday   I strongly encouraged both David Almeida and his wife to call Dr Fabien Saba, endocrine regarding his blood sugars  They know to report any weight gain to cardiology and to go to the ER if any chest pain or dyspnea, weakness, etc and he is following a low K+ diet as instructed as  well as 1800 ml fluid restriction [place]            The following portions of the patient's history were reviewed and updated as appropriate: allergies, current medications, past family history, past medical history, past social history, past surgical history and problem list     Review of Systems   Constitutional: Negative for chills and fever  Cardiovascular: Positive for leg swelling  Negative for chest pain           Current Outpatient Medications   Medication Sig Dispense Refill    acarbose (PRECOSE) 100 MG tablet Take 1 tablet (100 mg total) by mouth 3 (three) times a day with meals 270 tablet 3    acetaminophen (TYLENOL) 325 mg tablet Take 2 tablets (650 mg total) by mouth every 6 (six) hours as needed for mild pain, headaches or fever  0    Ascorbic Acid (Vitamin C) 500 MG CAPS Take 500 mg by mouth daily      aspirin (ECOTRIN LOW STRENGTH) 81 mg EC tablet Take 81 mg by mouth daily      atorvastatin (LIPITOR) 20 mg tablet Take 1 tablet (20 mg total) by mouth daily 90 tablet 1    brimonidine tartrate 0 2 % ophthalmic solution Inject 0 2 % into the eye 2 (two) times a day      carvedilol (COREG) 12 5 mg tablet Take 1 tablet (12 5 mg total) by mouth 2 (two) times a day 180 tablet 1    Cholecalciferol (Vitamin D3) 50 MCG (2000 UT) TABS Take 2,000 Units by mouth daily      co-enzyme Q-10 100 mg capsule Take 100 mg by mouth daily      ferrous sulfate 324 (65 Fe) mg TAKE ONE TABLET BY MOUTH BEFORE BREAKFAST      gabapentin (NEURONTIN) 600 MG tablet Take 1 tablet (600 mg total) by mouth 2 (two) times a day 180 tablet 0    glucose blood test strip Test blood sugar twice a day 100 each 3    lisinopril (ZESTRIL) 2 5 mg tablet Take 1 tablet (2 5 mg total) by mouth daily 30 tablet 0    metFORMIN (GLUCOPHAGE) 1000 MG tablet Take one tablet in AM and 0 5 tablet in PM  270 tablet 0    multivitamin (THERAGRAN) TABS Take 1 tablet by mouth daily      omeprazole (PriLOSEC) 40 MG capsule Take 40 mg by mouth daily      Probiotic Product (CULTURELLE PROBIOTICS) CHEW Chew daily      spironolactone (ALDACTONE) 25 mg tablet Take 1 tablet (25 mg total) by mouth daily 30 tablet 0    tamsulosin (FLOMAX) 0 4 mg Take 1 capsule (0 4 mg total) by mouth daily with dinner 30 capsule 11    TRUEPLUS LANCETS 28G MISC Test 1x/d, E11 29  0     No current facility-administered medications for this visit  Objective:    /60   Pulse 71   Temp 98 8 °F (37 1 °C)   Resp 18   Ht 5' 11" (1 803 m)   Wt 74 4 kg (164 lb)   SpO2 91%   BMI 22 87 kg/m²        Physical Exam  Vitals and nursing note reviewed  Constitutional:       General: He is not in acute distress  Appearance: He is well-developed     HENT:      Head: Normocephalic  Right Ear: Tympanic membrane normal       Left Ear: Tympanic membrane normal    Eyes:      General: No scleral icterus  Conjunctiva/sclera: Conjunctivae normal    Cardiovascular:      Rate and Rhythm: Normal rate and regular rhythm  Pulmonary:      Effort: Pulmonary effort is normal  No respiratory distress  Breath sounds: No rales  Abdominal:      General: There is no distension  Palpations: Abdomen is soft  Tenderness: There is no abdominal tenderness  Musculoskeletal:         General: No deformity  Cervical back: Neck supple  Skin:     General: Skin is warm and dry  Coloration: Skin is not pale  Neurological:      Mental Status: He is alert  Gait: Gait normal    Psychiatric:         Mood and Affect: Mood normal          Behavior: Behavior normal          Thought Content:  Thought content normal                 An Kaur,

## 2022-03-02 NOTE — TELEPHONE ENCOUNTER
Recommend increasing metformin to 1000 mg orally twice a day   Start Januvia 25 mg orally daily   Needs sooner follow-up

## 2022-03-02 NOTE — TELEPHONE ENCOUNTER
Patient's wife called and said she spoke with the insurance company and [place] will cost about 1,000 for 90 days  Is there something else that can be prescribed?

## 2022-03-02 NOTE — TELEPHONE ENCOUNTER
Spoke with patient's wife and she confirmed he never stopped metformin or acarbose  Never on steroids  Will be starting gabapentin

## 2022-03-02 NOTE — PROGRESS NOTES
Assessment/Plan:  Deformity of foot   Diabetic neuropathy   Pain upon ambulation   Pain upon ambulation   Mycosis of nail   Callus formation     Plan   Lesion debrided    all mycotic nails debrided without pain or complication   Foot exam performed   Patient educated on care of the diabetic foot   Plantar calluses debrided as well  Patient remain on gabapentin as well    Refill ordered           Subjective:   patient is diabetic   He has pain upon ambulation   He has pain with shoe wear   No history of trauma             Past Medical History:   Diagnosis Date    Acquired hallux valgus       last assessed: 8/1/2013    Allergic rhinitis      Anemia 10/26/2010    Atrophy of nail       last assessed: 7/7/2015    Chronic kidney disease       chronic renal insufficiency    Coronary artery disease      Deformity of ankle and foot, acquired       last assessed: 2/22/2016    Diabetes mellitus ([facility]      Difficulty walking       last assessed: 12/14/2015    Dysesthesia       last assessed: 11/25/2016    Hammer toe       unspecified laterality; last assessed: 8/1/2013    Hypertension      Onychomycosis of toenail       last assessed: 2/22/2016    Peripheral vascular disease ([facility]       left SFA stent in 2010    Pes planus       unspecified laterality; last assessed: 8/1/2013    Pneumonia       last assessed: 2/27/2016    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       last assessed: 8/15/2016    Type 2 diabetes mellitus ([facility] 07/26/2010    Viral warts       last assessed: 7/24/2015                   Past Surgical History:   Procedure Laterality Date    CARDIAC CATHETERIZATION   07/29/2010    CATARACT EXTRACTION, BILATERAL Bilateral       R 1999, L 2008    FEMORAL ARTERY - POPLITEAL ARTERY BYPASS GRAFT   09/23/2013    FOOT SURGERY         bone spur removed    LEG SURGERY Bilateral       repair; L 8/20/2010 and 7/26/2012    ROTATOR CUFF REPAIR Left 2009    SHOULDER SURGERY Right 2005     collar bone         No Known Allergies        Current Outpatient Medications:     acarbose (PRECOSE) 100 MG tablet, Take 1 tablet (100 mg total) by mouth 3 (three) times a day with meals, Disp: 270 tablet, Rfl: 1    amLODIPine (NORVASC) 2 5 mg tablet, Take 1 tablet (2 5 mg total) by mouth daily (Patient not taking: Reported on 9/26/2019), Disp: 90 tablet, Rfl: 3    aspirin (ECOTRIN LOW STRENGTH) 81 mg EC tablet, Take 1 tablet (81 mg total) by mouth daily, Disp: 30 tablet, Rfl: 6    b complex-vitamin C-folic acid (NEPHROCAPS) 1 mg capsule, Take 1 capsule by mouth daily, Disp: , Rfl:     betamethasone dipropionate (DIPROSONE) 0 05 % cream, Use as dir twice daily, Disp: , Rfl:     carvedilol (COREG) 12 5 mg tablet, TAKE 1 TABLET TWICE DAILY, Disp: 180 tablet, Rfl: 1    gabapentin (NEURONTIN) 600 MG tablet, Take 1 tablet (600 mg total) by mouth 2 (two) times a day, Disp: 180 tablet, Rfl: 0    glimepiride (AMARYL) 4 mg tablet, Take 1 tablet (4 mg total) by mouth 2 (two) times a day for 126 days, Disp: 180 tablet, Rfl: 0    glucose blood (TRUETRACK TEST) test strip, 1 each by Other route daily Use as instructed for E11 29, Disp: 100 each, Rfl: 3    lisinopril (ZESTRIL) 5 mg tablet, Take 1 tablet (5 mg total) by mouth daily (Patient taking differently: Take 2 5 mg by mouth daily ), Disp: 90 tablet, Rfl: 3    metFORMIN (GLUMETZA) 500 MG (MOD) 24 hr tablet, Take 2 tablets (1,000 mg total) by mouth 2 (two) times a day with meals, Disp: 120 tablet, Rfl: 5    multivitamin (THERAGRAN) TABS, Take 1 tablet by mouth daily, Disp: , Rfl:     omeprazole (PriLOSEC) 40 MG capsule, Take 1 capsule (40 mg total) by mouth daily, Disp: 90 capsule, Rfl: 1    simvastatin (ZOCOR) 40 mg tablet, TAKE 1 TABLET (40 MG TOTAL) BY MOUTH DAILY, Disp: 90 tablet, Rfl: 1    TRUEPLUS LANCETS 28G MISC, Test 1x/d, E11 29, Disp: , Rfl: 0           Patient Active Problem List   Diagnosis    Diabetic polyneuropathy associated with type 2 diabetes mellitus ([facility] )    Allergic rhinitis    Arthropathy    PAD (peripheral artery disease) (HCC)    Benign essential hypertension    CAD (coronary artery disease)    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)    Diabetic neuropathy (HCC)    GE reflux    Lumbar radiculopathy    Rosacea    Seborrheic dermatitis    Type 2 diabetes mellitus with diabetic neuropathy (HCC)    Onychomycosis    Occlusion of femoral-popliteal bypass graft (HCC)    Prostate cancer screening    Dyslipidemia    Screening for AAA (aortic abdominal aneurysm)    Medicare annual wellness visit, subsequent    Type 2 diabetes mellitus with stage 3 chronic kidney disease, without long-term current use of insulin (HCC)    Pain in both feet    Corns    Plantar warts             Patient ID: Pernell Covarrubias is a 76 y  o  male         The following portions of the patient's history were reviewed and updated as appropriate:      family history includes Aortic aneurysm in his mother; Heart attack in his father and sister; Heart disease in his father, paternal grandfather, and sister        reports that he has never smoked  He has never used smokeless tobacco  He reports that he drinks alcohol  He reports that he does not use drugs         Objective:  Patient's shoes and socks removed    Foot ExamPhysical Exam          Physical Exam  Left Foot: Appearance: a deformity (ball of foot and distal fifth metatarsal )  Fifth toe deformities include hammer toe  Tenderness: None except the plantar aspect of the foot  Right Foot: Appearance: a deformity (distal fifth metatarsal )  Left Ankle: ROM: limited ROM in all planes   Right Ankle: ROM: limited ROM in all planes   Neurological Exam: Light touch was decreased bilaterally  Vibratory sensation was decreased in both first metatarsophalangeal joints  Response to monofilament test was absent bilaterally  Deep tendon reflexes: achilles reflex 1/4 bilaterally     Vascular Exam: performed Dorsalis pedis pulses were 1/4 bilaterally  Posterior tibial pulses were 1/4 bilaterally  Elevation Pallor: diminished bilaterally  Capillary refill time was Q  9, findings bilateral  Negative digital hair noted, positive abnormal cooling  All pre-ulcer, lesions debrided  Today, but between 1-3 seconds bilaterally  Edema: mild bilaterally and All mycotic nails debrided  Today  The patient was given orthotics to help control his feet and at as cushioning and padding on the bottom of his feet q9 findings  Toenails: All of the toenails were elongated, hypertrophied, discolored, ingrown, shown to have subungual debris and tender       Socks and shoes removed, the Right Foot: the foot was dry  The sensory exam showed diminished vibratory sensation at the level of the toes  Diminished tactile sensation with monofilament testing throughout the right foot    Socks and shoes removed, Left Foot: the foot was dry  The sensory exam showed diminished vibratory sensation at the level of the toes  Diminished tactile sensation with monofilament testing throughout the left foot    Pulses:   1+ in the posterior tibialis on the right   1+ in the dorsalis pedis on the right  Capillary refills findings on the left were delayed in the toes  Pulses:   1+ in the posterior tibialis on the left   1+ in the dorsalis pedis on the left  Assign Risk Category: 2: Loss of protective sensation with or without weakness, deformity, callus, pre-ulcer, or history of ulceration  High risk  Hyperkeratosis: present on both first sub metatarsals, present on both fifth sub metatarsals and Pre-ulcerative lesion, submetatarsal one to 5, bilateral    Shoe Gear Evaluation: performed ()  Recommendation(s): custom inlays       Patient's shoes and socks removed  Right Foot/Ankle   Right Foot Inspection  Skin Exam: callus and callus               Toe Exam: swelling and erythema  Sensory   Vibration: diminished  Proprioception: diminished      Vascular  Capillary refills: elevated        Left Foot/Ankle  Left Foot Inspection  Skin Exam: callus   Submetatarsal 5 of the left foot demonstrates 0 5 centimeter squared plantar verruca   It is in capsulated in a bullae   Debrided   20% remaining               Toe Exam: swelling and erythema                   Sensory   Vibration: diminished  Proprioception: diminished     Vascular  Capillary refills: elevated  Patient's shoes and socks removed          Assign Risk Category  Deformity present  Loss of protective sensation  Weak pulses  Risk: 2

## 2022-03-03 ENCOUNTER — TELEPHONE (OUTPATIENT)
Dept: NEPHROLOGY | Facility: CLINIC | Age: 77
End: 2022-03-03

## 2022-03-03 ENCOUNTER — APPOINTMENT (OUTPATIENT)
Dept: LAB | Facility: CLINIC | Age: 77
End: 2022-03-03
Payer: MEDICARE

## 2022-03-03 DIAGNOSIS — I50.9 ACUTE ON CHRONIC CONGESTIVE HEART FAILURE, UNSPECIFIED HEART FAILURE TYPE (HCC): ICD-10-CM

## 2022-03-03 LAB
ANION GAP SERPL CALCULATED.3IONS-SCNC: 4 MMOL/L (ref 4–13)
BUN SERPL-MCNC: 12 MG/DL (ref 5–25)
CALCIUM SERPL-MCNC: 9.4 MG/DL (ref 8.3–10.1)
CHLORIDE SERPL-SCNC: 103 MMOL/L (ref 100–108)
CO2 SERPL-SCNC: 26 MMOL/L (ref 21–32)
CREAT SERPL-MCNC: 0.89 MG/DL (ref 0.6–1.3)
GFR SERPL CREATININE-BSD FRML MDRD: 82 ML/MIN/1.73SQ M
GLUCOSE P FAST SERPL-MCNC: 202 MG/DL (ref 65–99)
MAGNESIUM SERPL-MCNC: 1.9 MG/DL (ref 1.6–2.6)
POTASSIUM SERPL-SCNC: 4.3 MMOL/L (ref 3.5–5.3)
SODIUM SERPL-SCNC: 133 MMOL/L (ref 136–145)

## 2022-03-03 PROCEDURE — 80048 BASIC METABOLIC PNL TOTAL CA: CPT

## 2022-03-03 PROCEDURE — 83735 ASSAY OF MAGNESIUM: CPT

## 2022-03-03 PROCEDURE — 36415 COLL VENOUS BLD VENIPUNCTURE: CPT

## 2022-03-03 NOTE — TELEPHONE ENCOUNTER
I think the patient/ his wife meant [place]?   -alternatives are DDP4 inhibitor: Januvia, linagliptin, saxagliptin, alogliptin

## 2022-03-03 NOTE — TELEPHONE ENCOUNTER
Irene Styles, a visiting nurse, from [place] visiting nurse called about the the patient  The patient is on a low potassium diet and he would like more clarification on exactly how many mg of potassium he is allowed to have a day  He  Is going to consult a dietician as well  Please advise  Irene Styles can be reached at [phone]

## 2022-03-03 NOTE — TELEPHONE ENCOUNTER
Tried call Per Pickett, his visiting nurse back  Left Per Pickett a message  Dr Ignacio Velasco said no diet changes can stay with a the current diet   Per Pickett number is [phone]

## 2022-03-09 ENCOUNTER — OFFICE VISIT (OUTPATIENT)
Dept: CARDIOLOGY CLINIC | Facility: CLINIC | Age: 77
End: 2022-03-09
Payer: MEDICARE

## 2022-03-09 VITALS
OXYGEN SATURATION: 99 % | HEART RATE: 71 BPM | SYSTOLIC BLOOD PRESSURE: 120 MMHG | TEMPERATURE: 97.3 F | WEIGHT: 163 LBS | DIASTOLIC BLOOD PRESSURE: 50 MMHG | HEIGHT: 71 IN | BODY MASS INDEX: 22.82 KG/M2

## 2022-03-09 DIAGNOSIS — I10 BENIGN ESSENTIAL HYPERTENSION: ICD-10-CM

## 2022-03-09 DIAGNOSIS — E78.5 DYSLIPIDEMIA: ICD-10-CM

## 2022-03-09 DIAGNOSIS — E11.40 TYPE 2 DIABETES MELLITUS WITH DIABETIC NEUROPATHY, WITHOUT LONG-TERM CURRENT USE OF INSULIN (HCC): ICD-10-CM

## 2022-03-09 DIAGNOSIS — E11.22 CKD STAGE 3 DUE TO TYPE 2 DIABETES MELLITUS (HCC): ICD-10-CM

## 2022-03-09 DIAGNOSIS — N18.30 CKD STAGE 3 DUE TO TYPE 2 DIABETES MELLITUS (HCC): ICD-10-CM

## 2022-03-09 DIAGNOSIS — I25.119 CORONARY ARTERY DISEASE INVOLVING NATIVE CORONARY ARTERY OF NATIVE HEART WITH ANGINA PECTORIS (HCC): ICD-10-CM

## 2022-03-09 DIAGNOSIS — I50.42 CHRONIC COMBINED SYSTOLIC AND DIASTOLIC HEART FAILURE, NYHA CLASS 2 (HCC): ICD-10-CM

## 2022-03-09 PROCEDURE — 99214 OFFICE O/P EST MOD 30 MIN: CPT | Performed by: INTERNAL MEDICINE

## 2022-03-09 RX ORDER — FUROSEMIDE 20 MG/1
20 TABLET ORAL AS NEEDED
Qty: 15 TABLET | Refills: 1 | Status: SHIPPED | OUTPATIENT
Start: 2022-03-09

## 2022-03-09 NOTE — PROGRESS NOTES
Progress Note - Cardiology Office  [facility]    Ady Dye 67 y o  male MRN: [id number]  : 1945  Encounter: [id number]      Assessment:     1  Chronic combined systolic and diastolic heart failure, NYHA class 2 ([facility] )    2  Coronary artery disease involving native coronary artery of native heart with angina pectoris ([facility] )    3  Benign essential hypertension    4  CKD stage 3 due to type 2 diabetes mellitus ([facility] )    5  Dyslipidemia    6  Type 2 diabetes mellitus with diabetic neuropathy, without long-term current use of insulin ([place] [id number] )        Discussion Summary and Plan:  1  Chronic systolic and diastolic heart failure  Patient was recently admitted with chronic systolic and diastolic heart failure  Last EF around 45%  His current weight is around 162 lb  I will believe he will do best when his weight is around 160-165  We will give him Lasix 10 mg p r n  As needed if he gains weight  Labs done 2022 acceptable will check repeat labs in few weeks to avoid hyponatremia  In the meantime continue Coreg lisinopril  Monitor input output avoid salt All those issues discussed with them  2  Coronary artery disease  Status post multivessel stenting  Patient now want to follow up with [facility] cardiology  His nuclear stress test done in 2018 which shows no ischemia normal LV systolic function  No symptoms of chest pain EF now around 45%  Continue aspirin and statins  His cardiac workup done in 2022 reviewed  3  Hypertension  Patient blood pressure is now better  He is on carvedilol and lisinopril and Aldactone advised him to be compliant with these medications will check electrolytes  Last sodium 133      4  Dyslipidemia  Continue statins labs done 2021 shows cholesterol profile is acceptable  5  Chronic renal insufficiency  Continue management as per Nephrology  He regularly follow up with Nephrology    6    Carotid bruit   Carotid Doppler shows less than 50% stenosis on the left  No evidence of stenosis on the right side  Continue medical Rx follows up with vascular    7  Diabetes mellitus  His blood sugar has been labile  His hemoglobin A1c used to be very high currently much better but still not very well controlled  8  History of prostate cancer  Patient get injectable medicine  Which really makes him feel sick and feel weak and tired  9  History of anemia of chronic disease  10  PVD  PVD with right femoropopliteal which is occluded and left SFA which has stent placed by me in 2010, had repeat procedure done with known right SFA stent stenosis underwent balloon angioplasty  He is able to walk he has seen the vascular  He may have InStent stenosis on the left and has occlusion of his bypass on the right  He follows with vascular no symptoms at this time    Plan  Continue current Rx  Added Lasix as needed  Will check BMP in 4 weeks and continue current Rx  Patient should weigh himself every day  If there is a more than 2-3 lb weight gain in a day and 5 lb in a week he should call us and taken water pill as prescribed  Counseling :  A description of the counseling  All issues regarding tight sugar control, taking cholesterol medications, regular exercise, symptoms about coronary artery disease discussed with patient at length  Previous echo from 2014 reviewed  Goals and Barriers  Patient's ability to self care: Yes  Medication side effect reviewed with patient in detail and all their questions answered to their satisfaction  HPI :     Brenda Voss is a 67y o  year old male who came for follow up   Patient has a past medical history significant for Coronary Artery artery disease, CK D stage III, Diabetes mellitus with renal manifestations, PVD with right femoropopliteal which has occluded and left SFA stent by me in 2010, hypertension, diabetic neuropathy who came to see us after his vascular study was found to be abnormal  For cardiac problem he generally sees Dr Zee Villalobos  His vascular study was ordered by his podiatrist as he was having pain in his foot  He also had a lot of back problems sometime pain radiates from his back to the thighs  Here a stent placed in his left SFA in 2010  It's already known that his right femoropopliteal is occluded  He has no fever no chills no nausea no vomiting no other significant complaint     03/08/2021  Above reviewed  Patient came for follow-up  He is doing well from cardiac point of view  His legs are still bothering him  He follows up with vascular  He does have history of PVD with possible left subclavian stenosis, history of bypass on the right leg and stent on the left and follows up with vascular  Today EKG shows heart rate 67 beats per minute  And 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  He is no longer getting cramps when he walks  His study from May 2020 reviewed  ABIs were 0 63 on the right and 0 6 on the left  He denies any history of chest pain, shortness breath, dizziness, lightheadedness, nausea vomiting PND orthopnea or cardiovascular issues  He had a blood test of January 2021 with creatinine was 1 41  But blood glucose was elevated  HbA1c 13 4      08/13/2021     above reviewed  Patient came for follow-up  He has lot of issues but there were noncardiac  He was admitted in July with hypoglycemia and his sugar medications were adjusted  But HbA1c has significantly improved from 13 4-7 7  He has history of PVD with possible left subclavian stenosis, history of bypass on his right leg and stent on the left leg and a follow-up with vascular  He also was diagnosed to have prostate cancer which is stage III and he is getting radiation therapy and as well as injectable medicine  He had 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 with T-wave inversions  He does get leg cramps when he walks    Otherwise no cardiovascular issues at this time  He had a fall recently and he was in the urgent care center  Labs done from August 2021 reviewed were acceptable no other issues at this time from cardiac point of view  03/09/2022  Above reviewed  Patient came for follow-up  He was recently admitted to hospital in February with heart failure  His EF is now around 45%  Nuclear stress test shows no ischemia but EF was noted to be low and he had history of prostate cancer which is metastatic he has to take injectable medicines  He is not taking his Florinef  His med list reviewed  His blood pressure is now acceptable  He is now on lisinopril, carvedilol and Aldactone  He is not on diuretics  Today his heart rate is 71 beats per minute and his vitals are stable  Labs from March 3, 2022 reviewed sodium was 133  Other electrolytes were acceptable  No nausea no vomiting no fever no chills  Minimal lower extremity swelling noted  Over the years patient has become more weaker  He really feels weak after he gets his prostate injectable medication he takes him few days to recover  Review of Systems   Constitutional: Negative for activity change, chills, diaphoresis, fever and unexpected weight change  Has lost weight and feels weak   HENT: Negative for congestion  Eyes: Negative for discharge and redness  Respiratory: Negative for cough, chest tightness, shortness of breath and wheezing  Cardiovascular: Positive for leg swelling  Negative for chest pain and palpitations  Gastrointestinal: Negative for abdominal pain, diarrhea and nausea  Endocrine: Negative  Genitourinary: Negative for decreased urine volume and urgency  History of prostate cancer which is metastatic gets injectable medicine every 6 months now used to get every 3 months   Musculoskeletal: Positive for arthralgias, back pain and gait problem  Skin: Negative for rash and wound  Allergic/Immunologic: Negative  Neurological: Negative for dizziness, seizures, syncope, weakness, light-headedness and headaches  Hematological: Negative  Psychiatric/Behavioral: Negative for agitation and confusion  The patient is nervous/anxious          Historical Information   Past Medical History:   Diagnosis Date    Acquired hallux valgus     last assessed: 8/1/2013    Allergic rhinitis     Anemia 10/26/2010    Atrophy of nail     last assessed: 7/7/2015    Chronic kidney disease     chronic renal insufficiency    Coronary artery disease     Deformity of ankle and foot, acquired     last assessed: 2/22/2016    Difficulty walking     last assessed: 12/14/2015    Dysesthesia     last assessed: 11/25/2016    Hammer toe     unspecified laterality; last assessed: 8/1/2013    Hypertension     Onychomycosis of toenail     last assessed: 2/22/2016    Peripheral vascular disease ([facility] )     left SFA stent in 2010    Pes planus     unspecified laterality; last assessed: 8/1/2013    Pneumonia     last assessed: 2/27/2016    Pro[facility] ([facility] )     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     last assessed: 8/15/2016    Type 2 diabetes mellitus ([facility] ) 07/26/2010    Viral warts     last assessed: 7/24/2015     Past Surgical History:   Procedure Laterality Date    CARDIAC CATHETERIZATION  07/29/2010    CATARACT EXTRACTION, BILATERAL Bilateral     R 1999, L 2008    COLONOSCOPY  2011    FEMORAL ARTERY - POPLITEAL ARTERY BYPASS GRAFT  09/23/2013    FOOT SURGERY      bone spur removed    LEG SURGERY Bilateral     repair; L 8/20/2010 and 7/26/2012    WV PLACE RADIOTHER DEVICE/MARKER, PROSTATE N/A 6/22/2021    Procedure: INSERTION OF FIDUCIAL MARKER (TRANSRECTAL ULTRASOUND GUIDANCE), SPACEOAR;  Surgeon: Nancy Oneil MD;  Location: [facility];  Service: Urology    ROTATOR CUFF REPAIR Left 2009    SHOULDER SURGERY Right 2005    collar bone     Social History     Substance and Sexual Activity   Alcohol Use Yes Comment: being a social drinker/rare     Social History     Substance and Sexual Activity   Drug Use No     Social History     Tobacco Use   Smoking Status Never Smoker   Smokeless Tobacco Never Used     Family History:   Family History   Problem Relation Age of Onset    Heart disease Father     Heart attack Father         acute myocardial infarction    Heart disease Sister     Heart attack Sister         acute myocardial infarction    Heart disease Paternal Grandfather     Aortic aneurysm Mother     Throat cancer Maternal Grandfather        Meds/Allergies     No Known Allergies    Current Outpatient Medications:     acarbose (PRECOSE) 100 MG tablet, Take 1 tablet (100 mg total) by mouth 3 (three) times a day with meals, Disp: 270 tablet, Rfl: 3    acetaminophen (TYLENOL) 325 mg tablet, Take 2 tablets (650 mg total) by mouth every 6 (six) hours as needed for mild pain, headaches or fever, Disp: , Rfl: 0    Ascorbic Acid (Vitamin C) 500 MG CAPS, Take 500 mg by mouth daily, Disp: , Rfl:     aspirin (ECOTRIN LOW STRENGTH) 81 mg EC tablet, Take 81 mg by mouth daily, Disp: , Rfl:     atorvastatin (LIPITOR) 20 mg tablet, Take 1 tablet (20 mg total) by mouth daily, Disp: 90 tablet, Rfl: 1    brimonidine tartrate 0 2 % ophthalmic solution, Inject 0 2 % into the eye 2 (two) times a day, Disp: , Rfl:     carvedilol (COREG) 12 5 mg tablet, Take 1 tablet (12 5 mg total) by mouth 2 (two) times a day, Disp: 180 tablet, Rfl: 1    Cholecalciferol (Vitamin D3) 50 MCG (2000 UT) TABS, Take 2,000 Units by mouth daily, Disp: , Rfl:     co-enzyme Q-10 100 mg capsule, Take 100 mg by mouth daily, Disp: , Rfl:     ferrous sulfate 324 (65 Fe) mg, TAKE ONE TABLET BY MOUTH BEFORE BREAKFAST, Disp: , Rfl:     gabapentin (NEURONTIN) 600 MG tablet, Take 1 tablet (600 mg total) by mouth 2 (two) times a day, Disp: 180 tablet, Rfl: 0    glucose blood test strip, Test blood sugar twice a day, Disp: 100 each, Rfl: 3    lisinopril (ZESTRIL) 2 5 mg tablet, Take 1 tablet (2 5 mg total) by mouth daily, Disp: 30 tablet, Rfl: 0    metFORMIN (GLUCOPHAGE) 1000 MG tablet, Take one tablet in AM and 0 5 tablet in PM  (Patient taking differently: Take 1,000 mg by mouth  ), Disp: 270 tablet, Rfl: 0    multivitamin (THERAGRAN) TABS, Take 1 tablet by mouth daily, Disp: , Rfl:     omeprazole (PriLOSEC) 40 MG capsule, Take 40 mg by mouth daily, Disp: , Rfl:     Probiotic Product (CULTURELLE PROBIOTICS) CHEW, Chew daily, Disp: , Rfl:     spironolactone (ALDACTONE) 25 mg tablet, Take 1 tablet (25 mg total) by mouth daily, Disp: 30 tablet, Rfl: 0    tamsulosin (FLOMAX) 0 4 mg, Take 1 capsule (0 4 mg total) by mouth daily with dinner, Disp: 30 capsule, Rfl: 11    TRUEPLUS LANCETS 28G MISC, Test 1x/d, E11 29, Disp: , Rfl: 0    Vitals: Blood pressure 120/50, pulse 71, temperature (!) 97 3 °F (36 3 °C), height 5' 11" (1 803 m), weight 73 9 kg (163 lb), SpO2 99 %  Body mass index is 22 73 kg/m²  Vitals:    03/09/22 1231   Weight: 73 9 kg (163 lb)     BP Readings from Last 3 Encounters:   03/09/22 120/50   03/02/22 136/60   02/24/22 151/58         Physical Exam  Constitutional:       General: He is not in acute distress  Appearance: He is well-developed  He is not diaphoretic  Neck:      Thyroid: No thyromegaly  Vascular: No JVD  Trachea: No tracheal deviation  Cardiovascular:      Rate and Rhythm: Normal rate and regular rhythm  Heart sounds: S1 normal and S2 normal  Heart sounds not distant  Murmur heard  Systolic (ejection) murmur is present with a grade of 2/6  No friction rub  No gallop  No S3 or S4 sounds  Pulmonary:      Effort: Pulmonary effort is normal  No respiratory distress  Breath sounds: Normal breath sounds  No wheezing or rales  Chest:      Chest wall: No tenderness  Abdominal:      General: Bowel sounds are normal  There is no distension  Palpations: Abdomen is soft  Tenderness:  There is no abdominal tenderness  Musculoskeletal:         General: Swelling present  No deformity  Cervical back: Neck supple  Skin:     General: Skin is warm and dry  Coloration: Skin is not pale  Findings: No rash  Neurological:      Mental Status: He is alert and oriented to person, place, and time  Psychiatric:         Behavior: Behavior normal          Judgment: Judgment normal          Diagnostic Studies Review Cardio:    Nuclear stress test   Nuclear stress test done 09/27/2018 was a normal study with EF around 60%  No ischemia noted it was Lexiscan stress test     Echo Doppler  Echo Doppler done 10/14/2020 shows EF 55 60%, mild MR, trace to mild PI, pressure half time was 660 millisecond    EKG:    Twelve lead EKG done on 09/13/2018 shows normal sinus rhythm heart rate 60 beats per minute  No significant ST changes  Twelve lead EKG done 05/17/2019 shows normal sinus rhythm heart rate 60 beats per minute  No change from old EKG  Twelve lead EKG 03/10/2020 shows normal sinus rhythm LVH by voltage heart rate 60 beats per minute no change from old EKG  Twelve lead EKG 09/23/2020 shows normal sinus rhythm LVH by voltage  Q-waves in inferior leads cannot rule out 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  Twelve lead EKG 03/08/2021 shows normal sinus rhythm LVH by voltage and Q-waves in inferior leads cannot rule out 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ion  Twelve lead EKG done on 02/08/2022 shows sinus rhythm heart rate 98 beats per minute LVH by voltage as compared to previous EKGs his heart function is noted to be faster  Inferior Q-waves noted cannot rule out 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ion    Nonspecific ST changes in lateral precordial leads        Lab Review   Lab Results   Component Value Date    WBC 6 21 03/01/2022    HGB 10 3 (L) 03/01/2022    HCT 33 4 (L) 03/01/2022    MCV 87 03/01/2022    RDW 16 1 (H) 03/01/2022     03/01/2022     BMP:  Lab Results   Component Value Date     04/17/2014    K 4 3 03/03/2022     03/03/2022    CO2 26 03/03/2022    ANIONGAP 8 04/17/2014    BUN 12 03/03/2022    CREATININE 0 89 03/03/2022    GLUCOSE 137 04/17/2014    GLUF 202 (H) 03/03/2022    CALCIUM 9 4 03/03/2022    CORRECTEDCA 9 2 02/18/2022    EGFR 82 03/03/2022    MG 1 9 03/03/2022     LFT:  Lab Results   Component Value Date    AST 14 03/01/2022    ALT 21 03/01/2022    ALKPHOS 84 03/01/2022       Lab Results   Component Value Date    HGBA1C 7 1 (H) 12/27/2021     Lipid Profile:   Lab Results   Component Value Date    CHOL 106 07/19/2014    HDL 62 12/27/2021    LDLCALC 62 12/27/2021    TRIG 38 12/27/2021     Lab Results   Component Value Date    CHOL 106 07/19/2014       Dr Flaco Olson MD [facility]      "This note has been constructed using a voice recognition system  Therefore there may be syntax, spelling, and/or grammatical errors   Please call if you have any questions  "

## 2022-03-11 ENCOUNTER — PATIENT OUTREACH (OUTPATIENT)
Dept: FAMILY MEDICINE CLINIC | Facility: CLINIC | Age: 77
End: 2022-03-11

## 2022-03-11 ENCOUNTER — HOSPITAL ENCOUNTER (OUTPATIENT)
Dept: RADIOLOGY | Facility: HOSPITAL | Age: 77
Discharge: HOME | End: 2022-03-11
Payer: MEDICARE

## 2022-03-11 ENCOUNTER — TELEPHONE (OUTPATIENT)
Dept: ENDOCRINOLOGY | Facility: CLINIC | Age: 77
End: 2022-03-11

## 2022-03-11 DIAGNOSIS — E11.40 TYPE 2 DIABETES MELLITUS WITH DIABETIC NEUROPATHY, WITHOUT LONG-TERM CURRENT USE OF INSULIN (HCC): ICD-10-CM

## 2022-03-11 DIAGNOSIS — N18.2 CKD STAGE 2 DUE TO TYPE 2 DIABETES MELLITUS (HCC): Primary | ICD-10-CM

## 2022-03-11 DIAGNOSIS — E11.42 DIABETIC POLYNEUROPATHY ASSOCIATED WITH TYPE 2 DIABETES MELLITUS (HCC): ICD-10-CM

## 2022-03-11 DIAGNOSIS — T82.898D OCCLUSION OF FEMOROPOPLITEAL BYPASS GRAFT, SUBSEQUENT ENCOUNTER: ICD-10-CM

## 2022-03-11 DIAGNOSIS — I70.219 ATHEROSCLEROSIS OF ARTERY OF EXTREMITY WITH INTERMITTENT CLAUDICATION (HCC): ICD-10-CM

## 2022-03-11 DIAGNOSIS — E11.22 CKD STAGE 2 DUE TO TYPE 2 DIABETES MELLITUS (HCC): Primary | ICD-10-CM

## 2022-03-11 PROCEDURE — 93923 UPR/LXTR ART STDY 3+ LVLS: CPT

## 2022-03-11 PROCEDURE — 93922 UPR/L XTREMITY ART 2 LEVELS: CPT | Performed by: SURGERY

## 2022-03-11 PROCEDURE — 93925 LOWER EXTREMITY STUDY: CPT | Performed by: SURGERY

## 2022-03-11 PROCEDURE — 93925 LOWER EXTREMITY STUDY: CPT

## 2022-03-11 NOTE — TELEPHONE ENCOUNTER
Patient called insurance and will stay with Januvia    Will put in prescription 25 mg once a day to [facility]    BG logs attached

## 2022-03-11 NOTE — TELEPHONE ENCOUNTER
Patient takes acarbose 100 mg 3 times a day and metformin 1000 mg twice a day  Wife called to say his bg's are high  She will drop off log today  I gave her the names of the meds in the same class as the [place] and [place]  She will call the insurance company today also

## 2022-03-11 NOTE — TELEPHONE ENCOUNTER
If not Januvia, Other medications that could be considered are as follows     covered  - GLP-1  Agonist-Victoza, Bydureon, Byetta, ozempic, trulicity, ryblesus  - SGLT 2 inhibitors -jardiance, farxiga, Invokana, rex  - prandin

## 2022-03-25 ENCOUNTER — OFFICE VISIT (OUTPATIENT)
Dept: VASCULAR SURGERY | Facility: CLINIC | Age: 77
End: 2022-03-25
Payer: MEDICARE

## 2022-03-25 ENCOUNTER — PATIENT OUTREACH (OUTPATIENT)
Dept: FAMILY MEDICINE CLINIC | Facility: CLINIC | Age: 77
End: 2022-03-25

## 2022-03-25 VITALS
HEART RATE: 72 BPM | TEMPERATURE: 97.6 F | BODY MASS INDEX: 22.68 KG/M2 | SYSTOLIC BLOOD PRESSURE: 128 MMHG | DIASTOLIC BLOOD PRESSURE: 56 MMHG | WEIGHT: 162 LBS | HEIGHT: 71 IN

## 2022-03-25 DIAGNOSIS — I50.1 HEART FAILURE, LEFT, WITH LVEF <=30% (HCC): ICD-10-CM

## 2022-03-25 DIAGNOSIS — R29.898 BILATERAL LEG WEAKNESS: ICD-10-CM

## 2022-03-25 DIAGNOSIS — E78.5 DYSLIPIDEMIA: ICD-10-CM

## 2022-03-25 DIAGNOSIS — E11.65 TYPE 2 DIABETES MELLITUS WITH HYPERGLYCEMIA, WITHOUT LONG-TERM CURRENT USE OF INSULIN (HCC): Primary | ICD-10-CM

## 2022-03-25 DIAGNOSIS — E11.42 DIABETIC POLYNEUROPATHY ASSOCIATED WITH TYPE 2 DIABETES MELLITUS (HCC): ICD-10-CM

## 2022-03-25 DIAGNOSIS — I70.219 ATHEROSCLEROSIS OF ARTERY OF EXTREMITY WITH INTERMITTENT CLAUDICATION (HCC): ICD-10-CM

## 2022-03-25 DIAGNOSIS — T82.898D OCCLUSION OF FEMOROPOPLITEAL BYPASS GRAFT, SUBSEQUENT ENCOUNTER: ICD-10-CM

## 2022-03-25 DIAGNOSIS — I10 BENIGN ESSENTIAL HYPERTENSION: ICD-10-CM

## 2022-03-25 PROBLEM — R60.0 EDEMA OF BOTH FEET: Status: ACTIVE | Noted: 2022-03-25

## 2022-03-25 PROCEDURE — 99214 OFFICE O/P EST MOD 30 MIN: CPT | Performed by: PHYSICIAN ASSISTANT

## 2022-03-25 NOTE — PATIENT INSTRUCTIONS
Peripheral Arterial Disease    ambulatory dysfunction, general weakness and non lifestyle limiting claudication    [place] 3/11/22     R 0 64/132/63; Known fem-pop graft occlusion  Known occlusion prox to mid SFA with recon in distal SFA      L 0 64/120/72; Occlusion of SFA stent with collaterals and distal SFA reconstitution    Recommendations:      Regular exercise / walking program    Follow good, heart healthy diet which is low in fat and cholesterol   monitor your feet every day for any wounds or changes    no indication for vascular procedures at this time  We reviewed indications for procedures such as rest pain or wounds   continue with lifelong aspirin and statin    optimize blood pressure, cholesterol and glucose control   follow-up in 1 year with lower extremity arterial duplex study or sooner if needed        Leg swelling   chronic bilateral lower extremity edema with underlying Congestive heart failure   low-sodium diet, monitoring weight and salt intake   compression stockings can be worn daily as long as they do not create pain in the legs   increase activity and remember to elevate the legs when at rest          Peripheral Artery Disease   AMBULATORY CARE:   Peripheral artery disease (PAD)  is narrow, weak, or blocked arteries  It may affect any arteries outside of your heart and brain  PAD is usually the result of a buildup of fat and cholesterol, also called plaque, along your artery walls  Inflammation, a blood clot, or abnormal cell growth could also block your arteries  PAD prevents normal blood flow to your legs and arms  You are at risk of an amputation if poor blood flow keeps wounds from healing or causes gangrene (tissue death)  Without treatment, PAD can also cause a heart attack or stroke  Common symptoms include:  Mild PAD usually does not cause symptoms   As the disease worsens over time, you may have the following:  · Pain or cramps in your leg or hip while you walk    · A numb, weak, or heavy feeling in your legs    · Dry, scaly, red, or pale skin on your legs    · Thick or brittle nails, or hair loss on your arms and legs    · Foot sores that will not heal    · Burning or aching in your feet and toes while resting (this may be worse when you lie down)    Call your local emergency number (911 in the [address] [facility]) if:   · You have any of the following signs of a heart attack:      ? Squeezing, pressure, or pain in your chest    ? You may  also have any of the following:     § Discomfort or pain in your back, neck, jaw, stomach, or arm    § Shortness of breath    § Nausea or vomiting    § Lightheadedness or a sudden cold sweat    · You have any of the following signs of a stroke:      ? Numbness or drooping on one side of your face     ? Weakness in an arm or leg    ? Confusion or difficulty speaking    ? Dizziness, a severe headache, or vision loss    Seek care immediately if:   · You have sores or wounds that will not heal     · You notice black or discolored skin on your arm or leg  · Your skin is cool to the touch  Call your doctor if:   · You have leg pain when you walk 1/8 mile (200 meters) or less, even with treatment  · Your legs are red, dry, or pale, even with treatment  · You have questions or concerns about your condition or care  Treatment for PAD  can help reduce your risk of a heart attack, stroke, or amputation  You may need more than one of the following:  · Medicines  may be given to prevent blood clots and reduce the risk of a heart attack or stroke  You may be given medicine to help prevent your PAD from getting worse  · A supervised exercise program  helps you stay active in normal daily activities  Healthcare providers will help you safely walk or do strength training exercises 3 times a week for 30 to 60 minutes  You will do this for several months, then transition to walking on your own      · Angioplasty  is a procedure to open your artery so blood can flow through normally  A thin tube called a catheter is used to insert a small balloon into your artery  The balloon is inflated to open your blocked artery, and then removed  A tube called a stent may be placed in your artery to hold it open  · Bypass surgery  is used to make a new connection to your artery with a vein from another part of your body, or an artificial graft  The vein or graft is attached to your artery above and below your blockage  This allows blood to flow around the blocked portion of your artery  Manage and prevent PAD:   · Walk for 30 to 60 minutes at least 4 times a week  Your healthcare provider may also refer you to a supervised exercise program  The program helps increase how far you can walk without pain  It also helps you stay active in normal daily activities         · Do not smoke  Nicotine and other chemicals in cigarettes and cigars can worsen PAD  They can also increase your risk for a heart attack or stroke  Ask your healthcare provider for information if you currently smoke and need help to quit  E-cigarettes or smokeless tobacco still contain nicotine  Talk to your healthcare provider before you use these products  · Manage other health conditions  Take your medicines as directed and follow your healthcare provider's instructions if you have high blood pressure or high cholesterol  Perform foot care and check your blood sugar levels as directed if you have diabetes  · Eat heart-healthy foods  Eat whole grains, fruits, and vegetables every day  Limit salt and high-fat foods  Ask your healthcare provider for more information on a heart healthy diet  Ask what a healthy weight is for you  Your healthcare provider can help you create a healthy weight-loss plan, if needed  Follow up with your doctor as directed:  Write down your questions so you remember to ask them during your visits    © Copyright [facility] 2022 Information is for End User's use only and may not be sold, redistributed or otherwise used for commercial purposes  All illustrations and images included in CareNotes® are the copyrighted property of [employer] D [employer]  or Niya Ocampo  The above information is an  only  It is not intended as medical advice for individual conditions or treatments  Talk to your doctor, nurse or pharmacist before following any medical regimen to see if it is safe and effective for you

## 2022-03-25 NOTE — PROGRESS NOTES
Assessment/Plan:  66 y/o M hypertension, DM, neuropathy, CKD 3, CAD, PAD s/p R Fem to AK pop bypass Lakisha Peña, 2013, known occluded within 1 year of surgery) and B SFA occlusions s/p B SFA stenting (L 6/19/2018 )  Unfortunately, despite dual antiplatelets he developed recurrent SFA stenoses (L < 1 year and R < 2 years) after the intervention  Peripheral arterial disease with intermittent claudication  Occluded R Fem-pop bypass (chronic)  Occluded B SFA stents (chronic)  -     VAS lower limb arterial duplex, complete bilateral; Future      ambulatory dysfunction, general weakness and non lifestyle limiting claudication    no rest pain or wounds    DENVER 3/11/22     R 0 64/132/63; Known fem-pop graft occlusion  Known occlusion prox to mid SFA with recon in distal SFA      L 0 64/120/72; Occlusion of SFA stent with collaterals and distal SFA reconstitution     DENVER is unchanged     Recommendations:     Patient with a severe bilateral atherosclerosis lower extremities with intermittent claudication  He is limited by leg weakness and ambulatory dysfunction which has been ongoing and not likely entirely due to his arterial disease  Holding off on exercise ALEKSANDR and supervised exercise program, because he is not trying exercise everyday and he is not limited by arterial leg symptoms  We reviewed his lower extremity arterial duplex study which shows right femoral-popliteal graft occlusion  There is also an occlusion of the SFA stent on the left  Lower extremity arterial study is essentially unchanged from prior  There is no indication for vascular procedures at this time   We discussed the importance of regular walking as much as he can safely do so with a Rollator   Follow good, heart healthy diet which is low in fat and cholesterol   Monitor your feet every day for any wounds or changes    We reviewed indications for procedures such as rest pain or wounds      There is no indication for vascular procedures at this time   continue with lifelong aspirin and statin    optimize blood pressure, cholesterol and glucose control   follow-up in 1 year with lower extremity arterial duplex study or sooner if needed      Leg swelling       -     Compression Stocking   chronic bilateral lower extremity edema with underlying CHF   low-sodium diet, monitoring weight and salt intake   compression stockings (Rx 15-20mmHg) can be worn daily as long as they do not create pain in the legs   increase activity and remember to elevate the legs when at rest      Other diagnoses addressed:     Bilateral leg weakness    Occlusion of femoropopliteal bypass graft, subsequent encounter    Benign essential hypertension    Heart failure, left, 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)    Dyslipidemia    Diabetic polyneuropathy associated with type 2 diabetes mellitus (HCC)        Subjective:      Patient ID: Adore Hunter is a 67 y o  male  Pt is here to rev DENVER done on 3/11/22 [facility] visit for s/p B SFA stenting done on 8/19/18  Pt reports chronic short distance leg fatigue  Pt is taking asa 81mg and atorvastatin  HPI        3/25/22: Mr Adore Hunter presents to the office today accompanied by his wife  She reports that he has had some challenges recently  He has been in the hospital for decompensated CHF, AFib and has had increased leg swelling  Wife reports that she got a rollator walker to motivate him to walk more  Although he feels more confident with the Rollator, he has not increased his walking  He has general weakness and balance problems which affect both legs  He is limited to walking around the house from room to room  He does report "tightness" in the distal thighs and calves when walks a lot  No rest pain or wounds  He continues to have leg edema  He has compression stockings but does not wear them  He was given a script for fresh compression   He was able to get his diabetes mellitus under control with medication changes  A1C 7 1  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       We reviewed his recent labs and testing  MAHESH GOFF 3/11/22  CLINICAL:  Indications:  Patient presents with known PVD  Patient states he gets a weakness and tired  feeling in his legs after walking about a 1/2 block  Denies any open wounds or  ulcers at this time  Operative History:  2018-06-18 Left SFA Stent Angioplasty  Left SFA stents  Right fem-pop bypass  Right SFA stent  Risk Factors  The patient has history of HTN, Diabetes (Yes), Hyperlipidemia, CKD, PAD and  CAD  Clinical  Right Pressure:  148/ mm Hg, Left Pressure:  149/ mm Hg  FINDINGS:     Segment                Right                   Left                                            Impression  PSV (cm/s)  Impression  PSV (cm/s)    Common Femoral Artery                      73                      91    Prox Profunda                             167                     146    Prox SFA               Occluded             0  Occluded             0    Mid SFA                Occluded             0  Occluded             0    Dist SFA               Occluded             0  Occluded             0    Proximal Pop                               40                      37    Dist Post Tibial                           28                      37    Dist  Ant  Tibial                          10                       1    Dist Peroneal                              22                      13             CONCLUSION:  Impression:  RIGHT LOWER LIMB:  Known occlusion of the fem-pop bypass graft  Known occlusion of the proximal to  mid superficial femoral artery with reconstitution at the distal SFA  Ankle/Brachial index:  0 64, which is within the severe range  Prior 0 55  PVR/ PPG tracings are dampened  Metatarsal pressure of 132 mmHg  Great toe pressure of 63 mmHg, within the healing range       LEFT LOWER LIMB:  Occlusion of the superficial femoral artery stent with collaterals noted and  reconstitution at the distal SFA  Ankle/Brachial index: 0 64, which is within the normal range  Prior 0 55  PVR/ PPG tracings are dampened  Metatarsal pressure of 120 mmHg  Great toe pressure of 72 mmHg, within the healing range  Compared to previous study on 3/11/2021, there is no significant change  Recommend repeat testing in 1 year as per protocol unless otherwise indicated       The following portions of the patient's history were reviewed and updated as appropriate: allergies, current medications, past family history, past medical history, past social history, past surgical history and problem list     Review of Systems   Constitutional: Positive for fatigue  HENT: Negative  Eyes: Negative  Respiratory: Negative  Cardiovascular: Negative  Gastrointestinal: Negative  Endocrine: Negative  Genitourinary: Negative  Musculoskeletal: Negative  Skin: Negative  Allergic/Immunologic: Negative  Neurological: Negative  Hematological: Bruises/bleeds easily  Psychiatric/Behavioral: Negative  Objective:    /56 (BP Location: Right arm, Patient Position: Sitting, Cuff Size: Standard)   Pulse 72   Temp 97 6 °F (36 4 °C) (Tympanic)   Ht 5' 11" (1 803 m)   Wt 73 5 kg (162 lb)   BMI 22 59 kg/m²        Physical Exam  Vitals and nursing note reviewed  Constitutional:       Appearance: He is well-developed  Comments:   Uses a rolator     HENT:      Head: Normocephalic and atraumatic  Eyes:      Pupils: Pupils are equal, round, and reactive to light  Neck:      Thyroid: No thyromegaly  Vascular: No carotid bruit or JVD  Trachea: Trachea normal    Cardiovascular:      Rate and Rhythm: Normal rate and regular rhythm  Extrasystoles are present  Pulses:           Carotid pulses are 2+ on the right side and 2+ on the left side  Radial pulses are 2+ on the right side and 2+ on the left side  Dorsalis pedis pulses are 0 on the right side and 0 on the left side  Posterior tibial pulses are 0 on the right side and 0 on the left side  Heart sounds: Normal heart sounds, S1 normal and S2 normal  No murmur heard  No friction rub  No gallop  Comments:   2+ distal LE and ankle pitting edema    Trace pedal edema  Forefoot and toes are cool  Normal color  No wounds  Pulmonary:      Effort: Pulmonary effort is normal  No accessory muscle usage or respiratory distress  Breath sounds: Normal breath sounds  No wheezing or rales  Abdominal:      General: Bowel sounds are normal  There is no distension  Palpations: Abdomen is soft  Tenderness: There is no abdominal tenderness  Musculoskeletal:         General: No deformity  Normal range of motion  Cervical back: Neck supple  Right lower le+ Pitting Edema present  Left lower le+ Pitting Edema present  Skin:     General: Skin is warm and dry  Capillary Refill: Capillary refill takes 2 to 3 seconds  Findings: No lesion or rash  Nails: There is no clubbing  Neurological:      Mental Status: He is alert and oriented to person, place, and time  Comments: Grossly normal    Psychiatric:         Behavior: Behavior is cooperative  I have reviewed and made appropriate changes to the review of systems input by the medical assistant      Vitals:    22 0932   BP: 128/56   BP Location: Right arm   Patient Position: Sitting   Cuff Size: Standard   Pulse: 72   Temp: 97 6 °F (36 4 °C)   TempSrc: Tympanic   Weight: 73 5 kg (162 lb)   Height: 5' 11" (1 803 m)       Patient Active Problem List   Diagnosis    Allergic rhinitis    Arthropathy    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)    CAD (coronary artery disease)    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)    Diabetic neuropathy (HCC)    GE reflux    Lumbar radiculopathy  Rosacea    Seborrheic dermatitis    Type 2 diabetes mellitus with diabetic neuropathy (HCC)    Occlusion of femoral-popliteal bypass graft ([facility])    Prostate cancer screening    Dyslipidemia    Screening for AAA (aortic abdominal aneurysm)    Medicare annual wellness visit, subsequent    Type 2 diabetes mellitus with hyperglycemia, without long-term current use of insulin ([facility])    Elevated PSA    Embolism and thrombosis of arteries of the lower extremities ([facility])    Benign essential hypertension    Bilateral leg weakness    Anemia due to stage 3a chronic kidney disease ([facility])    Prostate cancer ([facility] [address] )    Screening for cardiovascular, respiratory, and genitourinary diseases    Insomnia, persistent    Weight loss    Orthostatic hypotension    SIADH (syndrome of inappropriate ADH production) ([facility])    Chronic right-sided low back pain without sciatica    Hypokalemia    Acute on chronic combined systolic and diastolic congestive heart failure ([facility])    Heart failure, left, 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% ([facility])    Chronic combined systolic and diastolic heart failure, NYHA class 2 [facility]       Past Surgical History:   Procedure Laterality Date    CARDIAC CATHETERIZATION  07/29/2010    CATARACT EXTRACTION, BILATERAL Bilateral     R 1999, L 2008    COLONOSCOPY  2011    FEMORAL ARTERY - POPLITEAL ARTERY BYPASS GRAFT  09/23/2013    FOOT SURGERY      bone spur removed    LEG SURGERY Bilateral     repair; L 8/20/2010 and 7/26/2012    OH PLACE RADIOTHER DEVICE/MARKER, PROSTATE N/A 6/22/2021    Procedure: INSERTION OF FIDUCIAL MARKER (TRANSRECTAL ULTRASOUND GUIDANCE), SPACEOAR;  Surgeon: Fernando Watters MD;  Location: [facility];  Service: Urology    ROTATOR CUFF REPAIR Left 2009    SHOULDER SURGERY Right 2005    collar bone       Family History   Problem Relation Age of Onset    Heart disease Father     Heart attack Father         acute myocardial infarction    Heart disease Sister     Heart attack Sister acute myocardial infarction    Heart disease Paternal Grandfather     Aortic aneurysm Mother     Throat cancer Maternal Grandfather        Social History     Socioeconomic History    Marital status: /Civil Union     Spouse name: Not on file    Number of children: Not on file    Years of education: Not on file    Highest education level: Not on file   Occupational History    Occupation: retired from  work   Tobacco Use    Smoking status: Never Smoker    Smokeless tobacco: Never Used   Vaping Use    Vaping Use: Never used   Substance and Sexual Activity    Alcohol use: Yes     Comment: being a social drinker/rare    Drug use: No    Sexual activity: Not on file   Other Topics Concern    Not on file   Social History Narrative    Not on file     Social Determinants of Health     Financial Resource Strain: Not on file   Food Insecurity: No Food Insecurity    Worried About [address] in the Last Year: Never true    [address] in the Last Year: Never true   Transportation Needs: No Transportation Needs    Lack of Transportation (Medical): No    Lack of Transportation (Non-Medical):  No   Physical Activity: Not on file   Stress: Not on file   Social Connections: Not on file   Intimate Partner Violence: Not on file   Housing Stability: Low Risk     Unable to Pay for Housing in the Last Year: No    Number of Places Lived in the Last Year: 1    Unstable Housing in the Last Year: No       No Known Allergies      Current Outpatient Medications:     acarbose (PRECOSE) 100 MG tablet, Take 1 tablet (100 mg total) by mouth 3 (three) times a day with meals, Disp: 270 tablet, Rfl: 3    acetaminophen (TYLENOL) 325 mg tablet, Take 2 tablets (650 mg total) by mouth every 6 (six) hours as needed for mild pain, headaches or fever, Disp: , Rfl: 0    Ascorbic Acid (Vitamin C) 500 MG CAPS, Take 500 mg by mouth daily, Disp: , Rfl:     aspirin (ECOTRIN LOW STRENGTH) 81 mg EC tablet, Take 81 mg by mouth daily, Disp: , Rfl:     atorvastatin (LIPITOR) 20 mg tablet, Take 1 tablet (20 mg total) by mouth daily, Disp: 90 tablet, Rfl: 1    brimonidine tartrate 0 2 % ophthalmic solution, Inject 0 2 % into the eye 2 (two) times a day, Disp: , Rfl:     carvedilol (COREG) 12 5 mg tablet, Take 1 tablet (12 5 mg total) by mouth 2 (two) times a day, Disp: 180 tablet, Rfl: 1    Cholecalciferol (Vitamin D3) 50 MCG (2000 UT) TABS, Take 2,000 Units by mouth daily, Disp: , Rfl:     co-enzyme Q-10 100 mg capsule, Take 100 mg by mouth daily, Disp: , Rfl:     ferrous sulfate 324 (65 Fe) mg, TAKE ONE TABLET BY MOUTH BEFORE BREAKFAST, Disp: , Rfl:     furosemide (LASIX) 20 mg tablet, Take 1 tablet (20 mg total) by mouth if needed (For weight gain of 2-3 lb in a day and 5 lb in a week and then call Cardiology), Disp: 15 tablet, Rfl: 1    gabapentin (NEURONTIN) 600 MG tablet, Take 1 tablet (600 mg total) by mouth 2 (two) times a day, Disp: 180 tablet, Rfl: 0    glucose blood test strip, Test blood sugar twice a day, Disp: 100 each, Rfl: 3    lisinopril (ZESTRIL) 2 5 mg tablet, Take 1 tablet (2 5 mg total) by mouth daily, Disp: 30 tablet, Rfl: 0    metFORMIN (GLUCOPHAGE) 1000 MG tablet, Take one tablet in AM and 0 5 tablet in PM  (Patient taking differently: Take 1,000 mg by mouth  ), Disp: 270 tablet, Rfl: 0    multivitamin (THERAGRAN) TABS, Take 1 tablet by mouth daily, Disp: , Rfl:     omeprazole (PriLOSEC) 40 MG capsule, Take 40 mg by mouth daily, Disp: , Rfl:     Probiotic Product (CULTURELLE PROBIOTICS) CHEW, Chew daily, Disp: , Rfl:     sitaGLIPtin (JANUVIA) 25 mg tablet, Take one tablet by mouth once a day, Disp: 90 tablet, Rfl: 3    spironolactone (ALDACTONE) 25 mg tablet, Take 1 tablet (25 mg total) by mouth daily, Disp: 30 tablet, Rfl: 0    tamsulosin (FLOMAX) 0 4 mg, Take 1 capsule (0 4 mg total) by mouth daily with dinner, Disp: 30 capsule, Rfl: 11    TRUEPLUS LANCETS 28G MISC, Test 1x/d, E11 29, Disp: , Rfl: 0

## 2022-03-29 DIAGNOSIS — E11.40 TYPE 2 DIABETES MELLITUS WITH DIABETIC NEUROPATHY, WITHOUT LONG-TERM CURRENT USE OF INSULIN (HCC): ICD-10-CM

## 2022-04-06 ENCOUNTER — APPOINTMENT (OUTPATIENT)
Dept: LAB | Facility: CLINIC | Age: 77
End: 2022-04-06
Payer: MEDICARE

## 2022-04-06 DIAGNOSIS — N18.30 CKD STAGE 3 DUE TO TYPE 2 DIABETES MELLITUS (HCC): ICD-10-CM

## 2022-04-06 DIAGNOSIS — E11.22 CKD STAGE 2 DUE TO TYPE 2 DIABETES MELLITUS (HCC): ICD-10-CM

## 2022-04-06 DIAGNOSIS — E11.40 TYPE 2 DIABETES MELLITUS WITH DIABETIC NEUROPATHY, WITHOUT LONG-TERM CURRENT USE OF INSULIN (HCC): ICD-10-CM

## 2022-04-06 DIAGNOSIS — I50.42 CHRONIC COMBINED SYSTOLIC AND DIASTOLIC HEART FAILURE, NYHA CLASS 2 (HCC): ICD-10-CM

## 2022-04-06 DIAGNOSIS — E78.5 DYSLIPIDEMIA: ICD-10-CM

## 2022-04-06 DIAGNOSIS — I25.119 CORONARY ARTERY DISEASE INVOLVING NATIVE CORONARY ARTERY OF NATIVE HEART WITH ANGINA PECTORIS (HCC): ICD-10-CM

## 2022-04-06 DIAGNOSIS — I10 BENIGN ESSENTIAL HYPERTENSION: ICD-10-CM

## 2022-04-06 DIAGNOSIS — N18.2 CKD STAGE 2 DUE TO TYPE 2 DIABETES MELLITUS (HCC): ICD-10-CM

## 2022-04-06 DIAGNOSIS — E11.22 CKD STAGE 3 DUE TO TYPE 2 DIABETES MELLITUS (HCC): ICD-10-CM

## 2022-04-06 LAB
ANION GAP SERPL CALCULATED.3IONS-SCNC: 4 MMOL/L (ref 4–13)
BUN SERPL-MCNC: 18 MG/DL (ref 5–25)
CALCIUM SERPL-MCNC: 9.7 MG/DL (ref 8.3–10.1)
CHLORIDE SERPL-SCNC: 100 MMOL/L (ref 100–108)
CHOLEST SERPL-MCNC: 110 MG/DL
CO2 SERPL-SCNC: 27 MMOL/L (ref 21–32)
CREAT SERPL-MCNC: 0.97 MG/DL (ref 0.6–1.3)
EST. AVERAGE GLUCOSE BLD GHB EST-MCNC: 171 MG/DL
GFR SERPL CREATININE-BSD FRML MDRD: 74 ML/MIN/1.73SQ M
GLUCOSE P FAST SERPL-MCNC: 146 MG/DL (ref 65–99)
HBA1C MFR BLD: 7.6 %
HDLC SERPL-MCNC: 47 MG/DL
LDLC SERPL CALC-MCNC: 53 MG/DL (ref 0–100)
NONHDLC SERPL-MCNC: 63 MG/DL
POTASSIUM SERPL-SCNC: 4.8 MMOL/L (ref 3.5–5.3)
SODIUM SERPL-SCNC: 131 MMOL/L (ref 136–145)
TRIGL SERPL-MCNC: 52 MG/DL

## 2022-04-06 PROCEDURE — 36415 COLL VENOUS BLD VENIPUNCTURE: CPT

## 2022-04-06 PROCEDURE — 80061 LIPID PANEL: CPT

## 2022-04-06 PROCEDURE — 80048 BASIC METABOLIC PNL TOTAL CA: CPT

## 2022-04-06 PROCEDURE — 83036 HEMOGLOBIN GLYCOSYLATED A1C: CPT

## 2022-04-07 ENCOUNTER — PATIENT OUTREACH (OUTPATIENT)
Dept: FAMILY MEDICINE CLINIC | Facility: CLINIC | Age: 77
End: 2022-04-07

## 2022-04-07 NOTE — PROGRESS NOTES
Call placed to patient  [facility] reports he is doing well  His BS has been running around 135  He adds his B/P was 130/66 yesterday morning, he has not checked it yet today  [facility] states he continues to weigh himself daily  Today he weighed in at 156 3 lbs  He continues to restrict his fluid intake daily to 1500 ml  All questions answered at this time  [facility] aware this CM will be removing name from care team and new CM Amie Macias RN will be calling him  [facility] agrees to this plan  [facility] thanked this CM for calling

## 2022-04-11 ENCOUNTER — APPOINTMENT (OUTPATIENT)
Dept: LAB | Facility: CLINIC | Age: 77
End: 2022-04-11
Payer: MEDICARE

## 2022-04-11 DIAGNOSIS — C61 PROSTATE CANCER (HCC): ICD-10-CM

## 2022-04-11 LAB — PSA SERPL-MCNC: <0.1 NG/ML (ref 0–4)

## 2022-04-11 PROCEDURE — 84153 ASSAY OF PSA TOTAL: CPT

## 2022-04-12 ENCOUNTER — TELEPHONE (OUTPATIENT)
Dept: ENDOCRINOLOGY | Facility: CLINIC | Age: 77
End: 2022-04-12

## 2022-04-12 NOTE — TELEPHONE ENCOUNTER
----- Message from Amaury Arellano MD sent at 4/11/2022  4:39 PM EDT -----  A1c has trended up to 7 6%   Cholesterol levels at goalNoted to have low sodium [id number] with primary care/Nephrology

## 2022-04-13 ENCOUNTER — TELEPHONE (OUTPATIENT)
Dept: NEPHROLOGY | Facility: CLINIC | Age: 77
End: 2022-04-13

## 2022-04-14 ENCOUNTER — CLINICAL SUPPORT (OUTPATIENT)
Dept: RADIATION ONCOLOGY | Facility: HOSPITAL | Age: 77
End: 2022-04-14
Attending: STUDENT IN AN ORGANIZED HEALTH CARE EDUCATION/TRAINING PROGRAM
Payer: MEDICARE

## 2022-04-14 VITALS
BODY MASS INDEX: 24.45 KG/M2 | DIASTOLIC BLOOD PRESSURE: 50 MMHG | WEIGHT: 155.8 LBS | RESPIRATION RATE: 18 BRPM | HEIGHT: 67 IN | TEMPERATURE: 98 F | OXYGEN SATURATION: 99 % | SYSTOLIC BLOOD PRESSURE: 108 MMHG | HEART RATE: 80 BPM

## 2022-04-14 DIAGNOSIS — C61 PROSTATE CANCER (HCC): Primary | ICD-10-CM

## 2022-04-14 PROCEDURE — 99212 OFFICE O/P EST SF 10 MIN: CPT | Performed by: STUDENT IN AN ORGANIZED HEALTH CARE EDUCATION/TRAINING PROGRAM

## 2022-04-14 PROCEDURE — 99211 OFF/OP EST MAY X REQ PHY/QHP: CPT | Performed by: STUDENT IN AN ORGANIZED HEALTH CARE EDUCATION/TRAINING PROGRAM

## 2022-04-14 NOTE — PROGRESS NOTES
Max Lombardi  is a 67 y o  male     Follow up visit   67year old male with [facility] 10 (5+5)  prostate cancer  The pt completed radiation treatment on 21  Last seen 10/15/21 via telemedicine for EOT visit  Lab Results   Component Value Date    PSA <0 1 2022    PSA <0 1 10/13/2021    PSA 0 3 2021  Urology  Lupron 45 mg IM given today  F/u in 6 months with PSA    [facility]22 [facility]  DX: Chronic Heart Failure    Upcomin22  Urology      Oncology History Overview Note   67year old male with [facility] 10 (5+5)  prostate cancer  The pt completed radiation treatment on 21  Last seen 10/15/21 via telemedicine for EOT visit  Lab Results   Component Value Date    PSA <0 1 2022    PSA <0 1 10/13/2021    PSA 0 3 2021  Urology  Lupron 45 mg IM given today  F/u in 6 months with PSA    22-22 Admitted, [address]  DX: Chronic Heart Failure    Upcomin22  Urology       Prostate cancer [facility])   2021 Initial Diagnosis    Prostate cancer ([facility] [address] )     2021 Biopsy    A  Prostate, Right Lateral Base, Biopsy:  - Benign prostatic tissue  B  Prostate, Right Base, Biopsy:  - Benign prostatic tissue  C  Prostate, Right Lateral Mid, Biopsy:  - Benign prostatic tissue  D  Prostate, Right Mid, Biopsy:  - Benign prostatic tissue  E  Prostate, Right Lateral Rea, Biopsy:  - Benign prostatic tissue  F  Prostate, Right Rea, Biopsy:  - Benign prostatic tissue       G  Prostate, Left Lateral Base, Biopsy:  - Prostatic adenocarcinoma, Mikki Score 5 + 3 = 8, Grade Group 4      - Percentage Mikki pattern 5: 95%     - Total number of cores identified: 1     - Total number of cores with carcinoma: 1     - Percentage of tissue with carcinoma: 92%     - Linear amount of tissue with carcinoma: 11 mm total     H  Prostate, Left Base, Biopsy:  - Prostatic adenocarcinoma, Mikki Score 5 + 4 = 9, Grade Group 5      - Percentage Mikki pattern 5: 95%     - Total number of cores identified: 1     - Total number of cores with carcinoma: 1     - Percentage of tissue with carcinoma: 92%     - Linear amount of tissue with carcinoma: 11 mm total     I  Prostate, Left Lateral Mid, Biopsy:  - Prostatic adenocarcinoma, Mikki Score 5 + 5 = 10, Grade Group 5      - Total number of cores identified: 1     - Total number of cores with carcinoma: 1     - Percentage of tissue with carcinoma: 100%     - Linear amount of tissue with carcinoma: 15 mm total  - Focus of suspected intraductal carcinoma  J  Prostate, Left Mid, Biopsy:  - Prostatic adenocarcinoma, Mikki Score 5 + 5 = 10, Grade Group 5      - Total number of cores identified: 1     - Total number of cores with carcinoma: 1     - Percentage of tissue with carcinoma: 93%     - Linear amount of tissue with carcinoma: 14 mm total     K  Prostate, Left Lateral Garland, Biopsy:  - Prostatic adenocarcinoma, Gooding Score 5 + 4 = 9, Grade Group 5      - Percentage Gooding pattern 5: 70%     - Total number of cores identified: 1     - Total number of cores with carcinoma: 1     - Percentage of tissue with carcinoma: 100%     - Linear amount of tissue with carcinoma: 10 mm total     L  Prostate, Left Garland, Biopsy:  - Prostatic adenocarcinoma, Mikki Score 5 + 4 = 9, Grade Group 5      - Percentage Gooding pattern 5: 95%     - Total number of cores identified: 1     - Total number of cores with carcinoma: 1     - Percentage of tissue with carcinoma: 94%     - Linear amount of tissue with carcinoma: 15 mm total     3/17/2021 -  Hormone Therapy    Firmagon 240 mg SQ injection - started 3/17/21  Plan for 2-3 years of therapy       3/29/2021 -  Cancer Staged    Staging form: Prostate, AJCC 8th Edition  - Clinical: Stage IIIC (cT3b, cN0, cM0, PSA: 8 6, Grade Group: 5) - Signed by Kimberly Puente MD on 3/29/2021  Prostate specific antigen (PSA) range: Less than 10  Histologic grading system: 5 grade system           Review of Systems:  Review of Systems   Constitutional: Positive for fatigue (mild)  HENT: Negative  Eyes: Negative  Wears glasses   Cardiovascular: Positive for leg swelling (bilateral lower legs)  Gastrointestinal: Positive for diarrhea (looser stool with his medications)  Endocrine: Negative  Genitourinary: Positive for frequency (mostly at night)  Negative for dysuria, hematuria and urgency  Slower, weaker stream  Nocturia - 5x     Musculoskeletal: Positive for gait problem (unsteady gait - uses walker)  Skin: Negative  Allergic/Immunologic: Negative  Neurological: Positive for weakness  Hematological: Bruises/bleeds easily  Psychiatric/Behavioral: Positive for sleep disturbance (does not sleep well at night, naps all day long)  Forgetful at times       Clinical Trial: no    IPSS Questionnaire (AUA-7): Over the past month    1)  How often have you had a sensation of not emptying your bladder completely after you finish urinating? 1 - Less than 1 time in 5   2)  How often have you had to urinate again less than two hours after you finished urinating? 5 - Almost always   3)  How often have you found you stopped and started again several times when you urinated? 0 - Not at all   4) How difficult have you found it to postpone urination? 0 - Not at all   5) How often have you had a weak urinary stream?  5 - Almost always   6) How often have you had to push or strain to begin urination? 0 - Not at all   7) How many times did you most typically get up to urinate from the time you went to bed until the time you got up in the morning?   5 - 5+ times   Total Score:  16       Teaching:     Covid Vaccine Status: Fully vaccinated including booster    Health Maintenance   Topic Date Due    Hepatitis C Screening  Never done    Influenza Vaccine (1) 09/01/2021    DTaP,Tdap,and Td Vaccines (2 - Td or Tdap) 02/22/2022    Depression Screening  06/16/2022    Medicare Annual Wellness Visit (AWV)  06/16/2022    Fall Risk  06/17/2022    Colorectal Cancer Screening  09/28/2035 (Originally 11/16/1990)    HEMOGLOBIN A1C  10/06/2022    URINE MICROALBUMIN  12/27/2022    Diabetic Foot Exam  03/02/2023    BMI: Adult  03/25/2023    DM Eye Exam  03/30/2024    Pneumococcal Vaccine: 65+ Years  Completed    COVID-19 Vaccine  Completed    HIB Vaccine  Aged Out    Hepatitis B Vaccine  Aged Out    IPV Vaccine  Aged Out    Hepatitis A Vaccine  Aged Out    Meningococcal ACWY Vaccine  Aged Out    HPV Vaccine  Aged Out     Patient Active Problem List   Diagnosis    Allergic rhinitis    Arthropathy    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 ([facility] )    CAD (coronary artery disease)    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 mellitus ([facility] [address] )    Diabetic neuropathy ([facility] [address] )    GE reflux    Lumbar radiculopathy    Rosacea    Seborrheic dermatitis    Type 2 diabetes mellitus with diabetic neuropathy ([facility] )    Occlusion of femoral-popliteal bypass graft ([facility] )    Prostate cancer screening    Dyslipidemia    Screening for AAA (aortic abdominal aneurysm)    Medicare annual wellness visit, subsequent    Type 2 diabetes mellitus with hyperglycemia, without long-term current use of insulin (HCC)    Elevated PSA    Embolism and thrombosis of arteries of the lower extremities ([facility])    Benign essential hypertension    Bilateral leg weakness    Anemia due to stage 3a chronic kidney disease ([facility] [address] )    Prostate cancer ([facility] [address] )    Screening for cardiovascular, respiratory, and genitourinary diseases    Insomnia, persistent    Weight loss    Orthostatic hypotension    SIADH (syndrome of inappropriate ADH production) ([facility])    Chronic right-sided low back pain without sciatica    Hypokalemia    Acute on chronic combined systolic and diastolic congestive heart failure (HCC)    Heart failure, left, 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)    Chronic combined systolic and diastolic heart failure, NYHA class 2 (HCC)    Edema of both feet     Past Medical History:   Diagnosis Date    Acquired hallux valgus     last assessed: 8/1/2013    Allergic rhinitis     Anemia 10/26/2010    Atrophy of nail     last assessed: 7/7/2015    Chronic kidney disease     chronic renal insufficiency    Coronary artery disease     Deformity of ankle and foot, acquired     last assessed: 2/22/2016    Difficulty walking     last assessed: 12/14/2015    Dysesthesia     last assessed: 11/25/2016    Hammer toe     unspecified laterality; last assessed: 8/1/2013    Hypertension     Onychomycosis of toenail     last assessed: 2/22/2016    Peripheral vascular disease ([facility] )     left SFA stent in 2010    Pes planus     unspecified laterality; last assessed: 8/1/2013    Pneumonia     last assessed: 2/27/2016    Prostate cancer ([facility] )     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     last assessed: 8/15/2016    Type 2 diabetes mellitus ([facility] ) 07/26/2010    Viral warts     last assessed: 7/24/2015     Past Surgical History:   Procedure Laterality Date    CARDIAC CATHETERIZATION  07/29/2010    CATARACT EXTRACTION, BILATERAL Bilateral     R 1999, L 2008    COLONOSCOPY  2011    FEMORAL ARTERY - POPLITEAL ARTERY BYPASS GRAFT  09/23/2013    FOOT SURGERY      bone spur removed    LEG SURGERY Bilateral     repair; L 8/20/2010 and 7/26/2012    NM PLACE RADIOTHER DEVICE/MARKER, PROSTATE N/A 6/22/2021    Procedure: INSERTION OF FIDUCIAL MARKER (TRANSRECTAL ULTRASOUND GUIDANCE), SPACEOAR;  Surgeon: Anjali Calles MD;  Location: [facility];  Service: Urology    ROTATOR CUFF REPAIR Left 2009    SHOULDER SURGERY Right 2005    collar bone     Family History   Problem Relation Age of Onset    Heart disease Father     Heart attack Father         acute myocardial infarction    Heart disease Sister     Heart attack Sister         acute myocardial infarction    Heart disease Paternal Grandfather     Aortic aneurysm Mother     Throat cancer Maternal Grandfather      Social History     Socioeconomic History    Marital status: /Civil Union     Spouse name: Not on file    Number of children: Not on file    Years of education: Not on file    Highest education level: Not on file   Occupational History    Occupation: retired from  work   Tobacco Use    Smoking status: Never Smoker    Smokeless tobacco: Never Used   Vaping Use    Vaping Use: Never used   Substance and Sexual Activity    Alcohol use: Yes     Comment: being a social drinker/rare    Drug use: No    Sexual activity: Not on file   Other Topics Concern    Not on file   Social History Narrative    Not on file     Social Determinants of Health     Financial Resource Strain: Not on file   Food Insecurity: No Food Insecurity    Worried About [address] [employer] in the Last Year: Never true    [address] [employer] in the Last Year: Never true   Transportation Needs: No Transportation Needs    Lack of Transportation (Medical): No    Lack of Transportation (Non-Medical):  No   Physical Activity: Not on file   Stress: Not on file   Social Connections: Not on file   Intimate Partner Violence: Not on file   Housing Stability: Low Risk     Unable to Pay for Housing in the Last Year: No    Number of Places Lived in the Last Year: 1    Unstable Housing in the Last Year: No       Current Outpatient Medications:     acarbose (PRECOSE) 100 MG tablet, Take 1 tablet (100 mg total) by mouth 3 (three) times a day with meals, Disp: 270 tablet, Rfl: 3    acetaminophen (TYLENOL) 325 mg tablet, Take 2 tablets (650 mg total) by mouth every 6 (six) hours as needed for mild pain, headaches or fever, Disp: , Rfl: 0    Ascorbic Acid (Vitamin C) 500 MG CAPS, Take 500 mg by mouth daily, Disp: , Rfl:     aspirin (ECOTRIN LOW STRENGTH) 81 mg EC tablet, Take 81 mg by mouth daily, Disp: , Rfl:     atorvastatin (LIPITOR) 20 mg tablet, Take 1 tablet (20 mg total) by mouth daily, Disp: 90 tablet, Rfl: 1    brimonidine tartrate 0 2 % ophthalmic solution, Inject 0 2 % into the eye 2 (two) times a day, Disp: , Rfl:     carvedilol (COREG) 12 5 mg tablet, Take 1 tablet (12 5 mg total) by mouth 2 (two) times a day, Disp: 180 tablet, Rfl: 1    Cholecalciferol (Vitamin D3) 50 MCG (2000 UT) TABS, Take 2,000 Units by mouth daily, Disp: , Rfl:     co-enzyme Q-10 100 mg capsule, Take 100 mg by mouth daily, Disp: , Rfl:     ferrous sulfate 324 (65 Fe) mg, TAKE ONE TABLET BY MOUTH BEFORE BREAKFAST, Disp: , Rfl:     furosemide (LASIX) 20 mg tablet, Take 1 tablet (20 mg total) by mouth if needed (For weight gain of 2-3 lb in a day and 5 lb in a week and then call Cardiology), Disp: 15 tablet, Rfl: 1    gabapentin (NEURONTIN) 600 MG tablet, Take 1 tablet (600 mg total) by mouth 2 (two) times a day, Disp: 180 tablet, Rfl: 0    glucose blood test strip, Test blood sugar twice a day, Disp: 100 each, Rfl: 3    lisinopril (ZESTRIL) 2 5 mg tablet, Take 1 tablet (2 5 mg total) by mouth daily, Disp: 30 tablet, Rfl: 0    metFORMIN (GLUCOPHAGE) 1000 MG tablet, Take one tablet in AM and 0 5 tablet in PM  (Patient taking differently: Take 1,000 mg by mouth  ), Disp: 270 tablet, Rfl: 0    multivitamin (THERAGRAN) TABS, Take 1 tablet by mouth daily, Disp: , Rfl:     omeprazole (PriLOSEC) 40 MG capsule, Take 40 mg by mouth daily, Disp: , Rfl:     Probiotic Product (CULTURELLE PROBIOTICS) CHEW, Chew daily, Disp: , Rfl:     sitaGLIPtin (JANUVIA) 25 mg tablet, Take one tablet by mouth once a day, Disp: 90 tablet, Rfl: 3    spironolactone (ALDACTONE) 25 mg tablet, Take 1 tablet (25 mg total) by mouth daily, Disp: 30 tablet, Rfl: 0    tamsulosin (FLOMAX) 0 4 mg, Take 1 capsule (0 4 mg total) by mouth daily with dinner, Disp: 30 capsule, Rfl: 11    TRUEPLUS LANCETS 28G MISC, Test 1x/d, E11 29, Disp: , Rfl: 0  No Known Allergies  There were no vitals filed for this visit

## 2022-04-14 NOTE — PROGRESS NOTES
Follow-up - Radiation Oncology   Hayley Soto  67 y o  male [id number]      History of Present Illness   Cancer Staging  Prostate cancer ([facility] [address] )  Staging form: Prostate, AJCC 8th Edition  - Clinical: Stage IIIC (cT3b, cN0, cM0, PSA: 8 6, Grade Group: 5) - Signed by Jc Anaya MD on 3/29/2021  Prostate specific antigen (PSA) range: Less than 10  Histologic grading system: 5 grade system      Follow up visit   67year old male with Loda 10 (5+5)  prostate cancer  The pt completed radiation treatment on 21  Last seen 10/15/21 via telemedicine for EOT visit              Lab Results   Component Value Date     PSA <0 1 2022     PSA <0 1 10/13/2021     PSA 0 3 2021  Urology  Lupron 45 mg IM given today  F/u in 6 months with PSA     22-22 Admitted, Gary Rivera  DX: Chronic Heart Failure    Currently the patient is doing well overall  He does have nocturia several times nightly, noting that he gets up initially approximately 2 hours after going to sleep, and then approximately every hour to hour and a half thereafter  He has some weakened stream overall  Using Flomax once nightly, which she states led to an improvement for him  He is not terribly bothered by his current urinary status and notes that it is baseline for him prior to prostate cancer and treatment  He does have some diarrhea and bilateral lower extremity edema     Upcomin22  Urology        Historical Information   Oncology History   Prostate cancer [facility])   2021 Initial Diagnosis    Prostate cancer ([facility] [address] )     2021 Biopsy    A  Prostate, Right Lateral Base, Biopsy:  - Benign prostatic tissue  B  Prostate, Right Base, Biopsy:  - Benign prostatic tissue  C  Prostate, Right Lateral Mid, Biopsy:  - Benign prostatic tissue  D  Prostate, Right Mid, Biopsy:  - Benign prostatic tissue  E  Prostate, Right Lateral [place], Biopsy:  - Benign prostatic tissue       F  Prostate, Right [place], Biopsy:  - Benign prostatic tissue  G  Prostate, Left Lateral Base, Biopsy:  - Prostatic adenocarcinoma, Homer Score 5 + 3 = 8, Grade Group 4      - Percentage Homer pattern 5: 95%     - Total number of cores identified: 1     - Total number of cores with carcinoma: 1     - Percentage of tissue with carcinoma: 92%     - Linear amount of tissue with carcinoma: 11 mm total     H  Prostate, Left Base, Biopsy:  - Prostatic adenocarcinoma, Mikki Score 5 + 4 = 9, Grade Group 5      - Percentage Homer pattern 5: 95%     - Total number of cores identified: 1     - Total number of cores with carcinoma: 1     - Percentage of tissue with carcinoma: 92%     - Linear amount of tissue with carcinoma: 11 mm total     I  Prostate, Left Lateral Mid, Biopsy:  - Prostatic adenocarcinoma, Mikki Score 5 + 5 = 10, Grade Group 5      - Total number of cores identified: 1     - Total number of cores with carcinoma: 1     - Percentage of tissue with carcinoma: 100%     - Linear amount of tissue with carcinoma: 15 mm total  - Focus of suspected intraductal carcinoma  J  Prostate, Left Mid, Biopsy:  - Prostatic adenocarcinoma, Homer Score 5 + 5 = 10, Grade Group 5      - Total number of cores identified: 1     - Total number of cores with carcinoma: 1     - Percentage of tissue with carcinoma: 93%     - Linear amount of tissue with carcinoma: 14 mm total     K  Prostate, Left Lateral [place], Biopsy:  - Prostatic adenocarcinoma, Homer Score 5 + 4 = 9, Grade Group 5      - Percentage Mikki pattern 5: 70%     - Total number of cores identified: 1     - Total number of cores with carcinoma: 1     - Percentage of tissue with carcinoma: 100%     - Linear amount of tissue with carcinoma: 10 mm total     L   Prostate, Left [place], Biopsy:  - Prostatic adenocarcinoma, Mikki Score 5 + 4 = 9, Grade Group 5      - Percentage Mikki pattern 5: 95%     - Total number of cores identified: 1     - Total number of cores with carcinoma: 1     - Percentage of tissue with carcinoma: 94%     - Linear amount of tissue with carcinoma: 15 mm total     3/17/2021 -  Hormone Therapy    Firmagon 240 mg SQ injection - started 3/17/21  Plan for 2-3 years of therapy       3/29/2021 -  Cancer Staged    Staging form: Prostate, AJCC 8th Edition  - Clinical: Stage IIIC (cT3b, cN0, cM0, PSA: 8 6, Grade Group: 5) - Signed by Milena Coffey MD on 3/29/2021  Prostate specific antigen (PSA) range: Less than 10  Histologic grading system: 5 grade system           Past Medical History:   Diagnosis Date    Acquired hallux valgus     last assessed: 8/1/2013    Allergic rhinitis     Anemia 10/26/2010    Atrophy of nail     last assessed: 7/7/2015    Chronic kidney disease     chronic renal insufficiency    Coronary artery disease     Deformity of ankle and foot, acquired     last assessed: 2/22/2016    Difficulty walking     last assessed: 12/14/2015    Dysesthesia     last assessed: 11/25/2016    Hammer toe     unspecified laterality; last assessed: 8/1/2013    Hypertension     Onychomycosis of toenail     last assessed: 2/22/2016    Peripheral vascular disease ([facility] [address] )     left SFA stent in 2010    Pes planus     unspecified laterality; last assessed: 8/1/2013    Pneumonia     last assessed: 2/27/2016    Prostate cancer ([facility] )     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     last assessed: 8/15/2016    Type 2 diabetes mellitus ([facility] [address] ) 07/26/2010    Viral warts     last assessed: 7/24/2015     Past Surgical History:   Procedure Laterality Date    CARDIAC CATHETERIZATION  07/29/2010    CATARACT EXTRACTION, BILATERAL Bilateral     R 1999, L 2008    COLONOSCOPY  2011    FEMORAL ARTERY - POPLITEAL ARTERY BYPASS GRAFT  09/23/2013    FOOT SURGERY      bone spur removed    LEG SURGERY Bilateral     repair; L 8/20/2010 and 7/26/2012    SC PLACE RADIOTHER DEVICE/MARKER, PROSTATE N/A 6/22/2021    Procedure: INSERTION OF FIDUCIAL MARKER (TRANSRECTAL ULTRASOUND GUIDANCE), SPACEOAR;  Surgeon: Ami Mendoza MD;  Location: [facility];  Service: Urology    ROTATOR CUFF REPAIR Left 2009    SHOULDER SURGERY Right 2005    collar bone       Social History   Social History     Substance and Sexual Activity   Alcohol Use Yes    Comment: being a social drinker/rare     Social History     Substance and Sexual Activity   Drug Use No     Social History     Tobacco Use   Smoking Status Never Smoker   Smokeless Tobacco Never Used         Meds/Allergies     Current Outpatient Medications:     acarbose (PRECOSE) 100 MG tablet, Take 1 tablet (100 mg total) by mouth 3 (three) times a day with meals, Disp: 270 tablet, Rfl: 3    acetaminophen (TYLENOL) 325 mg tablet, Take 2 tablets (650 mg total) by mouth every 6 (six) hours as needed for mild pain, headaches or fever, Disp: , Rfl: 0    Ascorbic Acid (Vitamin C) 500 MG CAPS, Take 500 mg by mouth daily, Disp: , Rfl:     aspirin (ECOTRIN LOW STRENGTH) 81 mg EC tablet, Take 81 mg by mouth daily, Disp: , Rfl:     atorvastatin (LIPITOR) 20 mg tablet, Take 1 tablet (20 mg total) by mouth daily, Disp: 90 tablet, Rfl: 1    brimonidine tartrate 0 2 % ophthalmic solution, Inject 0 2 % into the eye 2 (two) times a day, Disp: , Rfl:     carvedilol (COREG) 12 5 mg tablet, Take 1 tablet (12 5 mg total) by mouth 2 (two) times a day, Disp: 180 tablet, Rfl: 1    Cholecalciferol (Vitamin D3) 50 MCG (2000 UT) TABS, Take 2,000 Units by mouth daily, Disp: , Rfl:     co-enzyme Q-10 100 mg capsule, Take 100 mg by mouth daily, Disp: , Rfl:     gabapentin (NEURONTIN) 600 MG tablet, Take 1 tablet (600 mg total) by mouth 2 (two) times a day, Disp: 180 tablet, Rfl: 0    glucose blood test strip, Test blood sugar twice a day, Disp: 100 each, Rfl: 3    lisinopril (ZESTRIL) 2 5 mg tablet, Take 1 tablet (2 5 mg total) by mouth daily, Disp: 30 tablet, Rfl: 0    metFORMIN (GLUCOPHAGE) 1000 MG tablet, Take one tablet in AM and 0 5 tablet in PM  (Patient taking differently: Take 1,000 mg by mouth 1000 mg  Twice a day ), Disp: 270 tablet, Rfl: 0    multivitamin (THERAGRAN) TABS, Take 1 tablet by mouth daily, Disp: , Rfl:     omeprazole (PriLOSEC) 40 MG capsule, Take 40 mg by mouth daily, Disp: , Rfl:     Probiotic Product (CULTURELLE PROBIOTICS) CHEW, Chew daily, Disp: , Rfl:     sitaGLIPtin (JANUVIA) 25 mg tablet, Take one tablet by mouth once a day, Disp: 90 tablet, Rfl: 3    tamsulosin (FLOMAX) 0 4 mg, Take 1 capsule (0 4 mg total) by mouth daily with dinner, Disp: 30 capsule, Rfl: 11    TRUEPLUS LANCETS 28G MISC, Test 1x/d, E11 29, Disp: , Rfl: 0    ferrous sulfate 324 (65 Fe) mg, TAKE ONE TABLET BY MOUTH BEFORE BREAKFAST (Patient not taking: Reported on 4/14/2022), Disp: , Rfl:     furosemide (LASIX) 20 mg tablet, Take 1 tablet (20 mg total) by mouth if needed (For weight gain of 2-3 lb in a day and 5 lb in a week and then call Cardiology) (Patient not taking: Reported on 4/14/2022 ), Disp: 15 tablet, Rfl: 1    spironolactone (ALDACTONE) 25 mg tablet, Take 1 tablet (25 mg total) by mouth daily, Disp: 30 tablet, Rfl: 0  No Known Allergies    Review of Systems   Constitutional: Positive for fatigue (mild)  HENT: Negative  Eyes: Negative  Wears glasses   Cardiovascular: Positive for leg swelling (bilateral lower legs)  Gastrointestinal: Positive for diarrhea (looser stool with his medications)  Endocrine: Negative  Genitourinary: Positive for frequency (mostly at night)  Negative for dysuria, hematuria and urgency  Slower, weaker stream  Nocturia - 5x     Musculoskeletal: Positive for gait problem (unsteady gait - uses walker)  Skin: Negative  Allergic/Immunologic: Negative  Neurological: Positive for weakness  Hematological: Bruises/bleeds easily  Psychiatric/Behavioral: Positive for sleep disturbance (does not sleep well at night, naps all day long)          Forgetful at times         Clinical Trial: no     IPSS Questionnaire (AUA-7): Over the past month     1)  How often have you had a sensation of not emptying your bladder completely after you finish urinating? 1 - Less than 1 time in 5   2)  How often have you had to urinate again less than two hours after you finished urinating? 5 - Almost always   3)  How often have you found you stopped and started again several times when you urinated? 0 - Not at all   4) How difficult have you found it to postpone urination? 0 - Not at all   5) How often have you had a weak urinary stream?  5 - Almost always   6) How often have you had to push or strain to begin urination? 0 - Not at all   7) How many times did you most typically get up to urinate from the time you went to bed until the time you got up in the morning? 5 - 5+ times   Total Score:  16        OBJECTIVE:   /50 (BP Location: Left arm, Patient Position: Sitting, Cuff Size: Standard)   Pulse 80   Temp 98 °F (36 7 °C) (Temporal)   Resp 18   Ht 5' 7" (1 702 m)   Wt 70 7 kg (155 lb 12 8 oz)   SpO2 99%   BMI 24 40 kg/m²   Pain Assessment:  0  ECOG/Zubrod/WHO: 1 - Symptomatic but completely ambulatory    Physical Exam   Well-appearing  NAD  No increased work of breathing  Extremities warm well perfused  SEB deferred at today's visit due to undetectable PSA  No rashes or lesions on visible skin          RESULTS    Lab Results:   Recent Results (from the past 672 hour(s))    Diabetes Eye Exam    Collection Time: 03/30/22 12:00 AM   Result Value Ref Range    Right Eye Diabetic Retinopathy None     Left Eye Diabetic Retinopathy None    Basic metabolic panel    Collection Time: 04/06/22  7:30 AM   Result Value Ref Range    Sodium 131 (L) 136 - 145 mmol/L    Potassium 4 8 3 5 - 5 3 mmol/L    Chloride 100 100 - 108 mmol/L    CO2 27 21 - 32 mmol/L    ANION GAP 4 4 - 13 mmol/L    BUN 18 5 - 25 mg/dL    Creatinine 0 97 0 60 - 1 30 mg/dL    Glucose, Fasting 146 (H) 65 - 99 mg/dL Calcium 9 7 8 3 - 10 1 mg/dL    eGFR 74 ml/min/1 73sq m   Lipid panel    Collection Time: 04/06/22  7:30 AM   Result Value Ref Range    Cholesterol 110 See Comment mg/dL    Triglycerides 52 See Comment mg/dL    HDL, Direct 47 >=40 mg/dL    LDL Calculated 53 0 - 100 mg/dL    Non-HDL-Chol (CHOL-HDL) 63 mg/dl   Hemoglobin A1C    Collection Time: 04/06/22  7:30 AM   Result Value Ref Range    Hemoglobin A1C 7 6 (H) Normal 3 8-5 6%; PreDiabetic 5 7-6 4%; Diabetic >=6 5%; Glycemic control for adults with diabetes <7 0% %     mg/dl   PSA Total, Diagnostic    Collection Time: 04/11/22  7:04 AM   Result Value Ref Range    PSA, Diagnostic <0 1 0 0 - 4 0 ng/mL       Imaging Studies:No results found  Assessment/Plan:  No orders of the defined types were placed in this encounter  Approximately 7 months following completion of definitive RT, the patient is doing well overall and remains clinically and biochemically without evidence of disease recurrence  He additionally has largely recovered from his prior RT without significant side effects  He continues to receive Lupron therapy with PSA every 6 months under the care of Urology  I recommend continued adherence this schedule for monitoring  He is scheduled to see Urology in 07/2022 with a repeat PSA at that time  As he will be following with Urology for Lupron and PSA monitoring, I will plan to see him again in approximately 1 year to reassess  The patient is aware that if he does not follow with urology or this continues Lupron he should follow with us sooner for repeat PSA check  Al Amaya MD  2/08/8457,1:06 PM    Portions of the record may have been created with voice recognition software   Occasional wrong word or "sound a like" substitutions may have occurred due to the inherent limitations of voice recognition software   Read the chart carefully and recognize, using context, where substitutions have occurred

## 2022-05-04 ENCOUNTER — LAB REQUISITION (OUTPATIENT)
Dept: LAB | Facility: HOSPITAL | Age: 77
End: 2022-05-04
Payer: MEDICARE

## 2022-05-04 ENCOUNTER — OFFICE VISIT (OUTPATIENT)
Dept: PODIATRY | Facility: CLINIC | Age: 77
End: 2022-05-04
Payer: MEDICARE

## 2022-05-04 VITALS
SYSTOLIC BLOOD PRESSURE: 128 MMHG | BODY MASS INDEX: 22.68 KG/M2 | HEIGHT: 71 IN | DIASTOLIC BLOOD PRESSURE: 56 MMHG | RESPIRATION RATE: 17 BRPM | WEIGHT: 162 LBS

## 2022-05-04 DIAGNOSIS — M79.672 PAIN IN BOTH FEET: ICD-10-CM

## 2022-05-04 DIAGNOSIS — L03.031 CELLULITIS OF TOE, RIGHT: ICD-10-CM

## 2022-05-04 DIAGNOSIS — I73.9 PAD (PERIPHERAL ARTERY DISEASE) (HCC): Primary | ICD-10-CM

## 2022-05-04 DIAGNOSIS — L02.611 CUTANEOUS ABSCESS OF RIGHT FOOT: ICD-10-CM

## 2022-05-04 DIAGNOSIS — M54.16 LUMBAR RADICULOPATHY: ICD-10-CM

## 2022-05-04 DIAGNOSIS — L02.611 ABSCESS OF TOE OF RIGHT FOOT: ICD-10-CM

## 2022-05-04 DIAGNOSIS — B35.1 ONYCHOMYCOSIS: ICD-10-CM

## 2022-05-04 DIAGNOSIS — E11.42 DIABETIC POLYNEUROPATHY ASSOCIATED WITH TYPE 2 DIABETES MELLITUS (HCC): ICD-10-CM

## 2022-05-04 DIAGNOSIS — M79.671 PAIN IN BOTH FEET: ICD-10-CM

## 2022-05-04 PROCEDURE — 87205 SMEAR GRAM STAIN: CPT | Performed by: PODIATRIST

## 2022-05-04 PROCEDURE — 10061 I&D ABSCESS COMP/MULTIPLE: CPT | Performed by: PODIATRIST

## 2022-05-04 PROCEDURE — 87070 CULTURE OTHR SPECIMN AEROBIC: CPT | Performed by: PODIATRIST

## 2022-05-04 PROCEDURE — 99213 OFFICE O/P EST LOW 20 MIN: CPT | Performed by: PODIATRIST

## 2022-05-04 RX ORDER — GABAPENTIN 600 MG/1
600 TABLET ORAL 2 TIMES DAILY
Qty: 180 TABLET | Refills: 0 | Status: SHIPPED | OUTPATIENT
Start: 2022-05-04 | End: 2022-08-02

## 2022-05-04 RX ORDER — LEVOFLOXACIN 500 MG/1
500 TABLET, FILM COATED ORAL EVERY 24 HOURS
Qty: 7 TABLET | Refills: 0 | Status: SHIPPED | OUTPATIENT
Start: 2022-05-04 | End: 2022-05-11

## 2022-05-04 NOTE — PROGRESS NOTES
Assessment/Plan:  Deformity of foot   Diabetic neuropathy   Pain upon ambulation   Pain upon ambulation   Mycosis of nail   Callus formation  Abscess tibial aspect right hallux         Plan   Lesion debrided    all mycotic nails debrided without pain or complication   Foot exam performed   Patient educated on care of the diabetic foot   Plantar calluses debrided as well  Patient remain on gabapentin as well  Refill ordered  In addition patient will increase shoe size as directed  Patient has abscess right big toe  Incision and drainage done  Culture sensitivity done  Silver nitrate gentian violet applied  Patient be placed on Levaquin    He will bandage daily            Subjective:   patient is diabetic   He has pain upon ambulation   He has pain with shoe wear   No history of trauma             Past Medical History:   Diagnosis Date    Acquired hallux valgus       last assessed: 8/1/2013    Allergic rhinitis      Anemia 10/26/2010    Atrophy of nail       last assessed: 7/7/2015    Chronic kidney disease       chronic renal insufficiency    Coronary artery disease      Deformity of ankle and foot, acquired       last assessed: 2/22/2016    Diabetes mellitus ([facility] [address] )      Difficulty walking       last assessed: 12/14/2015    Dysesthesia       last assessed: 11/25/2016    Hammer toe       unspecified laterality; last assessed: 8/1/2013    Hypertension      Onychomycosis of toenail       last assessed: 2/22/2016    Peripheral vascular disease ([facility] [address] )       left SFA stent in 2010    Pes planus       unspecified laterality; last assessed: 8/1/2013    Pneumonia       last assessed: 2/27/2016    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       last assessed: 8/15/2016    Type 2 diabetes mellitus ([address] ) 07/26/2010    Viral warts       last assessed: 7/24/2015                   Past Surgical History:   Procedure Laterality Date    CARDIAC CATHETERIZATION   07/29/2010    CATARACT EXTRACTION, BILATERAL Bilateral       R 1999, L 2008    FEMORAL ARTERY - POPLITEAL ARTERY BYPASS GRAFT   09/23/2013    FOOT SURGERY         bone spur removed    LEG SURGERY Bilateral       repair; L 8/20/2010 and 7/26/2012    ROTATOR CUFF REPAIR Left 2009    SHOULDER SURGERY Right 2005     collar bone         No Known Allergies        Current Outpatient Medications:     acarbose (PRECOSE) 100 MG tablet, Take 1 tablet (100 mg total) by mouth 3 (three) times a day with meals, Disp: 270 tablet, Rfl: 1    amLODIPine (NORVASC) 2 5 mg tablet, Take 1 tablet (2 5 mg total) by mouth daily (Patient not taking: Reported on 9/26/2019), Disp: 90 tablet, Rfl: 3    aspirin (ECOTRIN LOW STRENGTH) 81 mg EC tablet, Take 1 tablet (81 mg total) by mouth daily, Disp: 30 tablet, Rfl: 6    b complex-vitamin C-folic acid (NEPHROCAPS) 1 mg capsule, Take 1 capsule by mouth daily, Disp: , Rfl:     betamethasone dipropionate (DIPROSONE) 0 05 % cream, Use as dir twice daily, Disp: , Rfl:     carvedilol (COREG) 12 5 mg tablet, TAKE 1 TABLET TWICE DAILY, Disp: 180 tablet, Rfl: 1    gabapentin (NEURONTIN) 600 MG tablet, Take 1 tablet (600 mg total) by mouth 2 (two) times a day, Disp: 180 tablet, Rfl: 0    glimepiride (AMARYL) 4 mg tablet, Take 1 tablet (4 mg total) by mouth 2 (two) times a day for 126 days, Disp: 180 tablet, Rfl: 0    glucose blood (TRUETRACK TEST) test strip, 1 each by Other route daily Use as instructed for E11 29, Disp: 100 each, Rfl: 3    lisinopril (ZESTRIL) 5 mg tablet, Take 1 tablet (5 mg total) by mouth daily (Patient taking differently: Take 2 5 mg by mouth daily ), Disp: 90 tablet, Rfl: 3    metFORMIN (GLUMETZA) 500 MG (MOD) 24 hr tablet, Take 2 tablets (1,000 mg total) by mouth 2 (two) times a day with meals, Disp: 120 tablet, Rfl: 5    multivitamin (THERAGRAN) TABS, Take 1 tablet by mouth daily, Disp: , Rfl:     omeprazole (PriLOSEC) 40 MG capsule, Take 1 capsule (40 mg total) by mouth daily, Disp: 90 capsule, Rfl: 1    simvastatin (ZOCOR) 40 mg tablet, TAKE 1 TABLET (40 MG TOTAL) BY MOUTH DAILY, Disp: 90 tablet, Rfl: 1    TRUEPLUS LANCETS 28G MISC, Test 1x/d, E11 29, Disp: , Rfl: 0           Patient Active Problem List   Diagnosis    Diabetic polyneuropathy associated with type 2 diabetes mellitus (HCC)    Allergic rhinitis    Arthropathy    [facility]    Benign essential hypertension    CAD (coronary artery disease)    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 mellitus ([facility] [address] [facility]    Diabetic neuropathy ([facility] [address] )    GE reflux    Lumbar radiculopathy    Rosacea    Seborrheic dermatitis    Type 2 diabetes mellitus with diabetic neuropathy ([facility])    Onychomycosis    Occlusion of femoral-popliteal bypass graft ([facility])    Prostate cancer screening    Dyslipidemia    Screening for AAA (aortic abdominal aneurysm)    Medicare annual wellness visit, subsequent    Type 2 diabetes mellitus with stage 3 chronic kidney disease, without long-term current use of insulin ([facility])    Pain in both feet    Corns    Plantar warts             Patient ID: Pernell Covarrubias is a 76 y  o  male         The following portions of the patient's history were reviewed and updated as appropriate:      family history includes Aortic aneurysm in his mother; Heart attack in his father and sister; Heart disease in his father, paternal grandfather, and sister        reports that he has never smoked  He has never used smokeless tobacco  He reports that he drinks alcohol  He reports that he does not use drugs         Objective:  Patient's shoes and socks removed    Foot ExamPhysical Exam          Physical Exam  Left Foot: Appearance: a deformity (ball of foot and distal fifth metatarsal )  Fifth toe deformities include hammer toe  Tenderness: None except the plantar aspect of the foot  Right Foot: Appearance: a deformity (distal fifth metatarsal )     Left Ankle: ROM: limited ROM in all planes   Right Ankle: ROM: limited ROM in all planes   Neurological Exam: Light touch was decreased bilaterally  Vibratory sensation was decreased in both first metatarsophalangeal joints  Response to monofilament test was absent bilaterally  Deep tendon reflexes: achilles reflex 1/4 bilaterally  Vascular Exam: performed Dorsalis pedis pulses were 1/4 bilaterally  Posterior tibial pulses were 1/4 bilaterally  Elevation Pallor: diminished bilaterally  Capillary refill time was Q  9, findings bilateral  Negative digital hair noted, positive abnormal cooling  All pre-ulcer, lesions debrided  Today, but between 1-3 seconds bilaterally  Edema: mild bilaterally and All mycotic nails debrided  Today  The patient was given orthotics to help control his feet and at as cushioning and padding on the bottom of his feet q9 findings  Toenails: All of the toenails were elongated, hypertrophied, discolored, ingrown, shown to have subungual debris and tender       Socks and shoes removed, the Right Foot: the foot was dry  The sensory exam showed diminished vibratory sensation at the level of the toes  Diminished tactile sensation with monofilament testing throughout the right foot    Socks and shoes removed, Left Foot: the foot was dry  The sensory exam showed diminished vibratory sensation at the level of the toes  Diminished tactile sensation with monofilament testing throughout the left foot    Pulses:   1+ in the posterior tibialis on the right   1+ in the dorsalis pedis on the right  Capillary refills findings on the left were delayed in the toes  Pulses:   1+ in the posterior tibialis on the left   1+ in the dorsalis pedis on the left  Assign Risk Category: 2: Loss of protective sensation with or without weakness, deformity, callus, pre-ulcer, or history of ulceration  High risk     Hyperkeratosis: present on both first sub metatarsals, present on both fifth sub metatarsals and Pre-ulcerative lesion, submetatarsal one to 5, bilateral    Shoe Gear Evaluation: performed ()  Recommendation(s): custom inlays       Patient's shoes and socks removed  Right Foot/Ankle   Right Foot Inspection  Skin Exam: callus and callus               Toe Exam: swelling and erythema  Sensory   Vibration: diminished  Proprioception: diminished      Vascular  Capillary refills: elevated        Left Foot/Ankle  Left Foot Inspection  Skin Exam: callus   Submetatarsal 5 of the left foot demonstrates 0 5 centimeter squared plantar verruca   It is in capsulated in a bullae   Debrided   20% remaining               Toe Exam: swelling and erythema                   Sensory   Vibration: diminished  Proprioception: diminished     Vascular  Capillary refills: elevated      Patient's shoes and socks removed            Assign Risk Category  Deformity present  Loss of protective sensation  Weak pulses  Risk: 2      Right hallux demonstrates cellulitis on the medial aspect of the toe  This is secondary to subungual abscess in the tibial nail groove  Incision and drainage done  Creamy exudate noted    Small sinus noted been negative occult underlying

## 2022-05-06 LAB
BACTERIA WND AEROBE CULT: ABNORMAL
GRAM STN SPEC: ABNORMAL
GRAM STN SPEC: ABNORMAL

## 2022-05-11 ENCOUNTER — OFFICE VISIT (OUTPATIENT)
Dept: PODIATRY | Facility: CLINIC | Age: 77
End: 2022-05-11
Payer: MEDICARE

## 2022-05-11 VITALS
HEIGHT: 71 IN | DIASTOLIC BLOOD PRESSURE: 56 MMHG | RESPIRATION RATE: 17 BRPM | BODY MASS INDEX: 22.68 KG/M2 | SYSTOLIC BLOOD PRESSURE: 128 MMHG | WEIGHT: 162 LBS

## 2022-05-11 DIAGNOSIS — I10 BENIGN ESSENTIAL HYPERTENSION: ICD-10-CM

## 2022-05-11 DIAGNOSIS — I73.9 PAD (PERIPHERAL ARTERY DISEASE) (HCC): ICD-10-CM

## 2022-05-11 DIAGNOSIS — E11.621 DIABETIC ULCER OF TOE OF RIGHT FOOT ASSOCIATED WITH TYPE 2 DIABETES MELLITUS, LIMITED TO BREAKDOWN OF SKIN (HCC): Primary | ICD-10-CM

## 2022-05-11 DIAGNOSIS — E11.42 DIABETIC POLYNEUROPATHY ASSOCIATED WITH TYPE 2 DIABETES MELLITUS (HCC): ICD-10-CM

## 2022-05-11 DIAGNOSIS — L97.511 DIABETIC ULCER OF TOE OF RIGHT FOOT ASSOCIATED WITH TYPE 2 DIABETES MELLITUS, LIMITED TO BREAKDOWN OF SKIN (HCC): Primary | ICD-10-CM

## 2022-05-11 PROCEDURE — 99213 OFFICE O/P EST LOW 20 MIN: CPT | Performed by: PODIATRIST

## 2022-05-11 NOTE — PROGRESS NOTES
Assessment/Plan:  Ulcerated tibial aspect nail groove right hallux  Diabetic ulcer  Diabetic neuropathy  Peripheral artery disease  Resolved cellulitis  Plan  Foot exam performed  Patient educated on condition  Wound debrided today  Utilizing scissors and curette all devitalized tissue removed  Gentian violet dry sterile dressing applied  Patient advised on aftercare  Return for follow-up  Return p r n  Diagnoses and all orders for this visit:    Diabetic ulcer of toe of right foot associated with type 2 diabetes mellitus, limited to breakdown of skin ([facility] )    Diabetic polyneuropathy associated with type 2 diabetes mellitus ([facility] )    PAD (peripheral artery disease) ([facility] )          Subjective:  Patient is doing much better  Took medication as prescribed  He finished antibiotic  No history of fever night sweats      No Known Allergies      Current Outpatient Medications:     acarbose (PRECOSE) 100 MG tablet, Take 1 tablet (100 mg total) by mouth 3 (three) times a day with meals, Disp: 270 tablet, Rfl: 3    acetaminophen (TYLENOL) 325 mg tablet, Take 2 tablets (650 mg total) by mouth every 6 (six) hours as needed for mild pain, headaches or fever, Disp: , Rfl: 0    Ascorbic Acid (Vitamin C) 500 MG CAPS, Take 500 mg by mouth daily, Disp: , Rfl:     aspirin (ECOTRIN LOW STRENGTH) 81 mg EC tablet, Take 81 mg by mouth daily, Disp: , Rfl:     atorvastatin (LIPITOR) 20 mg tablet, Take 1 tablet (20 mg total) by mouth daily, Disp: 90 tablet, Rfl: 1    brimonidine tartrate 0 2 % ophthalmic solution, Inject 0 2 % into the eye 2 (two) times a day, Disp: , Rfl:     carvedilol (COREG) 12 5 mg tablet, Take 1 tablet (12 5 mg total) by mouth 2 (two) times a day, Disp: 180 tablet, Rfl: 1    Cholecalciferol (Vitamin D3) 50 MCG (2000 UT) TABS, Take 2,000 Units by mouth daily, Disp: , Rfl:     co-enzyme Q-10 100 mg capsule, Take 100 mg by mouth daily, Disp: , Rfl:     ferrous sulfate 324 (65 Fe) mg, TAKE ONE TABLET BY MOUTH BEFORE BREAKFAST (Patient not taking: Reported on 4/14/2022), Disp: , Rfl:     furosemide (LASIX) 20 mg tablet, Take 1 tablet (20 mg total) by mouth if needed (For weight gain of 2-3 lb in a day and 5 lb in a week and then call Cardiology) (Patient not taking: Reported on 4/14/2022 ), Disp: 15 tablet, Rfl: 1    gabapentin (NEURONTIN) 600 MG tablet, Take 1 tablet (600 mg total) by mouth 2 (two) times a day, Disp: 180 tablet, Rfl: 0    glucose blood test strip, Test blood sugar twice a day, Disp: 100 each, Rfl: 3    levofloxacin (LEVAQUIN) 500 mg tablet, Take 1 tablet (500 mg total) by mouth every 24 hours for 7 days, Disp: 7 tablet, Rfl: 0    lisinopril (ZESTRIL) 2 5 mg tablet, Take 1 tablet (2 5 mg total) by mouth daily, Disp: 30 tablet, Rfl: 0    metFORMIN (GLUCOPHAGE) 1000 MG tablet, Take one tablet in AM and 0 5 tablet in PM  (Patient taking differently: Take 1,000 mg by mouth 1000 mg  Twice a day ), Disp: 270 tablet, Rfl: 0    multivitamin (THERAGRAN) TABS, Take 1 tablet by mouth daily, Disp: , Rfl:     omeprazole (PriLOSEC) 40 MG capsule, Take 40 mg by mouth daily, Disp: , Rfl:     Probiotic Product (CULTURELLE PROBIOTICS) CHEW, Chew daily, Disp: , Rfl:     sitaGLIPtin (JANUVIA) 25 mg tablet, Take one tablet by mouth once a day, Disp: 90 tablet, Rfl: 3    spironolactone (ALDACTONE) 25 mg tablet, Take 1 tablet (25 mg total) by mouth daily, Disp: 30 tablet, Rfl: 0    tamsulosin (FLOMAX) 0 4 mg, Take 1 capsule (0 4 mg total) by mouth daily with dinner, Disp: 30 capsule, Rfl: 11    TRUEPLUS LANCETS 28G MISC, Test 1x/d, E11 29, Disp: , Rfl: 0    Patient Active Problem List   Diagnosis    Allergic rhinitis    Arthropathy    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)    CAD (coronary artery disease)    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)    Diabetic neuropathy (HCC)    GE reflux    Lumbar radiculopathy    Rosacea    Seborrheic dermatitis    Type 2 diabetes mellitus with diabetic neuropathy (HCC)    Occlusion of femoral-popliteal bypass graft ([facility])    Prostate cancer screening    Dyslipidemia    Screening for AAA (aortic abdominal aneurysm)    Medicare annual wellness visit, subsequent    Type 2 diabetes mellitus with hyperglycemia, without long-term current use of insulin ([facility])    Elevated PSA    Embolism and thrombosis of arteries of the lower extremities ([facility])    Benign essential hypertension    Bilateral leg weakness    Anemia due to stage 3a chronic kidney disease (HCC)    Prostate cancer ([facility] )    Screening for cardiovascular, respiratory, and genitourinary diseases    Insomnia, persistent    Weight loss    Orthostatic hypotension    SIADH (syndrome of inappropriate ADH production) ([facility])    Chronic right-sided low back pain without sciatica    Hypokalemia    Acute on chronic combined systolic and diastolic congestive heart failure ([facility])    Heart failure, left, 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% ([facility])    Chronic combined systolic and diastolic heart failure, NYHA class 2 ([facility])    Edema of both feet          Patient ID: Quang Allen is a 67 y o  male  HPI    The following portions of the patient's history were reviewed and updated as appropriate:     family history includes Aortic aneurysm in his mother; Heart attack in his father and sister; Heart disease in his father, paternal grandfather, and sister; Throat cancer in his maternal grandfather  reports that he has never smoked  He has never used smokeless tobacco  He reports current alcohol use  He reports that he does not use drugs  Vitals:    05/11/22 1504   BP: 128/56   Resp: 17       Review of Systems      Objective:  Patient's shoes and socks removed  Foot ExamPhysical Exam           Physical Exam  Left Foot: Appearance: a deformity (ball of foot and distal fifth metatarsal )  Fifth toe deformities include hammer toe   Tenderness: None except the plantar aspect of the foot  Right Foot: Appearance: a deformity (distal fifth metatarsal )  Left Ankle: ROM: limited ROM in all planes   Right Ankle: ROM: limited ROM in all planes   Neurological Exam: Light touch was decreased bilaterally  Vibratory sensation was decreased in both first metatarsophalangeal joints  Response to monofilament test was absent bilaterally  Deep tendon reflexes: achilles reflex 1/4 bilaterally  Vascular Exam: performed Dorsalis pedis pulses were 1/4 bilaterally  Posterior tibial pulses were 1/4 bilaterally  Elevation Pallor: diminished bilaterally  Capillary refill time was Q  9, findings bilateral  Negative digital hair noted, positive abnormal cooling  All pre-ulcer, lesions debrided  Today, but between 1-3 seconds bilaterally  Edema: mild bilaterally and All mycotic nails debrided  Today  The patient was given orthotics to help control his feet and at as cushioning and padding on the bottom of his feet q9 findings  Toenails: All of the toenails were elongated, hypertrophied, discolored, ingrown, shown to have subungual debris and tender       Socks and shoes removed, the Right Foot: the foot was dry  The sensory exam showed diminished vibratory sensation at the level of the toes  Diminished tactile sensation with monofilament testing throughout the right foot    Socks and shoes removed, Left Foot: the foot was dry  The sensory exam showed diminished vibratory sensation at the level of the toes  Diminished tactile sensation with monofilament testing throughout the left foot    Pulses:   1+ in the posterior tibialis on the right   1+ in the dorsalis pedis on the right  Capillary refills findings on the left were delayed in the toes  Pulses:   1+ in the posterior tibialis on the left   1+ in the dorsalis pedis on the left  Assign Risk Category: 2: Loss of protective sensation with or without weakness, deformity, callus, pre-ulcer, or history of ulceration  High risk  Hyperkeratosis: present on both first sub metatarsals, present on both fifth sub metatarsals and Pre-ulcerative lesion, submetatarsal one to 5, bilateral    Shoe Gear Evaluation: performed ()  Recommendation(s): custom inlays       Patient's shoes and socks removed  Right Foot/Ankle   Right Foot Inspection  Skin Exam: callus and callus               Toe Exam: swelling and erythema  Sensory   Vibration: diminished  Proprioception: diminished      Vascular  Capillary refills: elevated        Left Foot/Ankle  Left Foot Inspection  Skin Exam: callus   Submetatarsal 5 of the left foot demonstrates 0 5 centimeter squared plantar verruca   It is in capsulated in a bullae   Debrided   20% remaining               Toe Exam: swelling and erythema                   Sensory   Vibration: diminished  Proprioception: diminished     Vascular  Capillary refills: elevated      Patient's shoes and socks removed            Assign Risk Category  Deformity present  Loss of protective sensation  Weak pulses  Risk: 2    Right hallux demonstrates ulcerated tibial nail groove  Negative pus at this time  This is a Gamino grade 1 ulcer limited to breakdown of skin  Negative cellulitis

## 2022-05-12 RX ORDER — CARVEDILOL 12.5 MG/1
12.5 TABLET ORAL 2 TIMES DAILY
Qty: 180 TABLET | Refills: 1 | Status: SHIPPED | OUTPATIENT
Start: 2022-05-12

## 2022-05-12 RX ORDER — OMEPRAZOLE 40 MG/1
40 CAPSULE, DELAYED RELEASE ORAL DAILY
OUTPATIENT
Start: 2022-05-12

## 2022-05-18 NOTE — TELEPHONE ENCOUNTER
LMOM for patient to call back  Would like to know who originally prescribed 40 mg omeprazole since Dr Brendan Leong did not    Lalo Rhodes

## 2022-05-24 ENCOUNTER — PATIENT OUTREACH (OUTPATIENT)
Dept: FAMILY MEDICINE CLINIC | Facility: CLINIC | Age: 77
End: 2022-05-24

## 2022-06-06 ENCOUNTER — OFFICE VISIT (OUTPATIENT)
Dept: CARDIOLOGY CLINIC | Facility: CLINIC | Age: 77
End: 2022-06-06
Payer: MEDICARE

## 2022-06-06 VITALS
BODY MASS INDEX: 22.82 KG/M2 | DIASTOLIC BLOOD PRESSURE: 70 MMHG | HEART RATE: 63 BPM | WEIGHT: 163 LBS | SYSTOLIC BLOOD PRESSURE: 140 MMHG | HEIGHT: 71 IN | OXYGEN SATURATION: 98 % | TEMPERATURE: 97 F

## 2022-06-06 DIAGNOSIS — I25.119 CORONARY ARTERY DISEASE INVOLVING NATIVE CORONARY ARTERY OF NATIVE HEART WITH ANGINA PECTORIS (HCC): ICD-10-CM

## 2022-06-06 DIAGNOSIS — I10 BENIGN ESSENTIAL HYPERTENSION: ICD-10-CM

## 2022-06-06 DIAGNOSIS — I50.42 CHRONIC COMBINED SYSTOLIC AND DIASTOLIC HEART FAILURE, NYHA CLASS 2 (HCC): ICD-10-CM

## 2022-06-06 DIAGNOSIS — E11.40 TYPE 2 DIABETES MELLITUS WITH DIABETIC NEUROPATHY, WITHOUT LONG-TERM CURRENT USE OF INSULIN (HCC): ICD-10-CM

## 2022-06-06 DIAGNOSIS — N18.30 CKD STAGE 3 DUE TO TYPE 2 DIABETES MELLITUS (HCC): ICD-10-CM

## 2022-06-06 DIAGNOSIS — E11.22 CKD STAGE 3 DUE TO TYPE 2 DIABETES MELLITUS (HCC): ICD-10-CM

## 2022-06-06 DIAGNOSIS — E78.5 DYSLIPIDEMIA: ICD-10-CM

## 2022-06-06 PROCEDURE — 99214 OFFICE O/P EST MOD 30 MIN: CPT | Performed by: INTERNAL MEDICINE

## 2022-06-06 RX ORDER — OMEPRAZOLE 40 MG/1
40 CAPSULE, DELAYED RELEASE ORAL DAILY
Status: CANCELLED | OUTPATIENT
Start: 2022-06-06

## 2022-06-06 NOTE — PROGRESS NOTES
Progress Note - Cardiology Office  [facility]    Randolph Sandoval 67 y o  male MRN: [id number]  : 1945  Encounter: [id number]      Assessment:     1  Chronic combined systolic and diastolic heart failure, NYHA class 2 ([facility] )    2  Coronary artery disease involving native coronary artery of native heart with angina pectoris ([facility] )    3  Benign essential hypertension    4  Dyslipidemia    5  CKD stage 3 due to type 2 diabetes mellitus ([place] [address] )    6  Type 2 diabetes mellitus with diabetic neuropathy, without long-term current use of insulin ([place] [address] )        Discussion Summary and Plan:  1  Chronic systolic and diastolic heart failure  Patient was recently admitted with chronic systolic and diastolic heart failure  Last EF around 45%  His current weight is around 162 lb  I will believe he will do best when his weight is around 160-165  We will give him Lasix 10 mg p r n  As needed if he gains weight  Labs done on 2022 shows sodium 131  He will use p r n  Lasix has not used it  2  Coronary artery disease  Status post multivessel stenting  Patient now want to follow up with [facility] cardiology  His nuclear stress test done in 2018 which shows no ischemia normal LV systolic function  Nuclear stress test shows no perfusion abnormality EF is 30% but EF by echo was around 45% which is not changed  Does have some valvular disease  3  Hypertension  His blood pressure is better currently he is on lisinopril, carvedilol and Aldactone and p r n  Lasix  Electrolytes are stable sodium 131      4  Dyslipidemia  Continue statins labs done 2021 shows cholesterol profile is acceptable  5  Chronic renal insufficiency  Management as per Nephrology  6   Carotid bruit     Carotid Doppler shows less than 50% stenosis on the left  No evidence of stenosis on the right side  Continue medical Rx follows up with vascular    7  Diabetes mellitus    His blood sugar has been labile  HbA1c 7 6      8  History of prostate cancer  Patient get injectable medicine  Which really makes him feel sick and feel weak and tired  He says injection has been changed now every 6 months      9  History of anemia of chronic disease  10  PVD  PVD with right femoropopliteal which is occluded and left SFA which has stent placed by me in 2010, had repeat procedure done with known right SFA stent stenosis underwent balloon angioplasty  He is able to walk he has seen the vascular  He may have InStent stenosis on the left and has occlusion of his bypass on the right  He follows with vascular no symptoms at this time continue medical Rx    Plan  Continue current Rx  If he has any worsening of symptoms he will give us a call  Counseling :  A description of the counseling  All issues regarding tight sugar control, taking cholesterol medications, regular exercise, symptoms about coronary artery disease discussed with patient at length  Previous echo from 2014 reviewed  Goals and Barriers  Patient's ability to self care: Yes  Medication side effect reviewed with patient in detail and all their questions answered to their satisfaction  HPI :     Tanja Walden is a 67y o  year old male who came for follow up  Patient has a past medical history significant for Coronary Artery artery disease, CK D stage III, Diabetes mellitus with renal manifestations, PVD with right femoropopliteal which has occluded and left SFA stent by me in 2010, hypertension, diabetic neuropathy who came to see us after his vascular study was found to be abnormal  For cardiac problem he generally sees Dr Janny Rushing  His vascular study was ordered by his podiatrist as he was having pain in his foot  He also had a lot of back problems sometime pain radiates from his back to the thighs  Here a stent placed in his left SFA in 2010  It's already known that his right femoropopliteal is occluded   He has no fever no chills no nausea no vomiting no other significant complaint     03/08/2021  Above reviewed  Patient came for follow-up  He is doing well from cardiac point of view  His legs are still bothering him  He follows up with vascular  He does have history of PVD with possible left subclavian stenosis, history of bypass on the right leg and stent on the left and follows up with vascular  Today EKG shows heart rate 67 beats per minute  And 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  He is no longer getting cramps when he walks  His study from May 2020 reviewed  ABIs were 0 63 on the right and 0 6 on the left  He denies any history of chest pain, shortness breath, dizziness, lightheadedness, nausea vomiting PND orthopnea or cardiovascular issues  He had a blood test of January 2021 with creatinine was 1 41  But blood glucose was elevated  HbA1c 13 4      08/13/2021     above reviewed  Patient came for follow-up  He has lot of issues but there were noncardiac  He was admitted in July with hypoglycemia and his sugar medications were adjusted  But HbA1c has significantly improved from 13 4-7 7  He has history of PVD with possible left subclavian stenosis, history of bypass on his right leg and stent on the left leg and a follow-up with vascular  He also was diagnosed to have prostate cancer which is stage III and he is getting radiation therapy and as well as injectable medicine  He had 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 with T-wave inversions  He does get leg cramps when he walks  Otherwise no cardiovascular issues at this time  He had a fall recently and he was in the urgent care center  Labs done from August 2021 reviewed were acceptable no other issues at this time from cardiac point of view  03/09/2022  Above reviewed  Patient came for follow-up  He was recently admitted to hospital in February with heart failure  His EF is now around 45%    Nuclear stress test shows no ischemia but EF was noted to be low and he had history of prostate cancer which is metastatic he has to take injectable medicines  He is not taking his Florinef  His med list reviewed  His blood pressure is now acceptable  He is now on lisinopril, carvedilol and Aldactone  He is not on diuretics  Today his heart rate is 71 beats per minute and his vitals are stable  Labs from March 3, 2022 reviewed sodium was 133  Other electrolytes were acceptable  No nausea no vomiting no fever no chills  Minimal lower extremity swelling noted  Over the years patient has become more weaker  He really feels weak after he gets his prostate injectable medication he takes him few days to recover  06/06/2022  Above reviewed  Patient came for follow-up  He is doing well  His main issue is his fatigue and tiredness which he gets after injection for his prostate cancer which he takes every 6 months  His EF on echo in February 2022 remained around 45% with moderate MR and moderate TR and PA pressure was around 58 mmHg  Nuclear stress test shows no ischemia though he was noted to have EF of around 30%  He is now on lisinopril, Coreg, Aldactone he does not take Lasix  He has blood work done April 2022 which sodium was 131 and his cholesterol and other electrolytes were acceptable hemoglobin 10 3  History of metastatic prostate cancer which is stage IV but under control because of this injections  Over the year he says he has lost weight and has become weaker and his appetite is now coming back  His weight has been stable around 163 lb since last visit of March 2022  He feels he if okay since the last visit of March 2022  No nausea no vomiting no PND no orthopnea no leg swelling no other cardiovascular issues  Review of Systems   Constitutional: Positive for fatigue  Negative for activity change, chills, diaphoresis, fever and unexpected weight change  HENT: Negative for congestion      Eyes: Negative for discharge and redness  Respiratory: Negative for cough, chest tightness, shortness of breath and wheezing  Cardiovascular: Negative  Negative for chest pain, palpitations and leg swelling  Gastrointestinal: Negative for abdominal pain, diarrhea and nausea  On and off diarrhea   Endocrine: Negative  Genitourinary: Negative for decreased urine volume and urgency  History of prostate cancer status post chemotherapy used to get every 3 months now every 6 months   Musculoskeletal: Positive for arthralgias, back pain and gait problem  Skin: Negative for rash and wound  Allergic/Immunologic: Negative  Neurological: Negative for dizziness, seizures, syncope, weakness, light-headedness and headaches  Hematological: Negative  Psychiatric/Behavioral: Negative for agitation and confusion  The patient is nervous/anxious          Historical Information   Past Medical History:   Diagnosis Date    Acquired hallux valgus     last assessed: 8/1/2013    Allergic rhinitis     Anemia 10/26/2010    Atrophy of nail     last assessed: 7/7/2015    Chronic kidney disease     chronic renal insufficiency    Coronary artery disease     Deformity of ankle and foot, acquired     last assessed: 2/22/2016    Difficulty walking     last assessed: 12/14/2015    Dysesthesia     last assessed: 11/25/2016    Hammer toe     unspecified laterality; last assessed: 8/1/2013    Hypertension     Onychomycosis of toenail     last assessed: 2/22/2016    Peripheral vascular disease ([facility] )     left SFA stent in 2010    Pes planus     unspecified laterality; last assessed: 8/1/2013    Pneumonia     last assessed: 2/27/2016    Prostate cancer ([facility] )     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     last assessed: 8/15/2016    Type 2 diabetes mellitus ([facility] ) 07/26/2010    Viral warts     last assessed: 7/24/2015     Past Surgical History:   Procedure Laterality Date    CARDIAC CATHETERIZATION 07/29/2010    CATARACT EXTRACTION, BILATERAL Bilateral     R 1999, L 2008    COLONOSCOPY  2011    FEMORAL ARTERY - POPLITEAL ARTERY BYPASS GRAFT  09/23/2013    FOOT SURGERY      bone spur removed    LEG SURGERY Bilateral     repair; L 8/20/2010 and 7/26/2012    NM PLACE RADIOTHER DEVICE/MARKER, PROSTATE N/A 6/22/2021    Procedure: INSERTION OF FIDUCIAL MARKER (TRANSRECTAL ULTRASOUND GUIDANCE), SPACEOAR;  Surgeon: Anjali Calles MD;  Location: [facility];  Service: Urology    ROTATOR CUFF REPAIR Left 2009    SHOULDER SURGERY Right 2005    collar bone     Social History     Substance and Sexual Activity   Alcohol Use Yes    Comment: being a social drinker/rare     Social History     Substance and Sexual Activity   Drug Use No     Social History     Tobacco Use   Smoking Status Never Smoker   Smokeless Tobacco Never Used     Family History:   Family History   Problem Relation Age of Onset    Heart disease Father     Heart attack Father         acute myocardial infarction    Heart disease Sister     Heart attack Sister         acute myocardial infarction    Heart disease Paternal Grandfather     Aortic aneurysm Mother     Throat cancer Maternal Grandfather        Meds/Allergies     No Known Allergies    Current Outpatient Medications:     acarbose (PRECOSE) 100 MG tablet, Take 1 tablet (100 mg total) by mouth 3 (three) times a day with meals, Disp: 270 tablet, Rfl: 3    acetaminophen (TYLENOL) 325 mg tablet, Take 2 tablets (650 mg total) by mouth every 6 (six) hours as needed for mild pain, headaches or fever, Disp: , Rfl: 0    Ascorbic Acid (Vitamin C) 500 MG CAPS, Take 500 mg by mouth daily, Disp: , Rfl:     aspirin (ECOTRIN LOW STRENGTH) 81 mg EC tablet, Take 81 mg by mouth daily, Disp: , Rfl:     atorvastatin (LIPITOR) 20 mg tablet, Take 1 tablet (20 mg total) by mouth daily, Disp: 90 tablet, Rfl: 1    brimonidine tartrate 0 2 % ophthalmic solution, Inject 0 2 % into the eye 2 (two) times a day, Disp: , Rfl:     carvedilol (COREG) 12 5 mg tablet, Take 1 tablet (12 5 mg total) by mouth in the morning and 1 tablet (12 5 mg total) in the evening , Disp: 180 tablet, Rfl: 1    Cholecalciferol (Vitamin D3) 50 MCG (2000 UT) TABS, Take 2,000 Units by mouth daily, Disp: , Rfl:     co-enzyme Q-10 100 mg capsule, Take 100 mg by mouth daily, Disp: , Rfl:     gabapentin (NEURONTIN) 600 MG tablet, Take 1 tablet (600 mg total) by mouth 2 (two) times a day, Disp: 180 tablet, Rfl: 0    glucose blood test strip, Test blood sugar twice a day, Disp: 100 each, Rfl: 3    lisinopril (ZESTRIL) 2 5 mg tablet, Take 1 tablet (2 5 mg total) by mouth daily, Disp: 30 tablet, Rfl: 0    metFORMIN (GLUCOPHAGE) 1000 MG tablet, Take one tablet in AM and 0 5 tablet in PM  (Patient taking differently: Take 1,000 mg by mouth 1000 mg  Twice a day), Disp: 270 tablet, Rfl: 0    multivitamin (THERAGRAN) TABS, Take 1 tablet by mouth daily, Disp: , Rfl:     omeprazole (PriLOSEC) 40 MG capsule, Take 40 mg by mouth daily, Disp: , Rfl:     Probiotic Product (CULTURELLE PROBIOTICS) CHEW, Chew daily, Disp: , Rfl:     sitaGLIPtin (JANUVIA) 25 mg tablet, Take one tablet by mouth once a day, Disp: 90 tablet, Rfl: 3    spironolactone (ALDACTONE) 25 mg tablet, Take 1 tablet (25 mg total) by mouth daily, Disp: 30 tablet, Rfl: 0    tamsulosin (FLOMAX) 0 4 mg, Take 1 capsule (0 4 mg total) by mouth daily with dinner, Disp: 30 capsule, Rfl: 11    TRUEPLUS LANCETS 28G MISC, Test 1x/d, E11 29, Disp: , Rfl: 0    ferrous sulfate 324 (65 Fe) mg, TAKE ONE TABLET BY MOUTH BEFORE BREAKFAST (Patient not taking: No sig reported), Disp: , Rfl:     furosemide (LASIX) 20 mg tablet, Take 1 tablet (20 mg total) by mouth if needed (For weight gain of 2-3 lb in a day and 5 lb in a week and then call Cardiology) (Patient not taking: No sig reported), Disp: 15 tablet, Rfl: 1    Vitals: Blood pressure 140/70, pulse 63, temperature (!) 97 °F (36 1 °C), height 5' 11" (1 803 m), weight 73 9 kg (163 lb), SpO2 98 %  ?  Body mass index is 22 73 kg/m²  Vitals:    06/06/22 1354   Weight: 73 9 kg (163 lb)     BP Readings from Last 3 Encounters:   06/06/22 140/70   05/11/22 128/56   05/04/22 128/56         Physical Exam  Constitutional:       General: He is not in acute distress  Appearance: He is well-developed  He is not diaphoretic  Neck:      Thyroid: No thyromegaly  Vascular: No JVD  Trachea: No tracheal deviation  Cardiovascular:      Rate and Rhythm: Normal rate and regular rhythm  Heart sounds: S1 normal and S2 normal  Heart sounds not distant  Murmur heard  Systolic (ejection) murmur is present with a grade of 2/6  No friction rub  No gallop  No S3 or S4 sounds  Pulmonary:      Effort: Pulmonary effort is normal  No respiratory distress  Breath sounds: Normal breath sounds  No wheezing or rales  Chest:      Chest wall: No tenderness  Abdominal:      General: Bowel sounds are normal  There is no distension  Palpations: Abdomen is soft  Tenderness: There is no abdominal tenderness  Musculoskeletal:         General: No deformity  Cervical back: Neck supple  Skin:     General: Skin is warm and dry  Coloration: Skin is not pale  Findings: No rash  Neurological:      Mental Status: He is alert and oriented to person, place, and time  Psychiatric:         Behavior: Behavior normal          Judgment: Judgment normal          Diagnostic Studies Review Cardio:    Nuclear stress test   Nuclear stress test done 09/27/2018 was a normal study with EF around 60%  No ischemia noted it was Lexiscan stress test     Nuclear Stress test   Nuclear stress test done in February 2022 shows no significant perfusion abnormality EF was severely reduced  Diaphragmatic attenuation EF calculated was around 30%    Echo Doppler    Echo Doppler done 10/14/2020 shows EF 55 60%, mild MR, trace to mild PI, pressure half time was 660 millisecond     Echo Doppler  Echo Doppler done 02/19/2022 shows EF 60%, diastolic dysfunction mildly abnormal grade 1, no aortic stenosis or regurgitation, moderate TR with PA pressure 58 mmHg, moderate MR     EKG:    Twelve lead EKG done on 09/13/2018 shows normal sinus rhythm heart rate 60 beats per minute  No significant ST changes  Twelve lead EKG done 05/17/2019 shows normal sinus rhythm heart rate 60 beats per minute  No change from old EKG  Twelve lead EKG 03/10/2020 shows normal sinus rhythm LVH by voltage heart rate 60 beats per minute no change from old EKG  Twelve lead EKG 09/23/2020 shows normal sinus rhythm LVH by voltage  Q-waves in inferior leads cannot rule out 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  Twelve lead EKG 03/08/2021 shows normal sinus rhythm LVH by voltage and Q-waves in inferior leads cannot rule out 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ion  Twelve lead EKG done on 02/08/2022 shows sinus rhythm heart rate 98 beats per minute LVH by voltage as compared to previous EKGs his heart function is noted to be faster  Inferior Q-waves noted cannot rule out 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ion    Nonspecific ST changes in lateral precordial leads        Lab Review   Lab Results   Component Value Date    WBC 6 21 03/01/2022    HGB 10 3 (L) 03/01/2022    HCT 33 4 (L) 03/01/2022    MCV 87 03/01/2022    RDW 16 1 (H) 03/01/2022     03/01/2022     BMP:  Lab Results   Component Value Date     04/17/2014    K 4 8 04/06/2022     04/06/2022    CO2 27 04/06/2022    ANIONGAP 8 04/17/2014    BUN 18 04/06/2022    CREATININE 0 97 04/06/2022    GLUCOSE 137 04/17/2014    GLUF 146 (H) 04/06/2022    CALCIUM 9 7 04/06/2022    CORRECTEDCA 9 2 02/18/2022    EGFR 74 04/06/2022    MG 1 9 03/03/2022     LFT:  Lab Results   Component Value Date    AST 14 03/01/2022    ALT 21 03/01/2022    ALKPHOS 84 03/01/2022       Lab Results   Component Value Date    HGBA1C 7 6 (H) 04/06/2022     Lipid Profile:   Lab Results Component Value Date    CHOL 106 07/19/2014    HDL 47 04/06/2022    LDLCALC 53 04/06/2022    TRIG 52 04/06/2022     Lab Results   Component Value Date    CHOL 106 07/19/2014       Dr Hadley Clements MD [facility] [place]      "This note has been constructed using a voice recognition system  Therefore there may be syntax, spelling, and/or grammatical errors   Please call if you have any questions  "

## 2022-06-10 ENCOUNTER — OFFICE VISIT (OUTPATIENT)
Dept: ENDOCRINOLOGY | Facility: CLINIC | Age: 77
End: 2022-06-10
Payer: MEDICARE

## 2022-06-10 VITALS
HEART RATE: 80 BPM | BODY MASS INDEX: 22.68 KG/M2 | HEIGHT: 71 IN | SYSTOLIC BLOOD PRESSURE: 130 MMHG | DIASTOLIC BLOOD PRESSURE: 58 MMHG | WEIGHT: 162 LBS

## 2022-06-10 DIAGNOSIS — I10 BENIGN ESSENTIAL HYPERTENSION: ICD-10-CM

## 2022-06-10 DIAGNOSIS — E11.40 TYPE 2 DIABETES MELLITUS WITH DIABETIC NEUROPATHY, WITHOUT LONG-TERM CURRENT USE OF INSULIN (HCC): Primary | ICD-10-CM

## 2022-06-10 DIAGNOSIS — E11.22 CKD STAGE 2 DUE TO TYPE 2 DIABETES MELLITUS (HCC): ICD-10-CM

## 2022-06-10 DIAGNOSIS — E11.65 TYPE 2 DIABETES MELLITUS WITH HYPERGLYCEMIA, WITHOUT LONG-TERM CURRENT USE OF INSULIN (HCC): ICD-10-CM

## 2022-06-10 DIAGNOSIS — I50.1 HEART FAILURE, LEFT, WITH LVEF <=30% (HCC): ICD-10-CM

## 2022-06-10 DIAGNOSIS — N18.2 CKD STAGE 2 DUE TO TYPE 2 DIABETES MELLITUS (HCC): ICD-10-CM

## 2022-06-10 DIAGNOSIS — K92.1 BLACK STOOLS: ICD-10-CM

## 2022-06-10 DIAGNOSIS — I25.10 CORONARY ARTERY DISEASE INVOLVING NATIVE CORONARY ARTERY OF NATIVE HEART WITHOUT ANGINA PECTORIS: ICD-10-CM

## 2022-06-10 DIAGNOSIS — R19.7 DIARRHEA, UNSPECIFIED TYPE: ICD-10-CM

## 2022-06-10 PROCEDURE — 99215 OFFICE O/P EST HI 40 MIN: CPT | Performed by: INTERNAL MEDICINE

## 2022-06-10 NOTE — PROGRESS NOTES
Hayley Soto 67 y o  male MRN: [id number]    Encounter: [id number]      Assessment/Plan     1  Type 2 diabetes mellitus not on long-term insulin therapy   2  CKD   3  Neuropathy   4  Diarrhea, black colored stools  Fairly controlled with Last A1c 7 6%, on review of blood sugar log, has hyperglycemia which is most prominent fasting    Recommend the following at this time  Hold acarbose to see if there is an improvement in diarrhea, if there is, advised not to resume and let me know   Continue metformin, Januvia at current dose   Labs as ordered next month, avoid high carbohydrate containing snacks  If A1c is higher, will increase dose of Januvia     Chronic diarrhea, black colored stools-consult with Gastroenterology    5  Hyperlipidemia  - continue statin therapy    6  Hypertension  7  Heart failure, CAD  Blood pressure at goal  - continue current medications include ACE-I/ ARB  Follow-up with cardiology    8  Prostate cancer    CC: Diabetes    History of Present Illness     HPI:  Hayley Soto is a 67 y o  male presents for a follow-up visit regarding diabetes management  Also has hypertension, hyperlipidemia, CKD, CAD, heart failure, neuropathy, prostate cancer     Last seen in 12/2021  Last Eye exam:  09/2021-no diabetic retinopathy    Current regimen:   Metformin 1 g twice a day  Januvia 25 mg orally daily  Acarbose 100 mg orally 3 times a day with meals    Follows up with podiatry every 3 months    Says that he has been having diarrhea for a long time   Black colored stools,  Denies blood in his stools   No lightheadedness/dizziness   Radiation therapy for prostate cancer started in 5/2021 -  Says he had diarrhea prior to tat as well  Not uptodate with colo per patient     No other sympotms     Statin:  Lipitor  ACE-I/ARB:  Lisinopril    Home glucose monitoring:    Will be scanned into chart   139-187/dl   Admits to pretzels as snack at bedtime  Symptoms of hypoglycemia :  No lows    All other systems were reviewed and are negative      Review of Systems      Historical Information   Past Medical History:   Diagnosis Date    Acquired hallux valgus     last assessed: 8/1/2013    Allergic rhinitis     Anemia 10/26/2010    Atrophy of nail     last assessed: 7/7/2015    Chronic kidney disease     chronic renal insufficiency    Coronary artery disease     Deformity of ankle and foot, acquired     last assessed: 2/22/2016    Difficulty walking     last assessed: 12/14/2015    Dysesthesia     last assessed: 11/25/2016    Hammer toe     unspecified laterality; last assessed: 8/1/2013    Hypertension     Onychomycosis of toenail     last assessed: 2/22/2016    Peripheral vascular disease ([facility] )     left SFA stent in 2010    Pes planus     unspecified laterality; last assessed: 8/1/2013    Pneumonia     last assessed: 2/27/2016    Prostate cancer ([facility] )     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     last assessed: 8/15/2016    Type 2 diabetes mellitus ([facility] ) 07/26/2010    Viral warts     last assessed: 7/24/2015     Past Surgical History:   Procedure Laterality Date    CARDIAC CATHETERIZATION  07/29/2010    CATARACT EXTRACTION, BILATERAL Bilateral     R 1999, L 2008    COLONOSCOPY  2011    FEMORAL ARTERY - POPLITEAL ARTERY BYPASS GRAFT  09/23/2013    FOOT SURGERY      bone spur removed    LEG SURGERY Bilateral     repair; L 8/20/2010 and 7/26/2012    [place] PLACE RADIOTHER DEVICE/MARKER, PROSTATE N/A 6/22/2021    Procedure: INSERTION OF FIDUCIAL MARKER (TRANSRECTAL ULTRASOUND GUIDANCE), SPACEOAR;  Surgeon: Renee Harrell MD;  [facility];  Service: Urology    ROTATOR CUFF REPAIR Left 2009    SHOULDER SURGERY Right 2005    collar bone     Social History   Social History     Substance and Sexual Activity   Alcohol Use Yes    Comment: being a social drinker/rare     Social History     Substance and Sexual Activity   Drug Use No     Social History     Tobacco Use   Smoking Status Never Smoker   Smokeless Tobacco Never Used     Family History:   Family History   Problem Relation Age of Onset    Heart disease Father     Heart attack Father         acute myocardial infarction    Heart disease Sister     Heart attack Sister         acute myocardial infarction    Heart disease Paternal Grandfather     Aortic aneurysm Mother     Throat cancer Maternal Grandfather        Meds/Allergies   Current Outpatient Medications   Medication Sig Dispense Refill    acarbose (PRECOSE) 100 MG tablet Take 1 tablet (100 mg total) by mouth 3 (three) times a day with meals 270 tablet 3    acetaminophen (TYLENOL) 325 mg tablet Take 2 tablets (650 mg total) by mouth every 6 (six) hours as needed for mild pain, headaches or fever  0    Ascorbic Acid (Vitamin C) 500 MG CAPS Take 500 mg by mouth daily      aspirin (ECOTRIN LOW STRENGTH) 81 mg EC tablet Take 81 mg by mouth daily      atorvastatin (LIPITOR) 20 mg tablet Take 1 tablet (20 mg total) by mouth daily 90 tablet 1    brimonidine tartrate 0 2 % ophthalmic solution Inject 0 2 % into the eye 2 (two) times a day      carvedilol (COREG) 12 5 mg tablet Take 1 tablet (12 5 mg total) by mouth in the morning and 1 tablet (12 5 mg total) in the evening   180 tablet 1    Cholecalciferol (Vitamin D3) 50 MCG (2000 UT) TABS Take 2,000 Units by mouth daily      co-enzyme Q-10 100 mg capsule Take 100 mg by mouth daily      gabapentin (NEURONTIN) 600 MG tablet Take 1 tablet (600 mg total) by mouth 2 (two) times a day 180 tablet 0    glucose blood test strip Test blood sugar twice a day 100 each 3    lisinopril (ZESTRIL) 2 5 mg tablet Take 1 tablet (2 5 mg total) by mouth daily 30 tablet 0    metFORMIN (GLUCOPHAGE) 1000 MG tablet Take one tablet in AM and 0 5 tablet in PM  (Patient taking differently: Take 1,000 mg by mouth 1000 mg  Twice a day) 270 tablet 0    multivitamin (THERAGRAN) TABS Take 1 tablet by mouth daily      omeprazole (PriLOSEC) 40 MG capsule Take 40 mg by mouth daily      Probiotic Product (CULTURELLE PROBIOTICS) CHEW Chew daily      sitaGLIPtin (JANUVIA) 25 mg tablet Take one tablet by mouth once a day 90 tablet 3    TRUEPLUS LANCETS 28G MISC Test 1x/d, E11 29  0    ferrous sulfate 324 (65 Fe) mg TAKE ONE TABLET BY MOUTH BEFORE BREAKFAST (Patient not taking: No sig reported)      furosemide (LASIX) 20 mg tablet Take 1 tablet (20 mg total) by mouth if needed (For weight gain of 2-3 lb in a day and 5 lb in a week and then call Cardiology) (Patient not taking: No sig reported) 15 tablet 1    spironolactone (ALDACTONE) 25 mg tablet Take 1 tablet (25 mg total) by mouth daily 30 tablet 0    tamsulosin (FLOMAX) 0 4 mg Take 1 capsule (0 4 mg total) by mouth daily with dinner (Patient not taking: Reported on 6/10/2022) 30 capsule 11     No current facility-administered medications for this visit  No Known Allergies    Objective   Vitals: Blood pressure 130/58, pulse 80, height 5' 11" (1 803 m), weight 73 5 kg (162 lb)  Physical Exam  Constitutional:       General: He is not in acute distress  Appearance: He is well-developed  He is not diaphoretic  HENT:      Head: Normocephalic and atraumatic  Eyes:      Conjunctiva/sclera: Conjunctivae normal       Pupils: Pupils are equal, round, and reactive to light  Cardiovascular:      Rate and Rhythm: Normal rate and regular rhythm  Heart sounds: Normal heart sounds  No murmur heard  Pulmonary:      Effort: Pulmonary effort is normal  No respiratory distress  Breath sounds: Normal breath sounds  No wheezing  Abdominal:      General: There is no distension  Palpations: Abdomen is soft  Tenderness: There is no abdominal tenderness  There is no guarding  Musculoskeletal:      Cervical back: Normal range of motion and neck supple  Right lower leg: Edema present  Left lower leg: Edema present  Skin:     General: Skin is warm and dry        Findings: No erythema or rash  Neurological:      Mental Status: He is alert and oriented to person, place, and time  Psychiatric:         Behavior: Behavior normal          Thought Content: Thought content normal          The history was obtained from the review of the chart, patient      Lab Results:   Lab Results   Component Value Date/Time    Hemoglobin A1C 7 6 (H) 04/06/2022 07:30 AM    Hemoglobin A1C 7 1 (H) 12/27/2021 07:19 AM    Hemoglobin A1C 7 7 (H) 07/30/2021 04:03 PM    WBC 6 21 03/01/2022 08:03 AM    WBC 6 69 02/23/2022 05:30 AM    WBC 7 38 02/22/2022 06:15 AM    Hemoglobin 10 3 (L) 03/01/2022 08:03 AM    Hemoglobin 10 0 (L) 02/23/2022 05:30 AM    Hemoglobin 10 4 (L) 02/22/2022 06:15 AM    Hematocrit 33 4 (L) 03/01/2022 08:03 AM    Hematocrit 32 3 (L) 02/23/2022 05:30 AM    Hematocrit 33 4 (L) 02/22/2022 06:15 AM    MCV 87 03/01/2022 08:03 AM    MCV 87 02/23/2022 05:30 AM    MCV 86 02/22/2022 06:15 AM    Platelets 844 18/56/3847 08:03 AM    Platelets 658 02/71/0740 05:30 AM    Platelets 424 92/04/0435 06:15 AM    BUN 18 04/06/2022 07:30 AM    BUN 12 03/03/2022 07:23 AM    BUN 13 03/01/2022 08:03 AM    Potassium 4 8 04/06/2022 07:30 AM    Potassium 4 3 03/03/2022 07:23 AM    Potassium 3 9 03/01/2022 08:03 AM    Chloride 100 04/06/2022 07:30 AM    Chloride 103 03/03/2022 07:23 AM    Chloride 105 03/01/2022 08:03 AM    CO2 27 04/06/2022 07:30 AM    CO2 26 03/03/2022 07:23 AM    CO2 22 03/01/2022 08:03 AM    Creatinine 0 97 04/06/2022 07:30 AM    Creatinine 0 89 03/03/2022 07:23 AM    Creatinine 0 97 03/01/2022 08:03 AM    AST 14 03/01/2022 08:03 AM    AST 10 02/18/2022 09:44 AM    AST 14 08/30/2021 09:31 AM    ALT 21 03/01/2022 08:03 AM    ALT 19 02/18/2022 09:44 AM    ALT 25 08/30/2021 09:31 AM    Albumin 3 5 03/01/2022 08:03 AM    Albumin 3 1 (L) 02/18/2022 09:44 AM    Albumin 3 2 (L) 08/30/2021 09:31 AM    HDL, Direct 47 04/06/2022 07:30 AM    HDL, Direct 62 12/27/2021 07:19 AM    Triglycerides 52 04/06/2022 07:30 AM    Triglycerides 38 12/27/2021 07:19 AM         Imaging Studies: I have personally reviewed pertinent reports  Portions of the record may have been created with voice recognition software  Occasional wrong word or "sound a like" substitutions may have occurred due to the inherent limitations of voice recognition software  Read the chart carefully and recognize, using context, where substitutions have occurred

## 2022-06-10 NOTE — PATIENT INSTRUCTIONS
Hold acarbose to see if there is an improvement in diarrhea, if there is, do not resume and let me know   Continue metformin, Januvia at current dose for now   Please have labs done as ordered next month   Recommend following up with primary care, gastroenterology

## 2022-06-15 RX ORDER — OMEPRAZOLE 40 MG/1
40 CAPSULE, DELAYED RELEASE ORAL DAILY
Status: CANCELLED | OUTPATIENT
Start: 2022-06-15

## 2022-06-15 NOTE — TELEPHONE ENCOUNTER
LMOM for patient to call back regarding this prescription  Dr Sergio Skinner has not prescribed this in the past   He needs to check name on his bottle to get proper provider    Evens Hollins

## 2022-06-17 ENCOUNTER — TELEPHONE (OUTPATIENT)
Dept: NEPHROLOGY | Facility: CLINIC | Age: 77
End: 2022-06-17

## 2022-06-17 NOTE — TELEPHONE ENCOUNTER
Appointment Confirmation   Person confirmed appointment with  If not patient, name of the person Voice msg    Date and time of appointment 6/20   8:30    Patient acknowledged and will be at appointment? no    Did you advise the patient that they will need a urine sample if they are a new patient?  N/A    Did you advise the patient to bring their current medications for verification? (including any OTC) Yes    Additional Information

## 2022-06-20 ENCOUNTER — OFFICE VISIT (OUTPATIENT)
Dept: NEPHROLOGY | Facility: CLINIC | Age: 77
End: 2022-06-20
Payer: MEDICARE

## 2022-06-20 VITALS
OXYGEN SATURATION: 96 % | BODY MASS INDEX: 23.1 KG/M2 | WEIGHT: 165 LBS | HEIGHT: 71 IN | DIASTOLIC BLOOD PRESSURE: 60 MMHG | HEART RATE: 65 BPM | SYSTOLIC BLOOD PRESSURE: 132 MMHG

## 2022-06-20 DIAGNOSIS — I95.1 ORTHOSTATIC HYPOTENSION: ICD-10-CM

## 2022-06-20 DIAGNOSIS — I10 BENIGN ESSENTIAL HYPERTENSION: ICD-10-CM

## 2022-06-20 DIAGNOSIS — N18.31 ANEMIA DUE TO STAGE 3A CHRONIC KIDNEY DISEASE (HCC): ICD-10-CM

## 2022-06-20 DIAGNOSIS — N18.2 CKD STAGE 2 DUE TO TYPE 2 DIABETES MELLITUS (HCC): Primary | ICD-10-CM

## 2022-06-20 DIAGNOSIS — E22.2 SIADH (SYNDROME OF INAPPROPRIATE ADH PRODUCTION) (HCC): ICD-10-CM

## 2022-06-20 DIAGNOSIS — D63.1 ANEMIA DUE TO STAGE 3A CHRONIC KIDNEY DISEASE (HCC): ICD-10-CM

## 2022-06-20 DIAGNOSIS — E11.22 CKD STAGE 2 DUE TO TYPE 2 DIABETES MELLITUS (HCC): Primary | ICD-10-CM

## 2022-06-20 PROCEDURE — 99214 OFFICE O/P EST MOD 30 MIN: CPT | Performed by: PHYSICIAN ASSISTANT

## 2022-06-20 NOTE — PATIENT INSTRUCTIONS
Repeat lab work now to recheck sodium and cortisol level (check in the morning between 7-9am)  Follow up in 3-4 months with repeat labs prior   Call us or cardiology if swelling worsens
no

## 2022-06-20 NOTE — PROGRESS NOTES
OFFICE FOLLOW UP - Nephrology   Albert Olson 67 y o  male MRN: [id number]       ASSESSMENT/PLAN:  1  CKD stage 2:  Likely due to diabetes  Last creatinine stable at 0 97 with GFR 74   Last UPC ratio 0 4 g  Continue lisinopril 2 5 mg daily and good diabetes control  2  Hyponatremia:  Initially attributed to SIADH however his sodium improved with Florinef  Now off Florinef since December and sodium has been trending down  Last sodium on 04/06 trending down to 131  · Prior workup Dec 2021:  Serum Osm 323, urine sodium 84, am cortisol 19  However at this time sodium was normal and florinef stopped 2 weeks prior   · Could also be related to CHF as he does have some chronic lower extremity edema  · Will repeat sodium, cortisol and urine studies at this time  If cortisol is normal and sodium worsening would recommend Lasix and fluid restriction  3  Hypertension with history of orthostatic hypotension:  Florinef stopped at last visit as his orthostasis was mild and his blood pressure was elevated  Currently no symptoms of orthostatic hypotension and blood pressure is well controlled  4  Anemia of chronic disease:  Last hemoglobin stable at 10 3  · SPEP and UPEP without monoclonal band  · Ferritin 63, iron saturation 16%  Continue oral iron for now  5  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%: On Lasix 20 mg p r n  but has not been taking this  Does have some mild edema right greater than left which he attributes to PAD   6  Hypokalemia: now off K supplements  Last K 4 8 on spironolactone  Repeat BMP now     Repeat hyponatremia workup now  Follow up 3-4 months with Dr Jacinto Lopez       PATIENT INSTRUCTIONS:  Patient Instructions    Repeat lab work now to recheck sodium and cortisol level (check in the morning between 7-9am)   Follow up in 3-4 months with repeat labs prior    Call us or cardiology if swelling worsens       HPI: Albert Olson is a 67 y o  male who is here for renal follow-up  Patient was hospitalized February 2022 with acute CHF  Since then he has been feeling much better  He does have Lasix at home but just takes this p r n  and has not needed it recently  He does have some swelling in his legs, right always greater than left but he thinks this may have been related to prior lower extremity bypasses  His weight has been stable  He denies any chest pain or shortness of breath  He has been walking for exercise  No lightheadedness or dizziness  Checks blood pressure at home and typically in the 130s/70s  ROS:   A complete review of systems was done  Pertinent positives and negatives as noted in the HPI, otherwise the review of systems is negative  Allergies: Patient has no known allergies      Medications:   Current Outpatient Medications:     acetaminophen (TYLENOL) 325 mg tablet, Take 2 tablets (650 mg total) by mouth every 6 (six) hours as needed for mild pain, headaches or fever, Disp: , Rfl: 0    Ascorbic Acid (Vitamin C) 500 MG CAPS, Take 500 mg by mouth daily, Disp: , Rfl:     aspirin (ECOTRIN LOW STRENGTH) 81 mg EC tablet, Take 81 mg by mouth daily, Disp: , Rfl:     atorvastatin (LIPITOR) 20 mg tablet, Take 1 tablet (20 mg total) by mouth daily, Disp: 90 tablet, Rfl: 1    brimonidine tartrate 0 2 % ophthalmic solution, Inject 0 2 % into the eye 2 (two) times a day, Disp: , Rfl:     carvedilol (COREG) 12 5 mg tablet, Take 1 tablet (12 5 mg total) by mouth in the morning and 1 tablet (12 5 mg total) in the evening , Disp: 180 tablet, Rfl: 1    Cholecalciferol (Vitamin D3) 50 MCG (2000 UT) TABS, Take 2,000 Units by mouth daily, Disp: , Rfl:     co-enzyme Q-10 100 mg capsule, Take 100 mg by mouth daily, Disp: , Rfl:     ferrous sulfate 324 (65 Fe) mg, TAKE ONE TABLET BY MOUTH BEFORE BREAKFAST, Disp: , Rfl:     furosemide (LASIX) 20 mg tablet, Take 1 tablet (20 mg total) by mouth if needed (For weight gain of 2-3 lb in a day and 5 lb in a week and then call Cardiology), Disp: 15 tablet, Rfl: 1    gabapentin (NEURONTIN) 600 MG tablet, Take 1 tablet (600 mg total) by mouth 2 (two) times a day, Disp: 180 tablet, Rfl: 0    glucose blood test strip, Test blood sugar twice a day, Disp: 100 each, Rfl: 3    lisinopril (ZESTRIL) 2 5 mg tablet, Take 1 tablet (2 5 mg total) by mouth daily, Disp: 30 tablet, Rfl: 0    metFORMIN (GLUCOPHAGE) 1000 MG tablet, Take one tablet in AM and 0 5 tablet in PM  (Patient taking differently: Take 1,000 mg by mouth 1000 mg  Twice a day), Disp: 270 tablet, Rfl: 0    multivitamin (THERAGRAN) TABS, Take 1 tablet by mouth daily, Disp: , Rfl:     omeprazole (PriLOSEC) 40 MG capsule, Take 40 mg by mouth daily, Disp: , Rfl:     Probiotic Product (CULTURELLE PROBIOTICS) CHEW, Chew daily, Disp: , Rfl:     sitaGLIPtin (JANUVIA) 25 mg tablet, Take one tablet by mouth once a day, Disp: 90 tablet, Rfl: 3    spironolactone (ALDACTONE) 25 mg tablet, Take 1 tablet (25 mg total) by mouth daily, Disp: 30 tablet, Rfl: 0    acarbose (PRECOSE) 100 MG tablet, Take 1 tablet (100 mg total) by mouth 3 (three) times a day with meals (Patient not taking: Reported on 6/20/2022), Disp: 270 tablet, Rfl: 3    tamsulosin (FLOMAX) 0 4 mg, Take 1 capsule (0 4 mg total) by mouth daily with dinner (Patient not taking: No sig reported), Disp: 30 capsule, Rfl: 11    TRUEPLUS LANCETS 28G MISC, Test 1x/d, E11 29 (Patient not taking: Reported on 6/20/2022), Disp: , Rfl: 0    Past Medical History:   Diagnosis Date    Acquired hallux valgus     last assessed: 8/1/2013    Allergic rhinitis     Anemia 10/26/2010    Atrophy of nail     last assessed: 7/7/2015    Chronic kidney disease     chronic renal insufficiency    Coronary artery disease     Deformity of ankle and foot, acquired     last assessed: 2/22/2016    Difficulty walking     last assessed: 12/14/2015    Dysesthesia     last assessed: 11/25/2016    Hammer toe     unspecified laterality; last assessed: 8/1/2013    Hypertension     Onychomycosis of toenail     last assessed: 2/22/2016    Peripheral vascular disease ([place] [address] )     left SFA stent in 2010    Pes planus     unspecified laterality; last assessed: 8/1/2013    Pneumonia     last assessed: 2/27/2016    [facility] ([facility] )     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     last assessed: 8/15/2016    Type 2 diabetes mellitus ([place] [address] ) 07/26/2010    Viral warts     last assessed: 7/24/2015     Past Surgical History:   Procedure Laterality Date    CARDIAC CATHETERIZATION  07/29/2010    CATARACT EXTRACTION, BILATERAL Bilateral     R 1999, L 2008    COLONOSCOPY  2011    FEMORAL ARTERY - POPLITEAL ARTERY BYPASS GRAFT  09/23/2013    FOOT SURGERY      bone spur removed    LEG SURGERY Bilateral     repair; L 8/20/2010 and 7/26/2012    [place] PLACE RADIOTHER DEVICE/MARKER, PROSTATE N/A 6/22/2021    Procedure: INSERTION OF FIDUCIAL MARKER (TRANSRECTAL ULTRASOUND GUIDANCE), SPACEOAR;  Surgeon: Eusebio Otero MD;  Location[facility];  Service: Urology    ROTATOR CUFF REPAIR Left 2009    SHOULDER SURGERY Right 2005    collar bone     Family History   Problem Relation Age of Onset    Heart disease Father     Heart attack Father         acute myocardial infarction    Heart disease Sister     Heart attack Sister         acute myocardial infarction    Heart disease Paternal Grandfather     Aortic aneurysm Mother     Throat cancer Maternal Grandfather       reports that he has never smoked  He has never used smokeless tobacco  He reports current alcohol use  He reports that he does not use drugs  Physical Exam:   Vitals:    06/20/22 0825   BP: 132/60   BP Location: Left arm   Patient Position: Sitting   Cuff Size: Standard   Pulse: 65   SpO2: 96%   Weight: 74 8 kg (165 lb)   Height: 5' 11" (1 803 m)     Body mass index is 23 01 kg/m²      General: no acute distress   Eyes: conjunctivae pink, anicteric sclerae  ENT: mucous membranes moist  Neck: supple, no JVD  Chest: clear to auscultation bilaterally with no wheezes, rale or rhochi  CVS: regular rate and rhythm   Abdomen: soft, non-tender, non-distended  Extremities:  +1 right ankle edema, trace left ankle edema  Skin: no rash  Neuro: awake and alert       Lab Results:  Results for orders placed or performed in visit on 05/04/22   Wound culture and Gram stain    Specimen: Foot, Right; Wound   Result Value Ref Range    Wound Culture 3+ Growth of      Gram Stain Result No polys seen (A)     Gram Stain Result (A)      Rare Gram positive cocci in pairs, chains and clusters             Invalid input(s): ALBUMIN      Portions of the record may have been created with voice recognition software  Occasional wrong word or "sound a like" substitutions may have occurred due to the inherent limitations of voice recognition software  Read the chart carefully and recognize, using context, where substitutions have occurred  If you have any questions, please contact the dictating provider

## 2022-06-21 ENCOUNTER — OFFICE VISIT (OUTPATIENT)
Dept: FAMILY MEDICINE CLINIC | Facility: CLINIC | Age: 77
End: 2022-06-21
Payer: MEDICARE

## 2022-06-21 ENCOUNTER — TELEPHONE (OUTPATIENT)
Dept: FAMILY MEDICINE CLINIC | Facility: CLINIC | Age: 77
End: 2022-06-21

## 2022-06-21 ENCOUNTER — TELEPHONE (OUTPATIENT)
Dept: ENDOCRINOLOGY | Facility: CLINIC | Age: 77
End: 2022-06-21

## 2022-06-21 VITALS
DIASTOLIC BLOOD PRESSURE: 70 MMHG | BODY MASS INDEX: 23.02 KG/M2 | WEIGHT: 164.4 LBS | SYSTOLIC BLOOD PRESSURE: 122 MMHG | HEIGHT: 71 IN | HEART RATE: 70 BPM | RESPIRATION RATE: 18 BRPM | OXYGEN SATURATION: 98 % | TEMPERATURE: 96.2 F

## 2022-06-21 DIAGNOSIS — E78.5 DYSLIPIDEMIA: ICD-10-CM

## 2022-06-21 DIAGNOSIS — N18.2 CKD STAGE 2 DUE TO TYPE 2 DIABETES MELLITUS (HCC): ICD-10-CM

## 2022-06-21 DIAGNOSIS — E11.22 CKD STAGE 2 DUE TO TYPE 2 DIABETES MELLITUS (HCC): ICD-10-CM

## 2022-06-21 DIAGNOSIS — I10 BENIGN ESSENTIAL HYPERTENSION: ICD-10-CM

## 2022-06-21 DIAGNOSIS — I25.10 CORONARY ARTERY DISEASE INVOLVING NATIVE CORONARY ARTERY OF NATIVE HEART WITHOUT ANGINA PECTORIS: ICD-10-CM

## 2022-06-21 DIAGNOSIS — I50.1 HEART FAILURE, LEFT, WITH LVEF <=30% (HCC): ICD-10-CM

## 2022-06-21 DIAGNOSIS — K21.9 GASTROESOPHAGEAL REFLUX DISEASE, UNSPECIFIED WHETHER ESOPHAGITIS PRESENT: ICD-10-CM

## 2022-06-21 DIAGNOSIS — Z00.00 MEDICARE ANNUAL WELLNESS VISIT, SUBSEQUENT: Primary | ICD-10-CM

## 2022-06-21 DIAGNOSIS — R26.9 GAIT DISORDER: ICD-10-CM

## 2022-06-21 PROCEDURE — G0439 PPPS, SUBSEQ VISIT: HCPCS | Performed by: FAMILY MEDICINE

## 2022-06-21 PROCEDURE — 99214 OFFICE O/P EST MOD 30 MIN: CPT | Performed by: FAMILY MEDICINE

## 2022-06-21 RX ORDER — OMEPRAZOLE 40 MG/1
40 CAPSULE, DELAYED RELEASE ORAL DAILY PRN
Qty: 90 CAPSULE | Refills: 1 | Status: SHIPPED | OUTPATIENT
Start: 2022-06-21

## 2022-06-21 NOTE — PATIENT INSTRUCTIONS
SHINGRIX is the new, more effective vaccine for Shingles  It is more than 90% effective  You should check with your local pharmacy and insurance company for availability and coverage  It is a 2 shot series, with the second shot given between 2-6 months after the first, and is approved for ages 48 and up  Medicare Preventive Visit Patient Instructions  Thank you for completing your Welcome to Medicare Visit or Medicare Annual Wellness Visit today  Your next wellness visit will be due in one year (6/23/2023)  The screening/preventive services that you may require over the next 5-10 years are detailed below  Some tests may not apply to you based off risk factors and/or age  Screening tests ordered at today's visit but not completed yet may show as past due  Also, please note that scanned in results may not display below  Preventive Screenings:  Service Recommendations Previous Testing/Comments   Colorectal Cancer Screening  · Colonoscopy    · Fecal Occult Blood Test (FOBT)/Fecal Immunochemical Test (FIT)  · Fecal DNA/Cologuard Test  · Flexible Sigmoidoscopy Age: 54-65 years old   Colonoscopy: every 10 years (May be performed more frequently if at higher risk)  OR  FOBT/FIT: every 1 year  OR  Cologuard: every 3 years  OR  Sigmoidoscopy: every 5 years  Screening may be recommended earlier than age 48 if at higher risk for colorectal cancer  Also, an individualized decision between you and your healthcare provider will decide whether screening between the ages of 74-80 would be appropriate   Colonoscopy: 06/14/2011  FOBT/FIT: Not on file  Cologuard: Not on file  Sigmoidoscopy: Not on file    Screening Not Indicated     Prostate Cancer Screening Individualized decision between patient and health care provider in men between ages of 53-78   Medicare will cover every 12 months beginning on the day after your 50th birthday PSA: <0 1 ng/mL     History Prostate Cancer  Screening Not Indicated     Hepatitis C Screening Once for adults born between 1945 and 1965  More frequently in patients at high risk for Hepatitis C Hep C Antibody: Not on file    Screening Not Indicated   Diabetes Screening 1-2 times per year if you're at risk for diabetes or have pre-diabetes Fasting glucose: 146 mg/dL   A1C: 7 6 %    Screening Not Indicated  History Diabetes   Cholesterol Screening Once every 5 years if you don't have a lipid disorder  May order more often based on risk factors  Lipid panel: 04/06/2022    Screening Current      Other Preventive Screenings Covered by Medicare:  1  Abdominal Aortic Aneurysm (AAA) Screening: covered once if your at risk  You're considered to be at risk if you have a family history of AAA or a male between the age of 73-68 who smoking at least 100 cigarettes in your lifetime  2  Lung Cancer Screening: covers low dose CT scan once per year if you meet all of the following conditions: (1) Age 50-69; (2) No signs or symptoms of lung cancer; (3) Current smoker or have quit smoking within the last 15 years; (4) You have a tobacco smoking history of at least 30 pack years (packs per day x number of years you smoked); (5) You get a written order from a healthcare provider  3  Glaucoma Screening: covered annually if you're considered high risk: (1) You have diabetes OR (2) Family history of glaucoma OR (3)  aged 48 and older OR (3)  American aged 72 and older  3  Osteoporosis Screening: covered every 2 years if you meet one of the following conditions: (1) Have a vertebral abnormality; (2) On glucocorticoid therapy for more than 3 months; (3) Have primary hyperparathyroidism; (4) On osteoporosis medications and need to assess response to drug therapy  5  HIV Screening: covered annually if you're between the age of 12-76  Also covered annually if you are younger than 13 and older than 72 with risk factors for HIV infection   For pregnant patients, it is covered up to 3 times per pregnancy  Immunizations:  Immunization Recommendations   Influenza Vaccine Annual influenza vaccination during flu season is recommended for all persons aged >= 6 months who do not have contraindications   Pneumococcal Vaccine (Prevnar and Pneumovax)  * Prevnar = PCV13  * Pneumovax = PPSV23 Adults 25-60 years old: 1-3 doses may be recommended based on certain risk factors  Adults 72 years old: Prevnar (PCV13) vaccine recommended followed by Pneumovax (PPSV23) vaccine  If already received PPSV23 since turning 65, then PCV13 recommended at least one year after PPSV23 dose  Hepatitis B Vaccine 3 dose series if at intermediate or high risk (ex: diabetes, end stage renal disease, liver disease)   Tetanus (Td) Vaccine - COST NOT COVERED BY MEDICARE PART B Following completion of primary series, a booster dose should be given every 10 years to maintain immunity against tetanus  Td may also be given as tetanus wound prophylaxis  Tdap Vaccine - COST NOT COVERED BY MEDICARE PART B Recommended at least once for all adults  For pregnant patients, recommended with each pregnancy  Shingles Vaccine (Shingrix) - COST NOT COVERED BY MEDICARE PART B  2 shot series recommended in those aged 48 and above     Health Maintenance Due:      Topic Date Due    Hepatitis C Screening  Never done    Colorectal Cancer Screening  09/28/2035 (Originally [birth year]1989)     Immunizations Due:      Topic Date Due    Pneumococcal Vaccine: 65+ Years (3 - PPSV23 or PCV20) 10/12/2016    COVID-19 Vaccine (3 - Moderna risk series) 12/10/2021    DTaP,Tdap,and Td Vaccines (2 - Td or Tdap) 02/22/2022    Influenza Vaccine (Season Ended) 09/01/2022     Advance Directives   What are advance directives? Advance directives are legal documents that state your wishes and plans for medical care  These plans are made ahead of time in case you lose your ability to make decisions for yourself   Advance directives can apply to any medical decision, such as the treatments you want, and if you want to donate organs  What are the types of advance directives? There are many types of advance directives, and each state has rules about how to use them  You may choose a combination of any of the following:  · Living will: This is a written record of the treatment you want  You can also choose which treatments you do not want, which to limit, and which to stop at a certain time  This includes surgery, medicine, IV fluid, and tube feedings  · Durable power of  for healthcare [facility]): This is a written record that states who you want to make healthcare choices for you when you are unable to make them for yourself  This person, called a proxy, is usually a family member or a friend  You may choose more than 1 proxy  · Do not resuscitate (DNR) order:  A DNR order is used in case your heart stops beating or you stop breathing  It is a request not to have certain forms of treatment, such as CPR  A DNR order may be included in other types of advance directives  · Medical directive: This covers the care that you want if you are in a coma, near death, or unable to make decisions for yourself  You can list the treatments you want for each condition  Treatment may include pain medicine, surgery, blood transfusions, dialysis, IV or tube feedings, and a ventilator (breathing machine)  · Values history: This document has questions about your views, beliefs, and how you feel and think about life  This information can help others choose the care that you would choose  Why are advance directives important? An advance directive helps you control your care  Although spoken wishes may be used, it is better to have your wishes written down  Spoken wishes can be misunderstood, or not followed  Treatments may be given even if you do not want them  An advance directive may make it easier for your family to make difficult choices about your care        © Copyright [employer] 2018 Information is for End User's use only and may not be sold, redistributed or otherwise used for commercial purposes   All illustrations and images included in CareNotes® are the copyrighted property of A D A M , Inc  or [facility] Rekha Mccarty

## 2022-06-21 NOTE — PROGRESS NOTES
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)    Heart failure, left, 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)    Gastroesophageal reflux disease, unspecified whether esophagitis present  -     omeprazole (PriLOSEC) 40 MG capsule; Take 1 capsule (40 mg total) by mouth daily as needed (acid reflux)    Gait disorder  -     Infant Foods (Follow-Up) POWD; HANDICAP PLACARD, medically recommended, <taxonomy [id number]> due to arthritic/orthopedic condition (#2,#5)        Recent data suggesting increased risk of ischemic CVA and chronic kidney damage with PPI use was advised  Return in about 6 months (around 12/21/2022) for Recheck  Subjective:      Patient ID: Jana Gong is a 67 y o  male  Chief Complaint   Patient presents with    Follow-up     Celestino [place]         HPI  Wt same  Follows diet  Watches daily weight  Walks slow  Worried about balance  Uses walker typically    The following portions of the patient's history were reviewed and updated as appropriate: allergies, current medications, past family history, past medical history, past social history, past surgical history and problem list     Review of Systems   Constitutional: Negative for fever  Respiratory: Negative for shortness of breath            Current Outpatient Medications   Medication Sig Dispense Refill    acetaminophen (TYLENOL) 325 mg tablet Take 2 tablets (650 mg total) by mouth every 6 (six) hours as needed for mild pain, headaches or fever  0    Ascorbic Acid (Vitamin C) 500 MG CAPS Take 500 mg by mouth daily      aspirin (ECOTRIN LOW STRENGTH) 81 mg EC tablet Take 81 mg by mouth daily      atorvastatin (LIPITOR) 20 mg tablet Take 1 tablet (20 mg total) by mouth daily 90 tablet 1    brimonidine tartrate 0 2 % ophthalmic solution Inject 0 2 % into the eye 2 (two) times a day      carvedilol (COREG) 12 5 mg tablet Take 1 tablet (12 5 mg total) by mouth in the morning and 1 tablet (12 5 mg total) in the evening   180 tablet 1    Cholecalciferol (Vitamin D3) 50 MCG (2000 UT) TABS Take 2,000 Units by mouth daily      co-enzyme Q-10 100 mg capsule Take 100 mg by mouth daily      ferrous sulfate 324 (65 Fe) mg TAKE ONE TABLET BY MOUTH BEFORE BREAKFAST      furosemide (LASIX) 20 mg tablet Take 1 tablet (20 mg total) by mouth if needed (For weight gain of 2-3 lb in a day and 5 lb in a week and then call Cardiology) 15 tablet 1    gabapentin (NEURONTIN) 600 MG tablet Take 1 tablet (600 mg total) by mouth 2 (two) times a day 180 tablet 0    glucose blood test strip Test blood sugar twice a day 100 each 3    Infant Foods (Follow-Up) POWD HANDICAP PLACARD, medically recommended, <taxonomy [id number]> due to arthritic/orthopedic condition (#2,#5) 99 g 1    lisinopril (ZESTRIL) 2 5 mg tablet Take 1 tablet (2 5 mg total) by mouth daily 30 tablet 0    metFORMIN (GLUCOPHAGE) 1000 MG tablet Take one tablet in AM and 0 5 tablet in PM  (Patient taking differently: Take 1,000 mg by mouth 1000 mg  Twice a day) 270 tablet 0    multivitamin (THERAGRAN) TABS Take 1 tablet by mouth daily      omeprazole (PriLOSEC) 40 MG capsule Take 1 capsule (40 mg total) by mouth daily as needed (acid reflux) 90 capsule 1    Probiotic Product (CULTURELLE PROBIOTICS) CHEW Chew daily      sitaGLIPtin (JANUVIA) 25 mg tablet Take one tablet by mouth once a day 90 tablet 3    spironolactone (ALDACTONE) 25 mg tablet Take 1 tablet (25 mg total) by mouth daily 30 tablet 0    acarbose (PRECOSE) 100 MG tablet Take 1 tablet (100 mg total) by mouth 3 (three) times a day with meals (Patient not taking: No sig reported) 270 tablet 3    tamsulosin (FLOMAX) 0 4 mg Take 1 capsule (0 4 mg total) by mouth daily with dinner (Patient not taking: No sig reported) 30 capsule 11    TRUEPLUS LANCETS 28G MISC Test 1x/d, E11 29 (Patient not taking: No sig reported)  0     No current facility-administered medications for this visit  Objective:    /70   Pulse 70   Temp (!) 96 2 °F (35 7 °C)   Resp 18   Ht 5' 11" (1 803 m)   Wt 74 6 kg (164 lb 6 4 oz)   SpO2 98%   BMI 22 93 kg/m²        Physical Exam  Vitals and nursing note reviewed  Constitutional:       General: He is not in acute distress  Appearance: He is well-developed  He is not ill-appearing  HENT:      Head: Normocephalic  Right Ear: Tympanic membrane normal       Left Ear: Tympanic membrane normal    Eyes:      General: No scleral icterus  Conjunctiva/sclera: Conjunctivae normal    Cardiovascular:      Rate and Rhythm: Normal rate and regular rhythm  Pulmonary:      Effort: Pulmonary effort is normal  No respiratory distress  Breath sounds: No stridor  Abdominal:      Palpations: Abdomen is soft  Musculoskeletal:         General: No deformity  Cervical back: Neck supple  Skin:     General: Skin is warm and dry  Coloration: Skin is not jaundiced or pale  Neurological:      Mental Status: He is alert  Motor: Weakness present  Gait: Gait abnormal    Psychiatric:         Behavior: Behavior normal          Thought Content:  Thought content normal                 Ruddy Eugene DO

## 2022-06-21 NOTE — TELEPHONE ENCOUNTER
Pt stopped in office and he stated that since he stopped taking Acarbose he has not had diarrhea in a week and the stool is no longer green, it is brown

## 2022-06-22 ENCOUNTER — OFFICE VISIT (OUTPATIENT)
Dept: DIABETES SERVICES | Facility: CLINIC | Age: 77
End: 2022-06-22
Payer: MEDICARE

## 2022-06-22 VITALS — BODY MASS INDEX: 23.02 KG/M2 | WEIGHT: 164.4 LBS | HEIGHT: 71 IN

## 2022-06-22 DIAGNOSIS — E11.40 TYPE 2 DIABETES MELLITUS WITH DIABETIC NEUROPATHY, WITHOUT LONG-TERM CURRENT USE OF INSULIN (HCC): Primary | ICD-10-CM

## 2022-06-22 PROCEDURE — 97803 MED NUTRITION INDIV SUBSEQ: CPT | Performed by: DIETITIAN, REGISTERED

## 2022-06-22 NOTE — PATIENT INSTRUCTIONS
Keep carbohydrates consistent to about 45 grams per meal an snacks to about 15 grams each  Midday meal and evening meal often only contained 15-30 grams of carbohydrate, try adding 6 oz yogurt or a serving of high fiber fruit such as apples, berries, tangerines, cherries, or pear

## 2022-06-22 NOTE — PROGRESS NOTES
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)    Coronary artery disease involving native coronary artery of native heart without angina pectoris    Gait disorder    Relevant Medications    Infant Foods (Follow-Up) POWD           Preventive health issues were discussed with patient, and age appropriate screening tests were ordered as noted in patient's After Visit Summary  Personalized health advice and appropriate referrals for health education or preventive services given if needed, as noted in patient's After Visit Summary       History of Present Illness:     Patient presents for a Medicare Wellness Visit    HPI   Patient Care Team:  Anant Deluna DO as PCP - General  Emile Vizcarra MD (Gastroenterology)  DO Darshan Hyde DPM (Podiatry)  Marie Krishnamurthy MD (Ophthalmology)  Gurwinder Garza MD (Cardiology)  Tino Hayden MD (Vascular Surgery)  Cristina Hawthorne MD (Urology)  Frank Graham MD (Radiation Oncology)     Review of Systems:     Review of Systems     Problem List:     Patient Active Problem List   Diagnosis    Allergic rhinitis    Arthropathy    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 ([facility] )    Coronary artery disease involving native coronary artery of native heart without angina pectoris    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 mellitus ([facility] )    Diabetic neuropathy ([address]    GE reflux    Lumbar radiculopathy    Rosacea    Seborrheic dermatitis    Type 2 diabetes mellitus with diabetic neuropathy ([address]    Occlusion of femoral-popliteal bypass graft ([facility] )    Prostate cancer screening    Dyslipidemia    Screening for AAA (aortic abdominal aneurysm)    Medicare annual wellness visit, subsequent    Type 2 diabetes mellitus with hyperglycemia, without long-term current use of insulin ([facility])    Elevated PSA    Embolism and thrombosis of arteries of the lower extremities ([facility])    Benign essential hypertension    Bilateral leg weakness    Anemia due to stage 3a chronic kidney disease ([facility])    Prostate cancer ([facility] )    Screening for cardiovascular, respiratory, and genitourinary diseases    Insomnia, persistent    Weight loss    Orthostatic hypotension    SIADH (syndrome of inappropriate ADH production) ([facility])    Chronic right-sided low back pain without sciatica    Hypokalemia    Acute on chronic combined systolic and diastolic congestive heart failure ([facility])    Heart failure, left, 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% ([facility])    Chronic combined systolic and diastolic heart failure, NYHA class 2 ([facility])    Edema of both feet    Black stools    Diarrhea    Gait disorder      Past Medical and Surgical History:     Past Medical History:   Diagnosis Date    Acquired hallux valgus     last assessed: 8/1/2013    Allergic rhinitis     Anemia 10/26/2010    Atrophy of nail     last assessed: 7/7/2015    Chronic kidney disease     chronic renal insufficiency    Coronary artery disease     Deformity of ankle and foot, acquired     last assessed: 2/22/2016    Difficulty walking     last assessed: 12/14/2015    Dysesthesia     last assessed: 11/25/2016    Hammer toe     unspecified laterality; last assessed: 8/1/2013    Hypertension     Onychomycosis of toenail     last assessed: 2/22/2016    Peripheral vascular disease ([facility] )     left SFA stent in 2010    Pes planus     unspecified laterality; last assessed: 8/1/2013    Pneumonia     last assessed: 2/27/2016    Prostate cancer ([facility] )     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     last assessed: 8/15/2016    Type 2 diabetes mellitus ([facility] ) 07/26/2010    Viral warts last assessed: 7/24/2015     Past Surgical History:   Procedure Laterality Date    CARDIAC CATHETERIZATION  07/29/2010    CATARACT EXTRACTION, BILATERAL Bilateral     R 1999, L 2008    COLONOSCOPY  2011    FEMORAL ARTERY - POPLITEAL ARTERY BYPASS GRAFT  09/23/2013    FOOT SURGERY      bone spur removed    LEG SURGERY Bilateral     repair; L 8/20/2010 and 7/26/2012    ND PLACE RADIOTHER DEVICE/MARKER, PROSTATE N/A 6/22/2021    Procedure: INSERTION OF FIDUCIAL MARKER (TRANSRECTAL ULTRASOUND GUIDANCE), SPACEOAR;  Surgeon: Eusebio Otero MD;  Location: [facility];  Service: Urology    ROTATOR CUFF REPAIR Left 2009    SHOULDER SURGERY Right 2005    collar bone      Family History:     Family History   Problem Relation Age of Onset    Heart disease Father     Heart attack Father         acute myocardial infarction    Heart disease Sister     Heart attack Sister         acute myocardial infarction    Heart disease Paternal Grandfather     Aortic aneurysm Mother     Throat cancer Maternal Grandfather       Social History:     Social History     Socioeconomic History    Marital status: /Civil Union     Spouse name: None    Number of children: None    Years of education: None    Highest education level: None   Occupational History    Occupation: retired from  work   Tobacco Use    Smoking status: Never Smoker    Smokeless tobacco: Never Used   Vaping Use    Vaping Use: Never used   Substance and Sexual Activity    Alcohol use: Yes     Comment: being a social drinker/rare    Drug use: No    Sexual activity: None   Other Topics Concern    None   Social History Narrative    None     Social Determinants of Health     Financial Resource Strain: Not on file   Food Insecurity: No Food Insecurity    Worried About Running Out of Food in the Last Year: Never true    Erinn of Food in the Last Year: Never true   Transportation Needs: No Transportation Needs    Lack of Transportation (Medical): No    Lack of Transportation (Non-Medical): No   Physical Activity: Not on file   Stress: Not on file   Social Connections: Not on file   Intimate Partner Violence: Not on file   Housing Stability: Low Risk     Unable to Pay for Housing in the Last Year: No    Number of Places Lived in the Last Year: 1    Unstable Housing in the Last Year: No      Medications and Allergies:     Current Outpatient Medications   Medication Sig Dispense Refill    acetaminophen (TYLENOL) 325 mg tablet Take 2 tablets (650 mg total) by mouth every 6 (six) hours as needed for mild pain, headaches or fever  0    Ascorbic Acid (Vitamin C) 500 MG CAPS Take 500 mg by mouth daily      aspirin (ECOTRIN LOW STRENGTH) 81 mg EC tablet Take 81 mg by mouth daily      atorvastatin (LIPITOR) 20 mg tablet Take 1 tablet (20 mg total) by mouth daily 90 tablet 1    brimonidine tartrate 0 2 % ophthalmic solution Inject 0 2 % into the eye 2 (two) times a day      carvedilol (COREG) 12 5 mg tablet Take 1 tablet (12 5 mg total) by mouth in the morning and 1 tablet (12 5 mg total) in the evening   180 tablet 1    Cholecalciferol (Vitamin D3) 50 MCG (2000 UT) TABS Take 2,000 Units by mouth daily      co-enzyme Q-10 100 mg capsule Take 100 mg by mouth daily      ferrous sulfate 324 (65 Fe) mg TAKE ONE TABLET BY MOUTH BEFORE BREAKFAST      furosemide (LASIX) 20 mg tablet Take 1 tablet (20 mg total) by mouth if needed (For weight gain of 2-3 lb in a day and 5 lb in a week and then call Cardiology) 15 tablet 1    gabapentin (NEURONTIN) 600 MG tablet Take 1 tablet (600 mg total) by mouth 2 (two) times a day 180 tablet 0    glucose blood test strip Test blood sugar twice a day 100 each 3    Infant Foods (Follow-Up) POWD HANDICAP PLACARD, medically recommended, <taxonomy [id number]> due to arthritic/orthopedic condition (#2,#5) 99 g 1    lisinopril (ZESTRIL) 2 5 mg tablet Take 1 tablet (2 5 mg total) by mouth daily 30 tablet 0    metFORMIN (GLUCOPHAGE) 1000 MG tablet Take one tablet in AM and 0 5 tablet in PM  (Patient taking differently: Take 1,000 mg by mouth 1000 mg  Twice a day) 270 tablet 0    multivitamin (THERAGRAN) TABS Take 1 tablet by mouth daily      omeprazole (PriLOSEC) 40 MG capsule Take 1 capsule (40 mg total) by mouth daily as needed (acid reflux) 90 capsule 1    Probiotic Product (CULTURELLE PROBIOTICS) CHEW Chew daily      sitaGLIPtin (JANUVIA) 25 mg tablet Take one tablet by mouth once a day 90 tablet 3    spironolactone (ALDACTONE) 25 mg tablet Take 1 tablet (25 mg total) by mouth daily 30 tablet 0    acarbose (PRECOSE) 100 MG tablet Take 1 tablet (100 mg total) by mouth 3 (three) times a day with meals (Patient not taking: No sig reported) 270 tablet 3    tamsulosin (FLOMAX) 0 4 mg Take 1 capsule (0 4 mg total) by mouth daily with dinner (Patient not taking: No sig reported) 30 capsule 11    TRUEPLUS LANCETS 28G MISC Test 1x/d, E11 29 (Patient not taking: No sig reported)  0     No current facility-administered medications for this visit       No Known Allergies   Immunizations:     Immunization History   Administered Date(s) Administered    COVID-19 MODERNA VACC 0 25 ML IM BOOSTER 11/12/2021    COVID-19 MODERNA VACC 0 5 ML IM 02/10/2021, 03/10/2021    Influenza Split High Dose Preservative Free IM 09/18/2012, 09/16/2013, 10/13/2014, 10/07/2016, 09/20/2017    Influenza, high dose seasonal 0 7 mL 09/24/2018, 10/25/2019    Influenza, seasonal, injectable 09/21/2011, 09/25/2015    Pneumococcal Conjugate 13-Valent 10/12/2015    Pneumococcal Polysaccharide PPV23 08/25/2010    Tdap 02/22/2012    Zoster 09/18/2012      Health Maintenance:         Topic Date Due    Hepatitis C Screening  Never done    Colorectal Cancer Screening  09/28/2035 (Originally 11/16/1990)         Topic Date Due    Pneumococcal Vaccine: 65+ Years (3 - PPSV23 or PCV20) 10/12/2016    COVID-19 Vaccine (3 - Moderna risk series) 12/10/2021    DTaP,Tdap,and Td Vaccines (2 - Td or Tdap) 02/22/2022    Influenza Vaccine (Season Ended) 09/01/2022      Medicare Screening Tests and Risk Assessments:     Yaima Umana is here for his Subsequent Wellness visit  Last Medicare Wellness visit information reviewed, patient interviewed and updates made to the record today  Health Risk Assessment:   Patient rates overall health as fair  Patient feels that their physical health rating is same  Patient is very satisfied with their life  Eyesight was rated as same  Hearing was rated as same  Patient feels that their emotional and mental health rating is same  Patients states they are never, rarely angry  Patient states they are often unusually tired/fatigued  Pain experienced in the last 7 days has been none  Patient states that he has experienced no weight loss or gain in last 6 months  Fall Risk Screening: In the past year, patient has experienced: no history of falling in past year      Home Safety:  Patient has trouble with stairs inside or outside of their home  Patient has working smoke alarms and has working carbon monoxide detector  Home safety hazards include: none  Nutrition:   Current diet is Diabetic  Medications:   Patient is currently taking over-the-counter supplements  OTC medications include: see medication list  Patient is able to manage medications  Activities of Daily Living (ADLs)/Instrumental Activities of Daily Living (IADLs):   Walk and transfer into and out of bed and chair?: Yes  Dress and groom yourself?: Yes    Bathe or shower yourself?: Yes    Feed yourself? Yes  Do your laundry/housekeeping?: Yes  Manage your money, pay your bills and track your expenses?: Yes  Make your own meals?: Yes    Do your own shopping?: Yes    Previous Hospitalizations:   Any hospitalizations or ED visits within the last 12 months?: No      Advance Care Planning:   Living will: Yes    Durable POA for healthcare:  Yes    Advanced directive: Yes    End of Life Decisions reviewed with patient: No      Cognitive Screening:   Provider or family/friend/caregiver concerned regarding cognition?: No    PREVENTIVE SCREENINGS      Cardiovascular Screening:    General: Screening Current      Diabetes Screening:     General: Screening Not Indicated and History Diabetes      Colorectal Cancer Screening:     General: Screening Not Indicated      Prostate Cancer Screening:    General: History Prostate Cancer and Screening Not Indicated      Osteoporosis Screening:    General: Screening Not Indicated      Abdominal Aortic Aneurysm (AAA) Screening:        General: Screening Not Indicated      Lung Cancer Screening:     General: Screening Not Indicated      Hepatitis C Screening:    General: Screening Not Indicated    Screening, Brief Intervention, and Referral to Treatment (SBIRT)    Screening  Typical number of drinks in a day: 0  Typical number of drinks in a week: 0  Interpretation: Low risk drinking behavior  Single Item Drug Screening:  How often have you used an illegal drug (including marijuana) or a prescription medication for non-medical reasons in the past year? never    Single Item Drug Screen Score: 0  Interpretation: Negative screen for possible drug use disorder    Other Counseling Topics:   Car/seat belt/driving safety and regular weightbearing exercise       No exam data present     Physical Exam:     /70   Pulse 70   Temp (!) 96 2 °F (35 7 °C)   Resp 18   Ht 5' 11" (1 803 m)   Wt 74 6 kg (164 lb 6 4 oz)   SpO2 98%   BMI 22 93 kg/m²     Physical Exam     Ed Islas DO

## 2022-06-22 NOTE — PROGRESS NOTES
Medical Nutrition Therapy      Assessment    Chief complaint T2DM    Visit Type: Follow-up visit    HPI: Robert García returned for follow-up today  Most recent HbA1c 7 6%  BG log provided by patient today and scanned under media  Before breakfast range 113-166 mg/dL, before lunch 148-164 mg/dL, before dinner 146-162 mg/dL, for bedtime 147-154 mg/dL  Pernells food record reveals inconsistent carbohydrate intake and inadequate vegetable intake  Overall, Pernells meals record in 3 day food record range between 15-55 g carbohydrate per meal   His breakfast usually contains an of carbohydrate, however his midday meal and evening meal usually only range between 15-30 g carbohydrate per meal    Based on meal plan review and food record provided education about being consistent with having 45 g carbohydrate per meal and increasing intake of non starchy vegetables  Fernie Chen verbalized good understanding of topics and recommendations discussed today and will call with any questions prior to next follow-up appointment  Recommend follow-up in 6 months  Ht Readings from Last 1 Encounters:   06/21/22 5' 11" (1 803 m)     Wt Readings from Last 2 Encounters:   06/21/22 74 6 kg (164 lb 6 4 oz)   06/20/22 74 8 kg (165 lb)     Weight Change: No    Medical Diagnosis/reason for visit E11 40 (ICD-10-CM) - Type 2 diabetes mellitus with diabetic neuropathy, without long-term current use of insulin ([facility] )    Food Log:   Please see scanned 3 day food diary      Beverages: decaf coffee, water, twilight flavoring in water  Eating out/Take out:once every 2 weeks    Exercise walking every day for 30 minutes      Calorie needs 1800 kcals/day           Carbs: 45 g/meal, 15 g/snack                Fat: 5 servings/day    Protein:8 oz/day     Nutrition Diagnosis:  Inconsistent carbohydrate intake  intake related to Food and nutrition related knowledge deficit concerning appropriate amount and timing of carbohydrate intake as evidenced by  Estimated carbohydrate intake that is different from recommended types or ingested on an irregular basis     Intervention: increased fiber intake, carbohydrate counting, meal planning and food diary      Treatment Goals: Patient understands education and recommendations, Patient will consume 25-35 grams of fiber a day, Patient will increase their intake of plant based foods and Patient will count carbohydrates    Monitoring and evaluation:    Term code indicator  FH 1 6 3 Carbohydrate Intake Criteria: 45 g carbohydrate per meal, 0-15 g carbohydrate per snack  Term code indicator  FH 4 4 Mealtime Behavior Criteria: Three meals per day, 4-5 hours apart  Up to 3 snacks per day, at least 2 hours apart from meals  Patients Response to Instruction:  Sugar Castro  Expected Compliancegood    Thank you for coming to the [facility] for education today  Please feel free to call with any questions or concerns  Start:  9:58 a m  Stop:  10:24 a m    Referred by: MD Bryant Kinney, [address]  [facility] [id number]

## 2022-07-05 ENCOUNTER — APPOINTMENT (OUTPATIENT)
Dept: LAB | Facility: CLINIC | Age: 77
End: 2022-07-05
Payer: MEDICARE

## 2022-07-05 DIAGNOSIS — E11.40 TYPE 2 DIABETES MELLITUS WITH DIABETIC NEUROPATHY, WITHOUT LONG-TERM CURRENT USE OF INSULIN (HCC): ICD-10-CM

## 2022-07-05 DIAGNOSIS — N18.2 CKD STAGE 2 DUE TO TYPE 2 DIABETES MELLITUS (HCC): ICD-10-CM

## 2022-07-05 DIAGNOSIS — E22.2 SIADH (SYNDROME OF INAPPROPRIATE ADH PRODUCTION) (HCC): ICD-10-CM

## 2022-07-05 DIAGNOSIS — E11.22 CKD STAGE 2 DUE TO TYPE 2 DIABETES MELLITUS (HCC): ICD-10-CM

## 2022-07-05 LAB
ANION GAP SERPL CALCULATED.3IONS-SCNC: 5 MMOL/L (ref 4–13)
BUN SERPL-MCNC: 12 MG/DL (ref 5–25)
CALCIUM SERPL-MCNC: 9.3 MG/DL (ref 8.3–10.1)
CHLORIDE SERPL-SCNC: 103 MMOL/L (ref 100–108)
CO2 SERPL-SCNC: 27 MMOL/L (ref 21–32)
CORTIS AM PEAK SERPL-MCNC: 22.7 UG/DL (ref 4.2–22.4)
CREAT SERPL-MCNC: 0.9 MG/DL (ref 0.6–1.3)
EST. AVERAGE GLUCOSE BLD GHB EST-MCNC: 151 MG/DL
GFR SERPL CREATININE-BSD FRML MDRD: 82 ML/MIN/1.73SQ M
GLUCOSE P FAST SERPL-MCNC: 150 MG/DL (ref 65–99)
HBA1C MFR BLD: 6.9 %
OSMOLALITY UR: 275 MMOL/KG
POTASSIUM SERPL-SCNC: 4.4 MMOL/L (ref 3.5–5.3)
SODIUM 24H UR-SCNC: 46 MOL/L
SODIUM SERPL-SCNC: 135 MMOL/L (ref 136–145)

## 2022-07-05 PROCEDURE — 82533 TOTAL CORTISOL: CPT

## 2022-07-05 PROCEDURE — 82985 ASSAY OF GLYCATED PROTEIN: CPT

## 2022-07-05 PROCEDURE — 84300 ASSAY OF URINE SODIUM: CPT | Performed by: PHYSICIAN ASSISTANT

## 2022-07-05 PROCEDURE — 83935 ASSAY OF URINE OSMOLALITY: CPT | Performed by: PHYSICIAN ASSISTANT

## 2022-07-05 PROCEDURE — 80048 BASIC METABOLIC PNL TOTAL CA: CPT

## 2022-07-05 PROCEDURE — 36415 COLL VENOUS BLD VENIPUNCTURE: CPT

## 2022-07-05 PROCEDURE — 83036 HEMOGLOBIN GLYCOSYLATED A1C: CPT

## 2022-07-06 ENCOUNTER — TELEPHONE (OUTPATIENT)
Dept: NEPHROLOGY | Facility: CLINIC | Age: 77
End: 2022-07-06

## 2022-07-06 LAB — FRUCTOSAMINE SERPL-SCNC: 300 UMOL/L (ref 0–285)

## 2022-07-06 NOTE — TELEPHONE ENCOUNTER
----- Message from [place], [place] sent at 7/5/2022  5:00 PM EDT -----  Sodium level improved to 135 which is almost normal   Continue current treatment regimen  Will continue to monitor

## 2022-07-10 ENCOUNTER — HOSPITAL ENCOUNTER (EMERGENCY)
Facility: HOSPITAL | Age: 77
Discharge: HOME | End: 2022-07-10
Attending: EMERGENCY MEDICINE
Payer: MEDICARE

## 2022-07-10 VITALS
OXYGEN SATURATION: 99 % | RESPIRATION RATE: 18 BRPM | BODY MASS INDEX: 23.57 KG/M2 | TEMPERATURE: 98.5 F | WEIGHT: 169 LBS | SYSTOLIC BLOOD PRESSURE: 198 MMHG | HEART RATE: 65 BPM | DIASTOLIC BLOOD PRESSURE: 81 MMHG

## 2022-07-10 DIAGNOSIS — T14.8XXA ABRASION: Primary | ICD-10-CM

## 2022-07-10 PROCEDURE — 90715 TDAP VACCINE 7 YRS/> IM: CPT | Performed by: EMERGENCY MEDICINE

## 2022-07-10 PROCEDURE — 99282 EMERGENCY DEPT VISIT SF MDM: CPT | Performed by: EMERGENCY MEDICINE

## 2022-07-10 PROCEDURE — 99283 EMERGENCY DEPT VISIT LOW MDM: CPT

## 2022-07-10 PROCEDURE — 90471 IMMUNIZATION ADMIN: CPT

## 2022-07-10 RX ORDER — GINSENG 100 MG
1 CAPSULE ORAL ONCE
Status: COMPLETED | OUTPATIENT
Start: 2022-07-10 | End: 2022-07-10

## 2022-07-10 RX ADMIN — BACITRACIN ZINC 1 LARGE APPLICATION: 500 OINTMENT TOPICAL at 19:31

## 2022-07-10 RX ADMIN — TETANUS TOXOID, REDUCED DIPHTHERIA TOXOID AND ACELLULAR PERTUSSIS VACCINE, ADSORBED 0.5 ML: 5; 2.5; 8; 8; 2.5 SUSPENSION INTRAMUSCULAR at 19:39

## 2022-07-11 NOTE — PROGRESS NOTES
UROLOGY PROGRESS NOTE   Patient Identifiers: Dary Marti (MRN [id number])  Date of Service: 7/11/2022    Subjective:    48-year-old man history of [place] 10 stage III prostate cancer  He was started on androgen deprivation therapy and completed radiation treatment  His PSA< 0 1  He has no weight loss or bone pain  Reason for visit:  Prostate cancer follow-up      Objective:     VITALS:    There were no vitals filed for this visit          LABS:  Lab Results   Component Value Date    HGB 10 3 (L) 03/01/2022    HCT 33 4 (L) 03/01/2022    WBC 6 21 03/01/2022     03/01/2022   ]    Lab Results   Component Value Date     04/17/2014    K 4 4 07/05/2022     07/05/2022    CO2 27 07/05/2022    BUN 12 07/05/2022    CREATININE 0 90 07/05/2022    CALCIUM 9 3 07/05/2022    GLUCOSE 137 04/17/2014   ]        INPATIENT MEDS:    Current Outpatient Medications:     acarbose (PRECOSE) 100 MG tablet, Take 1 tablet (100 mg total) by mouth 3 (three) times a day with meals (Patient not taking: No sig reported), Disp: 270 tablet, Rfl: 3    acetaminophen (TYLENOL) 325 mg tablet, Take 2 tablets (650 mg total) by mouth every 6 (six) hours as needed for mild pain, headaches or fever, Disp: , Rfl: 0    Ascorbic Acid (Vitamin C) 500 MG CAPS, Take 500 mg by mouth daily, Disp: , Rfl:     aspirin (ECOTRIN LOW STRENGTH) 81 mg EC tablet, Take 81 mg by mouth daily, Disp: , Rfl:     atorvastatin (LIPITOR) 20 mg tablet, Take 1 tablet (20 mg total) by mouth daily, Disp: 90 tablet, Rfl: 1    brimonidine tartrate 0 2 % ophthalmic solution, Inject 0 2 % into the eye 2 (two) times a day, Disp: , Rfl:     carvedilol (COREG) 12 5 mg tablet, Take 1 tablet (12 5 mg total) by mouth in the morning and 1 tablet (12 5 mg total) in the evening , Disp: 180 tablet, Rfl: 1    Cholecalciferol (Vitamin D3) 50 MCG (2000 UT) TABS, Take 2,000 Units by mouth daily, Disp: , Rfl:     co-enzyme Q-10 100 mg capsule, Take 100 mg by mouth daily, Disp: , Rfl:     ferrous sulfate 324 (65 Fe) mg, TAKE ONE TABLET BY MOUTH BEFORE BREAKFAST, Disp: , Rfl:     furosemide (LASIX) 20 mg tablet, Take 1 tablet (20 mg total) by mouth if needed (For weight gain of 2-3 lb in a day and 5 lb in a week and then call Cardiology), Disp: 15 tablet, Rfl: 1    gabapentin (NEURONTIN) 600 MG tablet, Take 1 tablet (600 mg total) by mouth 2 (two) times a day, Disp: 180 tablet, Rfl: 0    glucose blood test strip, Test blood sugar twice a day, Disp: 100 each, Rfl: 3    Infant Foods (Follow-Up) POWD, HANDICAP PLACARD, medically recommended, <taxonomy [id number]> due to arthritic/orthopedic condition (#2,#5), Disp: 99 g, Rfl: 1    lisinopril (ZESTRIL) 2 5 mg tablet, Take 1 tablet (2 5 mg total) by mouth daily, Disp: 30 tablet, Rfl: 0    metFORMIN (GLUCOPHAGE) 1000 MG tablet, Take one tablet in AM and 0 5 tablet in PM  (Patient taking differently: Take 1,000 mg by mouth 1000 mg  Twice a day), Disp: 270 tablet, Rfl: 0    multivitamin (THERAGRAN) TABS, Take 1 tablet by mouth daily, Disp: , Rfl:     omeprazole (PriLOSEC) 40 MG capsule, Take 1 capsule (40 mg total) by mouth daily as needed (acid reflux), Disp: 90 capsule, Rfl: 1    Probiotic Product (CULTURELLE PROBIOTICS) CHEW, Chew daily, Disp: , Rfl:     sitaGLIPtin (JANUVIA) 25 mg tablet, Take one tablet by mouth once a day, Disp: 90 tablet, Rfl: 3    spironolactone (ALDACTONE) 25 mg tablet, Take 1 tablet (25 mg total) by mouth daily, Disp: 30 tablet, Rfl: 0    tamsulosin (FLOMAX) 0 4 mg, Take 1 capsule (0 4 mg total) by mouth daily with dinner (Patient not taking: No sig reported), Disp: 30 capsule, Rfl: 11    TRUEPLUS LANCETS 28G MISC, Test 1x/d, E11 29 (Patient not taking: No sig reported), Disp: , Rfl: 0  No current facility-administered medications for this visit  Physical Exam:   There were no vitals taken for this visit    GEN: no acute distress    RESP: breathing comfortably with no accessory muscle use ABD: soft, non-tender, non-distended   INCISION:    EXT: no significant peripheral edema         RADIOLOGY:   None     Assessment:   1   [place] 10 prostate cancer status post radiation and ongoing ADT     Plan:   -treatment time 2-3 years-treatment initiated March 2021  -Lupron 45 mg given today  -follow-up in 6 months with PSA prior to visit  -

## 2022-07-11 NOTE — ED PROVIDER NOTES
History  Chief Complaint   Patient presents with   Denys Majors in grass and rolled down a small hill  Denies LOC, neck pain or HA  Skin tear left elbow and laceration to right shin  Needs tetanus     69-year-old male presents to the ED after he fell in the grass rolled down a small hill denies any pain to any of his joints he is awake alert states he got up to walk back up denies loss of consciousness no neck pain or headache does have a small skin tear on his left elbow and a abrasion on his right shin  States he needs a tetanus shot  No fevers chills nausea vomiting or diarrhea no other complaints      History provided by:  Patient   used: No        Prior to Admission Medications   Prescriptions Last Dose Informant Patient Reported? Taking?    Ascorbic Acid (Vitamin C) 500 MG CAPS  Self Yes No   Sig: Take 500 mg by mouth daily   Cholecalciferol (Vitamin D3) 50 MCG (2000 UT) TABS  Self Yes No   Sig: Take 2,000 Units by mouth daily   Infant Foods (Follow-Up) POWD   No No   Sig: HANDICAP PLACARD, medically recommended, <taxonomy [id number]> due to arthritic/orthopedic condition (#2,#5)   Probiotic Product (CULTURELLE PROBIOTICS) CHEW  Self Yes No   Sig: Chew daily   TRUEPLUS LANCETS 28G MISC   No No   Sig: Test 1x/d, E11 29   Patient not taking: No sig reported   acarbose (PRECOSE) 100 MG tablet   No No   Sig: Take 1 tablet (100 mg total) by mouth 3 (three) times a day with meals   Patient not taking: No sig reported   acetaminophen (TYLENOL) 325 mg tablet  Self No No   Sig: Take 2 tablets (650 mg total) by mouth every 6 (six) hours as needed for mild pain, headaches or fever   aspirin (ECOTRIN LOW STRENGTH) 81 mg EC tablet 7/10/2022 at Unknown time Self Yes Yes   Sig: Take 81 mg by mouth daily   atorvastatin (LIPITOR) 20 mg tablet  Self No No   Sig: Take 1 tablet (20 mg total) by mouth daily   brimonidine tartrate 0 2 % ophthalmic solution  Self Yes No   Sig: Inject 0 2 % into the eye 2 (two) times a day   carvedilol (COREG) 12 5 mg tablet  Self No No   Sig: Take 1 tablet (12 5 mg total) by mouth in the morning and 1 tablet (12 5 mg total) in the evening     co-enzyme Q-10 100 mg capsule  Self Yes No   Sig: Take 100 mg by mouth daily   ferrous sulfate 324 (65 Fe) mg   Yes No   Sig: TAKE ONE TABLET BY MOUTH BEFORE BREAKFAST   furosemide (LASIX) 20 mg tablet   No No   Sig: Take 1 tablet (20 mg total) by mouth if needed (For weight gain of 2-3 lb in a day and 5 lb in a week and then call Cardiology)   gabapentin (NEURONTIN) 600 MG tablet  Self No No   Sig: Take 1 tablet (600 mg total) by mouth 2 (two) times a day   glucose blood test strip  Self No No   Sig: Test blood sugar twice a day   lisinopril (ZESTRIL) 2 5 mg tablet  Self No No   Sig: Take 1 tablet (2 5 mg total) by mouth daily   metFORMIN (GLUCOPHAGE) 1000 MG tablet   No No   Sig: Take one tablet in AM and 0 5 tablet in PM    Patient taking differently: Take 1,000 mg by mouth 1000 mg  Twice a day   multivitamin (THERAGRAN) TABS  Self Yes No   Sig: Take 1 tablet by mouth daily   omeprazole (PriLOSEC) 40 MG capsule   No No   Sig: Take 1 capsule (40 mg total) by mouth daily as needed (acid reflux)   sitaGLIPtin (JANUVIA) 25 mg tablet  Self No No   Sig: Take one tablet by mouth once a day   spironolactone (ALDACTONE) 25 mg tablet  Self No No   Sig: Take 1 tablet (25 mg total) by mouth daily   tamsulosin (FLOMAX) 0 4 mg   No No   Sig: Take 1 capsule (0 4 mg total) by mouth daily with dinner   Patient not taking: No sig reported      Facility-Administered Medications: None       Past Medical History:   Diagnosis Date    Acquired hallux valgus     last assessed: 8/1/2013    Allergic rhinitis     Anemia 10/26/2010    Atrophy of nail     last assessed: 7/7/2015    Chronic kidney disease     chronic renal insufficiency    Coronary artery disease     Deformity of ankle and foot, acquired     last assessed: 2/22/2016    Difficulty walking     last assessed: 12/14/2015    Dysesthesia     last assessed: 11/25/2016    Hammer toe     unspecified laterality; last assessed: 8/1/2013    Hypertension     Onychomycosis of toenail     last assessed: 2/22/2016    Peripheral vascular disease ([place] [address] )     left SFA stent in 2010    Pes planus     unspecified laterality; last assessed: 8/1/2013    Pneumonia     last assessed: 2/27/2016    Prostate cancer ([place] [address] )     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     last assessed: 8/15/2016    Type 2 diabetes mellitus ([facility] ) 07/26/2010    Viral warts     last assessed: 7/24/2015       Past Surgical History:   Procedure Laterality Date    CARDIAC CATHETERIZATION  07/29/2010    CATARACT EXTRACTION, BILATERAL Bilateral     R 1999, L 2008    COLONOSCOPY  2011    FEMORAL ARTERY - POPLITEAL ARTERY BYPASS GRAFT  09/23/2013    FOOT SURGERY      bone spur removed    LEG SURGERY Bilateral     repair; L 8/20/2010 and 7/26/2012    AR PLACE RADIOTHER DEVICE/MARKER, PROSTATE N/A 6/22/2021    Procedure: INSERTION OF FIDUCIAL MARKER (TRANSRECTAL ULTRASOUND GUIDANCE), SPACEOAR;  Surgeon: Alejandra Padilla MD;  Location: [facility];  Service: Urology    ROTATOR CUFF REPAIR Left 2009    SHOULDER SURGERY Right 2005    collar bone       Family History   Problem Relation Age of Onset    Heart disease Father     Heart attack Father         acute myocardial infarction    Heart disease Sister     Heart attack Sister         acute myocardial infarction    Heart disease Paternal Grandfather     Aortic aneurysm Mother     Throat cancer Maternal Grandfather      I have reviewed and agree with the history as documented      E-Cigarette/Vaping    E-Cigarette Use Never User      E-Cigarette/Vaping Substances    Nicotine No     THC No     CBD No     Flavoring No     Other No     Unknown No      Social History     Tobacco Use    Smoking status: Never Smoker    Smokeless tobacco: Never Used   Vaping Use    Vaping Use: Never used   Substance Use Topics    Alcohol use: Yes     Comment: being a social drinker/rare    Drug use: No       Review of Systems   Constitutional: Negative for activity change, chills, diaphoresis and fever  HENT: Negative for congestion, ear pain, nosebleeds, sore throat, trouble swallowing and voice change  Eyes: Negative for pain, discharge and redness  Respiratory: Negative for apnea, cough, choking, shortness of breath, wheezing and stridor  Cardiovascular: Negative for chest pain and palpitations  Gastrointestinal: Negative for abdominal distention, abdominal pain, constipation, diarrhea, nausea and vomiting  Endocrine: Negative for polydipsia  Genitourinary: Negative for difficulty urinating, dysuria, flank pain, frequency, hematuria and urgency  Musculoskeletal: Negative for back pain, gait problem, joint swelling, myalgias, neck pain and neck stiffness  Skin: Positive for wound  Negative for pallor and rash  Neurological: Negative for dizziness, tremors, syncope, speech difficulty, weakness, numbness and headaches  Hematological: Negative for adenopathy  Psychiatric/Behavioral: Negative for confusion, hallucinations, self-injury and suicidal ideas  The patient is not nervous/anxious  Physical Exam  Physical Exam  Vitals and nursing note reviewed  Constitutional:       General: He is not in acute distress  Appearance: He is well-developed  He is not diaphoretic  HENT:      Head: Normocephalic and atraumatic  Right Ear: External ear normal       Left Ear: External ear normal       Nose: Nose normal    Eyes:      Conjunctiva/sclera: Conjunctivae normal       Pupils: Pupils are equal, round, and reactive to light  Cardiovascular:      Rate and Rhythm: Normal rate and regular rhythm  Heart sounds: Normal heart sounds  Pulmonary:      Effort: Pulmonary effort is normal       Breath sounds: Normal breath sounds     Abdominal:      General: Bowel sounds are normal       Palpations: Abdomen is soft  Musculoskeletal:         General: Signs of injury present  Normal range of motion  Cervical back: Normal range of motion and neck supple  Comments: Abrasions to left elbow and right shin   Skin:     General: Skin is warm and dry  Neurological:      Mental Status: He is alert and oriented to person, place, and time  Deep Tendon Reflexes: Reflexes are normal and symmetric  Psychiatric:         Behavior: Behavior is cooperative  Vital Signs  ED Triage Vitals [07/10/22 1839]   Temperature Pulse Respirations Blood Pressure SpO2   98 5 °F (36 9 °C) 65 18 (!) 198/81 99 %      Temp src Heart Rate Source Patient Position - Orthostatic VS BP Location FiO2 (%)   -- -- -- -- --      Pain Score       No Pain           Vitals:    07/10/22 1839   BP: (!) 198/81   Pulse: 65         Visual Acuity      ED Medications  Medications   bacitracin topical ointment 1 large application (1 large application Topical Given 7/10/22 1931)   tetanus-diphtheria-acellular pertussis (BOOSTRIX) IM injection 0 5 mL (0 5 mL Intramuscular Given 7/10/22 1939)       Diagnostic Studies  Results Reviewed     None                 No orders to display              Procedures  Procedures         ED Course                               SBIRT 20yo+    Flowsheet Row Most Recent Value   SBIRT (25 yo +)    In order to provide better care to our patients, we are screening all of our patients for alcohol and drug use  Would it be okay to ask you these screening questions? No Filed at: 07/10/2022 1851                    MDM    Disposition  Final diagnoses:   Abrasion     Time reflects when diagnosis was documented in both MDM as applicable and the Disposition within this note     Time User Action Codes Description Comment    7/10/2022  7:21 PM Kim Clayton Add [[id number] Abrasion       ED Disposition     ED Disposition   Discharge    Condition   Stable    Date/Time   Sun Jul 10, 2022  7:21 PM Comment   Nette Covarrubias discharge to home/self care                 Follow-up Information     Follow up With Specialties Details Why Contact Info Additional Information    [facility] Emergency Department Emergency Medicine  As needed [address] [address] [address] [place] [address] Emergency Department, Wendy Jacinto, [place], [id number]          Discharge Medication List as of 7/10/2022  7:21 PM      CONTINUE these medications which have NOT CHANGED    Details   aspirin (ECOTRIN LOW STRENGTH) 81 mg EC tablet Take 81 mg by mouth daily, Starting Wed 8/4/2021, Historical Med      acarbose (PRECOSE) 100 MG tablet Take 1 tablet (100 mg total) by mouth 3 (three) times a day with meals, Starting Fri 12/10/2021, Normal      acetaminophen (TYLENOL) 325 mg tablet Take 2 tablets (650 mg total) by mouth every 6 (six) hours as needed for mild pain, headaches or fever, Starting Thu 2/24/2022, No Print      Ascorbic Acid (Vitamin C) 500 MG CAPS Take 500 mg by mouth daily, Starting Wed 8/4/2021, Historical Med      atorvastatin (LIPITOR) 20 mg tablet Take 1 tablet (20 mg total) by mouth daily, Starting Thu 2/17/2022, Normal      brimonidine tartrate 0 2 % ophthalmic solution Inject 0 2 % into the eye 2 (two) times a day, Starting Wed 8/4/2021, Historical Med      carvedilol (COREG) 12 5 mg tablet Take 1 tablet (12 5 mg total) by mouth in the morning and 1 tablet (12 5 mg total) in the evening , Starting Thu 5/12/2022, Normal      Cholecalciferol (Vitamin D3) 50 MCG (2000 UT) TABS Take 2,000 Units by mouth daily, Starting Wed 8/4/2021, Historical Med      co-enzyme Q-10 100 mg capsule Take 100 mg by mouth daily, Starting Wed 8/4/2021, Historical Med      ferrous sulfate 324 (65 Fe) mg TAKE ONE TABLET BY MOUTH BEFORE BREAKFAST, Historical Med      furosemide (LASIX) 20 mg tablet Take 1 tablet (20 mg total) by mouth if needed (For weight gain of 2-3 lb in a day and 5 lb in a week and then call Cardiology), Starting Wed 3/9/2022, Normal      gabapentin (NEURONTIN) 600 MG tablet Take 1 tablet (600 mg total) by mouth 2 (two) times a day, Starting Wed 5/4/2022, Until Tue 8/2/2022, Normal      glucose blood test strip Test blood sugar twice a day, Normal      Infant Foods (Follow-Up) POWD HANDICAP PLACARD, medically recommended, <taxonomy [id number]> due to arthritic/orthopedic condition (#2,#5), Print      lisinopril (ZESTRIL) 2 5 mg tablet Take 1 tablet (2 5 mg total) by mouth daily, Starting Fri 2/25/2022, Until Tue 6/21/2022, Normal      metFORMIN (GLUCOPHAGE) 1000 MG tablet Take one tablet in AM and 0 5 tablet in PM , No Print      multivitamin (THERAGRAN) TABS Take 1 tablet by mouth daily, Historical Med      omeprazole (PriLOSEC) 40 MG capsule Take 1 capsule (40 mg total) by mouth daily as needed (acid reflux), Starting Tue 6/21/2022, Normal      Probiotic Product (CULTURELLE PROBIOTICS) CHEW Chew daily, Historical Med      sitaGLIPtin (JANUVIA) 25 mg tablet Take one tablet by mouth once a day, Normal      spironolactone (ALDACTONE) 25 mg tablet Take 1 tablet (25 mg total) by mouth daily, Starting Fri 2/25/2022, Until Tue 6/21/2022, Normal      tamsulosin (FLOMAX) 0 4 mg Take 1 capsule (0 4 mg total) by mouth daily with dinner, Starting Thu 8/12/2021, Normal      TRUEPLUS LANCETS 28G MISC Test 1x/d, E11 29, No Print             No discharge procedures on file      PDMP Review     None          ED Provider  Electronically Signed by           Reji De Leon DO  07/10/22 1897

## 2022-07-12 ENCOUNTER — TELEPHONE (OUTPATIENT)
Dept: UROLOGY | Facility: CLINIC | Age: 77
End: 2022-07-12

## 2022-07-12 ENCOUNTER — TELEPHONE (OUTPATIENT)
Dept: CARDIOLOGY CLINIC | Facility: CLINIC | Age: 77
End: 2022-07-12

## 2022-07-12 ENCOUNTER — OFFICE VISIT (OUTPATIENT)
Dept: UROLOGY | Facility: CLINIC | Age: 77
End: 2022-07-12
Payer: MEDICARE

## 2022-07-12 VITALS
OXYGEN SATURATION: 98 % | BODY MASS INDEX: 23.66 KG/M2 | HEART RATE: 92 BPM | SYSTOLIC BLOOD PRESSURE: 142 MMHG | HEIGHT: 71 IN | DIASTOLIC BLOOD PRESSURE: 80 MMHG | WEIGHT: 169 LBS

## 2022-07-12 DIAGNOSIS — C61 PROSTATE CANCER (HCC): Primary | ICD-10-CM

## 2022-07-12 PROCEDURE — 96402 CHEMO HORMON ANTINEOPL SQ/IM: CPT

## 2022-07-12 PROCEDURE — 99213 OFFICE O/P EST LOW 20 MIN: CPT | Performed by: PHYSICIAN ASSISTANT

## 2022-07-12 NOTE — TELEPHONE ENCOUNTER
Left multi message on voice mail, and also on wife cell number advising to call office to see how we can help or what he is in need of

## 2022-07-13 ENCOUNTER — OFFICE VISIT (OUTPATIENT)
Dept: PODIATRY | Facility: CLINIC | Age: 77
End: 2022-07-13
Payer: MEDICARE

## 2022-07-13 VITALS
WEIGHT: 169 LBS | RESPIRATION RATE: 17 BRPM | BODY MASS INDEX: 23.66 KG/M2 | HEIGHT: 71 IN | DIASTOLIC BLOOD PRESSURE: 80 MMHG | SYSTOLIC BLOOD PRESSURE: 142 MMHG

## 2022-07-13 DIAGNOSIS — E11.42 DIABETIC POLYNEUROPATHY ASSOCIATED WITH TYPE 2 DIABETES MELLITUS (HCC): Primary | ICD-10-CM

## 2022-07-13 DIAGNOSIS — B35.1 ONYCHOMYCOSIS: ICD-10-CM

## 2022-07-13 DIAGNOSIS — M79.671 PAIN IN BOTH FEET: ICD-10-CM

## 2022-07-13 DIAGNOSIS — I73.9 PAD (PERIPHERAL ARTERY DISEASE) (HCC): ICD-10-CM

## 2022-07-13 DIAGNOSIS — M54.16 LUMBAR RADICULOPATHY: ICD-10-CM

## 2022-07-13 DIAGNOSIS — M79.672 PAIN IN BOTH FEET: ICD-10-CM

## 2022-07-13 PROCEDURE — 11056 PARNG/CUTG B9 HYPRKR LES 2-4: CPT | Performed by: PODIATRIST

## 2022-07-13 PROCEDURE — 99212 OFFICE O/P EST SF 10 MIN: CPT | Performed by: PODIATRIST

## 2022-07-13 NOTE — PROGRESS NOTES
Assessment/Plan:  Deformity of foot   Diabetic neuropathy   Pain upon ambulation   Pain upon ambulation   Mycosis of nail   Callus formation     Plan   Lesion debrided    all mycotic nails debrided without pain or complication   Foot exam performed   Patient educated on care of the diabetic foot   Plantar calluses debrided as well  Patient remain on gabapentin as well    Refill ordered           Subjective:   patient is diabetic   He has pain upon ambulation   He has pain with shoe wear   No history of trauma             Past Medical History:   Diagnosis Date    Acquired hallux valgus       last assessed: 8/1/2013    Allergic rhinitis      Anemia 10/26/2010    Atrophy of nail       last assessed: 7/7/2015    Chronic kidney disease       chronic renal insufficiency    Coronary artery disease      Deformity of ankle and foot, acquired       last assessed: 2/22/2016    Diabetes mellitus ([facility] )      Difficulty walking       last assessed: 12/14/2015    Dysesthesia       last assessed: 11/25/2016    Hammer toe       unspecified laterality; last assessed: 8/1/2013    Hypertension      Onychomycosis of toenail       last assessed: 2/22/2016    Peripheral vascular disease ([facility] )       left SFA stent in 2010    Pes planus       unspecified laterality; last assessed: 8/1/2013    Pneumonia       last assessed: 2/27/2016    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       last assessed: 8/15/2016    Type 2 diabetes mellitus ([facility] ) 07/26/2010    Viral warts       last assessed: 7/24/2015                   Past Surgical History:   Procedure Laterality Date    CARDIAC CATHETERIZATION   07/29/2010    CATARACT EXTRACTION, BILATERAL Bilateral       R 1999, L 2008    FEMORAL ARTERY - POPLITEAL ARTERY BYPASS GRAFT   09/23/2013    FOOT SURGERY         bone spur removed    LEG SURGERY Bilateral       repair; L 8/20/2010 and 7/26/2012    ROTATOR CUFF REPAIR Left 2009    SHOULDER SURGERY Right 2005     collar bone         No Known Allergies        Current Outpatient Medications:     acarbose (PRECOSE) 100 MG tablet, Take 1 tablet (100 mg total) by mouth 3 (three) times a day with meals, Disp: 270 tablet, Rfl: 1    amLODIPine (NORVASC) 2 5 mg tablet, Take 1 tablet (2 5 mg total) by mouth daily (Patient not taking: Reported on 9/26/2019), Disp: 90 tablet, Rfl: 3    aspirin (ECOTRIN LOW STRENGTH) 81 mg EC tablet, Take 1 tablet (81 mg total) by mouth daily, Disp: 30 tablet, Rfl: 6    b complex-vitamin C-folic acid (NEPHROCAPS) 1 mg capsule, Take 1 capsule by mouth daily, Disp: , Rfl:     betamethasone dipropionate (DIPROSONE) 0 05 % cream, Use as dir twice daily, Disp: , Rfl:     carvedilol (COREG) 12 5 mg tablet, TAKE 1 TABLET TWICE DAILY, Disp: 180 tablet, Rfl: 1    gabapentin (NEURONTIN) 600 MG tablet, Take 1 tablet (600 mg total) by mouth 2 (two) times a day, Disp: 180 tablet, Rfl: 0    glimepiride (AMARYL) 4 mg tablet, Take 1 tablet (4 mg total) by mouth 2 (two) times a day for 126 days, Disp: 180 tablet, Rfl: 0    glucose blood (TRUETRACK TEST) test strip, 1 each by Other route daily Use as instructed for E11 29, Disp: 100 each, Rfl: 3    lisinopril (ZESTRIL) 5 mg tablet, Take 1 tablet (5 mg total) by mouth daily (Patient taking differently: Take 2 5 mg by mouth daily ), Disp: 90 tablet, Rfl: 3    metFORMIN (GLUMETZA) 500 MG (MOD) 24 hr tablet, Take 2 tablets (1,000 mg total) by mouth 2 (two) times a day with meals, Disp: 120 tablet, Rfl: 5    multivitamin (THERAGRAN) TABS, Take 1 tablet by mouth daily, Disp: , Rfl:     omeprazole (PriLOSEC) 40 MG capsule, Take 1 capsule (40 mg total) by mouth daily, Disp: 90 capsule, Rfl: 1    simvastatin (ZOCOR) 40 mg tablet, TAKE 1 TABLET (40 MG TOTAL) BY MOUTH DAILY, Disp: 90 tablet, Rfl: 1    TRUEPLUS LANCETS 28G MISC, Test 1x/d, E11 29, Disp: , Rfl: 0           Patient Active Problem List   Diagnosis    Diabetic polyneuropathy associated with type 2 diabetes mellitus ([facility]    Allergic rhinitis    Arthropathy    PAD (peripheral artery disease) (HCC)    Benign essential hypertension    CAD (coronary artery disease)    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)    Diabetic neuropathy (HCC)    GE reflux    Lumbar radiculopathy    Rosacea    Seborrheic dermatitis    Type 2 diabetes mellitus with diabetic neuropathy (HCC)    Onychomycosis    Occlusion of femoral-popliteal bypass graft (HCC)    Prostate cancer screening    Dyslipidemia    Screening for AAA (aortic abdominal aneurysm)    Medicare annual wellness visit, subsequent    Type 2 diabetes mellitus with stage 3 chronic kidney disease, without long-term current use of insulin (HCC)    Pain in both feet    Corns    Plantar warts             Patient ID: Pernell Covarrubias is a 76 y  o  male         The following portions of the patient's history were reviewed and updated as appropriate:      family history includes Aortic aneurysm in his mother; Heart attack in his father and sister; Heart disease in his father, paternal grandfather, and sister        reports that he has never smoked  He has never used smokeless tobacco  He reports that he drinks alcohol  He reports that he does not use drugs         Objective:  Patient's shoes and socks removed    Foot ExamPhysical Exam          Physical Exam  Left Foot: Appearance: a deformity (ball of foot and distal fifth metatarsal )  Fifth toe deformities include hammer toe  Tenderness: None except the plantar aspect of the foot  Right Foot: Appearance: a deformity (distal fifth metatarsal )  Left Ankle: ROM: limited ROM in all planes   Right Ankle: ROM: limited ROM in all planes   Neurological Exam: Light touch was decreased bilaterally  Vibratory sensation was decreased in both first metatarsophalangeal joints  Response to monofilament test was absent bilaterally  Deep tendon reflexes: achilles reflex 1/4 bilaterally     Vascular Exam: performed Dorsalis pedis pulses were 1/4 bilaterally  Posterior tibial pulses were 1/4 bilaterally  Elevation Pallor: diminished bilaterally  Capillary refill time was Q  9, findings bilateral  Negative digital hair noted, positive abnormal cooling  All pre-ulcer, lesions debrided  Today, but between 1-3 seconds bilaterally  Edema: mild bilaterally and All mycotic nails debrided  Today  The patient was given orthotics to help control his feet and at as cushioning and padding on the bottom of his feet q9 findings  Toenails: All of the toenails were elongated, hypertrophied, discolored, ingrown, shown to have subungual debris and tender       Socks and shoes removed, the Right Foot: the foot was dry  The sensory exam showed diminished vibratory sensation at the level of the toes  Diminished tactile sensation with monofilament testing throughout the right foot    Socks and shoes removed, Left Foot: the foot was dry  The sensory exam showed diminished vibratory sensation at the level of the toes  Diminished tactile sensation with monofilament testing throughout the left foot    Pulses:   1+ in the posterior tibialis on the right   1+ in the dorsalis pedis on the right  Capillary refills findings on the left were delayed in the toes  Pulses:   1+ in the posterior tibialis on the left   1+ in the dorsalis pedis on the left  Assign Risk Category: 2: Loss of protective sensation with or without weakness, deformity, callus, pre-ulcer, or history of ulceration  High risk  Hyperkeratosis: present on both first sub metatarsals, present on both fifth sub metatarsals and Pre-ulcerative lesion, submetatarsal one to 5, bilateral    Shoe Gear Evaluation: performed ()  Recommendation(s): custom inlays       Patient's shoes and socks removed  Right Foot/Ankle   Right Foot Inspection  Skin Exam: callus and callus               Toe Exam: swelling and erythema  Sensory   Vibration: diminished  Proprioception: diminished      Vascular  Capillary refills: elevated        Left Foot/Ankle  Left Foot Inspection  Skin Exam: callus   Submetatarsal 5 of the left foot demonstrates 0 5 centimeter squared plantar verruca   It is in capsulated in a bullae   Debrided   20% remaining               Toe Exam: swelling and erythema                   Sensory   Vibration: diminished  Proprioception: diminished     Vascular  Capillary refills: elevated      Patient's shoes and socks removed            Assign Risk Category  Deformity present  Loss of protective sensation  Weak pulses  Risk: 2

## 2022-07-14 DIAGNOSIS — I25.10 CORONARY ARTERY DISEASE INVOLVING NATIVE CORONARY ARTERY OF NATIVE HEART WITHOUT ANGINA PECTORIS: ICD-10-CM

## 2022-07-14 RX ORDER — ATORVASTATIN CALCIUM 20 MG/1
20 TABLET, FILM COATED ORAL DAILY
Qty: 90 TABLET | Refills: 1 | Status: SHIPPED | OUTPATIENT
Start: 2022-07-14

## 2022-09-17 ENCOUNTER — APPOINTMENT (OUTPATIENT)
Dept: LAB | Facility: HOSPITAL | Age: 77
End: 2022-09-17
Payer: MEDICARE

## 2022-09-17 DIAGNOSIS — E11.22 CKD STAGE 2 DUE TO TYPE 2 DIABETES MELLITUS (HCC): ICD-10-CM

## 2022-09-17 DIAGNOSIS — N18.2 CKD STAGE 2 DUE TO TYPE 2 DIABETES MELLITUS (HCC): ICD-10-CM

## 2022-09-17 DIAGNOSIS — E22.2 SIADH (SYNDROME OF INAPPROPRIATE ADH PRODUCTION) (HCC): ICD-10-CM

## 2022-09-17 LAB
ANION GAP SERPL CALCULATED.3IONS-SCNC: 10 MMOL/L (ref 4–13)
BASOPHILS # BLD AUTO: 0.03 THOUSANDS/ΜL (ref 0–0.1)
BASOPHILS NFR BLD AUTO: 0 % (ref 0–1)
BUN SERPL-MCNC: 14 MG/DL (ref 5–25)
CALCIUM SERPL-MCNC: 9.2 MG/DL (ref 8.3–10.1)
CHLORIDE SERPL-SCNC: 99 MMOL/L (ref 96–108)
CO2 SERPL-SCNC: 27 MMOL/L (ref 21–32)
CREAT SERPL-MCNC: 1.1 MG/DL (ref 0.6–1.3)
CREAT UR-MCNC: 144 MG/DL
EOSINOPHIL # BLD AUTO: 0.08 THOUSAND/ΜL (ref 0–0.61)
EOSINOPHIL NFR BLD AUTO: 1 % (ref 0–6)
ERYTHROCYTE [DISTWIDTH] IN BLOOD BY AUTOMATED COUNT: 14.6 % (ref 11.6–15.1)
GFR SERPL CREATININE-BSD FRML MDRD: 64 ML/MIN/1.73SQ M
GLUCOSE P FAST SERPL-MCNC: 154 MG/DL (ref 65–99)
HCT VFR BLD AUTO: 35 % (ref 36.5–49.3)
HGB BLD-MCNC: 11.4 G/DL (ref 12–17)
IMM GRANULOCYTES # BLD AUTO: 0.04 THOUSAND/UL (ref 0–0.2)
IMM GRANULOCYTES NFR BLD AUTO: 0 % (ref 0–2)
LYMPHOCYTES # BLD AUTO: 0.22 THOUSANDS/ΜL (ref 0.6–4.47)
LYMPHOCYTES NFR BLD AUTO: 2 % (ref 14–44)
MCH RBC QN AUTO: 28.7 PG (ref 26.8–34.3)
MCHC RBC AUTO-ENTMCNC: 32.6 G/DL (ref 31.4–37.4)
MCV RBC AUTO: 88 FL (ref 82–98)
MONOCYTES # BLD AUTO: 0.64 THOUSAND/ΜL (ref 0.17–1.22)
MONOCYTES NFR BLD AUTO: 7 % (ref 4–12)
NEUTROPHILS # BLD AUTO: 8.3 THOUSANDS/ΜL (ref 1.85–7.62)
NEUTS SEG NFR BLD AUTO: 90 % (ref 43–75)
NRBC BLD AUTO-RTO: 0 /100 WBCS
PLATELET # BLD AUTO: 256 THOUSANDS/UL (ref 149–390)
PMV BLD AUTO: 9.8 FL (ref 8.9–12.7)
POTASSIUM SERPL-SCNC: 4.8 MMOL/L (ref 3.5–5.3)
PROT UR-MCNC: 27 MG/DL
PROT/CREAT UR: 0.19 MG/G{CREAT} (ref 0–0.1)
RBC # BLD AUTO: 3.97 MILLION/UL (ref 3.88–5.62)
SODIUM SERPL-SCNC: 136 MMOL/L (ref 135–147)
WBC # BLD AUTO: 9.31 THOUSAND/UL (ref 4.31–10.16)

## 2022-09-17 PROCEDURE — 36415 COLL VENOUS BLD VENIPUNCTURE: CPT

## 2022-09-17 PROCEDURE — 82570 ASSAY OF URINE CREATININE: CPT

## 2022-09-17 PROCEDURE — 84156 ASSAY OF PROTEIN URINE: CPT

## 2022-09-17 PROCEDURE — 80048 BASIC METABOLIC PNL TOTAL CA: CPT

## 2022-09-17 PROCEDURE — 85025 COMPLETE CBC W/AUTO DIFF WBC: CPT

## 2022-09-19 ENCOUNTER — OFFICE VISIT (OUTPATIENT)
Dept: FAMILY MEDICINE CLINIC | Facility: CLINIC | Age: 77
End: 2022-09-19
Payer: MEDICARE

## 2022-09-19 VITALS
WEIGHT: 167 LBS | BODY MASS INDEX: 23.38 KG/M2 | HEART RATE: 72 BPM | HEIGHT: 71 IN | SYSTOLIC BLOOD PRESSURE: 114 MMHG | TEMPERATURE: 97.4 F | DIASTOLIC BLOOD PRESSURE: 70 MMHG | RESPIRATION RATE: 20 BRPM

## 2022-09-19 DIAGNOSIS — C61 PROSTATE CANCER (HCC): ICD-10-CM

## 2022-09-19 DIAGNOSIS — U07.1 COVID-19 VIRUS INFECTION: Primary | ICD-10-CM

## 2022-09-19 PROCEDURE — 99213 OFFICE O/P EST LOW 20 MIN: CPT | Performed by: NURSE PRACTITIONER

## 2022-09-19 NOTE — PROGRESS NOTES
COVID-19 Outpatient Progress Note    Assessment/Plan:    Problem List Items Addressed This Visit    None     Visit Diagnoses     COVID-19 virus infection    -  Primary         Disposition:     Patient has asymptomatic or mild COVID-19 infection  Based off CDC guidelines, they were recommended to isolate for 5 days  If they are asymptomatic or symptoms are improving with no fevers in the past 24 hours, isolation may be ended followed by 5 days of wearing a mask when around othes to minimize risk of infecting others  If still have a fever or other symptoms have not improved, continue to isolate until they improve  Regardless of when they end isolation, avoid being around people who are more likely to get very sick from COVID-19 until at least day 11  Discussed symptom directed medication options with patient  Discussed vitamin D, vitamin C, and/or zinc supplementation with patient  I have spent 12 minutes directly with the patient  Pt declines Paxlovid  To call office for f/u with any changes or worsening symptoms        Encounter provider: NEO Milan     Provider located at: [address] [address]     Recent Visits  No visits were found meeting these conditions  Showing recent visits within past 7 days and meeting all other requirements  Today's Visits  Date Type Provider Dept   09/19/22 Office Visit Ervin Milan today's visits and meeting all other requirements  Future Appointments  No visits were found meeting these conditions  Showing future appointments within next 150 days and meeting all other requirements     My office door was closed  No one else was in the room  Ree Comment acknowledged consent and understanding of privacy and security of the telemedicine visit   I informed the patient that I have reviewed his record in Epic and presented the opportunity for him to ask any questions regarding the visit today  The patient agreed to participate  Subjective:   Daren Raygoza is a 67 y o  male who has been screened for COVID-19  Symptom change since last report: unchanged  Patient's symptoms include rhinorrhea, cough and headache  Patient denies fever, chills, fatigue, malaise, congestion, sore throat, anosmia, loss of taste, shortness of breath, chest tightness, abdominal pain, nausea, vomiting, diarrhea and myalgias  - Date of symptom onset: 9/17/2022  - Date of positive COVID-19 test: 9/19/2022  Type of test: Home antigen  COVID-19 vaccination status: Fully vaccinated with booster    Daya Medeiros has been staying home and has isolated themselves in his home  He is taking care to not share personal items and is cleaning all surfaces that are touched often, like counters, tabletops, and doorknobs using household cleaning sprays or wipes  He is wearing a mask when he leaves his room  No prior Covid infection, has had 2 boosters  Feels well overall      Lab Results   Component Value Date    SARSCOV2 Negative 02/18/2022       Review of Systems   Constitutional: Negative for chills, fatigue and fever  HENT: Positive for rhinorrhea  Negative for congestion and sore throat  Respiratory: Positive for cough  Negative for chest tightness and shortness of breath  Gastrointestinal: Negative for abdominal pain, diarrhea, nausea and vomiting  Musculoskeletal: Negative for myalgias  Neurological: Positive for headaches       Current Outpatient Medications on File Prior to Visit   Medication Sig    acarbose (PRECOSE) 100 MG tablet Take 1 tablet (100 mg total) by mouth 3 (three) times a day with meals    acetaminophen (TYLENOL) 325 mg tablet Take 2 tablets (650 mg total) by mouth every 6 (six) hours as needed for mild pain, headaches or fever    Ascorbic Acid (Vitamin C) 500 MG CAPS Take 500 mg by mouth daily    aspirin (ECOTRIN LOW STRENGTH) 81 mg EC tablet Take 81 mg by mouth daily    atorvastatin (LIPITOR) 20 mg tablet TAKE 1 TABLET (20 MG TOTAL) BY MOUTH DAILY    brimonidine tartrate 0 2 % ophthalmic solution Inject 0 2 % into the eye 2 (two) times a day    carvedilol (COREG) 12 5 mg tablet Take 1 tablet (12 5 mg total) by mouth in the morning and 1 tablet (12 5 mg total) in the evening      Cholecalciferol (Vitamin D3) 50 MCG (2000 UT) TABS Take 2,000 Units by mouth daily    co-enzyme Q-10 100 mg capsule Take 100 mg by mouth daily    furosemide (LASIX) 20 mg tablet Take 1 tablet (20 mg total) by mouth if needed (For weight gain of 2-3 lb in a day and 5 lb in a week and then call Cardiology)    gabapentin (NEURONTIN) 600 MG tablet Take 1 tablet (600 mg total) by mouth 2 (two) times a day    glucose blood test strip Test blood sugar twice a day    metFORMIN (GLUCOPHAGE) 1000 MG tablet Take one tablet in AM and 0 5 tablet in PM  (Patient taking differently: Take 1,000 mg by mouth 1000 mg  Twice a day)    multivitamin (THERAGRAN) TABS Take 1 tablet by mouth daily    omeprazole (PriLOSEC) 40 MG capsule Take 1 capsule (40 mg total) by mouth daily as needed (acid reflux)    Probiotic Product (CULTURELLE PROBIOTICS) CHEW Chew daily    sitaGLIPtin (JANUVIA) 25 mg tablet Take one tablet by mouth once a day    spironolactone (ALDACTONE) 25 mg tablet Take 1 tablet (25 mg total) by mouth daily    TRUEPLUS LANCETS 28G MISC Test 1x/d, E11 29    ferrous sulfate 324 (65 Fe) mg TAKE ONE TABLET BY MOUTH BEFORE BREAKFAST (Patient not taking: No sig reported)    Infant Foods (Follow-Up) POWD HANDICAP PLACARD, medically recommended, <taxonomy [id number]> due to arthritic/orthopedic condition (#2,#5) (Patient not taking: Reported on 9/19/2022)    lisinopril (ZESTRIL) 2 5 mg tablet Take 1 tablet (2 5 mg total) by mouth daily    tamsulosin (FLOMAX) 0 4 mg Take 1 capsule (0 4 mg total) by mouth daily with dinner (Patient not taking: No sig reported)       Objective:    /70   Pulse 72   Temp (!) 97 4 °F (36 3 °C)   Resp 20   Ht 5' 11" (1 803 m)   Wt 75 8 kg (167 lb)   BMI 23 29 kg/m²      Physical Exam  Vitals and nursing note reviewed  Constitutional:       General: He is not in acute distress  Appearance: Normal appearance  He is well-developed  He is not ill-appearing or diaphoretic  Eyes:      Conjunctiva/sclera: Conjunctivae normal    Cardiovascular:      Rate and Rhythm: Normal rate and regular rhythm  Pulses: Normal pulses  Heart sounds: No murmur heard  Pulmonary:      Effort: Pulmonary effort is normal  No respiratory distress  Lymphadenopathy:      Cervical: No cervical adenopathy  Skin:     Capillary Refill: Capillary refill takes less than 2 seconds  Neurological:      Mental Status: He is alert        Deep Tendon Reflexes: Reflexes normal    Psychiatric:         Mood and Affect: Mood normal          Behavior: Behavior normal        NEO Milan

## 2022-09-20 RX ORDER — TAMSULOSIN HYDROCHLORIDE 0.4 MG/1
0.4 CAPSULE ORAL
Qty: 30 CAPSULE | Refills: 0 | Status: SHIPPED | OUTPATIENT
Start: 2022-09-20 | End: 2022-10-20 | Stop reason: SDUPTHER

## 2022-09-21 ENCOUNTER — TELEPHONE (OUTPATIENT)
Dept: FAMILY MEDICINE CLINIC | Facility: CLINIC | Age: 77
End: 2022-09-21

## 2022-09-21 NOTE — TELEPHONE ENCOUNTER
I saw pt on 9/19 for Covid, this is not new for him  Did he want to come in tomorrow for follow up or did he have other questions?

## 2022-09-21 NOTE — TELEPHONE ENCOUNTER
Patient has hannah with Brandon Yepez on Monday  He tested positive for covid today and would like to speak with Brandon Merida MA

## 2022-09-22 ENCOUNTER — OFFICE VISIT (OUTPATIENT)
Dept: FAMILY MEDICINE CLINIC | Facility: CLINIC | Age: 77
End: 2022-09-22
Payer: MEDICARE

## 2022-09-22 VITALS
WEIGHT: 169 LBS | HEIGHT: 71 IN | SYSTOLIC BLOOD PRESSURE: 134 MMHG | HEART RATE: 80 BPM | TEMPERATURE: 97 F | RESPIRATION RATE: 20 BRPM | BODY MASS INDEX: 23.66 KG/M2 | DIASTOLIC BLOOD PRESSURE: 64 MMHG

## 2022-09-22 DIAGNOSIS — U07.1 COVID-19 VIRUS INFECTION: Primary | ICD-10-CM

## 2022-09-22 DIAGNOSIS — I74.3 EMBOLISM AND THROMBOSIS OF ARTERIES OF THE LOWER EXTREMITIES (HCC): ICD-10-CM

## 2022-09-22 PROCEDURE — 99213 OFFICE O/P EST LOW 20 MIN: CPT | Performed by: NURSE PRACTITIONER

## 2022-09-22 RX ORDER — BENZONATATE 200 MG/1
200 CAPSULE ORAL 3 TIMES DAILY PRN
Qty: 20 CAPSULE | Refills: 0 | Status: SHIPPED | OUTPATIENT
Start: 2022-09-22

## 2022-09-22 NOTE — PROGRESS NOTES
COVID-19 Outpatient Progress Note    Assessment/Plan:    Problem List Items Addressed This Visit        Cardiovascular and Mediastinum    Embolism and thrombosis of arteries of the lower extremities ([address] )      Other Visit Diagnoses     COVID-19 virus infection    -  Primary    Relevant Medications    benzonatate (TESSALON) 200 MG capsule         Disposition:     Patient has asymptomatic or mild COVID-19 infection  Based off CDC guidelines, they were recommended to isolate for 5 days  If they are asymptomatic or symptoms are improving with no fevers in the past 24 hours, isolation may be ended followed by 5 days of wearing a mask when around othes to minimize risk of infecting others  If still have a fever or other symptoms have not improved, continue to isolate until they improve  Regardless of when they end isolation, avoid being around people who are more likely to get very sick from COVID-19 until at least day 11  Discussed symptom directed medication options with patient  Discussed vitamin D, vitamin C, and/or zinc supplementation with patient  I have spent 11 minutes directly with the patient  Pt previously declined Paxlovid  Continue with Robitussin   F/u as needed for any persistent/worsening symptoms        Encounter provider: NEO Larios     Provider located at: [address] [address]     Recent Visits  Date Type Provider Dept   09/21/22 Telephone [facility] 63   09/19/22 Office Visit Ervin Larios recent visits within past 7 days and meeting all other requirements  Today's Visits  Date Type Provider Dept   09/22/22 Office Visit Ervin Larios today's visits and meeting all other requirements  Future Appointments  No visits were found meeting these conditions    Showing future appointments within next 150 days and meeting all other requirements     Subjective:   Romero Monroe is a 67 y o  male who has been screened for COVID-19  Symptom change since last report: unchanged  Patient's symptoms include rhinorrhea and cough  Patient denies fever, chills, fatigue, malaise, congestion, sore throat, anosmia, loss of taste, shortness of breath, chest tightness, abdominal pain, nausea, vomiting, diarrhea, myalgias and headaches  - Date of symptom onset: 9/17/2022      COVID-19 vaccination status: Fully vaccinated with booster    Aleida Sen has been staying home and has isolated themselves in his home  He is taking care to not share personal items and is cleaning all surfaces that are touched often, like counters, tabletops, and doorknobs using household cleaning sprays or wipes  He is wearing a mask when he leaves his room  Following up on Covid 19  Symptoms stable, does not feel worse  Has persistent, dry cough  Taking Robitussin DM OTC  Denies any fevers, chills, breathing difficulties, weakness, excessive fatigue      Lab Results   Component Value Date    SARSCOV2 Negative 02/18/2022       Review of Systems   Constitutional: Negative for chills, fatigue and fever  HENT: Positive for rhinorrhea  Negative for congestion and sore throat  Respiratory: Positive for cough  Negative for chest tightness and shortness of breath  Gastrointestinal: Negative for abdominal pain, diarrhea, nausea and vomiting  Musculoskeletal: Negative for myalgias  Neurological: Negative for headaches       Current Outpatient Medications on File Prior to Visit   Medication Sig    acarbose (PRECOSE) 100 MG tablet Take 1 tablet (100 mg total) by mouth 3 (three) times a day with meals    acetaminophen (TYLENOL) 325 mg tablet Take 2 tablets (650 mg total) by mouth every 6 (six) hours as needed for mild pain, headaches or fever    Ascorbic Acid (Vitamin C) 500 MG CAPS Take 500 mg by mouth daily    aspirin (ECOTRIN LOW STRENGTH) 81 mg EC tablet Take 81 mg by mouth daily    atorvastatin (LIPITOR) 20 mg tablet TAKE 1 TABLET (20 MG TOTAL) BY MOUTH DAILY    brimonidine tartrate 0 2 % ophthalmic solution Inject 0 2 % into the eye 2 (two) times a day    carvedilol (COREG) 12 5 mg tablet Take 1 tablet (12 5 mg total) by mouth in the morning and 1 tablet (12 5 mg total) in the evening      Cholecalciferol (Vitamin D3) 50 MCG (2000 UT) TABS Take 2,000 Units by mouth daily    co-enzyme Q-10 100 mg capsule Take 100 mg by mouth daily    furosemide (LASIX) 20 mg tablet Take 1 tablet (20 mg total) by mouth if needed (For weight gain of 2-3 lb in a day and 5 lb in a week and then call Cardiology)    gabapentin (NEURONTIN) 600 MG tablet Take 1 tablet (600 mg total) by mouth 2 (two) times a day    glucose blood test strip Test blood sugar twice a day    metFORMIN (GLUCOPHAGE) 1000 MG tablet Take one tablet in AM and 0 5 tablet in PM  (Patient taking differently: Take 1,000 mg by mouth 1000 mg  Twice a day)    multivitamin (THERAGRAN) TABS Take 1 tablet by mouth daily    omeprazole (PriLOSEC) 40 MG capsule Take 1 capsule (40 mg total) by mouth daily as needed (acid reflux)    Probiotic Product (CULTURELLE PROBIOTICS) CHEW Chew daily    sitaGLIPtin (JANUVIA) 25 mg tablet Take one tablet by mouth once a day    spironolactone (ALDACTONE) 25 mg tablet Take 1 tablet (25 mg total) by mouth daily    tamsulosin (FLOMAX) 0 4 mg Take 1 capsule (0 4 mg total) by mouth daily with dinner    TRUEPLUS LANCETS 28G MISC Test 1x/d, E11 29    ferrous sulfate 324 (65 Fe) mg TAKE ONE TABLET BY MOUTH BEFORE BREAKFAST (Patient not taking: No sig reported)    Infant Foods (Follow-Up) POWD HANDICAP PLACARD, medically recommended, <taxonomy [id number]> due to arthritic/orthopedic condition (#2,#5) (Patient not taking: No sig reported)    lisinopril (ZESTRIL) 2 5 mg tablet Take 1 tablet (2 5 mg total) by mouth daily       Objective:    /64   Pulse 80   Temp (!) 97 °F (36 1 °C)   Resp 20   Ht 5' 11" (1 803 m)   Wt 76 7 kg (169 lb)   BMI 23 57 kg/m²      Physical Exam  Vitals and nursing note reviewed  Constitutional:       General: He is not in acute distress  Appearance: Normal appearance  He is well-developed  He is not ill-appearing  HENT:      Head: Normocephalic and atraumatic  Eyes:      Conjunctiva/sclera: Conjunctivae normal    Cardiovascular:      Rate and Rhythm: Normal rate and regular rhythm  Pulses: Normal pulses  Heart sounds: No murmur heard  Pulmonary:      Effort: Pulmonary effort is normal  No respiratory distress  Breath sounds: Normal breath sounds  Abdominal:      Palpations: Abdomen is soft  Tenderness: There is no abdominal tenderness  Musculoskeletal:      Cervical back: Neck supple  Skin:     General: Skin is warm and dry  Neurological:      Mental Status: He is alert     Psychiatric:         Mood and Affect: Mood normal          Behavior: Behavior normal        NEO Garcia

## 2022-09-22 NOTE — PROGRESS NOTES
Name: Mitra Liu      :       MRN: [id number]  Encounter Provider: NEO Loredo  Encounter Date: 2022   Encounter department: [address] [place] [address]     1  COVID-19 virus infection         Subjective      HPI  Review of Systems    Current Outpatient Medications on File Prior to Visit   Medication Sig    acarbose (PRECOSE) 100 MG tablet Take 1 tablet (100 mg total) by mouth 3 (three) times a day with meals    acetaminophen (TYLENOL) 325 mg tablet Take 2 tablets (650 mg total) by mouth every 6 (six) hours as needed for mild pain, headaches or fever    Ascorbic Acid (Vitamin C) 500 MG CAPS Take 500 mg by mouth daily    aspirin (ECOTRIN LOW STRENGTH) 81 mg EC tablet Take 81 mg by mouth daily    atorvastatin (LIPITOR) 20 mg tablet TAKE 1 TABLET (20 MG TOTAL) BY MOUTH DAILY    brimonidine tartrate 0 2 % ophthalmic solution Inject 0 2 % into the eye 2 (two) times a day    carvedilol (COREG) 12 5 mg tablet Take 1 tablet (12 5 mg total) by mouth in the morning and 1 tablet (12 5 mg total) in the evening      Cholecalciferol (Vitamin D3) 50 MCG (2000 UT) TABS Take 2,000 Units by mouth daily    co-enzyme Q-10 100 mg capsule Take 100 mg by mouth daily    furosemide (LASIX) 20 mg tablet Take 1 tablet (20 mg total) by mouth if needed (For weight gain of 2-3 lb in a day and 5 lb in a week and then call Cardiology)    gabapentin (NEURONTIN) 600 MG tablet Take 1 tablet (600 mg total) by mouth 2 (two) times a day    glucose blood test strip Test blood sugar twice a day    metFORMIN (GLUCOPHAGE) 1000 MG tablet Take one tablet in AM and 0 5 tablet in PM  (Patient taking differently: Take 1,000 mg by mouth 1000 mg  Twice a day)    multivitamin (THERAGRAN) TABS Take 1 tablet by mouth daily    omeprazole (PriLOSEC) 40 MG capsule Take 1 capsule (40 mg total) by mouth daily as needed (acid reflux)    Probiotic Product (CULTURELLE PROBIOTICS) CHEW Chew daily    sitaGLIPtin (JANUVIA) 25 mg tablet Take one tablet by mouth once a day    spironolactone (ALDACTONE) 25 mg tablet Take 1 tablet (25 mg total) by mouth daily    tamsulosin (FLOMAX) 0 4 mg Take 1 capsule (0 4 mg total) by mouth daily with dinner    TRUEPLUS LANCETS 28G MISC Test 1x/d, E11 29    ferrous sulfate 324 (65 Fe) mg TAKE ONE TABLET BY MOUTH BEFORE BREAKFAST (Patient not taking: No sig reported)    Infant Foods (Follow-Up) POWD HANDICAP PLACARD, medically recommended, <taxonomy [id number]> due to arthritic/orthopedic condition (#2,#5) (Patient not taking: No sig reported)    lisinopril (ZESTRIL) 2 5 mg tablet Take 1 tablet (2 5 mg total) by mouth daily       Objective     /64   Pulse 80   Temp (!) 97 °F (36 1 °C)   Resp 20   Ht 5' 11" (1 803 m)   Wt 76 7 kg (169 lb)   BMI 23 57 kg/m²     Physical Exam  NEO Infante

## 2022-10-10 ENCOUNTER — OFFICE VISIT (OUTPATIENT)
Dept: PODIATRY | Facility: CLINIC | Age: 77
End: 2022-10-10
Payer: MEDICARE

## 2022-10-10 VITALS
BODY MASS INDEX: 23.66 KG/M2 | WEIGHT: 169 LBS | SYSTOLIC BLOOD PRESSURE: 134 MMHG | HEIGHT: 71 IN | RESPIRATION RATE: 17 BRPM | DIASTOLIC BLOOD PRESSURE: 64 MMHG

## 2022-10-10 DIAGNOSIS — M54.16 LUMBAR RADICULOPATHY: ICD-10-CM

## 2022-10-10 DIAGNOSIS — B35.1 ONYCHOMYCOSIS: ICD-10-CM

## 2022-10-10 DIAGNOSIS — I73.9 PAD (PERIPHERAL ARTERY DISEASE) (HCC): ICD-10-CM

## 2022-10-10 DIAGNOSIS — M79.672 PAIN IN BOTH FEET: ICD-10-CM

## 2022-10-10 DIAGNOSIS — M79.671 PAIN IN BOTH FEET: ICD-10-CM

## 2022-10-10 DIAGNOSIS — E11.42 DIABETIC POLYNEUROPATHY ASSOCIATED WITH TYPE 2 DIABETES MELLITUS (HCC): Primary | ICD-10-CM

## 2022-10-10 PROCEDURE — 11721 DEBRIDE NAIL 6 OR MORE: CPT | Performed by: PODIATRIST

## 2022-10-10 PROCEDURE — 99212 OFFICE O/P EST SF 10 MIN: CPT | Performed by: PODIATRIST

## 2022-10-10 NOTE — PROGRESS NOTES
Assessment/Plan:  Deformity of foot   Diabetic neuropathy   Pain upon ambulation   Pain upon ambulation   Mycosis of nail   Callus formation     Plan   Lesion debrided    all mycotic nails debrided without pain or complication   Foot exam performed   Patient educated on care of the diabetic foot   Plantar calluses debrided as well   Patient remain on gabapentin as well   Refill ordered           Subjective:   patient is diabetic   He has pain upon ambulation   He has pain with shoe wear   No history of trauma             Past Medical History:   Diagnosis Date   • Acquired hallux valgus       last assessed: 8/1/2013   • Allergic rhinitis     • Anemia 10/26/2010   • Atrophy of nail       last assessed: 7/7/2015   • Chronic kidney disease       chronic renal insufficiency   • Coronary artery disease     • Deformity of ankle and foot, acquired       last assessed: 2/22/2016   • Diabetes mellitus ([facility] [address] )     • Difficulty walking       last assessed: 12/14/2015   • Dysesthesia       last assessed: 11/25/2016   • Hammer toe       unspecified laterality; last assessed: 8/1/2013   • Hypertension     • Onychomycosis of toenail       last assessed: 2/22/2016   • Peripheral vascular disease ([facility] [address] )       left SFA stent in 2010   • Pes planus       unspecified laterality; last assessed: 8/1/2013   • Pneumonia       last assessed: 2/27/2016   • 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       last assessed: 8/15/2016   • Type 2 diabetes mellitus ([facility] [address] ) 07/26/2010   • Viral warts       last assessed: 7/24/2015                   Past Surgical History:   Procedure Laterality Date   • CARDIAC CATHETERIZATION   07/29/2010   • CATARACT EXTRACTION, BILATERAL Bilateral       R 1999, L 2008   • FEMORAL ARTERY - POPLITEAL ARTERY BYPASS GRAFT   09/23/2013   • FOOT SURGERY         bone spur removed   • LEG SURGERY Bilateral       repair; L 8/20/2010 and 7/26/2012   • ROTATOR CUFF REPAIR Left 2009   • SHOULDER SURGERY Right 2005     collar bone         No Known Allergies        Current Outpatient Medications:   •  acarbose (PRECOSE) 100 MG tablet, Take 1 tablet (100 mg total) by mouth 3 (three) times a day with meals, Disp: 270 tablet, Rfl: 1  •  amLODIPine (NORVASC) 2 5 mg tablet, Take 1 tablet (2 5 mg total) by mouth daily (Patient not taking: Reported on 9/26/2019), Disp: 90 tablet, Rfl: 3  •  aspirin (ECOTRIN LOW STRENGTH) 81 mg EC tablet, Take 1 tablet (81 mg total) by mouth daily, Disp: 30 tablet, Rfl: 6  •  b complex-vitamin C-folic acid (NEPHROCAPS) 1 mg capsule, Take 1 capsule by mouth daily, Disp: , Rfl:   •  betamethasone dipropionate (DIPROSONE) 0 05 % cream, Use as dir twice daily, Disp: , Rfl:   •  carvedilol (COREG) 12 5 mg tablet, TAKE 1 TABLET TWICE DAILY, Disp: 180 tablet, Rfl: 1  •  gabapentin (NEURONTIN) 600 MG tablet, Take 1 tablet (600 mg total) by mouth 2 (two) times a day, Disp: 180 tablet, Rfl: 0  •  glimepiride (AMARYL) 4 mg tablet, Take 1 tablet (4 mg total) by mouth 2 (two) times a day for 126 days, Disp: 180 tablet, Rfl: 0  •  glucose blood (TRUETRACK TEST) test strip, 1 each by Other route daily Use as instructed for E11 29, Disp: 100 each, Rfl: 3  •  lisinopril (ZESTRIL) 5 mg tablet, Take 1 tablet (5 mg total) by mouth daily (Patient taking differently: Take 2 5 mg by mouth daily ), Disp: 90 tablet, Rfl: 3  •  metFORMIN (GLUMETZA) 500 MG (MOD) 24 hr tablet, Take 2 tablets (1,000 mg total) by mouth 2 (two) times a day with meals, Disp: 120 tablet, Rfl: 5  •  multivitamin (THERAGRAN) TABS, Take 1 tablet by mouth daily, Disp: , Rfl:   •  omeprazole (PriLOSEC) 40 MG capsule, Take 1 capsule (40 mg total) by mouth daily, Disp: 90 capsule, Rfl: 1  •  simvastatin (ZOCOR) 40 mg tablet, TAKE 1 TABLET (40 MG TOTAL) BY MOUTH DAILY, Disp: 90 tablet, Rfl: 1  •  TRUEPLUS LANCETS 28G MISC, Test 1x/d, E11 29, Disp: , Rfl: 0           Patient Active Problem List   Diagnosis   • Diabetic polyneuropathy associated with type 2 diabetes mellitus ([facility] [address] )   • Allergic rhinitis   • Arthropathy   • PAD (peripheral artery disease) ([facility])   • Benign essential hypertension   • CAD (coronary artery disease)   • 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 mellitus ([facility])   • Diabetic neuropathy ([facility])   • GE reflux   • Lumbar radiculopathy   • Rosacea   • Seborrheic dermatitis   • Type 2 diabetes mellitus with diabetic neuropathy ([facility])   • Onychomycosis   • Occlusion of femoral-popliteal bypass graft ([facility])   • Prostate cancer screening   • Dyslipidemia   • Screening for AAA (aortic abdominal aneurysm)   • Medicare annual wellness visit, subsequent   • Type 2 diabetes mellitus with stage 3 chronic kidney disease, without long-term current use of insulin ([facility])   • Pain in both feet   • Corns   • Plantar warts             Patient ID: Pernell Covarrubias is a 76 y  o  male         The following portions of the patient's history were reviewed and updated as appropriate:      family history includes Aortic aneurysm in his mother; Heart attack in his father and sister; Heart disease in his father, paternal grandfather, and sister        reports that he has never smoked  He has never used smokeless tobacco  He reports that he drinks alcohol  He reports that he does not use drugs         Objective:  Patient's shoes and socks removed    Foot ExamPhysical Exam          Physical Exam  Left Foot: Appearance: a deformity (ball of foot and distal fifth metatarsal )  Fifth toe deformities include hammer toe  Tenderness: None except the plantar aspect of the foot  Right Foot: Appearance: a deformity (distal fifth metatarsal )  Left Ankle: ROM: limited ROM in all planes   Right Ankle: ROM: limited ROM in all planes   Neurological Exam: Light touch was decreased bilaterally  Vibratory sensation was decreased in both first metatarsophalangeal joints  Response to monofilament test was absent bilaterally  Deep tendon reflexes: achilles reflex 1/4 bilaterally     Vascular Exam: performed Dorsalis pedis pulses were 1/4 bilaterally  Posterior tibial pulses were 1/4 bilaterally  Elevation Pallor: diminished bilaterally  Capillary refill time was Q  9, findings bilateral  Negative digital hair noted, positive abnormal cooling  All pre-ulcer, lesions debrided  Today, but between 1-3 seconds bilaterally  Edema: mild bilaterally and All mycotic nails debrided  Today  The patient was given orthotics to help control his feet and at as cushioning and padding on the bottom of his feet q9 findings  Toenails: All of the toenails were elongated, hypertrophied, discolored, ingrown, shown to have subungual debris and tender       Socks and shoes removed, the Right Foot: the foot was dry  The sensory exam showed diminished vibratory sensation at the level of the toes  Diminished tactile sensation with monofilament testing throughout the right foot    Socks and shoes removed, Left Foot: the foot was dry  The sensory exam showed diminished vibratory sensation at the level of the toes  Diminished tactile sensation with monofilament testing throughout the left foot    Pulses:   1+ in the posterior tibialis on the right   1+ in the dorsalis pedis on the right  Capillary refills findings on the left were delayed in the toes  Pulses:   1+ in the posterior tibialis on the left   1+ in the dorsalis pedis on the left  Assign Risk Category: 2: Loss of protective sensation with or without weakness, deformity, callus, pre-ulcer, or history of ulceration  High risk  Hyperkeratosis: present on both first sub metatarsals, present on both fifth sub metatarsals and Pre-ulcerative lesion, submetatarsal one to 5, bilateral    Shoe Gear Evaluation: performed ()  Recommendation(s): custom inlays       Patient's shoes and socks removed  Right Foot/Ankle   Right Foot Inspection  Skin Exam: callus and callus               Toe Exam: swelling and erythema  Sensory   Vibration: diminished  Proprioception: diminished      Vascular  Capillary refills: elevated        Left Foot/Ankle  Left Foot Inspection  Skin Exam: callus   Submetatarsal 5 of the left foot demonstrates 0 5 centimeter squared plantar verruca   It is in capsulated in a bullae   Debrided   20% remaining               Toe Exam: swelling and erythema                   Sensory   Vibration: diminished  Proprioception: diminished     Vascular  Capillary refills: elevated      Patient's shoes and socks removed            Assign Risk Category  Deformity present  Loss of protective sensation  Weak pulses  Risk: 2

## 2022-10-12 PROBLEM — Z13.83 SCREENING FOR CARDIOVASCULAR, RESPIRATORY, AND GENITOURINARY DISEASES: Status: RESOLVED | Noted: 2021-05-21 | Resolved: 2022-10-12

## 2022-10-12 PROBLEM — Z13.6 SCREENING FOR CARDIOVASCULAR, RESPIRATORY, AND GENITOURINARY DISEASES: Status: RESOLVED | Noted: 2021-05-21 | Resolved: 2022-10-12

## 2022-10-12 PROBLEM — Z13.89 SCREENING FOR CARDIOVASCULAR, RESPIRATORY, AND GENITOURINARY DISEASES: Status: RESOLVED | Noted: 2021-05-21 | Resolved: 2022-10-12

## 2022-10-19 ENCOUNTER — TELEPHONE (OUTPATIENT)
Dept: NEPHROLOGY | Facility: CLINIC | Age: 77
End: 2022-10-19

## 2022-10-20 ENCOUNTER — OFFICE VISIT (OUTPATIENT)
Dept: NEPHROLOGY | Facility: CLINIC | Age: 77
End: 2022-10-20
Payer: MEDICARE

## 2022-10-20 VITALS
SYSTOLIC BLOOD PRESSURE: 142 MMHG | DIASTOLIC BLOOD PRESSURE: 60 MMHG | BODY MASS INDEX: 23.66 KG/M2 | WEIGHT: 169 LBS | HEIGHT: 71 IN

## 2022-10-20 DIAGNOSIS — I95.1 ORTHOSTATIC HYPOTENSION: ICD-10-CM

## 2022-10-20 DIAGNOSIS — D63.1 ANEMIA DUE TO STAGE 3A CHRONIC KIDNEY DISEASE (HCC): ICD-10-CM

## 2022-10-20 DIAGNOSIS — N18.31 ANEMIA DUE TO STAGE 3A CHRONIC KIDNEY DISEASE (HCC): ICD-10-CM

## 2022-10-20 DIAGNOSIS — C61 PROSTATE CANCER (HCC): ICD-10-CM

## 2022-10-20 DIAGNOSIS — I50.42 CHRONIC COMBINED SYSTOLIC AND DIASTOLIC HEART FAILURE, NYHA CLASS 2 (HCC): Primary | ICD-10-CM

## 2022-10-20 DIAGNOSIS — I50.1 HEART FAILURE, LEFT, WITH LVEF <=30% (HCC): ICD-10-CM

## 2022-10-20 DIAGNOSIS — E11.22 CKD STAGE 2 DUE TO TYPE 2 DIABETES MELLITUS (HCC): ICD-10-CM

## 2022-10-20 DIAGNOSIS — N18.2 CKD STAGE 2 DUE TO TYPE 2 DIABETES MELLITUS (HCC): ICD-10-CM

## 2022-10-20 PROCEDURE — 99214 OFFICE O/P EST MOD 30 MIN: CPT | Performed by: INTERNAL MEDICINE

## 2022-10-20 RX ORDER — TAMSULOSIN HYDROCHLORIDE 0.4 MG/1
0.4 CAPSULE ORAL
Qty: 90 CAPSULE | Refills: 0 | Status: SHIPPED | OUTPATIENT
Start: 2022-10-20

## 2022-10-20 NOTE — PROGRESS NOTES
NEPHROLOGY OUTPATIENT PROGRESS NOTE   Daren Raygoza 67 y o  male MRN: [id number]  Reason for visit:  Chronic kidney disease    ASSESSMENT and PLAN:  1  Chronic kidney disease, stage II, most recent creatinine 1 1 EGFR greater than 60  Noted proteinuria with a protein creatinine ratio 0 19  2  Hypertension with noted orthostasis  Give blood pressure 130/60, standing blood pressure 100/50  Patient relatively asymptomatic  Discussed adding lower extremity stockings  3  Cardiomyopathy, previous ejection fraction 28% with diastolic dysfunction, volume status currently appears stable, continue with spironolactone   4  Anemia of chronic disease hemoglobin improved to 11 4, previous SPEP/UPEP negative for monoclonal gammopathy    Overall renal function remains fairly stable   Volume status acceptable, continue with spironolactone   Blood pressure acceptable again noted orthostasis  However patient remains fairly asymptomatic  Discussed adding lower extremity stockings  No changes from Nephrology standpoint follow-up in 6 months with repeat labs at that time    SUBJECTIVE / INTERVAL HISTORY:  He has been doing reasonably well  Denies any recent falls or syncopal episodes  Denies any increasing lower extremity swelling, chest pain or shortness of breath  His appetite has been stable  OBJECTIVE:  /60 (BP Location: Left arm, Patient Position: Sitting, Cuff Size: Adult)   Ht 5' 11" (1 803 m)   Wt 76 7 kg (169 lb)   BMI 23 57 kg/m²   Vitals:    10/20/22 0903   Weight: 76 7 kg (169 lb)       Physical Exam  Constitutional:       Appearance: He is not ill-appearing  HENT:      Head: Normocephalic and atraumatic  Eyes:      General: No scleral icterus  Cardiovascular:      Rate and Rhythm: Normal rate and regular rhythm  Pulmonary:      Effort: Pulmonary effort is normal       Breath sounds: Normal breath sounds  Abdominal:      General: There is no distension  Palpations: Abdomen is soft  Musculoskeletal:      Right lower leg: No edema  Left lower leg: No edema  Skin:     General: Skin is warm and dry  Neurological:      Mental Status: He is alert and oriented to person, place, and time             Medications:    Current Outpatient Medications:   •  acarbose (PRECOSE) 100 MG tablet, Take 1 tablet (100 mg total) by mouth 3 (three) times a day with meals, Disp: 270 tablet, Rfl: 3  •  acetaminophen (TYLENOL) 325 mg tablet, Take 2 tablets (650 mg total) by mouth every 6 (six) hours as needed for mild pain, headaches or fever, Disp: , Rfl: 0  •  Ascorbic Acid (Vitamin C) 500 MG CAPS, Take 500 mg by mouth daily, Disp: , Rfl:   •  aspirin (ECOTRIN LOW STRENGTH) 81 mg EC tablet, Take 81 mg by mouth daily, Disp: , Rfl:   •  atorvastatin (LIPITOR) 20 mg tablet, TAKE 1 TABLET (20 MG TOTAL) BY MOUTH DAILY, Disp: 90 tablet, Rfl: 1  •  brimonidine tartrate 0 2 % ophthalmic solution, Inject 0 2 % into the eye 2 (two) times a day, Disp: , Rfl:   •  carvedilol (COREG) 12 5 mg tablet, Take 1 tablet (12 5 mg total) by mouth in the morning and 1 tablet (12 5 mg total) in the evening , Disp: 180 tablet, Rfl: 1  •  Cholecalciferol (Vitamin D3) 50 MCG (2000 UT) TABS, Take 2,000 Units by mouth daily, Disp: , Rfl:   •  co-enzyme Q-10 100 mg capsule, Take 100 mg by mouth daily, Disp: , Rfl:   •  gabapentin (NEURONTIN) 600 MG tablet, Take 1 tablet (600 mg total) by mouth 2 (two) times a day, Disp: 180 tablet, Rfl: 0  •  glucose blood test strip, Test blood sugar twice a day, Disp: 100 each, Rfl: 3  •  multivitamin (THERAGRAN) TABS, Take 1 tablet by mouth daily, Disp: , Rfl:   •  omeprazole (PriLOSEC) 40 MG capsule, Take 1 capsule (40 mg total) by mouth daily as needed (acid reflux), Disp: 90 capsule, Rfl: 1  •  Probiotic Product (CULTURELLE PROBIOTICS) CHEW, Chew daily, Disp: , Rfl:   •  sitaGLIPtin (JANUVIA) 25 mg tablet, Take one tablet by mouth once a day, Disp: 90 tablet, Rfl: 3  •  spironolactone (ALDACTONE) 25 mg tablet, Take 1 tablet (25 mg total) by mouth daily, Disp: 30 tablet, Rfl: 0  •  tamsulosin (FLOMAX) 0 4 mg, Take 1 capsule (0 4 mg total) by mouth daily with dinner, Disp: 30 capsule, Rfl: 0  •  TRUEPLUS LANCETS 28G MISC, Test 1x/d, E11 29, Disp: , Rfl: 0  •  benzonatate (TESSALON) 200 MG capsule, Take 1 capsule (200 mg total) by mouth 3 (three) times a day as needed for cough (Patient not taking: Reported on 10/20/2022), Disp: 20 capsule, Rfl: 0  •  ferrous sulfate 324 (65 Fe) mg, TAKE ONE TABLET BY MOUTH BEFORE BREAKFAST (Patient not taking: No sig reported), Disp: , Rfl:   •  furosemide (LASIX) 20 mg tablet, Take 1 tablet (20 mg total) by mouth if needed (For weight gain of 2-3 lb in a day and 5 lb in a week and then call Cardiology) (Patient not taking: Reported on 10/20/2022), Disp: 15 tablet, Rfl: 1  •  Infant Foods (Follow-Up) POWD, HANDICAP PLACARD, medically recommended, <taxonomy [id number]> due to arthritic/orthopedic condition (#2,#5) (Patient not taking: No sig reported), Disp: 99 g, Rfl: 1  •  metFORMIN (GLUCOPHAGE) 1000 MG tablet, Take one tablet in AM and 0 5 tablet in PM  (Patient not taking: Reported on 10/20/2022), Disp: 270 tablet, Rfl: 0    Laboratory Results:  Results for orders placed or performed in visit on 87/11/79   Basic metabolic panel   Result Value Ref Range    Sodium 136 135 - 147 mmol/L    Potassium 4 8 3 5 - 5 3 mmol/L    Chloride 99 96 - 108 mmol/L    CO2 27 21 - 32 mmol/L    ANION GAP 10 4 - 13 mmol/L    BUN 14 5 - 25 mg/dL    Creatinine 1 10 0 60 - 1 30 mg/dL    Glucose, Fasting 154 (H) 65 - 99 mg/dL    Calcium 9 2 8 3 - 10 1 mg/dL    eGFR 64 ml/min/1 73sq m   Protein / creatinine ratio, urine   Result Value Ref Range    Creatinine, Ur 144 0 mg/dL    Protein Urine Random 27 mg/dL    Prot/Creat Ratio, Ur 0 19 (H) 0 00 - 0 10   CBC and differential   Result Value Ref Range    WBC 9 31 4 31 - 10 16 Thousand/uL    RBC 3 97 3 88 - 5 62 Million/uL    Hemoglobin 11 4 (L) 12 0 - 17 0 g/dL    Hematocrit 35 0 (L) 36 5 - 49 3 %    MCV 88 82 - 98 fL    MCH 28 7 26 8 - 34 3 pg    MCHC 32 6 31 4 - 37 4 g/dL    RDW 14 6 11 6 - 15 1 %    MPV 9 8 8 9 - 12 7 fL    Platelets 401 051 - 876 Thousands/uL    nRBC 0 /100 WBCs    Neutrophils Relative 90 (H) 43 - 75 %    Immat GRANS % 0 0 - 2 %    Lymphocytes Relative 2 (L) 14 - 44 %    Monocytes Relative 7 4 - 12 %    Eosinophils Relative 1 0 - 6 %    Basophils Relative 0 0 - 1 %    Neutrophils Absolute 8 30 (H) 1 85 - 7 62 Thousands/µL    Immature Grans Absolute 0 04 0 00 - 0 20 Thousand/uL    Lymphocytes Absolute 0 22 (L) 0 60 - 4 47 Thousands/µL    Monocytes Absolute 0 64 0 17 - 1 22 Thousand/µL    Eosinophils Absolute 0 08 0 00 - 0 61 Thousand/µL    Basophils Absolute 0 03 0 00 - 0 10 Thousands/µL

## 2022-10-20 NOTE — TELEPHONE ENCOUNTER
Medication Refill Request     Name Tamsulosin   Dose/Frequency 0 4 mg daily   Quantity 90  Verified pharmacy   [x]  Verified ordering Provider   [x]  Does patient have enough for the next 3 days? Yes [x] No []    (Patient has appointment in January and would like enough to get him to his appointment   Please call patient if there is a problem with his request )

## 2022-11-01 DIAGNOSIS — K21.9 GASTROESOPHAGEAL REFLUX DISEASE, UNSPECIFIED WHETHER ESOPHAGITIS PRESENT: ICD-10-CM

## 2022-11-01 DIAGNOSIS — I10 BENIGN ESSENTIAL HYPERTENSION: ICD-10-CM

## 2022-11-01 RX ORDER — CARVEDILOL 12.5 MG/1
12.5 TABLET ORAL 2 TIMES DAILY
Qty: 180 TABLET | Refills: 1 | Status: SHIPPED | OUTPATIENT
Start: 2022-11-01

## 2022-11-01 RX ORDER — OMEPRAZOLE 40 MG/1
40 CAPSULE, DELAYED RELEASE ORAL DAILY PRN
Qty: 90 CAPSULE | Refills: 1 | Status: SHIPPED | OUTPATIENT
Start: 2022-11-01

## 2022-11-21 ENCOUNTER — OFFICE VISIT (OUTPATIENT)
Dept: CARDIOLOGY CLINIC | Facility: CLINIC | Age: 77
End: 2022-11-21

## 2022-11-21 VITALS
TEMPERATURE: 96 F | SYSTOLIC BLOOD PRESSURE: 140 MMHG | BODY MASS INDEX: 23.52 KG/M2 | DIASTOLIC BLOOD PRESSURE: 68 MMHG | HEART RATE: 76 BPM | OXYGEN SATURATION: 99 % | WEIGHT: 168 LBS | HEIGHT: 71 IN

## 2022-11-21 DIAGNOSIS — I10 BENIGN ESSENTIAL HYPERTENSION: ICD-10-CM

## 2022-11-21 DIAGNOSIS — I25.10 CORONARY ARTERY DISEASE INVOLVING NATIVE CORONARY ARTERY OF NATIVE HEART WITHOUT ANGINA PECTORIS: ICD-10-CM

## 2022-11-21 DIAGNOSIS — N18.30 CKD STAGE 3 DUE TO TYPE 2 DIABETES MELLITUS (HCC): ICD-10-CM

## 2022-11-21 DIAGNOSIS — C61 PROSTATE CANCER (HCC): ICD-10-CM

## 2022-11-21 DIAGNOSIS — I50.42 CHRONIC COMBINED SYSTOLIC AND DIASTOLIC HEART FAILURE, NYHA CLASS 2 (HCC): ICD-10-CM

## 2022-11-21 DIAGNOSIS — E11.40 TYPE 2 DIABETES MELLITUS WITH DIABETIC NEUROPATHY, WITHOUT LONG-TERM CURRENT USE OF INSULIN (HCC): ICD-10-CM

## 2022-11-21 DIAGNOSIS — E78.5 DYSLIPIDEMIA: ICD-10-CM

## 2022-11-21 DIAGNOSIS — E11.22 CKD STAGE 3 DUE TO TYPE 2 DIABETES MELLITUS (HCC): ICD-10-CM

## 2022-11-21 RX ORDER — TAMSULOSIN HYDROCHLORIDE 0.4 MG/1
0.4 CAPSULE ORAL
Qty: 90 CAPSULE | Refills: 2 | Status: SHIPPED | OUTPATIENT
Start: 2022-11-21

## 2022-11-21 RX ORDER — LOSARTAN POTASSIUM 25 MG/1
25 TABLET ORAL DAILY
Qty: 90 TABLET | Refills: 1 | Status: SHIPPED | OUTPATIENT
Start: 2022-11-21

## 2022-11-21 NOTE — PROGRESS NOTES
Progress Note - Cardiology Office  [facility]    Salinas Castelan 65 y o  male MRN: [id number]  : 1945  Encounter: [id number]      Assessment:     1  Coronary artery disease involving native coronary artery of native heart without angina pectoris    2  Benign essential hypertension    3  Dyslipidemia    4  Chronic combined systolic and diastolic heart failure, NYHA class 2 ([facility] )    5  CKD stage 3 due to type 2 diabetes mellitus ([facility] )    6  Type 2 diabetes mellitus with diabetic neuropathy, without long-term current use of insulin ([facility] )        Discussion Summary and Plan:  1  Chronic systolic and diastolic heart failure  Patient was recently admitted with chronic systolic and diastolic heart failure  Last EF around 45%  His current weight is 168 lb  No lower extremity edema  We have last time prescribed him Lasix as needed  He is not even taking his spironolactone and losartan  He says he does not remember how it stopped  His sodium is 136  Potassium is 4 8 creatinine is 1 1  Will add low-dose losartan of 25 mg  Advised him to continue use p r n  Lasix  He can hold off on Aldactone if he needed due to low sodium but sodium is acceptable now  2  Coronary artery disease  Status post multivessel stenting  Patient now want to follow up with [facility] cardiology  His nuclear stress test done in 2018 which shows no ischemia normal LV systolic function  Nuclear stress test shows no perfusion abnormality EF is 30% but EF by echo was around 45% which is not changed  Does have some valvular disease  Will continue to monitor    3  Hypertension  Patient blood pressure is borderline  He will be started on losartan  Continue Coreg  He is not taking his spironolactone      4  Dyslipidemia  Continue statins labs done 2021 shows cholesterol profile is acceptable  Continue statin      5  Chronic renal insufficiency  Creatinine is acceptable 1 1    6  Carotid bruit     Carotid Doppler shows less than 50% stenosis on the left  No evidence of stenosis on the right side  Continue medical Rx follows up with vascular    7  Diabetes mellitus  His blood sugar has been labile  HbA1c 7 6      8  History of prostate cancer  Patient get injectable medicine  Which really makes him feel sick and feel weak and tired  He says injection has been changed now every 6 months      9  History of anemia of chronic disease  10  PVD  PVD with right femoropopliteal which is occluded and left SFA which has stent placed by me in 2010, had repeat procedure done with known right SFA stent stenosis underwent balloon angioplasty  He is able to walk he has seen the vascular  He may have InStent stenosis on the left and has occlusion of his bypass on the right  He follows with vascular no symptoms at this time continue medical Rx    Plan  Start him on losartan 25 mg daily  He does have cardiomyopathy  Continue Coreg  Check BMP will consider stress p r n  Diuretics he has a prescription continue other Rx as before EKG reviewed      Counseling :  A description of the counseling  All issues regarding tight sugar control, taking cholesterol medications, regular exercise, symptoms about coronary artery disease discussed with patient at length  Previous echo from 2014 reviewed  Goals and Barriers  Patient's ability to self care: Yes  Medication side effect reviewed with patient in detail and all their questions answered to their satisfaction  HPI :     Cass Givens is a 65y o  year old male who came for follow up   Patient has a past medical history significant for Coronary Artery artery disease, CK D stage III, Diabetes mellitus with renal manifestations, PVD with right femoropopliteal which has occluded and left SFA stent by me in 2010, hypertension, diabetic neuropathy who came to see us after his vascular study was found to be abnormal  For cardiac problem he generally sees Dr Leonard Tai  His vascular study was ordered by his podiatrist as he was having pain in his foot  He also had a lot of back problems sometime pain radiates from his back to the thighs  Here a stent placed in his left SFA in 2010  It's already known that his right femoropopliteal is occluded  He has no fever no chills no nausea no vomiting no other significant complaint       03/09/2022  Above reviewed  Patient came for follow-up  He was recently admitted to hospital in February with heart failure  His EF is now around 45%  Nuclear stress test shows no ischemia but EF was noted to be low and he had history of prostate cancer which is metastatic he has to take injectable medicines  He is not taking his Florinef  His med list reviewed  His blood pressure is now acceptable  He is now on lisinopril, carvedilol and Aldactone  He is not on diuretics  Today his heart rate is 71 beats per minute and his vitals are stable  Labs from March 3, 2022 reviewed sodium was 133  Other electrolytes were acceptable  No nausea no vomiting no fever no chills  Minimal lower extremity swelling noted  Over the years patient has become more weaker  He really feels weak after he gets his prostate injectable medication he takes him few days to recover  06/06/2022  Above reviewed  Patient came for follow-up  He is doing well  His main issue is his fatigue and tiredness which he gets after injection for his prostate cancer which he takes every 6 months  His EF on echo in February 2022 remained around 45% with moderate MR and moderate TR and PA pressure was around 58 mmHg  Nuclear stress test shows no ischemia though he was noted to have EF of around 30%  He is now on lisinopril, Coreg, Aldactone he does not take Lasix  He has blood work done April 2022 which sodium was 131 and his cholesterol and other electrolytes were acceptable hemoglobin 10 3    History of metastatic prostate cancer which is stage IV but under control because of this injections  Over the year he says he has lost weight and has become weaker and his appetite is now coming back  His weight has been stable around 163 lb since last visit of March 2022  He feels he if okay since the last visit of March 2022  No nausea no vomiting no PND no orthopnea no leg swelling no other cardiovascular issues  11/21/2022  Above reviewed  Patient came for follow-up he says he feels well  No cardiovascular issues but he continues to have fatigue and tiredness from his prostate cancer treatment which he gets every 6 months  His EF on echo was 45% with moderate MR and moderate TR and PA pressure was elevated  His nuclear stress test no ischemia and EF was noted to be lower  He is now on lisinopril Coreg Aldactone he does not need to take diuretics  Blood test done September 2022 has been acceptable  His weight has been stable now and his appetite has improved  Currently he has weight of 168 lb which has increased as compared to 5 lb  But he does not feel any shortness of breath on lower leg edema  He feels his appetite has improved  Currently from cardiac point of view he is on Coreg 12 5 twice a day, Lipitor 20 mg daily  He has stopped taking his Aldactone  Nephrology note noted he was recommended to continue Aldactone  He was also found to be orthostasis and was advised to use pressure stockings which he says he cannot use  He denies any dizziness or lightheadedness  Review of Systems   Constitutional: Positive for fatigue  Negative for activity change, chills, diaphoresis, fever and unexpected weight change  Has gained back his weight  His appetite is good   HENT: Negative for congestion  Eyes: Negative for discharge and redness  Respiratory: Negative for cough, chest tightness, shortness of breath and wheezing  Cardiovascular: Negative  Negative for chest pain, palpitations and leg swelling  Gastrointestinal: Negative for abdominal pain, diarrhea and nausea  No diarrhea now   Endocrine: Negative  Genitourinary: Negative for decreased urine volume and urgency  Musculoskeletal: Positive for arthralgias, back pain and gait problem  Skin: Negative for rash and wound  Allergic/Immunologic: Negative  Neurological: Negative for dizziness, seizures, syncope, weakness, light-headedness and headaches  Hematological: Negative  Psychiatric/Behavioral: Negative for agitation and confusion  The patient is not nervous/anxious          Historical Information   Past Medical History:   Diagnosis Date   • Acquired hallux valgus     last assessed: 8/1/2013   • Allergic rhinitis    • Anemia 10/26/2010   • Atrophy of nail     last assessed: 7/7/2015   • Chronic kidney disease     chronic renal insufficiency   • Coronary artery disease    • Deformity of ankle and foot, acquired     last assessed: 2/22/2016   • Difficulty walking     last assessed: 12/14/2015   • Dysesthesia     last assessed: 11/25/2016   • Hammer toe     unspecified laterality; last assessed: 8/1/2013   • Hypertension    • Onychomycosis of toenail     last assessed: 2/22/2016   • Peripheral vascular disease ([facility] )     left SFA stent in 2010   • Pes planus     unspecified laterality; last assessed: 8/1/2013   • Pneumonia     last assessed: 2/27/2016   • Prostate cancer [facility])    • 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     last assessed: 8/15/2016   • Type 2 diabetes mellitus ([facility] ) 07/26/2010   • Viral warts     last assessed: 7/24/2015     Past Surgical History:   Procedure Laterality Date   • CARDIAC CATHETERIZATION  07/29/2010   • CATARACT EXTRACTION, BILATERAL Bilateral     R 1999, L 2008   • COLONOSCOPY  2011   • FEMORAL ARTERY - POPLITEAL ARTERY BYPASS GRAFT  09/23/2013   • FOOT SURGERY      bone spur removed   • LEG SURGERY Bilateral     repair; L 8/20/2010 and 7/26/2012   • WY PLACE RADIOTHER DEVICE/MARKER, PROSTATE N/A 6/22/2021    Procedure: INSERTION OF FIDUCIAL MARKER (TRANSRECTAL ULTRASOUND GUIDANCE), SPACEOAR;  Surgeon: Marcio Thompson MD;  Location: [facility];  Service: Urology   • ROTATOR CUFF REPAIR Left 2009   • SHOULDER SURGERY Right 2005    collar bone     Social History     Substance and Sexual Activity   Alcohol Use Yes    Comment: being a social drinker/rare     Social History     Substance and Sexual Activity   Drug Use No     Social History     Tobacco Use   Smoking Status Never   Smokeless Tobacco Never     Family History:   Family History   Problem Relation Age of Onset   • Heart disease Father    • Heart attack Father         acute myocardial infarction   • Heart disease Sister    • Heart attack Sister         acute myocardial infarction   • Heart disease Paternal Grandfather    • Aortic aneurysm Mother    • Throat cancer Maternal Grandfather        Meds/Allergies     No Known Allergies    Current Outpatient Medications:   •  acarbose (PRECOSE) 100 MG tablet, Take 1 tablet (100 mg total) by mouth 3 (three) times a day with meals, Disp: 270 tablet, Rfl: 3  •  acetaminophen (TYLENOL) 325 mg tablet, Take 2 tablets (650 mg total) by mouth every 6 (six) hours as needed for mild pain, headaches or fever, Disp: , Rfl: 0  •  Ascorbic Acid (Vitamin C) 500 MG CAPS, Take 500 mg by mouth daily, Disp: , Rfl:   •  aspirin (ECOTRIN LOW STRENGTH) 81 mg EC tablet, Take 81 mg by mouth daily, Disp: , Rfl:   •  atorvastatin (LIPITOR) 20 mg tablet, TAKE 1 TABLET (20 MG TOTAL) BY MOUTH DAILY, Disp: 90 tablet, Rfl: 1  •  brimonidine tartrate 0 2 % ophthalmic solution, Inject 0 2 % into the eye 2 (two) times a day, Disp: , Rfl:   •  carvedilol (COREG) 12 5 mg tablet, Take 1 tablet (12 5 mg total) by mouth 2 (two) times a day, Disp: 180 tablet, Rfl: 1  •  Cholecalciferol (Vitamin D3) 50 MCG (2000 UT) TABS, Take 2,000 Units by mouth daily, Disp: , Rfl:   •  co-enzyme Q-10 100 mg capsule, Take 100 mg by mouth daily, Disp: , Rfl:   • glucose blood test strip, Test blood sugar twice a day, Disp: 100 each, Rfl: 3  •  losartan (COZAAR) 25 mg tablet, Take 1 tablet (25 mg total) by mouth daily, Disp: 90 tablet, Rfl: 1  •  metFORMIN (GLUCOPHAGE) 1000 MG tablet, Take one tablet in AM and 0 5 tablet in PM , Disp: 270 tablet, Rfl: 0  •  multivitamin (THERAGRAN) TABS, Take 1 tablet by mouth daily, Disp: , Rfl:   •  omeprazole (PriLOSEC) 40 MG capsule, Take 1 capsule (40 mg total) by mouth daily as needed (acid reflux), Disp: 90 capsule, Rfl: 1  •  Probiotic Product (CULTURELLE PROBIOTICS) CHEW, Chew daily, Disp: , Rfl:   •  tamsulosin (FLOMAX) 0 4 mg, Take 1 capsule (0 4 mg total) by mouth daily with dinner, Disp: 90 capsule, Rfl: 0  •  TRUEPLUS LANCETS 28G MISC, Test 1x/d, E11 29, Disp: , Rfl: 0  •  benzonatate (TESSALON) 200 MG capsule, Take 1 capsule (200 mg total) by mouth 3 (three) times a day as needed for cough (Patient not taking: Reported on 10/20/2022), Disp: 20 capsule, Rfl: 0  •  ferrous sulfate 324 (65 Fe) mg, TAKE ONE TABLET BY MOUTH BEFORE BREAKFAST (Patient not taking: Reported on 7/12/2022), Disp: , Rfl:   •  furosemide (LASIX) 20 mg tablet, Take 1 tablet (20 mg total) by mouth if needed (For weight gain of 2-3 lb in a day and 5 lb in a week and then call Cardiology) (Patient not taking: Reported on 10/20/2022), Disp: 15 tablet, Rfl: 1  •  gabapentin (NEURONTIN) 600 MG tablet, Take 1 tablet (600 mg total) by mouth 2 (two) times a day, Disp: 180 tablet, Rfl: 0  •  Infant Foods (Follow-Up) POWD, HANDICAP PLACARD, medically recommended, <taxonomy [id number]> due to arthritic/orthopedic condition (#2,#5) (Patient not taking: Reported on 9/19/2022), Disp: 99 g, Rfl: 1  •  sitaGLIPtin (JANUVIA) 25 mg tablet, Take one tablet by mouth once a day (Patient not taking: Reported on 11/21/2022), Disp: 90 tablet, Rfl: 3  •  spironolactone (ALDACTONE) 25 mg tablet, Take 1 tablet (25 mg total) by mouth daily (Patient not taking: Reported on 11/21/2022), Disp: 30 tablet, Rfl: 0    Vitals: Blood pressure 140/68, pulse 76, temperature (!) 96 °F (35 6 °C), height 5' 11" (1 803 m), weight 76 2 kg (168 lb), SpO2 99 %  ?  Body mass index is 23 43 kg/m²  Vitals:    11/21/22 1332   Weight: 76 2 kg (168 lb)     BP Readings from Last 3 Encounters:   11/21/22 140/68   10/20/22 142/60   10/10/22 134/64         Physical Exam  Constitutional:       General: He is not in acute distress  Appearance: He is well-developed  He is not diaphoretic  Neck:      Thyroid: No thyromegaly  Vascular: No JVD  Trachea: No tracheal deviation  Cardiovascular:      Rate and Rhythm: Normal rate and regular rhythm  Heart sounds: S1 normal and S2 normal  Heart sounds not distant  Murmur heard  Systolic (ejection) murmur is present with a grade of 2/6  No friction rub  No gallop  No S3 or S4 sounds  Pulmonary:      Effort: Pulmonary effort is normal  No respiratory distress  Breath sounds: Normal breath sounds  No wheezing or rales  Chest:      Chest wall: No tenderness  Abdominal:      General: Bowel sounds are normal  There is no distension  Palpations: Abdomen is soft  Tenderness: There is no abdominal tenderness  Musculoskeletal:         General: No deformity  Cervical back: Neck supple  Skin:     General: Skin is warm and dry  Coloration: Skin is not pale  Findings: No rash  Neurological:      Mental Status: He is alert and oriented to person, place, and time  Psychiatric:         Behavior: Behavior normal          Judgment: Judgment normal          Diagnostic Studies Review Cardio:    Nuclear stress test   Nuclear stress test done 09/27/2018 was a normal study with EF around 60%  No ischemia noted it was Lexiscan stress test     Nuclear Stress test   Nuclear stress test done in February 2022 shows no significant perfusion abnormality EF was severely reduced    Diaphragmatic attenuation EF calculated was around 30%    Echo Doppler  Echo Doppler done 10/14/2020 shows EF 55 60%, mild MR, trace to mild PI, pressure half time was 660 millisecond     Echo Doppler  Echo Doppler done 02/19/2022 shows EF 81%, diastolic dysfunction mildly abnormal grade 1, no aortic stenosis or regurgitation, moderate TR with PA pressure 58 mmHg, moderate MR     EKG:    Twelve lead EKG done on 09/13/2018 shows normal sinus rhythm heart rate 60 beats per minute  No significant ST changes  Twelve lead EKG done 05/17/2019 shows normal sinus rhythm heart rate 60 beats per minute  No change from old EKG  Twelve lead EKG 03/10/2020 shows normal sinus rhythm LVH by voltage heart rate 60 beats per minute no change from old EKG  Twelve lead EKG 09/23/2020 shows normal sinus rhythm LVH by voltage  Q-waves in inferior leads cannot rule out 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  Twelve lead EKG 03/08/2021 shows normal sinus rhythm LVH by voltage and Q-waves in inferior leads cannot rule out 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ion  Twelve lead EKG done on 02/08/2022 shows sinus rhythm heart rate 98 beats per minute LVH by voltage as compared to previous EKGs his heart function is noted to be faster  Inferior Q-waves noted cannot rule out 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ion  Nonspecific ST changes in lateral precordial leads    Twelve lead EKG done on 11/21/2022 shows normal sinus rhythm heart rate 76 beats per minute LVH by voltage  Q-wave noted inferiorly, not changed from previous EKG          Lab Review   Lab Results   Component Value Date    WBC 9 31 09/17/2022    HGB 11 4 (L) 09/17/2022    HCT 35 0 (L) 09/17/2022    MCV 88 09/17/2022    RDW 14 6 09/17/2022     09/17/2022     BMP:  Lab Results   Component Value Date     04/17/2014    K 4 8 09/17/2022    CL 99 09/17/2022    CO2 27 09/17/2022    ANIONGAP 8 04/17/2014    BUN 14 09/17/2022    CREATININE 1 10 09/17/2022    GLUCOSE 137 04/17/2014    GLUF 154 (H) 09/17/2022    CALCIUM 9 2 09/17/2022    CORRECTEDCA 9 2 02/18/2022    EGFR 64 09/17/2022    MG 1 9 03/03/2022     LFT:  Lab Results   Component Value Date    AST 14 03/01/2022    ALT 21 03/01/2022    ALKPHOS 84 03/01/2022       Lab Results   Component Value Date    HGBA1C 6 9 (H) 07/05/2022     Lipid Profile:   Lab Results   Component Value Date    CHOL 106 07/19/2014    HDL 47 04/06/2022    LDLCALC 53 04/06/2022    TRIG 52 04/06/2022     Lab Results   Component Value Date    CHOL 106 07/19/2014       Dr Colletta Croissant, MD [facility]      "This note has been constructed using a voice recognition system  Therefore there may be syntax, spelling, and/or grammatical errors   Please call if you have any questions  "

## 2022-11-22 NOTE — TELEPHONE ENCOUNTER
An Auto-fax Refill Request for Tamsulosin 0 4mg was received from [address]  The patient was last seen on 7/12/22 by Madisyn Diop PA-C in the [facility] location; continuation of the medication was authorized at that time    Request for same, 90 day supply with 2 refills was queued and forwarded to the Advanced Practitioner covering the [facility] location for approval

## 2022-12-14 ENCOUNTER — LAB (OUTPATIENT)
Dept: LAB | Facility: CLINIC | Age: 77
End: 2022-12-14

## 2022-12-14 ENCOUNTER — RA CDI HCC (OUTPATIENT)
Dept: OTHER | Facility: HOSPITAL | Age: 77
End: 2022-12-14

## 2022-12-14 DIAGNOSIS — I50.42 CHRONIC COMBINED SYSTOLIC AND DIASTOLIC HEART FAILURE, NYHA CLASS 2 (HCC): ICD-10-CM

## 2022-12-14 DIAGNOSIS — E78.5 DYSLIPIDEMIA: ICD-10-CM

## 2022-12-14 DIAGNOSIS — I10 BENIGN ESSENTIAL HYPERTENSION: ICD-10-CM

## 2022-12-14 DIAGNOSIS — R19.7 DIARRHEA, UNSPECIFIED TYPE: ICD-10-CM

## 2022-12-14 DIAGNOSIS — K92.1 BLACK STOOLS: ICD-10-CM

## 2022-12-14 DIAGNOSIS — E11.22 CKD STAGE 2 DUE TO TYPE 2 DIABETES MELLITUS (HCC): ICD-10-CM

## 2022-12-14 DIAGNOSIS — E11.40 TYPE 2 DIABETES MELLITUS WITH DIABETIC NEUROPATHY, WITHOUT LONG-TERM CURRENT USE OF INSULIN (HCC): ICD-10-CM

## 2022-12-14 DIAGNOSIS — N18.2 CKD STAGE 2 DUE TO TYPE 2 DIABETES MELLITUS (HCC): ICD-10-CM

## 2022-12-14 DIAGNOSIS — I25.10 CORONARY ARTERY DISEASE INVOLVING NATIVE CORONARY ARTERY OF NATIVE HEART WITHOUT ANGINA PECTORIS: ICD-10-CM

## 2022-12-14 DIAGNOSIS — N18.30 CKD STAGE 3 DUE TO TYPE 2 DIABETES MELLITUS (HCC): ICD-10-CM

## 2022-12-14 DIAGNOSIS — E11.22 CKD STAGE 3 DUE TO TYPE 2 DIABETES MELLITUS (HCC): ICD-10-CM

## 2022-12-14 LAB
ALBUMIN SERPL BCP-MCNC: 3.7 G/DL (ref 3.5–5)
ALP SERPL-CCNC: 76 U/L (ref 46–116)
ALT SERPL W P-5'-P-CCNC: 31 U/L (ref 12–78)
ANION GAP SERPL CALCULATED.3IONS-SCNC: 3 MMOL/L (ref 4–13)
AST SERPL W P-5'-P-CCNC: 21 U/L (ref 5–45)
BASOPHILS # BLD AUTO: 0.03 THOUSANDS/ÂΜL (ref 0–0.1)
BASOPHILS NFR BLD AUTO: 1 % (ref 0–1)
BILIRUB SERPL-MCNC: 0.51 MG/DL (ref 0.2–1)
BUN SERPL-MCNC: 20 MG/DL (ref 5–25)
CALCIUM SERPL-MCNC: 9.4 MG/DL (ref 8.3–10.1)
CHLORIDE SERPL-SCNC: 104 MMOL/L (ref 96–108)
CO2 SERPL-SCNC: 28 MMOL/L (ref 21–32)
CREAT SERPL-MCNC: 1.04 MG/DL (ref 0.6–1.3)
EOSINOPHIL # BLD AUTO: 0.1 THOUSAND/ÂΜL (ref 0–0.61)
EOSINOPHIL NFR BLD AUTO: 2 % (ref 0–6)
ERYTHROCYTE [DISTWIDTH] IN BLOOD BY AUTOMATED COUNT: 14.7 % (ref 11.6–15.1)
GFR SERPL CREATININE-BSD FRML MDRD: 68 ML/MIN/1.73SQ M
GLUCOSE P FAST SERPL-MCNC: 201 MG/DL (ref 65–99)
HCT VFR BLD AUTO: 32.5 % (ref 36.5–49.3)
HGB BLD-MCNC: 10.7 G/DL (ref 12–17)
IMM GRANULOCYTES # BLD AUTO: 0.02 THOUSAND/UL (ref 0–0.2)
IMM GRANULOCYTES NFR BLD AUTO: 0 % (ref 0–2)
LYMPHOCYTES # BLD AUTO: 0.6 THOUSANDS/ÂΜL (ref 0.6–4.47)
LYMPHOCYTES NFR BLD AUTO: 11 % (ref 14–44)
MCH RBC QN AUTO: 29.6 PG (ref 26.8–34.3)
MCHC RBC AUTO-ENTMCNC: 32.9 G/DL (ref 31.4–37.4)
MCV RBC AUTO: 90 FL (ref 82–98)
MONOCYTES # BLD AUTO: 0.63 THOUSAND/ÂΜL (ref 0.17–1.22)
MONOCYTES NFR BLD AUTO: 11 % (ref 4–12)
NEUTROPHILS # BLD AUTO: 4.34 THOUSANDS/ÂΜL (ref 1.85–7.62)
NEUTS SEG NFR BLD AUTO: 75 % (ref 43–75)
NRBC BLD AUTO-RTO: 0 /100 WBCS
PLATELET # BLD AUTO: 229 THOUSANDS/UL (ref 149–390)
PMV BLD AUTO: 9.6 FL (ref 8.9–12.7)
POTASSIUM SERPL-SCNC: 4.6 MMOL/L (ref 3.5–5.3)
PROT SERPL-MCNC: 7 G/DL (ref 6.4–8.4)
RBC # BLD AUTO: 3.61 MILLION/UL (ref 3.88–5.62)
SODIUM SERPL-SCNC: 135 MMOL/L (ref 135–147)
WBC # BLD AUTO: 5.72 THOUSAND/UL (ref 4.31–10.16)

## 2022-12-14 NOTE — PROGRESS NOTES
E11 51  [facility]  coding opportunities          Chart Reviewed number of suggestions sent to Provider: 1     Patients Insurance     Medicare Insurance: [employer]

## 2022-12-15 ENCOUNTER — TELEPHONE (OUTPATIENT)
Dept: CARDIOLOGY CLINIC | Facility: CLINIC | Age: 77
End: 2022-12-15

## 2022-12-16 ENCOUNTER — TELEMEDICINE (OUTPATIENT)
Dept: ENDOCRINOLOGY | Facility: CLINIC | Age: 77
End: 2022-12-16

## 2022-12-16 VITALS — WEIGHT: 162 LBS | BODY MASS INDEX: 22.68 KG/M2 | HEIGHT: 71 IN

## 2022-12-16 DIAGNOSIS — E11.40 TYPE 2 DIABETES MELLITUS WITH DIABETIC NEUROPATHY, WITHOUT LONG-TERM CURRENT USE OF INSULIN (HCC): ICD-10-CM

## 2022-12-16 DIAGNOSIS — E11.22 CKD STAGE 2 DUE TO TYPE 2 DIABETES MELLITUS (HCC): ICD-10-CM

## 2022-12-16 DIAGNOSIS — I10 BENIGN ESSENTIAL HYPERTENSION: ICD-10-CM

## 2022-12-16 DIAGNOSIS — E78.5 DYSLIPIDEMIA: ICD-10-CM

## 2022-12-16 DIAGNOSIS — N18.2 CKD STAGE 2 DUE TO TYPE 2 DIABETES MELLITUS (HCC): ICD-10-CM

## 2022-12-16 DIAGNOSIS — E11.65 TYPE 2 DIABETES MELLITUS WITH HYPERGLYCEMIA, WITHOUT LONG-TERM CURRENT USE OF INSULIN (HCC): Primary | ICD-10-CM

## 2022-12-16 NOTE — PROGRESS NOTES
Virtual Brief Visit    Patient is located in the following state in which I hold an active license { amb virtual patient location:[id number]}      Assessment/Plan:    Problem List Items Addressed This Visit        Endocrine    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 mellitus ([facility] )    Type 2 diabetes mellitus with hyperglycemia, without long-term current use of insulin ([facility] - Primary       Cardiovascular and Mediastinum    Benign essential hypertension       Other    Dyslipidemia       Type 2 diabetes mellitus  HbA1c: 6 9,   Cont with metformin 1000mg BID and Januvia 25mg QD      Recent Visits  No visits were found meeting these conditions  Showing recent visits within past 7 days and meeting all other requirements  Today's Visits  Date Type Provider Dept   12/16/22 Telemedicine Matty Alvarado MD [facility] Miranda today's visits and meeting all other requirements  Future Appointments  No visits were found meeting these conditions    Showing future appointments within next 150 days and meeting all other requirements         Visit Time    Visit Start Time: 0900  Visit Stop Time:   Total Visit Duration: {Psych Total Visit HNBQ:18020}

## 2022-12-16 NOTE — PROGRESS NOTES
Virtual Regular Visit    Verification of patient location:    Patient is located in the following state in which I hold an active license [place]      Assessment/Plan:    Problem List Items Addressed This Visit        Endocrine    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 mellitus ([facility] )    Type 2 diabetes mellitus with hyperglycemia, without long-term current use of insulin ([facility] ) - Primary       Cardiovascular and Mediastinum    Benign essential hypertension       Other    Dyslipidemia            Reason for visit is   Chief Complaint   Patient presents with   • Virtual Regular Visit        Encounter provider Malik Yusuf MD    Provider located at [address] [address] [phone]      Recent Visits  No visits were found meeting these conditions  Showing recent visits within past 7 days and meeting all other requirements  Today's Visits  Date Type Provider Dept   12/16/22 Telemedicine Malik Yusuf MD [facility] today's visits and meeting all other requirements  Future Appointments  No visits were found meeting these conditions  Showing future appointments within next 150 days and meeting all other requirements       The patient was identified by name and date of birth  Ady Dye was informed that this is a telemedicine visit and that the visit is being conducted through Telephone  My office door was closed  No one else was in the room  He acknowledged consent and understanding of privacy and security of the video platform  The patient has agreed to participate and understands they can discontinue the visit at any time  Patient is aware this is a billable service       Subjective  Ady Dye is a 65 y o  male           Past Medical History:   Diagnosis Date   • Acquired hallux valgus     last assessed: 8/1/2013   • Allergic rhinitis    • Anemia 10/26/2010   • Atrophy of nail     last assessed: 7/7/2015   • Chronic kidney disease     chronic renal insufficiency   • Coronary artery disease    • Deformity of ankle and foot, acquired     last assessed: 2/22/2016   • Difficulty walking     last assessed: 12/14/2015   • Dysesthesia     last assessed: 11/25/2016   • Hammer toe     unspecified laterality; last assessed: 8/1/2013   • Hypertension    • Onychomycosis of toenail     last assessed: 2/22/2016   • Peripheral vascular disease ([facility] )     left SFA stent in 2010   • Pes planus     unspecified laterality; last assessed: 8/1/2013   • Pneumonia     last assessed: 2/27/2016   • Prostate cancer ([facility] )    • 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     last assessed: 8/15/2016   • Type 2 diabetes mellitus ([facility] ) 07/26/2010   • Viral warts     last assessed: 7/24/2015       Past Surgical History:   Procedure Laterality Date   • CARDIAC CATHETERIZATION  07/29/2010   • CATARACT EXTRACTION, BILATERAL Bilateral     R 1999, L 2008   • COLONOSCOPY  2011   • FEMORAL ARTERY - POPLITEAL ARTERY BYPASS GRAFT  09/23/2013   • FOOT SURGERY      bone spur removed   • LEG SURGERY Bilateral     repair; L 8/20/2010 and 7/26/2012   • TX PLACE RADIOTHER DEVICE/MARKER, PROSTATE N/A 6/22/2021    Procedure: INSERTION OF FIDUCIAL MARKER (TRANSRECTAL ULTRASOUND GUIDANCE), SPACEOAR;  Surgeon: Stephen Almeida MD;  [facility];  Service: Urology   • ROTATOR CUFF REPAIR Left 2009   • SHOULDER SURGERY Right 2005    collar bone       Current Outpatient Medications   Medication Sig Dispense Refill   • acetaminophen (TYLENOL) 325 mg tablet Take 2 tablets (650 mg total) by mouth every 6 (six) hours as needed for mild pain, headaches or fever  0   • Ascorbic Acid (Vitamin C) 500 MG CAPS Take 500 mg by mouth daily     • aspirin (ECOTRIN LOW STRENGTH) 81 mg EC tablet Take 81 mg by mouth daily     • atorvastatin (LIPITOR) 20 mg tablet TAKE 1 TABLET (20 MG TOTAL) BY MOUTH DAILY 90 tablet 1   • brimonidine tartrate 0 2 % ophthalmic solution Inject 0 2 % into the eye 2 (two) times a day     • carvedilol (COREG) 12 5 mg tablet Take 1 tablet (12 5 mg total) by mouth 2 (two) times a day 180 tablet 1   • Cholecalciferol (Vitamin D3) 50 MCG (2000 UT) TABS Take 2,000 Units by mouth daily     • co-enzyme Q-10 100 mg capsule Take 100 mg by mouth daily     • glucose blood test strip Test blood sugar twice a day 100 each 3   • losartan (COZAAR) 25 mg tablet Take 1 tablet (25 mg total) by mouth daily 90 tablet 1   • metFORMIN (GLUCOPHAGE) 1000 MG tablet Take one tablet in AM and 0 5 tablet in PM  270 tablet 0   • multivitamin (THERAGRAN) TABS Take 1 tablet by mouth daily     • omeprazole (PriLOSEC) 40 MG capsule Take 1 capsule (40 mg total) by mouth daily as needed (acid reflux) 90 capsule 1   • Probiotic Product (CULTURELLE PROBIOTICS) CHEW Chew daily     • sitaGLIPtin (JANUVIA) 25 mg tablet Take one tablet by mouth once a day 90 tablet 3   • tamsulosin (FLOMAX) 0 4 mg Take 1 capsule (0 4 mg total) by mouth daily with dinner 90 capsule 2   • TRUEPLUS LANCETS 28G MISC Test 1x/d, E11 29  0   • acarbose (PRECOSE) 100 MG tablet Take 1 tablet (100 mg total) by mouth 3 (three) times a day with meals (Patient not taking: Reported on 12/16/2022) 270 tablet 3   • benzonatate (TESSALON) 200 MG capsule Take 1 capsule (200 mg total) by mouth 3 (three) times a day as needed for cough (Patient not taking: Reported on 10/20/2022) 20 capsule 0   • ferrous sulfate 324 (65 Fe) mg TAKE ONE TABLET BY MOUTH BEFORE BREAKFAST (Patient not taking: Reported on 7/12/2022)     • furosemide (LASIX) 20 mg tablet Take 1 tablet (20 mg total) by mouth if needed (For weight gain of 2-3 lb in a day and 5 lb in a week and then call Cardiology) (Patient not taking: Reported on 10/20/2022) 15 tablet 1   • gabapentin (NEURONTIN) 600 MG tablet Take 1 tablet (600 mg total) by mouth 2 (two) times a day (Patient not taking: Reported on 12/16/2022) 180 tablet 0   • Infant Foods (Follow-Up) POWD HANDICAP PLACARD, medically recommended, <taxonomy [id number]> due to arthritic/orthopedic condition (#2,#5) (Patient not taking: Reported on 9/19/2022) 99 g 1   • spironolactone (ALDACTONE) 25 mg tablet Take 1 tablet (25 mg total) by mouth daily (Patient not taking: Reported on 11/21/2022) 30 tablet 0     No current facility-administered medications for this visit          No Known Allergies    Review of Systems    Video Exam    Vitals:    12/16/22 0843   Weight: 73 5 kg (162 lb)   Height: 5' 11" (1 803 m)       Physical Exam     I spent 20 minutes directly with the patient during this visit

## 2022-12-16 NOTE — PROGRESS NOTES
Virtual Regular Visit    Verification of patient location:    Patient is located in the following state in which I hold an active license [place]      Assessment/Plan:    Problem List Items Addressed This Visit        Endocrine    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 mellitus ([facility] )    Type 2 diabetes mellitus with hyperglycemia, without long-term current use of insulin ([facility] ) - Primary       Cardiovascular and Mediastinum    Benign essential hypertension       Other    Dyslipidemia        Type 2 Diabetes Mellitus with neuropathy  HbA1c: 6 9 in July 2022, note Hb: 10 7  Will get repeat HbA1c, fructosamine levels now   Discussed about measuring blood glucose 2 hrs post prandially and sending blood glucose logs to the office in 2 weeks to review  Discussed with the patient that we might increase Januvia to up to 100 mg per day based on results of blood glucose logs and blood work  Cont with metformin 1000 mg BID and Januvia 25 mg QD until then, [facility] and [facility] pharmacy respectively    Stage 2 CKD  GFR is stable, GFR 68 on most recent CMP from dec 2022  Follows up with Nephrology (Dr Ever Weston)    HLD  Lipid panel from April 2022 noted and at goal  Cont with atorvastatin 20mg QD    CAD  sHTN  Heart failure with diastolic dysfunction  Cont with losartan 25mg QD    Reason for visit is   Chief Complaint   Patient presents with   • Virtual Regular Visit   • Virtual Brief Visit        Encounter provider Glen Atkins MD    Provider located at [address] [address] [phone]      Recent Visits  No visits were found meeting these conditions    Showing recent visits within past 7 days and meeting all other requirements  Today's Visits  Date Type Provider Dept   12/16/22 Telemedicine Glen Atkins MD [facility] today's visits and meeting all other requirements  Future Appointments  No visits were found meeting these conditions  Showing future appointments within next 150 days and meeting all other requirements       The patient was identified by name and date of birth  Perlita Pruett was informed that this is a telemedicine visit and that the visit is being conducted through Telephone  My office door was closed  No one else was in the room  He acknowledged consent and understanding of privacy and security of the video platform  The patient has agreed to participate and understands they can discontinue the visit at any time  Patient is aware this is a billable service  Subjective  Perlita Pruett is a 65 y o  male with PMH of type 2 diabetes mellitus for about 28 years, on oral agents who is seen in follow up for type 2 diabetes mellitus management  HPI     Perlita Pruett is a 65 y o  male with PMH of type 2 diabetes mellitus for about 28 years, on oral agents who is seen in follow up for type 2 diabetes mellitus management  Last seen in June 1337    Complications of diabetes include CKD stage 2, CAD  Current regimen: metformin 1000mg BID, Januvia 25mg QD    Denies of any hypo/hyperglycemic episodes requiring ED visits or admissions to the hospital  Pt reports to have nocturia which he attributes to his prostate cancer and to tamsulosin    Blood glucose monitoring: measures fasting blood glucose once a day in am using glucometer, BG is in 130s-160s, occasionally 190s    Diet: eats 45gm of carbs/day, eats crackers for snacks in between  Exercise: does not exercise, walks around the house    Optho: Nov 2022, no retinopathy  Podiatry:  Oct 2022, wnl    ACE/ARB: Losartan 20mg QD  Statin: atorvastatin 20mg QD    Past Medical History:   Diagnosis Date   • Acquired hallux valgus     last assessed: 8/1/2013   • Allergic rhinitis    • Anemia 10/26/2010   • Atrophy of nail     last assessed: 7/7/2015   • Chronic kidney disease     chronic renal insufficiency   • Coronary artery disease    • Deformity of ankle and foot, acquired     last assessed: 2/22/2016   • Difficulty walking     last assessed: 12/14/2015   • Dysesthesia     last assessed: 11/25/2016   • Hammer toe     unspecified laterality; last assessed: 8/1/2013   • Hypertension    • Onychomycosis of toenail     last assessed: 2/22/2016   • Peripheral vascular disease ([facility] )     left SFA stent in 2010   • Pes planus     unspecified laterality; last assessed: 8/1/2013   • Pneumonia     last assessed: 2/27/2016   • Prostate cancer ([facility] )    • 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     last assessed: 8/15/2016   • Type 2 diabetes mellitus ([facility] ) 07/26/2010   • Viral warts     last assessed: 7/24/2015       Past Surgical History:   Procedure Laterality Date   • CARDIAC CATHETERIZATION  07/29/2010   • CATARACT EXTRACTION, BILATERAL Bilateral     R 1999, L 2008   • COLONOSCOPY  2011   • FEMORAL ARTERY - POPLITEAL ARTERY BYPASS GRAFT  09/23/2013   • FOOT SURGERY      bone spur removed   • LEG SURGERY Bilateral     repair; L 8/20/2010 and 7/26/2012   • [place] PLACE RADIOTHER DEVICE/MARKER, PROSTATE N/A 6/22/2021    Procedure: INSERTION OF FIDUCIAL MARKER (TRANSRECTAL ULTRASOUND GUIDANCE), SPACEOAR;  Surgeon: Dinah Acosta MD;  [facility];  Service: Urology   • ROTATOR CUFF REPAIR Left 2009   • SHOULDER SURGERY Right 2005    collar bone       Current Outpatient Medications   Medication Sig Dispense Refill   • acetaminophen (TYLENOL) 325 mg tablet Take 2 tablets (650 mg total) by mouth every 6 (six) hours as needed for mild pain, headaches or fever  0   • Ascorbic Acid (Vitamin C) 500 MG CAPS Take 500 mg by mouth daily     • aspirin (ECOTRIN LOW STRENGTH) 81 mg EC tablet Take 81 mg by mouth daily     • atorvastatin (LIPITOR) 20 mg tablet TAKE 1 TABLET (20 MG TOTAL) BY MOUTH DAILY 90 tablet 1   • brimonidine tartrate 0 2 % ophthalmic solution Inject 0 2 % into the eye 2 (two) times a day     • carvedilol (COREG) 12 5 mg tablet Take 1 tablet (12 5 mg total) by mouth 2 (two) times a day 180 tablet 1   • Cholecalciferol (Vitamin D3) 50 MCG (2000 UT) TABS Take 2,000 Units by mouth daily     • co-enzyme Q-10 100 mg capsule Take 100 mg by mouth daily     • glucose blood test strip Test blood sugar twice a day 100 each 3   • losartan (COZAAR) 25 mg tablet Take 1 tablet (25 mg total) by mouth daily 90 tablet 1   • metFORMIN (GLUCOPHAGE) 1000 MG tablet Take one tablet in AM and 0 5 tablet in PM  270 tablet 0   • multivitamin (THERAGRAN) TABS Take 1 tablet by mouth daily     • omeprazole (PriLOSEC) 40 MG capsule Take 1 capsule (40 mg total) by mouth daily as needed (acid reflux) 90 capsule 1   • Probiotic Product (CULTURELLE PROBIOTICS) CHEW Chew daily     • sitaGLIPtin (JANUVIA) 25 mg tablet Take one tablet by mouth once a day 90 tablet 3   • tamsulosin (FLOMAX) 0 4 mg Take 1 capsule (0 4 mg total) by mouth daily with dinner 90 capsule 2   • TRUEPLUS LANCETS 28G MISC Test 1x/d, E11 29  0   • acarbose (PRECOSE) 100 MG tablet Take 1 tablet (100 mg total) by mouth 3 (three) times a day with meals (Patient not taking: Reported on 12/16/2022) 270 tablet 3   • benzonatate (TESSALON) 200 MG capsule Take 1 capsule (200 mg total) by mouth 3 (three) times a day as needed for cough (Patient not taking: Reported on 10/20/2022) 20 capsule 0   • ferrous sulfate 324 (65 Fe) mg TAKE ONE TABLET BY MOUTH BEFORE BREAKFAST (Patient not taking: Reported on 7/12/2022)     • furosemide (LASIX) 20 mg tablet Take 1 tablet (20 mg total) by mouth if needed (For weight gain of 2-3 lb in a day and 5 lb in a week and then call Cardiology) (Patient not taking: Reported on 10/20/2022) 15 tablet 1   • gabapentin (NEURONTIN) 600 MG tablet Take 1 tablet (600 mg total) by mouth 2 (two) times a day (Patient not taking: Reported on 12/16/2022) 180 tablet 0   • Infant Foods (Follow-Up) POWD HANDICAP PLACARD, medically recommended, <taxonomy [id number]> due to arthritic/orthopedic condition (#2,#5) (Patient not taking: Reported on 9/19/2022) 99 g 1   • spironolactone (ALDACTONE) 25 mg tablet Take 1 tablet (25 mg total) by mouth daily (Patient not taking: Reported on 11/21/2022) 30 tablet 0     No current facility-administered medications for this visit  No Known Allergies    Review of Systems   Constitutional: Negative for activity change, appetite change and fatigue  HENT: Negative for congestion and sore throat  Eyes: Negative for redness and visual disturbance  Respiratory: Negative for cough and shortness of breath  Cardiovascular: Negative for palpitations and leg swelling  Gastrointestinal: Negative for abdominal pain, constipation, diarrhea, nausea and vomiting  Endocrine: Negative for cold intolerance, heat intolerance, polydipsia, polyphagia and polyuria  Genitourinary: Negative for difficulty urinating and dysuria  Musculoskeletal: Negative for gait problem and neck pain  Skin: Negative for color change  Neurological: Negative for dizziness and syncope  Psychiatric/Behavioral: Negative for agitation and behavioral problems  All other systems reviewed and are negative  Video Exam    Vitals:    12/16/22 0843   Weight: 73 5 kg (162 lb)   Height: 5' 11" (1 803 m)       Physical Exam  Neurological:      Mental Status: He is alert     Psychiatric:         Mood and Affect: Mood normal          Judgment: Judgment normal           I spent 30 minutes with patient today in which greater than 50% of the time was spent in counseling/coordination of care regarding type 2 diabetes mellitus

## 2022-12-17 PROBLEM — I74.3 EMBOLISM AND THROMBOSIS OF ARTERIES OF THE LOWER EXTREMITIES (HCC): Status: RESOLVED | Noted: 2020-03-12 | Resolved: 2022-12-17

## 2022-12-17 PROBLEM — R97.20 ELEVATED PSA: Status: RESOLVED | Noted: 2019-12-05 | Resolved: 2022-12-17

## 2022-12-17 PROBLEM — T82.898A: Status: RESOLVED | Noted: 2018-05-18 | Resolved: 2022-12-17

## 2022-12-17 PROBLEM — N18.31 ANEMIA DUE TO STAGE 3A CHRONIC KIDNEY DISEASE (HCC): Status: RESOLVED | Noted: 2021-01-27 | Resolved: 2022-12-17

## 2022-12-17 PROBLEM — Z13.6 SCREENING FOR AAA (AORTIC ABDOMINAL ANEURYSM): Status: RESOLVED | Noted: 2019-05-17 | Resolved: 2022-12-17

## 2022-12-17 PROBLEM — I70.219 ATHEROSCLEROSIS OF ARTERY OF EXTREMITY WITH INTERMITTENT CLAUDICATION (HCC): Status: RESOLVED | Noted: 2017-02-07 | Resolved: 2022-12-17

## 2022-12-17 PROBLEM — D63.1 ANEMIA DUE TO STAGE 3A CHRONIC KIDNEY DISEASE (HCC): Status: RESOLVED | Noted: 2021-01-27 | Resolved: 2022-12-17

## 2022-12-19 ENCOUNTER — OFFICE VISIT (OUTPATIENT)
Dept: PODIATRY | Facility: CLINIC | Age: 77
End: 2022-12-19

## 2022-12-19 VITALS
DIASTOLIC BLOOD PRESSURE: 68 MMHG | SYSTOLIC BLOOD PRESSURE: 140 MMHG | RESPIRATION RATE: 17 BRPM | BODY MASS INDEX: 22.68 KG/M2 | HEIGHT: 71 IN | WEIGHT: 162 LBS

## 2022-12-19 DIAGNOSIS — I73.9 PAD (PERIPHERAL ARTERY DISEASE) (HCC): ICD-10-CM

## 2022-12-19 DIAGNOSIS — B35.1 ONYCHOMYCOSIS: ICD-10-CM

## 2022-12-19 DIAGNOSIS — E11.42 DIABETIC POLYNEUROPATHY ASSOCIATED WITH TYPE 2 DIABETES MELLITUS (HCC): Primary | ICD-10-CM

## 2022-12-19 DIAGNOSIS — M79.672 PAIN IN BOTH FEET: ICD-10-CM

## 2022-12-19 DIAGNOSIS — M54.16 LUMBAR RADICULOPATHY: ICD-10-CM

## 2022-12-19 DIAGNOSIS — M79.671 PAIN IN BOTH FEET: ICD-10-CM

## 2022-12-19 NOTE — PROGRESS NOTES
Assessment/Plan:  Deformity of foot   Diabetic neuropathy   Pain upon ambulation   Pain upon ambulation   Mycosis of nail   Callus formation     Plan   Lesion debrided    all mycotic nails debrided without pain or complication   Foot exam performed   Patient educated on care of the diabetic foot   Plantar calluses debrided as well   Patient remain on gabapentin as well   Refill ordered           Subjective:   patient is diabetic   He has pain upon ambulation   He has pain with shoe wear   No history of trauma             Past Medical History:   Diagnosis Date   • Acquired hallux valgus       last assessed: 8/1/2013   • Allergic rhinitis     • Anemia 10/26/2010   • Atrophy of nail       last assessed: 7/7/2015   • Chronic kidney disease       chronic renal insufficiency   • Coronary artery disease     • Deformity of ankle and foot, acquired       last assessed: 2/22/2016   • Diabetes mellitus ([facility] [address] )     • Difficulty walking       last assessed: 12/14/2015   • Dysesthesia       last assessed: 11/25/2016   • Hammer toe       unspecified laterality; last assessed: 8/1/2013   • Hypertension     • Onychomycosis of toenail       last assessed: 2/22/2016   • Peripheral vascular disease ([facility] [address] )       left SFA stent in 2010   • Pes planus       unspecified laterality; last assessed: 8/1/2013   • Pneumonia       last assessed: 2/27/2016   • 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       last assessed: 8/15/2016   • Type 2 diabetes mellitus ([facility] [address] ) 07/26/2010   • Viral warts       last assessed: 7/24/2015                   Past Surgical History:   Procedure Laterality Date   • CARDIAC CATHETERIZATION   07/29/2010   • CATARACT EXTRACTION, BILATERAL Bilateral       R 1999, L 2008   • FEMORAL ARTERY - POPLITEAL ARTERY BYPASS GRAFT   09/23/2013   • FOOT SURGERY         bone spur removed   • LEG SURGERY Bilateral       repair; L 8/20/2010 and 7/26/2012   • ROTATOR CUFF REPAIR Left 2009   • SHOULDER SURGERY Right 2005     collar bone         No Known Allergies        Current Outpatient Medications:   •  acarbose (PRECOSE) 100 MG tablet, Take 1 tablet (100 mg total) by mouth 3 (three) times a day with meals, Disp: 270 tablet, Rfl: 1  •  amLODIPine (NORVASC) 2 5 mg tablet, Take 1 tablet (2 5 mg total) by mouth daily (Patient not taking: Reported on 9/26/2019), Disp: 90 tablet, Rfl: 3  •  aspirin (ECOTRIN LOW STRENGTH) 81 mg EC tablet, Take 1 tablet (81 mg total) by mouth daily, Disp: 30 tablet, Rfl: 6  •  b complex-vitamin C-folic acid (NEPHROCAPS) 1 mg capsule, Take 1 capsule by mouth daily, Disp: , Rfl:   •  betamethasone dipropionate (DIPROSONE) 0 05 % cream, Use as dir twice daily, Disp: , Rfl:   •  carvedilol (COREG) 12 5 mg tablet, TAKE 1 TABLET TWICE DAILY, Disp: 180 tablet, Rfl: 1  •  gabapentin (NEURONTIN) 600 MG tablet, Take 1 tablet (600 mg total) by mouth 2 (two) times a day, Disp: 180 tablet, Rfl: 0  •  glimepiride (AMARYL) 4 mg tablet, Take 1 tablet (4 mg total) by mouth 2 (two) times a day for 126 days, Disp: 180 tablet, Rfl: 0  •  glucose blood (TRUETRACK TEST) test strip, 1 each by Other route daily Use as instructed for E11 29, Disp: 100 each, Rfl: 3  •  lisinopril (ZESTRIL) 5 mg tablet, Take 1 tablet (5 mg total) by mouth daily (Patient taking differently: Take 2 5 mg by mouth daily ), Disp: 90 tablet, Rfl: 3  •  metFORMIN (GLUMETZA) 500 MG (MOD) 24 hr tablet, Take 2 tablets (1,000 mg total) by mouth 2 (two) times a day with meals, Disp: 120 tablet, Rfl: 5  •  multivitamin (THERAGRAN) TABS, Take 1 tablet by mouth daily, Disp: , Rfl:   •  omeprazole (PriLOSEC) 40 MG capsule, Take 1 capsule (40 mg total) by mouth daily, Disp: 90 capsule, Rfl: 1  •  simvastatin (ZOCOR) 40 mg tablet, TAKE 1 TABLET (40 MG TOTAL) BY MOUTH DAILY, Disp: 90 tablet, Rfl: 1  •  TRUEPLUS LANCETS 28G MISC, Test 1x/d, E11 29, Disp: , Rfl: 0           Patient Active Problem List   Diagnosis   • Diabetic polyneuropathy associated with type 2 diabetes mellitus ([facility] [address] )   • Allergic rhinitis   • Arthropathy   • PAD (peripheral artery disease) ([facility])   • Benign essential hypertension   • CAD (coronary artery disease)   • 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 mellitus ([facility])   • Diabetic neuropathy ([facility])   • GE reflux   • Lumbar radiculopathy   • Rosacea   • Seborrheic dermatitis   • Type 2 diabetes mellitus with diabetic neuropathy ([facility])   • Onychomycosis   • Occlusion of femoral-popliteal bypass graft ([facility])   • Prostate cancer screening   • Dyslipidemia   • Screening for AAA (aortic abdominal aneurysm)   • Medicare annual wellness visit, subsequent   • Type 2 diabetes mellitus with stage 3 chronic kidney disease, without long-term current use of insulin ([facility])   • Pain in both feet   • Corns   • Plantar warts             Patient ID: Pernell Covarrubias is a 77 y  o  male         The following portions of the patient's history were reviewed and updated as appropriate:      family history includes Aortic aneurysm in his mother; Heart attack in his father and sister; Heart disease in his father, paternal grandfather, and sister        reports that he has never smoked  He has never used smokeless tobacco  He reports that he drinks alcohol  He reports that he does not use drugs         Objective:  Patient's shoes and socks removed    Foot ExamPhysical Exam          Physical Exam  Left Foot: Appearance: a deformity (ball of foot and distal fifth metatarsal )  Fifth toe deformities include hammer toe  Tenderness: None except the plantar aspect of the foot  Right Foot: Appearance: a deformity (distal fifth metatarsal )  Left Ankle: ROM: limited ROM in all planes   Right Ankle: ROM: limited ROM in all planes   Neurological Exam: Light touch was decreased bilaterally  Vibratory sensation was decreased in both first metatarsophalangeal joints  Response to monofilament test was absent bilaterally  Deep tendon reflexes: achilles reflex 1/4 bilaterally     Vascular Exam: performed Dorsalis pedis pulses were 1/4 bilaterally  Posterior tibial pulses were 1/4 bilaterally  Elevation Pallor: diminished bilaterally  Capillary refill time was Q  9, findings bilateral  Negative digital hair noted, positive abnormal cooling  All pre-ulcer, lesions debrided  Today, but between 1-3 seconds bilaterally  Edema: mild bilaterally and All mycotic nails debrided  Today  The patient was given orthotics to help control his feet and at as cushioning and padding on the bottom of his feet q9 findings  Toenails: All of the toenails were elongated, hypertrophied, discolored, ingrown, shown to have subungual debris and tender       Socks and shoes removed, the Right Foot: the foot was dry  The sensory exam showed diminished vibratory sensation at the level of the toes  Diminished tactile sensation with monofilament testing throughout the right foot    Socks and shoes removed, Left Foot: the foot was dry  The sensory exam showed diminished vibratory sensation at the level of the toes  Diminished tactile sensation with monofilament testing throughout the left foot    Pulses:   1+ in the posterior tibialis on the right   1+ in the dorsalis pedis on the right  Capillary refills findings on the left were delayed in the toes  Pulses:   1+ in the posterior tibialis on the left   1+ in the dorsalis pedis on the left  Assign Risk Category: 2: Loss of protective sensation with or without weakness, deformity, callus, pre-ulcer, or history of ulceration  High risk  Hyperkeratosis: present on both first sub metatarsals, present on both fifth sub metatarsals and Pre-ulcerative lesion, submetatarsal one to 5, bilateral    Shoe Gear Evaluation: performed ()  Recommendation(s): custom inlays       Patient's shoes and socks removed  Right Foot/Ankle   Right Foot Inspection  Skin Exam: callus and callus               Toe Exam: swelling and erythema  Sensory   Vibration: diminished  Proprioception: diminished      Vascular  Capillary refills: elevated        Left Foot/Ankle  Left Foot Inspection  Skin Exam: callus   Submetatarsal 5 of the left foot demonstrates 0 5 centimeter squared plantar verruca   It is in capsulated in a bullae   Debrided   20% remaining               Toe Exam: swelling and erythema                   Sensory   Vibration: diminished  Proprioception: diminished     Vascular  Capillary refills: elevated      Patient's shoes and socks removed            Assign Risk Category  Deformity present  Loss of protective sensation  Weak pulses  Risk: 2

## 2022-12-21 ENCOUNTER — OFFICE VISIT (OUTPATIENT)
Dept: FAMILY MEDICINE CLINIC | Facility: CLINIC | Age: 77
End: 2022-12-21

## 2022-12-21 VITALS
DIASTOLIC BLOOD PRESSURE: 60 MMHG | HEART RATE: 60 BPM | TEMPERATURE: 97.5 F | OXYGEN SATURATION: 100 % | HEIGHT: 71 IN | BODY MASS INDEX: 23.94 KG/M2 | WEIGHT: 171 LBS | RESPIRATION RATE: 16 BRPM | SYSTOLIC BLOOD PRESSURE: 130 MMHG

## 2022-12-21 DIAGNOSIS — E11.22 CKD STAGE 2 DUE TO TYPE 2 DIABETES MELLITUS (HCC): ICD-10-CM

## 2022-12-21 DIAGNOSIS — Z13.83 SCREENING FOR CARDIOVASCULAR, RESPIRATORY, AND GENITOURINARY DISEASES: ICD-10-CM

## 2022-12-21 DIAGNOSIS — I10 BENIGN ESSENTIAL HYPERTENSION: Primary | ICD-10-CM

## 2022-12-21 DIAGNOSIS — I25.10 CORONARY ARTERY DISEASE INVOLVING NATIVE CORONARY ARTERY OF NATIVE HEART WITHOUT ANGINA PECTORIS: ICD-10-CM

## 2022-12-21 DIAGNOSIS — Z13.6 SCREENING FOR CARDIOVASCULAR, RESPIRATORY, AND GENITOURINARY DISEASES: ICD-10-CM

## 2022-12-21 DIAGNOSIS — Z13.89 SCREENING FOR CARDIOVASCULAR, RESPIRATORY, AND GENITOURINARY DISEASES: ICD-10-CM

## 2022-12-21 DIAGNOSIS — N18.2 CKD STAGE 2 DUE TO TYPE 2 DIABETES MELLITUS (HCC): ICD-10-CM

## 2022-12-21 DIAGNOSIS — E78.5 DYSLIPIDEMIA: ICD-10-CM

## 2022-12-21 DIAGNOSIS — E11.40 TYPE 2 DIABETES MELLITUS WITH DIABETIC NEUROPATHY, WITHOUT LONG-TERM CURRENT USE OF INSULIN (HCC): ICD-10-CM

## 2022-12-21 NOTE — PROGRESS NOTES
Assessment/Plan:    No problem-specific Assessment & Plan notes found for this encounter  DM2 controlled but last a1c in July was 6 9 but anemic at approx 11 4    htn stable    ckd 2 with gfr 68  F/u nephrology    CAD stable, cardio f/u     Diagnoses and all orders for this visit:    Benign essential hypertension    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 mellitus ([facility] [address] )    Type 2 diabetes mellitus with diabetic neuropathy, without long-term current use of insulin ([facility])  -     Comprehensive metabolic panel; Future    Coronary artery disease involving native coronary artery of native heart without angina pectoris  -     Comprehensive metabolic panel; Future    Screening for cardiovascular, respiratory, and genitourinary diseases    Dyslipidemia  -     Lipid Panel with Direct LDL reflex; Future              Return in about 6 months (around 6/21/2023) for Recheck  Subjective:      Patient ID: Shara Venegas is a 65 y o  male  Chief Complaint   Patient presents with   • Follow-up     6 month f/u-wmcma       HPI  Wt about same  Low carb diet  No fluid restriction currently    The following portions of the patient's history were reviewed and updated as appropriate: allergies, current medications, past family history, past medical history, past social history, past surgical history and problem list     Review of Systems   Constitutional: Negative for chills and fever  Musculoskeletal: Positive for gait problem           Current Outpatient Medications   Medication Sig Dispense Refill   • acetaminophen (TYLENOL) 325 mg tablet Take 2 tablets (650 mg total) by mouth every 6 (six) hours as needed for mild pain, headaches or fever  0   • Ascorbic Acid (Vitamin C) 500 MG CAPS Take 500 mg by mouth daily     • aspirin (ECOTRIN LOW STRENGTH) 81 mg EC tablet Take 81 mg by mouth daily     • atorvastatin (LIPITOR) 20 mg tablet TAKE 1 TABLET (20 MG TOTAL) BY MOUTH DAILY 90 tablet 1   • brimonidine tartrate 0 2 % ophthalmic solution Inject 0 2 % into the eye 2 (two) times a day     • carvedilol (COREG) 12 5 mg tablet Take 1 tablet (12 5 mg total) by mouth 2 (two) times a day 180 tablet 1   • Cholecalciferol (Vitamin D3) 50 MCG (2000 UT) TABS Take 2,000 Units by mouth daily     • co-enzyme Q-10 100 mg capsule Take 100 mg by mouth daily     • glucose blood test strip Test blood sugar twice a day 100 each 3   • losartan (COZAAR) 25 mg tablet Take 1 tablet (25 mg total) by mouth daily 90 tablet 1   • metFORMIN (GLUCOPHAGE) 1000 MG tablet Take 1 tablet (1,000 mg total) by mouth 2 (two) times a day with meals 180 tablet 1   • multivitamin (THERAGRAN) TABS Take 1 tablet by mouth daily     • omeprazole (PriLOSEC) 40 MG capsule Take 1 capsule (40 mg total) by mouth daily as needed (acid reflux) 90 capsule 1   • Probiotic Product (CULTURELLE PROBIOTICS) CHEW Chew daily     • sitaGLIPtin (JANUVIA) 25 mg tablet Take one tablet by mouth once a day 90 tablet 1   • spironolactone (ALDACTONE) 25 mg tablet Take 1 tablet (25 mg total) by mouth daily 30 tablet 0   • tamsulosin (FLOMAX) 0 4 mg Take 1 capsule (0 4 mg total) by mouth daily with dinner 90 capsule 2   • TRUEPLUS LANCETS 28G MISC Test 1x/d, E11 29  0   • Infant Foods (Follow-Up) POWD HANDICAP PLACARD, medically recommended, <taxonomy [id number]> due to arthritic/orthopedic condition (#2,#5) (Patient not taking: Reported on 9/19/2022) 99 g 1     No current facility-administered medications for this visit  Objective:    /60 (BP Location: Right arm, Patient Position: Sitting, Cuff Size: Standard)   Pulse 60   Temp 97 5 °F (36 4 °C) (Temporal)   Resp 16   Ht 5' 11" (1 803 m)   Wt 77 6 kg (171 lb)   SpO2 100%   BMI 23 85 kg/m²        Physical Exam  Vitals and nursing note reviewed  Constitutional:       General: He is not in acute distress  Appearance: He is well-developed  He is not ill-appearing  HENT:      Head: Normocephalic        Right Ear: Tympanic membrane and ear canal normal       Left Ear: Tympanic membrane and ear canal normal    Eyes:      General: No scleral icterus  Conjunctiva/sclera: Conjunctivae normal    Cardiovascular:      Rate and Rhythm: Normal rate and regular rhythm  Pulses: no weak pulses          Dorsalis pedis pulses are 2+ on the right side and 2+ on the left side  Pulmonary:      Effort: Pulmonary effort is normal  No respiratory distress  Breath sounds: No wheezing  Abdominal:      Palpations: Abdomen is soft  Musculoskeletal:         General: No deformity  Cervical back: Neck supple  Skin:     General: Skin is warm and dry  Coloration: Skin is not pale  Neurological:      Mental Status: He is alert  Motor: No weakness  Gait: Gait abnormal    Psychiatric:         Mood and Affect: Mood normal          Behavior: Behavior normal          Thought Content: Thought content normal        Diabetic Foot Exam    Patient's shoes and socks removed  Right Foot/Ankle   Right Foot Inspection  Skin Exam: skin intact  No abnormal color  Sensory   Monofilament testing: intact    Vascular  The right DP pulse is 2+  Left Foot/Ankle  Left Foot Inspection  Skin Exam: skin intact  Normal color  Sensory   Monofilament testing: intact    Vascular  The left DP pulse is 2+       Assign Risk Category  No deformity present  No loss of protective sensation  No weak pulses  Risk: 0           Harry Counter, DO

## 2023-01-09 ENCOUNTER — APPOINTMENT (OUTPATIENT)
Dept: LAB | Facility: CLINIC | Age: 78
End: 2023-01-09

## 2023-01-09 DIAGNOSIS — C61 PROSTATE CANCER (HCC): ICD-10-CM

## 2023-01-09 LAB — PSA SERPL-MCNC: <0.1 NG/ML (ref 0–4)

## 2023-01-11 ENCOUNTER — TELEPHONE (OUTPATIENT)
Dept: UROLOGY | Facility: MEDICAL CENTER | Age: 78
End: 2023-01-11

## 2023-01-11 NOTE — TELEPHONE ENCOUNTER
LUPRON 45mg - [facility] - NO authorization required  Office stock okay to use  Please get ABN signed for 2023  *VLD 1/11/23

## 2023-01-12 ENCOUNTER — TELEPHONE (OUTPATIENT)
Dept: UROLOGY | Facility: CLINIC | Age: 78
End: 2023-01-12

## 2023-01-12 ENCOUNTER — OFFICE VISIT (OUTPATIENT)
Dept: UROLOGY | Facility: CLINIC | Age: 78
End: 2023-01-12

## 2023-01-12 VITALS
SYSTOLIC BLOOD PRESSURE: 124 MMHG | WEIGHT: 171 LBS | DIASTOLIC BLOOD PRESSURE: 70 MMHG | HEIGHT: 71 IN | BODY MASS INDEX: 23.94 KG/M2

## 2023-01-12 DIAGNOSIS — C61 PROSTATE CANCER (HCC): Primary | ICD-10-CM

## 2023-01-12 NOTE — PROGRESS NOTES
UROLOGY PROGRESS NOTE   Patient Identifiers: Leonela Washington (MRN [id number])  Date of Service: 1/12/2023    Subjective:   50-year-old man with history of [place] 10 stage III prostate cancer  He was started on androgen deprivation therapy and completed radiation treatment  His PSA remains undetectable<0 1  He denies any bone pain or weight loss  Does have some hot flashes  He has low energy  Reason for visit: Prostate cancer follow-up    Objective:     VITALS:    There were no vitals filed for this visit          LABS:  Lab Results   Component Value Date    HGB 10 7 (L) 12/14/2022    HCT 32 5 (L) 12/14/2022    WBC 5 72 12/14/2022     12/14/2022   ]    Lab Results   Component Value Date     04/17/2014    K 4 6 12/14/2022     12/14/2022    CO2 28 12/14/2022    BUN 20 12/14/2022    CREATININE 1 04 12/14/2022    CALCIUM 9 4 12/14/2022    GLUCOSE 137 04/17/2014   ]        INPATIENT MEDS:    Current Outpatient Medications:   •  acetaminophen (TYLENOL) 325 mg tablet, Take 2 tablets (650 mg total) by mouth every 6 (six) hours as needed for mild pain, headaches or fever, Disp: , Rfl: 0  •  Ascorbic Acid (Vitamin C) 500 MG CAPS, Take 500 mg by mouth daily, Disp: , Rfl:   •  aspirin (ECOTRIN LOW STRENGTH) 81 mg EC tablet, Take 81 mg by mouth daily, Disp: , Rfl:   •  atorvastatin (LIPITOR) 20 mg tablet, TAKE 1 TABLET (20 MG TOTAL) BY MOUTH DAILY, Disp: 90 tablet, Rfl: 1  •  brimonidine tartrate 0 2 % ophthalmic solution, Inject 0 2 % into the eye 2 (two) times a day, Disp: , Rfl:   •  carvedilol (COREG) 12 5 mg tablet, Take 1 tablet (12 5 mg total) by mouth 2 (two) times a day, Disp: 180 tablet, Rfl: 1  •  Cholecalciferol (Vitamin D3) 50 MCG (2000 UT) TABS, Take 2,000 Units by mouth daily, Disp: , Rfl:   •  co-enzyme Q-10 100 mg capsule, Take 100 mg by mouth daily, Disp: , Rfl:   •  glucose blood test strip, Test blood sugar twice a day, Disp: 100 each, Rfl: 3  •  Infant Foods (Follow-Up) POWD, HANDICAP SERENA, medically recommended, <taxonomy [id number]> due to arthritic/orthopedic condition (#2,#5) (Patient not taking: Reported on 9/19/2022), Disp: 99 g, Rfl: 1  •  losartan (COZAAR) 25 mg tablet, Take 1 tablet (25 mg total) by mouth daily, Disp: 90 tablet, Rfl: 1  •  metFORMIN (GLUCOPHAGE) 1000 MG tablet, Take 1 tablet (1,000 mg total) by mouth 2 (two) times a day with meals, Disp: 180 tablet, Rfl: 1  •  multivitamin (THERAGRAN) TABS, Take 1 tablet by mouth daily, Disp: , Rfl:   •  omeprazole (PriLOSEC) 40 MG capsule, Take 1 capsule (40 mg total) by mouth daily as needed (acid reflux), Disp: 90 capsule, Rfl: 1  •  Probiotic Product (CULTURELLE PROBIOTICS) CHEW, Chew daily, Disp: , Rfl:   •  sitaGLIPtin (JANUVIA) 25 mg tablet, Take one tablet by mouth once a day, Disp: 90 tablet, Rfl: 1  •  spironolactone (ALDACTONE) 25 mg tablet, Take 1 tablet (25 mg total) by mouth daily, Disp: 30 tablet, Rfl: 0  •  tamsulosin (FLOMAX) 0 4 mg, Take 1 capsule (0 4 mg total) by mouth daily with dinner, Disp: 90 capsule, Rfl: 2  •  TRUEPLUS LANCETS 28G MISC, Test 1x/d, E11 29, Disp: , Rfl: 0      Physical Exam:   There were no vitals taken for this visit  GEN: no acute distress    RESP: breathing comfortably with no accessory muscle use    ABD: soft, non-tender, non-distended   INCISION:    EXT: no significant peripheral edema         RADIOLOGY:   None    Assessment:   1   Prostate cancer     Plan:   -Lupron 45 mg today  -Treatment time 2 to 3 years started in March 2021  -Follow-up 6 months with PSA prior to visit  -

## 2023-01-12 NOTE — TELEPHONE ENCOUNTER
Patient is scheduled for 7/14/23 for Lupron  Provider note:    Return in about 6 months (around 7/12/2023) for psa prior to visit, Lupron

## 2023-02-09 ENCOUNTER — TELEPHONE (OUTPATIENT)
Dept: RADIATION ONCOLOGY | Facility: HOSPITAL | Age: 78
End: 2023-02-09

## 2023-02-09 DIAGNOSIS — C61 PROSTATE CANCER (HCC): ICD-10-CM

## 2023-02-09 RX ORDER — TAMSULOSIN HYDROCHLORIDE 0.4 MG/1
0.4 CAPSULE ORAL
Qty: 90 CAPSULE | Refills: 2 | Status: SHIPPED | OUTPATIENT
Start: 2023-02-09

## 2023-02-09 NOTE — TELEPHONE ENCOUNTER
Travis Goes called [facility] asking for refill of his flomax, we see that you sent the last prescription and refills, can you please send another refill? Thank you!

## 2023-02-09 NOTE — TELEPHONE ENCOUNTER
Kristina Guzman PA-C  [facility] [facility] 2 hours ago (12:57 PM)     BU  Please let patient know I sent refills  Thanks      Call placed to patient  He did not answer  LMOM advising patient that refill was sent to his pharmacy as requested  Office number was provided for the patient to call back and discuss any questions or concerns

## 2023-02-09 NOTE — TELEPHONE ENCOUNTER
Patient called for a refill of Tamsulosin  He said he always has to call for a refill and was wondering if we could put refills on his prescription  He uses the [facility] in [place]

## 2023-02-18 DIAGNOSIS — I25.10 CORONARY ARTERY DISEASE INVOLVING NATIVE CORONARY ARTERY OF NATIVE HEART WITHOUT ANGINA PECTORIS: ICD-10-CM

## 2023-02-18 RX ORDER — ATORVASTATIN CALCIUM 20 MG/1
TABLET, FILM COATED ORAL
Qty: 90 TABLET | Refills: 1 | Status: SHIPPED | OUTPATIENT
Start: 2023-02-18

## 2023-02-19 PROBLEM — Z13.89 SCREENING FOR CARDIOVASCULAR, RESPIRATORY, AND GENITOURINARY DISEASES: Status: RESOLVED | Noted: 2022-12-21 | Resolved: 2023-02-19

## 2023-02-19 PROBLEM — Z13.83 SCREENING FOR CARDIOVASCULAR, RESPIRATORY, AND GENITOURINARY DISEASES: Status: RESOLVED | Noted: 2022-12-21 | Resolved: 2023-02-19

## 2023-02-19 PROBLEM — Z13.6 SCREENING FOR CARDIOVASCULAR, RESPIRATORY, AND GENITOURINARY DISEASES: Status: RESOLVED | Noted: 2022-12-21 | Resolved: 2023-02-19

## 2023-02-20 ENCOUNTER — OFFICE VISIT (OUTPATIENT)
Dept: PODIATRY | Facility: CLINIC | Age: 78
End: 2023-02-20

## 2023-02-20 VITALS
DIASTOLIC BLOOD PRESSURE: 70 MMHG | WEIGHT: 171 LBS | BODY MASS INDEX: 23.94 KG/M2 | RESPIRATION RATE: 17 BRPM | SYSTOLIC BLOOD PRESSURE: 124 MMHG | HEIGHT: 71 IN

## 2023-02-20 DIAGNOSIS — M79.671 PAIN IN BOTH FEET: ICD-10-CM

## 2023-02-20 DIAGNOSIS — E11.42 DIABETIC POLYNEUROPATHY ASSOCIATED WITH TYPE 2 DIABETES MELLITUS (HCC): Primary | ICD-10-CM

## 2023-02-20 DIAGNOSIS — M54.16 LUMBAR RADICULOPATHY: ICD-10-CM

## 2023-02-20 DIAGNOSIS — B35.1 ONYCHOMYCOSIS: ICD-10-CM

## 2023-02-20 DIAGNOSIS — M79.672 PAIN IN BOTH FEET: ICD-10-CM

## 2023-02-20 DIAGNOSIS — L84 CORNS: ICD-10-CM

## 2023-02-20 DIAGNOSIS — I73.9 PAD (PERIPHERAL ARTERY DISEASE) (HCC): ICD-10-CM

## 2023-02-20 NOTE — PROGRESS NOTES
Assessment/Plan:  Deformity of foot   Diabetic neuropathy   Pain upon ambulation   Pain upon ambulation   Mycosis of nail   Callus formation     Plan   Lesion debrided    all mycotic nails debrided without pain or complication   Foot exam performed   Patient educated on care of the diabetic foot   Plantar calluses debrided as well   Patient remain on gabapentin as well   Refill ordered           Subjective:   patient is diabetic   He has pain upon ambulation   He has pain with shoe wear   No history of trauma             Past Medical History:   Diagnosis Date   • Acquired hallux valgus       last assessed: 8/1/2013   • Allergic rhinitis     • Anemia 10/26/2010   • Atrophy of nail       last assessed: 7/7/2015   • Chronic kidney disease       chronic renal insufficiency   • Coronary artery disease     • Deformity of ankle and foot, acquired       last assessed: 2/22/2016   • Diabetes mellitus ([facility] [address] )     • Difficulty walking       last assessed: 12/14/2015   • Dysesthesia       last assessed: 11/25/2016   • Hammer toe       unspecified laterality; last assessed: 8/1/2013   • Hypertension     • Onychomycosis of toenail       last assessed: 2/22/2016   • Peripheral vascular disease ([facility] [address] )       left SFA stent in 2010   • Pes planus       unspecified laterality; last assessed: 8/1/2013   • Pneumonia       last assessed: 2/27/2016   • 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       last assessed: 8/15/2016   • Type 2 diabetes mellitus ([facility] [address] ) 07/26/2010   • Viral warts       last assessed: 7/24/2015                   Past Surgical History:   Procedure Laterality Date   • CARDIAC CATHETERIZATION   07/29/2010   • CATARACT EXTRACTION, BILATERAL Bilateral       R 1999, L 2008   • FEMORAL ARTERY - POPLITEAL ARTERY BYPASS GRAFT   09/23/2013   • FOOT SURGERY         bone spur removed   • LEG SURGERY Bilateral       repair; L 8/20/2010 and 7/26/2012   • ROTATOR CUFF REPAIR Left 2009   • SHOULDER SURGERY Right 2005     collar bone         No Known Allergies        Current Outpatient Medications:   •  acarbose (PRECOSE) 100 MG tablet, Take 1 tablet (100 mg total) by mouth 3 (three) times a day with meals, Disp: 270 tablet, Rfl: 1  •  amLODIPine (NORVASC) 2 5 mg tablet, Take 1 tablet (2 5 mg total) by mouth daily (Patient not taking: Reported on 9/26/2019), Disp: 90 tablet, Rfl: 3  •  aspirin (ECOTRIN LOW STRENGTH) 81 mg EC tablet, Take 1 tablet (81 mg total) by mouth daily, Disp: 30 tablet, Rfl: 6  •  b complex-vitamin C-folic acid (NEPHROCAPS) 1 mg capsule, Take 1 capsule by mouth daily, Disp: , Rfl:   •  betamethasone dipropionate (DIPROSONE) 0 05 % cream, Use as dir twice daily, Disp: , Rfl:   •  carvedilol (COREG) 12 5 mg tablet, TAKE 1 TABLET TWICE DAILY, Disp: 180 tablet, Rfl: 1  •  gabapentin (NEURONTIN) 600 MG tablet, Take 1 tablet (600 mg total) by mouth 2 (two) times a day, Disp: 180 tablet, Rfl: 0  •  glimepiride (AMARYL) 4 mg tablet, Take 1 tablet (4 mg total) by mouth 2 (two) times a day for 126 days, Disp: 180 tablet, Rfl: 0  •  glucose blood (TRUETRACK TEST) test strip, 1 each by Other route daily Use as instructed for E11 29, Disp: 100 each, Rfl: 3  •  lisinopril (ZESTRIL) 5 mg tablet, Take 1 tablet (5 mg total) by mouth daily (Patient taking differently: Take 2 5 mg by mouth daily ), Disp: 90 tablet, Rfl: 3  •  metFORMIN (GLUMETZA) 500 MG (MOD) 24 hr tablet, Take 2 tablets (1,000 mg total) by mouth 2 (two) times a day with meals, Disp: 120 tablet, Rfl: 5  •  multivitamin (THERAGRAN) TABS, Take 1 tablet by mouth daily, Disp: , Rfl:   •  omeprazole (PriLOSEC) 40 MG capsule, Take 1 capsule (40 mg total) by mouth daily, Disp: 90 capsule, Rfl: 1  •  simvastatin (ZOCOR) 40 mg tablet, TAKE 1 TABLET (40 MG TOTAL) BY MOUTH DAILY, Disp: 90 tablet, Rfl: 1  •  TRUEPLUS LANCETS 28G MISC, Test 1x/d, E11 29, Disp: , Rfl: 0           Patient Active Problem List   Diagnosis   • Diabetic polyneuropathy associated with type 2 diabetes mellitus ([facility] [address] )   • Allergic rhinitis   • Arthropathy   • PAD (peripheral artery disease) ([facility])   • Benign essential hypertension   • CAD (coronary artery disease)   • 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 mellitus ([facility])   • Diabetic neuropathy ([facility])   • GE reflux   • Lumbar radiculopathy   • Rosacea   • Seborrheic dermatitis   • Type 2 diabetes mellitus with diabetic neuropathy ([facility])   • Onychomycosis   • Occlusion of femoral-popliteal bypass graft ([facility])   • Prostate cancer screening   • Dyslipidemia   • Screening for AAA (aortic abdominal aneurysm)   • Medicare annual wellness visit, subsequent   • Type 2 diabetes mellitus with stage 3 chronic kidney disease, without long-term current use of insulin ([facility])   • Pain in both feet   • Corns   • Plantar warts             Patient ID: Pernell Covarrubias is a 77 y  o  male         The following portions of the patient's history were reviewed and updated as appropriate:      family history includes Aortic aneurysm in his mother; Heart attack in his father and sister; Heart disease in his father, paternal grandfather, and sister        reports that he has never smoked  He has never used smokeless tobacco  He reports that he drinks alcohol  He reports that he does not use drugs         Objective:  Patient's shoes and socks removed    Foot ExamPhysical Exam          Physical Exam  Left Foot: Appearance: a deformity (ball of foot and distal fifth metatarsal )  Fifth toe deformities include hammer toe  Tenderness: None except the plantar aspect of the foot  Right Foot: Appearance: a deformity (distal fifth metatarsal )  Left Ankle: ROM: limited ROM in all planes   Right Ankle: ROM: limited ROM in all planes   Neurological Exam: Light touch was decreased bilaterally  Vibratory sensation was decreased in both first metatarsophalangeal joints  Response to monofilament test was absent bilaterally  Deep tendon reflexes: achilles reflex 1/4 bilaterally     Vascular Exam: performed Dorsalis pedis pulses were 1/4 bilaterally  Posterior tibial pulses were 1/4 bilaterally  Elevation Pallor: diminished bilaterally  Capillary refill time was Q  9, findings bilateral  Negative digital hair noted, positive abnormal cooling  All pre-ulcer, lesions debrided  Today, but between 1-3 seconds bilaterally  Edema: mild bilaterally and All mycotic nails debrided  Today  The patient was given orthotics to help control his feet and at as cushioning and padding on the bottom of his feet q9 findings  Toenails: All of the toenails were elongated, hypertrophied, discolored, ingrown, shown to have subungual debris and tender       Socks and shoes removed, the Right Foot: the foot was dry  The sensory exam showed diminished vibratory sensation at the level of the toes  Diminished tactile sensation with monofilament testing throughout the right foot    Socks and shoes removed, Left Foot: the foot was dry  The sensory exam showed diminished vibratory sensation at the level of the toes  Diminished tactile sensation with monofilament testing throughout the left foot    Pulses:   1+ in the posterior tibialis on the right   1+ in the dorsalis pedis on the right  Capillary refills findings on the left were delayed in the toes  Pulses:   1+ in the posterior tibialis on the left   1+ in the dorsalis pedis on the left  Assign Risk Category: 2: Loss of protective sensation with or without weakness, deformity, callus, pre-ulcer, or history of ulceration  High risk  Hyperkeratosis: present on both first sub metatarsals, present on both fifth sub metatarsals and Pre-ulcerative lesion, submetatarsal one to 5, bilateral    Shoe Gear Evaluation: performed ()  Recommendation(s): custom inlays       Patient's shoes and socks removed  Right Foot/Ankle   Right Foot Inspection  Skin Exam: callus and callus               Toe Exam: swelling and erythema  Sensory   Vibration: diminished  Proprioception: diminished      Vascular  Capillary refills: elevated        Left Foot/Ankle  Left Foot Inspection  Skin Exam: callus   Submetatarsal 5 of the left foot demonstrates 0 5 centimeter squared plantar verruca   It is in capsulated in a bullae   Debrided   20% remaining               Toe Exam: swelling and erythema                   Sensory   Vibration: diminished  Proprioception: diminished     Vascular  Capillary refills: elevated      Patient's shoes and socks removed            Assign Risk Category  Deformity present  Loss of protective sensation  Weak pulses  Risk: 2

## 2023-03-14 ENCOUNTER — TELEPHONE (OUTPATIENT)
Dept: VASCULAR SURGERY | Facility: CLINIC | Age: 78
End: 2023-03-14

## 2023-03-14 NOTE — LETTER
[address]  [address] [address]  Phone#  [phone]  Fax#  [phone]      April 7, 2023      Julissa Brink    We had referred you to *** and they have been unable to contact you  If you would still like to be seen by this specialty, please give us a call      Sincerely,    Amirah Tobar

## 2023-03-14 NOTE — TELEPHONE ENCOUNTER
Attempted to contact patient to schedule appointment(s) listed below  Requested patient call [phone] option 3 to schedule appointment(s)  Patient's appointment(s) are due now      Dopplers  [] Abdominal Aorta Iliac (AOIL)  [] Carotid (CV)   [] Celiac and/or Mesenteric  [] Endovascular Aortic Repair (EVAR)   [] Hemodialysis Access (HD)   [x] Lower Limb Arterial ([place]) 3/21/23  [] Lower Limb Venous (LEV)  [] Lower Limb Venous Duplex with Reflux (LEVDR)  [] Renal Artery  [] Upper Limb Arterial (UEA)    [] Upper Limb Venous (UEV)              [] ALEKSANDR and Waveform analysis     Advanced Imaging   [] CTA head/neck    [] CTA abdomen    [] CTA abdomen & pelvis    [] CT abdomen with/ without contrast  [] CT abdomen with contrast  [] CT abdomen without contrast    [] CT abdomen & pelvis with/ without contrast  [] CT abdomen & pelvis with contrast  [] CT abdomen & pelvis without contrast    Office Visit   [] New patient, patient last seen over 3 years ago  [x] Risk factor modification (RFM) saw RD 3/25/22- Jacinda Leaks pt   [] Follow up   [] Lost to follow up (LTFU)

## 2023-03-21 ENCOUNTER — HOSPITAL ENCOUNTER (OUTPATIENT)
Dept: RADIOLOGY | Facility: HOSPITAL | Age: 78
Discharge: HOME | End: 2023-03-21

## 2023-03-21 DIAGNOSIS — I70.219 ATHEROSCLEROSIS OF ARTERY OF EXTREMITY WITH INTERMITTENT CLAUDICATION (HCC): ICD-10-CM

## 2023-04-07 ENCOUNTER — OFFICE VISIT (OUTPATIENT)
Dept: CARDIOLOGY CLINIC | Facility: CLINIC | Age: 78
End: 2023-04-07

## 2023-04-07 VITALS
OXYGEN SATURATION: 100 % | HEIGHT: 71 IN | BODY MASS INDEX: 23.38 KG/M2 | HEART RATE: 69 BPM | DIASTOLIC BLOOD PRESSURE: 60 MMHG | SYSTOLIC BLOOD PRESSURE: 130 MMHG | WEIGHT: 167 LBS

## 2023-04-07 DIAGNOSIS — I70.219 ATHEROSCLEROSIS OF ARTERY OF EXTREMITY WITH INTERMITTENT CLAUDICATION (HCC): ICD-10-CM

## 2023-04-07 DIAGNOSIS — N18.30 CKD STAGE 3 DUE TO TYPE 2 DIABETES MELLITUS (HCC): ICD-10-CM

## 2023-04-07 DIAGNOSIS — I25.10 CORONARY ARTERY DISEASE INVOLVING NATIVE CORONARY ARTERY OF NATIVE HEART WITHOUT ANGINA PECTORIS: ICD-10-CM

## 2023-04-07 DIAGNOSIS — E11.22 CKD STAGE 3 DUE TO TYPE 2 DIABETES MELLITUS (HCC): ICD-10-CM

## 2023-04-07 DIAGNOSIS — I10 BENIGN ESSENTIAL HYPERTENSION: ICD-10-CM

## 2023-04-07 DIAGNOSIS — I50.42 CHRONIC COMBINED SYSTOLIC AND DIASTOLIC HEART FAILURE, NYHA CLASS 2 (HCC): ICD-10-CM

## 2023-04-07 DIAGNOSIS — E78.5 DYSLIPIDEMIA: ICD-10-CM

## 2023-04-07 DIAGNOSIS — E11.40 TYPE 2 DIABETES MELLITUS WITH DIABETIC NEUROPATHY, WITHOUT LONG-TERM CURRENT USE OF INSULIN (HCC): ICD-10-CM

## 2023-04-07 NOTE — PROGRESS NOTES
Progress Note - Cardiology Office  [facility]    Estella Bone 65 y o  male MRN: [id number]  : 1945  Encounter: [id number]      Assessment:     1  Coronary artery disease involving native coronary artery of native heart without angina pectoris    2  Benign essential hypertension    3  Dyslipidemia    4  Chronic combined systolic and diastolic heart failure, NYHA class 2 ([facility] [address] )    5  CKD stage 3 due to type 2 diabetes mellitus ([facility] )    6  Type 2 diabetes mellitus with diabetic neuropathy, without long-term current use of insulin ([facility] )    7  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 [facility])        Discussion Summary and Plan:  1  Chronic systolic and diastolic heart failure  Patient was recently admitted with chronic systolic and diastolic heart failure  Last EF around 45%  His weight has been stable around 167 pounds  Continue losartan, spironolactone and Coreg  Blood pressure heart rate acceptable  Creatinine has been stable lab from 2022 reviewed  Sodium is 135        2  Coronary artery disease  Status post multivessel stenting  Patient now want to follow up with [facility] cardiology  His nuclear stress test done in 2018 which shows no ischemia normal LV systolic function  Nuclear stress test shows no perfusion abnormality EF is 30% but EF by echo was around 45% which is not changed  Previous echo shows EF 45% with moderate MR moderate TR will update echo Doppler  3  Hypertension  Pressure is much better controlled with current therapy  4  Dyslipidemia  Continue statins labs done 2021 shows cholesterol profile is acceptable  Continue statin creatinine has been stable      5  Chronic renal insufficiency  Creatinine is acceptable 1 1    6  Carotid bruit     Carotid Doppler shows less than 50% stenosis on the left  No evidence of stenosis on the right side    Continue medical Rx follows up with vascular no other issues at this time    7  Diabetes mellitus  His blood sugar has been labile  HbA1c 7 6      8  History of prostate cancer  Patient get injectable medicine  He is cancer free  9  History of anemia of chronic disease  Hemoglobin 10 7 with    10  PVD  PVD with right femoropopliteal which is occluded and left SFA which has stent placed by me in 2010, had repeat procedure done with known right SFA stent stenosis underwent balloon angioplasty  He is able to walk he has seen the vascular  He may have InStent stenosis on the left and has occlusion of his bypass on the right  He follows with vascular no symptoms at this time continue medical Rx    Plan  Current Rx  We will check echo Doppler  Counseling :  A description of the counseling  All issues regarding tight sugar control, taking cholesterol medications, regular exercise, symptoms about coronary artery disease discussed with patient at length  Previous echo from 2014 reviewed  Goals and Barriers  Patient's ability to self care: Yes  Medication side effect reviewed with patient in detail and all their questions answered to their satisfaction  HPI :     Vj Perez is a 65y o  year old male who came for follow up  Patient has a past medical history significant for Coronary Artery artery disease, CK D stage III, Diabetes mellitus with renal manifestations, PVD with right femoropopliteal which has occluded and left SFA stent by me in 2010, hypertension, diabetic neuropathy who came to see us after his vascular study was found to be abnormal  For cardiac problem he generally sees Dr Massie Cooks  His vascular study was ordered by his podiatrist as he was having pain in his foot  He also had a lot of back problems sometime pain radiates from his back to the thighs  Here a stent placed in his left SFA in 2010  It's already known that his right femoropopliteal is occluded   He has no fever no chills no nausea no vomiting no other significant complaint       03/09/2022  Above reviewed  Patient came for follow-up  He was recently admitted to hospital in February with heart failure  His EF is now around 45%  Nuclear stress test shows no ischemia but EF was noted to be low and he had history of prostate cancer which is metastatic he has to take injectable medicines  He is not taking his Florinef  His med list reviewed  His blood pressure is now acceptable  He is now on lisinopril, carvedilol and Aldactone  He is not on diuretics  Today his heart rate is 71 beats per minute and his vitals are stable  Labs from March 3, 2022 reviewed sodium was 133  Other electrolytes were acceptable  No nausea no vomiting no fever no chills  Minimal lower extremity swelling noted  Over the years patient has become more weaker  He really feels weak after he gets his prostate injectable medication he takes him few days to recover  06/06/2022  Above reviewed  Patient came for follow-up  He is doing well  His main issue is his fatigue and tiredness which he gets after injection for his prostate cancer which he takes every 6 months  His EF on echo in February 2022 remained around 45% with moderate MR and moderate TR and PA pressure was around 58 mmHg  Nuclear stress test shows no ischemia though he was noted to have EF of around 30%  He is now on lisinopril, Coreg, Aldactone he does not take Lasix  He has blood work done April 2022 which sodium was 131 and his cholesterol and other electrolytes were acceptable hemoglobin 10 3  History of metastatic prostate cancer which is stage IV but under control because of this injections  Over the year he says he has lost weight and has become weaker and his appetite is now coming back  His weight has been stable around 163 lb since last visit of March 2022  He feels he if okay since the last visit of March 2022   No nausea no vomiting no PND no orthopnea no leg swelling no other cardiovascular issues  11/21/2022  Above reviewed  Patient came for follow-up he says he feels well  No cardiovascular issues but he continues to have fatigue and tiredness from his prostate cancer treatment which he gets every 6 months  His EF on echo was 45% with moderate MR and moderate TR and PA pressure was elevated  His nuclear stress test no ischemia and EF was noted to be lower  He is now on lisinopril Coreg Aldactone he does not need to take diuretics  Blood test done September 2022 has been acceptable  His weight has been stable now and his appetite has improved  Currently he has weight of 168 lb which has increased as compared to 5 lb  But he does not feel any shortness of breath on lower leg edema  He feels his appetite has improved  Currently from cardiac point of view he is on Coreg 12 5 twice a day, Lipitor 20 mg daily  He has stopped taking his Aldactone  Nephrology note noted he was recommended to continue Aldactone  He was also found to be orthostasis and was advised to use pressure stockings which he says he cannot use  He denies any dizziness or lightheadedness  4/7/2023  Above reviewed  Patient came for follow-up  He is doing well his main issue is back problem  He has history of chronic fatigue and tiredness from his prostate cancer treatment     His last echo was EF 45% with moderate MR and moderate TR PA pressure was elevated  He is pretty compliant with his medications his weight has been stable  Denies any chest pain he walks with the help of walker  Currently he is taking Coreg 12 5 twice a day, Lipitor milligram and losartan low-dose and Aldactone 25 mg daily  His last blood test in December 2022 has been acceptable hemoglobin is 10 7 he has chronic anemia  He is using compression stockings regularly but denies any dizziness lightness  Review of Systems   Constitutional: Positive for fatigue   Negative for activity change, chills, diaphoresis, fever and unexpected weight change  HENT: Negative for congestion  Eyes: Negative for discharge and redness  Respiratory: Negative for cough, chest tightness, shortness of breath and wheezing  No chest pain, no breathing problem,   Cardiovascular: Negative  Negative for chest pain, palpitations and leg swelling  Gastrointestinal: Negative for abdominal pain, diarrhea and nausea  Endocrine: Negative  Genitourinary: Negative for decreased urine volume and urgency  Musculoskeletal: Positive for arthralgias, back pain and gait problem  Skin: Negative for rash and wound  Allergic/Immunologic: Negative  Neurological: Negative for dizziness, seizures, syncope, weakness, light-headedness and headaches  Hematological: Negative  Psychiatric/Behavioral: Negative for agitation and confusion  The patient is not nervous/anxious          Historical Information   Past Medical History:   Diagnosis Date   • Acquired hallux valgus     last assessed: 8/1/2013   • Allergic rhinitis    • Anemia 10/26/2010   • Atrophy of nail     last assessed: 7/7/2015   • Chronic kidney disease     chronic renal insufficiency   • Coronary artery disease    • Deformity of ankle and foot, acquired     last assessed: 2/22/2016   • Difficulty walking     last assessed: 12/14/2015   • Dysesthesia     last assessed: 11/25/2016   • Hammer toe     unspecified laterality; last assessed: 8/1/2013   • Hypertension    • Onychomycosis of toenail     last assessed: 2/22/2016   • Peripheral vascular disease ([facility] )     left SFA stent in 2010   • Pes planus     unspecified laterality; last assessed: 8/1/2013   • Pneumonia     last assessed: 2/27/2016   • Prostate cancer [facility])    • 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     last assessed: 8/15/2016   • Type 2 diabetes mellitus ([facility] [address] ) 07/26/2010   • Viral warts     last assessed: 7/24/2015     Past Surgical History:   Procedure Laterality Date   • CARDIAC CATHETERIZATION  07/29/2010 • CATARACT EXTRACTION, BILATERAL Bilateral     R 1999, L 2008   • COLONOSCOPY  2011   • FEMORAL ARTERY - POPLITEAL ARTERY BYPASS GRAFT  09/23/2013   • FOOT SURGERY      bone spur removed   • LEG SURGERY Bilateral     repair; L 8/20/2010 and 7/26/2012   • NH PLMT INTERSTITIAL DEV RADIAT TX PROSTATE 1/MULT N/A 6/22/2021    Procedure: INSERTION OF FIDUCIAL MARKER (TRANSRECTAL ULTRASOUND GUIDANCE), SPACEOAR;  Surgeon: Chavez Romo MD;  Location: [facility];  Service: Urology   • ROTATOR CUFF REPAIR Left 2009   • SHOULDER SURGERY Right 2005    collar bone     Social History     Substance and Sexual Activity   Alcohol Use Yes    Comment: being a social drinker/rare     Social History     Substance and Sexual Activity   Drug Use No     Social History     Tobacco Use   Smoking Status Never   Smokeless Tobacco Never     Family History:   Family History   Problem Relation Age of Onset   • Heart disease Father    • Heart attack Father         acute myocardial infarction   • Heart disease Sister    • Heart attack Sister         acute myocardial infarction   • Heart disease Paternal Grandfather    • Aortic aneurysm Mother    • Throat cancer Maternal Grandfather        Meds/Allergies     No Known Allergies    Current Outpatient Medications:   •  acetaminophen (TYLENOL) 325 mg tablet, Take 2 tablets (650 mg total) by mouth every 6 (six) hours as needed for mild pain, headaches or fever, Disp: , Rfl: 0  •  Ascorbic Acid (Vitamin C) 500 MG CAPS, Take 500 mg by mouth daily, Disp: , Rfl:   •  aspirin (ECOTRIN LOW STRENGTH) 81 mg EC tablet, Take 81 mg by mouth daily, Disp: , Rfl:   •  atorvastatin (LIPITOR) 20 mg tablet, TAKE 1 TABLET EVERY DAY, Disp: 90 tablet, Rfl: 1  •  brimonidine tartrate 0 2 % ophthalmic solution, Inject 0 2 % into the eye 2 (two) times a day, Disp: , Rfl:   •  carvedilol (COREG) 12 5 mg tablet, Take 1 tablet (12 5 mg total) by mouth 2 (two) times a day, Disp: 180 tablet, Rfl: 1  •  Cholecalciferol "(Vitamin D3) 50 MCG (2000 UT) TABS, Take 2,000 Units by mouth daily, Disp: , Rfl:   •  co-enzyme Q-10 100 mg capsule, Take 100 mg by mouth daily, Disp: , Rfl:   •  glucose blood test strip, Test blood sugar twice a day, Disp: 100 each, Rfl: 3  •  losartan (COZAAR) 25 mg tablet, Take 1 tablet (25 mg total) by mouth daily, Disp: 90 tablet, Rfl: 1  •  metFORMIN (GLUCOPHAGE) 1000 MG tablet, Take 1 tablet (1,000 mg total) by mouth 2 (two) times a day with meals, Disp: 180 tablet, Rfl: 1  •  multivitamin (THERAGRAN) TABS, Take 1 tablet by mouth daily, Disp: , Rfl:   •  omeprazole (PriLOSEC) 40 MG capsule, Take 1 capsule (40 mg total) by mouth daily as needed (acid reflux), Disp: 90 capsule, Rfl: 1  •  Probiotic Product (CULTURELLE PROBIOTICS) CHEW, Chew daily, Disp: , Rfl:   •  sitaGLIPtin (JANUVIA) 25 mg tablet, Take one tablet by mouth once a day, Disp: 90 tablet, Rfl: 1  •  spironolactone (ALDACTONE) 25 mg tablet, Take 1 tablet (25 mg total) by mouth daily, Disp: 30 tablet, Rfl: 0  •  tamsulosin (FLOMAX) 0 4 mg, Take 1 capsule (0 4 mg total) by mouth daily with dinner, Disp: 90 capsule, Rfl: 2  •  TRUEPLUS LANCETS 28G MISC, Test 1x/d, E11 29, Disp: , Rfl: 0  •  Infant Foods (Follow-Up) POWD, HANDICAP SERENA, medically recommended, <taxonomy [id number]> due to arthritic/orthopedic condition (#2,#5) (Patient not taking: Reported on 9/19/2022), Disp: 99 g, Rfl: 1    Vitals: Blood pressure 130/60, pulse 69, height 5' 11\" (1 803 m), weight 75 8 kg (167 lb), SpO2 100 %  ?  Body mass index is 23 29 kg/m²  Vitals:    04/07/23 1133   Weight: 75 8 kg (167 lb)     BP Readings from Last 3 Encounters:   04/07/23 130/60   02/20/23 124/70 01/12/23 124/70         Physical Exam  Constitutional:       General: He is not in acute distress  Appearance: He is well-developed  He is not diaphoretic  Neck:      Thyroid: No thyromegaly  Vascular: No JVD  Trachea: No tracheal deviation     Cardiovascular:      Rate and " Rhythm: Normal rate and regular rhythm  Heart sounds: S1 normal and S2 normal  Heart sounds not distant  Murmur heard  Systolic (ejection) murmur is present with a grade of 2/6  No friction rub  No gallop  No S3 or S4 sounds  Pulmonary:      Effort: Pulmonary effort is normal  No respiratory distress  Breath sounds: Normal breath sounds  No wheezing or rales  Chest:      Chest wall: No tenderness  Abdominal:      General: Bowel sounds are normal  There is no distension  Palpations: Abdomen is soft  Tenderness: There is no abdominal tenderness  Musculoskeletal:         General: No deformity  Cervical back: Neck supple  Skin:     General: Skin is warm and dry  Coloration: Skin is not pale  Findings: No rash  Neurological:      Mental Status: He is alert and oriented to person, place, and time  Psychiatric:         Behavior: Behavior normal          Judgment: Judgment normal          Diagnostic Studies Review Cardio:    Nuclear stress test   Nuclear stress test done 09/27/2018 was a normal study with EF around 60%  No ischemia noted it was Lexiscan stress test     Nuclear Stress test   Nuclear stress test done in February 2022 shows no significant perfusion abnormality EF was severely reduced  Diaphragmatic attenuation EF calculated was around 30%    Echo Doppler  Echo Doppler done 10/14/2020 shows EF 55 60%, mild MR, trace to mild PI, pressure half time was 660 millisecond     Echo Doppler  Echo Doppler done 02/19/2022 shows EF 22%, diastolic dysfunction mildly abnormal grade 1, no aortic stenosis or regurgitation, moderate TR with PA pressure 58 mmHg, moderate MR     EKG:    Twelve lead EKG done on 09/13/2018 shows normal sinus rhythm heart rate 60 beats per minute  No significant ST changes  Twelve lead EKG done 05/17/2019 shows normal sinus rhythm heart rate 60 beats per minute  No change from old EKG        Twelve lead EKG 03/10/2020 shows normal sinus "rhythm LVH by voltage heart rate 60 beats per minute no change from old EKG  Twelve lead EKG 09/23/2020 shows normal sinus rhythm LVH by voltage  Q-waves in inferior leads cannot rule out 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  Twelve lead EKG 03/08/2021 shows normal sinus rhythm LVH by voltage and Q-waves in inferior leads cannot rule out 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ion  Twelve lead EKG done on 02/08/2022 shows sinus rhythm heart rate 98 beats per minute LVH by voltage as compared to previous EKGs his heart function is noted to be faster  Inferior Q-waves noted cannot rule out 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ion  Nonspecific ST changes in lateral precordial leads    Twelve lead EKG done on 11/21/2022 shows normal sinus rhythm heart rate 76 beats per minute LVH by voltage  Q-wave noted inferiorly, not changed from previous EKG  Lab Review   Lab Results   Component Value Date    WBC 5 72 12/14/2022    HGB 10 7 (L) 12/14/2022    HCT 32 5 (L) 12/14/2022    MCV 90 12/14/2022    RDW 14 7 12/14/2022     12/14/2022     BMP:  Lab Results   Component Value Date     04/17/2014    K 4 6 12/14/2022     12/14/2022    CO2 28 12/14/2022    ANIONGAP 8 04/17/2014    BUN 20 12/14/2022    CREATININE 1 04 12/14/2022    GLUCOSE 137 04/17/2014    GLUF 201 (H) 12/14/2022    CALCIUM 9 4 12/14/2022    CORRECTEDCA 9 2 02/18/2022    EGFR 68 12/14/2022    MG 1 9 03/03/2022     LFT:  Lab Results   Component Value Date    AST 21 12/14/2022    ALT 31 12/14/2022    ALKPHOS 76 12/14/2022       Lab Results   Component Value Date    HGBA1C 6 9 (H) 07/05/2022     Lipid Profile:   Lab Results   Component Value Date    CHOL 106 07/19/2014    HDL 47 04/06/2022    LDLCALC 53 04/06/2022    TRIG 52 04/06/2022     Lab Results   Component Value Date    CHOL 106 07/19/2014       Dr Gutierrez Bautista MD [facility]      \"This note has been constructed using a voice recognition system  Therefore there may be syntax, spelling, and/or grammatical errors   " "Please call if you have any questions   \"  "

## 2023-04-25 ENCOUNTER — OFFICE VISIT (OUTPATIENT)
Dept: NEPHROLOGY | Facility: CLINIC | Age: 78
End: 2023-04-25

## 2023-04-25 VITALS
HEIGHT: 71 IN | BODY MASS INDEX: 24.84 KG/M2 | DIASTOLIC BLOOD PRESSURE: 68 MMHG | SYSTOLIC BLOOD PRESSURE: 154 MMHG | HEART RATE: 64 BPM | WEIGHT: 177.4 LBS

## 2023-04-25 DIAGNOSIS — N18.2 CKD STAGE 2 DUE TO TYPE 2 DIABETES MELLITUS (HCC): ICD-10-CM

## 2023-04-25 DIAGNOSIS — N18.9 ANEMIA DUE TO CHRONIC KIDNEY DISEASE, UNSPECIFIED CKD STAGE: ICD-10-CM

## 2023-04-25 DIAGNOSIS — D63.1 ANEMIA DUE TO CHRONIC KIDNEY DISEASE, UNSPECIFIED CKD STAGE: ICD-10-CM

## 2023-04-25 DIAGNOSIS — I50.42 CHRONIC COMBINED SYSTOLIC AND DIASTOLIC HEART FAILURE, NYHA CLASS 2 (HCC): Primary | ICD-10-CM

## 2023-04-25 DIAGNOSIS — E11.22 CKD STAGE 2 DUE TO TYPE 2 DIABETES MELLITUS (HCC): ICD-10-CM

## 2023-04-25 DIAGNOSIS — I95.1 ORTHOSTATIC HYPOTENSION: ICD-10-CM

## 2023-04-25 PROBLEM — E22.2 SIADH (SYNDROME OF INAPPROPRIATE ADH PRODUCTION) (HCC): Status: RESOLVED | Noted: 2021-08-18 | Resolved: 2023-04-25

## 2023-04-25 NOTE — PROGRESS NOTES
"NEPHROLOGY OUTPATIENT PROGRESS NOTE   Richard Cordoba 65 y o  male MRN: [id number]  Reason for visit: Kidney disease    ASSESSMENT and PLAN:  1  Chronic kidney disease, stage II most recent creatinine 1 04 EGFR greater than 60, noted proteinuria previously, underlying disease most likely secondary to diabetic kidney disease  2  Hypertension with history of orthostasis  Currently appears suboptimal, no evidence of orthostasis seen on today's exam   Recommend resuming home blood pressure monitoring prior to adjusting antihypertensive medications  3  Anemia, previous hemoglobin at 10 7, recommend checking iron stores, previous serum electrophoresis without monoclonal gammopathy  4  History of hyponatremia currently appears stable at 135  5  Cardiomyopathy, ejection fraction 55% with grade 1 diastolic dysfunction, volume status currently appears stable  6  Coronary artery disease, multivessel, history of PCI in the past  7  Peripheral vascular disease, following with vascular surgery    Overall renal function remains stable  No evidence of orthostasis on today's exam  No changes in current antihypertensive regimen  Recommend home blood pressure monitoring  We will recommend repeating laboratory studies including repeat CBC with iron stores  Assuming repeat labs are stable, follow-up in 6 months with repeat labs at that time  Commend following up with PCP in regards to left hip pain  SUBJECTIVE / INTERVAL HISTORY:  He has been doing reasonably well  Denies any recent hospitalizations  He does complain of left hip pain which occurred a few weeks ago  His balance has been not good  Denies any falls or syncopal episodes  Denies any dizziness lightheadedness  No reports of chest pain shortness of breath  Does have chronic lower extremity swelling        OBJECTIVE:  /68 (BP Location: Left arm, Patient Position: Sitting, Cuff Size: Standard)   Pulse 64   Ht 5' 11\" (1 803 m)   Wt 80 5 kg (177 lb 6 4 oz) " BMI 24 74 kg/m²   Vitals:    04/25/23 1135   Weight: 80 5 kg (177 lb 6 4 oz)       Physical Exam  Constitutional:       Appearance: He is not ill-appearing  Eyes:      General: No scleral icterus  Cardiovascular:      Rate and Rhythm: Normal rate and regular rhythm  Pulmonary:      Effort: Pulmonary effort is normal       Breath sounds: Normal breath sounds  Abdominal:      General: There is no distension  Palpations: Abdomen is soft  Tenderness: There is no abdominal tenderness  Musculoskeletal:      Right lower leg: Edema present  Left lower leg: Edema present  Skin:     General: Skin is warm and dry  Neurological:      Mental Status: He is alert and oriented to person, place, and time             Medications:    Current Outpatient Medications:   •  acetaminophen (TYLENOL) 325 mg tablet, Take 2 tablets (650 mg total) by mouth every 6 (six) hours as needed for mild pain, headaches or fever, Disp: , Rfl: 0  •  Ascorbic Acid (Vitamin C) 500 MG CAPS, Take 500 mg by mouth daily, Disp: , Rfl:   •  aspirin (ECOTRIN LOW STRENGTH) 81 mg EC tablet, Take 81 mg by mouth daily, Disp: , Rfl:   •  atorvastatin (LIPITOR) 20 mg tablet, TAKE 1 TABLET EVERY DAY, Disp: 90 tablet, Rfl: 1  •  brimonidine tartrate 0 2 % ophthalmic solution, Inject 0 2 % into the eye 2 (two) times a day, Disp: , Rfl:   •  carvedilol (COREG) 12 5 mg tablet, Take 1 tablet (12 5 mg total) by mouth 2 (two) times a day, Disp: 180 tablet, Rfl: 1  •  Cholecalciferol (Vitamin D3) 50 MCG (2000 UT) TABS, Take 2,000 Units by mouth daily, Disp: , Rfl:   •  co-enzyme Q-10 100 mg capsule, Take 100 mg by mouth daily, Disp: , Rfl:   •  losartan (COZAAR) 25 mg tablet, Take 1 tablet (25 mg total) by mouth daily, Disp: 90 tablet, Rfl: 1  •  metFORMIN (GLUCOPHAGE) 1000 MG tablet, Take 1 tablet (1,000 mg total) by mouth 2 (two) times a day with meals, Disp: 180 tablet, Rfl: 1  •  multivitamin (THERAGRAN) TABS, Take 1 tablet by mouth daily, Disp: , Rfl:   •  omeprazole (PriLOSEC) 40 MG capsule, Take 1 capsule (40 mg total) by mouth daily as needed (acid reflux), Disp: 90 capsule, Rfl: 1  •  OneTouch Ultra test strip, TEST BLOOD SUGAR TWO TIMES A DAY, Disp: 100 strip, Rfl: 0  •  Probiotic Product (CULTURELLE PROBIOTICS) CHEW, Chew daily, Disp: , Rfl:   •  sitaGLIPtin (JANUVIA) 25 mg tablet, Take one tablet by mouth once a day, Disp: 90 tablet, Rfl: 1  •  spironolactone (ALDACTONE) 25 mg tablet, Take 1 tablet (25 mg total) by mouth daily, Disp: 30 tablet, Rfl: 0  •  tamsulosin (FLOMAX) 0 4 mg, Take 1 capsule (0 4 mg total) by mouth daily with dinner, Disp: 90 capsule, Rfl: 2  •  TRUEPLUS LANCETS 28G MISC, Test 1x/d, E11 29, Disp: , Rfl: 0  •  Infant Foods (Follow-Up) POWD, HANDICAP PLACARD, medically recommended, <taxonomy [id number]> due to arthritic/orthopedic condition (#2,#5) (Patient not taking: Reported on 9/19/2022), Disp: 99 g, Rfl: 1    Laboratory Results:  Results for orders placed or performed during the hospital encounter of 04/11/23   Echo complete w/ contrast if indicated   Result Value Ref Range    LA size 4 5 cm    Triscuspid Valve Regurgitation Peak Gradient 22 0 mmHg    Tricuspid valve peak regurgitation velocity 2 34 m/s    LVPWd 1 10 cm    Left Atrium Area-systolic Apical Two Chamber 20 2 cm2    Left Atrium Area-systolic Four Chamber 46 9 cm2    MV E' Tissue Velocity Septal 9 cm/s    MV E' Tissue Velocity Lateral 6 cm/s    Tricuspid annular plane systolic excursion 6 31 cm    TR Peak Ludin 2 3 m/s    IVSd 6 14 cm    LV DIASTOLIC VOLUME (MOD BIPLANE) 2D 94 mL    LEFT VENTRICLE SYSTOLIC VOLUME (MOD BIPLANE) 2D 45 mL    Left ventricular stroke volume (2D) 49 00 mL    EF 53 %    A4C EF 58 %    LA length (A2C) 5 20 cm    LVIDd 4 50 cm    IVS 1 1 cm    LVIDS 3 30 cm    FS 27 28 - 44 %    Ao root 3 30 cm    RVID d 2 9 cm    AV LVOT peak gradient 2 mmHg    MV valve area p 1/2 method 5 50 cm2    PV peak gradient antegrade 2 mmHg    E wave deceleration time 139 ms    LVOT diameter 2 1 cm    AV peak gradient 9 mmHg    MV Peak E Ludin 81 cm/s    MV Peak A Ludin 3 22 m/s    LV Systolic Volume (BP) 55 mL    LV Diastolic Volume (BP) 699 mL    DENEEN A4C 16 6 cm2    RAA A4C 15 5 cm2    MV stenosis pressure 1/2 time 40 ms    LVSV, 2D 49 mL    LVOT area 3 46 cm2    LV EF 55

## 2023-04-30 PROBLEM — R63.4 WEIGHT LOSS: Status: RESOLVED | Noted: 2021-07-14 | Resolved: 2023-04-30

## 2023-05-02 ENCOUNTER — OFFICE VISIT (OUTPATIENT)
Dept: PODIATRY | Facility: CLINIC | Age: 78
End: 2023-05-02

## 2023-05-02 VITALS
WEIGHT: 177 LBS | RESPIRATION RATE: 17 BRPM | SYSTOLIC BLOOD PRESSURE: 132 MMHG | HEIGHT: 71 IN | BODY MASS INDEX: 24.78 KG/M2 | DIASTOLIC BLOOD PRESSURE: 66 MMHG | HEART RATE: 72 BPM

## 2023-05-02 DIAGNOSIS — M79.672 PAIN IN BOTH FEET: ICD-10-CM

## 2023-05-02 DIAGNOSIS — M79.671 PAIN IN BOTH FEET: ICD-10-CM

## 2023-05-02 DIAGNOSIS — B35.1 ONYCHOMYCOSIS: ICD-10-CM

## 2023-05-02 DIAGNOSIS — M54.16 LUMBAR RADICULOPATHY: ICD-10-CM

## 2023-05-02 DIAGNOSIS — I73.9 PAD (PERIPHERAL ARTERY DISEASE) (HCC): ICD-10-CM

## 2023-05-02 DIAGNOSIS — E11.42 DIABETIC POLYNEUROPATHY ASSOCIATED WITH TYPE 2 DIABETES MELLITUS (HCC): Primary | ICD-10-CM

## 2023-05-02 DIAGNOSIS — L84 CORNS: ICD-10-CM

## 2023-05-02 NOTE — PROGRESS NOTES
Assessment/Plan:  Deformity of foot   Diabetic neuropathy   Pain upon ambulation   Pain upon ambulation   Mycosis of nail   Callus formation     Plan   Lesion debrided    all mycotic nails debrided without pain or complication   Foot exam performed   Patient educated on care of the diabetic foot   Plantar calluses debrided as well   Patient remain on gabapentin as well   Refill ordered           Subjective:   patient is diabetic   He has pain upon ambulation   He has pain with shoe wear   No history of trauma             Past Medical History:   Diagnosis Date    Acquired hallux valgus       last assessed: 8/1/2013    Allergic rhinitis      Anemia 10/26/2010    Atrophy of nail       last assessed: 7/7/2015    Chronic kidney disease       chronic renal insufficiency    Coronary artery disease      Deformity of ankle and foot, acquired       last assessed: 2/22/2016    Diabetes mellitus ([facility] [address]      Difficulty walking       last assessed: 12/14/2015    Dysesthesia       last assessed: 11/25/2016    Hammer toe       unspecified laterality; last assessed: 8/1/2013    Hypertension      Onychomycosis of toenail       last assessed: 2/22/2016    Peripheral vascular disease ([facility] [address] [facility]       left SFA stent in 2010    Pes planus       unspecified laterality; last assessed: 8/1/2013    Pneumonia       last assessed: 2/27/2016    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       last assessed: 8/15/2016    Type 2 diabetes mellitus ([facility] [address] [facility] 07/26/2010    Viral warts       last assessed: 7/24/2015                   Past Surgical History:   Procedure Laterality Date    CARDIAC CATHETERIZATION   07/29/2010    CATARACT EXTRACTION, BILATERAL Bilateral       R 1999, L 2008    FEMORAL ARTERY - POPLITEAL ARTERY BYPASS GRAFT   09/23/2013    FOOT SURGERY         bone spur removed    LEG SURGERY Bilateral       repair; L 8/20/2010 and 7/26/2012    ROTATOR CUFF REPAIR Left 2009    SHOULDER SURGERY Right 2005     collar bone         No Known Allergies        Current Outpatient Medications:     acarbose (PRECOSE) 100 MG tablet, Take 1 tablet (100 mg total) by mouth 3 (three) times a day with meals, Disp: 270 tablet, Rfl: 1    amLODIPine (NORVASC) 2 5 mg tablet, Take 1 tablet (2 5 mg total) by mouth daily (Patient not taking: Reported on 9/26/2019), Disp: 90 tablet, Rfl: 3    aspirin (ECOTRIN LOW STRENGTH) 81 mg EC tablet, Take 1 tablet (81 mg total) by mouth daily, Disp: 30 tablet, Rfl: 6    b complex-vitamin C-folic acid (NEPHROCAPS) 1 mg capsule, Take 1 capsule by mouth daily, Disp: , Rfl:     betamethasone dipropionate (DIPROSONE) 0 05 % cream, Use as dir twice daily, Disp: , Rfl:     carvedilol (COREG) 12 5 mg tablet, TAKE 1 TABLET TWICE DAILY, Disp: 180 tablet, Rfl: 1    gabapentin (NEURONTIN) 600 MG tablet, Take 1 tablet (600 mg total) by mouth 2 (two) times a day, Disp: 180 tablet, Rfl: 0    glimepiride (AMARYL) 4 mg tablet, Take 1 tablet (4 mg total) by mouth 2 (two) times a day for 126 days, Disp: 180 tablet, Rfl: 0    glucose blood (TRUETRACK TEST) test strip, 1 each by Other route daily Use as instructed for E11 29, Disp: 100 each, Rfl: 3    lisinopril (ZESTRIL) 5 mg tablet, Take 1 tablet (5 mg total) by mouth daily (Patient taking differently: Take 2 5 mg by mouth daily ), Disp: 90 tablet, Rfl: 3    metFORMIN (GLUMETZA) 500 MG (MOD) 24 hr tablet, Take 2 tablets (1,000 mg total) by mouth 2 (two) times a day with meals, Disp: 120 tablet, Rfl: 5    multivitamin (THERAGRAN) TABS, Take 1 tablet by mouth daily, Disp: , Rfl:     omeprazole (PriLOSEC) 40 MG capsule, Take 1 capsule (40 mg total) by mouth daily, Disp: 90 capsule, Rfl: 1    simvastatin (ZOCOR) 40 mg tablet, TAKE 1 TABLET (40 MG TOTAL) BY MOUTH DAILY, Disp: 90 tablet, Rfl: 1    TRUEPLUS LANCETS 28G MISC, Test 1x/d, E11 29, Disp: , Rfl: 0           Patient Active Problem List   Diagnosis    Diabetic polyneuropathy associated with type 2 diabetes mellitus ([facility] [address] [facility]    Allergic rhinitis    Arthropathy    PAD (peripheral artery disease) (HCC)    Benign essential hypertension    CAD (coronary artery disease)    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)    Diabetic neuropathy (HCC)    GE reflux    Lumbar radiculopathy    Rosacea    Seborrheic dermatitis    Type 2 diabetes mellitus with diabetic neuropathy (HCC)    Onychomycosis    Occlusion of femoral-popliteal bypass graft (HCC)    Prostate cancer screening    Dyslipidemia    Screening for AAA (aortic abdominal aneurysm)    Medicare annual wellness visit, subsequent    Type 2 diabetes mellitus with stage 3 chronic kidney disease, without long-term current use of insulin (HCC)    Pain in both feet    Corns    Plantar warts             Patient ID: Pernell Covarrubias is a 77 y  o  male         The following portions of the patient's history were reviewed and updated as appropriate:      family history includes Aortic aneurysm in his mother; Heart attack in his father and sister; Heart disease in his father, paternal grandfather, and sister        reports that he has never smoked  He has never used smokeless tobacco  He reports that he drinks alcohol  He reports that he does not use drugs         Objective:  Patient's shoes and socks removed    Foot ExamPhysical Exam          Physical Exam  Left Foot: Appearance: a deformity (ball of foot and distal fifth metatarsal )  Fifth toe deformities include hammer toe  Tenderness: None except the plantar aspect of the foot  Right Foot: Appearance: a deformity (distal fifth metatarsal )  Left Ankle: ROM: limited ROM in all planes   Right Ankle: ROM: limited ROM in all planes   Neurological Exam: Light touch was decreased bilaterally  Vibratory sensation was decreased in both first metatarsophalangeal joints  Response to monofilament test was absent bilaterally  Deep tendon reflexes: achilles reflex 1/4 bilaterally     Vascular Exam: performed Dorsalis pedis pulses were 1/4 bilaterally  Posterior tibial pulses were 1/4 bilaterally  Elevation Pallor: diminished bilaterally  Capillary refill time was Q  9, findings bilateral  Negative digital hair noted, positive abnormal cooling  All pre-ulcer, lesions debrided  Today, but between 1-3 seconds bilaterally  Edema: mild bilaterally and All mycotic nails debrided  Today  The patient was given orthotics to help control his feet and at as cushioning and padding on the bottom of his feet q9 findings  Toenails: All of the toenails were elongated, hypertrophied, discolored, ingrown, shown to have subungual debris and tender       Socks and shoes removed, the Right Foot: the foot was dry  The sensory exam showed diminished vibratory sensation at the level of the toes  Diminished tactile sensation with monofilament testing throughout the right foot    Socks and shoes removed, Left Foot: the foot was dry  The sensory exam showed diminished vibratory sensation at the level of the toes  Diminished tactile sensation with monofilament testing throughout the left foot    Pulses:   1+ in the posterior tibialis on the right   1+ in the dorsalis pedis on the right  Capillary refills findings on the left were delayed in the toes  Pulses:   1+ in the posterior tibialis on the left   1+ in the dorsalis pedis on the left  Assign Risk Category: 2: Loss of protective sensation with or without weakness, deformity, callus, pre-ulcer, or history of ulceration  High risk  Hyperkeratosis: present on both first sub metatarsals, present on both fifth sub metatarsals and Pre-ulcerative lesion, submetatarsal one to 5, bilateral    Shoe Gear Evaluation: performed ()  Recommendation(s): custom inlays       Patient's shoes and socks removed  Right Foot/Ankle   Right Foot Inspection  Skin Exam: callus and callus               Toe Exam: swelling and erythema  Sensory   Vibration: diminished  Proprioception: diminished      Vascular  Capillary refills: elevated        Left Foot/Ankle  Left Foot Inspection  Skin Exam: callus   Submetatarsal 5 of the left foot demonstrates 0 5 centimeter squared plantar verruca   It is in capsulated in a bullae   Debrided   20% remaining               Toe Exam: swelling and erythema                   Sensory   Vibration: diminished  Proprioception: diminished     Vascular  Capillary refills: elevated      Patient's shoes and socks removed            Assign Risk Category  Deformity present  Loss of protective sensation  Weak pulses  Risk: 2

## 2023-05-03 ENCOUNTER — APPOINTMENT (OUTPATIENT)
Dept: RADIOLOGY | Facility: CLINIC | Age: 78
End: 2023-05-03

## 2023-05-03 ENCOUNTER — OFFICE VISIT (OUTPATIENT)
Dept: FAMILY MEDICINE CLINIC | Facility: CLINIC | Age: 78
End: 2023-05-03

## 2023-05-03 VITALS
BODY MASS INDEX: 24.64 KG/M2 | DIASTOLIC BLOOD PRESSURE: 80 MMHG | WEIGHT: 176 LBS | HEART RATE: 71 BPM | RESPIRATION RATE: 16 BRPM | SYSTOLIC BLOOD PRESSURE: 148 MMHG | OXYGEN SATURATION: 99 % | TEMPERATURE: 97 F | HEIGHT: 71 IN

## 2023-05-03 DIAGNOSIS — I25.10 CORONARY ARTERY DISEASE INVOLVING NATIVE CORONARY ARTERY OF NATIVE HEART WITHOUT ANGINA PECTORIS: Primary | ICD-10-CM

## 2023-05-03 DIAGNOSIS — I25.119 CORONARY ARTERY DISEASE INVOLVING NATIVE CORONARY ARTERY OF NATIVE HEART WITH ANGINA PECTORIS (HCC): ICD-10-CM

## 2023-05-03 DIAGNOSIS — E11.22 CKD STAGE 2 DUE TO TYPE 2 DIABETES MELLITUS (HCC): ICD-10-CM

## 2023-05-03 DIAGNOSIS — N18.2 CKD STAGE 2 DUE TO TYPE 2 DIABETES MELLITUS (HCC): ICD-10-CM

## 2023-05-03 DIAGNOSIS — E11.40 TYPE 2 DIABETES MELLITUS WITH DIABETIC NEUROPATHY, WITHOUT LONG-TERM CURRENT USE OF INSULIN (HCC): ICD-10-CM

## 2023-05-03 DIAGNOSIS — M79.652 ACUTE PAIN OF LEFT THIGH: ICD-10-CM

## 2023-05-03 DIAGNOSIS — I50.9 ACUTE ON CHRONIC CONGESTIVE HEART FAILURE, UNSPECIFIED HEART FAILURE TYPE (HCC): ICD-10-CM

## 2023-05-03 DIAGNOSIS — I10 BENIGN ESSENTIAL HYPERTENSION: ICD-10-CM

## 2023-05-03 RX ORDER — SPIRONOLACTONE 25 MG/1
25 TABLET ORAL DAILY
Qty: 90 TABLET | Refills: 1 | Status: SHIPPED | OUTPATIENT
Start: 2023-05-03

## 2023-05-03 RX ORDER — CARVEDILOL 12.5 MG/1
12.5 TABLET ORAL 2 TIMES DAILY
Qty: 180 TABLET | Refills: 1 | Status: SHIPPED | OUTPATIENT
Start: 2023-05-03

## 2023-05-03 NOTE — PROGRESS NOTES
Assessment/Plan:    No problem-specific Assessment & Plan notes found for this encounter  DM unchanged, a1c pending, f/u endocrinology    CAD stable, no cp    htn not at goal  Re-add spironolactone that he has not been taking  F/u 1m  Labs after    ckd 2 stable    Left thigh pain, check XR femur, hx of prostate CA, may consider PT if xr neg and no better but can try otc topica for now eg  Mineral ice    Known PAD but relatively recent arterial duplex neg/stable on 3/21/23 so will not repeat at this time     Diagnoses and all orders for this visit:    Coronary artery disease involving native coronary artery of native heart without angina pectoris    Benign essential hypertension  -     carvedilol (COREG) 12 5 mg tablet; Take 1 tablet (12 5 mg total) by mouth 2 (two) times a day    Type 2 diabetes mellitus with diabetic neuropathy, without long-term current use of insulin (HCC)    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)    Acute on chronic congestive heart failure, unspecified heart failure type (HCC)  -     spironolactone (ALDACTONE) 25 mg tablet; Take 1 tablet (25 mg total) by mouth daily    Acute pain of left thigh  -     XR femur 2 vw left; Future    Coronary artery disease involving native coronary artery of native heart with angina pectoris ([facility] [address] )          Return in about 2 weeks (around 5/17/2023) for Recheck  Subjective:      Patient ID: Bo Maria is a 65 y o  male  Chief Complaint   Patient presents with    Leg Pain     Left leg  Swati Harris MA      Hypertension       HPI  Not on aldactone per pt?   Was mentioned at last cardio appt and was stable  bp has been high at nephro appt as noted  ckd 2 on last labs    Left leg pain   2w  No swelling  No color change  No injury  Up arising  Walking and WB ok  In proximal thigh area  No rash seen    The following portions of the patient's history were reviewed and updated as appropriate: allergies, current medications, past family history, past medical history, past social history, past surgical history and problem list     Review of Systems   Constitutional: Negative for fever  Respiratory: Negative for shortness of breath            Current Outpatient Medications   Medication Sig Dispense Refill    acetaminophen (TYLENOL) 325 mg tablet Take 2 tablets (650 mg total) by mouth every 6 (six) hours as needed for mild pain, headaches or fever  0    Ascorbic Acid (Vitamin C) 500 MG CAPS Take 500 mg by mouth daily      aspirin (ECOTRIN LOW STRENGTH) 81 mg EC tablet Take 81 mg by mouth daily      atorvastatin (LIPITOR) 20 mg tablet TAKE 1 TABLET EVERY DAY 90 tablet 1    brimonidine tartrate 0 2 % ophthalmic solution Inject 0 2 % into the eye 2 (two) times a day      carvedilol (COREG) 12 5 mg tablet Take 1 tablet (12 5 mg total) by mouth 2 (two) times a day 180 tablet 1    Cholecalciferol (Vitamin D3) 50 MCG (2000 UT) TABS Take 2,000 Units by mouth daily      co-enzyme Q-10 100 mg capsule Take 100 mg by mouth daily      losartan (COZAAR) 25 mg tablet Take 1 tablet (25 mg total) by mouth daily 90 tablet 1    metFORMIN (GLUCOPHAGE) 1000 MG tablet Take 1 tablet (1,000 mg total) by mouth 2 (two) times a day with meals 180 tablet 1    multivitamin (THERAGRAN) TABS Take 1 tablet by mouth daily      omeprazole (PriLOSEC) 40 MG capsule Take 1 capsule (40 mg total) by mouth daily as needed (acid reflux) 90 capsule 1    OneTouch Ultra test strip TEST BLOOD SUGAR TWO TIMES A  strip 0    Probiotic Product (CULTURELLE PROBIOTICS) CHEW Chew daily      sitaGLIPtin (JANUVIA) 25 mg tablet Take one tablet by mouth once a day 90 tablet 1    spironolactone (ALDACTONE) 25 mg tablet Take 1 tablet (25 mg total) by mouth daily 90 tablet 1    tamsulosin (FLOMAX) 0 4 mg Take 1 capsule (0 4 mg total) by mouth daily with dinner 90 capsule 2    TRUEPLUS LANCETS 28G MISC Test 1x/d, E11 29  0    Infant Foods (Follow-Up) POWD HANDICAP PLACARD, medically "recommended, <taxonomy [id number]> due to arthritic/orthopedic condition (#2,#5) (Patient not taking: Reported on 9/19/2022) 99 g 1     No current facility-administered medications for this visit  Objective:    /80   Pulse 71   Temp (!) 97 °F (36 1 °C)   Resp 16   Ht 5' 11\" (1 803 m)   Wt 79 8 kg (176 lb)   SpO2 99%   BMI 24 55 kg/m²        Physical Exam  Vitals and nursing note reviewed  Constitutional:       General: He is not in acute distress  Appearance: He is well-developed  He is not ill-appearing  HENT:      Head: Normocephalic  Eyes:      General: No scleral icterus  Conjunctiva/sclera: Conjunctivae normal    Cardiovascular:      Rate and Rhythm: Normal rate and regular rhythm  Pulmonary:      Effort: Pulmonary effort is normal  No respiratory distress  Breath sounds: No wheezing  Abdominal:      Palpations: Abdomen is soft  Musculoskeletal:         General: Tenderness present  No deformity  Cervical back: Neck supple  Comments: Left anterior proximal thigh area  No mass or nodule felt  No skin changes  No induration  No vesicle or warmth/redness   Skin:     General: Skin is warm and dry  Coloration: Skin is not pale  Findings: No rash  Neurological:      Mental Status: He is alert  Psychiatric:         Mood and Affect: Mood normal          Behavior: Behavior normal          Thought Content:  Thought content normal                 Camie Cockayne, DO    "

## 2023-05-11 DIAGNOSIS — E11.65 TYPE 2 DIABETES MELLITUS WITH HYPERGLYCEMIA, WITHOUT LONG-TERM CURRENT USE OF INSULIN (HCC): ICD-10-CM

## 2023-05-12 ENCOUNTER — APPOINTMENT (OUTPATIENT)
Dept: LAB | Facility: CLINIC | Age: 78
End: 2023-05-12

## 2023-05-12 DIAGNOSIS — I50.1 HEART FAILURE, LEFT, WITH LVEF <=30% (HCC): ICD-10-CM

## 2023-05-12 DIAGNOSIS — E11.22 CKD STAGE 2 DUE TO TYPE 2 DIABETES MELLITUS (HCC): ICD-10-CM

## 2023-05-12 DIAGNOSIS — N18.9 ANEMIA DUE TO CHRONIC KIDNEY DISEASE, UNSPECIFIED CKD STAGE: ICD-10-CM

## 2023-05-12 DIAGNOSIS — N18.2 CKD STAGE 2 DUE TO TYPE 2 DIABETES MELLITUS (HCC): ICD-10-CM

## 2023-05-12 DIAGNOSIS — N18.31 ANEMIA DUE TO STAGE 3A CHRONIC KIDNEY DISEASE (HCC): ICD-10-CM

## 2023-05-12 DIAGNOSIS — D63.1 ANEMIA DUE TO CHRONIC KIDNEY DISEASE, UNSPECIFIED CKD STAGE: ICD-10-CM

## 2023-05-12 DIAGNOSIS — I50.42 CHRONIC COMBINED SYSTOLIC AND DIASTOLIC HEART FAILURE, NYHA CLASS 2 (HCC): ICD-10-CM

## 2023-05-12 DIAGNOSIS — I95.1 ORTHOSTATIC HYPOTENSION: ICD-10-CM

## 2023-05-12 DIAGNOSIS — D63.1 ANEMIA DUE TO STAGE 3A CHRONIC KIDNEY DISEASE (HCC): ICD-10-CM

## 2023-05-12 LAB
ALBUMIN SERPL BCP-MCNC: 3.3 G/DL (ref 3.5–5)
ALP SERPL-CCNC: 107 U/L (ref 46–116)
ALT SERPL W P-5'-P-CCNC: 19 U/L (ref 12–78)
ANION GAP SERPL CALCULATED.3IONS-SCNC: 5 MMOL/L (ref 4–13)
AST SERPL W P-5'-P-CCNC: 13 U/L (ref 5–45)
BILIRUB SERPL-MCNC: 0.56 MG/DL (ref 0.2–1)
BUN SERPL-MCNC: 19 MG/DL (ref 5–25)
CALCIUM ALBUM COR SERPL-MCNC: 9.7 MG/DL (ref 8.3–10.1)
CALCIUM SERPL-MCNC: 9.1 MG/DL (ref 8.3–10.1)
CHLORIDE SERPL-SCNC: 104 MMOL/L (ref 96–108)
CO2 SERPL-SCNC: 25 MMOL/L (ref 21–32)
CREAT SERPL-MCNC: 1.06 MG/DL (ref 0.6–1.3)
CREAT UR-MCNC: 15.5 MG/DL
ERYTHROCYTE [DISTWIDTH] IN BLOOD BY AUTOMATED COUNT: 14.8 % (ref 11.6–15.1)
FERRITIN SERPL-MCNC: 60 NG/ML (ref 8–388)
GFR SERPL CREATININE-BSD FRML MDRD: 66 ML/MIN/1.73SQ M
GLUCOSE P FAST SERPL-MCNC: 147 MG/DL (ref 65–99)
HCT VFR BLD AUTO: 29.6 % (ref 36.5–49.3)
HGB BLD-MCNC: 9.7 G/DL (ref 12–17)
IRON SATN MFR SERPL: 14 % (ref 20–50)
IRON SERPL-MCNC: 39 UG/DL (ref 65–175)
MCH RBC QN AUTO: 29.2 PG (ref 26.8–34.3)
MCHC RBC AUTO-ENTMCNC: 32.8 G/DL (ref 31.4–37.4)
MCV RBC AUTO: 89 FL (ref 82–98)
MICROALBUMIN UR-MCNC: 5.3 MG/L (ref 0–20)
MICROALBUMIN/CREAT 24H UR: 34 MG/G CREATININE (ref 0–30)
PHOSPHATE SERPL-MCNC: 3 MG/DL (ref 2.3–4.1)
PLATELET # BLD AUTO: 271 THOUSANDS/UL (ref 149–390)
PMV BLD AUTO: 10.4 FL (ref 8.9–12.7)
POTASSIUM SERPL-SCNC: 4.8 MMOL/L (ref 3.5–5.3)
PROT SERPL-MCNC: 6.9 G/DL (ref 6.4–8.4)
RBC # BLD AUTO: 3.32 MILLION/UL (ref 3.88–5.62)
SODIUM SERPL-SCNC: 134 MMOL/L (ref 135–147)
TIBC SERPL-MCNC: 275 UG/DL (ref 250–450)
URATE SERPL-MCNC: 4.9 MG/DL (ref 3.5–8.5)
WBC # BLD AUTO: 7.26 THOUSAND/UL (ref 4.31–10.16)

## 2023-05-12 NOTE — PROGRESS NOTES
Assessment/Plan:    Peripheral arterial disease (HCC)  -     VAS lower limb arterial duplex, complete bilateral; Future  -Baseline ambulatory dysfunction and limitations; uses tall rollator  -No claudication at his level of activity; no ischemic rest pain or wounds  -No new leg or foot symptoms    -DENVER 3/21/23    R 0 67/151/87; known occlusion fem-pop bypass graft; known occlusion prox-mid mSFA with recon distal SFA    L 0 68/114/87; occlusion of SFA stent with collats and recon of the distal SFA    Plan:   -Bilateral PAD with known SFA occlusions  -Unchanged DENVER which redemonstrates known bilateral SFA occlusions  -Continue activity / walking as tolerated  -Maintain good diabetes control   -Apply good moisturizer to feet daily such as -Cetaphil, Eucerin, etc  -Avoid foot wounds; wear good protective footwear; monitor feet for wounds or changes  -Trial Tubigrip F to help with leg swelling (due to sedentary lifestyle and underlying CHF)  -Continue with aspirin 81 and atorvastatin 20   -Check DENVER in 1 year with OV or sooner, if needed    Leg swelling  -Legs stable with 1+ pitting edema at the ankles  -Trial Tubigrip F to help with leg swelling (due to sedentary lifestyle and underlying CHF)  -Patient to continue to monitor condition    Additional diagnoses:  Benign essential hypertension    Chronic combined systolic and diastolic heart failure, NYHA class 2 ([facility])    Type 2 diabetes mellitus with hyperglycemia, without long-term current use of insulin ([facility])    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)    Heart failure, left, 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% ([facility])      Subjective:      Patient ID: Amarilys Mendoza is a 65 y o  male  Patient present to review DENVER done on 3/21/23  Patient states right now he has no pain or complaints  Patient did stated that about a month ago he had some pain on the left leg that lasted for about 2 weeks and it when away  Patient hasn't had any issues since  Patient uses walker for support when walking  Patient is currently taking ASA and Atorvastatin  HPI    Randy Covarrubias 72YM M hypertension, DM with neuropathy, CKD 3, CAD, AF, CHF, PAD s/p R Fem to [place] pop bypass Melita Barboza, 2013, known occluded within 1 year of surgery) and B SFA occlusions s/p B SFA stenting (L 6/19/2018 )  Unfortunately, despite dual antiplatelets he developed recurrent SFA stenoses (L < 1 year and R < 2 years) after the intervention  Mr Emile Paredes has general weakness in the legs with balance problems affecting both legs  and limiting him to walking room to room for which he uses a Rolator  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   5/15/23: Patient presents for annual vascular RFM and review of recent testing  Patient reports that he is doing and has had no medical changes since he was last seen  He continues to walk with a tall rollator  His walking is unchanged compared with last year  No claudication  He does have some neuropathy, but no ischemic rest pain or tissue loss  He sees Dr Danielle novoa for diabetic foot care  He is advised to avoid foot and wear good protective foot wear  He should check his feet daily for any changes  Patient also apply good moisturizer to skin daily  He does have mild bilateral lower extremity edema likely due to sedentary lifestyle with underlying CHF  I recommended compression last year but he is unable to bend over to place them on his legs  He was given Tubigrip size F to trial     He had left thigh aching weeks ago which may have been MSK and seen his PCP and that pain has resolved  We reviewed his recent labs and testing  His lower extremity arterial duplex study is stable and unchanged compared to 2022  Patient confirmed that he is taking aspirin 81 and atorvastatin 20  LDL is at goal so we will not make any changes in his medications this year      A1c 6 9  LDL 53    The following portions of the patient's history were reviewed and "updated as appropriate: allergies, current medications, past family history, past medical history, past social history, past surgical history and problem list     Review of Systems   Constitutional: Negative  HENT: Negative  Eyes: Negative  Respiratory: Negative  Cardiovascular: Negative  Gastrointestinal: Negative  Endocrine: Negative  Genitourinary: Negative  Musculoskeletal: Positive for back pain (walker)  Skin: Negative  Allergic/Immunologic: Negative  Neurological: Negative  Hematological: Negative  Psychiatric/Behavioral: Negative  Objective:    /74 (BP Location: Right arm, Patient Position: Sitting, Cuff Size: Adult)   Pulse 77   Ht 5' 11\" (1 803 m)   Wt 78 5 kg (173 lb)   BMI 24 13 kg/m²      Physical Exam  Vitals and nursing note reviewed  Constitutional:       Appearance: He is well-developed  HENT:      Head: Normocephalic and atraumatic  Eyes:      Pupils: Pupils are equal, round, and reactive to light  Neck:      Thyroid: No thyromegaly  Vascular: No JVD  Trachea: Trachea normal    Cardiovascular:      Rate and Rhythm: Normal rate and regular rhythm  Pulses:           Carotid pulses are 2+ on the right side and 2+ on the left side  Radial pulses are 2+ on the right side and 2+ on the left side  Heart sounds: Normal heart sounds, S1 normal and S2 normal  No murmur heard  No friction rub  No gallop  Pulmonary:      Effort: Pulmonary effort is normal  No accessory muscle usage or respiratory distress  Breath sounds: Normal breath sounds  No wheezing or rales  Abdominal:      General: Bowel sounds are normal  There is no distension  Palpations: Abdomen is soft  Tenderness: There is no abdominal tenderness  Musculoskeletal:         General: No deformity  Normal range of motion  Cervical back: Neck supple  Right lower le+ Edema present  Left lower le+ Edema present   " Skin:     General: Skin is warm and dry  Findings: No lesion or rash  Nails: There is no clubbing  Neurological:      Mental Status: He is alert and oriented to person, place, and time  Comments: Grossly normal    Psychiatric:         Behavior: Behavior is cooperative  DENVER 3/21/23    Indications:  Patient presents with known PVD  Patient states he gets a weakness and tired  feeling in his legs after walking about a 1/2 block  Denies any open wounds or  ulcers at this time  Operative History:  2018-06-18 Left SFA Stent Angioplasty  Left SFA stents  Right fem-pop bypass  Right SFA stent  Risk Factors: The patient has history of HTN, Diabetes (Yes), Hyperlipidemia, CKD, PAD and  CAD  FINDINGS:     Segment                Right            Left                                            PSV (cm/s)  EDV  PSV (cm/s)  EDV    Common Femoral Artery         104    0         117    0    Prox Profunda                  97    0         163    0    Prox SFA                                        39    0    Mid SFA                                          0    0    Dist SFA                       84    0          36    0    Proximal Pop                   38    0          42    0    Distal Pop                     34    0          33    0    Dist Post Tibial               33    0          43    0    Dist  Ant  Tibial              14    0          22    0    Dist Peroneal                  27    0          13    0             CONCLUSION:  Impression:  RIGHT LOWER LIMB:  Known occlusion of the fem-pop bypass graft  Known occlusion of the proximal to  mid superficial femoral artery with reconstitution at the distal SFA  Ankle/Brachial index:  0 67, which is within the moderate range  Prior 0 64  PVR/ PPG tracings are dampened  Metatarsal pressure of 151 mmHg  Great toe pressure of 84 mmHg, within the healing range       LEFT LOWER LIMB:  Occlusion of the superficial femoral artery stent with collaterals noted and  reconstitution at the distal SFA  Ankle/Brachial index: 0 68, which is within the moderate range  Prior 0 64  PVR/ PPG tracings are dampened  Metatarsal pressure of 114 mmHg  Great toe pressure of 87 mmHg, within the healing range  Compared to previous study on 3/11/2022, there is no significant change  Recommend repeat testing in 1 year as per protocol unless otherwise indicated     SIGNATURE:  Electronically Signed by: Margaret Alfaro MD on 2023-03-21 06:18:16 PM           I have reviewed and made appropriate changes to the review of systems input by the medical assistant  There were no vitals filed for this visit      Patient Active Problem List   Diagnosis   • Coronary artery disease involving native coronary artery of native heart with angina pectoris ([facility] )   • 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 mellitus ([facility] )   • Diabetic neuropathy ([facility] )   • GE reflux   • Lumbar radiculopathy   • Type 2 diabetes mellitus with diabetic neuropathy ([facility] )   • Prostate cancer screening   • Dyslipidemia   • Medicare annual wellness visit, subsequent   • Type 2 diabetes mellitus with hyperglycemia, without long-term current use of insulin ([facility])   • Benign essential hypertension   • Bilateral leg weakness   • Prostate cancer ([facility])   • Insomnia, persistent   • Orthostatic hypotension   • Chronic right-sided low back pain without sciatica   • Hypokalemia   • Acute on chronic combined systolic and diastolic congestive heart failure ([facility])   • Heart failure, left, 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% ([facility])   • Chronic combined systolic and diastolic heart failure, NYHA class 2 ([facility])   • Edema of both feet   • Black stools   • Diarrhea   • Gait disorder   • Acute pain of left thigh   • Peripheral arterial disease ([facility] )       Past Surgical History:   Procedure Laterality Date   • CARDIAC CATHETERIZATION  07/29/2010   • CATARACT EXTRACTION, BILATERAL Bilateral     R 1999, L 2008   • COLONOSCOPY  2011   • FEMORAL ARTERY - POPLITEAL ARTERY BYPASS GRAFT  09/23/2013   • FOOT SURGERY      bone spur removed   • LEG SURGERY Bilateral     repair; L 8/20/2010 and 7/26/2012   • AL PLMT INTERSTITIAL DEV RADIAT TX PROSTATE 1/MULT N/A 6/22/2021    Procedure: INSERTION OF FIDUCIAL MARKER (TRANSRECTAL ULTRASOUND GUIDANCE), SPACEOAR;  Surgeon: Gonzalo Franco MD;  Location: [facility];  Service: Urology   • ROTATOR CUFF REPAIR Left 2009   • SHOULDER SURGERY Right 2005    collar bone       Family History   Problem Relation Age of Onset   • Heart disease Father    • Heart attack Father         acute myocardial infarction   • Heart disease Sister    • Heart attack Sister         acute myocardial infarction   • Heart disease Paternal Grandfather    • Aortic aneurysm Mother    • Throat cancer Maternal Grandfather        Social History     Socioeconomic History   • Marital status: /Civil Union     Spouse name: Not on file   • Number of children: Not on file   • Years of education: Not on file   • Highest education level: Not on file   Occupational History   • Occupation: retired from  work   Tobacco Use   • Smoking status: Never   • Smokeless tobacco: Never   Vaping Use   • Vaping Use: Never used   Substance and Sexual Activity   • Alcohol use: Yes     Comment: being a social drinker/rare   • Drug use: No   • Sexual activity: Not on file   Other Topics Concern   • Not on file   Social History Narrative   • Not on file     Social Determinants of Health     Financial Resource Strain: Not on file   Food Insecurity: Not on file   Transportation Needs: Not on file   Physical Activity: Not on file   Stress: Not on file   Social Connections: Not on file   Intimate Partner Violence: Not on file   Housing Stability: Not on file       No Known Allergies      Current Outpatient Medications:   •  acetaminophen (TYLENOL) 325 mg tablet, Take 2 tablets (650 mg total) by mouth every 6 (six) hours as needed for mild pain, headaches or fever, Disp: , Rfl: 0  • Ascorbic Acid (Vitamin C) 500 MG CAPS, Take 500 mg by mouth daily, Disp: , Rfl:   •  aspirin (ECOTRIN LOW STRENGTH) 81 mg EC tablet, Take 81 mg by mouth daily, Disp: , Rfl:   •  atorvastatin (LIPITOR) 20 mg tablet, TAKE 1 TABLET EVERY DAY, Disp: 90 tablet, Rfl: 1  •  brimonidine tartrate 0 2 % ophthalmic solution, Inject 0 2 % into the eye 2 (two) times a day, Disp: , Rfl:   •  carvedilol (COREG) 12 5 mg tablet, Take 1 tablet (12 5 mg total) by mouth 2 (two) times a day, Disp: 180 tablet, Rfl: 1  •  Cholecalciferol (Vitamin D3) 50 MCG (2000 UT) TABS, Take 2,000 Units by mouth daily, Disp: , Rfl:   •  co-enzyme Q-10 100 mg capsule, Take 100 mg by mouth daily, Disp: , Rfl:   •  losartan (COZAAR) 25 mg tablet, Take 1 tablet (25 mg total) by mouth daily, Disp: 90 tablet, Rfl: 1  •  metFORMIN (GLUCOPHAGE) 1000 MG tablet, TAKE ONE TABLET BY MOUTH TWO TIMES A DAY WITH MEALS, Disp: 180 tablet, Rfl: 1  •  multivitamin (THERAGRAN) TABS, Take 1 tablet by mouth daily, Disp: , Rfl:   •  omeprazole (PriLOSEC) 40 MG capsule, Take 1 capsule (40 mg total) by mouth daily as needed (acid reflux), Disp: 90 capsule, Rfl: 1  •  OneTouch Ultra test strip, TEST BLOOD SUGAR TWO TIMES A DAY, Disp: 100 strip, Rfl: 0  •  Probiotic Product (CULTURELLE PROBIOTICS) CHEW, Chew daily, Disp: , Rfl:   •  sitaGLIPtin (JANUVIA) 25 mg tablet, Take one tablet by mouth once a day, Disp: 90 tablet, Rfl: 1  •  spironolactone (ALDACTONE) 25 mg tablet, Take 1 tablet (25 mg total) by mouth daily, Disp: 90 tablet, Rfl: 1  •  tamsulosin (FLOMAX) 0 4 mg, Take 1 capsule (0 4 mg total) by mouth daily with dinner, Disp: 90 capsule, Rfl: 2  •  TRUEPLUS LANCETS 28G MISC, Test 1x/d, E11 29, Disp: , Rfl: 0  •  Infant Foods (Follow-Up) POWD, HANDICAP PLACARD, medically recommended, <taxonomy [id number]> due to arthritic/orthopedic condition (#2,#5) (Patient not taking: Reported on 9/19/2022), Disp: 99 g, Rfl: 1

## 2023-05-14 PROBLEM — I73.9 PERIPHERAL ARTERIAL DISEASE (HCC): Status: ACTIVE | Noted: 2023-05-14

## 2023-05-15 ENCOUNTER — OFFICE VISIT (OUTPATIENT)
Dept: VASCULAR SURGERY | Facility: CLINIC | Age: 78
End: 2023-05-15

## 2023-05-15 VITALS
BODY MASS INDEX: 24.22 KG/M2 | SYSTOLIC BLOOD PRESSURE: 110 MMHG | DIASTOLIC BLOOD PRESSURE: 74 MMHG | WEIGHT: 173 LBS | HEIGHT: 71 IN | HEART RATE: 77 BPM

## 2023-05-15 DIAGNOSIS — N18.2 CKD STAGE 2 DUE TO TYPE 2 DIABETES MELLITUS (HCC): ICD-10-CM

## 2023-05-15 DIAGNOSIS — I50.1 HEART FAILURE, LEFT, WITH LVEF <=30% (HCC): ICD-10-CM

## 2023-05-15 DIAGNOSIS — I73.9 PERIPHERAL ARTERIAL DISEASE (HCC): Primary | ICD-10-CM

## 2023-05-15 DIAGNOSIS — I10 BENIGN ESSENTIAL HYPERTENSION: ICD-10-CM

## 2023-05-15 DIAGNOSIS — E11.22 CKD STAGE 2 DUE TO TYPE 2 DIABETES MELLITUS (HCC): ICD-10-CM

## 2023-05-15 DIAGNOSIS — E11.65 TYPE 2 DIABETES MELLITUS WITH HYPERGLYCEMIA, WITHOUT LONG-TERM CURRENT USE OF INSULIN (HCC): ICD-10-CM

## 2023-05-15 DIAGNOSIS — I50.42 CHRONIC COMBINED SYSTOLIC AND DIASTOLIC HEART FAILURE, NYHA CLASS 2 (HCC): ICD-10-CM

## 2023-05-15 NOTE — PATIENT INSTRUCTIONS
Peripheral arterial disease    -Avoid foot wounds; wear good protective footwear  -walking as tolerated  -Maintain good diabetes control  -Cetaphil or Eucerin, etc  -Tubigrip F  -Continue with aspirin 81 and atorvastatin 20

## 2023-05-17 ENCOUNTER — OFFICE VISIT (OUTPATIENT)
Dept: FAMILY MEDICINE CLINIC | Facility: CLINIC | Age: 78
End: 2023-05-17

## 2023-05-17 VITALS
DIASTOLIC BLOOD PRESSURE: 64 MMHG | WEIGHT: 176.8 LBS | HEART RATE: 72 BPM | SYSTOLIC BLOOD PRESSURE: 128 MMHG | HEIGHT: 71 IN | TEMPERATURE: 96.8 F | RESPIRATION RATE: 18 BRPM | BODY MASS INDEX: 24.75 KG/M2

## 2023-05-17 DIAGNOSIS — E11.22 CKD STAGE 2 DUE TO TYPE 2 DIABETES MELLITUS (HCC): ICD-10-CM

## 2023-05-17 DIAGNOSIS — D50.9 IRON DEFICIENCY ANEMIA, UNSPECIFIED IRON DEFICIENCY ANEMIA TYPE: ICD-10-CM

## 2023-05-17 DIAGNOSIS — M79.652 ACUTE PAIN OF LEFT THIGH: ICD-10-CM

## 2023-05-17 DIAGNOSIS — N18.2 CKD STAGE 2 DUE TO TYPE 2 DIABETES MELLITUS (HCC): ICD-10-CM

## 2023-05-17 DIAGNOSIS — I10 BENIGN ESSENTIAL HYPERTENSION: Primary | ICD-10-CM

## 2023-05-17 NOTE — PROGRESS NOTES
Assessment/Plan:    No problem-specific Assessment & Plan notes found for this encounter     htn stable, better when back on aldactone  No breast tenderness  Bmp ok  F/u q6m    ckd2 stable, stay hydrated    Left thigh pain resolved, xr neg  Can monitor  Consider PT if recurs    DAYANA on labs  Not on any supplements  Ordered by nephrology but advised options may include po or iv iron  Stool heme negative in the office    According to your recent lab results, you are mildly anemic and iron deficient  You do not have blood in your stool when tested today in the office  You may need to start iron replacement, either in the form of pills or intravenously through a Hematologist   Please ask Dr Parisa Hatch what he thinks about the test result  Diagnoses and all orders for this visit:    Benign essential hypertension    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)    Acute pain of left thigh    Iron deficiency anemia, unspecified iron deficiency anemia type              No follow-ups on file  Subjective:      Patient ID: Charmayne Gab is a 65 y o  male  Chief Complaint   Patient presents with   • Follow-up     2 week f/u nm lpn       HPI  Restarted spironolactone  Taking other meds  Home readings for bp   Readings have improved  Left thigh suddenly better    DAYANA on last labs  Sees Dr Parisa Hatch    Black stools every few months per pt  The following portions of the patient's history were reviewed and updated as appropriate: allergies, current medications, past family history, past medical history, past social history, past surgical history and problem list     Review of Systems   Constitutional: Negative for unexpected weight change  Gastrointestinal: Negative for abdominal pain and nausea           Current Outpatient Medications   Medication Sig Dispense Refill   • acetaminophen (TYLENOL) 325 mg tablet Take 2 tablets (650 mg total) by mouth every 6 (six) hours as needed for mild pain, headaches or fever  0   • "Ascorbic Acid (Vitamin C) 500 MG CAPS Take 500 mg by mouth daily     • aspirin (ECOTRIN LOW STRENGTH) 81 mg EC tablet Take 81 mg by mouth daily     • atorvastatin (LIPITOR) 20 mg tablet TAKE 1 TABLET EVERY DAY 90 tablet 1   • brimonidine tartrate 0 2 % ophthalmic solution Inject 0 2 % into the eye 2 (two) times a day     • carvedilol (COREG) 12 5 mg tablet Take 1 tablet (12 5 mg total) by mouth 2 (two) times a day 180 tablet 1   • Cholecalciferol (Vitamin D3) 50 MCG (2000 UT) TABS Take 2,000 Units by mouth daily     • co-enzyme Q-10 100 mg capsule Take 100 mg by mouth daily     • losartan (COZAAR) 25 mg tablet Take 1 tablet (25 mg total) by mouth daily 90 tablet 1   • metFORMIN (GLUCOPHAGE) 1000 MG tablet TAKE ONE TABLET BY MOUTH TWO TIMES A DAY WITH MEALS 180 tablet 1   • multivitamin (THERAGRAN) TABS Take 1 tablet by mouth daily     • omeprazole (PriLOSEC) 40 MG capsule Take 1 capsule (40 mg total) by mouth daily as needed (acid reflux) 90 capsule 1   • OneTouch Ultra test strip TEST BLOOD SUGAR TWO TIMES A  strip 0   • sitaGLIPtin (JANUVIA) 25 mg tablet Take one tablet by mouth once a day 90 tablet 1   • spironolactone (ALDACTONE) 25 mg tablet Take 1 tablet (25 mg total) by mouth daily 90 tablet 1   • tamsulosin (FLOMAX) 0 4 mg Take 1 capsule (0 4 mg total) by mouth daily with dinner 90 capsule 2   • TRUEPLUS LANCETS 28G MISC Test 1x/d, E11 29  0   • Infant Foods (Follow-Up) POWD HANDICAP PLACARD, medically recommended, <taxonomy [id number]> due to arthritic/orthopedic condition (#2,#5) (Patient not taking: Reported on 9/19/2022) 99 g 1   • Probiotic Product (CULTURELLE PROBIOTICS) CHEW Chew daily (Patient not taking: Reported on 5/17/2023)       No current facility-administered medications for this visit         Objective:    /64   Pulse 72   Temp (!) 96 8 °F (36 °C)   Resp 18   Ht 5' 11\" (1 803 m)   Wt 80 2 kg (176 lb 12 8 oz)   BMI 24 66 kg/m²        Physical Exam  Vitals and nursing note " reviewed  Constitutional:       General: He is not in acute distress  Appearance: He is well-developed  He is not ill-appearing  HENT:      Head: Normocephalic  Eyes:      General: No scleral icterus  Conjunctiva/sclera: Conjunctivae normal    Cardiovascular:      Rate and Rhythm: Normal rate and regular rhythm  Heart sounds: No murmur heard  Pulmonary:      Effort: Pulmonary effort is normal  No respiratory distress  Breath sounds: No wheezing  Abdominal:      Palpations: Abdomen is soft  Musculoskeletal:         General: No deformity  Cervical back: Neck supple  Skin:     General: Skin is warm and dry  Coloration: Skin is not jaundiced or pale  Neurological:      Mental Status: He is alert  Psychiatric:         Mood and Affect: Mood normal          Behavior: Behavior normal          Thought Content:  Thought content normal                 Mey Montanez DO

## 2023-05-17 NOTE — PATIENT INSTRUCTIONS
According to your recent lab results, you are mildly anemic and iron deficient  You do not have blood in your stool when tested today in the office  You may need to start iron replacement, either in the form of pills or intravenously through a Hematologist   Please ask Dr Jorge A Arredondo what he thinks about the test result

## 2023-05-19 ENCOUNTER — CLINICAL SUPPORT (OUTPATIENT)
Dept: RADIATION ONCOLOGY | Facility: HOSPITAL | Age: 78
End: 2023-05-19
Attending: STUDENT IN AN ORGANIZED HEALTH CARE EDUCATION/TRAINING PROGRAM

## 2023-05-19 VITALS
WEIGHT: 165 LBS | RESPIRATION RATE: 18 BRPM | OXYGEN SATURATION: 99 % | HEART RATE: 83 BPM | TEMPERATURE: 97.4 F | SYSTOLIC BLOOD PRESSURE: 122 MMHG | DIASTOLIC BLOOD PRESSURE: 70 MMHG | BODY MASS INDEX: 23.1 KG/M2 | HEIGHT: 71 IN

## 2023-05-19 DIAGNOSIS — C61 PROSTATE CANCER (HCC): Primary | ICD-10-CM

## 2023-05-19 NOTE — PROGRESS NOTES
Follow-up - Radiation Oncology   Hubert Godinez [id number] 65 y o  male [id number]      History of Present Illness   Cancer Staging   Prostate cancer (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, stage IIIC  T3 B N0 M0 with a PSA of 8 6  He completed a course of definitive radiation therapy with neoadjuvant and concurrent androgen deprivation on 21  Last follow-up visit on 22  Interval History:   22 Urology follow-up  Lupron administered today  PSA is < 0 1  Follow-up in 6 months          Component PSA, Total   Latest Ref Rng & Units 0 0 - 4 0 ng/mL   2018 2 0   12/3/2019 4 0   2/10/2020 4 0   2020 6 2 (H)   12/10/2020 8 0 (H)   2021 0 4   2021 0 2   2021 0 3   10/13/2021 <0 1   2022 <0 1   2022 <0 1   2022 <0 1   2023 <0 1         23 Urology  PSA remains undetectable < 0 1  Recommend 2-3 years total duration of ADT  He started ADT in 2021  Lupron administered today  Follow-up in 6 months with PSA prior          Upcomin23 Urology follow-up, Lupron and PSA            Historical Information   Oncology History   Prostate cancer [facility])   2021 Initial Diagnosis    Prostate cancer ([facility] [address] )     2021 Biopsy    A  Prostate, Right Lateral Base, Biopsy:  - Benign prostatic tissue  B  Prostate, Right Base, Biopsy:  - Benign prostatic tissue  C  Prostate, Right Lateral Mid, Biopsy:  - Benign prostatic tissue  D  Prostate, Right Mid, Biopsy:  - Benign prostatic tissue  E  Prostate, Right Lateral [place], Biopsy:  - Benign prostatic tissue  F  Prostate, Right [place], Biopsy:  - Benign prostatic tissue       G  Prostate, Left Lateral Base, Biopsy:  - Prostatic adenocarcinoma, [place] Score 5 + 3 = 8, Grade Group 4      - Percentage [place] pattern 5: 95%     - Total number of cores identified: 1     - Total number of cores with carcinoma: 1     - Percentage of tissue with carcinoma: 92%     - Linear amount of tissue with carcinoma: 11 mm total     H  Prostate, Left Base, Biopsy:  - Prostatic adenocarcinoma, [place] Score 5 + 4 = 9, Grade Group 5      - Percentage [place] pattern 5: 95%     - Total number of cores identified: 1     - Total number of cores with carcinoma: 1     - Percentage of tissue with carcinoma: 92%     - Linear amount of tissue with carcinoma: 11 mm total     I  Prostate, Left Lateral Mid, Biopsy:  - Prostatic adenocarcinoma, Mikki Score 5 + 5 = 10, Grade Group 5      - Total number of cores identified: 1     - Total number of cores with carcinoma: 1     - Percentage of tissue with carcinoma: 100%     - Linear amount of tissue with carcinoma: 15 mm total  - Focus of suspected intraductal carcinoma  J  Prostate, Left Mid, Biopsy:  - Prostatic adenocarcinoma, Mikki Score 5 + 5 = 10, Grade Group 5      - Total number of cores identified: 1     - Total number of cores with carcinoma: 1     - Percentage of tissue with carcinoma: 93%     - Linear amount of tissue with carcinoma: 14 mm total     K  Prostate, Left Lateral [place], Biopsy:  - Prostatic adenocarcinoma, Mikki Score 5 + 4 = 9, Grade Group 5      - Percentage [place]ki pattern 5: 70%     - Total number of cores identified: 1     - Total number of cores with carcinoma: 1     - Percentage of tissue with carcinoma: 100%     - Linear amount of tissue with carcinoma: 10 mm total     L   Prostate, Left [place], Biopsy:  - Prostatic adenocarcinoma, Mikki Score 5 + 4 = 9, Grade Group 5      - Percentage [place] pattern 5: 95%     - Total number of cores identified: 1     - Total number of cores with carcinoma: 1     - Percentage of tissue with carcinoma: 94%     - Linear amount of tissue with carcinoma: 15 mm total     3/17/2021 -  Hormone Therapy    Firmagon 240 mg SQ injection - started 3/17/21  Plan for 2-3 years of therapy  3/29/2021 -  Cancer Staged    Staging form: Prostate, AJCC 8th Edition  - Clinical: Stage IIIC (cT3b, cN0, cM0, PSA: 8 6, Grade Group: 5) - Signed by Adelita Canseco MD on 3/29/2021  Prostate specific antigen (PSA) range: Less than 10  Histologic grading system: 5 grade system       7/8/2021 - 9/14/2021 Radiation    Course: C1    Plan ID Energy Fractions Dose per Fraction (cGy) Dose Correction (cGy) Total Dose Delivered (cGy) Elapsed Days   Prost SV 10X 19 / 19 180 0 3,420 29   Whole Pelvis 10X 25 / 25 180 0 4,500 36      Treatment Dates:  7/8/2021 - 9/14/2021            Past Medical History:   Diagnosis Date   • Acquired hallux valgus     last assessed: 8/1/2013   • Allergic rhinitis    • Anemia 10/26/2010   • Atrophy of nail     last assessed: 7/7/2015   • Chronic kidney disease     chronic renal insufficiency   • Coronary artery disease    • Deformity of ankle and foot, acquired     last assessed: 2/22/2016   • Difficulty walking     last assessed: 12/14/2015   • Dysesthesia     last assessed: 11/25/2016   • Hammer toe     unspecified laterality; last assessed: 8/1/2013   • Hypertension    • Onychomycosis of toenail     last assessed: 2/22/2016   • Peripheral vascular disease ([facility] [address] )     left SFA stent in 2010   • Pes planus     unspecified laterality; last assessed: 8/1/2013   • Pneumonia     last assessed: 2/27/2016   • Prostate cancer 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     last assessed: 8/15/2016   • Type 2 diabetes mellitus ([facility] [address] ) 07/26/2010   • Viral warts     last assessed: 7/24/2015     Past Surgical History:   Procedure Laterality Date   • CARDIAC CATHETERIZATION  07/29/2010   • CATARACT EXTRACTION, BILATERAL Bilateral     R 1999, L 2008   • COLONOSCOPY  2011   • FEMORAL ARTERY - POPLITEAL ARTERY BYPASS GRAFT  09/23/2013   • FOOT SURGERY      bone spur removed   • LEG SURGERY Bilateral     repair; L 8/20/2010 and 7/26/2012   • [place] PLMT INTERSTITIAL DEV RADIAT TX PROSTATE 1/MULT N/A 6/22/2021    Procedure: INSERTION OF FIDUCIAL MARKER (TRANSRECTAL ULTRASOUND GUIDANCE), SPACEOAR;  Surgeon: Claudia Cameron MD;  Location: [facility];  Service: Urology   • ROTATOR CUFF REPAIR Left 2009   • SHOULDER SURGERY Right 2005    collar bone       Social History   Social History     Substance and Sexual Activity   Alcohol Use Not Currently    Comment: being a social drinker/rare     Social History     Substance and Sexual Activity   Drug Use No     Social History     Tobacco Use   Smoking Status Never   Smokeless Tobacco Never         Meds/Allergies     Current Outpatient Medications:   •  acetaminophen (TYLENOL) 325 mg tablet, Take 2 tablets (650 mg total) by mouth every 6 (six) hours as needed for mild pain, headaches or fever, Disp: , Rfl: 0  •  Ascorbic Acid (Vitamin C) 500 MG CAPS, Take 500 mg by mouth daily, Disp: , Rfl:   •  aspirin (ECOTRIN LOW STRENGTH) 81 mg EC tablet, Take 81 mg by mouth daily, Disp: , Rfl:   •  atorvastatin (LIPITOR) 20 mg tablet, TAKE 1 TABLET EVERY DAY, Disp: 90 tablet, Rfl: 1  •  brimonidine tartrate 0 2 % ophthalmic solution, Inject 0 2 % into the eye 2 (two) times a day, Disp: , Rfl:   •  carvedilol (COREG) 12 5 mg tablet, Take 1 tablet (12 5 mg total) by mouth 2 (two) times a day, Disp: 180 tablet, Rfl: 1  •  Cholecalciferol (Vitamin D3) 50 MCG (2000 UT) TABS, Take 2,000 Units by mouth daily, Disp: , Rfl:   •  co-enzyme Q-10 100 mg capsule, Take 100 mg by mouth daily, Disp: , Rfl:   •  losartan (COZAAR) 25 mg tablet, Take 1 tablet (25 mg total) by mouth daily, Disp: 90 tablet, Rfl: 1  •  metFORMIN (GLUCOPHAGE) 1000 MG tablet, TAKE ONE TABLET BY MOUTH TWO TIMES A DAY WITH MEALS, Disp: 180 tablet, Rfl: 1  •  multivitamin (THERAGRAN) TABS, Take 1 tablet by mouth daily, Disp: , Rfl:   • omeprazole (PriLOSEC) 40 MG capsule, Take 1 capsule (40 mg total) by mouth daily as needed (acid reflux), Disp: 90 capsule, Rfl: 1  •  OneTouch Ultra test strip, TEST BLOOD SUGAR TWO TIMES A DAY, Disp: 100 strip, Rfl: 0  •  sitaGLIPtin (JANUVIA) 25 mg tablet, Take one tablet by mouth once a day, Disp: 90 tablet, Rfl: 1  •  spironolactone (ALDACTONE) 25 mg tablet, Take 1 tablet (25 mg total) by mouth daily, Disp: 90 tablet, Rfl: 1  •  tamsulosin (FLOMAX) 0 4 mg, Take 1 capsule (0 4 mg total) by mouth daily with dinner, Disp: 90 capsule, Rfl: 2  •  TRUEPLUS LANCETS 28G MISC, Test 1x/d, E11 29, Disp: , Rfl: 0  •  Infant Foods (Follow-Up) POWD, HANDICAP PLACARD, medically recommended, <taxonomy [id number]> due to arthritic/orthopedic condition (#2,#5) (Patient not taking: Reported on 9/19/2022), Disp: 99 g, Rfl: 1  •  Probiotic Product (CULTURELLE PROBIOTICS) CHEW, Chew daily (Patient not taking: Reported on 5/17/2023), Disp: , Rfl:   No Known Allergies      Review of Systems    Review of Systems   Constitutional: Positive for activity change and fatigue (Moderate)  HENT: Negative  Eyes:        Wears glasses   Respiratory: Negative  Cardiovascular: Negative  Gastrointestinal: Positive for diarrhea (Occasional )  Endocrine: Negative  Genitourinary: Positive for decreased urine volume and frequency  Negative for difficulty urinating, dysuria, hematuria and urgency  Musculoskeletal: Negative  Skin: Negative  Allergic/Immunologic: Negative  Neurological: Positive for weakness (Upper and lower extremities) and headaches (Occasional ,mild, relieved by tylenol)  Hematological: Bruises/bleeds easily  Psychiatric/Behavioral: Negative           Clinical Trial: no     IPSS Questionnaire (AUA-7): Over the past month…     1)  How often have you had a sensation of not emptying your bladder completely after you finish urinating?   0 - Not at all   2)  How often have you had to urinate again less than two "hours after you finished urinating? 3 - About half the time   3)  How often have you found you stopped and started again several times when you urinated? 0 - Not at all   4) How difficult have you found it to postpone urination? 0 - Not at all   5) How often have you had a weak urinary stream?  5 - Almost always   6) How often have you had to push or strain to begin urination? 0 - Not at all   7) How many times did you most typically get up to urinate from the time you went to bed until the time you got up in the morning? 5 - 5+ times   Total Score:  13               OBJECTIVE:   /70 (BP Location: Left arm, Patient Position: Sitting, Cuff Size: Standard)   Pulse 83   Temp (!) 97 4 °F (36 3 °C) (Temporal)   Resp 18   Ht 5' 11\" (1 803 m)   Wt 74 8 kg (165 lb)   SpO2 99%   BMI 23 01 kg/m²   Pain Assessment:  0  ECOG/Zubrod/WHO: 1 - Symptomatic but completely ambulatory    Physical Exam   Well appearing  NAD  No increased work of breathing  Extremities warm and well perfused  SEB deferred at today's visit         RESULTS    Lab Results:   Recent Results (from the past 672 hour(s))   Microalbumin / creatinine urine ratio    Collection Time: 05/12/23  9:14 AM   Result Value Ref Range    Creatinine, Ur 15 5 mg/dL    Albumin,U,Random 5 3 0 0 - 20 0 mg/L    Albumin Creat Ratio 34 (H) 0 - 30 mg/g creatinine   CBC    Collection Time: 05/12/23  9:14 AM   Result Value Ref Range    WBC 7 26 4 31 - 10 16 Thousand/uL    RBC 3 32 (L) 3 88 - 5 62 Million/uL    Hemoglobin 9 7 (L) 12 0 - 17 0 g/dL    Hematocrit 29 6 (L) 36 5 - 49 3 %    MCV 89 82 - 98 fL    MCH 29 2 26 8 - 34 3 pg    MCHC 32 8 31 4 - 37 4 g/dL    RDW 14 8 11 6 - 15 1 %    Platelets 805 660 - 857 Thousands/uL    MPV 10 4 8 9 - 12 7 fL   Comprehensive metabolic panel    Collection Time: 05/12/23  9:14 AM   Result Value Ref Range    Sodium 134 (L) 135 - 147 mmol/L    Potassium 4 8 3 5 - 5 3 mmol/L    Chloride 104 96 - 108 mmol/L    CO2 25 21 - 32 " mmol/L    ANION GAP 5 4 - 13 mmol/L    BUN 19 5 - 25 mg/dL    Creatinine 1 06 0 60 - 1 30 mg/dL    Glucose, Fasting 147 (H) 65 - 99 mg/dL    Calcium 9 1 8 3 - 10 1 mg/dL    Corrected Calcium 9 7 8 3 - 10 1 mg/dL    AST 13 5 - 45 U/L    ALT 19 12 - 78 U/L    Alkaline Phosphatase 107 46 - 116 U/L    Total Protein 6 9 6 4 - 8 4 g/dL    Albumin 3 3 (L) 3 5 - 5 0 g/dL    Total Bilirubin 0 56 0 20 - 1 00 mg/dL    eGFR 66 ml/min/1 73sq m   Uric acid    Collection Time: 05/12/23  9:14 AM   Result Value Ref Range    Uric Acid 4 9 3 5 - 8 5 mg/dL   Iron Saturation %    Collection Time: 05/12/23  9:14 AM   Result Value Ref Range    Iron Saturation 14 (L) 20 - 50 %    TIBC 275 250 - 450 ug/dL    Iron 39 (L) 65 - 175 ug/dL   Ferritin    Collection Time: 05/12/23  9:14 AM   Result Value Ref Range    Ferritin 60 8 - 388 ng/mL   Phosphorus    Collection Time: 05/12/23  9:14 AM   Result Value Ref Range    Phosphorus 3 0 2 3 - 4 1 mg/dL       Imaging Studies:XR femur 2 vw left    Result Date: 5/6/2023  Narrative: LEFT FEMUR INDICATION:   [id number]: Pain in left thigh  COMPARISON: Left hip radiograph 11/8/2010 VIEWS:  XR FEMUR 2 VW LEFT FINDINGS: There is no acute fracture or dislocation  Degenerative changes of the hip and knee are seen  No lytic or blastic osseous lesion  There is a vascular stent present  Impression: No acute osseous abnormality  Degenerative changes as described  Workstation performed: [id number]           Assessment/Plan:  Orders Placed This Encounter   Procedures   • PSA Total, Diagnostic   • Testosterone      Approximately 1 year 8 months following completion of definitive RT the patient is doing well overall and remains clinically and biochemically without evidence of disease recurrence  He will follow-up with urology in 7/2023 to discuss continuation vs cessation of ADT  As he has had > 2 years of ADT at this point I would feel comfortable discontinuing Lupron to see how his PSA trends   We reviewed his "ongoing LUTS/obstructive symptoms, I have also asked him to discuss whether he might be a candidate for TURP or additional medical therapy beyond tamsulosin  I will plan to see him back in 1 year with repeat PSA and testosterone at that time  Jenifer Gregg MD  5/19/2023,12:21 PM    Portions of the record may have been created with voice recognition software   Occasional wrong word or \"sound a like\" substitutions may have occurred due to the inherent limitations of voice recognition software   Read the chart carefully and recognize, using context, where substitutions have occurred        "

## 2023-05-19 NOTE — PROGRESS NOTES
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, stage IIIC  T3 B N0 M0 with a PSA of 8 6  He completed a course of definitive radiation therapy with neoadjuvant and concurrent androgen deprivation on 21  Last follow-up visit on 22 Urology follow-up  Lupron administered today  PSA is < 0 1  Follow-up in 6 months  Component PSA, Total   Latest Ref Rng & Units 0 0 - 4 0 ng/mL   2018 2 0   12/3/2019 4 0   2/10/2020 4 0   2020 6 2 (H)   12/10/2020 8 0 (H)   2021 0 4   2021 0 2   2021 0 3   10/13/2021 <0 1   2022 <0 1   2022 <0 1   2022 <0 1   2023 <0 1       23 Urology  PSA remains undetectable < 0 1  Recommend 2-3 years total duration of ADT  He started ADT in 2021  Lupron administered today  Follow-up in 6 months with PSA prior  Upcomin23 Urology follow-up, Lupron and PSA         Oncology History   Prostate cancer ([facility] )   2021 Initial Diagnosis    Prostate cancer ([facility] )     2021 Biopsy    A  Prostate, Right Lateral Base, Biopsy:  - Benign prostatic tissue  B  Prostate, Right Base, Biopsy:  - Benign prostatic tissue  C  Prostate, Right Lateral Mid, Biopsy:  - Benign prostatic tissue  D  Prostate, Right Mid, Biopsy:  - Benign prostatic tissue  E  Prostate, Right Lateral [place], Biopsy:  - Benign prostatic tissue  F  Prostate, Right [place], Biopsy:  - Benign prostatic tissue       G  Prostate, Left Lateral Base, Biopsy:  - Prostatic adenocarcinoma, Mikki Score 5 + 3 = 8, Grade Group 4      - Percentage Mikki pattern 5: 95%     - Total number of cores identified: 1     - Total number of cores with carcinoma: 1     - Percentage of tissue with carcinoma: 92%     - Linear amount of tissue with carcinoma: 11 mm total     H  Prostate, Left Base, Biopsy:  - Prostatic adenocarcinoma, [place] Score 5 + 4 = 9, Grade Group 5      - Percentage Mikki pattern 5: 95%     - Total number of cores identified: 1     - Total number of cores with carcinoma: 1     - Percentage of tissue with carcinoma: 92%     - Linear amount of tissue with carcinoma: 11 mm total     I  Prostate, Left Lateral Mid, Biopsy:  - Prostatic adenocarcinoma, Mikki Score 5 + 5 = 10, Grade Group 5      - Total number of cores identified: 1     - Total number of cores with carcinoma: 1     - Percentage of tissue with carcinoma: 100%     - Linear amount of tissue with carcinoma: 15 mm total  - Focus of suspected intraductal carcinoma  J  Prostate, Left Mid, Biopsy:  - Prostatic adenocarcinoma, [place] Score 5 + 5 = 10, Grade Group 5      - Total number of cores identified: 1     - Total number of cores with carcinoma: 1     - Percentage of tissue with carcinoma: 93%     - Linear amount of tissue with carcinoma: 14 mm total     K  Prostate, Left Lateral [place], Biopsy:  - Prostatic adenocarcinoma, Mikki Score 5 + 4 = 9, Grade Group 5      - Percentage [place] pattern 5: 70%     - Total number of cores identified: 1     - Total number of cores with carcinoma: 1     - Percentage of tissue with carcinoma: 100%     - Linear amount of tissue with carcinoma: 10 mm total     L  Prostate, Left [place], Biopsy:  - Prostatic adenocarcinoma, [place] Score 5 + 4 = 9, Grade Group 5      - Percentage [place] pattern 5: 95%     - Total number of cores identified: 1     - Total number of cores with carcinoma: 1     - Percentage of tissue with carcinoma: 94%     - Linear amount of tissue with carcinoma: 15 mm total     3/17/2021 -  Hormone Therapy    Firmagon 240 mg SQ injection - started 3/17/21  Plan for 2-3 years of therapy       3/29/2021 -  Cancer Staged    Staging form: Prostate, AJCC 8th Edition  - Clinical: Stage IIIC (cT3b, cN0, cM0, PSA: 8 6, Grade Group: 5) - Signed by Trip Almonte MD on 3/29/2021  Prostate specific antigen (PSA) range: Less than 10  Histologic grading system: 5 grade system       7/8/2021 - 9/14/2021 Radiation    Course: C1    Plan ID Energy Fractions Dose per Fraction (cGy) Dose Correction (cGy) Total Dose Delivered (cGy) Elapsed Days   Prost SV 10X 19 / 19 180 0 3,420 29   Whole Pelvis 10X 25 / 25 180 0 4,500 36      Treatment Dates:  7/8/2021 - 9/14/2021  Review of Systems:  Review of Systems   Constitutional: Positive for activity change and fatigue (Moderate)  HENT: Negative  Eyes:        Wears glasses   Respiratory: Negative  Cardiovascular: Negative  Gastrointestinal: Positive for diarrhea (Occasional )  Endocrine: Negative  Genitourinary: Positive for decreased urine volume and frequency  Negative for difficulty urinating, dysuria, hematuria and urgency  Musculoskeletal: Negative  Skin: Negative  Allergic/Immunologic: Negative  Neurological: Positive for weakness (Upper and lower extremities) and headaches (Occasional ,mild, relieved by tylenol)  Hematological: Bruises/bleeds easily  Psychiatric/Behavioral: Negative  Clinical Trial: no    IPSS Questionnaire (AUA-7): Over the past month…    1)  How often have you had a sensation of not emptying your bladder completely after you finish urinating? 0 - Not at all   2)  How often have you had to urinate again less than two hours after you finished urinating? 3 - About half the time   3)  How often have you found you stopped and started again several times when you urinated? 0 - Not at all   4) How difficult have you found it to postpone urination? 0 - Not at all   5) How often have you had a weak urinary stream?  5 - Almost always   6) How often have you had to push or strain to begin urination? 0 - Not at all   7) How many times did you most typically get up to urinate from the time you went to bed until the time you got up in the morning?   5 - 5+ times   Total Score:  13           Health Maintenance   Topic Date Due   • Hepatitis C Screening  Never done   • HEMOGLOBIN A1C 01/05/2023   • Medicare Annual Wellness Visit (AWV)  06/21/2023   • Colorectal Cancer Screening  09/28/2035 (Originally 11/16/1990)   • Influenza Vaccine (Season Ended) 09/01/2023   • Diabetic Foot Exam  12/21/2023   • DM Eye Exam  03/30/2024   • Fall Risk  05/17/2024   • BMI: Adult  05/17/2024   • Depression Screening  05/19/2024   • Pneumococcal Vaccine: 65+ Years  Completed   • COVID-19 Vaccine  Completed   • HIB Vaccine  Aged Out   • IPV Vaccine  Aged Out   • Hepatitis A Vaccine  Aged Out   • Meningococcal ACWY Vaccine  Aged Out   • HPV Vaccine  Aged Out     Patient Active Problem List   Diagnosis   • Coronary artery disease involving native coronary artery of native heart with angina pectoris ([facility] )   • 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 mellitus ([facility] )   • Diabetic neuropathy ([facility] )   • GE reflux   • Lumbar radiculopathy   • Type 2 diabetes mellitus with diabetic neuropathy ([facility] )   • Prostate cancer screening   • Dyslipidemia   • Medicare annual wellness visit, subsequent   • Type 2 diabetes mellitus with hyperglycemia, without long-term current use of insulin ([facility] )   • Benign essential hypertension   • Bilateral leg weakness   • Prostate cancer (HCC)   • Insomnia, persistent   • Orthostatic hypotension   • Chronic right-sided low back pain without sciatica   • Hypokalemia   • Acute on chronic combined systolic and diastolic congestive heart failure (HCC)   • Heart failure, left, 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)   • Chronic combined systolic and diastolic heart failure, NYHA class 2 (HCC)   • Edema of both feet   • Black stools   • Diarrhea   • Gait disorder   • Acute pain of left thigh   • Peripheral arterial disease (HCC)   • Iron deficiency anemia     Past Medical History:   Diagnosis Date   • Acquired hallux valgus     last assessed: 8/1/2013   • Allergic rhinitis    • Anemia 10/26/2010   • Atrophy of nail     last assessed: 7/7/2015   • Chronic kidney disease     chronic renal insufficiency   • Coronary artery disease • Deformity of ankle and foot, acquired     last assessed: 2/22/2016   • Difficulty walking     last assessed: 12/14/2015   • Dysesthesia     last assessed: 11/25/2016   • Hammer toe     unspecified laterality; last assessed: 8/1/2013   • Hypertension    • Onychomycosis of toenail     last assessed: 2/22/2016   • Peripheral vascular disease [facility] )     left SFA stent in 2010   • Pes planus     unspecified laterality; last assessed: 8/1/2013   • Pneumonia     last assessed: 2/27/2016   • Prostate cancer [facility])    • 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     last assessed: 8/15/2016   • Type 2 diabetes mellitus ([facility] ) 07/26/2010   • Viral warts     last assessed: 7/24/2015     Past Surgical History:   Procedure Laterality Date   • CARDIAC CATHETERIZATION  07/29/2010   • CATARACT EXTRACTION, BILATERAL Bilateral     R 1999, L 2008   • COLONOSCOPY  2011   • FEMORAL ARTERY - POPLITEAL ARTERY BYPASS GRAFT  09/23/2013   • FOOT SURGERY      bone spur removed   • LEG SURGERY Bilateral     repair; L 8/20/2010 and 7/26/2012   • TN PLMT INTERSTITIAL DEV RADIAT TX PROSTATE 1/MULT N/A 6/22/2021    Procedure: INSERTION OF FIDUCIAL MARKER (TRANSRECTAL ULTRASOUND GUIDANCE), SPACEOAR;  Surgeon: Sudhir Knapp MD;  Location: [facility];  Service: Urology   • ROTATOR CUFF REPAIR Left 2009   • SHOULDER SURGERY Right 2005    collar bone     Family History   Problem Relation Age of Onset   • Heart disease Father    • Heart attack Father         acute myocardial infarction   • Heart disease Sister    • Heart attack Sister         acute myocardial infarction   • Heart disease Paternal Grandfather    • Aortic aneurysm Mother    • Throat cancer Maternal Grandfather      Social History     Socioeconomic History   • Marital status: /Civil Union     Spouse name: Not on file   • Number of children: Not on file   • Years of education: Not on file   • Highest education level: Not on file   Occupational History   • Occupation: retired from  work   Tobacco Use   • Smoking status: Never   • Smokeless tobacco: Never   Vaping Use   • Vaping Use: Never used   Substance and Sexual Activity   • Alcohol use: Not Currently     Comment: being a social drinker/rare   • Drug use: No   • Sexual activity: Not on file   Other Topics Concern   • Not on file   Social History Narrative   • Not on file     Social Determinants of Health     Financial Resource Strain: Not on file   Food Insecurity: Not on file   Transportation Needs: Not on file   Physical Activity: Not on file   Stress: Not on file   Social Connections: Not on file   Intimate Partner Violence: Not on file   Housing Stability: Not on file       Current Outpatient Medications:   •  acetaminophen (TYLENOL) 325 mg tablet, Take 2 tablets (650 mg total) by mouth every 6 (six) hours as needed for mild pain, headaches or fever, Disp: , Rfl: 0  •  Ascorbic Acid (Vitamin C) 500 MG CAPS, Take 500 mg by mouth daily, Disp: , Rfl:   •  aspirin (ECOTRIN LOW STRENGTH) 81 mg EC tablet, Take 81 mg by mouth daily, Disp: , Rfl:   •  atorvastatin (LIPITOR) 20 mg tablet, TAKE 1 TABLET EVERY DAY, Disp: 90 tablet, Rfl: 1  •  brimonidine tartrate 0 2 % ophthalmic solution, Inject 0 2 % into the eye 2 (two) times a day, Disp: , Rfl:   •  carvedilol (COREG) 12 5 mg tablet, Take 1 tablet (12 5 mg total) by mouth 2 (two) times a day, Disp: 180 tablet, Rfl: 1  •  Cholecalciferol (Vitamin D3) 50 MCG (2000 UT) TABS, Take 2,000 Units by mouth daily, Disp: , Rfl:   •  co-enzyme Q-10 100 mg capsule, Take 100 mg by mouth daily, Disp: , Rfl:   •  losartan (COZAAR) 25 mg tablet, Take 1 tablet (25 mg total) by mouth daily, Disp: 90 tablet, Rfl: 1  •  metFORMIN (GLUCOPHAGE) 1000 MG tablet, TAKE ONE TABLET BY MOUTH TWO TIMES A DAY WITH MEALS, Disp: 180 tablet, Rfl: 1  •  multivitamin (THERAGRAN) TABS, Take 1 tablet by mouth daily, Disp: , Rfl:   •  omeprazole (PriLOSEC) 40 MG capsule, Take 1 capsule (40 mg total) by mouth daily as "needed (acid reflux), Disp: 90 capsule, Rfl: 1  •  OneTouch Ultra test strip, TEST BLOOD SUGAR TWO TIMES A DAY, Disp: 100 strip, Rfl: 0  •  sitaGLIPtin (JANUVIA) 25 mg tablet, Take one tablet by mouth once a day, Disp: 90 tablet, Rfl: 1  •  spironolactone (ALDACTONE) 25 mg tablet, Take 1 tablet (25 mg total) by mouth daily, Disp: 90 tablet, Rfl: 1  •  tamsulosin (FLOMAX) 0 4 mg, Take 1 capsule (0 4 mg total) by mouth daily with dinner, Disp: 90 capsule, Rfl: 2  •  TRUEPLUS LANCETS 28G MISC, Test 1x/d, E11 29, Disp: , Rfl: 0  •  Infant Foods (Follow-Up) POWD, HANDICAP PLACARD, medically recommended, <taxonomy [id number]> due to arthritic/orthopedic condition (#2,#5) (Patient not taking: Reported on 9/19/2022), Disp: 99 g, Rfl: 1  •  Probiotic Product (CULTURELLE PROBIOTICS) CHEW, Chew daily (Patient not taking: Reported on 5/17/2023), Disp: , Rfl:   No Known Allergies  Vitals:    05/19/23 0832   BP: 122/70   BP Location: Left arm   Patient Position: Sitting   Cuff Size: Standard   Pulse: 83   Resp: 18   Temp: (!) 97 4 °F (36 3 °C)   TempSrc: Temporal   SpO2: 99%   Weight: 74 8 kg (165 lb)   Height: 5' 11\" (1 803 m)      Pain Score: 0-No pain  "

## 2023-05-22 ENCOUNTER — TELEPHONE (OUTPATIENT)
Dept: NEPHROLOGY | Facility: CLINIC | Age: 78
End: 2023-05-22

## 2023-05-22 DIAGNOSIS — E87.6 HYPOKALEMIA: Primary | ICD-10-CM

## 2023-05-22 DIAGNOSIS — I10 BENIGN ESSENTIAL HYPERTENSION: ICD-10-CM

## 2023-05-22 DIAGNOSIS — N18.2 CKD STAGE 2 DUE TO TYPE 2 DIABETES MELLITUS (HCC): ICD-10-CM

## 2023-05-22 DIAGNOSIS — E11.22 CKD STAGE 2 DUE TO TYPE 2 DIABETES MELLITUS (HCC): ICD-10-CM

## 2023-05-22 NOTE — TELEPHONE ENCOUNTER
----- Message from Palak Jo DO sent at 5/19/2023  1:08 PM EDT -----  Attempted to call patient  No answer  Would recommend starting oral iron therapy as his iron stores appear low and his blood count is slightly lower  Recommend starting ferrous sulfate 325 daily  Monitor for constipation  Recommend repeating CBC   prior to his appointment in October with another renal function panel  Thank you

## 2023-05-22 NOTE — TELEPHONE ENCOUNTER
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

## 2023-05-23 NOTE — TELEPHONE ENCOUNTER
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

## 2023-05-24 DIAGNOSIS — N18.9 ANEMIA DUE TO CHRONIC KIDNEY DISEASE, UNSPECIFIED CKD STAGE: Primary | ICD-10-CM

## 2023-05-24 DIAGNOSIS — D63.1 ANEMIA DUE TO CHRONIC KIDNEY DISEASE, UNSPECIFIED CKD STAGE: Primary | ICD-10-CM

## 2023-05-24 RX ORDER — FERROUS SULFATE TAB EC 324 MG (65 MG FE EQUIVALENT) 324 (65 FE) MG
324 TABLET DELAYED RESPONSE ORAL
Qty: 90 TABLET | Refills: 3 | Status: SHIPPED | OUTPATIENT
Start: 2023-05-24

## 2023-05-24 NOTE — TELEPHONE ENCOUNTER
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

## 2023-05-24 NOTE — TELEPHONE ENCOUNTER
Patient called back the office he will like a prescription send over to [address] [facility]  recommend starting oral iron therapy as his iron stores appear low and his blood count is slightly lower  Recommend starting ferrous sulfate 325 daily and monitor for constipation  He is also recommending repeating CBC and RFP prior to his appointment in October

## 2023-06-14 ENCOUNTER — RA CDI HCC (OUTPATIENT)
Dept: OTHER | Facility: HOSPITAL | Age: 78
End: 2023-06-14

## 2023-06-14 DIAGNOSIS — E11.40 TYPE 2 DIABETES MELLITUS WITH DIABETIC NEUROPATHY, WITHOUT LONG-TERM CURRENT USE OF INSULIN (HCC): ICD-10-CM

## 2023-06-14 DIAGNOSIS — N18.2 CKD STAGE 2 DUE TO TYPE 2 DIABETES MELLITUS (HCC): ICD-10-CM

## 2023-06-14 DIAGNOSIS — E11.65 TYPE 2 DIABETES MELLITUS WITH HYPERGLYCEMIA, WITHOUT LONG-TERM CURRENT USE OF INSULIN (HCC): ICD-10-CM

## 2023-06-14 DIAGNOSIS — E11.22 CKD STAGE 2 DUE TO TYPE 2 DIABETES MELLITUS (HCC): ICD-10-CM

## 2023-06-14 DIAGNOSIS — E11.65 TYPE 2 DIABETES MELLITUS WITH HYPERGLYCEMIA, WITHOUT LONG-TERM CURRENT USE OF INSULIN (HCC): Primary | ICD-10-CM

## 2023-06-14 RX ORDER — BLOOD SUGAR DIAGNOSTIC
STRIP MISCELLANEOUS
Qty: 100 STRIP | Refills: 3 | Status: SHIPPED | OUTPATIENT
Start: 2023-06-14

## 2023-06-14 NOTE — PROGRESS NOTES
E11 51  [facility]  coding opportunities          Chart Reviewed number of suggestions sent to Provider: 1     Patients Insurance     Medicare Insurance: [employer]

## 2023-06-15 ENCOUNTER — APPOINTMENT (OUTPATIENT)
Dept: LAB | Facility: CLINIC | Age: 78
End: 2023-06-15
Payer: MEDICARE

## 2023-06-15 DIAGNOSIS — E78.5 DYSLIPIDEMIA: ICD-10-CM

## 2023-06-15 DIAGNOSIS — E11.40 TYPE 2 DIABETES MELLITUS WITH DIABETIC NEUROPATHY, WITHOUT LONG-TERM CURRENT USE OF INSULIN (HCC): ICD-10-CM

## 2023-06-15 DIAGNOSIS — I25.10 CORONARY ARTERY DISEASE INVOLVING NATIVE CORONARY ARTERY OF NATIVE HEART WITHOUT ANGINA PECTORIS: ICD-10-CM

## 2023-06-15 LAB
ALBUMIN SERPL BCP-MCNC: 3.7 G/DL (ref 3.5–5)
ALP SERPL-CCNC: 93 U/L (ref 46–116)
ALT SERPL W P-5'-P-CCNC: 23 U/L (ref 12–78)
ANION GAP SERPL CALCULATED.3IONS-SCNC: 3 MMOL/L (ref 4–13)
AST SERPL W P-5'-P-CCNC: 17 U/L (ref 5–45)
BILIRUB SERPL-MCNC: 0.53 MG/DL (ref 0.2–1)
BUN SERPL-MCNC: 18 MG/DL (ref 5–25)
CALCIUM SERPL-MCNC: 9.6 MG/DL (ref 8.3–10.1)
CHLORIDE SERPL-SCNC: 104 MMOL/L (ref 96–108)
CHOLEST SERPL-MCNC: 95 MG/DL
CO2 SERPL-SCNC: 25 MMOL/L (ref 21–32)
CREAT SERPL-MCNC: 1.07 MG/DL (ref 0.6–1.3)
GFR SERPL CREATININE-BSD FRML MDRD: 66 ML/MIN/1.73SQ M
GLUCOSE P FAST SERPL-MCNC: 137 MG/DL (ref 65–99)
HDLC SERPL-MCNC: 40 MG/DL
LDLC SERPL CALC-MCNC: 44 MG/DL (ref 0–100)
POTASSIUM SERPL-SCNC: 4.8 MMOL/L (ref 3.5–5.3)
PROT SERPL-MCNC: 7.3 G/DL (ref 6.4–8.4)
SODIUM SERPL-SCNC: 132 MMOL/L (ref 135–147)
TRIGL SERPL-MCNC: 54 MG/DL

## 2023-06-15 PROCEDURE — 80053 COMPREHEN METABOLIC PANEL: CPT

## 2023-06-15 PROCEDURE — 36415 COLL VENOUS BLD VENIPUNCTURE: CPT

## 2023-06-15 PROCEDURE — 80061 LIPID PANEL: CPT

## 2023-06-19 ENCOUNTER — OFFICE VISIT (OUTPATIENT)
Dept: ENDOCRINOLOGY | Facility: CLINIC | Age: 78
End: 2023-06-19
Payer: MEDICARE

## 2023-06-19 VITALS
DIASTOLIC BLOOD PRESSURE: 60 MMHG | WEIGHT: 171 LBS | HEIGHT: 71 IN | SYSTOLIC BLOOD PRESSURE: 148 MMHG | HEART RATE: 76 BPM | OXYGEN SATURATION: 98 % | BODY MASS INDEX: 23.94 KG/M2

## 2023-06-19 DIAGNOSIS — I10 BENIGN ESSENTIAL HYPERTENSION: ICD-10-CM

## 2023-06-19 DIAGNOSIS — I50.1 HEART FAILURE, LEFT, WITH LVEF <=30% (HCC): ICD-10-CM

## 2023-06-19 DIAGNOSIS — E11.40 TYPE 2 DIABETES MELLITUS WITH DIABETIC NEUROPATHY, WITHOUT LONG-TERM CURRENT USE OF INSULIN (HCC): Primary | ICD-10-CM

## 2023-06-19 DIAGNOSIS — E11.22 CKD STAGE 2 DUE TO TYPE 2 DIABETES MELLITUS (HCC): ICD-10-CM

## 2023-06-19 DIAGNOSIS — N18.2 CKD STAGE 2 DUE TO TYPE 2 DIABETES MELLITUS (HCC): ICD-10-CM

## 2023-06-19 DIAGNOSIS — R19.7 DIARRHEA, UNSPECIFIED TYPE: ICD-10-CM

## 2023-06-19 DIAGNOSIS — E78.5 DYSLIPIDEMIA: ICD-10-CM

## 2023-06-19 DIAGNOSIS — I25.10 CORONARY ARTERY DISEASE INVOLVING NATIVE CORONARY ARTERY OF NATIVE HEART WITHOUT ANGINA PECTORIS: ICD-10-CM

## 2023-06-19 LAB — SL AMB POCT HEMOGLOBIN AIC: 6.9 (ref ?–6.5)

## 2023-06-19 PROCEDURE — 99214 OFFICE O/P EST MOD 30 MIN: CPT | Performed by: INTERNAL MEDICINE

## 2023-06-19 PROCEDURE — 83036 HEMOGLOBIN GLYCOSYLATED A1C: CPT | Performed by: INTERNAL MEDICINE

## 2023-06-19 NOTE — PATIENT INSTRUCTIONS
Continue current medications  Please stagger check blood sugars, okay to check 1-2 times a day before meals and at bedtime  Follow-up in 3 months with repeat labs

## 2023-06-19 NOTE — PROGRESS NOTES
Briana Mujica 65 y o  male MRN: [id number]    Encounter: [id number]      Assessment/Plan     1  Type 2 diabetes mellitus not on long-term insulin therapy  2  Neuropathy  3  CKD stage II  4  Diarrhea   POCT A1c 6 9%, stable, well controlled     Recommend the following at this time  Continue current medications  Discussed potentially switching to metformin XR  Since patient has started iron supplementation, will wait to see if there is any change in bowel movements prior to switching   -Stagger check blood sugars before meals and at bedtime  Follow-up in 3 months with repeat lab    5  Hyperlipidemia  - continue statin therapy    6  Hypertension  #7 history of CAD, CHF  Blood pressure close to goal  - continue medications, follow-up with cardiology    8  History of prostate cancer  9  Iron deficiency, on supplementation    CC: Diabetes    History of Present Illness     HPI:  Briana Mujica is a 65 y o  male presents for a follow-up visit regarding diabetes management  Also has history of hypertension, neuropathy, hyperlipidemia, CKD, CAD    Last seen in 12/2022  Last Eye exam: April - May 2023; says he will be following up next [facility], Dr Brigido Colindres     Current regimen:   Januvia 25 mg orally daily  Metformin 1000 mg orally twice a day    Has had diarrhea/ loose stools for a long time   Started iron supplementation 1 week ago   Feels well, does not have any other complaints  Follows up regularly with podiatry    Statin: Lipitor 20  ACE-I/ARB: Losartan    Home glucose monitoring: On recall fasting   Before breakfast:  140-150   Symptoms of hypoglycemia :  No lows       All other systems were reviewed and are negative      Review of Systems      Historical Information   Past Medical History:   Diagnosis Date   • Acquired hallux valgus     last assessed: 8/1/2013   • Allergic rhinitis    • Anemia 10/26/2010   • Atrophy of nail     last assessed: 7/7/2015   • Chronic kidney disease     chronic renal insufficiency   • Coronary artery disease    • Deformity of ankle and foot, acquired     last assessed: 2/22/2016   • Difficulty walking     last assessed: 12/14/2015   • Dysesthesia     last assessed: 11/25/2016   • Hammer toe     unspecified laterality; last assessed: 8/1/2013   • Hypertension    • Onychomycosis of toenail     last assessed: 2/22/2016   • Peripheral vascular disease ([facility] [address] )     left SFA stent in 2010   • Pes planus     unspecified laterality; last assessed: 8/1/2013   • Pneumonia     last assessed: 2/27/2016   • Prostate cancer 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     last assessed: 8/15/2016   • Type 2 diabetes mellitus ([facility] [address] ) 07/26/2010   • Viral warts     last assessed: 7/24/2015     Past Surgical History:   Procedure Laterality Date   • CARDIAC CATHETERIZATION  07/29/2010   • CATARACT EXTRACTION, BILATERAL Bilateral     R 1999, L 2008   • COLONOSCOPY  2011   • FEMORAL ARTERY - POPLITEAL ARTERY BYPASS GRAFT  09/23/2013   • FOOT SURGERY      bone spur removed   • LEG SURGERY Bilateral     repair; L 8/20/2010 and 7/26/2012   • RI PLMT INTERSTITIAL DEV RADIAT TX PROSTATE 1/MULT N/A 6/22/2021    Procedure: INSERTION OF FIDUCIAL MARKER (TRANSRECTAL ULTRASOUND GUIDANCE), SPACEOAR;  Surgeon: Kesha Garcia MD;  Location: [facility];  Service: Urology   • ROTATOR CUFF REPAIR Left 2009   • SHOULDER SURGERY Right 2005    collar bone     Social History   Social History     Substance and Sexual Activity   Alcohol Use Not Currently    Comment: being a social drinker/rare     Social History     Substance and Sexual Activity   Drug Use No     Social History     Tobacco Use   Smoking Status Never   Smokeless Tobacco Never     Family History:   Family History   Problem Relation Age of Onset   • Heart disease Father    • Heart attack Father         acute myocardial infarction   • Heart disease Sister    • Heart attack Sister         acute myocardial infarction   • Heart disease Paternal Grandfather    • Aortic aneurysm Mother    • Throat cancer Maternal Grandfather        Meds/Allergies   Current Outpatient Medications   Medication Sig Dispense Refill   • acetaminophen (TYLENOL) 325 mg tablet Take 2 tablets (650 mg total) by mouth every 6 (six) hours as needed for mild pain, headaches or fever  0   • Ascorbic Acid (Vitamin C) 500 MG CAPS Take 500 mg by mouth daily     • aspirin (ECOTRIN LOW STRENGTH) 81 mg EC tablet Take 81 mg by mouth daily     • atorvastatin (LIPITOR) 20 mg tablet TAKE 1 TABLET EVERY DAY 90 tablet 1   • brimonidine tartrate 0 2 % ophthalmic solution Inject 0 2 % into the eye 2 (two) times a day     • carvedilol (COREG) 12 5 mg tablet Take 1 tablet (12 5 mg total) by mouth 2 (two) times a day 180 tablet 1   • Cholecalciferol (Vitamin D3) 50 MCG (2000 UT) TABS Take 2,000 Units by mouth daily     • co-enzyme Q-10 100 mg capsule Take 100 mg by mouth daily     • ferrous sulfate 324 (65 Fe) mg Take 1 tablet (324 mg total) by mouth daily before breakfast 90 tablet 3   • glucose blood (OneTouch Ultra) test strip Test blood sugar once a day 100 strip 3   • Infant Foods (Follow-Up) POWD HANDICAP PLACARD, medically recommended, <taxonomy [id number]> due to arthritic/orthopedic condition (#2,#5) (Patient not taking: Reported on 9/19/2022) 99 g 1   • losartan (COZAAR) 25 mg tablet Take 1 tablet (25 mg total) by mouth daily 90 tablet 1   • metFORMIN (GLUCOPHAGE) 1000 MG tablet TAKE ONE TABLET BY MOUTH TWO TIMES A DAY WITH MEALS 180 tablet 1   • multivitamin (THERAGRAN) TABS Take 1 tablet by mouth daily     • omeprazole (PriLOSEC) 40 MG capsule Take 1 capsule (40 mg total) by mouth daily as needed (acid reflux) 90 capsule 1   • Probiotic Product (CULTURELLE PROBIOTICS) CHEW Chew daily (Patient not taking: Reported on 5/17/2023)     • sitaGLIPtin (JANUVIA) 25 mg tablet Take one tablet by mouth once a day 90 tablet 3   • spironolactone (ALDACTONE) 25 mg tablet Take 1 tablet (25 mg total) by mouth "daily 90 tablet 1   • tamsulosin (FLOMAX) 0 4 mg Take 1 capsule (0 4 mg total) by mouth daily with dinner 90 capsule 2   • TRUEPLUS LANCETS 28G MISC Test 1x/d, E11 29  0     No current facility-administered medications for this visit  No Known Allergies    Objective   Vitals: Height 5' 11\" (1 803 m), weight 77 6 kg (171 lb)  Physical Exam  Constitutional:       General: He is not in acute distress  Appearance: He is well-developed  He is not diaphoretic  HENT:      Head: Normocephalic and atraumatic  Eyes:      Conjunctiva/sclera: Conjunctivae normal       Pupils: Pupils are equal, round, and reactive to light  Cardiovascular:      Rate and Rhythm: Normal rate and regular rhythm  Heart sounds: Normal heart sounds  No murmur heard  Pulmonary:      Effort: Pulmonary effort is normal  No respiratory distress  Breath sounds: Normal breath sounds  No wheezing  Abdominal:      General: There is no distension  Palpations: Abdomen is soft  Tenderness: There is no abdominal tenderness  There is no guarding  Musculoskeletal:      Cervical back: Normal range of motion and neck supple  Skin:     General: Skin is warm and dry  Findings: No erythema or rash  Neurological:      Mental Status: He is alert and oriented to person, place, and time  Psychiatric:         Behavior: Behavior normal          Thought Content: Thought content normal          The history was obtained from the review of the chart, patient      Lab Results:   Lab Results   Component Value Date/Time    Hemoglobin A1C 6 9 (H) 07/05/2022 07:35 AM    WBC 7 26 05/12/2023 09:14 AM    WBC 5 72 12/14/2022 07:27 AM    WBC 9 31 09/17/2022 08:26 AM    Hemoglobin 9 7 (L) 05/12/2023 09:14 AM    Hemoglobin 10 7 (L) 12/14/2022 07:27 AM    Hemoglobin 11 4 (L) 09/17/2022 08:26 AM    Hematocrit 29 6 (L) 05/12/2023 09:14 AM    Hematocrit 32 5 (L) 12/14/2022 07:27 AM    Hematocrit 35 0 (L) 09/17/2022 08:26 AM    MCV 89 05/12/2023 " "09:14 AM    MCV 90 12/14/2022 07:27 AM    MCV 88 09/17/2022 08:26 AM    Platelets 830 18/23/8957 09:14 AM    Platelets 463 64/29/1606 07:27 AM    Platelets 561 57/25/3129 08:26 AM    BUN 18 06/15/2023 07:27 AM    BUN 19 05/12/2023 09:14 AM    BUN 20 12/14/2022 07:27 AM    Potassium 4 8 06/15/2023 07:27 AM    Potassium 4 8 05/12/2023 09:14 AM    Potassium 4 6 12/14/2022 07:27 AM    Chloride 104 06/15/2023 07:27 AM    Chloride 104 05/12/2023 09:14 AM    Chloride 104 12/14/2022 07:27 AM    CO2 25 06/15/2023 07:27 AM    CO2 25 05/12/2023 09:14 AM    CO2 28 12/14/2022 07:27 AM    Creatinine 1 07 06/15/2023 07:27 AM    Creatinine 1 06 05/12/2023 09:14 AM    Creatinine 1 04 12/14/2022 07:27 AM    AST 17 06/15/2023 07:27 AM    AST 13 05/12/2023 09:14 AM    AST 21 12/14/2022 07:27 AM    ALT 23 06/15/2023 07:27 AM    ALT 19 05/12/2023 09:14 AM    ALT 31 12/14/2022 07:27 AM    Total Protein 7 3 06/15/2023 07:27 AM    Total Protein 6 9 05/12/2023 09:14 AM    Total Protein 7 0 12/14/2022 07:27 AM    Albumin 3 7 06/15/2023 07:27 AM    Albumin 3 3 (L) 05/12/2023 09:14 AM    Albumin 3 7 12/14/2022 07:27 AM    HDL, Direct 40 06/15/2023 07:27 AM    Triglycerides 54 06/15/2023 07:27 AM         Imaging Studies: I have personally reviewed pertinent reports  Portions of the record may have been created with voice recognition software  Occasional wrong word or \"sound a like\" substitutions may have occurred due to the inherent limitations of voice recognition software  Read the chart carefully and recognize, using context, where substitutions have occurred       "

## 2023-06-20 DIAGNOSIS — I10 BENIGN ESSENTIAL HYPERTENSION: ICD-10-CM

## 2023-06-20 RX ORDER — LOSARTAN POTASSIUM 25 MG/1
25 TABLET ORAL DAILY
Qty: 90 TABLET | Refills: 1 | Status: SHIPPED | OUTPATIENT
Start: 2023-06-20

## 2023-06-21 ENCOUNTER — OFFICE VISIT (OUTPATIENT)
Dept: FAMILY MEDICINE CLINIC | Facility: CLINIC | Age: 78
End: 2023-06-21
Payer: MEDICARE

## 2023-06-21 ENCOUNTER — TELEPHONE (OUTPATIENT)
Dept: UROLOGY | Facility: MEDICAL CENTER | Age: 78
End: 2023-06-21

## 2023-06-21 VITALS
HEART RATE: 82 BPM | DIASTOLIC BLOOD PRESSURE: 50 MMHG | HEIGHT: 69 IN | BODY MASS INDEX: 25.48 KG/M2 | OXYGEN SATURATION: 99 % | SYSTOLIC BLOOD PRESSURE: 130 MMHG | RESPIRATION RATE: 16 BRPM | WEIGHT: 172 LBS | TEMPERATURE: 97.6 F

## 2023-06-21 DIAGNOSIS — I25.119 CORONARY ARTERY DISEASE INVOLVING NATIVE CORONARY ARTERY OF NATIVE HEART WITH ANGINA PECTORIS (HCC): ICD-10-CM

## 2023-06-21 DIAGNOSIS — R26.9 GAIT DISORDER: ICD-10-CM

## 2023-06-21 DIAGNOSIS — K21.9 GASTROESOPHAGEAL REFLUX DISEASE, UNSPECIFIED WHETHER ESOPHAGITIS PRESENT: ICD-10-CM

## 2023-06-21 DIAGNOSIS — N18.2 CKD STAGE 2 DUE TO TYPE 2 DIABETES MELLITUS (HCC): ICD-10-CM

## 2023-06-21 DIAGNOSIS — I10 BENIGN ESSENTIAL HYPERTENSION: ICD-10-CM

## 2023-06-21 DIAGNOSIS — Z00.00 MEDICARE ANNUAL WELLNESS VISIT, SUBSEQUENT: Primary | ICD-10-CM

## 2023-06-21 DIAGNOSIS — E11.22 CKD STAGE 2 DUE TO TYPE 2 DIABETES MELLITUS (HCC): ICD-10-CM

## 2023-06-21 PROBLEM — Z99.89 USES ROLLER WALKER: Status: ACTIVE | Noted: 2023-06-21

## 2023-06-21 PROBLEM — K92.1 BLACK STOOLS: Status: RESOLVED | Noted: 2022-06-10 | Resolved: 2023-06-21

## 2023-06-21 PROCEDURE — G0439 PPPS, SUBSEQ VISIT: HCPCS | Performed by: FAMILY MEDICINE

## 2023-06-21 PROCEDURE — 99214 OFFICE O/P EST MOD 30 MIN: CPT | Performed by: FAMILY MEDICINE

## 2023-06-21 RX ORDER — OMEPRAZOLE 40 MG/1
40 CAPSULE, DELAYED RELEASE ORAL DAILY PRN
Qty: 90 CAPSULE | Refills: 1 | Status: SHIPPED | OUTPATIENT
Start: 2023-06-21

## 2023-06-21 NOTE — TELEPHONE ENCOUNTER
Lupron 45 mg - Medicare A & B/[employer] (verified) - NO auth required  Office stock okay to use  Please make sure signed Bassam Mode is on file    [facility] 6/21/23

## 2023-06-21 NOTE — PROGRESS NOTES
Assessment/Plan:    No problem-specific Assessment & Plan notes found for this encounter     htn stable, continue meds and hydration    HLD stable on statin, LDL 44, has PAD also    PAD stable at present, can send for vascular testing if symptomatic then vascular surgeon if needed    ckd 2 stable    DM2 good by a1c but has anemia, fructosamine ordered    GERD on PPI  Worse w/o after 3-4d  Asked to try to dose every other day to see if controls symptoms to reduce risks of PPI    Uses walker, has rollator, fall cautions     Diagnoses and all orders for this visit:    Medicare annual wellness visit, subsequent    Coronary artery disease involving native coronary artery of native heart with angina pectoris ([facility] [address] )    Benign essential hypertension  -     Comprehensive metabolic panel; Future    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)    Gait disorder    Gastroesophageal reflux disease, unspecified whether esophagitis present  -     omeprazole (PriLOSEC) 40 MG capsule; Take 1 capsule (40 mg total) by mouth daily as needed (acid reflux)        Return in about 6 months (around 12/21/2023) for Recheck  Subjective:      Patient ID: Carmen Renteria is a 65 y o  male  Chief Complaint   Patient presents with   • Follow-up     6 month f/[id number]-wmcma       HPI    Taking his meds  No breast pain  No dizziness  Walks slowly with devices, fall risk aware  Seeing endocrinology, last a1c 6 9 but hgb 9 7    The following portions of the patient's history were reviewed and updated as appropriate: allergies, current medications, past family history, past medical history, past social history, past surgical history and problem list     Review of Systems   Constitutional: Positive for fatigue  Respiratory: Negative for shortness of breath  Cardiovascular: Negative for chest pain  Musculoskeletal: Positive for gait problem           Current Outpatient Medications   Medication Sig Dispense Refill   • acetaminophen (TYLENOL) 325 mg tablet Take 2 tablets (650 mg total) by mouth every 6 (six) hours as needed for mild pain, headaches or fever  0   • Ascorbic Acid (Vitamin C) 500 MG CAPS Take 500 mg by mouth daily     • aspirin (ECOTRIN LOW STRENGTH) 81 mg EC tablet Take 81 mg by mouth daily     • atorvastatin (LIPITOR) 20 mg tablet TAKE 1 TABLET EVERY DAY 90 tablet 1   • brimonidine tartrate 0 2 % ophthalmic solution Inject 0 2 % into the eye 2 (two) times a day     • carvedilol (COREG) 12 5 mg tablet Take 1 tablet (12 5 mg total) by mouth 2 (two) times a day 180 tablet 1   • Cholecalciferol (Vitamin D3) 50 MCG (2000 UT) TABS Take 2,000 Units by mouth daily     • co-enzyme Q-10 100 mg capsule Take 100 mg by mouth daily     • ferrous sulfate 324 (65 Fe) mg Take 1 tablet (324 mg total) by mouth daily before breakfast 90 tablet 3   • glucose blood (OneTouch Ultra) test strip Test blood sugar once a day 100 strip 3   • losartan (COZAAR) 25 mg tablet Take 1 tablet (25 mg total) by mouth daily 90 tablet 1   • metFORMIN (GLUCOPHAGE) 1000 MG tablet TAKE ONE TABLET BY MOUTH TWO TIMES A DAY WITH MEALS 180 tablet 1   • multivitamin (THERAGRAN) TABS Take 1 tablet by mouth daily     • omeprazole (PriLOSEC) 40 MG capsule Take 1 capsule (40 mg total) by mouth daily as needed (acid reflux) 90 capsule 1   • Probiotic Product (CULTURELLE PROBIOTICS) CHEW Chew daily     • sitaGLIPtin (JANUVIA) 25 mg tablet Take one tablet by mouth once a day 90 tablet 3   • spironolactone (ALDACTONE) 25 mg tablet Take 1 tablet (25 mg total) by mouth daily 90 tablet 1   • tamsulosin (FLOMAX) 0 4 mg Take 1 capsule (0 4 mg total) by mouth daily with dinner 90 capsule 2   • TRUEPLUS LANCETS 28G MISC Test 1x/d, E11 29  0   • Infant Foods (Follow-Up) POWD HANDICAP PLACARD, medically recommended, <taxonomy [id number]> due to arthritic/orthopedic condition (#2,#5) (Patient not taking: Reported on 9/19/2022) 99 g 1     No current facility-administered medications for this visit  "      Objective:    /50 (BP Location: Left arm, Patient Position: Sitting, Cuff Size: Standard)   Pulse 82   Temp 97 6 °F (36 4 °C) (Temporal)   Resp 16   Ht 5' 9\" (1 753 m)   Wt 78 kg (172 lb)   SpO2 99%   BMI 25 40 kg/m²        Physical Exam  Vitals and nursing note reviewed  Constitutional:       General: He is not in acute distress  Appearance: He is well-developed  He is not ill-appearing  HENT:      Head: Normocephalic  Right Ear: Tympanic membrane normal       Left Ear: Tympanic membrane normal    Eyes:      General: No scleral icterus  Conjunctiva/sclera: Conjunctivae normal    Cardiovascular:      Rate and Rhythm: Normal rate and regular rhythm  Pulmonary:      Effort: Pulmonary effort is normal  No respiratory distress  Breath sounds: No rales  Abdominal:      Palpations: Abdomen is soft  Musculoskeletal:         General: No deformity  Cervical back: Neck supple  Skin:     General: Skin is warm and dry  Coloration: Skin is not pale  Neurological:      Mental Status: He is alert  Motor: Weakness present  Gait: Gait abnormal    Psychiatric:         Mood and Affect: Mood normal          Behavior: Behavior normal          Thought Content: Thought content normal        BMI Counseling: Body mass index is 25 4 kg/m²  The BMI is above normal  Nutrition recommendations include decreasing portion sizes and moderation in carbohydrate intake  Exercise recommendations include exercising 3-5 times per week  No pharmacotherapy was ordered  Rationale for BMI follow-up plan is due to patient being overweight or obese                Hank Mackey DO    "

## 2023-06-22 NOTE — PATIENT INSTRUCTIONS
Medicare Preventive Visit Patient Instructions  Thank you for completing your Welcome to Medicare Visit or Medicare Annual Wellness Visit today  Your next wellness visit will be due in one year (6/21/2024)  The screening/preventive services that you may require over the next 5-10 years are detailed below  Some tests may not apply to you based off risk factors and/or age  Screening tests ordered at today's visit but not completed yet may show as past due  Also, please note that scanned in results may not display below  Preventive Screenings:  Service Recommendations Previous Testing/Comments   Colorectal Cancer Screening  · Colonoscopy    · Fecal Occult Blood Test (FOBT)/Fecal Immunochemical Test (FIT)  · Fecal DNA/Cologuard Test  · Flexible Sigmoidoscopy Age: 39-70 years old   Colonoscopy: every 10 years (May be performed more frequently if at higher risk)  OR  FOBT/FIT: every 1 year  OR  Cologuard: every 3 years  OR  Sigmoidoscopy: every 5 years  Screening may be recommended earlier than age 39 if at higher risk for colorectal cancer  Also, an individualized decision between you and your healthcare provider will decide whether screening between the ages of 74-80 would be appropriate  Colonoscopy: 06/14/2011  FOBT/FIT: Not on file  Cologuard: Not on file  Sigmoidoscopy: Not on file          Prostate Cancer Screening Individualized decision between patient and health care provider in men between ages of 53-78   Medicare will cover every 12 months beginning on the day after your 50th birthday PSA: <0 1 ng/mL           Hepatitis C Screening Once for adults born between 1945 and 1965  More frequently in patients at high risk for Hepatitis C Hep C Antibody: Not on file        Diabetes Screening 1-2 times per year if you're at risk for diabetes or have pre-diabetes Fasting glucose: 137 mg/dL (6/15/2023)  A1C: 6 9 (6/19/2023)      Cholesterol Screening Once every 5 years if you don't have a lipid disorder   May order more often based on risk factors  Lipid panel: 06/15/2023         Other Preventive Screenings Covered by Medicare:  1  Abdominal Aortic Aneurysm (AAA) Screening: covered once if your at risk  You're considered to be at risk if you have a family history of AAA or a male between the age of 73-68 who smoking at least 100 cigarettes in your lifetime  2  Lung Cancer Screening: covers low dose CT scan once per year if you meet all of the following conditions: (1) Age 50-69; (2) No signs or symptoms of lung cancer; (3) Current smoker or have quit smoking within the last 15 years; (4) You have a tobacco smoking history of at least 20 pack years (packs per day x number of years you smoked); (5) You get a written order from a healthcare provider  3  Glaucoma Screening: covered annually if you're considered high risk: (1) You have diabetes OR (2) Family history of glaucoma OR (3)  aged 48 and older OR (3)  American aged 72 and older  3  Osteoporosis Screening: covered every 2 years if you meet one of the following conditions: (1) Have a vertebral abnormality; (2) On glucocorticoid therapy for more than 3 months; (3) Have primary hyperparathyroidism; (4) On osteoporosis medications and need to assess response to drug therapy  5  HIV Screening: covered annually if you're between the age of 12-76  Also covered annually if you are younger than 13 and older than 72 with risk factors for HIV infection  For pregnant patients, it is covered up to 3 times per pregnancy      Immunizations:  Immunization Recommendations   Influenza Vaccine Annual influenza vaccination during flu season is recommended for all persons aged >= 6 months who do not have contraindications   Pneumococcal Vaccine   * Pneumococcal conjugate vaccine = PCV13 (Prevnar 13), PCV15 (Vaxneuvance), PCV20 (Prevnar 20)  * Pneumococcal polysaccharide vaccine = PPSV23 (Pneumovax) Adults 25-60 years old: 1-3 doses may be recommended based on certain risk factors  Adults 72 years old: 1-2 doses may be recommended based off what pneumonia vaccine you previously received   Hepatitis B Vaccine 3 dose series if at intermediate or high risk (ex: diabetes, end stage renal disease, liver disease)   Tetanus (Td) Vaccine - COST NOT COVERED BY MEDICARE PART B Following completion of primary series, a booster dose should be given every 10 years to maintain immunity against tetanus  Td may also be given as tetanus wound prophylaxis  Tdap Vaccine - COST NOT COVERED BY MEDICARE PART B Recommended at least once for all adults  For pregnant patients, recommended with each pregnancy  Shingles Vaccine (Shingrix) - COST NOT COVERED BY MEDICARE PART B  2 shot series recommended in those aged 48 and above     Health Maintenance Due:      Topic Date Due   • Hepatitis C Screening  Never done   • Colorectal Cancer Screening  09/28/2035 (Originally 11/16/1990)     Immunizations Due:      Topic Date Due   • Influenza Vaccine (Season Ended) 09/01/2023     Advance Directives   What are advance directives? Advance directives are legal documents that state your wishes and plans for medical care  These plans are made ahead of time in case you lose your ability to make decisions for yourself  Advance directives can apply to any medical decision, such as the treatments you want, and if you want to donate organs  What are the types of advance directives? There are many types of advance directives, and each state has rules about how to use them  You may choose a combination of any of the following:  · Living will: This is a written record of the treatment you want  You can also choose which treatments you do not want, which to limit, and which to stop at a certain time  This includes surgery, medicine, IV fluid, and tube feedings  · Durable power of  for healthcare [facility]):   This is a written record that states who you want to make healthcare choices for you when you are unable to make them for yourself  This person, called a proxy, is usually a family member or a friend  You may choose more than 1 proxy  · Do not resuscitate (DNR) order:  A DNR order is used in case your heart stops beating or you stop breathing  It is a request not to have certain forms of treatment, such as CPR  A DNR order may be included in other types of advance directives  · Medical directive: This covers the care that you want if you are in a coma, near death, or unable to make decisions for yourself  You can list the treatments you want for each condition  Treatment may include pain medicine, surgery, blood transfusions, dialysis, IV or tube feedings, and a ventilator (breathing machine)  · Values history: This document has questions about your views, beliefs, and how you feel and think about life  This information can help others choose the care that you would choose  Why are advance directives important? An advance directive helps you control your care  Although spoken wishes may be used, it is better to have your wishes written down  Spoken wishes can be misunderstood, or not followed  Treatments may be given even if you do not want them  An advance directive may make it easier for your family to make difficult choices about your care  Weight Management   Why it is important to manage your weight:  Being overweight increases your risk of health conditions such as heart disease, high blood pressure, type 2 diabetes, and certain types of cancer  It can also increase your risk for osteoarthritis, sleep apnea, and other respiratory problems  Aim for a slow, steady weight loss  Even a small amount of weight loss can lower your risk of health problems  How to lose weight safely:  A safe and healthy way to lose weight is to eat fewer calories and get regular exercise  You can lose up about 1 pound a week by decreasing the number of calories you eat by 500 calories each day     Healthy meal plan for weight management:  A healthy meal plan includes a variety of foods, contains fewer calories, and helps you stay healthy  A healthy meal plan includes the following:  · Eat whole-grain foods more often  A healthy meal plan should contain fiber  Fiber is the part of grains, fruits, and vegetables that is not broken down by your body  Whole-grain foods are healthy and provide extra fiber in your diet  Some examples of whole-grain foods are whole-wheat breads and pastas, oatmeal, brown rice, and bulgur  · Eat a variety of vegetables every day  Include dark, leafy greens such as spinach, kale, elle greens, and mustard greens  Eat yellow and orange vegetables such as carrots, sweet potatoes, and winter squash  · Eat a variety of fruits every day  Choose fresh or canned fruit (canned in its own juice or light syrup) instead of juice  Fruit juice has very little or no fiber  · Eat low-fat dairy foods  Drink fat-free (skim) milk or 1% milk  Eat fat-free yogurt and low-fat cottage cheese  Try low-fat cheeses such as mozzarella and other reduced-fat cheeses  · Choose meat and other protein foods that are low in fat  Choose beans or other legumes such as split peas or lentils  Choose fish, skinless poultry (chicken or turkey), or lean cuts of red meat (beef or pork)  Before you cook meat or poultry, cut off any visible fat  · Use less fat and oil  Try baking foods instead of frying them  Add less fat, such as margarine, sour cream, regular salad dressing and mayonnaise to foods  Eat fewer high-fat foods  Some examples of high-fat foods include french fries, doughnuts, ice cream, and cakes  · Eat fewer sweets  Limit foods and drinks that are high in sugar  This includes candy, cookies, regular soda, and sweetened drinks  Exercise:  Exercise at least 30 minutes per day on most days of the week  Some examples of exercise include walking, biking, dancing, and swimming   You can also fit in more physical activity by taking the stairs instead of the elevator or parking farther away from stores  Ask your healthcare provider about the best exercise plan for you  © Copyright [employer] 2018 Information is for End User's use only and may not be sold, redistributed or otherwise used for commercial purposes   All illustrations and images included in CareNotes® are the copyrighted property of A D A M , Inc  or [address] [place] [address] [place]

## 2023-06-22 NOTE — PROGRESS NOTES
Assessment and Plan:     Problem List Items Addressed This Visit        Active Problems    Medicare annual wellness visit, subsequent - Primary    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 mellitus ([place] [address] )    GE reflux    Relevant Medications    omeprazole (PriLOSEC) 40 MG capsule    Benign essential hypertension    Relevant Orders    Comprehensive metabolic panel    Gait disorder    Coronary artery disease involving native coronary artery of native heart with angina pectoris [facility])        Preventive health issues were discussed with patient, and age appropriate screening tests were ordered as noted in patient's After Visit Summary  Personalized health advice and appropriate referrals for health education or preventive services given if needed, as noted in patient's After Visit Summary       History of Present Illness:     Patient presents for a Medicare Wellness Visit    HPI   Patient Care Team:  Kimberly Keen DO as PCP - General  Chelsi Britt MD (Gastroenterology)  DO Tootie Anthony DPM (Podiatry)  Payam Dave MD (Ophthalmology)  Linda Marie MD (Cardiology)  Ny Chaidez MD (Vascular Surgery)  Yo Patel MD (Urology)  Meche Rhodes MD (Radiation Oncology)  James Bunch DO (Nephrology)     Review of Systems:     Review of Systems     Problem List:     Patient Active Problem List   Diagnosis   • Coronary artery disease involving native coronary artery of native heart with angina pectoris [facility])   • 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 mellitus ([facility] )   • Diabetic neuropathy ([facility] )   • GE reflux   • Lumbar radiculopathy   • Type 2 diabetes mellitus with diabetic neuropathy ([place] [address] )   • Prostate cancer screening   • Dyslipidemia   • Medicare annual wellness visit, subsequent   • Type 2 diabetes mellitus with hyperglycemia, without long-term current use of insulin ([facility] )   • Benign essential hypertension   • Bilateral leg weakness   • Prostate cancer ([facility] )   • Insomnia, persistent   • Orthostatic hypotension   • Chronic right-sided low back pain without sciatica   • Hypokalemia   • Acute on chronic combined systolic and diastolic congestive heart failure (HCC)   • Heart failure, left, 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)   • Chronic combined systolic and diastolic heart failure, NYHA class 2 (HCC)   • Edema of both feet   • Diarrhea   • Gait disorder   • Acute pain of left thigh   • Peripheral arterial disease (HCC)   • Iron deficiency anemia   • Uses roller walker      Past Medical and Surgical History:     Past Medical History:   Diagnosis Date   • Acquired hallux valgus     last assessed: 8/1/2013   • Allergic rhinitis    • Anemia 10/26/2010   • Atrophy of nail     last assessed: 7/7/2015   • Chronic kidney disease     chronic renal insufficiency   • Coronary artery disease    • Deformity of ankle and foot, acquired     last assessed: 2/22/2016   • Difficulty walking     last assessed: 12/14/2015   • Dysesthesia     last assessed: 11/25/2016   • Hammer toe     unspecified laterality; last assessed: 8/1/2013   • Hypertension    • Onychomycosis of toenail     last assessed: 2/22/2016   • Peripheral vascular disease ([facility] )     left SFA stent in 2010   • Pes planus     unspecified laterality; last assessed: 8/1/2013   • Pneumonia     last assessed: 2/27/2016   • Prostate cancer ([facility] )    • 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     last assessed: 8/15/2016   • Type 2 diabetes mellitus ([facility] ) 07/26/2010   • Viral warts     last assessed: 7/24/2015     Past Surgical History:   Procedure Laterality Date   • CARDIAC CATHETERIZATION  07/29/2010   • CATARACT EXTRACTION, BILATERAL Bilateral     R 1999, L 2008   • COLONOSCOPY  2011   • FEMORAL ARTERY - POPLITEAL ARTERY BYPASS GRAFT  09/23/2013   • FOOT SURGERY      bone spur removed   • LEG SURGERY Bilateral     repair; L 8/20/2010 and 7/26/2012   • ND PLMT INTERSTITIAL DEV RADIAT TX PROSTATE 1/MULT N/A 6/22/2021    Procedure: INSERTION OF FIDUCIAL MARKER (TRANSRECTAL ULTRASOUND GUIDANCE), SPACEOAR;  Surgeon: Kishan Lopez MD;  Location: [facility];  Service: Urology   • ROTATOR CUFF REPAIR Left 2009   • SHOULDER SURGERY Right 2005    collar bone      Family History:     Family History   Problem Relation Age of Onset   • Heart disease Father    • Heart attack Father         acute myocardial infarction   • Heart disease Sister    • Heart attack Sister         acute myocardial infarction   • Heart disease Paternal Grandfather    • Aortic aneurysm Mother    • Throat cancer Maternal Grandfather       Social History:     Social History     Socioeconomic History   • Marital status: /Civil Union     Spouse name: None   • Number of children: None   • Years of education: None   • Highest education level: None   Occupational History   • Occupation: retired from  work   Tobacco Use   • Smoking status: Never     Passive exposure: Never   • Smokeless tobacco: Never   Vaping Use   • Vaping Use: Never used   Substance and Sexual Activity   • Alcohol use: Not Currently     Comment: being a social drinker/rare   • Drug use: No   • Sexual activity: None   Other Topics Concern   • None   Social History Narrative   • None     Social Determinants of Health     Financial Resource Strain: Low Risk  (6/21/2023)    Overall Financial Resource Strain (CARDIA)    • Difficulty of Paying Living Expenses: Not hard at all   Food Insecurity: No Food Insecurity (2/21/2022)    Hunger Vital Sign    • Worried About Running Out of Food in the Last Year: Never true    • Ran Out of Food in the Last Year: Never true   Transportation Needs: No Transportation Needs (6/21/2023)    PRAPARE - Transportation    • Lack of Transportation (Medical): No    • Lack of Transportation (Non-Medical):  No   Physical Activity: Not on file   Stress: Not on file   Social Connections: Not on file   Intimate Partner Violence: Not on file   Housing Stability: Low Risk  (2/21/2022)    Housing Stability Vital Sign    • Unable to Pay for Housing in the Last Year: No    • Number of Places Lived in the Last Year: 1    • Unstable Housing in the Last Year: No      Medications and Allergies:     Current Outpatient Medications   Medication Sig Dispense Refill   • acetaminophen (TYLENOL) 325 mg tablet Take 2 tablets (650 mg total) by mouth every 6 (six) hours as needed for mild pain, headaches or fever  0   • Ascorbic Acid (Vitamin C) 500 MG CAPS Take 500 mg by mouth daily     • aspirin (ECOTRIN LOW STRENGTH) 81 mg EC tablet Take 81 mg by mouth daily     • atorvastatin (LIPITOR) 20 mg tablet TAKE 1 TABLET EVERY DAY 90 tablet 1   • brimonidine tartrate 0 2 % ophthalmic solution Inject 0 2 % into the eye 2 (two) times a day     • carvedilol (COREG) 12 5 mg tablet Take 1 tablet (12 5 mg total) by mouth 2 (two) times a day 180 tablet 1   • Cholecalciferol (Vitamin D3) 50 MCG (2000 UT) TABS Take 2,000 Units by mouth daily     • co-enzyme Q-10 100 mg capsule Take 100 mg by mouth daily     • ferrous sulfate 324 (65 Fe) mg Take 1 tablet (324 mg total) by mouth daily before breakfast 90 tablet 3   • glucose blood (OneTouch Ultra) test strip Test blood sugar once a day 100 strip 3   • losartan (COZAAR) 25 mg tablet Take 1 tablet (25 mg total) by mouth daily 90 tablet 1   • metFORMIN (GLUCOPHAGE) 1000 MG tablet TAKE ONE TABLET BY MOUTH TWO TIMES A DAY WITH MEALS 180 tablet 1   • multivitamin (THERAGRAN) TABS Take 1 tablet by mouth daily     • omeprazole (PriLOSEC) 40 MG capsule Take 1 capsule (40 mg total) by mouth daily as needed (acid reflux) 90 capsule 1   • Probiotic Product (CULTURELLE PROBIOTICS) CHEW Chew daily     • sitaGLIPtin (JANUVIA) 25 mg tablet Take one tablet by mouth once a day 90 tablet 3   • spironolactone (ALDACTONE) 25 mg tablet Take 1 tablet (25 mg total) by mouth daily 90 tablet 1   • tamsulosin (FLOMAX) 0 4 mg Take 1 capsule (0 4 mg total) by mouth daily with dinner 90 capsule 2   • TRUEPLUS LANCETS 28G MISC Test 1x/d, E11 29  0   • Infant Foods (Follow-Up) POWD HANDICAP PLACARD, medically recommended, <taxonomy [id number]> due to arthritic/orthopedic condition (#2,#5) (Patient not taking: Reported on 9/19/2022) 99 g 1     No current facility-administered medications for this visit  No Known Allergies   Immunizations:     Immunization History   Administered Date(s) Administered   • COVID-19 MODERNA VACC 0 25 ML IM BOOSTER 11/12/2021   • COVID-19 MODERNA VACC 0 5 ML IM 02/10/2021, 03/10/2021, 11/12/2021, 06/02/2022   • COVID-19 Pfizer Vac BIVALENT Cecilio-sucrose 12 Yr+ IM (BOOSTER ONLY) 10/12/2022   • Influenza Split High Dose Preservative Free IM 09/18/2012, 09/16/2013, 10/13/2014, 10/07/2016, 09/20/2017   • Influenza, high dose seasonal 0 7 mL 09/24/2018, 10/25/2019   • Influenza, seasonal, injectable 09/21/2011, 09/25/2015   • Pneumococcal Conjugate 13-Valent 10/12/2015   • Pneumococcal Polysaccharide PPV23 08/25/2010   • Tdap 02/22/2012, 07/10/2022, 07/10/2022   • Zoster 09/18/2012      Health Maintenance:         Topic Date Due   • Hepatitis C Screening  Never done   • Colorectal Cancer Screening  09/28/2035 (Originally 11/16/1990)         Topic Date Due   • Influenza Vaccine (Season Ended) 09/01/2023      Medicare Screening Tests and Risk Assessments:     Fredi Castellon is here for his Subsequent Wellness visit  Last Medicare Wellness visit information reviewed, patient interviewed and updates made to the record today  Health Risk Assessment:   Patient rates overall health as fair  Patient feels that their physical health rating is same  Patient is satisfied with their life  Eyesight was rated as same  Hearing was rated as same  Patient feels that their emotional and mental health rating is same  Patients states they are never, rarely angry  Patient states they are sometimes unusually tired/fatigued  Pain experienced in the last 7 days has been some  Patient's pain rating has been 1/10   Patient states that he has experienced no weight loss or gain in last 6 months  Fall Risk Screening: In the past year, patient has experienced: no history of falling in past year      Home Safety:  Patient has trouble with stairs inside or outside of their home  Patient has working smoke alarms and has no working carbon monoxide detector  Home safety hazards include: none  Nutrition:   Current diet is Diabetic, Low Saturated Fat, No Added Salt and Low Carb  Medications:   Patient is currently taking over-the-counter supplements  OTC medications include: see medication list  Patient is able to manage medications  Activities of Daily Living (ADLs)/Instrumental Activities of Daily Living (IADLs):   Walk and transfer into and out of bed and chair?: Yes  Dress and groom yourself?: Yes    Bathe or shower yourself?: Yes    Feed yourself? Yes  Do your laundry/housekeeping?: No  Manage your money, pay your bills and track your expenses?: No  Make your own meals?: Yes    Do your own shopping?: Yes    Previous Hospitalizations:   Any hospitalizations or ED visits within the last 12 months?: No      Advance Care Planning:   Living will: Yes    Durable POA for healthcare:  Yes    Advanced directive: Yes    End of Life Decisions reviewed with patient: No      Cognitive Screening:   Provider or family/friend/caregiver concerned regarding cognition?: No    PREVENTIVE SCREENINGS      Cardiovascular Screening:    General: Screening Current      Diabetes Screening:     General: Screening Not Indicated and History Diabetes      Colorectal Cancer Screening:     General: Screening Not Indicated      Prostate Cancer Screening:    General: History Prostate Cancer and Screening Not Indicated      Osteoporosis Screening:    General: Screening Not Indicated      Abdominal Aortic Aneurysm (AAA) Screening:        General: Screening Not Indicated      Lung Cancer Screening:     General: Screening Not Indicated      Hepatitis C Screening:    General: "Screening Not Indicated    Hep C Screening Accepted: No     Screening, Brief Intervention, and Referral to Treatment (SBIRT)    Screening  Typical number of drinks in a day: 0  Typical number of drinks in a week: 0  Interpretation: Low risk drinking behavior  Other Counseling Topics:   Car/seat belt/driving safety and regular weightbearing exercise  No results found       Physical Exam:     /50 (BP Location: Left arm, Patient Position: Sitting, Cuff Size: Standard)   Pulse 82   Temp 97 6 °F (36 4 °C) (Temporal)   Resp 16   Ht 5' 9\" (1 753 m)   Wt 78 kg (172 lb)   SpO2 99%   BMI 25 40 kg/m²     Physical Exam     Elizabeth Jiménez DO  "

## 2023-07-07 ENCOUNTER — APPOINTMENT (OUTPATIENT)
Dept: LAB | Facility: CLINIC | Age: 78
End: 2023-07-07
Payer: MEDICARE

## 2023-07-07 DIAGNOSIS — C61 PROSTATE CANCER (HCC): ICD-10-CM

## 2023-07-07 LAB — PSA SERPL-MCNC: <0.01 NG/ML (ref 0–4)

## 2023-07-07 PROCEDURE — 84153 ASSAY OF PSA TOTAL: CPT

## 2023-07-07 PROCEDURE — 36415 COLL VENOUS BLD VENIPUNCTURE: CPT

## 2023-07-12 ENCOUNTER — OFFICE VISIT (OUTPATIENT)
Dept: PODIATRY | Facility: CLINIC | Age: 78
End: 2023-07-12
Payer: MEDICARE

## 2023-07-12 ENCOUNTER — LAB REQUISITION (OUTPATIENT)
Dept: LAB | Facility: HOSPITAL | Age: 78
End: 2023-07-12
Payer: MEDICARE

## 2023-07-12 VITALS
BODY MASS INDEX: 25.48 KG/M2 | RESPIRATION RATE: 17 BRPM | WEIGHT: 172 LBS | HEIGHT: 69 IN | DIASTOLIC BLOOD PRESSURE: 50 MMHG | SYSTOLIC BLOOD PRESSURE: 130 MMHG

## 2023-07-12 DIAGNOSIS — E11.621 DIABETIC ULCER OF LEFT MIDFOOT ASSOCIATED WITH TYPE 2 DIABETES MELLITUS, WITH FAT LAYER EXPOSED (HCC): ICD-10-CM

## 2023-07-12 DIAGNOSIS — L97.422 NON-PRESSURE CHRONIC ULCER OF LEFT HEEL AND MIDFOOT WITH FAT LAYER EXPOSED (HCC): ICD-10-CM

## 2023-07-12 DIAGNOSIS — E11.42 DIABETIC POLYNEUROPATHY ASSOCIATED WITH TYPE 2 DIABETES MELLITUS (HCC): Primary | ICD-10-CM

## 2023-07-12 DIAGNOSIS — L97.422 DIABETIC ULCER OF LEFT MIDFOOT ASSOCIATED WITH TYPE 2 DIABETES MELLITUS, WITH FAT LAYER EXPOSED (HCC): ICD-10-CM

## 2023-07-12 DIAGNOSIS — B35.1 ONYCHOMYCOSIS: ICD-10-CM

## 2023-07-12 DIAGNOSIS — M54.16 LUMBAR RADICULOPATHY: ICD-10-CM

## 2023-07-12 DIAGNOSIS — L03.116 CELLULITIS OF LEFT FOOT: ICD-10-CM

## 2023-07-12 DIAGNOSIS — I73.9 PAD (PERIPHERAL ARTERY DISEASE) (HCC): ICD-10-CM

## 2023-07-12 PROCEDURE — 87070 CULTURE OTHR SPECIMN AEROBIC: CPT | Performed by: PODIATRIST

## 2023-07-12 PROCEDURE — 99213 OFFICE O/P EST LOW 20 MIN: CPT | Performed by: PODIATRIST

## 2023-07-12 PROCEDURE — 87205 SMEAR GRAM STAIN: CPT | Performed by: PODIATRIST

## 2023-07-12 PROCEDURE — 87186 SC STD MICRODIL/AGAR DIL: CPT | Performed by: PODIATRIST

## 2023-07-12 PROCEDURE — 11042 DBRDMT SUBQ TIS 1ST 20SQCM/<: CPT | Performed by: PODIATRIST

## 2023-07-12 PROCEDURE — 87147 CULTURE TYPE IMMUNOLOGIC: CPT | Performed by: PODIATRIST

## 2023-07-12 RX ORDER — LEVOFLOXACIN 500 MG/1
500 TABLET, FILM COATED ORAL EVERY 24 HOURS
Qty: 7 TABLET | Refills: 0 | Status: SHIPPED | OUTPATIENT
Start: 2023-07-12 | End: 2023-07-19

## 2023-07-12 NOTE — PROGRESS NOTES
Assessment/Plan:  Deformity of foot.  Diabetic neuropathy.  Pain upon ambulation.  Pain upon ambulation.  Mycosis of nail.  Callus formation. Diabetic foot ulcer with secondary cellulitis. Left foot deformity.     Plan.  Lesion debrided.   all mycotic nails debrided without pain or complication.  Foot exam performed.  Patient educated on care of the diabetic foot.  Plantar calluses debrided as well.  Patient remain on gabapentin as well.  Refill ordered. Right foot ulcer debrided. Utilizing 10 blade excisional debridement done of left foot diabetic foot ulcer. All devitalized tissue debrided. Bleeding base was 100% of the wound upon completion. Culture and sensitivity done. Gentian violet dry sterile dressing applied. Patient will be started on oral antibiotic. He is advised on aftercare. Return for follow-up.            Subjective:   patient is diabetic.  He has pain upon ambulation.  He has pain with shoe wear.  No history of trauma             Past Medical History:   Diagnosis Date   • Acquired hallux valgus       last assessed: 8/1/2013   • Allergic rhinitis     • Anemia 10/26/2010   • Atrophy of nail       last assessed: 7/7/2015   • Chronic kidney disease       chronic renal insufficiency   • Coronary artery disease     • Deformity of ankle and foot, acquired       last assessed: 2/22/2016   • Diabetes mellitus ([address])     • Difficulty walking       last assessed: 12/14/2015   • Dysesthesia       last assessed: 11/25/2016   • Hammer toe       unspecified laterality; last assessed: 8/1/2013   • Hypertension     • Onychomycosis of toenail       last assessed: 2/22/2016   • Peripheral vascular disease ([address])       left SFA stent in 2010   • Pes planus       unspecified laterality; last assessed: 8/1/2013   • Pneumonia       last assessed: 2/27/2016   • 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       last assessed: 8/15/2016   • Type 2 diabetes mellitus ([address]) 07/26/2010   • Viral warts       last assessed: 7/24/2015                   Past Surgical History:   Procedure Laterality Date   • CARDIAC CATHETERIZATION   07/29/2010   • CATARACT EXTRACTION, BILATERAL Bilateral       R 1999, L 2008   • FEMORAL ARTERY - POPLITEAL ARTERY BYPASS GRAFT   09/23/2013   • FOOT SURGERY         bone spur removed   • LEG SURGERY Bilateral       repair; L 8/20/2010 and 7/26/2012   • ROTATOR CUFF REPAIR Left 2009   • SHOULDER SURGERY Right 2005     collar bone         No Known Allergies        Current Outpatient Medications:   •  acarbose (PRECOSE) 100 MG tablet, Take 1 tablet (100 mg total) by mouth 3 (three) times a day with meals, Disp: 270 tablet, Rfl: 1  •  amLODIPine (NORVASC) 2.5 mg tablet, Take 1 tablet (2.5 mg total) by mouth daily (Patient not taking: Reported on 9/26/2019), Disp: 90 tablet, Rfl: 3  •  aspirin (ECOTRIN LOW STRENGTH) 81 mg EC tablet, Take 1 tablet (81 mg total) by mouth daily, Disp: 30 tablet, Rfl: 6  •  b complex-vitamin C-folic acid (NEPHROCAPS) 1 mg capsule, Take 1 capsule by mouth daily, Disp: , Rfl:   •  betamethasone dipropionate (DIPROSONE) 0.05 % cream, Use as dir twice daily, Disp: , Rfl:   •  carvedilol (COREG) 12.5 mg tablet, TAKE 1 TABLET TWICE DAILY, Disp: 180 tablet, Rfl: 1  •  gabapentin (NEURONTIN) 600 MG tablet, Take 1 tablet (600 mg total) by mouth 2 (two) times a day, Disp: 180 tablet, Rfl: 0  •  glimepiride (AMARYL) 4 mg tablet, Take 1 tablet (4 mg total) by mouth 2 (two) times a day for 126 days, Disp: 180 tablet, Rfl: 0  •  glucose blood (TRUETRACK TEST) test strip, 1 each by Other route daily Use as instructed for E11.29, Disp: 100 each, Rfl: 3  •  lisinopril (ZESTRIL) 5 mg tablet, Take 1 tablet (5 mg total) by mouth daily (Patient taking differently: Take 2.5 mg by mouth daily ), Disp: 90 tablet, Rfl: 3  •  metFORMIN (GLUMETZA) 500 MG (MOD) 24 hr tablet, Take 2 tablets (1,000 mg total) by mouth 2 (two) times a day with meals, Disp: 120 tablet, Rfl: 5  •  multivitamin (THERAGRAN) TABS, Take 1 tablet by mouth daily, Disp: , Rfl:   •  omeprazole (PriLOSEC) 40 MG capsule, Take 1 capsule (40 mg total) by mouth daily, Disp: 90 capsule, Rfl: 1  •  simvastatin (ZOCOR) 40 mg tablet, TAKE 1 TABLET (40 MG TOTAL) BY MOUTH DAILY, Disp: 90 tablet, Rfl: 1  •  TRUEPLUS LANCETS 28G MISC, Test 1x/d, E11.29, Disp: , Rfl: 0           Patient Active Problem List   Diagnosis   • Diabetic polyneuropathy associated with type 2 diabetes mellitus (HCC)   • Allergic rhinitis   • Arthropathy   • PAD (peripheral artery disease) ([facility])   • Benign essential hypertension   • CAD (coronary artery disease)   • 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 mellitus ([address])   • Diabetic neuropathy ([facility])   • GE reflux   • Lumbar radiculopathy   • Rosacea   • Seborrheic dermatitis   • Type 2 diabetes mellitus with diabetic neuropathy ([facility])   • Onychomycosis   • Occlusion of femoral-popliteal bypass graft ([facility])   • Prostate cancer screening   • Dyslipidemia   • Screening for AAA (aortic abdominal aneurysm)   • Medicare annual wellness visit, subsequent   • Type 2 diabetes mellitus with stage 3 chronic kidney disease, without long-term current use of insulin ([facility])   • Pain in both feet   • Corns   • Plantar warts             Patient ID: Pernell Covarrubias is a 77 y. o. male.        The following portions of the patient's history were reviewed and updated as appropriate:      family history includes Aortic aneurysm in his mother; Heart attack in his father and sister; Heart disease in his father, paternal grandfather, and sister.       reports that he has never smoked. He has never used smokeless tobacco. He reports that he drinks alcohol. He reports that he does not use drugs.        Objective:  Patient's shoes and socks removed.   Foot ExamPhysical Exam          Physical Exam  Left Foot: Appearance: a deformity (ball of foot and distal fifth metatarsal ). Fifth toe deformities include hammer toe.  Tenderness: None except the plantar aspect of the foot. Right Foot: Appearance: a deformity (distal fifth metatarsal ). Left Ankle: ROM: limited ROM in all planes   Right Ankle: ROM: limited ROM in all planes   Neurological Exam: Light touch was decreased bilaterally. Vibratory sensation was decreased in both first metatarsophalangeal joints. Response to monofilament test was absent bilaterally. Deep tendon reflexes: achilles reflex 1/4 bilaterally. Vascular Exam: performed Dorsalis pedis pulses were 1/4 bilaterally. Posterior tibial pulses were 1/4 bilaterally. Elevation Pallor: diminished bilaterally. Capillary refill time was Q. 9, findings bilateral. Negative digital hair noted, positive abnormal cooling. All pre-ulcer, lesions debrided. Today, but between 1-3 seconds bilaterally. Edema: mild bilaterally and All mycotic nails debrided. Today. The patient was given orthotics to help control his feet and at as cushioning and padding on the bottom of his feet q9 findings. Toenails: All of the toenails were elongated, hypertrophied, discolored, ingrown, shown to have subungual debris and tender.      Socks and shoes removed, the Right Foot: the foot was dry. The sensory exam showed diminished vibratory sensation at the level of the toes. Diminished tactile sensation with monofilament testing throughout the right foot.   Socks and shoes removed, Left Foot: the foot was dry. The sensory exam showed diminished vibratory sensation at the level of the toes. Diminished tactile sensation with monofilament testing throughout the left foot.   Pulses:   1+ in the posterior tibialis on the right   1+ in the dorsalis pedis on the right. Capillary refills findings on the left were delayed in the toes. Pulses:   1+ in the posterior tibialis on the left   1+ in the dorsalis pedis on the left. Assign Risk Category: 2: Loss of protective sensation with or without weakness, deformity, callus, pre-ulcer, or history of ulceration. High risk.    Hyperkeratosis: present on both first sub metatarsals, present on both fifth sub metatarsals and Pre-ulcerative lesion, submetatarsal one to 5, bilateral.   Shoe Gear Evaluation: performed (). Recommendation(s): custom inlays.      Patient's shoes and socks removed. Right Foot/Ankle   Right Foot Inspection  Skin Exam: callus and callus               Toe Exam: swelling and erythema  Sensory   Vibration: diminished  Proprioception: diminished      Vascular  Capillary refills: elevated        Left Foot/Ankle  Left Foot Inspection  Skin Exam: callus.  Submetatarsal 5 of the left foot demonstrates 0.5 centimeter squared plantar verruca.  It is in capsulated in a bullae.  Debrided.  20% remaining.              Toe Exam: swelling and erythema                   Sensory   Vibration: diminished  Proprioception: diminished     Vascular  Capillary refills: elevated.     Patient's shoes and socks removed.           Assign Risk Category  Deformity present  Loss of protective sensation  Weak pulses  Risk: 2

## 2023-07-14 ENCOUNTER — OFFICE VISIT (OUTPATIENT)
Dept: UROLOGY | Facility: CLINIC | Age: 78
End: 2023-07-14
Payer: MEDICARE

## 2023-07-14 VITALS
HEART RATE: 91 BPM | WEIGHT: 168.6 LBS | HEIGHT: 69 IN | DIASTOLIC BLOOD PRESSURE: 78 MMHG | BODY MASS INDEX: 24.97 KG/M2 | RESPIRATION RATE: 18 BRPM | SYSTOLIC BLOOD PRESSURE: 130 MMHG | OXYGEN SATURATION: 99 %

## 2023-07-14 DIAGNOSIS — C61 PROSTATE CANCER (HCC): Primary | ICD-10-CM

## 2023-07-14 PROCEDURE — 99213 OFFICE O/P EST LOW 20 MIN: CPT | Performed by: PHYSICIAN ASSISTANT

## 2023-07-14 NOTE — PROGRESS NOTES
UROLOGY PROGRESS NOTE   Patient Identifiers: Alda Gottron (MRN [id number])  Date of Service: 7/14/2023    Subjective:   21-year-old man history of [place] 10 stage III prostate cancer. He was treated with radiation and has completed 2 years of ADT. His PSA remains undetectable<0.01. He denies any bone pain or weight loss. He does have low energy and some hot flashes.     Reason for visit: Prostate cancer follow-up    Objective:     VITALS:    Vitals:    07/14/23 1005   BP: 130/78   Pulse: 91   Resp: 18   SpO2: 99%           LABS:  Lab Results   Component Value Date    HGB 9.7 (L) 05/12/2023    HCT 29.6 (L) 05/12/2023    WBC 7.26 05/12/2023     05/12/2023   ]    Lab Results   Component Value Date     04/17/2014    K 4.8 06/15/2023     06/15/2023    CO2 25 06/15/2023    BUN 18 06/15/2023    CREATININE 1.07 06/15/2023    CALCIUM 9.6 06/15/2023    GLUCOSE 137 04/17/2014   ]        INPATIENT MEDS:    Current Outpatient Medications:   •  acetaminophen (TYLENOL) 325 mg tablet, Take 2 tablets (650 mg total) by mouth every 6 (six) hours as needed for mild pain, headaches or fever, Disp: , Rfl: 0  •  Ascorbic Acid (Vitamin C) 500 MG CAPS, Take 500 mg by mouth daily, Disp: , Rfl:   •  aspirin (ECOTRIN LOW STRENGTH) 81 mg EC tablet, Take 81 mg by mouth daily, Disp: , Rfl:   •  atorvastatin (LIPITOR) 20 mg tablet, TAKE 1 TABLET EVERY DAY, Disp: 90 tablet, Rfl: 1  •  brimonidine tartrate 0.2 % ophthalmic solution, Inject 0.2 % into the eye 2 (two) times a day, Disp: , Rfl:   •  carvedilol (COREG) 12.5 mg tablet, Take 1 tablet (12.5 mg total) by mouth 2 (two) times a day, Disp: 180 tablet, Rfl: 1  •  Cholecalciferol (Vitamin D3) 50 MCG (2000 UT) TABS, Take 2,000 Units by mouth daily, Disp: , Rfl:   •  co-enzyme Q-10 100 mg capsule, Take 100 mg by mouth daily, Disp: , Rfl:   •  ferrous sulfate 324 (65 Fe) mg, Take 1 tablet (324 mg total) by mouth daily before breakfast, Disp: 90 tablet, Rfl: 3  • glucose blood (OneTouch Ultra) test strip, Test blood sugar once a day, Disp: 100 strip, Rfl: 3  •  levofloxacin (LEVAQUIN) 500 mg tablet, Take 1 tablet (500 mg total) by mouth every 24 hours for 7 days, Disp: 7 tablet, Rfl: 0  •  losartan (COZAAR) 25 mg tablet, Take 1 tablet (25 mg total) by mouth daily, Disp: 90 tablet, Rfl: 1  •  metFORMIN (GLUCOPHAGE) 1000 MG tablet, TAKE ONE TABLET BY MOUTH TWO TIMES A DAY WITH MEALS, Disp: 180 tablet, Rfl: 1  •  multivitamin (THERAGRAN) TABS, Take 1 tablet by mouth daily, Disp: , Rfl:   •  omeprazole (PriLOSEC) 40 MG capsule, Take 1 capsule (40 mg total) by mouth daily as needed (acid reflux), Disp: 90 capsule, Rfl: 1  •  Probiotic Product (CULTURELLE PROBIOTICS) CHEW, Chew daily, Disp: , Rfl:   •  silver sulfadiazine (SILVADENE,SSD) 1 % cream, Apply topically 2 (two) times a day, Disp: 50 g, Rfl: 1  •  sitaGLIPtin (JANUVIA) 25 mg tablet, Take one tablet by mouth once a day, Disp: 90 tablet, Rfl: 3  •  spironolactone (ALDACTONE) 25 mg tablet, Take 1 tablet (25 mg total) by mouth daily, Disp: 90 tablet, Rfl: 1  •  tamsulosin (FLOMAX) 0.4 mg, Take 1 capsule (0.4 mg total) by mouth daily with dinner, Disp: 90 capsule, Rfl: 2  •  TRUEPLUS LANCETS 28G MISC, Test 1x/d, E11.29, Disp: , Rfl: 0  •  Infant Foods (Follow-Up) POWD, HANDICAP PLACARD, medically recommended, <taxonomy [id number]> due to arthritic/orthopedic condition (#2,#5) (Patient not taking: Reported on 9/19/2022), Disp: 99 g, Rfl: 1      Physical Exam:   /78 (BP Location: Right arm, Patient Position: Sitting, Cuff Size: Large)   Pulse 91   Resp 18   Ht 5' 9" (1.753 m)   Wt 76.5 kg (168 lb 9.6 oz)   SpO2 99%   BMI 24.90 kg/m²   GEN: no acute distress    RESP: breathing comfortably with no accessory muscle use    ABD: soft, non-tender, non-distended   INCISION:    EXT: no significant peripheral edema       RADIOLOGY:   None    Assessment:   #1.  [place] 10 prostate cancer    Plan:   -Follow-up in 6 months with PSA prior to visit  -  -  -

## 2023-07-15 LAB
BACTERIA WND AEROBE CULT: ABNORMAL
BACTERIA WND AEROBE CULT: ABNORMAL
GRAM STN SPEC: ABNORMAL
GRAM STN SPEC: ABNORMAL

## 2023-07-24 DIAGNOSIS — E11.40 TYPE 2 DIABETES MELLITUS WITH DIABETIC NEUROPATHY, WITHOUT LONG-TERM CURRENT USE OF INSULIN (HCC): ICD-10-CM

## 2023-07-24 RX ORDER — GLUCOSAM/CHON-MSM1/C/MANG/BOSW 500-416.6
TABLET ORAL
Qty: 200 EACH | Refills: 3 | Status: SHIPPED | OUTPATIENT
Start: 2023-07-24

## 2023-08-03 ENCOUNTER — OFFICE VISIT (OUTPATIENT)
Dept: PODIATRY | Facility: CLINIC | Age: 78
End: 2023-08-03
Payer: MEDICARE

## 2023-08-03 VITALS
HEIGHT: 69 IN | RESPIRATION RATE: 18 BRPM | DIASTOLIC BLOOD PRESSURE: 78 MMHG | SYSTOLIC BLOOD PRESSURE: 130 MMHG | BODY MASS INDEX: 24.88 KG/M2 | WEIGHT: 168 LBS

## 2023-08-03 DIAGNOSIS — E11.42 DIABETIC POLYNEUROPATHY ASSOCIATED WITH TYPE 2 DIABETES MELLITUS (HCC): ICD-10-CM

## 2023-08-03 DIAGNOSIS — L97.421 DIABETIC ULCER OF LEFT MIDFOOT ASSOCIATED WITH TYPE 2 DIABETES MELLITUS, LIMITED TO BREAKDOWN OF SKIN (HCC): Primary | ICD-10-CM

## 2023-08-03 DIAGNOSIS — M21.962 ACQUIRED DEFORMITY OF LEFT FOOT: ICD-10-CM

## 2023-08-03 DIAGNOSIS — I73.9 PAD (PERIPHERAL ARTERY DISEASE) (HCC): ICD-10-CM

## 2023-08-03 DIAGNOSIS — L03.116 CELLULITIS OF LEFT FOOT: ICD-10-CM

## 2023-08-03 DIAGNOSIS — E11.621 DIABETIC ULCER OF LEFT MIDFOOT ASSOCIATED WITH TYPE 2 DIABETES MELLITUS, LIMITED TO BREAKDOWN OF SKIN (HCC): Primary | ICD-10-CM

## 2023-08-03 PROCEDURE — 99214 OFFICE O/P EST MOD 30 MIN: CPT | Performed by: PODIATRIST

## 2023-08-03 NOTE — PROGRESS NOTES
Assessment/Plan:  Deformity of foot.  Diabetic neuropathy.  Pain upon ambulation.  Pain upon ambulation.  Mycosis of nail.  Callus formation.     Diabetic foot ulcer with secondary cellulitis, resolved. Left foot deformity.     Plan. Chart reviewed. Lab work reviewed. Yayo Napoles debrided.   all mycotic nails debrided without pain or complication.  Foot exam performed.  Patient educated on care of the diabetic foot.  Plantar calluses debrided as well.  Patient remain on gabapentin as well.  Refill ordered.     Right foot ulcer debrided. Utilizing 10 blade excisional debridement done of left foot diabetic foot ulcer. All devitalized tissue debrided. Bleeding base was 100% of the wound upon completion. Gentian violet and dry sterile dressing applied. He is advised on aftercare. Return for follow-up.            Subjective:   patient is diabetic.  He has pain upon ambulation.  He has pain with shoe wear.  No history of trauma             Past Medical History:   Diagnosis Date   • Acquired hallux valgus       last assessed: 8/1/2013   • Allergic rhinitis     • Anemia 10/26/2010   • Atrophy of nail       last assessed: 7/7/2015   • Chronic kidney disease       chronic renal insufficiency   • Coronary artery disease     • Deformity of ankle and foot, acquired       last assessed: 2/22/2016   • Diabetes mellitus ([address])     • Difficulty walking       last assessed: 12/14/2015   • Dysesthesia       last assessed: 11/25/2016   • Hammer toe       unspecified laterality; last assessed: 8/1/2013   • Hypertension     • Onychomycosis of toenail       last assessed: 2/22/2016   • Peripheral vascular disease ([address])       left SFA stent in 2010   • Pes planus       unspecified laterality; last assessed: 8/1/2013   • Pneumonia       last assessed: 2/27/2016   • 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       last assessed: 8/15/2016   • Type 2 diabetes mellitus ([address]) 07/26/2010   • Viral warts       last assessed: 7/24/2015                 Past Surgical History:   Procedure Laterality Date   • CARDIAC CATHETERIZATION   07/29/2010   • CATARACT EXTRACTION, BILATERAL Bilateral       R 1999, L 2008   • FEMORAL ARTERY - POPLITEAL ARTERY BYPASS GRAFT   09/23/2013   • FOOT SURGERY         bone spur removed   • LEG SURGERY Bilateral       repair; L 8/20/2010 and 7/26/2012   • ROTATOR CUFF REPAIR Left 2009   • SHOULDER SURGERY Right 2005     collar bone         No Known Allergies        Current Outpatient Medications:   •  acarbose (PRECOSE) 100 MG tablet, Take 1 tablet (100 mg total) by mouth 3 (three) times a day with meals, Disp: 270 tablet, Rfl: 1  •  amLODIPine (NORVASC) 2.5 mg tablet, Take 1 tablet (2.5 mg total) by mouth daily (Patient not taking: Reported on 9/26/2019), Disp: 90 tablet, Rfl: 3  •  aspirin (ECOTRIN LOW STRENGTH) 81 mg EC tablet, Take 1 tablet (81 mg total) by mouth daily, Disp: 30 tablet, Rfl: 6  •  b complex-vitamin C-folic acid (NEPHROCAPS) 1 mg capsule, Take 1 capsule by mouth daily, Disp: , Rfl:   •  betamethasone dipropionate (DIPROSONE) 0.05 % cream, Use as dir twice daily, Disp: , Rfl:   •  carvedilol (COREG) 12.5 mg tablet, TAKE 1 TABLET TWICE DAILY, Disp: 180 tablet, Rfl: 1  •  gabapentin (NEURONTIN) 600 MG tablet, Take 1 tablet (600 mg total) by mouth 2 (two) times a day, Disp: 180 tablet, Rfl: 0  •  glimepiride (AMARYL) 4 mg tablet, Take 1 tablet (4 mg total) by mouth 2 (two) times a day for 126 days, Disp: 180 tablet, Rfl: 0  •  glucose blood (TRUETRACK TEST) test strip, 1 each by Other route daily Use as instructed for E11.29, Disp: 100 each, Rfl: 3  •  lisinopril (ZESTRIL) 5 mg tablet, Take 1 tablet (5 mg total) by mouth daily (Patient taking differently: Take 2.5 mg by mouth daily ), Disp: 90 tablet, Rfl: 3  •  metFORMIN (GLUMETZA) 500 MG (MOD) 24 hr tablet, Take 2 tablets (1,000 mg total) by mouth 2 (two) times a day with meals, Disp: 120 tablet, Rfl: 5  •  multivitamin (THERAGRAN) TABS, Take 1 tablet by mouth daily, Disp: , Rfl:   •  omeprazole (PriLOSEC) 40 MG capsule, Take 1 capsule (40 mg total) by mouth daily, Disp: 90 capsule, Rfl: 1  •  simvastatin (ZOCOR) 40 mg tablet, TAKE 1 TABLET (40 MG TOTAL) BY MOUTH DAILY, Disp: 90 tablet, Rfl: 1  •  TRUEPLUS LANCETS 28G MISC, Test 1x/d, E11.29, Disp: , Rfl: 0           Patient Active Problem List   Diagnosis   • Diabetic polyneuropathy associated with type 2 diabetes mellitus (HCC)   • Allergic rhinitis   • Arthropathy   • PAD (peripheral artery disease) ([facility])   • Benign essential hypertension   • CAD (coronary artery disease)   • 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 mellitus ([address]   • Diabetic neuropathy (HCC)   • GE reflux   • Lumbar radiculopathy   • Rosacea   • Seborrheic dermatitis   • Type 2 diabetes mellitus with diabetic neuropathy ([facility])   • Onychomycosis   • Occlusion of femoral-popliteal bypass graft (HCC)   • Prostate cancer screening   • Dyslipidemia   • Screening for AAA (aortic abdominal aneurysm)   • Medicare annual wellness visit, subsequent   • Type 2 diabetes mellitus with stage 3 chronic kidney disease, without long-term current use of insulin ([facility])   • Pain in both feet   • Corns   • Plantar warts             Patient ID: Pernell Covarrubias is a 77 y. o. male.        The following portions of the patient's history were reviewed and updated as appropriate:      family history includes Aortic aneurysm in his mother; Heart attack in his father and sister; Heart disease in his father, paternal grandfather, and sister.       reports that he has never smoked. He has never used smokeless tobacco. He reports that he drinks alcohol. He reports that he does not use drugs.        Objective:  Patient's shoes and socks removed.   Foot ExamPhysical Exam          Physical Exam  Left Foot: Appearance: a deformity (ball of foot and distal fifth metatarsal ). Fifth toe deformities include hammer toe. Tenderness: None except the plantar aspect of the foot.    Right Foot: Appearance: a deformity (distal fifth metatarsal ). Left Ankle: ROM: limited ROM in all planes   Right Ankle: ROM: limited ROM in all planes   Neurological Exam: Light touch was decreased bilaterally. Vibratory sensation was decreased in both first metatarsophalangeal joints. Response to monofilament test was absent bilaterally. Deep tendon reflexes: achilles reflex 1/4 bilaterally. Vascular Exam: performed Dorsalis pedis pulses were 1/4 bilaterally. Posterior tibial pulses were 1/4 bilaterally. Elevation Pallor: diminished bilaterally. Capillary refill time was Q. 9, findings bilateral. Negative digital hair noted, positive abnormal cooling. All pre-ulcer, lesions debrided. Today, but between 1-3 seconds bilaterally. Edema: mild bilaterally and All mycotic nails debrided. Today. The patient was given orthotics to help control his feet and at as cushioning and padding on the bottom of his feet q9 findings. Toenails: All of the toenails were elongated, hypertrophied, discolored, ingrown, shown to have subungual debris and tender.      Socks and shoes removed, the Right Foot: the foot was dry. The sensory exam showed diminished vibratory sensation at the level of the toes. Diminished tactile sensation with monofilament testing throughout the right foot.   Socks and shoes removed, Left Foot: the foot was dry. The sensory exam showed diminished vibratory sensation at the level of the toes. Diminished tactile sensation with monofilament testing throughout the left foot.   Pulses:   1+ in the posterior tibialis on the right   1+ in the dorsalis pedis on the right. Capillary refills findings on the left were delayed in the toes. Pulses:   1+ in the posterior tibialis on the left   1+ in the dorsalis pedis on the left. Assign Risk Category: 2: Loss of protective sensation with or without weakness, deformity, callus, pre-ulcer, or history of ulceration. High risk.    Hyperkeratosis: present on both first sub metatarsals, present on both fifth sub metatarsals and Pre-ulcerative lesion, submetatarsal one to 5, bilateral.   Shoe Gear Evaluation: performed (). Recommendation(s): custom inlays.      Patient's shoes and socks removed. Right Foot/Ankle   Right Foot Inspection  Skin Exam: callus and callus               Toe Exam: swelling and erythema  Sensory   Vibration: diminished  Proprioception: diminished      Vascular  Capillary refills: elevated        Left Foot/Ankle  Left Foot Inspection  Skin Exam: callus.  Submetatarsal 5 of the left foot demonstrates 0.5 cm grade Gamino grade 1 ulcer.  Debrided.  20% remaining.   Cellulitis resolved.              Toe Exam: swelling and erythema                   Sensory   Vibration: diminished  Proprioception: diminished     Vascular  Capillary refills: elevated.     Patient's shoes and socks removed.           Assign Risk Category  Deformity present  Loss of protective sensation  Weak pulses  Risk: 2

## 2023-08-24 ENCOUNTER — OFFICE VISIT (OUTPATIENT)
Dept: PODIATRY | Facility: CLINIC | Age: 78
End: 2023-08-24
Payer: MEDICARE

## 2023-08-24 VITALS
DIASTOLIC BLOOD PRESSURE: 78 MMHG | SYSTOLIC BLOOD PRESSURE: 130 MMHG | WEIGHT: 168 LBS | RESPIRATION RATE: 18 BRPM | BODY MASS INDEX: 24.88 KG/M2 | HEIGHT: 69 IN

## 2023-08-24 DIAGNOSIS — E11.42 DIABETIC POLYNEUROPATHY ASSOCIATED WITH TYPE 2 DIABETES MELLITUS (HCC): ICD-10-CM

## 2023-08-24 DIAGNOSIS — L97.421 DIABETIC ULCER OF LEFT MIDFOOT ASSOCIATED WITH TYPE 2 DIABETES MELLITUS, LIMITED TO BREAKDOWN OF SKIN (HCC): Primary | ICD-10-CM

## 2023-08-24 DIAGNOSIS — M21.962 ACQUIRED DEFORMITY OF LEFT FOOT: ICD-10-CM

## 2023-08-24 DIAGNOSIS — E11.621 DIABETIC ULCER OF LEFT MIDFOOT ASSOCIATED WITH TYPE 2 DIABETES MELLITUS, LIMITED TO BREAKDOWN OF SKIN (HCC): Primary | ICD-10-CM

## 2023-08-24 DIAGNOSIS — I73.9 PAD (PERIPHERAL ARTERY DISEASE) (HCC): ICD-10-CM

## 2023-08-24 DIAGNOSIS — L03.116 CELLULITIS OF LEFT FOOT: ICD-10-CM

## 2023-08-24 DIAGNOSIS — B35.1 ONYCHOMYCOSIS: ICD-10-CM

## 2023-08-24 PROCEDURE — 99213 OFFICE O/P EST LOW 20 MIN: CPT | Performed by: PODIATRIST

## 2023-09-15 ENCOUNTER — APPOINTMENT (OUTPATIENT)
Dept: LAB | Facility: CLINIC | Age: 78
End: 2023-09-15
Payer: MEDICARE

## 2023-09-15 DIAGNOSIS — R19.7 DIARRHEA, UNSPECIFIED TYPE: ICD-10-CM

## 2023-09-15 DIAGNOSIS — I25.10 CORONARY ARTERY DISEASE INVOLVING NATIVE CORONARY ARTERY OF NATIVE HEART WITHOUT ANGINA PECTORIS: ICD-10-CM

## 2023-09-15 DIAGNOSIS — I10 BENIGN ESSENTIAL HYPERTENSION: ICD-10-CM

## 2023-09-15 DIAGNOSIS — E11.40 TYPE 2 DIABETES MELLITUS WITH DIABETIC NEUROPATHY, WITHOUT LONG-TERM CURRENT USE OF INSULIN (HCC): ICD-10-CM

## 2023-09-15 DIAGNOSIS — I50.1 HEART FAILURE, LEFT, WITH LVEF <=30% (HCC): ICD-10-CM

## 2023-09-15 DIAGNOSIS — E11.22 CKD STAGE 2 DUE TO TYPE 2 DIABETES MELLITUS: ICD-10-CM

## 2023-09-15 DIAGNOSIS — E78.5 DYSLIPIDEMIA: ICD-10-CM

## 2023-09-15 DIAGNOSIS — N18.2 CKD STAGE 2 DUE TO TYPE 2 DIABETES MELLITUS: ICD-10-CM

## 2023-09-15 DIAGNOSIS — E11.65 TYPE 2 DIABETES MELLITUS WITH HYPERGLYCEMIA, WITHOUT LONG-TERM CURRENT USE OF INSULIN (HCC): ICD-10-CM

## 2023-09-15 LAB
ANION GAP SERPL CALCULATED.3IONS-SCNC: 6 MMOL/L
BUN SERPL-MCNC: 16 MG/DL (ref 5–25)
CALCIUM SERPL-MCNC: 9.2 MG/DL (ref 8.4–10.2)
CHLORIDE SERPL-SCNC: 99 MMOL/L (ref 96–108)
CHOLEST SERPL-MCNC: 91 MG/DL
CO2 SERPL-SCNC: 25 MMOL/L (ref 21–32)
CREAT SERPL-MCNC: 1.02 MG/DL (ref 0.6–1.3)
CREAT UR-MCNC: 37 MG/DL
EST. AVERAGE GLUCOSE BLD GHB EST-MCNC: 157 MG/DL
GFR SERPL CREATININE-BSD FRML MDRD: 70 ML/MIN/1.73SQ M
GLUCOSE P FAST SERPL-MCNC: 145 MG/DL (ref 65–99)
HBA1C MFR BLD: 7.1 %
HDLC SERPL-MCNC: 31 MG/DL
LDLC SERPL CALC-MCNC: 49 MG/DL (ref 0–100)
MICROALBUMIN UR-MCNC: <7 MG/L
MICROALBUMIN/CREAT 24H UR: <19 MG/G CREATININE (ref 0–30)
NONHDLC SERPL-MCNC: 60 MG/DL
POTASSIUM SERPL-SCNC: 5 MMOL/L (ref 3.5–5.3)
SODIUM SERPL-SCNC: 130 MMOL/L (ref 135–147)
TRIGL SERPL-MCNC: 57 MG/DL

## 2023-09-15 PROCEDURE — 80061 LIPID PANEL: CPT

## 2023-09-15 PROCEDURE — 82043 UR ALBUMIN QUANTITATIVE: CPT

## 2023-09-15 PROCEDURE — 82985 ASSAY OF GLYCATED PROTEIN: CPT

## 2023-09-15 PROCEDURE — 83036 HEMOGLOBIN GLYCOSYLATED A1C: CPT

## 2023-09-15 PROCEDURE — 36415 COLL VENOUS BLD VENIPUNCTURE: CPT

## 2023-09-15 PROCEDURE — 82570 ASSAY OF URINE CREATININE: CPT

## 2023-09-15 PROCEDURE — 80048 BASIC METABOLIC PNL TOTAL CA: CPT

## 2023-09-16 LAB — FRUCTOSAMINE SERPL-SCNC: 253 UMOL/L (ref 0–285)

## 2023-09-19 ENCOUNTER — OFFICE VISIT (OUTPATIENT)
Dept: ENDOCRINOLOGY | Facility: CLINIC | Age: 78
End: 2023-09-19
Payer: MEDICARE

## 2023-09-19 VITALS
HEART RATE: 68 BPM | HEIGHT: 71 IN | BODY MASS INDEX: 23.13 KG/M2 | DIASTOLIC BLOOD PRESSURE: 80 MMHG | SYSTOLIC BLOOD PRESSURE: 136 MMHG | WEIGHT: 165.2 LBS | OXYGEN SATURATION: 99 %

## 2023-09-19 DIAGNOSIS — E11.40 TYPE 2 DIABETES MELLITUS WITH DIABETIC NEUROPATHY, WITHOUT LONG-TERM CURRENT USE OF INSULIN (HCC): ICD-10-CM

## 2023-09-19 DIAGNOSIS — E11.42 DIABETIC POLYNEUROPATHY ASSOCIATED WITH TYPE 2 DIABETES MELLITUS (HCC): ICD-10-CM

## 2023-09-19 DIAGNOSIS — E78.5 DYSLIPIDEMIA: ICD-10-CM

## 2023-09-19 DIAGNOSIS — E11.65 TYPE 2 DIABETES MELLITUS WITH HYPERGLYCEMIA, WITHOUT LONG-TERM CURRENT USE OF INSULIN (HCC): ICD-10-CM

## 2023-09-19 DIAGNOSIS — I10 BENIGN ESSENTIAL HYPERTENSION: ICD-10-CM

## 2023-09-19 DIAGNOSIS — N18.2 CKD STAGE 2 DUE TO TYPE 2 DIABETES MELLITUS: Primary | ICD-10-CM

## 2023-09-19 DIAGNOSIS — E11.22 CKD STAGE 2 DUE TO TYPE 2 DIABETES MELLITUS: Primary | ICD-10-CM

## 2023-09-19 DIAGNOSIS — E87.1 HYPONATREMIA: ICD-10-CM

## 2023-09-19 PROCEDURE — 99214 OFFICE O/P EST MOD 30 MIN: CPT | Performed by: NURSE PRACTITIONER

## 2023-09-19 RX ORDER — GLUCOSAM/CHON-MSM1/C/MANG/BOSW 500-416.6
TABLET ORAL
Qty: 200 EACH | Refills: 3 | Status: SHIPPED | OUTPATIENT
Start: 2023-09-19

## 2023-09-19 NOTE — PROGRESS NOTES
Established Patient Progress Note      CC: Type 2 Diabetes Mellitus      Impression & Plan:    Problem List Items Addressed This Visit        Endocrine    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 mellitus ([address]) - Primary       Lab Results   Component Value Date    HGBA1C 7.1 (H) 09/15/2023     Kidney function stable, microalbumin/creatinine ratio is within normal range. Hemoglobin A1c reasonable avoiding hypoglycemia given age. Diabetic neuropathy (HCC)       Lab Results   Component Value Date    HGBA1C 7.1 (H) 09/15/2023     Follows closely with Dr. Narendra Smith from podiatry. Followed most recently for a diabetic foot ulcer with secondary cellulitis in August 2023. Type 2 diabetes mellitus with diabetic neuropathy (HCC)       Lab Results   Component Value Date    HGBA1C 7.1 (H) 09/15/2023     Follows closely with podiatry. Relevant Medications    TRUEplus Lancets 28G MISC    Other Relevant Orders    Comprehensive metabolic panel    Hemoglobin A1C    Type 2 diabetes mellitus with hyperglycemia, without long-term current use of insulin (HCC)       Lab Results   Component Value Date    HGBA1C 7.1 (H) 09/15/2023     HGA1C is reasonable given age without history of hypoglycemic events. Would recommend avoiding hyoglycemia. Given history of glucose levels ranging 140-150 mg/dL per report, will continue Januvia 25 mg daiy and metformin 1000 mg twice daily. Will continue to trend kidney function. Discussed risks/complications associated with uncontrolled diabetes including organ involvement, heart attack, stroke, death. Advised lifestyle modifications including attention to diet including the amount and types of carbohydrates consumed and regular activity. Call for blood sugars less than 70 mg/dl or patterns over 250 mg/dL. Monitor blood glucose levels at least 1-2 times a day    Discussed symptoms and treatment of hypoglycemia. Routine follow up for diabetic eye and foot exams. Ordered blood work to complete prior to next visit. Follow up in 3 months. Relevant Medications    TRUEplus Lancets 28G MISC       Cardiovascular and Mediastinum    Benign essential hypertension     BP at goal on current regimen. Continues on ARB. Other    Dyslipidemia     Continues on statin therapy         Hyponatremia     Reached out to nephrology who has been following for review. Orders Placed This Encounter   Procedures   • Comprehensive metabolic panel     This is a patient instruction: Patient fasting for 8 hours or longer recommended. Standing Status:   Future     Standing Expiration Date:   9/19/2024   • Hemoglobin A1C     Standing Status:   Future     Standing Expiration Date:   9/19/2024       History of Present Illness:   Swapna Block is a 65 y.o. male with a history of type 2 diabetes without long term use of insulin. Reports complications of neuropathy follows with Dr. Bharati Albrecht, last seen in August 2023 followed for diabetic foot ulcer with secondary cellulitis. Most recent eye exam from Spring 2023 follows with Dr. Carlos Lundborg. Other significant past medical history includes hypertension, hyperlipidemia, CAD, and CKD. Denies recent illness or hospitalizations. Denies recent severe hypoglycemic or severe hyperglycemic episodes. Denies any issues with his current regimen including adverse GI effects of Januvia or Metformin denies worsening symptoms of HF including dyspnea or edema.  Home glucose monitoring: are performed regularly first morning and fasting ranging 140-150 mg/dL     Current regimen:   Januvia 25 mg orally   Metformin 1000 mg twice daily     Has hypertension: Taking losartan  Has hyperlipidemia: Taking atorvastatin     Patient Active Problem List   Diagnosis   • Coronary artery disease involving native coronary artery of native heart with angina pectoris ([address])   • 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 mellitus ([address])   • Diabetic neuropathy ([address]) • GE reflux   • Lumbar radiculopathy   • Type 2 diabetes mellitus with diabetic neuropathy (HCC)   • Prostate cancer screening   • Dyslipidemia   • Medicare annual wellness visit, subsequent   • Type 2 diabetes mellitus with hyperglycemia, without long-term current use of insulin ([facility])   • Benign essential hypertension   • Bilateral leg weakness   • Prostate cancer ([address])   • Insomnia, persistent   • Hyponatremia   • Orthostatic hypotension   • Chronic right-sided low back pain without sciatica   • Hypokalemia   • Acute on chronic combined systolic and diastolic congestive heart failure (HCC)   • Heart failure, left, 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)   • Chronic combined systolic and diastolic heart failure, NYHA class 2 ([facility])   • Edema of both feet   • Diarrhea   • Gait disorder   • Acute pain of left thigh   • Peripheral arterial disease ([facility])   • Iron deficiency anemia   • Uses roller walker      Past Medical History:   Diagnosis Date   • Acquired hallux valgus     last assessed: 8/1/2013   • Allergic rhinitis    • Anemia 10/26/2010   • Atrophy of nail     last assessed: 7/7/2015   • Chronic kidney disease     chronic renal insufficiency   • Coronary artery disease    • Deformity of ankle and foot, acquired     last assessed: 2/22/2016   • Difficulty walking     last assessed: 12/14/2015   • Dysesthesia     last assessed: 11/25/2016   • Hammer toe     unspecified laterality; last assessed: 8/1/2013   • Hypertension    • Onychomycosis of toenail     last assessed: 2/22/2016   • Peripheral vascular disease ([address])     left SFA stent in 2010   • Pes planus     unspecified laterality; last assessed: 8/1/2013   • Pneumonia     last assessed: 2/27/2016   • Prostate cancer [facility])    • 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     last assessed: 8/15/2016   • Type 2 diabetes mellitus ([address]) 07/26/2010   • Viral warts     last assessed: 7/24/2015      Past Surgical History:   Procedure Laterality Date   • CARDIAC CATHETERIZATION 07/29/2010   • CATARACT EXTRACTION, BILATERAL Bilateral     R 1999, L 2008   • COLONOSCOPY  2011   • FEMORAL ARTERY - POPLITEAL ARTERY BYPASS GRAFT  09/23/2013   • FOOT SURGERY      bone spur removed   • LEG SURGERY Bilateral     repair; L 8/20/2010 and 7/26/2012   • [place] PLMT INTERSTITIAL DEV RADIAT TX PROSTATE 1/MULT N/A 6/22/2021    Procedure: INSERTION OF FIDUCIAL MARKER (TRANSRECTAL ULTRASOUND GUIDANCE), SPACEOAR;  Surgeon: Marc Flores MD;  Location: [facility];  Service: Urology   • ROTATOR CUFF REPAIR Left 2009   • SHOULDER SURGERY Right 2005    collar bone      Family History   Problem Relation Age of Onset   • Heart disease Father    • Heart attack Father         acute myocardial infarction   • Heart disease Sister    • Heart attack Sister         acute myocardial infarction   • Heart disease Paternal Grandfather    • Aortic aneurysm Mother    • Throat cancer Maternal Grandfather      Social History     Tobacco Use   • Smoking status: Never     Passive exposure: Never   • Smokeless tobacco: Never   Substance Use Topics   • Alcohol use: Not Currently     Comment: being a social drinker/rare     No Known Allergies      Current Outpatient Medications:   •  Ascorbic Acid (Vitamin C) 500 MG CAPS, Take 500 mg by mouth daily, Disp: , Rfl:   •  aspirin (ECOTRIN LOW STRENGTH) 81 mg EC tablet, Take 81 mg by mouth daily, Disp: , Rfl:   •  atorvastatin (LIPITOR) 20 mg tablet, TAKE 1 TABLET EVERY DAY, Disp: 90 tablet, Rfl: 1  •  carvedilol (COREG) 12.5 mg tablet, Take 1 tablet (12.5 mg total) by mouth 2 (two) times a day, Disp: 180 tablet, Rfl: 1  •  Cholecalciferol (Vitamin D3) 50 MCG (2000 UT) TABS, Take 2,000 Units by mouth daily, Disp: , Rfl:   •  co-enzyme Q-10 100 mg capsule, Take 100 mg by mouth daily, Disp: , Rfl:   •  ferrous sulfate 324 (65 Fe) mg, Take 1 tablet (324 mg total) by mouth daily before breakfast, Disp: 90 tablet, Rfl: 3  •  losartan (COZAAR) 25 mg tablet, Take 1 tablet (25 mg total) by mouth daily, Disp: 90 tablet, Rfl: 1  •  metFORMIN (GLUCOPHAGE) 1000 MG tablet, TAKE ONE TABLET BY MOUTH TWO TIMES A DAY WITH MEALS, Disp: 180 tablet, Rfl: 1  •  multivitamin (THERAGRAN) TABS, Take 1 tablet by mouth daily, Disp: , Rfl:   •  omeprazole (PriLOSEC) 40 MG capsule, Take 1 capsule (40 mg total) by mouth daily as needed (acid reflux), Disp: 90 capsule, Rfl: 1  •  Probiotic Product (CULTURELLE PROBIOTICS) CHEW, Chew daily, Disp: , Rfl:   •  sitaGLIPtin (JANUVIA) 25 mg tablet, Take one tablet by mouth once a day, Disp: 90 tablet, Rfl: 3  •  spironolactone (ALDACTONE) 25 mg tablet, Take 1 tablet (25 mg total) by mouth daily, Disp: 90 tablet, Rfl: 1  •  tamsulosin (FLOMAX) 0.4 mg, Take 1 capsule (0.4 mg total) by mouth daily with dinner, Disp: 90 capsule, Rfl: 2  •  TRUEplus Lancets 28G MISC, Test 1x/d, E11.29, Disp: 200 each, Rfl: 3  •  acetaminophen (TYLENOL) 325 mg tablet, Take 2 tablets (650 mg total) by mouth every 6 (six) hours as needed for mild pain, headaches or fever, Disp: , Rfl: 0  •  brimonidine tartrate 0.2 % ophthalmic solution, Inject 0.2 % into the eye 2 (two) times a day, Disp: , Rfl:   •  glucose blood (OneTouch Ultra) test strip, Test blood sugar once a day, Disp: 100 strip, Rfl: 3  •  Infant Foods (Follow-Up) POWD, HANDICAP SERENA, medically recommended, <taxonomy [id number]> due to arthritic/orthopedic condition (#2,#5) (Patient not taking: Reported on 9/19/2022), Disp: 99 g, Rfl: 1    Review of Systems   Constitutional: Negative for appetite change, fatigue and unexpected weight change. HENT: Negative for trouble swallowing and voice change. Eyes: Negative for visual disturbance. Respiratory: Negative for shortness of breath. Cardiovascular: Negative for chest pain and palpitations. Gastrointestinal: Negative for abdominal distention, abdominal pain, constipation, diarrhea and vomiting. Endocrine: Negative for polydipsia and polyuria. Skin: Negative for color change and rash. Neurological: Negative for weakness or tremors. All other systems reviewed and are negative. Physical Exam:  Body mass index is 23.04 kg/m². /80 (BP Location: Left arm, Patient Position: Sitting, Cuff Size: Large)   Pulse 68   Ht 5' 11" (1.803 m)   Wt 74.9 kg (165 lb 3.2 oz)   SpO2 99%   BMI 23.04 kg/m²    Wt Readings from Last 3 Encounters:   09/19/23 74.9 kg (165 lb 3.2 oz)   08/24/23 76.2 kg (168 lb)   08/03/23 76.2 kg (168 lb)       Physical Exam  Diabetic Foot Exam    Labs:   Lab Results   Component Value Date    HGBA1C 7.1 (H) 09/15/2023    HGBA1C 6.9 (A) 06/19/2023    HGBA1C 6.9 (H) 07/05/2022     Lab Results   Component Value Date    CREATININE 1.02 09/15/2023    CREATININE 1.07 06/15/2023    CREATININE 1.06 05/12/2023    BUN 16 09/15/2023     04/17/2014    K 5.0 09/15/2023    CL 99 09/15/2023    CO2 25 09/15/2023     eGFR   Date Value Ref Range Status   09/15/2023 70 ml/min/1.73sq m Final     Lab Results   Component Value Date    CHOL 106 07/19/2014    HDL 31 (L) 09/15/2023    TRIG 57 09/15/2023     Lab Results   Component Value Date    ALT 23 06/15/2023    AST 17 06/15/2023    ALKPHOS 93 06/15/2023     Lab Results   Component Value Date    JGN8IJJRCGPV 2.817 02/18/2022    WMH3WZNYYWWC 1.640 12/27/2021    YXL3FOHMQZYG 1.392 07/20/2021     Lab Results   Component Value Date    FREET4 1.08 12/27/2021       Discussed with the patient and all questioned fully answered. He will call me if any problems arise. Follow-up appointment in 3 months.      Counseled patient on diagnostic results, prognosis, risk and benefit of treatment options, instruction for management, importance of treatment compliance, Risk  factor reduction and impressions      NEO Shah

## 2023-09-20 NOTE — ASSESSMENT & PLAN NOTE
Lab Results   Component Value Date    HGBA1C 7.1 (H) 09/15/2023     Kidney function stable, microalbumin/creatinine ratio is within normal range. Hemoglobin A1c reasonable avoiding hypoglycemia given age.

## 2023-09-20 NOTE — ASSESSMENT & PLAN NOTE
Lab Results   Component Value Date    HGBA1C 7.1 (H) 09/15/2023     HGA1C is reasonable given age without history of hypoglycemic events. Would recommend avoiding hyoglycemia. Given history of glucose levels ranging 140-150 mg/dL per report, will continue Januvia 25 mg daiy and metformin 1000 mg twice daily. Will continue to trend kidney function. Discussed risks/complications associated with uncontrolled diabetes including organ involvement, heart attack, stroke, death. Advised lifestyle modifications including attention to diet including the amount and types of carbohydrates consumed and regular activity. Call for blood sugars less than 70 mg/dl or patterns over 250 mg/dL. Monitor blood glucose levels at least 1-2 times a day    Discussed symptoms and treatment of hypoglycemia. Routine follow up for diabetic eye and foot exams. Ordered blood work to complete prior to next visit. Follow up in 3 months.

## 2023-09-20 NOTE — ASSESSMENT & PLAN NOTE
Lab Results   Component Value Date    HGBA1C 7.1 (H) 09/15/2023     Follows closely with Dr. Jl Steinberg from podiatry. Followed most recently for a diabetic foot ulcer with secondary cellulitis in August 2023.

## 2023-09-25 ENCOUNTER — TELEPHONE (OUTPATIENT)
Dept: NEPHROLOGY | Facility: HOSPITAL | Age: 78
End: 2023-09-25

## 2023-09-25 DIAGNOSIS — E87.1 HYPONATREMIA: ICD-10-CM

## 2023-09-25 DIAGNOSIS — I10 BENIGN ESSENTIAL HYPERTENSION: ICD-10-CM

## 2023-09-25 DIAGNOSIS — N18.2 CKD STAGE 2 DUE TO TYPE 2 DIABETES MELLITUS: Primary | ICD-10-CM

## 2023-09-25 DIAGNOSIS — E11.22 CKD STAGE 2 DUE TO TYPE 2 DIABETES MELLITUS: Primary | ICD-10-CM

## 2023-09-25 DIAGNOSIS — E87.6 HYPOKALEMIA: ICD-10-CM

## 2023-09-25 NOTE — TELEPHONE ENCOUNTER
Called patient and left a voicemail stating the following information:     Dr Taylor Chavez went over the lab work. Sodium level overall is slowly declining. Recommend a relative fluid restriction, 1 to 1-1/2 quarts per day. Recommend that he have a repeat BMP prior to his follow up in October. I asked the patient please call back with further questions. Labs have been added to the patients chart.

## 2023-09-27 DIAGNOSIS — I25.10 CORONARY ARTERY DISEASE INVOLVING NATIVE CORONARY ARTERY OF NATIVE HEART WITHOUT ANGINA PECTORIS: ICD-10-CM

## 2023-09-27 RX ORDER — ATORVASTATIN CALCIUM 20 MG/1
TABLET, FILM COATED ORAL
Qty: 90 TABLET | Refills: 1 | Status: SHIPPED | OUTPATIENT
Start: 2023-09-27

## 2023-10-03 ENCOUNTER — APPOINTMENT (OUTPATIENT)
Dept: LAB | Facility: CLINIC | Age: 78
End: 2023-10-03
Payer: MEDICARE

## 2023-10-03 DIAGNOSIS — I10 BENIGN ESSENTIAL HYPERTENSION: ICD-10-CM

## 2023-10-03 DIAGNOSIS — C61 PROSTATE CANCER (HCC): ICD-10-CM

## 2023-10-03 DIAGNOSIS — E11.40 TYPE 2 DIABETES MELLITUS WITH DIABETIC NEUROPATHY, WITHOUT LONG-TERM CURRENT USE OF INSULIN (HCC): ICD-10-CM

## 2023-10-03 DIAGNOSIS — E11.22 CKD STAGE 2 DUE TO TYPE 2 DIABETES MELLITUS: ICD-10-CM

## 2023-10-03 DIAGNOSIS — E87.6 HYPOKALEMIA: ICD-10-CM

## 2023-10-03 DIAGNOSIS — E87.1 HYPONATREMIA: ICD-10-CM

## 2023-10-03 DIAGNOSIS — N18.2 CKD STAGE 2 DUE TO TYPE 2 DIABETES MELLITUS: ICD-10-CM

## 2023-10-03 LAB
ALBUMIN SERPL BCP-MCNC: 3.7 G/DL (ref 3.5–5)
ALP SERPL-CCNC: 61 U/L (ref 34–104)
ALT SERPL W P-5'-P-CCNC: 13 U/L (ref 7–52)
ANION GAP SERPL CALCULATED.3IONS-SCNC: 8 MMOL/L
AST SERPL W P-5'-P-CCNC: 16 U/L (ref 13–39)
BILIRUB SERPL-MCNC: 0.54 MG/DL (ref 0.2–1)
BUN SERPL-MCNC: 19 MG/DL (ref 5–25)
CALCIUM SERPL-MCNC: 9 MG/DL (ref 8.4–10.2)
CHLORIDE SERPL-SCNC: 105 MMOL/L (ref 96–108)
CO2 SERPL-SCNC: 23 MMOL/L (ref 21–32)
CREAT SERPL-MCNC: 1 MG/DL (ref 0.6–1.3)
ERYTHROCYTE [DISTWIDTH] IN BLOOD BY AUTOMATED COUNT: 14.8 % (ref 11.6–15.1)
GFR SERPL CREATININE-BSD FRML MDRD: 71 ML/MIN/1.73SQ M
GLUCOSE P FAST SERPL-MCNC: 161 MG/DL (ref 65–99)
HCT VFR BLD AUTO: 28.7 % (ref 36.5–49.3)
HGB BLD-MCNC: 9.2 G/DL (ref 12–17)
MCH RBC QN AUTO: 30.4 PG (ref 26.8–34.3)
MCHC RBC AUTO-ENTMCNC: 32.1 G/DL (ref 31.4–37.4)
MCV RBC AUTO: 95 FL (ref 82–98)
PHOSPHATE SERPL-MCNC: 3.2 MG/DL (ref 2.3–4.1)
PLATELET # BLD AUTO: 252 THOUSANDS/UL (ref 149–390)
PMV BLD AUTO: 9.8 FL (ref 8.9–12.7)
POTASSIUM SERPL-SCNC: 4.7 MMOL/L (ref 3.5–5.3)
PROT SERPL-MCNC: 6.4 G/DL (ref 6.4–8.4)
PSA SERPL-MCNC: <0.01 NG/ML (ref 0–4)
RBC # BLD AUTO: 3.03 MILLION/UL (ref 3.88–5.62)
SODIUM SERPL-SCNC: 136 MMOL/L (ref 135–147)
WBC # BLD AUTO: 6.51 THOUSAND/UL (ref 4.31–10.16)

## 2023-10-03 PROCEDURE — 84153 ASSAY OF PSA TOTAL: CPT

## 2023-10-03 PROCEDURE — 85027 COMPLETE CBC AUTOMATED: CPT

## 2023-10-03 PROCEDURE — 84100 ASSAY OF PHOSPHORUS: CPT

## 2023-10-03 PROCEDURE — 80053 COMPREHEN METABOLIC PANEL: CPT

## 2023-10-03 PROCEDURE — 83036 HEMOGLOBIN GLYCOSYLATED A1C: CPT

## 2023-10-03 PROCEDURE — 36415 COLL VENOUS BLD VENIPUNCTURE: CPT

## 2023-10-04 LAB
EST. AVERAGE GLUCOSE BLD GHB EST-MCNC: 157 MG/DL
HBA1C MFR BLD: 7.1 %

## 2023-10-05 ENCOUNTER — OFFICE VISIT (OUTPATIENT)
Age: 78
End: 2023-10-05
Payer: MEDICARE

## 2023-10-05 VITALS
DIASTOLIC BLOOD PRESSURE: 68 MMHG | RESPIRATION RATE: 17 BRPM | WEIGHT: 165 LBS | HEART RATE: 72 BPM | BODY MASS INDEX: 23.1 KG/M2 | SYSTOLIC BLOOD PRESSURE: 120 MMHG | HEIGHT: 71 IN

## 2023-10-05 DIAGNOSIS — I73.9 PAD (PERIPHERAL ARTERY DISEASE) (HCC): ICD-10-CM

## 2023-10-05 DIAGNOSIS — M21.962 ACQUIRED DEFORMITY OF LEFT FOOT: ICD-10-CM

## 2023-10-05 DIAGNOSIS — B35.1 ONYCHOMYCOSIS: ICD-10-CM

## 2023-10-05 DIAGNOSIS — E11.621 DIABETIC ULCER OF LEFT MIDFOOT ASSOCIATED WITH TYPE 2 DIABETES MELLITUS, LIMITED TO BREAKDOWN OF SKIN (HCC): Primary | ICD-10-CM

## 2023-10-05 DIAGNOSIS — L97.421 DIABETIC ULCER OF LEFT MIDFOOT ASSOCIATED WITH TYPE 2 DIABETES MELLITUS, LIMITED TO BREAKDOWN OF SKIN (HCC): Primary | ICD-10-CM

## 2023-10-05 DIAGNOSIS — E11.42 DIABETIC POLYNEUROPATHY ASSOCIATED WITH TYPE 2 DIABETES MELLITUS (HCC): ICD-10-CM

## 2023-10-05 PROCEDURE — 99213 OFFICE O/P EST LOW 20 MIN: CPT | Performed by: PODIATRIST

## 2023-10-05 NOTE — PROGRESS NOTES
Assessment/Plan:  Deformity of foot.  Diabetic neuropathy.  Pain upon ambulation.  Pain upon ambulation.  Mycosis of nail.  Callus formation.     Diabetic foot ulcer with secondary cellulitis, resolved. Left foot deformity.     Plan.  Chart reviewed.  Lab work reviewed. Ryele Salazar debrided.   all mycotic nails debrided without pain or complication.  Foot exam performed.  Patient educated on care of the diabetic foot.  Plantar calluses debrided as well.  Patient remain on gabapentin as well.  Refill ordered.     Right foot ulcer debrided.  Utilizing 10 blade excisional debridement done of left foot diabetic foot ulcer.  All devitalized tissue debrided.  Bleeding base was 100% of the wound upon completion.  Gentian violet and dry sterile dressing applied. Amy Dasilva is advised on aftercare.  Return for follow-up.            Subjective:   patient is diabetic.  He has pain upon ambulation.  He has pain with shoe wear.  No history of trauma             Past Medical History:   Diagnosis Date   • Acquired hallux valgus       last assessed: 8/1/2013   • Allergic rhinitis     • Anemia 10/26/2010   • Atrophy of nail       last assessed: 7/7/2015   • Chronic kidney disease       chronic renal insufficiency   • Coronary artery disease     • Deformity of ankle and foot, acquired       last assessed: 2/22/2016   • Diabetes mellitus ([address])     • Difficulty walking       last assessed: 12/14/2015   • Dysesthesia       last assessed: 11/25/2016   • Hammer toe       unspecified laterality; last assessed: 8/1/2013   • Hypertension     • Onychomycosis of toenail       last assessed: 2/22/2016   • Peripheral vascular disease ([address])       left SFA stent in 2010   • Pes planus       unspecified laterality; last assessed: 8/1/2013   • Pneumonia       last assessed: 2/27/2016   • 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       last assessed: 8/15/2016   • Type 2 diabetes mellitus ([address]) 07/26/2010   • Viral warts       last assessed: 7/24/2015                 Past Surgical History:   Procedure Laterality Date   • CARDIAC CATHETERIZATION   07/29/2010   • CATARACT EXTRACTION, BILATERAL Bilateral       R 1999, L 2008   • FEMORAL ARTERY - POPLITEAL ARTERY BYPASS GRAFT   09/23/2013   • FOOT SURGERY         bone spur removed   • LEG SURGERY Bilateral       repair; L 8/20/2010 and 7/26/2012   • ROTATOR CUFF REPAIR Left 2009   • SHOULDER SURGERY Right 2005     collar bone         No Known Allergies        Current Outpatient Medications:   •  acarbose (PRECOSE) 100 MG tablet, Take 1 tablet (100 mg total) by mouth 3 (three) times a day with meals, Disp: 270 tablet, Rfl: 1  •  amLODIPine (NORVASC) 2.5 mg tablet, Take 1 tablet (2.5 mg total) by mouth daily (Patient not taking: Reported on 9/26/2019), Disp: 90 tablet, Rfl: 3  •  aspirin (ECOTRIN LOW STRENGTH) 81 mg EC tablet, Take 1 tablet (81 mg total) by mouth daily, Disp: 30 tablet, Rfl: 6  •  b complex-vitamin C-folic acid (NEPHROCAPS) 1 mg capsule, Take 1 capsule by mouth daily, Disp: , Rfl:   •  betamethasone dipropionate (DIPROSONE) 0.05 % cream, Use as dir twice daily, Disp: , Rfl:   •  carvedilol (COREG) 12.5 mg tablet, TAKE 1 TABLET TWICE DAILY, Disp: 180 tablet, Rfl: 1  •  gabapentin (NEURONTIN) 600 MG tablet, Take 1 tablet (600 mg total) by mouth 2 (two) times a day, Disp: 180 tablet, Rfl: 0  •  glimepiride (AMARYL) 4 mg tablet, Take 1 tablet (4 mg total) by mouth 2 (two) times a day for 126 days, Disp: 180 tablet, Rfl: 0  •  glucose blood (TRUETRACK TEST) test strip, 1 each by Other route daily Use as instructed for E11.29, Disp: 100 each, Rfl: 3  •  lisinopril (ZESTRIL) 5 mg tablet, Take 1 tablet (5 mg total) by mouth daily (Patient taking differently: Take 2.5 mg by mouth daily ), Disp: 90 tablet, Rfl: 3  •  metFORMIN (GLUMETZA) 500 MG (MOD) 24 hr tablet, Take 2 tablets (1,000 mg total) by mouth 2 (two) times a day with meals, Disp: 120 tablet, Rfl: 5  •  multivitamin (THERAGRAN) TABS, Take 1 tablet by mouth daily, Disp: , Rfl:   •  omeprazole (PriLOSEC) 40 MG capsule, Take 1 capsule (40 mg total) by mouth daily, Disp: 90 capsule, Rfl: 1  •  simvastatin (ZOCOR) 40 mg tablet, TAKE 1 TABLET (40 MG TOTAL) BY MOUTH DAILY, Disp: 90 tablet, Rfl: 1  •  TRUEPLUS LANCETS 28G MISC, Test 1x/d, E11.29, Disp: , Rfl: 0           Patient Active Problem List   Diagnosis   • Diabetic polyneuropathy associated with type 2 diabetes mellitus (HCC)   • Allergic rhinitis   • Arthropathy   • PAD (peripheral artery disease) ([facility])   • Benign essential hypertension   • CAD (coronary artery disease)   • 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 mellitus ([address]   • Diabetic neuropathy (HCC)   • GE reflux   • Lumbar radiculopathy   • Rosacea   • Seborrheic dermatitis   • Type 2 diabetes mellitus with diabetic neuropathy ([facility])   • Onychomycosis   • Occlusion of femoral-popliteal bypass graft (HCC)   • Prostate cancer screening   • Dyslipidemia   • Screening for AAA (aortic abdominal aneurysm)   • Medicare annual wellness visit, subsequent   • Type 2 diabetes mellitus with stage 3 chronic kidney disease, without long-term current use of insulin ([facility])   • Pain in both feet   • Corns   • Plantar warts             Patient ID: Pernell Covarrubias is a 77 y. o. male.        The following portions of the patient's history were reviewed and updated as appropriate:      family history includes Aortic aneurysm in his mother; Heart attack in his father and sister; Heart disease in his father, paternal grandfather, and sister.       reports that he has never smoked. He has never used smokeless tobacco. He reports that he drinks alcohol. He reports that he does not use drugs.        Objective:  Patient's shoes and socks removed.   Foot ExamPhysical Exam          Physical Exam  Left Foot: Appearance: a deformity (ball of foot and distal fifth metatarsal ). Fifth toe deformities include hammer toe. Tenderness: None except the plantar aspect of the foot.    Right Foot: Appearance: a deformity (distal fifth metatarsal ). Left Ankle: ROM: limited ROM in all planes   Right Ankle: ROM: limited ROM in all planes   Neurological Exam: Light touch was decreased bilaterally. Vibratory sensation was decreased in both first metatarsophalangeal joints. Response to monofilament test was absent bilaterally. Deep tendon reflexes: achilles reflex 1/4 bilaterally. Vascular Exam: performed Dorsalis pedis pulses were 1/4 bilaterally. Posterior tibial pulses were 1/4 bilaterally. Elevation Pallor: diminished bilaterally. Capillary refill time was Q. 9, findings bilateral. Negative digital hair noted, positive abnormal cooling. All pre-ulcer, lesions debrided. Today, but between 1-3 seconds bilaterally. Edema: mild bilaterally and All mycotic nails debrided. Today. The patient was given orthotics to help control his feet and at as cushioning and padding on the bottom of his feet q9 findings. Toenails: All of the toenails were elongated, hypertrophied, discolored, ingrown, shown to have subungual debris and tender.      Socks and shoes removed, the Right Foot: the foot was dry. The sensory exam showed diminished vibratory sensation at the level of the toes. Diminished tactile sensation with monofilament testing throughout the right foot.   Socks and shoes removed, Left Foot: the foot was dry. The sensory exam showed diminished vibratory sensation at the level of the toes. Diminished tactile sensation with monofilament testing throughout the left foot.   Pulses:   1+ in the posterior tibialis on the right   1+ in the dorsalis pedis on the right. Capillary refills findings on the left were delayed in the toes. Pulses:   1+ in the posterior tibialis on the left   1+ in the dorsalis pedis on the left. Assign Risk Category: 2: Loss of protective sensation with or without weakness, deformity, callus, pre-ulcer, or history of ulceration. High risk.    Hyperkeratosis: present on both first sub metatarsals, present on both fifth sub metatarsals and Pre-ulcerative lesion, submetatarsal one to 5, bilateral.   Shoe Gear Evaluation: performed (). Recommendation(s): custom inlays.      Patient's shoes and socks removed. Right Foot/Ankle   Right Foot Inspection  Skin Exam: callus and callus               Toe Exam: swelling and erythema  Sensory   Vibration: diminished  Proprioception: diminished      Vascular  Capillary refills: elevated        Left Foot/Ankle  Left Foot Inspection  Skin Exam: callus.  Submetatarsal 5 of the left foot demonstrates 0.5 cm grade Gamino grade 0/preulcerative callus. Cellulitis resolved.              Toe Exam: swelling and erythema                   Sensory   Vibration: diminished  Proprioception: diminished     Vascular  Capillary refills: elevated.     Patient's shoes and socks removed.           Assign Risk Category  Deformity present  Loss of protective sensation  Weak pulses  Risk: 2

## 2023-10-16 ENCOUNTER — APPOINTMENT (OUTPATIENT)
Dept: LAB | Facility: CLINIC | Age: 78
End: 2023-10-16
Payer: MEDICARE

## 2023-10-16 DIAGNOSIS — E11.22 CKD STAGE 2 DUE TO TYPE 2 DIABETES MELLITUS: ICD-10-CM

## 2023-10-16 DIAGNOSIS — N18.9 ANEMIA DUE TO CHRONIC KIDNEY DISEASE, UNSPECIFIED CKD STAGE: ICD-10-CM

## 2023-10-16 DIAGNOSIS — D63.1 ANEMIA DUE TO CHRONIC KIDNEY DISEASE, UNSPECIFIED CKD STAGE: ICD-10-CM

## 2023-10-16 DIAGNOSIS — N18.2 CKD STAGE 2 DUE TO TYPE 2 DIABETES MELLITUS: ICD-10-CM

## 2023-10-16 DIAGNOSIS — I50.42 CHRONIC COMBINED SYSTOLIC AND DIASTOLIC HEART FAILURE, NYHA CLASS 2 (HCC): ICD-10-CM

## 2023-10-16 DIAGNOSIS — I95.1 ORTHOSTATIC HYPOTENSION: ICD-10-CM

## 2023-10-16 LAB
CREAT UR-MCNC: 17.6 MG/DL
MICROALBUMIN UR-MCNC: <7 MG/L
MICROALBUMIN/CREAT 24H UR: <40 MG/G CREATININE (ref 0–30)

## 2023-10-16 PROCEDURE — 82043 UR ALBUMIN QUANTITATIVE: CPT

## 2023-10-16 PROCEDURE — 82570 ASSAY OF URINE CREATININE: CPT

## 2023-10-18 ENCOUNTER — OFFICE VISIT (OUTPATIENT)
Dept: CARDIOLOGY CLINIC | Facility: CLINIC | Age: 78
End: 2023-10-18
Payer: MEDICARE

## 2023-10-18 ENCOUNTER — OFFICE VISIT (OUTPATIENT)
Dept: NEPHROLOGY | Facility: CLINIC | Age: 78
End: 2023-10-18

## 2023-10-18 VITALS
OXYGEN SATURATION: 100 % | HEIGHT: 71 IN | HEART RATE: 73 BPM | DIASTOLIC BLOOD PRESSURE: 70 MMHG | WEIGHT: 168 LBS | SYSTOLIC BLOOD PRESSURE: 140 MMHG | BODY MASS INDEX: 23.52 KG/M2

## 2023-10-18 VITALS
BODY MASS INDEX: 23.66 KG/M2 | WEIGHT: 169 LBS | HEART RATE: 76 BPM | DIASTOLIC BLOOD PRESSURE: 70 MMHG | HEIGHT: 71 IN | SYSTOLIC BLOOD PRESSURE: 124 MMHG

## 2023-10-18 DIAGNOSIS — N18.30 CKD STAGE 3 DUE TO TYPE 2 DIABETES MELLITUS (HCC): ICD-10-CM

## 2023-10-18 DIAGNOSIS — I95.1 ORTHOSTATIC HYPOTENSION: ICD-10-CM

## 2023-10-18 DIAGNOSIS — I50.42 CHRONIC COMBINED SYSTOLIC AND DIASTOLIC HEART FAILURE, NYHA CLASS 2 (HCC): ICD-10-CM

## 2023-10-18 DIAGNOSIS — E78.5 DYSLIPIDEMIA: ICD-10-CM

## 2023-10-18 DIAGNOSIS — I50.1 HEART FAILURE, LEFT, WITH LVEF <=30% (HCC): ICD-10-CM

## 2023-10-18 DIAGNOSIS — I10 BENIGN ESSENTIAL HYPERTENSION: ICD-10-CM

## 2023-10-18 DIAGNOSIS — D63.1 ANEMIA DUE TO CHRONIC KIDNEY DISEASE, UNSPECIFIED CKD STAGE: ICD-10-CM

## 2023-10-18 DIAGNOSIS — E11.22 CKD STAGE 2 DUE TO TYPE 2 DIABETES MELLITUS: Primary | ICD-10-CM

## 2023-10-18 DIAGNOSIS — E11.40 TYPE 2 DIABETES MELLITUS WITH DIABETIC NEUROPATHY, WITHOUT LONG-TERM CURRENT USE OF INSULIN (HCC): ICD-10-CM

## 2023-10-18 DIAGNOSIS — N18.9 ANEMIA DUE TO CHRONIC KIDNEY DISEASE, UNSPECIFIED CKD STAGE: ICD-10-CM

## 2023-10-18 DIAGNOSIS — E11.22 CKD STAGE 3 DUE TO TYPE 2 DIABETES MELLITUS (HCC): ICD-10-CM

## 2023-10-18 DIAGNOSIS — I25.10 CORONARY ARTERY DISEASE INVOLVING NATIVE CORONARY ARTERY OF NATIVE HEART WITHOUT ANGINA PECTORIS: ICD-10-CM

## 2023-10-18 DIAGNOSIS — I70.219 ATHEROSCLEROSIS OF ARTERY OF EXTREMITY WITH INTERMITTENT CLAUDICATION (HCC): ICD-10-CM

## 2023-10-18 DIAGNOSIS — N18.2 CKD STAGE 2 DUE TO TYPE 2 DIABETES MELLITUS: Primary | ICD-10-CM

## 2023-10-18 PROCEDURE — 99214 OFFICE O/P EST MOD 30 MIN: CPT | Performed by: INTERNAL MEDICINE

## 2023-10-18 PROCEDURE — 93000 ELECTROCARDIOGRAM COMPLETE: CPT | Performed by: INTERNAL MEDICINE

## 2023-10-18 RX ORDER — LOSARTAN POTASSIUM 50 MG/1
50 TABLET ORAL DAILY
Qty: 90 TABLET | Refills: 1 | Status: SHIPPED | OUTPATIENT
Start: 2023-10-18 | End: 2023-10-18 | Stop reason: SDUPTHER

## 2023-10-18 RX ORDER — FERROUS SULFATE 324(65)MG
324 TABLET, DELAYED RELEASE (ENTERIC COATED) ORAL
Qty: 180 TABLET | Refills: 3 | Status: SHIPPED | OUTPATIENT
Start: 2023-10-18

## 2023-10-18 RX ORDER — LOSARTAN POTASSIUM 25 MG/1
25 TABLET ORAL DAILY
Qty: 90 TABLET | Refills: 1 | Status: SHIPPED | OUTPATIENT
Start: 2023-10-18

## 2023-10-18 NOTE — PROGRESS NOTES
Progress Note - Cardiology Office  [facility]    Jessica Antoine 65 y.o. male MRN: [id number]  : 1945  Encounter: [id number]      Assessment:     1. Coronary artery disease involving native coronary artery of native heart without angina pectoris    2. Benign essential hypertension    3. Chronic combined systolic and diastolic heart failure, NYHA class 2 ([facility])    4. Dyslipidemia    5. CKD stage 3 due to type 2 diabetes mellitus ([facility])    6. Type 2 diabetes mellitus with diabetic neuropathy, without long-term current use of insulin ([facility])    7. 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 Chronic systolic and diastolic heart failure. Patient was recently admitted with chronic systolic and diastolic heart failure. Repeat echo Doppler shows patient's EF is improved to 55% now less valvular disease we will continue diuretics that has tremendously helped. Continue losartan 25 mg. He has some orthostasis. Encouraged him to use stocking       2. Coronary artery disease. Status post multivessel stenting. Patient now want to follow up with [facility] cardiology. His nuclear stress test done in 2018 which shows no ischemia normal LV systolic function. Echo Doppler reviewed. 3. Hypertension. Blood pressure is borderline acceptable but he has orthostasis nephrology note noted we will continue same Rx      4. Dyslipidemia. Continue statins. He has blood test done in 2023 cholesterol profile acceptable. Electrolytes were stable. 5. Chronic renal insufficiency. Creatinine is acceptable 1.1    6. Carotid bruit. .  Carotid Doppler shows less than 50% stenosis on the left. No evidence of stenosis on the right side. Continue medical Rx follows up with vascular no other issues at this time    7. Diabetes mellitus. His blood sugar has been labile.   HbA1c 7.6.     8. History of prostate cancer. Patient get injectable medicine. He is cancer free. 9. History of anemia of chronic disease. Hemoglobin 9.2 he will be on iron    10. PVD. PVD with right femoropopliteal which is occluded and left SFA which has stent placed by me in 2010, had repeat procedure done with known right SFA stent stenosis underwent balloon angioplasty. He is able to walk he has seen the vascular. He may have InStent stenosis on the left and has occlusion of his bypass on the right. He follows with vascular no symptoms at this time continue medical Rx    Plan. Continue same Rx follow-up 6 months. Counseling :  A description of the counseling. All issues regarding tight sugar control, taking cholesterol medications, regular exercise, symptoms about coronary artery disease discussed with patient at length. Previous echo from 2014 reviewed. Goals and Barriers. Patient's ability to self care: Yes  Medication side effect reviewed with patient in detail and all their questions answered to their satisfaction. HPI :     Paul Christianson is a 65y.o. year old male who came for follow up. Patient has a past medical history significant for Coronary Artery artery disease, CK D stage III, Diabetes mellitus with renal manifestations, PVD with right femoropopliteal which has occluded and left SFA stent by me in 2010, hypertension, diabetic neuropathy who came to see us after his vascular study was found to be abnormal. For cardiac problem he generally sees Dr. Mio Goyal. His vascular study was ordered by his podiatrist as he was having pain in his foot. He also had a lot of back problems sometime pain radiates from his back to the thighs. Here a stent placed in his left SFA in 2010. It's already known that his right femoropopliteal is occluded. He has no fever no chills no nausea no vomiting no other significant complaint         11/21/2022. Above reviewed. Patient came for follow-up he says he feels well.   No cardiovascular issues but he continues to have fatigue and tiredness from his prostate cancer treatment which he gets every 6 months. His EF on echo was 45% with moderate MR and moderate TR and PA pressure was elevated. His nuclear stress test no ischemia and EF was noted to be lower. He is now on lisinopril Coreg Aldactone he does not need to take diuretics. Blood test done September 2022 has been acceptable. His weight has been stable now and his appetite has improved. Currently he has weight of 168 lb which has increased as compared to 5 lb. But he does not feel any shortness of breath on lower leg edema. He feels his appetite has improved. Currently from cardiac point of view he is on Coreg 12.5 twice a day, Lipitor 20 mg daily. He has stopped taking his Aldactone. Nephrology note noted he was recommended to continue Aldactone. He was also found to be orthostasis and was advised to use pressure stockings which he says he cannot use. He denies any dizziness or lightheadedness. 4/7/2023. Above reviewed. Patient came for follow-up. He is doing well his main issue is back problem. He has history of chronic fatigue and tiredness from his prostate cancer treatment. .  His last echo was EF 45% with moderate MR and moderate TR PA pressure was elevated. He is pretty compliant with his medications his weight has been stable. Denies any chest pain he walks with the help of walker. Currently he is taking Coreg 12.5 twice a day, Lipitor milligram and losartan low-dose and Aldactone 25 mg daily. His last blood test in December 2022 has been acceptable hemoglobin is 10.7 he has chronic anemia. He is using compression stockings regularly but denies any dizziness lightness. 10/18/2023. Above reviewed. Patient came for follow-up. He still continues to have chronic back problems. He is feeling better. He is pretty compliant with his medications and his med list reviewed.   EKG shows sinus rhythm with a heart rate 73 bpm which is not changed from previous EKG. His current medications include      Review of Systems   Constitutional:  Positive for fatigue. Negative for activity change, chills, diaphoresis, fever and unexpected weight change. HENT:  Negative for congestion. Eyes:  Negative for discharge and redness. Respiratory:  Negative for cough, chest tightness, shortness of breath and wheezing. Cardiovascular: Negative. Negative for chest pain, palpitations and leg swelling. Gastrointestinal:  Negative for abdominal pain, diarrhea and nausea. Endocrine: Negative. Genitourinary:  Negative for decreased urine volume and urgency. Musculoskeletal:  Positive for arthralgias, back pain and gait problem. Skin:  Negative for rash and wound. Allergic/Immunologic: Negative. Neurological:  Negative for dizziness, seizures, syncope, weakness, light-headedness and headaches. Hematological: Negative. Psychiatric/Behavioral:  Negative for agitation and confusion. The patient is not nervous/anxious.         Historical Information   Past Medical History:   Diagnosis Date   • Acquired hallux valgus     last assessed: 8/1/2013   • Allergic rhinitis    • Anemia 10/26/2010   • Atrophy of nail     last assessed: 7/7/2015   • Chronic kidney disease     chronic renal insufficiency   • Coronary artery disease    • Deformity of ankle and foot, acquired     last assessed: 2/22/2016   • Difficulty walking     last assessed: 12/14/2015   • Dysesthesia     last assessed: 11/25/2016   • Hammer toe     unspecified laterality; last assessed: 8/1/2013   • Hypertension    • Onychomycosis of toenail     last assessed: 2/22/2016   • Peripheral vascular disease ([address])     left SFA stent in 2010   • Pes planus     unspecified laterality; last assessed: 8/1/2013   • Pneumonia     last assessed: 2/27/2016   • Prostate cancer ([address])    • 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     last assessed: 8/15/2016   • Type 2 diabetes mellitus ([address] [facility]) 07/26/2010   • Viral warts     last assessed: 7/24/2015     Past Surgical History:   Procedure Laterality Date   • CARDIAC CATHETERIZATION  07/29/2010   • CATARACT EXTRACTION, BILATERAL Bilateral     R 1999, L 2008   • COLONOSCOPY  2011   • FEMORAL ARTERY - POPLITEAL ARTERY BYPASS GRAFT  09/23/2013   • FOOT SURGERY      bone spur removed   • LEG SURGERY Bilateral     repair; L 8/20/2010 and 7/26/2012   • CT PLMT INTERSTITIAL DEV RADIAT TX PROSTATE 1/MULT N/A 6/22/2021    Procedure: INSERTION OF FIDUCIAL MARKER (TRANSRECTAL ULTRASOUND GUIDANCE), SPACEOAR;  Surgeon: Radha Coles MD;  Location: [facility];  Service: Urology   • ROTATOR CUFF REPAIR Left 2009   • SHOULDER SURGERY Right 2005    collar bone     Social History     Substance and Sexual Activity   Alcohol Use Not Currently    Comment: being a social drinker/rare     Social History     Substance and Sexual Activity   Drug Use No     Social History     Tobacco Use   Smoking Status Never   • Passive exposure: Never   Smokeless Tobacco Never     Family History:   Family History   Problem Relation Age of Onset   • Heart disease Father    • Heart attack Father         acute myocardial infarction   • Heart disease Sister    • Heart attack Sister         acute myocardial infarction   • Heart disease Paternal Grandfather    • Aortic aneurysm Mother    • Throat cancer Maternal Grandfather        Meds/Allergies     No Known Allergies    Current Outpatient Medications:   •  acetaminophen (TYLENOL) 325 mg tablet, Take 2 tablets (650 mg total) by mouth every 6 (six) hours as needed for mild pain, headaches or fever, Disp: , Rfl: 0  •  Ascorbic Acid (Vitamin C) 500 MG CAPS, Take 500 mg by mouth daily, Disp: , Rfl:   •  aspirin (ECOTRIN LOW STRENGTH) 81 mg EC tablet, Take 81 mg by mouth daily, Disp: , Rfl:   •  atorvastatin (LIPITOR) 20 mg tablet, TAKE 1 TABLET EVERY DAY, Disp: 90 tablet, Rfl: 1  •  brimonidine tartrate 0.2 % ophthalmic solution, Inject 0.2 % into the eye 2 (two) times a day, Disp: , Rfl:   •  carvedilol (COREG) 12.5 mg tablet, Take 1 tablet (12.5 mg total) by mouth 2 (two) times a day, Disp: 180 tablet, Rfl: 1  •  Cholecalciferol (Vitamin D3) 50 MCG (2000 UT) TABS, Take 2,000 Units by mouth daily, Disp: , Rfl:   •  co-enzyme Q-10 100 mg capsule, Take 100 mg by mouth daily, Disp: , Rfl:   •  ferrous sulfate 324 (65 Fe) mg, Take 1 tablet (324 mg total) by mouth 2 (two) times a day before meals, Disp: 180 tablet, Rfl: 3  •  glucose blood (OneTouch Ultra) test strip, Test blood sugar once a day, Disp: 100 strip, Rfl: 3  •  losartan (COZAAR) 25 mg tablet, Take 1 tablet (25 mg total) by mouth daily, Disp: 90 tablet, Rfl: 1  •  metFORMIN (GLUCOPHAGE) 1000 MG tablet, TAKE ONE TABLET BY MOUTH TWO TIMES A DAY WITH MEALS, Disp: 180 tablet, Rfl: 1  •  multivitamin (THERAGRAN) TABS, Take 1 tablet by mouth daily, Disp: , Rfl:   •  omeprazole (PriLOSEC) 40 MG capsule, Take 1 capsule (40 mg total) by mouth daily as needed (acid reflux), Disp: 90 capsule, Rfl: 1  •  Probiotic Product (CULTURELLE PROBIOTICS) CHEW, Chew daily, Disp: , Rfl:   •  sitaGLIPtin (JANUVIA) 25 mg tablet, Take one tablet by mouth once a day, Disp: 90 tablet, Rfl: 3  •  spironolactone (ALDACTONE) 25 mg tablet, Take 1 tablet (25 mg total) by mouth daily, Disp: 90 tablet, Rfl: 1  •  tamsulosin (FLOMAX) 0.4 mg, Take 1 capsule (0.4 mg total) by mouth daily with dinner, Disp: 90 capsule, Rfl: 2  •  TRUEplus Lancets 28G MISC, Test 1x/d, E11.29, Disp: 200 each, Rfl: 3  •  Infant Foods (Follow-Up) POWD, HANDICAP PLACARD, medically recommended, <taxonomy [id number]> due to arthritic/orthopedic condition (#2,#5) (Patient not taking: Reported on 9/19/2022), Disp: 99 g, Rfl: 1    Vitals: Blood pressure 140/70, pulse 73, height 5' 11" (1.803 m), weight 76.2 kg (168 lb), SpO2 100 %. ?  Body mass index is 23.43 kg/m².   Vitals:    10/18/23 1254   Weight: 76.2 kg (168 lb)     BP Readings from Last 3 Encounters:   10/18/23 140/70   10/18/23 124/70   10/05/23 120/68         Physical Exam  Constitutional:       General: He is not in acute distress. Appearance: He is well-developed. He is not diaphoretic. Neck:      Thyroid: No thyromegaly. Vascular: No JVD. Trachea: No tracheal deviation. Cardiovascular:      Rate and Rhythm: Normal rate and regular rhythm. Heart sounds: S1 normal and S2 normal. Heart sounds not distant. Murmur heard. Systolic (ejection) murmur is present with a grade of 2/6. No friction rub. No gallop. No S3 or S4 sounds. Pulmonary:      Effort: Pulmonary effort is normal. No respiratory distress. Breath sounds: Normal breath sounds. No wheezing or rales. Chest:      Chest wall: No tenderness. Abdominal:      General: Bowel sounds are normal. There is no distension. Palpations: Abdomen is soft. Tenderness: There is no abdominal tenderness. Musculoskeletal:         General: No deformity. Cervical back: Neck supple. Skin:     General: Skin is warm and dry. Coloration: Skin is not pale. Findings: No rash. Neurological:      Mental Status: He is alert and oriented to person, place, and time. Psychiatric:         Behavior: Behavior normal.         Judgment: Judgment normal.         Diagnostic Studies Review Cardio:    Nuclear stress test.  Nuclear stress test done 09/27/2018 was a normal study with EF around 60%. No ischemia noted it was Lexiscan stress test.    Nuclear Stress test.  Nuclear stress test done in February 2022 shows no significant perfusion abnormality EF was severely reduced. Diaphragmatic attenuation EF calculated was around 30%    Echo Doppler. Echo Doppler done 10/14/2020 shows EF 55 60%, mild MR, trace to mild PI, pressure half time was 660 millisecond     Echo Doppler.   Echo Doppler done 02/19/2022 shows EF 52%, diastolic dysfunction mildly abnormal grade 1, no aortic stenosis or regurgitation, moderate TR with PA pressure 58 mmHg, moderate MR. Repeat echo Doppler done 4/11/2023 shows his EF is now 55%, his mild AI, mild MR and mild TR and PA pressure is around 25. EKG:    Twelve lead EKG done on 09/13/2018 shows normal sinus rhythm heart rate 60 beats per minute. No significant ST changes. Twelve lead EKG done 05/17/2019 shows normal sinus rhythm heart rate 60 beats per minute. No change from old EKG. Twelve lead EKG 03/10/2020 shows normal sinus rhythm LVH by voltage heart rate 60 beats per minute no change from old EKG. Twelve lead EKG 09/23/2020 shows normal sinus rhythm LVH by voltage. Q-waves in inferior leads cannot rule out 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. Twelve lead EKG 03/08/2021 shows normal sinus rhythm LVH by voltage and Q-waves in inferior leads cannot rule out 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ion. Twelve lead EKG done on 02/08/2022 shows sinus rhythm heart rate 98 beats per minute LVH by voltage as compared to previous EKGs his heart function is noted to be faster. Inferior Q-waves noted cannot rule out 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ion. Nonspecific ST changes in lateral precordial leads    Twelve lead EKG done on 11/21/2022 shows normal sinus rhythm heart rate 76 beats per minute LVH by voltage. Q-wave noted inferiorly, not changed from previous EKG. Twelve-lead EKG done 10/18/2020 shows normal sinus some heart rate 73 bpm no change from previous EKG.         Lab Review   Lab Results   Component Value Date    WBC 6.51 10/03/2023    HGB 9.2 (L) 10/03/2023    HCT 28.7 (L) 10/03/2023    MCV 95 10/03/2023    RDW 14.8 10/03/2023     10/03/2023     BMP:  Lab Results   Component Value Date     04/17/2014    K 4.7 10/03/2023     10/03/2023    CO2 23 10/03/2023    ANIONGAP 8 04/17/2014    BUN 19 10/03/2023    CREATININE 1.00 10/03/2023    GLUCOSE 137 04/17/2014    GLUF 161 (H) 10/03/2023    CALCIUM 9.0 10/03/2023    CORRECTEDCA 9.7 05/12/2023    EGFR 71 10/03/2023    MG 1.9 03/03/2022     LFT:  Lab Results   Component Value Date    AST 16 10/03/2023    ALT 13 10/03/2023    ALKPHOS 61 10/03/2023    TP 6.4 10/03/2023       Lab Results   Component Value Date    HGBA1C 7.1 (H) 10/03/2023     Lipid Profile:   Lab Results   Component Value Date    CHOL 106 07/19/2014    HDL 31 (L) 09/15/2023    LDLCALC 49 09/15/2023    TRIG 57 09/15/2023     Lab Results   Component Value Date    CHOL 106 07/19/2014       Dr. Jazmin Mercado MD [facility]      "This note has been constructed using a voice recognition system. Therefore there may be syntax, spelling, and/or grammatical errors.  Please call if you have any questions. "

## 2023-10-18 NOTE — PROGRESS NOTES
NEPHROLOGY OUTPATIENT PROGRESS NOTE   Truman Ren 65 y.o. male MRN: [id number]  Reason for visit: Chronic kidney disease    ASSESSMENT and PLAN:  Chronic kidney disease, stage II with previous noted microalbuminuria. Previous ratio undetectable. Creatinine stable at 1.0.  eGFR greater than 60. Chronic orthostasis, repeat orthostatic blood pressures today: 160/60 sitting, standing blood pressure 120/50. Patient asymptomatic. No changes in his current antihypertensive regimen  Cardiomyopathy, ejection fraction 55% with noted diastolic dysfunction  Acute on chronic anemia worsening hemoglobin currently 9.2. Increase iron supplementation to twice daily. Recommend checking stools, iron stores and repeat CBC in 1 to 2 weeks. Diabetes with recent hemoglobin A1c at 7.1  Lower extremity diabetic wound, following closely with podiatry, reportedly has healed. Peripheral vascular disease, continue follow-up with vascular surgery    Renal function overall appears stable  Patient with recurrent and persistent orthostasis remains relatively asymptomatic. No changes in his current regimen  Given worsening anemia recommend checking stools, iron studies and repeat CBC in 2 weeks empirically increase iron supplementation to twice daily  Await repeat laboratory studies otherwise again we will repeat labs in 3 months and then again in 6 months with follow-up at that time. SUBJECTIVE / INTERVAL HISTORY:  He has been doing reasonably well. Denies any recent hospitalizations. Denies any appetite changes. Complains of chronic diarrhea. Denies any black stools. Denies any dizziness lightheadedness or syncopal episodes. No reports of chest pain or shortness of breath. Does have chronic lower extremity swelling.       OBJECTIVE:  /70 (BP Location: Left arm, Patient Position: Sitting, Cuff Size: Standard)   Pulse 76   Ht 5' 11" (1.803 m)   Wt 76.7 kg (169 lb)   BMI 23.57 kg/m²   Vitals:    10/18/23 7341 Weight: 76.7 kg (169 lb)       Physical Exam  Constitutional:       Appearance: He is not ill-appearing. Eyes:      General: No scleral icterus. Cardiovascular:      Rate and Rhythm: Normal rate and regular rhythm. Pulmonary:      Effort: Pulmonary effort is normal.      Breath sounds: Normal breath sounds. Abdominal:      General: There is no distension. Palpations: Abdomen is soft. Tenderness: There is no abdominal tenderness. Musculoskeletal:      Right lower leg: Edema present. Left lower leg: Edema present. Skin:     General: Skin is warm and dry. Coloration: Skin is pale. Skin is not jaundiced. Findings: No rash. Neurological:      Mental Status: He is alert and oriented to person, place, and time.            Medications:    Current Outpatient Medications:     acetaminophen (TYLENOL) 325 mg tablet, Take 2 tablets (650 mg total) by mouth every 6 (six) hours as needed for mild pain, headaches or fever, Disp: , Rfl: 0    Ascorbic Acid (Vitamin C) 500 MG CAPS, Take 500 mg by mouth daily, Disp: , Rfl:     aspirin (ECOTRIN LOW STRENGTH) 81 mg EC tablet, Take 81 mg by mouth daily, Disp: , Rfl:     atorvastatin (LIPITOR) 20 mg tablet, TAKE 1 TABLET EVERY DAY, Disp: 90 tablet, Rfl: 1    brimonidine tartrate 0.2 % ophthalmic solution, Inject 0.2 % into the eye 2 (two) times a day, Disp: , Rfl:     carvedilol (COREG) 12.5 mg tablet, Take 1 tablet (12.5 mg total) by mouth 2 (two) times a day, Disp: 180 tablet, Rfl: 1    Cholecalciferol (Vitamin D3) 50 MCG (2000 UT) TABS, Take 2,000 Units by mouth daily, Disp: , Rfl:     co-enzyme Q-10 100 mg capsule, Take 100 mg by mouth daily, Disp: , Rfl:     ferrous sulfate 324 (65 Fe) mg, Take 1 tablet (324 mg total) by mouth 2 (two) times a day before meals, Disp: 180 tablet, Rfl: 3    glucose blood (OneTouch Ultra) test strip, Test blood sugar once a day, Disp: 100 strip, Rfl: 3    losartan (COZAAR) 25 mg tablet, Take 1 tablet (25 mg total) by mouth daily, Disp: 90 tablet, Rfl: 1    metFORMIN (GLUCOPHAGE) 1000 MG tablet, TAKE ONE TABLET BY MOUTH TWO TIMES A DAY WITH MEALS, Disp: 180 tablet, Rfl: 1    multivitamin (THERAGRAN) TABS, Take 1 tablet by mouth daily, Disp: , Rfl:     omeprazole (PriLOSEC) 40 MG capsule, Take 1 capsule (40 mg total) by mouth daily as needed (acid reflux), Disp: 90 capsule, Rfl: 1    Probiotic Product (CULTURELLE PROBIOTICS) CHEW, Chew daily, Disp: , Rfl:     sitaGLIPtin (JANUVIA) 25 mg tablet, Take one tablet by mouth once a day, Disp: 90 tablet, Rfl: 3    spironolactone (ALDACTONE) 25 mg tablet, Take 1 tablet (25 mg total) by mouth daily, Disp: 90 tablet, Rfl: 1    tamsulosin (FLOMAX) 0.4 mg, Take 1 capsule (0.4 mg total) by mouth daily with dinner, Disp: 90 capsule, Rfl: 2    TRUEplus Lancets 28G MISC, Test 1x/d, E11.29, Disp: 200 each, Rfl: 3    Infant Foods (Follow-Up) POWD, HANDICAP PLACARD, medically recommended, <taxonomy [id number]> due to arthritic/orthopedic condition (#2,#5) (Patient not taking: Reported on 9/19/2022), Disp: 99 g, Rfl: 1    Laboratory Results:  Results for orders placed or performed in visit on 10/16/23   Microalbumin / creatinine urine ratio   Result Value Ref Range    Creatinine, Ur 17.6 Reference range not established. mg/dL    Albumin,U,Random <7.0 <20.0 mg/L    Albumin Creat Ratio <40 (H) 0 - 30 mg/g creatinine

## 2023-10-25 ENCOUNTER — APPOINTMENT (OUTPATIENT)
Dept: LAB | Facility: CLINIC | Age: 78
End: 2023-10-25
Payer: MEDICARE

## 2023-10-25 DIAGNOSIS — E11.22 CKD STAGE 2 DUE TO TYPE 2 DIABETES MELLITUS: ICD-10-CM

## 2023-10-25 DIAGNOSIS — I50.1 HEART FAILURE, LEFT, WITH LVEF <=30% (HCC): ICD-10-CM

## 2023-10-25 DIAGNOSIS — N18.2 CKD STAGE 2 DUE TO TYPE 2 DIABETES MELLITUS: ICD-10-CM

## 2023-10-25 DIAGNOSIS — N18.9 ANEMIA DUE TO CHRONIC KIDNEY DISEASE, UNSPECIFIED CKD STAGE: ICD-10-CM

## 2023-10-25 DIAGNOSIS — I50.42 CHRONIC COMBINED SYSTOLIC AND DIASTOLIC HEART FAILURE, NYHA CLASS 2 (HCC): ICD-10-CM

## 2023-10-25 DIAGNOSIS — I95.1 ORTHOSTATIC HYPOTENSION: ICD-10-CM

## 2023-10-25 DIAGNOSIS — D63.1 ANEMIA DUE TO CHRONIC KIDNEY DISEASE, UNSPECIFIED CKD STAGE: ICD-10-CM

## 2023-10-25 LAB
ALBUMIN SERPL BCP-MCNC: 3.9 G/DL (ref 3.5–5)
ANION GAP SERPL CALCULATED.3IONS-SCNC: 9 MMOL/L
BUN SERPL-MCNC: 24 MG/DL (ref 5–25)
CALCIUM SERPL-MCNC: 9.4 MG/DL (ref 8.4–10.2)
CHLORIDE SERPL-SCNC: 102 MMOL/L (ref 96–108)
CO2 SERPL-SCNC: 25 MMOL/L (ref 21–32)
CREAT SERPL-MCNC: 0.92 MG/DL (ref 0.6–1.3)
ERYTHROCYTE [DISTWIDTH] IN BLOOD BY AUTOMATED COUNT: 14.6 % (ref 11.6–15.1)
FERRITIN SERPL-MCNC: 86 NG/ML (ref 24–336)
GFR SERPL CREATININE-BSD FRML MDRD: 79 ML/MIN/1.73SQ M
GLUCOSE P FAST SERPL-MCNC: 142 MG/DL (ref 65–99)
HCT VFR BLD AUTO: 29.3 % (ref 36.5–49.3)
HGB BLD-MCNC: 9.4 G/DL (ref 12–17)
IRON SATN MFR SERPL: 13 % (ref 15–50)
IRON SERPL-MCNC: 33 UG/DL (ref 50–212)
MCH RBC QN AUTO: 30.5 PG (ref 26.8–34.3)
MCHC RBC AUTO-ENTMCNC: 32.1 G/DL (ref 31.4–37.4)
MCV RBC AUTO: 95 FL (ref 82–98)
PHOSPHATE SERPL-MCNC: 3.3 MG/DL (ref 2.3–4.1)
PLATELET # BLD AUTO: 319 THOUSANDS/UL (ref 149–390)
PMV BLD AUTO: 10.1 FL (ref 8.9–12.7)
POTASSIUM SERPL-SCNC: 4.8 MMOL/L (ref 3.5–5.3)
RBC # BLD AUTO: 3.08 MILLION/UL (ref 3.88–5.62)
SODIUM SERPL-SCNC: 136 MMOL/L (ref 135–147)
TIBC SERPL-MCNC: 253 UG/DL (ref 250–450)
UIBC SERPL-MCNC: 220 UG/DL (ref 155–355)
URATE SERPL-MCNC: 5.2 MG/DL (ref 3.5–8.5)
WBC # BLD AUTO: 6.71 THOUSAND/UL (ref 4.31–10.16)

## 2023-10-25 PROCEDURE — 80069 RENAL FUNCTION PANEL: CPT

## 2023-10-25 PROCEDURE — 84550 ASSAY OF BLOOD/URIC ACID: CPT

## 2023-10-25 PROCEDURE — 85027 COMPLETE CBC AUTOMATED: CPT

## 2023-10-25 PROCEDURE — 83550 IRON BINDING TEST: CPT

## 2023-10-25 PROCEDURE — 82728 ASSAY OF FERRITIN: CPT

## 2023-10-25 PROCEDURE — 83540 ASSAY OF IRON: CPT

## 2023-10-25 PROCEDURE — 36415 COLL VENOUS BLD VENIPUNCTURE: CPT

## 2023-10-25 PROCEDURE — G0328 FECAL BLOOD SCRN IMMUNOASSAY: HCPCS

## 2023-10-26 DIAGNOSIS — N18.2 CKD STAGE 2 DUE TO TYPE 2 DIABETES MELLITUS: ICD-10-CM

## 2023-10-26 DIAGNOSIS — E11.22 CKD STAGE 2 DUE TO TYPE 2 DIABETES MELLITUS: ICD-10-CM

## 2023-10-26 DIAGNOSIS — N18.30 ANEMIA DUE TO STAGE 3 CHRONIC KIDNEY DISEASE, UNSPECIFIED WHETHER STAGE 3A OR 3B CKD: ICD-10-CM

## 2023-10-26 DIAGNOSIS — D63.1 ANEMIA DUE TO STAGE 3 CHRONIC KIDNEY DISEASE, UNSPECIFIED WHETHER STAGE 3A OR 3B CKD: ICD-10-CM

## 2023-10-26 DIAGNOSIS — I95.1 ORTHOSTATIC HYPOTENSION: Primary | ICD-10-CM

## 2023-10-26 DIAGNOSIS — I50.1 HEART FAILURE, LEFT, WITH LVEF <=30% (HCC): ICD-10-CM

## 2023-10-26 LAB — HEMOCCULT STL QL IA: NEGATIVE

## 2023-10-27 ENCOUNTER — TELEPHONE (OUTPATIENT)
Dept: OTHER | Facility: HOSPITAL | Age: 78
End: 2023-10-27

## 2023-10-27 NOTE — TELEPHONE ENCOUNTER
Reviewed laboratory studies. Message left. Renal function overall appears stable. Hemoglobin slightly improved at 9.4. Stool is negative for signs of bleeding. Iron stores however still remains significantly low. Recommend continuation of twice daily iron supplementation. No other changes.

## 2023-11-07 DIAGNOSIS — E11.65 TYPE 2 DIABETES MELLITUS WITH HYPERGLYCEMIA, WITHOUT LONG-TERM CURRENT USE OF INSULIN (HCC): ICD-10-CM

## 2023-11-08 ENCOUNTER — OFFICE VISIT (OUTPATIENT)
Age: 78
End: 2023-11-08
Payer: MEDICARE

## 2023-11-08 VITALS — WEIGHT: 168 LBS | BODY MASS INDEX: 23.52 KG/M2 | HEIGHT: 71 IN | RESPIRATION RATE: 16 BRPM

## 2023-11-08 DIAGNOSIS — I73.9 PAD (PERIPHERAL ARTERY DISEASE) (HCC): ICD-10-CM

## 2023-11-08 DIAGNOSIS — B35.1 ONYCHOMYCOSIS: ICD-10-CM

## 2023-11-08 DIAGNOSIS — M21.962 ACQUIRED DEFORMITY OF LEFT FOOT: ICD-10-CM

## 2023-11-08 DIAGNOSIS — M79.672 PAIN IN BOTH FEET: ICD-10-CM

## 2023-11-08 DIAGNOSIS — E11.42 DIABETIC POLYNEUROPATHY ASSOCIATED WITH TYPE 2 DIABETES MELLITUS (HCC): ICD-10-CM

## 2023-11-08 DIAGNOSIS — M79.671 PAIN IN BOTH FEET: ICD-10-CM

## 2023-11-08 DIAGNOSIS — L97.421 DIABETIC ULCER OF LEFT MIDFOOT ASSOCIATED WITH TYPE 2 DIABETES MELLITUS, LIMITED TO BREAKDOWN OF SKIN (HCC): Primary | ICD-10-CM

## 2023-11-08 DIAGNOSIS — E11.621 DIABETIC ULCER OF LEFT MIDFOOT ASSOCIATED WITH TYPE 2 DIABETES MELLITUS, LIMITED TO BREAKDOWN OF SKIN (HCC): Primary | ICD-10-CM

## 2023-11-08 PROCEDURE — 99213 OFFICE O/P EST LOW 20 MIN: CPT | Performed by: PODIATRIST

## 2023-11-08 NOTE — PROGRESS NOTES
Assessment/Plan: Preulcerative callus/Gamino grade 0 ulcer plantar aspect left foot. Acquired deformity foot. History of metatarsal resection. Diabetic foot. Diabetic neuropathy. Peripheral artery disease. Mycosis of nail. Plan. Chart reviewed. Patient examined. Diabetic foot exam performed. Today all nails debrided. Preulcerative calluses debrided. Patient watch for signs of infection. Aftercare instruction given. Return for follow-up. Diagnoses and all orders for this visit:    Diabetic ulcer of left midfoot associated with type 2 diabetes mellitus, limited to breakdown of skin ([address])    Diabetic polyneuropathy associated with type 2 diabetes mellitus (HCC)    PAD (peripheral artery disease) ([address])    Onychomycosis    Acquired deformity of left foot    Pain in both feet          Subjective: Patient is doing much better. He is not bandaging his foot. He sees no evidence of blood in his socks. He no history of fever or night sweats.   Patient is diabetic    No Known Allergies      Current Outpatient Medications:     acetaminophen (TYLENOL) 325 mg tablet, Take 2 tablets (650 mg total) by mouth every 6 (six) hours as needed for mild pain, headaches or fever, Disp: , Rfl: 0    Ascorbic Acid (Vitamin C) 500 MG CAPS, Take 500 mg by mouth daily, Disp: , Rfl:     aspirin (ECOTRIN LOW STRENGTH) 81 mg EC tablet, Take 81 mg by mouth daily, Disp: , Rfl:     atorvastatin (LIPITOR) 20 mg tablet, TAKE 1 TABLET EVERY DAY, Disp: 90 tablet, Rfl: 1    brimonidine tartrate 0.2 % ophthalmic solution, Inject 0.2 % into the eye 2 (two) times a day, Disp: , Rfl:     carvedilol (COREG) 12.5 mg tablet, Take 1 tablet (12.5 mg total) by mouth 2 (two) times a day, Disp: 180 tablet, Rfl: 1    Cholecalciferol (Vitamin D3) 50 MCG (2000 UT) TABS, Take 2,000 Units by mouth daily, Disp: , Rfl:     co-enzyme Q-10 100 mg capsule, Take 100 mg by mouth daily, Disp: , Rfl:     ferrous sulfate 324 (65 Fe) mg, Take 1 tablet (324 mg total) by mouth 2 (two) times a day before meals, Disp: 180 tablet, Rfl: 3    glucose blood (OneTouch Ultra) test strip, Test blood sugar once a day, Disp: 100 strip, Rfl: 3    losartan (COZAAR) 25 mg tablet, Take 1 tablet (25 mg total) by mouth daily, Disp: 90 tablet, Rfl: 1    metFORMIN (GLUCOPHAGE) 1000 MG tablet, TAKE ONE TABLET BY MOUTH TWO TIMES A DAY WITH MEALS, Disp: 180 tablet, Rfl: 1    multivitamin (THERAGRAN) TABS, Take 1 tablet by mouth daily, Disp: , Rfl:     omeprazole (PriLOSEC) 40 MG capsule, Take 1 capsule (40 mg total) by mouth daily as needed (acid reflux), Disp: 90 capsule, Rfl: 1    Probiotic Product (CULTURELLE PROBIOTICS) CHEW, Chew daily, Disp: , Rfl:     sitaGLIPtin (JANUVIA) 25 mg tablet, Take one tablet by mouth once a day, Disp: 90 tablet, Rfl: 3    spironolactone (ALDACTONE) 25 mg tablet, Take 1 tablet (25 mg total) by mouth daily, Disp: 90 tablet, Rfl: 1    tamsulosin (FLOMAX) 0.4 mg, Take 1 capsule (0.4 mg total) by mouth daily with dinner, Disp: 90 capsule, Rfl: 2    TRUEplus Lancets 28G MISC, Test 1x/d, E11.29, Disp: 200 each, Rfl: 3    Infant Foods (Follow-Up) POWD, HANDICAP PLACARD, medically recommended, <taxonomy [id number]> due to arthritic/orthopedic condition (#2,#5) (Patient not taking: Reported on 9/19/2022), Disp: 99 g, Rfl: 1    Patient Active Problem List   Diagnosis    Coronary artery disease involving native coronary artery of native heart with angina pectoris (HCC)    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 mellitus     Diabetic neuropathy (HCC)    GE reflux    Lumbar radiculopathy    Type 2 diabetes mellitus with diabetic neuropathy (HCC)    Prostate cancer screening    Dyslipidemia    Medicare annual wellness visit, subsequent    Type 2 diabetes mellitus with hyperglycemia, without long-term current use of insulin (HCC)    Benign essential hypertension    Bilateral leg weakness    Prostate cancer (HCC)    Insomnia, persistent    Hyponatremia    Orthostatic hypotension Chronic right-sided low back pain without sciatica    Hypokalemia    Acute on chronic combined systolic and diastolic congestive heart failure (HCC)    Heart failure, left, 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)    Chronic combined systolic and diastolic heart failure, NYHA class 2 (HCC)    Edema of both feet    Diarrhea    Gait disorder    Acute pain of left thigh    Peripheral arterial disease (HCC)    Iron deficiency anemia    Uses roller walker          Patient ID: Santa Chester is a 65 y.o. male. HPI    The following portions of the patient's history were reviewed and updated as appropriate:     family history includes Aortic aneurysm in his mother; Heart attack in his father and sister; Heart disease in his father, paternal grandfather, and sister; Throat cancer in his maternal grandfather. reports that he has never smoked. He has never been exposed to tobacco smoke. He has never used smokeless tobacco. He reports that he does not currently use alcohol. He reports that he does not use drugs. Vitals:    11/08/23 1342   Resp: 16       Review of Systems      Objective:  Patient's shoes and socks removed. Foot ExamPhysical Exam  Cardiovascular:      Pulses: Pulses are weak. Physical Exam  Left Foot: Appearance: a deformity (ball of foot and distal fifth metatarsal ). Fifth toe deformities include hammer toe. Tenderness: None except the plantar aspect of the foot. Right Foot: Appearance: a deformity (distal fifth metatarsal ). Left Ankle: ROM: limited ROM in all planes   Right Ankle: ROM: limited ROM in all planes   Neurological Exam: Light touch was decreased bilaterally. Vibratory sensation was decreased in both first metatarsophalangeal joints. Response to monofilament test was absent bilaterally. Deep tendon reflexes: achilles reflex 1/4 bilaterally. Vascular Exam: performed Dorsalis pedis pulses were 1/4 bilaterally. Posterior tibial pulses were 1/4 bilaterally.  Elevation Pallor: diminished bilaterally. Capillary refill time was Q. 9, findings bilateral. Negative digital hair noted, positive abnormal cooling. All pre-ulcer, lesions debrided. Today, but between 1-3 seconds bilaterally. Edema: mild bilaterally and All mycotic nails debrided. Today. The patient was given orthotics to help control his feet and at as cushioning and padding on the bottom of his feet q9 findings. Toenails: All of the toenails were elongated, hypertrophied, discolored, ingrown, shown to have subungual debris and tender. Socks and shoes removed, the Right Foot: the foot was dry. The sensory exam showed diminished vibratory sensation at the level of the toes. Diminished tactile sensation with monofilament testing throughout the right foot. Socks and shoes removed, Left Foot: the foot was dry. The sensory exam showed diminished vibratory sensation at the level of the toes. Diminished tactile sensation with monofilament testing throughout the left foot. Pulses:   1+ in the posterior tibialis on the right   1+ in the dorsalis pedis on the right. Capillary refills findings on the left were delayed in the toes. Pulses:   1+ in the posterior tibialis on the left   1+ in the dorsalis pedis on the left. Assign Risk Category: 2: Loss of protective sensation with or without weakness, deformity, callus, pre-ulcer, or history of ulceration. High risk. Hyperkeratosis: present on both first sub metatarsals, present on both fifth sub metatarsals and Pre-ulcerative lesion, submetatarsal one to 5, bilateral.   Shoe Gear Evaluation: performed (). Recommendation(s): custom inlays. Patient's shoes and socks removed. Right Foot/Ankle   Right Foot Inspection  Skin Exam: callus and callus               Toe Exam: swelling and erythema  Sensory   Vibration: diminished  Proprioception: diminished      Vascular  Capillary refills: elevated        Left Foot/Ankle  Left Foot Inspection  Skin Exam: callus.   Submetatarsal 5 of the left foot demonstrates 0.5 cm grade Gamino grade 0/preulcerative callus. Cellulitis resolved. Toe Exam: swelling and erythema                   Sensory   Vibration: diminished  Proprioception: diminished     Vascular  Capillary refills: elevated. Patient's shoes and socks removed. Patient's shoes and socks removed. Right Foot/Ankle   Right Foot Inspection      Toe Exam: right toe deformity. Sensory   Vibration: absent  Proprioception: diminished  Monofilament testing: diminished    Vascular  Capillary refills: < 3 seconds      Left Foot/Ankle  Left Foot Inspection      Toe Exam: left toe deformity.      Sensory   Vibration: absent  Proprioception: diminished  Monofilament testing: diminished    Vascular  Capillary refills: < 3 seconds      Assign Risk Category  Deformity present  Loss of protective sensation  Weak pulses  Risk: 2

## 2023-12-08 DIAGNOSIS — I50.9 ACUTE ON CHRONIC CONGESTIVE HEART FAILURE, UNSPECIFIED HEART FAILURE TYPE (HCC): ICD-10-CM

## 2023-12-08 DIAGNOSIS — I10 BENIGN ESSENTIAL HYPERTENSION: ICD-10-CM

## 2023-12-08 RX ORDER — CARVEDILOL 12.5 MG/1
12.5 TABLET ORAL 2 TIMES DAILY
Qty: 180 TABLET | Refills: 0 | Status: SHIPPED | OUTPATIENT
Start: 2023-12-08

## 2023-12-08 RX ORDER — SPIRONOLACTONE 25 MG/1
25 TABLET ORAL DAILY
Qty: 90 TABLET | Refills: 0 | Status: SHIPPED | OUTPATIENT
Start: 2023-12-08

## 2023-12-08 NOTE — TELEPHONE ENCOUNTER
Reason for call:   [x] Refill   [] Prior Auth  [] Other:     Office:   [x] PCP/Provider - Thaddeus   [] Specialty/Provider -     Medication:     Carvedilol 12.5mg BID #180  Spironolactone 25mg QD #90      [facility]: [facility]     Does the patient have enough for 3 days?    [x] Yes   [] No - Send as HP to POD

## 2023-12-20 ENCOUNTER — OFFICE VISIT (OUTPATIENT)
Dept: FAMILY MEDICINE CLINIC | Facility: CLINIC | Age: 78
End: 2023-12-20
Payer: MEDICARE

## 2023-12-20 VITALS
RESPIRATION RATE: 18 BRPM | BODY MASS INDEX: 23.18 KG/M2 | HEART RATE: 76 BPM | TEMPERATURE: 97.6 F | DIASTOLIC BLOOD PRESSURE: 84 MMHG | SYSTOLIC BLOOD PRESSURE: 118 MMHG | WEIGHT: 166.2 LBS

## 2023-12-20 DIAGNOSIS — C61 PROSTATE CANCER (HCC): ICD-10-CM

## 2023-12-20 DIAGNOSIS — D50.9 IRON DEFICIENCY ANEMIA, UNSPECIFIED IRON DEFICIENCY ANEMIA TYPE: ICD-10-CM

## 2023-12-20 DIAGNOSIS — E11.22 CKD STAGE 2 DUE TO TYPE 2 DIABETES MELLITUS: ICD-10-CM

## 2023-12-20 DIAGNOSIS — L85.3 DRY SKIN DERMATITIS: Primary | ICD-10-CM

## 2023-12-20 DIAGNOSIS — I25.119 CORONARY ARTERY DISEASE INVOLVING NATIVE CORONARY ARTERY OF NATIVE HEART WITH ANGINA PECTORIS (HCC): ICD-10-CM

## 2023-12-20 DIAGNOSIS — I10 BENIGN ESSENTIAL HYPERTENSION: ICD-10-CM

## 2023-12-20 DIAGNOSIS — I50.43 ACUTE ON CHRONIC COMBINED SYSTOLIC AND DIASTOLIC CONGESTIVE HEART FAILURE (HCC): ICD-10-CM

## 2023-12-20 DIAGNOSIS — E11.40 TYPE 2 DIABETES MELLITUS WITH DIABETIC NEUROPATHY, WITHOUT LONG-TERM CURRENT USE OF INSULIN (HCC): ICD-10-CM

## 2023-12-20 DIAGNOSIS — N18.2 CKD STAGE 2 DUE TO TYPE 2 DIABETES MELLITUS: ICD-10-CM

## 2023-12-20 PROBLEM — E87.1 HYPONATREMIA: Status: RESOLVED | Noted: 2021-07-20 | Resolved: 2023-12-20

## 2023-12-20 PROBLEM — E87.6 HYPOKALEMIA: Status: RESOLVED | Noted: 2021-11-02 | Resolved: 2023-12-20

## 2023-12-20 PROBLEM — R19.7 DIARRHEA: Status: RESOLVED | Noted: 2022-06-10 | Resolved: 2023-12-20

## 2023-12-20 PROCEDURE — 99214 OFFICE O/P EST MOD 30 MIN: CPT | Performed by: FAMILY MEDICINE

## 2023-12-20 RX ORDER — TAMSULOSIN HYDROCHLORIDE 0.4 MG/1
0.4 CAPSULE ORAL
Qty: 90 CAPSULE | Refills: 1 | Status: SHIPPED | OUTPATIENT
Start: 2023-12-20

## 2023-12-20 RX ORDER — AMMONIUM LACTATE 12 G/100G
LOTION TOPICAL 2 TIMES DAILY
Qty: 500 G | Refills: 3 | Status: SHIPPED | OUTPATIENT
Start: 2023-12-20

## 2023-12-20 NOTE — PROGRESS NOTES
Assessment/Plan:    No problem-specific Assessment & Plan notes found for this encounter.    Hx of prostate ca  Keep uro f/u  I offered flomax refill    Dm2 fair control   Last A1c 7.1    Ckd 2  Gfr 79  Has nephrology f/u    Htn stable on coreg    Dry skin  He requests derm eval  I offered rx for lac hydrin to try also    Chf hx  F/u cardiology    DAYANA  Has only been taking 1/d  Advised rx is bid  Take vit c daily and colace if needed    If you need to take iron supplements due to anemia, you would take a pill called ferrous sulfate 324mg each (which contains 65mg of elemental iron per pill), and take up to 1 pill three times a day along with a dose of vitamin C 250mg that helps you absorb the iron.  You may need a stool softener (such as colace or docusate 100-300mg) every day to prevent constipation from iron.    Please read the bottle you have at home for the ferrous sulfate pill which probably says take 1 pill twice a day and start doing so.     Diagnoses and all orders for this visit:    Dry skin dermatitis  -     Ambulatory referral to Dermatology; Future  -     ammonium lactate (LAC-HYDRIN) 12 % lotion; Apply topically 2 (two) times a day    Prostate cancer (HCC)  -     tamsulosin (FLOMAX) 0.4 mg; Take 1 capsule (0.4 mg total) by mouth daily with dinner    Benign essential hypertension    Acute on chronic combined systolic and diastolic congestive heart failure (HCC)    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 mellitus     Coronary artery disease involving native coronary artery of native heart with angina pectoris (HCC)    Type 2 diabetes mellitus with diabetic neuropathy, without long-term current use of insulin (HCC)    Iron deficiency anemia, unspecified iron deficiency anemia type        Return in about 25 weeks (around 6/12/2024) for Recheck.    Subjective:      Patient ID: Pernell Covarrubias is a 78 y.o. male.    Chief Complaint   Patient presents with    Follow-up     6 month f/u   [facility] CMA       HPI  Low  carb/salt diet  Drinking water mostly  Has DAYANA  GFR 79  No blood in stool or urine  On iron 1 po qd, labs reviewed    The following portions of the patient's history were reviewed and updated as appropriate: allergies, current medications, past family history, past medical history, past social history, past surgical history and problem list.    Review of Systems   Constitutional:  Positive for fatigue. Negative for chills and fever.         Current Outpatient Medications   Medication Sig Dispense Refill    acetaminophen (TYLENOL) 325 mg tablet Take 2 tablets (650 mg total) by mouth every 6 (six) hours as needed for mild pain, headaches or fever  0    ammonium lactate (LAC-HYDRIN) 12 % lotion Apply topically 2 (two) times a day 500 g 3    Ascorbic Acid (Vitamin C) 500 MG CAPS Take 500 mg by mouth daily      aspirin (ECOTRIN LOW STRENGTH) 81 mg EC tablet Take 81 mg by mouth daily      atorvastatin (LIPITOR) 20 mg tablet TAKE 1 TABLET EVERY DAY 90 tablet 1    brimonidine tartrate 0.2 % ophthalmic solution Inject 0.2 % into the eye 2 (two) times a day      carvedilol (COREG) 12.5 mg tablet Take 1 tablet (12.5 mg total) by mouth 2 (two) times a day 180 tablet 0    Cholecalciferol (Vitamin D3) 50 MCG (2000 UT) TABS Take 2,000 Units by mouth daily      co-enzyme Q-10 100 mg capsule Take 100 mg by mouth daily      ferrous sulfate 324 (65 Fe) mg Take 1 tablet (324 mg total) by mouth 2 (two) times a day before meals 180 tablet 3    glucose blood (OneTouch Ultra) test strip Test blood sugar once a day 100 strip 3    losartan (COZAAR) 25 mg tablet Take 1 tablet (25 mg total) by mouth daily 90 tablet 1    metFORMIN (GLUCOPHAGE) 1000 MG tablet TAKE ONE TABLET BY MOUTH TWO TIMES A DAY WITH MEALS 180 tablet 1    multivitamin (THERAGRAN) TABS Take 1 tablet by mouth daily      omeprazole (PriLOSEC) 40 MG capsule Take 1 capsule (40 mg total) by mouth daily as needed (acid reflux) 90 capsule 1    Probiotic Product (CULTURELLE  PROBIOTICS) CHEW Chew daily      sitaGLIPtin (JANUVIA) 25 mg tablet Take one tablet by mouth once a day 90 tablet 3    spironolactone (ALDACTONE) 25 mg tablet Take 1 tablet (25 mg total) by mouth daily 90 tablet 0    tamsulosin (FLOMAX) 0.4 mg Take 1 capsule (0.4 mg total) by mouth daily with dinner 90 capsule 1    TRUEplus Lancets 28G MISC Test 1x/d, E11.29 200 each 3    Infant Foods (Follow-Up) POWD HANDICAP PLACARD, medically recommended, <taxonomy [id number]> due to arthritic/orthopedic condition (#2,#5) (Patient not taking: Reported on 9/19/2022) 99 g 1     No current facility-administered medications for this visit.       Objective:    /84   Pulse 76   Temp 97.6 °F (36.4 °C)   Resp 18   Wt 75.4 kg (166 lb 3.2 oz)   BMI 23.18 kg/m²        Physical Exam  Vitals and nursing note reviewed.   Constitutional:       General: He is not in acute distress.     Appearance: He is well-developed. He is not ill-appearing.   HENT:      Head: Normocephalic.      Right Ear: Tympanic membrane normal.      Left Ear: Tympanic membrane normal.   Eyes:      General: No scleral icterus.     Conjunctiva/sclera: Conjunctivae normal.   Cardiovascular:      Rate and Rhythm: Normal rate and regular rhythm.   Pulmonary:      Effort: Pulmonary effort is normal. No respiratory distress.      Breath sounds: No wheezing or rales.   Abdominal:      Palpations: Abdomen is soft.      Tenderness: There is no abdominal tenderness.   Musculoskeletal:         General: No deformity.      Cervical back: Neck supple.      Right lower leg: No edema.      Left lower leg: No edema.   Skin:     General: Skin is warm and dry.      Coloration: Skin is not pale.   Neurological:      Mental Status: He is alert.      Motor: No weakness.      Gait: Gait normal.   Psychiatric:         Mood and Affect: Mood normal.         Behavior: Behavior normal.         Thought Content: Thought content normal.                Felipe Travis DO

## 2023-12-20 NOTE — PATIENT INSTRUCTIONS
If you need to take iron supplements due to anemia, you would take a pill called ferrous sulfate 324mg each (which contains 65mg of elemental iron per pill), and take up to 1 pill three times a day along with a dose of vitamin C 250mg that helps you absorb the iron.  You may need a stool softener (such as colace or docusate 100-300mg) every day to prevent constipation from iron.    Please read the bottle you have at home for the ferrous sulfate pill which probably says take 1 pill twice a day and start doing so.

## 2024-01-10 ENCOUNTER — OFFICE VISIT (OUTPATIENT)
Age: 79
End: 2024-01-10
Payer: MEDICARE

## 2024-01-10 VITALS
RESPIRATION RATE: 18 BRPM | DIASTOLIC BLOOD PRESSURE: 79 MMHG | BODY MASS INDEX: 23.24 KG/M2 | SYSTOLIC BLOOD PRESSURE: 157 MMHG | HEART RATE: 69 BPM | HEIGHT: 71 IN | WEIGHT: 166 LBS

## 2024-01-10 DIAGNOSIS — M79.671 PAIN IN BOTH FEET: ICD-10-CM

## 2024-01-10 DIAGNOSIS — E11.621 DIABETIC ULCER OF LEFT MIDFOOT ASSOCIATED WITH TYPE 2 DIABETES MELLITUS, LIMITED TO BREAKDOWN OF SKIN (HCC): Primary | ICD-10-CM

## 2024-01-10 DIAGNOSIS — E11.42 DIABETIC POLYNEUROPATHY ASSOCIATED WITH TYPE 2 DIABETES MELLITUS (HCC): ICD-10-CM

## 2024-01-10 DIAGNOSIS — B35.1 ONYCHOMYCOSIS: ICD-10-CM

## 2024-01-10 DIAGNOSIS — M79.672 PAIN IN BOTH FEET: ICD-10-CM

## 2024-01-10 DIAGNOSIS — L97.421 DIABETIC ULCER OF LEFT MIDFOOT ASSOCIATED WITH TYPE 2 DIABETES MELLITUS, LIMITED TO BREAKDOWN OF SKIN (HCC): Primary | ICD-10-CM

## 2024-01-10 DIAGNOSIS — M21.962 ACQUIRED DEFORMITY OF LEFT FOOT: ICD-10-CM

## 2024-01-10 DIAGNOSIS — I73.9 PAD (PERIPHERAL ARTERY DISEASE) (HCC): ICD-10-CM

## 2024-01-10 PROCEDURE — 99213 OFFICE O/P EST LOW 20 MIN: CPT | Performed by: PODIATRIST

## 2024-01-10 NOTE — PROGRESS NOTES
Assessment/Plan: Preulcerative callus/Gamino grade 0 ulcer plantar aspect left foot.  Acquired deformity foot.  History of metatarsal resection.  Diabetic foot.  Diabetic neuropathy.  Peripheral artery disease.  Mycosis of nail.     Plan.  Chart reviewed.  Primary care notes reviewed.  Lab work reviewed.  Patient examined.  Diabetic foot exam performed.  Today all nails debrided.  Preulcerative calluses debrided.  Patient watch for signs of infection.  Aftercare instruction given.  Return for follow-up.            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  He is not bandaging his foot.  He sees no evidence of blood in his socks.  He no history of fever or night sweats.  Patient is diabetic     No Known Allergies        Current Outpatient Medications:     acetaminophen (TYLENOL) 325 mg tablet, Take 2 tablets (650 mg total) by mouth every 6 (six) hours as needed for mild pain, headaches or fever, Disp: , Rfl: 0    Ascorbic Acid (Vitamin C) 500 MG CAPS, Take 500 mg by mouth daily, Disp: , Rfl:     aspirin (ECOTRIN LOW STRENGTH) 81 mg EC tablet, Take 81 mg by mouth daily, Disp: , Rfl:     atorvastatin (LIPITOR) 20 mg tablet, TAKE 1 TABLET EVERY DAY, Disp: 90 tablet, Rfl: 1    brimonidine tartrate 0.2 % ophthalmic solution, Inject 0.2 % into the eye 2 (two) times a day, Disp: , Rfl:     carvedilol (COREG) 12.5 mg tablet, Take 1 tablet (12.5 mg total) by mouth 2 (two) times a day, Disp: 180 tablet, Rfl: 1    Cholecalciferol (Vitamin D3) 50 MCG (2000 UT) TABS, Take 2,000 Units by mouth daily, Disp: , Rfl:     co-enzyme Q-10 100 mg capsule, Take 100 mg by mouth daily, Disp:  , Rfl:     ferrous sulfate 324 (65 Fe) mg, Take 1 tablet (324 mg total) by mouth 2 (two) times a day before meals, Disp: 180 tablet, Rfl: 3    glucose blood (OneTouch Ultra) test strip, Test blood sugar once a day, Disp: 100 strip, Rfl: 3    losartan (COZAAR) 25 mg tablet, Take 1 tablet (25 mg total) by mouth daily, Disp: 90 tablet, Rfl: 1    metFORMIN (GLUCOPHAGE) 1000 MG tablet, TAKE ONE TABLET BY MOUTH TWO TIMES A DAY WITH MEALS, Disp: 180 tablet, Rfl: 1    multivitamin (THERAGRAN) TABS, Take 1 tablet by mouth daily, Disp: , Rfl:     omeprazole (PriLOSEC) 40 MG capsule, Take 1 capsule (40 mg total) by mouth daily as needed (acid reflux), Disp: 90 capsule, Rfl: 1    Probiotic Product (CULTURELLE PROBIOTICS) CHEW, Chew daily, Disp: , Rfl:     sitaGLIPtin (JANUVIA) 25 mg tablet, Take one tablet by mouth once a day, Disp: 90 tablet, Rfl: 3    spironolactone (ALDACTONE) 25 mg tablet, Take 1 tablet (25 mg total) by mouth daily, Disp: 90 tablet, Rfl: 1    tamsulosin (FLOMAX) 0.4 mg, Take 1 capsule (0.4 mg total) by mouth daily with dinner, Disp: 90 capsule, Rfl: 2    TRUEplus Lancets 28G MISC, Test 1x/d, E11.29, Disp: 200 each, Rfl: 3    Infant Foods (Follow-Up) POWD, HANDICAP PLACARD, medically recommended, <taxonomy [id number]> due to arthritic/orthopedic condition (#2,#5) (Patient not taking: Reported on 9/19/2022), Disp: 99 g, Rfl: 1         Patient Active Problem List   Diagnosis    Coronary artery disease involving native coronary artery of native heart with angina pectoris (HCC)    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 mellitus     Diabetic neuropathy (HCC)    GE reflux    Lumbar radiculopathy    Type 2 diabetes mellitus with diabetic neuropathy (HCC)    Prostate cancer screening    Dyslipidemia    Medicare annual wellness visit, subsequent    Type 2 diabetes mellitus with hyperglycemia, without long-term current use of insulin (HCC)    Benign essential hypertension    Bilateral leg weakness    Prostate cancer (HCC)     Insomnia, persistent    Hyponatremia    Orthostatic hypotension    Chronic right-sided low back pain without sciatica    Hypokalemia    Acute on chronic combined systolic and diastolic congestive heart failure (HCC)    Heart failure, left, 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)    Chronic combined systolic and diastolic heart failure, NYHA class 2 (HCC)    Edema of both feet    Diarrhea    Gait disorder    Acute pain of left thigh    Peripheral arterial disease (HCC)    Iron deficiency anemia    Uses roller walker             Patient ID: Pernell Covarrubias is a 78 y.o. male.     HPI     The following portions of the patient's history were reviewed and updated as appropriate:      family history includes Aortic aneurysm in his mother; Heart attack in his father and sister; Heart disease in his father, paternal grandfather, and sister; Throat cancer in his maternal grandfather.       reports that he has never smoked. He has never been exposed to tobacco smoke. He has never used smokeless tobacco. He reports that he does not currently use alcohol. He reports that he does not use drugs.         Vitals:     11/08/23 1342   Resp: 16         Review of Systems       Objective:  Patient's shoes and socks removed.   Foot ExamPhysical Exam  Cardiovascular:      Pulses: Pulses are weak.               Physical Exam  Left Foot: Appearance: a deformity (ball of foot and distal fifth metatarsal ). Fifth toe deformities include hammer toe. Tenderness: None except the plantar aspect of the foot.   Right Foot: Appearance: a deformity (distal fifth metatarsal ).   Left Ankle: ROM: limited ROM in all planes   Right Ankle: ROM: limited ROM in all planes   Neurological Exam: Light touch was decreased bilaterally. Vibratory sensation was decreased in both first metatarsophalangeal joints. Response to monofilament test was absent bilaterally. Deep tendon reflexes: achilles reflex 1/4 bilaterally.   Vascular Exam: performed Dorsalis pedis pulses were 1/4  bilaterally. Posterior tibial pulses were 1/4 bilaterally. Elevation Pallor: diminished bilaterally. Capillary refill time was Q. 9, findings bilateral. Negative digital hair noted, positive abnormal cooling. All pre-ulcer, lesions debrided. Today, but between 1-3 seconds bilaterally. Edema: mild bilaterally and All mycotic nails debrided. Today. The patient was given orthotics to help control his feet and at as cushioning and padding on the bottom of his feet q9 findings.   Toenails: All of the toenails were elongated, hypertrophied, discolored, ingrown, shown to have subungual debris and tender.      Socks and shoes removed, the Right Foot: the foot was dry.   The sensory exam showed diminished vibratory sensation at the level of the toes. Diminished tactile sensation with monofilament testing throughout the right foot.   Socks and shoes removed, Left Foot: the foot was dry.   The sensory exam showed diminished vibratory sensation at the level of the toes. Diminished tactile sensation with monofilament testing throughout the left foot.   Pulses:   1+ in the posterior tibialis on the right   1+ in the dorsalis pedis on the right. Capillary refills findings on the left were delayed in the toes.   Pulses:   1+ in the posterior tibialis on the left   1+ in the dorsalis pedis on the left.   Assign Risk Category: 2: Loss of protective sensation with or without weakness, deformity, callus, pre-ulcer, or history of ulceration. High risk.   Hyperkeratosis: present on both first sub metatarsals, present on both fifth sub metatarsals and Pre-ulcerative lesion, submetatarsal one to 5, bilateral.   Shoe Gear Evaluation: performed (). Recommendation(s): custom inlays.      Patient's shoes and socks removed.Right Foot/Ankle   Right Foot Inspection  Skin Exam: callus and callus               Toe Exam: swelling and erythema  Sensory   Vibration: diminished  Proprioception: diminished      Vascular  Capillary refills:  elevated        Left Foot/Ankle  Left Foot Inspection  Skin Exam: callus.  Submetatarsal 5 of the left foot demonstrates 0.5 cm grade Gamino grade 0/preulcerative callus. Cellulitis resolved.              Toe Exam: swelling and erythema                   Sensory   Vibration: diminished  Proprioception: diminished     Vascular  Capillary refills: elevated.     Patient's shoes and socks removed.         Patient's shoes and socks removed.     Right Foot/Ankle   Right Foot Inspection        Toe Exam: right toe deformity.      Sensory   Vibration: absent  Proprioception: diminished  Monofilament testing: diminished     Vascular  Capillary refills: < 3 seconds        Left Foot/Ankle  Left Foot Inspection        Toe Exam: left toe deformity.      Sensory   Vibration: absent  Proprioception: diminished  Monofilament testing: diminished     Vascular  Capillary refills: < 3 seconds        Assign Risk Category  Deformity present  Loss of protective sensation  Weak pulses  Risk: 2

## 2024-01-15 ENCOUNTER — TELEPHONE (OUTPATIENT)
Age: 79
End: 2024-01-15

## 2024-01-15 NOTE — TELEPHONE ENCOUNTER
Patient seen by Jitendra at [facility]    Patient called stating he just has a cough no fever and he tested for covid this morning and it was negative.  He stated he will use a mask when he comes.  Communicated via teams with office.  Office stated ok for patient to come.

## 2024-01-16 ENCOUNTER — NURSE TRIAGE (OUTPATIENT)
Age: 79
End: 2024-01-16

## 2024-01-16 ENCOUNTER — OFFICE VISIT (OUTPATIENT)
Dept: FAMILY MEDICINE CLINIC | Facility: CLINIC | Age: 79
End: 2024-01-16
Payer: MEDICARE

## 2024-01-16 VITALS
DIASTOLIC BLOOD PRESSURE: 76 MMHG | OXYGEN SATURATION: 95 % | HEIGHT: 71 IN | HEART RATE: 70 BPM | TEMPERATURE: 97.8 F | BODY MASS INDEX: 23.66 KG/M2 | WEIGHT: 169 LBS | RESPIRATION RATE: 20 BRPM | SYSTOLIC BLOOD PRESSURE: 128 MMHG

## 2024-01-16 DIAGNOSIS — N18.30 CKD STAGE 3 DUE TO TYPE 2 DIABETES MELLITUS (HCC): ICD-10-CM

## 2024-01-16 DIAGNOSIS — E11.42 DIABETIC POLYNEUROPATHY ASSOCIATED WITH TYPE 2 DIABETES MELLITUS (HCC): ICD-10-CM

## 2024-01-16 DIAGNOSIS — I25.119 CORONARY ARTERY DISEASE INVOLVING NATIVE CORONARY ARTERY OF NATIVE HEART WITH ANGINA PECTORIS (HCC): ICD-10-CM

## 2024-01-16 DIAGNOSIS — E11.22 CKD STAGE 3 DUE TO TYPE 2 DIABETES MELLITUS (HCC): ICD-10-CM

## 2024-01-16 DIAGNOSIS — E11.22 CKD STAGE 2 DUE TO TYPE 2 DIABETES MELLITUS: ICD-10-CM

## 2024-01-16 DIAGNOSIS — E11.40 TYPE 2 DIABETES MELLITUS WITH DIABETIC NEUROPATHY, WITHOUT LONG-TERM CURRENT USE OF INSULIN (HCC): ICD-10-CM

## 2024-01-16 DIAGNOSIS — N18.2 CKD STAGE 2 DUE TO TYPE 2 DIABETES MELLITUS: ICD-10-CM

## 2024-01-16 DIAGNOSIS — E11.22 TYPE 2 DIABETES MELLITUS WITH STAGE 2 CHRONIC KIDNEY DISEASE, WITHOUT LONG-TERM CURRENT USE OF INSULIN: ICD-10-CM

## 2024-01-16 DIAGNOSIS — I73.9 PERIPHERAL ARTERIAL DISEASE (HCC): ICD-10-CM

## 2024-01-16 DIAGNOSIS — Z99.89 USES ROLLER WALKER: ICD-10-CM

## 2024-01-16 DIAGNOSIS — N18.2 TYPE 2 DIABETES MELLITUS WITH STAGE 2 CHRONIC KIDNEY DISEASE, WITHOUT LONG-TERM CURRENT USE OF INSULIN: ICD-10-CM

## 2024-01-16 DIAGNOSIS — J01.00 ACUTE NON-RECURRENT MAXILLARY SINUSITIS: Primary | ICD-10-CM

## 2024-01-16 PROBLEM — E11.65 TYPE 2 DIABETES MELLITUS WITH HYPERGLYCEMIA, WITHOUT LONG-TERM CURRENT USE OF INSULIN (HCC): Status: RESOLVED | Noted: 2019-05-25 | Resolved: 2024-01-16

## 2024-01-16 PROBLEM — N18.32 TYPE 2 DIABETES MELLITUS WITH STAGE 3B CHRONIC KIDNEY DISEASE, WITHOUT LONG-TERM CURRENT USE OF INSULIN (HCC): Status: ACTIVE | Noted: 2024-01-16

## 2024-01-16 PROCEDURE — 99214 OFFICE O/P EST MOD 30 MIN: CPT | Performed by: FAMILY MEDICINE

## 2024-01-16 RX ORDER — CEFDINIR 300 MG/1
600 CAPSULE ORAL DAILY
Qty: 20 CAPSULE | Refills: 0 | Status: SHIPPED | OUTPATIENT
Start: 2024-01-16 | End: 2024-01-26

## 2024-01-16 NOTE — PROGRESS NOTES
Assessment/Plan:    No problem-specific Assessment & Plan notes found for this encounter.    Sinusitis  Mucinex DM suggested  F/u if no better  Component of bronchitis    Gfr 79  Ckd2 stable  Stay hydrated    Dm fair control  Last A1c 7.1    CAD stable  No cp     Diagnoses and all orders for this visit:    Acute non-recurrent maxillary sinusitis  -     cefdinir (OMNICEF) 300 mg capsule; Take 2 capsules (600 mg total) by mouth daily for 10 days (May take 2 pills together once a day)    Type 2 diabetes mellitus with stage 2 chronic kidney disease, without long-term current use of insulin     CKD stage 3 due to type 2 diabetes mellitus (HCC)    Coronary artery disease involving native coronary artery of native heart with angina pectoris (HCC)    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 mellitus     Type 2 diabetes mellitus with diabetic neuropathy, without long-term current use of insulin (HCC)    Diabetic polyneuropathy associated with type 2 diabetes mellitus (HCC)    Uses roller walker    Peripheral arterial disease (HCC)        Return if symptoms worsen or fail to improve.    Subjective:      Patient ID: Pernell Covarrubias is a 78 y.o. male.    Chief Complaint   Patient presents with    Cough     Started 1 week ago  [id number]    COVID-19     Negative yesterday         HPI  Day 6  Cough  No fever  No sob  Green mucus nasal and phlegm noted  Covid test neg x 2    No chills  No sick contacts  No otc tried    The following portions of the patient's history were reviewed and updated as appropriate: allergies, current medications, past family history, past medical history, past social history, past surgical history and problem list.    Review of Systems   Constitutional:  Negative for unexpected weight change.   Respiratory:  Negative for shortness of breath and wheezing.          Current Outpatient Medications   Medication Sig Dispense Refill    acetaminophen (TYLENOL) 325 mg tablet Take 2 tablets (650 mg total) by mouth every 6  (six) hours as needed for mild pain, headaches or fever  0    ammonium lactate (LAC-HYDRIN) 12 % lotion Apply topically 2 (two) times a day 500 g 3    Ascorbic Acid (Vitamin C) 500 MG CAPS Take 500 mg by mouth daily      aspirin (ECOTRIN LOW STRENGTH) 81 mg EC tablet Take 81 mg by mouth daily      atorvastatin (LIPITOR) 20 mg tablet TAKE 1 TABLET EVERY DAY 90 tablet 1    brimonidine tartrate 0.2 % ophthalmic solution Inject 0.2 % into the eye 2 (two) times a day      carvedilol (COREG) 12.5 mg tablet Take 1 tablet (12.5 mg total) by mouth 2 (two) times a day 180 tablet 0    cefdinir (OMNICEF) 300 mg capsule Take 2 capsules (600 mg total) by mouth daily for 10 days (May take 2 pills together once a day) 20 capsule 0    Cholecalciferol (Vitamin D3) 50 MCG (2000 UT) TABS Take 2,000 Units by mouth daily      co-enzyme Q-10 100 mg capsule Take 100 mg by mouth daily      ferrous sulfate 324 (65 Fe) mg Take 1 tablet (324 mg total) by mouth 2 (two) times a day before meals 180 tablet 3    glucose blood (OneTouch Ultra) test strip Test blood sugar once a day 100 strip 3    losartan (COZAAR) 25 mg tablet Take 1 tablet (25 mg total) by mouth daily 90 tablet 1    metFORMIN (GLUCOPHAGE) 1000 MG tablet TAKE ONE TABLET BY MOUTH TWO TIMES A DAY WITH MEALS 180 tablet 1    multivitamin (THERAGRAN) TABS Take 1 tablet by mouth daily      omeprazole (PriLOSEC) 40 MG capsule Take 1 capsule (40 mg total) by mouth daily as needed (acid reflux) 90 capsule 1    Probiotic Product (CULTURELLE PROBIOTICS) CHEW Chew daily      sitaGLIPtin (JANUVIA) 25 mg tablet Take one tablet by mouth once a day 90 tablet 3    spironolactone (ALDACTONE) 25 mg tablet Take 1 tablet (25 mg total) by mouth daily 90 tablet 0    tamsulosin (FLOMAX) 0.4 mg Take 1 capsule (0.4 mg total) by mouth daily with dinner 90 capsule 1    TRUEplus Lancets 28G MISC Test 1x/d, E11.29 200 each 3    Infant Foods (Follow-Up) POWD HANDICAP PLACARD, medically recommended, <taxonomy  "[id number]> due to arthritic/orthopedic condition (#2,#5) (Patient not taking: Reported on 9/19/2022) 99 g 1     No current facility-administered medications for this visit.       Objective:    /76   Pulse 70   Temp 97.8 °F (36.6 °C)   Resp 20   Ht 5' 11\" (1.803 m)   Wt 76.7 kg (169 lb)   SpO2 95%   BMI 23.57 kg/m²        Physical Exam  Vitals and nursing note reviewed.   Constitutional:       General: He is not in acute distress.     Appearance: He is well-developed.   HENT:      Head: Normocephalic.      Right Ear: Tympanic membrane normal.      Left Ear: Tympanic membrane normal.      Nose: Congestion present.      Comments: red  Eyes:      General: No scleral icterus.     Conjunctiva/sclera: Conjunctivae normal.   Cardiovascular:      Rate and Rhythm: Normal rate and regular rhythm.   Pulmonary:      Effort: Pulmonary effort is normal. No respiratory distress.      Breath sounds: No wheezing or rales.   Abdominal:      Palpations: Abdomen is soft.   Musculoskeletal:         General: No deformity.      Cervical back: Neck supple.   Skin:     General: Skin is warm and dry.      Coloration: Skin is not pale.   Neurological:      Mental Status: He is alert.      Gait: Gait abnormal.   Psychiatric:         Mood and Affect: Mood normal.         Behavior: Behavior normal.         Thought Content: Thought content normal.                Felipe Travis DO    "

## 2024-01-16 NOTE — TELEPHONE ENCOUNTER
"Patient called in to report nonproductive cough for the past week that is worsening; coughing spells make it hard to catch his breath.Reports runny nose and post nasal drip. Denies fever. Patient has hx of CHF and diabetes. Uses sugar free throat lozenges provide some relief  Covid negative 1/15/24. OV scheduled with PCP tor today at 3:15 PM    Reason for Disposition   SEVERE coughing spells (e.g., whooping sound after coughing, vomiting after coughing)    Answer Assessment - Initial Assessment Questions  1. ONSET: \"When did the cough begin?\"       Past 1 week and worsening from tickle in throat (post nasal drip)  2. SEVERITY: \"How bad is the cough today?\"       Pretty bad; has trouble breathing from constant cough  3. SPUTUM: \"Describe the color of your sputum\" (none, dry cough; clear, white, yellow, green)      Denies sputum  4. HEMOPTYSIS: \"Are you coughing up any blood?\" If so ask: \"How muh?\" (flecks, streaks, tablespoons, etc.)      Denies  5. DIFFICULTY BREATHING: \"Are you having difficulty breathing?\" If Yes, ask: \"How bad is it?\" (e.g., mild, moderate, severe)     - MILD: No SOB at rest, mild SOB with walking, speaks normally in sentences, can lay down, no retractions, pulse < 100.     - MODERATE: SOB at rest, SOB with minimal exertion and prefers to sit, cannot lie down flat, speaks in phrases, mild retractions, audible wheezing, pulse 100-120.     - SEVERE: Very SOB at rest, speaks in single words, struggling to breathe, sitting hunched forward, retractions, pulse > 120       Denies  6. FEVER: \"Do you have a fever?\" If Yes, ask: \"What is your temperature, how was it measured, and when did it start?\"      Denies  7. CARDIAC HISTORY: \"Do you have any history of heart disease?\" (e.g., heart attack, congestive heart failure)       Congestive heart failure  8. LUNG HISTORY: \"Do you have any history of lung disease?\"  (e.g., pulmonary embolus, asthma, emphysema)      Denies  9. PE RISK FACTORS: \"Do you have a " "history of blood clots?\" (or: recent major surgery, recent prolonged travel, bedridden)      Denies  10. OTHER SYMPTOMS: \"Do you have any other symptoms?\" (e.g., runny nose, wheezing, chest pain)        Runny nose, diabetic  11. PREGNANCY: \"Is there any chance you are pregnant?\" \"When was your last menstrual period?\"        N/a  12. TRAVEL: \"Have you traveled out of the country in the last month?\" (e.g., travel history, exposures)        Denies    Protocols used: Cough-ADULT-[place]    "

## 2024-01-16 NOTE — PATIENT INSTRUCTIONS
"  Over-the-counter products can be useful for your symptoms:                      <<GUAIFENESIN>> is an expectorant that is useful for thick mucus.    Often found in Robitussin and Mucinex products.                        <<DEXTROMETHORPHAN>> is a cough suppressant that works in the brain   to help reduce bothersome cough.   Use with caution with other medications that work in the brain.                        <<ANTI-HISTAMINES>> are useful as allergy medications and to help dry up   bothersome thin secretions. Less sedating options include   Claritin, Zyrtec, Allegra and Xyzal and have generic OTC forms.                       <<PSEUDOEPHEDRINE, PHENYLEPHRINE>> are stimulant decongestants available \"behind the counter\"  that can be used for congestion and sinus pressure.   Phenylephrine recently found not to be very effective according to the FDA.      Avoid or use with caution with high blood pressure or heart conditions.     <<NASAL SPRAYS>> Steroid nasal sprays (flonase, nasacort) are used for allergies but   can be used for congestion also.  Safe for high blood pressure.   Afrin is a decongestant that works quickly but for up to 3 days.      Today it is recommended that you take an otc product like MUCINEX DM or equivalent that has an expectorant and cough suppressant and is ok with your blood pressure, kidneys and diabetes.  "

## 2024-01-17 ENCOUNTER — OFFICE VISIT (OUTPATIENT)
Dept: ENDOCRINOLOGY | Facility: CLINIC | Age: 79
End: 2024-01-17
Payer: MEDICARE

## 2024-01-17 VITALS
SYSTOLIC BLOOD PRESSURE: 120 MMHG | BODY MASS INDEX: 23.66 KG/M2 | HEART RATE: 59 BPM | DIASTOLIC BLOOD PRESSURE: 52 MMHG | OXYGEN SATURATION: 95 % | HEIGHT: 71 IN | WEIGHT: 169 LBS

## 2024-01-17 DIAGNOSIS — E11.42 DIABETIC POLYNEUROPATHY ASSOCIATED WITH TYPE 2 DIABETES MELLITUS (HCC): ICD-10-CM

## 2024-01-17 DIAGNOSIS — R53.83 OTHER FATIGUE: ICD-10-CM

## 2024-01-17 DIAGNOSIS — I50.1 HEART FAILURE, LEFT, WITH LVEF <=30% (HCC): ICD-10-CM

## 2024-01-17 DIAGNOSIS — I25.10 CORONARY ARTERY DISEASE INVOLVING NATIVE CORONARY ARTERY OF NATIVE HEART WITHOUT ANGINA PECTORIS: ICD-10-CM

## 2024-01-17 DIAGNOSIS — E11.65 TYPE 2 DIABETES MELLITUS WITH HYPERGLYCEMIA, WITHOUT LONG-TERM CURRENT USE OF INSULIN (HCC): Primary | ICD-10-CM

## 2024-01-17 DIAGNOSIS — N18.2 CKD STAGE 2 DUE TO TYPE 2 DIABETES MELLITUS: ICD-10-CM

## 2024-01-17 DIAGNOSIS — E55.9 VITAMIN D DEFICIENCY: ICD-10-CM

## 2024-01-17 DIAGNOSIS — I10 BENIGN ESSENTIAL HYPERTENSION: ICD-10-CM

## 2024-01-17 DIAGNOSIS — E78.5 DYSLIPIDEMIA: ICD-10-CM

## 2024-01-17 DIAGNOSIS — E11.22 CKD STAGE 2 DUE TO TYPE 2 DIABETES MELLITUS: ICD-10-CM

## 2024-01-17 LAB — SL AMB POCT HEMOGLOBIN AIC: 6.7 (ref ?–6.5)

## 2024-01-17 PROCEDURE — 83036 HEMOGLOBIN GLYCOSYLATED A1C: CPT | Performed by: INTERNAL MEDICINE

## 2024-01-17 PROCEDURE — 99214 OFFICE O/P EST MOD 30 MIN: CPT | Performed by: INTERNAL MEDICINE

## 2024-01-17 RX ORDER — METFORMIN HYDROCHLORIDE 500 MG/1
1000 TABLET, EXTENDED RELEASE ORAL 2 TIMES DAILY WITH MEALS
Qty: 360 TABLET | Refills: 2 | Status: SHIPPED | OUTPATIENT
Start: 2024-01-17

## 2024-01-17 NOTE — PATIENT INSTRUCTIONS
Hold Januvia for a few days  Please stagger check blood sugars at least 1-2 times a day before meals and at bedtime, send log for review in 2 weeks  Continue metformin at current dose  If you notice your blood sugars trending up, please restart Januvia      Please call your insurance and inquire which of the following would be covered  - SGLT 2 inhibitors -jardiance, farxiga, Invokana, steglarto  - DDP4 inhibitor: Januvia, linagliptin, saxagliptin, alogliptin

## 2024-01-17 NOTE — PROGRESS NOTES
Pernell Covarrubias 78 y.o. male MRN: [id number]    Encounter: [id number]      Assessment/Plan     Type 2 diabetes mellitus not on long-term insulin therapy  CKD stage II-stable  Neuropathy  POCT A1c 6.7%, trended down, tightly controlled for this elderly patient with comorbidities     Recommend the following at this time  Continue metformin at current dose, will switch to XR  Will hold Januvia for a couple of days, stagger check sugars 1-2 times a day before meals and at bedtime, send for review  If patient notices blood sugars trending up, advised to restart Januvia  Patient will inquire about coverage of alternative DPP 4 inhibitors, also about coverage of SGLT2    4. Hyperlipidemia  - continue statin therapy    5. Hypertension  6. CAD, CHF  Blood pressure at goal   - continue current medications including ACE-I/ ARB    7. H/o Prostate cancer  8. H/o Iron deficiency anemia - on addition, has repeat CBC ordered  9.  Fatigue, history of vitamin D deficiency-continue supplementation, check vitamin D level  10. Insomnia -advised to follow-up with primary care        CC: Diabetes    History of Present Illness     HPI:  Pernell Covarrubias is a 78 y.o. male presents for a follow-up visit regarding diabetes management.     Last seen in 9/2023  Last Eye exam: spring 2023    Current regimen:   Metformin  1000 mg orally twice a day  Januvia 25 mg orally daily    Says januvia is expensive     Feels fatigued; billings snot sleep well at night, naps a lot during the day; says melatonin did not help   Appetite is good   Walks using his walker   Gets both diarrhea and constipation ; is on iron supplementation; possiubly more diarrhea   No CP/SOB   No numbness/tingling   No vision complaints   URI symptoms since X 1 week, started on antibiotics     Statin: atorvastatin   ACE-I/ARB: Losartan    Home glucose monitoring: once a day; in recall   Before breakfast: 150  Symptoms of hypoglycemia :  no lows     Takinf D3 - dose not lknown      All other systems were reviewed and are negative.    Review of Systems      Historical Information   Past Medical History:   Diagnosis Date    Acquired hallux valgus     last assessed: 8/1/2013    Allergic rhinitis     Anemia 10/26/2010    Atrophy of nail     last assessed: 7/7/2015    Chronic kidney disease     chronic renal insufficiency    Coronary artery disease     Deformity of ankle and foot, acquired     last assessed: 2/22/2016    Difficulty walking     last assessed: 12/14/2015    Dysesthesia     last assessed: 11/25/2016    Hammer toe     unspecified laterality; last assessed: 8/1/2013    Hypertension     Onychomycosis of toenail     last assessed: 2/22/2016    Peripheral vascular disease (HCC)     left SFA stent in 2010    Pes planus     unspecified laterality; last assessed: 8/1/2013    Pneumonia     last assessed: 2/27/2016    Prostate cancer (HCC)     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     last assessed: 8/15/2016    Type 2 diabetes mellitus (HCC) 07/26/2010    Viral warts     last assessed: 7/24/2015     Past Surgical History:   Procedure Laterality Date    CARDIAC CATHETERIZATION  07/29/2010    CATARACT EXTRACTION, BILATERAL Bilateral     R 1999, L 2008    COLONOSCOPY  2011    FEMORAL ARTERY - POPLITEAL ARTERY BYPASS GRAFT  09/23/2013    FOOT SURGERY      bone spur removed    LEG SURGERY Bilateral     repair; L 8/20/2010 and 7/26/2012    [facility] PLMT INTERSTITIAL DEV RADIAT TX PROSTATE 1/MULT N/A 6/22/2021    Procedure: INSERTION OF FIDUCIAL MARKER (TRANSRECTAL ULTRASOUND GUIDANCE), SPACEOAR;  Surgeon: Jero Loomis MD;  Location: [facility];  Service: Urology    ROTATOR CUFF REPAIR Left 2009    SHOULDER SURGERY Right 2005    collar bone     Social History   Social History     Substance and Sexual Activity   Alcohol Use Not Currently    Comment: being a social drinker/rare     Social History     Substance and Sexual Activity   Drug Use No     Social History     Tobacco Use   Smoking  Status Never    Passive exposure: Never   Smokeless Tobacco Never     Family History:   Family History   Problem Relation Age of Onset    Heart disease Father     Heart attack Father         acute myocardial infarction    Heart disease Sister     Heart attack Sister         acute myocardial infarction    Heart disease Paternal Grandfather     Aortic aneurysm Mother     Throat cancer Maternal Grandfather        Meds/Allergies   Current Outpatient Medications   Medication Sig Dispense Refill    acetaminophen (TYLENOL) 325 mg tablet Take 2 tablets (650 mg total) by mouth every 6 (six) hours as needed for mild pain, headaches or fever  0    ammonium lactate (LAC-HYDRIN) 12 % lotion Apply topically 2 (two) times a day 500 g 3    Ascorbic Acid (Vitamin C) 500 MG CAPS Take 500 mg by mouth daily      aspirin (ECOTRIN LOW STRENGTH) 81 mg EC tablet Take 81 mg by mouth daily      atorvastatin (LIPITOR) 20 mg tablet TAKE 1 TABLET EVERY DAY 90 tablet 1    brimonidine tartrate 0.2 % ophthalmic solution Inject 0.2 % into the eye 2 (two) times a day      carvedilol (COREG) 12.5 mg tablet Take 1 tablet (12.5 mg total) by mouth 2 (two) times a day 180 tablet 0    cefdinir (OMNICEF) 300 mg capsule Take 2 capsules (600 mg total) by mouth daily for 10 days (May take 2 pills together once a day) 20 capsule 0    Cholecalciferol (Vitamin D3) 50 MCG (2000 UT) TABS Take 2,000 Units by mouth daily      co-enzyme Q-10 100 mg capsule Take 100 mg by mouth daily      ferrous sulfate 324 (65 Fe) mg Take 1 tablet (324 mg total) by mouth 2 (two) times a day before meals 180 tablet 3    glucose blood (OneTouch Ultra) test strip Test blood sugar once a day 100 strip 3    Infant Foods (Follow-Up) POWD HANDICAP PLACARD, medically recommended, <taxonomy [id number]> due to arthritic/orthopedic condition (#2,#5) (Patient not taking: Reported on 9/19/2022) 99 g 1    losartan (COZAAR) 25 mg tablet Take 1 tablet (25 mg total) by mouth daily 90 tablet 1     "metFORMIN (GLUCOPHAGE) 1000 MG tablet TAKE ONE TABLET BY MOUTH TWO TIMES A DAY WITH MEALS 180 tablet 1    multivitamin (THERAGRAN) TABS Take 1 tablet by mouth daily      omeprazole (PriLOSEC) 40 MG capsule Take 1 capsule (40 mg total) by mouth daily as needed (acid reflux) 90 capsule 1    Probiotic Product (CULTURELLE PROBIOTICS) CHEW Chew daily      sitaGLIPtin (JANUVIA) 25 mg tablet Take one tablet by mouth once a day 90 tablet 3    spironolactone (ALDACTONE) 25 mg tablet Take 1 tablet (25 mg total) by mouth daily 90 tablet 0    tamsulosin (FLOMAX) 0.4 mg Take 1 capsule (0.4 mg total) by mouth daily with dinner 90 capsule 1    TRUEplus Lancets 28G MISC Test 1x/d, E11.29 200 each 3     No current facility-administered medications for this visit.     No Known Allergies    Objective   Vitals: Blood pressure 120/52, pulse 59, height 5' 11\" (1.803 m), weight 76.7 kg (169 lb), SpO2 95%.    Physical Exam  Constitutional:       General: He is not in acute distress.     Appearance: He is well-developed. He is not diaphoretic.   HENT:      Head: Normocephalic and atraumatic.   Eyes:      Conjunctiva/sclera: Conjunctivae normal.      Pupils: Pupils are equal, round, and reactive to light.   Cardiovascular:      Rate and Rhythm: Normal rate and regular rhythm.      Heart sounds: Normal heart sounds. No murmur heard.  Pulmonary:      Effort: Pulmonary effort is normal. No respiratory distress.      Breath sounds: Normal breath sounds. No wheezing.   Abdominal:      General: There is no distension.      Palpations: Abdomen is soft.      Tenderness: There is no abdominal tenderness. There is no guarding.   Musculoskeletal:      Cervical back: Normal range of motion and neck supple.      Comments: Mild edema  +   Skin:     General: Skin is warm and dry.      Findings: No erythema or rash.   Neurological:      Mental Status: He is alert and oriented to person, place, and time.   Psychiatric:         Behavior: Behavior normal.    "      Thought Content: Thought content normal.         The history was obtained from the review of the chart, patient.    Lab Results:   Lab Results   Component Value Date/Time    Hemoglobin A1C 7.1 (H) 10/03/2023 10:09 AM    Hemoglobin A1C 7.1 (H) 09/15/2023 07:55 AM    Hemoglobin A1C 6.9 (A) 06/19/2023 01:37 PM    WBC 6.71 10/25/2023 07:58 AM    WBC 6.51 10/03/2023 10:09 AM    WBC 7.26 05/12/2023 09:14 AM    Hemoglobin 9.4 (L) 10/25/2023 07:58 AM    Hemoglobin 9.2 (L) 10/03/2023 10:09 AM    Hemoglobin 9.7 (L) 05/12/2023 09:14 AM    Hematocrit 29.3 (L) 10/25/2023 07:58 AM    Hematocrit 28.7 (L) 10/03/2023 10:09 AM    Hematocrit 29.6 (L) 05/12/2023 09:14 AM    MCV 95 10/25/2023 07:58 AM    MCV 95 10/03/2023 10:09 AM    MCV 89 05/12/2023 09:14 AM    Platelets 319 10/25/2023 07:58 AM    Platelets 252 10/03/2023 10:09 AM    Platelets 271 05/12/2023 09:14 AM    BUN 24 10/25/2023 07:58 AM    BUN 19 10/03/2023 10:09 AM    BUN 16 09/15/2023 07:55 AM    Potassium 4.8 10/25/2023 07:58 AM    Potassium 4.7 10/03/2023 10:09 AM    Potassium 5.0 09/15/2023 07:55 AM    Chloride 102 10/25/2023 07:58 AM    Chloride 105 10/03/2023 10:09 AM    Chloride 99 09/15/2023 07:55 AM    CO2 25 10/25/2023 07:58 AM    CO2 23 10/03/2023 10:09 AM    CO2 25 09/15/2023 07:55 AM    Creatinine 0.92 10/25/2023 07:58 AM    Creatinine 1.00 10/03/2023 10:09 AM    Creatinine 1.02 09/15/2023 07:55 AM    AST 16 10/03/2023 10:09 AM    AST 17 06/15/2023 07:27 AM    AST 13 05/12/2023 09:14 AM    ALT 13 10/03/2023 10:09 AM    ALT 23 06/15/2023 07:27 AM    ALT 19 05/12/2023 09:14 AM    Total Protein 6.4 10/03/2023 10:09 AM    Total Protein 7.3 06/15/2023 07:27 AM    Total Protein 6.9 05/12/2023 09:14 AM    Albumin 3.9 10/25/2023 07:58 AM    Albumin 3.7 10/03/2023 10:09 AM    Albumin 3.7 06/15/2023 07:27 AM    HDL, Direct 31 (L) 09/15/2023 07:55 AM    HDL, Direct 40 06/15/2023 07:27 AM    Triglycerides 57 09/15/2023 07:55 AM    Triglycerides 54 06/15/2023 07:27 AM  "        Imaging Studies: I have personally reviewed pertinent reports.      Portions of the record may have been created with voice recognition software. Occasional wrong word or \"sound a like\" substitutions may have occurred due to the inherent limitations of voice recognition software. Read the chart carefully and recognize, using context, where substitutions have occurred.     "

## 2024-02-20 ENCOUNTER — APPOINTMENT (OUTPATIENT)
Dept: LAB | Facility: CLINIC | Age: 79
End: 2024-02-20
Payer: MEDICARE

## 2024-02-20 DIAGNOSIS — N18.2 CKD STAGE 2 DUE TO TYPE 2 DIABETES MELLITUS: ICD-10-CM

## 2024-02-20 DIAGNOSIS — N18.9 ANEMIA DUE TO CHRONIC KIDNEY DISEASE, UNSPECIFIED CKD STAGE: ICD-10-CM

## 2024-02-20 DIAGNOSIS — I95.1 ORTHOSTATIC HYPOTENSION: ICD-10-CM

## 2024-02-20 DIAGNOSIS — E55.9 VITAMIN D DEFICIENCY: ICD-10-CM

## 2024-02-20 DIAGNOSIS — I50.1 HEART FAILURE, LEFT, WITH LVEF <=30% (HCC): ICD-10-CM

## 2024-02-20 DIAGNOSIS — I10 BENIGN ESSENTIAL HYPERTENSION: ICD-10-CM

## 2024-02-20 DIAGNOSIS — E11.22 CKD STAGE 2 DUE TO TYPE 2 DIABETES MELLITUS: ICD-10-CM

## 2024-02-20 DIAGNOSIS — R53.83 OTHER FATIGUE: ICD-10-CM

## 2024-02-20 DIAGNOSIS — E78.5 DYSLIPIDEMIA: ICD-10-CM

## 2024-02-20 DIAGNOSIS — D63.1 ANEMIA DUE TO CHRONIC KIDNEY DISEASE, UNSPECIFIED CKD STAGE: ICD-10-CM

## 2024-02-20 DIAGNOSIS — E11.65 TYPE 2 DIABETES MELLITUS WITH HYPERGLYCEMIA, WITHOUT LONG-TERM CURRENT USE OF INSULIN (HCC): ICD-10-CM

## 2024-02-20 LAB
25(OH)D3 SERPL-MCNC: 58.4 NG/ML (ref 30–100)
ALBUMIN SERPL BCP-MCNC: 3.9 G/DL (ref 3.5–5)
ALP SERPL-CCNC: 66 U/L (ref 34–104)
ALT SERPL W P-5'-P-CCNC: 13 U/L (ref 7–52)
ANION GAP SERPL CALCULATED.3IONS-SCNC: 7 MMOL/L
AST SERPL W P-5'-P-CCNC: 16 U/L (ref 13–39)
BILIRUB SERPL-MCNC: 0.44 MG/DL (ref 0.2–1)
BUN SERPL-MCNC: 17 MG/DL (ref 5–25)
CALCIUM SERPL-MCNC: 9.1 MG/DL (ref 8.4–10.2)
CHLORIDE SERPL-SCNC: 101 MMOL/L (ref 96–108)
CO2 SERPL-SCNC: 26 MMOL/L (ref 21–32)
CREAT SERPL-MCNC: 1.2 MG/DL (ref 0.6–1.3)
ERYTHROCYTE [DISTWIDTH] IN BLOOD BY AUTOMATED COUNT: 15.2 % (ref 11.6–15.1)
EST. AVERAGE GLUCOSE BLD GHB EST-MCNC: 160 MG/DL
GFR SERPL CREATININE-BSD FRML MDRD: 57 ML/MIN/1.73SQ M
GLUCOSE SERPL-MCNC: 160 MG/DL (ref 65–140)
HBA1C MFR BLD: 7.2 %
HCT VFR BLD AUTO: 29.8 % (ref 36.5–49.3)
HGB BLD-MCNC: 9.6 G/DL (ref 12–17)
MCH RBC QN AUTO: 29.7 PG (ref 26.8–34.3)
MCHC RBC AUTO-ENTMCNC: 32.2 G/DL (ref 31.4–37.4)
MCV RBC AUTO: 92 FL (ref 82–98)
PHOSPHATE SERPL-MCNC: 2.8 MG/DL (ref 2.3–4.1)
PLATELET # BLD AUTO: 256 THOUSANDS/UL (ref 149–390)
PMV BLD AUTO: 10.2 FL (ref 8.9–12.7)
POTASSIUM SERPL-SCNC: 4.4 MMOL/L (ref 3.5–5.3)
PROT SERPL-MCNC: 6.3 G/DL (ref 6.4–8.4)
RBC # BLD AUTO: 3.23 MILLION/UL (ref 3.88–5.62)
SODIUM SERPL-SCNC: 134 MMOL/L (ref 135–147)
WBC # BLD AUTO: 6.8 THOUSAND/UL (ref 4.31–10.16)

## 2024-02-20 PROCEDURE — 36415 COLL VENOUS BLD VENIPUNCTURE: CPT

## 2024-02-20 PROCEDURE — 84100 ASSAY OF PHOSPHORUS: CPT

## 2024-02-20 PROCEDURE — 82306 VITAMIN D 25 HYDROXY: CPT

## 2024-02-20 PROCEDURE — 83036 HEMOGLOBIN GLYCOSYLATED A1C: CPT

## 2024-02-20 PROCEDURE — 85027 COMPLETE CBC AUTOMATED: CPT

## 2024-02-20 PROCEDURE — 80053 COMPREHEN METABOLIC PANEL: CPT

## 2024-02-21 PROBLEM — Z12.5 PROSTATE CANCER SCREENING: Status: RESOLVED | Noted: 2018-08-15 | Resolved: 2024-02-21

## 2024-02-21 PROBLEM — Z00.00 MEDICARE ANNUAL WELLNESS VISIT, SUBSEQUENT: Status: RESOLVED | Noted: 2019-05-25 | Resolved: 2024-02-21

## 2024-02-23 DIAGNOSIS — I50.9 ACUTE ON CHRONIC CONGESTIVE HEART FAILURE, UNSPECIFIED HEART FAILURE TYPE (HCC): ICD-10-CM

## 2024-02-23 DIAGNOSIS — I10 BENIGN ESSENTIAL HYPERTENSION: ICD-10-CM

## 2024-02-23 RX ORDER — CARVEDILOL 12.5 MG/1
12.5 TABLET ORAL 2 TIMES DAILY
Qty: 180 TABLET | Refills: 1 | Status: SHIPPED | OUTPATIENT
Start: 2024-02-23

## 2024-02-24 RX ORDER — SPIRONOLACTONE 25 MG/1
25 TABLET ORAL DAILY
Qty: 90 TABLET | Refills: 1 | Status: SHIPPED | OUTPATIENT
Start: 2024-02-24

## 2024-02-26 ENCOUNTER — APPOINTMENT (OUTPATIENT)
Dept: LAB | Facility: AMBULARY SURGERY CENTER | Age: 79
End: 2024-02-26
Payer: MEDICARE

## 2024-02-26 ENCOUNTER — OFFICE VISIT (OUTPATIENT)
Dept: UROLOGY | Facility: CLINIC | Age: 79
End: 2024-02-26
Payer: MEDICARE

## 2024-02-26 VITALS
HEART RATE: 68 BPM | WEIGHT: 169 LBS | HEIGHT: 71 IN | DIASTOLIC BLOOD PRESSURE: 72 MMHG | OXYGEN SATURATION: 100 % | SYSTOLIC BLOOD PRESSURE: 128 MMHG | BODY MASS INDEX: 23.66 KG/M2

## 2024-02-26 DIAGNOSIS — R21 RASH AND NONSPECIFIC SKIN ERUPTION: ICD-10-CM

## 2024-02-26 DIAGNOSIS — C61 PROSTATE CANCER (HCC): ICD-10-CM

## 2024-02-26 DIAGNOSIS — C61 PROSTATE CANCER (HCC): Primary | ICD-10-CM

## 2024-02-26 LAB — PSA SERPL-MCNC: <0.01 NG/ML (ref 0–4)

## 2024-02-26 PROCEDURE — 99213 OFFICE O/P EST LOW 20 MIN: CPT | Performed by: PHYSICIAN ASSISTANT

## 2024-02-26 PROCEDURE — 84153 ASSAY OF PSA TOTAL: CPT

## 2024-02-26 PROCEDURE — 36415 COLL VENOUS BLD VENIPUNCTURE: CPT

## 2024-02-26 NOTE — PROGRESS NOTES
UROLOGY PROGRESS NOTE   Patient Identifiers: Pernell Covarrubias (MRN [id number])  Date of Service: 2/26/2024    Subjective:   78-year-old man history of Mikki 10 stage III prostate cancer.  He was treated with radiation and 2 years of ADT.  PSA remains undetectable<0.01.  Denies any bone pain or weight loss.  He has a pruritic rash on his left elbow which now appears raw and ecchymotic.    Reason for visit: Prostate cancer follow-up    Objective:     VITALS:    Vitals:    02/26/24 1137   BP: 128/72   Pulse: 68   SpO2: 100%           LABS:  Lab Results   Component Value Date    HGB 9.6 (L) 02/20/2024    HCT 29.8 (L) 02/20/2024    WBC 6.80 02/20/2024     02/20/2024   ]    Lab Results   Component Value Date     04/17/2014    K 4.4 02/20/2024     02/20/2024    CO2 26 02/20/2024    BUN 17 02/20/2024    CREATININE 1.20 02/20/2024    CALCIUM 9.1 02/20/2024    GLUCOSE 137 04/17/2014   ]        INPATIENT MEDS:    Current Outpatient Medications:     acetaminophen (TYLENOL) 325 mg tablet, Take 2 tablets (650 mg total) by mouth every 6 (six) hours as needed for mild pain, headaches or fever, Disp: , Rfl: 0    ammonium lactate (LAC-HYDRIN) 12 % lotion, Apply topically 2 (two) times a day, Disp: 500 g, Rfl: 3    Ascorbic Acid (Vitamin C) 500 MG CAPS, Take 500 mg by mouth daily, Disp: , Rfl:     aspirin (ECOTRIN LOW STRENGTH) 81 mg EC tablet, Take 81 mg by mouth daily, Disp: , Rfl:     atorvastatin (LIPITOR) 20 mg tablet, TAKE 1 TABLET EVERY DAY, Disp: 90 tablet, Rfl: 1    brimonidine tartrate 0.2 % ophthalmic solution, Inject 0.2 % into the eye 2 (two) times a day, Disp: , Rfl:     carvedilol (COREG) 12.5 mg tablet, TAKE 1 TABLET TWICE DAILY, Disp: 180 tablet, Rfl: 1    Cholecalciferol (Vitamin D3) 50 MCG (2000 UT) TABS, Take 2,000 Units by mouth daily, Disp: , Rfl:     co-enzyme Q-10 100 mg capsule, Take 100 mg by mouth daily, Disp: , Rfl:     ferrous sulfate 324 (65 Fe) mg, Take 1 tablet (324 mg total) by  "mouth 2 (two) times a day before meals, Disp: 180 tablet, Rfl: 3    glucose blood (OneTouch Ultra) test strip, Test blood sugar once a day, Disp: 100 strip, Rfl: 3    losartan (COZAAR) 25 mg tablet, Take 1 tablet (25 mg total) by mouth daily, Disp: 90 tablet, Rfl: 1    metFORMIN (GLUCOPHAGE-XR) 500 mg 24 hr tablet, Take 2 tablets (1,000 mg total) by mouth 2 (two) times a day with meals, Disp: 360 tablet, Rfl: 2    multivitamin (THERAGRAN) TABS, Take 1 tablet by mouth daily, Disp: , Rfl:     omeprazole (PriLOSEC) 40 MG capsule, Take 1 capsule (40 mg total) by mouth daily as needed (acid reflux), Disp: 90 capsule, Rfl: 1    sitaGLIPtin (JANUVIA) 25 mg tablet, Take one tablet by mouth once a day, Disp: 90 tablet, Rfl: 3    spironolactone (ALDACTONE) 25 mg tablet, TAKE 1 TABLET EVERY DAY, Disp: 90 tablet, Rfl: 1    tamsulosin (FLOMAX) 0.4 mg, Take 1 capsule (0.4 mg total) by mouth daily with dinner, Disp: 90 capsule, Rfl: 1    TRUEplus Lancets 28G MISC, Test 1x/d, E11.29, Disp: 200 each, Rfl: 3    Infant Foods (Follow-Up) POWD, HANDICAP PLACARD, medically recommended, <taxonomy [id number]> due to arthritic/orthopedic condition (#2,#5) (Patient not taking: Reported on 9/19/2022), Disp: 99 g, Rfl: 1    Probiotic Product (CULTURELLE PROBIOTICS) CHEW, Chew daily (Patient not taking: Reported on 2/26/2024), Disp: , Rfl:       Physical Exam:   /72 (BP Location: Right arm, Patient Position: Sitting, Cuff Size: Adult)   Pulse 68   Ht 5' 11\" (1.803 m)   Wt 76.7 kg (169 lb)   SpO2 100%   BMI 23.57 kg/m²   GEN: no acute distress    RESP: breathing comfortably with no accessory muscle use    ABD: soft, non-tender, non-distended   INCISION:    EXT: no significant peripheral edema left elbow as above.      RADIOLOGY:   None    Assessment:   #1.  Prostate cancer    Plan:   -Follow-up in 6 months with PSA now and prior to visit  -Ambulatory referral to dermatology  -  -          "

## 2024-02-27 ENCOUNTER — TELEPHONE (OUTPATIENT)
Dept: NEPHROLOGY | Facility: HOSPITAL | Age: 79
End: 2024-02-27

## 2024-02-27 NOTE — TELEPHONE ENCOUNTER
----- Message from Umesh Montejo DO sent at 2/26/2024  4:15 PM EST -----  Recently reviewed laboratory studies.  Patient will need his routine follow-up appointment scheduled.  Thank you.  ----- Message -----  From: Lab, Background User  Sent: 2/20/2024   8:23 PM EST  To: Umesh Montejo DO

## 2024-02-27 NOTE — TELEPHONE ENCOUNTER
Called patient and left a voicemail stating the following formation:    Recently reviewed laboratory studies.  Patient will need his routine follow-up appointment scheduled.     I asked the patient please call the office back to schedule the follow up appointment.   Recommended artificial tears to use: 1 drop 4x a day in both eyes.

## 2024-03-01 ENCOUNTER — OFFICE VISIT (OUTPATIENT)
Dept: DERMATOLOGY | Facility: CLINIC | Age: 79
End: 2024-03-01
Payer: MEDICARE

## 2024-03-01 VITALS — TEMPERATURE: 97.9 F | BODY MASS INDEX: 23.66 KG/M2 | HEIGHT: 71 IN | WEIGHT: 169 LBS

## 2024-03-01 DIAGNOSIS — Z13.89 SCREENING FOR SKIN CONDITION: Primary | ICD-10-CM

## 2024-03-01 DIAGNOSIS — D22.9 MULTIPLE NEVI: ICD-10-CM

## 2024-03-01 DIAGNOSIS — L85.3 XEROSIS OF SKIN: ICD-10-CM

## 2024-03-01 DIAGNOSIS — D18.01 CHERRY ANGIOMA: ICD-10-CM

## 2024-03-01 DIAGNOSIS — L82.1 SEBORRHEIC KERATOSIS: ICD-10-CM

## 2024-03-01 PROCEDURE — 99203 OFFICE O/P NEW LOW 30 MIN: CPT | Performed by: DERMATOLOGY

## 2024-03-01 RX ORDER — TRIAMCINOLONE ACETONIDE 1 MG/G
OINTMENT TOPICAL 2 TIMES DAILY
Qty: 453.6 G | Refills: 1 | Status: SHIPPED | OUTPATIENT
Start: 2024-03-01

## 2024-03-01 RX ORDER — TACROLIMUS 1 MG/G
OINTMENT TOPICAL DAILY
Qty: 100 G | Refills: 3 | Status: SHIPPED | OUTPATIENT
Start: 2024-03-01

## 2024-03-01 NOTE — PROGRESS NOTES
"[facility] Note     Patient Name: Pernell Covarrubias  Encounter Date: March 1, 2024     Have you been cared for by a [facility] Dermatologist in the last 3 years and, if so, which description applies to you?    NO.   I am considered a \"new\" patient and must complete all patient intake questions. I am MALE/not capable of bearing children.    REVIEW OF SYSTEMS:  Have you recently had or currently have any of the following? Recent fever or chills? No  Any non-healing wound? No   PAST MEDICAL HISTORY:  Have you personally ever had or currently have any of the following?  If \"YES,\" then please provide more detail. Skin cancer (such as Melanoma, Basal Cell Carcinoma, Squamous Cell Carcinoma?  No  Tuberculosis, HIV/AIDS, Hepatitis B or C: No  Radiation Treatment YES, Prostate Cancer - 3yrs ago   HISTORY OF IMMUNOSUPPRESSION:   Do you have a history of any of the following:  Systemic Immunosuppression such as Diabetes, Biologic or Immunotherapy, Chemotherapy, Organ Transplantation, Bone Marrow Transplantation?  No     Answering \"YES\" requires the addition of the dotphrase \"IMMUNOSUPPRESSED\" as the first diagnosis of the patient's visit.   FAMILY HISTORY:  Any \"first degree relatives\" (parent, brother, sister, or child) with the following?    Skin Cancer, Pancreatic or Other Cancer? No   PATIENT EXPERIENCE:    Do you want the Dermatologist to perform a COMPLETE skin exam today including a clinical examination under the \"bra and underwear\" areas?  Yes  If necessary, do we have your permission to call and leave a detailed message on your Preferred Phone number that includes your specific medical information?  Yes      No Known Allergies   Current Outpatient Medications:     acetaminophen (TYLENOL) 325 mg tablet, Take 2 tablets (650 mg total) by mouth every 6 (six) hours as needed for mild pain, headaches or fever, Disp: , Rfl: 0    ammonium lactate (LAC-HYDRIN) 12 % lotion, Apply topically 2 (two) times a " day, Disp: 500 g, Rfl: 3    Ascorbic Acid (Vitamin C) 500 MG CAPS, Take 500 mg by mouth daily, Disp: , Rfl:     aspirin (ECOTRIN LOW STRENGTH) 81 mg EC tablet, Take 81 mg by mouth daily, Disp: , Rfl:     atorvastatin (LIPITOR) 20 mg tablet, TAKE 1 TABLET EVERY DAY, Disp: 90 tablet, Rfl: 1    brimonidine tartrate 0.2 % ophthalmic solution, Inject 0.2 % into the eye 2 (two) times a day, Disp: , Rfl:     carvedilol (COREG) 12.5 mg tablet, TAKE 1 TABLET TWICE DAILY, Disp: 180 tablet, Rfl: 1    Cholecalciferol (Vitamin D3) 50 MCG (2000 UT) TABS, Take 2,000 Units by mouth daily, Disp: , Rfl:     co-enzyme Q-10 100 mg capsule, Take 100 mg by mouth daily, Disp: , Rfl:     ferrous sulfate 324 (65 Fe) mg, Take 1 tablet (324 mg total) by mouth 2 (two) times a day before meals, Disp: 180 tablet, Rfl: 3    glucose blood (OneTouch Ultra) test strip, Test blood sugar once a day, Disp: 100 strip, Rfl: 3    losartan (COZAAR) 25 mg tablet, Take 1 tablet (25 mg total) by mouth daily, Disp: 90 tablet, Rfl: 1    metFORMIN (GLUCOPHAGE-XR) 500 mg 24 hr tablet, Take 2 tablets (1,000 mg total) by mouth 2 (two) times a day with meals, Disp: 360 tablet, Rfl: 2    multivitamin (THERAGRAN) TABS, Take 1 tablet by mouth daily, Disp: , Rfl:     omeprazole (PriLOSEC) 40 MG capsule, Take 1 capsule (40 mg total) by mouth daily as needed (acid reflux), Disp: 90 capsule, Rfl: 1    sitaGLIPtin (JANUVIA) 25 mg tablet, Take one tablet by mouth once a day, Disp: 90 tablet, Rfl: 3    spironolactone (ALDACTONE) 25 mg tablet, TAKE 1 TABLET EVERY DAY, Disp: 90 tablet, Rfl: 1    tamsulosin (FLOMAX) 0.4 mg, Take 1 capsule (0.4 mg total) by mouth daily with dinner, Disp: 90 capsule, Rfl: 1    TRUEplus Lancets 28G MISC, Test 1x/d, E11.29, Disp: 200 each, Rfl: 3    Infant Foods (Follow-Up) POWD, HANDICAP AXELARD, medically recommended, <taxonomy [id number]> due to arthritic/orthopedic condition (#2,#5) (Patient not taking: Reported on 9/19/2022), Disp: 99 g, Rfl: 1  "         Whom besides the patient is providing clinical information about today's encounter?   NO ADDITIONAL HISTORIAN (patient alone provided history)    Physical Exam and Assessment/Plan by Diagnosis:    Chief complaint: Patient is a 79 y/o male present for a routine skin exam without personal or family history of skin cancer and has some spots of concern from the baby aspirin that's causing bruising.    MELANOCYTIC NEVI (\"Moles\")    Physical Exam:  Anatomic Location Affected: Mostly on sun-exposed areas of the trunk and extremities  Morphological Description:  Scattered, 1-4mm round to ovoid, symmetrical-appearing, even bordered, skin colored to dark brown macules/papules, mostly in sun-exposed areas  Pertinent Positives:  Pertinent Negatives:    Additional History of Present Condition:  present on exam    Assessment and Plan:  Based on a thorough discussion of this condition and the management approach to it (including a comprehensive discussion of the known risks, side effects and potential benefits of treatment), the patient (family) agrees to implement the following specific plan:  Provided handout with information regarding the ABCDE's of moles   Recommend routine skin exams every year   Sun avoidance, protective clothing (known as UPF clothing), and the use of at least SPF 30 sunscreens is advised. Sunscreen should be reapplied every two hours when outside.     SEBORRHEIC KERATOSIS; NON-INFLAMED    Physical Exam:  Anatomic Location Affected:  scattered across trunk, extremities, face  Morphological Description:  Flat and raised, waxy, smooth to warty textured, yellow to brownish-grey to dark brown to blackish, discrete, \"stuck-on\" appearing papules.  Pertinent Positives:  Pertinent Negatives:    Additional History of Present Condition:  Patient reports new bumps on the skin.  Denies itch, burn, pain, bleeding or ulceration.  Present constantly; nothing seems to make it worse or better.  No prior treatment.  " "    Assessment and Plan:  Based on a thorough discussion of this condition and the management approach to it (including a comprehensive discussion of the known risks, side effects and potential benefits of treatment), the patient (family) agrees to implement the following specific plan:  Reassured benign    ANGIOMA (\"CHERRY ANGIOMA\")    Physical Exam:  Anatomic Location: scattered across sun exposed areas of the trunk and extremities   Morphologic Description: Firm red to reddish-blue discrete papules  Pertinent Positives:  Pertinent Negatives:    Additional History of Present Condition:  Present on exam.     Assessment and Plan:  Reassured benign    XEROSIS (\"DRY SKIN\")    Physical Exam:  Anatomic Location Affected:  Scalp, Face, Trunk and Extremities  Morphological Description:  powdery dry scaling  Pertinent Positives: Itching  Pertinent Negatives:    Additional History of Present Condition:  present on exam    Assessment and Plan:  Based on a thorough discussion of this condition and the management approach to it (including a comprehensive discussion of the known risks, side effects and potential benefits of treatment), the patient (family) agrees to implement the following specific plan:  Triamcinolone Ointment apply to dry twice a day x2 weeks then stop and restart for flare ups - DO NOT APPLY TO FACE, GROIN & UNDERARMS  Switch to Protopic Ointment daily for maintenance  Cera-Ve or Cetaphil Cream daily after warm baths (not hot water) and pat skin dry, DO NOT RUB  Dove Sensitive Skin Cleanser  Can apply Aquaphor over moisturizer to scalp and face     Dry skin refers to skin that feels dry to touch. Dry skin has a dull surface with a rough, scaly quality. The skin is less pliable and cracked. When dryness is severe, the skin may become inflamed and fissured.  Although any body site can be dry, dry skin tends to affect the shins more than any other site.    Dry skin is lacking moisture in the outer horny cell " layer (stratum corneum) and this results in cracks in the skin surface.  Dry skin is also called xerosis, xeroderma or asteatosis (lack of fat).  It can affect males and females of all ages. There is some racial variability in water and lipid content of the skin.  Dry skin that starts in early childhood may be one of about 20 types of ichthyosis (fish-scale skin). There is often a family history of dry skin.   Dry skin is commonly seen in people with atopic dermatitis.  Nearly everyone > 60 years has dry skin.    Dry skin that begins later may be seen in people with certain diseases and conditions.  Postmenopausal women  Hypothyroidism  Chronic renal disease   Malnutrition and weight loss   Subclinical dermatitis   Treatment with certain drugs such as oral retinoids, diuretics and epidermal growth factor receptor inhibitors    People exposed to a dry environment may experience dry skin.  Low humidity: in desert climates or cool, windy conditions   Excessive air conditioning   Direct heat from a fire or fan heater   Excessive bathing   Contact with soap, detergents and solvents   Inappropriate topical agents such as alcohol   Frictional irritation from rough clothing or abrasives    Dry skin is due to abnormalities in the integrity of the barrier function of the stratum corneum, which is made up of corneocytes.  There is an overall reduction in the lipids in the stratum corneum.   Ratio of ceramides, cholesterol and free fatty acids may be normal or altered.   There may be a reduction in the proliferation of keratinocytes.   Keratinocyte subtypes change in dry skin with a decrease in keratins K1, K10 and increase in K5, K14.   Involucrin (a protein) may be expressed early, increasing cell stiffness.   The result is retention of corneocytes and reduced water-holding capacity.    The inherited forms of ichthyosis are due to loss of function mutations in various genes (listed in parentheses below).  The clinical  features of ichthyosis depend on the specific type of ichthyosis:  Ichthyosis vulgaris (FLG).   Recessive X-linked ichthyosis (STS)   Autosomal recessive congenital ichthyosis (ABCA12, TGM1, ALOXE3)   Keratinopathic ichthyoses (KRT1, KRT10, KRT2)    Acquired ichthyosis may be due to:  Metabolic factors: thyroid deficiency   Illness: lymphoma, internal malignancy, sarcoidosis, HIV infection   Drugs: nicotinic acid, kava, protein kinase inhibitors (eg EGFR inhibitors), hydroxyurea.    Complications of dry skin:  Dry areas of skin may become itchy, indicating a form of eczema/dermatitis has developed.  Atopic eczema -- especially in people with ichthyosis vulgaris   Eczema craquelé -- especially in elderly people. Also called asteatotic eczema   A dry form of nummular dermatitis/discoid eczema -- especially in people that wash their skin excessively.  When the dry skin of an elderly person is itchy without a visible rash, it is sometimes called winter itch, 7th age itch, senile pruritus or chronic pruritus of the elderly.    Other complications of dry skin may include:  Skin infection when bacteria or viruses penetrate a break in the skin surface   Overheating, especially in some forms of ichthyosis   Food allergy, eg, to peanuts, has been associated with filaggrin mutations   Contact allergy, eg, to nickel, has also been correlated with barrier function defects.    How is the type of dry skin diagnosed?  The type of dry skin is diagnosed by careful history and examination.    In children:  Family history   Age of onset   Appearance at birth, if known   Distribution of dry skin   Other features, eg eczema, abnormal nails, hair, dentition, sight, hearing.    In adults:  Medical history   Medications and topical preparations   Bathing frequency and use of soap   Evaluation of environmental factors that may contribute to dry skin.    What is the treatment for dry skin?  The mainstay of treatment of dry skin and ichthyosis  is moisturisers/emollients. They should be applied liberally and often enough to:  Reduce itch   Improve the barrier function   Prevent entry of irritants, bacteria   Reduce transepidermal water loss.    When considering which emollient is most suitable, consider:  Severity of the dryness   Tolerance   Personal preference   Cost and availability.    Emollients generally work best if applied to damp skin, if pH is below 7 (acidic), and if containing humectants such as urea or propylene glycol.  Additional treatments include:  Topical steroid if itchy or there is dermatitis -- choose an emollient base   Topical calcineurin inhibitors if topical steroids are unsuitable.    How can dry skin be prevented?  Eliminate aggravating factors:  Reduce the frequency of bathing.   A humidifier in winter and air conditioner in summer   Compare having a short shower with a prolonged soak in a bath.   Use lukewarm, not hot, water.   Replace standard soap with a substitute such as a synthetic detergent cleanser, water-miscible emollient, bath oil, anti-pruritic tar oil, colloidal oatmeal etc.   Apply an emollient liberally and often, particularly shortly after bathing, and when itchy. The drier the skin, the thicker this should be, especially on the hands.    What is the outlook for dry skin?  A tendency to dry skin may persist life-long, or it may improve once contributing factors are controlled.    Scribe Attestation      I,:  Lisbet Morales am acting as a scribe while in the presence of the attending physician.:       I,:  Guilherme Garcia MD personally performed the services described in this documentation    as scribed in my presence.:

## 2024-03-01 NOTE — PATIENT INSTRUCTIONS
"XEROSIS (\"DRY SKIN\")    Assessment and Plan:  Based on a thorough discussion of this condition and the management approach to it (including a comprehensive discussion of the known risks, side effects and potential benefits of treatment), the patient (family) agrees to implement the following specific plan:  Triamcinolone Ointment apply to dry twice a day x2 weeks then stop and restart for flare ups - DO NOT APPLY TO FACE, GROIN & UNDERARMS  Switch to Protopic Ointment daily for maintenance  Cera-Ve or Cetaphil Cream daily after warm baths (not hot water) and pat skin dry, DO NOT RUB  Dove Sensitive Skin Cleanser  Can apply Aquaphor over moisturizer to scalp and face     Dry skin refers to skin that feels dry to touch. Dry skin has a dull surface with a rough, scaly quality. The skin is less pliable and cracked. When dryness is severe, the skin may become inflamed and fissured.  Although any body site can be dry, dry skin tends to affect the shins more than any other site.    Dry skin is lacking moisture in the outer horny cell layer (stratum corneum) and this results in cracks in the skin surface.  Dry skin is also called xerosis, xeroderma or asteatosis (lack of fat).  It can affect males and females of all ages. There is some racial variability in water and lipid content of the skin.  Dry skin that starts in early childhood may be one of about 20 types of ichthyosis (fish-scale skin). There is often a family history of dry skin.   Dry skin is commonly seen in people with atopic dermatitis.  Nearly everyone > 60 years has dry skin.    Dry skin that begins later may be seen in people with certain diseases and conditions.  Postmenopausal women  Hypothyroidism  Chronic renal disease   Malnutrition and weight loss   Subclinical dermatitis   Treatment with certain drugs such as oral retinoids, diuretics and epidermal growth factor receptor inhibitors    People exposed to a dry environment may experience dry skin.  Low " humidity: in desert climates or cool, windy conditions   Excessive air conditioning   Direct heat from a fire or fan heater   Excessive bathing   Contact with soap, detergents and solvents   Inappropriate topical agents such as alcohol   Frictional irritation from rough clothing or abrasives    Dry skin is due to abnormalities in the integrity of the barrier function of the stratum corneum, which is made up of corneocytes.  There is an overall reduction in the lipids in the stratum corneum.   Ratio of ceramides, cholesterol and free fatty acids may be normal or altered.   There may be a reduction in the proliferation of keratinocytes.   Keratinocyte subtypes change in dry skin with a decrease in keratins K1, K10 and increase in K5, K14.   Involucrin (a protein) may be expressed early, increasing cell stiffness.   The result is retention of corneocytes and reduced water-holding capacity.    The inherited forms of ichthyosis are due to loss of function mutations in various genes (listed in parentheses below).  The clinical features of ichthyosis depend on the specific type of ichthyosis:  Ichthyosis vulgaris (FLG).   Recessive X-linked ichthyosis (STS)   Autosomal recessive congenital ichthyosis (ABCA12, TGM1, ALOXE3)   Keratinopathic ichthyoses (KRT1, KRT10, KRT2)    Acquired ichthyosis may be due to:  Metabolic factors: thyroid deficiency   Illness: lymphoma, internal malignancy, sarcoidosis, HIV infection   Drugs: nicotinic acid, kava, protein kinase inhibitors (eg EGFR inhibitors), hydroxyurea.    Complications of dry skin:  Dry areas of skin may become itchy, indicating a form of eczema/dermatitis has developed.  Atopic eczema -- especially in people with ichthyosis vulgaris   Eczema craquelé -- especially in elderly people. Also called asteatotic eczema   A dry form of nummular dermatitis/discoid eczema -- especially in people that wash their skin excessively.  When the dry skin of an elderly person is itchy  without a visible rash, it is sometimes called winter itch, 7th age itch, senile pruritus or chronic pruritus of the elderly.    Other complications of dry skin may include:  Skin infection when bacteria or viruses penetrate a break in the skin surface   Overheating, especially in some forms of ichthyosis   Food allergy, eg, to peanuts, has been associated with filaggrin mutations   Contact allergy, eg, to nickel, has also been correlated with barrier function defects.    How is the type of dry skin diagnosed?  The type of dry skin is diagnosed by careful history and examination.    In children:  Family history   Age of onset   Appearance at birth, if known   Distribution of dry skin   Other features, eg eczema, abnormal nails, hair, dentition, sight, hearing.    In adults:  Medical history   Medications and topical preparations   Bathing frequency and use of soap   Evaluation of environmental factors that may contribute to dry skin.    What is the treatment for dry skin?  The mainstay of treatment of dry skin and ichthyosis is moisturisers/emollients. They should be applied liberally and often enough to:  Reduce itch   Improve the barrier function   Prevent entry of irritants, bacteria   Reduce transepidermal water loss.    When considering which emollient is most suitable, consider:  Severity of the dryness   Tolerance   Personal preference   Cost and availability.    Emollients generally work best if applied to damp skin, if pH is below 7 (acidic), and if containing humectants such as urea or propylene glycol.  Additional treatments include:  Topical steroid if itchy or there is dermatitis -- choose an emollient base   Topical calcineurin inhibitors if topical steroids are unsuitable.    How can dry skin be prevented?  Eliminate aggravating factors:  Reduce the frequency of bathing.   A humidifier in winter and air conditioner in summer   Compare having a short shower with a prolonged soak in a bath.   Use  "lukewarm, not hot, water.   Replace standard soap with a substitute such as a synthetic detergent cleanser, water-miscible emollient, bath oil, anti-pruritic tar oil, colloidal oatmeal etc.   Apply an emollient liberally and often, particularly shortly after bathing, and when itchy. The drier the skin, the thicker this should be, especially on the hands.    What is the outlook for dry skin?  A tendency to dry skin may persist life-long, or it may improve once contributing factors are controlled.    When outside we recommend using a wide brim hat, sunglasses, long sleeve and pants, sunscreen with SPF 30+ with reapplication every 2 hours, or SPF specific clothing     MELANOCYTIC NEVI (\"Moles\")    Melanocytic nevi (\"moles\") are tan or brown, raised or flat areas of the skin which have an increased number of melanocytes. Melanocytes are the cells in our body which make pigment and account for skin color.    Some moles are present at birth (I.e., \"congenital nevi\"), while others come up later in life (i.e., \"acquired nevi\").  The sun can stimulate the body to make more moles.  Sunburns are not the only thing that triggers more moles.  Chronic sun exposure can do it too.     Clinically distinguishing a healthy mole from melanoma may be difficult, even for experienced dermatologists. The \"ABCDE's\" of moles have been suggested as a means of helping to alert a person to a suspicious mole and the possible increased risk of melanoma.  The suggestions for raising alert are as follows:    Asymmetry: Healthy moles tend to be symmetric, while melanomas are often asymmetric.  Asymmetry means if you draw a line through the mole, the two halves do not match in color, size, shape, or surface texture. Asymmetry can be a result of rapid enlargement of a mole, the development of a raised area on a previously flat lesion, scaling, ulceration, bleeding or scabbing within the mole.  Any mole that starts to demonstrate \"asymmetry\" should be " "examined promptly by a board certified dermatologist.     Border: Healthy moles tend to have discrete, even borders.  The border of a melanoma often blends into the normal skin and does not sharply delineate the mole from normal skin.  Any mole that starts to demonstrate \"uneven borders\" should be examined promptly by a board certified dermatologist.     Color: Healthy moles tend to be one color throughout.  Melanomas tend to be made up of different colors ranging from dark black, blue, white, or red.  Any mole that demonstrates a color change should be examined promptly by a board certified dermatologist.     Diameter: Healthy moles tend to be smaller than 0.6 cm in size; an exception are \"congenital nevi\" that can be larger.  Melanomas tend to grow and can often be greater than 0.6 cm (1/4 of an inch, or the size of a pencil eraser). This is only a guideline, and many normal moles may be larger than 0.6 cm without being unhealthy.  Any mole that starts to change in size (small to bigger or bigger to smaller) should be examined promptly by a board certified dermatologist.     Evolving: Healthy moles tend to \"stay the same.\"  Melanomas may often show signs of change or evolution such as a change in size, shape, color, or elevation.  Any mole that starts to itch, bleed, crust, burn, hurt, or ulcerate or demonstrate a change or evolution should be examined promptly by a board certified dermatologist.      Dysplastic Nevi  Dysplastic moles are moles that fit the ABCDE rules of melanoma but are not identified as melanomas when examined under the microscope.  They may indicate an increased risk of melanoma in that person. If there is a family history of melanoma, most experts agree that the person may be at an increased risk for developing a melanoma.  Experts still do not agree on what dysplastic moles mean in patients without a personal or family history of melanoma.  Dysplastic moles are usually larger than common " "moles and have different colors within it with irregular borders. The appearance can be very similar to a melanoma. Biopsies of dysplastic moles may show abnormalities which are different from a regular mole.      Melanoma  Malignant melanoma is a type of skin cancer that can be deadly if it spreads throughout the body. The incidence of melanoma in the [place] is growing faster than any other cancer. Melanoma usually grows near the surface of the skin for a period of time, and then begins to grow deeper into the skin. Once it grows deeper into the skin, the risk of spread to other organs greatly increases. Therefore, early detection and removal of a malignant melanoma may result in a better chance at a complete cure; removal after the tumor has spread may not be as effective, leading to worse clinical outcomes such as death.    The true rate of nevus transformation into a melanoma is unknown. It has been estimated that the lifetime risk for any acquired melanocytic nevus on any 20-year-old individual transforming into melanoma by age 80 is 0.03% (1 in 3,164) for men and 0.009% (1 in 10,800) for women.     The appearance of a \"new mole\" remains one of the most reliable methods for identifying a malignant melanoma.  Occasionally, melanomas appear as rapidly growing, blue-black, dome-shaped bumps within a previous mole or previous area of normal skin.  Other times, melanomas are suspected when a mole suddenly appears or changes. Itching, burning, or pain in a pigmented lesion should increase suspicion, but most patients with early melanoma have no skin discomfort whatsoever.  Melanoma can occur anywhere on the skin, including areas that are difficult for self-examination. Many melanomas are first noticed by other family members.  Suspicious-looking moles may be removed for microscopic examination.       You may be able to prevent death from melanoma by doing two simple things:    Try to avoid unnecessary sun " "exposure and protect your skin when it is exposed to the sun.  People who live near the equator, people who have intermittent exposures to large amounts of sun, and people who have had sunburns in childhood or adolescence have an increased risk for melanoma. Sun sense and vigilant sun protection may be keys to helping to prevent melanoma.  We recommend wearing UPF-rated sun protective clothing and sunglasses whenever possible and applying a moisturizer-sunscreen combination product (SPF 50+) such as Neutrogena Daily Defense to sun exposed areas of skin at least three times a day.    Have your moles regularly examined by a board certified dermatologist AND by yourself or a family member/friend at home.  We recommend that you have your moles examined at least once a year by a board certified dermatologist.  Use your birthday as an annual reminder to have your \"Birthday Suit\" (I.e., your skin) examined; it is a nice birthday gift to yourself to know that your skin is healthy appearing!  Additionally, at-home self examinations may be helpful for detecting a possible melanoma.  Use the ABCDEs we discussed and check your moles once a month at home.        SEBORRHEIC KERATOSIS  A seborrheic keratosis is a harmless warty spot that appears during adult life as a common sign of skin aging.  Seborrheic keratoses can arise on any area of skin, covered or uncovered, with the usual exception of the palms and soles. They do not arise from mucous membranes. Seborrheic keratoses can have highly variable appearance.      Seborrheic keratoses are extremely common. It has been estimated that over 90% of adults over the age of 60 years have one or more of them. They occur in males and females of all races, typically beginning to erupt in the 30s or 40s. They are uncommon under the age of 20 years.  The precise cause of seborrhoeic keratoses is not known.  Seborrhoeic keratoses are considered degenerative in nature. As time goes by, " "seborrheic keratoses tend to become more numerous. Some people inherit a tendency to develop a very large number of them; some people may have hundreds of them.    The name \"seborrheic keratosis\" is misleading, because these lesions are not limited to a seborrhoeic distribution (scalp, mid-face, chest, upper back), nor are they formed from sebaceous glands, nor are they associated with sebum -- which is greasy.  Seborrheic keratosis may also be called \"SK,\" \"Seb K,\" \"basal cell papilloma,\" \"senile wart,\" or \"barnacle.\"      There is no easy way to remove multiple lesions on a single occasion.  Unless a specific lesion is \"inflamed\" and is causing pain or stinging/burning or is bleeding, most insurance companies do not authorize treatment.      ANGIOMA (\"CHERRY ANGIOMA\")  Welch angiomas markedly increase in number from about the age of 40, so it has been estimated that 75% of people over 75 years of age have them. Although they also called \"senile angiomas,\" they can occur in young people too - 5% of adolescents have been found to have them.     Cherry angiomas are very common in males and females of any age or race, with no difference in sexes or races affected. They are however more noticeable in white skin than in skin of colour.  There may be a family history of similar lesions. Eruptive (very large number appearing in a short period of time) cherry angiomas have been rarely reported to be associated with internal malignancy and pregnancy.    "

## 2024-03-04 ENCOUNTER — TELEPHONE (OUTPATIENT)
Age: 79
End: 2024-03-04

## 2024-03-04 NOTE — TELEPHONE ENCOUNTER
Spoke with patient's wife. She was confused on why patient was prescribed Tacrolimus ointment. I explained to her that patient should use his Triamcinolone cream for two weeks only and then transition to Tacrolimus for maintenance. I also explained to her that she should get an GoodRx coupon to get medication for a lower price. She agreed and expressed understanding.     Virginia Lozada/sid  03/04/24  3:40 PM

## 2024-03-04 NOTE — TELEPHONE ENCOUNTER
I left a voicemail for patient requesting a call back.     Virginia Lozada/sid  03/04/24  1:24 PM

## 2024-03-04 NOTE — TELEPHONE ENCOUNTER
Dr Garcia,     You prescribed Tacrolimus for Xerosis of skin. Please advise.     Virginia Lozada/sid  03/04/24  12:29 PM

## 2024-03-04 NOTE — TELEPHONE ENCOUNTER
Patient's wife called regarding a high copay for tacrolimus , it will cost $200 out of his pocket , they would like to know if there is  something similar that Dr. Garcia can prescribe .

## 2024-03-13 ENCOUNTER — OFFICE VISIT (OUTPATIENT)
Age: 79
End: 2024-03-13
Payer: MEDICARE

## 2024-03-13 VITALS
DIASTOLIC BLOOD PRESSURE: 66 MMHG | HEART RATE: 76 BPM | WEIGHT: 169 LBS | SYSTOLIC BLOOD PRESSURE: 100 MMHG | RESPIRATION RATE: 16 BRPM | BODY MASS INDEX: 23.66 KG/M2 | HEIGHT: 71 IN

## 2024-03-13 DIAGNOSIS — E11.42 DIABETIC POLYNEUROPATHY ASSOCIATED WITH TYPE 2 DIABETES MELLITUS (HCC): ICD-10-CM

## 2024-03-13 DIAGNOSIS — M79.671 PAIN IN BOTH FEET: ICD-10-CM

## 2024-03-13 DIAGNOSIS — L97.421 DIABETIC ULCER OF LEFT MIDFOOT ASSOCIATED WITH TYPE 2 DIABETES MELLITUS, LIMITED TO BREAKDOWN OF SKIN (HCC): Primary | ICD-10-CM

## 2024-03-13 DIAGNOSIS — M79.672 PAIN IN BOTH FEET: ICD-10-CM

## 2024-03-13 DIAGNOSIS — E11.621 DIABETIC ULCER OF LEFT MIDFOOT ASSOCIATED WITH TYPE 2 DIABETES MELLITUS, LIMITED TO BREAKDOWN OF SKIN (HCC): Primary | ICD-10-CM

## 2024-03-13 DIAGNOSIS — B07.0 PLANTAR WARTS: ICD-10-CM

## 2024-03-13 DIAGNOSIS — M21.962 ACQUIRED DEFORMITY OF LEFT FOOT: ICD-10-CM

## 2024-03-13 DIAGNOSIS — K21.9 GASTROESOPHAGEAL REFLUX DISEASE, UNSPECIFIED WHETHER ESOPHAGITIS PRESENT: ICD-10-CM

## 2024-03-13 DIAGNOSIS — I73.9 PAD (PERIPHERAL ARTERY DISEASE) (HCC): ICD-10-CM

## 2024-03-13 PROCEDURE — 99213 OFFICE O/P EST LOW 20 MIN: CPT | Performed by: PODIATRIST

## 2024-03-13 RX ORDER — IMIQUIMOD 12.5 MG/.25G
1 CREAM TOPICAL 3 TIMES WEEKLY
Qty: 12 EACH | Refills: 0 | Status: SHIPPED | OUTPATIENT
Start: 2024-03-13

## 2024-03-13 RX ORDER — OMEPRAZOLE 40 MG/1
40 CAPSULE, DELAYED RELEASE ORAL DAILY PRN
Qty: 90 CAPSULE | Refills: 1 | Status: SHIPPED | OUTPATIENT
Start: 2024-03-13

## 2024-03-13 NOTE — TELEPHONE ENCOUNTER
Reason for call:   [x] Refill   [] Prior Auth  [] Other:     Office:   [x] PCP/Provider -   [] Specialty/Provider -     Medication:   Omeprazole 40mg- take 1 capsule by mouth daily as needed      Pharmacy: [facility]     Does the patient have enough for 3 days?   [] Yes   [x] No - Send as HP to [facility]

## 2024-03-13 NOTE — PROGRESS NOTES
Assessment/Plan: Preulcerative callus/Gamino grade 0 ulcer plantar aspect left foot.  Acquired deformity foot.  History of metatarsal resection.  Diabetic foot.  Diabetic neuropathy.  Peripheral artery disease.  Mycosis of nail.     Plan.  Chart reviewed.  Primary care notes reviewed.  Lab work reviewed.  Patient examined.  Diabetic foot exam performed.  Betadine wet-to-dry dressing applied.  Patient watch for signs of infection.  Aftercare instruction given.  Return for follow-up.  We will add topical Aldara cream.  Patient wear accommodative shoes daily.            Diagnoses and all orders for this visit:     Diabetic ulcer of left midfoot associated with type 2 diabetes mellitus, limited to breakdown of skin (formerly [facility]).  Rule out atypical verruca as etiology.     Diabetic polyneuropathy associated with type 2 diabetes mellitus (formerly [facility])     PAD (peripheral artery disease) (formerly [facility])     Onychomycosis     Acquired deformity of left foot     Pain in both feet            Subjective: Patient is doing much better.  He is not bandaging his foot.  He sees no evidence of blood in his socks.  He no history of fever or night sweats.  Patient is diabetic     No Known Allergies        Current Outpatient Medications:     acetaminophen (TYLENOL) 325 mg tablet, Take 2 tablets (650 mg total) by mouth every 6 (six) hours as needed for mild pain, headaches or fever, Disp: , Rfl: 0    Ascorbic Acid (Vitamin C) 500 MG CAPS, Take 500 mg by mouth daily, Disp: , Rfl:     aspirin (ECOTRIN LOW STRENGTH) 81 mg EC tablet, Take 81 mg by mouth daily, Disp: , Rfl:     atorvastatin (LIPITOR) 20 mg tablet, TAKE 1 TABLET EVERY DAY, Disp: 90 tablet, Rfl: 1    brimonidine tartrate 0.2 % ophthalmic solution, Inject 0.2 % into the eye 2 (two) times a day, Disp: , Rfl:     carvedilol (COREG) 12.5 mg tablet, Take 1 tablet (12.5 mg total) by mouth 2 (two) times a day, Disp: 180 tablet, Rfl: 1    Cholecalciferol (Vitamin D3) 50 MCG (2000 UT) TABS, Take 2,000 Units  by mouth daily, Disp: , Rfl:     co-enzyme Q-10 100 mg capsule, Take 100 mg by mouth daily, Disp: , Rfl:     ferrous sulfate 324 (65 Fe) mg, Take 1 tablet (324 mg total) by mouth 2 (two) times a day before meals, Disp: 180 tablet, Rfl: 3    glucose blood (OneTouch Ultra) test strip, Test blood sugar once a day, Disp: 100 strip, Rfl: 3    losartan (COZAAR) 25 mg tablet, Take 1 tablet (25 mg total) by mouth daily, Disp: 90 tablet, Rfl: 1    metFORMIN (GLUCOPHAGE) 1000 MG tablet, TAKE ONE TABLET BY MOUTH TWO TIMES A DAY WITH MEALS, Disp: 180 tablet, Rfl: 1    multivitamin (THERAGRAN) TABS, Take 1 tablet by mouth daily, Disp: , Rfl:     omeprazole (PriLOSEC) 40 MG capsule, Take 1 capsule (40 mg total) by mouth daily as needed (acid reflux), Disp: 90 capsule, Rfl: 1    Probiotic Product (CULTURELLE PROBIOTICS) CHEW, Chew daily, Disp: , Rfl:     sitaGLIPtin (JANUVIA) 25 mg tablet, Take one tablet by mouth once a day, Disp: 90 tablet, Rfl: 3    spironolactone (ALDACTONE) 25 mg tablet, Take 1 tablet (25 mg total) by mouth daily, Disp: 90 tablet, Rfl: 1    tamsulosin (FLOMAX) 0.4 mg, Take 1 capsule (0.4 mg total) by mouth daily with dinner, Disp: 90 capsule, Rfl: 2    TRUEplus Lancets 28G MISC, Test 1x/d, E11.29, Disp: 200 each, Rfl: 3    Infant Foods (Follow-Up) POWD, HANDICAP SERENA, medically recommended, <taxonomy [id number]> due to arthritic/orthopedic condition (#2,#5) (Patient not taking: Reported on 9/19/2022), Disp: 99 g, Rfl: 1           Patient Active Problem List   Diagnosis    Coronary artery disease involving native coronary artery of native heart with angina pectoris (HCC)    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 mellitus     Diabetic neuropathy (HCC)    GE reflux    Lumbar radiculopathy    Type 2 diabetes mellitus with diabetic neuropathy (HCC)    Prostate cancer screening    Dyslipidemia    Medicare annual wellness visit, subsequent    Type 2 diabetes mellitus with hyperglycemia, without long-term current use  of insulin (HCC)    Benign essential hypertension    Bilateral leg weakness    Prostate cancer (HCC)    Insomnia, persistent    Hyponatremia    Orthostatic hypotension    Chronic right-sided low back pain without sciatica    Hypokalemia    Acute on chronic combined systolic and diastolic congestive heart failure (HCC)    Heart failure, left, 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)    Chronic combined systolic and diastolic heart failure, NYHA class 2 (HCC)    Edema of both feet    Diarrhea    Gait disorder    Acute pain of left thigh    Peripheral arterial disease (HCC)    Iron deficiency anemia    Uses roller walker             Patient ID: Pernell Covarrubias is a 78 y.o. male.     HPI     The following portions of the patient's history were reviewed and updated as appropriate:      family history includes Aortic aneurysm in his mother; Heart attack in his father and sister; Heart disease in his father, paternal grandfather, and sister; Throat cancer in his maternal grandfather.       reports that he has never smoked. He has never been exposed to tobacco smoke. He has never used smokeless tobacco. He reports that he does not currently use alcohol. He reports that he does not use drugs.           Vitals:     11/08/23 1342   Resp: 16         Review of Systems       Objective:  Patient's shoes and socks removed.   Foot ExamPhysical Exam  Cardiovascular:      Pulses: Pulses are weak.               Physical Exam  Left Foot: Appearance: a deformity (ball of foot and distal fifth metatarsal ). Fifth toe deformities include hammer toe. Tenderness: None except the plantar aspect of the foot.   Right Foot: Appearance: a deformity (distal fifth metatarsal ).   Left Ankle: ROM: limited ROM in all planes   Right Ankle: ROM: limited ROM in all planes   Neurological Exam: Light touch was decreased bilaterally. Vibratory sensation was decreased in both first metatarsophalangeal joints. Response to monofilament test was absent bilaterally. Deep  tendon reflexes: achilles reflex 1/4 bilaterally.   Vascular Exam: performed Dorsalis pedis pulses were 1/4 bilaterally. Posterior tibial pulses were 1/4 bilaterally. Elevation Pallor: diminished bilaterally. Capillary refill time was Q. 9, findings bilateral. Negative digital hair noted, positive abnormal cooling. All pre-ulcer, lesions debrided. Today, but between 1-3 seconds bilaterally. Edema: mild bilaterally and All mycotic nails debrided. Today. The patient was given orthotics to help control his feet and at as cushioning and padding on the bottom of his feet q9 findings.   Toenails: All of the toenails were elongated, hypertrophied, discolored, ingrown, shown to have subungual debris and tender.      Socks and shoes removed, the Right Foot: the foot was dry.   The sensory exam showed diminished vibratory sensation at the level of the toes. Diminished tactile sensation with monofilament testing throughout the right foot.   Socks and shoes removed, Left Foot: the foot was dry.   The sensory exam showed diminished vibratory sensation at the level of the toes. Diminished tactile sensation with monofilament testing throughout the left foot.   Pulses:   1+ in the posterior tibialis on the right   1+ in the dorsalis pedis on the right. Capillary refills findings on the left were delayed in the toes.   Pulses:   1+ in the posterior tibialis on the left   1+ in the dorsalis pedis on the left.   Assign Risk Category: 2: Loss of protective sensation with or without weakness, deformity, callus, pre-ulcer, or history of ulceration. High risk.   Hyperkeratosis: present on both first sub metatarsals, present on both fifth sub metatarsals and Pre-ulcerative lesion, submetatarsal one to 5, bilateral.   Shoe Gear Evaluation: performed (). Recommendation(s): custom inlays.      Patient's shoes and socks removed.Right Foot/Ankle   Right Foot Inspection  Skin Exam: callus and callus               Toe Exam: swelling and  erythema  Sensory   Vibration: diminished  Proprioception: diminished      Vascular  Capillary refills: elevated        Left Foot/Ankle  Left Foot Inspection  Skin Exam: callus.  Submetatarsal 5 of the left foot demonstrates 0.5 cm grade Gamino grade 0/preulcerative callus. Cellulitis resolved.              Toe Exam: swelling and erythema                   Sensory   Vibration: diminished  Proprioception: diminished     Vascular  Capillary refills: elevated.     Patient's shoes and socks removed.         Patient's shoes and socks removed.     Right Foot/Ankle   Right Foot Inspection        Toe Exam: right toe deformity.      Sensory   Vibration: absent  Proprioception: diminished  Monofilament testing: diminished     Vascular  Capillary refills: < 3 seconds        Left Foot/Ankle  Left Foot Inspection        Toe Exam: left toe deformity.      Sensory   Vibration: absent  Proprioception: diminished  Monofilament testing: diminished     Vascular  Capillary refills: < 3 seconds        Assign Risk Category  Deformity present  Loss of protective sensation  Weak pulses  Risk: 2

## 2024-03-28 NOTE — PROGRESS NOTES
Daily Note  IE: 2021 (POC: 2021)    Today's date: 2021  Patient name: Kristofer Au  :   MRN: [id number]  Referring provider: Self, Referral  Dx:   Encounter Diagnosis     ICD-10-CM    1  Weakness  R53 1    2  Balance disorder  R26 89    3  Other abnormalities of gait and mobility  R26 89                   Subjective: Patient reports no new changes, complaints, or falls  Objective: See treatment diary below      NMR:  Vibration Plate: 5 minutes, level 50  - Static standing  - FAEC  - Squats w/ 1 UE  - Heel raises    - STS: 10 reps, 0 UE  - Tandem ambulation: 4 cycles, 10 ft down/back, light 1 UE  - Ambulation w/ alternate hand to knee taps: 2 cycles  - Sidestepping on foam: 4 cycles, 10 ft down/back, 0 UE  - Sidestepping w/ cone taps: 4 cycles, 10 ft down/back, 0 UE  - Fwd/bwd ambulation while walking out to [facility]: 1 rep, PTCS      Assessment: Patient able to tolerate treatment session well today with increased focus on dynamic balance tasks  He demonstrated increased LoB and postural sway during exercises that required narrow Reji especially as he fatigued  He required increased duration seated rest breaks today with good recovery noted  He will continue to benefit from skilled outpatient PT in order to maximize his function and reduce his risk for falls  Plan: Continue per plan of care        Outcome Measures Initial Eval  2021       5xSTS 15 82 sec, 2 UE        TUG 14 90 sec        10 meter 2 87 ft/sec  0 87 m/s        FGA         DGI         mCTSIB  - FTEO (firm)  - FTEC (firm)  - FTEO (foam)  - FTEC (foam)   30 sec  30 sec  30 sec (+)  30 sec (+)        6MWT Defer 720 ft, no AD I spoke with the patient to see if he was having any issues such as redness and or swelling around his incisions or fever and chills. He denies any issues except itching. He feels the itching is from a reaction to Flowmax. I advised the patient to finish his antibiotic but didn't think he would need anymore at this time.

## 2024-04-01 ENCOUNTER — HOSPITAL ENCOUNTER (OUTPATIENT)
Dept: RADIOLOGY | Facility: HOSPITAL | Age: 79
Discharge: HOME | End: 2024-04-01
Payer: MEDICARE

## 2024-04-01 DIAGNOSIS — I73.9 PERIPHERAL ARTERIAL DISEASE (HCC): ICD-10-CM

## 2024-04-01 PROCEDURE — 93923 UPR/LXTR ART STDY 3+ LVLS: CPT

## 2024-04-01 PROCEDURE — 93925 LOWER EXTREMITY STUDY: CPT

## 2024-04-01 PROCEDURE — 93922 UPR/L XTREMITY ART 2 LEVELS: CPT | Performed by: SURGERY

## 2024-04-01 PROCEDURE — 93925 LOWER EXTREMITY STUDY: CPT | Performed by: SURGERY

## 2024-04-03 ENCOUNTER — TELEPHONE (OUTPATIENT)
Dept: VASCULAR SURGERY | Facility: CLINIC | Age: 79
End: 2024-04-03

## 2024-04-03 NOTE — TELEPHONE ENCOUNTER
Attempted to contact patient to schedule appointment(s) listed below.  Requested patient call [phone] option 3 to schedule appointment(s).    Patient's appointment(s) are due on or after 5/15/24 .    Dopplers  [] Abdominal Aorta Iliac (AOIL)  [] Carotid (CV)   [] Celiac and/or Mesenteric  [] Endovascular Aortic Repair (EVAR)   [] Hemodialysis Access (HD)   [] Lower Limb Arterial (MARTHA)  [] Lower Limb Venous (LEV)  [] Lower Limb Venous Duplex with Reflux (LEVDR)  [] Renal Artery  [] Upper Limb Arterial (UEA)    [] Upper Limb Venous (UEV)              [] ALEKSANDR and Waveform analysis     Advanced Imaging   [] CTA head/neck    [] CTA abdomen    [] CTA abdomen & pelvis    [] CT abdomen with/ without contrast  [] CT abdomen with contrast  [] CT abdomen without contrast    [] CT abdomen & pelvis with/ without contrast  [] CT abdomen & pelvis with contrast  [] CT abdomen & pelvis without contrast    Office Visit   [] New patient, patient last seen over 3 years ago  [] Risk factor modification (RFM)   [x] Follow up   [] Lost to follow up (LTFU)   Called patient & LMOM to schedule 1 yr RFM OV/RR martha 4/1/24

## 2024-04-09 ENCOUNTER — TELEPHONE (OUTPATIENT)
Dept: NEPHROLOGY | Facility: CLINIC | Age: 79
End: 2024-04-09

## 2024-04-10 ENCOUNTER — NEW PATIENT (OUTPATIENT)
Dept: URBAN - METROPOLITAN AREA CLINIC 6 | Facility: CLINIC | Age: 79
End: 2024-04-10

## 2024-04-10 DIAGNOSIS — H40.023: ICD-10-CM

## 2024-04-10 DIAGNOSIS — Z96.1: ICD-10-CM

## 2024-04-10 PROCEDURE — 92133 CPTRZD OPH DX IMG PST SGM ON: CPT

## 2024-04-10 PROCEDURE — 92004 COMPRE OPH EXAM NEW PT 1/>: CPT

## 2024-04-10 PROCEDURE — 92020 GONIOSCOPY: CPT

## 2024-04-10 PROCEDURE — 76514 ECHO EXAM OF EYE THICKNESS: CPT

## 2024-04-10 ASSESSMENT — PACHYMETRY
OS_CT_UM: 609
OD_CT_UM: 598

## 2024-04-10 ASSESSMENT — VISUAL ACUITY
OS_CC: 20/30
OD_CC: 20/25

## 2024-04-10 ASSESSMENT — TONOMETRY
OS_IOP_MMHG: 13
OD_IOP_MMHG: 13

## 2024-04-11 DIAGNOSIS — B07.0 PLANTAR WARTS: ICD-10-CM

## 2024-04-11 DIAGNOSIS — I10 BENIGN ESSENTIAL HYPERTENSION: ICD-10-CM

## 2024-04-12 RX ORDER — IMIQUIMOD 12.5 MG/.25G
1 CREAM TOPICAL 3 TIMES WEEKLY
Qty: 12 PACKET | Refills: 11 | Status: SHIPPED | OUTPATIENT
Start: 2024-04-12

## 2024-04-12 RX ORDER — LOSARTAN POTASSIUM 25 MG/1
25 TABLET ORAL DAILY
Qty: 90 TABLET | Refills: 3 | Status: SHIPPED | OUTPATIENT
Start: 2024-04-12

## 2024-04-16 ENCOUNTER — OFFICE VISIT (OUTPATIENT)
Dept: NEPHROLOGY | Facility: CLINIC | Age: 79
End: 2024-04-16
Payer: MEDICARE

## 2024-04-16 VITALS
HEART RATE: 69 BPM | HEIGHT: 71 IN | BODY MASS INDEX: 24.78 KG/M2 | SYSTOLIC BLOOD PRESSURE: 110 MMHG | DIASTOLIC BLOOD PRESSURE: 68 MMHG | WEIGHT: 177 LBS

## 2024-04-16 DIAGNOSIS — N18.31 ANEMIA DUE TO STAGE 3A CHRONIC KIDNEY DISEASE  (HCC): ICD-10-CM

## 2024-04-16 DIAGNOSIS — E11.22 CKD STAGE 2 DUE TO TYPE 2 DIABETES MELLITUS  (HCC): Primary | ICD-10-CM

## 2024-04-16 DIAGNOSIS — N18.2 CKD STAGE 2 DUE TO TYPE 2 DIABETES MELLITUS  (HCC): Primary | ICD-10-CM

## 2024-04-16 DIAGNOSIS — E87.1 HYPONATREMIA: ICD-10-CM

## 2024-04-16 DIAGNOSIS — D53.9 NUTRITIONAL ANEMIA, UNSPECIFIED: ICD-10-CM

## 2024-04-16 DIAGNOSIS — I50.43 ACUTE ON CHRONIC COMBINED SYSTOLIC AND DIASTOLIC CONGESTIVE HEART FAILURE (HCC): ICD-10-CM

## 2024-04-16 DIAGNOSIS — I95.1 ORTHOSTATIC HYPOTENSION: ICD-10-CM

## 2024-04-16 DIAGNOSIS — D63.1 ANEMIA DUE TO STAGE 3A CHRONIC KIDNEY DISEASE  (HCC): ICD-10-CM

## 2024-04-16 PROCEDURE — 99214 OFFICE O/P EST MOD 30 MIN: CPT | Performed by: INTERNAL MEDICINE

## 2024-04-16 NOTE — PROGRESS NOTES
"NEPHROLOGY OUTPATIENT PROGRESS NOTE   Pernell Covarrubias 78 y.o. male MRN: [id number]  Reason for visit: Chronic kidney disease    ASSESSMENT and PLAN:  Chronic kidney disease, stage II/III most recent creatinine 1.20 with an estimated GFR of 57.  Etiology multifactorial, noted age-related changes, longstanding diabetes  Noted previous microalbuminuria.  Bilateral lower extremity swelling, continue with spironolactone  Hypertension with associated orthostasis.  Blood pressure currently acceptable.  Sitting blood pressure 160/60 with standing blood pressures 130/50.  Anemia of chronic kidney disease with possible component due to iron deficiency, recommend repeating iron studies with B12.  Diabetes with likely associated diabetic kidney disease with recent hemoglobin A1c at 7.2  Urinary frequency with history of prostamegaly.  Remains on Flomax.  Peripheral vascular disease, following closely with vascular surgery.    Renal function overall appears fairly stable creatinine slightly up at 1.2  No changes in his current regimen  Recommend checking iron stores with B12 given persistent anemia despite oral iron supplementation, may benefit from IV iron.  Further recommendations again will be based upon repeat labs otherwise we will plan to see him in 6 months with repeat labs at that time      SUBJECTIVE / INTERVAL HISTORY:  He has been doing reasonably well.  Denies any recent hospitalizations.  Denies any significant chest pain or shortness of breath.  Does have chronic lower extremity swelling which has been stable.  Occasional dizziness as well as loss of imbalance.  Unfortunately had fallen yesterday after trying to kick something out of the way.  Appetite has been stable.  Does complain of fatigue daytime sleepiness.        OBJECTIVE:  /68 (BP Location: Left arm, Patient Position: Sitting, Cuff Size: Standard)   Pulse 69   Ht 5' 11\" (1.803 m)   Wt 80.3 kg (177 lb)   BMI 24.69 kg/m²   Vitals:    04/16/24 " 1159   Weight: 80.3 kg (177 lb)       Physical Exam  Constitutional:       Appearance: He is not ill-appearing.   Eyes:      General: No scleral icterus.  Cardiovascular:      Rate and Rhythm: Normal rate and regular rhythm.   Pulmonary:      Effort: Pulmonary effort is normal.      Breath sounds: Normal breath sounds.   Abdominal:      General: There is no distension.      Palpations: Abdomen is soft.      Tenderness: There is no abdominal tenderness.   Musculoskeletal:      Right lower leg: Edema present.      Left lower leg: Edema present.   Skin:     General: Skin is warm and dry.      Findings: No rash.   Neurological:      Mental Status: He is alert and oriented to person, place, and time.           Medications:    Current Outpatient Medications:     acetaminophen (TYLENOL) 325 mg tablet, Take 2 tablets (650 mg total) by mouth every 6 (six) hours as needed for mild pain, headaches or fever, Disp: , Rfl: 0    ammonium lactate (LAC-HYDRIN) 12 % lotion, Apply topically 2 (two) times a day, Disp: 500 g, Rfl: 3    Ascorbic Acid (Vitamin C) 500 MG CAPS, Take 500 mg by mouth daily, Disp: , Rfl:     aspirin (ECOTRIN LOW STRENGTH) 81 mg EC tablet, Take 81 mg by mouth daily, Disp: , Rfl:     atorvastatin (LIPITOR) 20 mg tablet, TAKE 1 TABLET EVERY DAY, Disp: 90 tablet, Rfl: 1    brimonidine tartrate 0.2 % ophthalmic solution, Inject 0.2 % into the eye 2 (two) times a day, Disp: , Rfl:     carvedilol (COREG) 12.5 mg tablet, TAKE 1 TABLET TWICE DAILY, Disp: 180 tablet, Rfl: 1    Cholecalciferol (Vitamin D3) 50 MCG (2000 UT) TABS, Take 2,000 Units by mouth daily, Disp: , Rfl:     co-enzyme Q-10 100 mg capsule, Take 100 mg by mouth daily, Disp: , Rfl:     ferrous sulfate 324 (65 Fe) mg, Take 1 tablet (324 mg total) by mouth 2 (two) times a day before meals, Disp: 180 tablet, Rfl: 3    glucose blood (OneTouch Ultra) test strip, Test blood sugar once a day, Disp: 100 strip, Rfl: 3    imiquimod (ALDARA) 5 % cream, APPLY 1  PACKET TOPICALLY 3 (THREE) TIMES A WEEK, Disp: 12 packet, Rfl: 11    losartan (COZAAR) 25 mg tablet, TAKE 1 TABLET (25 MG TOTAL) BY MOUTH DAILY, Disp: 90 tablet, Rfl: 3    metFORMIN (GLUCOPHAGE-XR) 500 mg 24 hr tablet, Take 2 tablets (1,000 mg total) by mouth 2 (two) times a day with meals, Disp: 360 tablet, Rfl: 2    multivitamin (THERAGRAN) TABS, Take 1 tablet by mouth daily, Disp: , Rfl:     omeprazole (PriLOSEC) 40 MG capsule, Take 1 capsule (40 mg total) by mouth daily as needed (acid reflux), Disp: 90 capsule, Rfl: 1    sitaGLIPtin (JANUVIA) 25 mg tablet, Take one tablet by mouth once a day, Disp: 90 tablet, Rfl: 3    spironolactone (ALDACTONE) 25 mg tablet, TAKE 1 TABLET EVERY DAY, Disp: 90 tablet, Rfl: 1    tacrolimus (PROTOPIC) 0.1 % ointment, Apply topically daily Apply daily for maintenance, Disp: 100 g, Rfl: 3    tamsulosin (FLOMAX) 0.4 mg, Take 1 capsule (0.4 mg total) by mouth daily with dinner, Disp: 90 capsule, Rfl: 1    triamcinolone (KENALOG) 0.1 % ointment, Apply topically 2 (two) times a day Apply to body twice a day then switch to Protopic Ointment and DO NOT USE ON FACE, GROIN, ARMPITS, Disp: 453.6 g, Rfl: 1    TRUEplus Lancets 28G MISC, Test 1x/d, E11.29, Disp: 200 each, Rfl: 3    Infant Foods (Follow-Up) POWD, HANDICAP PLACARD, medically recommended, <taxonomy [id number]> due to arthritic/orthopedic condition (#2,#5) (Patient not taking: Reported on 9/19/2022), Disp: 99 g, Rfl: 1    Laboratory Results:  Results for orders placed or performed in visit on 02/26/24   PSA Total, Diagnostic   Result Value Ref Range    PSA, Diagnostic <0.01 0.00 - 4.00 ng/mL     *Note: Due to a large number of results and/or encounters for the requested time period, some results have not been displayed. A complete set of results can be found in Results Review.

## 2024-04-17 ENCOUNTER — OFFICE VISIT (OUTPATIENT)
Dept: ENDOCRINOLOGY | Facility: CLINIC | Age: 79
End: 2024-04-17
Payer: MEDICARE

## 2024-04-17 VITALS
WEIGHT: 176.4 LBS | OXYGEN SATURATION: 99 % | HEART RATE: 66 BPM | BODY MASS INDEX: 24.69 KG/M2 | SYSTOLIC BLOOD PRESSURE: 130 MMHG | HEIGHT: 71 IN | DIASTOLIC BLOOD PRESSURE: 86 MMHG

## 2024-04-17 DIAGNOSIS — E78.5 DYSLIPIDEMIA: Primary | ICD-10-CM

## 2024-04-17 DIAGNOSIS — I10 BENIGN ESSENTIAL HYPERTENSION: ICD-10-CM

## 2024-04-17 DIAGNOSIS — E11.42 DIABETIC POLYNEUROPATHY ASSOCIATED WITH TYPE 2 DIABETES MELLITUS (HCC): ICD-10-CM

## 2024-04-17 DIAGNOSIS — E11.65 TYPE 2 DIABETES MELLITUS WITH HYPERGLYCEMIA, WITHOUT LONG-TERM CURRENT USE OF INSULIN (HCC): ICD-10-CM

## 2024-04-17 DIAGNOSIS — E11.40 TYPE 2 DIABETES MELLITUS WITH DIABETIC NEUROPATHY, WITHOUT LONG-TERM CURRENT USE OF INSULIN (HCC): ICD-10-CM

## 2024-04-17 PROBLEM — N18.30 CKD STAGE 3 DUE TO TYPE 2 DIABETES MELLITUS (HCC): Status: RESOLVED | Noted: 2024-01-16 | Resolved: 2024-04-17

## 2024-04-17 PROBLEM — E11.22 CKD STAGE 3 DUE TO TYPE 2 DIABETES MELLITUS (HCC): Status: RESOLVED | Noted: 2024-01-16 | Resolved: 2024-04-17

## 2024-04-17 PROCEDURE — 99214 OFFICE O/P EST MOD 30 MIN: CPT | Performed by: NURSE PRACTITIONER

## 2024-04-17 RX ORDER — METFORMIN HYDROCHLORIDE 500 MG/1
1000 TABLET, EXTENDED RELEASE ORAL 2 TIMES DAILY WITH MEALS
Qty: 360 TABLET | Refills: 2 | Status: SHIPPED | OUTPATIENT
Start: 2024-04-17

## 2024-04-17 NOTE — ASSESSMENT & PLAN NOTE
BP slightly elevated today on current regimen.   Denies CP or SOB.   Continues on ARB.   Follows with cardiology.

## 2024-04-17 NOTE — PROGRESS NOTES
Established Patient Progress Note    Chief Complaint:  Diabetes follow up visit    Impression & Plan:    Problem List Items Addressed This Visit          Cardiovascular and Mediastinum    Benign essential hypertension     BP slightly elevated today on current regimen.   Denies CP or SOB.   Continues on ARB.   Follows with cardiology.            Endocrine    Diabetic neuropathy (HCC)     Follows with nephrology.            Relevant Medications    metFORMIN (GLUCOPHAGE-XR) 500 mg 24 hr tablet    Type 2 diabetes mellitus with diabetic neuropathy (HCC)     Follows closely with podiatry, has follow up net week.            Relevant Medications    metFORMIN (GLUCOPHAGE-XR) 500 mg 24 hr tablet    Type 2 diabetes mellitus with hyperglycemia, without long-term current use of insulin (HCC)       Lab Results   Component Value Date    HGBA1C 7.2 (H) 02/20/2024     HGA1C at goal given age and comorbidity. Patient has Januvia at home but states it is very costly. He would like to complete ~70 days he has left and then will send us glucose logs for review a week before and after discontinuing Januvia to confirm if it is needed to have another treatment choice above metformin at this time.     BGL Reviewed: Recommend continue checking glucose levels 1-2 times per day. NO glucose levels for review at today's appointment.     Treatment regimen: Continues on Januvia 25 mg daily  Continues on metformin XR 1000 mg twice daily    Discussed risks/complications associated with uncontrolled diabetes including organ involvement, heart attack, stroke, death.  Recommended referral to diabetes education.      Advised lifestyle modifications including attention to diet including the amount and types of carbohydrates consumed and regular activity.     Call for blood sugars less than 70 mg/dl or patterns over 250 mg/dl.     Discussed symptoms and treatment of hypoglycemia.  Reviewed risks associated with hypoglycemia. Always carry rapid acting  carbohydrates and a glucometer (a way to check your blood sugar).    Recommendation for medical identification either bracelet, necklace.    Routine follow up for diabetic eye and foot exams.     Ordered blood work to complete prior to next visit.    Send glucose logs/CGM download in 1 week after ending januvia.     Follow up in 3 months.            Relevant Medications    metFORMIN (GLUCOPHAGE-XR) 500 mg 24 hr tablet    Other Relevant Orders    Comprehensive metabolic panel    Hemoglobin A1C    Lipid panel    TSH, 3rd generation with Free T4 reflex       Other    Dyslipidemia - Primary     Continues on statin, tolerating.   Due for repeat lipid panel.          Relevant Orders    Lipid panel       History of Present Illness:   Pernell Covarrubias is a 78 y.o. male  with a history of type 2 diabetes without long term use of insulin. Reports complications of neuropathy follows with Dr. Lancaster. [facility] diabetic eye exam follows with Dr Gardner. Follows with cardiology and nephrology. Denies recent illness or hospitalizations. Denies recent severe hypoglycemic or severe hyperglycemic episodes. Denies side effects of treatment at this time. Home glucose monitoring: are performed regularly first morning and fasting, no levels for review today.      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)    Diabetic neuropathy (HCC)    GE reflux    Lumbar radiculopathy    Type 2 diabetes mellitus with diabetic neuropathy (HCC)    Dyslipidemia    Type 2 diabetes mellitus with hyperglycemia, without long-term current use of insulin (HCC)    Benign essential hypertension    Bilateral leg weakness    Prostate cancer (HCC)     Insomnia, persistent    Orthostatic hypotension    Chronic right-sided low back pain without sciatica    Acute on chronic combined systolic and diastolic congestive heart failure (HCC)    Heart failure, left, 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)    Chronic combined systolic and diastolic heart failure, NYHA class 2 (HCC)    Edema of both feet    Gait disorder    Acute pain of left thigh    Peripheral arterial disease (HCC)    Iron deficiency anemia    Uses roller walker    Dry skin dermatitis    Type 2 diabetes mellitus with stage 2 chronic kidney disease, without long-term current use of insulin  (HCC)    Vitamin D deficiency    Other fatigue      Past Medical History:   Diagnosis Date    Acquired hallux valgus     last assessed: 8/1/2013    Allergic rhinitis     Anemia 10/26/2010    Atrophy of nail     last assessed: 7/7/2015    Chronic kidney disease     chronic renal insufficiency    Coronary artery disease     Deformity of ankle and foot, acquired     last assessed: 2/22/2016    Difficulty walking     last assessed: 12/14/2015    Dysesthesia     last assessed: 11/25/2016    Hammer toe     unspecified laterality; last assessed: 8/1/2013    Hypertension     Onychomycosis of toenail     last assessed: 2/22/2016    Peripheral vascular disease (HCC)     left SFA stent in 2010    Pes planus     unspecified laterality; last assessed: 8/1/2013    Pneumonia     last assessed: 2/27/2016    Prostate cancer ([facility])     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     last assessed: 8/15/2016    Type 2 diabetes mellitus (HCC) 07/26/2010    Viral warts     last assessed: 7/24/2015      Past Surgical History:   Procedure Laterality Date    CARDIAC CATHETERIZATION  07/29/2010    CATARACT EXTRACTION, BILATERAL Bilateral     R 1999, L 2008    COLONOSCOPY  2011    FEMORAL ARTERY - POPLITEAL ARTERY BYPASS GRAFT  09/23/2013    FOOT SURGERY      bone spur removed    LEG SURGERY Bilateral     repair; L 8/20/2010 and 7/26/2012    NE PLMT  INTERSTITIAL DEV RADIAT TX PROSTATE 1/MULT N/A 6/22/2021    Procedure: INSERTION OF FIDUCIAL MARKER (TRANSRECTAL ULTRASOUND GUIDANCE), SPACEOAR;  Surgeon: Jero Loomis MD;  Location: [facility];  Service: Urology    ROTATOR CUFF REPAIR Left 2009    SHOULDER SURGERY Right 2005    collar bone      Family History   Problem Relation Age of Onset    Aortic aneurysm Mother     Heart disease Father     Heart attack Father         acute myocardial infarction    Heart disease Sister     Heart attack Sister         acute myocardial infarction    Throat cancer Maternal Grandfather     Heart disease Paternal Grandfather     No Known Problems Son      Social History     Tobacco Use    Smoking status: Never     Passive exposure: Never    Smokeless tobacco: Never   Substance Use Topics    Alcohol use: Not Currently     Comment: being a social drinker/rare     No Known Allergies      Current Outpatient Medications:     ammonium lactate (LAC-HYDRIN) 12 % lotion, Apply topically 2 (two) times a day, Disp: 500 g, Rfl: 3    Ascorbic Acid (Vitamin C) 500 MG CAPS, Take 500 mg by mouth daily, Disp: , Rfl:     aspirin (ECOTRIN LOW STRENGTH) 81 mg EC tablet, Take 81 mg by mouth daily, Disp: , Rfl:     atorvastatin (LIPITOR) 20 mg tablet, TAKE 1 TABLET EVERY DAY, Disp: 90 tablet, Rfl: 1    brimonidine tartrate 0.2 % ophthalmic solution, Inject 0.2 % into the eye 2 (two) times a day, Disp: , Rfl:     carvedilol (COREG) 12.5 mg tablet, TAKE 1 TABLET TWICE DAILY, Disp: 180 tablet, Rfl: 1    Cholecalciferol (Vitamin D3) 50 MCG (2000 UT) TABS, Take 2,000 Units by mouth daily, Disp: , Rfl:     co-enzyme Q-10 100 mg capsule, Take 100 mg by mouth daily, Disp: , Rfl:     ferrous sulfate 324 (65 Fe) mg, Take 1 tablet (324 mg total) by mouth 2 (two) times a day before meals, Disp: 180 tablet, Rfl: 3    glucose blood (OneTouch Ultra) test strip, Test blood sugar once a day, Disp: 100 strip, Rfl: 3    losartan (COZAAR) 25 mg tablet, TAKE 1 TABLET  (25 MG TOTAL) BY MOUTH DAILY, Disp: 90 tablet, Rfl: 3    metFORMIN (GLUCOPHAGE-XR) 500 mg 24 hr tablet, Take 2 tablets (1,000 mg total) by mouth 2 (two) times a day with meals, Disp: 360 tablet, Rfl: 2    multivitamin (THERAGRAN) TABS, Take 1 tablet by mouth daily, Disp: , Rfl:     omeprazole (PriLOSEC) 40 MG capsule, Take 1 capsule (40 mg total) by mouth daily as needed (acid reflux), Disp: 90 capsule, Rfl: 1    sitaGLIPtin (JANUVIA) 25 mg tablet, Take one tablet by mouth once a day, Disp: 90 tablet, Rfl: 3    spironolactone (ALDACTONE) 25 mg tablet, TAKE 1 TABLET EVERY DAY, Disp: 90 tablet, Rfl: 1    tacrolimus (PROTOPIC) 0.1 % ointment, Apply topically daily Apply daily for maintenance, Disp: 100 g, Rfl: 3    tamsulosin (FLOMAX) 0.4 mg, Take 1 capsule (0.4 mg total) by mouth daily with dinner, Disp: 90 capsule, Rfl: 1    triamcinolone (KENALOG) 0.1 % ointment, Apply topically 2 (two) times a day Apply to body twice a day then switch to Protopic Ointment and DO NOT USE ON FACE, GROIN, ARMPITS, Disp: 453.6 g, Rfl: 1    TRUEplus Lancets 28G MISC, Test 1x/d, E11.29, Disp: 200 each, Rfl: 3    acetaminophen (TYLENOL) 325 mg tablet, Take 2 tablets (650 mg total) by mouth every 6 (six) hours as needed for mild pain, headaches or fever, Disp: , Rfl: 0    imiquimod (ALDARA) 5 % cream, APPLY 1 PACKET TOPICALLY 3 (THREE) TIMES A WEEK, Disp: 12 packet, Rfl: 11    Infant Foods (Follow-Up) POWD, HANDICAP PLACARD, medically recommended, <taxonomy [id number]> due to arthritic/orthopedic condition (#2,#5) (Patient not taking: Reported on 9/19/2022), Disp: 99 g, Rfl: 1    Review of Systems  Constitutional: Negative for activity change, appetite change, +fatigue and -unexpected weight change.   HENT: Negative for ear pain, sore throat, trouble swallowing and voice change.    Eyes: Negative for visual disturbance.   Respiratory: Negative for cough and shortness of breath.    Cardiovascular: Negative for chest pain and palpitations.  "  Gastrointestinal: Negative for abdominal distention, abdominal pain, constipation, diarrhea and vomiting.   Endocrine: Negative for cold intolerance, heat intolerance, polydipsia and polyuria.   Musculoskeletal: Negative for arthralgias and back pain. Uses rolling walker.   Skin: Negative for color change and rash.   All other systems reviewed and are negative.      Physical Exam:  Body mass index is 24.6 kg/m².  /86 (BP Location: Left arm, Patient Position: Sitting, Cuff Size: Large)   Pulse 66   Ht 5' 11\" (1.803 m)   Wt 80 kg (176 lb 6.4 oz)   SpO2 99%   BMI 24.60 kg/m²    Wt Readings from Last 3 Encounters:   04/17/24 80 kg (176 lb 6.4 oz)   04/16/24 80.3 kg (177 lb)   03/13/24 76.7 kg (169 lb)        Physical Exam  Vitals reviewed.   Constitutional:       Appearance: Normal appearance.   Cardiovascular:      Rate and Rhythm: Normal rate and regular rhythm.      Pulses: Normal pulses.      Heart sounds: Normal heart sounds.   Pulmonary:      Effort: Pulmonary effort is normal.      Breath sounds: Normal breath sounds.   Skin:     General: Skin is warm and dry.      Capillary Refill: Capillary refill takes less than 2 seconds.   Neurological:      General: No focal deficit present.      Mental Status: He is alert and oriented to person, place, and time.   Psychiatric:         Mood and Affect: Mood normal.         Behavior: Behavior normal.       Labs:   Lab Results   Component Value Date    HGBA1C 7.2 (H) 02/20/2024    HGBA1C 6.7 (A) 01/17/2024    HGBA1C 7.1 (H) 10/03/2023     Lab Results   Component Value Date    CREATININE 1.20 02/20/2024    CREATININE 0.92 10/25/2023    CREATININE 1.00 10/03/2023    BUN 17 02/20/2024     04/17/2014    K 4.4 02/20/2024     02/20/2024    CO2 26 02/20/2024     eGFR   Date Value Ref Range Status   02/20/2024 57 ml/min/1.73sq m Final     Lab Results   Component Value Date    CHOL 106 07/19/2014    HDL 31 (L) 09/15/2023    TRIG 57 09/15/2023     Lab Results "   Component Value Date    ALT 13 02/20/2024    AST 16 02/20/2024    ALKPHOS 66 02/20/2024     Lab Results   Component Value Date    OGN9STZMGJXC 2.817 02/18/2022    FFI3FOTHCWZA 1.640 12/27/2021    EBS4ZEXLWVUI 1.392 07/20/2021     Lab Results   Component Value Date    FREET4 1.08 12/27/2021       Discussed with the patient and all questioned fully answered. He will call me if any problems arise.    Follow-up appointment in 3 months.     Counseled patient on diagnostic results, prognosis, risk and benefit of treatment options, instruction for management, importance of treatment compliance, Risk  factor reduction and impressions    NEO Arroyo

## 2024-04-17 NOTE — ASSESSMENT & PLAN NOTE
Lab Results   Component Value Date    HGBA1C 7.2 (H) 02/20/2024     HGA1C at goal given age and comorbidity. Patient has Januvia at home but states it is very costly. He would like to complete ~70 days he has left and then will send us glucose logs for review a week before and after discontinuing Januvia to confirm if it is needed to have another treatment choice above metformin at this time.     BGL Reviewed: Recommend continue checking glucose levels 1-2 times per day. NO glucose levels for review at today's appointment.     Treatment regimen: Continues on Januvia 25 mg daily  Continues on metformin XR 1000 mg twice daily    Discussed risks/complications associated with uncontrolled diabetes including organ involvement, heart attack, stroke, death.  Recommended referral to diabetes education.      Advised lifestyle modifications including attention to diet including the amount and types of carbohydrates consumed and regular activity.     Call for blood sugars less than 70 mg/dl or patterns over 250 mg/dl.     Discussed symptoms and treatment of hypoglycemia.  Reviewed risks associated with hypoglycemia. Always carry rapid acting carbohydrates and a glucometer (a way to check your blood sugar).    Recommendation for medical identification either bracelet, necklace.    Routine follow up for diabetic eye and foot exams.     Ordered blood work to complete prior to next visit.    Send glucose logs/CGM download in 1 week after ending januvia.     Follow up in 3 months.

## 2024-04-24 ENCOUNTER — LAB (OUTPATIENT)
Dept: LAB | Facility: CLINIC | Age: 79
End: 2024-04-24
Payer: MEDICARE

## 2024-04-24 DIAGNOSIS — D53.9 NUTRITIONAL ANEMIA, UNSPECIFIED: ICD-10-CM

## 2024-04-24 DIAGNOSIS — E87.1 HYPONATREMIA: ICD-10-CM

## 2024-04-24 DIAGNOSIS — D63.1 ANEMIA DUE TO CHRONIC KIDNEY DISEASE, UNSPECIFIED CKD STAGE: ICD-10-CM

## 2024-04-24 DIAGNOSIS — C61 PROSTATE CANCER (HCC): ICD-10-CM

## 2024-04-24 DIAGNOSIS — E11.22 CKD STAGE 2 DUE TO TYPE 2 DIABETES MELLITUS  (HCC): ICD-10-CM

## 2024-04-24 DIAGNOSIS — N18.2 CKD STAGE 2 DUE TO TYPE 2 DIABETES MELLITUS  (HCC): ICD-10-CM

## 2024-04-24 DIAGNOSIS — D63.1 ANEMIA DUE TO STAGE 3A CHRONIC KIDNEY DISEASE  (HCC): ICD-10-CM

## 2024-04-24 DIAGNOSIS — I50.1 HEART FAILURE, LEFT, WITH LVEF <=30% (HCC): ICD-10-CM

## 2024-04-24 DIAGNOSIS — N18.31 ANEMIA DUE TO STAGE 3A CHRONIC KIDNEY DISEASE  (HCC): ICD-10-CM

## 2024-04-24 DIAGNOSIS — N18.9 ANEMIA DUE TO CHRONIC KIDNEY DISEASE, UNSPECIFIED CKD STAGE: ICD-10-CM

## 2024-04-24 DIAGNOSIS — I95.1 ORTHOSTATIC HYPOTENSION: ICD-10-CM

## 2024-04-24 LAB
ALBUMIN SERPL BCP-MCNC: 4 G/DL (ref 3.5–5)
ANION GAP SERPL CALCULATED.3IONS-SCNC: 9 MMOL/L (ref 4–13)
BUN SERPL-MCNC: 21 MG/DL (ref 5–25)
CALCIUM SERPL-MCNC: 9.1 MG/DL (ref 8.4–10.2)
CHLORIDE SERPL-SCNC: 101 MMOL/L (ref 96–108)
CHOLEST SERPL-MCNC: 94 MG/DL
CO2 SERPL-SCNC: 25 MMOL/L (ref 21–32)
CREAT SERPL-MCNC: 1.06 MG/DL (ref 0.6–1.3)
CREAT UR-MCNC: 135.9 MG/DL
ERYTHROCYTE [DISTWIDTH] IN BLOOD BY AUTOMATED COUNT: 15 % (ref 11.6–15.1)
FERRITIN SERPL-MCNC: 52 NG/ML (ref 24–336)
GFR SERPL CREATININE-BSD FRML MDRD: 66 ML/MIN/1.73SQ M
GLUCOSE P FAST SERPL-MCNC: 158 MG/DL (ref 65–99)
HCT VFR BLD AUTO: 31.6 % (ref 36.5–49.3)
HDLC SERPL-MCNC: 37 MG/DL
HGB BLD-MCNC: 9.9 G/DL (ref 12–17)
IRON SATN MFR SERPL: 19 % (ref 15–50)
IRON SERPL-MCNC: 55 UG/DL (ref 50–212)
LDLC SERPL CALC-MCNC: 44 MG/DL (ref 0–100)
MCH RBC QN AUTO: 29.9 PG (ref 26.8–34.3)
MCHC RBC AUTO-ENTMCNC: 31.3 G/DL (ref 31.4–37.4)
MCV RBC AUTO: 96 FL (ref 82–98)
MICROALBUMIN UR-MCNC: 19.9 MG/L
MICROALBUMIN/CREAT 24H UR: 15 MG/G CREATININE (ref 0–30)
NONHDLC SERPL-MCNC: 57 MG/DL
PHOSPHATE SERPL-MCNC: 3.3 MG/DL (ref 2.3–4.1)
PLATELET # BLD AUTO: 252 THOUSANDS/UL (ref 149–390)
PMV BLD AUTO: 10.4 FL (ref 8.9–12.7)
POTASSIUM SERPL-SCNC: 4.6 MMOL/L (ref 3.5–5.3)
PSA SERPL-MCNC: <0.01 NG/ML (ref 0–4)
RBC # BLD AUTO: 3.31 MILLION/UL (ref 3.88–5.62)
SODIUM SERPL-SCNC: 135 MMOL/L (ref 135–147)
TIBC SERPL-MCNC: 297 UG/DL (ref 250–450)
TRIGL SERPL-MCNC: 63 MG/DL
UIBC SERPL-MCNC: 242 UG/DL (ref 155–355)
VIT B12 SERPL-MCNC: 251 PG/ML (ref 180–914)
WBC # BLD AUTO: 6.92 THOUSAND/UL (ref 4.31–10.16)

## 2024-04-24 PROCEDURE — 82607 VITAMIN B-12: CPT

## 2024-04-24 PROCEDURE — 83550 IRON BINDING TEST: CPT

## 2024-04-24 PROCEDURE — 83540 ASSAY OF IRON: CPT

## 2024-04-24 PROCEDURE — 82570 ASSAY OF URINE CREATININE: CPT

## 2024-04-24 PROCEDURE — 85027 COMPLETE CBC AUTOMATED: CPT

## 2024-04-24 PROCEDURE — 80069 RENAL FUNCTION PANEL: CPT

## 2024-04-24 PROCEDURE — 82043 UR ALBUMIN QUANTITATIVE: CPT

## 2024-04-24 PROCEDURE — 84153 ASSAY OF PSA TOTAL: CPT

## 2024-04-24 PROCEDURE — 82728 ASSAY OF FERRITIN: CPT

## 2024-04-26 ENCOUNTER — TELEPHONE (OUTPATIENT)
Age: 79
End: 2024-04-26

## 2024-04-29 ENCOUNTER — TELEPHONE (OUTPATIENT)
Dept: OTHER | Facility: HOSPITAL | Age: 79
End: 2024-04-29

## 2024-04-29 NOTE — TELEPHONE ENCOUNTER
Left message to discuss laboratory results.  Overall renal function remains stable.  Noted persistent anemia with likely component due to iron deficiency.  Message left to discuss possible IV iron infusions.  Would also start B12 supplementation.

## 2024-05-04 DIAGNOSIS — I25.10 CORONARY ARTERY DISEASE INVOLVING NATIVE CORONARY ARTERY OF NATIVE HEART WITHOUT ANGINA PECTORIS: ICD-10-CM

## 2024-05-05 DIAGNOSIS — E11.65 TYPE 2 DIABETES MELLITUS WITH HYPERGLYCEMIA, WITHOUT LONG-TERM CURRENT USE OF INSULIN (HCC): ICD-10-CM

## 2024-05-05 RX ORDER — ATORVASTATIN CALCIUM 20 MG/1
TABLET, FILM COATED ORAL
Qty: 90 TABLET | Refills: 3 | Status: SHIPPED | OUTPATIENT
Start: 2024-05-05

## 2024-05-13 DIAGNOSIS — C61 PROSTATE CANCER (HCC): ICD-10-CM

## 2024-05-14 RX ORDER — TAMSULOSIN HYDROCHLORIDE 0.4 MG/1
0.4 CAPSULE ORAL
Qty: 90 CAPSULE | Refills: 1 | Status: SHIPPED | OUTPATIENT
Start: 2024-05-14

## 2024-05-15 ENCOUNTER — OFFICE VISIT (OUTPATIENT)
Age: 79
End: 2024-05-15
Payer: MEDICARE

## 2024-05-15 VITALS
SYSTOLIC BLOOD PRESSURE: 93 MMHG | WEIGHT: 176 LBS | HEART RATE: 71 BPM | DIASTOLIC BLOOD PRESSURE: 58 MMHG | RESPIRATION RATE: 16 BRPM | HEIGHT: 71 IN | BODY MASS INDEX: 24.64 KG/M2

## 2024-05-15 DIAGNOSIS — M21.962 ACQUIRED DEFORMITY OF LEFT FOOT: ICD-10-CM

## 2024-05-15 DIAGNOSIS — M79.672 PAIN IN BOTH FEET: ICD-10-CM

## 2024-05-15 DIAGNOSIS — E11.621 DIABETIC ULCER OF LEFT MIDFOOT ASSOCIATED WITH TYPE 2 DIABETES MELLITUS, LIMITED TO BREAKDOWN OF SKIN (HCC): Primary | ICD-10-CM

## 2024-05-15 DIAGNOSIS — I73.9 PAD (PERIPHERAL ARTERY DISEASE) (HCC): ICD-10-CM

## 2024-05-15 DIAGNOSIS — L97.421 DIABETIC ULCER OF LEFT MIDFOOT ASSOCIATED WITH TYPE 2 DIABETES MELLITUS, LIMITED TO BREAKDOWN OF SKIN (HCC): Primary | ICD-10-CM

## 2024-05-15 DIAGNOSIS — M79.671 PAIN IN BOTH FEET: ICD-10-CM

## 2024-05-15 DIAGNOSIS — E11.42 DIABETIC POLYNEUROPATHY ASSOCIATED WITH TYPE 2 DIABETES MELLITUS (HCC): ICD-10-CM

## 2024-05-15 PROCEDURE — 99213 OFFICE O/P EST LOW 20 MIN: CPT | Performed by: PODIATRIST

## 2024-05-15 NOTE — PROGRESS NOTES
Assessment/Plan: Preulcerative callus/Gamino grade 0 ulcer plantar aspect left foot.  Acquired deformity foot.  History of metatarsal resection.  Diabetic foot.  Diabetic neuropathy.  Peripheral artery disease.  Mycosis of nail.     Plan.  Chart reviewed.  Primary care notes reviewed.  Lab work reviewed.  Patient examined.  Diabetic foot exam performed.  Betadine wet-to-dry dressing applied.  Patient watch for signs of infection.  Aftercare instruction given.  Return for follow-up.  We will add topical Aldara cream.  Patient wear accommodative shoes daily.            Diagnoses and all orders for this visit:     Diabetic ulcer of left midfoot associated with type 2 diabetes mellitus, limited to breakdown of skin ([facility]).  Rule out atypical verruca as etiology.     Diabetic polyneuropathy associated with type 2 diabetes mellitus ([facility])     PAD (peripheral artery disease) ([facility])     Onychomycosis     Acquired deformity of left foot     Pain in both feet            Subjective: Patient is doing much better.  He is not bandaging his foot.  He sees no evidence of blood in his socks.  He no history of fever or night sweats.  Patient is diabetic     No Known Allergies        Current Outpatient Medications:     acetaminophen (TYLENOL) 325 mg tablet, Take 2 tablets (650 mg total) by mouth every 6 (six) hours as needed for mild pain, headaches or fever, Disp: , Rfl: 0    Ascorbic Acid (Vitamin C) 500 MG CAPS, Take 500 mg by mouth daily, Disp: , Rfl:     aspirin (ECOTRIN LOW STRENGTH) 81 mg EC tablet, Take 81 mg by mouth daily, Disp: , Rfl:     atorvastatin (LIPITOR) 20 mg tablet, TAKE 1 TABLET EVERY DAY, Disp: 90 tablet, Rfl: 1    brimonidine tartrate 0.2 % ophthalmic solution, Inject 0.2 % into the eye 2 (two) times a day, Disp: , Rfl:     carvedilol (COREG) 12.5 mg tablet, Take 1 tablet (12.5 mg total) by mouth 2 (two) times a day, Disp: 180 tablet, Rfl: 1    Cholecalciferol (Vitamin D3) 50 MCG (2000 UT) TABS, Take 2,000 Units  by mouth daily, Disp: , Rfl:     co-enzyme Q-10 100 mg capsule, Take 100 mg by mouth daily, Disp: , Rfl:     ferrous sulfate 324 (65 Fe) mg, Take 1 tablet (324 mg total) by mouth 2 (two) times a day before meals, Disp: 180 tablet, Rfl: 3    glucose blood (OneTouch Ultra) test strip, Test blood sugar once a day, Disp: 100 strip, Rfl: 3    losartan (COZAAR) 25 mg tablet, Take 1 tablet (25 mg total) by mouth daily, Disp: 90 tablet, Rfl: 1    metFORMIN (GLUCOPHAGE) 1000 MG tablet, TAKE ONE TABLET BY MOUTH TWO TIMES A DAY WITH MEALS, Disp: 180 tablet, Rfl: 1    multivitamin (THERAGRAN) TABS, Take 1 tablet by mouth daily, Disp: , Rfl:     omeprazole (PriLOSEC) 40 MG capsule, Take 1 capsule (40 mg total) by mouth daily as needed (acid reflux), Disp: 90 capsule, Rfl: 1    Probiotic Product (CULTURELLE PROBIOTICS) CHEW, Chew daily, Disp: , Rfl:     sitaGLIPtin (JANUVIA) 25 mg tablet, Take one tablet by mouth once a day, Disp: 90 tablet, Rfl: 3    spironolactone (ALDACTONE) 25 mg tablet, Take 1 tablet (25 mg total) by mouth daily, Disp: 90 tablet, Rfl: 1    tamsulosin (FLOMAX) 0.4 mg, Take 1 capsule (0.4 mg total) by mouth daily with dinner, Disp: 90 capsule, Rfl: 2    TRUEplus Lancets 28G MISC, Test 1x/d, E11.29, Disp: 200 each, Rfl: 3    Infant Foods (Follow-Up) POWD, HANDICAP SERENA, medically recommended, <taxonomy [id number]> due to arthritic/orthopedic condition (#2,#5) (Patient not taking: Reported on 9/19/2022), Disp: 99 g, Rfl: 1           Patient Active Problem List   Diagnosis    Coronary artery disease involving native coronary artery of native heart with angina pectoris (HCC)    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 mellitus     Diabetic neuropathy (HCC)    GE reflux    Lumbar radiculopathy    Type 2 diabetes mellitus with diabetic neuropathy (HCC)    Prostate cancer screening    Dyslipidemia    Medicare annual wellness visit, subsequent    Type 2 diabetes mellitus with hyperglycemia, without long-term current use  of insulin (HCC)    Benign essential hypertension    Bilateral leg weakness    Prostate cancer (HCC)    Insomnia, persistent    Hyponatremia    Orthostatic hypotension    Chronic right-sided low back pain without sciatica    Hypokalemia    Acute on chronic combined systolic and diastolic congestive heart failure (HCC)    Heart failure, left, 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)    Chronic combined systolic and diastolic heart failure, NYHA class 2 (HCC)    Edema of both feet    Diarrhea    Gait disorder    Acute pain of left thigh    Peripheral arterial disease (HCC)    Iron deficiency anemia    Uses roller walker             Patient ID: Pernell Covarrubias is a 78 y.o. male.     HPI     The following portions of the patient's history were reviewed and updated as appropriate:      family history includes Aortic aneurysm in his mother; Heart attack in his father and sister; Heart disease in his father, paternal grandfather, and sister; Throat cancer in his maternal grandfather.       reports that he has never smoked. He has never been exposed to tobacco smoke. He has never used smokeless tobacco. He reports that he does not currently use alcohol. He reports that he does not use drugs.           Vitals:     11/08/23 1342   Resp: 16         Review of Systems       Objective:  Patient's shoes and socks removed.   Foot ExamPhysical Exam  Cardiovascular:      Pulses: Pulses are weak.               Physical Exam  Left Foot: Appearance: a deformity (ball of foot and distal fifth metatarsal ). Fifth toe deformities include hammer toe. Tenderness: None except the plantar aspect of the foot.   Right Foot: Appearance: a deformity (distal fifth metatarsal ).   Left Ankle: ROM: limited ROM in all planes   Right Ankle: ROM: limited ROM in all planes   Neurological Exam: Light touch was decreased bilaterally. Vibratory sensation was decreased in both first metatarsophalangeal joints. Response to monofilament test was absent bilaterally. Deep  tendon reflexes: achilles reflex 1/4 bilaterally.   Vascular Exam: performed Dorsalis pedis pulses were 1/4 bilaterally. Posterior tibial pulses were 1/4 bilaterally. Elevation Pallor: diminished bilaterally. Capillary refill time was Q. 9, findings bilateral. Negative digital hair noted, positive abnormal cooling. All pre-ulcer, lesions debrided. Today, but between 1-3 seconds bilaterally. Edema: mild bilaterally and All mycotic nails debrided. Today. The patient was given orthotics to help control his feet and at as cushioning and padding on the bottom of his feet q9 findings.   Toenails: All of the toenails were elongated, hypertrophied, discolored, ingrown, shown to have subungual debris and tender.      Socks and shoes removed, the Right Foot: the foot was dry.   The sensory exam showed diminished vibratory sensation at the level of the toes. Diminished tactile sensation with monofilament testing throughout the right foot.   Socks and shoes removed, Left Foot: the foot was dry.   The sensory exam showed diminished vibratory sensation at the level of the toes. Diminished tactile sensation with monofilament testing throughout the left foot.   Pulses:   1+ in the posterior tibialis on the right   1+ in the dorsalis pedis on the right. Capillary refills findings on the left were delayed in the toes.   Pulses:   1+ in the posterior tibialis on the left   1+ in the dorsalis pedis on the left.   Assign Risk Category: 2: Loss of protective sensation with or without weakness, deformity, callus, pre-ulcer, or history of ulceration. High risk.   Hyperkeratosis: present on both first sub metatarsals, present on both fifth sub metatarsals and Pre-ulcerative lesion, submetatarsal one to 5, bilateral.   Shoe Gear Evaluation: performed (). Recommendation(s): custom inlays.      Patient's shoes and socks removed.Right Foot/Ankle   Right Foot Inspection  Skin Exam: callus and callus               Toe Exam: swelling and  erythema  Sensory   Vibration: diminished  Proprioception: diminished      Vascular  Capillary refills: elevated        Left Foot/Ankle  Left Foot Inspection  Skin Exam: callus.  Submetatarsal 5 of the left foot demonstrates 0.5 cm grade Gamino grade 0/preulcerative callus. Cellulitis resolved.              Toe Exam: swelling and erythema                   Sensory   Vibration: diminished  Proprioception: diminished     Vascular  Capillary refills: elevated.     Patient's shoes and socks removed.         Patient's shoes and socks removed.     Right Foot/Ankle   Right Foot Inspection        Toe Exam: right toe deformity.      Sensory   Vibration: absent  Proprioception: diminished  Monofilament testing: diminished     Vascular  Capillary refills: < 3 seconds        Left Foot/Ankle  Left Foot Inspection        Toe Exam: left toe deformity.      Sensory   Vibration: absent  Proprioception: diminished  Monofilament testing: diminished     Vascular  Capillary refills: < 3 seconds        Assign Risk Category  Deformity present  Loss of protective sensation  Weak pulses  Risk: 2

## 2024-05-17 ENCOUNTER — TELEPHONE (OUTPATIENT)
Age: 79
End: 2024-05-17

## 2024-05-20 ENCOUNTER — RADIATION ONCOLOGY FOLLOW-UP (OUTPATIENT)
Dept: RADIATION ONCOLOGY | Facility: HOSPITAL | Age: 79
End: 2024-05-20
Attending: STUDENT IN AN ORGANIZED HEALTH CARE EDUCATION/TRAINING PROGRAM
Payer: MEDICARE

## 2024-05-20 ENCOUNTER — TELEPHONE (OUTPATIENT)
Age: 79
End: 2024-05-20

## 2024-05-20 VITALS
SYSTOLIC BLOOD PRESSURE: 112 MMHG | RESPIRATION RATE: 18 BRPM | WEIGHT: 176 LBS | OXYGEN SATURATION: 96 % | HEART RATE: 63 BPM | BODY MASS INDEX: 24.64 KG/M2 | TEMPERATURE: 97.1 F | DIASTOLIC BLOOD PRESSURE: 70 MMHG | HEIGHT: 71 IN

## 2024-05-20 DIAGNOSIS — C61 PROSTATE CANCER (HCC): Primary | ICD-10-CM

## 2024-05-20 DIAGNOSIS — N32.81 OAB (OVERACTIVE BLADDER): Primary | ICD-10-CM

## 2024-05-20 PROCEDURE — 99211 OFF/OP EST MAY X REQ PHY/QHP: CPT | Performed by: STUDENT IN AN ORGANIZED HEALTH CARE EDUCATION/TRAINING PROGRAM

## 2024-05-20 PROCEDURE — G2211 COMPLEX E/M VISIT ADD ON: HCPCS | Performed by: STUDENT IN AN ORGANIZED HEALTH CARE EDUCATION/TRAINING PROGRAM

## 2024-05-20 PROCEDURE — 99213 OFFICE O/P EST LOW 20 MIN: CPT | Performed by: STUDENT IN AN ORGANIZED HEALTH CARE EDUCATION/TRAINING PROGRAM

## 2024-05-20 RX ORDER — MIRABEGRON 25 MG/1
25 TABLET, FILM COATED, EXTENDED RELEASE ORAL
Qty: 90 TABLET | Refills: 3 | Status: SHIPPED | OUTPATIENT
Start: 2024-05-20

## 2024-05-20 NOTE — TELEPHONE ENCOUNTER
----- Message from Arnold Hernandez PA-C sent at 5/20/2024  1:12 PM EDT -----  Please let the patient know I sent the medication for his urinary frequency to his mail away pharmacy.  ----- Message -----  From: Soy Pride MD  Sent: 5/20/2024   9:00 AM EDT  To: Arnold Hernandez PA-C    Hi,     I am seeing Pernell Nyluis e today in follow-up. It sounds like he still has significant urinary frequency and urgency, though has no weakened stream or hesitancy on tamsulosin. I wanted to get your thoughts on an anti-spasmodic agent like oxybutynin for him? I can prescribe if you think this would help.     He is scheduled to see you back in August.     Will

## 2024-05-20 NOTE — PROGRESS NOTES
Follow-up - Radiation Oncology   Pernell Covarrubias 1945 78 y.o. male [id number]      History of Present Illness   Cancer Staging   Prostate cancer (HCC)  Staging form: Prostate, AJCC 8th Edition  - Clinical: Stage IIIC (cT3b, cN0, cM0, PSA: 8.6, Grade Group: 5) - Signed by Deo Gonzalez MD on 3/29/2021  Prostate specific antigen (PSA) range: Less than 10  Histologic grading system: 5 grade system      Mr. Arcadio Covarrubias is a 78 year old man with a history of high risk (cT3b, GS 5+5, PSA 8.6) prostate adenocarcinoma. On 9/14/21 the patient completed a course of definitive RT to a dose of 7920 cGy to the prostate/SV and pelvis (4500cGy/25fx). He returns today for follow-up.     Interval History:  The patient was last seen in follow-up on 5/19/23, at that time doing well overall. Since then he has continued to follow with urology with last ADT injected in 1/2023 (end date in 7/2023). PSA has remained undetectable (PSA trend as below).     Currently he is doing fair overall. He has some ongoing LUTS including frequency and nocturia; he does not have significant obstruction, no hesitancy, no weakened stream. He remains on tamsulosin.     PSA trend as below.      PSA   Latest Ref Rng 0.00 - 4.00 ng/mL   12/3/2019 4.0    2/10/2020 4.0    8/14/2020 6.2 (H)    12/10/2020 8.0 (H)    6/8/2021 0.4    7/2/2021 0.2    7/13/2021 0.3    10/13/2021 <0.1    1/6/2022 <0.1    4/11/2022 <0.1    7/5/2022 <0.1    1/9/2023 <0.1    7/7/2023 <0.01    10/3/2023 <0.01    2/26/2024 <0.01    4/24/2024 <0.01           Historical Information   Oncology History   Prostate cancer (HCC)   2/2021 Initial Diagnosis    Prostate cancer (HCC)     2/17/2021 Biopsy    A. Prostate, Right Lateral Base, Biopsy:  - Benign prostatic tissue.     B. Prostate, Right Base, Biopsy:  - Benign prostatic tissue.     C. Prostate, Right Lateral Mid, Biopsy:  - Benign prostatic tissue.     D. Prostate, Right Mid, Biopsy:  - Benign prostatic tissue.     E.  Prostate, Right Lateral [place], Biopsy:  - Benign prostatic tissue.     F. Prostate, Right [place], Biopsy:  - Benign prostatic tissue.     G. Prostate, Left Lateral Base, Biopsy:  - Prostatic adenocarcinoma, Mikki Score 5 + 3 = 8, Grade Group 4.     - Percentage Mikki pattern 5: 95%     - Total number of cores identified: 1     - Total number of cores with carcinoma: 1     - Percentage of tissue with carcinoma: 92%     - Linear amount of tissue with carcinoma: 11 mm total     H. Prostate, Left Base, Biopsy:  - Prostatic adenocarcinoma, Mikki Score 5 + 4 = 9, Grade Group 5.     - Percentage Mikki pattern 5: 95%     - Total number of cores identified: 1     - Total number of cores with carcinoma: 1     - Percentage of tissue with carcinoma: 92%     - Linear amount of tissue with carcinoma: 11 mm total     I. Prostate, Left Lateral Mid, Biopsy:  - Prostatic adenocarcinoma, [place] Score 5 + 5 = 10, Grade Group 5.     - Total number of cores identified: 1     - Total number of cores with carcinoma: 1     - Percentage of tissue with carcinoma: 100%     - Linear amount of tissue with carcinoma: 15 mm total  - Focus of suspected intraductal carcinoma.     J. Prostate, Left Mid, Biopsy:  - Prostatic adenocarcinoma, [place] Score 5 + 5 = 10, Grade Group 5.     - Total number of cores identified: 1     - Total number of cores with carcinoma: 1     - Percentage of tissue with carcinoma: 93%     - Linear amount of tissue with carcinoma: 14 mm total     K. Prostate, Left Lateral [place], Biopsy:  - Prostatic adenocarcinoma, [place] Score 5 + 4 = 9, Grade Group 5.     - Percentage [place] pattern 5: 70%     - Total number of cores identified: 1     - Total number of cores with carcinoma: 1     - Percentage of tissue with carcinoma: 100%     - Linear amount of tissue with carcinoma: 10 mm total     L. Prostate, Left [place], Biopsy:  - Prostatic adenocarcinoma, [place] Score 5 + 4 = 9, Grade Group 5.     - Percentage Mikki pattern  5: 95%     - Total number of cores identified: 1     - Total number of cores with carcinoma: 1     - Percentage of tissue with carcinoma: 94%     - Linear amount of tissue with carcinoma: 15 mm total     3/17/2021 -  Hormone Therapy    Firmagon 240 mg SQ injection - started 3/17/21  Plan for 2-3 years of therapy.     3/29/2021 -  Cancer Staged    Staging form: Prostate, AJCC 8th Edition  - Clinical: Stage IIIC (cT3b, cN0, cM0, PSA: 8.6, Grade Group: 5) - Signed by Deo Gonzalez MD on 3/29/2021  Prostate specific antigen (PSA) range: Less than 10  Histologic grading system: 5 grade system       7/8/2021 - 9/14/2021 Radiation    Course: C1    Plan ID Energy Fractions Dose per Fraction (cGy) Dose Correction (cGy) Total Dose Delivered (cGy) Elapsed Days   Prost SV 10X 19 / 19 180 0 3,420 29   Whole Pelvis 10X 25 / 25 180 0 4,500 36      Treatment Dates:  7/8/2021 - 9/14/2021.        Review of Systems   Constitutional: Negative.    HENT: Negative.     Eyes: Negative.         Wears glasses    Respiratory: Negative.     Cardiovascular: Negative.    Gastrointestinal: Negative.    Endocrine: Negative.    Genitourinary:  Positive for frequency.   Musculoskeletal:  Positive for back pain and gait problem (using rolling walker).   Skin: Negative.    Allergic/Immunologic: Negative.    Hematological: Negative.    Psychiatric/Behavioral: Negative.           Clinical Trial: no     IPSS Questionnaire (AUA-7):  Over the past month…     1)  How often have you had a sensation of not emptying your bladder completely after you finish urinating?  0 - Not at all   2)  How often have you had to urinate again less than two hours after you finished urinating? 0 - Not at all   3)  How often have you found you stopped and started again several times when you urinated?  0 - Not at all   4) How difficult have you found it to postpone urination?  1 - Less than 1 time in 5   5) How often have you had a weak urinary stream?  2 - Less than half the  "time   6) How often have you had to push or strain to begin urination?  0 - Not at all   7) How many times did you most typically get up to urinate from the time you went to bed until the time you got up in the morning?  4 - 4 times   Total Score:  7       OBJECTIVE:   /70 (BP Location: Left arm, Patient Position: Sitting, Cuff Size: Standard)   Pulse 63   Temp (!) 97.1 °F (36.2 °C) (Temporal)   Resp 18   Ht 5' 11\" (1.803 m)   Wt 79.8 kg (176 lb)   SpO2 96%   BMI 24.55 kg/m²   Pain Assessment:  0  ECOG/Zubrod/WHO: 1 - Symptomatic but completely ambulatory    Physical Exam   Well appearing. NAD.   No increased work of breathing.   Extremities warm and well perfused.   SEB deferred.     Assessment/Plan:  Orders Placed This Encounter   Procedures    PSA Total, Diagnostic      Approximately 2 years, 8 months following completion of definitive RT the patient is doing well overall and remains clinically and biochemically without evidence of disease recurrence. We reviewed the patient's repeat PSA, which remains undetectable at this time. He does have some ongoing urinary discomfort, which may be more due to overactivity than obstruction; he is currently on tamsulosin and I will reach out to urology to inquire about the addition of an anti-spasmodic agent (e.g. oxybutynin). He will otherwise plan for urology follow-up on 8/27/24. I will plan to see him back ~6 months thereafter with repeat PSA.     Soy Pride MD  5/20/2024,12:47 PM    Portions of the record may have been created with voice recognition software.  Occasional wrong word or \"sound a like\" substitutions may have occurred due to the inherent limitations of voice recognition software.  Read the chart carefully and recognize, using context, where substitutions have occurred.        "

## 2024-05-20 NOTE — PROGRESS NOTES
Pernell Covarrubias 1945 is a 78 y.o. male  with [place] 10 (5+5) high risk adenocarcinoma the prostate, stage IIIC  T3 B N0 M0 with a PSA of 8.6. He completed a course of definitive radiation therapy with neoadjuvant and concurrent androgen deprivation on 21. He was last seen in radiation oncology 23. He returns today for yearly follow-up.     PSA   Latest Ref Rng 0.00 - 4.00 ng/mL   2022 <0.1    2023 <0.1    2023 <0.01    10/3/2023 <0.01    2024 <0.01    2024 <0.01      23 Urology - [facility]  Completed 2 years of ADT  PSA undetectable  F/u in 6 months with PSA prior     24 Urology - [facility]  PSA remains undetectable   Follow-up in 6 months with PSA now and prior to visit     Upcomin24 Urology         Follow up visit     Oncology History   Prostate cancer (HCC)   2021 Initial Diagnosis    Prostate cancer (HCC)     2021 Biopsy    A. Prostate, Right Lateral Base, Biopsy:  - Benign prostatic tissue.     B. Prostate, Right Base, Biopsy:  - Benign prostatic tissue.     C. Prostate, Right Lateral Mid, Biopsy:  - Benign prostatic tissue.     D. Prostate, Right Mid, Biopsy:  - Benign prostatic tissue.     E. Prostate, Right Lateral [place], Biopsy:  - Benign prostatic tissue.     F. Prostate, Right [place], Biopsy:  - Benign prostatic tissue.     G. Prostate, Left Lateral Base, Biopsy:  - Prostatic adenocarcinoma, [place] Score 5 + 3 = 8, Grade Group 4.     - Percentage [place] pattern 5: 95%     - Total number of cores identified: 1     - Total number of cores with carcinoma: 1     - Percentage of tissue with carcinoma: 92%     - Linear amount of tissue with carcinoma: 11 mm total     H. Prostate, Left Base, Biopsy:  - Prostatic adenocarcinoma, Mikki Score 5 + 4 = 9, Grade Group 5.     - Percentage [place] pattern 5: 95%     - Total number of cores identified: 1     - Total number of cores with carcinoma: 1     - Percentage of tissue with carcinoma: 92%      - Linear amount of tissue with carcinoma: 11 mm total     I. Prostate, Left Lateral Mid, Biopsy:  - Prostatic adenocarcinoma, Mikki Score 5 + 5 = 10, Grade Group 5.     - Total number of cores identified: 1     - Total number of cores with carcinoma: 1     - Percentage of tissue with carcinoma: 100%     - Linear amount of tissue with carcinoma: 15 mm total  - Focus of suspected intraductal carcinoma.     J. Prostate, Left Mid, Biopsy:  - Prostatic adenocarcinoma, Mikki Score 5 + 5 = 10, Grade Group 5.     - Total number of cores identified: 1     - Total number of cores with carcinoma: 1     - Percentage of tissue with carcinoma: 93%     - Linear amount of tissue with carcinoma: 14 mm total     K. Prostate, Left Lateral [place], Biopsy:  - Prostatic adenocarcinoma, Mikki Score 5 + 4 = 9, Grade Group 5.     - Percentage Mikki pattern 5: 70%     - Total number of cores identified: 1     - Total number of cores with carcinoma: 1     - Percentage of tissue with carcinoma: 100%     - Linear amount of tissue with carcinoma: 10 mm total     L. Prostate, Left [place], Biopsy:  - Prostatic adenocarcinoma, [place] Score 5 + 4 = 9, Grade Group 5.     - Percentage [place] pattern 5: 95%     - Total number of cores identified: 1     - Total number of cores with carcinoma: 1     - Percentage of tissue with carcinoma: 94%     - Linear amount of tissue with carcinoma: 15 mm total     3/17/2021 -  Hormone Therapy    Firmagon 240 mg SQ injection - started 3/17/21  Plan for 2-3 years of therapy.     3/29/2021 -  Cancer Staged    Staging form: Prostate, AJCC 8th Edition  - Clinical: Stage IIIC (cT3b, cN0, cM0, PSA: 8.6, Grade Group: 5) - Signed by Deo Gonzalez MD on 3/29/2021  Prostate specific antigen (PSA) range: Less than 10  Histologic grading system: 5 grade system       7/8/2021 - 9/14/2021 Radiation    Course: C1    Plan ID Energy Fractions Dose per Fraction (cGy) Dose Correction (cGy) Total Dose Delivered (cGy) Elapsed  Days   Prost SV 10X 19 / 19 180 0 3,420 29   Whole Pelvis 10X 25 / 25 180 0 4,500 36      Treatment Dates:  7/8/2021 - 9/14/2021.          Review of Systems:  Review of Systems   Constitutional: Negative.    HENT: Negative.     Eyes: Negative.         Wears glasses    Respiratory: Negative.     Cardiovascular: Negative.    Gastrointestinal: Negative.    Endocrine: Negative.    Genitourinary:  Positive for frequency.   Musculoskeletal:  Positive for back pain and gait problem (using rolling walker).   Skin: Negative.    Allergic/Immunologic: Negative.    Hematological: Negative.    Psychiatric/Behavioral: Negative.         Clinical Trial: no    IPSS Questionnaire (AUA-7):  Over the past month…    1)  How often have you had a sensation of not emptying your bladder completely after you finish urinating?  0 - Not at all   2)  How often have you had to urinate again less than two hours after you finished urinating? 0 - Not at all   3)  How often have you found you stopped and started again several times when you urinated?  0 - Not at all   4) How difficult have you found it to postpone urination?  1 - Less than 1 time in 5   5) How often have you had a weak urinary stream?  2 - Less than half the time   6) How often have you had to push or strain to begin urination?  0 - Not at all   7) How many times did you most typically get up to urinate from the time you went to bed until the time you got up in the morning?  4 - 4 times   Total Score:  7           Health Maintenance   Topic Date Due    Hepatitis C Screening  Never done    RSV Vaccine Age 60+ Years (1 - 1-dose 60+ series) Never done    Zoster Vaccine (1 of 2) 11/13/2012    COVID-19 Vaccine (5 - 2023-24 season) 09/01/2023    DM Eye Exam  03/30/2024    Medicare Annual Wellness Visit (AWV)  06/21/2024    HEMOGLOBIN A1C  08/20/2024    Colorectal Cancer Screening  01/01/2033 (Originally 10/25/2024)    Influenza Vaccine (Season Ended) 09/01/2024    Diabetic Foot Exam   11/08/2024    Fall Risk  01/16/2025    Depression Screening  01/16/2025    BMI: Adult  05/15/2025    Pneumococcal Vaccine: 65+ Years  Completed    RSV Vaccine age 0-20 Months  Aged Out    HIB Vaccine  Aged Out    IPV Vaccine  Aged Out    Hepatitis A Vaccine  Aged Out    Meningococcal ACWY Vaccine  Aged Out    HPV Vaccine  Aged Out     Patient Active Problem List   Diagnosis    Coronary artery disease involving native coronary artery of native heart without angina pectoris    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 mellitus  ([facility])    Diabetic neuropathy ([facility])    GE reflux    Lumbar radiculopathy    Type 2 diabetes mellitus with diabetic neuropathy (HCC)    Dyslipidemia    Type 2 diabetes mellitus with hyperglycemia, without long-term current use of insulin ([facility])    Benign essential hypertension    Bilateral leg weakness    Prostate cancer ([facility])    Insomnia, persistent    Orthostatic hypotension    Chronic right-sided low back pain without sciatica    Acute on chronic combined systolic and diastolic congestive heart failure ([facility])    Heart failure, left, 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% ([facility])    Chronic combined systolic and diastolic heart failure, NYHA class 2 ([facility])    Edema of both feet    Gait disorder    Acute pain of left thigh    Peripheral arterial disease ([facility])    Iron deficiency anemia    Uses roller walker    Dry skin dermatitis    Type 2 diabetes mellitus with stage 2 chronic kidney disease, without long-term current use of insulin  ([facility])    Vitamin D deficiency    Other fatigue     Past Medical History:   Diagnosis Date    Acquired hallux valgus     last assessed: 8/1/2013    Allergic rhinitis     Anemia 10/26/2010    Atrophy of nail     last assessed: 7/7/2015    Chronic kidney disease     chronic renal insufficiency    Coronary artery disease     Deformity of ankle and foot, acquired     last assessed: 2/22/2016    Difficulty walking     last assessed: 12/14/2015    Dysesthesia     last assessed: 11/25/2016    Hammer toe      unspecified laterality; last assessed: 8/1/2013    Hypertension     Onychomycosis of toenail     last assessed: 2/22/2016    Peripheral vascular disease (HCC)     left SFA stent in 2010    Pes planus     unspecified laterality; last assessed: 8/1/2013    Pneumonia     last assessed: 2/27/2016    Prostate cancer (HCC)     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     last assessed: 8/15/2016    Type 2 diabetes mellitus (HCC) 07/26/2010    Viral warts     last assessed: 7/24/2015     Past Surgical History:   Procedure Laterality Date    CARDIAC CATHETERIZATION  07/29/2010    CATARACT EXTRACTION, BILATERAL Bilateral     R 1999, L 2008    COLONOSCOPY  2011    FEMORAL ARTERY - POPLITEAL ARTERY BYPASS GRAFT  09/23/2013    FOOT SURGERY      bone spur removed    LEG SURGERY Bilateral     repair; L 8/20/2010 and 7/26/2012    FL PLMT INTERSTITIAL DEV RADIAT TX PROSTATE 1/MULT N/A 6/22/2021    Procedure: INSERTION OF FIDUCIAL MARKER (TRANSRECTAL ULTRASOUND GUIDANCE), SPACEOAR;  Surgeon: Jero Loomis MD;  Location: [facility];  Service: Urology    ROTATOR CUFF REPAIR Left 2009    SHOULDER SURGERY Right 2005    collar bone     Family History   Problem Relation Age of Onset    Aortic aneurysm Mother     Heart disease Father     Heart attack Father         acute myocardial infarction    Heart disease Sister     Heart attack Sister         acute myocardial infarction    Throat cancer Maternal Grandfather     Heart disease Paternal Grandfather     No Known Problems Son      Social History     Socioeconomic History    Marital status: /Civil Union     Spouse name: Not on file    Number of children: 1    Years of education: Not on file    Highest education level: Not on file   Occupational History    Occupation: retired from  work   Tobacco Use    Smoking status: Never     Passive exposure: Never    Smokeless tobacco: Never   Vaping Use    Vaping status: Never Used   Substance and Sexual Activity    Alcohol use: Not  Currently     Comment: being a social drinker/rare    Drug use: No    Sexual activity: Not Currently   Other Topics Concern    Not on file   Social History Narrative    Not on file     Social Determinants of Health     Financial Resource Strain: Low Risk  (6/21/2023)    Overall Financial Resource Strain (CARDIA)     Difficulty of Paying Living Expenses: Not hard at all   Food Insecurity: No Food Insecurity (2/21/2022)    Hunger Vital Sign     Worried About Running Out of Food in the Last Year: Never true     Ran Out of Food in the Last Year: Never true   Transportation Needs: No Transportation Needs (6/21/2023)    PRAPARE - Transportation     Lack of Transportation (Medical): No     Lack of Transportation (Non-Medical): No   Physical Activity: Not on file   Stress: Not on file   Social Connections: Not on file   Intimate Partner Violence: Not on file   Housing Stability: Low Risk  (2/21/2022)    Housing Stability Vital Sign     Unable to Pay for Housing in the Last Year: No     Number of Places Lived in the Last Year: 1     Unstable Housing in the Last Year: No       Current Outpatient Medications:     acetaminophen (TYLENOL) 325 mg tablet, Take 2 tablets (650 mg total) by mouth every 6 (six) hours as needed for mild pain, headaches or fever, Disp: , Rfl: 0    ammonium lactate (LAC-HYDRIN) 12 % lotion, Apply topically 2 (two) times a day, Disp: 500 g, Rfl: 3    Ascorbic Acid (Vitamin C) 500 MG CAPS, Take 500 mg by mouth daily, Disp: , Rfl:     aspirin (ECOTRIN LOW STRENGTH) 81 mg EC tablet, Take 81 mg by mouth daily, Disp: , Rfl:     atorvastatin (LIPITOR) 20 mg tablet, TAKE 1 TABLET EVERY DAY, Disp: 90 tablet, Rfl: 3    brimonidine tartrate 0.2 % ophthalmic solution, Inject 0.2 % into the eye 2 (two) times a day, Disp: , Rfl:     carvedilol (COREG) 12.5 mg tablet, TAKE 1 TABLET TWICE DAILY, Disp: 180 tablet, Rfl: 1    Cholecalciferol (Vitamin D3) 50 MCG (2000 UT) TABS, Take 2,000 Units by mouth daily, Disp: , Rfl:      co-enzyme Q-10 100 mg capsule, Take 100 mg by mouth daily, Disp: , Rfl:     ferrous sulfate 324 (65 Fe) mg, Take 1 tablet (324 mg total) by mouth 2 (two) times a day before meals, Disp: 180 tablet, Rfl: 3    glucose blood (OneTouch Ultra) test strip, Test blood sugar once a day, Disp: 100 strip, Rfl: 3    imiquimod (ALDARA) 5 % cream, APPLY 1 PACKET TOPICALLY 3 (THREE) TIMES A WEEK, Disp: 12 packet, Rfl: 11    Infant Foods (Follow-Up) POWD, HANDICAP PLACARD, medically recommended, <taxonomy [id number]> due to arthritic/orthopedic condition (#2,#5) (Patient not taking: Reported on 9/19/2022), Disp: 99 g, Rfl: 1    losartan (COZAAR) 25 mg tablet, TAKE 1 TABLET (25 MG TOTAL) BY MOUTH DAILY, Disp: 90 tablet, Rfl: 3    metFORMIN (GLUCOPHAGE) 1000 MG tablet, TAKE ONE TABLET BY MOUTH TWICE A DAY WITH MEALS, Disp: 180 tablet, Rfl: 1    metFORMIN (GLUCOPHAGE-XR) 500 mg 24 hr tablet, Take 2 tablets (1,000 mg total) by mouth 2 (two) times a day with meals, Disp: 360 tablet, Rfl: 2    multivitamin (THERAGRAN) TABS, Take 1 tablet by mouth daily, Disp: , Rfl:     omeprazole (PriLOSEC) 40 MG capsule, Take 1 capsule (40 mg total) by mouth daily as needed (acid reflux), Disp: 90 capsule, Rfl: 1    sitaGLIPtin (JANUVIA) 25 mg tablet, Take one tablet by mouth once a day, Disp: 90 tablet, Rfl: 3    spironolactone (ALDACTONE) 25 mg tablet, TAKE 1 TABLET EVERY DAY, Disp: 90 tablet, Rfl: 1    tacrolimus (PROTOPIC) 0.1 % ointment, Apply topically daily Apply daily for maintenance, Disp: 100 g, Rfl: 3    tamsulosin (FLOMAX) 0.4 mg, Take 1 capsule (0.4 mg total) by mouth daily with dinner, Disp: 90 capsule, Rfl: 1    triamcinolone (KENALOG) 0.1 % ointment, Apply topically 2 (two) times a day Apply to body twice a day then switch to Protopic Ointment and DO NOT USE ON FACE, GROIN, ARMPITS, Disp: 453.6 g, Rfl: 1    TRUEplus Lancets 28G MISC, Test 1x/d, E11.29, Disp: 200 each, Rfl: 3  No Known Allergies  There were no vitals filed for this  visit.

## 2024-05-20 NOTE — TELEPHONE ENCOUNTER
Left a voicemail per communication sheet and per provider that his prescription was sent to his mail order pharmacy for urinary symptoms and frequency .

## 2024-05-24 ENCOUNTER — IOP CHECK (OUTPATIENT)
Dept: URBAN - METROPOLITAN AREA CLINIC 6 | Facility: CLINIC | Age: 79
End: 2024-05-24

## 2024-05-24 DIAGNOSIS — H01.023: ICD-10-CM

## 2024-05-24 DIAGNOSIS — H40.023: ICD-10-CM

## 2024-05-24 DIAGNOSIS — Z96.1: ICD-10-CM

## 2024-05-24 DIAGNOSIS — H01.026: ICD-10-CM

## 2024-05-24 PROCEDURE — 92012 INTRM OPH EXAM EST PATIENT: CPT

## 2024-05-24 PROCEDURE — 92083 EXTENDED VISUAL FIELD XM: CPT

## 2024-05-24 ASSESSMENT — VISUAL ACUITY
OD_CC: 20/25-1
OS_CC: 20/40
OU_CC: J1
OS_PH: 20/30-1

## 2024-05-24 ASSESSMENT — TONOMETRY
OD_IOP_MMHG: 14
OS_IOP_MMHG: 12

## 2024-06-09 PROBLEM — M79.652 ACUTE PAIN OF LEFT THIGH: Status: RESOLVED | Noted: 2023-05-03 | Resolved: 2024-06-09

## 2024-06-10 ENCOUNTER — APPOINTMENT (OUTPATIENT)
Dept: LAB | Facility: CLINIC | Age: 79
End: 2024-06-10
Payer: MEDICARE

## 2024-06-10 LAB — TESTOST SERPL-MSCNC: 65 NG/DL

## 2024-06-10 PROCEDURE — 84403 ASSAY OF TOTAL TESTOSTERONE: CPT

## 2024-06-12 ENCOUNTER — OFFICE VISIT (OUTPATIENT)
Dept: FAMILY MEDICINE CLINIC | Facility: CLINIC | Age: 79
End: 2024-06-12
Payer: MEDICARE

## 2024-06-12 VITALS
SYSTOLIC BLOOD PRESSURE: 110 MMHG | TEMPERATURE: 98.6 F | DIASTOLIC BLOOD PRESSURE: 44 MMHG | WEIGHT: 167 LBS | HEART RATE: 78 BPM | RESPIRATION RATE: 18 BRPM | BODY MASS INDEX: 26.84 KG/M2 | HEIGHT: 66 IN

## 2024-06-12 DIAGNOSIS — E11.22 CKD STAGE 2 DUE TO TYPE 2 DIABETES MELLITUS  (HCC): ICD-10-CM

## 2024-06-12 DIAGNOSIS — N18.2 CKD STAGE 2 DUE TO TYPE 2 DIABETES MELLITUS  (HCC): ICD-10-CM

## 2024-06-12 DIAGNOSIS — Z00.00 MEDICARE ANNUAL WELLNESS VISIT, SUBSEQUENT: Primary | ICD-10-CM

## 2024-06-12 DIAGNOSIS — Z99.89 USES ROLLER WALKER: ICD-10-CM

## 2024-06-12 DIAGNOSIS — I73.9 PERIPHERAL ARTERIAL DISEASE (HCC): ICD-10-CM

## 2024-06-12 DIAGNOSIS — R26.9 GAIT DISORDER: ICD-10-CM

## 2024-06-12 DIAGNOSIS — D64.9 ANEMIA, UNSPECIFIED TYPE: ICD-10-CM

## 2024-06-12 DIAGNOSIS — E11.22 TYPE 2 DIABETES MELLITUS WITH STAGE 2 CHRONIC KIDNEY DISEASE, WITHOUT LONG-TERM CURRENT USE OF INSULIN  (HCC): ICD-10-CM

## 2024-06-12 DIAGNOSIS — C61 PROSTATE CANCER (HCC): ICD-10-CM

## 2024-06-12 DIAGNOSIS — I50.9 ACUTE ON CHRONIC CONGESTIVE HEART FAILURE, UNSPECIFIED HEART FAILURE TYPE (HCC): ICD-10-CM

## 2024-06-12 DIAGNOSIS — N18.2 TYPE 2 DIABETES MELLITUS WITH STAGE 2 CHRONIC KIDNEY DISEASE, WITHOUT LONG-TERM CURRENT USE OF INSULIN  (HCC): ICD-10-CM

## 2024-06-12 DIAGNOSIS — I10 BENIGN ESSENTIAL HYPERTENSION: ICD-10-CM

## 2024-06-12 PROCEDURE — 99214 OFFICE O/P EST MOD 30 MIN: CPT | Performed by: FAMILY MEDICINE

## 2024-06-12 PROCEDURE — G0439 PPPS, SUBSEQ VISIT: HCPCS | Performed by: FAMILY MEDICINE

## 2024-06-12 RX ORDER — SPIRONOLACTONE 25 MG/1
25 TABLET ORAL DAILY
Qty: 90 TABLET | Refills: 1 | Status: SHIPPED | OUTPATIENT
Start: 2024-06-12

## 2024-06-12 RX ORDER — CARVEDILOL 12.5 MG/1
12.5 TABLET ORAL 2 TIMES DAILY
Qty: 180 TABLET | Refills: 1 | Status: SHIPPED | OUTPATIENT
Start: 2024-06-12

## 2024-06-12 NOTE — PROGRESS NOTES
Assessment/Plan:    No problem-specific Assessment & Plan notes found for this encounter.    Chronic anemia  Recheck cbc with epo  Consider hematology referral    DM2 near goal   A1c 7.2  F/u endo    Ckd2 stable  Stay hydrated  Avoid nsaids    PAD unchanged  Continue statin     Diagnoses and all orders for this visit:    Medicare annual wellness visit, subsequent    Anemia, unspecified type  -     Erythropoietin; Future  -     CBC and differential; Future    Acute on chronic congestive heart failure, unspecified heart failure type (HCC)  -     spironolactone (ALDACTONE) 25 mg tablet; Take 1 tablet (25 mg total) by mouth daily    Benign essential hypertension  -     carvedilol (COREG) 12.5 mg tablet; Take 1 tablet (12.5 mg total) by mouth 2 (two) times a day    Gait disorder  -     Ambulatory referral to Physical Therapy; Future    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    Subjective:      Patient ID: Pernell Covarrubias is a 78 y.o. male.    Chief Complaint   Patient presents with    Follow-up     Sas/ca    Hypertension       HPI  Taking all meds  Watching carbs  Using walker  Some minimal exercise  Wt today c/w home, he does not recall being weighed 2m ago  No iron def but anemic at 9.2-9.9  Urine flow is good  Tolerating statin  Aldactone in am, no breast tenderness    The following portions of the patient's history were reviewed and updated as appropriate: allergies, current medications, past family history, past medical history, past social history, past surgical history and problem list.    Review of Systems   Constitutional:  Negative for chills and fever.   Respiratory:  Negative for cough.          Current Outpatient Medications   Medication Sig  Dispense Refill    acetaminophen (TYLENOL) 325 mg tablet Take 2 tablets (650 mg total) by mouth every 6 (six) hours as needed for mild pain, headaches or fever  0    ammonium lactate (LAC-HYDRIN) 12 % lotion Apply topically 2 (two) times a day 500 g 3    Ascorbic Acid (Vitamin C) 500 MG CAPS Take 500 mg by mouth daily      aspirin (ECOTRIN LOW STRENGTH) 81 mg EC tablet Take 81 mg by mouth daily      atorvastatin (LIPITOR) 20 mg tablet TAKE 1 TABLET EVERY DAY 90 tablet 3    brimonidine tartrate 0.2 % ophthalmic solution Inject 0.2 % into the eye 2 (two) times a day      carvedilol (COREG) 12.5 mg tablet Take 1 tablet (12.5 mg total) by mouth 2 (two) times a day 180 tablet 1    Cholecalciferol (Vitamin D3) 50 MCG (2000 UT) TABS Take 2,000 Units by mouth daily      co-enzyme Q-10 100 mg capsule Take 100 mg by mouth daily      ferrous sulfate 324 (65 Fe) mg Take 1 tablet (324 mg total) by mouth 2 (two) times a day before meals 180 tablet 3    glucose blood (OneTouch Ultra) test strip Test blood sugar once a day 100 strip 3    imiquimod (ALDARA) 5 % cream APPLY 1 PACKET TOPICALLY 3 (THREE) TIMES A WEEK 12 packet 11    losartan (COZAAR) 25 mg tablet TAKE 1 TABLET (25 MG TOTAL) BY MOUTH DAILY 90 tablet 3    metFORMIN (GLUCOPHAGE) 1000 MG tablet TAKE ONE TABLET BY MOUTH TWICE A DAY WITH MEALS 180 tablet 1    metFORMIN (GLUCOPHAGE-XR) 500 mg 24 hr tablet Take 2 tablets (1,000 mg total) by mouth 2 (two) times a day with meals 360 tablet 2    Mirabegron ER 25 MG TB24 Take 25 mg by mouth daily at bedtime 90 tablet 3    multivitamin (THERAGRAN) TABS Take 1 tablet by mouth daily      omeprazole (PriLOSEC) 40 MG capsule Take 1 capsule (40 mg total) by mouth daily as needed (acid reflux) 90 capsule 1    sitaGLIPtin (JANUVIA) 25 mg tablet Take one tablet by mouth once a day 90 tablet 3    spironolactone (ALDACTONE) 25 mg tablet Take 1 tablet (25 mg total) by mouth daily 90 tablet 1    tacrolimus (PROTOPIC) 0.1 % ointment Apply  "topically daily Apply daily for maintenance 100 g 3    tamsulosin (FLOMAX) 0.4 mg Take 1 capsule (0.4 mg total) by mouth daily with dinner 90 capsule 1    triamcinolone (KENALOG) 0.1 % ointment Apply topically 2 (two) times a day Apply to body twice a day then switch to Protopic Ointment and DO NOT USE ON FACE, GROIN, ARMPITS 453.6 g 1    TRUEplus Lancets 28G MISC Test 1x/d, E11.29 200 each 3    Infant Foods (Follow-Up) POWD HANDICAP PLACARD, medically recommended, <taxonomy [id number]> due to arthritic/orthopedic condition (#2,#5) (Patient not taking: Reported on 9/19/2022) 99 g 1     No current facility-administered medications for this visit.       Objective:    BP (!) 110/44   Pulse 78   Temp 98.6 °F (37 °C)   Resp 18   Ht 5' 5.5\" (1.664 m)   Wt 75.8 kg (167 lb)   BMI 27.37 kg/m²        Physical Exam  Vitals and nursing note reviewed.   Constitutional:       General: He is not in acute distress.     Appearance: He is well-developed. He is not ill-appearing.   HENT:      Head: Normocephalic.      Right Ear: Tympanic membrane normal.      Left Ear: Tympanic membrane normal.   Eyes:      General: No scleral icterus.     Conjunctiva/sclera: Conjunctivae normal.   Neck:      Vascular: No carotid bruit.   Cardiovascular:      Rate and Rhythm: Normal rate and regular rhythm.      Heart sounds: No murmur heard.  Pulmonary:      Effort: Pulmonary effort is normal. No respiratory distress.      Breath sounds: No wheezing.   Abdominal:      Palpations: Abdomen is soft.   Musculoskeletal:         General: No deformity.      Cervical back: Neck supple.   Skin:     General: Skin is warm and dry.      Coloration: Skin is not pale.   Neurological:      Mental Status: He is alert.      Motor: No weakness.      Gait: Gait abnormal.   Psychiatric:         Mood and Affect: Mood normal.         Behavior: Behavior normal.         Thought Content: Thought content normal.                Felipe Travis, DO    "

## 2024-06-12 NOTE — PATIENT INSTRUCTIONS
After your labwork in July, we may need to consider having you see a hematologist for your chronic anemia.  Medicare Preventive Visit Patient Instructions  Thank you for completing your Welcome to Medicare Visit or Medicare Annual Wellness Visit today. Your next wellness visit will be due in one year (6/14/2025).  The screening/preventive services that you may require over the next 5-10 years are detailed below. Some tests may not apply to you based off risk factors and/or age. Screening tests ordered at today's visit but not completed yet may show as past due. Also, please note that scanned in results may not display below.  Preventive Screenings:  Service Recommendations Previous Testing/Comments   Colorectal Cancer Screening  Colonoscopy    Fecal Occult Blood Test (FOBT)/Fecal Immunochemical Test (FIT)  Fecal DNA/Cologuard Test  Flexible Sigmoidoscopy Age: 45-75 years old   Colonoscopy: every 10 years (May be performed more frequently if at higher risk)  OR  FOBT/FIT: every 1 year  OR  Cologuard: every 3 years  OR  Sigmoidoscopy: every 5 years  Screening may be recommended earlier than age 45 if at higher risk for colorectal cancer. Also, an individualized decision between you and your healthcare provider will decide whether screening between the ages of 76-85 would be appropriate. Colonoscopy: 06/14/2011  FOBT/FIT: 10/25/2023  Cologuard: Not on file  Sigmoidoscopy: Not on file          Prostate Cancer Screening Individualized decision between patient and health care provider in men between ages of 55-69   Medicare will cover every 12 months beginning on the day after your 50th birthday PSA: <0.01 ng/mL           Hepatitis C Screening Once for adults born between 1945 and 1965  More frequently in patients at high risk for Hepatitis C Hep C Antibody: Not on file        Diabetes Screening 1-2 times per year if you're at risk for diabetes or have pre-diabetes Fasting glucose: 158 mg/dL (4/24/2024)  A1C: 7.2 %  (2/20/2024)      Cholesterol Screening Once every 5 years if you don't have a lipid disorder. May order more often based on risk factors. Lipid panel: 04/24/2024         Other Preventive Screenings Covered by Medicare:  Abdominal Aortic Aneurysm (AAA) Screening: covered once if your at risk. You're considered to be at risk if you have a family history of AAA or a male between the age of 65-75 who smoking at least 100 cigarettes in your lifetime.  Lung Cancer Screening: covers low dose CT scan once per year if you meet all of the following conditions: (1) Age 55-77; (2) No signs or symptoms of lung cancer; (3) Current smoker or have quit smoking within the last 15 years; (4) You have a tobacco smoking history of at least 20 pack years (packs per day x number of years you smoked); (5) You get a written order from a healthcare provider.  Glaucoma Screening: covered annually if you're considered high risk: (1) You have diabetes OR (2) Family history of glaucoma OR (3)  aged 50 and older OR (4)  American aged 65 and older  Osteoporosis Screening: covered every 2 years if you meet one of the following conditions: (1) Have a vertebral abnormality; (2) On glucocorticoid therapy for more than 3 months; (3) Have primary hyperparathyroidism; (4) On osteoporosis medications and need to assess response to drug therapy.  HIV Screening: covered annually if you're between the age of 15-65. Also covered annually if you are younger than 15 and older than 65 with risk factors for HIV infection. For pregnant patients, it is covered up to 3 times per pregnancy.    Immunizations:  Immunization Recommendations   Influenza Vaccine Annual influenza vaccination during flu season is recommended for all persons aged >= 6 months who do not have contraindications   Pneumococcal Vaccine   * Pneumococcal conjugate vaccine = PCV13 (Prevnar 13), PCV15 (Vaxneuvance), PCV20 (Prevnar 20)  * Pneumococcal polysaccharide vaccine =  PPSV23 (Pneumovax) Adults 19-63 yo with certain risk factors or if 65+ yo  If never received any pneumonia vaccine: recommend Prevnar 20 (PCV20)  Give PCV20 if previously received 1 dose of PCV13 or PPSV23   Hepatitis B Vaccine 3 dose series if at intermediate or high risk (ex: diabetes, end stage renal disease, liver disease)   Respiratory syncytial virus (RSV) Vaccine - COVERED BY MEDICARE PART D  * RSVPreF3 (Arexvy) CDC recommends that adults 60 years of age and older may receive a single dose of RSV vaccine using shared clinical decision-making (SCDM)   Tetanus (Td) Vaccine - COST NOT COVERED BY MEDICARE PART B Following completion of primary series, a booster dose should be given every 10 years to maintain immunity against tetanus. Td may also be given as tetanus wound prophylaxis.   Tdap Vaccine - COST NOT COVERED BY MEDICARE PART B Recommended at least once for all adults. For pregnant patients, recommended with each pregnancy.   Shingles Vaccine (Shingrix) - COST NOT COVERED BY MEDICARE PART B  2 shot series recommended in those 19 years and older who have or will have weakened immune systems or those 50 years and older     Health Maintenance Due:      Topic Date Due   • Hepatitis C Screening  Never done   • Colorectal Cancer Screening  01/01/2033 (Originally 10/25/2024)     Immunizations Due:      Topic Date Due   • COVID-19 Vaccine (5 - 2023-24 season) 09/01/2023   • Influenza Vaccine (Season Ended) 09/01/2024     Advance Directives   What are advance directives?  Advance directives are legal documents that state your wishes and plans for medical care. These plans are made ahead of time in case you lose your ability to make decisions for yourself. Advance directives can apply to any medical decision, such as the treatments you want, and if you want to donate organs.   What are the types of advance directives?  There are many types of advance directives, and each state has rules about how to use them. You  may choose a combination of any of the following:  Living will:  This is a written record of the treatment you want. You can also choose which treatments you do not want, which to limit, and which to stop at a certain time. This includes surgery, medicine, IV fluid, and tube feedings.   Durable power of  for healthcare (DPAHC):  This is a written record that states who you want to make healthcare choices for you when you are unable to make them for yourself. This person, called a proxy, is usually a family member or a friend. You may choose more than 1 proxy.  Do not resuscitate (DNR) order:  A DNR order is used in case your heart stops beating or you stop breathing. It is a request not to have certain forms of treatment, such as CPR. A DNR order may be included in other types of advance directives.  Medical directive:  This covers the care that you want if you are in a coma, near death, or unable to make decisions for yourself. You can list the treatments you want for each condition. Treatment may include pain medicine, surgery, blood transfusions, dialysis, IV or tube feedings, and a ventilator (breathing machine).  Values history:  This document has questions about your views, beliefs, and how you feel and think about life. This information can help others choose the care that you would choose.  Why are advance directives important?  An advance directive helps you control your care. Although spoken wishes may be used, it is better to have your wishes written down. Spoken wishes can be misunderstood, or not followed. Treatments may be given even if you do not want them. An advance directive may make it easier for your family to make difficult choices about your care.   Weight Management   Why it is important to manage your weight:  Being overweight increases your risk of health conditions such as heart disease, high blood pressure, type 2 diabetes, and certain types of cancer. It can also increase your  risk for osteoarthritis, sleep apnea, and other respiratory problems. Aim for a slow, steady weight loss. Even a small amount of weight loss can lower your risk of health problems.  How to lose weight safely:  A safe and healthy way to lose weight is to eat fewer calories and get regular exercise. You can lose up about 1 pound a week by decreasing the number of calories you eat by 500 calories each day.   Healthy meal plan for weight management:  A healthy meal plan includes a variety of foods, contains fewer calories, and helps you stay healthy. A healthy meal plan includes the following:  Eat whole-grain foods more often.  A healthy meal plan should contain fiber. Fiber is the part of grains, fruits, and vegetables that is not broken down by your body. Whole-grain foods are healthy and provide extra fiber in your diet. Some examples of whole-grain foods are whole-wheat breads and pastas, oatmeal, brown rice, and bulgur.  Eat a variety of vegetables every day.  Include dark, leafy greens such as spinach, kale, elle greens, and mustard greens. Eat yellow and orange vegetables such as carrots, sweet potatoes, and winter squash.   Eat a variety of fruits every day.  Choose fresh or canned fruit (canned in its own juice or light syrup) instead of juice. Fruit juice has very little or no fiber.  Eat low-fat dairy foods.  Drink fat-free (skim) milk or 1% milk. Eat fat-free yogurt and low-fat cottage cheese. Try low-fat cheeses such as mozzarella and other reduced-fat cheeses.  Choose meat and other protein foods that are low in fat.  Choose beans or other legumes such as split peas or lentils. Choose fish, skinless poultry (chicken or turkey), or lean cuts of red meat (beef or pork). Before you cook meat or poultry, cut off any visible fat.   Use less fat and oil.  Try baking foods instead of frying them. Add less fat, such as margarine, sour cream, regular salad dressing and mayonnaise to foods. Eat fewer high-fat  foods. Some examples of high-fat foods include french fries, doughnuts, ice cream, and cakes.  Eat fewer sweets.  Limit foods and drinks that are high in sugar. This includes candy, cookies, regular soda, and sweetened drinks.  Exercise:  Exercise at least 30 minutes per day on most days of the week. Some examples of exercise include walking, biking, dancing, and swimming. You can also fit in more physical activity by taking the stairs instead of the elevator or parking farther away from stores. Ask your healthcare provider about the best exercise plan for you.      © Copyright [employer] 2018 Information is for End User's use only and may not be sold, redistributed or otherwise used for commercial purposes. All illustrations and images included in CareNotes® are the copyrighted property of A.[employer]M., Inc. or [employer]

## 2024-06-13 PROBLEM — D64.9 ANEMIA: Status: ACTIVE | Noted: 2024-06-13

## 2024-06-13 PROBLEM — Z00.00 MEDICARE ANNUAL WELLNESS VISIT, SUBSEQUENT: Status: ACTIVE | Noted: 2024-06-13

## 2024-06-13 NOTE — PROGRESS NOTES
Ambulatory Visit  Name: Pernell Covarrubias      : 1945      MRN: [id number]  Encounter Provider: Felipe Travis DO  Encounter Date: 2024   Encounter department: [facility]    Assessment & Plan   1. Medicare annual wellness visit, subsequent  2. Anemia, unspecified type  -     Erythropoietin; Future; Expected date: 2024  -     CBC and differential; Future; Expected date: 2024  3. Acute on chronic congestive heart failure, unspecified heart failure type (HCC)  -     spironolactone (ALDACTONE) 25 mg tablet; Take 1 tablet (25 mg total) by mouth daily  4. Benign essential hypertension  -     carvedilol (COREG) 12.5 mg tablet; Take 1 tablet (12.5 mg total) by mouth 2 (two) times a day  5. Gait disorder  -     Ambulatory referral to Physical Therapy; Future; Expected date: 2024  6. 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 Peripheral arterial disease (HCC)  8. Prostate cancer (HCC)  9. Type 2 diabetes mellitus with stage 2 chronic kidney disease, without long-term current use of insulin  (HCC)  10. Uses roller walker       Preventive health issues were discussed with patient, and age appropriate screening tests were ordered as noted in patient's After Visit Summary. Personalized health advice and appropriate referrals for health education or preventive services given if needed, as noted in patient's After Visit Summary.    History of Present Illness     HPI   Patient Care Team:  Felipe Travis DO as PCP - General  Jen Grimes MD (Gastroenterology)  DO Ramiro Pompa DPM (Podiatry)  Tanner Taylor MD (Ophthalmology)  Olayinka Begum MD (Cardiology)  Jose Walters MD (Vascular Surgery)  Bereket Irwin MD (Urology)  Soy Pride MD (Radiation Oncology)  Umesh Montejo DO (Nephrology)    Review of Systems  Medical History Reviewed by provider this encounter:  Tobacco  Allergies  Meds  Problems  Med Hx  Surg  Hx  Fam Hx       Annual Wellness Visit Questionnaire   Pernell is here for his Subsequent Wellness visit. Last Medicare Wellness visit information reviewed, patient interviewed and updates made to the record today.      Health Risk Assessment:   Patient rates overall health as good. Patient feels that their physical health rating is same. Patient is satisfied with their life. Eyesight was rated as same. Hearing was rated as slightly worse. Patient feels that their emotional and mental health rating is same. Patients states they are never, rarely angry. Patient states they are sometimes unusually tired/fatigued. Pain experienced in the last 7 days has been some. Patient's pain rating has been 5/10. Patient states that he has experienced no weight loss or gain in last 6 months.     Depression Screening:   PHQ-2 Score: 0      Fall Risk Screening:   In the past year, patient has experienced: no history of falling in past year      Urinary Incontinence Screening:   Patient has not leaked urine accidently in the last six months.     Home Safety:  Patient does not have trouble with stairs inside or outside of their home. Patient has working smoke alarms and has working carbon monoxide detector. Home safety hazards include: none.     Nutrition:   Current diet is Diabetic and Limited junk food.     Medications:   Patient is currently taking over-the-counter supplements. OTC medications include: see medication list. Patient is able to manage medications.     Activities of Daily Living (ADLs)/Instrumental Activities of Daily Living (IADLs):   Walk and transfer into and out of bed and chair?: Yes  Dress and groom yourself?: Yes    Bathe or shower yourself?: Yes    Feed yourself? Yes  Do your laundry/housekeeping?: Yes  Manage your money, pay your bills and track your expenses?: Yes  Make your own meals?: Yes    Do your own shopping?: Yes    Previous Hospitalizations:   Any hospitalizations or ED visits within the last 12  months?: No      Advance Care Planning:   Living will: Yes    Durable POA for healthcare: Yes    Advanced directive: Yes    End of Life Decisions reviewed with patient: No      Cognitive Screening:   Provider or family/friend/caregiver concerned regarding cognition?: No    PREVENTIVE SCREENINGS      Cardiovascular Screening:    General: Screening Current      Diabetes Screening:     General: Screening Not Indicated and History Diabetes      Colorectal Cancer Screening:     General: Screening Current      Prostate Cancer Screening:    General: History Prostate Cancer and Screening Not Indicated      Osteoporosis Screening:    General: Screening Not Indicated      Abdominal Aortic Aneurysm (AAA) Screening:    Risk factors include: tobacco use        General: Screening Not Indicated      Lung Cancer Screening:     General: Screening Not Indicated      Hepatitis C Screening:    General: Screening Current    Hep C Screening Accepted: No     Screening, Brief Intervention, and Referral to Treatment (SBIRT)    Screening  Typical number of drinks in a day: 0  Typical number of drinks in a week: 0  Interpretation: Low risk drinking behavior.    Single Item Drug Screening:  How often have you used an illegal drug (including marijuana) or a prescription medication for non-medical reasons in the past year? never    Single Item Drug Screen Score: 0  Interpretation: Negative screen for possible drug use disorder    Other Counseling Topics:   Car/seat belt/driving safety and regular weightbearing exercise.     Social Determinants of Health     Financial Resource Strain: Low Risk  (6/21/2023)    Overall Financial Resource Strain (CARDIA)     Difficulty of Paying Living Expenses: Not hard at all   Food Insecurity: No Food Insecurity (6/13/2024)    Hunger Vital Sign     Worried About Running Out of Food in the Last Year: Never true     Ran Out of Food in the Last Year: Never true   Transportation Needs: No Transportation Needs  "(6/13/2024)    PRAPARE - Transportation     Lack of Transportation (Medical): No     Lack of Transportation (Non-Medical): No   Housing Stability: Low Risk  (6/13/2024)    Housing Stability Vital Sign     Unable to Pay for Housing in the Last Year: No     Number of Times Moved in the Last Year: 0     Homeless in the Last Year: No   Utilities: Not At Risk (6/13/2024)    [facility] Utilities     Threatened with loss of utilities: No     No results found.    Objective     BP (!) 110/44   Pulse 78   Temp 98.6 °F (37 °C)   Resp 18   Ht 5' 5.5\" (1.664 m)   Wt 75.8 kg (167 lb)   BMI 27.37 kg/m²     Physical Exam  Administrative Statements           "

## 2024-06-20 NOTE — PROGRESS NOTES
Ambulatory Visit  Name: Pernell Covarrubias      : 1945      MRN: [id number]  Encounter Provider: Brenda Andrea PA-C  Encounter Date: 2024   Encounter department: [facility]    Assessment & Plan   1. Peripheral arterial disease (HCC)  Assessment & Plan:  -Baseline ambulatory dysfunction and limitations; uses tall rollator  -No foot/leg pain or claudication at level of activity  -No new leg symptoms     -DENVER 24 - no significant change    R 0.58/95/58; known occlusion fem-pop bypass. Known occlusion prox SFA with recon dSFA.    L 0.61/79/53; occlusion SFA stent with collats and recon dSFA. KATINA / DP artery occluded.     -DENVER 3/21/23    R 0.67/151/87; known occlusion fem-pop bypass graft; known occlusion prox-mid mSFA with recon distal SFA    L 0.68/114/87; occlusion of SFA stent with collats and recon of the distal SFA     Plan:  -Known severe PAD with bilateral SFA occlusions  -DENVER re-demonstrates bilateral SFA occlusions with reduced ALEKSANDR and borderline for healing metatarsal / toe pressures.   -We discussed pathophysiology and treatment of PAD and indications for surgical/endovascular interventions  -We discussed the benefits of regular walking  -Maintain good blood pressure, cholesterol and glucose control  -Continue aspirin 81 and atorvastatin 20  -Avoid foot wounds; wear good footwear, check feet daily and call office for any major changes  -Follow-up blood extremity arterial duplex study in 1 year with office visit, or sooner if needed  Orders:  -     VAS ARTERIAL DUPLEX- LOWER LIMB BILATERAL; Future; Expected date: 2025  2. Type 2 diabetes mellitus with hyperglycemia, without long-term current use of insulin ([facility])  3. Benign essential hypertension  4. Chronic combined systolic and diastolic heart failure, NYHA class 2 ([facility])    CC: Patient presents today to review DENVER done 24. H/o BLE interventions. Ambulates with walker due to balance issues. Denies claudication at  "his level of activity.    History of Present Illness     Pernell Covarrubias is a 78 y.o. male hypertension, DM with neuropathy, CKD 3, CAD, AF, CHF, PAD s/p R Fem to AK pop bypass ([place], 2013, known occluded within 1 year of surgery) and B SFA occlusions s/p B SFA stenting (L 6/19/2018.)  Unfortunately, despite dual antiplatelets he developed recurrent SFA stenoses (L < 1 year and R < 2 years) after the intervention. Mr Horvath has general weakness in the legs with balance problems affecting both legs  and limiting him to walking room to room for which he uses a Rolator.  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       6/21/24: Patient returns for annual vascular follow-up for PAD.  He does have bilateral SFA occlusions and functionally limited.  Patient reports that his walking is \"okay.\"  Legs do get tired if he is standing so he prefers to do some walking.  He has history of tall rollator.  He has no new foot or leg symptoms.  No claudication, ischemic rest pain or tissue loss.  He continues to see Dr. Lancaster regularly for foot and nail care.    He does have marked bilateral lower extremity edema.  He tells me the legs and the swelling do not particularly bother him.  I did offer him Tubigrip again as an option to help with drainage, but he declined.  We did discuss that continued leg swelling can lead to wounds and infection.     He has no associated chest pain or shortness of breath.    We reviewed his recent labs and testing including his lower extremity arterial duplex study which was essentially unchanged from last year.    Medical therapy includes aspirin 81 and atorvastatin 20.      A1c 7.2  LDL 44    .  Review of Systems   Constitutional: Negative.    HENT: Negative.     Eyes: Negative.    Respiratory: Negative.     Cardiovascular: Negative.    Gastrointestinal: Negative.    Endocrine: Negative.    Genitourinary: Negative.    Musculoskeletal:  Positive for gait " "problem.   Skin: Negative.    Allergic/Immunologic: Negative.    Neurological:  Positive for weakness.   Hematological: Negative.    Psychiatric/Behavioral: Negative.         Objective     /60 (BP Location: Right arm, Patient Position: Sitting)   Pulse 68   Ht 5' 5.5\" (1.664 m)   Wt 77.1 kg (170 lb)   BMI 27.86 kg/m²       Marked B 2+ LE pitting and mild pedal edema  No wounds  Feet warm and well-perfused. Normal color and temp. No wounds/ lesions.   DP/AT/PT signals      Physical Exam  Vitals and nursing note reviewed.   Constitutional:       Appearance: He is well-developed.   HENT:      Head: Normocephalic and atraumatic.   Eyes:      Pupils: Pupils are equal, round, and reactive to light.   Neck:      Thyroid: Thyromegaly present.      Vascular: No carotid bruit or JVD.   Cardiovascular:      Rate and Rhythm: Normal rate and regular rhythm.      Pulses:           Carotid pulses are 2+ on the right side and 2+ on the left side.       Radial pulses are 2+ on the right side and 2+ on the left side.        Dorsalis pedis pulses are detected w/ Doppler on the right side and detected w/ Doppler on the left side.        Posterior tibial pulses are detected w/ Doppler on the right side and detected w/ Doppler on the left side.      Heart sounds: S1 normal and S2 normal. Murmur heard.      Systolic murmur is present with a grade of 2/6.      No friction rub. No gallop.   Pulmonary:      Effort: Pulmonary effort is normal. No accessory muscle usage or respiratory distress.      Breath sounds: Normal breath sounds. No wheezing or rales.   Abdominal:      General: Bowel sounds are normal. There is no distension.      Palpations: Abdomen is soft.      Tenderness: There is no abdominal tenderness.   Musculoskeletal:         General: No deformity. Normal range of motion.      Cervical back: Neck supple.      Right lower le+ Pitting Edema present.      Left lower le+ Pitting Edema present.   Skin:     General: " Skin is warm and dry.      Capillary Refill: Capillary refill takes less than 2 seconds.      Findings: No lesion or rash.      Nails: There is no clubbing.   Neurological:      Mental Status: He is alert and oriented to person, place, and time.      Comments: Grossly normal    Psychiatric:         Behavior: Behavior is cooperative.         DEN[facility] 4/1/24  [facility]  CLINICAL:  Indications:  PVD, Unspecified [I73.9].  Yearly surveillance for progression of disease.   Patient is ambulating with a  walker. He denies any rest pain but does experience claudication with minimal  activity. Denies tissue loss.  Operative History:  2018-06-18 Left SFA Stent Angioplasty  Left SFA stents  Right fem-pop bypass  Right SFA stent  Risk Factors  The patient has history of HTN, Diabetes (Yes), Hyperlipidemia, CKD, PAD and  CAD.  Clinical  Right Pressure:  153/ mm Hg, Left Pressure:  166/ mm Hg.     FINDINGS:     Post. Tibial, Right       Waveform: Monophasic  Ant. Tibial, Right       Waveform: Monophasic  Common Femoral Artery, Right       PSV (cm/s): 116  Prox Profunda, Right       PSV (cm/s): 253  Prox SFA, Right       Impression: Occluded       PSV (cm/s): 0  Mid SFA, Right       Impression: Occluded       PSV (cm/s): 0  Dist SFA, Right       PSV (cm/s): 28  Proximal Pop, Right       PSV (cm/s): 49  Distal Pop, Right       PSV (cm/s): 37  Tibioperoneal, Right       PSV (cm/s): 59  Dist Post Tibial, Right       PSV (cm/s): 45       EDV: 8  Dist. Ant. Tibial, Right       PSV (cm/s): 17       EDV: 1  DorsPedis, Right       PSV (cm/s): 17       EDV: 5       Acceleration Time: 0.190  Post. Tibial, Left       Waveform: Monophasic  Ant. Tibial, Left       Waveform: Monophasic  Common Femoral Artery, Left       PSV (cm/s): 131       EDV: 11  Prox Profunda, Left       PSV (cm/s): 179       EDV: 11  Prox SFA - Stent, Left       Impression: Occluded       PSV (cm/s): 0       EDV: 0  Mid SFA - Stent, Left       Impression:  "Occluded       PSV (cm/s): 0       EDV: 0  Dist SFA, Left       PSV (cm/s): 34       EDV: 0  Proximal Pop, Left       PSV (cm/s): 37       EDV: 0  Distal Pop, Left       PSV (cm/s): 67       EDV: 4  Tibioperoneal, Left       PSV (cm/s): 42  Dist Post Tibial, Left       PSV (cm/s): 49       EDV: 8       Acceleration Time: 0.160  Dist. Ant. Tibial, Left       Impression: Not visualized  DorsPedis, Left       PSV (cm/s): 0       EDV: 0           CONCLUSION:  Impression:  RIGHT LOWER LIMB:  Known occlusion of the fem-pop bypass graft. Known occlusion of the proximal  superficial femoral artery with reconstitution at the distal SFA.  Ankle/Brachial index: 0.58, which is within the severe disease range. Prior  0.67.  PVR/ PPG tracings are dampened.  Metatarsal pressure of 95 mm Hg.  Great toe pressure of 58 mm Hg, within the healing range. Prior 84 mm Hg.  Pedal Acceleration time: 190 ms which is in the moderate range.     LEFT LOWER LIMB:  Occlusion of the superficial femoral artery stent with collaterals noted and  reconstitution at the distal SFA.  Finding suggests anterior tibial artery/ dorsalis pedis artery is occluded. The  posterior tibial artery is patent.  Ankle/Brachial index: 0.61, which is within the moderate range.  Prior 0.68.  PVR/ PPG tracings are dampened.  Metatarsal pressure of 79 mm Hg.  Great toe pressure of 53 mm Hg, within the healing range. Prior 87 mm Hg.    Compared to previous study on 03/21/2023, there is no significant change.          Administrative Statements           I have reviewed and made appropriate changes to the review of systems input by the medical assistant.    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  (HCC)    Diabetic neuropathy (HCC)    GE reflux    Lumbar radiculopathy    Type 2 diabetes mellitus with diabetic neuropathy (HCC)    Dyslipidemia    Type 2 diabetes mellitus with hyperglycemia, without long-term current use of insulin (HCC)    Benign essential hypertension    Bilateral leg weakness    Prostate cancer (HCC)    Insomnia, persistent    Orthostatic hypotension    Chronic right-sided low back pain without sciatica    Acute on chronic combined systolic and diastolic congestive heart failure (HCC)    Heart failure, left, 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)    Chronic combined systolic and diastolic heart failure, NYHA class 2 (HCC)    Edema of both feet    Gait disorder    Peripheral arterial disease (HCC)    Iron deficiency anemia    Uses roller walker    Dry skin dermatitis    Type 2 diabetes mellitus with stage 2 chronic kidney disease, without long-term current use of insulin  (HCC)    Vitamin D deficiency    Other fatigue    Medicare annual wellness visit, subsequent    Anemia       Past Surgical History:   Procedure Laterality Date    CARDIAC CATHETERIZATION  07/29/2010    CATARACT EXTRACTION, BILATERAL Bilateral     R 1999, L 2008    COLONOSCOPY  2011    FEMORAL ARTERY - POPLITEAL ARTERY BYPASS GRAFT  09/23/2013    FOOT SURGERY      bone spur removed    LEG SURGERY Bilateral     repair; L 8/20/2010 and 7/26/2012    TN PLMT INTERSTITIAL DEV RADIAT TX PROSTATE 1/MULT N/A 6/22/2021    Procedure: INSERTION OF FIDUCIAL MARKER (TRANSRECTAL ULTRASOUND GUIDANCE), SPACEOAR;  Surgeon: Jero Loomis MD;  Location: [facility];  Service: Urology    ROTATOR CUFF REPAIR Left 2009    SHOULDER SURGERY Right 2005    collar bone       Family History   Problem Relation Age of Onset    Aortic aneurysm Mother     Heart disease Father     Heart attack Father         acute myocardial infarction    Heart disease Sister     Heart attack Sister         acute myocardial infarction    Throat cancer Maternal  Grandfather     Heart disease Paternal Grandfather     No Known Problems Son        Social History     Socioeconomic History    Marital status: /Civil Union     Spouse name: Not on file    Number of children: 1    Years of education: Not on file    Highest education level: Not on file   Occupational History    Occupation: retired from  work   Tobacco Use    Smoking status: Never     Passive exposure: Past    Smokeless tobacco: Never   Vaping Use    Vaping status: Never Used   Substance and Sexual Activity    Alcohol use: Not Currently     Comment: being a social drinker/rare    Drug use: No    Sexual activity: Not Currently   Other Topics Concern    Not on file   Social History Narrative    Not on file     Social Determinants of Health     Financial Resource Strain: Low Risk  (6/21/2023)    Overall Financial Resource Strain (CARDIA)     Difficulty of Paying Living Expenses: Not hard at all   Food Insecurity: No Food Insecurity (6/13/2024)    Hunger Vital Sign     Worried About Running Out of Food in the Last Year: Never true     Ran Out of Food in the Last Year: Never true   Transportation Needs: No Transportation Needs (6/13/2024)    PRAPARE - Transportation     Lack of Transportation (Medical): No     Lack of Transportation (Non-Medical): No   Physical Activity: Not on file   Stress: Not on file   Social Connections: Not on file   Intimate Partner Violence: Not on file   Housing Stability: Low Risk  (6/13/2024)    Housing Stability Vital Sign     Unable to Pay for Housing in the Last Year: No     Number of Times Moved in the Last Year: 0     Homeless in the Last Year: No       No Known Allergies      Current Outpatient Medications:     acetaminophen (TYLENOL) 325 mg tablet, Take 2 tablets (650 mg total) by mouth every 6 (six) hours as needed for mild pain, headaches or fever, Disp: , Rfl: 0    ammonium lactate (LAC-HYDRIN) 12 % lotion, Apply topically 2 (two) times a day, Disp: 500 g, Rfl: 3    Ascorbic  Acid (Vitamin C) 500 MG CAPS, Take 500 mg by mouth daily, Disp: , Rfl:     aspirin (ECOTRIN LOW STRENGTH) 81 mg EC tablet, Take 81 mg by mouth daily, Disp: , Rfl:     atorvastatin (LIPITOR) 20 mg tablet, TAKE 1 TABLET EVERY DAY, Disp: 90 tablet, Rfl: 3    brimonidine tartrate 0.2 % ophthalmic solution, Inject 0.2 % into the eye 2 (two) times a day, Disp: , Rfl:     carvedilol (COREG) 12.5 mg tablet, Take 1 tablet (12.5 mg total) by mouth 2 (two) times a day, Disp: 180 tablet, Rfl: 1    Cholecalciferol (Vitamin D3) 50 MCG (2000 UT) TABS, Take 2,000 Units by mouth daily, Disp: , Rfl:     co-enzyme Q-10 100 mg capsule, Take 100 mg by mouth daily, Disp: , Rfl:     glucose blood (OneTouch Ultra) test strip, Test blood sugar once a day, Disp: 100 strip, Rfl: 3    imiquimod (ALDARA) 5 % cream, APPLY 1 PACKET TOPICALLY 3 (THREE) TIMES A WEEK, Disp: 12 packet, Rfl: 11    losartan (COZAAR) 25 mg tablet, TAKE 1 TABLET (25 MG TOTAL) BY MOUTH DAILY, Disp: 90 tablet, Rfl: 3    metFORMIN (GLUCOPHAGE) 1000 MG tablet, TAKE ONE TABLET BY MOUTH TWICE A DAY WITH MEALS, Disp: 180 tablet, Rfl: 1    Mirabegron ER 25 MG TB24, Take 25 mg by mouth daily at bedtime, Disp: 90 tablet, Rfl: 3    multivitamin (THERAGRAN) TABS, Take 1 tablet by mouth daily, Disp: , Rfl:     omeprazole (PriLOSEC) 40 MG capsule, Take 1 capsule (40 mg total) by mouth daily as needed (acid reflux), Disp: 90 capsule, Rfl: 1    sitaGLIPtin (JANUVIA) 25 mg tablet, Take one tablet by mouth once a day, Disp: 90 tablet, Rfl: 3    spironolactone (ALDACTONE) 25 mg tablet, Take 1 tablet (25 mg total) by mouth daily, Disp: 90 tablet, Rfl: 1    tacrolimus (PROTOPIC) 0.1 % ointment, Apply topically daily Apply daily for maintenance, Disp: 100 g, Rfl: 3    tamsulosin (FLOMAX) 0.4 mg, Take 1 capsule (0.4 mg total) by mouth daily with dinner, Disp: 90 capsule, Rfl: 1    triamcinolone (KENALOG) 0.1 % ointment, Apply topically 2 (two) times a day Apply to body twice a day then switch  to Protopic Ointment and DO NOT USE ON FACE, GROIN, ARMPITS, Disp: 453.6 g, Rfl: 1    TRUEplus Lancets 28G MISC, Test 1x/d, E11.29, Disp: 200 each, Rfl: 3    ferrous sulfate 324 (65 Fe) mg, Take 1 tablet (324 mg total) by mouth 2 (two) times a day before meals (Patient not taking: Reported on 6/21/2024), Disp: 180 tablet, Rfl: 3    Infant Foods (Follow-Up) POWD, HANDICAP SERENA, medically recommended, <taxonomy [id number]> due to arthritic/orthopedic condition (#2,#5) (Patient not taking: Reported on 9/19/2022), Disp: 99 g, Rfl: 1    metFORMIN (GLUCOPHAGE-XR) 500 mg 24 hr tablet, Take 2 tablets (1,000 mg total) by mouth 2 (two) times a day with meals (Patient not taking: Reported on 6/21/2024), Disp: 360 tablet, Rfl: 2

## 2024-06-21 ENCOUNTER — OFFICE VISIT (OUTPATIENT)
Dept: VASCULAR SURGERY | Facility: CLINIC | Age: 79
End: 2024-06-21
Payer: MEDICARE

## 2024-06-21 VITALS
DIASTOLIC BLOOD PRESSURE: 60 MMHG | HEART RATE: 68 BPM | BODY MASS INDEX: 27.32 KG/M2 | WEIGHT: 170 LBS | SYSTOLIC BLOOD PRESSURE: 142 MMHG | HEIGHT: 66 IN

## 2024-06-21 DIAGNOSIS — I73.9 PERIPHERAL ARTERIAL DISEASE (HCC): Primary | ICD-10-CM

## 2024-06-21 DIAGNOSIS — E11.65 TYPE 2 DIABETES MELLITUS WITH HYPERGLYCEMIA, WITHOUT LONG-TERM CURRENT USE OF INSULIN (HCC): ICD-10-CM

## 2024-06-21 DIAGNOSIS — I10 BENIGN ESSENTIAL HYPERTENSION: ICD-10-CM

## 2024-06-21 DIAGNOSIS — I50.42 CHRONIC COMBINED SYSTOLIC AND DIASTOLIC HEART FAILURE, NYHA CLASS 2 (HCC): ICD-10-CM

## 2024-06-21 PROCEDURE — 99213 OFFICE O/P EST LOW 20 MIN: CPT | Performed by: PHYSICIAN ASSISTANT

## 2024-06-21 NOTE — ASSESSMENT & PLAN NOTE
-Baseline ambulatory dysfunction and limitations; uses tall rollator  -No foot/leg pain or claudication at level of activity  -No new leg symptoms     -DENVER 4/1/24 - no significant change    R 0.58/95/58; known occlusion fem-pop bypass. Known occlusion prox SFA with recon dSFA.    L 0.61/79/53; occlusion SFA stent with collats and recon dSFA. KATINA / DP artery occluded.     -DENVER 3/21/23    R 0.67/151/87; known occlusion fem-pop bypass graft; known occlusion prox-mid mSFA with recon distal SFA    L 0.68/114/87; occlusion of SFA stent with collats and recon of the distal SFA     Plan:  -Known severe PAD with bilateral SFA occlusions  -DENVER re-demonstrates bilateral SFA occlusions with reduced ALEKSANDR and borderline for healing metatarsal / toe pressures.   -We discussed pathophysiology and treatment of PAD and indications for surgical/endovascular interventions  -We discussed the benefits of regular walking  -Maintain good blood pressure, cholesterol and glucose control  -Continue aspirin 81 and atorvastatin 20  -Avoid foot wounds; wear good footwear, check feet daily and call office for any major changes  -Follow-up blood extremity arterial duplex study in 1 year with office visit, or sooner if needed

## 2024-06-21 NOTE — PATIENT INSTRUCTIONS
PAD  -We discussed the benefits of regular walking  -Maintain good blood pressure, cholesterol and glucose control  -Continue aspirin 81 and atorvastatin 20  -Avoid foot wounds; wear good footwear, check feet daily and call office for any major changes  -Follow-up blood extremity arterial duplex study in 1 year with office visit, or sooner if needed

## 2024-07-13 PROBLEM — Z00.00 MEDICARE ANNUAL WELLNESS VISIT, SUBSEQUENT: Status: RESOLVED | Noted: 2024-06-13 | Resolved: 2024-07-13

## 2024-07-18 ENCOUNTER — OFFICE VISIT (OUTPATIENT)
Age: 79
End: 2024-07-18
Payer: MEDICARE

## 2024-07-18 VITALS
HEART RATE: 69 BPM | HEIGHT: 66 IN | DIASTOLIC BLOOD PRESSURE: 67 MMHG | RESPIRATION RATE: 17 BRPM | SYSTOLIC BLOOD PRESSURE: 133 MMHG | WEIGHT: 169 LBS | BODY MASS INDEX: 27.16 KG/M2

## 2024-07-18 DIAGNOSIS — L97.421 DIABETIC ULCER OF LEFT MIDFOOT ASSOCIATED WITH TYPE 2 DIABETES MELLITUS, LIMITED TO BREAKDOWN OF SKIN (HCC): ICD-10-CM

## 2024-07-18 DIAGNOSIS — E11.42 DIABETIC POLYNEUROPATHY ASSOCIATED WITH TYPE 2 DIABETES MELLITUS (HCC): Primary | ICD-10-CM

## 2024-07-18 DIAGNOSIS — B07.0 PLANTAR WARTS: ICD-10-CM

## 2024-07-18 DIAGNOSIS — M54.16 LUMBAR RADICULOPATHY: ICD-10-CM

## 2024-07-18 DIAGNOSIS — I73.9 PAD (PERIPHERAL ARTERY DISEASE) (HCC): ICD-10-CM

## 2024-07-18 DIAGNOSIS — M79.672 PAIN IN BOTH FEET: ICD-10-CM

## 2024-07-18 DIAGNOSIS — M79.671 PAIN IN BOTH FEET: ICD-10-CM

## 2024-07-18 DIAGNOSIS — M21.962 ACQUIRED DEFORMITY OF LEFT FOOT: ICD-10-CM

## 2024-07-18 DIAGNOSIS — E11.621 DIABETIC ULCER OF LEFT MIDFOOT ASSOCIATED WITH TYPE 2 DIABETES MELLITUS, LIMITED TO BREAKDOWN OF SKIN (HCC): ICD-10-CM

## 2024-07-18 PROCEDURE — 99213 OFFICE O/P EST LOW 20 MIN: CPT | Performed by: PODIATRIST

## 2024-07-18 NOTE — PROGRESS NOTES
Assessment/Plan: Preulcerative callus/Gamino grade 0 ulcer plantar aspect left foot.  Acquired deformity foot.  History of metatarsal resection.  Diabetic foot.  Diabetic neuropathy.  Peripheral artery disease.  Mycosis of nail.     Plan.  Chart reviewed.  Primary care notes reviewed.  Lab work reviewed.  Patient examined.  Diabetic foot exam performed.  Betadine wet-to-dry dressing applied.  Patient watch for signs of infection.  Aftercare instruction given.  Return for follow-up.  We will add topical Aldara cream.  Patient wear accommodative shoes daily.            Diagnoses and all orders for this visit:     Diabetic ulcer of left midfoot associated with type 2 diabetes mellitus, limited to breakdown of skin ([facility]).  Rule out atypical verruca as etiology.     Diabetic polyneuropathy associated with type 2 diabetes mellitus ([facility])     PAD (peripheral artery disease) ([facility])     Onychomycosis     Acquired deformity of left foot     Pain in both feet            Subjective: Patient is doing much better.  He is not bandaging his foot.  He sees no evidence of blood in his socks.  He no history of fever or night sweats.  Patient is diabetic     No Known Allergies        Current Outpatient Medications:     acetaminophen (TYLENOL) 325 mg tablet, Take 2 tablets (650 mg total) by mouth every 6 (six) hours as needed for mild pain, headaches or fever, Disp: , Rfl: 0    Ascorbic Acid (Vitamin C) 500 MG CAPS, Take 500 mg by mouth daily, Disp: , Rfl:     aspirin (ECOTRIN LOW STRENGTH) 81 mg EC tablet, Take 81 mg by mouth daily, Disp: , Rfl:     atorvastatin (LIPITOR) 20 mg tablet, TAKE 1 TABLET EVERY DAY, Disp: 90 tablet, Rfl: 1    brimonidine tartrate 0.2 % ophthalmic solution, Inject 0.2 % into the eye 2 (two) times a day, Disp: , Rfl:     carvedilol (COREG) 12.5 mg tablet, Take 1 tablet (12.5 mg total) by mouth 2 (two) times a day, Disp: 180 tablet, Rfl: 1    Cholecalciferol (Vitamin D3) 50 MCG (2000 UT) TABS, Take 2,000 Units  by mouth daily, Disp: , Rfl:     co-enzyme Q-10 100 mg capsule, Take 100 mg by mouth daily, Disp: , Rfl:     ferrous sulfate 324 (65 Fe) mg, Take 1 tablet (324 mg total) by mouth 2 (two) times a day before meals, Disp: 180 tablet, Rfl: 3    glucose blood (OneTouch Ultra) test strip, Test blood sugar once a day, Disp: 100 strip, Rfl: 3    losartan (COZAAR) 25 mg tablet, Take 1 tablet (25 mg total) by mouth daily, Disp: 90 tablet, Rfl: 1    metFORMIN (GLUCOPHAGE) 1000 MG tablet, TAKE ONE TABLET BY MOUTH TWO TIMES A DAY WITH MEALS, Disp: 180 tablet, Rfl: 1    multivitamin (THERAGRAN) TABS, Take 1 tablet by mouth daily, Disp: , Rfl:     omeprazole (PriLOSEC) 40 MG capsule, Take 1 capsule (40 mg total) by mouth daily as needed (acid reflux), Disp: 90 capsule, Rfl: 1    Probiotic Product (CULTURELLE PROBIOTICS) CHEW, Chew daily, Disp: , Rfl:     sitaGLIPtin (JANUVIA) 25 mg tablet, Take one tablet by mouth once a day, Disp: 90 tablet, Rfl: 3    spironolactone (ALDACTONE) 25 mg tablet, Take 1 tablet (25 mg total) by mouth daily, Disp: 90 tablet, Rfl: 1    tamsulosin (FLOMAX) 0.4 mg, Take 1 capsule (0.4 mg total) by mouth daily with dinner, Disp: 90 capsule, Rfl: 2    TRUEplus Lancets 28G MISC, Test 1x/d, E11.29, Disp: 200 each, Rfl: 3    Infant Foods (Follow-Up) POWD, HANDICAP SERENA, medically recommended, <taxonomy [id number]> due to arthritic/orthopedic condition (#2,#5) (Patient not taking: Reported on 9/19/2022), Disp: 99 g, Rfl: 1           Patient Active Problem List   Diagnosis    Coronary artery disease involving native coronary artery of native heart with angina pectoris (HCC)    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 mellitus     Diabetic neuropathy (HCC)    GE reflux    Lumbar radiculopathy    Type 2 diabetes mellitus with diabetic neuropathy (HCC)    Prostate cancer screening    Dyslipidemia    Medicare annual wellness visit, subsequent    Type 2 diabetes mellitus with hyperglycemia, without long-term current use  of insulin (HCC)    Benign essential hypertension    Bilateral leg weakness    Prostate cancer (HCC)    Insomnia, persistent    Hyponatremia    Orthostatic hypotension    Chronic right-sided low back pain without sciatica    Hypokalemia    Acute on chronic combined systolic and diastolic congestive heart failure (HCC)    Heart failure, left, 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)    Chronic combined systolic and diastolic heart failure, NYHA class 2 (HCC)    Edema of both feet    Diarrhea    Gait disorder    Acute pain of left thigh    Peripheral arterial disease (HCC)    Iron deficiency anemia    Uses roller walker             Patient ID: Pernell Covarrubias is a 78 y.o. male.     HPI     The following portions of the patient's history were reviewed and updated as appropriate:      family history includes Aortic aneurysm in his mother; Heart attack in his father and sister; Heart disease in his father, paternal grandfather, and sister; Throat cancer in his maternal grandfather.       reports that he has never smoked. He has never been exposed to tobacco smoke. He has never used smokeless tobacco. He reports that he does not currently use alcohol. He reports that he does not use drugs.        Objective:  Patient's shoes and socks removed.     Foot ExamPhysical Exam    Cardiovascular:      Pulses: Pulses are weak.               Physical Exam  Left Foot: Appearance: a deformity (ball of foot and distal fifth metatarsal ). Fifth toe deformities include hammer toe. Tenderness: None except the plantar aspect of the foot.   Right Foot: Appearance: a deformity (distal fifth metatarsal ).   Left Ankle: ROM: limited ROM in all planes   Right Ankle: ROM: limited ROM in all planes   Neurological Exam: Light touch was decreased bilaterally. Vibratory sensation was decreased in both first metatarsophalangeal joints. Response to monofilament test was absent bilaterally. Deep tendon reflexes: achilles reflex 1/4 bilaterally.   Vascular Exam:  performed Dorsalis pedis pulses were 1/4 bilaterally. Posterior tibial pulses were 1/4 bilaterally. Elevation Pallor: diminished bilaterally. Capillary refill time was Q. 9, findings bilateral. Negative digital hair noted, positive abnormal cooling. All pre-ulcer, lesions debrided. Today, but between 1-3 seconds bilaterally. Edema: mild bilaterally and All mycotic nails debrided. Today. The patient was given orthotics to help control his feet and at as cushioning and padding on the bottom of his feet q9 findings.   Toenails: All of the toenails were elongated, hypertrophied, discolored, ingrown, shown to have subungual debris and tender.      Socks and shoes removed, the Right Foot: the foot was dry.   The sensory exam showed diminished vibratory sensation at the level of the toes. Diminished tactile sensation with monofilament testing throughout the right foot.   Socks and shoes removed, Left Foot: the foot was dry.   The sensory exam showed diminished vibratory sensation at the level of the toes. Diminished tactile sensation with monofilament testing throughout the left foot.   Pulses:   1+ in the posterior tibialis on the right   1+ in the dorsalis pedis on the right. Capillary refills findings on the left were delayed in the toes.   Pulses:   1+ in the posterior tibialis on the left   1+ in the dorsalis pedis on the left.   Assign Risk Category: 2: Loss of protective sensation with or without weakness, deformity, callus, pre-ulcer, or history of ulceration. High risk.   Hyperkeratosis: present on both first sub metatarsals, present on both fifth sub metatarsals and Pre-ulcerative lesion, submetatarsal one to 5, bilateral.   Shoe Gear Evaluation: performed (). Recommendation(s): custom inlays.      Patient's shoes and socks removed.Right Foot/Ankle   Right Foot Inspection  Skin Exam: callus and callus               Toe Exam: swelling and erythema  Sensory   Vibration: diminished  Proprioception: diminished       Vascular  Capillary refills: elevated        Left Foot/Ankle  Left Foot Inspection  Skin Exam: callus.  Submetatarsal 5 of the left foot demonstrates 0.5 cm grade Gamino grade 0/preulcerative callus. Cellulitis resolved.              Toe Exam: swelling and erythema                   Sensory   Vibration: diminished  Proprioception: diminished     Vascular  Capillary refills: elevated.     Patient's shoes and socks removed.         Patient's shoes and socks removed.     Right Foot/Ankle   Right Foot Inspection        Toe Exam: right toe deformity.      Sensory   Vibration: absent  Proprioception: diminished  Monofilament testing: diminished     Vascular  Capillary refills: < 3 seconds        Left Foot/Ankle  Left Foot Inspection        Toe Exam: left toe deformity.      Sensory   Vibration: absent  Proprioception: diminished  Monofilament testing: diminished     Vascular  Capillary refills: < 3 seconds        Assign Risk Category  Deformity present  Loss of protective sensation  Weak pulses  Risk: 2

## 2024-08-02 ENCOUNTER — APPOINTMENT (OUTPATIENT)
Dept: LAB | Facility: CLINIC | Age: 79
End: 2024-08-02
Payer: MEDICARE

## 2024-08-02 DIAGNOSIS — E11.65 TYPE 2 DIABETES MELLITUS WITH HYPERGLYCEMIA, WITHOUT LONG-TERM CURRENT USE OF INSULIN (HCC): ICD-10-CM

## 2024-08-02 DIAGNOSIS — E78.5 DYSLIPIDEMIA: ICD-10-CM

## 2024-08-02 LAB
ALBUMIN SERPL BCG-MCNC: 3.9 G/DL (ref 3.5–5)
ALP SERPL-CCNC: 84 U/L (ref 34–104)
ALT SERPL W P-5'-P-CCNC: 15 U/L (ref 7–52)
ANION GAP SERPL CALCULATED.3IONS-SCNC: 8 MMOL/L (ref 4–13)
AST SERPL W P-5'-P-CCNC: 15 U/L (ref 13–39)
BILIRUB SERPL-MCNC: 0.74 MG/DL (ref 0.2–1)
BUN SERPL-MCNC: 16 MG/DL (ref 5–25)
CALCIUM SERPL-MCNC: 9.3 MG/DL (ref 8.4–10.2)
CHLORIDE SERPL-SCNC: 98 MMOL/L (ref 96–108)
CHOLEST SERPL-MCNC: 86 MG/DL
CO2 SERPL-SCNC: 24 MMOL/L (ref 21–32)
CREAT SERPL-MCNC: 1.11 MG/DL (ref 0.6–1.3)
EST. AVERAGE GLUCOSE BLD GHB EST-MCNC: 171 MG/DL
GFR SERPL CREATININE-BSD FRML MDRD: 62 ML/MIN/1.73SQ M
GLUCOSE P FAST SERPL-MCNC: 147 MG/DL (ref 65–99)
HBA1C MFR BLD: 7.6 %
HDLC SERPL-MCNC: 38 MG/DL
LDLC SERPL CALC-MCNC: 35 MG/DL (ref 0–100)
NONHDLC SERPL-MCNC: 48 MG/DL
POTASSIUM SERPL-SCNC: 5.2 MMOL/L (ref 3.5–5.3)
PROT SERPL-MCNC: 6.7 G/DL (ref 6.4–8.4)
SODIUM SERPL-SCNC: 130 MMOL/L (ref 135–147)
TRIGL SERPL-MCNC: 65 MG/DL
TSH SERPL DL<=0.05 MIU/L-ACNC: 3.01 UIU/ML (ref 0.45–4.5)

## 2024-08-02 PROCEDURE — 84443 ASSAY THYROID STIM HORMONE: CPT

## 2024-08-02 PROCEDURE — 80053 COMPREHEN METABOLIC PANEL: CPT

## 2024-08-02 PROCEDURE — 36415 COLL VENOUS BLD VENIPUNCTURE: CPT

## 2024-08-02 PROCEDURE — 80061 LIPID PANEL: CPT

## 2024-08-02 PROCEDURE — 83036 HEMOGLOBIN GLYCOSYLATED A1C: CPT

## 2024-08-05 ENCOUNTER — OFFICE VISIT (OUTPATIENT)
Dept: ENDOCRINOLOGY | Facility: CLINIC | Age: 79
End: 2024-08-05
Payer: MEDICARE

## 2024-08-05 VITALS
HEIGHT: 66 IN | OXYGEN SATURATION: 100 % | SYSTOLIC BLOOD PRESSURE: 121 MMHG | DIASTOLIC BLOOD PRESSURE: 59 MMHG | HEART RATE: 68 BPM | WEIGHT: 170.6 LBS | BODY MASS INDEX: 27.42 KG/M2

## 2024-08-05 DIAGNOSIS — I10 BENIGN ESSENTIAL HYPERTENSION: ICD-10-CM

## 2024-08-05 DIAGNOSIS — N18.2 TYPE 2 DIABETES MELLITUS WITH STAGE 2 CHRONIC KIDNEY DISEASE, WITHOUT LONG-TERM CURRENT USE OF INSULIN  (HCC): ICD-10-CM

## 2024-08-05 DIAGNOSIS — E87.1 HYPONATREMIA: ICD-10-CM

## 2024-08-05 DIAGNOSIS — E11.65 TYPE 2 DIABETES MELLITUS WITH HYPERGLYCEMIA, WITHOUT LONG-TERM CURRENT USE OF INSULIN (HCC): Primary | ICD-10-CM

## 2024-08-05 DIAGNOSIS — E11.42 DIABETIC POLYNEUROPATHY ASSOCIATED WITH TYPE 2 DIABETES MELLITUS (HCC): ICD-10-CM

## 2024-08-05 DIAGNOSIS — E78.5 DYSLIPIDEMIA: ICD-10-CM

## 2024-08-05 DIAGNOSIS — E11.22 TYPE 2 DIABETES MELLITUS WITH STAGE 2 CHRONIC KIDNEY DISEASE, WITHOUT LONG-TERM CURRENT USE OF INSULIN  (HCC): ICD-10-CM

## 2024-08-05 DIAGNOSIS — E55.9 VITAMIN D DEFICIENCY: ICD-10-CM

## 2024-08-05 PROCEDURE — 99214 OFFICE O/P EST MOD 30 MIN: CPT | Performed by: NURSE PRACTITIONER

## 2024-08-05 NOTE — PROGRESS NOTES
Established Patient Progress Note    Chief Complaint:  Diabetes follow up visit    Impression & Plan:    Problem List Items Addressed This Visit          Cardiovascular and Mediastinum    Benign essential hypertension     Blood pressure at goal on current regimen.  Continues on ARB.   Follows with Cardiology.             Endocrine    Diabetic neuropathy (HCC)    Type 2 diabetes mellitus with hyperglycemia, without long-term current use of insulin ([facility]) - Primary       Lab Results   Component Value Date    HGBA1C 7.6 (H) 08/02/2024     HGA1C risen but reasonable for age.     BGL Reviewed: Patient to send in blood sugars for review when returns patient assistance application.     Treatment regimen: Continue metformin for now, will send in patient assistance application for Januvia.     Discussed risks/complications associated with uncontrolled diabetes including organ involvement, heart attack, stroke, death.    Advised lifestyle modifications including attention to diet including the amount and types of carbohydrates consumed and regular activity.     Call for blood sugars less than 70 mg/dl or patterns over 200 mg/dl.     Monitor blood glucose levels at least 1 times a day    Discussed symptoms and treatment of hypoglycemia.  Reviewed risks associated with hypoglycemia. Always carry rapid acting carbohydrates and a glucometer (a way to check your blood sugar).    Recommendation for medical identification either bracelet, necklace.    Routine follow up for diabetic eye and foot exams.     Ordered blood work to complete prior to next visit.    Send glucose logs/CGM download in 1-2 weeks for review    Follow up in 3 months.            Type 2 diabetes mellitus with stage 2 chronic kidney disease, without long-term current use of insulin  ([facility])     Follows with nephrology.               Other    Dyslipidemia     Continues on statin.          Hyponatremia     Asymptomatic.  Recommend repeat BMP.  Follows with  nephrology.          Relevant Orders    Basic metabolic panel    Vitamin D deficiency     Continues vitamin D3 supplementation.             History of Present Illness:   Pernell Covarrubias is a 78 y.o. male  with a history of type 2 diabetes without long term use of insulin. Reports complications of neuropathy follows with Dr. Lancaster. UTD diabetic eye exam follows with Dr Gardner.     Denies recent illness or hospitalizations. Denies recent severe hypoglycemic or severe hyperglycemic episodes.     Denies side effects of treatment at this time.     Off of Januvia 25 mg daily for the past week running blood sugars around 160 mg/dL waking. Will apply for patient assistance as this medication has been cost-prohibitive.      Home glucose monitoring: are performed regularly first morning and fasting, no levels for review today.     Current regimen:   Metformin 1000 XR mg twice daily      Has hypertension: Taking losartan, compliant  Has hyperlipidemia: Taking atorvastatin, tolerating       Patient Active Problem List   Diagnosis    Coronary artery disease involving native coronary artery of native heart without angina pectoris    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 mellitus  (HCC)    Diabetic neuropathy (HCC)    GE reflux    Lumbar radiculopathy    Type 2 diabetes mellitus with diabetic neuropathy (HCC)    Dyslipidemia    Type 2 diabetes mellitus with hyperglycemia, without long-term current use of insulin (HCC)    Benign essential hypertension    Bilateral leg weakness    Prostate cancer (HCC)    Insomnia, persistent    Hyponatremia    Orthostatic hypotension    Chronic right-sided low back pain without sciatica    Acute on chronic combined systolic and diastolic congestive heart failure (HCC)    Heart failure, left, 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)    Chronic combined systolic and diastolic heart failure, NYHA class 2 ([facility])    Edema of both feet    Gait disorder    Peripheral arterial disease (HCC)    Iron deficiency anemia     Uses roller walker    Dry skin dermatitis    Type 2 diabetes mellitus with stage 2 chronic kidney disease, without long-term current use of insulin  ([facility])    Vitamin D deficiency    Other fatigue    Anemia      Past Medical History:   Diagnosis Date    Acquired hallux valgus     last assessed: 8/1/2013    Allergic rhinitis     Anemia 10/26/2010    Atrophy of nail     last assessed: 7/7/2015    Cancer ([facility]) 2020    Chronic kidney disease     chronic renal insufficiency    Coronary artery disease     Deformity of ankle and foot, acquired     last assessed: 2/22/2016    Diabetes mellitus ([facility]) 1994    Difficulty walking     last assessed: 12/14/2015    Dysesthesia     last assessed: 11/25/2016    Hammer toe     unspecified laterality; last assessed: 8/1/2013    Hypertension     Neuropathy in diabetes ([facility]) 1994    Onychomycosis of toenail     last assessed: 2/22/2016    Peripheral vascular disease ([facility])     left SFA stent in 2010    Pes planus     unspecified laterality; last assessed: 8/1/2013    Pneumonia     last assessed: 2/27/2016    Prostate cancer ([facility])     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     last assessed: 8/15/2016    Type 2 diabetes mellitus ([facility]) 07/26/2010    Viral warts     last assessed: 7/24/2015      Past Surgical History:   Procedure Laterality Date    CARDIAC CATHETERIZATION  07/29/2010    CATARACT EXTRACTION, BILATERAL Bilateral     R 1999, L 2008    COLONOSCOPY  2011    FEMORAL ARTERY - POPLITEAL ARTERY BYPASS GRAFT  09/23/2013    FOOT SURGERY      bone spur removed    LEG SURGERY Bilateral     repair; L 8/20/2010 and 7/26/2012    IA PLMT INTERSTITIAL DEV RADIAT TX PROSTATE 1/MULT N/A 6/22/2021    Procedure: INSERTION OF FIDUCIAL MARKER (TRANSRECTAL ULTRASOUND GUIDANCE), SPACEOAR;  Surgeon: Jero Loomis MD;  Location: [facility];  Service: Urology    ROTATOR CUFF REPAIR Left 2009    SHOULDER SURGERY Right 2005    collar bone      Family History   Problem Relation Age of Onset     Aortic aneurysm Mother     Heart disease Father     Heart attack Father         acute myocardial infarction    Heart disease Sister     Heart attack Sister         acute myocardial infarction    Throat cancer Maternal Grandfather     Heart disease Paternal Grandfather     No Known Problems Son      Social History     Tobacco Use    Smoking status: Never     Passive exposure: Past    Smokeless tobacco: Never   Substance Use Topics    Alcohol use: Yes     Alcohol/week: 1.0 standard drink of alcohol     Types: 1 Glasses of wine per week     Comment: Stopped in 1986     No Known Allergies      Current Outpatient Medications:     acetaminophen (TYLENOL) 325 mg tablet, Take 2 tablets (650 mg total) by mouth every 6 (six) hours as needed for mild pain, headaches or fever, Disp: , Rfl: 0    ammonium lactate (LAC-HYDRIN) 12 % lotion, Apply topically 2 (two) times a day, Disp: 500 g, Rfl: 3    Ascorbic Acid (Vitamin C) 500 MG CAPS, Take 500 mg by mouth daily, Disp: , Rfl:     aspirin (ECOTRIN LOW STRENGTH) 81 mg EC tablet, Take 81 mg by mouth daily, Disp: , Rfl:     atorvastatin (LIPITOR) 20 mg tablet, TAKE 1 TABLET EVERY DAY, Disp: 90 tablet, Rfl: 3    brimonidine tartrate 0.2 % ophthalmic solution, Inject 0.2 % into the eye 2 (two) times a day, Disp: , Rfl:     carvedilol (COREG) 12.5 mg tablet, Take 1 tablet (12.5 mg total) by mouth 2 (two) times a day, Disp: 180 tablet, Rfl: 1    Cholecalciferol (Vitamin D3) 50 MCG (2000 UT) TABS, Take 2,000 Units by mouth daily, Disp: , Rfl:     co-enzyme Q-10 100 mg capsule, Take 100 mg by mouth daily, Disp: , Rfl:     imiquimod (ALDARA) 5 % cream, APPLY 1 PACKET TOPICALLY 3 (THREE) TIMES A WEEK, Disp: 12 packet, Rfl: 11    losartan (COZAAR) 25 mg tablet, TAKE 1 TABLET (25 MG TOTAL) BY MOUTH DAILY, Disp: 90 tablet, Rfl: 3    metFORMIN (GLUCOPHAGE) 1000 MG tablet, TAKE ONE TABLET BY MOUTH TWICE A DAY WITH MEALS, Disp: 180 tablet, Rfl: 1    Mirabegron ER 25 MG TB24, Take 25 mg by mouth  daily at bedtime, Disp: 90 tablet, Rfl: 3    multivitamin (THERAGRAN) TABS, Take 1 tablet by mouth daily, Disp: , Rfl:     omeprazole (PriLOSEC) 40 MG capsule, Take 1 capsule (40 mg total) by mouth daily as needed (acid reflux), Disp: 90 capsule, Rfl: 1    spironolactone (ALDACTONE) 25 mg tablet, Take 1 tablet (25 mg total) by mouth daily, Disp: 90 tablet, Rfl: 1    tacrolimus (PROTOPIC) 0.1 % ointment, Apply topically daily Apply daily for maintenance, Disp: 100 g, Rfl: 3    tamsulosin (FLOMAX) 0.4 mg, Take 1 capsule (0.4 mg total) by mouth daily with dinner, Disp: 90 capsule, Rfl: 1    triamcinolone (KENALOG) 0.1 % ointment, Apply topically 2 (two) times a day Apply to body twice a day then switch to Protopic Ointment and DO NOT USE ON FACE, GROIN, ARMPITS, Disp: 453.6 g, Rfl: 1    ferrous sulfate 324 (65 Fe) mg, Take 1 tablet (324 mg total) by mouth 2 (two) times a day before meals (Patient not taking: Reported on 6/21/2024), Disp: 180 tablet, Rfl: 3    glucose blood (OneTouch Ultra) test strip, Test blood sugar once a day, Disp: 100 strip, Rfl: 3    Infant Foods (Follow-Up) POWD, HANDICAP PLACARD, medically recommended, <taxonomy [id number]> due to arthritic/orthopedic condition (#2,#5) (Patient not taking: Reported on 9/19/2022), Disp: 99 g, Rfl: 1    metFORMIN (GLUCOPHAGE-XR) 500 mg 24 hr tablet, Take 2 tablets (1,000 mg total) by mouth 2 (two) times a day with meals (Patient not taking: Reported on 6/21/2024), Disp: 360 tablet, Rfl: 2    sitaGLIPtin (JANUVIA) 25 mg tablet, Take one tablet by mouth once a day (Patient not taking: Reported on 8/5/2024), Disp: 90 tablet, Rfl: 3    TRUEplus Lancets 28G MISC, Test 1x/d, E11.29, Disp: 200 each, Rfl: 3    Review of Systems   Constitutional: Negative   HENT: Negative  Eyes: Baseline.   Respiratory: Negative for cough and shortness of breath.    Cardiovascular: Negative for chest pain and palpitations.   Gastrointestinal: Negative for abdominal distention, abdominal  "pain, constipation, diarrhea and vomiting.   Endocrine: Negative for cold intolerance, heat intolerance, polydipsia and polyuria.   Musculoskeletal: Baseline for arthralgias and back pain. Walks with walker.   Skin: Negative for color change and rash.   Neurological: Negative for weakness or tremors.   All other systems reviewed and are negative.      Physical Exam:  Body mass index is 27.96 kg/m².  /59 (BP Location: Right arm, Patient Position: Sitting, Cuff Size: Large)   Pulse 68   Ht 5' 5.5\" (1.664 m)   Wt 77.4 kg (170 lb 9.6 oz)   SpO2 100%   BMI 27.96 kg/m²    Wt Readings from Last 3 Encounters:   08/05/24 77.4 kg (170 lb 9.6 oz)   07/18/24 76.7 kg (169 lb)   06/21/24 77.1 kg (170 lb)        Physical Exam  Vitals reviewed.   Constitutional:       Appearance: Normal appearance.   Cardiovascular:      Rate and Rhythm: Normal rate and regular rhythm.      Pulses: Normal pulses.      Heart sounds: Normal heart sounds.   Pulmonary:      Effort: Pulmonary effort is normal.      Breath sounds: Normal breath sounds.   Skin:     General: Skin is warm and dry.      Capillary Refill: Capillary refill takes less than 2 seconds.   Neurological:      General: No focal deficit present.      Mental Status: He is alert and oriented to person, place, and time.   Psychiatric:         Mood and Affect: Mood normal.         Behavior: Behavior normal.       Labs:   Lab Results   Component Value Date    HGBA1C 7.6 (H) 08/02/2024    HGBA1C 7.2 (H) 02/20/2024    HGBA1C 6.7 (A) 01/17/2024     Lab Results   Component Value Date    CREATININE 1.11 08/02/2024    CREATININE 1.06 04/24/2024    CREATININE 1.20 02/20/2024    BUN 16 08/02/2024     04/17/2014    K 5.2 08/02/2024    CL 98 08/02/2024    CO2 24 08/02/2024     eGFR   Date Value Ref Range Status   08/02/2024 62 ml/min/1.73sq m Final     Lab Results   Component Value Date    CHOL 106 07/19/2014    HDL 38 (L) 08/02/2024    TRIG 65 08/02/2024     Lab Results   Component " Value Date    ALT 15 08/02/2024    AST 15 08/02/2024    ALKPHOS 84 08/02/2024     Lab Results   Component Value Date    NIS6QVIZUXIN 3.014 08/02/2024    VNC6DVFBZXBZ 2.817 02/18/2022    DCE5BMDNFQUN 1.640 12/27/2021     Lab Results   Component Value Date    FREET4 1.08 12/27/2021       Discussed with the patient and all questioned fully answered. He will call me if any problems arise.    Follow-up appointment in 3 months.     Counseled patient on diagnostic results, prognosis, risk and benefit of treatment options, instruction for management, importance of treatment compliance, Risk  factor reduction and impressions    NEO Arroyo

## 2024-08-05 NOTE — ASSESSMENT & PLAN NOTE
Lab Results   Component Value Date    HGBA1C 7.6 (H) 08/02/2024     HGA1C risen but reasonable for age.     BGL Reviewed: Patient to send in blood sugars for review when returns patient assistance application.     Treatment regimen: Continue metformin for now, will send in patient assistance application for Januvia.     Discussed risks/complications associated with uncontrolled diabetes including organ involvement, heart attack, stroke, death.    Advised lifestyle modifications including attention to diet including the amount and types of carbohydrates consumed and regular activity.     Call for blood sugars less than 70 mg/dl or patterns over 200 mg/dl.     Monitor blood glucose levels at least 1 times a day    Discussed symptoms and treatment of hypoglycemia.  Reviewed risks associated with hypoglycemia. Always carry rapid acting carbohydrates and a glucometer (a way to check your blood sugar).    Recommendation for medical identification either bracelet, necklace.    Routine follow up for diabetic eye and foot exams.     Ordered blood work to complete prior to next visit.    Send glucose logs/CGM download in 1-2 weeks for review    Follow up in 3 months.

## 2024-08-06 ENCOUNTER — APPOINTMENT (OUTPATIENT)
Dept: LAB | Facility: CLINIC | Age: 79
End: 2024-08-06
Payer: MEDICARE

## 2024-08-06 DIAGNOSIS — E87.1 HYPONATREMIA: ICD-10-CM

## 2024-08-06 LAB
ANION GAP SERPL CALCULATED.3IONS-SCNC: 9 MMOL/L (ref 4–13)
BUN SERPL-MCNC: 19 MG/DL (ref 5–25)
CALCIUM SERPL-MCNC: 9.1 MG/DL (ref 8.4–10.2)
CHLORIDE SERPL-SCNC: 99 MMOL/L (ref 96–108)
CO2 SERPL-SCNC: 22 MMOL/L (ref 21–32)
CREAT SERPL-MCNC: 1.2 MG/DL (ref 0.6–1.3)
GFR SERPL CREATININE-BSD FRML MDRD: 57 ML/MIN/1.73SQ M
GLUCOSE P FAST SERPL-MCNC: 139 MG/DL (ref 65–99)
POTASSIUM SERPL-SCNC: 5.2 MMOL/L (ref 3.5–5.3)
SODIUM SERPL-SCNC: 130 MMOL/L (ref 135–147)

## 2024-08-06 PROCEDURE — 36415 COLL VENOUS BLD VENIPUNCTURE: CPT

## 2024-08-06 PROCEDURE — 80048 BASIC METABOLIC PNL TOTAL CA: CPT

## 2024-08-07 ENCOUNTER — TELEPHONE (OUTPATIENT)
Dept: ENDOCRINOLOGY | Facility: CLINIC | Age: 79
End: 2024-08-07

## 2024-08-07 DIAGNOSIS — K21.9 GASTROESOPHAGEAL REFLUX DISEASE, UNSPECIFIED WHETHER ESOPHAGITIS PRESENT: ICD-10-CM

## 2024-08-07 DIAGNOSIS — I50.42 CHRONIC COMBINED SYSTOLIC AND DIASTOLIC HEART FAILURE, NYHA CLASS 2 (HCC): Primary | ICD-10-CM

## 2024-08-07 RX ORDER — FUROSEMIDE 20 MG/1
20 TABLET ORAL 3 TIMES WEEKLY
Qty: 36 TABLET | Refills: 3 | Status: SHIPPED | OUTPATIENT
Start: 2024-08-07

## 2024-08-07 RX ORDER — OMEPRAZOLE 40 MG/1
40 CAPSULE, DELAYED RELEASE ORAL DAILY PRN
Qty: 100 CAPSULE | Refills: 1 | Status: SHIPPED | OUTPATIENT
Start: 2024-08-07

## 2024-08-07 NOTE — PROGRESS NOTES
Discussed recent laboratory studies.  Sodium level still remains low at 130.  Patient does state that he still has fairly significant lower extremity swelling.  Recommend Lasix 20 mg 3 times weekly.  Patient has already on a relative fluid restriction of only 30 to 40 ounces a day.  Repeat BMP in 1 to 2 weeks.

## 2024-08-14 ENCOUNTER — TELEPHONE (OUTPATIENT)
Age: 79
End: 2024-08-14

## 2024-08-14 DIAGNOSIS — E11.40 TYPE 2 DIABETES MELLITUS WITH DIABETIC NEUROPATHY, WITHOUT LONG-TERM CURRENT USE OF INSULIN (HCC): ICD-10-CM

## 2024-08-14 RX ORDER — BLOOD SUGAR DIAGNOSTIC
STRIP MISCELLANEOUS
Qty: 100 STRIP | Refills: 1 | Status: SHIPPED | OUTPATIENT
Start: 2024-08-14

## 2024-08-14 NOTE — TELEPHONE ENCOUNTER
Patient called stating he was given a form for the [facility] to fill out to help get his januvia cheaper.     Patient is calling stating there are things on the form he is not comfortable filling out.     He is asking if instead there is an alternative that can be sent in for the Januvia instead.     Please advise.

## 2024-08-14 NOTE — TELEPHONE ENCOUNTER
glucose blood (OneTouch Ultra) test strip    Dispense: 100 strip  Refills: 3 ordered    [employer]

## 2024-08-14 NOTE — TELEPHONE ENCOUNTER
I called the patient but was unable to leave a voicemail for the patient because the call keeps dropping. I am going to to send the patient a [employer] message as well. Thank you

## 2024-08-19 NOTE — TELEPHONE ENCOUNTER
Patient called to request a refill for their test strips advised a refill was requested on 8/14/24 and approved. Patient verbalized understanding and is in agreement.

## 2024-08-21 ENCOUNTER — APPOINTMENT (OUTPATIENT)
Dept: LAB | Facility: CLINIC | Age: 79
End: 2024-08-21
Payer: MEDICARE

## 2024-08-21 DIAGNOSIS — I50.42 CHRONIC COMBINED SYSTOLIC AND DIASTOLIC HEART FAILURE, NYHA CLASS 2 (HCC): ICD-10-CM

## 2024-08-21 DIAGNOSIS — D64.9 ANEMIA, UNSPECIFIED TYPE: ICD-10-CM

## 2024-08-21 LAB
ANION GAP SERPL CALCULATED.3IONS-SCNC: 10 MMOL/L (ref 4–13)
BASOPHILS # BLD AUTO: 0.03 THOUSANDS/ÂΜL (ref 0–0.1)
BASOPHILS NFR BLD AUTO: 1 % (ref 0–1)
BUN SERPL-MCNC: 22 MG/DL (ref 5–25)
CALCIUM SERPL-MCNC: 9.3 MG/DL (ref 8.4–10.2)
CHLORIDE SERPL-SCNC: 98 MMOL/L (ref 96–108)
CO2 SERPL-SCNC: 23 MMOL/L (ref 21–32)
CREAT SERPL-MCNC: 1.21 MG/DL (ref 0.6–1.3)
EOSINOPHIL # BLD AUTO: 0.1 THOUSAND/ÂΜL (ref 0–0.61)
EOSINOPHIL NFR BLD AUTO: 2 % (ref 0–6)
ERYTHROCYTE [DISTWIDTH] IN BLOOD BY AUTOMATED COUNT: 14.2 % (ref 11.6–15.1)
GFR SERPL CREATININE-BSD FRML MDRD: 56 ML/MIN/1.73SQ M
GLUCOSE P FAST SERPL-MCNC: 154 MG/DL (ref 65–99)
HCT VFR BLD AUTO: 29.2 % (ref 36.5–49.3)
HGB BLD-MCNC: 9.7 G/DL (ref 12–17)
IMM GRANULOCYTES # BLD AUTO: 0.03 THOUSAND/UL (ref 0–0.2)
IMM GRANULOCYTES NFR BLD AUTO: 1 % (ref 0–2)
LYMPHOCYTES # BLD AUTO: 0.58 THOUSANDS/ÂΜL (ref 0.6–4.47)
LYMPHOCYTES NFR BLD AUTO: 9 % (ref 14–44)
MCH RBC QN AUTO: 30 PG (ref 26.8–34.3)
MCHC RBC AUTO-ENTMCNC: 33.2 G/DL (ref 31.4–37.4)
MCV RBC AUTO: 90 FL (ref 82–98)
MONOCYTES # BLD AUTO: 0.58 THOUSAND/ÂΜL (ref 0.17–1.22)
MONOCYTES NFR BLD AUTO: 9 % (ref 4–12)
NEUTROPHILS # BLD AUTO: 5.14 THOUSANDS/ÂΜL (ref 1.85–7.62)
NEUTS SEG NFR BLD AUTO: 78 % (ref 43–75)
NRBC BLD AUTO-RTO: 0 /100 WBCS
PLATELET # BLD AUTO: 235 THOUSANDS/UL (ref 149–390)
PMV BLD AUTO: 9.4 FL (ref 8.9–12.7)
POTASSIUM SERPL-SCNC: 4.7 MMOL/L (ref 3.5–5.3)
RBC # BLD AUTO: 3.23 MILLION/UL (ref 3.88–5.62)
SODIUM SERPL-SCNC: 131 MMOL/L (ref 135–147)
WBC # BLD AUTO: 6.46 THOUSAND/UL (ref 4.31–10.16)

## 2024-08-21 PROCEDURE — 82668 ASSAY OF ERYTHROPOIETIN: CPT

## 2024-08-21 PROCEDURE — 85025 COMPLETE CBC W/AUTO DIFF WBC: CPT

## 2024-08-21 PROCEDURE — 80048 BASIC METABOLIC PNL TOTAL CA: CPT

## 2024-08-21 PROCEDURE — 36415 COLL VENOUS BLD VENIPUNCTURE: CPT

## 2024-08-22 DIAGNOSIS — N18.9 ANEMIA DUE TO CHRONIC KIDNEY DISEASE, UNSPECIFIED CKD STAGE: ICD-10-CM

## 2024-08-22 DIAGNOSIS — D63.1 ANEMIA DUE TO CHRONIC KIDNEY DISEASE, UNSPECIFIED CKD STAGE: ICD-10-CM

## 2024-08-22 RX ORDER — FERROUS SULFATE 324(65)MG
324 TABLET, DELAYED RELEASE (ENTERIC COATED) ORAL
Qty: 180 TABLET | Refills: 1 | Status: SHIPPED | OUTPATIENT
Start: 2024-08-22

## 2024-08-23 ENCOUNTER — TELEPHONE (OUTPATIENT)
Dept: NEPHROLOGY | Facility: CLINIC | Age: 79
End: 2024-08-23

## 2024-08-23 LAB — EPO SERPL-ACNC: 12.8 MIU/ML (ref 2.6–18.5)

## 2024-08-23 NOTE — TELEPHONE ENCOUNTER
Left a voicemail to patient with the following information: his kidney function and sodium levels are stable.  Advised to please call our office to let us know he received the message and if have any other questions or concerns.

## 2024-08-23 NOTE — TELEPHONE ENCOUNTER
----- Message from Umesh Montejo DO sent at 8/23/2024  8:54 AM EDT -----  Please let him know that his kidney function and sodium levels are stable. Thanks  ----- Message -----  From: Lab, Background User  Sent: 8/21/2024  10:45 PM EDT  To: Umesh Montejo DO

## 2024-08-26 ENCOUNTER — TELEPHONE (OUTPATIENT)
Dept: ENDOCRINOLOGY | Facility: CLINIC | Age: 79
End: 2024-08-26

## 2024-08-26 NOTE — TELEPHONE ENCOUNTER
Patient came in to drop off BG Log along with a Patient Assistance Program application that he will not be filling out because he states that it is personal information and he will not be sending that information out. He would like a different substitute for his prescription ASAP.

## 2024-08-27 ENCOUNTER — OFFICE VISIT (OUTPATIENT)
Dept: UROLOGY | Facility: CLINIC | Age: 79
End: 2024-08-27
Payer: MEDICARE

## 2024-08-27 VITALS
SYSTOLIC BLOOD PRESSURE: 124 MMHG | DIASTOLIC BLOOD PRESSURE: 80 MMHG | HEIGHT: 65 IN | HEART RATE: 99 BPM | WEIGHT: 166 LBS | OXYGEN SATURATION: 100 % | BODY MASS INDEX: 27.66 KG/M2

## 2024-08-27 DIAGNOSIS — C61 PROSTATE CANCER (HCC): Primary | ICD-10-CM

## 2024-08-27 DIAGNOSIS — N18.2 CKD STAGE 2 DUE TO TYPE 2 DIABETES MELLITUS  (HCC): ICD-10-CM

## 2024-08-27 DIAGNOSIS — E11.22 CKD STAGE 2 DUE TO TYPE 2 DIABETES MELLITUS  (HCC): ICD-10-CM

## 2024-08-27 DIAGNOSIS — E11.65 TYPE 2 DIABETES MELLITUS WITH HYPERGLYCEMIA, WITHOUT LONG-TERM CURRENT USE OF INSULIN (HCC): Primary | ICD-10-CM

## 2024-08-27 DIAGNOSIS — E11.40 TYPE 2 DIABETES MELLITUS WITH DIABETIC NEUROPATHY, WITHOUT LONG-TERM CURRENT USE OF INSULIN (HCC): ICD-10-CM

## 2024-08-27 PROCEDURE — 99213 OFFICE O/P EST LOW 20 MIN: CPT | Performed by: PHYSICIAN ASSISTANT

## 2024-08-27 NOTE — PROGRESS NOTES
UROLOGY PROGRESS NOTE   Patient Identifiers: Pernell Covarrubias (MRN [id number])  Date of Service: 8/27/2024    Subjective:   78-year-old man with history of Mikki 10 stage III prostate cancer.  He was treated with radiation and 2 years of ADT.  Testosterone is 65.  PSA in April was<0.01.  Denies any bone pain or weight loss.    Reason for visit: Prostate cancer follow-up    Objective:     VITALS:    There were no vitals filed for this visit.        LABS:  Lab Results   Component Value Date    HGB 9.7 (L) 08/21/2024    HCT 29.2 (L) 08/21/2024    WBC 6.46 08/21/2024     08/21/2024   ]    Lab Results   Component Value Date     04/17/2014    K 4.7 08/21/2024    CL 98 08/21/2024    CO2 23 08/21/2024    BUN 22 08/21/2024    CREATININE 1.21 08/21/2024    CALCIUM 9.3 08/21/2024    GLUCOSE 137 04/17/2014   ]        INPATIENT MEDS:    Current Outpatient Medications:     acetaminophen (TYLENOL) 325 mg tablet, Take 2 tablets (650 mg total) by mouth every 6 (six) hours as needed for mild pain, headaches or fever, Disp: , Rfl: 0    ammonium lactate (LAC-HYDRIN) 12 % lotion, Apply topically 2 (two) times a day, Disp: 500 g, Rfl: 3    Ascorbic Acid (Vitamin C) 500 MG CAPS, Take 500 mg by mouth daily, Disp: , Rfl:     aspirin (ECOTRIN LOW STRENGTH) 81 mg EC tablet, Take 81 mg by mouth daily, Disp: , Rfl:     atorvastatin (LIPITOR) 20 mg tablet, TAKE 1 TABLET EVERY DAY, Disp: 90 tablet, Rfl: 3    brimonidine tartrate 0.2 % ophthalmic solution, Inject 0.2 % into the eye 2 (two) times a day, Disp: , Rfl:     carvedilol (COREG) 12.5 mg tablet, Take 1 tablet (12.5 mg total) by mouth 2 (two) times a day, Disp: 180 tablet, Rfl: 1    Cholecalciferol (Vitamin D3) 50 MCG (2000 UT) TABS, Take 2,000 Units by mouth daily, Disp: , Rfl:     co-enzyme Q-10 100 mg capsule, Take 100 mg by mouth daily, Disp: , Rfl:     ferrous sulfate 324 (65 Fe) mg, TAKE 1 TABLET TWICE DAILY BEFORE MEALS, Disp: 180 tablet, Rfl: 1    furosemide (LASIX)  20 mg tablet, Take 1 tablet (20 mg total) by mouth 3 (three) times a week, Disp: 36 tablet, Rfl: 3    glucose blood (OneTouch Ultra) test strip, Test blood sugar once a day, Disp: 100 strip, Rfl: 1    imiquimod (ALDARA) 5 % cream, APPLY 1 PACKET TOPICALLY 3 (THREE) TIMES A WEEK, Disp: 12 packet, Rfl: 11    Infant Foods (Follow-Up) POWD, HANDICAP PLACARD, medically recommended, <taxonomy 767019111> due to arthritic/orthopedic condition (#2,#5) (Patient not taking: Reported on 9/19/2022), Disp: 99 g, Rfl: 1    losartan (COZAAR) 25 mg tablet, TAKE 1 TABLET (25 MG TOTAL) BY MOUTH DAILY, Disp: 90 tablet, Rfl: 3    metFORMIN (GLUCOPHAGE) 1000 MG tablet, TAKE ONE TABLET BY MOUTH TWICE A DAY WITH MEALS, Disp: 180 tablet, Rfl: 1    metFORMIN (GLUCOPHAGE-XR) 500 mg 24 hr tablet, Take 2 tablets (1,000 mg total) by mouth 2 (two) times a day with meals (Patient not taking: Reported on 6/21/2024), Disp: 360 tablet, Rfl: 2    Mirabegron ER 25 MG TB24, Take 25 mg by mouth daily at bedtime, Disp: 90 tablet, Rfl: 3    multivitamin (THERAGRAN) TABS, Take 1 tablet by mouth daily, Disp: , Rfl:     omeprazole (PriLOSEC) 40 MG capsule, TAKE 1 CAPSULE (40 MG TOTAL) BY MOUTH DAILY AS NEEDED (ACID REFLUX), Disp: 100 capsule, Rfl: 1    sitaGLIPtin (JANUVIA) 25 mg tablet, Take one tablet by mouth once a day (Patient not taking: Reported on 8/5/2024), Disp: 90 tablet, Rfl: 3    spironolactone (ALDACTONE) 25 mg tablet, Take 1 tablet (25 mg total) by mouth daily, Disp: 90 tablet, Rfl: 1    tacrolimus (PROTOPIC) 0.1 % ointment, Apply topically daily Apply daily for maintenance, Disp: 100 g, Rfl: 3    tamsulosin (FLOMAX) 0.4 mg, Take 1 capsule (0.4 mg total) by mouth daily with dinner, Disp: 90 capsule, Rfl: 1    triamcinolone (KENALOG) 0.1 % ointment, Apply topically 2 (two) times a day Apply to body twice a day then switch to Protopic Ointment and DO NOT USE ON FACE, GROIN, ARMPITS, Disp: 453.6 g, Rfl: 1    TRUEplus Lancets 28G MISC, Test 1x/d,  E11.29, Disp: 200 each, Rfl: 3      Physical Exam:   There were no vitals taken for this visit.  GEN: no acute distress    RESP: breathing comfortably with no accessory muscle use    ABD: soft, non-tender, non-distended   INCISION:    EXT: no significant peripheral edema         RADIOLOGY:   none     Assessment:   Prostate cancer     Plan:   -follow up 6 months with PSA prior  -  -  -

## 2024-08-27 NOTE — TELEPHONE ENCOUNTER
Could reach out to insurance regarding coverage for Tradjenta? This medication is in the sample class as Januvia so cost may be very comparable.

## 2024-08-27 NOTE — TELEPHONE ENCOUNTER
Patient called back, he states that he found out the Trajenta 5 mg will be $227 for 90 day supply and the Januvia 25 mg will be $248 for 90 days and he said either one if fine with him. Please call patient to let him know.

## 2024-09-06 ENCOUNTER — TELEPHONE (OUTPATIENT)
Age: 79
End: 2024-09-06

## 2024-09-06 DIAGNOSIS — E11.40 TYPE 2 DIABETES MELLITUS WITH DIABETIC NEUROPATHY, WITHOUT LONG-TERM CURRENT USE OF INSULIN (HCC): ICD-10-CM

## 2024-09-06 RX ORDER — BLOOD SUGAR DIAGNOSTIC
STRIP MISCELLANEOUS
Qty: 100 STRIP | Refills: 1 | Status: SHIPPED | OUTPATIENT
Start: 2024-09-06

## 2024-09-06 NOTE — TELEPHONE ENCOUNTER
Pharmacist states they need a new rx sent over for test strips. States patient was using [facility] but they are not able to transfer the prescription. Please advise, thank you.

## 2024-09-20 ENCOUNTER — OFFICE VISIT (OUTPATIENT)
Age: 79
End: 2024-09-20
Payer: MEDICARE

## 2024-09-20 VITALS
HEIGHT: 65 IN | DIASTOLIC BLOOD PRESSURE: 69 MMHG | BODY MASS INDEX: 27.66 KG/M2 | SYSTOLIC BLOOD PRESSURE: 118 MMHG | HEART RATE: 78 BPM | WEIGHT: 166 LBS | RESPIRATION RATE: 16 BRPM

## 2024-09-20 DIAGNOSIS — M21.962 ACQUIRED DEFORMITY OF LEFT FOOT: ICD-10-CM

## 2024-09-20 DIAGNOSIS — E11.42 DIABETIC POLYNEUROPATHY ASSOCIATED WITH TYPE 2 DIABETES MELLITUS (HCC): Primary | ICD-10-CM

## 2024-09-20 DIAGNOSIS — M79.671 PAIN IN BOTH FEET: ICD-10-CM

## 2024-09-20 DIAGNOSIS — E11.621 DIABETIC ULCER OF LEFT MIDFOOT ASSOCIATED WITH TYPE 2 DIABETES MELLITUS, LIMITED TO BREAKDOWN OF SKIN (HCC): ICD-10-CM

## 2024-09-20 DIAGNOSIS — M79.672 PAIN IN BOTH FEET: ICD-10-CM

## 2024-09-20 DIAGNOSIS — I73.9 PAD (PERIPHERAL ARTERY DISEASE) (HCC): ICD-10-CM

## 2024-09-20 DIAGNOSIS — L97.421 DIABETIC ULCER OF LEFT MIDFOOT ASSOCIATED WITH TYPE 2 DIABETES MELLITUS, LIMITED TO BREAKDOWN OF SKIN (HCC): ICD-10-CM

## 2024-09-20 DIAGNOSIS — B35.1 ONYCHOMYCOSIS: ICD-10-CM

## 2024-09-20 PROCEDURE — 99213 OFFICE O/P EST LOW 20 MIN: CPT | Performed by: PODIATRIST

## 2024-09-20 NOTE — PROGRESS NOTES
Assessment/Plan: Preulcerative callus/Gamino grade 0 ulcer plantar aspect left foot.  Acquired deformity foot.  History of metatarsal resection.  Diabetic foot.  Diabetic neuropathy.  Peripheral artery disease.  Mycosis of nail.     Plan.  Chart reviewed.  Primary care notes reviewed.  Lab work reviewed.  Patient examined.  Diabetic foot exam performed.  Betadine wet-to-dry dressing applied.  Patient watch for signs of infection.  Aftercare instruction given.  Return for follow-up.  We will add topical Aldara cream.  Patient wear accommodative shoes daily.            Diagnoses and all orders for this visit:     Diabetic ulcer of left midfoot associated with type 2 diabetes mellitus, limited to breakdown of skin ([facility]).  Rule out atypical verruca as etiology.     Diabetic polyneuropathy associated with type 2 diabetes mellitus ([facility])     PAD (peripheral artery disease) ([facility])     Onychomycosis     Acquired deformity of left foot     Pain in both feet            Subjective: Patient is doing much better.  He is not bandaging his foot.  He sees no evidence of blood in his socks.  He no history of fever or night sweats.  Patient is diabetic     No Known Allergies        Current Outpatient Medications:     acetaminophen (TYLENOL) 325 mg tablet, Take 2 tablets (650 mg total) by mouth every 6 (six) hours as needed for mild pain, headaches or fever, Disp: , Rfl: 0    Ascorbic Acid (Vitamin C) 500 MG CAPS, Take 500 mg by mouth daily, Disp: , Rfl:     aspirin (ECOTRIN LOW STRENGTH) 81 mg EC tablet, Take 81 mg by mouth daily, Disp: , Rfl:     atorvastatin (LIPITOR) 20 mg tablet, TAKE 1 TABLET EVERY DAY, Disp: 90 tablet, Rfl: 1    brimonidine tartrate 0.2 % ophthalmic solution, Inject 0.2 % into the eye 2 (two) times a day, Disp: , Rfl:     carvedilol (COREG) 12.5 mg tablet, Take 1 tablet (12.5 mg total) by mouth 2 (two) times a day, Disp: 180 tablet, Rfl: 1    Cholecalciferol (Vitamin D3) 50 MCG (2000 UT) TABS, Take 2,000 Units  by mouth daily, Disp: , Rfl:     co-enzyme Q-10 100 mg capsule, Take 100 mg by mouth daily, Disp: , Rfl:     ferrous sulfate 324 (65 Fe) mg, Take 1 tablet (324 mg total) by mouth 2 (two) times a day before meals, Disp: 180 tablet, Rfl: 3    glucose blood (OneTouch Ultra) test strip, Test blood sugar once a day, Disp: 100 strip, Rfl: 3    losartan (COZAAR) 25 mg tablet, Take 1 tablet (25 mg total) by mouth daily, Disp: 90 tablet, Rfl: 1    metFORMIN (GLUCOPHAGE) 1000 MG tablet, TAKE ONE TABLET BY MOUTH TWO TIMES A DAY WITH MEALS, Disp: 180 tablet, Rfl: 1    multivitamin (THERAGRAN) TABS, Take 1 tablet by mouth daily, Disp: , Rfl:     omeprazole (PriLOSEC) 40 MG capsule, Take 1 capsule (40 mg total) by mouth daily as needed (acid reflux), Disp: 90 capsule, Rfl: 1    Probiotic Product (CULTURELLE PROBIOTICS) CHEW, Chew daily, Disp: , Rfl:     sitaGLIPtin (JANUVIA) 25 mg tablet, Take one tablet by mouth once a day, Disp: 90 tablet, Rfl: 3    spironolactone (ALDACTONE) 25 mg tablet, Take 1 tablet (25 mg total) by mouth daily, Disp: 90 tablet, Rfl: 1    tamsulosin (FLOMAX) 0.4 mg, Take 1 capsule (0.4 mg total) by mouth daily with dinner, Disp: 90 capsule, Rfl: 2    TRUEplus Lancets 28G MISC, Test 1x/d, E11.29, Disp: 200 each, Rfl: 3    Infant Foods (Follow-Up) POWD, HANDICAP SERENA, medically recommended, <taxonomy [id number]> due to arthritic/orthopedic condition (#2,#5) (Patient not taking: Reported on 9/19/2022), Disp: 99 g, Rfl: 1           Patient Active Problem List   Diagnosis    Coronary artery disease involving native coronary artery of native heart with angina pectoris (HCC)    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 mellitus     Diabetic neuropathy (HCC)    GE reflux    Lumbar radiculopathy    Type 2 diabetes mellitus with diabetic neuropathy (HCC)    Prostate cancer screening    Dyslipidemia    Medicare annual wellness visit, subsequent    Type 2 diabetes mellitus with hyperglycemia, without long-term current use  of insulin (HCC)    Benign essential hypertension    Bilateral leg weakness    Prostate cancer (HCC)    Insomnia, persistent    Hyponatremia    Orthostatic hypotension    Chronic right-sided low back pain without sciatica    Hypokalemia    Acute on chronic combined systolic and diastolic congestive heart failure (HCC)    Heart failure, left, 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)    Chronic combined systolic and diastolic heart failure, NYHA class 2 (HCC)    Edema of both feet    Diarrhea    Gait disorder    Acute pain of left thigh    Peripheral arterial disease (HCC)    Iron deficiency anemia    Uses roller walker             Patient ID: Pernell Covarrubias is a 78 y.o. male.     HPI     The following portions of the patient's history were reviewed and updated as appropriate:      family history includes Aortic aneurysm in his mother; Heart attack in his father and sister; Heart disease in his father, paternal grandfather, and sister; Throat cancer in his maternal grandfather.       reports that he has never smoked. He has never been exposed to tobacco smoke. He has never used smokeless tobacco. He reports that he does not currently use alcohol. He reports that he does not use drugs.        Objective:  Patient's shoes and socks removed.      Foot ExamPhysical Exam     Cardiovascular:      Pulses: Pulses are weak.               Physical Exam  Left Foot: Appearance: a deformity (ball of foot and distal fifth metatarsal ). Fifth toe deformities include hammer toe. Tenderness: None except the plantar aspect of the foot.   Right Foot: Appearance: a deformity (distal fifth metatarsal ).   Left Ankle: ROM: limited ROM in all planes   Right Ankle: ROM: limited ROM in all planes   Neurological Exam: Light touch was decreased bilaterally. Vibratory sensation was decreased in both first metatarsophalangeal joints. Response to monofilament test was absent bilaterally. Deep tendon reflexes: achilles reflex 1/4 bilaterally.   Vascular Exam:  performed Dorsalis pedis pulses were 1/4 bilaterally. Posterior tibial pulses were 1/4 bilaterally. Elevation Pallor: diminished bilaterally. Capillary refill time was Q. 9, findings bilateral. Negative digital hair noted, positive abnormal cooling. All pre-ulcer, lesions debrided. Today, but between 1-3 seconds bilaterally. Edema: mild bilaterally and All mycotic nails debrided. Today. The patient was given orthotics to help control his feet and at as cushioning and padding on the bottom of his feet q9 findings.   Toenails: All of the toenails were elongated, hypertrophied, discolored, ingrown, shown to have subungual debris and tender.      Socks and shoes removed, the Right Foot: the foot was dry.   The sensory exam showed diminished vibratory sensation at the level of the toes. Diminished tactile sensation with monofilament testing throughout the right foot.   Socks and shoes removed, Left Foot: the foot was dry.   The sensory exam showed diminished vibratory sensation at the level of the toes. Diminished tactile sensation with monofilament testing throughout the left foot.   Pulses:   1+ in the posterior tibialis on the right   1+ in the dorsalis pedis on the right. Capillary refills findings on the left were delayed in the toes.   Pulses:   1+ in the posterior tibialis on the left   1+ in the dorsalis pedis on the left.   Assign Risk Category: 2: Loss of protective sensation with or without weakness, deformity, callus, pre-ulcer, or history of ulceration. High risk.   Hyperkeratosis: present on both first sub metatarsals, present on both fifth sub metatarsals and Pre-ulcerative lesion, submetatarsal one to 5, bilateral.   Shoe Gear Evaluation: performed (). Recommendation(s): custom inlays.      Patient's shoes and socks removed.Right Foot/Ankle   Right Foot Inspection  Skin Exam: callus and callus               Toe Exam: swelling and erythema  Sensory   Vibration: diminished  Proprioception: diminished       Vascular  Capillary refills: elevated        Left Foot/Ankle  Left Foot Inspection  Skin Exam: callus.  Submetatarsal 5 of the left foot demonstrates 0.5 cm grade Gamino grade 0/preulcerative callus. Cellulitis resolved.              Toe Exam: swelling and erythema                   Sensory   Vibration: diminished  Proprioception: diminished     Vascular  Capillary refills: elevated.     Patient's shoes and socks removed.         Patient's shoes and socks removed.     Right Foot/Ankle   Right Foot Inspection        Toe Exam: right toe deformity.      Sensory   Vibration: absent  Proprioception: diminished  Monofilament testing: diminished     Vascular  Capillary refills: < 3 seconds        Left Foot/Ankle  Left Foot Inspection        Toe Exam: left toe deformity.      Sensory   Vibration: absent  Proprioception: diminished  Monofilament testing: diminished     Vascular  Capillary refills: < 3 seconds       Patient's shoes and socks removed.    Right Foot/Ankle   Right Foot Inspection  Skin Exam: dry skin and pre-ulcer.     Toe Exam: right toe deformity.     Sensory   Vibration: diminished  Proprioception: diminished  Monofilament testing: diminished    Vascular  Capillary refills: < 3 seconds  The right DP pulse is 2+. The right PT pulse is 2+.     Left Foot/Ankle  Left Foot Inspection  Skin Exam: dry skin and pre-ulcer.     Toe Exam: left toe deformity.     Sensory   Vibration: diminished  Proprioception: diminished  Monofilament testing: diminished    Vascular  Capillary refills: < 3 seconds  The left DP pulse is 2+. The left PT pulse is 2+.     Assign Risk Category  Deformity present  Loss of protective sensation  Weak pulses  Risk: 2

## 2024-10-03 DIAGNOSIS — C61 PROSTATE CANCER (HCC): ICD-10-CM

## 2024-10-03 RX ORDER — TAMSULOSIN HYDROCHLORIDE 0.4 MG/1
0.4 CAPSULE ORAL
Qty: 90 CAPSULE | Refills: 1 | Status: SHIPPED | OUTPATIENT
Start: 2024-10-03

## 2024-11-08 ENCOUNTER — TELEPHONE (OUTPATIENT)
Dept: NEPHROLOGY | Facility: CLINIC | Age: 79
End: 2024-11-08

## 2024-11-08 NOTE — TELEPHONE ENCOUNTER
Called pt and LVM requesting a call back to schedule follow up appt with Dr Montejo or SAMANTHA for next available.

## 2024-11-13 ENCOUNTER — TELEPHONE (OUTPATIENT)
Age: 79
End: 2024-11-13

## 2024-11-13 ENCOUNTER — HOSPITAL ENCOUNTER (EMERGENCY)
Facility: HOSPITAL | Age: 79
Discharge: HOME | End: 2024-11-13
Attending: EMERGENCY MEDICINE
Payer: MEDICARE

## 2024-11-13 VITALS
SYSTOLIC BLOOD PRESSURE: 127 MMHG | BODY MASS INDEX: 23.05 KG/M2 | HEART RATE: 81 BPM | HEIGHT: 70 IN | TEMPERATURE: 97.4 F | RESPIRATION RATE: 18 BRPM | WEIGHT: 161 LBS | OXYGEN SATURATION: 100 % | DIASTOLIC BLOOD PRESSURE: 60 MMHG

## 2024-11-13 DIAGNOSIS — S81.819A LEG LACERATION: Primary | ICD-10-CM

## 2024-11-13 PROCEDURE — 12004 RPR S/N/AX/GEN/TRK7.6-12.5CM: CPT | Performed by: PHYSICIAN ASSISTANT

## 2024-11-13 PROCEDURE — 99282 EMERGENCY DEPT VISIT SF MDM: CPT

## 2024-11-13 PROCEDURE — 99284 EMERGENCY DEPT VISIT MOD MDM: CPT | Performed by: PHYSICIAN ASSISTANT

## 2024-11-13 RX ORDER — GINSENG 100 MG
1 CAPSULE ORAL 2 TIMES DAILY
Status: DISCONTINUED | OUTPATIENT
Start: 2024-11-13 | End: 2024-11-13 | Stop reason: HOSPADM

## 2024-11-13 RX ADMIN — BACITRACIN ZINC 1 LARGE APPLICATION: 500 OINTMENT TOPICAL at 11:04

## 2024-11-13 NOTE — ED PROVIDER NOTES
Time reflects when diagnosis was documented in both MDM as applicable and the Disposition within this note       Time User Action Codes Description Comment    11/13/2024 11:22 AM Ana Spicer Add [S85.642X] Leg laceration           ED Disposition       ED Disposition   Discharge    Condition   Stable    Date/Time   Wed Nov 13, 2024 11:22 AM    Comment   Pernell Covarrubias discharge to home/self care.                   Assessment & Plan       Medical Decision Making  Somewhat of a difficult closure given patient's serous fluid drainage from edema, thinning skin and skin flap that deepens approximately.  6 sutures were placed medially and laterally with Steri-Strips glued down in certain areas.  Discussed with patient and wife risk of sutures tearing through skin.  Encouraged elevation of the extremity to avoid edematous changes.  Discussed appropriate wound care and will have patient return in 10 to 12 days for suture removal or sooner for any signs of infection patient wife verbalized understanding and agree with the above assessment and plan.    Risk  OTC drugs.             Medications   bacitracin topical ointment 1 large application (1 large application Topical Given 11/13/24 1104)       ED Risk Strat Scores                           SBIRT 22yo+      Flowsheet Row Most Recent Value   Initial Alcohol Screen: US AUDIT-C     1. How often do you have a drink containing alcohol? 0 Filed at: 11/13/2024 1024   2. How many drinks containing alcohol do you have on a typical day you are drinking?  0 Filed at: 11/13/2024 1024   3a. Male UNDER 65: How often do you have five or more drinks on one occasion? 0 Filed at: 11/13/2024 1024   Audit-C Score 0 Filed at: 11/13/2024 1024   TUAN: How many times in the past year have you...    Used an illegal drug or used a prescription medication for non-medical reasons? Never Filed at: 11/13/2024 1024                            History of Present Illness       Chief Complaint    Patient presents with    Laceration     Pt was attempting to get up onto a step stool and hit RLE anterior aspect onto same causing laceration. Ecchymotic skin and lac noted bleeding controlled at present. Pt takes ASA. Unsure of tdap       Past Medical History:   Diagnosis Date    Acquired hallux valgus     last assessed: 8/1/2013    Allergic rhinitis     Anemia 10/26/2010    Atrophy of nail     last assessed: 7/7/2015    Cancer ([facility]) 2020    Chronic kidney disease     chronic renal insufficiency    Coronary artery disease     Deformity of ankle and foot, acquired     last assessed: 2/22/2016    Diabetes mellitus ([facility]) 1994    Difficulty walking     last assessed: 12/14/2015    Dysesthesia     last assessed: 11/25/2016    Hammer toe     unspecified laterality; last assessed: 8/1/2013    Hypertension     Neuropathy in diabetes ([facility]) 1994    Onychomycosis of toenail     last assessed: 2/22/2016    Peripheral vascular disease ([facility])     left SFA stent in 2010    Pes planus     unspecified laterality; last assessed: 8/1/2013    Pneumonia     last assessed: 2/27/2016    Prostate cancer ([facility])     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     last assessed: 8/15/2016    Type 2 diabetes mellitus ([facility]) 07/26/2010    Viral warts     last assessed: 7/24/2015      Past Surgical History:   Procedure Laterality Date    CARDIAC CATHETERIZATION  07/29/2010    CATARACT EXTRACTION, BILATERAL Bilateral     R 1999, L 2008    COLONOSCOPY  2011    FEMORAL ARTERY - POPLITEAL ARTERY BYPASS GRAFT  09/23/2013    FOOT SURGERY      bone spur removed    LEG SURGERY Bilateral     repair; L 8/20/2010 and 7/26/2012    MS PLMT INTERSTITIAL DEV RADIAT TX PROSTATE 1/MULT N/A 6/22/2021    Procedure: INSERTION OF FIDUCIAL MARKER (TRANSRECTAL ULTRASOUND GUIDANCE), SPACEOAR;  Surgeon: Jero Loomis MD;  Location: [facility];  Service: Urology    ROTATOR CUFF REPAIR Left 2009    SHOULDER SURGERY Right 2005    collar bone      Family History    Problem Relation Age of Onset    Aortic aneurysm Mother     Heart disease Father     Heart attack Father         acute myocardial infarction    Heart disease Sister     Heart attack Sister         acute myocardial infarction    Throat cancer Maternal Grandfather     Heart disease Paternal Grandfather     No Known Problems Son       Social History     Tobacco Use    Smoking status: Never     Passive exposure: Past    Smokeless tobacco: Never   Vaping Use    Vaping status: Never Used   Substance Use Topics    Alcohol use: Yes     Alcohol/week: 1.0 standard drink of alcohol     Types: 1 Glasses of wine per week     Comment: Stopped in 1986    Drug use: Never      E-Cigarette/Vaping    E-Cigarette Use Never User       E-Cigarette/Vaping Substances    Nicotine No     THC No     CBD No     Flavoring No     Other No     Unknown No       I have reviewed and agree with the history as documented.     78-year-old male presenting today with a right lower extremity skin tear laceration that occurred just prior to arrival, states that a stepstool fell against his leg causing a large skin tear.  He is up-to-date on tetanus.  Denies any pain, bleeding currently controlled.        Review of Systems   Constitutional: Negative.  Negative for chills, fatigue and fever.   HENT: Negative.  Negative for congestion, postnasal drip, rhinorrhea and sore throat.    Eyes: Negative.    Respiratory: Negative.  Negative for cough, shortness of breath and wheezing.    Cardiovascular: Negative.    Gastrointestinal: Negative.  Negative for abdominal pain, diarrhea, nausea and vomiting.   Endocrine: Negative.    Genitourinary: Negative.    Musculoskeletal: Negative.    Skin:  Positive for color change and wound. Negative for pallor and rash.   Neurological: Negative.    Hematological: Negative.    Psychiatric/Behavioral: Negative.     All other systems reviewed and are negative.          Objective       ED Triage Vitals [11/13/24 1022]  "  Temperature Pulse Blood Pressure Respirations SpO2 Patient Position - Orthostatic VS   (!) 97.4 °F (36.3 °C) 81 127/60 18 100 % Sitting      Temp Source Heart Rate Source BP Location FiO2 (%) Pain Score    Tympanic Monitor Right arm -- 2      Vitals      Date and Time Temp Pulse SpO2 Resp BP Pain Score FACES Pain Rating User   11/13/24 1022 97.4 °F (36.3 °C) 81 100 % 18 127/60 2 -- [facility]            Physical Exam  Vitals and nursing note reviewed.   Constitutional:       Appearance: Normal appearance.   HENT:      Head: Normocephalic and atraumatic.      Right Ear: External ear normal.      Left Ear: External ear normal.      Nose: Nose normal.   Eyes:      Conjunctiva/sclera: Conjunctivae normal.   Cardiovascular:      Rate and Rhythm: Normal rate.      Pulses: Normal pulses.   Pulmonary:      Effort: Pulmonary effort is normal.   Abdominal:      General: There is no distension.   Musculoskeletal:         General: Signs of injury present. No swelling, tenderness or deformity. Normal range of motion.      Cervical back: Normal range of motion.      Right lower leg: Edema present.      Left lower leg: Edema present.   Skin:     General: Skin is warm and dry.      Capillary Refill: Capillary refill takes less than 2 seconds.      Findings: No rash.          Neurological:      General: No focal deficit present.      Mental Status: He is alert and oriented to person, place, and time. Mental status is at baseline.   Psychiatric:         Mood and Affect: Mood normal.         Behavior: Behavior normal.         Thought Content: Thought content normal.         Judgment: Judgment normal.         Results Reviewed       None            No orders to display       Universal Protocol:  procedure performed by consultantConsent: Verbal consent obtained.  Risks and benefits: risks, benefits and alternatives were discussed  Consent given by: patient  Time out: Immediately prior to procedure a \"time out\" was called to verify the correct " patient, procedure, equipment, support staff and site/side marked as required.  Timeout called at: 11/13/2024 11:59 AM.  Patient understanding: patient states understanding of the procedure being performed  Patient consent: the patient's understanding of the procedure matches consent given  Procedure consent: procedure consent matches procedure scheduled  Relevant documents: relevant documents present and verified  Test results: test results available and properly labeled  Site marked: the operative site was marked  Radiology Images displayed and confirmed. If images not available, report reviewed: imaging studies available  Required items: required blood products, implants, devices, and special equipment available  Patient identity confirmed: verbally with patient  Laceration repair    Date/Time: 11/13/2024 11:59 AM    Performed by: Ana Spicer PA-C  Authorized by: Ana Spicer PA-C  Body area: lower extremity  Location details: right lower leg  Laceration length: 8 cm  Foreign bodies: no foreign bodies  Tendon involvement: none  Nerve involvement: none  Anesthesia: local infiltration    Anesthesia:  Local Anesthetic: lidocaine 1% without epinephrine    Sedation:  Patient sedated: no      Wound Dehiscence:  Superficial Wound Dehiscence: simple closure      Procedure Details:  Preparation: Patient was prepped and draped in the usual sterile fashion.  Irrigation solution: saline  Irrigation method: syringe  Amount of cleaning: standard  Debridement: none  Degree of undermining: none  Skin closure: 5-0 nylon, glue and Steri-Strips  Number of sutures: 6  Approximation: close  Approximation difficulty: simple  Dressing: antibiotic ointment, gauze roll and 4x4 sterile gauze  Patient tolerance: patient tolerated the procedure well with no immediate complications          ED Medication and Procedure Management   Prior to Admission Medications   Prescriptions Last Dose Informant Patient Reported? Taking?    Ascorbic Acid (Vitamin C) 500 MG CAPS 11/12/2024 Self Yes Yes   Sig: Take 500 mg by mouth daily   Cholecalciferol (Vitamin D3) 50 MCG (2000 UT) TABS 11/12/2024 Self Yes Yes   Sig: Take 2,000 Units by mouth daily   Infant Foods (Follow-Up) POWD  Self No No   Sig: HANDICAP PLACARD, medically recommended, <taxonomy [id number]> due to arthritic/orthopedic condition (#2,#5)   Patient not taking: Reported on 9/19/2022   Mirabegron ER 25 MG TB24 11/12/2024 Self No Yes   Sig: Take 25 mg by mouth daily at bedtime   TRUEplus Lancets 28G MISC  Self No No   Sig: Test 1x/d, E11.29   acetaminophen (TYLENOL) 325 mg tablet  Self No No   Sig: Take 2 tablets (650 mg total) by mouth every 6 (six) hours as needed for mild pain, headaches or fever   ammonium lactate (LAC-HYDRIN) 12 % lotion  Self No No   Sig: Apply topically 2 (two) times a day   aspirin (ECOTRIN LOW STRENGTH) 81 mg EC tablet 11/12/2024 Self Yes Yes   Sig: Take 81 mg by mouth daily   atorvastatin (LIPITOR) 20 mg tablet 11/12/2024 Self No Yes   Sig: TAKE 1 TABLET EVERY DAY   brimonidine tartrate 0.2 % ophthalmic solution  Self Yes No   Sig: Inject 0.2 % into the eye 2 (two) times a day   carvedilol (COREG) 12.5 mg tablet 11/12/2024 Self No Yes   Sig: Take 1 tablet (12.5 mg total) by mouth 2 (two) times a day   co-enzyme Q-10 100 mg capsule 11/12/2024 Self Yes Yes   Sig: Take 100 mg by mouth daily   ferrous sulfate 324 (65 Fe) mg 11/12/2024 Self No Yes   Sig: TAKE 1 TABLET TWICE DAILY BEFORE MEALS   furosemide (LASIX) 20 mg tablet 11/12/2024 Self No Yes   Sig: Take 1 tablet (20 mg total) by mouth 3 (three) times a week   glucose blood (OneTouch Ultra) test strip   No No   Sig: Test blood sugar once a day   imiquimod (ALDARA) 5 % cream 11/12/2024 Self No Yes   Sig: APPLY 1 PACKET TOPICALLY 3 (THREE) TIMES A WEEK   losartan (COZAAR) 25 mg tablet 11/12/2024 Self No Yes   Sig: TAKE 1 TABLET (25 MG TOTAL) BY MOUTH DAILY   metFORMIN (GLUCOPHAGE) 1000 MG tablet 11/12/2024 Self  No Yes   Sig: TAKE ONE TABLET BY MOUTH TWICE A DAY WITH MEALS   metFORMIN (GLUCOPHAGE-XR) 500 mg 24 hr tablet  Self No No   Sig: Take 2 tablets (1,000 mg total) by mouth 2 (two) times a day with meals   Patient not taking: Reported on 6/21/2024   multivitamin (THERAGRAN) TABS 11/12/2024 Self Yes Yes   Sig: Take 1 tablet by mouth daily   omeprazole (PriLOSEC) 40 MG capsule 11/12/2024 Self No Yes   Sig: TAKE 1 CAPSULE (40 MG TOTAL) BY MOUTH DAILY AS NEEDED (ACID REFLUX)   sitaGLIPtin (JANUVIA) 25 mg tablet 11/12/2024  No Yes   Sig: Take 1 tablet (25 mg total) by mouth daily   spironolactone (ALDACTONE) 25 mg tablet 11/12/2024 Self No Yes   Sig: Take 1 tablet (25 mg total) by mouth daily   tacrolimus (PROTOPIC) 0.1 % ointment 11/12/2024 Self No Yes   Sig: Apply topically daily Apply daily for maintenance   tamsulosin (FLOMAX) 0.4 mg 11/12/2024  No Yes   Sig: Take 1 capsule (0.4 mg total) by mouth daily with dinner   triamcinolone (KENALOG) 0.1 % ointment 11/12/2024 Self No Yes   Sig: Apply topically 2 (two) times a day Apply to body twice a day then switch to Protopic Ointment and DO NOT USE ON FACE, GROIN, ARMPITS      Facility-Administered Medications: None     Discharge Medication List as of 11/13/2024 11:22 AM        CONTINUE these medications which have NOT CHANGED    Details   Ascorbic Acid (Vitamin C) 500 MG CAPS Take 500 mg by mouth daily, Starting Wed 8/4/2021, Historical Med      aspirin (ECOTRIN LOW STRENGTH) 81 mg EC tablet Take 81 mg by mouth daily, Starting Wed 8/4/2021, Historical Med      atorvastatin (LIPITOR) 20 mg tablet TAKE 1 TABLET EVERY DAY, Normal      carvedilol (COREG) 12.5 mg tablet Take 1 tablet (12.5 mg total) by mouth 2 (two) times a day, Starting Wed 6/12/2024, Normal      Cholecalciferol (Vitamin D3) 50 MCG (2000 UT) TABS Take 2,000 Units by mouth daily, Starting Wed 8/4/2021, Historical Med      co-enzyme Q-10 100 mg capsule Take 100 mg by mouth daily, Starting Wed 8/4/2021, Historical  Med      ferrous sulfate 324 (65 Fe) mg TAKE 1 TABLET TWICE DAILY BEFORE MEALS, Starting Thu 8/22/2024, Normal      furosemide (LASIX) 20 mg tablet Take 1 tablet (20 mg total) by mouth 3 (three) times a week, Starting Wed 8/7/2024, Normal      imiquimod (ALDARA) 5 % cream APPLY 1 PACKET TOPICALLY 3 (THREE) TIMES A WEEK, Starting Fri 4/12/2024, Normal      losartan (COZAAR) 25 mg tablet TAKE 1 TABLET (25 MG TOTAL) BY MOUTH DAILY, Starting Fri 4/12/2024, Normal      metFORMIN (GLUCOPHAGE) 1000 MG tablet TAKE ONE TABLET BY MOUTH TWICE A DAY WITH MEALS, Starting Sun 5/5/2024, Normal      Mirabegron ER 25 MG TB24 Take 25 mg by mouth daily at bedtime, Starting Mon 5/20/2024, Normal      multivitamin (THERAGRAN) TABS Take 1 tablet by mouth daily, Historical Med      omeprazole (PriLOSEC) 40 MG capsule TAKE 1 CAPSULE (40 MG TOTAL) BY MOUTH DAILY AS NEEDED (ACID REFLUX), Starting Wed 8/7/2024, Normal      sitaGLIPtin (JANUVIA) 25 mg tablet Take 1 tablet (25 mg total) by mouth daily, Starting Tue 8/27/2024, Normal      spironolactone (ALDACTONE) 25 mg tablet Take 1 tablet (25 mg total) by mouth daily, Starting Wed 6/12/2024, Normal      tacrolimus (PROTOPIC) 0.1 % ointment Apply topically daily Apply daily for maintenance, Starting Fri 3/1/2024, Normal      tamsulosin (FLOMAX) 0.4 mg Take 1 capsule (0.4 mg total) by mouth daily with dinner, Starting Thu 10/3/2024, Normal      triamcinolone (KENALOG) 0.1 % ointment Apply topically 2 (two) times a day Apply to body twice a day then switch to Protopic Ointment and DO NOT USE ON FACE, GROIN, ARMPITS, Starting Fri 3/1/2024, Normal      acetaminophen (TYLENOL) 325 mg tablet Take 2 tablets (650 mg total) by mouth every 6 (six) hours as needed for mild pain, headaches or fever, Starting Thu 2/24/2022, No Print      ammonium lactate (LAC-HYDRIN) 12 % lotion Apply topically 2 (two) times a day, Starting Wed 12/20/2023, Print      brimonidine tartrate 0.2 % ophthalmic solution Inject  0.2 % into the eye 2 (two) times a day, Starting Wed 8/4/2021, Historical Med      glucose blood (OneTouch Ultra) test strip Test blood sugar once a day, Normal      Infant Foods (Follow-Up) POWD HANDICAP PLACARD, medically recommended, <taxonomy [id number]> due to arthritic/orthopedic condition (#2,#5), Print      metFORMIN (GLUCOPHAGE-XR) 500 mg 24 hr tablet Take 2 tablets (1,000 mg total) by mouth 2 (two) times a day with meals, Starting Wed 4/17/2024, Normal      TRUEplus Lancets 28G MISC Test 1x/d, E11.29, Normal           No discharge procedures on file.  ED SEPSIS DOCUMENTATION   Time reflects when diagnosis was documented in both MDM as applicable and the Disposition within this note       Time User Action Codes Description Comment    11/13/2024 11:22 AM Ana Spicer Add [S81.819A] Leg laceration                  Ana Spicer PA-C  11/13/24 2474

## 2024-11-13 NOTE — TELEPHONE ENCOUNTER
Patient's wife called in as he was supposed to have an appointment tomorrow with [facility] but patient just out of the ER. He currently can't walk until he gets his stitches out in 10 days.     She asked me to reschedule patient's appointment but first available she said is too far away. Patient does not want to do a virtual. They are asking if any opening at the end of the month.    I did cancel the appointment for tomorrow as he definitely cannot make it.    Please call patient back to advise.

## 2024-11-13 NOTE — TELEPHONE ENCOUNTER
Called patient and advised him we will place him in first cancellation appointment with in the week. His wife will be dropping a sugar log within the week also.

## 2024-11-21 ENCOUNTER — OFFICE VISIT (OUTPATIENT)
Dept: ENDOCRINOLOGY | Facility: CLINIC | Age: 79
End: 2024-11-21
Payer: MEDICARE

## 2024-11-21 VITALS
SYSTOLIC BLOOD PRESSURE: 130 MMHG | BODY MASS INDEX: 23.85 KG/M2 | HEART RATE: 71 BPM | WEIGHT: 166.6 LBS | HEIGHT: 70 IN | OXYGEN SATURATION: 100 % | DIASTOLIC BLOOD PRESSURE: 62 MMHG

## 2024-11-21 DIAGNOSIS — I10 BENIGN ESSENTIAL HYPERTENSION: ICD-10-CM

## 2024-11-21 DIAGNOSIS — E55.9 VITAMIN D DEFICIENCY: ICD-10-CM

## 2024-11-21 DIAGNOSIS — E11.65 TYPE 2 DIABETES MELLITUS WITH HYPERGLYCEMIA, WITHOUT LONG-TERM CURRENT USE OF INSULIN (HCC): Primary | ICD-10-CM

## 2024-11-21 DIAGNOSIS — E78.5 DYSLIPIDEMIA: ICD-10-CM

## 2024-11-21 LAB — SL AMB POCT HEMOGLOBIN AIC: 6.6 (ref ?–6.5)

## 2024-11-21 PROCEDURE — 83036 HEMOGLOBIN GLYCOSYLATED A1C: CPT | Performed by: NURSE PRACTITIONER

## 2024-11-21 PROCEDURE — 99204 OFFICE O/P NEW MOD 45 MIN: CPT | Performed by: NURSE PRACTITIONER

## 2024-11-21 NOTE — ASSESSMENT & PLAN NOTE
Lab Results   Component Value Date    HGBA1C 6.6 (A) 11/21/2024     HGA1C at goal. Continue on current regimen.     Discussed risks/complications associated with uncontrolled diabetes including organ involvement, heart attack, stroke, death.    Call for blood sugars less than 70 mg/dl or patterns over 250 mg/dl.     Monitor blood glucose levels at least 1-2 times a day    Discussed symptoms and treatment of hypoglycemia.  Reviewed risks associated with hypoglycemia. Always carry rapid acting carbohydrates and a glucometer (a way to check your blood sugar).    Recommendation for medical identification either bracelet, necklace.    Routine follow up for diabetic eye and foot exams.     Ordered blood work to complete prior to next visit.    Send glucose logs/CGM download in 1-2 weeks for review    Follow up in 3 months.       Orders:    POCT hemoglobin A1c    Comprehensive metabolic panel; Future    Albumin / creatinine urine ratio; Future    Hemoglobin A1C; Future

## 2024-11-21 NOTE — PROGRESS NOTES
Name: Pernell Covarrubias      : 1945      MRN: [id number]  Encounter Provider: NEO Arroyo  Encounter Date: 2024   Encounter department: [facility]  :  Assessment & Plan  Type 2 diabetes mellitus with hyperglycemia, without long-term current use of insulin ([facility])    Lab Results   Component Value Date    HGBA1C 6.6 (A) 2024     HGA1C at goal. Continue on current regimen.     Discussed risks/complications associated with uncontrolled diabetes including organ involvement, heart attack, stroke, death.    Call for blood sugars less than 70 mg/dl or patterns over 250 mg/dl.     Monitor blood glucose levels at least 1-2 times a day    Discussed symptoms and treatment of hypoglycemia.  Reviewed risks associated with hypoglycemia. Always carry rapid acting carbohydrates and a glucometer (a way to check your blood sugar).    Recommendation for medical identification either bracelet, necklace.    Routine follow up for diabetic eye and foot exams.     Ordered blood work to complete prior to next visit.    Send glucose logs/CGM download in 1-2 weeks for review    Follow up in 3 months.       Orders:    POCT hemoglobin A1c    Comprehensive metabolic panel; Future    Albumin / creatinine urine ratio; Future    Hemoglobin A1C; Future    Benign essential hypertension  BP under good control. Continues on ARB.          Vitamin D deficiency  Continues on vitamin D3 supplementation.     Orders:    Vitamin D 25 hydroxy; Future    Dyslipidemia  Continues on statin.                History of Present Illness     HPI  Pernell Covarrubias is a 79 y.o. male who presents  with a history of type 2 diabetes without long term use of insulin. Reports complications of neuropathy follows with Dr. Lancaster last seen 2024. [facility] diabetic eye exam follows with Dr Gardner.      Denies recent illness or hospitalizations. Was seen in the ER for right lower extremity  laceration with stitches, bandages C/D/I. Denies any redness, warmth, discharge.     Denies recent severe hypoglycemic or severe hyperglycemic episodes requiring medical attention or third party assistance.     Denies frequent thirst or urination.    Denies side effects of treatment at this time.      Home glucose monitorin-185 mg/dL     Current regimen:   Metformin 1000 XR mg twice daily, eGFR 56 from 2024.   Januvia 25 mg daily     Continues on statin and ARB    Denies CP or shortness of breath.     Denies weakness, changes in gait.     History obtained from: patient    Review of Systems  See HPI.   All other systems reviewed and are negative.    Medical History Reviewed by provider this encounter:  Tobacco  Allergies  Meds  Problems  Med Hx  Surg Hx  Fam Hx     .  Current Outpatient Medications on File Prior to Visit   Medication Sig Dispense Refill    acetaminophen (TYLENOL) 325 mg tablet Take 2 tablets (650 mg total) by mouth every 6 (six) hours as needed for mild pain, headaches or fever  0    ammonium lactate (LAC-HYDRIN) 12 % lotion Apply topically 2 (two) times a day 500 g 3    Ascorbic Acid (Vitamin C) 500 MG CAPS Take 500 mg by mouth daily      aspirin (ECOTRIN LOW STRENGTH) 81 mg EC tablet Take 81 mg by mouth daily      atorvastatin (LIPITOR) 20 mg tablet TAKE 1 TABLET EVERY DAY 90 tablet 3    brimonidine tartrate 0.2 % ophthalmic solution Inject 0.2 % into the eye 2 (two) times a day      carvedilol (COREG) 12.5 mg tablet Take 1 tablet (12.5 mg total) by mouth 2 (two) times a day 180 tablet 1    Cholecalciferol (Vitamin D3) 50 MCG (2000 UT) TABS Take 2,000 Units by mouth daily      co-enzyme Q-10 100 mg capsule Take 100 mg by mouth daily      ferrous sulfate 324 (65 Fe) mg TAKE 1 TABLET TWICE DAILY BEFORE MEALS 180 tablet 1    furosemide (LASIX) 20 mg tablet Take 1 tablet (20 mg total) by mouth 3 (three) times a week 36 tablet 3    glucose blood (OneTouch Ultra) test strip Test blood  "sugar once a day 100 strip 1    imiquimod (ALDARA) 5 % cream APPLY 1 PACKET TOPICALLY 3 (THREE) TIMES A WEEK 12 packet 11    losartan (COZAAR) 25 mg tablet TAKE 1 TABLET (25 MG TOTAL) BY MOUTH DAILY 90 tablet 3    metFORMIN (GLUCOPHAGE) 1000 MG tablet TAKE ONE TABLET BY MOUTH TWICE A DAY WITH MEALS 180 tablet 1    Mirabegron ER 25 MG TB24 Take 25 mg by mouth daily at bedtime 90 tablet 3    multivitamin (THERAGRAN) TABS Take 1 tablet by mouth daily      omeprazole (PriLOSEC) 40 MG capsule TAKE 1 CAPSULE (40 MG TOTAL) BY MOUTH DAILY AS NEEDED (ACID REFLUX) 100 capsule 1    sitaGLIPtin (JANUVIA) 25 mg tablet Take 1 tablet (25 mg total) by mouth daily 90 tablet 2    spironolactone (ALDACTONE) 25 mg tablet Take 1 tablet (25 mg total) by mouth daily 90 tablet 1    tacrolimus (PROTOPIC) 0.1 % ointment Apply topically daily Apply daily for maintenance 100 g 3    tamsulosin (FLOMAX) 0.4 mg Take 1 capsule (0.4 mg total) by mouth daily with dinner 90 capsule 1    triamcinolone (KENALOG) 0.1 % ointment Apply topically 2 (two) times a day Apply to body twice a day then switch to Protopic Ointment and DO NOT USE ON FACE, GROIN, ARMPITS 453.6 g 1    TRUEplus Lancets 28G MISC Test 1x/d, E11.29 200 each 3    [DISCONTINUED] Infant Foods (Follow-Up) POWD HANDICAP PLACARD, medically recommended, <taxonomy [id number]> due to arthritic/orthopedic condition (#2,#5) (Patient not taking: Reported on 9/19/2022) 99 g 1    [DISCONTINUED] metFORMIN (GLUCOPHAGE-XR) 500 mg 24 hr tablet Take 2 tablets (1,000 mg total) by mouth 2 (two) times a day with meals (Patient not taking: Reported on 6/21/2024) 360 tablet 2     No current facility-administered medications on file prior to visit.         Objective   /62 (BP Location: Right arm, Patient Position: Sitting, Cuff Size: Standard)   Pulse 71   Ht 5' 10\" (1.778 m)   Wt 75.6 kg (166 lb 9.6 oz)   SpO2 100%   BMI 23.90 kg/m²      Physical Exam  Vitals reviewed.   Constitutional:       " Appearance: Normal appearance.   Cardiovascular:      Rate and Rhythm: Normal rate and regular rhythm.      Pulses: Normal pulses.      Heart sounds: Normal heart sounds.   Pulmonary:      Effort: Pulmonary effort is normal.      Breath sounds: Normal breath sounds.   Skin:     General: Skin is warm and dry.      Capillary Refill: Capillary refill takes less than 2 seconds.   Neurological:      General: No focal deficit present.      Mental Status: He is alert and oriented to person, place, and time.   Psychiatric:         Mood and Affect: Mood normal.         Behavior: Behavior normal.       Administrative Statements

## 2024-11-23 ENCOUNTER — HOSPITAL ENCOUNTER (EMERGENCY)
Facility: HOSPITAL | Age: 79
Discharge: HOME | End: 2024-11-23
Attending: EMERGENCY MEDICINE
Payer: MEDICARE

## 2024-11-23 VITALS
OXYGEN SATURATION: 100 % | RESPIRATION RATE: 18 BRPM | HEART RATE: 90 BPM | SYSTOLIC BLOOD PRESSURE: 141 MMHG | DIASTOLIC BLOOD PRESSURE: 62 MMHG

## 2024-11-23 DIAGNOSIS — Z48.02 VISIT FOR SUTURE REMOVAL: Primary | ICD-10-CM

## 2024-11-23 PROCEDURE — 99283 EMERGENCY DEPT VISIT LOW MDM: CPT

## 2024-11-23 RX ORDER — GINSENG 100 MG
1 CAPSULE ORAL ONCE
Status: COMPLETED | OUTPATIENT
Start: 2024-11-23 | End: 2024-11-23

## 2024-11-23 RX ADMIN — BACITRACIN ZINC 1 LARGE APPLICATION: 500 OINTMENT TOPICAL at 11:31

## 2024-11-23 NOTE — DISCHARGE INSTRUCTIONS
Elevate leg. Clean the wound with soap and water daily. Can use vaseline or antibiotic ointment. Clean wound clean and dry. Keep covered but do not tightly dress wound. Return for any new or worsening symptoms including but not limited to fever/chills, worsening pain, increased drainage, surrounding redness, or other concerning symptoms.

## 2024-11-23 NOTE — ED PROVIDER NOTES
Time reflects when diagnosis was documented in both MDM as applicable and the Disposition within this note       Time User Action Codes Description Comment    11/23/2024 11:07 AM [email or web address]o Add [Z48.02] Visit for suture removal           ED Disposition       ED Disposition   Discharge    Condition   Stable    Date/Time   Sat Nov 23, 2024 11:07 AM    Comment   Pernell Covarrubias discharge to home/self care.                   Assessment & Plan       Medical Decision Making  Patient is an 80 y/o M who presents for suture removal. Patient had sutures placed 10 days ago after a small step ladder fell on to his left shin. Patient has been changing the dressings regularly but has not been washing the wound with soap and water. Notes small amounts of drainage but denies fever, chills, surrounding erythema, increased drainage, trouble walking, pain, or other symptoms at this time.     Afebrile. VSS. Patient is resting comfortably in bed and in no acute distress. Pleasant and cooperative. Skin breakdown to skin tear on right anterior lower leg. Six sutures intact. No evidence of wound infection. Wound is healing. See picture below physical exam findings. 6 sutures removed with suture removal tray. Tolerated well. Dressed with bacitracin and gauze.     Recommended elevating leg, cleaning the wound with soap and water daily, use vaseline or antibiotic ointment, keep wound clean and dry and to cover wound but to not too tightly. Provided bacitracin ointment. Advised to f/u with PCP. Case discussed with Dr. Dillon. Advised patient to return with any new or worsening symptoms including but not limited to fever/chills, worsening pain, increased drainage, surrounding redness, or other concerning symptoms. Discussed assessment and plan with patient who verbalizes understanding and agrees with plan.    Risk  OTC drugs.             Medications   bacitracin topical ointment 1 large application (1 large application Topical Given  11/23/24 1131)       ED Risk Strat Scores                                               History of Present Illness       Chief Complaint   Patient presents with    Wound Check     Suture removal       Past Medical History:   Diagnosis Date    Acquired hallux valgus     last assessed: 8/1/2013    Allergic rhinitis     Anemia 10/26/2010    Atrophy of nail     last assessed: 7/7/2015    Cancer ([facility]) 2020    Chronic kidney disease     chronic renal insufficiency    Coronary artery disease     Deformity of ankle and foot, acquired     last assessed: 2/22/2016    Diabetes mellitus ([facility]) 1994    Difficulty walking     last assessed: 12/14/2015    Dysesthesia     last assessed: 11/25/2016    Hammer toe     unspecified laterality; last assessed: 8/1/2013    Hypertension     Neuropathy in diabetes ([facility]) 1994    Onychomycosis of toenail     last assessed: 2/22/2016    Peripheral vascular disease ([facility])     left SFA stent in 2010    Pes planus     unspecified laterality; last assessed: 8/1/2013    Pneumonia     last assessed: 2/27/2016    Prostate cancer ([facility])     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     last assessed: 8/15/2016    Type 2 diabetes mellitus ([facility]) 07/26/2010    Viral warts     last assessed: 7/24/2015      Past Surgical History:   Procedure Laterality Date    CARDIAC CATHETERIZATION  07/29/2010    CATARACT EXTRACTION, BILATERAL Bilateral     R 1999, L 2008    COLONOSCOPY  2011    FEMORAL ARTERY - POPLITEAL ARTERY BYPASS GRAFT  09/23/2013    FOOT SURGERY      bone spur removed    LEG SURGERY Bilateral     repair; L 8/20/2010 and 7/26/2012    WI PLMT INTERSTITIAL DEV RADIAT TX PROSTATE 1/MULT N/A 6/22/2021    Procedure: INSERTION OF FIDUCIAL MARKER (TRANSRECTAL ULTRASOUND GUIDANCE), SPACEOAR;  Surgeon: Jero Loomis MD;  Location: [facility];  Service: Urology    ROTATOR CUFF REPAIR Left 2009    SHOULDER SURGERY Right 2005    collar bone      Family History   Problem Relation Age of Onset    Aortic  aneurysm Mother     Heart disease Father     Heart attack Father         acute myocardial infarction    Heart disease Sister     Heart attack Sister         acute myocardial infarction    Throat cancer Maternal Grandfather     Heart disease Paternal Grandfather     No Known Problems Son       Social History     Tobacco Use    Smoking status: Never     Passive exposure: Past    Smokeless tobacco: Never   Vaping Use    Vaping status: Never Used   Substance Use Topics    Alcohol use: Yes     Alcohol/week: 1.0 standard drink of alcohol     Types: 1 Glasses of wine per week     Comment: Stopped in 1986    Drug use: Never      E-Cigarette/Vaping    E-Cigarette Use Never User       E-Cigarette/Vaping Substances    Nicotine No     THC No     CBD No     Flavoring No     Other No     Unknown No       I have reviewed and agree with the history as documented.     Patient is an 80 y/o M who presents for suture removal. Patient had sutures placed 10 days ago after a small step ladder fell on to his left shin. Patient has been changing the dressings regularly but has not been washing the wound with soap and water. Notes small amounts of drainage but denies fever, chills, surrounding erythema, increased drainage, trouble walking, pain, or other symptoms at this time.       History provided by:  Patient   used: No    Suture / Staple Removal   The sutures were placed 7 to 10 days ago. Treatments since wound repair include antibiotic ointment use. Fever duration: none. There has been bloody discharge from the wound. There is no redness present. There is no swelling present. There is no pain present. He has no difficulty moving the affected extremity or digit.       Review of Systems   Constitutional: Negative.  Negative for chills, fatigue and fever.   HENT: Negative.     Eyes: Negative.    Respiratory: Negative.     Cardiovascular: Negative.    Gastrointestinal: Negative.    Genitourinary: Negative.     Musculoskeletal: Negative.  Negative for gait problem.   Skin:  Positive for wound. Negative for color change and rash.   Neurological: Negative.    All other systems reviewed and are negative.          Objective       ED Triage Vitals [11/23/24 1048]   Temp Pulse Blood Pressure Respirations SpO2 Patient Position - Orthostatic VS   -- 90 141/62 18 100 % Lying      Temp src Heart Rate Source BP Location FiO2 (%) Pain Score    -- Monitor Left arm -- No Pain      Vitals      Date and Time Temp Pulse SpO2 Resp BP Pain Score FACES Pain Rating User   11/23/24 1048 -- 90 100 % 18 141/62 No Pain -- TPK            Physical Exam  Vitals and nursing note reviewed.   Constitutional:       General: He is awake. He is not in acute distress.     Appearance: Normal appearance. He is well-developed and well-groomed. He is not ill-appearing, toxic-appearing or diaphoretic.      Comments: Patient is resting comfortably in bed and in no acute distress. Pleasant and cooperative.    HENT:      Head: Normocephalic and atraumatic.      Right Ear: External ear normal.      Left Ear: External ear normal.      Nose: Nose normal.      Mouth/Throat:      Lips: Pink.   Eyes:      Extraocular Movements: Extraocular movements intact.      Conjunctiva/sclera: Conjunctivae normal.   Cardiovascular:      Rate and Rhythm: Normal rate and regular rhythm.      Heart sounds: Normal heart sounds. No murmur heard.  Pulmonary:      Effort: Pulmonary effort is normal. No tachypnea or respiratory distress.      Breath sounds: Normal breath sounds and air entry. No stridor. No decreased breath sounds, wheezing, rhonchi or rales.   Musculoskeletal:         General: No swelling.      Cervical back: Normal range of motion.        Legs:    Skin:     General: Skin is warm and dry.      Findings: Wound present.      Comments: Skin breakdown to skin tear on right anterior lower leg. 6 sutures intact. No evidence of wound infection. Wound is healing. See picture  "below.    Neurological:      Mental Status: He is alert and oriented to person, place, and time.   Psychiatric:         Mood and Affect: Mood normal.         Speech: Speech normal.         Behavior: Behavior normal. Behavior is cooperative.         Results Reviewed       None            No orders to display       Suture removal    Date/Time: 11/23/2024 11:00 AM    Performed by: Analia Swain PA-C  Authorized by: Figueroa Dillon MD  [place] Protocol:  procedure performed by consultantConsent: Verbal consent obtained.  Risks and benefits: risks, benefits and alternatives were discussed  Consent given by: patient  Time out: Immediately prior to procedure a \"time out\" was called to verify the correct patient, procedure, equipment, support staff and site/side marked as required.  Timeout called at: 11/23/2024 11:00 AM.  Patient understanding: patient states understanding of the procedure being performed  Patient consent: the patient's understanding of the procedure matches consent given  Procedure consent: procedure consent matches procedure scheduled  Relevant documents: relevant documents present and verified  Test results: test results available and properly labeled  Site marked: the operative site was marked  Radiology Images displayed and confirmed. If images not available, report reviewed: imaging studies available  Required items: required blood products, implants, devices, and special equipment available  Patient identity confirmed: verbally with patient      Patient location:  ED  Location:     Laterality:  Right    Location:  Lower extremity    Lower extremity location:  Leg    Leg location:  R lower leg  Procedure details:     Tools used:  Suture removal kit    Wound appearance:  Draining, good wound healing, nontender, clean and no sign(s) of infection    Drainage characteristics:  Serosangunious/willam    Number of sutures removed:  6  Post-procedure details:     Post-removal:  Antibiotic ointment " applied and dressing applied    Patient tolerance of procedure:  Tolerated well, no immediate complications      ED Medication and Procedure Management   Prior to Admission Medications   Prescriptions Last Dose Informant Patient Reported? Taking?   Ascorbic Acid (Vitamin C) 500 MG CAPS  Self Yes No   Sig: Take 500 mg by mouth daily   Cholecalciferol (Vitamin D3) 50 MCG (2000 UT) TABS  Self Yes No   Sig: Take 2,000 Units by mouth daily   Mirabegron ER 25 MG TB24  Self No No   Sig: Take 25 mg by mouth daily at bedtime   TRUEplus Lancets 28G MISC  Self No No   Sig: Test 1x/d, E11.29   acetaminophen (TYLENOL) 325 mg tablet  Self No No   Sig: Take 2 tablets (650 mg total) by mouth every 6 (six) hours as needed for mild pain, headaches or fever   ammonium lactate (LAC-HYDRIN) 12 % lotion  Self No No   Sig: Apply topically 2 (two) times a day   aspirin (ECOTRIN LOW STRENGTH) 81 mg EC tablet  Self Yes No   Sig: Take 81 mg by mouth daily   atorvastatin (LIPITOR) 20 mg tablet  Self No No   Sig: TAKE 1 TABLET EVERY DAY   brimonidine tartrate 0.2 % ophthalmic solution  Self Yes No   Sig: Inject 0.2 % into the eye 2 (two) times a day   carvedilol (COREG) 12.5 mg tablet  Self No No   Sig: Take 1 tablet (12.5 mg total) by mouth 2 (two) times a day   co-enzyme Q-10 100 mg capsule  Self Yes No   Sig: Take 100 mg by mouth daily   ferrous sulfate 324 (65 Fe) mg  Self No No   Sig: TAKE 1 TABLET TWICE DAILY BEFORE MEALS   furosemide (LASIX) 20 mg tablet  Self No No   Sig: Take 1 tablet (20 mg total) by mouth 3 (three) times a week   glucose blood (OneTouch Ultra) test strip   No No   Sig: Test blood sugar once a day   imiquimod (ALDARA) 5 % cream  Self No No   Sig: APPLY 1 PACKET TOPICALLY 3 (THREE) TIMES A WEEK   losartan (COZAAR) 25 mg tablet  Self No No   Sig: TAKE 1 TABLET (25 MG TOTAL) BY MOUTH DAILY   metFORMIN (GLUCOPHAGE) 1000 MG tablet  Self No No   Sig: TAKE ONE TABLET BY MOUTH TWICE A DAY WITH MEALS   multivitamin (THERAGRAN)  TABS  Self Yes No   Sig: Take 1 tablet by mouth daily   omeprazole (PriLOSEC) 40 MG capsule  Self No No   Sig: TAKE 1 CAPSULE (40 MG TOTAL) BY MOUTH DAILY AS NEEDED (ACID REFLUX)   sitaGLIPtin (JANUVIA) 25 mg tablet   No No   Sig: Take 1 tablet (25 mg total) by mouth daily   spironolactone (ALDACTONE) 25 mg tablet  Self No No   Sig: Take 1 tablet (25 mg total) by mouth daily   tacrolimus (PROTOPIC) 0.1 % ointment  Self No No   Sig: Apply topically daily Apply daily for maintenance   tamsulosin (FLOMAX) 0.4 mg   No No   Sig: Take 1 capsule (0.4 mg total) by mouth daily with dinner   triamcinolone (KENALOG) 0.1 % ointment  Self No No   Sig: Apply topically 2 (two) times a day Apply to body twice a day then switch to Protopic Ointment and DO NOT USE ON FACE, GROIN, ARMPITS      Facility-Administered Medications: None     Discharge Medication List as of 11/23/2024 11:09 AM        CONTINUE these medications which have NOT CHANGED    Details   acetaminophen (TYLENOL) 325 mg tablet Take 2 tablets (650 mg total) by mouth every 6 (six) hours as needed for mild pain, headaches or fever, Starting Thu 2/24/2022, No Print      ammonium lactate (LAC-HYDRIN) 12 % lotion Apply topically 2 (two) times a day, Starting Wed 12/20/2023, Print      Ascorbic Acid (Vitamin C) 500 MG CAPS Take 500 mg by mouth daily, Starting Wed 8/4/2021, Historical Med      aspirin (ECOTRIN LOW STRENGTH) 81 mg EC tablet Take 81 mg by mouth daily, Starting Wed 8/4/2021, Historical Med      atorvastatin (LIPITOR) 20 mg tablet TAKE 1 TABLET EVERY DAY, Normal      brimonidine tartrate 0.2 % ophthalmic solution Inject 0.2 % into the eye 2 (two) times a day, Starting Wed 8/4/2021, Historical Med      carvedilol (COREG) 12.5 mg tablet Take 1 tablet (12.5 mg total) by mouth 2 (two) times a day, Starting Wed 6/12/2024, Normal      Cholecalciferol (Vitamin D3) 50 MCG (2000 UT) TABS Take 2,000 Units by mouth daily, Starting Wed 8/4/2021, Historical Med      co-enzyme  Q-10 100 mg capsule Take 100 mg by mouth daily, Starting Wed 8/4/2021, Historical Med      ferrous sulfate 324 (65 Fe) mg TAKE 1 TABLET TWICE DAILY BEFORE MEALS, Starting Thu 8/22/2024, Normal      furosemide (LASIX) 20 mg tablet Take 1 tablet (20 mg total) by mouth 3 (three) times a week, Starting Wed 8/7/2024, Normal      glucose blood (OneTouch Ultra) test strip Test blood sugar once a day, Normal      imiquimod (ALDARA) 5 % cream APPLY 1 PACKET TOPICALLY 3 (THREE) TIMES A WEEK, Starting Fri 4/12/2024, Normal      losartan (COZAAR) 25 mg tablet TAKE 1 TABLET (25 MG TOTAL) BY MOUTH DAILY, Starting Fri 4/12/2024, Normal      metFORMIN (GLUCOPHAGE) 1000 MG tablet TAKE ONE TABLET BY MOUTH TWICE A DAY WITH MEALS, Starting Sun 5/5/2024, Normal      Mirabegron ER 25 MG TB24 Take 25 mg by mouth daily at bedtime, Starting Mon 5/20/2024, Normal      multivitamin (THERAGRAN) TABS Take 1 tablet by mouth daily, Historical Med      omeprazole (PriLOSEC) 40 MG capsule TAKE 1 CAPSULE (40 MG TOTAL) BY MOUTH DAILY AS NEEDED (ACID REFLUX), Starting Wed 8/7/2024, Normal      sitaGLIPtin (JANUVIA) 25 mg tablet Take 1 tablet (25 mg total) by mouth daily, Starting Tue 8/27/2024, Normal      spironolactone (ALDACTONE) 25 mg tablet Take 1 tablet (25 mg total) by mouth daily, Starting Wed 6/12/2024, Normal      tacrolimus (PROTOPIC) 0.1 % ointment Apply topically daily Apply daily for maintenance, Starting Fri 3/1/2024, Normal      tamsulosin (FLOMAX) 0.4 mg Take 1 capsule (0.4 mg total) by mouth daily with dinner, Starting Thu 10/3/2024, Normal      triamcinolone (KENALOG) 0.1 % ointment Apply topically 2 (two) times a day Apply to body twice a day then switch to Protopic Ointment and DO NOT USE ON FACE, GROIN, ARMPITS, Starting Fri 3/1/2024, Normal      TRUEplus Lancets 28G MISC Test 1x/d, E11.29, Normal           No discharge procedures on file.  ED SEPSIS DOCUMENTATION   Time reflects when diagnosis was documented in both MDM as  applicable and the Disposition within this note       Time User Action Codes Description Comment    11/23/2024 11:07 AM Analia Swain Add [Z48.02] Visit for suture removal                  Analia Swain PA-C  11/23/24 1248

## 2024-11-25 ENCOUNTER — VBI (OUTPATIENT)
Dept: FAMILY MEDICINE CLINIC | Facility: CLINIC | Age: 79
End: 2024-11-25

## 2024-11-25 NOTE — TELEPHONE ENCOUNTER
11/25/24 11:02 AM    Patient contacted post ED visit, first outreach attempt made. Message was left for patient to return a call to the [facility] Department at [place]: Phone [phone].    Thank you.  [facility]  [facility]

## 2024-11-26 ENCOUNTER — OFFICE VISIT (OUTPATIENT)
Age: 79
End: 2024-11-26
Payer: MEDICARE

## 2024-11-26 VITALS — RESPIRATION RATE: 16 BRPM | BODY MASS INDEX: 23.77 KG/M2 | HEIGHT: 70 IN | WEIGHT: 166 LBS

## 2024-11-26 DIAGNOSIS — B07.0 PLANTAR WARTS: ICD-10-CM

## 2024-11-26 DIAGNOSIS — M79.671 PAIN IN BOTH FEET: ICD-10-CM

## 2024-11-26 DIAGNOSIS — E11.42 DIABETIC POLYNEUROPATHY ASSOCIATED WITH TYPE 2 DIABETES MELLITUS (HCC): Primary | ICD-10-CM

## 2024-11-26 DIAGNOSIS — M21.962 ACQUIRED DEFORMITY OF LEFT FOOT: ICD-10-CM

## 2024-11-26 DIAGNOSIS — B35.1 ONYCHOMYCOSIS: ICD-10-CM

## 2024-11-26 DIAGNOSIS — I73.9 PAD (PERIPHERAL ARTERY DISEASE) (HCC): ICD-10-CM

## 2024-11-26 DIAGNOSIS — M79.672 PAIN IN BOTH FEET: ICD-10-CM

## 2024-11-26 DIAGNOSIS — M54.16 LUMBAR RADICULOPATHY: ICD-10-CM

## 2024-11-26 PROCEDURE — 99213 OFFICE O/P EST LOW 20 MIN: CPT | Performed by: PODIATRIST

## 2024-11-26 NOTE — TELEPHONE ENCOUNTER
11/26/24 10:41 AM    Patient contacted post ED visit, second outreach attempt made. Message was left for patient to return a call to the [facility] Department at [place]: Phone [phone].    Thank you.  [facility]  [facility]

## 2024-11-26 NOTE — PROGRESS NOTES
Assessment/Plan: Preulcerative callus/Gamino grade 0 ulcer plantar aspect left foot.  Acquired deformity foot.  History of metatarsal resection.  Diabetic foot.  Diabetic neuropathy.  Peripheral artery disease.  Mycosis of nail.     Plan.  Chart reviewed.  Primary care notes reviewed.  Lab work reviewed.  Patient examined.  Diabetic foot exam performed.  Betadine wet-to-dry dressing applied.  Patient watch for signs of infection.  Aftercare instruction given.  Return for follow-up.  We will add topical Aldara cream.  Patient wear accommodative shoes daily.            Diagnoses and all orders for this visit:     Diabetic ulcer of left midfoot associated with type 2 diabetes mellitus, limited to breakdown of skin ([facility]).  Rule out atypical verruca as etiology.     Diabetic polyneuropathy associated with type 2 diabetes mellitus ([facility])     PAD (peripheral artery disease) ([facility])     Onychomycosis     Acquired deformity of left foot     Pain in both feet            Subjective: Patient is doing much better.  He is not bandaging his foot.  He sees no evidence of blood in his socks.  He no history of fever or night sweats.  Patient is diabetic     No Known Allergies        Current Outpatient Medications:     acetaminophen (TYLENOL) 325 mg tablet, Take 2 tablets (650 mg total) by mouth every 6 (six) hours as needed for mild pain, headaches or fever, Disp: , Rfl: 0    Ascorbic Acid (Vitamin C) 500 MG CAPS, Take 500 mg by mouth daily, Disp: , Rfl:     aspirin (ECOTRIN LOW STRENGTH) 81 mg EC tablet, Take 81 mg by mouth daily, Disp: , Rfl:     atorvastatin (LIPITOR) 20 mg tablet, TAKE 1 TABLET EVERY DAY, Disp: 90 tablet, Rfl: 1    brimonidine tartrate 0.2 % ophthalmic solution, Inject 0.2 % into the eye 2 (two) times a day, Disp: , Rfl:     carvedilol (COREG) 12.5 mg tablet, Take 1 tablet (12.5 mg total) by mouth 2 (two) times a day, Disp: 180 tablet, Rfl: 1    Cholecalciferol (Vitamin D3) 50 MCG (2000 UT) TABS, Take 2,000 Units  by mouth daily, Disp: , Rfl:     co-enzyme Q-10 100 mg capsule, Take 100 mg by mouth daily, Disp: , Rfl:     ferrous sulfate 324 (65 Fe) mg, Take 1 tablet (324 mg total) by mouth 2 (two) times a day before meals, Disp: 180 tablet, Rfl: 3    glucose blood (OneTouch Ultra) test strip, Test blood sugar once a day, Disp: 100 strip, Rfl: 3    losartan (COZAAR) 25 mg tablet, Take 1 tablet (25 mg total) by mouth daily, Disp: 90 tablet, Rfl: 1    metFORMIN (GLUCOPHAGE) 1000 MG tablet, TAKE ONE TABLET BY MOUTH TWO TIMES A DAY WITH MEALS, Disp: 180 tablet, Rfl: 1    multivitamin (THERAGRAN) TABS, Take 1 tablet by mouth daily, Disp: , Rfl:     omeprazole (PriLOSEC) 40 MG capsule, Take 1 capsule (40 mg total) by mouth daily as needed (acid reflux), Disp: 90 capsule, Rfl: 1    Probiotic Product (CULTURELLE PROBIOTICS) CHEW, Chew daily, Disp: , Rfl:     sitaGLIPtin (JANUVIA) 25 mg tablet, Take one tablet by mouth once a day, Disp: 90 tablet, Rfl: 3    spironolactone (ALDACTONE) 25 mg tablet, Take 1 tablet (25 mg total) by mouth daily, Disp: 90 tablet, Rfl: 1    tamsulosin (FLOMAX) 0.4 mg, Take 1 capsule (0.4 mg total) by mouth daily with dinner, Disp: 90 capsule, Rfl: 2    TRUEplus Lancets 28G MISC, Test 1x/d, E11.29, Disp: 200 each, Rfl: 3    Infant Foods (Follow-Up) POWD, HANDICAP SERENA, medically recommended, <taxonomy [id number]> due to arthritic/orthopedic condition (#2,#5) (Patient not taking: Reported on 9/19/2022), Disp: 99 g, Rfl: 1           Patient Active Problem List   Diagnosis    Coronary artery disease involving native coronary artery of native heart with angina pectoris (HCC)    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 mellitus     Diabetic neuropathy (HCC)    GE reflux    Lumbar radiculopathy    Type 2 diabetes mellitus with diabetic neuropathy (HCC)    Prostate cancer screening    Dyslipidemia    Medicare annual wellness visit, subsequent    Type 2 diabetes mellitus with hyperglycemia, without long-term current use  of insulin (HCC)    Benign essential hypertension    Bilateral leg weakness    Prostate cancer (HCC)    Insomnia, persistent    Hyponatremia    Orthostatic hypotension    Chronic right-sided low back pain without sciatica    Hypokalemia    Acute on chronic combined systolic and diastolic congestive heart failure (HCC)    Heart failure, left, 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)    Chronic combined systolic and diastolic heart failure, NYHA class 2 (HCC)    Edema of both feet    Diarrhea    Gait disorder    Acute pain of left thigh    Peripheral arterial disease (HCC)    Iron deficiency anemia    Uses roller walker             Patient ID: Pernell Covarrubias is a 78 y.o. male.     HPI     The following portions of the patient's history were reviewed and updated as appropriate:      family history includes Aortic aneurysm in his mother; Heart attack in his father and sister; Heart disease in his father, paternal grandfather, and sister; Throat cancer in his maternal grandfather.       reports that he has never smoked. He has never been exposed to tobacco smoke. He has never used smokeless tobacco. He reports that he does not currently use alcohol. He reports that he does not use drugs.        Objective:  Patient's shoes and socks removed.      Foot ExamPhysical Exam     Cardiovascular:      Pulses: Pulses are weak.               Physical Exam  Left Foot: Appearance: a deformity (ball of foot and distal fifth metatarsal ). Fifth toe deformities include hammer toe. Tenderness: None except the plantar aspect of the foot.   Right Foot: Appearance: a deformity (distal fifth metatarsal ).   Left Ankle: ROM: limited ROM in all planes   Right Ankle: ROM: limited ROM in all planes   Neurological Exam: Light touch was decreased bilaterally. Vibratory sensation was decreased in both first metatarsophalangeal joints. Response to monofilament test was absent bilaterally. Deep tendon reflexes: achilles reflex 1/4 bilaterally.   Vascular Exam:  performed Dorsalis pedis pulses were 1/4 bilaterally. Posterior tibial pulses were 1/4 bilaterally. Elevation Pallor: diminished bilaterally. Capillary refill time was Q. 9, findings bilateral. Negative digital hair noted, positive abnormal cooling. All pre-ulcer, lesions debrided. Today, but between 1-3 seconds bilaterally. Edema: mild bilaterally and All mycotic nails debrided. Today. The patient was given orthotics to help control his feet and at as cushioning and padding on the bottom of his feet q9 findings.   Toenails: All of the toenails were elongated, hypertrophied, discolored, ingrown, shown to have subungual debris and tender.      Socks and shoes removed, the Right Foot: the foot was dry.   The sensory exam showed diminished vibratory sensation at the level of the toes. Diminished tactile sensation with monofilament testing throughout the right foot.   Socks and shoes removed, Left Foot: the foot was dry.   The sensory exam showed diminished vibratory sensation at the level of the toes. Diminished tactile sensation with monofilament testing throughout the left foot.   Pulses:   1+ in the posterior tibialis on the right   1+ in the dorsalis pedis on the right. Capillary refills findings on the left were delayed in the toes.   Pulses:   1+ in the posterior tibialis on the left   1+ in the dorsalis pedis on the left.   Assign Risk Category: 2: Loss of protective sensation with or without weakness, deformity, callus, pre-ulcer, or history of ulceration. High risk.   Hyperkeratosis: present on both first sub metatarsals, present on both fifth sub metatarsals and Pre-ulcerative lesion, submetatarsal one to 5, bilateral.   Shoe Gear Evaluation: performed (). Recommendation(s): custom inlays.      Patient's shoes and socks removed.Right Foot/Ankle   Right Foot Inspection  Skin Exam: callus and callus               Toe Exam: swelling and erythema  Sensory   Vibration: diminished  Proprioception: diminished       Vascular  Capillary refills: elevated        Left Foot/Ankle  Left Foot Inspection  Skin Exam: callus.  Submetatarsal 5 of the left foot demonstrates 0.5 cm grade Gamino grade 0/preulcerative callus. Cellulitis resolved.              Toe Exam: swelling and erythema                   Sensory   Vibration: diminished  Proprioception: diminished     Vascular  Capillary refills: elevated.     Patient's shoes and socks removed.         Patient's shoes and socks removed.     Right Foot/Ankle   Right Foot Inspection        Toe Exam: right toe deformity.      Sensory   Vibration: absent  Proprioception: diminished  Monofilament testing: diminished     Vascular  Capillary refills: < 3 seconds        Left Foot/Ankle  Left Foot Inspection        Toe Exam: left toe deformity.      Sensory   Vibration: absent  Proprioception: diminished  Monofilament testing: diminished     Vascular  Capillary refills: < 3 seconds        Patient's shoes and socks removed.     Right Foot/Ankle   Right Foot Inspection  Skin Exam: dry skin and pre-ulcer.      Toe Exam: right toe deformity.      Sensory   Vibration: diminished  Proprioception: diminished  Monofilament testing: diminished     Vascular  Capillary refills: < 3 seconds  The right DP pulse is 2+. The right PT pulse is 2+.      Left Foot/Ankle  Left Foot Inspection  Skin Exam: dry skin and pre-ulcer.      Toe Exam: left toe deformity.      Sensory   Vibration: diminished  Proprioception: diminished  Monofilament testing: diminished     Vascular  Capillary refills: < 3 seconds  The left DP pulse is 2+. The left PT pulse is 2+.      Assign Risk Category  Deformity present  Loss of protective sensation  Weak pulses  Risk: 2

## 2024-11-27 NOTE — TELEPHONE ENCOUNTER
11/27/24 9:05 AM    Patient contacted post ED visit, [facility] department spoke with patient/caregiver and outreach was successful.    Thank you.  Brittany Man  [facility]

## 2024-12-08 PROBLEM — Z86.39 HISTORY OF VITAMIN D DEFICIENCY: Status: ACTIVE | Noted: 2024-01-17

## 2024-12-08 PROBLEM — E78.49 OTHER HYPERLIPIDEMIA: Status: ACTIVE | Noted: 2018-09-13

## 2024-12-11 DIAGNOSIS — I50.9 ACUTE ON CHRONIC CONGESTIVE HEART FAILURE, UNSPECIFIED HEART FAILURE TYPE (HCC): ICD-10-CM

## 2024-12-11 DIAGNOSIS — I10 BENIGN ESSENTIAL HYPERTENSION: ICD-10-CM

## 2024-12-12 ENCOUNTER — OFFICE VISIT (OUTPATIENT)
Dept: FAMILY MEDICINE CLINIC | Facility: CLINIC | Age: 79
End: 2024-12-12
Payer: MEDICARE

## 2024-12-12 VITALS
DIASTOLIC BLOOD PRESSURE: 62 MMHG | HEART RATE: 78 BPM | HEIGHT: 70 IN | SYSTOLIC BLOOD PRESSURE: 130 MMHG | TEMPERATURE: 96.9 F | RESPIRATION RATE: 16 BRPM | WEIGHT: 164.8 LBS | BODY MASS INDEX: 23.59 KG/M2

## 2024-12-12 DIAGNOSIS — I50.9 ACUTE ON CHRONIC CONGESTIVE HEART FAILURE, UNSPECIFIED HEART FAILURE TYPE (HCC): ICD-10-CM

## 2024-12-12 DIAGNOSIS — C61 PROSTATE CANCER (HCC): ICD-10-CM

## 2024-12-12 DIAGNOSIS — N18.30 CKD STAGE 3 DUE TO TYPE 2 DIABETES MELLITUS (HCC): ICD-10-CM

## 2024-12-12 DIAGNOSIS — K21.9 CHRONIC GERD: ICD-10-CM

## 2024-12-12 DIAGNOSIS — Z99.89 USES ROLLER WALKER: ICD-10-CM

## 2024-12-12 DIAGNOSIS — E11.65 TYPE 2 DIABETES MELLITUS WITH HYPERGLYCEMIA, WITHOUT LONG-TERM CURRENT USE OF INSULIN (HCC): ICD-10-CM

## 2024-12-12 DIAGNOSIS — I25.10 CORONARY ARTERY DISEASE INVOLVING NATIVE CORONARY ARTERY OF NATIVE HEART WITHOUT ANGINA PECTORIS: ICD-10-CM

## 2024-12-12 DIAGNOSIS — I10 BENIGN ESSENTIAL HYPERTENSION: Primary | ICD-10-CM

## 2024-12-12 DIAGNOSIS — E11.40 TYPE 2 DIABETES MELLITUS WITH DIABETIC NEUROPATHY, WITHOUT LONG-TERM CURRENT USE OF INSULIN (HCC): ICD-10-CM

## 2024-12-12 DIAGNOSIS — E78.49 OTHER HYPERLIPIDEMIA: ICD-10-CM

## 2024-12-12 DIAGNOSIS — E11.22 CKD STAGE 3 DUE TO TYPE 2 DIABETES MELLITUS (HCC): ICD-10-CM

## 2024-12-12 PROCEDURE — 99214 OFFICE O/P EST MOD 30 MIN: CPT | Performed by: FAMILY MEDICINE

## 2024-12-12 PROCEDURE — G2211 COMPLEX E/M VISIT ADD ON: HCPCS | Performed by: FAMILY MEDICINE

## 2024-12-12 RX ORDER — SPIRONOLACTONE 25 MG/1
25 TABLET ORAL DAILY
Qty: 90 TABLET | Refills: 1 | Status: SHIPPED | OUTPATIENT
Start: 2024-12-12

## 2024-12-12 RX ORDER — SPIRONOLACTONE 25 MG/1
25 TABLET ORAL DAILY
Qty: 90 TABLET | Refills: 3 | OUTPATIENT
Start: 2024-12-12

## 2024-12-12 RX ORDER — CARVEDILOL 12.5 MG/1
12.5 TABLET ORAL 2 TIMES DAILY
Qty: 180 TABLET | Refills: 3 | OUTPATIENT
Start: 2024-12-12

## 2024-12-12 RX ORDER — CARVEDILOL 12.5 MG/1
12.5 TABLET ORAL 2 TIMES DAILY
Qty: 180 TABLET | Refills: 1 | Status: SHIPPED | OUTPATIENT
Start: 2024-12-12

## 2024-12-12 RX ORDER — OMEPRAZOLE 40 MG/1
40 CAPSULE, DELAYED RELEASE ORAL DAILY PRN
Qty: 100 CAPSULE | Refills: 1 | Status: SHIPPED | OUTPATIENT
Start: 2024-12-12

## 2024-12-12 NOTE — PROGRESS NOTES
Assessment/Plan:    No problem-specific Assessment & Plan notes found for this encounter.    Gerd on ppi  Try every other day if possible    Htn stable  Continue meds  No breast tenderness on aldactone    Cad/chf  F/u cardiology    Prostate CA hx  F/u urology    Stay hydrated    Fall cautions, use walker     Diagnoses and all orders for this visit:    Benign essential hypertension  -     carvedilol (COREG) 12.5 mg tablet; Take 1 tablet (12.5 mg total) by mouth 2 (two) times a day    Type 2 diabetes mellitus with hyperglycemia, without long-term current use of insulin (HCC)    Chronic GERD  -     omeprazole (PriLOSEC) 40 MG capsule; Take 1 capsule (40 mg total) by mouth daily as needed (acid reflux)    Acute on chronic congestive heart failure, unspecified heart failure type (HCC)  -     spironolactone (ALDACTONE) 25 mg tablet; Take 1 tablet (25 mg total) by mouth daily    Uses roller walker    Coronary artery disease involving native coronary artery of native heart without angina pectoris    CKD stage 3 due to type 2 diabetes mellitus (HCC)    Type 2 diabetes mellitus with diabetic neuropathy, without long-term current use of insulin (HCC)    Other hyperlipidemia    Prostate cancer (HCC)        Return in about 6 months (around 6/12/2025) for Recheck.    Subjective:      Patient ID: Pernell Covarrubias is a 79 y.o. male.    Chief Complaint   Patient presents with    Follow-up     6 month cmavp        HPI  Wt about same  Taking all meds  Ckd 3 noted    Urine flow good  Last psa wnl  Hx of prostate ca  Sees urology    No falls  Careful, uses rolling walker    Ppi daily  Recent data suggesting increased risk of ischemic CVA and chronic kidney damage with PPI use was advised.  Gets worse after 6th day of not taking    The following portions of the patient's history were reviewed and updated as appropriate: allergies, current medications, past family history, past medical history, past social history, past surgical history  and problem list.    Review of Systems   Respiratory:  Negative for shortness of breath.    Cardiovascular:  Negative for chest pain.         Current Outpatient Medications   Medication Sig Dispense Refill    acetaminophen (TYLENOL) 325 mg tablet Take 2 tablets (650 mg total) by mouth every 6 (six) hours as needed for mild pain, headaches or fever  0    ammonium lactate (LAC-HYDRIN) 12 % lotion Apply topically 2 (two) times a day 500 g 3    Ascorbic Acid (Vitamin C) 500 MG CAPS Take 500 mg by mouth daily      aspirin (ECOTRIN LOW STRENGTH) 81 mg EC tablet Take 81 mg by mouth daily      atorvastatin (LIPITOR) 20 mg tablet TAKE 1 TABLET EVERY DAY 90 tablet 3    brimonidine tartrate 0.2 % ophthalmic solution Inject 0.2 % into the eye 2 (two) times a day      carvedilol (COREG) 12.5 mg tablet Take 1 tablet (12.5 mg total) by mouth 2 (two) times a day 180 tablet 1    Cholecalciferol (Vitamin D3) 50 MCG (2000 UT) TABS Take 2,000 Units by mouth daily      co-enzyme Q-10 100 mg capsule Take 100 mg by mouth daily      ferrous sulfate 324 (65 Fe) mg TAKE 1 TABLET TWICE DAILY BEFORE MEALS 180 tablet 1    furosemide (LASIX) 20 mg tablet Take 1 tablet (20 mg total) by mouth 3 (three) times a week 36 tablet 3    glucose blood (OneTouch Ultra) test strip Test blood sugar once a day 100 strip 1    imiquimod (ALDARA) 5 % cream APPLY 1 PACKET TOPICALLY 3 (THREE) TIMES A WEEK 12 packet 11    losartan (COZAAR) 25 mg tablet TAKE 1 TABLET (25 MG TOTAL) BY MOUTH DAILY 90 tablet 3    metFORMIN (GLUCOPHAGE) 1000 MG tablet TAKE ONE TABLET BY MOUTH TWICE A DAY WITH MEALS 180 tablet 1    Mirabegron ER 25 MG TB24 Take 25 mg by mouth daily at bedtime 90 tablet 3    multivitamin (THERAGRAN) TABS Take 1 tablet by mouth daily      omeprazole (PriLOSEC) 40 MG capsule Take 1 capsule (40 mg total) by mouth daily as needed (acid reflux) 100 capsule 1    sitaGLIPtin (JANUVIA) 25 mg tablet Take 1 tablet (25 mg total) by mouth daily 90 tablet 2     "spironolactone (ALDACTONE) 25 mg tablet Take 1 tablet (25 mg total) by mouth daily 90 tablet 1    tacrolimus (PROTOPIC) 0.1 % ointment Apply topically daily Apply daily for maintenance 100 g 3    tamsulosin (FLOMAX) 0.4 mg Take 1 capsule (0.4 mg total) by mouth daily with dinner 90 capsule 1    triamcinolone (KENALOG) 0.1 % ointment Apply topically 2 (two) times a day Apply to body twice a day then switch to Protopic Ointment and DO NOT USE ON FACE, GROIN, ARMPITS 453.6 g 1    TRUEplus Lancets 28G MISC Test 1x/d, E11.29 200 each 3     No current facility-administered medications for this visit.       Objective:    /62   Pulse 78   Temp (!) 96.9 °F (36.1 °C)   Resp 16   Ht 5' 10\" (1.778 m)   Wt 74.8 kg (164 lb 12.8 oz)   BMI 23.65 kg/m²        Physical Exam  Vitals and nursing note reviewed.   Constitutional:       General: He is not in acute distress.     Appearance: He is well-developed. He is not ill-appearing.   HENT:      Head: Normocephalic.      Right Ear: Tympanic membrane normal.      Left Ear: Tympanic membrane normal.   Eyes:      General: No scleral icterus.     Conjunctiva/sclera: Conjunctivae normal.   Neck:      Vascular: No carotid bruit.   Cardiovascular:      Rate and Rhythm: Normal rate and regular rhythm.      Heart sounds: No murmur heard.  Pulmonary:      Effort: Pulmonary effort is normal. No respiratory distress.      Breath sounds: No wheezing.   Abdominal:      General: There is no distension.      Palpations: Abdomen is soft.   Musculoskeletal:         General: No deformity.      Cervical back: Neck supple.   Skin:     General: Skin is warm and dry.      Coloration: Skin is not jaundiced or pale.   Neurological:      Mental Status: He is alert.      Gait: Gait abnormal.   Psychiatric:         Mood and Affect: Mood normal.         Behavior: Behavior normal.         Thought Content: Thought content normal.                Felipe Travis,     "

## 2025-01-15 ENCOUNTER — OFFICE VISIT (OUTPATIENT)
Dept: CARDIOLOGY CLINIC | Facility: CLINIC | Age: 80
End: 2025-01-15
Payer: MEDICARE

## 2025-01-15 VITALS
HEIGHT: 70 IN | SYSTOLIC BLOOD PRESSURE: 140 MMHG | OXYGEN SATURATION: 99 % | DIASTOLIC BLOOD PRESSURE: 60 MMHG | BODY MASS INDEX: 23.77 KG/M2 | HEART RATE: 69 BPM | WEIGHT: 166 LBS

## 2025-01-15 DIAGNOSIS — N18.9 ANEMIA DUE TO CHRONIC KIDNEY DISEASE, UNSPECIFIED CKD STAGE: ICD-10-CM

## 2025-01-15 DIAGNOSIS — I70.219 ATHEROSCLEROSIS OF ARTERY OF EXTREMITY WITH INTERMITTENT CLAUDICATION (HCC): ICD-10-CM

## 2025-01-15 DIAGNOSIS — E11.40 TYPE 2 DIABETES MELLITUS WITH DIABETIC NEUROPATHY, WITHOUT LONG-TERM CURRENT USE OF INSULIN (HCC): ICD-10-CM

## 2025-01-15 DIAGNOSIS — I50.42 CHRONIC COMBINED SYSTOLIC AND DIASTOLIC HEART FAILURE, NYHA CLASS 2 (HCC): ICD-10-CM

## 2025-01-15 DIAGNOSIS — I25.10 CORONARY ARTERY DISEASE INVOLVING NATIVE CORONARY ARTERY OF NATIVE HEART WITHOUT ANGINA PECTORIS: ICD-10-CM

## 2025-01-15 DIAGNOSIS — E78.5 DYSLIPIDEMIA: ICD-10-CM

## 2025-01-15 DIAGNOSIS — N18.30 CKD STAGE 3 DUE TO TYPE 2 DIABETES MELLITUS (HCC): ICD-10-CM

## 2025-01-15 DIAGNOSIS — E11.22 CKD STAGE 3 DUE TO TYPE 2 DIABETES MELLITUS (HCC): ICD-10-CM

## 2025-01-15 DIAGNOSIS — I10 BENIGN ESSENTIAL HYPERTENSION: ICD-10-CM

## 2025-01-15 DIAGNOSIS — D63.1 ANEMIA DUE TO CHRONIC KIDNEY DISEASE, UNSPECIFIED CKD STAGE: ICD-10-CM

## 2025-01-15 PROCEDURE — 93000 ELECTROCARDIOGRAM COMPLETE: CPT | Performed by: INTERNAL MEDICINE

## 2025-01-15 PROCEDURE — 99214 OFFICE O/P EST MOD 30 MIN: CPT | Performed by: INTERNAL MEDICINE

## 2025-01-15 RX ORDER — FERROUS SULFATE 324(65)MG
324 TABLET, DELAYED RELEASE (ENTERIC COATED) ORAL
Qty: 180 TABLET | Refills: 1 | Status: SHIPPED | OUTPATIENT
Start: 2025-01-15

## 2025-01-15 NOTE — ASSESSMENT & PLAN NOTE
Wt Readings from Last 3 Encounters:   01/15/25 75.3 kg (166 lb)   12/12/24 74.8 kg (164 lb 12.8 oz)   11/26/24 75.3 kg (166 lb)

## 2025-01-15 NOTE — PROGRESS NOTES
Progress Note - Cardiology Office  [facility]    Pernell Covarrubias 79 y.o. male MRN: [id number]  : 1945  Encounter: [id number]      Assessment:     Assessment & Plan  Coronary artery disease involving native coronary artery of native heart without angina pectoris    Chronic combined systolic and diastolic heart failure, NYHA class 2 ([facility])  Wt Readings from Last 3 Encounters:   01/15/25 75.3 kg (166 lb)   24 74.8 kg (164 lb 12.8 oz)   24 75.3 kg (166 lb)           Benign essential hypertension    Dyslipidemia    CKD stage 3 due to type 2 diabetes mellitus ([facility])    Lab Results   Component Value Date    HGBA1C 6.6 (A) 2024     Type 2 diabetes mellitus with diabetic neuropathy, without long-term current use of insulin ([facility])    Lab Results   Component Value Date    HGBA1C 6.6 (A) 2024     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 Chronic systolic and diastolic heart failure.  Patient was recently admitted with chronic systolic and diastolic heart failure.  Repeat echo Doppler shows patient's EF is improved to 55% now less valvular disease we will continue diuretics that has tremendously helped.  Continue losartan 25 mg.  He has some orthostasis since his chemotherapy.  Currently there is no evidence of volume overload.  Will check echo Doppler for his LV function.       2. Coronary artery disease.  Status post multivessel stenting.  Patient now want to follow up with [facility].  His nuclear stress test done in 2022 reviewed.  Recommended medical therapy.  He is not having any symptoms of chest pain with his normal activities.    3. Hypertension.  Blood pressure is borderline but has orthostasis.  At this time he is doing well.  He has not fallen we will continue same Rx.  He has not had any recent blood test we will update electrolytes.      4. Dyslipidemia.    Continue statins.  He has PVD and cholesterol last blood test was in August 2024 which has been acceptable.      5. Chronic renal insufficiency.  Creatinine is acceptable 1.2 and sodium was 130 1 repeat labs will be ordered.    6.  Carotid bruit..  Carotid Doppler shows less than 50% stenosis on the left.  No evidence of stenosis on the right side.  Continue medical Rx follows up with vascular no other issues at this time    7. Diabetes mellitus.  His blood sugar has been labile.  HbA1c 7.6.     8. History of prostate cancer.  Patient s/p chemotherapy injectable medicine.  It did affect his balance and some peripheral neuropathy but now he is cancer free as per him he is doing well.      9. History of anemia of chronic disease.  Hemoglobin 9.7 he will be on iron    10. PVD. PVD with right femoropopliteal which is occluded and left SFA which has stent placed by me in 2010, had repeat procedure done with known right SFA stent stenosis underwent balloon angioplasty.  He is able to walk he has seen the vascular.  He may have InStent stenosis on the left and has occlusion of his bypass on the right.  He follows with vascular no symptoms at this time continue medical Rx    Plan.    Will continue same Rx check echo Doppler.  Will check electrolytes follow-up in 6 to 7 months.      Counseling :  A description of the counseling.  All issues regarding tight sugar control, taking cholesterol medications, regular exercise, symptoms about coronary artery disease discussed with patient at length.  Previous echo from 2014 reviewed.  Goals and Barriers.  Patient's ability to self care: Yes  Medication side effect reviewed with patient in detail and all their questions answered to their satisfaction.    HPI :     Pernell Covarrubias is a 79 y.o. year old male who came for follow up. Patient has a past medical history significant for Coronary Artery artery disease, CK D stage III, Diabetes mellitus with renal manifestations, PVD with  right femoropopliteal which has occluded and left SFA stent by me in 2010, hypertension, diabetic neuropathy who came to see us after his vascular study was found to be abnormal. For cardiac problem he generally sees Dr. Gonzalez. His vascular study was ordered by his podiatrist as he was having pain in his foot. He also had a lot of back problems sometime pain radiates from his back to the thighs. Here a stent placed in his left SFA in 2010. It's already known that his right femoropopliteal is occluded. He has no fever no chills no nausea no vomiting no other significant complaint     4/7/2023.    Above reviewed.  Patient came for follow-up.  He is doing well his main issue is back problem.  He has history of chronic fatigue and tiredness from his prostate cancer treatment..  His last echo was EF 45% with moderate MR and moderate TR PA pressure was elevated.  He is pretty compliant with his medications his weight has been stable.  Denies any chest pain he walks with the help of walker.  Currently he is taking Coreg 12.5 twice a day, Lipitor milligram and losartan low-dose and Aldactone 25 mg daily.  His last blood test in December 2022 has been acceptable hemoglobin is 10.7 he has chronic anemia.  He is using compression stockings regularly but denies any dizziness lightness.    10/18/2023.    Above reviewed.  Patient came for follow-up.  He still continues to have chronic back problems.  He is feeling better.  He is pretty compliant with his medications and his med list reviewed.  EKG shows sinus rhythm with a heart rate 73 bpm which is not changed from previous EKG.  His current medications     1/15/2025.    Labs reviewed.  Patient came for follow-up.  Patient has a medical history significant for CAD, PVD, history of cardiomyopathy with moderate valvular disease with moderate MR and moderate TR, elevated pulmonary artery pressure, history of claudication with stents who came for follow-up.  He was known to  have chronic systolic and diastolic heart failure but with the treatment his EF has improved and his PA pressure has improved and his valvular disease got much better last echo was in April 2023.  He has not seen us since October 2023.  His last blood test in [facility] was in August 2024 at that time his sodium was 131 other labs were acceptable.  His weight has been stable.  Continues to be anemic.  His last blood test was in August 2023.  Today he has no new complaint he feels better and his med list reviewed with him.  His blood pressure is ranging between 1 30-1 40 systolic.  EKG shows sinus rhythm with T wave inversion inferiorly not much change.  No nausea no vomiting no other issues he walks with the help of walker.  It all started after he had chemotherapy as per him for prostate cancer which helped cure the cancer but give him some neuropathy and weakness which still exist.      Review of Systems   Constitutional:  Negative for activity change, chills, diaphoresis, fever and unexpected weight change.   HENT:  Negative for congestion.    Eyes:  Negative for discharge and redness.   Respiratory:  Negative for cough, chest tightness, shortness of breath and wheezing.    Cardiovascular: Negative.  Negative for chest pain, palpitations and leg swelling.   Gastrointestinal:  Negative for abdominal pain, diarrhea and nausea.   Endocrine: Negative.    Genitourinary:  Negative for decreased urine volume and urgency.   Musculoskeletal:  Positive for arthralgias and gait problem. Negative for back pain.   Skin:  Negative for rash and wound.   Allergic/Immunologic: Negative.    Neurological:  Negative for dizziness, seizures, syncope, weakness, light-headedness and headaches.   Hematological: Negative.    Psychiatric/Behavioral:  Negative for agitation and confusion. The patient is not nervous/anxious.        Historical Information   Past Medical History:   Diagnosis Date   • Acquired hallux valgus     last  assessed: 8/1/2013   • Allergic rhinitis    • Anemia 10/26/2010   • Atrophy of nail     last assessed: 7/7/2015   • Cancer ([facility]) 2020   • Chronic kidney disease     chronic renal insufficiency   • Coronary artery disease    • Deformity of ankle and foot, acquired     last assessed: 2/22/2016   • Diabetes mellitus ([facility]) 1994   • Difficulty walking     last assessed: 12/14/2015   • Dysesthesia     last assessed: 11/25/2016   • Hammer toe     unspecified laterality; last assessed: 8/1/2013   • Hypertension    • Neuropathy in diabetes ([facility]) 1994   • Onychomycosis of toenail     last assessed: 2/22/2016   • Peripheral vascular disease ([facility])     left SFA stent in 2010   • Pes planus     unspecified laterality; last assessed: 8/1/2013   • Pneumonia     last assessed: 2/27/2016   • Prostate cancer ([facility])    • 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     last assessed: 8/15/2016   • Type 2 diabetes mellitus ([facility]) 07/26/2010   • Viral warts     last assessed: 7/24/2015     Past Surgical History:   Procedure Laterality Date   • CARDIAC CATHETERIZATION  07/29/2010   • CATARACT EXTRACTION, BILATERAL Bilateral     R 1999, L 2008   • COLONOSCOPY  2011   • FEMORAL ARTERY - POPLITEAL ARTERY BYPASS GRAFT  09/23/2013   • FOOT SURGERY      bone spur removed   • LEG SURGERY Bilateral     repair; L 8/20/2010 and 7/26/2012   • NE PLMT INTERSTITIAL DEV RADIAT TX PROSTATE 1/MULT N/A 6/22/2021    Procedure: INSERTION OF FIDUCIAL MARKER (TRANSRECTAL ULTRASOUND GUIDANCE), SPACEOAR;  Surgeon: Jero Loomis MD;  Location: [facility];  Service: Urology   • ROTATOR CUFF REPAIR Left 2009   • SHOULDER SURGERY Right 2005    collar bone     Social History     Substance and Sexual Activity   Alcohol Use Yes   • Alcohol/week: 1.0 standard drink of alcohol   • Types: 1 Glasses of wine per week    Comment: Stopped in 1986     Social History     Substance and Sexual Activity   Drug Use Never     Social History     Tobacco Use   Smoking Status  Never   • Passive exposure: Past   Smokeless Tobacco Never     Family History:   Family History   Problem Relation Age of Onset   • Aortic aneurysm Mother    • Heart disease Father    • Heart attack Father         acute myocardial infarction   • Heart disease Sister    • Heart attack Sister         acute myocardial infarction   • Throat cancer Maternal Grandfather    • Heart disease Paternal Grandfather    • No Known Problems Son        Meds/Allergies     No Known Allergies    Current Outpatient Medications:   •  acetaminophen (TYLENOL) 325 mg tablet, Take 2 tablets (650 mg total) by mouth every 6 (six) hours as needed for mild pain, headaches or fever, Disp: , Rfl: 0  •  ammonium lactate (LAC-HYDRIN) 12 % lotion, Apply topically 2 (two) times a day, Disp: 500 g, Rfl: 3  •  Ascorbic Acid (Vitamin C) 500 MG CAPS, Take 500 mg by mouth daily, Disp: , Rfl:   •  aspirin (ECOTRIN LOW STRENGTH) 81 mg EC tablet, Take 81 mg by mouth daily, Disp: , Rfl:   •  atorvastatin (LIPITOR) 20 mg tablet, TAKE 1 TABLET EVERY DAY, Disp: 90 tablet, Rfl: 3  •  brimonidine tartrate 0.2 % ophthalmic solution, Inject 0.2 % into the eye 2 (two) times a day, Disp: , Rfl:   •  carvedilol (COREG) 12.5 mg tablet, Take 1 tablet (12.5 mg total) by mouth 2 (two) times a day, Disp: 180 tablet, Rfl: 1  •  Cholecalciferol (Vitamin D3) 50 MCG (2000 UT) TABS, Take 2,000 Units by mouth daily, Disp: , Rfl:   •  co-enzyme Q-10 100 mg capsule, Take 100 mg by mouth daily, Disp: , Rfl:   •  ferrous sulfate 324 (65 Fe) mg, TAKE 1 TABLET TWICE DAILY BEFORE MEALS, Disp: 180 tablet, Rfl: 1  •  furosemide (LASIX) 20 mg tablet, Take 1 tablet (20 mg total) by mouth 3 (three) times a week, Disp: 36 tablet, Rfl: 3  •  glucose blood (OneTouch Ultra) test strip, Test blood sugar once a day, Disp: 100 strip, Rfl: 1  •  imiquimod (ALDARA) 5 % cream, APPLY 1 PACKET TOPICALLY 3 (THREE) TIMES A WEEK, Disp: 12 packet, Rfl: 11  •  losartan (COZAAR) 25 mg tablet, TAKE 1 TABLET  "(25 MG TOTAL) BY MOUTH DAILY, Disp: 90 tablet, Rfl: 3  •  metFORMIN (GLUCOPHAGE) 1000 MG tablet, TAKE ONE TABLET BY MOUTH TWICE A DAY WITH MEALS, Disp: 180 tablet, Rfl: 1  •  Mirabegron ER 25 MG TB24, Take 25 mg by mouth daily at bedtime, Disp: 90 tablet, Rfl: 3  •  multivitamin (THERAGRAN) TABS, Take 1 tablet by mouth daily, Disp: , Rfl:   •  omeprazole (PriLOSEC) 40 MG capsule, Take 1 capsule (40 mg total) by mouth daily as needed (acid reflux), Disp: 100 capsule, Rfl: 1  •  sitaGLIPtin (JANUVIA) 25 mg tablet, Take 1 tablet (25 mg total) by mouth daily, Disp: 90 tablet, Rfl: 2  •  spironolactone (ALDACTONE) 25 mg tablet, Take 1 tablet (25 mg total) by mouth daily, Disp: 90 tablet, Rfl: 1  •  tacrolimus (PROTOPIC) 0.1 % ointment, Apply topically daily Apply daily for maintenance, Disp: 100 g, Rfl: 3  •  tamsulosin (FLOMAX) 0.4 mg, Take 1 capsule (0.4 mg total) by mouth daily with dinner, Disp: 90 capsule, Rfl: 1  •  triamcinolone (KENALOG) 0.1 % ointment, Apply topically 2 (two) times a day Apply to body twice a day then switch to Protopic Ointment and DO NOT USE ON FACE, GROIN, ARMPITS, Disp: 453.6 g, Rfl: 1  •  TRUEplus Lancets 28G MISC, Test 1x/d, E11.29, Disp: 200 each, Rfl: 3    Vitals: Blood pressure 140/60, pulse 69, height 5' 10\" (1.778 m), weight 75.3 kg (166 lb), SpO2 99%.    Body mass index is 23.82 kg/m².  Vitals:    01/15/25 1301   Weight: 75.3 kg (166 lb)       BP Readings from Last 3 Encounters:   01/15/25 140/60   12/12/24 130/62   11/23/24 141/62         Physical Exam  Constitutional:       General: He is not in acute distress.     Appearance: He is well-developed. He is not diaphoretic.   Neck:      Thyroid: No thyromegaly.      Vascular: No JVD.      Trachea: No tracheal deviation.   Cardiovascular:      Rate and Rhythm: Normal rate and regular rhythm.      Heart sounds: S1 normal and S2 normal. Heart sounds not distant. Murmur heard.      Systolic (ejection) murmur is present with a grade of " 2/6.      No friction rub. No gallop. No S3 or S4 sounds.   Pulmonary:      Effort: Pulmonary effort is normal. No respiratory distress.      Breath sounds: Normal breath sounds. No wheezing or rales.   Chest:      Chest wall: No tenderness.   Abdominal:      General: Bowel sounds are normal. There is no distension.      Palpations: Abdomen is soft.      Tenderness: There is no abdominal tenderness.   Musculoskeletal:         General: No deformity.      Cervical back: Neck supple.   Skin:     General: Skin is warm and dry.      Coloration: Skin is not pale.      Findings: No rash.   Neurological:      Mental Status: He is alert and oriented to person, place, and time.   Psychiatric:         Behavior: Behavior normal.         Judgment: Judgment normal.         Diagnostic Studies Review Cardio:    Nuclear stress test.  Nuclear stress test done 09/27/2018 was a normal study with EF around 60%.  No ischemia noted it was Lexiscan stress test.    Nuclear Stress test.  Nuclear stress test done in February 2022 shows no significant perfusion abnormality EF was severely reduced.  Diaphragmatic attenuation EF calculated was around 30%    Echo Doppler.  Echo Doppler done 10/14/2020 shows EF 55 60%, mild MR, trace to mild PI, pressure half time was 660 millisecond     Echo Doppler.  Echo Doppler done 02/19/2022 shows EF 45%, diastolic dysfunction mildly abnormal grade 1, no aortic stenosis or regurgitation, moderate TR with PA pressure 58 mmHg, moderate MR.    Repeat echo Doppler done 4/11/2023 shows his EF is now 55%, his mild AI, mild MR and mild TR and PA pressure is around 25.    EKG:    Twelve lead EKG done on 09/13/2018 shows normal sinus rhythm heart rate 60 beats per minute.  No significant ST changes.    Twelve lead EKG done 05/17/2019 shows normal sinus rhythm heart rate 60 beats per minute.  No change from old EKG.      Twelve lead EKG 03/10/2020 shows normal sinus rhythm LVH by voltage heart rate 60 beats per minute  no change from old EKG.    Twelve lead EKG 09/23/2020 shows normal sinus rhythm LVH by voltage.  Q-waves in inferior leads cannot rule out 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.    Twelve lead EKG 03/08/2021 shows normal sinus rhythm LVH by voltage and Q-waves in inferior leads cannot rule out 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ion.     Twelve lead EKG done on 02/08/2022 shows sinus rhythm heart rate 98 beats per minute LVH by voltage as compared to previous EKGs his heart function is noted to be faster.  Inferior Q-waves noted cannot rule out 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ion.  Nonspecific ST changes in lateral precordial leads    Twelve lead EKG done on 11/21/2022 shows normal sinus rhythm heart rate 76 beats per minute LVH by voltage.  Q-wave noted inferiorly, not changed from previous EKG.    Twelve-lead EKG done 10/18/2020 shows normal sinus some heart rate 73 bpm no change from previous EKG.    Twelve-lead EKG done on 1/15/2025 shows normal sinus rhythm heart rate 69 bpm.  Poor wave progression with T inversion inferior lead not much change from previous EKG.        Lab Review   Lab Results   Component Value Date    WBC 6.46 08/21/2024    HGB 9.7 (L) 08/21/2024    HCT 29.2 (L) 08/21/2024    MCV 90 08/21/2024    RDW 14.2 08/21/2024     08/21/2024     BMP:  Lab Results   Component Value Date     04/17/2014    K 4.7 08/21/2024    CL 98 08/21/2024    CO2 23 08/21/2024    ANIONGAP 8 04/17/2014    BUN 22 08/21/2024    CREATININE 1.21 08/21/2024    GLUCOSE 137 04/17/2014    GLUF 154 (H) 08/21/2024    CALCIUM 9.3 08/21/2024    CORRECTEDCA 9.7 05/12/2023    EGFR 56 08/21/2024    MG 1.9 03/03/2022     LFT:  Lab Results   Component Value Date    AST 15 08/02/2024    ALT 15 08/02/2024    ALKPHOS 84 08/02/2024    TP 6.7 08/02/2024       Lab Results   Component Value Date    HGBA1C 6.6 (A) 11/21/2024     Lipid Profile:   Lab Results   Component Value Date    CHOL 106 07/19/2014    HDL 38 (L) 08/02/2024    LDLCALC 35 08/02/2024    TRIG 65  "08/02/2024     Lab Results   Component Value Date    CHOL 106 07/19/2014       Dr. Olayinka Begum MD [facility]      \"This note has been constructed using a voice recognition system.Therefore there may be syntax, spelling, and/or grammatical errors. Please call if you have any questions. \"  "

## 2025-01-22 ENCOUNTER — HOSPITAL ENCOUNTER (OUTPATIENT)
Dept: NON INVASIVE DIAGNOSTICS | Facility: HOSPITAL | Age: 80
Discharge: HOME | End: 2025-01-22
Attending: INTERNAL MEDICINE
Payer: MEDICARE

## 2025-01-22 ENCOUNTER — TELEPHONE (OUTPATIENT)
Age: 80
End: 2025-01-22

## 2025-01-22 VITALS
HEART RATE: 69 BPM | WEIGHT: 166 LBS | SYSTOLIC BLOOD PRESSURE: 140 MMHG | HEIGHT: 70 IN | DIASTOLIC BLOOD PRESSURE: 60 MMHG | BODY MASS INDEX: 23.77 KG/M2

## 2025-01-22 DIAGNOSIS — I25.10 CORONARY ARTERY DISEASE INVOLVING NATIVE CORONARY ARTERY OF NATIVE HEART WITHOUT ANGINA PECTORIS: ICD-10-CM

## 2025-01-22 DIAGNOSIS — E11.40 TYPE 2 DIABETES MELLITUS WITH DIABETIC NEUROPATHY, WITHOUT LONG-TERM CURRENT USE OF INSULIN (HCC): ICD-10-CM

## 2025-01-22 DIAGNOSIS — E11.22 CKD STAGE 3 DUE TO TYPE 2 DIABETES MELLITUS (HCC): ICD-10-CM

## 2025-01-22 DIAGNOSIS — I10 BENIGN ESSENTIAL HYPERTENSION: ICD-10-CM

## 2025-01-22 DIAGNOSIS — N18.30 CKD STAGE 3 DUE TO TYPE 2 DIABETES MELLITUS (HCC): ICD-10-CM

## 2025-01-22 DIAGNOSIS — I50.42 CHRONIC COMBINED SYSTOLIC AND DIASTOLIC HEART FAILURE, NYHA CLASS 2 (HCC): ICD-10-CM

## 2025-01-22 DIAGNOSIS — I70.219 ATHEROSCLEROSIS OF ARTERY OF EXTREMITY WITH INTERMITTENT CLAUDICATION (HCC): ICD-10-CM

## 2025-01-22 DIAGNOSIS — E78.5 DYSLIPIDEMIA: ICD-10-CM

## 2025-01-22 PROCEDURE — 93306 TTE W/DOPPLER COMPLETE: CPT

## 2025-01-22 PROCEDURE — 93306 TTE W/DOPPLER COMPLETE: CPT | Performed by: INTERNAL MEDICINE

## 2025-01-23 ENCOUNTER — RESULTS FOLLOW-UP (OUTPATIENT)
Dept: CARDIOLOGY CLINIC | Facility: CLINIC | Age: 80
End: 2025-01-23

## 2025-01-23 LAB
AORTIC ROOT: 3.8 CM
AV LVOT PEAK GRADIENT: 3 MMHG
AV PEAK GRADIENT: 12 MMHG
AV REGURGITATION PRESSURE HALF TIME: 467 MS
BSA FOR ECHO PROCEDURE: 1.93 M2
DOP CALC LVOT AREA: 3.46 CM2
DOP CALC LVOT DIAMETER: 2.1 CM
E WAVE DECELERATION TIME: 228 MS
E/A RATIO: 0.77
FRACTIONAL SHORTENING: 24 (ref 28–44)
INTERVENTRICULAR SEPTUM IN DIASTOLE (PARASTERNAL SHORT AXIS VIEW): 1.2 CM
INTERVENTRICULAR SEPTUM: 1.2 CM (ref 0.6–1.1)
LAAS-AP2: 30.9 CM2
LAAS-AP4: 24.5 CM2
LEFT ATRIUM SIZE: 4.1 CM
LEFT ATRIUM VOLUME (MOD BIPLANE): 99 ML
LEFT ATRIUM VOLUME INDEX (MOD BIPLANE): 51.3 ML/M2
LEFT INTERNAL DIMENSION IN SYSTOLE: 3.4 CM (ref 2.1–4)
LEFT VENTRICULAR INTERNAL DIMENSION IN DIASTOLE: 4.5 CM (ref 3.5–6)
LEFT VENTRICULAR POSTERIOR WALL IN END DIASTOLE: 1.1 CM
LEFT VENTRICULAR STROKE VOLUME: 43 ML
LV EF US.2D.A4C+ESTIMATED: 62 %
LVSV (TEICH): 43 ML
MV E'TISSUE VEL-SEP: 8 CM/S
MV PEAK A VEL: 0.93 M/S
MV PEAK E VEL: 72 CM/S
MV STENOSIS PRESSURE HALF TIME: 66 MS
MV VALVE AREA P 1/2 METHOD: 3.3
RIGHT ATRIUM AREA SYSTOLE A4C: 16.5 CM2
RIGHT VENTRICLE ID DIMENSION: 3.3 CM
SL CV AV DECELERATION TIME RETROGRADE: 1609 MS
SL CV AV PEAK GRADIENT RETROGRADE: 58 MMHG
SL CV LEFT ATRIUM LENGTH A2C: 6 CM
SL CV LV EF: 55
SL CV PED ECHO LEFT VENTRICLE DIASTOLIC VOLUME (MOD BIPLANE) 2D: 91 ML
SL CV PED ECHO LEFT VENTRICLE SYSTOLIC VOLUME (MOD BIPLANE) 2D: 48 ML
TRICUSPID ANNULAR PLANE SYSTOLIC EXCURSION: 2 CM

## 2025-01-23 NOTE — TELEPHONE ENCOUNTER
----- Message from Olayinka Begum MD sent at 1/23/2025  9:54 AM EST -----  Patient's echo shows normal LV systolic function.  No significant, to mild valvular disease.  Patient can keep an appointment.    Please call patient about echo report.

## 2025-01-29 DIAGNOSIS — I10 BENIGN ESSENTIAL HYPERTENSION: ICD-10-CM

## 2025-01-29 RX ORDER — LOSARTAN POTASSIUM 25 MG/1
25 TABLET ORAL DAILY
Qty: 90 TABLET | Refills: 1 | Status: SHIPPED | OUTPATIENT
Start: 2025-01-29

## 2025-01-31 ENCOUNTER — TELEPHONE (OUTPATIENT)
Dept: NEPHROLOGY | Facility: CLINIC | Age: 80
End: 2025-01-31

## 2025-01-31 DIAGNOSIS — E11.22 CKD STAGE 3 DUE TO TYPE 2 DIABETES MELLITUS (HCC): Primary | ICD-10-CM

## 2025-01-31 DIAGNOSIS — E11.22 TYPE 2 DIABETES MELLITUS WITH STAGE 2 CHRONIC KIDNEY DISEASE, WITHOUT LONG-TERM CURRENT USE OF INSULIN  (HCC): ICD-10-CM

## 2025-01-31 DIAGNOSIS — N18.30 CKD STAGE 3 DUE TO TYPE 2 DIABETES MELLITUS (HCC): Primary | ICD-10-CM

## 2025-01-31 DIAGNOSIS — I10 BENIGN ESSENTIAL HYPERTENSION: ICD-10-CM

## 2025-01-31 DIAGNOSIS — N18.2 TYPE 2 DIABETES MELLITUS WITH STAGE 2 CHRONIC KIDNEY DISEASE, WITHOUT LONG-TERM CURRENT USE OF INSULIN  (HCC): ICD-10-CM

## 2025-02-03 ENCOUNTER — TELEPHONE (OUTPATIENT)
Dept: NEPHROLOGY | Facility: CLINIC | Age: 80
End: 2025-02-03

## 2025-02-03 NOTE — TELEPHONE ENCOUNTER
Left voicemail for the patient reminding to please complete blood and urine test prior to 2/10 appointment with Lu.  Advised patient to call back with any questions or concerns.

## 2025-02-04 ENCOUNTER — OFFICE VISIT (OUTPATIENT)
Age: 80
End: 2025-02-04
Payer: MEDICARE

## 2025-02-04 ENCOUNTER — TELEPHONE (OUTPATIENT)
Age: 80
End: 2025-02-04

## 2025-02-04 VITALS — RESPIRATION RATE: 17 BRPM | WEIGHT: 166 LBS | BODY MASS INDEX: 23.77 KG/M2 | HEIGHT: 70 IN

## 2025-02-04 DIAGNOSIS — M21.962 ACQUIRED DEFORMITY OF LEFT FOOT: ICD-10-CM

## 2025-02-04 DIAGNOSIS — E11.42 DIABETIC POLYNEUROPATHY ASSOCIATED WITH TYPE 2 DIABETES MELLITUS (HCC): Primary | ICD-10-CM

## 2025-02-04 DIAGNOSIS — B35.1 ONYCHOMYCOSIS: ICD-10-CM

## 2025-02-04 DIAGNOSIS — M79.672 PAIN IN BOTH FEET: ICD-10-CM

## 2025-02-04 DIAGNOSIS — I73.9 PAD (PERIPHERAL ARTERY DISEASE) (HCC): ICD-10-CM

## 2025-02-04 DIAGNOSIS — M54.16 LUMBAR RADICULOPATHY: ICD-10-CM

## 2025-02-04 DIAGNOSIS — M79.671 PAIN IN BOTH FEET: ICD-10-CM

## 2025-02-04 PROCEDURE — 99213 OFFICE O/P EST LOW 20 MIN: CPT | Performed by: PODIATRIST

## 2025-02-04 NOTE — TELEPHONE ENCOUNTER
Received call from pt asking when he needed to have his blood work completed.  Advised pt per Dr. Begum's LOV note that he wanted to check the pt's electrolyte levels and to have his blood drawn at his earliest convenience.  Pt verbalized understanding and has no further questions at this time.

## 2025-02-04 NOTE — PROGRESS NOTES
Assessment/Plan: Preulcerative callus/Gamino grade 0 ulcer plantar aspect left foot.  Acquired deformity foot.  History of metatarsal resection.  Diabetic foot.  Diabetic neuropathy.  Peripheral artery disease.  Mycosis of nail.     Plan.  Chart reviewed.  Primary care notes reviewed.  Lab work reviewed.  Patient examined.  Diabetic foot exam performed.  Betadine wet-to-dry dressing applied.  Patient watch for signs of infection.  Aftercare instruction given.  Return for follow-up.  We will add topical Aldara cream.  Patient wear accommodative shoes daily.            Diagnoses and all orders for this visit:     Diabetic ulcer of left midfoot associated with type 2 diabetes mellitus, limited to breakdown of skin ([facility]).  Rule out atypical verruca as etiology.     Diabetic polyneuropathy associated with type 2 diabetes mellitus ([facility])     PAD (peripheral artery disease) ([facility])     Onychomycosis     Acquired deformity of left foot     Pain in both feet            Subjective: Patient is doing much better.  He is not bandaging his foot.  He sees no evidence of blood in his socks.  He no history of fever or night sweats.  Patient is diabetic     No Known Allergies        Current Outpatient Medications:     acetaminophen (TYLENOL) 325 mg tablet, Take 2 tablets (650 mg total) by mouth every 6 (six) hours as needed for mild pain, headaches or fever, Disp: , Rfl: 0    Ascorbic Acid (Vitamin C) 500 MG CAPS, Take 500 mg by mouth daily, Disp: , Rfl:     aspirin (ECOTRIN LOW STRENGTH) 81 mg EC tablet, Take 81 mg by mouth daily, Disp: , Rfl:     atorvastatin (LIPITOR) 20 mg tablet, TAKE 1 TABLET EVERY DAY, Disp: 90 tablet, Rfl: 1    brimonidine tartrate 0.2 % ophthalmic solution, Inject 0.2 % into the eye 2 (two) times a day, Disp: , Rfl:     carvedilol (COREG) 12.5 mg tablet, Take 1 tablet (12.5 mg total) by mouth 2 (two) times a day, Disp: 180 tablet, Rfl: 1    Cholecalciferol (Vitamin D3) 50 MCG (2000 UT) TABS, Take 2,000 Units  by mouth daily, Disp: , Rfl:     co-enzyme Q-10 100 mg capsule, Take 100 mg by mouth daily, Disp: , Rfl:     ferrous sulfate 324 (65 Fe) mg, Take 1 tablet (324 mg total) by mouth 2 (two) times a day before meals, Disp: 180 tablet, Rfl: 3    glucose blood (OneTouch Ultra) test strip, Test blood sugar once a day, Disp: 100 strip, Rfl: 3    losartan (COZAAR) 25 mg tablet, Take 1 tablet (25 mg total) by mouth daily, Disp: 90 tablet, Rfl: 1    metFORMIN (GLUCOPHAGE) 1000 MG tablet, TAKE ONE TABLET BY MOUTH TWO TIMES A DAY WITH MEALS, Disp: 180 tablet, Rfl: 1    multivitamin (THERAGRAN) TABS, Take 1 tablet by mouth daily, Disp: , Rfl:     omeprazole (PriLOSEC) 40 MG capsule, Take 1 capsule (40 mg total) by mouth daily as needed (acid reflux), Disp: 90 capsule, Rfl: 1    Probiotic Product (CULTURELLE PROBIOTICS) CHEW, Chew daily, Disp: , Rfl:     sitaGLIPtin (JANUVIA) 25 mg tablet, Take one tablet by mouth once a day, Disp: 90 tablet, Rfl: 3    spironolactone (ALDACTONE) 25 mg tablet, Take 1 tablet (25 mg total) by mouth daily, Disp: 90 tablet, Rfl: 1    tamsulosin (FLOMAX) 0.4 mg, Take 1 capsule (0.4 mg total) by mouth daily with dinner, Disp: 90 capsule, Rfl: 2    TRUEplus Lancets 28G MISC, Test 1x/d, E11.29, Disp: 200 each, Rfl: 3    Infant Foods (Follow-Up) POWD, HANDICAP SERENA, medically recommended, <taxonomy [id number]> due to arthritic/orthopedic condition (#2,#5) (Patient not taking: Reported on 9/19/2022), Disp: 99 g, Rfl: 1           Patient Active Problem List   Diagnosis    Coronary artery disease involving native coronary artery of native heart with angina pectoris (HCC)    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 mellitus     Diabetic neuropathy (HCC)    GE reflux    Lumbar radiculopathy    Type 2 diabetes mellitus with diabetic neuropathy (HCC)    Prostate cancer screening    Dyslipidemia    Medicare annual wellness visit, subsequent    Type 2 diabetes mellitus with hyperglycemia, without long-term current use  of insulin (HCC)    Benign essential hypertension    Bilateral leg weakness    Prostate cancer (HCC)    Insomnia, persistent    Hyponatremia    Orthostatic hypotension    Chronic right-sided low back pain without sciatica    Hypokalemia    Acute on chronic combined systolic and diastolic congestive heart failure (HCC)    Heart failure, left, 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)    Chronic combined systolic and diastolic heart failure, NYHA class 2 (HCC)    Edema of both feet    Diarrhea    Gait disorder    Acute pain of left thigh    Peripheral arterial disease (HCC)    Iron deficiency anemia    Uses roller walker             Patient ID: Pernell Covarrubias is a 78 y.o. male.     HPI     The following portions of the patient's history were reviewed and updated as appropriate:      family history includes Aortic aneurysm in his mother; Heart attack in his father and sister; Heart disease in his father, paternal grandfather, and sister; Throat cancer in his maternal grandfather.       reports that he has never smoked. He has never been exposed to tobacco smoke. He has never used smokeless tobacco. He reports that he does not currently use alcohol. He reports that he does not use drugs.        Objective:  Patient's shoes and socks removed.      Foot ExamPhysical Exam     Cardiovascular:      Pulses: Pulses are weak.               Physical Exam  Left Foot: Appearance: a deformity (ball of foot and distal fifth metatarsal ). Fifth toe deformities include hammer toe. Tenderness: None except the plantar aspect of the foot.   Right Foot: Appearance: a deformity (distal fifth metatarsal ).   Left Ankle: ROM: limited ROM in all planes   Right Ankle: ROM: limited ROM in all planes   Neurological Exam: Light touch was decreased bilaterally. Vibratory sensation was decreased in both first metatarsophalangeal joints. Response to monofilament test was absent bilaterally. Deep tendon reflexes: achilles reflex 1/4 bilaterally.   Vascular Exam:  performed Dorsalis pedis pulses were 1/4 bilaterally. Posterior tibial pulses were 1/4 bilaterally. Elevation Pallor: diminished bilaterally. Capillary refill time was Q. 9, findings bilateral. Negative digital hair noted, positive abnormal cooling. All pre-ulcer, lesions debrided. Today, but between 1-3 seconds bilaterally. Edema: mild bilaterally and All mycotic nails debrided. Today. The patient was given orthotics to help control his feet and at as cushioning and padding on the bottom of his feet q9 findings.   Toenails: All of the toenails were elongated, hypertrophied, discolored, ingrown, shown to have subungual debris and tender.      Socks and shoes removed, the Right Foot: the foot was dry.   The sensory exam showed diminished vibratory sensation at the level of the toes. Diminished tactile sensation with monofilament testing throughout the right foot.   Socks and shoes removed, Left Foot: the foot was dry.   The sensory exam showed diminished vibratory sensation at the level of the toes. Diminished tactile sensation with monofilament testing throughout the left foot.   Pulses:   1+ in the posterior tibialis on the right   1+ in the dorsalis pedis on the right. Capillary refills findings on the left were delayed in the toes.   Pulses:   1+ in the posterior tibialis on the left   1+ in the dorsalis pedis on the left.   Assign Risk Category: 2: Loss of protective sensation with or without weakness, deformity, callus, pre-ulcer, or history of ulceration. High risk.   Hyperkeratosis: present on both first sub metatarsals, present on both fifth sub metatarsals and Pre-ulcerative lesion, submetatarsal one to 5, bilateral.   Shoe Gear Evaluation: performed (). Recommendation(s): custom inlays.      Patient's shoes and socks removed.Right Foot/Ankle   Right Foot Inspection  Skin Exam: callus and callus               Toe Exam: swelling and erythema  Sensory   Vibration: diminished  Proprioception: diminished       Vascular  Capillary refills: elevated        Left Foot/Ankle  Left Foot Inspection  Skin Exam: callus.  Submetatarsal 5 of the left foot demonstrates 0.5 cm grade Gamino grade 0/preulcerative callus. Cellulitis resolved.              Toe Exam: swelling and erythema                   Sensory   Vibration: diminished  Proprioception: diminished     Vascular  Capillary refills: elevated.     Patient's shoes and socks removed.         Patient's shoes and socks removed.     Right Foot/Ankle   Right Foot Inspection        Toe Exam: right toe deformity.      Sensory   Vibration: absent  Proprioception: diminished  Monofilament testing: diminished     Vascular  Capillary refills: < 3 seconds        Left Foot/Ankle  Left Foot Inspection        Toe Exam: left toe deformity.      Sensory   Vibration: absent  Proprioception: diminished  Monofilament testing: diminished     Vascular  Capillary refills: < 3 seconds        Patient's shoes and socks removed.     Right Foot/Ankle   Right Foot Inspection  Skin Exam: dry skin and pre-ulcer.      Toe Exam: right toe deformity.      Sensory   Vibration: diminished  Proprioception: diminished  Monofilament testing: diminished     Vascular  Capillary refills: < 3 seconds  The right DP pulse is 2+. The right PT pulse is 2+.      Left Foot/Ankle  Left Foot Inspection  Skin Exam: dry skin and pre-ulcer.      Toe Exam: left toe deformity.      Sensory   Vibration: diminished  Proprioception: diminished  Monofilament testing: diminished     Vascular  Capillary refills: < 3 seconds  The left DP pulse is 2+. The left PT pulse is 2+.      Assign Risk Category  Deformity present  Loss of protective sensation  Weak pulses  Risk: 2

## 2025-02-05 NOTE — TELEPHONE ENCOUNTER
Called patient reminding to please complete labwork/ blood and urine tests  prior to 2/10 appointment with Lu.

## 2025-02-10 ENCOUNTER — OFFICE VISIT (OUTPATIENT)
Dept: NEPHROLOGY | Facility: CLINIC | Age: 80
End: 2025-02-10
Payer: MEDICARE

## 2025-02-10 VITALS
HEART RATE: 79 BPM | BODY MASS INDEX: 23.53 KG/M2 | OXYGEN SATURATION: 96 % | DIASTOLIC BLOOD PRESSURE: 50 MMHG | SYSTOLIC BLOOD PRESSURE: 120 MMHG | WEIGHT: 164 LBS

## 2025-02-10 DIAGNOSIS — I95.1 ORTHOSTATIC HYPOTENSION: ICD-10-CM

## 2025-02-10 DIAGNOSIS — N18.9 ANEMIA DUE TO CHRONIC KIDNEY DISEASE, UNSPECIFIED CKD STAGE: ICD-10-CM

## 2025-02-10 DIAGNOSIS — I50.42 CHRONIC COMBINED SYSTOLIC AND DIASTOLIC HEART FAILURE, NYHA CLASS 2 (HCC): ICD-10-CM

## 2025-02-10 DIAGNOSIS — E11.22 CKD STAGE 3 DUE TO TYPE 2 DIABETES MELLITUS (HCC): Primary | ICD-10-CM

## 2025-02-10 DIAGNOSIS — N18.30 CKD STAGE 3 DUE TO TYPE 2 DIABETES MELLITUS (HCC): Primary | ICD-10-CM

## 2025-02-10 DIAGNOSIS — E87.1 HYPONATREMIA: ICD-10-CM

## 2025-02-10 DIAGNOSIS — D63.1 ANEMIA DUE TO CHRONIC KIDNEY DISEASE, UNSPECIFIED CKD STAGE: ICD-10-CM

## 2025-02-10 DIAGNOSIS — I10 BENIGN ESSENTIAL HYPERTENSION: ICD-10-CM

## 2025-02-10 PROCEDURE — 99214 OFFICE O/P EST MOD 30 MIN: CPT | Performed by: PHYSICIAN ASSISTANT

## 2025-02-10 NOTE — PROGRESS NOTES
OFFICE FOLLOW UP - Nephrology   Pernell Covarrubias 79 y.o. male MRN: [id number]     Assessment & Plan  CKD stage 3 due to type 2 diabetes mellitus (HCC)  Creatinine 1.2 with GFR 56 in Aug 2024   patient will be repeating new labs this month  Chronic combined systolic and diastolic heart failure, NYHA class 2 (HCC)  Recent ECHO with EF improved to 55-60% and G1DD  Does have some lower extremity edema  Currently on Lasix 20 mg 3 times a week  We discussed calling if edema worsens or weight increases as we may need to increase Lasix to daily  Benign essential hypertension  BP currently acceptable  Continue current medications  Orthostatic hypotension  History of orthostatic hypotension  No current orthostatic symptoms  Continue current medications  Anemia due to chronic kidney disease, unspecified CKD stage  Last hgb 9.7 in Aug 2024  Repeat with next labs  Hyponatremia  Last sodium 131   Possibly due to CHF  Will follow-up on next labs.  If sodium worsening would increase Lasix to 20 mg daily as he does have some edema      Plan:   Repeat renal labs this month  Follow up in 6 months with Dr Montejo with repeat labs prior     HPI: Pernell Covarrubias is a 79 y.o. male who is here for CKD follow-up.    Overall doing okay.  He does have some chronic lower extremity edema for which he takes Lasix.  Notes some chronic insomnia and nocturia.  No chest pain or shortness of breath.  Good appetite.    ROS:   A complete 10 point review of systems was done. Pertinent positives and negatives as noted in the HPI, otherwise the review of systems is negative.    Allergies: Patient has no known allergies.    Medications:   Current Outpatient Medications:     acetaminophen (TYLENOL) 325 mg tablet, Take 2 tablets (650 mg total) by mouth every 6 (six) hours as needed for mild pain, headaches or fever, Disp: , Rfl: 0    ammonium lactate (LAC-HYDRIN) 12 % lotion, Apply topically 2 (two) times a day, Disp: 500 g, Rfl: 3    Ascorbic Acid  (Vitamin C) 500 MG CAPS, Take 500 mg by mouth daily, Disp: , Rfl:     aspirin (ECOTRIN LOW STRENGTH) 81 mg EC tablet, Take 81 mg by mouth daily, Disp: , Rfl:     atorvastatin (LIPITOR) 20 mg tablet, TAKE 1 TABLET EVERY DAY, Disp: 90 tablet, Rfl: 3    brimonidine tartrate 0.2 % ophthalmic solution, Inject 0.2 % into the eye 2 (two) times a day, Disp: , Rfl:     carvedilol (COREG) 12.5 mg tablet, Take 1 tablet (12.5 mg total) by mouth 2 (two) times a day, Disp: 180 tablet, Rfl: 1    Cholecalciferol (Vitamin D3) 50 MCG (2000 UT) TABS, Take 2,000 Units by mouth daily, Disp: , Rfl:     co-enzyme Q-10 100 mg capsule, Take 100 mg by mouth daily, Disp: , Rfl:     ferrous sulfate 324 (65 Fe) mg, TAKE 1 TABLET TWICE DAILY BEFORE MEALS, Disp: 180 tablet, Rfl: 1    furosemide (LASIX) 20 mg tablet, Take 1 tablet (20 mg total) by mouth 3 (three) times a week, Disp: 36 tablet, Rfl: 3    glucose blood (OneTouch Ultra) test strip, Test blood sugar once a day, Disp: 100 strip, Rfl: 1    imiquimod (ALDARA) 5 % cream, APPLY 1 PACKET TOPICALLY 3 (THREE) TIMES A WEEK, Disp: 12 packet, Rfl: 11    losartan (COZAAR) 25 mg tablet, TAKE 1 TABLET (25 MG TOTAL) BY MOUTH DAILY, Disp: 90 tablet, Rfl: 1    metFORMIN (GLUCOPHAGE) 1000 MG tablet, TAKE ONE TABLET BY MOUTH TWICE A DAY WITH MEALS, Disp: 180 tablet, Rfl: 1    Mirabegron ER 25 MG TB24, Take 25 mg by mouth daily at bedtime, Disp: 90 tablet, Rfl: 3    multivitamin (THERAGRAN) TABS, Take 1 tablet by mouth daily, Disp: , Rfl:     omeprazole (PriLOSEC) 40 MG capsule, Take 1 capsule (40 mg total) by mouth daily as needed (acid reflux), Disp: 100 capsule, Rfl: 1    sitaGLIPtin (JANUVIA) 25 mg tablet, Take 1 tablet (25 mg total) by mouth daily, Disp: 90 tablet, Rfl: 2    spironolactone (ALDACTONE) 25 mg tablet, Take 1 tablet (25 mg total) by mouth daily, Disp: 90 tablet, Rfl: 1    tacrolimus (PROTOPIC) 0.1 % ointment, Apply topically daily Apply daily for maintenance, Disp: 100 g, Rfl: 3     tamsulosin (FLOMAX) 0.4 mg, Take 1 capsule (0.4 mg total) by mouth daily with dinner, Disp: 90 capsule, Rfl: 1    triamcinolone (KENALOG) 0.1 % ointment, Apply topically 2 (two) times a day Apply to body twice a day then switch to Protopic Ointment and DO NOT USE ON FACE, GROIN, ARMPITS, Disp: 453.6 g, Rfl: 1    TRUEplus Lancets 28G MISC, Test 1x/d, E11.29, Disp: 200 each, Rfl: 3    Past Medical History:   Diagnosis Date    Acquired hallux valgus     last assessed: 8/1/2013    Allergic rhinitis     Anemia 10/26/2010    Atrophy of nail     last assessed: 7/7/2015    Cancer ([facility]) 2020    Chronic kidney disease     chronic renal insufficiency    Coronary artery disease     Deformity of ankle and foot, acquired     last assessed: 2/22/2016    Diabetes mellitus ([facility]) 1994    Difficulty walking     last assessed: 12/14/2015    Dysesthesia     last assessed: 11/25/2016    Hammer toe     unspecified laterality; last assessed: 8/1/2013    Hypertension     Neuropathy in diabetes ([facility]) 1994    Onychomycosis of toenail     last assessed: 2/22/2016    Peripheral vascular disease ([facility])     left SFA stent in 2010    Pes planus     unspecified laterality; last assessed: 8/1/2013    Pneumonia     last assessed: 2/27/2016    Prostate cancer ([facility])     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     last assessed: 8/15/2016    Type 2 diabetes mellitus ([facility]) 07/26/2010    Viral warts     last assessed: 7/24/2015     Past Surgical History:   Procedure Laterality Date    CARDIAC CATHETERIZATION  07/29/2010    CATARACT EXTRACTION, BILATERAL Bilateral     R 1999, L 2008    COLONOSCOPY  2011    FEMORAL ARTERY - POPLITEAL ARTERY BYPASS GRAFT  09/23/2013    FOOT SURGERY      bone spur removed    LEG SURGERY Bilateral     repair; L 8/20/2010 and 7/26/2012    [place] PLMT INTERSTITIAL DEV RADIAT TX PROSTATE 1/MULT N/A 6/22/2021    Procedure: INSERTION OF FIDUCIAL MARKER (TRANSRECTAL ULTRASOUND GUIDANCE), SPACEOAR;  Surgeon: Jero Loomis,  MD;  Location: [facility];  Service: Urology    ROTATOR CUFF REPAIR Left 2009    SHOULDER SURGERY Right 2005    collar bone     Family History   Problem Relation Age of Onset    Aortic aneurysm Mother     Heart disease Father     Heart attack Father         acute myocardial infarction    Heart disease Sister     Heart attack Sister         acute myocardial infarction    Throat cancer Maternal Grandfather     Heart disease Paternal Grandfather     No Known Problems Son       reports that he has never smoked. He has been exposed to tobacco smoke. He has never used smokeless tobacco. He reports current alcohol use of about 1.0 standard drink of alcohol per week. He reports that he does not use drugs.      Physical Exam:   Vitals:    02/10/25 0953   BP: 120/50   BP Location: Left arm   Patient Position: Sitting   Cuff Size: Adult   Pulse: 79   SpO2: 96%   Weight: 74.4 kg (164 lb)     Body mass index is 23.53 kg/m².    General: no acute distress   Eyes: conjunctivae pink, anicteric sclerae  ENT: mucous membranes moist  Neck: supple, no JVD  Chest: clear to auscultation bilaterally with no wheezes, rale or rhochi  CVS: regular rate and rhythm   Abdomen: soft, non-tender, non-distended  Extremities: +1 bilateral lower extremity edema  Skin: no rash  Neuro: awake and alert       Lab Results:  Results for orders placed or performed during the hospital encounter of 01/22/25   Echo complete w/ contrast if indicated    Collection Time: 01/22/25 10:30 AM   Result Value Ref Range    BSA 1.93 m2    A4C EF 62 %    LVIDd 4.50 cm    LVIDS 3.40 cm    IVSd 1.20 cm    LVPWd 1.10 cm    LVOT diameter 2.1 cm    FS 24 28 - 44    MV E' Tissue Velocity Septal 8 cm/s    LA Volume Index (BP) 51.3 mL/m2    E/A ratio 0.77     E wave deceleration time 228 ms    MV Peak E Ludin 72 cm/s    MV Peak A Ludin 0.93 m/s    AV LVOT peak gradient 3 mmHg    RVID d 3.3 cm    Tricuspid annular plane systolic excursion 2.00 cm    LA size 4.1 cm    LA length (A2C) 6.00  "cm    LA volume (BP) 99 mL    RAA A4C 16.5 cm2    AV peak gradient 12 mmHg    AV Deceleration Time 1,609 ms    AV regurgitation pressure 1/2 time 467 ms    MV stenosis pressure 1/2 time 66 ms    MV valve area p 1/2 method 3.30     Ao root 3.80 cm    AV peak gradient 58 mmHg    Left ventricular stroke volume (2D) 43.00 mL    IVS 1.2 cm    LEFT VENTRICLE SYSTOLIC VOLUME (MOD BIPLANE) 2D 48 mL    LV DIASTOLIC VOLUME (MOD BIPLANE) 2D 91 mL    Left Atrium Area-systolic Four Chamber 24.5 cm2    Left Atrium Area-systolic Apical Two Chamber 30.9 cm2    LVSV, 2D 43 mL    LVOT area 3.46 cm2    LV EF 55      *Note: Due to a large number of results and/or encounters for the requested time period, some results have not been displayed. A complete set of results can be found in Results Review.             Invalid input(s): \"ALBUMIN\"        Portions of the record may have been created with voice recognition software. Occasional wrong word or \"sound a like\" substitutions may have occurred due to the inherent limitations of voice recognition software. Read the chart carefully and recognize, using context, where substitutions have occurred.If you have any questions, please contact the dictating provider.  "

## 2025-02-10 NOTE — ASSESSMENT & PLAN NOTE
BP currently acceptable  Continue current medications  
Creatinine 1.2 with GFR 56 in Aug 2024   patient will be repeating new labs this month  
History of orthostatic hypotension  No current orthostatic symptoms  Continue current medications  
Last hgb 9.7 in Aug 2024  Repeat with next labs  
Last sodium 131   Possibly due to CHF  Will follow-up on next labs.  If sodium worsening would increase Lasix to 20 mg daily as he does have some edema  
Recent ECHO with EF improved to 55-60% and G1DD  Does have some lower extremity edema  Currently on Lasix 20 mg 3 times a week  We discussed calling if edema worsens or weight increases as we may need to increase Lasix to daily  
none

## 2025-02-11 ENCOUNTER — APPOINTMENT (OUTPATIENT)
Dept: LAB | Facility: CLINIC | Age: 80
End: 2025-02-11
Payer: MEDICARE

## 2025-02-11 DIAGNOSIS — D63.1 ANEMIA DUE TO STAGE 3A CHRONIC KIDNEY DISEASE  (HCC): ICD-10-CM

## 2025-02-11 DIAGNOSIS — E55.9 VITAMIN D DEFICIENCY: ICD-10-CM

## 2025-02-11 DIAGNOSIS — I25.10 CORONARY ARTERY DISEASE INVOLVING NATIVE CORONARY ARTERY OF NATIVE HEART WITHOUT ANGINA PECTORIS: ICD-10-CM

## 2025-02-11 DIAGNOSIS — I50.42 CHRONIC COMBINED SYSTOLIC AND DIASTOLIC HEART FAILURE, NYHA CLASS 2 (HCC): ICD-10-CM

## 2025-02-11 DIAGNOSIS — N18.2 CKD STAGE 2 DUE TO TYPE 2 DIABETES MELLITUS  (HCC): ICD-10-CM

## 2025-02-11 DIAGNOSIS — C61 PROSTATE CANCER (HCC): ICD-10-CM

## 2025-02-11 DIAGNOSIS — I10 BENIGN ESSENTIAL HYPERTENSION: ICD-10-CM

## 2025-02-11 DIAGNOSIS — N18.30 CKD STAGE 3 DUE TO TYPE 2 DIABETES MELLITUS (HCC): ICD-10-CM

## 2025-02-11 DIAGNOSIS — N18.2 TYPE 2 DIABETES MELLITUS WITH STAGE 2 CHRONIC KIDNEY DISEASE, WITHOUT LONG-TERM CURRENT USE OF INSULIN  (HCC): ICD-10-CM

## 2025-02-11 DIAGNOSIS — E11.65 TYPE 2 DIABETES MELLITUS WITH HYPERGLYCEMIA, WITHOUT LONG-TERM CURRENT USE OF INSULIN (HCC): ICD-10-CM

## 2025-02-11 DIAGNOSIS — I70.219 ATHEROSCLEROSIS OF ARTERY OF EXTREMITY WITH INTERMITTENT CLAUDICATION (HCC): ICD-10-CM

## 2025-02-11 DIAGNOSIS — E78.5 DYSLIPIDEMIA: ICD-10-CM

## 2025-02-11 DIAGNOSIS — I95.1 ORTHOSTATIC HYPOTENSION: ICD-10-CM

## 2025-02-11 DIAGNOSIS — N18.31 ANEMIA DUE TO STAGE 3A CHRONIC KIDNEY DISEASE  (HCC): ICD-10-CM

## 2025-02-11 DIAGNOSIS — E11.40 TYPE 2 DIABETES MELLITUS WITH DIABETIC NEUROPATHY, WITHOUT LONG-TERM CURRENT USE OF INSULIN (HCC): ICD-10-CM

## 2025-02-11 DIAGNOSIS — E11.22 CKD STAGE 3 DUE TO TYPE 2 DIABETES MELLITUS (HCC): ICD-10-CM

## 2025-02-11 DIAGNOSIS — E87.1 HYPONATREMIA: ICD-10-CM

## 2025-02-11 DIAGNOSIS — E11.22 CKD STAGE 2 DUE TO TYPE 2 DIABETES MELLITUS  (HCC): ICD-10-CM

## 2025-02-11 DIAGNOSIS — E11.22 TYPE 2 DIABETES MELLITUS WITH STAGE 2 CHRONIC KIDNEY DISEASE, WITHOUT LONG-TERM CURRENT USE OF INSULIN  (HCC): ICD-10-CM

## 2025-02-11 LAB
ALBUMIN SERPL BCG-MCNC: 3.9 G/DL (ref 3.5–5)
ALP SERPL-CCNC: 87 U/L (ref 34–104)
ALT SERPL W P-5'-P-CCNC: 20 U/L (ref 7–52)
ANION GAP SERPL CALCULATED.3IONS-SCNC: 6 MMOL/L (ref 4–13)
AST SERPL W P-5'-P-CCNC: 20 U/L (ref 13–39)
BACTERIA UR QL AUTO: ABNORMAL /HPF
BILIRUB SERPL-MCNC: 0.43 MG/DL (ref 0.2–1)
BILIRUB UR QL STRIP: NEGATIVE
BUN SERPL-MCNC: 20 MG/DL (ref 5–25)
CALCIUM SERPL-MCNC: 9.5 MG/DL (ref 8.4–10.2)
CHLORIDE SERPL-SCNC: 96 MMOL/L (ref 96–108)
CLARITY UR: CLEAR
CO2 SERPL-SCNC: 24 MMOL/L (ref 21–32)
COLOR UR: ABNORMAL
CREAT SERPL-MCNC: 0.98 MG/DL (ref 0.6–1.3)
CREAT UR-MCNC: 98.6 MG/DL
ERYTHROCYTE [DISTWIDTH] IN BLOOD BY AUTOMATED COUNT: 13.8 % (ref 11.6–15.1)
EST. AVERAGE GLUCOSE BLD GHB EST-MCNC: 151 MG/DL
GFR SERPL CREATININE-BSD FRML MDRD: 72 ML/MIN/1.73SQ M
GLUCOSE P FAST SERPL-MCNC: 125 MG/DL (ref 65–99)
GLUCOSE UR STRIP-MCNC: NEGATIVE MG/DL
HBA1C MFR BLD: 6.9 %
HCT VFR BLD AUTO: 29.3 % (ref 36.5–49.3)
HGB BLD-MCNC: 9.8 G/DL (ref 12–17)
HGB UR QL STRIP.AUTO: NEGATIVE
KETONES UR STRIP-MCNC: NEGATIVE MG/DL
LEUKOCYTE ESTERASE UR QL STRIP: NEGATIVE
MCH RBC QN AUTO: 29.7 PG (ref 26.8–34.3)
MCHC RBC AUTO-ENTMCNC: 33.4 G/DL (ref 31.4–37.4)
MCV RBC AUTO: 89 FL (ref 82–98)
MICROALBUMIN UR-MCNC: 10.9 MG/L
MICROALBUMIN/CREAT 24H UR: 11 MG/G CREATININE (ref 0–30)
NITRITE UR QL STRIP: NEGATIVE
NON-SQ EPI CELLS URNS QL MICRO: ABNORMAL /HPF
PH UR STRIP.AUTO: 6.5 [PH]
PHOSPHATE SERPL-MCNC: 3.1 MG/DL (ref 2.3–4.1)
PLATELET # BLD AUTO: 237 THOUSANDS/UL (ref 149–390)
PMV BLD AUTO: 9.7 FL (ref 8.9–12.7)
POTASSIUM SERPL-SCNC: 5.1 MMOL/L (ref 3.5–5.3)
PROT SERPL-MCNC: 6.5 G/DL (ref 6.4–8.4)
PROT UR STRIP-MCNC: ABNORMAL MG/DL
RBC # BLD AUTO: 3.3 MILLION/UL (ref 3.88–5.62)
RBC #/AREA URNS AUTO: ABNORMAL /HPF
SODIUM SERPL-SCNC: 126 MMOL/L (ref 135–147)
SP GR UR STRIP.AUTO: 1.02 (ref 1–1.03)
URATE SERPL-MCNC: 4.1 MG/DL (ref 3.5–8.5)
UROBILINOGEN UR STRIP-ACNC: <2 MG/DL
WBC # BLD AUTO: 6.68 THOUSAND/UL (ref 4.31–10.16)
WBC #/AREA URNS AUTO: ABNORMAL /HPF

## 2025-02-11 PROCEDURE — 80053 COMPREHEN METABOLIC PANEL: CPT

## 2025-02-11 PROCEDURE — 82043 UR ALBUMIN QUANTITATIVE: CPT

## 2025-02-11 PROCEDURE — 84550 ASSAY OF BLOOD/URIC ACID: CPT

## 2025-02-11 PROCEDURE — 81001 URINALYSIS AUTO W/SCOPE: CPT

## 2025-02-11 PROCEDURE — 83036 HEMOGLOBIN GLYCOSYLATED A1C: CPT

## 2025-02-11 PROCEDURE — 84100 ASSAY OF PHOSPHORUS: CPT

## 2025-02-11 PROCEDURE — 36415 COLL VENOUS BLD VENIPUNCTURE: CPT

## 2025-02-11 PROCEDURE — 82306 VITAMIN D 25 HYDROXY: CPT

## 2025-02-11 PROCEDURE — 82570 ASSAY OF URINE CREATININE: CPT

## 2025-02-11 PROCEDURE — 83970 ASSAY OF PARATHORMONE: CPT

## 2025-02-11 PROCEDURE — 84153 ASSAY OF PSA TOTAL: CPT

## 2025-02-11 PROCEDURE — 85027 COMPLETE CBC AUTOMATED: CPT

## 2025-02-12 ENCOUNTER — RESULTS FOLLOW-UP (OUTPATIENT)
Dept: CARDIOLOGY CLINIC | Facility: CLINIC | Age: 80
End: 2025-02-12

## 2025-02-12 ENCOUNTER — RESULTS FOLLOW-UP (OUTPATIENT)
Dept: ENDOCRINOLOGY | Facility: CLINIC | Age: 80
End: 2025-02-12

## 2025-02-12 DIAGNOSIS — I10 BENIGN ESSENTIAL HYPERTENSION: ICD-10-CM

## 2025-02-12 DIAGNOSIS — I25.119 CORONARY ARTERY DISEASE INVOLVING NATIVE CORONARY ARTERY OF NATIVE HEART WITH ANGINA PECTORIS (HCC): ICD-10-CM

## 2025-02-12 DIAGNOSIS — E11.22 CKD STAGE 3 DUE TO TYPE 2 DIABETES MELLITUS (HCC): ICD-10-CM

## 2025-02-12 DIAGNOSIS — N18.30 CKD STAGE 3 DUE TO TYPE 2 DIABETES MELLITUS (HCC): ICD-10-CM

## 2025-02-12 DIAGNOSIS — E87.1 HYPONATREMIA: Primary | ICD-10-CM

## 2025-02-12 DIAGNOSIS — I50.42 CHRONIC COMBINED SYSTOLIC AND DIASTOLIC HEART FAILURE, NYHA CLASS 2 (HCC): ICD-10-CM

## 2025-02-12 LAB
25(OH)D3 SERPL-MCNC: 55.8 NG/ML (ref 30–100)
PSA SERPL-MCNC: <0.008 NG/ML (ref 0–4)
PTH-INTACT SERPL-MCNC: 41.8 PG/ML (ref 12–88)

## 2025-02-12 NOTE — TELEPHONE ENCOUNTER
Pt returned the call. Informed him of message below from Dr. Begum. He voiced understanding. Please add BMP order. Thanks!      Olayinka Begum MD to [facility]  [facility]  Regarding result: Comprehensive metabolic panel       2/12/25  9:49 AM  Result Note  Patient blood test shows that patient's sodium has gone down to 126.  This could be related to Aldactone.  We may need cut back to Aldactone to every other day.  He should also had fluid restriction to about 1200 1500 cc.  Repeat BMP in about 10 days.

## 2025-02-13 NOTE — TELEPHONE ENCOUNTER
LM to patient wit the above message. Advised to please call our office to let us know he received the message and if have any other questions or concerns.

## 2025-02-13 NOTE — TELEPHONE ENCOUNTER
----- Message from Lu Fink PA-C sent at 2/13/2025  9:58 AM EST -----  Please inform patient sodium dropped to 126. I see his cardiologist already messaged him to follow a fluid restriction and repeat BMP. I would also like him to increase his lasix to 20mg daily instead of 3x/week. Thanks

## 2025-02-17 NOTE — TELEPHONE ENCOUNTER
Called patient to make sure he received the message and spoke with Ruth when over Lu message.  Ruth stated that they will call Cardiologist office. No further questions at this time.

## 2025-02-21 ENCOUNTER — APPOINTMENT (OUTPATIENT)
Dept: LAB | Facility: CLINIC | Age: 80
End: 2025-02-21
Payer: MEDICARE

## 2025-02-21 DIAGNOSIS — I25.119 CORONARY ARTERY DISEASE INVOLVING NATIVE CORONARY ARTERY OF NATIVE HEART WITH ANGINA PECTORIS (HCC): ICD-10-CM

## 2025-02-21 DIAGNOSIS — I10 BENIGN ESSENTIAL HYPERTENSION: ICD-10-CM

## 2025-02-21 DIAGNOSIS — N18.30 CKD STAGE 3 DUE TO TYPE 2 DIABETES MELLITUS (HCC): ICD-10-CM

## 2025-02-21 DIAGNOSIS — E11.22 CKD STAGE 3 DUE TO TYPE 2 DIABETES MELLITUS (HCC): ICD-10-CM

## 2025-02-21 DIAGNOSIS — I50.42 CHRONIC COMBINED SYSTOLIC AND DIASTOLIC HEART FAILURE, NYHA CLASS 2 (HCC): ICD-10-CM

## 2025-02-21 DIAGNOSIS — E87.1 HYPONATREMIA: ICD-10-CM

## 2025-02-21 LAB
ALBUMIN SERPL BCG-MCNC: 3.9 G/DL (ref 3.5–5)
ANION GAP SERPL CALCULATED.3IONS-SCNC: 10 MMOL/L (ref 4–13)
BUN SERPL-MCNC: 23 MG/DL (ref 5–25)
CALCIUM SERPL-MCNC: 9.6 MG/DL (ref 8.4–10.2)
CHLORIDE SERPL-SCNC: 99 MMOL/L (ref 96–108)
CO2 SERPL-SCNC: 23 MMOL/L (ref 21–32)
CREAT SERPL-MCNC: 1.09 MG/DL (ref 0.6–1.3)
GFR SERPL CREATININE-BSD FRML MDRD: 63 ML/MIN/1.73SQ M
GLUCOSE P FAST SERPL-MCNC: 137 MG/DL (ref 65–99)
PHOSPHATE SERPL-MCNC: 3.5 MG/DL (ref 2.3–4.1)
POTASSIUM SERPL-SCNC: 4.8 MMOL/L (ref 3.5–5.3)
SODIUM SERPL-SCNC: 132 MMOL/L (ref 135–147)

## 2025-02-21 PROCEDURE — 36415 COLL VENOUS BLD VENIPUNCTURE: CPT

## 2025-02-21 PROCEDURE — 80069 RENAL FUNCTION PANEL: CPT

## 2025-02-23 DIAGNOSIS — C61 PROSTATE CANCER (HCC): ICD-10-CM

## 2025-02-23 DIAGNOSIS — I25.10 CORONARY ARTERY DISEASE INVOLVING NATIVE CORONARY ARTERY OF NATIVE HEART WITHOUT ANGINA PECTORIS: ICD-10-CM

## 2025-02-24 ENCOUNTER — RESULTS FOLLOW-UP (OUTPATIENT)
Dept: NEPHROLOGY | Facility: HOSPITAL | Age: 80
End: 2025-02-24

## 2025-02-24 ENCOUNTER — OFFICE VISIT (OUTPATIENT)
Dept: RADIATION ONCOLOGY | Facility: HOSPITAL | Age: 80
End: 2025-02-24
Attending: STUDENT IN AN ORGANIZED HEALTH CARE EDUCATION/TRAINING PROGRAM
Payer: MEDICARE

## 2025-02-24 VITALS
DIASTOLIC BLOOD PRESSURE: 70 MMHG | RESPIRATION RATE: 18 BRPM | TEMPERATURE: 97.5 F | HEART RATE: 80 BPM | SYSTOLIC BLOOD PRESSURE: 116 MMHG | OXYGEN SATURATION: 99 %

## 2025-02-24 DIAGNOSIS — Z85.46 HISTORY OF PROSTATE CANCER: Primary | ICD-10-CM

## 2025-02-24 DIAGNOSIS — R35.0 URINARY FREQUENCY: ICD-10-CM

## 2025-02-24 PROCEDURE — 99213 OFFICE O/P EST LOW 20 MIN: CPT | Performed by: STUDENT IN AN ORGANIZED HEALTH CARE EDUCATION/TRAINING PROGRAM

## 2025-02-24 PROCEDURE — 99211 OFF/OP EST MAY X REQ PHY/QHP: CPT | Performed by: STUDENT IN AN ORGANIZED HEALTH CARE EDUCATION/TRAINING PROGRAM

## 2025-02-24 RX ORDER — ATORVASTATIN CALCIUM 20 MG/1
20 TABLET, FILM COATED ORAL DAILY
Qty: 90 TABLET | Refills: 1 | Status: ON HOLD | OUTPATIENT
Start: 2025-02-24

## 2025-02-24 RX ORDER — TAMSULOSIN HYDROCHLORIDE 0.4 MG/1
CAPSULE ORAL
Qty: 90 CAPSULE | Refills: 1 | Status: ON HOLD | OUTPATIENT
Start: 2025-02-24

## 2025-02-24 NOTE — TELEPHONE ENCOUNTER
LVM to patient with the following information : sodium improved to 132. Continue daily lasix and oral fluid restriction. Advised to please call our office to let us know he received the message and if have any other questions or concerns.

## 2025-02-24 NOTE — TELEPHONE ENCOUNTER
----- Message from Lu Fink PA-C sent at 2/24/2025 11:32 AM EST -----  Please let patient know sodium improved to 132. Continue daily lasix and oral fluid restriction.

## 2025-02-24 NOTE — PROGRESS NOTES
Pernell Covarrubias 1945 is a 79 y.o. 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 He completed a course of definitive radiation therapy with neoadjuvant and concurrent androgen deprivation on 21. He was last seen in radiation oncology  on 24. He returns today for follow up.    24 - Urology, [facility]  Most recent PSA <0.01  No bone pain    Component      Latest Ref Rng 2021 2021 10/13/2021 2022 2022 2022 2023 2023   PSA      0.000 - 4.000 ng/mL 0.2  0.3  <0.1  <0.1  <0.1  <0.1  <0.1  <0.01      Component      Latest Ref Rng 10/3/2023 2024 2024 2025   PSA      0.000 - 4.000 ng/mL <0.01  <0.01  <0.01  <0.008          Upcomin25 - Urology, pi    Oncology History   Prostate cancer (HCC)   2021 Initial Diagnosis    Prostate cancer (HCC)     2021 Biopsy    A. Prostate, Right Lateral Base, Biopsy:  - Benign prostatic tissue.     B. Prostate, Right Base, Biopsy:  - Benign prostatic tissue.     C. Prostate, Right Lateral Mid, Biopsy:  - Benign prostatic tissue.     D. Prostate, Right Mid, Biopsy:  - Benign prostatic tissue.     E. Prostate, Right Lateral [place], Biopsy:  - Benign prostatic tissue.     F. Prostate, Right [place], Biopsy:  - Benign prostatic tissue.     G. Prostate, Left Lateral [place], Biopsy:  - Prostatic adenocarcinoma, Mikki Score 5 + 3 = 8, Grade Group 4.     - Percentage Mikki pattern 5: 95%     - Total number of cores identified: 1     - Total number of cores with carcinoma: 1     - Percentage of tissue with carcinoma: 92%     - Linear amount of tissue with carcinoma: 11 mm total     H. Prostate, Left Base, Biopsy:  - Prostatic adenocarcinoma, Mikki Score 5 + 4 = 9, Grade Group 5.     - Percentage Mikki pattern 5: 95%     - Total number of cores identified: 1     - Total number of cores with carcinoma: 1     - Percentage of tissue with carcinoma: 92%     -  Linear amount of tissue with carcinoma: 11 mm total     I. Prostate, Left Lateral Mid, Biopsy:  - Prostatic adenocarcinoma, Mikki Score 5 + 5 = 10, Grade Group 5.     - Total number of cores identified: 1     - Total number of cores with carcinoma: 1     - Percentage of tissue with carcinoma: 100%     - Linear amount of tissue with carcinoma: 15 mm total  - Focus of suspected intraductal carcinoma.     J. Prostate, Left Mid, Biopsy:  - Prostatic adenocarcinoma, [place] Score 5 + 5 = 10, Grade Group 5.     - Total number of cores identified: 1     - Total number of cores with carcinoma: 1     - Percentage of tissue with carcinoma: 93%     - Linear amount of tissue with carcinoma: 14 mm total     K. Prostate, Left Lateral [place], Biopsy:  - Prostatic adenocarcinoma, [place] Score 5 + 4 = 9, Grade Group 5.     - Percentage Mikki pattern 5: 70%     - Total number of cores identified: 1     - Total number of cores with carcinoma: 1     - Percentage of tissue with carcinoma: 100%     - Linear amount of tissue with carcinoma: 10 mm total     L. Prostate, Left [place], Biopsy:  - Prostatic adenocarcinoma, [place] Score 5 + 4 = 9, Grade Group 5.     - Percentage [place] pattern 5: 95%     - Total number of cores identified: 1     - Total number of cores with carcinoma: 1     - Percentage of tissue with carcinoma: 94%     - Linear amount of tissue with carcinoma: 15 mm total     3/17/2021 -  Hormone Therapy    Firmagon 240 mg SQ injection - started 3/17/21  Plan for 2-3 years of therapy.     3/29/2021 -  Cancer Staged    Staging form: Prostate, AJCC 8th Edition  - Clinical: Stage IIIC (cT3b, cN0, cM0, PSA: 8.6, Grade Group: 5) - Signed by Deo Gonzalez MD on 3/29/2021  Prostate specific antigen (PSA) range: Less than 10  Histologic grading system: 5 grade system       7/8/2021 - 9/14/2021 Radiation    Course: C1    Plan ID Energy Fractions Dose per Fraction (cGy) Dose Correction (cGy) Total Dose Delivered (cGy) Elapsed Days    Prost SV 10X 19 / 19 180 0 3,420 29   Whole Pelvis 10X 25 / 25 180 0 4,500 36      Treatment Dates:  7/8/2021 - 9/14/2021.          Review of Systems:  Review of Systems   Constitutional:  Positive for fatigue.   HENT: Negative.     Eyes: Negative.    Respiratory: Negative.     Cardiovascular: Negative.    Gastrointestinal: Negative.    Endocrine: Negative.    Genitourinary:  Positive for urgency. Negative for difficulty urinating.   Musculoskeletal: Negative.    Skin: Negative.    Allergic/Immunologic: Negative.    Neurological: Negative.    Hematological: Negative.    Psychiatric/Behavioral: Negative.         Clinical Trial: no    IPSS Questionnaire (AUA-7):  Over the past month…    1)  How often have you had a sensation of not emptying your bladder completely after you finish urinating?  0 - Not at all   2)  How often have you had to urinate again less than two hours after you finished urinating? 3 - About half the time   3)  How often have you found you stopped and started again several times when you urinated?  0 - Not at all   4) How difficult have you found it to postpone urination?  5 - Almost always   5) How often have you had a weak urinary stream?  0 - Not at all   6) How often have you had to push or strain to begin urination?  0 - Not at all   7) How many times did you most typically get up to urinate from the time you went to bed until the time you got up in the morning?  4 - 4 times   Total Score:  12           Health Maintenance   Topic Date Due    Zoster Vaccine (2 of 3) 11/13/2012    RSV Vaccine for Pregnant Patients and Patients Age 60+ Years (1 - 1-dose 75+ series) Never done    Diabetic Eye Exam  03/30/2024    Influenza Vaccine (1) 09/01/2024    COVID-19 Vaccine (7 - 2024-25 season) 09/01/2024    Colorectal Cancer Screening  01/01/2033 (Originally 10/25/2024)    Fall Risk  06/12/2025    Medicare Annual Wellness Visit (AWV)  06/12/2025    HEMOGLOBIN A1C  08/11/2025    Diabetic Foot Exam   09/20/2025    BMI: Adult  02/10/2026    Depression Screening  02/24/2026    Pneumococcal Vaccine: 65+ Years  Completed    Meningococcal B Vaccine  Aged Out    RSV Vaccine age 0-20 Months  Aged Out    HIB Vaccine  Aged Out    IPV Vaccine  Aged Out    Hepatitis A Vaccine  Aged Out    Meningococcal ACWY Vaccine  Aged Out    HPV Vaccine  Aged Out     Patient Active Problem List   Diagnosis    Coronary artery disease involving native coronary artery of native heart with angina pectoris ([facility])    CKD stage 3 due to type 2 diabetes mellitus ([facility])    Diabetic neuropathy ([facility])    Chronic GERD    Lumbar radiculopathy    Type 2 diabetes mellitus with diabetic neuropathy (HCC)    Other hyperlipidemia    Type 2 diabetes mellitus with hyperglycemia, without long-term current use of insulin ([facility])    Benign essential hypertension    Bilateral leg weakness    Prostate cancer ([facility])    Insomnia, persistent    Hyponatremia    Orthostatic hypotension    Chronic right-sided low back pain without sciatica    Acute on chronic combined systolic and diastolic congestive heart failure ([facility])    Heart failure, left, 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% ([facility])    Chronic combined systolic and diastolic heart failure, NYHA class 2 ([facility])    Edema of both feet    Gait disorder    Peripheral arterial disease ([facility])    Iron deficiency anemia    Uses roller walker    Dry skin dermatitis    Type 2 diabetes mellitus with stage 2 chronic kidney disease, without long-term current use of insulin  ([facility])    History of vitamin D deficiency    Other fatigue    Anemia     Past Medical History:   Diagnosis Date    Acquired hallux valgus     last assessed: 8/1/2013    Allergic rhinitis     Anemia 10/26/2010    Atrophy of nail     last assessed: 7/7/2015    Cancer ([facility]) 2020    Chronic kidney disease     chronic renal insufficiency    Coronary artery disease     Deformity of ankle and foot, acquired     last assessed: 2/22/2016    Diabetes mellitus ([facility]) 1994    Difficulty walking      last assessed: 12/14/2015    Dysesthesia     last assessed: 11/25/2016    Hammer toe     unspecified laterality; last assessed: 8/1/2013    Hypertension     Neuropathy in diabetes (HCC) 1994    Onychomycosis of toenail     last assessed: 2/22/2016    Peripheral vascular disease (HCC)     left SFA stent in 2010    Pes planus     unspecified laterality; last assessed: 8/1/2013    Pneumonia     last assessed: 2/27/2016    Prostate cancer (HCC)     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     last assessed: 8/15/2016    Type 2 diabetes mellitus (HCC) 07/26/2010    Viral warts     last assessed: 7/24/2015     Past Surgical History:   Procedure Laterality Date    CARDIAC CATHETERIZATION  07/29/2010    CATARACT EXTRACTION, BILATERAL Bilateral     R 1999, L 2008    COLONOSCOPY  2011    FEMORAL ARTERY - POPLITEAL ARTERY BYPASS GRAFT  09/23/2013    FOOT SURGERY      bone spur removed    LEG SURGERY Bilateral     repair; L 8/20/2010 and 7/26/2012    CT PLMT INTERSTITIAL DEV RADIAT TX PROSTATE 1/MULT N/A 6/22/2021    Procedure: INSERTION OF FIDUCIAL MARKER (TRANSRECTAL ULTRASOUND GUIDANCE), SPACEOAR;  Surgeon: Jero Loomis MD;  Location: [facility];  Service: Urology    ROTATOR CUFF REPAIR Left 2009    SHOULDER SURGERY Right 2005    collar bone     Family History   Problem Relation Age of Onset    Aortic aneurysm Mother     Heart disease Father     Heart attack Father         acute myocardial infarction    Heart disease Sister     Heart attack Sister         acute myocardial infarction    Throat cancer Maternal Grandfather     Heart disease Paternal Grandfather     No Known Problems Son      Social History     Socioeconomic History    Marital status: /Civil Union     Spouse name: Not on file    Number of children: 1    Years of education: Not on file    Highest education level: Not on file   Occupational History    Occupation: retired from  work   Tobacco Use    Smoking status: Never     Passive exposure:  Past    Smokeless tobacco: Never   Vaping Use    Vaping status: Never Used   Substance and Sexual Activity    Alcohol use: Yes     Alcohol/week: 1.0 standard drink of alcohol     Types: 1 Glasses of wine per week     Comment: Stopped in 1986    Drug use: Never    Sexual activity: Not Currently     Partners: Female   Other Topics Concern    Not on file   Social History Narrative    Not on file     Social Drivers of Health     Financial Resource Strain: Low Risk  (6/21/2023)    Overall Financial Resource Strain (CARDIA)     Difficulty of Paying Living Expenses: Not hard at all   Food Insecurity: No Food Insecurity (6/13/2024)    Nursing - Inadequate Food Risk Classification     Worried About Running Out of Food in the Last Year: Never true     Ran Out of Food in the Last Year: Never true     Ran Out of Food in the Last Year: Not on file   Transportation Needs: No Transportation Needs (6/13/2024)    PRAPARE - Transportation     Lack of Transportation (Medical): No     Lack of Transportation (Non-Medical): No   Physical Activity: Not on file   Stress: Not on file   Social Connections: Not on file   Intimate Partner Violence: Not on file   Housing Stability: Low Risk  (6/13/2024)    Housing Stability Vital Sign     Unable to Pay for Housing in the Last Year: No     Number of Times Moved in the Last Year: 0     Homeless in the Last Year: No       Current Outpatient Medications:     ammonium lactate (LAC-HYDRIN) 12 % lotion, Apply topically 2 (two) times a day, Disp: 500 g, Rfl: 3    Ascorbic Acid (Vitamin C) 500 MG CAPS, Take 500 mg by mouth daily, Disp: , Rfl:     aspirin (ECOTRIN LOW STRENGTH) 81 mg EC tablet, Take 81 mg by mouth daily, Disp: , Rfl:     atorvastatin (LIPITOR) 20 mg tablet, TAKE 1 TABLET EVERY DAY, Disp: 90 tablet, Rfl: 3    brimonidine tartrate 0.2 % ophthalmic solution, Inject 0.2 % into the eye 2 (two) times a day, Disp: , Rfl:     carvedilol (COREG) 12.5 mg tablet, Take 1 tablet (12.5 mg total) by  mouth 2 (two) times a day, Disp: 180 tablet, Rfl: 1    Cholecalciferol (Vitamin D3) 50 MCG (2000 UT) TABS, Take 2,000 Units by mouth daily, Disp: , Rfl:     co-enzyme Q-10 100 mg capsule, Take 100 mg by mouth daily, Disp: , Rfl:     ferrous sulfate 324 (65 Fe) mg, TAKE 1 TABLET TWICE DAILY BEFORE MEALS, Disp: 180 tablet, Rfl: 1    furosemide (LASIX) 20 mg tablet, Take 1 tablet (20 mg total) by mouth 3 (three) times a week, Disp: 36 tablet, Rfl: 3    glucose blood (OneTouch Ultra) test strip, Test blood sugar once a day, Disp: 100 strip, Rfl: 1    imiquimod (ALDARA) 5 % cream, APPLY 1 PACKET TOPICALLY 3 (THREE) TIMES A WEEK, Disp: 12 packet, Rfl: 11    losartan (COZAAR) 25 mg tablet, TAKE 1 TABLET (25 MG TOTAL) BY MOUTH DAILY, Disp: 90 tablet, Rfl: 1    metFORMIN (GLUCOPHAGE) 1000 MG tablet, TAKE ONE TABLET BY MOUTH TWICE A DAY WITH MEALS, Disp: 180 tablet, Rfl: 1    Mirabegron ER 25 MG TB24, Take 25 mg by mouth daily at bedtime, Disp: 90 tablet, Rfl: 3    multivitamin (THERAGRAN) TABS, Take 1 tablet by mouth daily, Disp: , Rfl:     omeprazole (PriLOSEC) 40 MG capsule, Take 1 capsule (40 mg total) by mouth daily as needed (acid reflux), Disp: 100 capsule, Rfl: 1    sitaGLIPtin (JANUVIA) 25 mg tablet, Take 1 tablet (25 mg total) by mouth daily, Disp: 90 tablet, Rfl: 2    spironolactone (ALDACTONE) 25 mg tablet, Take 1 tablet (25 mg total) by mouth daily, Disp: 90 tablet, Rfl: 1    tacrolimus (PROTOPIC) 0.1 % ointment, Apply topically daily Apply daily for maintenance, Disp: 100 g, Rfl: 3    tamsulosin (FLOMAX) 0.4 mg, Take 1 capsule (0.4 mg total) by mouth daily with dinner, Disp: 90 capsule, Rfl: 1    triamcinolone (KENALOG) 0.1 % ointment, Apply topically 2 (two) times a day Apply to body twice a day then switch to Protopic Ointment and DO NOT USE ON FACE, GROIN, ARMPITS, Disp: 453.6 g, Rfl: 1    TRUEplus Lancets 28G MISC, Test 1x/d, E11.29, Disp: 200 each, Rfl: 3    acetaminophen (TYLENOL) 325 mg tablet, Take 2  tablets (650 mg total) by mouth every 6 (six) hours as needed for mild pain, headaches or fever (Patient not taking: Reported on 2/24/2025), Disp: , Rfl: 0  No Known Allergies  Vitals:    02/24/25 0928   BP: 116/70   BP Location: Left arm   Patient Position: Sitting   Cuff Size: Standard   Pulse: 80   Resp: 18   Temp: 97.5 °F (36.4 °C)   TempSrc: Temporal   SpO2: 99%      Pain Score: 0-No pain

## 2025-02-25 ENCOUNTER — TELEPHONE (OUTPATIENT)
Dept: RADIATION ONCOLOGY | Facility: CLINIC | Age: 80
End: 2025-02-25

## 2025-02-25 PROBLEM — R35.0 URINARY FREQUENCY: Status: ACTIVE | Noted: 2025-02-25

## 2025-02-25 NOTE — TELEPHONE ENCOUNTER
Left voicemail for pt....would you like a 90 day supply or just 30 to try medication first. Oxybutin.

## 2025-02-25 NOTE — PROGRESS NOTES
Follow-up Visit   Name: Pernell Covarrubias      : 1945      MRN: [id number]  Encounter Provider: Soy Pride MD  Encounter Date: 2025   Encounter department: [facility] RADIATION ONCOLOGY  :  Assessment & Plan  History of prostate cancer  We reviewed his PSA, which remains undetectable consistent with continued good disease control. We reviewed that at this time I recommend continued surveillance with PSA every 6 months. He has plans to continue urology follow-up every 6 months. I will plan to see him back in 2026 for follow-up.     Orders:    PSA Total, Diagnostic; Future    PSA Total, Diagnostic; Future    PSA Total, Diagnostic; Future        History of Present Illness   Chief Complaint   Patient presents with    Follow-up     Radiation Oncology   Pertinent Medical History   Mr. Arcadio Covarrubias is a 78 year old man with a history of high risk (cT3b, GS 5+5, PSA 8.6) prostate adenocarcinoma. On 21 the patient completed a course of definitive RT to a dose of 7920 cGy to the prostate/SV and pelvis (4500cGy/25fx). He returns today for follow-up.      Radiation Summary:  Definitive RT to prostate (7920cGy/44fx) and pelvis (4500cGy/25fx) completed 21    Interval History:  The patient was last seen in clinic on 24, at that time doing well with continued undetectable PSA. Last Lupron injection was on 23. Currently the patient is doing well overall. He does have ongoing urinary urgency and frequency, noting then he has no difficulty initiating stream or with straining. As a result he is up throughout the night and feels tired often.     PSA Trend:   PSA   Latest Ref Rng 0.000 - 4.000 ng/mL   2020 6.2 (H)    12/10/2020 8.0 (H)    2021 0.4    2021 0.2    2021 0.3    10/13/2021 <0.1    2022 <0.1    2022 <0.1    2022 <0.1    2023 <0.1    2023 <0.01    10/3/2023 <0.01    2024 <0.01    2024 <0.01    2025 <0.008          Oncology History   Cancer Staging   Prostate cancer ([facility])  Staging form: Prostate, AJCC 8th Edition  - Clinical: Stage IIIC (cT3b, cN0, cM0, PSA: 8.6, Grade Group: 5) - Signed by Deo Gonzalez MD on 3/29/2021  Prostate specific antigen (PSA) range: Less than 10  Histologic grading system: 5 grade system  Oncology History   Prostate cancer ([facility])   2/2021 Initial Diagnosis    Prostate cancer ([facility])     2/17/2021 Biopsy    A. Prostate, Right Lateral [place], Biopsy:  - Benign prostatic tissue.     B. Prostate, Right Base, Biopsy:  - Benign prostatic tissue.     C. Prostate, Right Lateral Mid, Biopsy:  - Benign prostatic tissue.     D. Prostate, Right Mid, Biopsy:  - Benign prostatic tissue.     E. Prostate, Right Lateral [place], Biopsy:  - Benign prostatic tissue.     F. Prostate, Right [place], Biopsy:  - Benign prostatic tissue.     G. Prostate, Left Lateral [place], Biopsy:  - Prostatic adenocarcinoma, [place] Score 5 + 3 = 8, Grade Group 4.     - Percentage Mikki pattern 5: 95%     - Total number of cores identified: 1     - Total number of cores with carcinoma: 1     - Percentage of tissue with carcinoma: 92%     - Linear amount of tissue with carcinoma: 11 mm total     H. Prostate, Left [place], Biopsy:  - Prostatic adenocarcinoma, [place]ki Score 5 + 4 = 9, Grade Group 5.     - Percentage [place] pattern 5: 95%     - Total number of cores identified: 1     - Total number of cores with carcinoma: 1     - Percentage of tissue with carcinoma: 92%     - Linear amount of tissue with carcinoma: 11 mm total     I. Prostate, Left Lateral [place], Biopsy:  - Prostatic adenocarcinoma, [place] Score 5 + 5 = 10, Grade Group 5.     - Total number of cores identified: 1     - Total number of cores with carcinoma: 1     - Percentage of tissue with carcinoma: 100%     - Linear amount of tissue with carcinoma: 15 mm total  - Focus of suspected intraductal carcinoma.     J. Prostate, Left [place], Biopsy:  - Prostatic adenocarcinoma, Mikki  Score 5 + 5 = 10, Grade Group 5.     - Total number of cores identified: 1     - Total number of cores with carcinoma: 1     - Percentage of tissue with carcinoma: 93%     - Linear amount of tissue with carcinoma: 14 mm total     K. Prostate, Left Lateral [place], Biopsy:  - Prostatic adenocarcinoma, [place] Score 5 + 4 = 9, Grade Group 5.     - Percentage [place] pattern 5: 70%     - Total number of cores identified: 1     - Total number of cores with carcinoma: 1     - Percentage of tissue with carcinoma: 100%     - Linear amount of tissue with carcinoma: 10 mm total     L. Prostate, Left [place], Biopsy:  - Prostatic adenocarcinoma, Mikki Score 5 + 4 = 9, Grade Group 5.     - Percentage Mikki pattern 5: 95%     - Total number of cores identified: 1     - Total number of cores with carcinoma: 1     - Percentage of tissue with carcinoma: 94%     - Linear amount of tissue with carcinoma: 15 mm total     3/17/2021 -  Hormone Therapy    Firmagon 240 mg SQ injection - started 3/17/21  Plan for 2-3 years of therapy.     3/29/2021 -  Cancer Staged    Staging form: Prostate, AJCC 8th Edition  - Clinical: Stage IIIC (cT3b, cN0, cM0, PSA: 8.6, Grade Group: 5) - Signed by Deo Gonzalez MD on 3/29/2021  Prostate specific antigen (PSA) range: Less than 10  Histologic grading system: 5 grade system       7/8/2021 - 9/14/2021 Radiation    Course: C1    Plan ID Energy Fractions Dose per Fraction (cGy) Dose Correction (cGy) Total Dose Delivered (cGy) Elapsed Days   Prost SV 10X 19 / 19 180 0 3,420 29   Whole Pelvis 10X 25 / 25 180 0 4,500 36      Treatment Dates:  7/8/2021 - 9/14/2021.         Review of Systems Refer to nursing note.          Objective   /70 (BP Location: Left arm, Patient Position: Sitting, Cuff Size: Standard)   Pulse 80   Temp 97.5 °F (36.4 °C) (Temporal)   Resp 18   SpO2 99%     Pain Screening:  Pain Score: 0-No pain  ECOG ECOG Performance Status: 2 - Ambulatory and capable of all selfcare but unable  "to carry out any work activities.  Up and about more than 50% of waking hours  Physical Exam   Well appearing. NAD.   No increased work of breathing.   Extremities warm and well perfused.  SEB deferred.   Alert and well oriented. No overt neurologic deficits appreciated.     Administrative Statements   I have spent a total time of 26 minutes in caring for this patient on the day of the visit/encounter including Diagnostic results, Reviewing/placing orders in the medical record (including tests, medications, and/or procedures), and Obtaining or reviewing history  .  Portions of the record may have been created with voice recognition software.  Occasional wrong word or \"sound a like\" substitutions may have occurred due to the inherent limitations of voice recognition software.  Read the chart carefully and recognize, using context, where substitutions have occurred.  "

## 2025-03-03 NOTE — TELEPHONE ENCOUNTER
This is the Second attempt.  Called patient to make sure he received the message and LVM to patient with the following information : sodium improved to 132. Continue daily lasix and oral fluid restriction. Advised to please call our office to let us know he received the message and if have any other questions or concerns.

## 2025-03-04 ENCOUNTER — OFFICE VISIT (OUTPATIENT)
Dept: UROLOGY | Facility: CLINIC | Age: 80
End: 2025-03-04
Payer: MEDICARE

## 2025-03-04 VITALS
HEART RATE: 55 BPM | OXYGEN SATURATION: 100 % | HEIGHT: 70 IN | WEIGHT: 165 LBS | SYSTOLIC BLOOD PRESSURE: 122 MMHG | BODY MASS INDEX: 23.62 KG/M2 | DIASTOLIC BLOOD PRESSURE: 68 MMHG

## 2025-03-04 DIAGNOSIS — N32.81 OAB (OVERACTIVE BLADDER): Primary | ICD-10-CM

## 2025-03-04 DIAGNOSIS — C61 PROSTATE CANCER (HCC): ICD-10-CM

## 2025-03-04 PROCEDURE — 99213 OFFICE O/P EST LOW 20 MIN: CPT | Performed by: PHYSICIAN ASSISTANT

## 2025-03-04 RX ORDER — OXYBUTYNIN CHLORIDE 5 MG/1
5 TABLET, EXTENDED RELEASE ORAL
Qty: 90 TABLET | Refills: 3 | Status: SHIPPED | OUTPATIENT
Start: 2025-03-04

## 2025-03-04 NOTE — PROGRESS NOTES
UROLOGY PROGRESS NOTE   Patient Identifiers: Pernell Covarrubias (MRN [id number])  Date of Service: 3/4/2025    Subjective:   79-year-old man history of [place] 10 stage III prostate cancer.  He was treated with radiation and 2 years of ADT.  His last testosterone was 65.  PSA is undetectable <0.008.  He is quite weak and has difficulty ambulating.  His upper extremities seem weaker than the lower.  He does not have any bone pain or weight loss.  He does have urinary frequency.  Reason for visit: Prostate cancer follow-up    Objective:     VITALS:    Vitals:    03/04/25 1015   BP: 122/68   Pulse: 55   SpO2: 100%           LABS:  Lab Results   Component Value Date    HGB 9.8 (L) 02/11/2025    HCT 29.3 (L) 02/11/2025    WBC 6.68 02/11/2025     02/11/2025   ]    Lab Results   Component Value Date     04/17/2014    K 4.8 02/21/2025    CL 99 02/21/2025    CO2 23 02/21/2025    BUN 23 02/21/2025    CREATININE 1.09 02/21/2025    CALCIUM 9.6 02/21/2025    GLUCOSE 137 04/17/2014   ]        INPATIENT MEDS:    Current Outpatient Medications:     acetaminophen (TYLENOL) 325 mg tablet, Take 2 tablets (650 mg total) by mouth every 6 (six) hours as needed for mild pain, headaches or fever, Disp: , Rfl: 0    ammonium lactate (LAC-HYDRIN) 12 % lotion, Apply topically 2 (two) times a day, Disp: 500 g, Rfl: 3    Ascorbic Acid (Vitamin C) 500 MG CAPS, Take 500 mg by mouth daily, Disp: , Rfl:     aspirin (ECOTRIN LOW STRENGTH) 81 mg EC tablet, Take 81 mg by mouth daily, Disp: , Rfl:     atorvastatin (LIPITOR) 20 mg tablet, TAKE 1 TABLET EVERY DAY, Disp: 90 tablet, Rfl: 1    brimonidine tartrate 0.2 % ophthalmic solution, Inject 0.2 % into the eye 2 (two) times a day, Disp: , Rfl:     carvedilol (COREG) 12.5 mg tablet, Take 1 tablet (12.5 mg total) by mouth 2 (two) times a day, Disp: 180 tablet, Rfl: 1    Cholecalciferol (Vitamin D3) 50 MCG (2000 UT) TABS, Take 2,000 Units by mouth daily, Disp: , Rfl:     co-enzyme Q-10 100 mg  "capsule, Take 100 mg by mouth daily, Disp: , Rfl:     ferrous sulfate 324 (65 Fe) mg, TAKE 1 TABLET TWICE DAILY BEFORE MEALS, Disp: 180 tablet, Rfl: 1    furosemide (LASIX) 20 mg tablet, Take 1 tablet (20 mg total) by mouth 3 (three) times a week, Disp: 36 tablet, Rfl: 3    glucose blood (OneTouch Ultra) test strip, Test blood sugar once a day, Disp: 100 strip, Rfl: 1    imiquimod (ALDARA) 5 % cream, APPLY 1 PACKET TOPICALLY 3 (THREE) TIMES A WEEK, Disp: 12 packet, Rfl: 11    losartan (COZAAR) 25 mg tablet, TAKE 1 TABLET (25 MG TOTAL) BY MOUTH DAILY, Disp: 90 tablet, Rfl: 1    metFORMIN (GLUCOPHAGE) 1000 MG tablet, TAKE ONE TABLET BY MOUTH TWICE A DAY WITH MEALS, Disp: 180 tablet, Rfl: 1    multivitamin (THERAGRAN) TABS, Take 1 tablet by mouth daily, Disp: , Rfl:     omeprazole (PriLOSEC) 40 MG capsule, Take 1 capsule (40 mg total) by mouth daily as needed (acid reflux), Disp: 100 capsule, Rfl: 1    oxybutynin (DITROPAN-XL) 5 mg 24 hr tablet, Take 1 tablet (5 mg total) by mouth daily at bedtime, Disp: 90 tablet, Rfl: 3    sitaGLIPtin (JANUVIA) 25 mg tablet, Take 1 tablet (25 mg total) by mouth daily, Disp: 90 tablet, Rfl: 2    spironolactone (ALDACTONE) 25 mg tablet, Take 1 tablet (25 mg total) by mouth daily, Disp: 90 tablet, Rfl: 1    tacrolimus (PROTOPIC) 0.1 % ointment, Apply topically daily Apply daily for maintenance, Disp: 100 g, Rfl: 3    tamsulosin (FLOMAX) 0.4 mg, TAKE 1 CAPSULE EVERY DAY WITH DINNER, Disp: 90 capsule, Rfl: 1    triamcinolone (KENALOG) 0.1 % ointment, Apply topically 2 (two) times a day Apply to body twice a day then switch to Protopic Ointment and DO NOT USE ON FACE, GROIN, ARMPITS, Disp: 453.6 g, Rfl: 1    TRUEplus Lancets 28G MISC, Test 1x/d, E11.29, Disp: 200 each, Rfl: 3      Physical Exam:   /68 (BP Location: Left arm, Patient Position: Sitting, Cuff Size: Standard)   Pulse 55   Ht 5' 10\" (1.778 m)   Wt 74.8 kg (165 lb)   SpO2 100%   BMI 23.68 kg/m²   GEN: no acute " distress    RESP: breathing comfortably with no accessory muscle use    ABD: soft, non-tender, non-distended   INCISION:    EXT: no significant peripheral edema     RADIOLOGY:   None    Assessment:   #1.  Prostate cancer  #2.  Ambulatory dysfunction    Plan:   -I will see him back in 6 months with PSA and testosterone prior to visit  -I put in an ambulatory referral for physical therapy due to ambulatory dysfunction and deconditioning  -  -

## 2025-03-05 NOTE — TELEPHONE ENCOUNTER
This is the 3rd attempt letter been mailed.  Called patient to make sure he received the message and LVM to patient with the following information : sodium improved to 132. Continue daily lasix and oral fluid restriction. Advised to please call our office to let us know he received the message and if have any other questions or concerns.

## 2025-03-08 ENCOUNTER — HOSPITAL ENCOUNTER (INPATIENT)
Facility: HOSPITAL | Age: 80
LOS: 3 days | Discharge: NURSING, REHAB OR LONG-TERM CARE | DRG: 644 | End: 2025-03-12
Attending: EMERGENCY MEDICINE | Admitting: INTERNAL MEDICINE
Payer: MEDICARE

## 2025-03-08 DIAGNOSIS — E03.9 HYPOTHYROID: ICD-10-CM

## 2025-03-08 DIAGNOSIS — E11.65 TYPE 2 DIABETES MELLITUS WITH HYPERGLYCEMIA, WITHOUT LONG-TERM CURRENT USE OF INSULIN (HCC): ICD-10-CM
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
## 2025-03-08 DIAGNOSIS — S81.801A OPEN WOUND OF RIGHT LOWER EXTREMITY, INITIAL ENCOUNTER: ICD-10-CM

## 2025-03-08 DIAGNOSIS — I50.42 CHRONIC COMBINED SYSTOLIC AND DIASTOLIC CHF (CONGESTIVE HEART FAILURE) (HCC): ICD-10-CM

## 2025-03-08 DIAGNOSIS — I50.9 CHF, ACUTE (HCC): ICD-10-CM

## 2025-03-08 DIAGNOSIS — I73.9 PERIPHERAL ARTERIAL DISEASE (HCC): ICD-10-CM

## 2025-03-08 DIAGNOSIS — E87.1 HYPONATREMIA: ICD-10-CM

## 2025-03-08 DIAGNOSIS — T68.XXXA HYPOTHERMIA, INITIAL ENCOUNTER: ICD-10-CM

## 2025-03-08 DIAGNOSIS — K59.00 CONSTIPATION: ICD-10-CM

## 2025-03-08 DIAGNOSIS — R41.843: ICD-10-CM

## 2025-03-08 DIAGNOSIS — R26.2 AMBULATORY DYSFUNCTION: ICD-10-CM

## 2025-03-08 PROCEDURE — 99285 EMERGENCY DEPT VISIT HI MDM: CPT

## 2025-03-09 ENCOUNTER — APPOINTMENT (EMERGENCY)
Dept: RADIOLOGY | Facility: HOSPITAL | Age: 80
DRG: 644 | End: 2025-03-09
Payer: MEDICARE

## 2025-03-09 PROBLEM — T68.XXXA HYPOTHERMIA: Status: ACTIVE | Noted: 2025-03-09

## 2025-03-09 PROBLEM — I50.43 ACUTE ON CHRONIC COMBINED SYSTOLIC AND DIASTOLIC CHF, NYHA CLASS 2 (HCC): Status: ACTIVE | Noted: 2025-03-09
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
## 2025-03-09 PROBLEM — G93.40 ENCEPHALOPATHY ACUTE: Status: ACTIVE | Noted: 2025-03-09

## 2025-03-09 PROBLEM — D63.8 ANEMIA OF CHRONIC DISEASE: Status: ACTIVE | Noted: 2025-03-09

## 2025-03-09 PROBLEM — R79.89 ABNORMAL TSH: Status: ACTIVE | Noted: 2025-03-09

## 2025-03-09 PROBLEM — R00.1 BRADYCARDIA: Status: ACTIVE | Noted: 2025-03-09

## 2025-03-09 LAB
2HR DELTA HS TROPONIN: 0 NG/L
ALBUMIN SERPL BCG-MCNC: 4.2 G/DL (ref 3.5–5)
ALP SERPL-CCNC: 72 U/L (ref 34–104)
ALT SERPL W P-5'-P-CCNC: 42 U/L (ref 7–52)
AMMONIA PLAS-SCNC: 29 UMOL/L (ref 18–72)
AMORPH URATE CRY URNS QL MICRO: ABNORMAL
ANION GAP SERPL CALCULATED.3IONS-SCNC: 10 MMOL/L (ref 4–13)
ANION GAP SERPL CALCULATED.3IONS-SCNC: 9 MMOL/L (ref 4–13)
AST SERPL W P-5'-P-CCNC: 38 U/L (ref 13–39)
BACTERIA UR QL AUTO: ABNORMAL /HPF
BASOPHILS # BLD AUTO: 0.01 THOUSANDS/ÂΜL (ref 0–0.1)
BASOPHILS # BLD AUTO: 0.01 THOUSANDS/ÂΜL (ref 0–0.1)
BASOPHILS NFR BLD AUTO: 0 % (ref 0–1)
BASOPHILS NFR BLD AUTO: 0 % (ref 0–1)
BILIRUB SERPL-MCNC: 0.74 MG/DL (ref 0.2–1)
BILIRUB UR QL STRIP: NEGATIVE
BNP SERPL-MCNC: 349 PG/ML (ref 0–100)
BUN SERPL-MCNC: 22 MG/DL (ref 5–25)
BUN SERPL-MCNC: 24 MG/DL (ref 5–25)
BUN SERPL-MCNC: 26 MG/DL (ref 5–25)
BUN SERPL-MCNC: 27 MG/DL (ref 5–25)
CALCIUM SERPL-MCNC: 10.2 MG/DL (ref 8.4–10.2)
CALCIUM SERPL-MCNC: 8.8 MG/DL (ref 8.4–10.2)
CALCIUM SERPL-MCNC: 8.9 MG/DL (ref 8.4–10.2)
CALCIUM SERPL-MCNC: 9.1 MG/DL (ref 8.4–10.2)
CARDIAC TROPONIN I PNL SERPL HS: 5 NG/L (ref ?–50)
CARDIAC TROPONIN I PNL SERPL HS: 5 NG/L (ref ?–50)
CHLORIDE SERPL-SCNC: 96 MMOL/L (ref 96–108)
CHLORIDE SERPL-SCNC: 96 MMOL/L (ref 96–108)
CHLORIDE SERPL-SCNC: 97 MMOL/L (ref 96–108)
CHLORIDE SERPL-SCNC: 98 MMOL/L (ref 96–108)
CK SERPL-CCNC: 103 U/L (ref 39–308)
CLARITY UR: CLEAR
CO2 SERPL-SCNC: 19 MMOL/L (ref 21–32)
CO2 SERPL-SCNC: 20 MMOL/L (ref 21–32)
COLOR UR: YELLOW
CREAT SERPL-MCNC: 0.82 MG/DL (ref 0.6–1.3)
CREAT SERPL-MCNC: 0.83 MG/DL (ref 0.6–1.3)
CREAT SERPL-MCNC: 1 MG/DL (ref 0.6–1.3)
CREAT SERPL-MCNC: 1.15 MG/DL (ref 0.6–1.3)
CRP SERPL QL: 5 MG/L
EOSINOPHIL # BLD AUTO: 0.02 THOUSAND/ÂΜL (ref 0–0.61)
EOSINOPHIL # BLD AUTO: 0.03 THOUSAND/ÂΜL (ref 0–0.61)
EOSINOPHIL NFR BLD AUTO: 1 % (ref 0–6)
EOSINOPHIL NFR BLD AUTO: 1 % (ref 0–6)
ERYTHROCYTE [DISTWIDTH] IN BLOOD BY AUTOMATED COUNT: 14.3 % (ref 11.6–15.1)
ERYTHROCYTE [DISTWIDTH] IN BLOOD BY AUTOMATED COUNT: 14.5 % (ref 11.6–15.1)
FLUAV AG UPPER RESP QL IA.RAPID: NEGATIVE
FLUBV AG UPPER RESP QL IA.RAPID: NEGATIVE
GFR SERPL CREATININE-BSD FRML MDRD: 59 ML/MIN/1.73SQ M
GFR SERPL CREATININE-BSD FRML MDRD: 70 ML/MIN/1.73SQ M
GFR SERPL CREATININE-BSD FRML MDRD: 83 ML/MIN/1.73SQ M
GFR SERPL CREATININE-BSD FRML MDRD: 83 ML/MIN/1.73SQ M
GLUCOSE SERPL-MCNC: 103 MG/DL (ref 65–140)
GLUCOSE SERPL-MCNC: 139 MG/DL (ref 65–140)
GLUCOSE SERPL-MCNC: 142 MG/DL (ref 65–140)
GLUCOSE SERPL-MCNC: 162 MG/DL (ref 65–140)
GLUCOSE SERPL-MCNC: 75 MG/DL (ref 65–140)
GLUCOSE SERPL-MCNC: 83 MG/DL (ref 65–140)
GLUCOSE SERPL-MCNC: 84 MG/DL (ref 65–140)
GLUCOSE SERPL-MCNC: 89 MG/DL (ref 65–140)
GLUCOSE UR STRIP-MCNC: NEGATIVE MG/DL
HCT VFR BLD AUTO: 23.7 % (ref 36.5–49.3)
HCT VFR BLD AUTO: 30.1 % (ref 36.5–49.3)
HGB BLD-MCNC: 10.2 G/DL (ref 12–17)
HGB BLD-MCNC: 8 G/DL (ref 12–17)
HGB UR QL STRIP.AUTO: NEGATIVE
IMM GRANULOCYTES # BLD AUTO: 0.02 THOUSAND/UL (ref 0–0.2)
IMM GRANULOCYTES # BLD AUTO: 0.02 THOUSAND/UL (ref 0–0.2)
IMM GRANULOCYTES NFR BLD AUTO: 0 % (ref 0–2)
IMM GRANULOCYTES NFR BLD AUTO: 1 % (ref 0–2)
KETONES UR STRIP-MCNC: NEGATIVE MG/DL
LACTATE SERPL-SCNC: 0.8 MMOL/L (ref 0.5–2)
LEUKOCYTE ESTERASE UR QL STRIP: ABNORMAL
LYMPHOCYTES # BLD AUTO: 0.27 THOUSANDS/ÂΜL (ref 0.6–4.47)
LYMPHOCYTES # BLD AUTO: 0.31 THOUSANDS/ÂΜL (ref 0.6–4.47)
LYMPHOCYTES NFR BLD AUTO: 6 % (ref 14–44)
LYMPHOCYTES NFR BLD AUTO: 7 % (ref 14–44)
MCH RBC QN AUTO: 29.1 PG (ref 26.8–34.3)
MCH RBC QN AUTO: 29.2 PG (ref 26.8–34.3)
MCHC RBC AUTO-ENTMCNC: 33.8 G/DL (ref 31.4–37.4)
MCHC RBC AUTO-ENTMCNC: 33.9 G/DL (ref 31.4–37.4)
MCV RBC AUTO: 86 FL (ref 82–98)
MCV RBC AUTO: 86 FL (ref 82–98)
MONOCYTES # BLD AUTO: 0.33 THOUSAND/ÂΜL (ref 0.17–1.22)
MONOCYTES # BLD AUTO: 0.37 THOUSAND/ÂΜL (ref 0.17–1.22)
MONOCYTES NFR BLD AUTO: 8 % (ref 4–12)
MONOCYTES NFR BLD AUTO: 8 % (ref 4–12)
MUCOUS THREADS UR QL AUTO: ABNORMAL
NEUTROPHILS # BLD AUTO: 3.48 THOUSANDS/ÂΜL (ref 1.85–7.62)
NEUTROPHILS # BLD AUTO: 4.15 THOUSANDS/ÂΜL (ref 1.85–7.62)
NEUTS SEG NFR BLD AUTO: 83 % (ref 43–75)
NEUTS SEG NFR BLD AUTO: 85 % (ref 43–75)
NITRITE UR QL STRIP: NEGATIVE
NON-SQ EPI CELLS URNS QL MICRO: ABNORMAL /HPF
NRBC BLD AUTO-RTO: 0 /100 WBCS
NRBC BLD AUTO-RTO: 0 /100 WBCS
OSMOLALITY UR/SERPL-RTO: 273 MMOL/KG (ref 282–298)
OSMOLALITY UR: 385 MMOL/KG (ref 250–900)
PH UR STRIP.AUTO: 6 [PH]
PLATELET # BLD AUTO: 106 THOUSANDS/UL (ref 149–390)
PLATELET # BLD AUTO: 133 THOUSANDS/UL (ref 149–390)
PMV BLD AUTO: 10.1 FL (ref 8.9–12.7)
PMV BLD AUTO: 10.7 FL (ref 8.9–12.7)
POTASSIUM SERPL-SCNC: 4.3 MMOL/L (ref 3.5–5.3)
POTASSIUM SERPL-SCNC: 4.5 MMOL/L (ref 3.5–5.3)
POTASSIUM SERPL-SCNC: 4.7 MMOL/L (ref 3.5–5.3)
POTASSIUM SERPL-SCNC: 4.8 MMOL/L (ref 3.5–5.3)
PROCALCITONIN SERPL-MCNC: 0.06 NG/ML
PROT SERPL-MCNC: 7 G/DL (ref 6.4–8.4)
PROT UR STRIP-MCNC: ABNORMAL MG/DL
RBC # BLD AUTO: 2.75 MILLION/UL (ref 3.88–5.62)
RBC # BLD AUTO: 3.49 MILLION/UL (ref 3.88–5.62)
RBC #/AREA URNS AUTO: ABNORMAL /HPF
SARS-COV+SARS-COV-2 AG RESP QL IA.RAPID: NEGATIVE
SODIUM SERPL-SCNC: 125 MMOL/L (ref 135–147)
SODIUM SERPL-SCNC: 126 MMOL/L (ref 135–147)
SODIUM UR-SCNC: 108 MMOL/L
SP GR UR STRIP.AUTO: >=1.03 (ref 1–1.03)
T3 SERPL-MCNC: 0.6 NG/ML (ref 0.9–1.8)
T4 SERPL-MCNC: 7.83 UG/DL (ref 6.09–12.23)
TSH SERPL DL<=0.05 MIU/L-ACNC: 6.68 UIU/ML (ref 0.45–4.5)
URATE SERPL-MCNC: 4.9 MG/DL (ref 3.5–8.5)
UROBILINOGEN UR STRIP-ACNC: <2 MG/DL
WBC # BLD AUTO: 4.17 THOUSAND/UL (ref 4.31–10.16)
WBC # BLD AUTO: 4.85 THOUSAND/UL (ref 4.31–10.16)
WBC #/AREA URNS AUTO: ABNORMAL /HPF

## 2025-03-09 PROCEDURE — 80053 COMPREHEN METABOLIC PANEL: CPT | Performed by: EMERGENCY MEDICINE

## 2025-03-09 PROCEDURE — 85025 COMPLETE CBC W/AUTO DIFF WBC: CPT

## 2025-03-09 PROCEDURE — 84480 ASSAY TRIIODOTHYRONINE (T3): CPT

## 2025-03-09 PROCEDURE — 70450 CT HEAD/BRAIN W/O DYE: CPT

## 2025-03-09 PROCEDURE — 84145 PROCALCITONIN (PCT): CPT

## 2025-03-09 PROCEDURE — 83930 ASSAY OF BLOOD OSMOLALITY: CPT | Performed by: FAMILY MEDICINE

## 2025-03-09 PROCEDURE — 82550 ASSAY OF CK (CPK): CPT | Performed by: EMERGENCY MEDICINE

## 2025-03-09 PROCEDURE — 84550 ASSAY OF BLOOD/URIC ACID: CPT | Performed by: FAMILY MEDICINE

## 2025-03-09 PROCEDURE — 80048 BASIC METABOLIC PNL TOTAL CA: CPT

## 2025-03-09 PROCEDURE — 80048 BASIC METABOLIC PNL TOTAL CA: CPT | Performed by: FAMILY MEDICINE

## 2025-03-09 PROCEDURE — 82140 ASSAY OF AMMONIA: CPT

## 2025-03-09 PROCEDURE — 84300 ASSAY OF URINE SODIUM: CPT

## 2025-03-09 PROCEDURE — 99223 1ST HOSP IP/OBS HIGH 75: CPT | Performed by: FAMILY MEDICINE

## 2025-03-09 PROCEDURE — 84443 ASSAY THYROID STIM HORMONE: CPT | Performed by: EMERGENCY MEDICINE

## 2025-03-09 PROCEDURE — 83605 ASSAY OF LACTIC ACID: CPT | Performed by: EMERGENCY MEDICINE

## 2025-03-09 PROCEDURE — 85025 COMPLETE CBC W/AUTO DIFF WBC: CPT | Performed by: EMERGENCY MEDICINE

## 2025-03-09 PROCEDURE — 84436 ASSAY OF TOTAL THYROXINE: CPT

## 2025-03-09 PROCEDURE — 87804 INFLUENZA ASSAY W/OPTIC: CPT | Performed by: EMERGENCY MEDICINE

## 2025-03-09 PROCEDURE — 87811 SARS-COV-2 COVID19 W/OPTIC: CPT | Performed by: EMERGENCY MEDICINE

## 2025-03-09 PROCEDURE — 81001 URINALYSIS AUTO W/SCOPE: CPT | Performed by: EMERGENCY MEDICINE

## 2025-03-09 PROCEDURE — 82948 REAGENT STRIP/BLOOD GLUCOSE: CPT

## 2025-03-09 PROCEDURE — 36415 COLL VENOUS BLD VENIPUNCTURE: CPT | Performed by: EMERGENCY MEDICINE

## 2025-03-09 PROCEDURE — 93005 ELECTROCARDIOGRAM TRACING: CPT

## 2025-03-09 PROCEDURE — 86140 C-REACTIVE PROTEIN: CPT

## 2025-03-09 PROCEDURE — 87040 BLOOD CULTURE FOR BACTERIA: CPT | Performed by: EMERGENCY MEDICINE

## 2025-03-09 PROCEDURE — 71045 X-RAY EXAM CHEST 1 VIEW: CPT

## 2025-03-09 PROCEDURE — 83880 ASSAY OF NATRIURETIC PEPTIDE: CPT | Performed by: EMERGENCY MEDICINE

## 2025-03-09 PROCEDURE — 99285 EMERGENCY DEPT VISIT HI MDM: CPT | Performed by: EMERGENCY MEDICINE

## 2025-03-09 PROCEDURE — 84484 ASSAY OF TROPONIN QUANT: CPT | Performed by: EMERGENCY MEDICINE

## 2025-03-09 PROCEDURE — 72125 CT NECK SPINE W/O DYE: CPT

## 2025-03-09 PROCEDURE — 83935 ASSAY OF URINE OSMOLALITY: CPT

## 2025-03-09 RX ORDER — ENOXAPARIN SODIUM 100 MG/ML
40 INJECTION SUBCUTANEOUS DAILY
Status: DISCONTINUED | OUTPATIENT
Start: 2025-03-09 | End: 2025-03-12 | Stop reason: HOSPADM

## 2025-03-09 RX ORDER — UBIDECARENONE 30 MG
100 CAPSULE ORAL DAILY
Status: DISCONTINUED | OUTPATIENT
Start: 2025-03-09 | End: 2025-03-12 | Stop reason: HOSPADM

## 2025-03-09 RX ORDER — CARVEDILOL 12.5 MG/1
12.5 TABLET ORAL 2 TIMES DAILY
Status: DISCONTINUED | OUTPATIENT
Start: 2025-03-09 | End: 2025-03-12 | Stop reason: HOSPADM

## 2025-03-09 RX ORDER — INSULIN LISPRO 100 [IU]/ML
1-5 INJECTION, SOLUTION INTRAVENOUS; SUBCUTANEOUS
Status: DISCONTINUED | OUTPATIENT
Start: 2025-03-09 | End: 2025-03-12 | Stop reason: HOSPADM

## 2025-03-09 RX ORDER — TAMSULOSIN HYDROCHLORIDE 0.4 MG/1
0.4 CAPSULE ORAL
Status: DISCONTINUED | OUTPATIENT
Start: 2025-03-09 | End: 2025-03-12 | Stop reason: HOSPADM

## 2025-03-09 RX ORDER — BRIMONIDINE TARTRATE 2 MG/ML
1 SOLUTION/ DROPS OPHTHALMIC 2 TIMES DAILY
Status: DISCONTINUED | OUTPATIENT
Start: 2025-03-09 | End: 2025-03-12 | Stop reason: HOSPADM

## 2025-03-09 RX ORDER — AMMONIUM LACTATE 12 G/100G
LOTION TOPICAL 2 TIMES DAILY
Status: DISCONTINUED | OUTPATIENT
Start: 2025-03-09 | End: 2025-03-12 | Stop reason: HOSPADM

## 2025-03-09 RX ORDER — SODIUM CHLORIDE 9 MG/ML
50 INJECTION, SOLUTION INTRAVENOUS CONTINUOUS
Status: DISCONTINUED | OUTPATIENT
Start: 2025-03-09 | End: 2025-03-09

## 2025-03-09 RX ORDER — ACETAMINOPHEN 325 MG/1
650 TABLET ORAL EVERY 6 HOURS PRN
Status: DISCONTINUED | OUTPATIENT
Start: 2025-03-09 | End: 2025-03-12 | Stop reason: HOSPADM

## 2025-03-09 RX ORDER — FERROUS SULFATE 325(65) MG
325 TABLET ORAL 2 TIMES DAILY WITH MEALS
Status: DISCONTINUED | OUTPATIENT
Start: 2025-03-09 | End: 2025-03-12 | Stop reason: HOSPADM

## 2025-03-09 RX ORDER — SPIRONOLACTONE 25 MG/1
25 TABLET ORAL DAILY
Status: DISCONTINUED | OUTPATIENT
Start: 2025-03-09 | End: 2025-03-12 | Stop reason: HOSPADM

## 2025-03-09 RX ORDER — SODIUM CHLORIDE, SODIUM GLUCONATE, SODIUM ACETATE, POTASSIUM CHLORIDE, MAGNESIUM CHLORIDE, SODIUM PHOSPHATE, DIBASIC, AND POTASSIUM PHOSPHATE .53; .5; .37; .037; .03; .012; .00082 G/100ML; G/100ML; G/100ML; G/100ML; G/100ML; G/100ML; G/100ML
100 INJECTION, SOLUTION INTRAVENOUS CONTINUOUS
Status: DISPENSED | OUTPATIENT
Start: 2025-03-09 | End: 2025-03-09

## 2025-03-09 RX ORDER — CEFTRIAXONE 1 G/50ML
1000 INJECTION, SOLUTION INTRAVENOUS EVERY 24 HOURS
Status: DISCONTINUED | OUTPATIENT
Start: 2025-03-09 | End: 2025-03-10

## 2025-03-09 RX ORDER — ATORVASTATIN CALCIUM 20 MG/1
20 TABLET, FILM COATED ORAL
Status: DISCONTINUED | OUTPATIENT
Start: 2025-03-09 | End: 2025-03-12 | Stop reason: HOSPADM

## 2025-03-09 RX ORDER — FUROSEMIDE 20 MG/1
20 TABLET ORAL 3 TIMES WEEKLY
Status: DISCONTINUED | OUTPATIENT
Start: 2025-03-10 | End: 2025-03-11

## 2025-03-09 RX ORDER — ASPIRIN 81 MG/1
81 TABLET, CHEWABLE ORAL DAILY
Status: DISCONTINUED | OUTPATIENT
Start: 2025-03-09 | End: 2025-03-12 | Stop reason: HOSPADM

## 2025-03-09 RX ORDER — FUROSEMIDE 10 MG/ML
20 INJECTION INTRAMUSCULAR; INTRAVENOUS ONCE
Status: COMPLETED | OUTPATIENT
Start: 2025-03-09 | End: 2025-03-09

## 2025-03-09 RX ORDER — PANTOPRAZOLE SODIUM 40 MG/1
40 TABLET, DELAYED RELEASE ORAL
Status: DISCONTINUED | OUTPATIENT
Start: 2025-03-09 | End: 2025-03-12 | Stop reason: HOSPADM

## 2025-03-09 RX ORDER — LOSARTAN POTASSIUM 25 MG/1
25 TABLET ORAL DAILY
Status: DISCONTINUED | OUTPATIENT
Start: 2025-03-09 | End: 2025-03-12 | Stop reason: HOSPADM

## 2025-03-09 RX ADMIN — BRIMONIDINE TARTRATE 1 DROP: 2 SOLUTION/ DROPS OPHTHALMIC at 17:20

## 2025-03-09 RX ADMIN — FERROUS SULFATE TAB 325 MG (65 MG ELEMENTAL FE) 325 MG: 325 (65 FE) TAB at 08:23

## 2025-03-09 RX ADMIN — Medication 1 APPLICATION: at 08:25

## 2025-03-09 RX ADMIN — FUROSEMIDE 20 MG: 10 INJECTION, SOLUTION INTRAMUSCULAR; INTRAVENOUS at 13:55

## 2025-03-09 RX ADMIN — Medication 1 APPLICATION: at 17:20

## 2025-03-09 RX ADMIN — B-COMPLEX W/ C & FOLIC ACID TAB 1 TABLET: TAB at 08:22

## 2025-03-09 RX ADMIN — TAMSULOSIN HYDROCHLORIDE 0.4 MG: 0.4 CAPSULE ORAL at 15:49

## 2025-03-09 RX ADMIN — FUROSEMIDE 20 MG: 10 INJECTION, SOLUTION INTRAMUSCULAR; INTRAVENOUS at 08:21

## 2025-03-09 RX ADMIN — BRIMONIDINE TARTRATE 1 DROP: 2 SOLUTION/ DROPS OPHTHALMIC at 08:25

## 2025-03-09 RX ADMIN — ASPIRIN 81 MG CHEWABLE TABLET 81 MG: 81 TABLET CHEWABLE at 08:22

## 2025-03-09 RX ADMIN — ATORVASTATIN CALCIUM 20 MG: 20 TABLET, FILM COATED ORAL at 15:49

## 2025-03-09 RX ADMIN — SODIUM CHLORIDE, SODIUM GLUCONATE, SODIUM ACETATE, POTASSIUM CHLORIDE, MAGNESIUM CHLORIDE, SODIUM PHOSPHATE, DIBASIC, AND POTASSIUM PHOSPHATE 100 ML/HR: .53; .5; .37; .037; .03; .012; .00082 INJECTION, SOLUTION INTRAVENOUS at 14:03

## 2025-03-09 RX ADMIN — CEFTRIAXONE 1000 MG: 1 INJECTION, SOLUTION INTRAVENOUS at 11:11

## 2025-03-09 RX ADMIN — Medication 100 MG: at 08:22

## 2025-03-09 RX ADMIN — PANTOPRAZOLE SODIUM 40 MG: 40 TABLET, DELAYED RELEASE ORAL at 08:23

## 2025-03-09 RX ADMIN — LOSARTAN POTASSIUM 25 MG: 25 TABLET, FILM COATED ORAL at 08:23

## 2025-03-09 RX ADMIN — FERROUS SULFATE TAB 325 MG (65 MG ELEMENTAL FE) 325 MG: 325 (65 FE) TAB at 15:49

## 2025-03-09 RX ADMIN — CARVEDILOL 12.5 MG: 12.5 TABLET, FILM COATED ORAL at 17:19

## 2025-03-09 RX ADMIN — ENOXAPARIN SODIUM 40 MG: 40 INJECTION SUBCUTANEOUS at 08:22

## 2025-03-09 NOTE — H&P
H&P - Hospitalist   Name: Pernell Covarrubias 79 y.o. male I MRN: [id number]  Unit/Bed#: ED 10 I Date of Admission: 3/8/2025   Date of Service: 3/9/2025 I Hospital Day: 0     Assessment & Plan  Hypothermia  POA post fall per wife per wife 3 one this week.   Denies LOC and head strike  In Ed found to be bradycardiac HR 53 and hypothermic 90.1 T rectal  Infectious work up as of now negative  COVID FLU RSV negative  LA negative, no leukocytosis  CXR negative   UA unremarkable   TSH noted elevated 6.684  Etiology unclear possible infectious etiology vs related to hypothyroidism ???  In Ed started on warming blanket, repeat temp now 94.7 F rectal, continue with warming blanket until reach normothermic  Vitals every 2 hours  Monitor on telemetry  Follow-up on blood cultures  Obtain Pro-Ambrosio follow-up  Obtain T3 and T4  Endocrine consulted   Obtain cortisol level  Hyponatremia  POA with sodium level 125  Baseline approx around 130 -132   Follow with nephrology  Suspect possible Acute CHF   Home med furosemide 20 mg MWF daily on hold   Give 20 mg IV furosemide x1, monitor response  Repeat BMP at 1200  Obtain hyponatremia labs: F/U cortisol level, osmolarity urine, sodium urine and Uric acid  FR 1800  Coronary artery disease involving native coronary artery of native heart with angina pectoris (HCC)  S/p multivessel stenting  Home medication aspirin Lipitor, Coreg, continued with hold parameters on the Coreg for heart rate less than 50  Fall  POA s/p fall  PT/Ot consulted  Fall precautions  Encephalopathy acute  POA with increase confusion post fall from home   Per wife, noticed in the last 2 weeks patient has becoming increasingly more tired at home and confused, she noted that he falls asleep easily and has not be outside for 1 week.   Ax2, forgetful no focal deficits noted, per wife not at baseline  CT of the head: No acute intracranial abnormality. Chronic microangiopathic changes   In ED noted to be hypothermic with  rectal temperature at 90.1f  UA unremarkable   CXR unremarkable  Elevated TSH  Suspect etiology secondary to hypothermia vs elevated TSH  Obtain ammonia level and B12  Monitor neuro status   Bradycardia  POA wit HR 49-53  Found to be hypothermic with rectal temp at 90.1F  With rewarming patient heart rate improved now 79  Monitor on tele  Abnormal TSH  POA with elevated TSH 6.68  Obtain T3 & T4   Endocrine consulted appreciate recs  Acute on chronic combined systolic and diastolic CHF, NYHA class 2 (HCC)  Wt Readings from Last 3 Encounters:   03/04/25 74.8 kg (165 lb)   02/10/25 74.4 kg (164 lb)   02/04/25 75.3 kg (166 lb)   POA with bilateral lower leg edema +3 pitting  Per wife have chronic edema takes furosemide MWF   CXR: unremarkable awaiting final read  Noted sodium level 125  BNP elevated 349  Echo: 1/22/25 EF 55 to 60% by visual estimation. Systolic function is normal. Wall motion is normal. Diastolic function is mildly abnormal, consistent with grade I (abnormal) relaxation.   Suspect mild CHF acute exacerbation  Give one time dose of IV 20 mg furosemide x1, consider repeating  FR 1800  Monitor I&O and daily weights  Monitor on tele   Type 2 diabetes mellitus with diabetic neuropathy (HCC)  Lab Results   Component Value Date    HGBA1C 6.9 (H) 02/11/2025   Home med metformin and Januvia on hold  SSI coverage,   4 times daily blood sugar check  Monitor on hypoglycemic protocol  Prostate cancer (HCC)  Hx noted  Follow with urology   S/p injectable chemo medications  Peripheral arterial disease (HCC)  Follows with vascular   Continue with ASA and statin  Anemia of chronic disease  POA with hgb 10.2, baseline 9-10  No active bleeding  Suspect etiology 2/2 CKD  Monitor signs of bleeding  Transfuse for hgb less than 7      VTE Pharmacologic Prophylaxis:   High Risk (Score >/= 5) - Pharmacological DVT Prophylaxis Ordered: enoxaparin (Lovenox). Sequential Compression Devices Ordered.  Code Status: Level 1 - Full  Code with wife  Discussion with family: Updated  (wife) at bedside.    Anticipated Length of Stay: Patient will be admitted on an inpatient basis with an anticipated length of stay of greater than 2 midnights secondary to hypothermia, elevated TSH, mild acute CHF, and generalized weakness.    History of Present Illness   Chief Complaint: post fall and generalized weakness     Pernell Covarrubias is a 79 y.o. male with a PMH of type 2 diabetes, hypertension, prostate cancer, hyponatremia, HF who presents with post fall and generalized weakness.  Patient present after another fall today.  Patient is more confused from baseline and wife bedside to help with history.  Per wife, patient had another fall last night and 3 one this week.  She denies head strike and LOC.  She states he just went to the ground.  She states for 1 week has had increased weakness and fatigue . He is unable to stay awake for long periods of time.  She states he has no  fever or chills at home. She help him with all his medications.  Discussed code status with wife bedside and patient is full code   Review of Systems   Unable to perform ROS: Mental status change       Historical Information   Past Medical History:   Diagnosis Date    Acquired hallux valgus     last assessed: 8/1/2013    Allergic rhinitis     Anemia 10/26/2010    Atrophy of nail     last assessed: 7/7/2015    Cancer ([facility]) 2020    Chronic kidney disease     chronic renal insufficiency    Coronary artery disease     Deformity of ankle and foot, acquired     last assessed: 2/22/2016    Diabetes mellitus ([facility]) 1994    Difficulty walking     last assessed: 12/14/2015    Dysesthesia     last assessed: 11/25/2016    Hammer toe     unspecified laterality; last assessed: 8/1/2013    Hypertension     Neuropathy in diabetes ([facility]) 1994    Onychomycosis of toenail     last assessed: 2/22/2016    Peripheral vascular disease ([facility])     left SFA stent in 2010    Pes planus      unspecified laterality; last assessed: 8/1/2013    Pneumonia     last assessed: 2/27/2016    Prostate cancer (HCC)     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     last assessed: 8/15/2016    Type 2 diabetes mellitus (HCC) 07/26/2010    Viral warts     last assessed: 7/24/2015     Past Surgical History:   Procedure Laterality Date    CARDIAC CATHETERIZATION  07/29/2010    CATARACT EXTRACTION, BILATERAL Bilateral     R 1999, L 2008    COLONOSCOPY  2011    FEMORAL ARTERY - POPLITEAL ARTERY BYPASS GRAFT  09/23/2013    FOOT SURGERY      bone spur removed    LEG SURGERY Bilateral     repair; L 8/20/2010 and 7/26/2012    ID PLMT INTERSTITIAL DEV RADIAT TX PROSTATE 1/MULT N/A 6/22/2021    Procedure: INSERTION OF FIDUCIAL MARKER (TRANSRECTAL ULTRASOUND GUIDANCE), SPACEOAR;  Surgeon: Jero Loomis MD;  Location: [facility];  Service: Urology    ROTATOR CUFF REPAIR Left 2009    SHOULDER SURGERY Right 2005    collar bone     Social History     Tobacco Use    Smoking status: Never     Passive exposure: Past    Smokeless tobacco: Never   Vaping Use    Vaping status: Never Used   Substance and Sexual Activity    Alcohol use: Yes     Alcohol/week: 1.0 standard drink of alcohol     Types: 1 Glasses of wine per week     Comment: Stopped in 1986    Drug use: Never    Sexual activity: Not Currently     Partners: Female     E-Cigarette/Vaping    E-Cigarette Use Never User      E-Cigarette/Vaping Substances    Nicotine No     THC No     CBD No     Flavoring No     Other No     Unknown No        Social History:  Marital Status: /Civil Union   Occupation: retired  Patient Pre-hospital Living Situation: Home  Patient Pre-hospital Level of Mobility: walks with walker  Patient Pre-hospital Diet Restrictions: none  Meds/Allergies   I have reviewed home medications with patient personally.  Prior to Admission medications    Medication Sig Start Date End Date Taking? Authorizing Provider   acetaminophen (TYLENOL) 325 mg  tablet Take 2 tablets (650 mg total) by mouth every 6 (six) hours as needed for mild pain, headaches or fever 2/24/22   NEO Camacho   ammonium lactate (LAC-HYDRIN) 12 % lotion Apply topically 2 (two) times a day 12/20/23   Felipe Travis DO   Ascorbic Acid (Vitamin C) 500 MG CAPS Take 500 mg by mouth daily 8/4/21   Historical Provider, MD   aspirin (ECOTRIN LOW STRENGTH) 81 mg EC tablet Take 81 mg by mouth daily 8/4/21   Historical Provider, MD   atorvastatin (LIPITOR) 20 mg tablet TAKE 1 TABLET EVERY DAY 2/24/25   Felipe Travis DO   brimonidine tartrate 0.2 % ophthalmic solution Inject 0.2 % into the eye 2 (two) times a day 8/4/21   Historical Provider, MD   carvedilol (COREG) 12.5 mg tablet Take 1 tablet (12.5 mg total) by mouth 2 (two) times a day 12/12/24   Felipe Travis DO   Cholecalciferol (Vitamin D3) 50 MCG (2000 UT) TABS Take 2,000 Units by mouth daily 8/4/21   Historical Provider, MD   co-enzyme Q-10 100 mg capsule Take 100 mg by mouth daily 8/4/21   Historical Provider, MD   ferrous sulfate 324 (65 Fe) mg TAKE 1 TABLET TWICE DAILY BEFORE MEALS 1/15/25   Umesh Montejo DO   furosemide (LASIX) 20 mg tablet Take 1 tablet (20 mg total) by mouth 3 (three) times a week 8/7/24   Umesh Montejo DO   glucose blood (OneTouch Ultra) test strip Test blood sugar once a day 9/6/24   NEO Lombardo   imiquimod (ALDARA) 5 % cream APPLY 1 PACKET TOPICALLY 3 (THREE) TIMES A WEEK 4/12/24   Ramiro Lancaster DPM   losartan (COZAAR) 25 mg tablet TAKE 1 TABLET (25 MG TOTAL) BY MOUTH DAILY 1/29/25   Olayinka Begum MD   metFORMIN (GLUCOPHAGE) 1000 MG tablet TAKE ONE TABLET BY MOUTH TWICE A DAY WITH MEALS 5/5/24   Lela Begum MD   multivitamin (THERAGRAN) TABS Take 1 tablet by mouth daily    Historical Provider, MD   omeprazole (PriLOSEC) 40 MG capsule Take 1 capsule (40 mg total) by mouth daily as needed (acid reflux) 12/12/24   Felipe Travis DO   oxybutynin (DITROPAN-XL) 5 mg 24 hr tablet  Take 1 tablet (5 mg total) by mouth daily at bedtime 3/4/25   Arnold Hernandez PA-C   sitaGLIPtin (JANUVIA) 25 mg tablet Take 1 tablet (25 mg total) by mouth daily 8/27/24   NEO Lombardo   spironolactone (ALDACTONE) 25 mg tablet Take 1 tablet (25 mg total) by mouth daily 12/12/24   Felipe Travis DO   tacrolimus (PROTOPIC) 0.1 % ointment Apply topically daily Apply daily for maintenance 3/1/24   Guilherme Garcia MD   tamsulosin (FLOMAX) 0.4 mg TAKE 1 CAPSULE EVERY DAY WITH DINNER 2/24/25   Felipe Travis DO   triamcinolone (KENALOG) 0.1 % ointment Apply topically 2 (two) times a day Apply to body twice a day then switch to Protopic Ointment and DO NOT USE ON FACE, GROIN, ARMPITS 3/1/24   Guilherme Garcia MD   TRUEplus Lancets 28G MISC Test 1x/d, E11.29 9/19/23   NEO Lombardo     No Known Allergies    Objective :  Temp:  [90.1 °F (32.3 °C)-96.4 °F (35.8 °C)] 96.4 °F (35.8 °C)  HR:  [49-65] 65  BP: (131-186)/(55-79) 131/55  Resp:  [16-21] 17  SpO2:  [93 %-100 %] 93 %  O2 Device: None (Room air)    Physical Exam  Vitals and nursing note reviewed.   Constitutional:       General: He is not in acute distress.     Appearance: He is well-developed. He is ill-appearing.   HENT:      Head: Normocephalic and atraumatic.   Eyes:      Conjunctiva/sclera: Conjunctivae normal.   Cardiovascular:      Rate and Rhythm: Regular rhythm. Bradycardia present.      Heart sounds: No murmur heard.  Pulmonary:      Effort: Pulmonary effort is normal. No respiratory distress.      Breath sounds: Normal breath sounds.   Abdominal:      Palpations: Abdomen is soft.      Tenderness: There is no abdominal tenderness.   Musculoskeletal:         General: Swelling present.      Cervical back: Neck supple.      Right lower leg: 3+ Pitting Edema present.      Left lower leg: 3+ Pitting Edema present.   Skin:     General: Skin is warm and dry.      Capillary Refill: Capillary refill takes less than 2 seconds.   Neurological:       Mental Status: He is alert. He is disoriented.      Sensory: No sensory deficit.      Motor: Weakness present.      Comments: Generalized weakness   Psychiatric:         Mood and Affect: Mood normal.          Lines/Drains:            Lab Results: I have reviewed the following results:  Results from last 7 days   Lab Units 03/09/25  0555   WBC Thousand/uL 4.17*   HEMOGLOBIN g/dL 8.0*   HEMATOCRIT % 23.7*   PLATELETS Thousands/uL 106*   SEGS PCT % 83*   LYMPHO PCT % 7*   MONO PCT % 8   EOS PCT % 1     Results from last 7 days   Lab Units 03/09/25  0020   SODIUM mmol/L 125*   POTASSIUM mmol/L 4.8   CHLORIDE mmol/L 96   CO2 mmol/L 19*   BUN mg/dL 26*   CREATININE mg/dL 0.83   ANION GAP mmol/L 10   CALCIUM mg/dL 10.2   ALBUMIN g/dL 4.2   TOTAL BILIRUBIN mg/dL 0.74   ALK PHOS U/L 72   ALT U/L 42   AST U/L 38   GLUCOSE RANDOM mg/dL 139         Results from last 7 days   Lab Units 03/09/25  0031   POC GLUCOSE mg/dl 142*     Lab Results   Component Value Date    HGBA1C 6.9 (H) 02/11/2025    HGBA1C 6.6 (A) 11/21/2024    HGBA1C 7.6 (H) 08/02/2024     Results from last 7 days   Lab Units 03/09/25  0020   LACTIC ACID mmol/L 0.8       Imaging Results Review: I reviewed radiology reports from this admission including: chest xray, CT head, and CT C-spine.  Other Study Results Review: EKG was reviewed.     Administrative Statements       ** Please Note: This note has been constructed using a voice recognition system. **

## 2025-03-09 NOTE — ED NOTES
FS POCT collected, critical value noted of 142, provider and nurse notified      Galilea Ho  03/09/25 0036

## 2025-03-09 NOTE — ASSESSMENT & PLAN NOTE
Wt Readings from Last 3 Encounters:   03/04/25 74.8 kg (165 lb)   02/10/25 74.4 kg (164 lb)   02/04/25 75.3 kg (166 lb)   POA with bilateral lower leg edema +3 pitting  Per wife have chronic edema takes furosemide MWF   CXR: unremarkable awaiting final read  Noted sodium level 125  BNP elevated 349  Echo: 1/22/25 EF 55 to 60% by visual estimation. Systolic function is normal. Wall motion is normal. Diastolic function is mildly abnormal, consistent with grade I (abnormal) relaxation.   Suspect mild CHF acute exacerbation  Give one time dose of IV 20 mg furosemide x1, consider repeating  FR 1800  Monitor I&O and daily weights  Monitor on tele

## 2025-03-09 NOTE — ASSESSMENT & PLAN NOTE
Lab Results   Component Value Date    HGBA1C 6.9 (H) 02/11/2025   Home med metformin and Januvia on hold  SSI coverage,   4 times daily blood sugar check  Monitor on hypoglycemic protocol

## 2025-03-09 NOTE — ASSESSMENT & PLAN NOTE
Lab Results   Component Value Date    HGBA1C 6.9 (H) 02/11/2025       Recent Labs     03/09/25  0031   POCGLU 142*       Blood Sugar Average: Last 72 hrs:  (P) 142

## 2025-03-09 NOTE — ASSESSMENT & PLAN NOTE
POA with sodium level 125  Baseline approx around 130 -132   Follow with nephrology  Suspect possible Acute CHF   Home med furosemide 20 mg MWF daily on hold   Give 20 mg IV furosemide x1, monitor response  Repeat BMP at 1200  Obtain hyponatremia labs: F/U cortisol level, osmolarity urine, sodium urine and Uric acid  FR 1800   Chonodrocutaneous Helical Advancement Flap Text: The defect edges were debeveled with a #15 scalpel blade.  Given the location of the defect and the proximity to free margins a chondrocutaneous helical advancement flap was deemed most appropriate.  Using a sterile surgical marker, the appropriate advancement flap was drawn incorporating the defect and placing the expected incisions within the relaxed skin tension lines where possible.    The area thus outlined was incised deep to adipose tissue with a #15 scalpel blade.  The skin margins were undermined to an appropriate distance in all directions utilizing iris scissors.

## 2025-03-09 NOTE — ASSESSMENT & PLAN NOTE
POA post fall per wife per wife 3 one this week.   Denies LOC and head strike  In Ed found to be bradycardiac HR 53 and hypothermic 90.1 T rectal  Infectious work up as of now negative  COVID FLU RSV negative  LA negative, no leukocytosis  CXR negative   UA unremarkable   TSH noted elevated 6.684  Etiology unclear possible infectious etiology vs related to hypothyroidism ???  In Ed started on warming blanket, repeat temp now 94.7 F rectal, continue with warming blanket until reach normothermic  Vitals every 2 hours  Monitor on telemetry  Follow-up on blood cultures  Obtain Pro-Ambrosio follow-up  Obtain T3 and T4  Endocrine consulted   Obtain cortisol level

## 2025-03-09 NOTE — ASSESSMENT & PLAN NOTE
POA wit HR 49-53  Found to be hypothermic with rectal temp at 90.1F  With rewarming patient heart rate improved now 79  Monitor on tele

## 2025-03-09 NOTE — QUICK NOTE
"Progress Note - Triage Assessment   Pernell Covarrubias 79 y.o. male MRN: [id number]    Date Called: 03/09/25  Room#: ED10  Person requesting evaluation: NEO Serrato    Called to ED to evaluate patient for possible admission to Critical Care Service, chart reviewed and patient evaluated.     Patient has a past medical history of CKD, DM2, HFrEF, htn, anemia, prostate cancer and chronic hyponatremia. Patient fell tonight and was unable to get up from the floor. He has fallen at least 3x this week. Patient and wife deny any other new symptoms.     Upon evaluation in the ED, patient was found to be hypothermic with temperature of 90 and subsequent bradycardia with rates in 50's. Lab work was revealing for acute on chronic hyponatremia with sodium of 125 as well as TSH of 6.7.     On exam, patient was sleepy, but would wake to voice and was oriented x4. Wife states this is patients baseline and that \"he's been sleepy for years\". Patient did not endorse or exhibit pain anywhere. Patient denies s/s of infection. Denies exposure to sick contacts. UA and CXR are unremarkable. He was on RA.    Patient was placed on Stella hugger with temperature appropriating increasing; was up to almost 92 with HR improved to 60's and SBP's in 140's.    Recommend:  Follow up on free T4  Continue to slowly re-warm the patient until normothermic    Follow up with additional infectious work-up; though low suspicion at this time for infection   PT/OT and possible ST for failure to thrive     Case discussed with Critical Care attending Dr. Ying and after review it was felt the patient is appropriate for admission to a general medical floor.    Discussed case with [facility] and they have agreed to accept patient to their service.         Triage Assessment:     Patient appropriate to be admitted to Med Surg with Telemetry  level of care.    If any questions or concerns please call the critical care team via Secure Chat.        "

## 2025-03-09 NOTE — ASSESSMENT & PLAN NOTE
POA with hgb 10.2, baseline 9-10  No active bleeding  Suspect etiology 2/2 CKD  Monitor signs of bleeding  Transfuse for hgb less than 7

## 2025-03-09 NOTE — ED PROVIDER NOTES
Final Diagnosis:  1. Fall from standing, initial encounter    2. Hypothermia, initial encounter    3. Hyponatremia    4. Hypothyroid    5. CHF, acute (HCC)    6. Ambulatory dysfunction    7. Psychomotor delay        Chief Complaint   Patient presents with    Fall     Pt arrived with bls stating that he's fallen twice today.  No loc no head strike states he was putting on his shoes and slid out of the bed.  Pt complains of left sided back soreness       HPI  Pt pres w/ fall from standing. Second fall today. On ASA. Wife notes he's been too weak to do anything. Also w/ LE swelling. Mild diastolic CHF early this year's echo. No pain after the fall. Some chronic back pain, not new. No LOC. Not syncope he tells me. Seems psychomotor slowed and cold to touch. Rectal temp high 80s started on thelma hugger. No infectious symptoms per him and wife. No abd pain, chest pain cough fever paul burning urination, new vomiting.     EMS additionally reports:     - Previous charting underwent limited review with attention to last ED visits, labs, ekgs, and prior imaging.  Chart review reveals :     Appointment on 02/11/2025   Component Date Value Ref Range Status    Phosphorus 02/11/2025 3.1  2.3 - 4.1 mg/dL Final    PSA, Diagnostic 02/11/2025 <0.008  0.000 - 4.000 ng/mL Final    [employer] Access chemiluminescent immunoassay. Confirm baseline values for patients being serially monitored.    Creatinine, Ur 02/11/2025 98.6  Reference range not established. mg/dL Final    Albumin,U,Random 02/11/2025 10.9  <20.0 mg/L Final    Albumin Creat Ratio 02/11/2025 11  0 - 30 mg/g creatinine Final    Hemoglobin A1C 02/11/2025 6.9 (H)  Normal 4.0-5.6%; PreDiabetic 5.7-6.4%; Diabetic >=6.5%; Glycemic control for adults with diabetes <7.0% % Final    EAG 02/11/2025 151  mg/dl Final    Vit D, 25-Hydroxy 02/11/2025 55.8  30.0 - 100.0 ng/mL Final    Vitamin D guidelines established by Clinical Guidelines Subcommittee  of the Endocrine Society Task  Force, 2011    Deficiency <20ng/ml   Insufficiency 20-30ng/ml   Sufficient  ng/ml     Albumin 02/21/2025 3.9  3.5 - 5.0 g/dL Final    Calcium 02/21/2025 9.6  8.4 - 10.2 mg/dL Final    Phosphorus 02/21/2025 3.5  2.3 - 4.1 mg/dL Final    BUN 02/21/2025 23  5 - 25 mg/dL Final    Creatinine 02/21/2025 1.09  0.60 - 1.30 mg/dL Final    Standardized to IDMS reference method    Sodium 02/21/2025 132 (L)  135 - 147 mmol/L Final    Potassium 02/21/2025 4.8  3.5 - 5.3 mmol/L Final    Chloride 02/21/2025 99  96 - 108 mmol/L Final    CO2 02/21/2025 23  21 - 32 mmol/L Final    ANION GAP 02/21/2025 10  4 - 13 mmol/L Final    eGFR 02/21/2025 63  ml/min/1.73sq m Final    Glucose, Fasting 02/21/2025 137 (H)  65 - 99 mg/dL Final    Color, UA 02/11/2025 Light Yellow   Final    Clarity, UA 02/11/2025 Clear   Final    Specific Gravity, UA 02/11/2025 1.017  1.003 - 1.030 Final    pH, UA 02/11/2025 6.5  4.5, 5.0, 5.5, 6.0, 6.5, 7.0, 7.5, 8.0 Final    Leukocytes, UA 02/11/2025 Negative  Negative Final    Nitrite, UA 02/11/2025 Negative  Negative Final    Protein, UA 02/11/2025 Trace (A)  Negative mg/dl Final    Glucose, UA 02/11/2025 Negative  Negative mg/dl Final    Ketones, UA 02/11/2025 Negative  Negative mg/dl Final    Urobilinogen, UA 02/11/2025 <2.0  <2.0 mg/dl mg/dl Final    Bilirubin, UA 02/11/2025 Negative  Negative Final    Occult Blood, UA 02/11/2025 Negative  Negative Final    RBC, UA 02/11/2025 None Seen  None Seen, 1-2 /hpf Final    WBC, UA 02/11/2025 None Seen  None Seen, 1-2 /hpf Final    Epithelial Cells 02/11/2025 None Seen  None Seen, Occasional /hpf Final    Bacteria, UA 02/11/2025 None Seen  None Seen, Occasional /hpf Final    WBC 02/11/2025 6.68  4.31 - 10.16 Thousand/uL Final    RBC 02/11/2025 3.30 (L)  3.88 - 5.62 Million/uL Final    Hemoglobin 02/11/2025 9.8 (L)  12.0 - 17.0 g/dL Final    Hematocrit 02/11/2025 29.3 (L)  36.5 - 49.3 % Final    MCV 02/11/2025 89  82 - 98 fL Final    MCH 02/11/2025 29.7  26.8 -  34.3 pg Final    MCHC 02/11/2025 33.4  31.4 - 37.4 g/dL Final    RDW 02/11/2025 13.8  11.6 - 15.1 % Final    Platelets 02/11/2025 237  149 - 390 Thousands/uL Final    MPV 02/11/2025 9.7  8.9 - 12.7 fL Final    Sodium 02/11/2025 126 (L)  135 - 147 mmol/L Final    Potassium 02/11/2025 5.1  3.5 - 5.3 mmol/L Final    Chloride 02/11/2025 96  96 - 108 mmol/L Final    CO2 02/11/2025 24  21 - 32 mmol/L Final    ANION GAP 02/11/2025 6  4 - 13 mmol/L Final    BUN 02/11/2025 20  5 - 25 mg/dL Final    Creatinine 02/11/2025 0.98  0.60 - 1.30 mg/dL Final    Standardized to IDMS reference method    Glucose, Fasting 02/11/2025 125 (H)  65 - 99 mg/dL Final    Calcium 02/11/2025 9.5  8.4 - 10.2 mg/dL Final    AST 02/11/2025 20  13 - 39 U/L Final    ALT 02/11/2025 20  7 - 52 U/L Final    Specimen collection should occur prior to Sulfasalazine administration due to the potential for falsely depressed results.     Alkaline Phosphatase 02/11/2025 87  34 - 104 U/L Final    Total Protein 02/11/2025 6.5  6.4 - 8.4 g/dL Final    Albumin 02/11/2025 3.9  3.5 - 5.0 g/dL Final    Total Bilirubin 02/11/2025 0.43  0.20 - 1.00 mg/dL Final    Use of this assay is not recommended for patients undergoing treatment with eltrombopag due to the potential for falsely elevated results.  N-acetyl-p-benzoquinone imine (metabolite of Acetaminophen) will generate erroneously low results in samples for patients that have taken an overdose of Acetaminophen.    eGFR 02/11/2025 72  ml/min/1.73sq m Final    Uric Acid 02/11/2025 4.1  3.5 - 8.5 mg/dL Final    Specimen collection should occur prior to Metamizole administration due to the potential for falsely depressed results.    PTH 02/11/2025 41.8  12.0 - 88.0 pg/mL Final       - No language barrier.   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    PSH:   has a past surgical history that includes Femoral artery - popliteal artery bypass graft (09/23/2013); Cataract extraction, bilateral (Bilateral); Foot surgery; Cardiac catheterization (07/29/2010); Leg Surgery (Bilateral); Rotator cuff repair (Left, 2009); Shoulder surgery (Right, 2005); Colonoscopy (2011); and pr plmt interstitial dev radiat tx prostate 1/mult (N/A, 6/22/2021).     Social History:  Tobacco Use: Low Risk  (3/4/2025)    Patient History     Smoking Tobacco Use: Never     Smokeless Tobacco Use: Never     Passive Exposure: Past     Alcohol Use: Unknown (6/9/2021)    AUDIT-C     Frequency of Alcohol Consumption: 2-4 times a month     Average Number of Drinks: Not on file     Frequency of Binge Drinking: Not on file     No illicit use       ROS:  Pertinent positives/negatives: .     Some ROS may be present in the HPI and would take precedent over these standard questions asked below.   Review of Systems   Constitutional:  Positive for fatigue. Negative for chills, diaphoresis and fever.   Respiratory:  Negative for cough, shortness of breath, wheezing and stridor.    Cardiovascular:  Positive for leg swelling. Negative for chest pain and palpitations.   Gastrointestinal:  Negative for abdominal pain, constipation, diarrhea, nausea and vomiting.   Endocrine: Positive for cold intolerance.   Musculoskeletal:  Positive for gait problem.   Neurological:  Negative for syncope, facial asymmetry, weakness and light-headedness.         CONSTITUTIONAL:  No lethargy. No unexpected weight loss. No change in behavior.  EYES:  No pain, redness, or discharge. No loss of vision. No orbital trauma or pain.   ENT:  No tinnitus or decreased hearing. No epistaxis/purulent rhinorrhea. No voice change, airway closing, trismus.   CARDIOVASCULAR:  No chest pain. No skin mottling or pallor. No change in exertional capacity  RESPIRATORY:  No hemoptysis. No paroxysmal nocturnal dyspnea. No stridor. No apnea or bluing.   GASTROINTESTINAL:  No vomiting, diarrhea. No distension. No melena. No hematochezia.   GENITOURINARY:  No nocturia. No hematuria or foul smelling or cloudy urine. No discharge. No sores/adenopathy.   MUSCULOSKELETAL:  No contracture.  No new deformity.   INTEGUMENTARY:  No swelling. No unexpected contusions. No abrasions. No lymphangitis.  NEUROLOGIC:  No meningismus. No new numbness of the extremities. No new focal weakness. No postural instability  PSYCHIATRIC:  No SI HI AVH  HEMATOLOGICAL:  No bleeding. No petechiae. No bruising.  ALLERGIES:  No urticaria. No sudden abd cramping. No stridor.    PE:     Physical exam highlights:   Physical Exam       Vitals:    03/09/25 0445 03/09/25 0603 03/09/25 0630 03/09/25 0630   BP: 131/55  123/51 123/51   BP Location:   Left arm Right arm   Pulse: 65  79 79   Resp: 17  16 16   Temp: (!) 94.7 °F (34.8 °C) (!) 96.4 °F (35.8 °C) 97.8 °F (36.6 °C) 97.8 °F (36.6 °C)   TempSrc: Rectal Rectal Rectal Rectal   SpO2: 93%  98% 98%   Weight:   72.3 kg (159 lb 4.8 oz) 72.2 kg (159 lb 3.2 oz)     Vitals reviewed by me.   Nursing note reviewed  Chaperone present for all sensitive exam.  Const: No acute distress. Alert. Nontoxic. Not diaphoretic.    HEENT: External ears normal. No protrusion drainage swelling. Nose normal. No drainage/traumatic deformity. MM. Mouth with baseline/symmetric movement. No trismus.   Eyes: No squinting. No icterus. No tearing/swelling/drainage. Tracks through the room with normal EOM.    Neck: ROM normal. No rigidity. No meningismus.  Cards: Rate as per vitals gloria. Compared to monitor sinus unless documented. Regular Well perfused. Bilateral peripheral edema.   Pulm: Effort and excursion normal. No distress. No audible wheezing/no stridor. Normal resp rate without retraction or change in work of breathing. CTAB  Abd: No distension beyond baseline. No fluctuant wave. Patient without peritoneal pain with shifting/bumping the bed. Soft to palp  MSK: ROM normal baseline. No deformity. No contractures from baseline. Stable pelvis. No spinal tenderness/stepoff palpating.   Skin: No new rashes visible. Well perfused. No wounds visualized on exposed skin  Neuro: Nonfocal. Baseline. CN grossly intact. Moving all four with coordination.   Psych: Normal behavior and affect. Psychomotor slowing.         A:  - Nursing note reviewed.    Ddx and MDM  Considered diagnoses    Cold  Sepsis?  Blood cultures lactic  No source suspected  Hold abx for now. F/u cultures  UA neg  Chest xr w no pneumonia on prelim. F/u formal.   Procal neg    Check tsh  Elev  F/u t3t4  Had CHF signs, hyponatremia, psychomotor slowing and weakness, and hypothermia. Not severe enough to be called myxedema coma but on this spectrum likely. Trend. Can consider levothyroxine if no improvement w/ idea of cause.     Hyponatremia  Hypervolemia  Fluid restrict    No suggestion of med like coreg OD. No other culprit meds.     R/o traumatic injury, I.e. ICH (htn, bradycardia) CT head wo  CT c spine r/o fracture, neurogenic bradycardia.     R/o ACS  Trop  EKG    Procedure Note: EKG  Date/Time: 03/09/25 7:28 AM   Interpreted by: Kt Gamez  Indications / Diagnosis: syncope?  ECG reviewed by me, the ED Provider: yes   The EKG demonstrates:  Rhythm: sinus  gloria  Intervals: normal intervals  Axis: normal axis  QRS/Blocks: normal QRS  ST Changes: No acute ST Changes, no STD/JAMES.  T wave changes: none        Dispo decision       My conversation  with consultant reveals:    Icu re need for SD1 on thelma hugger w/ bradycardia  My opinion ok for SD2  Admit slim      Decision rules:                    ED Course as of 03/09/25 0723   Sun Mar 09, 2025   0426 Last echo 1/2025    Left Ventricle: Left ventricular cavity size is normal. Wall thickness is normal. The left ventricular ejection fraction is 55 to 60% by visual estimation. Systolic function is normal. Wall motion is normal. Diastolic function is mildly abnormal, consistent with grade I (abnormal) relaxation.  ·  Left Atrium: The atrium is severely dilated. LA Volume Index (BP) is 51.3 mL/m2.  ·  Aortic Valve: The aortic valve is trileaflet. The leaflets are moderately thickened. The leaflets are moderately calcified. There is mildly reduced mobility. There is mild to moderate regurgitation. There is aortic valve sclerosis.  ·  Mitral Valve: There is mild annular calcification. There is mild regurgitation.  ·  Tricuspid Valve: There is no indirect evidence of pulmonary hypertension.  ·  As compared to previous study of 2023 no significant change.              My read of the XR/CT scan reveals:     CT spine cervical without contrast   Final Result      No cervical spine fracture or traumatic malalignment.                  Workstation performed: [id number]         CT head without contrast   Final Result      No acute intracranial abnormality.  Chronic microangiopathic changes.                  Workstation performed: [id number]         XR chest 1 view portable    (Results Pending)       Orders Placed This Encounter   Procedures    Blood culture    Blood culture    COVID-19/ Infleunza A/B Rapid Anitgen(30 min. TAT)    CT head without contrast    CT spine cervical without contrast    XR chest 1 view portable    CBC and differential    Comprehensive metabolic panel    UA (URINE) with reflex to Scope    CK    Lactic acid, plasma (w/reflex if result > 2.0)    HS Troponin 0hr (reflex protocol)    TSH, 3rd generation  with Free T4 reflex    B-Type Natriuretic Peptide(BNP)    Urine Microscopic    HS Troponin I 2hr    Basic metabolic panel    CBC and differential    T4    T3    C-reactive protein    CBC and differential    Comprehensive metabolic panel    Procalcitonin    Ammonia    Osmolality-If this is regarding a toxic alcohol, STOP. Test is not routinely indicated. Please consult medical  on call for further guidance.    Osmolality, urine    Sodium, urine, random    Uric acid    Cortisol Level,7-9 AM Specimen    Diet Ambrosio/CHO Controlled; Consistent Carbohydrate Diet Level 2 (5 carb servings/75 grams CHO/meal); Fluid Restriction 1800 ML    Straight cath    Apply warming blanket    Nursing communication Continue IV as ordered.    Notify admitting physician    Notify admitting physician on arrival    24 Hour Telemetry Monitoring    Insulin Subcutaneous Notify Physician    Insulin Subcutaneous Instruction    Hypoglycemia Protocol    I/O    Activity as Tolerated    Out of Bed to Chair    Commode at Bedside    Apply SCD only    Fingerstick Glucose (POCT)    Vital Signs    Urinary retention protocol    Nursing Communication Notify Provider/AP of positive CAM    Titrate O2 (oxygen) to keep saturation at    Nursing dysphagia screen    Nursing Communication Monitor patient for constipation and notify provider if bowel regimen not ordered.    Nursing Communication Daytime Wakefulness Hygiene From 7:00 am until 7:00 pm, potential interventions could include the following, if applicable based on clinical condition: - Ambient light should reflect daytime (lights on, window shades and door...    Nursing Communication Nighttime Sleep Hygiene From 7:00 pm until 7:00 am, potential interventions could include the following, if applicable based on clinical condition: - Ambient light in room to reflect nighttime (lights off, window shades close...    Level 1-Full Code: all life saving measures are indicated    Inpatient consult to  Endocrinology    OT eval and treat    PT eval and treat    ECG 12 lead    ECG 12 lead    ECG 12 lead    Inpatient Consult to Wound Care Nurse    INPATIENT ADMISSION    Fall precautions    Update level of care     Labs Reviewed   CBC AND DIFFERENTIAL - Abnormal       Result Value Ref Range Status    WBC 4.85  4.31 - 10.16 Thousand/uL Final    RBC 3.49 (*) 3.88 - 5.62 Million/uL Final    Hemoglobin 10.2 (*) 12.0 - 17.0 g/dL Final    Hematocrit 30.1 (*) 36.5 - 49.3 % Final    MCV 86  82 - 98 fL Final    MCH 29.2  26.8 - 34.3 pg Final    MCHC 33.9  31.4 - 37.4 g/dL Final    RDW 14.5  11.6 - 15.1 % Final    MPV 10.7  8.9 - 12.7 fL Final    Platelets 133 (*) 149 - 390 Thousands/uL Final    Comment: Manual Review of Smear PerformedResults verified by repeat    nRBC 0  /100 WBCs Final    Comment: This is an appended report.  These results have been appended to a previously preliminary verified report.    Segmented % 85 (*) 43 - 75 % Final    Immature Grans % 0  0 - 2 % Final    Lymphocytes % 6 (*) 14 - 44 % Final    Monocytes % 8  4 - 12 % Final    Eosinophils Relative 1  0 - 6 % Final    Basophils Relative 0  0 - 1 % Final    Absolute Neutrophils 4.15  1.85 - 7.62 Thousands/µL Final    Absolute Immature Grans 0.02  0.00 - 0.20 Thousand/uL Final    Absolute Lymphocytes 0.27 (*) 0.60 - 4.47 Thousands/µL Final    Absolute Monocytes 0.37  0.17 - 1.22 Thousand/µL Final    Eosinophils Absolute 0.03  0.00 - 0.61 Thousand/µL Final    Basophils Absolute 0.01  0.00 - 0.10 Thousands/µL Final    Narrative:     This is an appended report.  These results have been appended to a previously verified report.   COMPREHENSIVE METABOLIC PANEL - Abnormal    Sodium 125 (*) 135 - 147 mmol/L Final    Potassium 4.8  3.5 - 5.3 mmol/L Final    Chloride 96  96 - 108 mmol/L Final    CO2 19 (*) 21 - 32 mmol/L Final    ANION GAP 10  4 - 13 mmol/L Final    BUN 26 (*) 5 - 25 mg/dL Final    Creatinine 0.83  0.60 - 1.30 mg/dL Final    Comment: Standardized  to IDMS reference method    Glucose 139  65 - 140 mg/dL Final    Comment: If the patient is fasting, the ADA then defines impaired fasting glucose as > 100 mg/dL and diabetes as > or equal to 123 mg/dL.    Calcium 10.2  8.4 - 10.2 mg/dL Final    AST 38  13 - 39 U/L Final    ALT 42  7 - 52 U/L Final    Comment: Specimen collection should occur prior to Sulfasalazine administration due to the potential for falsely depressed results.     Alkaline Phosphatase 72  34 - 104 U/L Final    Total Protein 7.0  6.4 - 8.4 g/dL Final    Albumin 4.2  3.5 - 5.0 g/dL Final    Total Bilirubin 0.74  0.20 - 1.00 mg/dL Final    Comment: Use of this assay is not recommended for patients undergoing treatment with eltrombopag due to the potential for falsely elevated results.  N-acetyl-p-benzoquinone imine (metabolite of Acetaminophen) will generate erroneously low results in samples for patients that have taken an overdose of Acetaminophen.    eGFR 83  ml/min/1.73sq m Final    Narrative:     National Kidney Disease Foundation guidelines for Chronic Kidney Disease (CKD):     Stage 1 with normal or high GFR (GFR > 90 mL/min/1.73 square meters)    Stage 2 Mild CKD (GFR = 60-89 mL/min/1.73 square meters)    Stage 3A Moderate CKD (GFR = 45-59 mL/min/1.73 square meters)    Stage 3B Moderate CKD (GFR = 30-44 mL/min/1.73 square meters)    Stage 4 Severe CKD (GFR = 15-29 mL/min/1.73 square meters)    Stage 5 End Stage CKD (GFR <15 mL/min/1.73 square meters)  Note: GFR calculation is accurate only with a steady state creatinine   URINALYSIS WITH REFLEX TO SCOPE - Abnormal    Color, UA Yellow   Final    Clarity, UA Clear   Final    Specific Gravity, UA >=1.030  1.005 - 1.030 Final    pH, UA 6.0  4.5, 5.0, 5.5, 6.0, 6.5, 7.0, 7.5, 8.0 Final    Leukocytes, UA Trace (*) Negative Final    Nitrite, UA Negative  Negative Final    Protein, UA Trace (*) Negative mg/dl Final    Glucose, UA Negative  Negative mg/dl Final    Ketones, UA Negative  Negative  mg/dl Final    Urobilinogen, UA <2.0  <2.0 mg/dl mg/dl Final    Bilirubin, UA Negative  Negative Final    Occult Blood, UA Negative  Negative Final   TSH, 3RD GENERATION WITH FREE T4 REFLEX - Abnormal    TSH 3RD GENERATON 6.684 (*) 0.450 - 4.500 uIU/mL Final    Comment: Adult TSH (3rd generation) reference range follows the recommended guidelines of the American Thyroid Association, January, 2020.   B-TYPE NATRIURETIC PEPTIDE (BNP) - Abnormal     (*) 0 - 100 pg/mL Final   URINE MICROSCOPIC - Abnormal    RBC, UA None Seen  None Seen, 0-1, 1-2, 2-4, 0-5 /hpf Final    WBC, UA 1-2  None Seen, 0-1, 1-2, 0-5, 2-4 /hpf Final    Epithelial Cells Occasional  None Seen, Occasional /hpf Final    Bacteria, UA Occasional  None Seen, Occasional /hpf Final    MUCUS THREADS Moderate (*) None Seen Final    Amorphous Crystals, UA Moderate   Final   CBC AND DIFFERENTIAL - Abnormal    WBC 4.17 (*) 4.31 - 10.16 Thousand/uL Final    RBC 2.75 (*) 3.88 - 5.62 Million/uL Final    Hemoglobin 8.0 (*) 12.0 - 17.0 g/dL Final    Hematocrit 23.7 (*) 36.5 - 49.3 % Final    MCV 86  82 - 98 fL Final    MCH 29.1  26.8 - 34.3 pg Final    MCHC 33.8  31.4 - 37.4 g/dL Final    RDW 14.3  11.6 - 15.1 % Final    MPV 10.1  8.9 - 12.7 fL Final    Platelets 106 (*) 149 - 390 Thousands/uL Final    nRBC 0  /100 WBCs Final    Segmented % 83 (*) 43 - 75 % Final    Immature Grans % 1  0 - 2 % Final    Lymphocytes % 7 (*) 14 - 44 % Final    Monocytes % 8  4 - 12 % Final    Eosinophils Relative 1  0 - 6 % Final    Basophils Relative 0  0 - 1 % Final    Absolute Neutrophils 3.48  1.85 - 7.62 Thousands/µL Final    Absolute Immature Grans 0.02  0.00 - 0.20 Thousand/uL Final    Absolute Lymphocytes 0.31 (*) 0.60 - 4.47 Thousands/µL Final    Absolute Monocytes 0.33  0.17 - 1.22 Thousand/µL Final    Eosinophils Absolute 0.02  0.00 - 0.61 Thousand/µL Final    Basophils Absolute 0.01  0.00 - 0.10 Thousands/µL Final   POCT GLUCOSE - Abnormal    POC Glucose 142 (*) 65 -  "140 mg/dl Final    Comment: CRITICAL VALUE NOTED-RN NOTIFIED/CRIT NOEL   COVID-19/INFLUENZA A/B RAPID ANTIGEN (30 MIN.TAT) - Normal    SARS COV Rapid Antigen Negative  Negative Final    Influenza A Rapid Antigen Negative  Negative Final    Influenza B Rapid Antigen Negative  Negative Final    Narrative:     This test has been performed using the [employer] 2 FLU+SARS Antigen test under the Emergency Use Authorization (EUA). This test has been validated by the  and verified by the performing laboratory. The Livier uses lateral flow immunofluorescent sandwich assay to detect SARS-COV, Influenza A and Influenza B Antigen.     The Quidel Livier 2 SARS Antigen test does not differentiate between SARS-CoV and SARS-CoV-2.     Negative results are presumptive and may be confirmed with a molecular assay, if necessary, for patient management. Negative results do not rule out SARS-CoV-2 or influenza infection and should not be used as the sole basis for treatment or patient management decisions. A negative test result may occur if the level of antigen in a sample is below the limit of detection of this test.     Positive results are indicative of the presence of viral antigens, but do not rule out bacterial infection or co-infection with other viruses.     All test results should be used as an adjunct to clinical observations and other information available to the provider.    FOR PEDIATRIC PATIENTS - copy/paste COVID Guidelines URL to browser: [email or web address]   CK - Normal    Total   39 - 308 U/L Final   LACTIC ACID, PLASMA (W/REFLEX IF RESULT > 2.0) - Normal    LACTIC ACID 0.8  0.5 - 2.0 mmol/L Final    Narrative:     Result may be elevated if tourniquet was used during collection.   HS TROPONIN I 0HR - Normal    hs TnI 0hr 5  \"Refer to ACS Flowchart\"- see link ng/L Final    Comment:                                              Initial (time 0) " "result  If >=50 ng/L, Myocardial injury suggested ;  Type of myocardial injury and treatment strategy  to be determined.  If 5-49 ng/L, a delta result at 2 hours and or 4 hours will be needed to further evaluate.  If <4 ng/L, and chest pain has been >3 hours since onset, patient may qualify for discharge based on the HEART score in the ED.  If <5 ng/L and <3hours since onset of chest pain, a delta result at 2 hours will be needed to further evaluate.    HS Troponin 99th Percentile URL of a Health Population=12 ng/L with a 95% Confidence Interval of 8-18 ng/L.    Second Troponin (time 2 hours)  If calculated delta >= 20 ng/L,  Myocardial injury suggested ; Type of myocardial injury and treatment strategy to be determined.  If 5-49 ng/L and the calculated delta is 5-19 ng/L, consult medical service for evaluation.  Continue evaluation for ischemia on ecg and other possible etiology and repeat hs troponin at 4 hours.  If delta is <5 ng/L at 2 hours, consider discharge based on risk stratification via the HEART score (if in ED), or MARY ANNE risk score in IP/Observation.    HS Troponin 99th Percentile URL of a Health Population=12 ng/L with a 95% Confidence Interval of 8-18 ng/L.   HS TROPONIN I 2HR - Normal    hs TnI 2hr 5  \"Refer to ACS Flowchart\"- see link ng/L Final    Comment:                                              Initial (time 0) result  If >=50 ng/L, Myocardial injury suggested ;  Type of myocardial injury and treatment strategy  to be determined.  If 5-49 ng/L, a delta result at 2 hours and or 4 hours will be needed to further evaluate.  If <4 ng/L, and chest pain has been >3 hours since onset, patient may qualify for discharge based on the HEART score in the ED.  If <5 ng/L and <3hours since onset of chest pain, a delta result at 2 hours will be needed to further evaluate.    HS Troponin 99th Percentile URL of a Health Population=12 ng/L with a 95% Confidence Interval of 8-18 ng/L.    Second Troponin (time 2 " hours)  If calculated delta >= 20 ng/L,  Myocardial injury suggested ; Type of myocardial injury and treatment strategy to be determined.  If 5-49 ng/L and the calculated delta is 5-19 ng/L, consult medical service for evaluation.  Continue evaluation for ischemia on ecg and other possible etiology and repeat hs troponin at 4 hours.  If delta is <5 ng/L at 2 hours, consider discharge based on risk stratification via the HEART score (if in ED), or MARY ANNE risk score in IP/Observation.    HS Troponin 99th Percentile URL of a Health Population=12 ng/L with a 95% Confidence Interval of 8-18 ng/L.    Delta 2hr hsTnI 0  <20 ng/L Final   BLOOD CULTURE   BLOOD CULTURE   T4,TOTAL   T3,TOTAL   OSMOLALITY   OSMOLALITY, URINE   SODIUM, URINE, RANDOM   URIC ACID   CORTISOL LEVEL 7-9 AM SPECIMEN       *Each of these labs was reviewed. Particular standout labs will be noted in the ED Course above     Final Diagnosis:  1. Fall from standing, initial encounter    2. Hypothermia, initial encounter    3. Hyponatremia    4. Hypothyroid    5. CHF, acute (HCC)    6. Ambulatory dysfunction    7. Psychomotor delay          P:  - hospital tx includes   Medications   ammonium lactate (LAC-HYDRIN) 12 % lotion (has no administration in time range)   aspirin chewable tablet 81 mg (has no administration in time range)   atorvastatin (LIPITOR) tablet 20 mg (has no administration in time range)   brimonidine tartrate 0.2 % ophthalmic solution 1 drop (has no administration in time range)   carvedilol (COREG) tablet 12.5 mg (has no administration in time range)   co-enzyme Q-10 capsule 100 mg (has no administration in time range)   ferrous sulfate tablet 325 mg (has no administration in time range)   furosemide (LASIX) tablet 20 mg ( Oral Held Dose 3/14/25 0900)   losartan (COZAAR) tablet 25 mg (has no administration in time range)   multivitamin stress formula tablet 1 tablet (has no administration in time range)   pantoprazole (PROTONIX) EC tablet 40  mg (has no administration in time range)   tamsulosin (FLOMAX) capsule 0.4 mg (has no administration in time range)   sitaGLIPtin (JANUVIA) tablet 25 mg ( Oral Held Dose 3/12/25 0900)   spironolactone (ALDACTONE) tablet 25 mg ( Oral Held Dose 3/12/25 0900)   acetaminophen (TYLENOL) tablet 650 mg (has no administration in time range)   enoxaparin (LOVENOX) subcutaneous injection 40 mg (has no administration in time range)   insulin lispro (HumALOG/ADMELOG) 100 units/mL subcutaneous injection 1-5 Units (has no administration in time range)   insulin lispro (HumALOG/ADMELOG) 100 units/mL subcutaneous injection 1-5 Units (has no administration in time range)   furosemide (LASIX) injection 20 mg (has no administration in time range)         - disposition  Time reflects when diagnosis was documented in both MDM as applicable and the Disposition within this note       Time User Action Codes Description Comment    3/9/2025  4:24 AM Kt Gamez [W19.XXXA] Fall from standing, initial encounter     3/9/2025  4:24 AM Kt Gamez [T68.XXXA] Hypothermia, initial encounter     3/9/2025  4:25 AM Kt Gamez [E87.1] Hyponatremia     3/9/2025  4:25 AM Kt Gamez [E03.9] Hypothyroid     3/9/2025  4:25 AM Kt Gamez [I50.9] CHF, acute (HCC)     3/9/2025  4:25 AM Kt Gamez [R26.2] Ambulatory dysfunction     3/9/2025  4:25 AM Kt Gamez [R41.89] Depressed level of consciousness     3/9/2025  4:25 AM Kt Gamez Remove [R41.89] Depressed level of consciousness     3/9/2025  4:25 AM Kt Gamez [R41.843] Psychomotor delay           ED Disposition       ED Disposition   Admit    Condition   Stable    Date/Time   Sun Mar 9, 2025  4:24 AM    Comment   Case was discussed with karime and the patient's admission status was agreed to be Admission Status: inpatient status to the service of Dr. torres .               Follow-up Information    None         - patient  will call their PCP to let them know they were in the emergency department. We discuss return precautions and patient is agreeable with plan and aformentioned disposition.       - additional treatment intended, if consistent with primary provider:  - patient to follow with :      Current Discharge Medication List        CONTINUE these medications which have NOT CHANGED    Details   acetaminophen (TYLENOL) 325 mg tablet Take 2 tablets (650 mg total) by mouth every 6 (six) hours as needed for mild pain, headaches or fever  Refills: 0    Associated Diagnoses: Chronic right-sided low back pain without sciatica      ammonium lactate (LAC-HYDRIN) 12 % lotion Apply topically 2 (two) times a day  Qty: 500 g, Refills: 3    Associated Diagnoses: Dry skin dermatitis      Ascorbic Acid (Vitamin C) 500 MG CAPS Take 500 mg by mouth daily      aspirin (ECOTRIN LOW STRENGTH) 81 mg EC tablet Take 81 mg by mouth daily      atorvastatin (LIPITOR) 20 mg tablet TAKE 1 TABLET EVERY DAY  Qty: 90 tablet, Refills: 1    Associated Diagnoses: Coronary artery disease involving native coronary artery of native heart without angina pectoris      brimonidine tartrate 0.2 % ophthalmic solution Inject 0.2 % into the eye 2 (two) times a day      carvedilol (COREG) 12.5 mg tablet Take 1 tablet (12.5 mg total) by mouth 2 (two) times a day  Qty: 180 tablet, Refills: 1    Associated Diagnoses: Benign essential hypertension      Cholecalciferol (Vitamin D3) 50 MCG (2000 UT) TABS Take 2,000 Units by mouth daily      co-enzyme Q-10 100 mg capsule Take 100 mg by mouth daily      ferrous sulfate 324 (65 Fe) mg TAKE 1 TABLET TWICE DAILY BEFORE MEALS  Qty: 180 tablet, Refills: 1    Associated Diagnoses: Anemia due to chronic kidney disease, unspecified CKD stage      furosemide (LASIX) 20 mg tablet Take 1 tablet (20 mg total) by mouth 3 (three) times a week  Qty: 36 tablet, Refills: 3    Associated Diagnoses: Chronic combined systolic and diastolic heart  failure, NYHA class 2 ([facility])      glucose blood (OneTouch Ultra) test strip Test blood sugar once a day  Qty: 100 strip, Refills: 1    Associated Diagnoses: Type 2 diabetes mellitus with diabetic neuropathy, without long-term current use of insulin ([facility])      imiquimod (ALDARA) 5 % cream APPLY 1 PACKET TOPICALLY 3 (THREE) TIMES A WEEK  Qty: 12 packet, Refills: 11    Associated Diagnoses: Plantar warts      losartan (COZAAR) 25 mg tablet TAKE 1 TABLET (25 MG TOTAL) BY MOUTH DAILY  Qty: 90 tablet, Refills: 1    Associated Diagnoses: Benign essential hypertension      metFORMIN (GLUCOPHAGE) 1000 MG tablet TAKE ONE TABLET BY MOUTH TWICE A DAY WITH MEALS  Qty: 180 tablet, Refills: 1    Associated Diagnoses: Type 2 diabetes mellitus with hyperglycemia, without long-term current use of insulin ([facility])      multivitamin (THERAGRAN) TABS Take 1 tablet by mouth daily      omeprazole (PriLOSEC) 40 MG capsule Take 1 capsule (40 mg total) by mouth daily as needed (acid reflux)  Qty: 100 capsule, Refills: 1    Associated Diagnoses: Chronic GERD      oxybutynin (DITROPAN-XL) 5 mg 24 hr tablet Take 1 tablet (5 mg total) by mouth daily at bedtime  Qty: 90 tablet, Refills: 3    Associated Diagnoses: OAB (overactive bladder)      sitaGLIPtin (JANUVIA) 25 mg tablet Take 1 tablet (25 mg total) by mouth daily  Qty: 90 tablet, Refills: 2    Associated Diagnoses: Type 2 diabetes mellitus with hyperglycemia, without long-term current use of insulin ([facility])      spironolactone (ALDACTONE) 25 mg tablet Take 1 tablet (25 mg total) by mouth daily  Qty: 90 tablet, Refills: 1    Associated Diagnoses: Acute on chronic congestive heart failure, unspecified heart failure type ([facility])      tacrolimus (PROTOPIC) 0.1 % ointment Apply topically daily Apply daily for maintenance  Qty: 100 g, Refills: 3    Associated Diagnoses: Xerosis of skin      tamsulosin (FLOMAX) 0.4 mg TAKE 1 CAPSULE EVERY DAY WITH DINNER  Qty: 90 capsule, Refills: 1    Associated  Diagnoses: Prostate cancer (HCC)      triamcinolone (KENALOG) 0.1 % ointment Apply topically 2 (two) times a day Apply to body twice a day then switch to Protopic Ointment and DO NOT USE ON FACE, GROIN, ARMPITS  Qty: 453.6 g, Refills: 1    Associated Diagnoses: Xerosis of skin      TRUEplus Lancets 28G MISC Test 1x/d, E11.29  Qty: 200 each, Refills: 3    Associated Diagnoses: Type 2 diabetes mellitus with diabetic neuropathy, without long-term current use of insulin ([facility])           No discharge procedures on file.  Prior to Admission Medications   Prescriptions Last Dose Informant Patient Reported? Taking?   Ascorbic Acid (Vitamin C) 500 MG CAPS  Self Yes No   Sig: Take 500 mg by mouth daily   Cholecalciferol (Vitamin D3) 50 MCG (2000 UT) TABS  Self Yes No   Sig: Take 2,000 Units by mouth daily   TRUEplus Lancets 28G MISC  Self No No   Sig: Test 1x/d, E11.29   acetaminophen (TYLENOL) 325 mg tablet  Self No No   Sig: Take 2 tablets (650 mg total) by mouth every 6 (six) hours as needed for mild pain, headaches or fever   ammonium lactate (LAC-HYDRIN) 12 % lotion  Self No No   Sig: Apply topically 2 (two) times a day   aspirin (ECOTRIN LOW STRENGTH) 81 mg EC tablet  Self Yes No   Sig: Take 81 mg by mouth daily   atorvastatin (LIPITOR) 20 mg tablet  Self No No   Sig: TAKE 1 TABLET EVERY DAY   brimonidine tartrate 0.2 % ophthalmic solution  Self Yes No   Sig: Inject 0.2 % into the eye 2 (two) times a day   carvedilol (COREG) 12.5 mg tablet  Self No No   Sig: Take 1 tablet (12.5 mg total) by mouth 2 (two) times a day   co-enzyme Q-10 100 mg capsule  Self Yes No   Sig: Take 100 mg by mouth daily   ferrous sulfate 324 (65 Fe) mg  Self No No   Sig: TAKE 1 TABLET TWICE DAILY BEFORE MEALS   furosemide (LASIX) 20 mg tablet  Self No No   Sig: Take 1 tablet (20 mg total) by mouth 3 (three) times a week   glucose blood (OneTouch Ultra) test strip  Self No No   Sig: Test blood sugar once a day   imiquimod (ALDARA) 5 % cream  Self  "No No   Sig: APPLY 1 PACKET TOPICALLY 3 (THREE) TIMES A WEEK   losartan (COZAAR) 25 mg tablet  Self No No   Sig: TAKE 1 TABLET (25 MG TOTAL) BY MOUTH DAILY   metFORMIN (GLUCOPHAGE) 1000 MG tablet  Self No No   Sig: TAKE ONE TABLET BY MOUTH TWICE A DAY WITH MEALS   multivitamin (THERAGRAN) TABS  Self Yes No   Sig: Take 1 tablet by mouth daily   omeprazole (PriLOSEC) 40 MG capsule  Self No No   Sig: Take 1 capsule (40 mg total) by mouth daily as needed (acid reflux)   oxybutynin (DITROPAN-XL) 5 mg 24 hr tablet   No No   Sig: Take 1 tablet (5 mg total) by mouth daily at bedtime   sitaGLIPtin (JANUVIA) 25 mg tablet  Self No No   Sig: Take 1 tablet (25 mg total) by mouth daily   spironolactone (ALDACTONE) 25 mg tablet  Self No No   Sig: Take 1 tablet (25 mg total) by mouth daily   tacrolimus (PROTOPIC) 0.1 % ointment  Self No No   Sig: Apply topically daily Apply daily for maintenance   tamsulosin (FLOMAX) 0.4 mg  Self No No   Sig: TAKE 1 CAPSULE EVERY DAY WITH DINNER   triamcinolone (KENALOG) 0.1 % ointment  Self No No   Sig: Apply topically 2 (two) times a day Apply to body twice a day then switch to Protopic Ointment and DO NOT USE ON FACE, GROIN, ARMPITS      Facility-Administered Medications: None       Portions of the record may have been created with voice recognition software. Occasional wrong word or \"sound a like\" substitutions may have occurred due to the inherent limitations of voice recognition software. Read the chart carefully and recognize, using context, where substitutions have occurred.    Electronically signed by:  MD Kt Martin MD  03/09/25 [id number]    "

## 2025-03-09 NOTE — ASSESSMENT & PLAN NOTE
Wt Readings from Last 3 Encounters:   03/04/25 74.8 kg (165 lb)   02/10/25 74.4 kg (164 lb)   02/04/25 75.3 kg (166 lb)

## 2025-03-09 NOTE — ASSESSMENT & PLAN NOTE
POA with increase confusion post fall from home   Per wife, noticed in the last 2 weeks patient has becoming increasingly more tired at home and confused, she noted that he falls asleep easily and has not be outside for 1 week.   Ax2, forgetful no focal deficits noted, per wife not at baseline  CT of the head: No acute intracranial abnormality. Chronic microangiopathic changes   In ED noted to be hypothermic with rectal temperature at 90.1f  UA unremarkable   CXR unremarkable  Elevated TSH  Suspect etiology secondary to hypothermia vs elevated TSH  Obtain ammonia level and B12  Monitor neuro status

## 2025-03-09 NOTE — ASSESSMENT & PLAN NOTE
S/p multivessel stenting  Home medication aspirin Lipitor, Coreg, continued with hold parameters on the Coreg for heart rate less than 50

## 2025-03-10 ENCOUNTER — TELEPHONE (OUTPATIENT)
Age: 80
End: 2025-03-10

## 2025-03-10 ENCOUNTER — APPOINTMENT (INPATIENT)
Dept: RADIOLOGY | Facility: HOSPITAL | Age: 80
DRG: 644 | End: 2025-03-10
Payer: MEDICARE

## 2025-03-10 PROBLEM — R53.1 GENERALIZED WEAKNESS: Status: ACTIVE | Noted: 2025-03-09

## 2025-03-10 PROBLEM — I50.42 CHRONIC COMBINED SYSTOLIC AND DIASTOLIC CHF (CONGESTIVE HEART FAILURE) (HCC): Status: ACTIVE | Noted: 2025-03-09

## 2025-03-10 LAB
ALBUMIN SERPL BCG-MCNC: 3.5 G/DL (ref 3.5–5)
ALP SERPL-CCNC: 63 U/L (ref 34–104)
ALT SERPL W P-5'-P-CCNC: 36 U/L (ref 7–52)
ANION GAP SERPL CALCULATED.3IONS-SCNC: 11 MMOL/L (ref 4–13)
ANION GAP SERPL CALCULATED.3IONS-SCNC: 9 MMOL/L (ref 4–13)
AST SERPL W P-5'-P-CCNC: 33 U/L (ref 13–39)
ATRIAL RATE: 50 BPM
BASE EX.OXY STD BLDV CALC-SCNC: 92 % (ref 60–80)
BASE EXCESS BLDV CALC-SCNC: -0.6 MMOL/L
BASOPHILS # BLD AUTO: 0.01 THOUSANDS/ÂΜL (ref 0–0.1)
BASOPHILS NFR BLD AUTO: 0 % (ref 0–1)
BILIRUB SERPL-MCNC: 0.68 MG/DL (ref 0.2–1)
BUN SERPL-MCNC: 23 MG/DL (ref 5–25)
BUN SERPL-MCNC: 27 MG/DL (ref 5–25)
CALCIUM SERPL-MCNC: 9 MG/DL (ref 8.4–10.2)
CALCIUM SERPL-MCNC: 9.1 MG/DL (ref 8.4–10.2)
CHLORIDE SERPL-SCNC: 98 MMOL/L (ref 96–108)
CHLORIDE SERPL-SCNC: 98 MMOL/L (ref 96–108)
CO2 SERPL-SCNC: 20 MMOL/L (ref 21–32)
CO2 SERPL-SCNC: 20 MMOL/L (ref 21–32)
CORTIS AM PEAK SERPL-MCNC: 10.7 UG/DL (ref 6.7–22.6)
CREAT SERPL-MCNC: 1.08 MG/DL (ref 0.6–1.3)
CREAT SERPL-MCNC: 1.28 MG/DL (ref 0.6–1.3)
EOSINOPHIL # BLD AUTO: 0.03 THOUSAND/ÂΜL (ref 0–0.61)
EOSINOPHIL NFR BLD AUTO: 1 % (ref 0–6)
ERYTHROCYTE [DISTWIDTH] IN BLOOD BY AUTOMATED COUNT: 14.8 % (ref 11.6–15.1)
GFR SERPL CREATININE-BSD FRML MDRD: 52 ML/MIN/1.73SQ M
GFR SERPL CREATININE-BSD FRML MDRD: 64 ML/MIN/1.73SQ M
GLUCOSE SERPL-MCNC: 128 MG/DL (ref 65–140)
GLUCOSE SERPL-MCNC: 143 MG/DL (ref 65–140)
GLUCOSE SERPL-MCNC: 152 MG/DL (ref 65–140)
GLUCOSE SERPL-MCNC: 88 MG/DL (ref 65–140)
GLUCOSE SERPL-MCNC: 90 MG/DL (ref 65–140)
GLUCOSE SERPL-MCNC: 93 MG/DL (ref 65–140)
HCO3 BLDV-SCNC: 23.7 MMOL/L (ref 24–30)
HCT VFR BLD AUTO: 26.8 % (ref 36.5–49.3)
HGB BLD-MCNC: 9.1 G/DL (ref 12–17)
IMM GRANULOCYTES # BLD AUTO: 0.03 THOUSAND/UL (ref 0–0.2)
IMM GRANULOCYTES NFR BLD AUTO: 1 % (ref 0–2)
LYMPHOCYTES # BLD AUTO: 0.51 THOUSANDS/ÂΜL (ref 0.6–4.47)
LYMPHOCYTES NFR BLD AUTO: 9 % (ref 14–44)
MCH RBC QN AUTO: 29.1 PG (ref 26.8–34.3)
MCHC RBC AUTO-ENTMCNC: 34 G/DL (ref 31.4–37.4)
MCV RBC AUTO: 86 FL (ref 82–98)
MONOCYTES # BLD AUTO: 0.75 THOUSAND/ÂΜL (ref 0.17–1.22)
MONOCYTES NFR BLD AUTO: 13 % (ref 4–12)
NEUTROPHILS # BLD AUTO: 4.38 THOUSANDS/ÂΜL (ref 1.85–7.62)
NEUTS SEG NFR BLD AUTO: 76 % (ref 43–75)
NRBC BLD AUTO-RTO: 0 /100 WBCS
O2 CT BLDV-SCNC: 13.8 ML/DL
P AXIS: 60 DEGREES
PCO2 BLDV: 37.4 MM HG (ref 42–50)
PH BLDV: 7.42 [PH] (ref 7.3–7.4)
PLATELET # BLD AUTO: 139 THOUSANDS/UL (ref 149–390)
PMV BLD AUTO: 9.7 FL (ref 8.9–12.7)
PO2 BLDV: 74.5 MM HG (ref 35–45)
POTASSIUM SERPL-SCNC: 4.1 MMOL/L (ref 3.5–5.3)
POTASSIUM SERPL-SCNC: 4.5 MMOL/L (ref 3.5–5.3)
PR INTERVAL: 258 MS
PROCALCITONIN SERPL-MCNC: 0.13 NG/ML
PROT SERPL-MCNC: 6.1 G/DL (ref 6.4–8.4)
QRS AXIS: 20 DEGREES
QRSD INTERVAL: 92 MS
QT INTERVAL: 470 MS
QTC INTERVAL: 428 MS
RBC # BLD AUTO: 3.13 MILLION/UL (ref 3.88–5.62)
SODIUM SERPL-SCNC: 127 MMOL/L (ref 135–147)
SODIUM SERPL-SCNC: 129 MMOL/L (ref 135–147)
T WAVE AXIS: 31 DEGREES
T4 FREE SERPL-MCNC: 1.02 NG/DL (ref 0.61–1.12)
VENTRICULAR RATE: 50 BPM
WBC # BLD AUTO: 5.71 THOUSAND/UL (ref 4.31–10.16)

## 2025-03-10 PROCEDURE — 99223 1ST HOSP IP/OBS HIGH 75: CPT | Performed by: INTERNAL MEDICINE

## 2025-03-10 PROCEDURE — 82948 REAGENT STRIP/BLOOD GLUCOSE: CPT

## 2025-03-10 PROCEDURE — 70551 MRI BRAIN STEM W/O DYE: CPT

## 2025-03-10 PROCEDURE — 97163 PT EVAL HIGH COMPLEX 45 MIN: CPT

## 2025-03-10 PROCEDURE — 82805 BLOOD GASES W/O2 SATURATION: CPT

## 2025-03-10 PROCEDURE — 80048 BASIC METABOLIC PNL TOTAL CA: CPT | Performed by: INTERNAL MEDICINE

## 2025-03-10 PROCEDURE — 84439 ASSAY OF FREE THYROXINE: CPT | Performed by: INTERNAL MEDICINE

## 2025-03-10 PROCEDURE — 85025 COMPLETE CBC W/AUTO DIFF WBC: CPT | Performed by: FAMILY MEDICINE

## 2025-03-10 PROCEDURE — 99232 SBSQ HOSP IP/OBS MODERATE 35: CPT | Performed by: FAMILY MEDICINE

## 2025-03-10 PROCEDURE — 84145 PROCALCITONIN (PCT): CPT | Performed by: FAMILY MEDICINE

## 2025-03-10 PROCEDURE — 93010 ELECTROCARDIOGRAM REPORT: CPT | Performed by: INTERNAL MEDICINE

## 2025-03-10 PROCEDURE — 80053 COMPREHEN METABOLIC PANEL: CPT | Performed by: FAMILY MEDICINE

## 2025-03-10 PROCEDURE — 82533 TOTAL CORTISOL: CPT | Performed by: FAMILY MEDICINE

## 2025-03-10 PROCEDURE — 99222 1ST HOSP IP/OBS MODERATE 55: CPT | Performed by: INTERNAL MEDICINE

## 2025-03-10 PROCEDURE — 97110 THERAPEUTIC EXERCISES: CPT

## 2025-03-10 RX ORDER — CEFAZOLIN SODIUM 1 G/50ML
1000 SOLUTION INTRAVENOUS EVERY 8 HOURS
Status: DISCONTINUED | OUTPATIENT
Start: 2025-03-11 | End: 2025-03-12 | Stop reason: HOSPADM

## 2025-03-10 RX ADMIN — PANTOPRAZOLE SODIUM 40 MG: 40 TABLET, DELAYED RELEASE ORAL at 06:27

## 2025-03-10 RX ADMIN — BRIMONIDINE TARTRATE 1 DROP: 2 SOLUTION/ DROPS OPHTHALMIC at 17:11

## 2025-03-10 RX ADMIN — INSULIN LISPRO 1 UNITS: 100 INJECTION, SOLUTION INTRAVENOUS; SUBCUTANEOUS at 22:31

## 2025-03-10 RX ADMIN — CARVEDILOL 12.5 MG: 12.5 TABLET, FILM COATED ORAL at 17:10

## 2025-03-10 RX ADMIN — Medication: at 17:11

## 2025-03-10 RX ADMIN — B-COMPLEX W/ C & FOLIC ACID TAB 1 TABLET: TAB at 08:18

## 2025-03-10 RX ADMIN — TAMSULOSIN HYDROCHLORIDE 0.4 MG: 0.4 CAPSULE ORAL at 17:10

## 2025-03-10 RX ADMIN — ENOXAPARIN SODIUM 40 MG: 40 INJECTION SUBCUTANEOUS at 08:18

## 2025-03-10 RX ADMIN — CARVEDILOL 12.5 MG: 12.5 TABLET, FILM COATED ORAL at 08:18

## 2025-03-10 RX ADMIN — ATORVASTATIN CALCIUM 20 MG: 20 TABLET, FILM COATED ORAL at 17:11

## 2025-03-10 RX ADMIN — Medication 100 MG: at 08:18

## 2025-03-10 RX ADMIN — CEFTRIAXONE 1000 MG: 1 INJECTION, SOLUTION INTRAVENOUS at 10:20

## 2025-03-10 RX ADMIN — FERROUS SULFATE TAB 325 MG (65 MG ELEMENTAL FE) 325 MG: 325 (65 FE) TAB at 17:11

## 2025-03-10 RX ADMIN — LOSARTAN POTASSIUM 25 MG: 25 TABLET, FILM COATED ORAL at 08:18

## 2025-03-10 RX ADMIN — FERROUS SULFATE TAB 325 MG (65 MG ELEMENTAL FE) 325 MG: 325 (65 FE) TAB at 08:18

## 2025-03-10 RX ADMIN — Medication 7.5 MG: at 15:08

## 2025-03-10 RX ADMIN — Medication: at 08:19

## 2025-03-10 RX ADMIN — ASPIRIN 81 MG CHEWABLE TABLET 81 MG: 81 TABLET CHEWABLE at 08:18

## 2025-03-10 RX ADMIN — BRIMONIDINE TARTRATE 1 DROP: 2 SOLUTION/ DROPS OPHTHALMIC at 08:18

## 2025-03-10 NOTE — WOUND OSTOMY CARE
Progress Note - Wound   Pernell Covarrubias 79 y.o. male MRN: [id number]  Unit/Bed#: [address] [place] [address] Encounter: [id number]        Assessment:   This is an 79 year old male patient admitted on 3/8/25 with hypothermia. Patient was AAO x 3 and agreeable to have wound visit done. He was turned and repositioned with assist of 1 person. He had male external urinary catheter in place with no record of BM.    Assessment Findings:  1-Partial thickness skin tear to left medial hand with flap of skin intact and reapproximated. Orders in place for wound care.  2-Full thickness traumatic wound to right anterior lower leg with dry brown eschar, yellow slough and no drainage due to wound left uncovered. Patient stated that he sustained wound when he hit his leg on a ladder several months ago. Orders in place for wound care and ambulatory referral to wound care placed.  3-Heels and sacrobuttocks intact - orders in place for skin care and for prevention.    Wound Care Plan:  1-Cleanse wound to left medial hand with NSS & pat dry. Leave steristrips in place - if they fall off, apply 3M No Sting and reapply steristrips. Cover with Adaptic, gauze, rolled gauze and tape. Change every other day & prn soilage/dislodgement.  2-Cleanse wound to right anterior lower leg with NSS & pat dry. Paint wound with betadine, cover with ABD, rolled gauze and tape. Change every other day & prn soilage/dislodgement.  3-Apply Prevent barrier cream to bilateral sacrum, buttock and heels BID and PRN  4-Heel lift boots to be worn to bilateral heels at all times in bed and after transfer to reclining chair if able. If patient unable to tolerate, must float heels on 2 pillows to offload pressure so heels are not in contact with mattress or pillows.  5-Ehob pressure redistribution cushion in chair when out of bed if able. Avoid prolonged sitting.  6-Moisturize skin daily with skin nourishing cream.  7-Turn/reposition q2h or when medically stable for pressure  re-distribution on skin.     Wound 03/09/25 Skin Tear Skin tear Hand Left;Medial (Active)   Wound Image   03/10/25 0926   Wound Description Pink;Other (Comment);Bleeding 03/10/25 0926   Romy-wound Assessment Purple 03/10/25 0926   Wound Length (cm) 0.8 cm 03/10/25 0926   Wound Width (cm) 1.8 cm 03/10/25 0926   Wound Depth (cm) 0.1 cm 03/10/25 0926   Wound Surface Area (cm^2) 1.44 cm^2 03/10/25 0926   Wound Volume (cm^3) 0.144 cm^3 03/10/25 0926   Calculated Wound Volume (cm^3) 0.14 cm^3 03/10/25 0926   Wound Site Closure Adhesive closure strips 03/10/25 0926   Drainage Amount Small 03/10/25 0926   Drainage Description Sanguineous 03/10/25 0926   Non-staged Wound Description Partial thickness 03/10/25 0926   Treatments Cleansed 03/10/25 0926   Dressing Normlgel;Adaptic;Gauze;Dry dressing 03/10/25 0926   Dressing Changed New 03/10/25 0926   Dressing Status Clean;Dry;Intact 03/10/25 0926       Wound 03/10/25 Traumatic Laceration Leg Right;Anterior (Active)   Wound Image   03/10/25 0940   Wound Description Dry;Brown;Eschar;Yellow;  Slough 03/10/25 0940   Romy-wound Assessment Intact 03/10/25 0940   Wound Length (cm) 3 cm 03/10/25 0940   Wound Width (cm) 3 cm 03/10/25 0940   Wound Depth (cm) 0.1 cm 03/10/25 0940   Wound Surface Area (cm^2) 9 cm^2 03/10/25 0940   Wound Volume (cm^3) 0.9 cm^3 03/10/25 0940   Calculated Wound Volume (cm^3) 0.9 cm^3 03/10/25 0940   Drainage Amount None 03/10/25 0940   Non-staged Wound Description Full thickness 03/10/25 0940   Treatments Cleansed 03/10/25 0940   Dressing Betadine;ABD;Dry dressing 03/10/25 0940   Dressing Changed New 03/10/25 0940   Dressing Status Clean;Dry;Intact 03/10/25 0940     Discussed assessment findings, and plan of care/recommendations with Niecy GARCIA.    Wound care will follow along with patient throughout admission, please call or text with questions and concerns.    Recommendations written as orders.  Sintia Bass MSN, RN, CWON

## 2025-03-10 NOTE — ASSESSMENT & PLAN NOTE
Lab Results   Component Value Date    HGBA1C 6.9 (H) 02/11/2025   Home med metformin and Januvia on hold  SSI coverage 4 times daily blood sugar check  Monitor on hypoglycemic protocol

## 2025-03-10 NOTE — DISCHARGE INSTR - OTHER ORDERS
Wound Care Plan:    1-Cleanse wound to left medial hand with wound cleanser & pat dry. Leave steristrips in place - if they fall off, apply 3M No Sting and reapply steristrips. Cover with Adaptic, gauze, rolled gauze and tape. Change 3x/week and as needed for soilage/dislodgement.  2-Cleanse wound to right anterior lower leg with wound cleanser or mild soap and water and pat dry. Apply Betadine to wound only (not good skin), cover with ABD pad, rolled gauze and tape. Change 3x/week and as needed for soilage/dislodgement.  3-Apply Prevent barrier cream or equivalent to bilateral sacrum, buttock and heels 2x/day and as needed.  4-Heel lift boots to be worn to bilateral heels at all times in bed and after transfer to reclining chair if able. If patient unable to tolerate, must float heels on 2 pillows to offload pressure so heels are not in contact with mattress or pillows. Please remove boots before attempting to ambulate patient.  5-Use pressure redistribution cushion in chair when out of bed if able. Avoid prolonged sitting.  6-Moisturize skin daily with skin nourishing cream.  7-Turn/reposition every 2 hours for pressure re-distribution on skin.   8-Patient will receive call from Central Scheduling for follow up Wound Care visit. No need to call unless you do not hear from them: [phone].

## 2025-03-10 NOTE — TELEPHONE ENCOUNTER
Ruth-wife called  patient is currently admitted to hospital for a fall, states is not doing well. Would like Urologist note reviewed from Dr Hernandez. (3-4-2025). Will keep posted on condition.

## 2025-03-10 NOTE — ASSESSMENT & PLAN NOTE
Patient presented with 3 falls over the last week along with weakness  Per wife, no LOC and head strike  In Ed found to be bradycardiac HR 53 and hypothermic 90.1 T rectal  Unclear etiology.  Infectious work up negative as of now negative  Started empirically on IV Rocephin pending blood cultures.  Will switch to IV Ancef starting tomorrow as patient does have some erythema of bilateral lower extremity which is worse on the right (although per wife this is also at his baseline and therefore unclear if this is truly cellulitis).  Can consider discontinuing antibiotics if blood cultures negative  COVID FLU RSV negative, LA negative, no leukocytosis  TSH noted elevated 6.684  Currently temperature has improved and no longer requiring Stella hugger  blood cultures pending  Procalcitonin negative  cortisol level pending

## 2025-03-10 NOTE — CONSULTS
NEPHROLOGY HOSPITAL CONSULTATION   Pernell Covarrubias 79 y.o. male MRN: [id number]  Unit/Bed#: [address] [place] [address] Encounter: [id number]    Assessment & Plan  Hyponatremia  Baseline sodium is around 130 to 135 over the past year.   Sodium on admission was 125.  Workup: SOsm 273, UOsm 385, Estrada 108, uric acid 4.9, TSH 6.684.  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  Cortisol level is pending.  He has mild lower extremity edema but I doubt that he is truly fluid overloaded.  Will tighten fluid restriction to 1500 cc per 24 hours.  Give tolvaptan 7.5 mg PO x 1 today.     CKD stage 3 due to type 2 diabetes mellitus (HCC)  Baseline creatinine is around 1.0 to 1.1  Follows with Dr. Montejo in the office.  Stable renal function.  Creatinine 1.08.    Fall  Per primary service.    Abnormal TSH  Per primary service.    Acute on chronic combined systolic and diastolic CHF, NYHA class 2 (HCC)  He is on furosemide 20 mg 3 times per week, spironolactone 25 mg daily- per records.   He reports taking Furosemide 20 mg daily - unclear accuracy.   Diuretics currently on hold.   Currently compensated.   Echo with EF up to 55-60%.         TODAY's DISCUSSION / PLAN:  Continue to hold furosemide and spironolactone.  Tighten fluid restriction to 1500 cc per 24 hours.  Give Tolvaptan 7.5 mg PO x 1.   Recheck BMP at 6 pm.   Follow up cortisol.     I have reviewed the nephrology recommendations including giving Tolvaptan, with Dr. Palomino, and we are in agreement with renal plan including the information outlined above.  Previous records were personally reviewed by me to obtain a baseline creatinine.   The images (CXR) were personally reviewed by me in PACS    HISTORY OF PRESENT ILLNESS:  Requesting Physician: Megan Miramontes DO  Reason for Consult: Hyponatremia    Pernell Covarrubias is a 79 y.o. male who was admitted to [facility] on 3/9/25 after presenting with a fall and generalized weakness. A renal consultation is requested today for assistance in  the management of hyponatremia.     Pernell has a history of HTN, DM, CHF, prostate cancer, and hyponatremia.  He now presents to [facility] on March 8, 2025 after a fall.  According to the patient's wife at the bedside, the patient has had frequent falls amounting to 3 episodes over the past week.  On arrival in the ER, he was noted to have hyponatremia with a sodium level of 125.  We are now being consulted for assistance with management of hyponatremia.  His sodium level today is 127.    Based on my review of the records, Arcadio has a history of hyponatremia with a sodium level range of around 130 to 135 in 2024.  Of note, his sodium level was 126 and 132 on February 11 and February 21, 2025, respectively.  He follows with Dr. Montejo in the office..    PAST MEDICAL HISTORY:  Past Medical History:   Diagnosis Date    Acquired hallux valgus     last assessed: 8/1/2013    Allergic rhinitis     Anemia 10/26/2010    Atrophy of nail     last assessed: 7/7/2015    Cancer ([facility]) 2020    Chronic kidney disease     chronic renal insufficiency    Coronary artery disease     Deformity of ankle and foot, acquired     last assessed: 2/22/2016    Diabetes mellitus ([facility]) 1994    Difficulty walking     last assessed: 12/14/2015    Dysesthesia     last assessed: 11/25/2016    Hammer toe     unspecified laterality; last assessed: 8/1/2013    Hypertension     Neuropathy in diabetes ([facility]) 1994    Onychomycosis of toenail     last assessed: 2/22/2016    Peripheral vascular disease ([facility])     left SFA stent in 2010    Pes planus     unspecified laterality; last assessed: 8/1/2013    Pneumonia     last assessed: 2/27/2016    Prostate cancer ([facility])     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     last assessed: 8/15/2016    Type 2 diabetes mellitus ([facility]) 07/26/2010    Viral warts     last assessed: 7/24/2015     PAST SURGICAL HISTORY:  Past Surgical History:   Procedure Laterality Date    CARDIAC CATHETERIZATION   07/29/2010    CATARACT EXTRACTION, BILATERAL Bilateral     R 1999, L 2008    COLONOSCOPY  2011    FEMORAL ARTERY - POPLITEAL ARTERY BYPASS GRAFT  09/23/2013    FOOT SURGERY      bone spur removed    LEG SURGERY Bilateral     repair; L 8/20/2010 and 7/26/2012    [place] PLMT INTERSTITIAL DEV RADIAT TX PROSTATE 1/MULT N/A 6/22/2021    Procedure: INSERTION OF FIDUCIAL MARKER (TRANSRECTAL ULTRASOUND GUIDANCE), SPACEOAR;  Surgeon: Jero Loomis MD;  Location: [facility];  Service: Urology    ROTATOR CUFF REPAIR Left 2009    SHOULDER SURGERY Right 2005    collar bone     ALLERGIES:  No Known Allergies    SOCIAL HISTORY:  Social History     Substance and Sexual Activity   Alcohol Use Yes    Alcohol/week: 1.0 standard drink of alcohol    Types: 1 Glasses of wine per week    Comment: Stopped in 1986     Social History     Substance and Sexual Activity   Drug Use Never     Social History     Tobacco Use   Smoking Status Never    Passive exposure: Past   Smokeless Tobacco Never     FAMILY HISTORY:  Family History   Problem Relation Age of Onset    Aortic aneurysm Mother     Heart disease Father     Heart attack Father         acute myocardial infarction    Heart disease Sister     Heart attack Sister         acute myocardial infarction    Throat cancer Maternal Grandfather     Heart disease Paternal Grandfather     No Known Problems Son      MEDICATIONS:    Current Facility-Administered Medications:     acetaminophen (TYLENOL) tablet 650 mg, 650 mg, Oral, Q6H PRN, NEO Carrera    ammonium lactate (LAC-HYDRIN) 12 % lotion, , Topical, BID, NEO Carrera, Given at 03/10/25 0819    aspirin chewable tablet 81 mg, 81 mg, Oral, Daily, NEO Carrera, 81 mg at 03/10/25 0818    atorvastatin (LIPITOR) tablet 20 mg, 20 mg, Oral, Daily With Dinner, NEO Carrera, 20 mg at 03/09/25 1549    brimonidine tartrate 0.2 % ophthalmic solution 1 drop, 1 drop, Both Eyes, BID, NEO Carrera, 1  drop at 03/10/25 0818    carvedilol (COREG) tablet 12.5 mg, 12.5 mg, Oral, BID, Chantel A Ujvary, CRNP, 12.5 mg at 03/10/25 0818    cefTRIAXone (ROCEPHIN) IVPB (premix in dextrose) 1,000 mg 50 mL, 1,000 mg, Intravenous, Q24H, Megan Miramontes DO, Last Rate: 100 mL/hr at 03/10/25 1020, 1,000 mg at 03/10/25 1020    co-enzyme Q-10 capsule 100 mg, 100 mg, Oral, Daily, Chantel A Ujvary, CRNP, 100 mg at 03/10/25 0818    enoxaparin (LOVENOX) subcutaneous injection 40 mg, 40 mg, Subcutaneous, Daily, Chantel A Ujvary, CRNP, 40 mg at 03/10/25 0818    ferrous sulfate tablet 325 mg, 325 mg, Oral, BID With Meals, Chantel A Ujvary, CRNP, 325 mg at 03/10/25 0818    [Held by provider] furosemide (LASIX) tablet 20 mg, 20 mg, Oral, Once per day on Monday Wednesday Friday, Chantel A Ujvary, CRNP    insulin lispro (HumALOG/ADMELOG) 100 units/mL subcutaneous injection 1-5 Units, 1-5 Units, Subcutaneous, TID AC **AND** Fingerstick Glucose (POCT), , , TID AC, Chantel A Ujvary, CRNP    insulin lispro (HumALOG/ADMELOG) 100 units/mL subcutaneous injection 1-5 Units, 1-5 Units, Subcutaneous, HS, Chantel A Ujvary, CRNP    losartan (COZAAR) tablet 25 mg, 25 mg, Oral, Daily, Chantel A Ujvary, CRNP, 25 mg at 03/10/25 0818    multivitamin stress formula tablet 1 tablet, 1 tablet, Oral, Daily, Chantel A Ujvary, CRNP, 1 tablet at 03/10/25 0818    pantoprazole (PROTONIX) EC tablet 40 mg, 40 mg, Oral, Early Morning, Chantel A Ujvary, CRNP, 40 mg at 03/10/25 0627    [Held by provider] sitaGLIPtin (JANUVIA) tablet 25 mg, 25 mg, Oral, Daily, NEO Carrera    [Held by provider] spironolactone (ALDACTONE) tablet 25 mg, 25 mg, Oral, Daily, NEO Carrera    tamsulosin (FLOMAX) capsule 0.4 mg, 0.4 mg, Oral, Daily With Dinner, NEO Carrera, 0.4 mg at 03/09/25 1549    REVIEW OF SYSTEMS:  All the systems were reviewed and were negative except as documented on the HPI.    PHYSICAL EXAM:  Current Weight: Weight  - Scale: 72.2 kg (159 lb 3.2 oz)  First Weight: Weight - Scale: 72.3 kg (159 lb 4.8 oz)  Vitals:    03/09/25 1814 03/09/25 2201 03/10/25 0220 03/10/25 0736   BP: 112/71 138/58 138/78 157/59   BP Location:       Pulse: 80 70  64   Resp: 20 18 20    Temp: 97.7 °F (36.5 °C) 98.3 °F (36.8 °C)  (!) 97.3 °F (36.3 °C)   TempSrc:       SpO2: 98% 98%  98%   Weight:       Height:           Intake/Output Summary (Last 24 hours) at 3/10/2025 1313  Last data filed at 3/10/2025 0902  Gross per 24 hour   Intake 295 ml   Output 2100 ml   Net -1805 ml     Physical Exam  General: conscious, coherent, cooperative, not in distress.   Skin: warm, dry, good turgor.   Eyes: pink conjunctivae, no scleral icterus.   ENT: moist lips and mucous membranes.   Respiratory: equal chest expansion, clear breath sounds.   Cardiovascular: distinct heart sounds, normal rate, regular rhythm, no rub  Abdomen: soft, non-tender, non-distended, normoactive bowel sounds  Extremities: trace to mild LE edema.  Genitourinary: no meza catheter.   Neuro: awake, alert, oriented to time, place and person.   Psych: appropriate affect.      Invasive Devices:      Lab Results:   Results from last 7 days   Lab Units 03/10/25  0812 03/09/25  2051 03/09/25  1213 03/09/25  0555 03/09/25  0020   WBC Thousand/uL 5.71  --   --  4.17* 4.85   HEMOGLOBIN g/dL 9.1*  --   --  8.0* 10.2*   HEMATOCRIT % 26.8*  --   --  23.7* 30.1*   PLATELETS Thousands/uL 139*  --   --  106* 133*   POTASSIUM mmol/L 4.1 4.5 4.7 4.3 4.8   CHLORIDE mmol/L 98 96 97 98 96   CO2 mmol/L 20* 20* 19* 19* 19*   BUN mg/dL 23 22 24 27* 26*   CREATININE mg/dL 1.08 1.15 1.00 0.82 0.83   CALCIUM mg/dL 9.0 8.8 9.1 8.9 10.2   ALK PHOS U/L 63  --   --   --  72   ALT U/L 36  --   --   --  42   AST U/L 33  --   --   --  38     Other Studies:   Chest x-ray personally reviewed by me showed no vascular congestion.    Portions of the record may have been created with voice recognition software. Occasional wrong word or  "\"sound a like\" substitutions may have occurred due to the inherent limitations of voice recognition software. Read the chart carefully and recognize, using context, where substitutions have occurred.If you have any questions, please contact the dictating provider.    "

## 2025-03-10 NOTE — ASSESSMENT & PLAN NOTE
Baseline creatinine is around 1.0 to 1.1  Follows with Dr. Montejo in the office.  Stable renal function.  Creatinine 1.08.

## 2025-03-10 NOTE — PLAN OF CARE
Problem: Prexisting or High Potential for Compromised Skin Integrity  Goal: Skin integrity is maintained or improved  Description: INTERVENTIONS:  - Identify patients at risk for skin breakdown  - Assess and monitor skin integrity  - Assess and monitor nutrition and hydration status  - Monitor labs   - Assess for incontinence   - Turn and reposition patient  - Assist with mobility/ambulation  - Relieve pressure over bony prominences  - Avoid friction and shearing  - Provide appropriate hygiene as needed including keeping skin clean and dry  - Evaluate need for skin moisturizer/barrier cream  - Collaborate with interdisciplinary team   - Patient/family teaching  - Consider wound care consult   Outcome: Progressing     Problem: NEUROSENSORY - ADULT  Goal: Achieves stable or improved neurological status  Description: INTERVENTIONS  - Monitor and report changes in neurological status  - Monitor vital signs such as temperature, blood pressure, glucose, and any other labs ordered   - Initiate measures to prevent increased intracranial pressure  - Monitor for seizure activity and implement precautions if appropriate      Outcome: Progressing  Goal: Achieves maximal functionality and self care  Description: INTERVENTIONS  - Monitor swallowing and airway patency with patient fatigue and changes in neurological status  - Encourage and assist patient to increase activity and self care.   - Encourage visually impaired, hearing impaired and aphasic patients to use assistive/communication devices  Outcome: Progressing     Problem: CARDIOVASCULAR - ADULT  Goal: Maintains optimal cardiac output and hemodynamic stability  Description: INTERVENTIONS:  - Monitor I/O, vital signs and rhythm  - Monitor for S/S and trends of decreased cardiac output  - Administer and titrate ordered vasoactive medications to optimize hemodynamic stability  - Assess quality of pulses, skin color and temperature  - Assess for signs of decreased coronary  artery perfusion  - Instruct patient to report change in severity of symptoms  Outcome: Progressing  Goal: Absence of cardiac dysrhythmias or at baseline rhythm  Description: INTERVENTIONS:  - Continuous cardiac monitoring, vital signs, obtain 12 lead EKG if ordered  - Administer antiarrhythmic and heart rate control medications as ordered  - Monitor electrolytes and administer replacement therapy as ordered  Outcome: Progressing     Problem: RESPIRATORY - ADULT  Goal: Achieves optimal ventilation and oxygenation  Description: INTERVENTIONS:  - Assess for changes in respiratory status  - Assess for changes in mentation and behavior  - Position to facilitate oxygenation and minimize respiratory effort  - Oxygen administered by appropriate delivery if ordered  - Initiate smoking cessation education as indicated  - Encourage broncho-pulmonary hygiene including cough, deep breathe, Incentive Spirometry  - Assess the need for suctioning and aspirate as needed  - Assess and instruct to report SOB or any respiratory difficulty  - Respiratory Therapy support as indicated  Outcome: Progressing     Problem: GENITOURINARY - ADULT  Goal: Maintains or returns to baseline urinary function  Description: INTERVENTIONS:  - Assess urinary function  - Encourage oral fluids to ensure adequate hydration if ordered  - Administer IV fluids as ordered to ensure adequate hydration  - Administer ordered medications as needed  - Offer frequent toileting  - Follow urinary retention protocol if ordered  Outcome: Progressing  Goal: Absence of urinary retention  Description: INTERVENTIONS:  - Assess patient’s ability to void and empty bladder  - Monitor I/O  - Bladder scan as needed  - Discuss with physician/AP medications to alleviate retention as needed  - Discuss catheterization for long term situations as appropriate  Outcome: Progressing     Problem: METABOLIC, FLUID AND ELECTROLYTES - ADULT  Goal: Electrolytes maintained within normal  limits  Description: INTERVENTIONS:  - Monitor labs and assess patient for signs and symptoms of electrolyte imbalances  - Administer electrolyte replacement as ordered  - Monitor response to electrolyte replacements, including repeat lab results as appropriate  - Instruct patient on fluid and nutrition as appropriate  Outcome: Progressing  Goal: Fluid balance maintained  Description: INTERVENTIONS:  - Monitor labs   - Monitor I/O and WT  - Instruct patient on fluid and nutrition as appropriate  - Assess for signs & symptoms of volume excess or deficit  Outcome: Progressing  Goal: Glucose maintained within target range  Description: INTERVENTIONS:  - Monitor Blood Glucose as ordered  - Assess for signs and symptoms of hyperglycemia and hypoglycemia  - Administer ordered medications to maintain glucose within target range  - Assess nutritional intake and initiate nutrition service referral as needed  Outcome: Progressing     Problem: SKIN/TISSUE INTEGRITY - ADULT  Goal: Skin Integrity remains intact(Skin Breakdown Prevention)  Description: Assess:  -Perform [place] assessment every shift  -Clean and moisturize skin every day  -Inspect skin when repositioning, toileting, and assisting with ADLS  -Assess under medical devices such as pipo every shift  -Assess extremities for adequate circulation and sensation     Bed Management:  -Have minimal linens on bed & keep smooth, unwrinkled  -Change linens as needed when moist or perspiring  -Avoid sitting or lying in one position for more than 2 hours while in bed  -Keep HOB at 45 degrees     Toileting:  -Offer bedside commode  -Assess for incontinence every hour  -Use incontinent care products after each incontinent episode such as foaming cleanser     Activity:  -Mobilize patient 3 times a day  -Encourage activity and walks on unit  -Encourage or provide ROM exercises   -Turn and reposition patient every 2 Hours  -Use appropriate equipment to lift or move patient in  bed  -Instruct/ Assist with weight shifting every hour when out of bed in chair  -Consider limitation of chair time 2 hour intervals    Skin Care:  -Avoid use of baby powder, tape, friction and shearing, hot water or constrictive clothing  -Relieve pressure over bony prominences using green wedge turning system   -Do not massage red bony areas    Next Steps:  -Teach patient strategies to minimize risks such as hygiene/mobilization   -Consider consults to  interdisciplinary teams such as wound care   Outcome: Progressing     Problem: HEMATOLOGIC - ADULT  Goal: Maintains hematologic stability  Description: INTERVENTIONS  - Assess for signs and symptoms of bleeding or hemorrhage  - Monitor labs  - Administer supportive blood products/factors as ordered and appropriate  Outcome: Progressing     Problem: MUSCULOSKELETAL - ADULT  Goal: Maintain or return mobility to safest level of function  Description: INTERVENTIONS:  - Assess patient's ability to carry out ADLs; assess patient's baseline for ADL function and identify physical deficits which impact ability to perform ADLs (bathing, care of mouth/teeth, toileting, grooming, dressing, etc.)  - Assess/evaluate cause of self-care deficits   - Assess range of motion  - Assess patient's mobility  - Assess patient's need for assistive devices and provide as appropriate  - Encourage maximum independence but intervene and supervise when necessary  - Involve family in performance of ADLs  - Assess for home care needs following discharge   - Consider OT consult to assist with ADL evaluation and planning for discharge  - Provide patient education as appropriate  Outcome: Progressing  Goal: Maintain proper alignment of affected body part  Description: INTERVENTIONS:  - Support, maintain and protect limb and body alignment  - Provide patient/ family with appropriate education  Outcome: Progressing

## 2025-03-10 NOTE — ASSESSMENT & PLAN NOTE
POA with sodium level 125  Baseline approx around 130 -135  Per discussion with nephrology on 3/9 patient received Isolyte at 100 mL/h for 5 hours along with 20 mg of IV Lasix x 1.  Had already received another 20 mg of IV Lasix in the a.m.  Serum osm 273, urine osm 385, urine sodium 108, uric acid 4.9  Per nephrology, fluid restriction of 1500 mL and tolvaptan 7.5 mg p.o. x 1 today.  Sodium today 127  Repeat BMP per nephrology

## 2025-03-10 NOTE — ASSESSMENT & PLAN NOTE
Per wife over the last 1 week patient has had 3 falls and appears weaker.  At baseline patient sleeps a lot even at home and this has been ongoing for many years.  Patient also gets intermittently confused and forgetful at baseline  Per wife when patient is awake at home he is more interactive and is able to feed himself compared to what he is doing here  CT of the head: No acute intracranial abnormality. Chronic microangiopathic changes   In ED noted to be hypothermic with rectal temperature at 90.1f  UA unremarkable, CXR unremarkable  Ammonia level within normal limits  Given recurrent falls and weakness will obtain MRI for further evaluation  PT/OT

## 2025-03-10 NOTE — ASSESSMENT & PLAN NOTE
Wt Readings from Last 3 Encounters:   03/09/25 72.2 kg (159 lb 3.2 oz)   03/04/25 74.8 kg (165 lb)   02/10/25 74.4 kg (164 lb)

## 2025-03-10 NOTE — ASSESSMENT & PLAN NOTE
Baseline sodium is around 130 to 135 over the past year.   Sodium on admission was 125.  Workup: SOsm 273, UOsm 385, Estrada 108, uric acid 4.9, TSH 6.684.  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  Cortisol level is pending.  He has mild lower extremity edema but I doubt that he is truly fluid overloaded.  Will tighten fluid restriction to 1500 cc per 24 hours.  Give tolvaptan 7.5 mg PO x 1 today.

## 2025-03-10 NOTE — ASSESSMENT & PLAN NOTE
He is on furosemide 20 mg 3 times per week, spironolactone 25 mg daily- per records.   He reports taking Furosemide 20 mg daily - unclear accuracy.   Diuretics currently on hold.   Currently compensated.   Echo with EF up to 55-60%.

## 2025-03-10 NOTE — ASSESSMENT & PLAN NOTE
Wt Readings from Last 3 Encounters:   03/09/25 72.2 kg (159 lb 3.2 oz)   03/04/25 74.8 kg (165 lb)   02/10/25 74.4 kg (164 lb)     Patient with bilateral lower extremity edema although this is at baseline per wife  Per wife have chronic edema takes furosemide MWF   CXR negative for acute pathology  BNP elevated 349  Echo: 1/22/25 EF 55 to 60% by visual estimation. Systolic function is normal. Wall motion is normal. Diastolic function is mildly abnormal, consistent with grade I (abnormal) relaxation.   Hold off on further IV Lasix  Monitor I&O and daily weights

## 2025-03-10 NOTE — PHYSICAL THERAPY NOTE
PHYSICAL THERAPY EVALUATION/TREATMENT     03/10/25 1400   PT Last Visit   PT Visit Date 03/10/25   Note Type   Note type Evaluation   Pain Assessment   Pain Assessment Tool Tinajero-Baker FACES   Tinajero-Baker FACES Pain Rating 2  (Cervical spinal pain as well as low back pain at times)   Restrictions/Precautions   Other Precautions Chair Alarm;Bed Alarm;Fall Risk;Pain   Home Living   Type of Home House   Home Layout One level;Stairs to enter with rails  (1+4 steps to enter)   Home Equipment Walker  (Roller walker, standard walker and rollator)   Additional Comments Patient using a standard walker prior to admission   Prior Function   Level of [place] Needs assistance with ADLs;Needs assistance with IADLS;Needs assistance with functional mobility   Lives With Spouse   Receives Help From Family   IADLs Family/Friend/Other provides transportation;Family/Friend/Other provides meals;Family/Friend/Other provides medication management   Falls in the last 6 months 1 to 4   Comments Patient ambulating with a standard walker prior to 2 weeks ago.  Wife now reporting decline in functional mobility over the last 2 weeks requiring assist for bathing, dressing and gait activity   General   Additional Pertinent History Chart reviewed, patient admitted with CHF, gait dysfunction, hypothermia.  Patient presents as generally weak and deconditioned with decreased responsiveness at times, falling asleep between eval questions and activity.  Patient typically ambulating with a standard walker in his home independently now requiring increased assist of 1 person for very short distance gait activity   Family/Caregiver Present Yes  (Wife present)   Cognition   Overall Cognitive Status Impaired   Arousal/Participation Cooperative   Orientation Level Oriented to person;Oriented to place   Following Commands Follows one step commands with increased time or repetition   Comments Patient slow to follow commands at times and reports as a  poor historian with wife correcting functional mobility information as prior to admission   Subjective   Subjective Patient states having cervical spinal pain at times but feeling comfortable at this moment   RLE Assessment   RLE Assessment   (Range of motion within functional limits, strength 3/3+)   LLE Assessment   LLE Assessment   (Range of motion within functional limits, strength 3/3+)   Coordination   Movements are Fluid and Coordinated 0   Coordination and Movement Description Decreased coordination with transfers, gait activity   Bed Mobility   Supine to Sit 3  Moderate assistance   Additional items Assist x 1;Verbal cues;LE management   Sit to Supine 2  Maximal assistance   Additional items Assist x 1;Verbal cues;LE management   Transfers   Sit to Stand   (Mod to max)   Additional items Assist x 1;Verbal cues   Stand to Sit 3  Moderate assistance   Additional items Assist x 1;Verbal cues   Ambulation/Elevation   Gait Assistance   (Mod to max)   Additional items Assist x 1;Verbal cues;Tactile cues   Assistive Device Rolling walker   Distance 10-12 steps at bedside with assist for weight shifting with forward flexed posturing increased thyrotoxic kyphosis and forward head posture   Balance   Static Sitting Fair -   Dynamic Sitting Poor +   Static Standing Poor +   Dynamic Standing Poor   Ambulatory Poor   Activity Tolerance   Activity Tolerance Patient limited by fatigue;Treatment limited secondary to medical complications (Comment)  (Limited by weakness)   Nurse Made Aware Yes   Assessment   Prognosis Good   Problem List Decreased strength;Decreased range of motion;Decreased endurance;Impaired balance;Decreased mobility;Decreased coordination;Pain;Decreased cognition   Assessment Patient seen for Physical Therapy evaluation. Patient admitted with Hypothermia.  Comorbidities affecting patient's physical performance include: type 2 diabetes, hypertension, prostate cancer, hyponatremia, HF .  Personal factors  affecting patient at time of initial evaluation include: ambulating with assistive device, stairs to enter home, inability to ambulate household distances, inability to navigate community distances, inability to navigate level surfaces without external assistance, inability to perform dynamic tasks in community, limited home support, positive fall history, inability to perform caregiver support/tasks, inability to perform physical activity, inability to perform ADLS, and inability to perform IADLS . Prior to admission, patient was independent with functional mobility with standard walker, requiring assist for ADLS, requiring assist for IADLS, ambulating household distance, and home with family assist.  Please find objective findings from Physical Therapy assessment regarding body systems outlined above with impairments and limitations including weakness, decreased ROM, impaired balance, decreased endurance, impaired coordination, gait deviations, pain, decreased activity tolerance, decreased functional mobility tolerance, and fall risk.  The Barthel Index was used as a functional outcome tool presenting with a score of Barthel Index Score: 30 today indicating marked limitations of functional mobility and ADLS.  Patient's clinical presentation is currently unstable/unpredictable as seen in patient's presentation of vital sign response, changing level of pain, increased fall risk, new onset of impairment of functional mobility, decreased endurance, and new onset of weakness. Pt would benefit from continued Physical Therapy treatment to address deficits as defined above and maximize level of functional mobility. As demonstrated by objective findings, the assigned level of complexity for this evaluation is high.The patient's -[facility] Basic Mobility Inpatient Short Form Raw Score is 11. A Raw score of less than or equal to 16 suggests the patient may benefit from discharge to post-acute rehabilitation services. Please also  refer to the recommendation of the Physical Therapist for safe discharge planning.   Goals   Patient Goals To get stronger   STG Expiration Date 03/17/25   Short Term Goal #1 Transfers and gait with min assist of 1   Short Term Goal #2 Gait endurance to 30 feet   LTG Expiration Date 03/24/25   Long Term Goal #1 Strength bilateral lower extremities 3+/4 -   Long Term Goal #2 Transfers and gait with roller walker with supervision for functional household distances prior to admission   Plan   Treatment/Interventions ADL retraining;Functional transfer training;LE strengthening/ROM;Therapeutic exercise;Endurance training;Patient/family training;Equipment eval/education;Bed mobility;Gait training;Compensatory technique education   PT Frequency 3-5x/wk   Discharge Recommendation   Rehab Resource Intensity Level, PT II (Moderate Resource Intensity)   AM-PAC Basic Mobility Inpatient   Turning in Flat Bed Without Bedrails 2   Lying on Back to Sitting on Edge of Flat Bed Without Bedrails 2   Moving Bed to Chair 2   Standing Up From Chair Using Arms 2   Walk in Room 2   Climb 3-5 Stairs With Railing 1   Basic Mobility Inpatient Raw Score 11   Basic Mobility Standardized Score 30.25   [facility] Highest Level Of Mobility   -[facility] Goal 4: Move to chair/commode   -[facility] Achieved 6: Walk 10 steps or more   Barthel Index   Feeding 5   Bathing 0   Grooming Score 0   Dressing Score 5   Bladder Score 0   Bowels Score 10   Toilet Use Score 5   Transfers (Bed/Chair) Score 5   Mobility (Level Surface) Score 0   Stairs Score 0   Barthel Index Score 30   Additional Treatment Session   Start Time 1345   End Time 1400   Treatment Assessment s: Patient reports no pain with treatment session O: Bilateral lower extremity exercise completed as listed below A: Patient requiring increased assist for transfers, sitting balance and gait activities and will benefit from continued physical therapy with progression as tolerated and increasing  functional mobility with clinical staff as well   Exercises   Glute Sets Sitting;5 reps;Bilateral  (Alternating)   Knee AROM Long Arc Quad Sitting;5 reps;Bilateral  (Alternating)   Ankle Pumps Sitting;10 reps;Bilateral   Balance training  Sitting balance activity completed on bed edge without lumbar support, sidestepping completed for balance as well   Licensure   [place] License Number  Shante Alan, PT 4 0 [id number]

## 2025-03-10 NOTE — PROGRESS NOTES
Progress Note - Hospitalist   Name: Pernell Covarrubias 79 y.o. male I MRN: [id number]  Unit/Bed#: [address] [place] [address] Date of Admission: 3/8/2025   Date of Service: 3/10/2025 I Hospital Day: 1    Assessment & Plan  Hypothermia  Patient presented with 3 falls over the last week along with weakness  Per wife, no LOC and head strike  In Ed found to be bradycardiac HR 53 and hypothermic 90.1 T rectal  Unclear etiology.  Infectious work up negative as of now negative  Started empirically on IV Rocephin pending blood cultures.  Will switch to IV Ancef starting tomorrow as patient does have some erythema of bilateral lower extremity which is worse on the right (although per wife this is also at his baseline and therefore unclear if this is truly cellulitis).  Can consider discontinuing antibiotics if blood cultures negative  COVID FLU RSV negative, LA negative, no leukocytosis  TSH noted elevated 6.684  Currently temperature has improved and no longer requiring Stella hugger  blood cultures pending  Procalcitonin negative  cortisol level pending  Hyponatremia  POA with sodium level 125  Baseline approx around 130 -135  Per discussion with nephrology on 3/9 patient received Isolyte at 100 mL/h for 5 hours along with 20 mg of IV Lasix x 1.  Had already received another 20 mg of IV Lasix in the a.m.  Serum osm 273, urine osm 385, urine sodium 108, uric acid 4.9  Per nephrology, fluid restriction of 1500 mL and tolvaptan 7.5 mg p.o. x 1 today.  Sodium today 127  Repeat BMP per nephrology  Generalized weakness  Per wife over the last 1 week patient has had 3 falls and appears weaker.  At baseline patient sleeps a lot even at home and this has been ongoing for many years.  Patient also gets intermittently confused and forgetful at baseline  Per wife when patient is awake at home he is more interactive and is able to feed himself compared to what he is doing here  CT of the head: No acute intracranial abnormality. Chronic  home microangiopathic changes   In ED noted to be hypothermic with rectal temperature at 90.1f  UA unremarkable, CXR unremarkable  Ammonia level within normal limits  Given recurrent falls and weakness will obtain MRI for further evaluation  PT/OT  Coronary artery disease involving native coronary artery of native heart with angina pectoris (HCC)  S/p multivessel stenting  Home medication aspirin Lipitor, Coreg, continued with hold parameters  Bradycardia  POA wit HR 49-53  Found to be hypothermic with rectal temp at 90.1F  Heart rate has improved  Abnormal TSH  POA with elevated TSH 6.68  Free T4 normal  Endocrine consulted appreciate recs  Chronic combined systolic and diastolic CHF (congestive heart failure) (HCC)  Wt Readings from Last 3 Encounters:   03/09/25 72.2 kg (159 lb 3.2 oz)   03/04/25 74.8 kg (165 lb)   02/10/25 74.4 kg (164 lb)     Patient with bilateral lower extremity edema although this is at baseline per wife  Per wife have chronic edema takes furosemide MWF   CXR negative for acute pathology  BNP elevated 349  Echo: 1/22/25 EF 55 to 60% by visual estimation. Systolic function is normal. Wall motion is normal. Diastolic function is mildly abnormal, consistent with grade I (abnormal) relaxation.   Hold off on further IV Lasix  Monitor I&O and daily weights  Type 2 diabetes mellitus with diabetic neuropathy (HCC)  Lab Results   Component Value Date    HGBA1C 6.9 (H) 02/11/2025   Home med metformin and Januvia on hold  SSI coverage 4 times daily blood sugar check  Monitor on hypoglycemic protocol  CKD stage 3 due to type 2 diabetes mellitus (HCC)  Baseline creatinine 1-1.1.  Creatinine today stable  Repeat lab work in a.m.  Prostate cancer (HCC)  Hx noted  Follow with urology.  S/p injectable chemo medications  Peripheral arterial disease (HCC)  Follows with vascular   Continue with ASA and statin.  Anemia of chronic disease  Hemoglobin stable at 9.1 today  Repeat lab work in a.m.    VTE Pharmacologic  Prophylaxis:    Lovenox    Mobility:   Basic Mobility Inpatient Raw Score: 6  JH-HLM Goal: 2: Bed activities/Dependent transfer  JH-HLM Achieved: 2: Bed activities/Dependent transfer  JH-HLM Goal achieved. Continue to encourage appropriate mobility.    Patient Centered Rounds: I performed bedside rounds with nursing staff today.   Discussions with Specialists or Other Care Team Provider: Yes-nephrology    Education and Discussions with Family / Patient: Updated  (wife) at bedside.    Current Length of Stay: 1 day(s)  Current Patient Status: Inpatient   Certification Statement: The patient will continue to require additional inpatient hospital stay due to hypothermia awaiting cultures, hyponatremia, generalized weakness  Discharge Plan: Anticipate discharge in >72 hrs to discharge location to be determined pending rehab evaluations.    Code Status: Level 1 - Full Code    Subjective   Patient sleeping but does open eyes and answer some simple questions.  Per wife and staff patient with good p.o. intake however per wife he is normally able to feed himself which he is unable to do so now    Objective :  Temp:  [97.3 °F (36.3 °C)-99.5 °F (37.5 °C)] 97.3 °F (36.3 °C)  HR:  [64-93] 64  BP: (112-157)/(53-78) 157/59  Resp:  [18-20] 20  SpO2:  [97 %-99 %] 98 %    Body mass index is 22.2 kg/m².     Input and Output Summary (last 24 hours):     Intake/Output Summary (Last 24 hours) at 3/10/2025 0935  Last data filed at 3/10/2025 0902  Gross per 24 hour   Intake 295 ml   Output 2100 ml   Net -1805 ml       Physical Exam  Vitals reviewed.   Constitutional:       General: He is not in acute distress.     Comments: Sleeping, opens eyes briefly and answers some simple questions.  At other times answers some simple yes/no questions with eyes closed   HENT:      Head: Normocephalic and atraumatic.   Eyes:      General:         Right eye: No discharge.         Left eye: No discharge.   Cardiovascular:      Rate and  Rhythm: Normal rate and regular rhythm.   Pulmonary:      Effort: No respiratory distress.      Breath sounds: No wheezing or rales.      Comments: Decreased breath sounds bilaterally although patient with decreased inspiratory effort  Abdominal:      General: Bowel sounds are normal. There is no distension.      Palpations: Abdomen is soft.      Tenderness: There is no abdominal tenderness.   Musculoskeletal:      Right lower leg: Edema present.      Left lower leg: Edema present.      Comments: Bilateral lower extremity erythema noted which appears slightly worse on the right compared to left.  Scab noted right lower extremity         Lines/Drains:  Lines/Drains/Airways       Active Status       Name Placement date Placement time Site Days    External Urinary Catheter Small 03/09/25  0840  -- 1                            Lab Results: I have reviewed the following results:   Results from last 7 days   Lab Units 03/10/25  0812   WBC Thousand/uL 5.71   HEMOGLOBIN g/dL 9.1*   HEMATOCRIT % 26.8*   PLATELETS Thousands/uL 139*   SEGS PCT % 76*   LYMPHO PCT % 9*   MONO PCT % 13*   EOS PCT % 1     Results from last 7 days   Lab Units 03/10/25  0812   SODIUM mmol/L 127*   POTASSIUM mmol/L 4.1   CHLORIDE mmol/L 98   CO2 mmol/L 20*   BUN mg/dL 23   CREATININE mg/dL 1.08   ANION GAP mmol/L 9   CALCIUM mg/dL 9.0   ALBUMIN g/dL 3.5   TOTAL BILIRUBIN mg/dL 0.68   ALK PHOS U/L 63   ALT U/L 36   AST U/L 33   GLUCOSE RANDOM mg/dL 88         Results from last 7 days   Lab Units 03/10/25  0726 03/09/25  1607 03/09/25  1057 03/09/25  0825 03/09/25  0031   POC GLUCOSE mg/dl 90 89 84 75 142*         Results from last 7 days   Lab Units 03/09/25  0555 03/09/25  0020   LACTIC ACID mmol/L  --  0.8   PROCALCITONIN ng/ml 0.06  --        Recent Cultures (last 7 days):   Results from last 7 days   Lab Units 03/09/25  0026 03/09/25  0020   BLOOD CULTURE  Received in Microbiology Lab. Culture in Progress. Received in Microbiology Lab. Culture in  Progress.       Imaging Results Review: I reviewed radiology reports from this admission including: chest xray and CT head.  Other Study Results Review: No additional pertinent studies reviewed.    Last 24 Hours Medication List:     Current Facility-Administered Medications:     acetaminophen (TYLENOL) tablet 650 mg, Q6H PRN    ammonium lactate (LAC-HYDRIN) 12 % lotion, BID    aspirin chewable tablet 81 mg, Daily    atorvastatin (LIPITOR) tablet 20 mg, Daily With Dinner    brimonidine tartrate 0.2 % ophthalmic solution 1 drop, BID    carvedilol (COREG) tablet 12.5 mg, BID    cefTRIAXone (ROCEPHIN) IVPB (premix in dextrose) 1,000 mg 50 mL, Q24H, Last Rate: 1,000 mg (03/09/25 1111)    co-enzyme Q-10 capsule 100 mg, Daily    enoxaparin (LOVENOX) subcutaneous injection 40 mg, Daily    ferrous sulfate tablet 325 mg, BID With Meals    [Held by provider] furosemide (LASIX) tablet 20 mg, Once per day on Monday Wednesday Friday    insulin lispro (HumALOG/ADMELOG) 100 units/mL subcutaneous injection 1-5 Units, TID AC **AND** Fingerstick Glucose (POCT), TID AC    insulin lispro (HumALOG/ADMELOG) 100 units/mL subcutaneous injection 1-5 Units, HS    losartan (COZAAR) tablet 25 mg, Daily    multivitamin stress formula tablet 1 tablet, Daily    pantoprazole (PROTONIX) EC tablet 40 mg, Early Morning    [Held by provider] sitaGLIPtin (JANUVIA) tablet 25 mg, Daily    [Held by provider] spironolactone (ALDACTONE) tablet 25 mg, Daily    tamsulosin (FLOMAX) capsule 0.4 mg, Daily With Dinner    Administrative Statements   Today, Patient Was Seen By: Megan Miramontes DO      **Please Note: This note may have been constructed using a voice recognition system.**

## 2025-03-11 LAB
ANION GAP SERPL CALCULATED.3IONS-SCNC: 9 MMOL/L (ref 4–13)
BUN SERPL-MCNC: 24 MG/DL (ref 5–25)
CALCIUM SERPL-MCNC: 9.6 MG/DL (ref 8.4–10.2)
CHLORIDE SERPL-SCNC: 103 MMOL/L (ref 96–108)
CO2 SERPL-SCNC: 21 MMOL/L (ref 21–32)
CREAT SERPL-MCNC: 1.08 MG/DL (ref 0.6–1.3)
ERYTHROCYTE [DISTWIDTH] IN BLOOD BY AUTOMATED COUNT: 14.6 % (ref 11.6–15.1)
GFR SERPL CREATININE-BSD FRML MDRD: 64 ML/MIN/1.73SQ M
GLUCOSE SERPL-MCNC: 109 MG/DL (ref 65–140)
GLUCOSE SERPL-MCNC: 120 MG/DL (ref 65–140)
GLUCOSE SERPL-MCNC: 121 MG/DL (ref 65–140)
GLUCOSE SERPL-MCNC: 182 MG/DL (ref 65–140)
GLUCOSE SERPL-MCNC: 213 MG/DL (ref 65–140)
HCT VFR BLD AUTO: 28.9 % (ref 36.5–49.3)
HGB BLD-MCNC: 9.6 G/DL (ref 12–17)
MCH RBC QN AUTO: 28.8 PG (ref 26.8–34.3)
MCHC RBC AUTO-ENTMCNC: 33.2 G/DL (ref 31.4–37.4)
MCV RBC AUTO: 87 FL (ref 82–98)
PLATELET # BLD AUTO: 138 THOUSANDS/UL (ref 149–390)
PMV BLD AUTO: 10.2 FL (ref 8.9–12.7)
POTASSIUM SERPL-SCNC: 4.3 MMOL/L (ref 3.5–5.3)
RBC # BLD AUTO: 3.33 MILLION/UL (ref 3.88–5.62)
SODIUM SERPL-SCNC: 133 MMOL/L (ref 135–147)
WBC # BLD AUTO: 5.46 THOUSAND/UL (ref 4.31–10.16)

## 2025-03-11 PROCEDURE — 99232 SBSQ HOSP IP/OBS MODERATE 35: CPT | Performed by: INTERNAL MEDICINE

## 2025-03-11 PROCEDURE — 80048 BASIC METABOLIC PNL TOTAL CA: CPT | Performed by: INTERNAL MEDICINE

## 2025-03-11 PROCEDURE — 82948 REAGENT STRIP/BLOOD GLUCOSE: CPT

## 2025-03-11 PROCEDURE — 97167 OT EVAL HIGH COMPLEX 60 MIN: CPT

## 2025-03-11 PROCEDURE — 85027 COMPLETE CBC AUTOMATED: CPT | Performed by: FAMILY MEDICINE

## 2025-03-11 PROCEDURE — 97535 SELF CARE MNGMENT TRAINING: CPT

## 2025-03-11 RX ORDER — FUROSEMIDE 20 MG/1
20 TABLET ORAL DAILY
Status: DISCONTINUED | OUTPATIENT
Start: 2025-03-11 | End: 2025-03-12 | Stop reason: HOSPADM

## 2025-03-11 RX ORDER — POLYETHYLENE GLYCOL 3350 17 G/17G
17 POWDER, FOR SOLUTION ORAL DAILY
Status: DISCONTINUED | OUTPATIENT
Start: 2025-03-11 | End: 2025-03-12 | Stop reason: HOSPADM

## 2025-03-11 RX ORDER — DOCUSATE SODIUM 100 MG/1
100 CAPSULE, LIQUID FILLED ORAL 2 TIMES DAILY
Status: DISCONTINUED | OUTPATIENT
Start: 2025-03-11 | End: 2025-03-12 | Stop reason: HOSPADM

## 2025-03-11 RX ORDER — FUROSEMIDE 20 MG/1
20 TABLET ORAL DAILY
Status: DISCONTINUED | OUTPATIENT
Start: 2025-03-12 | End: 2025-03-11

## 2025-03-11 RX ADMIN — Medication: at 08:41

## 2025-03-11 RX ADMIN — CARVEDILOL 12.5 MG: 12.5 TABLET, FILM COATED ORAL at 08:40

## 2025-03-11 RX ADMIN — LOSARTAN POTASSIUM 25 MG: 25 TABLET, FILM COATED ORAL at 08:41

## 2025-03-11 RX ADMIN — Medication 100 MG: at 08:41

## 2025-03-11 RX ADMIN — B-COMPLEX W/ C & FOLIC ACID TAB 1 TABLET: TAB at 08:41

## 2025-03-11 RX ADMIN — FERROUS SULFATE TAB 325 MG (65 MG ELEMENTAL FE) 325 MG: 325 (65 FE) TAB at 08:40

## 2025-03-11 RX ADMIN — Medication: at 17:28

## 2025-03-11 RX ADMIN — BRIMONIDINE TARTRATE 1 DROP: 2 SOLUTION/ DROPS OPHTHALMIC at 08:41

## 2025-03-11 RX ADMIN — TAMSULOSIN HYDROCHLORIDE 0.4 MG: 0.4 CAPSULE ORAL at 17:22

## 2025-03-11 RX ADMIN — PANTOPRAZOLE SODIUM 40 MG: 40 TABLET, DELAYED RELEASE ORAL at 06:37

## 2025-03-11 RX ADMIN — ASPIRIN 81 MG CHEWABLE TABLET 81 MG: 81 TABLET CHEWABLE at 08:40

## 2025-03-11 RX ADMIN — DOCUSATE SODIUM 100 MG: 100 CAPSULE, LIQUID FILLED ORAL at 17:23

## 2025-03-11 RX ADMIN — CARVEDILOL 12.5 MG: 12.5 TABLET, FILM COATED ORAL at 17:23

## 2025-03-11 RX ADMIN — ATORVASTATIN CALCIUM 20 MG: 20 TABLET, FILM COATED ORAL at 17:23

## 2025-03-11 RX ADMIN — INSULIN LISPRO 2 UNITS: 100 INJECTION, SOLUTION INTRAVENOUS; SUBCUTANEOUS at 11:55

## 2025-03-11 RX ADMIN — FERROUS SULFATE TAB 325 MG (65 MG ELEMENTAL FE) 325 MG: 325 (65 FE) TAB at 17:23

## 2025-03-11 RX ADMIN — CEFAZOLIN SODIUM 1000 MG: 1 SOLUTION INTRAVENOUS at 11:55

## 2025-03-11 RX ADMIN — CEFAZOLIN SODIUM 1000 MG: 1 SOLUTION INTRAVENOUS at 17:24

## 2025-03-11 RX ADMIN — INSULIN LISPRO 1 UNITS: 100 INJECTION, SOLUTION INTRAVENOUS; SUBCUTANEOUS at 21:20

## 2025-03-11 RX ADMIN — POLYETHYLENE GLYCOL 3350 17 G: 17 POWDER, FOR SOLUTION ORAL at 15:18

## 2025-03-11 RX ADMIN — BRIMONIDINE TARTRATE 1 DROP: 2 SOLUTION/ DROPS OPHTHALMIC at 17:29

## 2025-03-11 RX ADMIN — ENOXAPARIN SODIUM 40 MG: 40 INJECTION SUBCUTANEOUS at 08:41

## 2025-03-11 NOTE — ASSESSMENT & PLAN NOTE
He is on furosemide 20 mg 3 times per week, spironolactone 25 mg daily- per records.   He reports taking Furosemide 20 mg daily - unclear accuracy.   Diuretics currently on hold.   Currently compensated.   Restart Furosemide and Spironolactone.   Echo with EF up to 55-60%.

## 2025-03-11 NOTE — PLAN OF CARE
Problem: Prexisting or High Potential for Compromised Skin Integrity  Goal: Skin integrity is maintained or improved  Description: INTERVENTIONS:  - Identify patients at risk for skin breakdown  - Assess and monitor skin integrity  - Assess and monitor nutrition and hydration status  - Monitor labs   - Assess for incontinence   - Turn and reposition patient  - Assist with mobility/ambulation  - Relieve pressure over bony prominences  - Avoid friction and shearing  - Provide appropriate hygiene as needed including keeping skin clean and dry  - Evaluate need for skin moisturizer/barrier cream  - Collaborate with interdisciplinary team   - Patient/family teaching  - Consider wound care consult   Outcome: Progressing     Problem: NEUROSENSORY - ADULT  Goal: Achieves stable or improved neurological status  Description: INTERVENTIONS  - Monitor and report changes in neurological status  - Monitor vital signs such as temperature, blood pressure, glucose, and any other labs ordered   - Initiate measures to prevent increased intracranial pressure  - Monitor for seizure activity and implement precautions if appropriate      Outcome: Progressing  Goal: Achieves maximal functionality and self care  Description: INTERVENTIONS  - Monitor swallowing and airway patency with patient fatigue and changes in neurological status  - Encourage and assist patient to increase activity and self care.   - Encourage visually impaired, hearing impaired and aphasic patients to use assistive/communication devices  Outcome: Progressing     Problem: CARDIOVASCULAR - ADULT  Goal: Maintains optimal cardiac output and hemodynamic stability  Description: INTERVENTIONS:  - Monitor I/O, vital signs and rhythm  - Monitor for S/S and trends of decreased cardiac output  - Administer and titrate ordered vasoactive medications to optimize hemodynamic stability  - Assess quality of pulses, skin color and temperature  - Assess for signs of decreased coronary  artery perfusion  - Instruct patient to report change in severity of symptoms  Outcome: Progressing  Goal: Absence of cardiac dysrhythmias or at baseline rhythm  Description: INTERVENTIONS:  - Continuous cardiac monitoring, vital signs, obtain 12 lead EKG if ordered  - Administer antiarrhythmic and heart rate control medications as ordered  - Monitor electrolytes and administer replacement therapy as ordered  Outcome: Progressing     Problem: RESPIRATORY - ADULT  Goal: Achieves optimal ventilation and oxygenation  Description: INTERVENTIONS:  - Assess for changes in respiratory status  - Assess for changes in mentation and behavior  - Position to facilitate oxygenation and minimize respiratory effort  - Oxygen administered by appropriate delivery if ordered  - Initiate smoking cessation education as indicated  - Encourage broncho-pulmonary hygiene including cough, deep breathe, Incentive Spirometry  - Assess the need for suctioning and aspirate as needed  - Assess and instruct to report SOB or any respiratory difficulty  - Respiratory Therapy support as indicated  Outcome: Progressing     Problem: GENITOURINARY - ADULT  Goal: Maintains or returns to baseline urinary function  Description: INTERVENTIONS:  - Assess urinary function  - Encourage oral fluids to ensure adequate hydration if ordered  - Administer IV fluids as ordered to ensure adequate hydration  - Administer ordered medications as needed  - Offer frequent toileting  - Follow urinary retention protocol if ordered  Outcome: Progressing  Goal: Absence of urinary retention  Description: INTERVENTIONS:  - Assess patient’s ability to void and empty bladder  - Monitor I/O  - Bladder scan as needed  - Discuss with physician/AP medications to alleviate retention as needed  - Discuss catheterization for long term situations as appropriate  Outcome: Progressing     Problem: METABOLIC, FLUID AND ELECTROLYTES - ADULT  Goal: Electrolytes maintained within normal  limits  Description: INTERVENTIONS:  - Monitor labs and assess patient for signs and symptoms of electrolyte imbalances  - Administer electrolyte replacement as ordered  - Monitor response to electrolyte replacements, including repeat lab results as appropriate  - Instruct patient on fluid and nutrition as appropriate  Outcome: Progressing  Goal: Fluid balance maintained  Description: INTERVENTIONS:  - Monitor labs   - Monitor I/O and WT  - Instruct patient on fluid and nutrition as appropriate  - Assess for signs & symptoms of volume excess or deficit  Outcome: Progressing  Goal: Glucose maintained within target range  Description: INTERVENTIONS:  - Monitor Blood Glucose as ordered  - Assess for signs and symptoms of hyperglycemia and hypoglycemia  - Administer ordered medications to maintain glucose within target range  - Assess nutritional intake and initiate nutrition service referral as needed  Outcome: Progressing     Problem: SKIN/TISSUE INTEGRITY - ADULT  Goal: Skin Integrity remains intact(Skin Breakdown Prevention)  Description: Assess:  -Perform [place] assessment every shift   -Clean and moisturize skin every shift   -Inspect skin when repositioning, toileting, and assisting with ADLS  -Assess under medical devices such as male purewick  every shift   -Assess extremities for adequate circulation and sensation     Bed Management:  -Have minimal linens on bed & keep smooth, unwrinkled  -Change linens as needed when moist or perspiring  -Avoid sitting or lying in one position for more than 2 hours while in bed  -Keep HOB at 30 degrees     Toileting:  -Offer bedside commode  -Assess for incontinence every 2 hours   -Use incontinent care products after each incontinent episode such as barrier creams     Activity:  -Mobilize patient 2 times a day  -Encourage activity and walks on unit  -Encourage or provide ROM exercises   -Turn and reposition patient every 2 Hours  -Use appropriate equipment to lift or move  patient in bed  -Instruct/ Assist with weight shifting every 90 when out of bed in chair  -Consider limitation of chair time 2 hour intervals    Skin Care:  -Avoid use of baby powder, tape, friction and shearing, hot water or constrictive clothing  -Relieve pressure over bony prominences using cushions   -Do not massage red bony areas    Next Steps:  -Teach patient strategies to minimize risks such as weight shifting    -Consider consults to  interdisciplinary teams such as ot and pt   Outcome: Progressing     Problem: HEMATOLOGIC - ADULT  Goal: Maintains hematologic stability  Description: INTERVENTIONS  - Assess for signs and symptoms of bleeding or hemorrhage  - Monitor labs  - Administer supportive blood products/factors as ordered and appropriate  Outcome: Progressing     Problem: MUSCULOSKELETAL - ADULT  Goal: Maintain or return mobility to safest level of function  Description: INTERVENTIONS:  - Assess patient's ability to carry out ADLs; assess patient's baseline for ADL function and identify physical deficits which impact ability to perform ADLs (bathing, care of mouth/teeth, toileting, grooming, dressing, etc.)  - Assess/evaluate cause of self-care deficits   - Assess range of motion  - Assess patient's mobility  - Assess patient's need for assistive devices and provide as appropriate  - Encourage maximum independence but intervene and supervise when necessary  - Involve family in performance of ADLs  - Assess for home care needs following discharge   - Consider OT consult to assist with ADL evaluation and planning for discharge  - Provide patient education as appropriate  Outcome: Progressing  Goal: Maintain proper alignment of affected body part  Description: INTERVENTIONS:  - Support, maintain and protect limb and body alignment  - Provide patient/ family with appropriate education  Outcome: Progressing

## 2025-03-11 NOTE — PLAN OF CARE
Problem: NEUROSENSORY - ADULT  Goal: Achieves stable or improved neurological status  Description: INTERVENTIONS  - Monitor and report changes in neurological status  - Monitor vital signs such as temperature, blood pressure, glucose, and any other labs ordered   - Initiate measures to prevent increased intracranial pressure  - Monitor for seizure activity and implement precautions if appropriate      3/10/2025 2317 by Miki Cannon RN  Outcome: Progressing  3/10/2025 2316 by Miki Cannon RN  Outcome: Progressing  3/10/2025 2316 by Miki Cannon RN  Outcome: Progressing  3/10/2025 2316 by Miki Cannon RN  Outcome: Progressing     Problem: NEUROSENSORY - ADULT  Goal: Achieves maximal functionality and self care  Description: INTERVENTIONS  - Monitor swallowing and airway patency with patient fatigue and changes in neurological status  - Encourage and assist patient to increase activity and self care.   - Encourage visually impaired, hearing impaired and aphasic patients to use assistive/communication devices  3/10/2025 2317 by Miki Cannon RN  Outcome: Progressing  3/10/2025 2316 by Miki Cannon RN  Outcome: Progressing  3/10/2025 2316 by Miki Cannon RN  Outcome: Progressing  3/10/2025 2316 by Miki Cannon RN  Outcome: Progressing     Problem: CARDIOVASCULAR - ADULT  Goal: Maintains optimal cardiac output and hemodynamic stability  Description: INTERVENTIONS:  - Monitor I/O, vital signs and rhythm  - Monitor for S/S and trends of decreased cardiac output  - Administer and titrate ordered vasoactive medications to optimize hemodynamic stability  - Assess quality of pulses, skin color and temperature  - Assess for signs of decreased coronary artery perfusion  - Instruct patient to report change in severity of symptoms  3/10/2025 2317 by Miki Cannon RN  Outcome: Progressing  3/10/2025 2316 by Miki Cannon RN  Outcome: Progressing  3/10/2025 2316 by Miki Cannon RN  Outcome: Progressing  3/10/2025  2316 by Miki Cannon RN  Outcome: Progressing     Problem: CARDIOVASCULAR - ADULT  Goal: Absence of cardiac dysrhythmias or at baseline rhythm  Description: INTERVENTIONS:  - Continuous cardiac monitoring, vital signs, obtain 12 lead EKG if ordered  - Administer antiarrhythmic and heart rate control medications as ordered  - Monitor electrolytes and administer replacement therapy as ordered  3/10/2025 2317 by Miki Cannon RN  Outcome: Progressing  3/10/2025 2316 by Miki Cannon RN  Outcome: Progressing  3/10/2025 2316 by Miki Cannon RN  Outcome: Progressing  3/10/2025 2316 by Miki Cannon RN  Outcome: Progressing     Problem: RESPIRATORY - ADULT  Goal: Achieves optimal ventilation and oxygenation  Description: INTERVENTIONS:  - Assess for changes in respiratory status  - Assess for changes in mentation and behavior  - Position to facilitate oxygenation and minimize respiratory effort  - Oxygen administered by appropriate delivery if ordered  - Initiate smoking cessation education as indicated  - Encourage broncho-pulmonary hygiene including cough, deep breathe, Incentive Spirometry  - Assess the need for suctioning and aspirate as needed  - Assess and instruct to report SOB or any respiratory difficulty  - Respiratory Therapy support as indicated  3/10/2025 2317 by Miki Cannon RN  Outcome: Progressing  3/10/2025 2316 by Miki Cannon RN  Outcome: Progressing  3/10/2025 2316 by Miki Cannon RN  Outcome: Progressing  3/10/2025 2316 by Miki Cannon RN  Outcome: Progressing     Problem: GENITOURINARY - ADULT  Goal: Maintains or returns to baseline urinary function  Description: INTERVENTIONS:  - Assess urinary function  - Encourage oral fluids to ensure adequate hydration if ordered  - Administer IV fluids as ordered to ensure adequate hydration  - Administer ordered medications as needed  - Offer frequent toileting  - Follow urinary retention protocol if ordered  3/10/2025 2317 by Miki Cannon  RN  Outcome: Progressing  3/10/2025 2316 by Miki Cannon RN  Outcome: Progressing  3/10/2025 2316 by Miki Cannon RN  Outcome: Progressing  3/10/2025 2316 by Miki Cannon RN  Outcome: Progressing     Problem: GENITOURINARY - ADULT  Goal: Absence of urinary retention  Description: INTERVENTIONS:  - Assess patient’s ability to void and empty bladder  - Monitor I/O  - Bladder scan as needed  - Discuss with physician/AP medications to alleviate retention as needed  - Discuss catheterization for long term situations as appropriate  3/10/2025 2317 by Miki Cannon RN  Outcome: Progressing  3/10/2025 2316 by Miki Cannon RN  Outcome: Progressing  3/10/2025 2316 by Miki Cannon RN  Outcome: Progressing  3/10/2025 2316 by Miki Cannon RN  Outcome: Progressing     Problem: METABOLIC, FLUID AND ELECTROLYTES - ADULT  Goal: Electrolytes maintained within normal limits  Description: INTERVENTIONS:  - Monitor labs and assess patient for signs and symptoms of electrolyte imbalances  - Administer electrolyte replacement as ordered  - Monitor response to electrolyte replacements, including repeat lab results as appropriate  - Instruct patient on fluid and nutrition as appropriate  3/10/2025 2317 by Miki Cannon RN  Outcome: Progressing  3/10/2025 2316 by Miki Cannon RN  Outcome: Progressing  3/10/2025 2316 by Miki Cannon RN  Outcome: Progressing  3/10/2025 2316 by Miki Cannon RN  Outcome: Progressing     Problem: METABOLIC, FLUID AND ELECTROLYTES - ADULT  Goal: Fluid balance maintained  Description: INTERVENTIONS:  - Monitor labs   - Monitor I/O and WT  - Instruct patient on fluid and nutrition as appropriate  - Assess for signs & symptoms of volume excess or deficit  3/10/2025 2317 by Miki Cannon RN  Outcome: Progressing  3/10/2025 2316 by Miki Cannon RN  Outcome: Progressing  3/10/2025 2316 by Miik Cannon RN  Outcome: Progressing  3/10/2025 2316 by Miki Cannon RN  Outcome: Progressing      Problem: METABOLIC, FLUID AND ELECTROLYTES - ADULT  Goal: Glucose maintained within target range  Description: INTERVENTIONS:  - Monitor Blood Glucose as ordered  - Assess for signs and symptoms of hyperglycemia and hypoglycemia  - Administer ordered medications to maintain glucose within target range  - Assess nutritional intake and initiate nutrition service referral as needed  3/10/2025 2317 by Miki Cannon RN  Outcome: Progressing  3/10/2025 2316 by Miki Cannon RN  Outcome: Progressing  3/10/2025 2316 by Miki Cannon RN  Outcome: Progressing  3/10/2025 2316 by Miki Cannon RN  Outcome: Progressing     Problem: MUSCULOSKELETAL - ADULT  Goal: Maintain or return mobility to safest level of function  Description: INTERVENTIONS:  - Assess patient's ability to carry out ADLs; assess patient's baseline for ADL function and identify physical deficits which impact ability to perform ADLs (bathing, care of mouth/teeth, toileting, grooming, dressing, etc.)  - Assess/evaluate cause of self-care deficits   - Assess range of motion  - Assess patient's mobility  - Assess patient's need for assistive devices and provide as appropriate  - Encourage maximum independence but intervene and supervise when necessary  - Involve family in performance of ADLs  - Assess for home care needs following discharge   - Consider OT consult to assist with ADL evaluation and planning for discharge  - Provide patient education as appropriate  3/10/2025 2317 by iMki Cannon RN  Outcome: Progressing  3/10/2025 2316 by Miki Cannon RN  Outcome: Progressing  3/10/2025 2316 by Miki Cannon RN  Outcome: Progressing

## 2025-03-11 NOTE — ASSESSMENT & PLAN NOTE
-- DO NOT REPLY / DO NOT REPLY ALL --  -- Message is from [facility] [employer] ([facility] --    General Patient Message: Patient states has cyst on right and left ovary. Vaginal bleeding for 32 days.     Caller Information       Type Contact Phone/Fax    06/12/2023 01:14 PM CDT Phone (Incoming) Xander Cazares (Self) [phone] (M)        Alternative phone number: no    Can a detailed message be left? Yes    Message Turnaround: [place]:    Refer to site's KB page for routing instructions    Please give this turnaround time to the caller:   \"You can expect to receive a response 2-3 business days after your provider's clinical team reviews the message\"               Lab Results   Component Value Date    HGBA1C 6.9 (H) 02/11/2025   Home med metformin and Januvia on hold  SSI coverage 4 times daily blood sugar check  Monitor on hypoglycemic protocol

## 2025-03-11 NOTE — PROGRESS NOTES
Progress Note - Hospitalist   Name: Pernell Covarrubias 79 y.o. male I MRN: [id number]  Unit/Bed#: [address] [place] [address] Date of Admission: 3/8/2025   Date of Service: 3/11/2025 I Hospital Day: 2    Assessment & Plan  Hypothermia  Patient presented with 3 falls over the last week along with weakness  Per wife, no LOC and head strike  In Ed found to be bradycardiac HR 53 and hypothermic 90.1 F rectal  Unclear etiology.  Infectious work up  has been negative  Patient received IV Rocephin initially which was later switched to IV Ancef starting tomorrow as patient does have some erythema of bilateral lower extremity which is worse on the right (although per wife this is also at his baseline and therefore unclear if this is truly cellulitis).   COVID FLU RSV negative, LA negative, no leukocytosis  TSH noted elevated 6.684  Currently temperature has improved and no longer requiring Stella hugger  blood cultures  x 2 from admission  Procalcitonin negative   a.m. cortisol level was normal  Hyponatremia   sodium level 125 upon admission  Baseline approx around 130 -135  Per discussion with nephrology on 3/9 patient received Isolyte at 100 mL/h for 5 hours along with 20 mg of IV Lasix x 1.  Patient received another dose of Lasix 20 mg on 3/10/2025  Serum osm 273, urine osm 385, urine sodium 108, uric acid 4.9  Patient received a dose of tolvaptan 7.5 mg p.o. on 3/10/2025  Sodium level is normal today  Restarted on furosemide 20 mg p.o. daily  Repeat BMP per nephrology  Generalized weakness  Per wife over the last 1 week patient has had 3 falls and appears weaker.  At baseline patient sleeps a lot even at home and this has been ongoing for many years.  Patient also gets intermittently confused and forgetful at baseline  Per wife when patient is awake at home he is more interactive and is able to feed himself compared to what he is doing here  CT of the head: No acute intracranial abnormality. Chronic microangiopathic changes   In ED  noted to be hypothermic with rectal temperature at 90.1f  UA unremarkable, CXR unremarkable  Ammonia level within normal limits  Given recurrent falls and weakness, MRI of the brain was obtained which was negative for acute infarct or intracranial hemorrhage or mass  Patient was seen by PT/OT-rehabilitation requirements discussed with patient and wife who are in agreement  Coronary artery disease involving native coronary artery of native heart with angina pectoris (HCC)  S/p multivessel stenting  Home medication aspirin Lipitor, Coreg, continued with hold parameters  Bradycardia  HR 49-53 upon admission  Found to be hypothermic with rectal temp at 90.1F  Heart rate has improved  TSH level was normal  A.m. cortisol level was normal  Abnormal TSH  POA with elevated TSH 6.68  Free T4 normal  Endocrine consulted appreciate recs  Chronic combined systolic and diastolic CHF (congestive heart failure) (Formerly [facility])  Wt Readings from Last 3 Encounters:   03/09/25 72.2 kg (159 lb 3.2 oz)   03/04/25 74.8 kg (165 lb)   02/10/25 74.4 kg (164 lb)     Patient with bilateral lower extremity edema although this is at baseline per wife  Per wife have chronic edema takes furosemide MWF   CXR negative for acute pathology  BNP elevated 349  Echo: 1/22/25 EF 55 to 60% by visual estimation. Systolic function is normal. Wall motion is normal. Diastolic function is mildly abnormal, consistent with grade I (abnormal) relaxation.   Patient is received couple of doses of IV Lasix  Restarted on Lasix 20 mg p.o. daily and Aldactone 25 mg p.o. daily  Type 2 diabetes mellitus with diabetic neuropathy (HCC)  Lab Results   Component Value Date    HGBA1C 6.9 (H) 02/11/2025   Home med metformin and Januvia on hold  SSI coverage 4 times daily blood sugar check  Monitor on hypoglycemic protocol  CKD stage 3 due to type 2 diabetes mellitus (HCC)  Baseline creatinine 1-1.1.  Creatinine today stable  Repeat lab work in a.m.  Prostate cancer (HCC)  Hx  noted  Follow with urology.  S/p injectable chemo medications  Peripheral arterial disease (HCC)  Follows with vascular   Continue with ASA and statin.  Anemia of chronic disease  Hemoglobin stable at 9.1 today  Repeat lab work in a.m.  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    Mobility:   Basic Mobility Inpatient Raw Score: 11  JH-HLM Goal: 4: Move to chair/commode  JH-HLM Achieved: 2: Bed activities/Dependent transfer  JH-HLM Goal NOT achieved. Continue with multidisciplinary rounding and encourage appropriate mobility to improve upon JH-HLM goals.    Patient Centered Rounds: I performed bedside rounds with nursing staff today.   Discussions with Specialists or Other Care Team Provider: Nephrology    Education and Discussions with Family / Patient: Updated  (wife) via phone.    Current Length of Stay: 2 day(s)  Current Patient Status: Inpatient   Certification Statement: The patient will continue to require additional inpatient hospital stay due to hyponatremia, hypothermia and bradycardia  Discharge Plan: Anticipate discharge in 24-48 hrs to rehab facility.    Code Status: Level 1 - Full Code    Subjective   Patient is more alert and awake today.  Denies any chest pain, shortness of breath or abdominal pain.  Patient usually ambulates at home with the help of a walker but has weakness.  Patient did not have a bowel movement since admission.    Objective :  Temp:  [97.1 °F (36.2 °C)-97.2 °F (36.2 °C)] 97.2 °F (36.2 °C)  HR:  [64-69] 65  BP: ()/(40-64) 96/40  Resp:  [19-20] 19  SpO2:  [97 %-100 %] 97 %  O2 Device: None (Room air)    Body mass index is 22.2 kg/m².     Input and Output Summary  (last 24 hours):     Intake/Output Summary (Last 24 hours) at 3/11/2025 1400  Last data filed at 3/11/2025 1301  Gross per 24 hour   Intake 600 ml   Output 2050 ml   Net -1450 ml       Physical Exam  Constitutional:       Appearance: Normal appearance.   HENT:      Head: Normocephalic and atraumatic.   Eyes:      Extraocular Movements: Extraocular movements intact.      Pupils: Pupils are equal, round, and reactive to light.   Cardiovascular:      Rate and Rhythm: Normal rate and regular rhythm.      Heart sounds: No murmur heard.     No gallop.   Pulmonary:      Effort: Pulmonary effort is normal.      Breath sounds: Normal breath sounds.   Abdominal:      General: Bowel sounds are normal.      Palpations: Abdomen is soft.      Tenderness: There is no abdominal tenderness.   Musculoskeletal:         General: No swelling or deformity. Normal range of motion.      Cervical back: Normal range of motion and neck supple.   Skin:     General: Skin is warm and dry.      Comments: Right leg dressing is clean dry and intact     Neurological:      General: No focal deficit present.      Mental Status: He is alert.           Lines/Drains:  Lines/Drains/Airways       Active Status       Name Placement date Placement time Site Days    External Urinary Catheter Small 03/09/25  0840  -- 2                            Lab Results: I have reviewed the following results:   Results from last 7 days   Lab Units 03/11/25  0543 03/10/25  0812   WBC Thousand/uL 5.46 5.71   HEMOGLOBIN g/dL 9.6* 9.1*   HEMATOCRIT % 28.9* 26.8*   PLATELETS Thousands/uL 138* 139*   SEGS PCT %  --  76*   LYMPHO PCT %  --  9*   MONO PCT %  --  13*   EOS PCT %  --  1     Results from last 7 days   Lab Units 03/11/25  0543 03/10/25  1752 03/10/25  0812   SODIUM mmol/L 133*   < > 127*   POTASSIUM mmol/L 4.3   < > 4.1   CHLORIDE mmol/L 103   < > 98   CO2 mmol/L 21   < > 20*   BUN mg/dL 24   < > 23   CREATININE mg/dL 1.08   < > 1.08   ANION GAP mmol/L 9   < > 9    CALCIUM mg/dL 9.6   < > 9.0   ALBUMIN g/dL  --   --  3.5   TOTAL BILIRUBIN mg/dL  --   --  0.68   ALK PHOS U/L  --   --  63   ALT U/L  --   --  36   AST U/L  --   --  33   GLUCOSE RANDOM mg/dL 120   < > 88    < > = values in this interval not displayed.         Results from last 7 days   Lab Units 03/11/25  1117 03/11/25  0712 03/10/25  2033 03/10/25  1601 03/10/25  1058 03/10/25  0726 03/09/25  1607 03/09/25  1057 03/09/25  0825 03/09/25  0031   POC GLUCOSE mg/dl 213* 109 152* 93 143* 90 89 84 75 142*         Results from last 7 days   Lab Units 03/10/25  0812 03/09/25  0555 03/09/25  0020   LACTIC ACID mmol/L  --   --  0.8   PROCALCITONIN ng/ml 0.13 0.06  --        Recent Cultures (last 7 days):   Results from last 7 days   Lab Units 03/09/25  0026 03/09/25  0020   BLOOD CULTURE  No Growth at 24 hrs. No Growth at 24 hrs.       Imaging Results Review: I reviewed radiology reports from this admission including: chest xray, CT head, CT C-spine, and MRI brain.      Last 24 Hours Medication List:     Current Facility-Administered Medications:     acetaminophen (TYLENOL) tablet 650 mg, Q6H PRN    ammonium lactate (LAC-HYDRIN) 12 % lotion, BID    aspirin chewable tablet 81 mg, Daily    atorvastatin (LIPITOR) tablet 20 mg, Daily With Dinner    brimonidine tartrate 0.2 % ophthalmic solution 1 drop, BID    carvedilol (COREG) tablet 12.5 mg, BID    ceFAZolin (ANCEF) IVPB (premix in dextrose) 1,000 mg 50 mL, Q8H, Last Rate: 1,000 mg (03/11/25 1155)    co-enzyme Q-10 capsule 100 mg, Daily    docusate sodium (COLACE) capsule 100 mg, BID    enoxaparin (LOVENOX) subcutaneous injection 40 mg, Daily    ferrous sulfate tablet 325 mg, BID With Meals    furosemide (LASIX) tablet 20 mg, Daily    insulin lispro (HumALOG/ADMELOG) 100 units/mL subcutaneous injection 1-5 Units, TID AC **AND** Fingerstick Glucose (POCT), TID AC    insulin lispro (HumALOG/ADMELOG) 100 units/mL subcutaneous injection 1-5 Units, HS    losartan (COZAAR)  tablet 25 mg, Daily    multivitamin stress formula tablet 1 tablet, Daily    pantoprazole (PROTONIX) EC tablet 40 mg, Early Morning    polyethylene glycol (MIRALAX) packet 17 g, Daily    [Held by provider] sitaGLIPtin (JANUVIA) tablet 25 mg, Daily    spironolactone (ALDACTONE) tablet 25 mg, Daily    tamsulosin (FLOMAX) capsule 0.4 mg, Daily With Dinner    Administrative Statements   Today, Patient Was Seen By: Kristen Hinkle MD      **Please Note: This note may have been constructed using a voice recognition system.**

## 2025-03-11 NOTE — ASSESSMENT & PLAN NOTE
HR 49-53 upon admission  Found to be hypothermic with rectal temp at 90.1F  Heart rate has improved  TSH level was normal  A.m. cortisol level was normal

## 2025-03-11 NOTE — ASSESSMENT & PLAN NOTE
Baseline sodium is around 130 to 135 over the past year.   Sodium on admission was 125.  Workup: SOsm 273, UOsm 385, Estrada 108, uric acid 4.9, TSH 6.684, cortisol 10.7.   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  Na improved to 133 after Tolvaptan 7.5 mg PO x 1 on 3/10/25.   Restart furosemide at 20 mg daily.  Continue fluid restriction to 1500 cc per 24 hours.

## 2025-03-11 NOTE — OCCUPATIONAL THERAPY NOTE
"  Occupational Therapy Evaluation/Treatment       03/11/25 1155   OT Last Visit   OT Visit Date 03/11/25   Note Type   Note type Evaluation   Pain Assessment   Pain Assessment Tool 0-10   Pain Score No Pain   Restrictions/Precautions   Other Precautions Chair Alarm;Bed Alarm;Fall Risk   Home Living   Type of Home House   Home Layout One level;Stairs to enter with rails  (1 + 4 JAMES)   Bathroom Shower/Tub Walk-in shower   Bathroom Toilet Raised   Bathroom Equipment Grab bars in shower;Shower chair;Hand-held shower   Bathroom Accessibility Accessible via walker   Home Equipment Walker  (Roller walker, standard walker and rollator)   Additional Comments pt usually able to ambulate independently with RW at home, but increasing assistance needed in the past 2 weeks   Prior Function   Level of [place] Needs assistance with ADLs;Needs assistance with functional mobility;Needs assistance with IADLS   Lives With Spouse   Receives Help From Family   IADLs Family/Friend/Other provides transportation;Family/Friend/Other provides meals;Family/Friend/Other provides medication management   Falls in the last 6 months 1 to 4  (at least 3 recent falls)   Vocational Retired   Comments Increasing decline in ADLS and functional mobility for 2 weeks prior to admission   Subjective   Subjective \"I'm feeling better.\"   ADL   Where Assessed Supine, bed   Eating Assistance 4  Minimal Assistance   Eating Deficit Setup;Increased time to complete;Scoop assist   Grooming Assistance 3  Moderate Assistance   Grooming Deficit Setup;Verbal cueing;Supervision/safety;Increased time to complete   UB Bathing Assistance 3  Moderate Assistance   UB Bathing Deficit Setup;Steadying;Verbal cueing;Supervision/safety;Increased time to complete   LB Bathing Assistance 2  Maximal Assistance   LB Bathing Deficit Setup;Steadying;Verbal cueing;Supervision/safety;Increased time to complete   UB Dressing Assistance 3  Moderate Assistance   UB Dressing Deficit " Setup;Steadying;Verbal cueing;Increased time to complete   LB Dressing Assistance 2  Maximal Assistance   LB Dressing Deficit Setup;Requires assistive device for steadying;Verbal cueing;Supervision/safety;Increased time to complete   Toileting Assistance  2  Maximal Assistance   Toileting Deficit Setup;Steadying;Verbal cueing;Supervison/safety;Increased time to complete;Use of bedpan/urinal setup;Bedside commode;Clothing management up;Clothing management down;Perineal hygiene   Bed Mobility   Supine to Sit 3  Moderate assistance   Additional items Assist x 1;Verbal cues;LE management;Increased time required   Transfers   Sit to Stand 2  Maximal assistance   Additional items Assist x 1;Verbal cues;Increased time required   Stand to Sit 3  Moderate assistance   Additional items Assist x 1;Verbal cues;Armrests   Balance   Static Sitting Fair -   Dynamic Sitting Poor +   Static Standing Poor +  (with RW)   Dynamic Standing Poor  (with RW)   Ambulatory Poor  (with RW)   Activity Tolerance   Activity Tolerance Patient limited by fatigue   Medical Staff Made Aware Alyse Markham   Nurse Made Aware RADHA Amaral   RUE Assessment   RUE Assessment X  (wrist/hand WFL)   RUE Overall AROM   R Shoulder Flexion 10   R Elbow Extension -30   LUE Assessment   LUE Assessment X  (wrist/hand WFL)   LUE Overall AROM   L Shoulder Flexion 10   L Elbow Flexion -15   Hand Function   Gross Motor Coordination Functional   Fine Motor Coordination Functional   Vision-Basic Assessment   Current Vision Wears glasses all the time   Psychosocial   Psychosocial (WDL) WDL   Cognition   Overall Cognitive Status Impaired  (but improving compared to yesterday after chart review)   Arousal/Participation Alert;Cooperative   Attention Attends with cues to redirect   Orientation Level Oriented X4   Memory Decreased recall of recent events   Following Commands Follows multistep commands with increased time or repetition   Comments decreased safety awareness    Assessment   Limitation Decreased ADL status;Decreased UE ROM;Decreased UE strength;Decreased Safe judgement during ADL;Decreased cognition;Decreased endurance;Decreased self-care trans;Decreased high-level ADLs  (decreased balance and mobility)   Prognosis Good   Assessment Patient evaluated by Occupational Therapy.  Patient admitted with Hypothermia.  The patients occupational profile, medical and therapy history includes a extensive additional review of physical, cognitive, or psychosocial history related to current functional performance.  Comorbidities affecting functional mobility and ADLS include:  type 2 diabetes, hypertension, prostate cancer, hyponatremia, HF, falls, CAD, neuropathy.  Prior to admission, patient was independent with functional mobility with RW, requiring assist for ADLS, requiring assist for IADLS, living with wife in a 1 level home with 1 + 4 steps to enter, and ambulating household distance.  The evaluation identifies the following performance deficits: weakness, decreased ROM, impaired balance, decreased endurance, increased fall risk, new onset of impairment of functional mobility, decreased ADLS, decreased IADLS, decreased activity tolerance, decreased safety awareness, decreased cognition, and decreased strength, that result in activity limitations and/or participation restrictions. This evaluation requires clinical decision making of high complexity, because the patient presents with comorbidites that affect occupational performance and required significant modification of tasks or assistance with consideration of multiple treatment options.  The Barthel Index was used as a functional outcome tool presenting with a score of Barthel Index Score: 30, indicating marked limitations of functional mobility and ADLS.  The patient's raw score on the AM-PAC Daily Activity Inpatient Short Form is 13. A raw score of less than 19 suggests the patient may benefit from discharge to post-acute  "rehabilitation services. Please refer to the recommendation of the Occupational Therapist for safe discharge planning.  Patient will benefit from skilled Occupational Therapy services to address above deficits and facilitate a safe return to prior level of function.   Goals   Patient Goals \"\"move better by myself\"   STG Time Frame   (1-7)   Short Term Goal #1 Eating: supervision; Grooming: min assist seated; Bathing: mod assist; Upper Body Dressing min assist; Lower Body Dressing: mod assist; Toileting: mod assist; Patient will increase stand pivot commode transfer to mod assist with rolling walker to increase performance and safety with ADLS and functional mobility; Patient will increase standing tolerance to 2 minutes during ADL task to decrease assistance level and decrease fall risk; Patient will increase bed mobility to min assist in preparation for ADLS and transfers; Patient will increase functional mobility to and from bathroom with rolling walker with mod assist to increase performance with ADLS and to use a toilet; Patient will tolerate 5 minutes of UE ROM/strengthening to increase general activity tolerance and performance in ADLS/IADLS; Patient will improve functional activity tolerance to 5 minutes of sustained functional tasks to increase participation in basic self-care and decrease assistance level;  Patient will be able to to verbalize understanding and perform energy conservation/proper body mechanics during ADLS and functional mobility at least 75% of the time with minimal cueing to decrease signs of fatigue and increase stamina to return to prior level of function; Patient will increase static/dynamic sitting balance to fair to improve the ability to sit at edge of bed or on a chair for ADLS;  Patient will increase static/dynamic standing balance to fair- to improve postural stability and decrease fall risk during standing ADLS and transfers.   LTG Time Frame   (8-14)   Long Term Goal #1 Eating: " independent; Grooming: supervision standing at sink; Bathing: min assist; Upper Body Dressing supervision; Lower Body Dressing: min assist; Toileting: min assist; Patient will increase stand pivot commode transfer to min assist with rolling walker to increase performance and safety with ADLS and functional mobility; Patient will increase standing tolerance to 4 minutes during ADL task to decrease assistance level and decrease fall risk; Patient will increase bed mobility to supervision in preparation for ADLS and transfers; Patient will increase functional mobility to and from bathroom with rolling walker with min assist to increase performance with ADLS and to use a toilet; Patient will tolerate 10 minutes of UE ROM/strengthening to increase general activity tolerance and performance in ADLS/IADLS; Patient will improve functional activity tolerance to 10 minutes of sustained functional tasks to increase participation in basic self-care and decrease assistance level;  Patient will be able to to verbalize understanding and perform energy conservation/proper body mechanics during ADLS and functional mobility at least 90% of the time with minimal cueing to decrease signs of fatigue and increase stamina to return to prior level of function; Patient will increase static/dynamic sitting balance to fair+ to improve the ability to sit at edge of bed or on a chair for ADLS;  Patient will increase static/dynamic standing balance to fair to improve postural stability and decrease fall risk during standing ADLS and transfers;  Pt will score >/= 20/24 on AM-PAC Daily Activity Inpatient scale to promote safe independence with ADLs and functional mobility; Pt will score >/= 70/100 on Barthel Index in order to decrease caregiver assistance needed and increase ability to perform ADLs and functional mobility.   Plan   Treatment Interventions ADL retraining;Functional transfer training;UE strengthening/ROM;Endurance training;Cognitive  reorientation;Patient/family training;Equipment evaluation/education;Compensatory technique education;Energy conservation;Activityengagement   Goal Expiration Date 03/25/25   OT Frequency 3-5x/wk   Discharge Recommendation   Rehab Resource Intensity Level, OT II (Moderate Resource Intensity)   AM-PAC Daily Activity Inpatient   Lower Body Dressing 2   Bathing 2   Toileting 2   Upper Body Dressing 2   Grooming 2   Eating 3   Daily Activity Raw Score 13   Daily Activity Standardized Score (Calc for Raw Score >=11) 32.03   AM-PAC Applied Cognition Inpatient   Following a Speech/Presentation 3   Understanding Ordinary Conversation 4   Taking Medications 2   Remembering Where Things Are Placed or Put Away 3   Remembering List of 4-5 Errands 2   Taking Care of Complicated Tasks 2   Applied Cognition Raw Score 16   Applied Cognition Standardized Score 35.03   Barthel Index   Feeding 5   Bathing 0   Grooming Score 0   Dressing Score 5   Bladder Score 0   Bowels Score 10   Toilet Use Score 5   Transfers (Bed/Chair) Score 5   Mobility (Level Surface) Score 0   Stairs Score 0   Barthel Index Score 30   Additional Treatment Session   Start Time 1135   End Time 1155   Treatment Assessment Pt seen for ADL training. Education and training with teachback method begun with pt regarding energy conservation/proper body mechanics during ADLS and functional mobility to decrease fall risks, decrease signs of fatigue and increase stamina needed to return to prior level of function. Supine to sit ModA and cues with increased time. Dynamic unsupported sitting balance at edge of bed poor +. Pt able to don socks with MaxA and cues. Sit to stand from bed MaxA with RW. Transfer bed to chair with ModA and RW and cues. Pt able to wash hands and face with Agnes. Pt set up with lunch meal and able to feed himself mostly with supervision, except Agnes to scoop food at times due to decreased UE AROM limiting functional reach.  Provided education  regarding OT goals and emotional support to pt.  Pt's cognition appears to be improved compared to yesterday's staff notes. Pt demonstrating good verbal understanding of all information provided and all questions answered. Patient left OOB in chair with all needs within reach, padded foot supports, and tab alarm in place. Continue OT per POC.   End of Consult   Education Provided Yes   Patient Position at End of Consult Bedside chair;Bed/Chair alarm activated;All needs within reach   Nurse Communication Nurse aware of consult   Licensure   [place] License Number  Berkley Patino MS, OTR/L, [place] Lic# [id number]

## 2025-03-11 NOTE — ASSESSMENT & PLAN NOTE
Wt Readings from Last 3 Encounters:   03/09/25 72.2 kg (159 lb 3.2 oz)   03/04/25 74.8 kg (165 lb)   02/10/25 74.4 kg (164 lb)     Patient with bilateral lower extremity edema although this is at baseline per wife  Per wife have chronic edema takes furosemide MWF   CXR negative for acute pathology  BNP elevated 349  Echo: 1/22/25 EF 55 to 60% by visual estimation. Systolic function is normal. Wall motion is normal. Diastolic function is mildly abnormal, consistent with grade I (abnormal) relaxation.   Patient is received couple of doses of IV Lasix  Restarted on Lasix 20 mg p.o. daily and Aldactone 25 mg p.o. daily

## 2025-03-11 NOTE — ASSESSMENT & PLAN NOTE
Baseline creatinine is around 1.0 to 1.1  Follows with Dr. Montejo in the office.  Renal function stable.  SCr 1.08.

## 2025-03-11 NOTE — ASSESSMENT & PLAN NOTE
Patient presented with 3 falls over the last week along with weakness  Per wife, no LOC and head strike  In Ed found to be bradycardiac HR 53 and hypothermic 90.1 F rectal  Unclear etiology.  Infectious work up  has been negative  Patient received IV Rocephin initially which was later switched to IV Ancef starting tomorrow as patient does have some erythema of bilateral lower extremity which is worse on the right (although per wife this is also at his baseline and therefore unclear if this is truly cellulitis).   COVID FLU RSV negative, LA negative, no leukocytosis  TSH noted elevated 6.684  Currently temperature has improved and no longer requiring Stella hugger  blood cultures  x 2 from admission  Procalcitonin negative   a.m. cortisol level was normal

## 2025-03-11 NOTE — PROGRESS NOTES
NEPHROLOGY HOSPITAL PROGRESS NOTE   Pernell Covarrubias 79 y.o. male MRN: [id number]  Unit/Bed#: [address] [place] [address] Encounter: [id number]  Reason for Consult: Hyponatremia  Assessment & Plan  Hyponatremia  Baseline sodium is around 130 to 135 over the past year.   Sodium on admission was 125.  Workup: SOsm 273, UOsm 385, Estrada 108, uric acid 4.9, TSH 6.684, cortisol 10.7.   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  Na improved to 133 after Tolvaptan 7.5 mg PO x 1 on 3/10/25.   Restart furosemide at 20 mg daily.  Continue fluid restriction to 1500 cc per 24 hours.    CKD stage 3 due to type 2 diabetes mellitus (HCC)  Baseline creatinine is around 1.0 to 1.1  Follows with Dr. Montejo in the office.  Renal function stable.  SCr 1.08.    Fall  Per primary service and PT    Abnormal TSH  Per primary service.    Chronic combined systolic and diastolic CHF (congestive heart failure) (HCC)  He is on furosemide 20 mg 3 times per week, spironolactone 25 mg daily- per records.   He reports taking Furosemide 20 mg daily - unclear accuracy.   Diuretics currently on hold.   Currently compensated.   Restart Furosemide and Spironolactone.   Echo with EF up to 55-60%.       TODAY's DISCUSSION / PLAN:  Restart Furosemide 20 mg OD.   Restart Spironolactone.   Continue FR 1500 cc/24 hours.   Stable for discharge from renal standpoint.     I have reviewed the nephrology recommendations including restarting Furosemide, with Dr. Hinkle, and we are in agreement with renal plan including the information outlined above.    SUBJECTIVE / 24H INTERVAL HISTORY:  No acute complaints or events.   Denies CP or SOB.     OBJECTIVE:  Current Weight: Weight - Scale: 72.2 kg (159 lb 3.2 oz)  Vitals:    03/10/25 1530 03/10/25 2003 03/10/25 2229 03/11/25 0844   BP: 150/60 129/61 166/64 (!) 128/45   Pulse: 69 67  64   Resp: 19 20 19    Temp: (!) 97.1 °F (36.2 °C)  (!) 97.2 °F (36.2 °C) (!) 97.2 °F (36.2 °C)   TempSrc:       SpO2: 100% 99%  98%   Weight:        Height:           Intake/Output Summary (Last 24 hours) at 3/11/2025 1140  Last data filed at 3/11/2025 1001  Gross per 24 hour   Intake 300 ml   Output 2050 ml   Net -1750 ml     General: conscious, cooperative, no distress  Skin: dry  Eyes: pink conjunctivae  ENT: moist mucous membranes  Respiratory: equal chest expansion, clear breath sounds.  Cardiovascular: distinct heart sounds, normal rate, regular rhythm, no rub  Abdomen: soft, non tender, non distended, normal bowel sounds  Extremities: mild LE edema.   Genitourinary: no meza catheter.   Neuro: awake, alert.   Psych: appropriate affect    Medications:    Current Facility-Administered Medications:     acetaminophen (TYLENOL) tablet 650 mg, 650 mg, Oral, Q6H PRN, Chantel A Rojelio, MARYJANENP    ammonium lactate (LAC-HYDRIN) 12 % lotion, , Topical, BID, Chantel A Ujvary, CRNP, Given at 03/11/25 0841    aspirin chewable tablet 81 mg, 81 mg, Oral, Daily, Chantel A Ujvary, CRNP, 81 mg at 03/11/25 0840    atorvastatin (LIPITOR) tablet 20 mg, 20 mg, Oral, Daily With Dinner, Chantel A Ujvary, CRNP, 20 mg at 03/10/25 1711    brimonidine tartrate 0.2 % ophthalmic solution 1 drop, 1 drop, Both Eyes, BID, Chantel A Ujvary, CRNP, 1 drop at 03/11/25 0841    carvedilol (COREG) tablet 12.5 mg, 12.5 mg, Oral, BID, Chantel A Ujvary, CRNP, 12.5 mg at 03/11/25 0840    ceFAZolin (ANCEF) IVPB (premix in dextrose) 1,000 mg 50 mL, 1,000 mg, Intravenous, Q8H, Megan Miramontes DO    co-enzyme Q-10 capsule 100 mg, 100 mg, Oral, Daily, Chantel A Ujvary, CRNP, 100 mg at 03/11/25 0841    enoxaparin (LOVENOX) subcutaneous injection 40 mg, 40 mg, Subcutaneous, Daily, Chantel A Ujvary, CRNP, 40 mg at 03/11/25 0841    ferrous sulfate tablet 325 mg, 325 mg, Oral, BID With Meals, NEO Carrera, 325 mg at 03/11/25 0840    [Held by provider] furosemide (LASIX) tablet 20 mg, 20 mg, Oral, Once per day on Monday Wednesday Friday, NEO Carrera    insulin lispro  "(HumALOG/ADMELOG) 100 units/mL subcutaneous injection 1-5 Units, 1-5 Units, Subcutaneous, TID AC **AND** Fingerstick Glucose (POCT), , , TID AC, Chantel A Rojelio, CRNP    insulin lispro (HumALOG/ADMELOG) 100 units/mL subcutaneous injection 1-5 Units, 1-5 Units, Subcutaneous, HS, Chantel A Ujvary, CRNP, 1 Units at 03/10/25 2231    losartan (COZAAR) tablet 25 mg, 25 mg, Oral, Daily, Chantel A Ujvary, CRNP, 25 mg at 03/11/25 0841    multivitamin stress formula tablet 1 tablet, 1 tablet, Oral, Daily, Chantel A Ujvary, CRNP, 1 tablet at 03/11/25 0841    pantoprazole (PROTONIX) EC tablet 40 mg, 40 mg, Oral, Early Morning, Chantel A Ujvary, CRNP, 40 mg at 03/11/25 0637    [Held by provider] sitaGLIPtin (JANUVIA) tablet 25 mg, 25 mg, Oral, Daily, Chantel A Ujvary, CRNP    [Held by provider] spironolactone (ALDACTONE) tablet 25 mg, 25 mg, Oral, Daily, Chantel A Ujvary, CRNP    tamsulosin (FLOMAX) capsule 0.4 mg, 0.4 mg, Oral, Daily With Dinner, Chantel A Ujvary, CRNP, 0.4 mg at 03/10/25 1710    Laboratory Results:  Results from last 7 days   Lab Units 03/11/25  0543 03/10/25  1752 03/10/25  0812 03/09/25  2051 03/09/25  1213 03/09/25  0555 03/09/25  0020   WBC Thousand/uL 5.46  --  5.71  --   --  4.17* 4.85   HEMOGLOBIN g/dL 9.6*  --  9.1*  --   --  8.0* 10.2*   HEMATOCRIT % 28.9*  --  26.8*  --   --  23.7* 30.1*   PLATELETS Thousands/uL 138*  --  139*  --   --  106* 133*   POTASSIUM mmol/L 4.3 4.5 4.1 4.5 4.7 4.3 4.8   CHLORIDE mmol/L 103 98 98 96 97 98 96   CO2 mmol/L 21 20* 20* 20* 19* 19* 19*   BUN mg/dL 24 27* 23 22 24 27* 26*   CREATININE mg/dL 1.08 1.28 1.08 1.15 1.00 0.82 0.83   CALCIUM mg/dL 9.6 9.1 9.0 8.8 9.1 8.9 10.2     Portions of the record may have been created with voice recognition software. Occasional wrong word or \"sound a like\" substitutions may have occurred due to the inherent limitations of voice recognition software. Read the chart carefully and recognize, using context, where " substitutions have occurred.If you have any questions, please contact the dictating provider.  1

## 2025-03-11 NOTE — ASSESSMENT & PLAN NOTE
sodium level 125 upon admission  Baseline approx around 130 -135  Per discussion with nephrology on 3/9 patient received Isolyte at 100 mL/h for 5 hours along with 20 mg of IV Lasix x 1.  Patient received another dose of Lasix 20 mg on 3/10/2025  Serum osm 273, urine osm 385, urine sodium 108, uric acid 4.9  Patient received a dose of tolvaptan 7.5 mg p.o. on 3/10/2025  Sodium level is normal today  Restarted on furosemide 20 mg p.o. daily  Repeat BMP per nephrology

## 2025-03-11 NOTE — ASSESSMENT & PLAN NOTE
Per wife over the last 1 week patient has had 3 falls and appears weaker.  At baseline patient sleeps a lot even at home and this has been ongoing for many years.  Patient also gets intermittently confused and forgetful at baseline  Per wife when patient is awake at home he is more interactive and is able to feed himself compared to what he is doing here  CT of the head: No acute intracranial abnormality. Chronic microangiopathic changes   In ED noted to be hypothermic with rectal temperature at 90.1f  UA unremarkable, CXR unremarkable  Ammonia level within normal limits  Given recurrent falls and weakness, MRI of the brain was obtained which was negative for acute infarct or intracranial hemorrhage or mass  Patient was seen by PT/OT-rehabilitation requirements discussed with patient and wife who are in agreement

## 2025-03-11 NOTE — CASE MANAGEMENT
Case Management Discharge Planning Note    Patient name Pernell Covarrubias  Location [address] [place] [address]-* MRN [id number]  : 1945 Date 3/11/2025       Current Admission Date: 3/8/2025  Current Admission Diagnosis:Hypothermia   Patient Active Problem List    Diagnosis Date Noted Date Diagnosed    Bradycardia 2025     Fall 2025     Hypothermia 2025     Abnormal TSH 2025     Generalized weakness 2025     Chronic combined systolic and diastolic CHF (congestive heart failure) ([facility]) 2025     Anemia of chronic disease 2025     Urinary frequency 2025     Anemia 2024     History of vitamin D deficiency 2024     Other fatigue 2024     Type 2 diabetes mellitus with stage 2 chronic kidney disease, without long-term current use of insulin  ([facility]) 2024     Dry skin dermatitis 2023     Uses roller walker 2023     Iron deficiency anemia 2023     Peripheral arterial disease ([facility]) 2023     Gait disorder 2022     Edema of both feet 2022     Chronic combined systolic and diastolic heart failure, NYHA class 2 ([facility]) 2022     Heart failure, left, 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% ([facility]) 2022     Acute on chronic combined systolic and diastolic congestive heart failure ([facility]) 2022     Chronic right-sided low back pain without sciatica 2021     Orthostatic hypotension 2021     Hyponatremia 2021     Insomnia, persistent 2021     Prostate cancer ([facility]) 2021     Benign essential hypertension 06/15/2020     Bilateral leg weakness 06/15/2020     Type 2 diabetes mellitus with hyperglycemia, without long-term current use of insulin ([facility]) 2019     Other hyperlipidemia 2018     Coronary artery disease involving native coronary artery of native heart with angina pectoris ([facility])      CKD stage 3 due to type 2 diabetes mellitus ([facility]) 2017     Lumbar radiculopathy 2016     Type 2  diabetes mellitus with diabetic neuropathy (HCC) 01/11/2015     Diabetic neuropathy (HCC) 10/10/2013     Chronic GERD 06/16/2013       LOS (days): 2  Geometric Mean LOS (GMLOS) (days): 3.4  Days to GMLOS:1     OBJECTIVE:  Risk of Unplanned Readmission Score: 23.77      Current admission status: Inpatient   Preferred Pharmacy:   [facility] - [place], [place] - [address]  [address] [address] [facility] [facility] [address]  Phone: [phone] Fax: [phone]    [facility] - [place], [place] - [address]  [address]  [facility] [address]  Phone: [phone] Fax: [phone]    Primary Care Provider: Felipe Travis DO    Primary Insurance: MEDICARE  Secondary Insurance: [facility]    DISCHARGE DETAILS:    Discharge planning discussed with:: Patient, Spouse Ruth  Freedom of Choice: Yes  Comments - Freedom of Choice: SW met bedside with patient and spouse to review recommendation by therapy for STR. Both verbalized agreement with STR and consented to blanket referrals to local area.  CM contacted family/caregiver?: Yes  Were Treatment Team discharge recommendations reviewed with patient/caregiver?: Yes  Did patient/caregiver verbalize understanding of patient care needs?: N/A- going to facility  Were patient/caregiver advised of the risks associated with not following Treatment Team discharge recommendations?: Yes    Contacts  Patient Contacts: Ruth Covarrubias (spouse)  Relationship to Patient:: Family  Contact Method: In Person  Reason/Outcome: Emergency Contact, Discharge Planning, Continuity of Care    Other Referral/Resources/Interventions Provided:  Interventions: Short Term Rehab  Referral Comments: [facility] referrals sent in [place]. Responses pending.     Treatment Team Recommendation: Short Term Rehab  Discharge Destination Plan:: Short Term Rehab

## 2025-03-11 NOTE — CASE MANAGEMENT
Case Management Assessment & Discharge Planning Note    Patient name Pernell Covarrubias  Location [address] [place] [address]-* MRN [id number]  : 1945 Date 3/11/2025       Current Admission Date: 3/8/2025  Current Admission Diagnosis:Hypothermia   Patient Active Problem List    Diagnosis Date Noted Date Diagnosed    Bradycardia 2025     Fall 2025     Hypothermia 2025     Abnormal TSH 2025     Generalized weakness 2025     Chronic combined systolic and diastolic CHF (congestive heart failure) ([facility]) 2025     Anemia of chronic disease 2025     Urinary frequency 2025     Anemia 2024     History of vitamin D deficiency 2024     Other fatigue 2024     Type 2 diabetes mellitus with stage 2 chronic kidney disease, without long-term current use of insulin  ([facility]) 2024     Dry skin dermatitis 2023     Uses roller walker 2023     Iron deficiency anemia 2023     Peripheral arterial disease (HCC) 2023     Gait disorder 2022     Edema of both feet 2022     Chronic combined systolic and diastolic heart failure, NYHA class 2 ([facility]) 2022     Heart failure, left, 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% ([facility]) 2022     Acute on chronic combined systolic and diastolic congestive heart failure ([facility]) 2022     Chronic right-sided low back pain without sciatica 2021     Orthostatic hypotension 2021     Hyponatremia 2021     Insomnia, persistent 2021     Prostate cancer ([facility]) 2021     Benign essential hypertension 06/15/2020     Bilateral leg weakness 06/15/2020     Type 2 diabetes mellitus with hyperglycemia, without long-term current use of insulin ([facility]) 2019     Other hyperlipidemia 2018     Coronary artery disease involving native coronary artery of native heart with angina pectoris ([facility])      CKD stage 3 due to type 2 diabetes mellitus ([facility]) 2017     Lumbar radiculopathy 2016      Type 2 diabetes mellitus with diabetic neuropathy (HCC) 01/11/2015     Diabetic neuropathy (HCC) 10/10/2013     Chronic GERD 06/16/2013       LOS (days): 2  Geometric Mean LOS (GMLOS) (days): 3.4  Days to GMLOS:1.1     OBJECTIVE:    Risk of Unplanned Readmission Score: 23.77      Current admission status: Inpatient     Preferred Pharmacy:   [facility] - [place], [place] - [address]  [address] [facility] [address]  Phone: [phone] Fax: [phone]    [facility] - [place], [place] - [address]  [address]  [facility] [address]  Phone: [phone] Fax: [phone]    Primary Care Provider: Felipe Travis DO    Primary Insurance: MEDICARE  Secondary Insurance: [employer]    ASSESSMENT:  Active Health Care Proxies    There are no active Health Care Proxies on file.    Readmission Root Cause  30 Day Readmission: No    Patient Information  Admitted from:: Home  Mental Status: Alert  During Assessment patient was accompanied by: Not accompanied during assessment  Assessment information provided by:: Patient  Primary Caregiver: Family  Caregiver's Name:: Ruth Covarrubias (spouse)  Caregiver's Relationship to Patient:: Significant Other  Caregiver's Telephone Number:: [phone]  Support Systems: Spouse/significant other, Son  County of Residence: [place]  What city do you live in?: [place], [place]  Home entry access options. Select all that apply.: Stairs  Number of steps to enter home.: 5 (1+4)  Type of Current Residence: [facility]  Living Arrangements: Lives w/ Spouse/significant other    Activities of Daily Living Prior to Admission  Functional Status: Assistance  Completes ADLs independently?: No  Level of ADL dependence: Assistance  Ambulates independently?: No  Level of ambulatory dependence: Assistance  Does patient use assisted devices?: Yes  Assisted Devices (DME) used: Walker (standard walker)  Does patient currently own DME?: Yes  What DME does the  patient currently own?: Rollator, Walker (rolling walker and standard walker)  Does patient have a history of HHC?: Yes ([facility]; [facility])  Does patient currently have HHC?: No     Patient Information Continued  Income Source: Pension/[facility]  Does patient have prescription coverage?: Yes  Can the patient afford their medications and any related supplies (such as glucometers or test strips)?: Yes  Does patient receive dialysis treatments?: No     Means of Transportation  [facility]    DISCHARGE DETAILS:    Discharge planning discussed with:: Patient, Spouse Ruth  Freedom of Choice: Yes  Comments - Freedom of Choice: SW met bedside with patient to introduce role, complete assessment, and discuss discharge plan. Patient aware of recommendation by PT/OT for STR. Patient requesting SW discuss with spouse.  CM contacted family/caregiver?: Yes (Voicemail left for spouse requesting callback to discuss DCP.)  Were Treatment Team discharge recommendations reviewed with patient/caregiver?: Yes  Did patient/caregiver verbalize understanding of patient care needs?: Yes  Were patient/caregiver advised of the risks associated with not following Treatment Team discharge recommendations?: Yes    Contacts  Patient Contacts: Ruth Covarrubias (spouse)  Relationship to Patient:: Family  Contact Method: Phone  Phone Number: [phone]  Reason/Outcome: Emergency Contact, Discharge Planning, Continuity of Care    Treatment Team Recommendation: Short Term Rehab  Discharge Destination Plan:: Short Term Rehab (pending discuss with family)

## 2025-03-12 ENCOUNTER — TELEPHONE (OUTPATIENT)
Dept: FAMILY MEDICINE CLINIC | Facility: CLINIC | Age: 80
End: 2025-03-12

## 2025-03-12 VITALS
OXYGEN SATURATION: 98 % | WEIGHT: 159.2 LBS | HEART RATE: 62 BPM | RESPIRATION RATE: 16 BRPM | DIASTOLIC BLOOD PRESSURE: 59 MMHG | HEIGHT: 71 IN | BODY MASS INDEX: 22.29 KG/M2 | TEMPERATURE: 97.3 F | SYSTOLIC BLOOD PRESSURE: 135 MMHG

## 2025-03-12 LAB
ANION GAP SERPL CALCULATED.3IONS-SCNC: 8 MMOL/L (ref 4–13)
BUN SERPL-MCNC: 35 MG/DL (ref 5–25)
CALCIUM SERPL-MCNC: 9.1 MG/DL (ref 8.4–10.2)
CHLORIDE SERPL-SCNC: 102 MMOL/L (ref 96–108)
CO2 SERPL-SCNC: 21 MMOL/L (ref 21–32)
CREAT SERPL-MCNC: 1.24 MG/DL (ref 0.6–1.3)
GFR SERPL CREATININE-BSD FRML MDRD: 54 ML/MIN/1.73SQ M
GLUCOSE SERPL-MCNC: 119 MG/DL (ref 65–140)
GLUCOSE SERPL-MCNC: 124 MG/DL (ref 65–140)
GLUCOSE SERPL-MCNC: 213 MG/DL (ref 65–140)
POTASSIUM SERPL-SCNC: 4.8 MMOL/L (ref 3.5–5.3)
SODIUM SERPL-SCNC: 131 MMOL/L (ref 135–147)

## 2025-03-12 PROCEDURE — 99232 SBSQ HOSP IP/OBS MODERATE 35: CPT | Performed by: INTERNAL MEDICINE

## 2025-03-12 PROCEDURE — 82948 REAGENT STRIP/BLOOD GLUCOSE: CPT

## 2025-03-12 PROCEDURE — 99239 HOSP IP/OBS DSCHRG MGMT >30: CPT | Performed by: INTERNAL MEDICINE

## 2025-03-12 PROCEDURE — 80048 BASIC METABOLIC PNL TOTAL CA: CPT | Performed by: INTERNAL MEDICINE

## 2025-03-12 RX ORDER — POLYETHYLENE GLYCOL 3350 17 G/17G
17 POWDER, FOR SOLUTION ORAL DAILY PRN
Start: 2025-03-12

## 2025-03-12 RX ORDER — FUROSEMIDE 20 MG/1
20 TABLET ORAL DAILY
Start: 2025-03-13

## 2025-03-12 RX ORDER — CEPHALEXIN 500 MG/1
500 CAPSULE ORAL EVERY 6 HOURS SCHEDULED
Start: 2025-03-12 | End: 2025-03-15

## 2025-03-12 RX ORDER — INSULIN LISPRO 100 [IU]/ML
1-5 INJECTION, SOLUTION INTRAVENOUS; SUBCUTANEOUS
Start: 2025-03-12

## 2025-03-12 RX ORDER — DOCUSATE SODIUM 100 MG/1
100 CAPSULE, LIQUID FILLED ORAL 2 TIMES DAILY
Start: 2025-03-12

## 2025-03-12 RX ADMIN — B-COMPLEX W/ C & FOLIC ACID TAB 1 TABLET: TAB at 09:04

## 2025-03-12 RX ADMIN — CEFAZOLIN SODIUM 1000 MG: 1 SOLUTION INTRAVENOUS at 09:04

## 2025-03-12 RX ADMIN — SPIRONOLACTONE 25 MG: 25 TABLET, FILM COATED ORAL at 09:04

## 2025-03-12 RX ADMIN — FUROSEMIDE 20 MG: 20 TABLET ORAL at 09:04

## 2025-03-12 RX ADMIN — FERROUS SULFATE TAB 325 MG (65 MG ELEMENTAL FE) 325 MG: 325 (65 FE) TAB at 09:04

## 2025-03-12 RX ADMIN — POLYETHYLENE GLYCOL 3350 17 G: 17 POWDER, FOR SOLUTION ORAL at 09:04

## 2025-03-12 RX ADMIN — Medication: at 09:04

## 2025-03-12 RX ADMIN — CARVEDILOL 12.5 MG: 12.5 TABLET, FILM COATED ORAL at 09:04

## 2025-03-12 RX ADMIN — PANTOPRAZOLE SODIUM 40 MG: 40 TABLET, DELAYED RELEASE ORAL at 05:43

## 2025-03-12 RX ADMIN — CEFAZOLIN SODIUM 1000 MG: 1 SOLUTION INTRAVENOUS at 01:46

## 2025-03-12 RX ADMIN — DOCUSATE SODIUM 100 MG: 100 CAPSULE, LIQUID FILLED ORAL at 09:04

## 2025-03-12 RX ADMIN — Medication 100 MG: at 09:04

## 2025-03-12 RX ADMIN — LOSARTAN POTASSIUM 25 MG: 25 TABLET, FILM COATED ORAL at 09:04

## 2025-03-12 RX ADMIN — INSULIN LISPRO 2 UNITS: 100 INJECTION, SOLUTION INTRAVENOUS; SUBCUTANEOUS at 12:05

## 2025-03-12 RX ADMIN — ASPIRIN 81 MG CHEWABLE TABLET 81 MG: 81 TABLET CHEWABLE at 09:04

## 2025-03-12 RX ADMIN — BRIMONIDINE TARTRATE 1 DROP: 2 SOLUTION/ DROPS OPHTHALMIC at 09:04

## 2025-03-12 RX ADMIN — ENOXAPARIN SODIUM 40 MG: 40 INJECTION SUBCUTANEOUS at 09:04

## 2025-03-12 NOTE — ASSESSMENT & PLAN NOTE
Per wife over the last 1 week patient has had 3 falls and appears weaker.  At baseline patient sleeps a lot even at home and this has been ongoing for many years.  Patient also gets intermittently confused and forgetful at baseline  Per wife when patient is awake at home he is more interactive and is able to feed himself compared to what he is doing here  CT of the head: No acute intracranial abnormality. Chronic microangiopathic changes   In ED noted to be hypothermic with rectal temperature at 90.1f  UA unremarkable, CXR unremarkable  Ammonia level within normal limits  Given recurrent falls and weakness, MRI of the brain was obtained which was negative for acute infarct or intracranial hemorrhage or mass  Patient was seen by PT/OT-rehabilitation requirements discussed with patient and wife who are in agreement.  Patient to be discharged to short-term rehabilitation

## 2025-03-12 NOTE — ASSESSMENT & PLAN NOTE
sodium level 125 upon admission  Baseline approx around 130 -135  Per discussion with nephrology on 3/9 patient received Isolyte at 100 mL/h for 5 hours along with 20 mg of IV Lasix x 1.  Patient received another dose of Lasix 20 mg on 3/10/2025  Serum osm 273, urine osm 385, urine sodium 108, uric acid 4.9  Patient received a dose of tolvaptan 7.5 mg p.o. on 3/10/2025  Sodium level is low normal  Restarted on furosemide 20 mg p.o. daily  Repeat BMP  in 1 week  Continue fluid restriction at 1500 mL/day

## 2025-03-12 NOTE — CASE MANAGEMENT
Case Management Discharge Planning Note    Patient name Pernell Covarrubias  Location [address] [place] [address]-* MRN [id number]  : 1945 Date 3/12/2025       Current Admission Date: 3/8/2025  Current Admission Diagnosis:Hypothermia   Patient Active Problem List    Diagnosis Date Noted Date Diagnosed    Bradycardia 2025     Fall 2025     Hypothermia 2025     Abnormal TSH 2025     Generalized weakness 2025     Chronic combined systolic and diastolic CHF (congestive heart failure) ([facility]) 2025     Anemia of chronic disease 2025     Urinary frequency 2025     Anemia 2024     History of vitamin D deficiency 2024     Other fatigue 2024     Type 2 diabetes mellitus with stage 2 chronic kidney disease, without long-term current use of insulin  ([facility]) 2024     Dry skin dermatitis 2023     Uses roller walker 2023     Iron deficiency anemia 2023     Peripheral arterial disease ([facility]) 2023     Gait disorder 2022     Edema of both feet 2022     Chronic combined systolic and diastolic heart failure, NYHA class 2 ([facility]) 2022     Heart failure, left, 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% ([facility]) 2022     Acute on chronic combined systolic and diastolic congestive heart failure ([facility]) 2022     Chronic right-sided low back pain without sciatica 2021     Orthostatic hypotension 2021     Hyponatremia 2021     Insomnia, persistent 2021     Prostate cancer ([facility]) 2021     Benign essential hypertension 06/15/2020     Bilateral leg weakness 06/15/2020     Type 2 diabetes mellitus with hyperglycemia, without long-term current use of insulin ([facility]) 2019     Other hyperlipidemia 2018     Coronary artery disease involving native coronary artery of native heart with angina pectoris ([facility])      CKD stage 3 due to type 2 diabetes mellitus ([facility]) 2017     Lumbar radiculopathy 2016     Type 2  diabetes mellitus with diabetic neuropathy (HCC) 01/11/2015     Diabetic neuropathy (HCC) 10/10/2013     Chronic GERD 06/16/2013       LOS (days): 3  Geometric Mean LOS (GMLOS) (days): 3.4  Days to GMLOS:0.2     OBJECTIVE:  Risk of Unplanned Readmission Score: 26.99      Current admission status: Inpatient   Preferred Pharmacy:   [facility] - [place], [place] - [address]  [address] [facility] [address]  Phone: [phone] Fax: [phone]    [facility] - [place], [place] - [address]  [address]  [facility] [address]  Phone: [phone] Fax: [phone]    Primary Care Provider: Felipe Travis DO    Primary Insurance: MEDICARE  [facility]    DISCHARGE DETAILS:    Discharge planning discussed with:: Patient, Spouse Ruth  Freedom of Choice: Yes  Comments - Freedom of Choice: SW met bedside with patient and spouse to provide accepting STR list for review.    Contacts  Patient Contacts: Ruth Covarrubias (spouse)  Relationship to Patient:: Family  Contact Method: In Person  Reason/Outcome: Emergency Contact, Discharge Planning, Continuity of Care     Treatment Team Recommendation: Short Term Rehab  Discharge Destination Plan:: Short Term Rehab

## 2025-03-12 NOTE — PLAN OF CARE
Problem: Prexisting or High Potential for Compromised Skin Integrity  Goal: Skin integrity is maintained or improved  Description: INTERVENTIONS:  - Identify patients at risk for skin breakdown  - Assess and monitor skin integrity  - Assess and monitor nutrition and hydration status  - Monitor labs   - Assess for incontinence   - Turn and reposition patient  - Assist with mobility/ambulation  - Relieve pressure over bony prominences  - Avoid friction and shearing  - Provide appropriate hygiene as needed including keeping skin clean and dry  - Evaluate need for skin moisturizer/barrier cream  - Collaborate with interdisciplinary team   - Patient/family teaching  - Consider wound care consult   Outcome: Progressing     Problem: NEUROSENSORY - ADULT  Goal: Achieves stable or improved neurological status  Description: INTERVENTIONS  - Monitor and report changes in neurological status  - Monitor vital signs such as temperature, blood pressure, glucose, and any other labs ordered   - Initiate measures to prevent increased intracranial pressure  - Monitor for seizure activity and implement precautions if appropriate      Outcome: Progressing  Goal: Achieves maximal functionality and self care  Description: INTERVENTIONS  - Monitor swallowing and airway patency with patient fatigue and changes in neurological status  - Encourage and assist patient to increase activity and self care.   - Encourage visually impaired, hearing impaired and aphasic patients to use assistive/communication devices  Outcome: Progressing     Problem: CARDIOVASCULAR - ADULT  Goal: Maintains optimal cardiac output and hemodynamic stability  Description: INTERVENTIONS:  - Monitor I/O, vital signs and rhythm  - Monitor for S/S and trends of decreased cardiac output  - Administer and titrate ordered vasoactive medications to optimize hemodynamic stability  - Assess quality of pulses, skin color and temperature  - Assess for signs of decreased coronary  artery perfusion  - Instruct patient to report change in severity of symptoms  Outcome: Progressing  Goal: Absence of cardiac dysrhythmias or at baseline rhythm  Description: INTERVENTIONS:  - Continuous cardiac monitoring, vital signs, obtain 12 lead EKG if ordered  - Administer antiarrhythmic and heart rate control medications as ordered  - Monitor electrolytes and administer replacement therapy as ordered  Outcome: Progressing     Problem: RESPIRATORY - ADULT  Goal: Achieves optimal ventilation and oxygenation  Description: INTERVENTIONS:  - Assess for changes in respiratory status  - Assess for changes in mentation and behavior  - Position to facilitate oxygenation and minimize respiratory effort  - Oxygen administered by appropriate delivery if ordered  - Initiate smoking cessation education as indicated  - Encourage broncho-pulmonary hygiene including cough, deep breathe, Incentive Spirometry  - Assess the need for suctioning and aspirate as needed  - Assess and instruct to report SOB or any respiratory difficulty  - Respiratory Therapy support as indicated  Outcome: Progressing     Problem: GENITOURINARY - ADULT  Goal: Maintains or returns to baseline urinary function  Description: INTERVENTIONS:  - Assess urinary function  - Encourage oral fluids to ensure adequate hydration if ordered  - Administer IV fluids as ordered to ensure adequate hydration  - Administer ordered medications as needed  - Offer frequent toileting  - Follow urinary retention protocol if ordered  Outcome: Progressing  Goal: Absence of urinary retention  Description: INTERVENTIONS:  - Assess patient’s ability to void and empty bladder  - Monitor I/O  - Bladder scan as needed  - Discuss with physician/AP medications to alleviate retention as needed  - Discuss catheterization for long term situations as appropriate  Outcome: Progressing     Problem: METABOLIC, FLUID AND ELECTROLYTES - ADULT  Goal: Electrolytes maintained within normal  limits  Description: INTERVENTIONS:  - Monitor labs and assess patient for signs and symptoms of electrolyte imbalances  - Administer electrolyte replacement as ordered  - Monitor response to electrolyte replacements, including repeat lab results as appropriate  - Instruct patient on fluid and nutrition as appropriate  Outcome: Progressing  Goal: Fluid balance maintained  Description: INTERVENTIONS:  - Monitor labs   - Monitor I/O and WT  - Instruct patient on fluid and nutrition as appropriate  - Assess for signs & symptoms of volume excess or deficit  Outcome: Progressing  Goal: Glucose maintained within target range  Description: INTERVENTIONS:  - Monitor Blood Glucose as ordered  - Assess for signs and symptoms of hyperglycemia and hypoglycemia  - Administer ordered medications to maintain glucose within target range  - Assess nutritional intake and initiate nutrition service referral as needed  Outcome: Progressing     Problem: SKIN/TISSUE INTEGRITY - ADULT  Goal: Skin Integrity remains intact(Skin Breakdown Prevention)  Description: Assess:  -Perform [place] assessment every shift   -Clean and moisturize skin every day/PRN  -Inspect skin when repositioning, toileting, and assisting with ADLS  -Assess under medical devices such as pipo every shift  -Assess extremities for adequate circulation and sensation     Bed Management:  -Have minimal linens on bed & keep smooth, unwrinkled  -Change linens as needed when moist or perspiring  -Avoid sitting or lying in one position for more than 2 hours while in bed  -Keep HOB at 45 degrees     Toileting:  -Offer bedside commode  -Assess for incontinence every hour  -Use incontinent care products after each incontinent episode such as foaming cleanser     Activity:  -Mobilize patient 3 times a day  -Encourage activity and walks on unit  -Encourage or provide ROM exercises   -Turn and reposition patient every 2 Hours  -Use appropriate equipment to lift or move patient in  bed  -Instruct/ Assist with weight shifting every hour when out of bed in chair  -Consider limitation of chair time 1 hour intervals    Skin Care:  -Avoid use of baby powder, tape, friction and shearing, hot water or constrictive clothing  -Relieve pressure over bony prominences using green wedges   -Do not massage red bony areas    Next Steps:  -Teach patient strategies to minimize risks such as turning/mobilization and hygiene    -Consider consults to  interdisciplinary teams such as wound care   Outcome: Progressing     Problem: HEMATOLOGIC - ADULT  Goal: Maintains hematologic stability  Description: INTERVENTIONS  - Assess for signs and symptoms of bleeding or hemorrhage  - Monitor labs  - Administer supportive blood products/factors as ordered and appropriate  Outcome: Progressing     Problem: MUSCULOSKELETAL - ADULT  Goal: Maintain or return mobility to safest level of function  Description: INTERVENTIONS:  - Assess patient's ability to carry out ADLs; assess patient's baseline for ADL function and identify physical deficits which impact ability to perform ADLs (bathing, care of mouth/teeth, toileting, grooming, dressing, etc.)  - Assess/evaluate cause of self-care deficits   - Assess range of motion  - Assess patient's mobility  - Assess patient's need for assistive devices and provide as appropriate  - Encourage maximum independence but intervene and supervise when necessary  - Involve family in performance of ADLs  - Assess for home care needs following discharge   - Consider OT consult to assist with ADL evaluation and planning for discharge  - Provide patient education as appropriate  Outcome: Progressing  Goal: Maintain proper alignment of affected body part  Description: INTERVENTIONS:  - Support, maintain and protect limb and body alignment  - Provide patient/ family with appropriate education  Outcome: Progressing

## 2025-03-12 NOTE — ASSESSMENT & PLAN NOTE
Baseline sodium is around 130 to 135 over the past year.   Sodium on admission was 125.  Workup: SOsm 273, UOsm 385, Estrada 108, uric acid 4.9, TSH 6.684, cortisol 10.7.   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  Na improved to 133 after Tolvaptan 7.5 mg PO x 1 on 3/10/25.   Na lower at 131 today but still acceptable.   Continue Furosemide 20 mg OD and FR of 1500 cc/24 hours.

## 2025-03-12 NOTE — ASSESSMENT & PLAN NOTE
He is prescribed furosemide 20 mg 3 times per week, spironolactone 25 mg daily- per records.   He reports taking Furosemide 20 mg daily   Currently compensated.   Continue Furosemide 20 mg OD and Spironolactone 25 mg OD.   Echo with EF up to 55-60%.

## 2025-03-12 NOTE — PROGRESS NOTES
NEPHROLOGY HOSPITAL PROGRESS NOTE   Pernell Covarrubias 79 y.o. male MRN: [id number]  Unit/Bed#: [address] [place] [address] Encounter: [id number]  Reason for Consult: Hyponatremia  Assessment & Plan  Hyponatremia  Baseline sodium is around 130 to 135 over the past year.   Sodium on admission was 125.  Workup: SOsm 273, UOsm 385, Estrada 108, uric acid 4.9, TSH 6.684, cortisol 10.7.   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  Na improved to 133 after Tolvaptan 7.5 mg PO x 1 on 3/10/25.   Na lower at 131 today but still acceptable.   Continue Furosemide 20 mg OD and FR of 1500 cc/24 hours.     CKD stage 3 due to type 2 diabetes mellitus (HCC)  Baseline creatinine is around 1.0 to 1.1  Follows with Dr. Montejo in the office.  Renal function stable.  SCr 1.24 today.     Fall  Per primary service and PT    Abnormal TSH  Per primary service.    Chronic combined systolic and diastolic CHF (congestive heart failure) (HCC)  He is prescribed furosemide 20 mg 3 times per week, spironolactone 25 mg daily- per records.   He reports taking Furosemide 20 mg daily   Currently compensated.   Continue Furosemide 20 mg OD and Spironolactone 25 mg OD.   Echo with EF up to 55-60%.       TODAY's DISCUSSION / PLAN:  Na lower at 131 but acceptable.   Continue Furosemide and Spironolactone.   Stable for discharge from renal standpoint.  If discharged, check BMP 1 week after discharge.      I have reviewed the plan above with Dr. Hinkle and we are in agreement that the patient stable for discharge.     SUBJECTIVE / 24H INTERVAL HISTORY:  He confirmed to me again today that he was taking furosemide 20 mg daily at home.  No new complaints overnight.  No CP or SOB.    OBJECTIVE:  Current Weight: Weight - Scale: 72.2 kg (159 lb 3.2 oz)  Vitals:    03/11/25 1958 03/11/25 2224 03/12/25 0042 03/12/25 0727   BP: 126/51 (!) 124/48 130/50 135/59   Pulse: 63 60 61 62   Resp: 18 21  16   Temp: (!) 96 °F (35.6 °C) (!) 96.8 °F (36 °C) (!) 96.6 °F (35.9 °C) (!) 97.3 °F  (36.3 °C)   TempSrc:       SpO2: 99% 100% 99% 98%   Weight:       Height:           Intake/Output Summary (Last 24 hours) at 3/12/2025 1201  Last data filed at 3/12/2025 0431  Gross per 24 hour   Intake 300 ml   Output 800 ml   Net -500 ml     General: conscious, cooperative, no distress  Skin: dry  Eyes: pink conjunctivae  ENT: moist mucous membranes  Respiratory: equal chest expansion, clear breath sounds.  Cardiovascular: distinct heart sounds, normal rate, regular rhythm, no rub  Abdomen: soft, non tender, non distended, normal bowel sounds  Extremities: mild LE edema.   Genitourinary: no meza catheter.   Neuro: awake, alert.   Psych: appropriate affect    Medications:    Current Facility-Administered Medications:     acetaminophen (TYLENOL) tablet 650 mg, 650 mg, Oral, Q6H PRN, Chantel A HARRYjvardonta, CRNP    ammonium lactate (LAC-HYDRIN) 12 % lotion, , Topical, BID, Chantel A Ujvary, CRNP, Given at 03/12/25 0904    aspirin chewable tablet 81 mg, 81 mg, Oral, Daily, Chantel A Ujvary, CRNP, 81 mg at 03/12/25 0904    atorvastatin (LIPITOR) tablet 20 mg, 20 mg, Oral, Daily With Dinner, Chantel A Ujvary, CRNP, 20 mg at 03/11/25 1723    brimonidine tartrate 0.2 % ophthalmic solution 1 drop, 1 drop, Both Eyes, BID, Chantel A Ujvary, CRNP, 1 drop at 03/12/25 0904    carvedilol (COREG) tablet 12.5 mg, 12.5 mg, Oral, BID, Chantel A Ujvary, CRNP, 12.5 mg at 03/12/25 0904    ceFAZolin (ANCEF) IVPB (premix in dextrose) 1,000 mg 50 mL, 1,000 mg, Intravenous, Q8H, Megan Miramontes DO, Last Rate: 100 mL/hr at 03/12/25 0904, 1,000 mg at 03/12/25 0904    co-enzyme Q-10 capsule 100 mg, 100 mg, Oral, Daily, Chantel A Ujvary, CRNP, 100 mg at 03/12/25 0904    docusate sodium (COLACE) capsule 100 mg, 100 mg, Oral, BID, Kristen Hinkle MD, 100 mg at 03/12/25 0904    enoxaparin (LOVENOX) subcutaneous injection 40 mg, 40 mg, Subcutaneous, Daily, NEO Carrera, 40 mg at 03/12/25 0904    ferrous sulfate tablet 325  mg, 325 mg, Oral, BID With Meals, Chantel A Ucolumbavary, CRNP, 325 mg at 03/12/25 0904    furosemide (LASIX) tablet 20 mg, 20 mg, Oral, Daily, Khris Antunez MD, 20 mg at 03/12/25 0904    insulin lispro (HumALOG/ADMELOG) 100 units/mL subcutaneous injection 1-5 Units, 1-5 Units, Subcutaneous, TID AC, 2 Units at 03/11/25 1155 **AND** Fingerstick Glucose (POCT), , , TID AC, Chantel A Divinavary, CRNP    insulin lispro (HumALOG/ADMELOG) 100 units/mL subcutaneous injection 1-5 Units, 1-5 Units, Subcutaneous, HS, Chantel A Divinavary, CRNP, 1 Units at 03/11/25 2120    losartan (COZAAR) tablet 25 mg, 25 mg, Oral, Daily, Chantel A Divinavary, CRNP, 25 mg at 03/12/25 0904    multivitamin stress formula tablet 1 tablet, 1 tablet, Oral, Daily, Chantel A Divinavary, CRNP, 1 tablet at 03/12/25 0904    pantoprazole (PROTONIX) EC tablet 40 mg, 40 mg, Oral, Early Morning, Chantel A Divinavary, CRNP, 40 mg at 03/12/25 0543    polyethylene glycol (MIRALAX) packet 17 g, 17 g, Oral, Daily, Kristen Hinkle MD, 17 g at 03/12/25 0904    [Held by provider] sitaGLIPtin (JANUVIA) tablet 25 mg, 25 mg, Oral, Daily, Chantel A Ujvary, CRNP    spironolactone (ALDACTONE) tablet 25 mg, 25 mg, Oral, Daily, Khris Antunez MD, 25 mg at 03/12/25 0904    tamsulosin (FLOMAX) capsule 0.4 mg, 0.4 mg, Oral, Daily With Dinner, Chantel A HARRYjvary, CRNP, 0.4 mg at 03/11/25 1722    Laboratory Results:  Results from last 7 days   Lab Units 03/12/25  0431 03/11/25  0543 03/10/25  1752 03/10/25  0812 03/09/25  2051 03/09/25  1213 03/09/25  0555 03/09/25  0020   WBC Thousand/uL  --  5.46  --  5.71  --   --  4.17* 4.85   HEMOGLOBIN g/dL  --  9.6*  --  9.1*  --   --  8.0* 10.2*   HEMATOCRIT %  --  28.9*  --  26.8*  --   --  23.7* 30.1*   PLATELETS Thousands/uL  --  138*  --  139*  --   --  106* 133*   POTASSIUM mmol/L 4.8 4.3 4.5 4.1 4.5 4.7 4.3 4.8   CHLORIDE mmol/L 102 103 98 98 96 97 98 96   CO2 mmol/L 21 21 20* 20* 20* 19* 19* 19*   BUN mg/dL 35* 24 27* 23 22 24  "27* 26*   CREATININE mg/dL 1.24 1.08 1.28 1.08 1.15 1.00 0.82 0.83   CALCIUM mg/dL 9.1 9.6 9.1 9.0 8.8 9.1 8.9 10.2     Portions of the record may have been created with voice recognition software. Occasional wrong word or \"sound a like\" substitutions may have occurred due to the inherent limitations of voice recognition software. Read the chart carefully and recognize, using context, where substitutions have occurred.If you have any questions, please contact the dictating provider.  1  "

## 2025-03-12 NOTE — ASSESSMENT & PLAN NOTE
Baseline creatinine is around 1.0 to 1.1  Follows with Dr. Montejo in the office.  Renal function stable.  SCr 1.24 today.

## 2025-03-12 NOTE — PLAN OF CARE
Problem: Prexisting or High Potential for Compromised Skin Integrity  Goal: Skin integrity is maintained or improved  Description: INTERVENTIONS:  - Identify patients at risk for skin breakdown  - Assess and monitor skin integrity  - Assess and monitor nutrition and hydration status  - Monitor labs   - Assess for incontinence   - Turn and reposition patient  - Assist with mobility/ambulation  - Relieve pressure over bony prominences  - Avoid friction and shearing  - Provide appropriate hygiene as needed including keeping skin clean and dry  - Evaluate need for skin moisturizer/barrier cream  - Collaborate with interdisciplinary team   - Patient/family teaching  - Consider wound care consult   Outcome: Progressing     Problem: NEUROSENSORY - ADULT  Goal: Achieves stable or improved neurological status  Description: INTERVENTIONS  - Monitor and report changes in neurological status  - Monitor vital signs such as temperature, blood pressure, glucose, and any other labs ordered   - Initiate measures to prevent increased intracranial pressure  - Monitor for seizure activity and implement precautions if appropriate      Outcome: Progressing  Goal: Achieves maximal functionality and self care  Description: INTERVENTIONS  - Monitor swallowing and airway patency with patient fatigue and changes in neurological status  - Encourage and assist patient to increase activity and self care.   - Encourage visually impaired, hearing impaired and aphasic patients to use assistive/communication devices  Outcome: Progressing     Problem: CARDIOVASCULAR - ADULT  Goal: Maintains optimal cardiac output and hemodynamic stability  Description: INTERVENTIONS:  - Monitor I/O, vital signs and rhythm  - Monitor for S/S and trends of decreased cardiac output  - Administer and titrate ordered vasoactive medications to optimize hemodynamic stability  - Assess quality of pulses, skin color and temperature  - Assess for signs of decreased coronary  artery perfusion  - Instruct patient to report change in severity of symptoms  Outcome: Progressing  Goal: Absence of cardiac dysrhythmias or at baseline rhythm  Description: INTERVENTIONS:  - Continuous cardiac monitoring, vital signs, obtain 12 lead EKG if ordered  - Administer antiarrhythmic and heart rate control medications as ordered  - Monitor electrolytes and administer replacement therapy as ordered  Outcome: Progressing     Problem: RESPIRATORY - ADULT  Goal: Achieves optimal ventilation and oxygenation  Description: INTERVENTIONS:  - Assess for changes in respiratory status  - Assess for changes in mentation and behavior  - Position to facilitate oxygenation and minimize respiratory effort  - Oxygen administered by appropriate delivery if ordered  - Initiate smoking cessation education as indicated  - Encourage broncho-pulmonary hygiene including cough, deep breathe, Incentive Spirometry  - Assess the need for suctioning and aspirate as needed  - Assess and instruct to report SOB or any respiratory difficulty  - Respiratory Therapy support as indicated  Outcome: Progressing     Problem: GENITOURINARY - ADULT  Goal: Maintains or returns to baseline urinary function  Description: INTERVENTIONS:  - Assess urinary function  - Encourage oral fluids to ensure adequate hydration if ordered  - Administer IV fluids as ordered to ensure adequate hydration  - Administer ordered medications as needed  - Offer frequent toileting  - Follow urinary retention protocol if ordered  Outcome: Progressing  Goal: Absence of urinary retention  Description: INTERVENTIONS:  - Assess patient’s ability to void and empty bladder  - Monitor I/O  - Bladder scan as needed  - Discuss with physician/AP medications to alleviate retention as needed  - Discuss catheterization for long term situations as appropriate  Outcome: Progressing     Problem: METABOLIC, FLUID AND ELECTROLYTES - ADULT  Goal: Electrolytes maintained within normal  limits  Description: INTERVENTIONS:  - Monitor labs and assess patient for signs and symptoms of electrolyte imbalances  - Administer electrolyte replacement as ordered  - Monitor response to electrolyte replacements, including repeat lab results as appropriate  - Instruct patient on fluid and nutrition as appropriate  Outcome: Progressing  Goal: Fluid balance maintained  Description: INTERVENTIONS:  - Monitor labs   - Monitor I/O and WT  - Instruct patient on fluid and nutrition as appropriate  - Assess for signs & symptoms of volume excess or deficit  Outcome: Progressing  Goal: Glucose maintained within target range  Description: INTERVENTIONS:  - Monitor Blood Glucose as ordered  - Assess for signs and symptoms of hyperglycemia and hypoglycemia  - Administer ordered medications to maintain glucose within target range  - Assess nutritional intake and initiate nutrition service referral as needed  Outcome: Progressing     Problem: SKIN/TISSUE INTEGRITY - ADULT  Goal: Skin Integrity remains intact(Skin Breakdown Prevention)  Description: Assess:  -Perform [place] assessment every shift  -Clean and moisturize skin every day  -Inspect skin when repositioning, toileting, and assisting with ADLS  -Assess under medical devices such as oxygen every day  -Assess extremities for adequate circulation and sensation     Bed Management:  -Have minimal linens on bed & keep smooth, unwrinkled  -Change linens as needed when moist or perspiring  -Avoid sitting or lying in one position for more than 2 hours while in bed  -Keep HOB at 30 degrees     Toileting:  -Offer bedside commode  -Assess for incontinence every 2 hours  -Use incontinent care products after each incontinent episode such as barrier cream    Activity:  -Mobilize patient 3 times a day  -Encourage activity and walks on unit  -Encourage or provide ROM exercises   -Turn and reposition patient every 2 Hours  -Use appropriate equipment to lift or move patient in  bed  -Instruct/ Assist with weight shifting every 30 minutes when out of bed in chair  -Consider limitation of chair time 2 hour intervals    Skin Care:  -Avoid use of baby powder, tape, friction and shearing, hot water or constrictive clothing  -Relieve pressure over bony prominences using foam dressings  -Do not massage red bony areas    Next Steps:  -Teach patient strategies to minimize risks such as weight shifting   -Consider consults to  interdisciplinary teams such as PT/OT  Outcome: Progressing     Problem: HEMATOLOGIC - ADULT  Goal: Maintains hematologic stability  Description: INTERVENTIONS  - Assess for signs and symptoms of bleeding or hemorrhage  - Monitor labs  - Administer supportive blood products/factors as ordered and appropriate  Outcome: Progressing     Problem: MUSCULOSKELETAL - ADULT  Goal: Maintain or return mobility to safest level of function  Description: INTERVENTIONS:  - Assess patient's ability to carry out ADLs; assess patient's baseline for ADL function and identify physical deficits which impact ability to perform ADLs (bathing, care of mouth/teeth, toileting, grooming, dressing, etc.)  - Assess/evaluate cause of self-care deficits   - Assess range of motion  - Assess patient's mobility  - Assess patient's need for assistive devices and provide as appropriate  - Encourage maximum independence but intervene and supervise when necessary  - Involve family in performance of ADLs  - Assess for home care needs following discharge   - Consider OT consult to assist with ADL evaluation and planning for discharge  - Provide patient education as appropriate  Outcome: Progressing  Goal: Maintain proper alignment of affected body part  Description: INTERVENTIONS:  - Support, maintain and protect limb and body alignment  - Provide patient/ family with appropriate education  Outcome: Progressing

## 2025-03-12 NOTE — CASE MANAGEMENT
Case Management Discharge Planning Note    Patient name Pernell Covarrubias  Location [address] [place] [address]-* MRN [id number]  : 1945 Date 3/12/2025       Current Admission Date: 3/8/2025  Current Admission Diagnosis:Hypothermia   Patient Active Problem List    Diagnosis Date Noted Date Diagnosed    Bradycardia 2025     Fall 2025     Hypothermia 2025     Abnormal TSH 2025     Generalized weakness 2025     Chronic combined systolic and diastolic CHF (congestive heart failure) ([facility]) 2025     Anemia of chronic disease 2025     Urinary frequency 2025     Anemia 2024     History of vitamin D deficiency 2024     Other fatigue 2024     Type 2 diabetes mellitus with stage 2 chronic kidney disease, without long-term current use of insulin  ([facility]) 2024     Dry skin dermatitis 2023     Uses roller walker 2023     Iron deficiency anemia 2023     Peripheral arterial disease ([facility]) 2023     Gait disorder 2022     Edema of both feet 2022     Chronic combined systolic and diastolic heart failure, NYHA class 2 ([facility]) 2022     Heart failure, left, 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% ([facility]) 2022     Acute on chronic combined systolic and diastolic congestive heart failure ([facility]) 2022     Chronic right-sided low back pain without sciatica 2021     Orthostatic hypotension 2021     Hyponatremia 2021     Insomnia, persistent 2021     Prostate cancer ([facility]) 2021     Benign essential hypertension 06/15/2020     Bilateral leg weakness 06/15/2020     Type 2 diabetes mellitus with hyperglycemia, without long-term current use of insulin ([facility]) 2019     Other hyperlipidemia 2018     Coronary artery disease involving native coronary artery of native heart with angina pectoris ([facility])      CKD stage 3 due to type 2 diabetes mellitus ([facility]) 2017     Lumbar radiculopathy 2016     Type 2  diabetes mellitus with diabetic neuropathy (HCC) 01/11/2015     Diabetic neuropathy (HCC) 10/10/2013     Chronic GERD 06/16/2013       LOS (days): 3  Geometric Mean LOS (GMLOS) (days): 3.4  Days to GMLOS:0.1     OBJECTIVE:  Risk of Unplanned Readmission Score: 26.99      Current admission status: Inpatient   Preferred Pharmacy:   [facility] - [place], [place] - [address]  [address] [address]  Phone: [phone] Fax: [phone]    [id number] - [place], [place] - [address]  [address]  [facility] [address]  Phone: [phone] Fax: [phone]    Primary Care Provider: Felipe Travis DO    Primary Insurance: MEDICARE  Secondary Insurance: AARP    DISCHARGE DETAILS:    Discharge planning discussed with:: Patient, Spouse Ruth  Freedom of Choice: Yes  Comments - Freedom of Choice: STR choice is [facility]  CM contacted family/caregiver?: Yes  Were Treatment Team discharge recommendations reviewed with patient/caregiver?: Yes  Did patient/caregiver verbalize understanding of patient care needs?: N/A- going to facility  Were patient/caregiver advised of the risks associated with not following Treatment Team discharge recommendations?: Yes    Contacts  Patient Contacts: Ruth Covarrubias (spouse)  Relationship to Patient:: Family  Contact Method: In Person  Reason/Outcome: Emergency Contact, Discharge Planning, Continuity of Care    Other Referral/Resources/Interventions Provided:  Interventions: Short Term Rehab, Transportation  Referral Comments: [facility] reserved in [facility]. Facility confirmed ability to accept today. Facility aware of 1330 pickup time.     Treatment Team Recommendation: Short Term Rehab  Discharge Destination Plan:: Short Term Rehab  Transport at Discharge : [facility] Ambulance     Number/Name of Dispatcher: [facility]  Transported by (Company and Unit #): [facility]  ETA of Transport (Date): 03/12/25  ETA of Transport (Time):  4876    Accepting Facility Name, City & State : [facility]  Receiving Facility/Agency Phone Number: [phone]

## 2025-03-12 NOTE — ASSESSMENT & PLAN NOTE
Patient presented with 3 falls over the last week along with weakness  Per wife, no LOC and head strike  In Ed found to be bradycardiac HR 53 and hypothermic 90.1 F rectal  Unclear etiology.  Infectious work up  has been negative  Patient was given IV Rocephin in the ED and was later continued on IV cefazolin for possible right leg cellulitis complete the course with Keflex for 7 days total  COVID FLU RSV negative, lactic acid negative, no leukocytosis  TSH noted elevated 6.684  Currently temperature has improved and no longer requiring Stella hugger  blood cultures  x 2 from admission are negative  Procalcitonin negative   AM cortisol level was normal

## 2025-03-12 NOTE — DISCHARGE SUMMARY
Discharge Summary - Hospitalist   Name: Pernell Covarrubias 79 y.o. male I MRN: [id number]  Unit/Bed#: [address] [place] [address] Date of Admission: 3/8/2025   Date of Service: 3/12/2025 I Hospital Day: 3     Assessment & Plan  Hypothermia  Patient presented with 3 falls over the last week along with weakness  Per wife, no LOC and head strike  In Ed found to be bradycardiac HR 53 and hypothermic 90.1 F rectal  Unclear etiology.  Infectious work up  has been negative  Patient was given IV Rocephin in the ED and was later continued on IV cefazolin for possible right leg cellulitis complete the course with Keflex for 7 days total  COVID FLU RSV negative, lactic acid negative, no leukocytosis  TSH noted elevated 6.684  Currently temperature has improved and no longer requiring Stella hugger  blood cultures  x 2 from admission are negative  Procalcitonin negative   AM cortisol level was normal  Hyponatremia   sodium level 125 upon admission  Baseline approx around 130 -135  Per discussion with nephrology on 3/9 patient received Isolyte at 100 mL/h for 5 hours along with 20 mg of IV Lasix x 1.  Patient received another dose of Lasix 20 mg on 3/10/2025  Serum osm 273, urine osm 385, urine sodium 108, uric acid 4.9  Patient received a dose of tolvaptan 7.5 mg p.o. on 3/10/2025  Sodium level is low normal  Restarted on furosemide 20 mg p.o. daily  Repeat BMP  in 1 week  Continue fluid restriction at 1500 mL/day  Generalized weakness  Per wife over the last 1 week patient has had 3 falls and appears weaker.  At baseline patient sleeps a lot even at home and this has been ongoing for many years.  Patient also gets intermittently confused and forgetful at baseline  Per wife when patient is awake at home he is more interactive and is able to feed himself compared to what he is doing here  CT of the head: No acute intracranial abnormality. Chronic microangiopathic changes   In ED noted to be hypothermic with rectal temperature at 90.1f  UA  unremarkable, CXR unremarkable  Ammonia level within normal limits  Given recurrent falls and weakness, MRI of the brain was obtained which was negative for acute infarct or intracranial hemorrhage or mass  Patient was seen by PT/OT-rehabilitation requirements discussed with patient and wife who are in agreement.  Patient to be discharged to short-term rehabilitation  Coronary artery disease involving native coronary artery of native heart with angina pectoris (HCC)  S/p multivessel stenting  Home medication aspirin Lipitor, Coreg  Bradycardia  HR 49-53 upon admission  Found to be hypothermic with rectal temp at 90.1F  Heart rate has improved  TSH level was normal  A.m. cortisol level was normal  Abnormal TSH  POA with elevated TSH 6.68  Free T4 normal  Endocrine consulted appreciate recs  Chronic combined systolic and diastolic CHF (congestive heart failure) (HCC)  Wt Readings from Last 3 Encounters:   03/09/25 72.2 kg (159 lb 3.2 oz)   03/04/25 74.8 kg (165 lb)   02/10/25 74.4 kg (164 lb)     Patient with bilateral lower extremity edema although this is at baseline per wife  Per wife have chronic edema takes furosemide MWF   CXR negative for acute pathology  BNP elevated 349  Echo: 1/22/25 EF 55 to 60% by visual estimation. Systolic function is normal. Wall motion is normal. Diastolic function is mildly abnormal, consistent with grade I (abnormal) relaxation.   Patient is received couple of doses of IV Lasix  Restarted on Lasix 20 mg p.o. daily and Aldactone 25 mg p.o. 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  Creatinine today stable  Repeat lab work in a.m.  Prostate cancer (HCC)  Hx noted  Follow  with urology.  S/p injectable chemo medications  Peripheral arterial disease (HCC)  Follows with vascular   Continue with ASA and statin.  Anemia of chronic disease  Hemoglobin stable at in the 9 range  Fall  POA s/p fall  Continue PT/OT     Medical Problems       Resolved Problems  Date Reviewed: 2/10/2025   None       Discharging Physician / Practitioner: Kristen Hinkle MD  PCP: Felipe Travis DO  Admission Date:   Admission Orders (From admission, onward)       Ordered        03/09/25 0424  INPATIENT ADMISSION  Once                          Discharge Date: 03/12/25    Consultations During Hospital Stay:  Nephrology      Significant Findings / Test Results:   CT head without any acute intracranial abnormality with chronic microangiopathic changes  CT cervical spine without any acute abnormality  Chest x-ray showed no acute abnormality  MRI of the brain without any evidence of acute infarct, intracranial hemorrhage or mass with paranasal sinuses       Outpatient Tests Requested:  BMP in 1 week    Complications: None    Reason for Admission: Fall and weakness    Hospital Course:   Pernell Covarrubias is a 79 y.o. male patient with past medical history of diabetes, hypertension, prostate cancer, hyponatremia, heart failure who originally presented to the hospital on 3/8/2025 due to fall and generalized weakness with some confusion.  Patient noted to have hypothermia with hyponatremia and was admitted for acute encephalopathy.  Patient also noted to be bradycardic.  Patient initially required Stella hugger and all infectious workup was negative.  Temperature eventually improved.  Patient was seen by nephrology for hyponatremia and patient received few doses of Lasix and later tolvaptan and eventually started back on furosemide.  Patient was also constipated which resolved with bowel regimen.  Patient was also seen by PT/OT and will be discharged to short-term rehabilitation.    Please see above list of diagnoses  "and related plan for additional information.     Condition at Discharge: fair    Discharge Day Visit / Exam:   Subjective: Patient was sitting out of bed in the chair.  Denies any chest pain, abdominal pain or shortness of breath.  Patient had a large bowel movement last night.  Vitals: Blood Pressure: 135/59 (03/12/25 0727)  Pulse: 62 (03/12/25 0727)  Temperature: (!) 97.3 °F (36.3 °C) (03/12/25 0727)  Temp Source: Rectal (03/09/25 1700)  Respirations: 16 (03/12/25 0727)  Height: 5' 11\" (180.3 cm) (03/09/25 0800)  Weight - Scale: 72.2 kg (159 lb 3.2 oz) (03/09/25 0630)  SpO2: 98 % (03/12/25 0727)  Physical Exam  Constitutional:       Appearance: Normal appearance.   HENT:      Head: Normocephalic and atraumatic.   Eyes:      Extraocular Movements: Extraocular movements intact.      Pupils: Pupils are equal, round, and reactive to light.   Cardiovascular:      Rate and Rhythm: Normal rate and regular rhythm.      Heart sounds: No murmur heard.     No gallop.   Pulmonary:      Effort: Pulmonary effort is normal.      Breath sounds: Normal breath sounds.   Abdominal:      General: Bowel sounds are normal.      Palpations: Abdomen is soft.      Tenderness: There is no abdominal tenderness.   Musculoskeletal:         General: No swelling or deformity. Normal range of motion.      Cervical back: Normal range of motion and neck supple.   Skin:     General: Skin is warm and dry.   Neurological:      General: No focal deficit present.      Mental Status: He is alert.          Discussion with Family: Updated  (wife) at bedside.    Discharge instructions/Information to patient and family:   See after visit summary for information provided to patient and family.      Provisions for Follow-Up Care:  See after visit summary for information related to follow-up care and any pertinent home health orders.      Mobility at time of Discharge:   Basic Mobility Inpatient Raw Score: 11  -HLM Goal: 4: Move to " chair/commode  JH-HLM Achieved: 4: Move to chair/commode  HLM Goal NOT achieved. Continue to encourage mobility in post discharge setting.     Disposition:   Other Skilled Nursing Facility at [facility]    Planned Readmission: No    Discharge Medications:  See after visit summary for reconciled discharge medications provided to patient and/or family.      Administrative Statements   Discharge Statement:  I have spent a total time of 35 minutes in caring for this patient on the day of the visit/encounter.     **Please Note: This note may have been constructed using a voice recognition system**

## 2025-03-12 NOTE — TELEPHONE ENCOUNTER
----- Message from Kristen Hinkle MD sent at 3/12/2025  1:12 PM EDT -----  Thank you for allowing us to participate in the care of your patient, Pernell Covarrubias, who was hospitalized from 3/8/2025 through 3/12/2025 with the admitting diagnosis of hypothermia with bradycardia and hyponatremia.  Infectious workup was negative.  Patient was given few dose of Lasix and tolvaptan with improved sodium level.  Patient was started on Lasix every day and fluid restriction.  Patient was also given IV Ancef for possible leg cellulitis and will complete the course with Keflex    Medication Changes:  Furosemide 20 mg p.o. daily    Outpatient testing recommended:  BMP in 1 week    If you have any additional questions or would like to discuss further, please feel free to contact me.    Kristen Hinkle MD  [facility] Internal Medicine, Hospitalist  [phone]

## 2025-03-12 NOTE — ASSESSMENT & PLAN NOTE
Wt Readings from Last 3 Encounters:   03/09/25 72.2 kg (159 lb 3.2 oz)   03/04/25 74.8 kg (165 lb)   02/10/25 74.4 kg (164 lb)     Patient with bilateral lower extremity edema although this is at baseline per wife  Per wife have chronic edema takes furosemide MWF   CXR negative for acute pathology  BNP elevated 349  Echo: 1/22/25 EF 55 to 60% by visual estimation. Systolic function is normal. Wall motion is normal. Diastolic function is mildly abnormal, consistent with grade I (abnormal) relaxation.   Patient is received couple of doses of IV Lasix  Restarted on Lasix 20 mg p.o. daily and Aldactone 25 mg p.o. daily with a follow-up BMP in 1 week

## 2025-03-13 ENCOUNTER — PATIENT OUTREACH (OUTPATIENT)
Dept: CASE MANAGEMENT | Facility: OTHER | Age: 80
End: 2025-03-13

## 2025-03-13 NOTE — PROGRESS NOTES
Outpatient Care Management [facility]/SNF [facility]. Discharged 3/12/25 to [facility]. Email sent to SNF Coordinator to inform the facility that I am following the patient while in SNF and to add them to the facilities PCC.   This Admin Coordinator will continue to monitor via chart review.     
Unknown

## 2025-03-14 LAB
BACTERIA BLD CULT: NORMAL
BACTERIA BLD CULT: NORMAL

## 2025-03-17 ENCOUNTER — TELEPHONE (OUTPATIENT)
Dept: NEPHROLOGY | Facility: HOSPITAL | Age: 80
End: 2025-03-17

## 2025-03-17 DIAGNOSIS — I10 BENIGN ESSENTIAL HYPERTENSION: ICD-10-CM

## 2025-03-17 DIAGNOSIS — D63.8 ANEMIA OF CHRONIC DISEASE: ICD-10-CM

## 2025-03-17 DIAGNOSIS — N18.2 TYPE 2 DIABETES MELLITUS WITH STAGE 2 CHRONIC KIDNEY DISEASE, WITHOUT LONG-TERM CURRENT USE OF INSULIN  (HCC): ICD-10-CM

## 2025-03-17 DIAGNOSIS — N18.30 CKD STAGE 3 DUE TO TYPE 2 DIABETES MELLITUS (HCC): Primary | ICD-10-CM

## 2025-03-17 DIAGNOSIS — E11.22 CKD STAGE 3 DUE TO TYPE 2 DIABETES MELLITUS (HCC): Primary | ICD-10-CM

## 2025-03-17 DIAGNOSIS — E11.22 TYPE 2 DIABETES MELLITUS WITH STAGE 2 CHRONIC KIDNEY DISEASE, WITHOUT LONG-TERM CURRENT USE OF INSULIN  (HCC): ICD-10-CM

## 2025-03-17 NOTE — TELEPHONE ENCOUNTER
Called [facility] and obtained the fax number to fax instructions and labs to [phone].    This patient was seen at [facility] for hyponatremia.   He was discharged to [facility] for rehab.   Please call [facility] and have them draw a BMP in 1 week and have results sent to Dr. Montejo.   Please remind [facility] staff that he needs a fluid restriction of 1500 cc/24 hours.   No need to arrange for follow up appointment.

## 2025-03-17 NOTE — TELEPHONE ENCOUNTER
----- Message from Khris Antunez MD sent at 3/14/2025  9:18 PM EDT -----  This patient was seen at [facility] for hyponatremia.   He was discharged to [facility] for rehab.   Please call [facility] and have them draw a BMP in 1 week and have results sent to Dr. Montejo.   Please remind [facility] staff that he needs a fluid restriction of 1500 cc/24 hours.   No need to arrange for follow up appointment.

## 2025-03-20 ENCOUNTER — TELEPHONE (OUTPATIENT)
Age: 80
End: 2025-03-20

## 2025-03-20 NOTE — TELEPHONE ENCOUNTER
Pts wife called asking if PCP can contact her directly.  Pt is currently at [facility].   Pts wife was told it is not looking good for patient.    If pcp can please contact wife of patient.  Thank you

## 2025-03-21 ENCOUNTER — PATIENT OUTREACH (OUTPATIENT)
Dept: CASE MANAGEMENT | Facility: OTHER | Age: 80
End: 2025-03-21

## 2025-03-25 ENCOUNTER — TELEPHONE (OUTPATIENT)
Age: 80
End: 2025-03-25

## 2025-03-25 NOTE — TELEPHONE ENCOUNTER
Pt's wife Ruth called stating that pt is residing in a nursing home and is unable to walk at this time. She states that she did cancel his duplex for 4/1 and she is not sure when pt will be able to having this done. She just wanted [facility] to know.     JUAN A

## 2025-03-25 NOTE — TELEPHONE ENCOUNTER
Wife Ruth called, she r/s LEAD to 4/9/25. Pt will need to be transported from nursing home to testing.  She ? If he needs to be on a stretcher or is wheelchair ok. Advised he will need to be supine on exam table for doppler.  If he is able to stand from wheelchair and on to exam table wheelchair is ok.  If not he should come on a stretcher. She states he will not be able to move from wheelchair to exam table, she will notify rehab center to transport on stretcher.

## 2025-03-26 NOTE — TELEPHONE ENCOUNTER
Please call wife.  I do recommend a Doppler out of the abundance of caution.  However, if it is easier and he has no symptoms or wounds, under these circumstances, we could do a televisit if she prefers. -RD

## 2025-03-26 NOTE — TELEPHONE ENCOUNTER
Ruth calling back.Patient will have duplex done on 4/9/25. They are agreeable to a virtual visit. Please contact Ruth to schedule Virtual appointment.

## 2025-03-27 ENCOUNTER — PATIENT OUTREACH (OUTPATIENT)
Dept: CASE MANAGEMENT | Facility: OTHER | Age: 80
End: 2025-03-27

## 2025-03-30 ENCOUNTER — HOSPITAL ENCOUNTER (INPATIENT)
Facility: HOSPITAL | Age: 80
LOS: 3 days | Discharge: NURSING, REHAB OR LONG-TERM CARE | DRG: 871 | End: 2025-04-02
Attending: EMERGENCY MEDICINE | Admitting: INTERNAL MEDICINE
Payer: MEDICARE

## 2025-03-30 ENCOUNTER — APPOINTMENT (EMERGENCY)
Dept: CT IMAGING | Facility: HOSPITAL | Age: 80
DRG: 871 | End: 2025-03-30
Payer: MEDICARE

## 2025-03-30 DIAGNOSIS — E46 MALNUTRITION (HCC): ICD-10-CM

## 2025-03-30 DIAGNOSIS — D63.8 ANEMIA OF CHRONIC DISEASE: ICD-10-CM

## 2025-03-30 DIAGNOSIS — D64.9 ANEMIA: ICD-10-CM

## 2025-03-30 DIAGNOSIS — A41.9 SEPSIS (HCC): ICD-10-CM

## 2025-03-30 DIAGNOSIS — R91.1 LUNG NODULE: ICD-10-CM

## 2025-03-30 DIAGNOSIS — E11.40 TYPE 2 DIABETES MELLITUS WITH DIABETIC NEUROPATHY, WITHOUT LONG-TERM CURRENT USE OF INSULIN (HCC): ICD-10-CM

## 2025-03-30 DIAGNOSIS — N39.0 UTI (URINARY TRACT INFECTION): ICD-10-CM

## 2025-03-30 DIAGNOSIS — R26.9 GAIT DISORDER: ICD-10-CM

## 2025-03-30 DIAGNOSIS — R41.89 COGNITIVE DECLINE: ICD-10-CM

## 2025-03-30 DIAGNOSIS — D69.6 THROMBOCYTOPENIA (HCC): ICD-10-CM

## 2025-03-30 DIAGNOSIS — R13.10 DYSPHAGIA: ICD-10-CM

## 2025-03-30 DIAGNOSIS — J32.9 SINUSITIS: ICD-10-CM

## 2025-03-30 DIAGNOSIS — G93.40 ENCEPHALOPATHY: ICD-10-CM

## 2025-03-30 DIAGNOSIS — T68.XXXA HYPOTHERMIA, INITIAL ENCOUNTER: Primary | ICD-10-CM

## 2025-03-30 DIAGNOSIS — R79.89 ELEVATED TSH: ICD-10-CM

## 2025-03-30 DIAGNOSIS — R26.2 AMBULATORY DYSFUNCTION: ICD-10-CM

## 2025-03-30 DIAGNOSIS — C61 PROSTATE CANCER (HCC): ICD-10-CM

## 2025-03-30 DIAGNOSIS — I50.42 CHRONIC COMBINED SYSTOLIC AND DIASTOLIC CHF (CONGESTIVE HEART FAILURE) (HCC): ICD-10-CM

## 2025-03-30 DIAGNOSIS — S32.039A CLOSED L3 VERTEBRAL FRACTURE (HCC): ICD-10-CM

## 2025-03-30 PROBLEM — G92.8 TOXIC METABOLIC ENCEPHALOPATHY: Status: ACTIVE | Noted: 2025-03-30

## 2025-03-30 PROBLEM — L89.309 PRESSURE INJURY OF SKIN OF BUTTOCK: Status: ACTIVE | Noted: 2025-03-30

## 2025-03-30 PROBLEM — S32.030A COMPRESSION FRACTURE OF L3 VERTEBRA (HCC): Status: ACTIVE | Noted: 2025-03-30

## 2025-03-30 LAB
ALBUMIN SERPL BCG-MCNC: 3.4 G/DL (ref 3.5–5)
ALP SERPL-CCNC: 77 U/L (ref 34–104)
ALT SERPL W P-5'-P-CCNC: 39 U/L (ref 7–52)
ANION GAP SERPL CALCULATED.3IONS-SCNC: 7 MMOL/L (ref 4–13)
APTT PPP: 29 SECONDS (ref 23–34)
AST SERPL W P-5'-P-CCNC: 31 U/L (ref 13–39)
ATRIAL RATE: 67 BPM
BACTERIA UR QL AUTO: ABNORMAL /HPF
BASOPHILS # BLD AUTO: 0.03 THOUSANDS/ÂΜL (ref 0–0.1)
BASOPHILS NFR BLD AUTO: 0 % (ref 0–1)
BILIRUB SERPL-MCNC: 0.44 MG/DL (ref 0.2–1)
BILIRUB UR QL STRIP: NEGATIVE
BNP SERPL-MCNC: 78 PG/ML (ref 0–100)
BUN SERPL-MCNC: 35 MG/DL (ref 5–25)
CALCIUM ALBUM COR SERPL-MCNC: 9.4 MG/DL (ref 8.3–10.1)
CALCIUM SERPL-MCNC: 8.9 MG/DL (ref 8.4–10.2)
CHLORIDE SERPL-SCNC: 107 MMOL/L (ref 96–108)
CLARITY UR: CLEAR
CO2 SERPL-SCNC: 21 MMOL/L (ref 21–32)
COLOR UR: YELLOW
CORTIS SERPL-MCNC: 14.5 UG/DL
CREAT SERPL-MCNC: 1.35 MG/DL (ref 0.6–1.3)
EOSINOPHIL # BLD AUTO: 0.1 THOUSAND/ÂΜL (ref 0–0.61)
EOSINOPHIL NFR BLD AUTO: 1 % (ref 0–6)
ERYTHROCYTE [DISTWIDTH] IN BLOOD BY AUTOMATED COUNT: 15.4 % (ref 11.6–15.1)
FLUAV RNA RESP QL NAA+PROBE: NEGATIVE
FLUBV RNA RESP QL NAA+PROBE: NEGATIVE
GFR SERPL CREATININE-BSD FRML MDRD: 49 ML/MIN/1.73SQ M
GLUCOSE SERPL-MCNC: 158 MG/DL (ref 65–140)
GLUCOSE SERPL-MCNC: 85 MG/DL (ref 65–140)
GLUCOSE SERPL-MCNC: 90 MG/DL (ref 65–140)
GLUCOSE UR STRIP-MCNC: NEGATIVE MG/DL
HCT VFR BLD AUTO: 26.3 % (ref 36.5–49.3)
HGB BLD-MCNC: 8.6 G/DL (ref 12–17)
HGB UR QL STRIP.AUTO: NEGATIVE
HYALINE CASTS #/AREA URNS LPF: ABNORMAL /LPF
IMM GRANULOCYTES # BLD AUTO: 0.04 THOUSAND/UL (ref 0–0.2)
IMM GRANULOCYTES NFR BLD AUTO: 0 % (ref 0–2)
INR PPP: 1.1 (ref 0.85–1.19)
KETONES UR STRIP-MCNC: NEGATIVE MG/DL
LACTATE SERPL-SCNC: 0.9 MMOL/L (ref 0.5–2)
LEUKOCYTE ESTERASE UR QL STRIP: ABNORMAL
LYMPHOCYTES # BLD AUTO: 0.66 THOUSANDS/ÂΜL (ref 0.6–4.47)
LYMPHOCYTES NFR BLD AUTO: 7 % (ref 14–44)
MCH RBC QN AUTO: 29 PG (ref 26.8–34.3)
MCHC RBC AUTO-ENTMCNC: 32.7 G/DL (ref 31.4–37.4)
MCV RBC AUTO: 89 FL (ref 82–98)
MONOCYTES # BLD AUTO: 0.5 THOUSAND/ÂΜL (ref 0.17–1.22)
MONOCYTES NFR BLD AUTO: 6 % (ref 4–12)
MUCOUS THREADS UR QL AUTO: ABNORMAL
NEUTROPHILS # BLD AUTO: 7.63 THOUSANDS/ÂΜL (ref 1.85–7.62)
NEUTS SEG NFR BLD AUTO: 86 % (ref 43–75)
NITRITE UR QL STRIP: POSITIVE
NON-SQ EPI CELLS URNS QL MICRO: ABNORMAL /HPF
NRBC BLD AUTO-RTO: 0 /100 WBCS
P AXIS: 81 DEGREES
PH UR STRIP.AUTO: 6 [PH]
PLATELET # BLD AUTO: 128 THOUSANDS/UL (ref 149–390)
PLATELET # BLD AUTO: 132 THOUSANDS/UL (ref 149–390)
PMV BLD AUTO: 10.6 FL (ref 8.9–12.7)
PMV BLD AUTO: 10.7 FL (ref 8.9–12.7)
POTASSIUM SERPL-SCNC: 4.9 MMOL/L (ref 3.5–5.3)
PR INTERVAL: 236 MS
PROCALCITONIN SERPL-MCNC: 0.12 NG/ML
PROT SERPL-MCNC: 6 G/DL (ref 6.4–8.4)
PROT UR STRIP-MCNC: NEGATIVE MG/DL
PROTHROMBIN TIME: 14.6 SECONDS (ref 12.3–15)
QRS AXIS: -6 DEGREES
QRSD INTERVAL: 84 MS
QT INTERVAL: 362 MS
QTC INTERVAL: 382 MS
RBC # BLD AUTO: 2.97 MILLION/UL (ref 3.88–5.62)
RBC #/AREA URNS AUTO: ABNORMAL /HPF
RSV RNA RESP QL NAA+PROBE: NEGATIVE
SARS-COV-2 RNA RESP QL NAA+PROBE: NEGATIVE
SODIUM SERPL-SCNC: 135 MMOL/L (ref 135–147)
SP GR UR STRIP.AUTO: 1.02 (ref 1–1.03)
T WAVE AXIS: 36 DEGREES
T4 FREE SERPL-MCNC: 1.1 NG/DL (ref 0.61–1.12)
TSH SERPL DL<=0.05 MIU/L-ACNC: 9.02 UIU/ML (ref 0.45–4.5)
UROBILINOGEN UR QL STRIP.AUTO: 0.2 E.U./DL
VENTRICULAR RATE: 67 BPM
WBC # BLD AUTO: 8.96 THOUSAND/UL (ref 4.31–10.16)
WBC #/AREA URNS AUTO: ABNORMAL /HPF

## 2025-03-30 PROCEDURE — 36415 COLL VENOUS BLD VENIPUNCTURE: CPT | Performed by: EMERGENCY MEDICINE

## 2025-03-30 PROCEDURE — 85610 PROTHROMBIN TIME: CPT | Performed by: EMERGENCY MEDICINE

## 2025-03-30 PROCEDURE — 0241U HB NFCT DS VIR RESP RNA 4 TRGT: CPT | Performed by: EMERGENCY MEDICINE

## 2025-03-30 PROCEDURE — 87040 BLOOD CULTURE FOR BACTERIA: CPT | Performed by: EMERGENCY MEDICINE

## 2025-03-30 PROCEDURE — 84439 ASSAY OF FREE THYROXINE: CPT | Performed by: EMERGENCY MEDICINE

## 2025-03-30 PROCEDURE — 81001 URINALYSIS AUTO W/SCOPE: CPT | Performed by: EMERGENCY MEDICINE

## 2025-03-30 PROCEDURE — 82533 TOTAL CORTISOL: CPT | Performed by: EMERGENCY MEDICINE

## 2025-03-30 PROCEDURE — 93005 ELECTROCARDIOGRAM TRACING: CPT

## 2025-03-30 PROCEDURE — 71260 CT THORAX DX C+: CPT

## 2025-03-30 PROCEDURE — 83880 ASSAY OF NATRIURETIC PEPTIDE: CPT | Performed by: EMERGENCY MEDICINE

## 2025-03-30 PROCEDURE — 99223 1ST HOSP IP/OBS HIGH 75: CPT | Performed by: INTERNAL MEDICINE

## 2025-03-30 PROCEDURE — 93010 ELECTROCARDIOGRAM REPORT: CPT | Performed by: INTERNAL MEDICINE

## 2025-03-30 PROCEDURE — 85025 COMPLETE CBC W/AUTO DIFF WBC: CPT | Performed by: EMERGENCY MEDICINE

## 2025-03-30 PROCEDURE — 80053 COMPREHEN METABOLIC PANEL: CPT | Performed by: EMERGENCY MEDICINE

## 2025-03-30 PROCEDURE — 85730 THROMBOPLASTIN TIME PARTIAL: CPT | Performed by: EMERGENCY MEDICINE

## 2025-03-30 PROCEDURE — 87186 SC STD MICRODIL/AGAR DIL: CPT | Performed by: EMERGENCY MEDICINE

## 2025-03-30 PROCEDURE — 87086 URINE CULTURE/COLONY COUNT: CPT | Performed by: EMERGENCY MEDICINE

## 2025-03-30 PROCEDURE — 99291 CRITICAL CARE FIRST HOUR: CPT | Performed by: EMERGENCY MEDICINE

## 2025-03-30 PROCEDURE — 70450 CT HEAD/BRAIN W/O DYE: CPT

## 2025-03-30 PROCEDURE — 74177 CT ABD & PELVIS W/CONTRAST: CPT

## 2025-03-30 PROCEDURE — 84145 PROCALCITONIN (PCT): CPT | Performed by: EMERGENCY MEDICINE

## 2025-03-30 PROCEDURE — 84443 ASSAY THYROID STIM HORMONE: CPT | Performed by: EMERGENCY MEDICINE

## 2025-03-30 PROCEDURE — 85049 AUTOMATED PLATELET COUNT: CPT | Performed by: INTERNAL MEDICINE

## 2025-03-30 PROCEDURE — 96366 THER/PROPH/DIAG IV INF ADDON: CPT

## 2025-03-30 PROCEDURE — 87077 CULTURE AEROBIC IDENTIFY: CPT | Performed by: EMERGENCY MEDICINE

## 2025-03-30 PROCEDURE — 82948 REAGENT STRIP/BLOOD GLUCOSE: CPT

## 2025-03-30 PROCEDURE — 83605 ASSAY OF LACTIC ACID: CPT | Performed by: EMERGENCY MEDICINE

## 2025-03-30 PROCEDURE — 99285 EMERGENCY DEPT VISIT HI MDM: CPT

## 2025-03-30 PROCEDURE — 96365 THER/PROPH/DIAG IV INF INIT: CPT

## 2025-03-30 RX ORDER — FERROUS SULFATE 325(65) MG
325 TABLET ORAL 2 TIMES DAILY WITH MEALS
Status: DISCONTINUED | OUTPATIENT
Start: 2025-03-30 | End: 2025-04-02 | Stop reason: HOSPADM

## 2025-03-30 RX ORDER — PANTOPRAZOLE SODIUM 40 MG/10ML
40 INJECTION, POWDER, LYOPHILIZED, FOR SOLUTION INTRAVENOUS
Status: DISCONTINUED | OUTPATIENT
Start: 2025-03-31 | End: 2025-03-31

## 2025-03-30 RX ORDER — SODIUM CHLORIDE, SODIUM GLUCONATE, SODIUM ACETATE, POTASSIUM CHLORIDE, MAGNESIUM CHLORIDE, SODIUM PHOSPHATE, DIBASIC, AND POTASSIUM PHOSPHATE .53; .5; .37; .037; .03; .012; .00082 G/100ML; G/100ML; G/100ML; G/100ML; G/100ML; G/100ML; G/100ML
1000 INJECTION, SOLUTION INTRAVENOUS ONCE
Status: COMPLETED | OUTPATIENT
Start: 2025-03-30 | End: 2025-03-30

## 2025-03-30 RX ORDER — ASCORBIC ACID 500 MG
500 TABLET ORAL DAILY
Status: DISCONTINUED | OUTPATIENT
Start: 2025-03-31 | End: 2025-04-02 | Stop reason: HOSPADM

## 2025-03-30 RX ORDER — INSULIN LISPRO 100 [IU]/ML
1-5 INJECTION, SOLUTION INTRAVENOUS; SUBCUTANEOUS
Status: DISCONTINUED | OUTPATIENT
Start: 2025-03-30 | End: 2025-04-02 | Stop reason: HOSPADM

## 2025-03-30 RX ORDER — ATORVASTATIN CALCIUM 20 MG/1
20 TABLET, FILM COATED ORAL DAILY
Status: DISCONTINUED | OUTPATIENT
Start: 2025-03-31 | End: 2025-04-02 | Stop reason: HOSPADM

## 2025-03-30 RX ORDER — SODIUM CHLORIDE 9 MG/ML
75 INJECTION, SOLUTION INTRAVENOUS CONTINUOUS
Status: DISPENSED | OUTPATIENT
Start: 2025-03-30 | End: 2025-03-31

## 2025-03-30 RX ORDER — CEFTRIAXONE 1 G/50ML
1000 INJECTION, SOLUTION INTRAVENOUS ONCE
Status: COMPLETED | OUTPATIENT
Start: 2025-03-30 | End: 2025-03-30

## 2025-03-30 RX ORDER — TAMSULOSIN HYDROCHLORIDE 0.4 MG/1
0.4 CAPSULE ORAL
Status: DISCONTINUED | OUTPATIENT
Start: 2025-03-30 | End: 2025-04-02 | Stop reason: HOSPADM

## 2025-03-30 RX ORDER — FUROSEMIDE 20 MG/1
20 TABLET ORAL DAILY
Status: DISCONTINUED | OUTPATIENT
Start: 2025-03-31 | End: 2025-04-02 | Stop reason: HOSPADM

## 2025-03-30 RX ORDER — UBIDECARENONE 30 MG
100 CAPSULE ORAL DAILY
Status: DISCONTINUED | OUTPATIENT
Start: 2025-03-31 | End: 2025-04-02 | Stop reason: HOSPADM

## 2025-03-30 RX ORDER — LOSARTAN POTASSIUM 25 MG/1
25 TABLET ORAL DAILY
Status: DISCONTINUED | OUTPATIENT
Start: 2025-03-31 | End: 2025-04-02 | Stop reason: HOSPADM

## 2025-03-30 RX ORDER — ACETAMINOPHEN 325 MG/1
650 TABLET ORAL EVERY 4 HOURS PRN
Status: DISCONTINUED | OUTPATIENT
Start: 2025-03-30 | End: 2025-04-02 | Stop reason: HOSPADM

## 2025-03-30 RX ORDER — ASPIRIN 81 MG/1
81 TABLET ORAL DAILY
Status: DISCONTINUED | OUTPATIENT
Start: 2025-03-31 | End: 2025-04-02 | Stop reason: HOSPADM

## 2025-03-30 RX ORDER — HEPARIN SODIUM 5000 [USP'U]/ML
5000 INJECTION, SOLUTION INTRAVENOUS; SUBCUTANEOUS EVERY 8 HOURS SCHEDULED
Status: DISCONTINUED | OUTPATIENT
Start: 2025-03-30 | End: 2025-04-02 | Stop reason: HOSPADM

## 2025-03-30 RX ORDER — CARVEDILOL 12.5 MG/1
12.5 TABLET ORAL 2 TIMES DAILY
Status: DISCONTINUED | OUTPATIENT
Start: 2025-03-30 | End: 2025-04-02 | Stop reason: HOSPADM

## 2025-03-30 RX ORDER — ONDANSETRON 2 MG/ML
4 INJECTION INTRAMUSCULAR; INTRAVENOUS EVERY 6 HOURS PRN
Status: DISCONTINUED | OUTPATIENT
Start: 2025-03-30 | End: 2025-04-02 | Stop reason: HOSPADM

## 2025-03-30 RX ORDER — CEFTRIAXONE 1 G/50ML
1000 INJECTION, SOLUTION INTRAVENOUS EVERY 24 HOURS
Status: DISCONTINUED | OUTPATIENT
Start: 2025-03-31 | End: 2025-03-31

## 2025-03-30 RX ADMIN — IOHEXOL 100 ML: 350 INJECTION, SOLUTION INTRAVENOUS at 13:08

## 2025-03-30 RX ADMIN — CEFTRIAXONE 1000 MG: 1 INJECTION, SOLUTION INTRAVENOUS at 12:09

## 2025-03-30 RX ADMIN — HEPARIN SODIUM 5000 UNITS: 5000 INJECTION INTRAVENOUS; SUBCUTANEOUS at 21:35

## 2025-03-30 RX ADMIN — SODIUM CHLORIDE, SODIUM GLUCONATE, SODIUM ACETATE, POTASSIUM CHLORIDE, MAGNESIUM CHLORIDE, SODIUM PHOSPHATE, DIBASIC, AND POTASSIUM PHOSPHATE 1000 ML: .53; .5; .37; .037; .03; .012; .00082 INJECTION, SOLUTION INTRAVENOUS at 11:38

## 2025-03-30 RX ADMIN — SODIUM CHLORIDE 75 ML/HR: 0.9 INJECTION, SOLUTION INTRAVENOUS at 17:52

## 2025-03-30 RX ADMIN — TAMSULOSIN HYDROCHLORIDE 0.4 MG: 0.4 CAPSULE ORAL at 18:03

## 2025-03-30 RX ADMIN — FERROUS SULFATE TAB 325 MG (65 MG ELEMENTAL FE) 325 MG: 325 (65 FE) TAB at 18:03

## 2025-03-30 NOTE — PLAN OF CARE
Problem: Potential for Falls  Goal: Patient will remain free of falls  Description: INTERVENTIONS:- Educate patient/family on patient safety including physical limitations- Instruct patient to call for assistance with activity - Consult OT/PT to assist with strengthening/mobility - Keep Call bell within reach- Keep bed low and locked with side rails adjusted as appropriate- Keep care items and personal belongings within reach- Initiate and maintain comfort rounds- Make Fall Risk Sign visible to staff- Offer Toileting every  Hours, in advance of need- Initiate/Maintain alarm- Obtain necessary fall risk management equipment- Apply yellow socks and bracelet for high fall risk patients- Consider moving patient to room near nurses station  INTERVENTIONS:- Educate patient/family on patient safety including physical limitations- Instruct patient to call for assistance with activity - Consult OT/PT to assist with strengthening/mobility - Keep Call bell within reach- Keep bed low and locked with side rails adjusted as appropriate- Keep care items and personal belongings within reach- Initiate and maintain comfort rounds- Make Fall Risk Sign visible to staff- Offer Toileting every  Hours, in advance of need- Initiate/Maintain alarm- Obtain necessary fall risk management equipment: - Apply yellow socks and bracelet for high fall risk patients- Consider moving patient to room near nurses station  Outcome: Progressing     Problem: Prexisting or High Potential for Compromised Skin Integrity  Goal: Skin integrity is maintained or improved  Description: INTERVENTIONS:- Identify patients at risk for skin breakdown- Assess and monitor skin integrity- Assess and monitor nutrition and hydration status- Monitor labs - Assess for incontinence - Turn and reposition patient- Assist with mobility/ambulation- Relieve pressure over bony prominences- Avoid friction and shearing- Provide appropriate hygiene as needed including keeping skin  clean and dry- Evaluate need for skin moisturizer/barrier cream- Collaborate with interdisciplinary team - Patient/family teaching- Consider wound care consult   Outcome: Progressing     Problem: PAIN - ADULT  Goal: Verbalizes/displays adequate comfort level or baseline comfort level  Description: Interventions:- Encourage patient to monitor pain and request assistance- Assess pain using appropriate pain scale- Administer analgesics based on type and severity of pain and evaluate response- Implement non-pharmacological measures as appropriate and evaluate response- Consider cultural and social influences on pain and pain management- Notify physician/advanced practitioner if interventions unsuccessful or patient reports new pain  Outcome: Progressing     Problem: INFECTION - ADULT  Goal: Absence or prevention of progression during hospitalization  Description: INTERVENTIONS:- Assess and monitor for signs and symptoms of infection- Monitor lab/diagnostic results- Monitor all insertion sites, i.e. indwelling lines, tubes, and drains- Monitor endotracheal if appropriate and nasal secretions for changes in amount and color- [place] appropriate cooling/warming therapies per order- Administer medications as ordered- Instruct and encourage patient and family to use good hand hygiene technique- Identify and instruct in appropriate isolation precautions for identified infection/condition  Outcome: Progressing  Goal: Absence of fever/infection during neutropenic period  Description: INTERVENTIONS:- Monitor WBC  Outcome: Progressing     Problem: SAFETY ADULT  Goal: Patient will remain free of falls  Description: INTERVENTIONS:- Educate patient/family on patient safety including physical limitations- Instruct patient to call for assistance with activity - Consult OT/PT to assist with strengthening/mobility - Keep Call bell within reach- Keep bed low and locked with side rails adjusted as appropriate- Keep care items and  personal belongings within reach- Initiate and maintain comfort rounds- Make Fall Risk Sign visible to staff- Offer Toileting every  Hours, in advance of need- Initiate/Maintain alarm- Obtain necessary fall risk management equipment: - Apply yellow socks and bracelet for high fall risk patients- Consider moving patient to room near nurses station  INTERVENTIONS:- Educate patient/family on patient safety including physical limitations- Instruct patient to call for assistance with activity - Consult OT/PT to assist with strengthening/mobility - Keep Call bell within reach- Keep bed low and locked with side rails adjusted as appropriate- Keep care items and personal belongings within reach- Initiate and maintain comfort rounds- Make Fall Risk Sign visible to staff- Offer Toileting every  Hours, in advance of need- Initiate/Maintain alarm- Obtain necessary fall risk management equipment: - Apply yellow socks and bracelet for high fall risk patients- Consider moving patient to room near nurses station  Outcome: Progressing  Goal: Maintain or return to baseline ADL function  Description: INTERVENTIONS:-  Assess patient's ability to carry out ADLs; assess patient's baseline for ADL function and identify physical deficits which impact ability to perform ADLs (bathing, care of mouth/teeth, toileting, grooming, dressing, etc.)- Assess/evaluate cause of self-care deficits - Assess range of motion- Assess patient's mobility; develop plan if impaired- Assess patient's need for assistive devices and provide as appropriate- Encourage maximum independence but intervene and supervise when necessary- Involve family in performance of ADLs- Assess for home care needs following discharge - Consider OT consult to assist with ADL evaluation and planning for discharge- Provide patient education as appropriate  Outcome: Progressing  Goal: Maintains/Returns to pre admission functional level  Description: INTERVENTIONS:- Perform AM-PAC 6  Click Basic Mobility/ Daily Activity assessment daily.- Set and communicate daily mobility goal to care team and patient/family/caregiver. - Collaborate with rehabilitation services on mobility goals if consulted- Perform Range of Motion  times a day.- Reposition patient every  hours.- Dangle patient  times a day- Stand patient  times a day- Ambulate patient  times a day- Out of bed to chair  times a day - Out of bed for meals  times a day- Out of bed for toileting- Record patient progress and toleration of activity level   Outcome: Progressing     Problem: DISCHARGE PLANNING  Goal: Discharge to home or other facility with appropriate resources  Description: INTERVENTIONS:- Identify barriers to discharge w/patient and caregiver- Arrange for needed discharge resources and transportation as appropriate- Identify discharge learning needs (meds, wound care, etc.)- Arrange for interpretive services to assist at discharge as needed- Refer to Case Management Department for coordinating discharge planning if the patient needs post-hospital services based on physician/advanced practitioner order or complex needs related to functional status, cognitive ability, or social support system  Outcome: Progressing     Problem: Knowledge Deficit  Goal: Patient/family/caregiver demonstrates understanding of disease process, treatment plan, medications, and discharge instructions  Description: Complete learning assessment and assess knowledge base.Interventions:- Provide teaching at level of understanding- Provide teaching via preferred learning methods  Outcome: Progressing

## 2025-03-30 NOTE — ASSESSMENT & PLAN NOTE
Baseline creatinine seems to range between 0.8 and 1.2.  Monitor I's/O.  Monitor weights.  Currently at baseline on admission.  BMP in AM.  Avoid nephrotoxins.  Avoid hypotension.

## 2025-03-30 NOTE — H&P
H&P - Hospitalist   Name: Pernell Covarrubias 79 y.o. male I MRN: [id number]  Unit/Bed#: -01 I Date of Admission: 3/30/2025   Date of Service: 3/30/2025 I Hospital Day: 0     Assessment & Plan  Toxic metabolic encephalopathy  Differential Diagnosis -urinary infection with hypothermia is a clear front running cause.  Worsening dementia is another possibility as well.  Small possibility of thyroid being a cause.  Dehydration can also be a potential etiology as well.  Rule out hypoglycemia.  Baseline Mental Status -there has been some milder more chronic decline over the last few months but recently since beginning in March there is been significant worsening including lethargy, forgetfulness, increased difficulty ambulating  Medication Review (potential causes / contributors) -   None of the patient's home medications are typically associated with altered mental status.  The beta-blocker, carvedilol theoretically has some potential risk.  Abnormal Testing Thus Far -   Urinalysis very concerning for infection.  TSH is abnormal but not bad when corrected for age.  Negative Testing Thus Far -   CT head shows no acute pathology.  Notably, there are chronic microangiopathic changes with stable right basal ganglier lacunar infarct.  This was compared with 3/9/2025.  WBC, lactic acid, and procalcitonin normal.  Creatinine is at baseline.  Sodium and calcium are normal.  COVID testing negative.  Additional Testing Being Requested -   None just yet as I would like to see the patient's response to treating the urinary infection first.  If mental status is not improving after initiating treatment for urinary infection and also hydrating, then additional workup may be considered.  Consultations - Geriatric medicine consultation due to concerns for worsening dementia.  Patient safety -   Not currently requiring any restraints.  Redirection and reorientation when needed.  Fall precautions and bed alarm.  PT/OT  evaluations.  Other -   Assure adequate sleep hygiene.  Minimize interruptions to sleep.  Avoid constipation.  Avoid delirium inducing medications.  Ensure proper hydration and nutrition.  Serial Mental Status Exams.  Hypothermia  Differential Diagnosis -   UTI is the most favored diagnosis as a cause.  Need to consider the potential of hypoglycemia or poor p.o. intake as alternate etiologies.  Potentially, since this is not the first time that this has occurred, and the previous time did not have clear signs of infection, the patient could have a reset thermostat centrally.  Hypothalamic dysfunction?  Neuropathy can also be an etiology especially in the setting of his diabetes.  And only if the patient were to show symptoms of Parkinson would this be another potential neurological etiology.  Previously was assessed for hypoadrenalism and hypothyroidism.  Prior workup -   Cortisol normal.  During previous hospitalization, although the TSH was abnormal but the free T4 was normal.  Previous admission showed the patient to be bradycardic but that is not currently the case.  No reported hypoglycemia.  He is diabetic though.  Current workup -   Treat urinary infection.  Get free T4 (TSH has increased from prior).   Monitor blood sugars closely.  Most recent A1c is 6.9% so would not expect a ton of hypoglycemia.  Consider endocrinology evaluation if persists  Monitor temperatures.  Temperature probe urinary catheter placed in the ER.   Stella hugger until temp at least 97.5.  UTI (urinary tract infection)  Antibiotic -  Ceftriaxone 1 g IV daily.  Recommend a total of 7 days of antibiotic.  Antipyretic -Tylenol as needed.  IVF -   Given 1 L isolate bolus in emergency department.  Will provide the patient with normal saline at 75 mL/h for 15 hours and reassess tomorrow morning.  Antiemetic - Zofran as needed.  Pain control -   Tylenol as needed  Monitor pain levels.  Evaluations -   CT abdomen/pelvis -thickened urinary  "bladder though collapsed.  Clinical correlation for acute cystitis recommended.  Urinalysis -   During prior admission for hypothermia earlier in March 2025, urinalysis showed only trace WBC (1-2/hpf), occasional bacteria, and trace protein.  However, the urinalysis done in the emergency department on 3/30/2025 shows positive nitrates, 1+ leukocyte (innumerable/hpf), moderate bacteria, and occasional mucus threads and 2-4 hyaline casts which represents a large change.  Follow-up urine culture.  Follow-up blood cultures.  Of note, COVID/influenza/RSV PCR testing negative.  Abnormal TSH  TSH 9.021.  For adults over 51, the upper limit of normal is 8.9 mIU/L.  Therefore, this 9.0-1 level might really be fairly insignificant.  Will check a free T4 and T3.  Of note, during recent hospitalization the TSH was 6.684 (during which time the T4 was normal) but back in August 2024 and prior the levels were completely normal.  Therefore, the TSH above represents an increasing TSH.  Thyroid exam no palpable thyromegaly/nodularity.   Type 2 diabetes mellitus with diabetic neuropathy (HCC)  Lab Results   Component Value Date    HGBA1C 6.9 (H) 02/11/2025     No results for input(s): \"POCGLU\" in the last 72 hours.    Blood Sugar Average: Last 72 hrs:    Home regimen -   Metformin 1000 mg twice daily.  Januvia 25 mg by mouth daily.  Inpatient regimen -   Basal insulin - None currently as anticipate poor p.o. intake.  Insulin sliding scale  Adjust regimen as needed.  Monitor blood sugars.  Not currently taking any medications for the neuropathy.  CKD stage 3 due to type 2 diabetes mellitus (HCC)    Baseline creatinine seems to range between 0.8 and 1.2.  Monitor I's/O.  Monitor weights.  Currently at baseline on admission.  BMP in AM.  Avoid nephrotoxins.  Avoid hypotension.  Anemia of chronic disease  Baseline hemoglobin tends to be in the 9s range.  Monitor hemoglobin levels.  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  Currently no chest pain and no ischemic changes on EKG.  Benign essential hypertension  Home regimen -   Carvedilol 12.5 mg by mouth twice daily.  Losartan 25 mg by mouth daily.  Spironolactone 25 mg by mouth daily.  Patient is also on furosemide 20 mg by mouth daily.  Inpatient regimen -   Continue carvedilol as heart rate is currently not bradycardic but we will use caution and have hold parameters.  Continue losartan as renal function remains stable.  Hold spironolactone and furosemide to allow for hydration in the setting of the infection  Monitor blood pressures.  Compression fracture of L3 vertebra (HCC)  Age-indeterminate on imaging.  Monitor for any pain symptoms.  Supportive care.  Outpatient follow-up.  Incidental Finding: Lung nodule  Given patient's history of prostate cancer in the past, a 3-month follow-up CT chest recommended.  Incidental findings note completed   Incidental Finding: Chronic sinusitis  CT head -   Near complete opacification of the left maxillary sinus with opacification of posterior left ethmoid air cells and the left sphenoid sinus. There is high density material again seen either representing inspissated secretions or allergic fungal sinusitis. There is hypertrophy of the sinus walls   Outpatient follow-up with PCP and/or ENT.  Pressure injury of skin of buttock  Turn/reposition frequently.  Wound care consultation.  Nutrition consult.      VTE Pharmacologic Prophylaxis: VTE Score: 6 High Risk (Score >/= 5) - Pharmacological DVT Prophylaxis Ordered: heparin. Sequential Compression Devices Ordered.  Code Status: Level 3 - DNAR and DNI   Discussion with family: Updated  (wife) via phone.    Anticipated Length of Stay: Patient will be admitted on an inpatient basis with an anticipated length of stay of greater than 2 midnights secondary to  "evaluation and treatment of the above medical problems.    History of Present Illness   Chief Complaint: Progressive cognitive decline now with increased lethargy and hallucinations    Pernell Covarrubias is a 79 y.o. male with recent hospitalization for hypothermia as well as a PMH of diabetes, chronic kidney disease, CAD, hypertension and months along gradual decline in mental state, who presents with an acute on seemingly chronic mental status change now associated with increased lethargy and hallucinations more recently.  The patient had a previous hospitalization in the beginning of March for the hypothermia noted above and was eventually discharged to nursing facility on 3/12/2025.  The patient's wife had indicated that prior to that hospitalization the patient was able to walk using a rollator walker and he was more conversant and only every now and then he would say something that was off but most of the time he was able to remain part of the conversation.  He would sometimes become forgetful but it was not frequent.  However, she states \"what ever happened in March changed him\".  The patient became much more rigid in his movement and started having more issues with forgetfulness.  She stated that ever since March he would sometimes just stare into space, sometimes he would just not respond at all and was also much more apathetic.  The patient was often tired and would often have his eyes closed and he would not engage in conversation and all of this was not like how he typically was.  It was felt that he was having ambulatory dysfunction and there was no clear etiology identified for the hypothermia during that prior admission.  The patient then was sent to rehab to treat the ambulatory dysfunction.  The patient's wife states that at the rehab \"he was having problems with his sugars off and on \"(low sugars) and although he would typically eat okay at home he was not eating quite as much at the rehab as \"the " "food is not very good\".  Then recently, his wife states that \"yesterday, they were concerned that his blood pressure is really low and gave him coffee and it did not help\".  However, on arrival to the emergency department vitals temperature was 93.8 his blood pressure was 141/97.  Heart rate was 65 but the range of heart rates in the emergency department ranged between 65 and 92.  The patient's wife also stated that she was concerned about his mobility stating that \"they say he is so rigid and has all the signs of Parkinson disease at the rehab\".    Review of Systems   Unable to perform ROS: Patient nonverbal   All other systems reviewed and are negative.      Historical Information   Past Medical History:   Diagnosis Date    Acquired hallux valgus     last assessed: 8/1/2013    Allergic rhinitis     Anemia 10/26/2010    Atrophy of nail     last assessed: 7/7/2015    Cancer (Former[facility]) 2020    Chronic kidney disease     chronic renal insufficiency    Coronary artery disease     Deformity of ankle and foot, acquired     last assessed: 2/22/2016    Diabetes mellitus ([facility]) 1994    Difficulty walking     last assessed: 12/14/2015    Dysesthesia     last assessed: 11/25/2016    Hammer toe     unspecified laterality; last assessed: 8/1/2013    Hypertension     Neuropathy in diabetes ([facility]) 1994    Onychomycosis of toenail     last assessed: 2/22/2016    Peripheral vascular disease ([facility])     left SFA stent in 2010    Pes planus     unspecified laterality; last assessed: 8/1/2013    Pneumonia     last assessed: 2/27/2016    Prostate cancer ([facility])     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     last assessed: 8/15/2016    Type 2 diabetes mellitus ([facility]) 07/26/2010    Viral warts     last assessed: 7/24/2015     Past Surgical History:   Procedure Laterality Date    CARDIAC CATHETERIZATION  07/29/2010    CATARACT EXTRACTION, BILATERAL Bilateral     R 1999, L 2008    COLONOSCOPY  2011    FEMORAL ARTERY - POPLITEAL ARTERY BYPASS " GRAFT  09/23/2013    FOOT SURGERY      bone spur removed    LEG SURGERY Bilateral     repair; L 8/20/2010 and 7/26/2012    MI PLMT INTERSTITIAL DEV RADIAT TX PROSTATE 1/MULT N/A 6/22/2021    Procedure: INSERTION OF FIDUCIAL MARKER (TRANSRECTAL ULTRASOUND GUIDANCE), SPACEOAR;  Surgeon: Jero Loomis MD;  Location: [facility];  Service: Urology    ROTATOR CUFF REPAIR Left 2009    SHOULDER SURGERY Right 2005    collar bone     Social History     Tobacco Use    Smoking status: Never     Passive exposure: Past    Smokeless tobacco: Never   Vaping Use    Vaping status: Never Used   Substance and Sexual Activity    Alcohol use: Yes     Alcohol/week: 1.0 standard drink of alcohol     Types: 1 Glasses of wine per week     Comment: Stopped in 1986    Drug use: Never    Sexual activity: Not Currently     Partners: Female     E-Cigarette/Vaping    E-Cigarette Use Never User      E-Cigarette/Vaping Substances    Nicotine No     THC No     CBD No     Flavoring No     Other No     Unknown No      Family History   Problem Relation Age of Onset    Aortic aneurysm Mother     Heart disease Father     Heart attack Father         acute myocardial infarction    Heart disease Sister     Heart attack Sister         acute myocardial infarction    Throat cancer Maternal Grandfather     Heart disease Paternal Grandfather     No Known Problems Son      Social History:  Marital Status: /Civil Union   Patient Pre-hospital Living Situation: Home, but immediately prehospital he was at SNF rehab.  Patient Pre-hospital Level of Mobility: unable to be assessed at time of evaluation  Patient Pre-hospital Diet Restrictions: None.    Meds/Allergies   I have reviewed home medications with patient family member.  (Wife)  Prior to Admission medications    Medication Sig Start Date End Date Taking? Authorizing Provider   acetaminophen (TYLENOL) 325 mg tablet Take 2 tablets (650 mg total) by mouth every 6 (six) hours as needed for mild pain,  headaches or fever 2/24/22   NEO Camacho   ammonium lactate (LAC-HYDRIN) 12 % lotion Apply topically 2 (two) times a day 12/20/23   Felipe Travis DO   Ascorbic Acid (Vitamin C) 500 MG CAPS Take 500 mg by mouth daily 8/4/21   Historical Provider, MD   aspirin (ECOTRIN LOW STRENGTH) 81 mg EC tablet Take 81 mg by mouth daily 8/4/21   Historical Provider, MD   atorvastatin (LIPITOR) 20 mg tablet TAKE 1 TABLET EVERY DAY 2/24/25   Felipe Travis DO   brimonidine tartrate 0.2 % ophthalmic solution Inject 0.2 % into the eye 2 (two) times a day 8/4/21   Historical Provider, MD   carvedilol (COREG) 12.5 mg tablet Take 1 tablet (12.5 mg total) by mouth 2 (two) times a day 12/12/24   Felipe Travis DO   Cholecalciferol (Vitamin D3) 50 MCG (2000 UT) TABS Take 2,000 Units by mouth daily 8/4/21   Historical Provider, MD   co-enzyme Q-10 100 mg capsule Take 100 mg by mouth daily 8/4/21   Historical Provider, MD   docusate sodium (COLACE) 100 mg capsule Take 1 capsule (100 mg total) by mouth 2 (two) times a day 3/12/25   Kristen Hinkle MD   ferrous sulfate 324 (65 Fe) mg TAKE 1 TABLET TWICE DAILY BEFORE MEALS 1/15/25   Umesh Montejo DO   furosemide (LASIX) 20 mg tablet Take 1 tablet (20 mg total) by mouth daily 3/13/25   Kristen Hinkle MD   glucose blood (OneTouch Ultra) test strip Test blood sugar once a day 9/6/24   NEO Lombardo   imiquimod (ALDARA) 5 % cream APPLY 1 PACKET TOPICALLY 3 (THREE) TIMES A WEEK 4/12/24   Ramiro Lancaster DPM   insulin lispro (HumALOG/ADMELOG) 100 units/mL injection Inject 1-5 Units under the skin 3 (three) times a day before meals 3/12/25   Kristen Hinkle MD   insulin lispro (HumALOG/ADMELOG) 100 units/mL injection Inject 1-5 Units under the skin daily at bedtime 3/12/25   Kristen Hinkle MD   losartan (COZAAR) 25 mg tablet TAKE 1 TABLET (25 MG TOTAL) BY MOUTH DAILY 1/29/25   Olayinka eBgum MD   metFORMIN (GLUCOPHAGE) 1000 MG tablet TAKE ONE TABLET BY MOUTH  TWICE A DAY WITH MEALS 5/5/24   Lela Begum MD   multivitamin (THERAGRAN) TABS Take 1 tablet by mouth daily    Historical Provider, MD   omeprazole (PriLOSEC) 40 MG capsule Take 1 capsule (40 mg total) by mouth daily as needed (acid reflux) 12/12/24   Felipe Travis DO   oxybutynin (DITROPAN-XL) 5 mg 24 hr tablet Take 1 tablet (5 mg total) by mouth daily at bedtime 3/4/25   Arnold Hernandez PA-C   polyethylene glycol (MIRALAX) 17 g packet Take 17 g by mouth daily as needed (Constipation) 3/12/25   Kristen Hinkle MD   sitaGLIPtin (JANUVIA) 25 mg tablet Take 1 tablet (25 mg total) by mouth daily 8/27/24   NEO Lombardo   spironolactone (ALDACTONE) 25 mg tablet Take 1 tablet (25 mg total) by mouth daily 12/12/24   Felipe Travis DO   tacrolimus (PROTOPIC) 0.1 % ointment Apply topically daily Apply daily for maintenance 3/1/24   Guilherme Garcia MD   tamsulosin (FLOMAX) 0.4 mg TAKE 1 CAPSULE EVERY DAY WITH DINNER 2/24/25   Felipe Travis DO   triamcinolone (KENALOG) 0.1 % ointment Apply topically 2 (two) times a day Apply to body twice a day then switch to Protopic Ointment and DO NOT USE ON FACE, GROIN, ARMPITS 3/1/24   Guilherme Garcia MD   TRUEplus Lancets 28G MISC Test 1x/d, E11.29 9/19/23   NEO Lombardo     No Known Allergies    Objective :  Temp:  [93.6 °F (34.2 °C)-97 °F (36.1 °C)] 97 °F (36.1 °C)  HR:  [63-92] 92  BP: (112-151)/(57-97) 112/83  Resp:  [14-22] 22  SpO2:  [96 %-100 %] 96 %  O2 Device: None (Room air)    Physical Exam  Vitals reviewed.     The patient has dentures but it appears that the dentures have created scabbed lesions on the back of the upper palate/pharynx.  The mucosa is slightly dry in appearance with a tongue that has somewhat more of a cobblestone type appearance.  Tongue is normal in size.  The neck is supple.  No stridor.  Trachea midline.  No thyromegaly or nodularity.  No cervical lymphadenopathy.  No evidence of any JVD.  The heart is with a regular rate  "and rhythm with normal S1 and S2 heart sounds.  No obvious murmurs, rubs, or gallops.  Lungs are diminished but patient does not take deep breath for exam.  No wheezing, rhonchi, or rales currently.  Unlabored breathing pattern.  The abdomen is nondistended, soft, and with normal active bowel sounds.  I do not appreciate any flank tenderness currently but the patient does have some apparent suprapubic tenderness as the only response I got was tensing of the abdominal muscles when I palpated the suprapubic region and at 1 point he stated \"Hey!\".  The lower extremities are without any edema at all.  There are various scabbed lesions on the left lower extremity and a hyperkeratotic lesion on the right lower extremity.  Photo of the left lower extremity is included below.  The area of erythema is slightly warm to palpation.  Significant muscle atrophy in the lower extremities is noted.  However, the patient was just wearing a Stella hugger so it is difficult to tell temperature difference.  Currently, Stella hugger still in place and patient has no rigors or shivering.  Mentally, the patient is somnolent.  He does not respond to any verbal commands and even tactile and shining a light in his eyes only create a very brief opening of the eyes before they closed once again.  When I go to examine eyes patient does clench eyes tight but I am able to open them and note the pupils to be about 3 mm and responsive and equal bilaterally.  No obvious facial droop.  He does withdraw symmetrically to noxious stimuli bilaterally.  I was unable to turn the patient myself to evaluate the sacrum but did review a photo of the sacrum included in the media section of the chart.  That photo is also included below.          Lines/Drains:  Lines/Drains/Airways       Active Status       Name Placement date Placement time Site Days    Urethral Catheter Temperature probe 16 Fr. 03/30/25  1139  Temperature probe  less than 1                  Urinary " Catheter:  Goal for removal: Voiding trial when ambulation improves               Lab Results: I have reviewed the following results:  Results from last 7 days   Lab Units 03/30/25  1136   WBC Thousand/uL 8.96   HEMOGLOBIN g/dL 8.6*   HEMATOCRIT % 26.3*   PLATELETS Thousands/uL 132*   SEGS PCT % 86*   LYMPHO PCT % 7*   MONO PCT % 6   EOS PCT % 1     Results from last 7 days   Lab Units 03/30/25  1136   SODIUM mmol/L 135   POTASSIUM mmol/L 4.9   CHLORIDE mmol/L 107   CO2 mmol/L 21   BUN mg/dL 35*   CREATININE mg/dL 1.35*   ANION GAP mmol/L 7   CALCIUM mg/dL 8.9   ALBUMIN g/dL 3.4*   TOTAL BILIRUBIN mg/dL 0.44   ALK PHOS U/L 77   ALT U/L 39   AST U/L 31   GLUCOSE RANDOM mg/dL 158*     Results from last 7 days   Lab Units 03/30/25  1136   INR  1.10         Lab Results   Component Value Date    HGBA1C 6.9 (H) 02/11/2025    HGBA1C 6.6 (A) 11/21/2024    HGBA1C 7.6 (H) 08/02/2024     Results from last 7 days   Lab Units 03/30/25  1136   LACTIC ACID mmol/L 0.9   PROCALCITONIN ng/ml 0.12       Imaging Results Review: I reviewed radiology reports from this admission including: All imaging done in the emergency department..    ** Please Note: This note has been constructed using a voice recognition system. **

## 2025-03-30 NOTE — ASSESSMENT & PLAN NOTE
Antiplatelet - Aspirin  Beta-blocker -carvedilol  Statin -atorvastatin  Nitrate -none.  Currently no chest pain and no ischemic changes on EKG.

## 2025-03-30 NOTE — ASSESSMENT & PLAN NOTE
Age-indeterminate on imaging.  Monitor for any pain symptoms.  Supportive care.  Outpatient follow-up.

## 2025-03-30 NOTE — SEPSIS NOTE
Sepsis Note   Pernell Covarrubias 79 y.o. male MRN: [id number]  Unit/Bed#: ED 14 Encounter: [id number]       Initial Sepsis Screening       Row Name 03/30/25 1233                Is the patient's history suggestive of a new or worsening infection? Yes (Proceed)  -MD        Suspected source of infection urinary tract infection  -MD        Indicate SIRS criteria Hyperthemia > 38.3C (100.9F) OR Hypothermia <36C (96.8F)  -MD        Are two or more of the above signs & symptoms of infection both present and new to the patient? No  -MD                  User Key  (r) = Recorded By, (t) = Taken By, (c) = Cosigned By      Initials Name Provider Type    MD José Miguel Sumner DO Physician                        Body mass index is 21.62 kg/m².  Wt Readings from Last 1 Encounters:   03/30/25 70.3 kg (155 lb)     IBW (Ideal Body Weight): 75.3 kg    Ideal body weight: 75.3 kg (166 lb 0.1 oz)

## 2025-03-30 NOTE — ASSESSMENT & PLAN NOTE
Differential Diagnosis -   UTI is the most favored diagnosis as a cause.  Need to consider the potential of hypoglycemia or poor p.o. intake as alternate etiologies.  Potentially, since this is not the first time that this has occurred, and the previous time did not have clear signs of infection, the patient could have a reset thermostat centrally.  Hypothalamic dysfunction?  Neuropathy can also be an etiology especially in the setting of his diabetes.  And only if the patient were to show symptoms of Parkinson would this be another potential neurological etiology.  Previously was assessed for hypoadrenalism and hypothyroidism.  Prior workup -   Cortisol normal.  During previous hospitalization, although the TSH was abnormal but the free T4 was normal.  Previous admission showed the patient to be bradycardic but that is not currently the case.  No reported hypoglycemia.  He is diabetic though.  Current workup -   Treat urinary infection.  Get free T4 (TSH has increased from prior).   Monitor blood sugars closely.  Most recent A1c is 6.9% so would not expect a ton of hypoglycemia.  Consider endocrinology evaluation if persists  Monitor temperatures.  Temperature probe urinary catheter placed in the ER.   Stella hugger until temp at least 97.5.

## 2025-03-30 NOTE — ED PROVIDER NOTES
Time reflects when diagnosis was documented in both MDM as applicable and the Disposition within this note       Time User Action Codes Description Comment    3/30/2025 12:12 PM José Miguel Sumner Add [T68.XXXA] Hypothermia, initial encounter     3/30/2025 12:12 PM Gian Sumnerus Add [N39.0] UTI (urinary tract infection)     3/30/2025 12:35 PM José Miguel Sumner Add [G93.40] Encephalopathy     3/30/2025 12:36 PM José Miguel Sumner Add [D64.9] Anemia     3/30/2025 12:36 PM Gian Sumnerus Add [D69.6] Thrombocytopenia (HCC)     3/30/2025 12:36 PM José Miguel Sumner Modify [T68.XXXA] Hypothermia, initial encounter     3/30/2025 12:36 PM José Miguel Sumner Modify [N39.0] UTI (urinary tract infection)     3/30/2025 12:36 PM José Miguel Sumner Modify [T68.XXXA] Hypothermia, initial encounter     3/30/2025 12:36 PM José Miguel Sumner Modify [N39.0] UTI (urinary tract infection)     3/30/2025  1:49 PM José Miguel Sumner Add [R79.89] Elevated TSH     3/30/2025  2:20 PM José Miguel Sumner Add [J32.9] Sinusitis     3/30/2025  2:24 PM José Miguel Sumner Add [R91.1] Lung nodule     3/30/2025  2:24 PM José Miguel Sumner Add [S32.039A] Closed L3 vertebral fracture (HCC)           ED Disposition       ED Disposition   Admit    Condition   Stable    Date/Time   Sun Mar 30, 2025  2:35 PM    Comment   Case was discussed with karime and the patient's admission status was agreed to be Admission Status: inpatient status to the service of Dr. Aquino .               Assessment & Plan       Medical Decision Making  Chronically ill-appearing 79-year-old male.  History obtained via paperwork, EMS and patient's wife.    Patient's wife notes that the patient has been having a pretty significant decline over the last few months.  She believes he has worsening dementia.  Now with hallucinations.  Very fatigued.  Eats and drinks ok.  Does not ambulate for the last month.    He arrives with a POLST form that says he would like everything done.  His wife corrects this  and states that he filled the form out when he did not know what it meant.  She states that he would not want any chest compressions, intubation, feeding tube.    Patient reportedly hypotensive yesterday per nursing facility.  Also reportedly more lethargic than normal.    On exam he is chronically ill-appearing.  He has skin tears on his extremities.  He is cool to the touch and intermittently bradycardic.  He is not hypotensive.  He is hypothermic.    Differential including but not limited to altered mental status, metabolic encephalopathy, electrolyte derangement, BRIAN, intracranial bleed, acute intrathoracic or intra-abdominal infection, urinary tract infection, hypothermia.    Workup with urinary tract infection, thrombocytopenia at baseline, anemia near baseline.    Given ceftriaxone for urinary tract infection.  Applied Stella hugger.  Given 1 L of warm IV fluids.  Temperature sensing Soto placed just to monitor the patient's hypothermic     CT head with sinusitis.  Possibly fungal per radiology.    CT chest, abdomen, pelvis with long nodule, thickening of the urinary bladder, superior endplate fracture of L3 age-indeterminate.    I informed the patient's wife of the findings on the imaging as well as the plan to admit for IV antibiotics.    Admitted to medicine..        Problems Addressed:  Anemia: chronic illness or injury  Closed L3 vertebral fracture (HCC): undiagnosed new problem with uncertain prognosis  Elevated TSH: acute illness or injury  Encephalopathy: chronic illness or injury  Hypothermia, initial encounter: acute illness or injury  Lung nodule: acute illness or injury  Sinusitis: chronic illness or injury  Thrombocytopenia (HCC): chronic illness or injury  UTI (urinary tract infection): acute illness or injury    Amount and/or Complexity of Data Reviewed  Labs: ordered. Decision-making details documented in ED Course.  Radiology: ordered.    Risk  Prescription drug management.  Decision regarding  hospitalization.        ED Course as of 03/30/25 1452   Sun Mar 30, 2025   1138 Procedure Note: EKG  Date/Time: 03/30/25 11:38 AM   Interpreted by: José Miguel Sumner  Indications / Diagnosis: AMS  ECG reviewed by me, the ED Provider: yes   The EKG demonstrates:  Rhythm: normal sinus  Intervals: normal intervals  Axis: normal axis  QRS/Blocks: normal QRS/ 1st degree av block  ST Changes: No acute ST Changes, no STD/JAMES.     1201 Spoke to patient's wife, April.  Patient arrived with a POLST form that states the patient is full code.  She states that the patient filled this out when he had no idea what he was doing.  She states that he would want no chest compressions, intubation or feeding tube.  She reports worsening of his mental status over the last month.  Believes he is getting worsening dementia.  Has increasing hallucinations.  He eats and drinks normally but has become more lethargic.  He is bedbound at this point.   1205 Nitrite, UA(!): Positive   1206 Leukocytes, UA(!): 1+   1220 Critical Care Time Statement: Upon my evaluation, this patient had a high probability of imminent or life-threatening deterioration due to hypothermia requiring warming blanket and warmed IV fluids, which required my direct attention, intervention, and personal management.  I spent a total of 30 minutes directly providing critical care services, including management of organ system failure(s)  and complex medical decision making (to support/prevent further life-threatening deterioration).. This time is exclusive of procedures, teaching, treating other patients, family meetings, and any prior time recorded by providers other than myself.       1232 Hemoglobin(!): 8.6  Aprox at baseline   1232 Platelet Count(!): 132  At baseline   1348 TSH 3RD GENERATON(!): 9.021       Medications   multi-electrolyte (Plasmalyte-A/Isolyte-S PH 7.4/Normosol-R) IV bolus 1,000 mL (0 mL Intravenous Stopped 3/30/25 1408)   cefTRIAXone (ROCEPHIN) IVPB  (premix in dextrose) 1,000 mg 50 mL (0 mg Intravenous Stopped 3/30/25 1250)   iohexol (OMNIPAQUE) 350 MG/ML injection (SINGLE-DOSE) 100 mL (100 mL Intravenous Given 3/30/25 1308)       ED Risk Strat Scores                                                History of Present Illness       Chief Complaint   Patient presents with    Fatigue     Brought in by EMS from [facility] for fatigue and hypotension       Past Medical History:   Diagnosis Date    Acquired hallux valgus     last assessed: 8/1/2013    Allergic rhinitis     Anemia 10/26/2010    Atrophy of nail     last assessed: 7/7/2015    Cancer (formerly [facility]) 2020    Chronic kidney disease     chronic renal insufficiency    Coronary artery disease     Deformity of ankle and foot, acquired     last assessed: 2/22/2016    Diabetes mellitus (formerly [facility]) 1994    Difficulty walking     last assessed: 12/14/2015    Dysesthesia     last assessed: 11/25/2016    Hammer toe     unspecified laterality; last assessed: 8/1/2013    Hypertension     Neuropathy in diabetes (formerly [facility]) 1994    Onychomycosis of toenail     last assessed: 2/22/2016    Peripheral vascular disease (formerly [facility])     left SFA stent in 2010    Pes planus     unspecified laterality; last assessed: 8/1/2013    Pneumonia     last assessed: 2/27/2016    Prostate cancer (formerly [facility])     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     last assessed: 8/15/2016    Type 2 diabetes mellitus (formerly [facility]) 07/26/2010    Viral warts     last assessed: 7/24/2015      Past Surgical History:   Procedure Laterality Date    CARDIAC CATHETERIZATION  07/29/2010    CATARACT EXTRACTION, BILATERAL Bilateral     R 1999, L 2008    COLONOSCOPY  2011    FEMORAL ARTERY - POPLITEAL ARTERY BYPASS GRAFT  09/23/2013    FOOT SURGERY      bone spur removed    LEG SURGERY Bilateral     repair; L 8/20/2010 and 7/26/2012    [place] PLMT INTERSTITIAL DEV RADIAT TX PROSTATE 1/MULT N/A 6/22/2021    Procedure: INSERTION OF FIDUCIAL MARKER (TRANSRECTAL ULTRASOUND GUIDANCE), SPACEOAR;  Surgeon:  Jero Loomis MD;  Location: [facility];  Service: Urology    ROTATOR CUFF REPAIR Left 2009    SHOULDER SURGERY Right 2005    collar bone      Family History   Problem Relation Age of Onset    Aortic aneurysm Mother     Heart disease Father     Heart attack Father         acute myocardial infarction    Heart disease Sister     Heart attack Sister         acute myocardial infarction    Throat cancer Maternal Grandfather     Heart disease Paternal Grandfather     No Known Problems Son       Social History     Tobacco Use    Smoking status: Never     Passive exposure: Past    Smokeless tobacco: Never   Vaping Use    Vaping status: Never Used   Substance Use Topics    Alcohol use: Yes     Alcohol/week: 1.0 standard drink of alcohol     Types: 1 Glasses of wine per week     Comment: Stopped in 1986    Drug use: Never      E-Cigarette/Vaping    E-Cigarette Use Never User       E-Cigarette/Vaping Substances    Nicotine No     THC No     CBD No     Flavoring No     Other No     Unknown No       I have reviewed and agree with the history as documented.     79-year-old male previous history of diabetes, orthostatic hypotension, hypertension, peripheral arterial disease, prostate cancer, CKD, coronary artery disease presenting for altered mental status and reported hypotension at nursing home overnight.  Reportedly blood pressure in the 70s per paperwork and EMS.    Patient arrives with paperwork but states that he is full code.  He has no complaints in the emergency department though he seems very fatigued.  He denies any chest pain, abdominal pain, pain anywhere.  Denies any shortness of breath.  Denies any nausea, vomiting, diarrhea.    Patient has a previous admission 22 days ago for altered mental status and was found to be hypothermic similar to presentation today.  He had an MRI brain performed which was without significant findings.  Most recent echo performed in January with normal systolic function with mildly  abnormal diastolic function.      Fatigue  Severity:  Severe  Onset quality:  Gradual  Timing:  Constant  Progression:  Worsening  Chronicity:  Recurrent      Review of Systems   Unable to perform ROS: Acuity of condition   Constitutional:  Positive for fatigue.           Objective       ED Triage Vitals [03/30/25 1123]   Temperature Pulse Blood Pressure Respirations SpO2 Patient Position - Orthostatic VS   (!) 93.8 °F (34.3 °C) 65 141/97 14 100 % Lying      Temp Source Heart Rate Source BP Location FiO2 (%) Pain Score    Rectal Monitor Right arm -- --      Vitals      Date and Time Temp Pulse SpO2 Resp BP Pain Score FACES Pain Rating User   03/30/25 1445 96.6 °F (35.9 °C) 79 96 % 17 143/57 -- -- YARY   03/30/25 1400 95.2 °F (35.1 °C) 75 98 % 22 131/57 -- -- YARY   03/30/25 1345 94.8 °F (34.9 °C) 73 99 % 22 -- -- -- YARY   03/30/25 1330 94.3 °F (34.6 °C) 71 99 % 22 121/58 -- -- YARY   03/30/25 1315 94.1 °F (34.5 °C) 69 100 % 22 151/66 -- -- YARY   03/30/25 1200 93.7 °F (34.3 °C) 63 99 % 19 119/57 -- -- YARY   03/30/25 1145 93.6 °F (34.2 °C) 66 100 % 18 123/77 -- -- YARY   03/30/25 1123 93.8 °F (34.3 °C) 65 100 % 14 141/97 -- -- EJN            Physical Exam  Vitals and nursing note reviewed.   Constitutional:       General: He is not in acute distress.     Appearance: He is well-developed. He is not diaphoretic.   HENT:      Head: Normocephalic and atraumatic.      Right Ear: External ear normal.      Left Ear: External ear normal.   Eyes:      Conjunctiva/sclera: Conjunctivae normal.   Neck:      Trachea: No tracheal deviation.   Cardiovascular:      Rate and Rhythm: Normal rate and regular rhythm.      Heart sounds: Normal heart sounds. No murmur heard.  Pulmonary:      Effort: No respiratory distress.      Breath sounds: Normal breath sounds. No stridor. No wheezing or rales.   Abdominal:      General: Bowel sounds are normal. There is no distension.      Palpations: Abdomen is soft. There is no mass.      Tenderness: There is  no abdominal tenderness. There is no guarding or rebound.   Musculoskeletal:         General: No tenderness or deformity.   Skin:     General: Skin is dry.      Coloration: Skin is pale.      Findings: No rash.      Comments: Multiple skin tears on all extremities.    Neurological:      Motor: No abnormal muscle tone.      Coordination: Coordination normal.      Comments: Hard of hearing. Moves all extremities. Eyes closed         Results Reviewed       Procedure Component Value Units Date/Time    TSH, 3rd generation with Free T4 reflex [[id number]]  (Abnormal) Collected: 03/30/25 1136    Lab Status: Final result Specimen: Blood from Arm, Left Updated: 03/30/25 1321     TSH 3RD GENERATON 9.021 uIU/mL     T4, free [[id number]] Collected: 03/30/25 1136    Lab Status: In process Specimen: Blood from Arm, Left Updated: 03/30/25 1321    B-Type Natriuretic Peptide(BNP) [[id number]]  (Normal) Collected: 03/30/25 1136    Lab Status: Final result Specimen: Blood from Arm, Left Updated: 03/30/25 1303     BNP 78 pg/mL     Procalcitonin [[id number]]  (Normal) Collected: 03/30/25 1136    Lab Status: Final result Specimen: Blood from Arm, Left Updated: 03/30/25 1244     Procalcitonin 0.12 ng/ml     FLU/RSV/COVID - if FLU/RSV clinically relevant (2hr TAT) [[id number]]  (Normal) Collected: 03/30/25 1136    Lab Status: Final result Specimen: Nares from Nasopharyngeal Swab Updated: 03/30/25 1242     SARS-CoV-2 Negative     INFLUENZA A PCR Negative     INFLUENZA B PCR Negative     RSV PCR Negative    Narrative:      This test has been performed using the CoV-2/Flu/RSV plus assay on the [employer] GeneXpert platform. This test has been validated by the  and verified by the performing laboratory.     This test is designed to amplify and detect the following: nucleocapsid (N), envelope (E), and RNA-dependent RNA polymerase (RdRP) genes of the SARS-CoV-2 genome; matrix (M), basic polymerase (PB2), and acidic protein (PA) segments of  the influenza A genome; matrix (M) and non-structural protein (NS) segments of the influenza B genome, and the nucleocapsid genes of RSV A and RSV B.     Positive results are indicative of the presence of Flu A, Flu B, RSV, and/or SARS-CoV-2 RNA. Positive results for SARS-CoV-2 or suspected novel influenza should be reported to state, local, or federal health departments according to local reporting requirements.      All results should be assessed in conjunction with clinical presentation and other laboratory markers for clinical management.     FOR PEDIATRIC PATIENTS - copy/paste COVID Guidelines URL to browser: [email or web address]       CBC and differential [[id number]]  (Abnormal) Collected: 03/30/25 1136    Lab Status: Final result Specimen: Blood from Arm, Left Updated: 03/30/25 1232     WBC 8.96 Thousand/uL      RBC 2.97 Million/uL      Hemoglobin 8.6 g/dL      Hematocrit 26.3 %      MCV 89 fL      MCH 29.0 pg      MCHC 32.7 g/dL      RDW 15.4 %      MPV 10.6 fL      Platelets 132 Thousands/uL      nRBC 0 /100 WBCs      Segmented % 86 %      Immature Grans % 0 %      Lymphocytes % 7 %      Monocytes % 6 %      Eosinophils Relative 1 %      Basophils Relative 0 %      Absolute Neutrophils 7.63 Thousands/µL      Absolute Immature Grans 0.04 Thousand/uL      Absolute Lymphocytes 0.66 Thousands/µL      Absolute Monocytes 0.50 Thousand/µL      Eosinophils Absolute 0.10 Thousand/µL      Basophils Absolute 0.03 Thousands/µL     Urine Microscopic [[id number]]  (Abnormal) Collected: 03/30/25 1140    Lab Status: Final result Specimen: Urine, Indwelling Soto Catheter Updated: 03/30/25 1226     RBC, UA 0-5 /hpf      WBC, UA Innumerable /hpf      Epithelial Cells Occasional /hpf      Bacteria, UA Moderate /hpf      Hyaline Casts, UA 2-4 /lpf      MUCUS THREADS Occasional    Urine culture [[id number]] Collected: 03/30/25 1140    Lab Status: In process Specimen: Urine,  Indwelling Soto Catheter Updated: 03/30/25 1226    Lactic acid [[id number]]  (Normal) Collected: 03/30/25 1136    Lab Status: Final result Specimen: Blood from Arm, Left Updated: 03/30/25 1223     LACTIC ACID 0.9 mmol/L     Narrative:      Result may be elevated if tourniquet was used during collection.    Comprehensive metabolic panel [[id number]]  (Abnormal) Collected: 03/30/25 1136    Lab Status: Final result Specimen: Blood from Arm, Left Updated: 03/30/25 1223     Sodium 135 mmol/L      Potassium 4.9 mmol/L      Chloride 107 mmol/L      CO2 21 mmol/L      ANION GAP 7 mmol/L      BUN 35 mg/dL      Creatinine 1.35 mg/dL      Glucose 158 mg/dL      Calcium 8.9 mg/dL      Corrected Calcium 9.4 mg/dL      AST 31 U/L      ALT 39 U/L      Alkaline Phosphatase 77 U/L      Total Protein 6.0 g/dL      Albumin 3.4 g/dL      Total Bilirubin 0.44 mg/dL      eGFR 49 ml/min/1.73sq m     Narrative:      National Kidney Disease Foundation guidelines for Chronic Kidney Disease (CKD):     Stage 1 with normal or high GFR (GFR > 90 mL/min/1.73 square meters)    Stage 2 Mild CKD (GFR = 60-89 mL/min/1.73 square meters)    Stage 3A Moderate CKD (GFR = 45-59 mL/min/1.73 square meters)    Stage 3B Moderate CKD (GFR = 30-44 mL/min/1.73 square meters)    Stage 4 Severe CKD (GFR = 15-29 mL/min/1.73 square meters)    Stage 5 End Stage CKD (GFR <15 mL/min/1.73 square meters)  Note: GFR calculation is accurate only with a steady state creatinine    Protime-INR [[id number]]  (Normal) Collected: 03/30/25 1136    Lab Status: Final result Specimen: Blood from Arm, Left Updated: 03/30/25 1219     Protime 14.6 seconds      INR 1.10    Narrative:      INR Therapeutic Range    Indication                                             INR Range      Atrial Fibrillation                                               2.0-3.0  Hypercoagulable State                                    2.0.2.3  Left Ventricular Asist Device                             2.0-3.0  Mechanical Heart Valve                                  -    Aortic(with afib, MI, embolism, HF, LA enlargement,    and/or coagulopathy)                                     2.0-3.0 (2.5-3.5)     Mitral                                                             2.5-3.5  Prosthetic/Bioprosthetic Heart Valve               2.0-3.0  Venous thromboembolism (VTE: VT, PE        2.0-3.0    APTT [[id number]]  (Normal) Collected: 03/30/25 1136    Lab Status: Final result Specimen: Blood from Arm, Left Updated: 03/30/25 1219     PTT 29 seconds     Cortisol [[id number]] Collected: 03/30/25 1136    Lab Status: In process Specimen: Blood from Arm, Left Updated: 03/30/25 1209    UA w Reflex to Microscopic w Reflex to Culture [[id number]]  (Abnormal) Collected: 03/30/25 1140    Lab Status: Final result Specimen: Urine, Indwelling Soto Catheter Updated: 03/30/25 1205     Color, UA Yellow     Clarity, UA Clear     Specific Gravity, UA 1.020     pH, UA 6.0     Leukocytes, UA 1+     Nitrite, UA Positive     Protein, UA Negative mg/dl      Glucose, UA Negative mg/dl      Ketones, UA Negative mg/dl      Urobilinogen, UA 0.2 E.U./dl      Bilirubin, UA Negative     Occult Blood, UA Negative    Blood culture #1 [[id number]] Collected: 03/30/25 1154    Lab Status: In process Specimen: Blood from Hand, Right Updated: 03/30/25 1157    Blood culture #2 [[id number]] Collected: 03/30/25 1136    Lab Status: In process Specimen: Blood from Arm, Left Updated: 03/30/25 1155            CT head without contrast   Final Interpretation by Codey Epstein MD (03/30 1400)      1.  No acute intracranial abnormality.  Chronic microangiopathic changes with stable right basal ganglia lacunar infarct.      2.  Stable chronic left-sided sinusitis with high density material either representing inspissated secretions or allergic fungal sinusitis.                  Workstation performed: [id number]         CT chest abdomen pelvis w contrast   Final  Interpretation by Codey Epstein MD (03/30 2586)      1.  No acute abnormality in the chest.      2.  Age-indeterminate fracture of the left aspect of the L3 vertebral superior endplate extending into a marginal osteophyte, not present in 2021. Clinical correlation for pain in this region recommended.      3.  Thickened urinary bladder though collapsed. This may be related to prior radiation though clinical correlation for acute cystitis is recommended.      4.  5.5 mm right upper lobe lung nodule. Given history of prostate cancer and lack of prior imaging, 3-month follow-up chest CT is recommended.      The study was marked in EPIC for immediate notification.         Workstation performed: [id number]             Procedures    ED Medication and Procedure Management   Prior to Admission Medications   Prescriptions Last Dose Informant Patient Reported? Taking?   Ascorbic Acid (Vitamin C) 500 MG CAPS  Self Yes No   Sig: Take 500 mg by mouth daily   Cholecalciferol (Vitamin D3) 50 MCG (2000 UT) TABS  Self Yes No   Sig: Take 2,000 Units by mouth daily   TRUEplus Lancets 28G MISC  Self No No   Sig: Test 1x/d, E11.29   acetaminophen (TYLENOL) 325 mg tablet  Self No No   Sig: Take 2 tablets (650 mg total) by mouth every 6 (six) hours as needed for mild pain, headaches or fever   ammonium lactate (LAC-HYDRIN) 12 % lotion  Self No No   Sig: Apply topically 2 (two) times a day   aspirin (ECOTRIN LOW STRENGTH) 81 mg EC tablet  Self Yes No   Sig: Take 81 mg by mouth daily   atorvastatin (LIPITOR) 20 mg tablet  Self No No   Sig: TAKE 1 TABLET EVERY DAY   brimonidine tartrate 0.2 % ophthalmic solution  Self Yes No   Sig: Inject 0.2 % into the eye 2 (two) times a day   carvedilol (COREG) 12.5 mg tablet  Self No No   Sig: Take 1 tablet (12.5 mg total) by mouth 2 (two) times a day   co-enzyme Q-10 100 mg capsule  Self Yes No   Sig: Take 100 mg by mouth daily   docusate sodium (COLACE) 100 mg capsule   No No   Sig: Take 1 capsule  (100 mg total) by mouth 2 (two) times a day   ferrous sulfate 324 (65 Fe) mg  Self No No   Sig: TAKE 1 TABLET TWICE DAILY BEFORE MEALS   furosemide (LASIX) 20 mg tablet   No No   Sig: Take 1 tablet (20 mg total) by mouth daily   glucose blood (OneTouch Ultra) test strip  Self No No   Sig: Test blood sugar once a day   imiquimod (ALDARA) 5 % cream  Self No No   Sig: APPLY 1 PACKET TOPICALLY 3 (THREE) TIMES A WEEK   insulin lispro (HumALOG/ADMELOG) 100 units/mL injection   No No   Sig: Inject 1-5 Units under the skin 3 (three) times a day before meals   insulin lispro (HumALOG/ADMELOG) 100 units/mL injection   No No   Sig: Inject 1-5 Units under the skin daily at bedtime   losartan (COZAAR) 25 mg tablet  Self No No   Sig: TAKE 1 TABLET (25 MG TOTAL) BY MOUTH DAILY   metFORMIN (GLUCOPHAGE) 1000 MG tablet  Self No No   Sig: TAKE ONE TABLET BY MOUTH TWICE A DAY WITH MEALS   multivitamin (THERAGRAN) TABS  Self Yes No   Sig: Take 1 tablet by mouth daily   omeprazole (PriLOSEC) 40 MG capsule  Self No No   Sig: Take 1 capsule (40 mg total) by mouth daily as needed (acid reflux)   oxybutynin (DITROPAN-XL) 5 mg 24 hr tablet   No No   Sig: Take 1 tablet (5 mg total) by mouth daily at bedtime   polyethylene glycol (MIRALAX) 17 g packet   No No   Sig: Take 17 g by mouth daily as needed (Constipation)   sitaGLIPtin (JANUVIA) 25 mg tablet  Self No No   Sig: Take 1 tablet (25 mg total) by mouth daily   spironolactone (ALDACTONE) 25 mg tablet  Self No No   Sig: Take 1 tablet (25 mg total) by mouth daily   tacrolimus (PROTOPIC) 0.1 % ointment  Self No No   Sig: Apply topically daily Apply daily for maintenance   tamsulosin (FLOMAX) 0.4 mg  Self No No   Sig: TAKE 1 CAPSULE EVERY DAY WITH DINNER   triamcinolone (KENALOG) 0.1 % ointment  Self No No   Sig: Apply topically 2 (two) times a day Apply to body twice a day then switch to Protopic Ointment and DO NOT USE ON FACE, GROIN, ARMPITS      Facility-Administered Medications: None      Patient's Medications   Discharge Prescriptions    No medications on file     No discharge procedures on file.  ED SEPSIS DOCUMENTATION   Time reflects when diagnosis was documented in both MDM as applicable and the Disposition within this note       Time User Action Codes Description Comment    3/30/2025 12:12 PM José Miguel Sumner Add [T68.XXXA] Hypothermia, initial encounter     3/30/2025 12:12 PM Gian Sumnerus Add [N39.0] UTI (urinary tract infection)     3/30/2025 12:35 PM Gian Sumnerus Add [G93.40] Encephalopathy     3/30/2025 12:36 PM Gian Sumnerus Add [D64.9] Anemia     3/30/2025 12:36 PM Gian Sumnerus Add [D69.6] Thrombocytopenia (HCC)     3/30/2025 12:36 PM Gian Sumnerus Modify [T68.XXXA] Hypothermia, initial encounter     3/30/2025 12:36 PM José Miguel Sumner Modify [N39.0] UTI (urinary tract infection)     3/30/2025 12:36 PM Gian Sumnerus Modify [T68.XXXA] Hypothermia, initial encounter     3/30/2025 12:36 PM Gian Sumnerus Modify [N39.0] UTI (urinary tract infection)     3/30/2025  1:49 PM José Miguel Sumner Add [R79.89] Elevated TSH     3/30/2025  2:20 PM Gian Sumnerus Add [J32.9] Sinusitis     3/30/2025  2:24 PM Gian Sumnerus Add [R91.1] Lung nodule     3/30/2025  2:24 PM Gian Sumnerus Add [S32.039A] Closed L3 vertebral fracture (HCC)            Initial Sepsis Screening       Row Name 03/30/25 1233                Is the patient's history suggestive of a new or worsening infection? Yes (Proceed)  -MD        Suspected source of infection urinary tract infection  -MD        Indicate SIRS criteria Hyperthemia > 38.3C (100.9F) OR Hypothermia <36C (96.8F)  -MD        Are two or more of the above signs & symptoms of infection both present and new to the patient? No  -MD                  User Key  (r) = Recorded By, (t) = Taken By, (c) = Cosigned By      Initials Name Provider Type    MD José Miguel Sumner,  Physician                       José Miguel Sumner,   03/30/25 2631        José Miguel Sumner,   03/30/25 1456

## 2025-03-30 NOTE — ASSESSMENT & PLAN NOTE
TSH 9.021.  For adults over 51, the upper limit of normal is 8.9 mIU/L.  Therefore, this 9.0-1 level might really be fairly insignificant.  Will check a free T4 and T3.  Of note, during recent hospitalization the TSH was 6.684 (during which time the T4 was normal) but back in August 2024 and prior the levels were completely normal.  Therefore, the TSH above represents an increasing TSH.  Thyroid exam no palpable thyromegaly/nodularity.

## 2025-03-30 NOTE — ASSESSMENT & PLAN NOTE
CT head -   Near complete opacification of the left maxillary sinus with opacification of posterior left ethmoid air cells and the left sphenoid sinus. There is high density material again seen either representing inspissated secretions or allergic fungal sinusitis. There is hypertrophy of the sinus walls   Outpatient follow-up with PCP and/or ENT.

## 2025-03-30 NOTE — ASSESSMENT & PLAN NOTE
Antibiotic -  Ceftriaxone 1 g IV daily.  Recommend a total of 7 days of antibiotic.  Antipyretic -Tylenol as needed.  IVF -   Given 1 L isolate bolus in emergency department.  Will provide the patient with normal saline at 75 mL/h for 15 hours and reassess tomorrow morning.  Antiemetic - Zofran as needed.  Pain control -   Tylenol as needed  Monitor pain levels.  Evaluations -   CT abdomen/pelvis -thickened urinary bladder though collapsed.  Clinical correlation for acute cystitis recommended.  Urinalysis -   During prior admission for hypothermia earlier in March 2025, urinalysis showed only trace WBC (1-2/hpf), occasional bacteria, and trace protein.  However, the urinalysis done in the emergency department on 3/30/2025 shows positive nitrates, 1+ leukocyte (innumerable/hpf), moderate bacteria, and occasional mucus threads and 2-4 hyaline casts which represents a large change.  Follow-up urine culture.  Follow-up blood cultures.  Of note, COVID/influenza/RSV PCR testing negative.

## 2025-03-30 NOTE — ASSESSMENT & PLAN NOTE
Given patient's history of prostate cancer in the past, a 3-month follow-up CT chest recommended.  Incidental findings note completed

## 2025-03-30 NOTE — ASSESSMENT & PLAN NOTE
Differential Diagnosis -urinary infection with hypothermia is a clear front running cause.  Worsening dementia is another possibility as well.  Small possibility of thyroid being a cause.  Dehydration can also be a potential etiology as well.  Rule out hypoglycemia.  Baseline Mental Status -there has been some milder more chronic decline over the last few months but recently since beginning in March there is been significant worsening including lethargy, forgetfulness, increased difficulty ambulating  Medication Review (potential causes / contributors) -   None of the patient's home medications are typically associated with altered mental status.  The beta-blocker, carvedilol theoretically has some potential risk.  Abnormal Testing Thus Far -   Urinalysis very concerning for infection.  TSH is abnormal but not bad when corrected for age.  Negative Testing Thus Far -   CT head shows no acute pathology.  Notably, there are chronic microangiopathic changes with stable right basal ganglier lacunar infarct.  This was compared with 3/9/2025.  WBC, lactic acid, and procalcitonin normal.  Creatinine is at baseline.  Sodium and calcium are normal.  COVID testing negative.  Additional Testing Being Requested -   None just yet as I would like to see the patient's response to treating the urinary infection first.  If mental status is not improving after initiating treatment for urinary infection and also hydrating, then additional workup may be considered.  Consultations - Geriatric medicine consultation due to concerns for worsening dementia.  Patient safety -   Not currently requiring any restraints.  Redirection and reorientation when needed.  Fall precautions and bed alarm.  PT/OT evaluations.  Other -   Assure adequate sleep hygiene.  Minimize interruptions to sleep.  Avoid constipation.  Avoid delirium inducing medications.  Ensure proper hydration and nutrition.  Serial Mental Status Exams.

## 2025-03-30 NOTE — ASSESSMENT & PLAN NOTE
"Lab Results   Component Value Date    HGBA1C 6.9 (H) 02/11/2025     No results for input(s): \"POCGLU\" in the last 72 hours.    Blood Sugar Average: Last 72 hrs:    Home regimen -   Metformin 1000 mg twice daily.  Januvia 25 mg by mouth daily.  Inpatient regimen -   Basal insulin - None currently as anticipate poor p.o. intake.  Insulin sliding scale  Adjust regimen as needed.  Monitor blood sugars.  Not currently taking any medications for the neuropathy.  "

## 2025-03-30 NOTE — ASSESSMENT & PLAN NOTE
Home regimen -   Carvedilol 12.5 mg by mouth twice daily.  Losartan 25 mg by mouth daily.  Spironolactone 25 mg by mouth daily.  Patient is also on furosemide 20 mg by mouth daily.  Inpatient regimen -   Continue carvedilol as heart rate is currently not bradycardic but we will use caution and have hold parameters.  Continue losartan as renal function remains stable.  Hold spironolactone and furosemide to allow for hydration in the setting of the infection  Monitor blood pressures.

## 2025-03-30 NOTE — INCIDENTAL FINDINGS
The following findings require follow up:  Radiographic finding   Finding: Lung nodule   Follow up required: Repeat CT chest   Follow up should be done within 3 month(s)    Please notify the following clinician to assist with the follow up:   Dr. Felipe Travis    Incidental finding results were discussed with the patient's wife by Wilbert Aquino DO on 03/30/25.   They expressed understanding and all questions answered.

## 2025-03-31 ENCOUNTER — PATIENT OUTREACH (OUTPATIENT)
Dept: CASE MANAGEMENT | Facility: OTHER | Age: 80
End: 2025-03-31

## 2025-03-31 PROBLEM — R41.89 COGNITIVE DECLINE: Status: ACTIVE | Noted: 2025-03-31

## 2025-03-31 PROBLEM — R26.2 AMBULATORY DYSFUNCTION: Status: ACTIVE | Noted: 2025-03-31

## 2025-03-31 PROBLEM — R54 FRAILTY SYNDROME IN GERIATRIC PATIENT: Status: ACTIVE | Noted: 2025-03-31

## 2025-03-31 PROBLEM — R13.10 DYSPHAGIA: Status: ACTIVE | Noted: 2025-03-31

## 2025-03-31 LAB
ANION GAP SERPL CALCULATED.3IONS-SCNC: 7 MMOL/L (ref 4–13)
BASOPHILS # BLD AUTO: 0.03 THOUSANDS/ÂΜL (ref 0–0.1)
BASOPHILS NFR BLD AUTO: 0 % (ref 0–1)
BUN SERPL-MCNC: 26 MG/DL (ref 5–25)
CALCIUM SERPL-MCNC: 8.4 MG/DL (ref 8.4–10.2)
CHLORIDE SERPL-SCNC: 112 MMOL/L (ref 96–108)
CO2 SERPL-SCNC: 21 MMOL/L (ref 21–32)
CREAT SERPL-MCNC: 1.14 MG/DL (ref 0.6–1.3)
EOSINOPHIL # BLD AUTO: 0.08 THOUSAND/ÂΜL (ref 0–0.61)
EOSINOPHIL NFR BLD AUTO: 1 % (ref 0–6)
ERYTHROCYTE [DISTWIDTH] IN BLOOD BY AUTOMATED COUNT: 15.5 % (ref 11.6–15.1)
GFR SERPL CREATININE-BSD FRML MDRD: 60 ML/MIN/1.73SQ M
GLUCOSE SERPL-MCNC: 126 MG/DL (ref 65–140)
GLUCOSE SERPL-MCNC: 129 MG/DL (ref 65–140)
GLUCOSE SERPL-MCNC: 151 MG/DL (ref 65–140)
GLUCOSE SERPL-MCNC: 70 MG/DL (ref 65–140)
GLUCOSE SERPL-MCNC: 81 MG/DL (ref 65–140)
HCT VFR BLD AUTO: 23.2 % (ref 36.5–49.3)
HGB BLD-MCNC: 7.5 G/DL (ref 12–17)
IMM GRANULOCYTES # BLD AUTO: 0.03 THOUSAND/UL (ref 0–0.2)
IMM GRANULOCYTES NFR BLD AUTO: 0 % (ref 0–2)
LYMPHOCYTES # BLD AUTO: 0.54 THOUSANDS/ÂΜL (ref 0.6–4.47)
LYMPHOCYTES NFR BLD AUTO: 7 % (ref 14–44)
MCH RBC QN AUTO: 29.1 PG (ref 26.8–34.3)
MCHC RBC AUTO-ENTMCNC: 32.3 G/DL (ref 31.4–37.4)
MCV RBC AUTO: 90 FL (ref 82–98)
MONOCYTES # BLD AUTO: 0.58 THOUSAND/ÂΜL (ref 0.17–1.22)
MONOCYTES NFR BLD AUTO: 8 % (ref 4–12)
NEUTROPHILS # BLD AUTO: 6.37 THOUSANDS/ÂΜL (ref 1.85–7.62)
NEUTS SEG NFR BLD AUTO: 84 % (ref 43–75)
NRBC BLD AUTO-RTO: 0 /100 WBCS
PLATELET # BLD AUTO: 120 THOUSANDS/UL (ref 149–390)
PMV BLD AUTO: 10.9 FL (ref 8.9–12.7)
POTASSIUM SERPL-SCNC: 4.8 MMOL/L (ref 3.5–5.3)
RBC # BLD AUTO: 2.58 MILLION/UL (ref 3.88–5.62)
SODIUM SERPL-SCNC: 140 MMOL/L (ref 135–147)
T3 SERPL-MCNC: 0.8 NG/ML (ref 0.9–1.8)
T4 FREE SERPL-MCNC: 1.1 NG/DL (ref 0.61–1.12)
VIT B12 SERPL-MCNC: 751 PG/ML (ref 180–914)
WBC # BLD AUTO: 7.63 THOUSAND/UL (ref 4.31–10.16)

## 2025-03-31 PROCEDURE — 80048 BASIC METABOLIC PNL TOTAL CA: CPT | Performed by: INTERNAL MEDICINE

## 2025-03-31 PROCEDURE — 82948 REAGENT STRIP/BLOOD GLUCOSE: CPT

## 2025-03-31 PROCEDURE — 99223 1ST HOSP IP/OBS HIGH 75: CPT | Performed by: NURSE PRACTITIONER

## 2025-03-31 PROCEDURE — 84480 ASSAY TRIIODOTHYRONINE (T3): CPT | Performed by: INTERNAL MEDICINE

## 2025-03-31 PROCEDURE — 85025 COMPLETE CBC W/AUTO DIFF WBC: CPT | Performed by: INTERNAL MEDICINE

## 2025-03-31 PROCEDURE — 82607 VITAMIN B-12: CPT | Performed by: NURSE PRACTITIONER

## 2025-03-31 PROCEDURE — 99233 SBSQ HOSP IP/OBS HIGH 50: CPT | Performed by: INTERNAL MEDICINE

## 2025-03-31 PROCEDURE — 84439 ASSAY OF FREE THYROXINE: CPT | Performed by: INTERNAL MEDICINE

## 2025-03-31 RX ORDER — CEFEPIME HYDROCHLORIDE 1 G/50ML
1000 INJECTION, SOLUTION INTRAVENOUS EVERY 12 HOURS
Status: DISCONTINUED | OUTPATIENT
Start: 2025-03-31 | End: 2025-04-02 | Stop reason: HOSPADM

## 2025-03-31 RX ORDER — BISACODYL 5 MG/1
10 TABLET, DELAYED RELEASE ORAL DAILY PRN
COMMUNITY

## 2025-03-31 RX ORDER — PANTOPRAZOLE SODIUM 40 MG/1
40 TABLET, DELAYED RELEASE ORAL
Status: DISCONTINUED | OUTPATIENT
Start: 2025-04-01 | End: 2025-04-02 | Stop reason: HOSPADM

## 2025-03-31 RX ORDER — NICOTINE POLACRILEX 4 MG
1 LOZENGE BUCCAL DAILY PRN
COMMUNITY
Start: 2025-03-13

## 2025-03-31 RX ORDER — IBUPROFEN 600 MG/1
TABLET ORAL
COMMUNITY
Start: 2025-03-13 | End: 2025-04-02

## 2025-03-31 RX ORDER — FERROUS SULFATE 324(65)MG
324 TABLET, DELAYED RELEASE (ENTERIC COATED) ORAL EVERY 12 HOURS
Status: ON HOLD | COMMUNITY
Start: 2025-03-12 | End: 2025-03-31

## 2025-03-31 RX ADMIN — ASPIRIN 81 MG: 81 TABLET, COATED ORAL at 09:46

## 2025-03-31 RX ADMIN — CEFTRIAXONE 1000 MG: 1 INJECTION, SOLUTION INTRAVENOUS at 11:38

## 2025-03-31 RX ADMIN — LOSARTAN POTASSIUM 25 MG: 25 TABLET, FILM COATED ORAL at 09:46

## 2025-03-31 RX ADMIN — HEPARIN SODIUM 5000 UNITS: 5000 INJECTION INTRAVENOUS; SUBCUTANEOUS at 05:31

## 2025-03-31 RX ADMIN — ATORVASTATIN CALCIUM 20 MG: 20 TABLET, FILM COATED ORAL at 09:46

## 2025-03-31 RX ADMIN — HEPARIN SODIUM 5000 UNITS: 5000 INJECTION INTRAVENOUS; SUBCUTANEOUS at 14:16

## 2025-03-31 RX ADMIN — PANTOPRAZOLE SODIUM 40 MG: 40 INJECTION, POWDER, FOR SOLUTION INTRAVENOUS at 09:46

## 2025-03-31 RX ADMIN — FERROUS SULFATE TAB 325 MG (65 MG ELEMENTAL FE) 325 MG: 325 (65 FE) TAB at 17:06

## 2025-03-31 RX ADMIN — INSULIN LISPRO 1 UNITS: 100 INJECTION, SOLUTION INTRAVENOUS; SUBCUTANEOUS at 11:37

## 2025-03-31 RX ADMIN — TAMSULOSIN HYDROCHLORIDE 0.4 MG: 0.4 CAPSULE ORAL at 17:06

## 2025-03-31 RX ADMIN — CARVEDILOL 12.5 MG: 12.5 TABLET, FILM COATED ORAL at 09:46

## 2025-03-31 RX ADMIN — CARVEDILOL 12.5 MG: 12.5 TABLET, FILM COATED ORAL at 17:06

## 2025-03-31 RX ADMIN — HEPARIN SODIUM 5000 UNITS: 5000 INJECTION INTRAVENOUS; SUBCUTANEOUS at 21:27

## 2025-03-31 RX ADMIN — CEFEPIME HYDROCHLORIDE 1000 MG: 1 INJECTION, SOLUTION INTRAVENOUS at 17:07

## 2025-03-31 RX ADMIN — FERROUS SULFATE TAB 325 MG (65 MG ELEMENTAL FE) 325 MG: 325 (65 FE) TAB at 09:46

## 2025-03-31 NOTE — ASSESSMENT & PLAN NOTE
Lab Results   Component Value Date    HGBA1C 6.9 (H) 02/11/2025     Recent Labs     03/30/25  1754 03/30/25 2028 03/31/25  0706 03/31/25  1131   POCGLU 85 90 81 151*       Blood Sugar Average: Last 72 hrs:  (P) 101.75  Home regimen -   Metformin 1000 mg twice daily.  Januvia 25 mg by mouth daily.  Inpatient regimen -   Basal insulin -none currently but if his p.o. intake increases significantly and his sugars start going up we may need to add basal insulin.  However, which sugars noted above, no need to add currently.  Insulin sliding scale  Adjust regimen as needed.  Monitor blood sugars.  Not currently taking any medications for the neuropathy.

## 2025-03-31 NOTE — ASSESSMENT & PLAN NOTE
Wife reports some trouble swallowing and coughing when swallowing  Reports she thinks this just started and since that his hospitalization at [place]  Speech therapy consult already pending  Aspiration precautions  Further management of diet changes per speech therapy recommendations and primary

## 2025-03-31 NOTE — ASSESSMENT & PLAN NOTE
Age-indeterminate on imaging  Wife does report patient complains frequently of back pain  Would recommend adding scheduled Tylenol and lidocaine patch, she was using lidocaine patches at home  Management per primary

## 2025-03-31 NOTE — ASSESSMENT & PLAN NOTE
Present on admission.  Turn/reposition frequently.  Wound care consultation.  Follow-up recommendations once available.  Nutrition -   Glucerna recommended twice daily.

## 2025-03-31 NOTE — ASSESSMENT & PLAN NOTE
PT / OT evaluation.  Ambulatory referral placed for patient to see movement specialist with the [facility] neurology group due to expressed concerns for possibility of Parkinson Disease.

## 2025-03-31 NOTE — ASSESSMENT & PLAN NOTE
Differential Diagnosis -   UTI is the most favored diagnosis as a cause.  Need to consider the potential of hypoglycemia or poor p.o. intake as alternate etiologies.  Potentially, since this is not the first time that this has occurred, and the previous time did not have clear signs of infection, the patient could have a reset thermostat centrally.  Hypothalamic dysfunction?  Neuropathy can also be an etiology especially in the setting of his diabetes.  And only if the patient were to show symptoms of Parkinson would this be another potential neurological etiology.  Previously was assessed for hypoadrenalism and hypothyroidism.  Prior workup -   Cortisol normal.  During previous hospitalization, although the TSH was abnormal but the free T4 was normal.  Previous admission showed the patient to be bradycardic but that is not currently the case.  No reported hypoglycemia.  He is diabetic though.  Current workup -   Treat urinary infection.  Not suspected currently to be thyroid based on workup.  See abnormal TSH below.  Monitor blood sugars closely.  Most recent A1c is 6.9% so would not expect a ton of hypoglycemia.  Consider endocrinology evaluation if persists  Monitor temperatures.  Temperature probe urinary catheter placed in the ER.   Stella sierra until temp at least 97.5.

## 2025-03-31 NOTE — ASSESSMENT & PLAN NOTE
TSH 9.021.  For adults over 51, the upper limit of normal is 8.9 mIU/L.  Therefore, this 9.021 level might really be fairly insignificant.  Just like prior hospitalization, the free T4 is normal and the T3 is slightly low.  Of note, during recent hospitalization the TSH was 6.684 (during which time the T4 was normal) but back in August 2024 and prior the levels were completely normal.  Therefore, the TSH above represents an increasing TSH.  Thyroid exam no palpable thyromegaly/nodularity.

## 2025-03-31 NOTE — CASE MANAGEMENT
Case Management Assessment    Patient name Pernell Covarrubias  Location /-01 MRN [id number]  : 1945 Date 3/31/2025       Current Admission Date: 3/30/2025  Current Admission Diagnosis:Toxic metabolic encephalopathy   Patient Active Problem List    Diagnosis Date Noted Date Diagnosed    Toxic metabolic encephalopathy 2025     UTI (urinary tract infection) 2025     Incidental Finding: Lung nodule 2025     Incidental Finding: Chronic sinusitis 2025     Compression fracture of L3 vertebra (HCC) 2025     Pressure injury of skin of buttock 2025     Bradycardia 2025     Fall 2025     Hypothermia 2025     Abnormal TSH 2025     Generalized weakness 2025     Chronic combined systolic and diastolic CHF (congestive heart failure) ([facility]) 2025     Anemia of chronic disease 2025     Urinary frequency 2025     Anemia 2024     History of vitamin D deficiency 2024     Other fatigue 2024     Type 2 diabetes mellitus with stage 2 chronic kidney disease, without long-term current use of insulin  ([facility]) 2024     Dry skin dermatitis 2023     Uses roller walker 2023     Iron deficiency anemia 2023     Peripheral arterial disease ([facility]) 2023     Gait disorder 2022     Edema of both feet 2022     Chronic combined systolic and diastolic heart failure, NYHA class 2 ([facility]) 2022     Heart failure, left, 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% ([facility]) 2022     Acute on chronic combined systolic and diastolic congestive heart failure ([facility]) 2022     Chronic right-sided low back pain without sciatica 2021     Orthostatic hypotension 2021     Hyponatremia 2021     Insomnia, persistent 2021     Prostate cancer ([facility]) 2021     Benign essential hypertension 06/15/2020     Bilateral leg weakness 06/15/2020     Type 2 diabetes mellitus with hyperglycemia, without long-term  current use of insulin (HCC) 05/25/2019     Other hyperlipidemia 09/13/2018     Coronary artery disease involving native coronary artery of native heart with angina pectoris (HCC)      CKD stage 3 due to type 2 diabetes mellitus ([facility]) 09/20/2017     Lumbar radiculopathy 11/25/2016     Type 2 diabetes mellitus with diabetic neuropathy (HCC) 01/11/2015     Diabetic neuropathy (HCC) 10/10/2013     Chronic GERD 06/16/2013       LOS (days): 1  Geometric Mean LOS (GMLOS) (days):   Days to GMLOS:     OBJECTIVE:  PATIENT READMITTED TO HOSPITAL  Risk of Unplanned Readmission Score: 28.59     Current admission status: Inpatient     Preferred Pharmacy:   [facility] - [place], [place] - [address]  [address] [address]  Phone: [phone] Fax: [phone]    [facility] [address] [place], [place] - [address]  [address]  [facility] [address]  Phone: [phone] Fax: [phone]    Primary Care Provider: Felipe Travis,     Primary Insurance: MEDICARE  Secondary Insurance: AARP    ASSESSMENT:  Active Health Care Proxies    There are no active Health Care Proxies on file.    Advance Directives  Does patient have a Health Care POA?: No  Does patient have Advance Directives?: No    Readmission Root Cause  30 Day Readmission: Yes  During your hospital stay, did someone (provider, nurse, ) explain your care to you in a way you could understand?: Yes  Did you feel medically stable to leave the hospital?: Yes  Were you able to pay for your medication at the pharmacy?: Yes  Did you have reliable transportation to take you to your appointments?: Yes  During previous admission, was a post-acute recommendation made?: Yes  What post-acute resources were offered?: STR  Patient was readmitted due to: Toxic metabolic encephalopathy  Action Plan: Medical management    Patient Information  Admitted from:: Facility ([facility])  Mental Status: Confused  During  Assessment patient was accompanied by: Not accompanied during assessment  Assessment information provided by:: Spouse  Primary Caregiver: Family  Support Systems: Spouse/significant other, Son  County of Residence: [place]  What city do you live in?: [place]  Home entry access options. Select all that apply.: Stairs  Number of steps to enter home.: 4  Do the steps have railings?: Yes  Type of Current Residence: [facility]  Living Arrangements: Lives w/ Spouse/significant other  Is patient a ?: Yes ([employer])  Is patient active with VA ([place] [facility])?: No    Activities of Daily Living Prior to Admission  Functional Status: Independent  Completes ADLs independently?: No  Level of ADL dependence: Assistance  Ambulates independently?: Yes  Does patient use assisted devices?: Yes  Assisted Devices (DME) used: Walker, Rollator, Shower Chair  Does patient currently own DME?: Yes  What DME does the patient currently own?: Rollator, Walker, Shower Chair  Does patient have a history of Outpatient Therapy (PT/OT)?: Yes  Does the patient have a history of Short-Term Rehab?: Yes  Does patient have a history of HHC?: No  Does patient currently have HHC?: No    Patient Information Continued  Income Source: Pension/assisted  Does patient have prescription coverage?: Yes  Can the patient afford their medications and any related supplies (such as glucometers or test strips)?: Yes  Does patient receive dialysis treatments?: No  Does patient have a history of substance abuse?: No  Does patient have a history of Mental Health Diagnosis?: No    Means of Transportation  Means of Transport to [facility]:: Family transport    CM spoke to the patient's wife, Ruth, on the phone. Ruth provided assessment information. She reports understanding that PT/OT evals are pending at this time. Ruth is in agreement with the patient returning to [facility] for [facility] upon discharge. CM will continue to follow the patient through discharge.

## 2025-03-31 NOTE — ASSESSMENT & PLAN NOTE
Clinical Frail Scale: 6  Total protein 6.0 and albumin 3.4  Encourage adequate hydration and nutrition, including protein in meals  OOB as tolerated  PT/OT consulted  Encourage early and frequent mobilization

## 2025-03-31 NOTE — ASSESSMENT & PLAN NOTE
Antibiotic -  Ceftriaxone 1 g IV daily --->  Cefepime  Preliminary urine culture is showing greater than 100,000 Pseudomonas aeruginosa will switch to cefepime currently.  This is a preliminary result.  Follow-up final results and adjust antibiotics accordingly.    There is also growth of Enterococcus faecalis and will need to check to see if this is resistant or not.  Recommend a total of 7 days of antibiotic.  Follow-up susceptibility data to see if any oral regimens are available.  Antipyretic -Tylenol as needed.  IVF -   Given 1 L isolate bolus in emergency department.  The patient was given normal saline at 75 mL/h for the first day of hospitalization.  Today, fluids were held and we will monitor to see if he can eat/drink well enough to sustain himself.  If it looks like his p.o. intake decreases, then we may reinitiate fluids again.  Antiemetic - Zofran as needed.  Pain control -   Tylenol as needed  Monitor pain levels.  Evaluations -   CT abdomen/pelvis -thickened urinary bladder though collapsed.  Clinical correlation for acute cystitis recommended.  Urinalysis -   During prior admission for hypothermia earlier in March 2025, urinalysis showed only trace WBC (1-2/hpf), occasional bacteria, and trace protein.  However, the urinalysis done in the emergency department on 3/30/2025 shows positive nitrates, 1+ leukocyte (innumerable/hpf), moderate bacteria, and occasional mucus threads and 2-4 hyaline casts which represents a large change.  Follow-up urine culture.  Preliminary results showing 2 bacteria, Pseudomonas aeruginosa and Enterococcus faecalis.  Follow-up susceptibility data, once available.  Most likely susceptibility data will be back by late morning tomorrow.  Follow-up blood cultures.  So far, these are fortunately negative.  Of note, COVID/influenza/RSV PCR testing negative.

## 2025-03-31 NOTE — PROGRESS NOTES
Progress Note - Hospitalist   Name: Pernell Covarrubias 79 y.o. male I MRN: [id number]  Unit/Bed#: [facility]-01 I Date of Admission: 3/30/2025   Date of Service: 3/31/2025 I Hospital Day: 1     Assessment & Plan  Toxic metabolic encephalopathy  Differential Diagnosis -urinary infection with hypothermia is a clear front running cause.  Worsening dementia is another possibility as well.  Small possibility of thyroid being a cause.  Dehydration can also be a potential etiology as well.  Rule out hypoglycemia.  Baseline Mental Status -there has been some milder more chronic decline over the last few months but recently since beginning in March there is been significant worsening including lethargy, forgetfulness, increased difficulty ambulating  Medication Review (potential causes / contributors) -   None of the patient's home medications are typically associated with altered mental status.  The beta-blocker, carvedilol theoretically has some potential risk.  Abnormal Testing Thus Far -   Urinalysis very concerning for infection.  TSH is abnormal but not bad when corrected for age.  Negative Testing Thus Far -   CT head shows no acute pathology.  Notably, there are chronic microangiopathic changes with stable right basal ganglier lacunar infarct.  This was compared with 3/9/2025.  WBC, lactic acid, and procalcitonin normal.  Creatinine is at baseline.  Sodium and calcium are normal.  COVID testing negative.  Additional Testing Being Requested -   None just yet as I would like to see the patient's response to treating the urinary infection first.  If mental status is not improving after initiating treatment for urinary infection and also hydrating, then additional workup may be considered.  Consultations - Geriatric medicine consultation due to the underlying cognitive decline present before the last few days.  Reviewed geriatric medicine recommendations.  Patient safety -   Not currently requiring any  restraints.  Redirection and reorientation when needed.  Fall precautions and bed alarm.  PT/OT evaluations.  Other -   Assure adequate sleep hygiene.  Minimize interruptions to sleep.  Avoid constipation.  Avoid delirium inducing medications.  Ensure proper hydration and nutrition.  Serial Mental Status Exams.  Hypothermia  Differential Diagnosis -   UTI is the most favored diagnosis as a cause.  Need to consider the potential of hypoglycemia or poor p.o. intake as alternate etiologies.  Potentially, since this is not the first time that this has occurred, and the previous time did not have clear signs of infection, the patient could have a reset thermostat centrally.  Hypothalamic dysfunction?  Neuropathy can also be an etiology especially in the setting of his diabetes.  And only if the patient were to show symptoms of Parkinson would this be another potential neurological etiology.  Previously was assessed for hypoadrenalism and hypothyroidism.  Prior workup -   Cortisol normal.  During previous hospitalization, although the TSH was abnormal but the free T4 was normal.  Previous admission showed the patient to be bradycardic but that is not currently the case.  No reported hypoglycemia.  He is diabetic though.  Current workup -   Treat urinary infection.  Not suspected currently to be thyroid based on workup.  See abnormal TSH below.  Monitor blood sugars closely.  Most recent A1c is 6.9% so would not expect a ton of hypoglycemia.  Consider endocrinology evaluation if persists  Monitor temperatures.  Temperature probe urinary catheter placed in the ER.   Stella hugger until temp at least 97.5.  UTI (urinary tract infection)  Antibiotic -  Ceftriaxone 1 g IV daily --->  Cefepime  Preliminary urine culture is showing greater than 100,000 Pseudomonas aeruginosa will switch to cefepime currently.  This is a preliminary result.  Follow-up final results and adjust antibiotics accordingly.    There is also growth of  Enterococcus faecalis and will need to check to see if this is resistant or not.  Recommend a total of 7 days of antibiotic.  Follow-up susceptibility data to see if any oral regimens are available.  Antipyretic -Tylenol as needed.  IVF -   Given 1 L isolate bolus in emergency department.  The patient was given normal saline at 75 mL/h for the first day of hospitalization.  Today, fluids were held and we will monitor to see if he can eat/drink well enough to sustain himself.  If it looks like his p.o. intake decreases, then we may reinitiate fluids again.  Antiemetic - Zofran as needed.  Pain control -   Tylenol as needed  Monitor pain levels.  Evaluations -   CT abdomen/pelvis -thickened urinary bladder though collapsed.  Clinical correlation for acute cystitis recommended.  Urinalysis -   During prior admission for hypothermia earlier in March 2025, urinalysis showed only trace WBC (1-2/hpf), occasional bacteria, and trace protein.  However, the urinalysis done in the emergency department on 3/30/2025 shows positive nitrates, 1+ leukocyte (innumerable/hpf), moderate bacteria, and occasional mucus threads and 2-4 hyaline casts which represents a large change.  Follow-up urine culture.  Preliminary results showing 2 bacteria, Pseudomonas aeruginosa and Enterococcus faecalis.  Follow-up susceptibility data, once available.  Most likely susceptibility data will be back by late morning tomorrow.  Follow-up blood cultures.  So far, these are fortunately negative.  Of note, COVID/influenza/RSV PCR testing negative.  Abnormal TSH  TSH 9.021.  For adults over 51, the upper limit of normal is 8.9 mIU/L.  Therefore, this 9.021 level might really be fairly insignificant.  Just like prior hospitalization, the free T4 is normal and the T3 is slightly low.  Of note, during recent hospitalization the TSH was 6.684 (during which time the T4 was normal) but back in August 2024 and prior the levels were completely normal.   Therefore, the TSH above represents an increasing TSH.  Thyroid exam no palpable thyromegaly/nodularity.   Type 2 diabetes mellitus with diabetic neuropathy (HCC)  Lab Results   Component Value Date    HGBA1C 6.9 (H) 02/11/2025     Recent Labs     03/30/25  1754 03/30/25 2028 03/31/25  0706 03/31/25  1131   POCGLU 85 90 81 151*       Blood Sugar Average: Last 72 hrs:  (P) 101.75  Home regimen -   Metformin 1000 mg twice daily.  Januvia 25 mg by mouth daily.  Inpatient regimen -   Basal insulin -none currently but if his p.o. intake increases significantly and his sugars start going up we may need to add basal insulin.  However, which sugars noted above, no need to add currently.  Insulin sliding scale  Adjust regimen as needed.  Monitor blood sugars.  Not currently taking any medications for the neuropathy.  CKD stage 3 due to type 2 diabetes mellitus (HCC)    Baseline creatinine seems to range between 0.8 and 1.2.  Monitor I's/O.  Monitor weights.  Currently at baseline on admission.  BMP in AM.  Avoid nephrotoxins.  Avoid hypotension.  Anemia of chronic disease  Baseline hemoglobin tends to be in the 9s range.  Monitor hemoglobin levels.  Coronary artery disease involving native coronary artery of native heart with angina pectoris ([facility])  Antiplatelet - Aspirin  Beta-blocker -carvedilol  Statin -atorvastatin  Nitrate -none.  Currently no chest pain and no ischemic changes on EKG.  Benign essential hypertension  Home regimen -   Carvedilol 12.5 mg by mouth twice daily.  Losartan 25 mg by mouth daily.  Spironolactone 25 mg by mouth daily.  Patient is also on furosemide 20 mg by mouth daily.  Inpatient regimen -   Continue carvedilol as heart rate is currently not bradycardic but we will use caution and have hold parameters.  Continue losartan as renal function remains stable.  Hold spironolactone and furosemide to allow for hydration in the setting of the infection  We would need to reassess whether it is best for  the patient to still be on both diuretics as an outpatient or if we can scale back if he is potentially prone to dehydration at all.  However, as he is just starting to come to it is too early to really tell how much she is going to eat consistently.  Monitor blood pressures.  Compression fracture of L3 vertebra (HCC)  Age-indeterminate on imaging.  Monitor for any pain symptoms.  Supportive care.  Outpatient follow-up.  Incidental Finding: Lung nodule  Given patient's history of prostate cancer in the past, a 3-month follow-up CT chest recommended.  Incidental findings note completed   Incidental Finding: Chronic sinusitis  CT head -   Near complete opacification of the left maxillary sinus with opacification of posterior left ethmoid air cells and the left sphenoid sinus. There is high density material again seen either representing inspissated secretions or allergic fungal sinusitis. There is hypertrophy of the sinus walls   Outpatient follow-up with PCP and/or ENT.  Pressure injury of skin of buttock  Present on admission.  Turn/reposition frequently.  Wound care consultation.  Follow-up recommendations once available.  Nutrition -   Glucerna recommended twice daily.  Ambulatory dysfunction  PT / OT evaluation.  Ambulatory referral placed for patient to see movement specialist with the [facility] neurology group due to expressed concerns for possibility of Parkinson Disease.     VTE Pharmacologic Prophylaxis: VTE Score: 6 High Risk (Score >/= 5) - Pharmacological DVT Prophylaxis Ordered: heparin. Sequential Compression Devices Ordered.    Mobility:   Basic Mobility Inpatient Raw Score: 6  JH-HLM Goal: 2: Bed activities/Dependent transfer  JH-HLM Achieved: 2: Bed activities/Dependent transfer  JH-HLM Goal achieved. Continue to encourage appropriate mobility.    Patient Centered Rounds: I performed bedside rounds with nursing staff today.   Discussions with Specialists or Other Care Team Provider: Briefly  geriatric medicine    Education and Discussions with Family / Patient: Updated  (wife) at bedside.    Current Length of Stay: 1 day(s)  Current Patient Status: Inpatient   Certification Statement: The patient will continue to require additional inpatient hospital stay due to evaluation and treatment of the above medical problems.  Discharge Plan: Anticipate discharge in 48-72 hrs to discharge location to be determined pending rehab evaluations.    Code Status: Level 3 - DNAR and DNI    Subjective   The patient is doing better overall.  He has started to become more awake and alert and is interacting a bit more.  He has been able to start taking some things orally as well.  He denies any pain symptoms.  Today I am able to ask him more about symptoms and he denies any suprapubic pain or tenderness.  He denies any kidney region pain either.  The patient denies any dysuria symptoms prior to coming in but he did have a Soto catheter placed in the emergency department.  The patient's temperature did go low again into the 96 range this morning so a Stella hugger was reapplied after he had been off the Stella hugger for the overnight period.    Objective :  Temp:  [96.8 °F (36 °C)-98.6 °F (37 °C)] 97.2 °F (36.2 °C)  HR:  [66-98] 66  BP: (103-160)/(37-90) 123/44  Resp:  [12-18] 16  SpO2:  [98 %-100 %] 98 %  O2 Device: None (Room air)    Body mass index is 21.62 kg/m².     Input and Output Summary (last 24 hours):     Intake/Output Summary (Last 24 hours) at 3/31/2025 1553  Last data filed at 3/31/2025 1001  Gross per 24 hour   Intake 922.5 ml   Output 975 ml   Net -52.5 ml       Physical Exam  The patient is still a bit sluggish on his exam but he is actually interacting more and talking with me today which is better than yesterday when he was grossly lethargic.  Today, he is able to recognize and identify his wife and tell me that they were  for 53 years so far.  The wife indicates that this is accurate.   "The patient is able to tell me that it is 2025 and that the month is March and when I ask him the name of the president he states \"I would rather not say his name\".  When asked about where he is he did say [place] which was incorrect but then when given a second chance he indicated [place].  The patient has cranial nerves II through XII grossly intact.  Motor strength is 5/5 to all extremities.  Sensations grossly intact.  No facial droop noted.  No significant dysarthria or aphasia.  A thelma hugger is in place once again.  The patient has no evidence of JVD.  No cervical lymphadenopathy.  The heart is with a regular rate and rhythm and normal S1 and S2 heart sounds.  No obvious murmurs, rubs, or gallops.  Lungs are still slightly diminished towards bases but otherwise clear without any wheezing, rhonchi, rales.  Unlabored breathing pattern.  He does not cough at all during my exam.  The abdomen is soft, nondistended, nontender to palpation.  This time I actually did press decently on the suprapubic region and he has no suprapubic fullness or tenderness.  The patient has no flank tenderness.  The patient has stable sores on his left leg.  No new evidence of any cellulitis.  The patient has no increased warmth or skin changes in the lower extremity otherwise.  The patient has no significant edema of the lower extremities either.    Lines/Drains:  Lines/Drains/Airways       Active Status       Name Placement date Placement time Site Days    Urethral Catheter Temperature probe 16 Fr. 03/30/25  1139  Temperature probe  1                  Urinary Catheter:  Goal for removal:  If we can get the patient slightly more ambulatory and he is otherwise stable and we no longer need the temperature probe catheter, then we could potentially discontinue Soto within the next 24 hours.  Soto was placed in the emergency room for continuous temperature monitoring.                 Lab Results: I have reviewed the following results: "   Results from last 7 days   Lab Units 03/31/25  0405   WBC Thousand/uL 7.63   HEMOGLOBIN g/dL 7.5*   HEMATOCRIT % 23.2*   PLATELETS Thousands/uL 120*   SEGS PCT % 84*   LYMPHO PCT % 7*   MONO PCT % 8   EOS PCT % 1     Results from last 7 days   Lab Units 03/31/25  0406 03/30/25  1136   SODIUM mmol/L 140 135   POTASSIUM mmol/L 4.8 4.9   CHLORIDE mmol/L 112* 107   CO2 mmol/L 21 21   BUN mg/dL 26* 35*   CREATININE mg/dL 1.14 1.35*   ANION GAP mmol/L 7 7   CALCIUM mg/dL 8.4 8.9   ALBUMIN g/dL  --  3.4*   TOTAL BILIRUBIN mg/dL  --  0.44   ALK PHOS U/L  --  77   ALT U/L  --  39   AST U/L  --  31   GLUCOSE RANDOM mg/dL 70 158*     Results from last 7 days   Lab Units 03/30/25  1136   INR  1.10     Results from last 7 days   Lab Units 03/31/25  1542 03/31/25  1131 03/31/25  0706 03/30/25  2028 03/30/25  1754   POC GLUCOSE mg/dl 129 151* 81 90 85         Results from last 7 days   Lab Units 03/30/25  1136   LACTIC ACID mmol/L 0.9   PROCALCITONIN ng/ml 0.12       Recent Cultures (last 7 days):   Results from last 7 days   Lab Units 03/30/25  1154 03/30/25  1140 03/30/25  1136   BLOOD CULTURE  Received in Microbiology Lab. Culture in Progress.  --  Received in Microbiology Lab. Culture in Progress.   URINE CULTURE   --  >100,000 cfu/ml Pseudomonas aeruginosa*  >100,000 cfu/ml Enterococcus faecalis*  --        No new imaging studies to review.    Last 24 Hours Medication List:     Current Facility-Administered Medications:     acetaminophen (TYLENOL) tablet 650 mg, Q4H PRN    [Held by provider] ascorbic acid (VITAMIN C) tablet 500 mg, Daily    aspirin (ECOTRIN LOW STRENGTH) EC tablet 81 mg, Daily    atorvastatin (LIPITOR) tablet 20 mg, Daily    carvedilol (COREG) tablet 12.5 mg, BID    cefepime (MAXIPIME) IVPB (premix in dextrose) 1,000 mg 50 mL, Q12H    [Held by provider] Cholecalciferol (VITAMIN D3) tablet 2,000 Units, Daily    [Held by provider] co-enzyme Q-10 capsule 100 mg, Daily    ferrous sulfate tablet 325 mg, BID  With Meals    [Held by provider] furosemide (LASIX) tablet 20 mg, Daily    heparin (porcine) subcutaneous injection 5,000 Units, Q8H ENRIQUE **AND** [COMPLETED] Platelet count, Once    insulin lispro (HumALOG/ADMELOG) 100 units/mL subcutaneous injection 1-5 Units, TID AC **AND** Fingerstick Glucose (POCT), TID AC    insulin lispro (HumALOG/ADMELOG) 100 units/mL subcutaneous injection 1-5 Units, HS    losartan (COZAAR) tablet 25 mg, Daily    ondansetron (ZOFRAN) injection 4 mg, Q6H PRN    pantoprazole (PROTONIX) injection 40 mg, Q24H ENRIQUE    tamsulosin (FLOMAX) capsule 0.4 mg, Daily With Dinner    Administrative Statements   Today, Patient Was Seen By: Wilbert Aquino DO    **Please Note: This note may have been constructed using a voice recognition system.**

## 2025-03-31 NOTE — PROGRESS NOTES
The pantoprazole has been converted to Oral per [facility] IV-to-PO Auto-Conversion Protocol for Adults as approved by the Pharmacy and Therapeutics Committee. The patient met all eligible criteria:  1) Age = 18 years old   2) Received at least one dose of the IV form   3) Receiving at least one other scheduled oral/enteral medication   4) Tolerating an oral/enteral diet   and did not have any exclusions:   1) Critical care patient   2) Active GI bleed (IF assessing H2RAs or PPIs)   3) Continuous tube feeding (IF assessing cipro, doxycycline, levofloxacin, minocycline, rifampin, or voriconazole)   4) Receiving PO vancomycin (IF assessing metronidazole)   5) Persistent nausea and/or vomiting   6) Ileus or gastrointestinal obstruction   7) Rosalind/nasogastric tube set for continuous suction   8) Specific order not to automatically convert to PO (in the order's comments or if discussed in the most recent Infectious Disease or primary team's progress notes).

## 2025-03-31 NOTE — CONSULTS
Consult for nutrition assessment/non healing wound. Clinically significant loss of 10#/6% within the past month. Unable to complete nutrition focused physical exam today. On a CCD2 diet. Consumed 50-60% of breakfast this morning. Will start glucerna ONS BID to aid in protein and energy intake. Wound care nurse and SLP evaluation pending. Nutrition will continue to follow and adjust ONS as needed.

## 2025-03-31 NOTE — ASSESSMENT & PLAN NOTE
Patient is alert oriented person, place, and time disoriented to situation  Wife has noticed a cognitive decline over the last about a year  Has trouble with short-term things  Frequently forgets that of he several people in the next day  No cognitive testing on file  No formal diagnosis of dementia or cognitive impairment  Suspicion of Parkinson's disease due to patient's shuffling gait, tremors, changes in handwriting, although this has not formally been diagnosed and patient has not been seen by neurology-send spoke with Dr. Aquino who plans to refer him to neurology for formal workup  If neurology is able to see patient in person would recommend neurology consult while hospitalized  At risk for delirium due to UTI, hospitalization, medications, sleep disturbance  QTC- [id number]  Recommend delirium precautions as above

## 2025-03-31 NOTE — ASSESSMENT & PLAN NOTE
Lab Results   Component Value Date    HGBA1C 6.9 (H) 02/11/2025       Recent Labs     03/30/25  1754 03/30/25 2028 03/31/25  0706   POCGLU 85 90 81       Blood Sugar Average: Last 72 hrs:  (P) [id number]

## 2025-03-31 NOTE — PROGRESS NOTES
Update obtained from [facility] the patient admitted 3/30/25 to [facility]. This Admin Coordinator will continue to monitor via chart review.

## 2025-03-31 NOTE — ASSESSMENT & PLAN NOTE
Hypothermic at 93.8 on admission, most recent temperature 97.4  Felt likely secondary to UTI, although patient is showing signs of Parkinson's and that could be another potential neurological etiology  Although patient does have a previous history of this  Consider endocrinology consult if this persist  Management per primary

## 2025-03-31 NOTE — ASSESSMENT & PLAN NOTE
Urine microscopic showed WBCs occasional epithelial cells, moderate bacteria  Urine culture pending  Currently on ceftriaxone  Management per primary

## 2025-03-31 NOTE — ASSESSMENT & PLAN NOTE
At a baseline ambulates with walker or rollator  PT/OT following  Fall precautions  Out of bed as tolerated  Encourage early and frequent mobilization  Encourage adequate hydration and nutrition  Provide adequate pain management   Goal is short-term rehab once medically stable  Continue with PT/OT for continued strength and balance training

## 2025-03-31 NOTE — ASSESSMENT & PLAN NOTE
Home regimen -   Carvedilol 12.5 mg by mouth twice daily.  Losartan 25 mg by mouth daily.  Spironolactone 25 mg by mouth daily.  Patient is also on furosemide 20 mg by mouth daily.  Inpatient regimen -   Continue carvedilol as heart rate is currently not bradycardic but we will use caution and have hold parameters.  Continue losartan as renal function remains stable.  Hold spironolactone and furosemide to allow for hydration in the setting of the infection  We would need to reassess whether it is best for the patient to still be on both diuretics as an outpatient or if we can scale back if he is potentially prone to dehydration at all.  However, as he is just starting to come to it is too early to really tell how much she is going to eat consistently.  Monitor blood pressures.

## 2025-03-31 NOTE — ASSESSMENT & PLAN NOTE
Unclear etiology, differential diagnosis include-UTI with hypothermia, versus worsening dementia versus hypothyroidism versus dehydration versus hypoglycemia  At a baseline is alert and oriented x  3-4  On exam patient is alert and oriented to person, place, and time disoriented to situation  Overall mental status is improved from yesterday, patient less lethargic and more oriented today  With a chronic decline over the last few months, but with worsening lethargy and hallucinations on admission  Identify and treat reversible causes of confusion including infection, dehydration, electrolyte imbalance, anemia, hypoxia, urinary retention, constipation, pain, and sleep disturbance  At risk for delirium due to acute encephalopathy  Recommend delirium precautions  Maintain sleep-wake cycle, avoid nighttime interruptions  Provide adequate pain control  Avoid urinary retention and constipation  Provide frequent and early mobilization  Provide frequent redirection and reorientation as needed  Avoid medications that may worsen or precipitate delirium such as tramadol, benzodiazepines, anticholinergics, and Benadryl  Redirect unwanted behaviors as first-line therapy, avoid physical restraints as able to  Continue to monitor

## 2025-03-31 NOTE — ASSESSMENT & PLAN NOTE
Most recent blood pressure 160/90  Currently on carvedilol 12.5 mg twice daily, losartan 25 mg daily  Hypotension  Management per primary

## 2025-03-31 NOTE — CONSULTS
Consultation - Geriatric Medicine   Name: Pernell Covarrubias 79 y.o. male I MRN: [id number]  Unit/Bed#: -01 I Date of Admission: 3/30/2025   Date of Service: 3/31/2025 I Hospital Day: 1   Inpatient consult to Gerontology  Consult performed by: NEO Hummel  Consult ordered by: Wilbert Aquino DO        Physician Requesting Evaluation: Wilbert Aquino DO   Reason for Evaluation / Principal Problem: Encephalopathy, ambulatory dysfunction    Assessment & Plan  Toxic metabolic encephalopathy  Unclear etiology, differential diagnosis include-UTI with hypothermia, versus worsening dementia versus hypothyroidism versus dehydration versus hypoglycemia  At a baseline is alert and oriented x  3-4  On exam patient is alert and oriented to person, place, and time disoriented to situation  Overall mental status is improved from yesterday, patient less lethargic and more oriented today  With a chronic decline over the last few months, but with worsening lethargy and hallucinations on admission  Identify and treat reversible causes of confusion including infection, dehydration, electrolyte imbalance, anemia, hypoxia, urinary retention, constipation, pain, and sleep disturbance  At risk for delirium due to acute encephalopathy  Recommend delirium precautions  Maintain sleep-wake cycle, avoid nighttime interruptions  Provide adequate pain control  Avoid urinary retention and constipation  Provide frequent and early mobilization  Provide frequent redirection and reorientation as needed  Avoid medications that may worsen or precipitate delirium such as tramadol, benzodiazepines, anticholinergics, and Benadryl  Redirect unwanted behaviors as first-line therapy, avoid physical restraints as able to  Continue to monitor  Coronary artery disease involving native coronary artery of native heart with angina pectoris (HCC)  Takes aspirin, statin, and carvedilol as an outpatient  CKD stage 3 due to type 2 diabetes mellitus  ([facility])  Lab Results   Component Value Date    HGBA1C 6.9 (H) 02/11/2025   Baseline creatinine appears to be between 0.8-1.2  Avoid nephrotoxic medication  Renal dose medications as needed  Encourage adequate p.o. Hydration  Avoid hypotension  Periodic lab work to monitor renal function  Continue to monitor  Management per primary  Type 2 diabetes mellitus with diabetic neuropathy ([facility])  Lab Results   Component Value Date    HGBA1C 6.9 (H) 02/11/2025       Recent Labs     03/30/25  1754 03/30/25 2028 03/31/25  0706   POCGLU 85 90 81       Blood Sugar Average: Last 72 hrs:  (P) [id number]    Benign essential hypertension  Most recent blood pressure 160/90  Currently on carvedilol 12.5 mg twice daily, losartan 25 mg daily  Hypotension  Management per primary  Hypothermia  Hypothermic at 93.8 on admission, most recent temperature 97.4  Felt likely secondary to UTI, although patient is showing signs of Parkinson's and that could be another potential neurological etiology  Although patient does have a previous history of this  Consider endocrinology consult if this persist  Management per primary  Abnormal TSH  TSH 9.021  T4 free and T3 pending  Anemia of chronic disease  Hemoglobin on admission 8.6, today 7.5  UTI (urinary tract infection)  Urine microscopic showed WBCs occasional epithelial cells, moderate bacteria  Urine culture pending  Currently on ceftriaxone  Management per primary  Compression fracture of L3 vertebra ([facility])  Age-indeterminate on imaging  Wife does report patient complains frequently of back pain  Would recommend adding scheduled Tylenol and lidocaine patch, she was using lidocaine patches at home  Management per primary  Pressure injury of skin of buttock  Frequent turning and repositioning  Wound care consult pending  Insomnia, persistent  First-line treatment is behavior modification  Maintain sleep-wake cycle, avoid nighttime interruptions  Avoid caffeine throughout the day  Avoid  napping throughout the day  Encourage physical activity throughout the day  Avoid sedative hypnotics including benzodiazepines and benadryl    Currently not on anything for sleep  If needed could use a low-dose of melatonin  Ambulatory dysfunction  At a baseline ambulates with walker or rollator  PT/OT following  Fall precautions  Out of bed as tolerated  Encourage early and frequent mobilization  Encourage adequate hydration and nutrition  Provide adequate pain management   Goal is short-term rehab once medically stable  Continue with PT/OT for continued strength and balance training   Cognitive decline  Patient is alert oriented person, place, and time disoriented to situation  Wife has noticed a cognitive decline over the last about a year  Has trouble with short-term things  Frequently forgets that of he several people in the next day  No cognitive testing on file  No formal diagnosis of dementia or cognitive impairment  Suspicion of Parkinson's disease due to patient's shuffling gait, tremors, changes in handwriting, although this has not formally been diagnosed and patient has not been seen by neurology-send spoke with Dr. Aquino who plans to refer him to neurology for formal workup  If neurology is able to see patient in person would recommend neurology consult while hospitalized  At risk for delirium due to UTI, hospitalization, medications, sleep disturbance  QTC- 382  Recommend delirium precautions as above  Frailty syndrome in geriatric patient  Clinical Frail Scale: 6  Total protein 6.0 and albumin 3.4  Encourage adequate hydration and nutrition, including protein in meals  OOB as tolerated  PT/OT consulted  Encourage early and frequent mobilization  Dysphagia  Wife reports some trouble swallowing and coughing when swallowing  Reports she thinks this just started and since that his hospitalization at [place]  Speech therapy consult already pending  Aspiration precautions  Further management of  diet changes per speech therapy recommendations and primary      History of Present Illness   Hx and PE limited by: Encephalopathy  HPI: Pernell Covarrubias is a 79 y.o. year old male who presents with past medical history including, but not limited to CAD, CKD, hypertension, type 2 diabetes, anemia, abnormal TSH, and amatory dysfunction.  He presented to the ER with increased lethargy and hallucinations.  Of note patient has been having some cognitive decline for over a month.  He had a hospitalization at [facility]] at the beginning of March for hypothermia.    Prior to patient's initial hospitalization at the beginning of March patient was living at home with his wife.  He was using a rollator or walker for ambulation.  He had at least 2 falls in the last 6 months.  He requires assistance for bathing and dressing.  There are grab bar shower chairs in the bathroom.  He has occasional bowel and bladder incontinence and wears a depends.  His wife is doing all the cooking and cleaning.  Patient was managing his own medications.  His wife more recently was doing all of the finances.  She did notice changes in his handwriting and having trouble reading them and when he was writing checks.  He wears eyeglasses, dentures, and hearing aids.  No anxiety, depression, or insomnia.  No appetite loss or weight loss at home, although since being at the facility has lost weight.  He no longer drives, his wife gets him to appointments.  Memory has been slowly declining, patient more forgetful especially short-term stuff.  History provided by wife who is bedside.    Upon exam patient was lying in bed, sleeping.  He is finished eating breakfast.  He ate about 50% of his meal.  Slept okay overnight.  Nurse reports he is more oriented and awake today.  No bowel movement since hospitalized.  Per nursing no acute concerns or issues at this time.    Review of Systems   Constitutional:  Positive for appetite change. Negative for  activity change.   HENT:  Positive for trouble swallowing. Negative for ear pain and sore throat.    Eyes:  Negative for visual disturbance.   Respiratory:  Negative for cough and shortness of breath.    Cardiovascular:  Negative for chest pain and palpitations.   Gastrointestinal:  Negative for abdominal pain, constipation, diarrhea and vomiting.   Genitourinary:  Negative for difficulty urinating and dysuria.   Musculoskeletal:  Positive for gait problem. Negative for arthralgias and back pain.   Skin:  Negative for color change and rash.   Neurological:  Positive for weakness. Negative for dizziness and headaches.       Medical History Review: I have reviewed the patient's PMH, PSH, Social History, Family History, Meds, and Allergies   Historical Information   Past Medical History:   Diagnosis Date    Acquired hallux valgus     last assessed: 8/1/2013    Allergic rhinitis     Anemia 10/26/2010    Atrophy of nail     last assessed: 7/7/2015    Cancer ([facility]) 2020    Chronic kidney disease     chronic renal insufficiency    Coronary artery disease     Deformity of ankle and foot, acquired     last assessed: 2/22/2016    Diabetes mellitus ([facility]) 1994    Difficulty walking     last assessed: 12/14/2015    Dysesthesia     last assessed: 11/25/2016    Hammer toe     unspecified laterality; last assessed: 8/1/2013    Hypertension     Neuropathy in diabetes ([facility]) 1994    Onychomycosis of toenail     last assessed: 2/22/2016    Peripheral vascular disease ([facility])     left SFA stent in 2010    Pes planus     unspecified laterality; last assessed: 8/1/2013    Pneumonia     last assessed: 2/27/2016    Prostate cancer ([facility])     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     last assessed: 8/15/2016    Type 2 diabetes mellitus ([facility]) 07/26/2010    Viral warts     last assessed: 7/24/2015     Past Surgical History:   Procedure Laterality Date    CARDIAC CATHETERIZATION  07/29/2010    CATARACT EXTRACTION, BILATERAL Bilateral      R 1999, L 2008    COLONOSCOPY  2011    FEMORAL ARTERY - POPLITEAL ARTERY BYPASS GRAFT  09/23/2013    FOOT SURGERY      bone spur removed    LEG SURGERY Bilateral     repair; L 8/20/2010 and 7/26/2012    [facility] PLMT INTERSTITIAL DEV RADIAT TX PROSTATE 1/MULT N/A 6/22/2021    Procedure: INSERTION OF FIDUCIAL MARKER (TRANSRECTAL ULTRASOUND GUIDANCE), SPACEOAR;  Surgeon: Jero Loomis MD;  Location: [facility];  Service: Urology    ROTATOR CUFF REPAIR Left 2009    SHOULDER SURGERY Right 2005    collar bone     Social History     Tobacco Use    Smoking status: Never     Passive exposure: Past    Smokeless tobacco: Never   Vaping Use    Vaping status: Never Used   Substance and Sexual Activity    Alcohol use: Yes     Alcohol/week: 1.0 standard drink of alcohol     Types: 1 Glasses of wine per week     Comment: Stopped in 1986    Drug use: Never    Sexual activity: Not Currently     Partners: Female     E-Cigarette/Vaping    E-Cigarette Use Never User      E-Cigarette/Vaping Substances    Nicotine No     THC No     CBD No     Flavoring No     Other No     Unknown No      Family History   Problem Relation Age of Onset    Aortic aneurysm Mother     Heart disease Father     Heart attack Father         acute myocardial infarction    Heart disease Sister     Heart attack Sister         acute myocardial infarction    Throat cancer Maternal Grandfather     Heart disease Paternal Grandfather     No Known Problems Son        Meds/Allergies   Home medication review  Omeprazole 40mg daily as needed for acid reflux  Oxybutynin er 5mg daily at bedtime  Januvia 25mg daily  Tamsoulsin 0.4mg daily with dinner  Spirolactone 25mg daily  Carvedilol 12.5mg twice daily  Atorvastatin 20mg daily  Losartan 25mg daily  Ferrous sulfate ec 324mg twice daily before meals  Lasix 20mg three times per week- canceled by provider    Personally confirmed with [facility] pharmacy      Objective :  Temp:  [93.6 °F (34.2 °C)-98.6 °F (37 °C)] 98.4 °F (36.9  °C)  HR:  [63-98] 84  BP: (104-160)/(51-97) 160/90  Resp:  [14-22] 16  SpO2:  [96 %-100 %] 100 %  O2 Device: None (Room air)    Physical Exam  Vitals reviewed.   Constitutional:       General: He is sleeping. He is not in acute distress.     Appearance: He is not ill-appearing.   Cardiovascular:      Rate and Rhythm: Normal rate and regular rhythm.   Pulmonary:      Effort: Pulmonary effort is normal.      Breath sounds: Normal breath sounds.   Abdominal:      General: Bowel sounds are normal.      Palpations: Abdomen is soft.   Genitourinary:     Comments: meza  Skin:     General: Skin is warm and dry.      Coloration: Skin is pale.   Neurological:      General: No focal deficit present.      Mental Status: Mental status is at baseline.      Cranial Nerves: No cranial nerve deficit.      Motor: Weakness present.      Gait: Gait abnormal.      Comments: Alert to person, place, and time disoriented to situation           Lab Results: I have reviewed the following results:CBC/BMP:   .     03/31/25  0405 03/31/25  0406   WBC 7.63  --    HGB 7.5*  --    HCT 23.2*  --    *  --    SODIUM  --  140   K  --  4.8   CL  --  112*   CO2  --  21   BUN  --  26*   CREATININE  --  1.14   GLUC  --  70    , Creatinine Clearance: Estimated Creatinine Clearance: 51.4 mL/min (by C-G formula based on SCr of 1.14 mg/dL)., Procalcitonin:   Lab Results   Component Value Date    PROCALCITONI 0.12 03/30/2025   , TSH:   Results from last 7 days   Lab Units 03/30/25  1136   TSH 3RD GENERATON uIU/mL 9.021*   , Urinalysis:   Lab Results   Component Value Date    COLORU Yellow 03/30/2025    CLARITYU Clear 03/30/2025    SPECGRAV 1.020 03/30/2025    PHUR 6.0 03/30/2025    LEUKOCYTESUR 1+ (A) 03/30/2025    NITRITE Positive (A) 03/30/2025    GLUCOSEU Negative 03/30/2025    KETONESU Negative 03/30/2025    BILIRUBINUR Negative 03/30/2025    BLOODU Negative 03/30/2025       Imaging Results Review: I reviewed radiology reports from this admission  "including: CT head.  Other Study Results Review: EKG was reviewed.     Therapies:   Basic Mobility Inpatient Raw Score: 6  -[facility] Goal: 2: Bed activities/Dependent transfer  -HL Achieved: 1: Laying in bed  PT: consulted  OT: consulted    VTE Prophylaxis: VTE covered by:  heparin (porcine), Subcutaneous, 5,000 Units at 03/31/25 0531    and Sequential compression device (Venodyne)     Code Status: Level 3 - DNAR and DNI -confirmed with patient's wife bedside that she wishes for him to be a level 3 DNR  Advance Directive and Living Will:    will  Power of :  no  POLST:  yes    Family and Social Support:   Living Arrangements: Lives w/ Spouse/significant other  Support Systems: Spouse/significant other; Son  Type of Current Residence: [facility]      Goals of Care: Undetermined     Administrative Statements   I have spent a total time of 65 minutes in caring for this patient on the day of the visit/encounter including Documenting in the medical record, Reviewing/placing orders in the medical record (including tests, medications, and/or procedures), Obtaining or reviewing history  , and Communicating with other healthcare professionals .    Please note:  Voice-recognition software may have been used in the preparation of this document.  Occasional wrong word or \"sound-alike\" substitutions may have occurred due to the inherent limitations of voice recognition software.  Interpretation should be guided by context.    "

## 2025-03-31 NOTE — ASSESSMENT & PLAN NOTE
First-line treatment is behavior modification  Maintain sleep-wake cycle, avoid nighttime interruptions  Avoid caffeine throughout the day  Avoid napping throughout the day  Encourage physical activity throughout the day  Avoid sedative hypnotics including benzodiazepines and benadryl    Currently not on anything for sleep  If needed could use a low-dose of melatonin

## 2025-03-31 NOTE — PLAN OF CARE
Problem: Potential for Falls  Goal: Patient will remain free of falls  Description: INTERVENTIONS:- Educate patient/family on patient safety including physical limitations- Instruct patient to call for assistance with activity - Consult OT/PT to assist with strengthening/mobility - Keep Call bell within reach- Keep bed low and locked with side rails adjusted as appropriate- Keep care items and personal belongings within reach- Initiate and maintain comfort rounds- Make Fall Risk Sign visible to staff- Offer Toileting every  Hours, in advance of need- Initiate/Maintain alarm- Obtain necessary fall risk management equipment: - Apply yellow socks and bracelet for high fall risk patients- Consider moving patient to room near nurses station  INTERVENTIONS:- Educate patient/family on patient safety including physical limitations- Instruct patient to call for assistance with activity - Consult OT/PT to assist with strengthening/mobility - Keep Call bell within reach- Keep bed low and locked with side rails adjusted as appropriate- Keep care items and personal belongings within reach- Initiate and maintain comfort rounds- Make Fall Risk Sign visible to staff- Offer Toileting every  Hours, in advance of need- Initiate/Maintain alarm- Obtain necessary fall risk management equipment: - Apply yellow socks and bracelet for high fall risk patients- Consider moving patient to room near nurses station  Outcome: Progressing     Problem: Prexisting or High Potential for Compromised Skin Integrity  Goal: Skin integrity is maintained or improved  Description: INTERVENTIONS:- Identify patients at risk for skin breakdown- Assess and monitor skin integrity- Assess and monitor nutrition and hydration status- Monitor labs - Assess for incontinence - Turn and reposition patient- Assist with mobility/ambulation- Relieve pressure over bony prominences- Avoid friction and shearing- Provide appropriate hygiene as needed including keeping skin  clean and dry- Evaluate need for skin moisturizer/barrier cream- Collaborate with interdisciplinary team - Patient/family teaching- Consider wound care consult   Outcome: Progressing     Problem: PAIN - ADULT  Goal: Verbalizes/displays adequate comfort level or baseline comfort level  Description: Interventions:- Encourage patient to monitor pain and request assistance- Assess pain using appropriate pain scale- Administer analgesics based on type and severity of pain and evaluate response- Implement non-pharmacological measures as appropriate and evaluate response- Consider cultural and social influences on pain and pain management- Notify physician/advanced practitioner if interventions unsuccessful or patient reports new pain  Outcome: Progressing     Problem: INFECTION - ADULT  Goal: Absence or prevention of progression during hospitalization  Description: INTERVENTIONS:- Assess and monitor for signs and symptoms of infection- Monitor lab/diagnostic results- Monitor all insertion sites, i.e. indwelling lines, tubes, and drains- Monitor endotracheal if appropriate and nasal secretions for changes in amount and color- [place] appropriate cooling/warming therapies per order- Administer medications as ordered- Instruct and encourage patient and family to use good hand hygiene technique- Identify and instruct in appropriate isolation precautions for identified infection/condition  Outcome: Progressing  Goal: Absence of fever/infection during neutropenic period  Description: INTERVENTIONS:- Monitor WBC  Outcome: Progressing     Problem: SAFETY ADULT  Goal: Patient will remain free of falls  Description: INTERVENTIONS:- Educate patient/family on patient safety including physical limitations- Instruct patient to call for assistance with activity - Consult OT/PT to assist with strengthening/mobility - Keep Call bell within reach- Keep bed low and locked with side rails adjusted as appropriate- Keep care items and  personal belongings within reach- Initiate and maintain comfort rounds- Make Fall Risk Sign visible to staff- Offer Toileting every  Hours, in advance of need- Initiate/Maintain alarm- Obtain necessary fall risk management equipment: - Apply yellow socks and bracelet for high fall risk patients- Consider moving patient to room near nurses station  INTERVENTIONS:- Educate patient/family on patient safety including physical limitations- Instruct patient to call for assistance with activity - Consult OT/PT to assist with strengthening/mobility - Keep Call bell within reach- Keep bed low and locked with side rails adjusted as appropriate- Keep care items and personal belongings within reach- Initiate and maintain comfort rounds- Make Fall Risk Sign visible to staff- Offer Toileting every  Hours, in advance of need- Initiate/Maintain alarm- Obtain necessary fall risk management equipment: - Apply yellow socks and bracelet for high fall risk patients- Consider moving patient to room near nurses station  Outcome: Progressing  Goal: Maintain or return to baseline ADL function  Description: INTERVENTIONS:-  Assess patient's ability to carry out ADLs; assess patient's baseline for ADL function and identify physical deficits which impact ability to perform ADLs (bathing, care of mouth/teeth, toileting, grooming, dressing, etc.)- Assess/evaluate cause of self-care deficits - Assess range of motion- Assess patient's mobility; develop plan if impaired- Assess patient's need for assistive devices and provide as appropriate- Encourage maximum independence but intervene and supervise when necessary- Involve family in performance of ADLs- Assess for home care needs following discharge - Consider OT consult to assist with ADL evaluation and planning for discharge- Provide patient education as appropriate  Outcome: Progressing  Goal: Maintains/Returns to pre admission functional level  Description: INTERVENTIONS:- Perform AM-PAC 6  Click Basic Mobility/ Daily Activity assessment daily.- Set and communicate daily mobility goal to care team and patient/family/caregiver. - Collaborate with rehabilitation services on mobility goals if consulted- Perform Range of Motion  times a day.- Reposition patient every  hours.- Dangle patient times a day- Stand patient  times a day- Ambulate patient  times a day- Out of bed to chair  times a day - Out of bed for meals  times a day- Out of bed for toileting- Record patient progress and toleration of activity level   Outcome: Progressing     Problem: DISCHARGE PLANNING  Goal: Discharge to home or other facility with appropriate resources  Description: INTERVENTIONS:- Identify barriers to discharge w/patient and caregiver- Arrange for needed discharge resources and transportation as appropriate- Identify discharge learning needs (meds, wound care, etc.)- Arrange for interpretive services to assist at discharge as needed- Refer to Case Management Department for coordinating discharge planning if the patient needs post-hospital services based on physician/advanced practitioner order or complex needs related to functional status, cognitive ability, or social support system  Outcome: Progressing     Problem: Knowledge Deficit  Goal: Patient/family/caregiver demonstrates understanding of disease process, treatment plan, medications, and discharge instructions  Description: Complete learning assessment and assess knowledge base.Interventions:- Provide teaching at level of understanding- Provide teaching via preferred learning methods  Outcome: Progressing

## 2025-03-31 NOTE — PLAN OF CARE
Problem: Potential for Falls  Goal: Patient will remain free of falls  Description: INTERVENTIONS:- Educate patient/family on patient safety including physical limitations- Instruct patient to call for assistance with activity - Consult OT/PT to assist with strengthening/mobility - Keep Call bell within reach- Keep bed low and locked with side rails adjusted as appropriate- Keep care items and personal belongings within reach- Initiate and maintain comfort rounds- Make Fall Risk Sign visible to staff- Offer Toileting every 2 Hours, in advance of need- Initiate/Maintain bed alarm- Apply yellow socks and bracelet for high fall risk patients- Consider moving patient to room near nurses station  INTERVENTIONS:- Educate patient/family on patient safety including physical limitations- Instruct patient to call for assistance with activity - Consult OT/PT to assist with strengthening/mobility - Keep Call bell within reach- Keep bed low and locked with side rails adjusted as appropriate- Keep care items and personal belongings within reach- Initiate and maintain comfort rounds- Make Fall Risk Sign visible to staff- Offer Toileting every 2 Hours, in advance of need- Initiate/Maintain bed alarm- - Apply yellow socks and bracelet for high fall risk patients- Consider moving patient to room near nurses station  Outcome: Progressing     Problem: Prexisting or High Potential for Compromised Skin Integrity  Goal: Skin integrity is maintained or improved  Description: INTERVENTIONS:- Identify patients at risk for skin breakdown- Assess and monitor skin integrity- Assess and monitor nutrition and hydration status- Monitor labs - Assess for incontinence - Turn and reposition patient- Assist with mobility/ambulation- Relieve pressure over bony prominences- Avoid friction and shearing- Provide appropriate hygiene as needed including keeping skin clean and dry- Evaluate need for skin moisturizer/barrier cream- Collaborate with  interdisciplinary team - Patient/family teaching- Consider wound care consult   Outcome: Progressing     Problem: PAIN - ADULT  Goal: Verbalizes/displays adequate comfort level or baseline comfort level  Description: Interventions:- Encourage patient to monitor pain and request assistance- Assess pain using appropriate pain scale- Administer analgesics based on type and severity of pain and evaluate response- Implement non-pharmacological measures as appropriate and evaluate response- Consider cultural and social influences on pain and pain management- Notify physician/advanced practitioner if interventions unsuccessful or patient reports new pain  Outcome: Progressing     Problem: INFECTION - ADULT  Goal: Absence or prevention of progression during hospitalization  Description: INTERVENTIONS:- Assess and monitor for signs and symptoms of infection- Monitor lab/diagnostic results- Monitor all insertion sites, i.e. indwelling lines, tubes, and drains- Monitor endotracheal if appropriate and nasal secretions for changes in amount and color- [place] appropriate cooling/warming therapies per order- Administer medications as ordered- Instruct and encourage patient and family to use good hand hygiene technique- Identify and instruct in appropriate isolation precautions for identified infection/condition  Outcome: Progressing  Goal: Absence of fever/infection during neutropenic period  Description: INTERVENTIONS:- Monitor WBC  Outcome: Progressing     Problem: SAFETY ADULT  Goal: Patient will remain free of falls  Description: INTERVENTIONS:- Educate patient/family on patient safety including physical limitations- Instruct patient to call for assistance with activity - Consult OT/PT to assist with strengthening/mobility - Keep Call bell within reach- Keep bed low and locked with side rails adjusted as appropriate- Keep care items and personal belongings within reach- Initiate and maintain comfort rounds- Make Fall Risk  Sign visible to staff- Offer Toileting every 2 Hours, in advance of need- Initiate/Maintain bed alarm- - Apply yellow socks and bracelet for high fall risk patients- Consider moving patient to room near nurses station  INTERVENTIONS:- Educate patient/family on patient safety including physical limitations- Instruct patient to call for assistance with activity - Consult OT/PT to assist with strengthening/mobility - Keep Call bell within reach- Keep bed low and locked with side rails adjusted as appropriate- Keep care items and personal belongings within reach- Initiate and maintain comfort rounds- Make Fall Risk Sign visible to staff- Offer Toileting every 3 Hours, in advance of need- Initiate/Maintain bed alarm-Apply yellow socks and bracelet for high fall risk patients- Consider moving patient to room near nurses station  Outcome: Progressing  Goal: Maintain or return to baseline ADL function  Description: INTERVENTIONS:-  Assess patient's ability to carry out ADLs; assess patient's baseline for ADL function and identify physical deficits which impact ability to perform ADLs (bathing, care of mouth/teeth, toileting, grooming, dressing, etc.)- Assess/evaluate cause of self-care deficits - Assess range of motion- Assess patient's mobility; develop plan if impaired- Assess patient's need for assistive devices and provide as appropriate- Encourage maximum independence but intervene and supervise when necessary- Involve family in performance of ADLs- Assess for home care needs following discharge - Consider OT consult to assist with ADL evaluation and planning for discharge- Provide patient education as appropriate  Outcome: Progressing  Goal: Maintains/Returns to pre admission functional level  Description: INTERVENTIONS:- Perform AM-PAC 6 Click Basic Mobility/ Daily Activity assessment daily.- Set and communicate daily mobility goal to care team and patient/family/caregiver. - Collaborate with rehabilitation services  on mobility goals if consulted- Perform Range of Motion 3 times a day.- Reposition patient every 2 hours.- Dangle patient 3 times a day- Stand patient 3 times a day- Ambulate patient 3 times a day- Out of bed to chair 3 times a day - Out of bed for meals 3 times a day- Out of bed for toileting- Record patient progress and toleration of activity level   Outcome: Progressing     Problem: DISCHARGE PLANNING  Goal: Discharge to home or other facility with appropriate resources  Description: INTERVENTIONS:- Identify barriers to discharge w/patient and caregiver- Arrange for needed discharge resources and transportation as appropriate- Identify discharge learning needs (meds, wound care, etc.)- Arrange for interpretive services to assist at discharge as needed- Refer to Case Management Department for coordinating discharge planning if the patient needs post-hospital services based on physician/advanced practitioner order or complex needs related to functional status, cognitive ability, or social support system  Outcome: Progressing     Problem: Knowledge Deficit  Goal: Patient/family/caregiver demonstrates understanding of disease process, treatment plan, medications, and discharge instructions  Description: Complete learning assessment and assess knowledge base.Interventions:- Provide teaching at level of understanding- Provide teaching via preferred learning methods  Outcome: Progressing

## 2025-04-01 LAB
ALBUMIN SERPL BCG-MCNC: 3 G/DL (ref 3.5–5)
ALP SERPL-CCNC: 76 U/L (ref 34–104)
ALT SERPL W P-5'-P-CCNC: 26 U/L (ref 7–52)
ANION GAP SERPL CALCULATED.3IONS-SCNC: 8 MMOL/L (ref 4–13)
AST SERPL W P-5'-P-CCNC: 21 U/L (ref 13–39)
BACTERIA UR CULT: ABNORMAL
BACTERIA UR CULT: ABNORMAL
BASOPHILS # BLD AUTO: 0.03 THOUSANDS/ÂΜL (ref 0–0.1)
BASOPHILS NFR BLD AUTO: 0 % (ref 0–1)
BILIRUB SERPL-MCNC: 0.53 MG/DL (ref 0.2–1)
BUN SERPL-MCNC: 33 MG/DL (ref 5–25)
CALCIUM ALBUM COR SERPL-MCNC: 9.3 MG/DL (ref 8.3–10.1)
CALCIUM SERPL-MCNC: 8.5 MG/DL (ref 8.4–10.2)
CHLORIDE SERPL-SCNC: 110 MMOL/L (ref 96–108)
CO2 SERPL-SCNC: 20 MMOL/L (ref 21–32)
CREAT SERPL-MCNC: 1.38 MG/DL (ref 0.6–1.3)
EOSINOPHIL # BLD AUTO: 0.11 THOUSAND/ÂΜL (ref 0–0.61)
EOSINOPHIL NFR BLD AUTO: 2 % (ref 0–6)
ERYTHROCYTE [DISTWIDTH] IN BLOOD BY AUTOMATED COUNT: 15.9 % (ref 11.6–15.1)
GFR SERPL CREATININE-BSD FRML MDRD: 47 ML/MIN/1.73SQ M
GLUCOSE SERPL-MCNC: 115 MG/DL (ref 65–140)
GLUCOSE SERPL-MCNC: 121 MG/DL (ref 65–140)
GLUCOSE SERPL-MCNC: 146 MG/DL (ref 65–140)
GLUCOSE SERPL-MCNC: 165 MG/DL (ref 65–140)
GLUCOSE SERPL-MCNC: 199 MG/DL (ref 65–140)
HCT VFR BLD AUTO: 22.8 % (ref 36.5–49.3)
HGB BLD-MCNC: 7.4 G/DL (ref 12–17)
IMM GRANULOCYTES # BLD AUTO: 0.04 THOUSAND/UL (ref 0–0.2)
IMM GRANULOCYTES NFR BLD AUTO: 1 % (ref 0–2)
LYMPHOCYTES # BLD AUTO: 0.39 THOUSANDS/ÂΜL (ref 0.6–4.47)
LYMPHOCYTES NFR BLD AUTO: 5 % (ref 14–44)
MCH RBC QN AUTO: 29.1 PG (ref 26.8–34.3)
MCHC RBC AUTO-ENTMCNC: 32.5 G/DL (ref 31.4–37.4)
MCV RBC AUTO: 90 FL (ref 82–98)
MONOCYTES # BLD AUTO: 0.58 THOUSAND/ÂΜL (ref 0.17–1.22)
MONOCYTES NFR BLD AUTO: 8 % (ref 4–12)
NEUTROPHILS # BLD AUTO: 6.35 THOUSANDS/ÂΜL (ref 1.85–7.62)
NEUTS SEG NFR BLD AUTO: 84 % (ref 43–75)
NRBC BLD AUTO-RTO: 0 /100 WBCS
PLATELET # BLD AUTO: 128 THOUSANDS/UL (ref 149–390)
PMV BLD AUTO: 10.7 FL (ref 8.9–12.7)
POTASSIUM SERPL-SCNC: 4.5 MMOL/L (ref 3.5–5.3)
PROT SERPL-MCNC: 5.3 G/DL (ref 6.4–8.4)
RBC # BLD AUTO: 2.54 MILLION/UL (ref 3.88–5.62)
SODIUM SERPL-SCNC: 138 MMOL/L (ref 135–147)
WBC # BLD AUTO: 7.5 THOUSAND/UL (ref 4.31–10.16)

## 2025-04-01 PROCEDURE — 92610 EVALUATE SWALLOWING FUNCTION: CPT

## 2025-04-01 PROCEDURE — 97530 THERAPEUTIC ACTIVITIES: CPT

## 2025-04-01 PROCEDURE — 82948 REAGENT STRIP/BLOOD GLUCOSE: CPT

## 2025-04-01 PROCEDURE — 97163 PT EVAL HIGH COMPLEX 45 MIN: CPT

## 2025-04-01 PROCEDURE — 87081 CULTURE SCREEN ONLY: CPT

## 2025-04-01 PROCEDURE — 80053 COMPREHEN METABOLIC PANEL: CPT | Performed by: INTERNAL MEDICINE

## 2025-04-01 PROCEDURE — 97110 THERAPEUTIC EXERCISES: CPT

## 2025-04-01 PROCEDURE — 97116 GAIT TRAINING THERAPY: CPT

## 2025-04-01 PROCEDURE — 97167 OT EVAL HIGH COMPLEX 60 MIN: CPT

## 2025-04-01 PROCEDURE — 85025 COMPLETE CBC W/AUTO DIFF WBC: CPT | Performed by: INTERNAL MEDICINE

## 2025-04-01 PROCEDURE — 99232 SBSQ HOSP IP/OBS MODERATE 35: CPT | Performed by: HOSPITALIST

## 2025-04-01 RX ADMIN — HEPARIN SODIUM 5000 UNITS: 5000 INJECTION INTRAVENOUS; SUBCUTANEOUS at 21:32

## 2025-04-01 RX ADMIN — HEPARIN SODIUM 5000 UNITS: 5000 INJECTION INTRAVENOUS; SUBCUTANEOUS at 05:47

## 2025-04-01 RX ADMIN — CARVEDILOL 12.5 MG: 12.5 TABLET, FILM COATED ORAL at 18:14

## 2025-04-01 RX ADMIN — INSULIN LISPRO 1 UNITS: 100 INJECTION, SOLUTION INTRAVENOUS; SUBCUTANEOUS at 11:48

## 2025-04-01 RX ADMIN — CEFEPIME HYDROCHLORIDE 1000 MG: 1 INJECTION, SOLUTION INTRAVENOUS at 18:10

## 2025-04-01 RX ADMIN — HEPARIN SODIUM 5000 UNITS: 5000 INJECTION INTRAVENOUS; SUBCUTANEOUS at 14:41

## 2025-04-01 RX ADMIN — CEFEPIME HYDROCHLORIDE 1000 MG: 1 INJECTION, SOLUTION INTRAVENOUS at 03:49

## 2025-04-01 RX ADMIN — FERROUS SULFATE TAB 325 MG (65 MG ELEMENTAL FE) 325 MG: 325 (65 FE) TAB at 18:14

## 2025-04-01 RX ADMIN — CARVEDILOL 12.5 MG: 12.5 TABLET, FILM COATED ORAL at 08:54

## 2025-04-01 RX ADMIN — INSULIN LISPRO 1 UNITS: 100 INJECTION, SOLUTION INTRAVENOUS; SUBCUTANEOUS at 21:32

## 2025-04-01 RX ADMIN — ATORVASTATIN CALCIUM 20 MG: 20 TABLET, FILM COATED ORAL at 08:54

## 2025-04-01 RX ADMIN — FERROUS SULFATE TAB 325 MG (65 MG ELEMENTAL FE) 325 MG: 325 (65 FE) TAB at 09:01

## 2025-04-01 RX ADMIN — ASPIRIN 81 MG: 81 TABLET, COATED ORAL at 08:55

## 2025-04-01 RX ADMIN — TAMSULOSIN HYDROCHLORIDE 0.4 MG: 0.4 CAPSULE ORAL at 18:14

## 2025-04-01 RX ADMIN — PANTOPRAZOLE SODIUM 40 MG: 40 TABLET, DELAYED RELEASE ORAL at 05:47

## 2025-04-01 NOTE — ASSESSMENT & PLAN NOTE
Lab Results   Component Value Date    HGBA1C 6.9 (H) 02/11/2025     Recent Labs     03/31/25  1131 03/31/25  1542 03/31/25 2039 04/01/25  0730   POCGLU 151* 129 126 115       Blood Sugar Average: Last 72 hrs:  (P) 111  Home regimen -   Metformin 1000 mg twice daily.  Januvia 25 mg by mouth daily.  Inpatient regimen -   Basal insulin -none currently but if his p.o. intake increases significantly and his sugars start going up we may need to add basal insulin.  However, which sugars noted above, no need to add currently.  Insulin sliding scale  Adjust regimen as needed.  Monitor blood sugars.  Not currently taking any medications for the neuropathy.

## 2025-04-01 NOTE — PROGRESS NOTES
Progress Note - Hospitalist   Name: Pernell Covarrubias 79 y.o. male I MRN: [id number]  Unit/Bed#: -01 I Date of Admission: 3/30/2025   Date of Service: 4/1/2025 I Hospital Day: 2    Assessment & Plan  Toxic metabolic encephalopathy  Differential Diagnosis -urinary infection with hypothermia is a clear front running cause.  Worsening dementia is another possibility as well.  Small possibility of thyroid being a cause.  Dehydration can also be a potential etiology as well.  Rule out hypoglycemia.  Baseline Mental Status -there has been some milder more chronic decline over the last few months but recently since beginning in March there is been significant worsening including lethargy, forgetfulness, increased difficulty ambulating  Medication Review (potential causes / contributors) -   None of the patient's home medications are typically associated with altered mental status.  The beta-blocker, carvedilol theoretically has some potential risk.  Abnormal Testing Thus Far -   Urinalysis very concerning for infection.  TSH is abnormal but not bad when corrected for age.  Negative Testing Thus Far -   CT head shows no acute pathology.  Notably, there are chronic microangiopathic changes with stable right basal ganglier lacunar infarct.  This was compared with 3/9/2025.  WBC, lactic acid, and procalcitonin normal.  Creatinine is at baseline.  Sodium and calcium are normal.  COVID testing negative.  Additional Testing Being Requested -   None just yet as I would like to see the patient's response to treating the urinary infection first.  Consultations - Geriatric medicine consultation   Patient safety -   Not currently requiring any restraints.  Redirection and reorientation when needed.  Fall precautions and bed alarm.  PT/OT evaluations.  Other -   Assure adequate sleep hygiene.  Minimize interruptions to sleep.  Avoid constipation.  Avoid delirium inducing medications.  Ensure proper hydration and  nutrition.  Serial Mental Status Exams.  Hypothermia  Differential Diagnosis -   UTI is the most favored diagnosis as a cause.  Need to consider the potential of hypoglycemia or poor p.o. intake as alternate etiologies.  Potentially, since this is not the first time that this has occurred, and the previous time did not have clear signs of infection, the patient could have a reset thermostat centrally.  Hypothalamic dysfunction?  Neuropathy can also be an etiology especially in the setting of his diabetes.  And only if the patient were to show symptoms of Parkinson would this be another potential neurological etiology.  Previously was assessed for hypoadrenalism and hypothyroidism.  Prior workup -   Cortisol normal.  During previous hospitalization, although the TSH was abnormal but the free T4 was normal.  Previous admission showed the patient to be bradycardic but that is not currently the case.  No reported hypoglycemia.  He is diabetic though.  Current workup -   Treat urinary infection.  Not suspected currently to be thyroid based on workup.  See abnormal TSH below.  Monitor blood sugars closely.  Most recent A1c is 6.9% so would not expect a ton of hypoglycemia.  Consider endocrinology evaluation if persists  Monitor temperatures.  Temperature probe urinary catheter placed in the ER.   Stella hugger until temp at least 97.5.  UTI (urinary tract infection)  Antibiotic -  Ceftriaxone 1 g IV daily --->  Cefepime  Preliminary urine culture is showing greater than 100,000 Pseudomonas aeruginosa will switch to cefepime currently.  This is a preliminary result.  Follow-up final results and adjust antibiotics accordingly.    There is also growth of Enterococcus faecalis and will need to check to see if this is resistant or not.  Recommend a total of 7 days of antibiotic.  Follow-up susceptibility data to see if any oral regimens are available.  Antipyretic -Tylenol as needed.  IVF -   Given 1 L isolate bolus in emergency  department.  The patient was given normal saline at 75 mL/h for the first day of hospitalization.   Resume mIVF  Antiemetic - Zofran as needed.  Pain control -   Tylenol as needed  Monitor pain levels.  Evaluations -   CT abdomen/pelvis -thickened urinary bladder though collapsed.  Clinical correlation for acute cystitis recommended.  Urinalysis -   During prior admission for hypothermia earlier in March 2025, urinalysis showed only trace WBC (1-2/hpf), occasional bacteria, and trace protein.  However, the urinalysis done in the emergency department on 3/30/2025 shows positive nitrates, 1+ leukocyte (innumerable/hpf), moderate bacteria, and occasional mucus threads and 2-4 hyaline casts which represents a large change.  F/u  UCx:: Preliminary results showing 2 bacteria, Pseudomonas aeruginosa and Enterococcus faecalis.    F/u Bcx: negative.  COVID/influenza/RSV PCR testing negative.  Abnormal TSH  TSH 9.021.  For adults over 51, the upper limit of normal is 8.9 mIU/L.  Therefore, this 9.021 level might really be fairly insignificant.  Just like prior hospitalization, the free T4 is normal and the T3 is slightly low.  Of note, during recent hospitalization the TSH was 6.684 (during which time the T4 was normal) but back in August 2024 and prior the levels were completely normal.  Therefore, the TSH above represents an increasing TSH.  Thyroid exam no palpable thyromegaly/nodularity.   Type 2 diabetes mellitus with diabetic neuropathy (HCC)  Lab Results   Component Value Date    HGBA1C 6.9 (H) 02/11/2025     Recent Labs     03/31/25  1131 03/31/25  1542 03/31/25  2039 04/01/25  0730   POCGLU 151* 129 126 115       Blood Sugar Average: Last 72 hrs:  (P) 111  Home regimen -   Metformin 1000 mg twice daily.  Januvia 25 mg by mouth daily.  Inpatient regimen -   Basal insulin -none currently but if his p.o. intake increases significantly and his sugars start going up we may need to add basal insulin.  However, which sugars  noted above, no need to add currently.  Insulin sliding scale  Adjust regimen as needed.  Monitor blood sugars.  Not currently taking any medications for the neuropathy.  CKD stage 3 due to type 2 diabetes mellitus (HCC)    Baseline creatinine seems to range between 0.8 and 1.2.  Monitor I's/O.  Monitor weights.  Currently at baseline on admission.  BMP in AM.  Avoid nephrotoxins.  Avoid hypotension.  Anemia of chronic disease  Baseline hemoglobin tends to be in the 9s range.  Monitor hemoglobin levels.  Coronary artery disease involving native coronary artery of native heart with angina pectoris (HCC)  Antiplatelet - Aspirin  Beta-blocker -carvedilol  Statin -atorvastatin  Benign essential hypertension  Home regimen -   Carvedilol 12.5 mg by mouth twice daily.  Losartan 25 mg by mouth daily.  Spironolactone 25 mg by mouth daily.  Patient is also on furosemide 20 mg by mouth daily.  Inpatient regimen -   Continue carvedilol as heart rate is currently not bradycardic but we will use caution and have hold parameters.  hold losartan as renal function remains stable.  Hold spironolactone and furosemide to allow for hydration in the setting of the infection  We would need to reassess whether it is best for the patient to still be on both diuretics as an outpatient or if we can scale back if he is potentially prone to dehydration at all.    Monitor blood pressures.  Compression fracture of L3 vertebra (HCC)  Age-indeterminate on imaging.  Monitor for any pain symptoms.  Supportive care.  Outpatient follow-up.  Incidental Finding: Lung nodule  Given patient's history of prostate cancer in the past, a 3-month follow-up CT chest recommended.  Incidental findings note completed   Incidental Finding: Chronic sinusitis  CT head -   Near complete opacification of the left maxillary sinus with opacification of posterior left ethmoid air cells and the left sphenoid sinus. There is high density material again seen either  representing inspissated secretions or allergic fungal sinusitis. There is hypertrophy of the sinus walls   Outpatient follow-up with PCP and/or ENT.  Pressure injury of skin of buttock  Present on admission.  Turn/reposition frequently.  Wound care consultation.  Follow-up recommendations once available.  Nutrition -   Glucerna recommended twice daily.  Ambulatory dysfunction  PT / OT evaluation.  Ambulatory referral placed for patient to see movement specialist with the [facility] neurology group due to expressed concerns for possibility of Parkinson Disease.   Orthostatic hypotension  Noted by PT during session; wife also thinks it happened at rehab.   Start with compression stockings  Consider low dose midodrine     VTE Pharmacologic Prophylaxis: VTE Score: 6 High Risk (Score >/= 5) - Pharmacological DVT Prophylaxis Ordered: heparin. Sequential Compression Devices Ordered.    Mobility:   Basic Mobility Inpatient Raw Score: 6  JH-HLM Goal: 2: Bed activities/Dependent transfer  JH-HLM Achieved: 2: Bed activities/Dependent transfer  JH-HLM Goal achieved. Continue to encourage appropriate mobility.    Patient Centered Rounds: I performed bedside rounds with nursing staff today.   Discussions with Specialists or Other Care Team Provider:     Education and Discussions with Family / Patient: Updated  (wife) at bedside.    Current Length of Stay: 2 day(s)  Current Patient Status: Inpatient   Certification Statement: The patient will continue to require additional inpatient hospital stay due to UTI on IV abx  Discharge Plan: Anticipate discharge tomorrow to rehab facility.    Code Status: Level 3 - DNAR and DNI    Subjective     Patient feels okay this morning.  He is alert and oriented to person and place.  Unclear if he understands why he is in the hospital.  He has no physical complaints at this time with the exception of feeling cold.    Objective :  Temp:  [97.2 °F (36.2 °C)-99.5 °F (37.5 °C)] 99.5 °F  (37.5 °C)  HR:  [66-88] 88  BP: (103-134)/(37-55) 134/55  Resp:  [12-16] 16  SpO2:  [97 %-99 %] 99 %  O2 Device: None (Room air)    Body mass index is 21.62 kg/m².     Input and Output Summary (last 24 hours):     Intake/Output Summary (Last 24 hours) at 4/1/2025 0854  Last data filed at 3/31/2025 2035  Gross per 24 hour   Intake 60 ml   Output 500 ml   Net -440 ml       Physical Exam  Vitals and nursing note reviewed.   Constitutional:       General: He is not in acute distress.     Appearance: Normal appearance. He is not ill-appearing or toxic-appearing.   HENT:      Head: Normocephalic and atraumatic.   Cardiovascular:      Rate and Rhythm: Normal rate and regular rhythm.      Heart sounds: No murmur heard.  Pulmonary:      Effort: Pulmonary effort is normal. No respiratory distress.      Breath sounds: Normal breath sounds. No wheezing.   Abdominal:      General: Abdomen is flat.      Palpations: Abdomen is soft.   Musculoskeletal:      Right lower leg: No edema.      Left lower leg: No edema.   Neurological:      General: No focal deficit present.      Mental Status: He is alert.         Lines/Drains:  Lines/Drains/Airways       Active Status       Name Placement date Placement time Site Days    Urethral Catheter Temperature probe 16 Fr. 03/30/25  1139  Temperature probe  1                  Urinary Catheter:  Goal for removal: N/A- Discharging with [place]                 Lab Results: I have reviewed the following results:   Results from last 7 days   Lab Units 04/01/25  0541   WBC Thousand/uL 7.50   HEMOGLOBIN g/dL 7.4*   HEMATOCRIT % 22.8*   PLATELETS Thousands/uL 128*   SEGS PCT % 84*   LYMPHO PCT % 5*   MONO PCT % 8   EOS PCT % 2     Results from last 7 days   Lab Units 04/01/25  0541   SODIUM mmol/L 138   POTASSIUM mmol/L 4.5   CHLORIDE mmol/L 110*   CO2 mmol/L 20*   BUN mg/dL 33*   CREATININE mg/dL 1.38*   ANION GAP mmol/L 8   CALCIUM mg/dL 8.5   ALBUMIN g/dL 3.0*   TOTAL BILIRUBIN mg/dL 0.53   ALK PHOS  U/L 76   ALT U/L 26   AST U/L 21   GLUCOSE RANDOM mg/dL 121     Results from last 7 days   Lab Units 03/30/25  1136   INR  1.10     Results from last 7 days   Lab Units 04/01/25  0730 03/31/25  2039 03/31/25  1542 03/31/25  1131 03/31/25  0706 03/30/25 2028 03/30/25  1754   POC GLUCOSE mg/dl 115 126 129 151* 81 90 85         Results from last 7 days   Lab Units 03/30/25  1136   LACTIC ACID mmol/L 0.9   PROCALCITONIN ng/ml 0.12       Recent Cultures (last 7 days):   Results from last 7 days   Lab Units 03/30/25  1154 03/30/25  1140 03/30/25  1136   BLOOD CULTURE  No Growth at 24 hrs.  --  No Growth at 24 hrs.   URINE CULTURE   --  >100,000 cfu/ml Pseudomonas aeruginosa*  >100,000 cfu/ml Enterococcus faecalis*  --        Imaging Results Review: No pertinent imaging studies reviewed.  Other Study Results Review: No additional pertinent studies reviewed.    Last 24 Hours Medication List:     Current Facility-Administered Medications:     acetaminophen (TYLENOL) tablet 650 mg, Q4H PRN    [Held by provider] ascorbic acid (VITAMIN C) tablet 500 mg, Daily    aspirin (ECOTRIN LOW STRENGTH) EC tablet 81 mg, Daily    atorvastatin (LIPITOR) tablet 20 mg, Daily    carvedilol (COREG) tablet 12.5 mg, BID    cefepime (MAXIPIME) IVPB (premix in dextrose) 1,000 mg 50 mL, Q12H, Last Rate: 1,000 mg (04/01/25 0349)    [Held by provider] Cholecalciferol (VITAMIN D3) tablet 2,000 Units, Daily    [Held by provider] co-enzyme Q-10 capsule 100 mg, Daily    ferrous sulfate tablet 325 mg, BID With Meals    [Held by provider] furosemide (LASIX) tablet 20 mg, Daily    heparin (porcine) subcutaneous injection 5,000 Units, Q8H ENRIQUE **AND** [COMPLETED] Platelet count, Once    insulin lispro (HumALOG/ADMELOG) 100 units/mL subcutaneous injection 1-5 Units, TID AC **AND** Fingerstick Glucose (POCT), TID AC    insulin lispro (HumALOG/ADMELOG) 100 units/mL subcutaneous injection 1-5 Units, HS    [Held by provider] losartan (COZAAR) tablet 25 mg,  Daily    ondansetron (ZOFRAN) injection 4 mg, Q6H PRN    pantoprazole (PROTONIX) EC tablet 40 mg, Early Morning    tamsulosin (FLOMAX) capsule 0.4 mg, Daily With Dinner    **Please Note: This note may have been constructed using a voice recognition system.**

## 2025-04-01 NOTE — ASSESSMENT & PLAN NOTE
Noted by PT during session; wife also thinks it happened at rehab.   Start with compression stockings  Consider low dose midodrine

## 2025-04-01 NOTE — SPEECH THERAPY NOTE
Speech-Language Pathology Bedside Swallow Evaluation      Patient Name: Pernell Covarrubias    Today's Date: 4/1/2025     Problem List  Principal Problem:    Toxic metabolic encephalopathy  Active Problems:    Coronary artery disease involving native coronary artery of native heart with angina pectoris ([facility])    CKD stage 3 due to type 2 diabetes mellitus ([facility])    Type 2 diabetes mellitus with diabetic neuropathy ([facility])    Benign essential hypertension    Insomnia, persistent    Hypothermia    Abnormal TSH    Anemia of chronic disease    UTI (urinary tract infection)    Incidental Finding: Lung nodule    Incidental Finding: Chronic sinusitis    Compression fracture of L3 vertebra ([facility])    Pressure injury of skin of buttock    Ambulatory dysfunction    Cognitive decline    Frailty syndrome in geriatric patient    Dysphagia      Past Medical History  Past Medical History:   Diagnosis Date    Acquired hallux valgus     last assessed: 8/1/2013    Allergic rhinitis     Anemia 10/26/2010    Atrophy of nail     last assessed: 7/7/2015    Cancer ([facility]) 2020    Chronic kidney disease     chronic renal insufficiency    Coronary artery disease     Deformity of ankle and foot, acquired     last assessed: 2/22/2016    Diabetes mellitus ([facility]) 1994    Difficulty walking     last assessed: 12/14/2015    Dysesthesia     last assessed: 11/25/2016    Hammer toe     unspecified laterality; last assessed: 8/1/2013    Hypertension     Neuropathy in diabetes ([facility]) 1994    Onychomycosis of toenail     last assessed: 2/22/2016    Peripheral vascular disease ([facility])     left SFA stent in 2010    Pes planus     unspecified laterality; last assessed: 8/1/2013    Pneumonia     last assessed: 2/27/2016    Prostate cancer ([facility])     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     last assessed: 8/15/2016    Type 2 diabetes mellitus ([facility]) 07/26/2010    Viral warts     last assessed: 7/24/2015       Past Surgical History  Past Surgical History:    Procedure Laterality Date    CARDIAC CATHETERIZATION  07/29/2010    CATARACT EXTRACTION, BILATERAL Bilateral     R 1999, L 2008    COLONOSCOPY  2011    FEMORAL ARTERY - POPLITEAL ARTERY BYPASS GRAFT  09/23/2013    FOOT SURGERY      bone spur removed    LEG SURGERY Bilateral     repair; L 8/20/2010 and 7/26/2012    [place] PLMT INTERSTITIAL DEV RADIAT TX PROSTATE 1/MULT N/A 6/22/2021    Procedure: INSERTION OF FIDUCIAL MARKER (TRANSRECTAL ULTRASOUND GUIDANCE), SPACEOAR;  Surgeon: Jero Loomis MD;  [facility]: [facility];  Service: Urology    ROTATOR CUFF REPAIR Left 2009    SHOULDER SURGERY Right 2005    collar bone       Summary   Pt presented with s/s suggestive of mild oral dysphagia (inconsistent ability to use straw, mildly slowed oral processing and transfer time, occasions of mild oral residue.  Suspect no significant pharyngeal dysphagia.      Risk/s for Aspiration: present 2* lethargy    Recommended Diet: CCD Dysphagia 3 (advanced) diet and thin liquids   Recommended Form of Meds: as best tolerated  Aspiration precautions and swallowing strategies: upright posture, only feed when fully alert, slow rate of feeding, and provide ample stimulation throughout meal to ensure engagement and straw use  Other Recommendations: Continue frequent oral care Of upper/lower dentures)        Current Medical Status  Pernell Covarrubias is a 79 y.o. male with recent hospitalization for hypothermia as well as a PMH of diabetes, chronic kidney disease, CAD, hypertension and months along gradual decline in mental state, who presents with an acute on seemingly chronic mental status change now associated with increased lethargy and recent hallucinations.  DX:  TME.   SLP Swallowing Evaluation requested and completed bedside.     Current Precautions:  Fall      Allergies:  No known food allergies    Past medical history:  Please see H&P for details    Special Studies of 3/30:  CT of head:   1.  No acute intracranial abnormality.   "Chronic microangiopathic changes with stable right basal ganglia lacunar infarct.  2.  Stable chronic left-sided sinusitis with high density material either representing inspissated secretions or allergic fungal sinusitis.    CT of chest/abd/pelvis:   1.  No acute abnormality in the chest.  2.  Age-indeterminate fracture of the left aspect of the L3 vertebral superior endplate extending into a marginal osteophyte, not present in 2021. Clinical correlation for pain in this region recommended.  3.  Thickened urinary bladder though collapsed. This may be related to prior radiation though clinical correlation for acute cystitis is recommended.  4.  5.5 mm right upper lobe lung nodule. Given history of prostate cancer and lack of prior imaging, 3-month follow-up chest CT is recommended.       Social/Education/Vocational Hx:  Pt lived w/family.  After recetn hospital stay, was d/c to SNF 3/12.     Swallow Information   Current Risks for Dysphagia & Aspiration: AMS  Current Symptoms/Concerns: reduced po intake  Current Diet: CCD2 regular texture and thin liquids   Baseline Diet:  as above      Baseline Assessment   Behavior/Cognition: lethargic, O to self.  Stated \"from [employer].  Work with the [employer] on the [employer]\"  Speech/Language Status: able to follow commands inconsistently and expressed only v bgasic needs.  No gross dysarthria  Patient Positioning: upright in bed  Pain Status/Interventions/Response to Interventions:   No report of or nonverbal indications of pain.       Swallow Mechanism Exam  Facial: symmetrical  Labial and Lingual decreased strength at times  Mandible: adequate ROM  Dentition: edentulous and full dentures  Vocal quality:clear/adequate   Volitional Cough: weak   Respiratory Status: on RA       Consistencies Assessed and Performance   Consistencies Administered: thin liquids, puree, and soft solids  Materials administered included 4 ozs juice, 3-4 of glucerna, 4 ozs applesauce, 1 " pancakes highly moistened, biters of scrambled egg    Oral Stage:   Mastication, bolus formation and transfer were mildly slowed with episodes of transient or mild oral residue with eggs (cleared w/puree).  Inconsistent straw use (+responsive to direct instruction to open eyes/suck on straw). Min reduced oral control (minimal labial leakage x2)    Pharyngeal Stage:   Swallow Mechanics:  Swallowing initiation appeared prompt.  Laryngeal rise was palpated and judged to be within functional limits.  No coughing, throat clearing, change in vocal quality or respiratory status noted today.     Esophageal Concerns: none reported    Summary and Recommendations (see above)    Results Reviewed with: RN & CNA    Treatment Recommended: yes    Frequency of treatment: min of 2x weekly    Patient Stated Goal: unable to state at this time    Dysphagia LTG  -Patient will demonstrate safe and effective oral intake (without overt s/s significant oral/pharyngeal dysphagia including s/s penetration or aspiration) for the highest appropriate diet level.     Short Term Goals:    -Pt will tolerate Dysphagia 1, 2 and 3 diet items as well as thin liquid with no significant s/s oral or pharyngeal dysphagia across 3-5 diagnostic session/s.    -Patient will tolerate trials of upgraded food texture with no significant s/s of oral or pharyngeal dysphagia including aspiration across 2-3 diagnostic sessions     - Patient’s caregivers will demonstrate adherence to recommended diet, as well as application of aspiration precautions and compensatory strategies.

## 2025-04-01 NOTE — PHYSICAL THERAPY NOTE
PHYSICAL THERAPY Evaluation and Treatment  DATE: 04/01/25  TIME: 9397-4390    NAME:  Pernell Covarrubias  AGE:   79 y.o.  Mrn:   [id number]  Length Of Stay: 2    ADMIT DX:  Sinusitis [J32.9]  UTI (urinary tract infection) [N39.0]  Anemia [D64.9]  Thrombocytopenia (HCC) [D69.6]  Encephalopathy [G93.40]  Lung nodule [R91.1]  Elevated TSH [R79.89]  Closed L3 vertebral fracture ([facility]) [S32.039A]  Hypothermia, initial encounter [T68.XXXA]    Past Medical History:   Diagnosis Date    Acquired hallux valgus     last assessed: 8/1/2013    Allergic rhinitis     Anemia 10/26/2010    Atrophy of nail     last assessed: 7/7/2015    Cancer ([facility]) 2020    Chronic kidney disease     chronic renal insufficiency    Coronary artery disease     Deformity of ankle and foot, acquired     last assessed: 2/22/2016    Diabetes mellitus ([facility]) 1994    Difficulty walking     last assessed: 12/14/2015    Dysesthesia     last assessed: 11/25/2016    Hammer toe     unspecified laterality; last assessed: 8/1/2013    Hypertension     Neuropathy in diabetes ([facility]) 1994    Onychomycosis of toenail     last assessed: 2/22/2016    Peripheral vascular disease ([facility])     left SFA stent in 2010    Pes planus     unspecified laterality; last assessed: 8/1/2013    Pneumonia     last assessed: 2/27/2016    Prostate cancer (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) 07/26/2010    Viral warts     last assessed: 7/24/2015     Past Surgical History:   Procedure Laterality Date    CARDIAC CATHETERIZATION  07/29/2010    CATARACT EXTRACTION, BILATERAL Bilateral     R 1999, L 2008    COLONOSCOPY  2011    FEMORAL ARTERY - POPLITEAL ARTERY BYPASS GRAFT  09/23/2013    FOOT SURGERY      bone spur removed    LEG SURGERY Bilateral     repair; L 8/20/2010 and 7/26/2012    [place] PLMT INTERSTITIAL DEV RADIAT TX PROSTATE 1/MULT N/A 6/22/2021    Procedure: INSERTION OF FIDUCIAL MARKER (TRANSRECTAL  ULTRASOUND GUIDANCE), SPACEOAR;  Surgeon: Jero Loomis MD;  Location: [facility];  Service: Urology    ROTATOR CUFF REPAIR Left 2009    SHOULDER SURGERY Right 2005    collar bone        04/01/25 1015   PT Last Visit   PT Visit Date 04/01/25   Note Type   Note type Evaluation   Additional Comments 80 y/o presents due to declining functionality/cognition, hallucinations, fatigue, hypotension.  Dx: toxic metabolic encephalopathy, hypothermia, UTI.   Pain Assessment   Pain Assessment Tool 0-10   Pain Score No Pain   Restrictions/Precautions   Weight Bearing Precautions Per Order No   Other Precautions Bed Alarm;Impulsive;Cognitive;Fall Risk   Home Living   Additional Comments Pt unable to provide information due to cognition.  Pt presented from SNF.  Prior to this was living with wife in 1-level house, 1+4 JAMES (rails).  DME: RW, SW, rollator.   Prior Function   Comments Pt is a poor historian.  Pt has been dependent for ADL, IADLs, transportation, med management, meals.  Pt was reportedly ambulating with RW in Feb 2025.  Unable to manage walking without significant amount of assistance at this time   Cognition   Overall Cognitive Status Impaired   Arousal/Participation Lethargic   Orientation Level Disoriented to place;Disoriented to time;Disoriented to situation  (arousable to name)   Subjective   Subjective Pt denies any complaints   RLE Assessment   RLE Assessment   (hips lack ER/IR ABD due to stiffness.  ankle lack DF/PF due to stiffness.  Strenght not formally tested due to cognition, but does appear limited.)   LLE Assessment   LLE Assessment   (hips lack ER/IR ABD due to stiffness. ankle lack DF/PF due to stiffness. Strenght not formally tested due to cognition, but does appear limited.)   Coordination   Movements are Fluid and Coordinated 0   Bed Mobility   Rolling R 3  Moderate assistance   Additional items Assist x 1;Bedrails;Increased time required;Verbal cues;LE management   Supine to Sit 2  Maximal  assistance   Additional items Assist x 1;Bedrails;HOB elevated;Increased time required;Impulsive;Verbal cues;LE management   Transfers   Sit to Stand 2  Maximal assistance  (to dependent during last attempt from commode)   Additional items Assist x 2;Bedrails;Armrests;Increased time required;Impulsive;Verbal cues  (RW in standing)   Stand to Sit 2  Maximal assistance   Additional items Assist x 2;Bedrails;Armrests;Increased time required;Verbal cues;Impulsive   Stand pivot 1  Dependent   Additional items Assist x 2;Armrests;Increased time required;Impulsive;Verbal cues   Ambulation/Elevation   Gait pattern Narrow ILYA;Forward Flexion;Decreased foot clearance;Shuffling;Inconsistent afua;Short stride;Excessively slow   Gait Assistance 2  Maximal assist   Additional items Assist x 2;Verbal cues;Tactile cues   Assistive Device Rolling walker   Distance 12'   Ambulation/Elevation Additional Comments Pt requires constant assist to manage balance and trunk support, assist to initiate movement of RW and at times assist to advance steps.   Balance   Static Sitting Fair -   Dynamic Sitting Poor +   Static Standing Poor  (with fatigue Poor-)   Ambulatory Poor  (RW)   Endurance Deficit   Endurance Deficit Yes   Activity Tolerance   Activity Tolerance Patient limited by fatigue   Medical Staff Made Aware collaborated with OT   Nurse Made Aware Nursing aware of patient's mobility   Assessment   Prognosis Poor   Problem List Decreased strength;Decreased range of motion;Decreased endurance;Impaired balance;Decreased coordination;Decreased mobility;Decreased cognition;Impaired judgement;Decreased safety awareness;Impaired tone   Assessment Orders for PT eval and treat received.  Pt exhibits physical deficits noted in problem list above.  Deficits listed contribute to functional limitations that are significant from the patient PLOF and include: difficulty with bed mobility, impaired sitting balance, impaired standing balance,  inability to perform safe transfers, tolerance for OOB functional activities, unsafe/inefficient ambulation, fall risk, and unable to safely manage stairs/steps/ramp      Additional considerations affecting pt's discharge planning: Insufficient or unavailable assistance at home, Significant amount of stairs to manage in order to access living quarters, Progressive cognitive decline affecting safety awareness/insight, Inability to manage basic self care ADLs with the assistance that is currently available, and Inability to perform necessary transfers and/or mobility out of bed, with the assistance that is currently available    During today's session, formal PT evaluation performed. Pt displaying significant stiffness/rigidity of Les/trunk, difficulty alerting to stimulus, and difficulty following simple instructions.  Pt requires significant assist for all transfers and mobility, and tends to fatigue very quickly.  Pt displayed hypotension during activity, which further limited our activity, but pt unable to verbalize any symptoms or complaints. Pt will need SNF placement at time of discharge.     The -PAC Mobility Score was used to assist in determining pt safety w/ mobility/self care & appropriate d/c recommendations, see above for scores. Patient's clinical presentation is unstable/unpredictable due to significant acute change in functional independence, significant need for care assistance compared to baseline, altered mental status, ongoing medical management needs, and complicated social/support system.     From a PT/mobility standpoint given the above findings, DC recommendation is level: II (Moderate Rehab Resource Intensity)    Considering the patient's PLOF, co-morbidities, acute functional limitations, functional outcome measures, and/or goal to progress functional independence; this patient would benefit from skilled Physical Therapy intervention in the acute care setting.   Barriers to Discharge  Decreased caregiver support;Inaccessible home environment   Goals   Patient Goals none reported   STG Expiration Date 04/11/25   Short Term Goal #1 1: Pt will perform repositioning and scooting in bed with hospital bed features, min A.  2: Pt will perform supine<>sit transfer with hospital bed features, mod A. 3: Pt will perform lateral scooting EOB, mod A. 4: Pt will perform sit<>stand with hand rails/AD, min A. 6: Pt will perform stand pivot transfer with AD/rails, mod A. 7: Pt will pt will ambulated 15'x2 with RW, mod A.   Plan   Treatment/Interventions Functional transfer training;LE strengthening/ROM;Therapeutic exercise;Endurance training;Cognitive reorientation;Patient/family training;Equipment eval/education;Bed mobility;Gait training   PT Frequency 3-5x/wk   Discharge Recommendation   Rehab Resource Intensity Level, PT II (Moderate Resource Intensity)   AM-PAC Basic Mobility Inpatient   Turning in Flat Bed Without Bedrails 2   Lying on Back to Sitting on Edge of Flat Bed Without Bedrails 2   Moving Bed to Chair 2   Standing Up From Chair Using Arms 2   Walk in Room 2   Climb 3-5 Stairs With Railing 1   Basic Mobility Inpatient Raw Score 11   Basic Mobility Standardized Score 30.25   [facility] Highest Level Of Mobility   -[facility] Goal 4: Move to chair/commode   -[facility] Achieved 6: Walk 10 steps or more   Additional Treatment Session   Start Time 1023   End Time 1057   Treatment Assessment Initiated session with AZUCENA exercises of BLE and trunk in supine and sitting: heel slides 10x each, hook lying lower trunk twist 15x, sitting trunk flexion/extension 10x, sitting upper trunk rotations 10x each.  Educated and instructed pt on bed mobility, transfer techniques, correction and management of COM/balance in standing with RW, postural corrections.  Performed gait training to bathroom.  Pt required 2 person assist to perform, but was unable to manage ambulating back to bed.  Pt required dependent transfer from  commode back to bed.   End of Consult   Patient Position at End of Consult Supine;Bed/Chair alarm activated;All needs within reach   The patient's AM-PAC Basic Mobility Inpatient Short Form Raw Score is 11. A Raw score of less than 16 suggests the patient may benefit from discharge to post-acute rehabilitation services. Please also refer to the recommendation of the Physical Therapist for safe discharge planning.    Ignacio Mcknight, PT, DPT  [place] Licensure #[id number]

## 2025-04-01 NOTE — ASSESSMENT & PLAN NOTE
Differential Diagnosis -urinary infection with hypothermia is a clear front running cause.  Worsening dementia is another possibility as well.  Small possibility of thyroid being a cause.  Dehydration can also be a potential etiology as well.  Rule out hypoglycemia.  Baseline Mental Status -there has been some milder more chronic decline over the last few months but recently since beginning in March there is been significant worsening including lethargy, forgetfulness, increased difficulty ambulating  Medication Review (potential causes / contributors) -   None of the patient's home medications are typically associated with altered mental status.  The beta-blocker, carvedilol theoretically has some potential risk.  Abnormal Testing Thus Far -   Urinalysis very concerning for infection.  TSH is abnormal but not bad when corrected for age.  Negative Testing Thus Far -   CT head shows no acute pathology.  Notably, there are chronic microangiopathic changes with stable right basal ganglier lacunar infarct.  This was compared with 3/9/2025.  WBC, lactic acid, and procalcitonin normal.  Creatinine is at baseline.  Sodium and calcium are normal.  COVID testing negative.  Additional Testing Being Requested -   None just yet as I would like to see the patient's response to treating the urinary infection first.  Consultations - Geriatric medicine consultation   Patient safety -   Not currently requiring any restraints.  Redirection and reorientation when needed.  Fall precautions and bed alarm.  PT/OT evaluations.  Other -   Assure adequate sleep hygiene.  Minimize interruptions to sleep.  Avoid constipation.  Avoid delirium inducing medications.  Ensure proper hydration and nutrition.  Serial Mental Status Exams.

## 2025-04-01 NOTE — ASSESSMENT & PLAN NOTE
PT / OT evaluation.  Ambulatory referral placed for patient to see movement specialist with the [facility] neurology group due to expressed concerns for possibility of Parkinson Disease.

## 2025-04-01 NOTE — PLAN OF CARE
Problem: OCCUPATIONAL THERAPY ADULT  Goal: Performs self-care activities at highest level of function for planned discharge setting.  See evaluation for individualized goals.  Description: Treatment Interventions: ADL retraining, Functional transfer training, Cognitive reorientation, Patient/family training, Endurance training, UE strengthening/ROM, Equipment evaluation/education, Compensatory technique education, Activityengagement          See flowsheet documentation for full assessment, interventions and recommendations.   Note: Limitation: Decreased ADL status, Decreased self-care trans, Decreased high-level ADLs, Decreased endurance, Decreased cognition, Decreased UE ROM, Decreased UE strength  Prognosis: Guarded  Assessment: Pt is a 79 y.o. male seen for OT evaluation at [facility], admitted 3/30/2025 w/ progressive cognitive decline, lethargy, and visual hallucination. Pt found to have Toxic metabolic encephalopathy.  Comorbidities affecting pt's functional performance at time of assessment include: diabetes, chronic kidney disease, CAD, hypertension and months along gradual decline in mental state.  Pt most recently presents from [facility]. Prior to admission, pt was living with wife in a 1 SH with steps to manage.  Pt was I w/  ADLS and IADLS,  & required walker PTA. Upon evaluation, pt appears to be performing below baseline functional status. Pt currently requires max ax 2 for bed mobility, mod-total A x 2 for functional mobility/transfers,  mod-max A for UB ADLs and total A for LB ADLS 2* the following deficits impacting occupational performance: weakness, decreased strength, decreased balance, decreased tolerance, impaired arousal, impaired attention, impaired initiation, impaired memory, impaired sequencing, impaired problem solving, and decreased safety awareness. Full objective findings from OT assessment regarding body systems outlined above. Personal factors affecting pt at time of IE  include:steps to enter environment, difficulty performing ADLS, difficulty performing IADLS , decreased initiation and engagement , and decreased functional mobility . These impairments, as well as pt's decreased caregiver support and risk for falls  limit pt's ability to safely engage in all baseline areas of occupation and mobility. Pt to benefit from continued skilled OT tx while in the hospital to address deficits as defined above and maximize level of functional independence w ADL's and functional mobility. Occupational Performance areas to address include: bathing/shower, toilet hygiene, dressing, and functional mobility.  This evaluation required an extensive review of medical and/or therapy records and additional review of physical, cognitive and psychosocial history related to functional performance. Based upon functional performance deficits and assessments, this evaluation has been identified as a high complexity evaluation.  At this time, OT recommendations at time of discharge are level 2 mod intensity resources.     Rehab Resource Intensity Level, OT: II (Moderate Resource Intensity)

## 2025-04-01 NOTE — ASSESSMENT & PLAN NOTE
Home regimen -   Carvedilol 12.5 mg by mouth twice daily.  Losartan 25 mg by mouth daily.  Spironolactone 25 mg by mouth daily.  Patient is also on furosemide 20 mg by mouth daily.  Inpatient regimen -   Continue carvedilol as heart rate is currently not bradycardic but we will use caution and have hold parameters.  hold losartan as renal function remains stable.  Hold spironolactone and furosemide to allow for hydration in the setting of the infection  We would need to reassess whether it is best for the patient to still be on both diuretics as an outpatient or if we can scale back if he is potentially prone to dehydration at all.    Monitor blood pressures.

## 2025-04-01 NOTE — PLAN OF CARE
Problem: PHYSICAL THERAPY ADULT  Goal: Performs mobility at highest level of function for planned discharge setting.  See evaluation for individualized goals.  Description: Treatment/Interventions: Functional transfer training, LE strengthening/ROM, Therapeutic exercise, Endurance training, Cognitive reorientation, Patient/family training, Equipment eval/education, Bed mobility, Gait training          See flowsheet documentation for full assessment, interventions and recommendations.  Outcome: Progressing  Note: Prognosis: Poor  Problem List: Decreased strength, Decreased range of motion, Decreased endurance, Impaired balance, Decreased coordination, Decreased mobility, Decreased cognition, Impaired judgement, Decreased safety awareness, Impaired tone  Assessment: Orders for PT eval and treat received.  Pt exhibits physical deficits noted in problem list above.  Deficits listed contribute to functional limitations that are significant from the patient PLOF and include: difficulty with bed mobility, impaired sitting balance, impaired standing balance, inability to perform safe transfers, tolerance for OOB functional activities, unsafe/inefficient ambulation, fall risk, and unable to safely manage stairs/steps/ramp      Additional considerations affecting pt's discharge planning: Insufficient or unavailable assistance at home, Significant amount of stairs to manage in order to access living quarters, Progressive cognitive decline affecting safety awareness/insight, Inability to manage basic self care ADLs with the assistance that is currently available, and Inability to perform necessary transfers and/or mobility out of bed, with the assistance that is currently available    During today's session, formal PT evaluation performed. Pt displaying significant stiffness/rigidity of Les/trunk, difficulty alerting to stimulus, and difficulty following simple instructions.  Pt requires significant assist for all transfers  and mobility, and tends to fatigue very quickly.  Pt displayed hypotension during activity, which further limited our activity, but pt unable to verbalize any symptoms or complaints. Pt will need SNF placement at time of discharge.     The -PAC Mobility Score was used to assist in determining pt safety w/ mobility/self care & appropriate d/c recommendations, see above for scores. Patient's clinical presentation is unstable/unpredictable due to significant acute change in functional independence, significant need for care assistance compared to baseline, altered mental status, ongoing medical management needs, and complicated social/support system.     From a PT/mobility standpoint given the above findings, DC recommendation is level: II (Moderate Rehab Resource Intensity)    Considering the patient's PLOF, co-morbidities, acute functional limitations, functional outcome measures, and/or goal to progress functional independence; this patient would benefit from skilled Physical Therapy intervention in the acute care setting.  Barriers to Discharge: Decreased caregiver support, Inaccessible home environment     Rehab Resource Intensity Level, PT: II (Moderate Resource Intensity)    See flowsheet documentation for full assessment.

## 2025-04-01 NOTE — PLAN OF CARE
Problem: Potential for Falls  Goal: Patient will remain free of falls  Description: INTERVENTIONS:- Educate patient/family on patient safety including physical limitations- Instruct patient to call for assistance with activity - Consult OT/PT to assist with strengthening/mobility - Keep Call bell within reach- Keep bed low and locked with side rails adjusted as appropriate- Keep care items and personal belongings within reach- Initiate and maintain comfort rounds- Make Fall Risk Sign visible to staff- Offer Toileting every 2 Hours, in advance of need- Initiate/Maintain bed alarm- Obtain necessary fall risk management equipment: walker- Apply yellow socks and bracelet for high fall risk patients- Consider moving patient to room near nurses station  INTERVENTIONS:- Educate patient/family on patient safety including physical limitations- Instruct patient to call for assistance with activity - Consult OT/PT to assist with strengthening/mobility - Keep Call bell within reach- Keep bed low and locked with side rails adjusted as appropriate- Keep care items and personal belongings within reach- Initiate and maintain comfort rounds- Make Fall Risk Sign visible to staff- Offer Toileting every 2 Hours, in advance of need- Initiate/Maintain bed alarm- Obtain necessary fall risk management equipment: walker- Apply yellow socks and bracelet for high fall risk patients- Consider moving patient to room near nurses station  Outcome: Progressing     Problem: PAIN - ADULT  Goal: Verbalizes/displays adequate comfort level or baseline comfort level  Description: Interventions:- Encourage patient to monitor pain and request assistance- Assess pain using appropriate pain scale- Administer analgesics based on type and severity of pain and evaluate response- Implement non-pharmacological measures as appropriate and evaluate response- Consider cultural and social influences on pain and pain management- Notify physician/advanced  practitioner if interventions unsuccessful or patient reports new pain  Outcome: Progressing     Problem: INFECTION - ADULT  Goal: Absence or prevention of progression during hospitalization  Description: INTERVENTIONS:- Assess and monitor for signs and symptoms of infection- Monitor lab/diagnostic results- Monitor all insertion sites, i.e. indwelling lines, tubes, and drains- Monitor endotracheal if appropriate and nasal secretions for changes in amount and color- [place] appropriate cooling/warming therapies per order- Administer medications as ordered- Instruct and encourage patient and family to use good hand hygiene technique- Identify and instruct in appropriate isolation precautions for identified infection/condition  Outcome: Progressing     Problem: SAFETY ADULT  Goal: Patient will remain free of falls  Description: INTERVENTIONS:- Educate patient/family on patient safety including physical limitations- Instruct patient to call for assistance with activity - Consult OT/PT to assist with strengthening/mobility - Keep Call bell within reach- Keep bed low and locked with side rails adjusted as appropriate- Keep care items and personal belongings within reach- Initiate and maintain comfort rounds- Make Fall Risk Sign visible to staff- Offer Toileting every 2 Hours, in advance of need- Initiate/Maintain bed alarm- Obtain necessary fall risk management equipment: walker- Apply yellow socks and bracelet for high fall risk patients- Consider moving patient to room near nurses station  INTERVENTIONS:- Educate patient/family on patient safety including physical limitations- Instruct patient to call for assistance with activity - Consult OT/PT to assist with strengthening/mobility - Keep Call bell within reach- Keep bed low and locked with side rails adjusted as appropriate- Keep care items and personal belongings within reach- Initiate and maintain comfort rounds- Make Fall Risk Sign visible to staff- Offer  Toileting every 2 Hours, in advance of need- Initiate/Maintain bed alarm- Obtain necessary fall risk management equipment: walker- Apply yellow socks and bracelet for high fall risk patients- Consider moving patient to room near nurses station  Outcome: Progressing     Problem: Knowledge Deficit  Goal: Patient/family/caregiver demonstrates understanding of disease process, treatment plan, medications, and discharge instructions  Description: Complete learning assessment and assess knowledge base.Interventions:- Provide teaching at level of understanding- Provide teaching via preferred learning methods  Outcome: Progressing     Problem: Nutrition/Hydration-ADULT  Goal: Nutrient/Hydration intake appropriate for improving, restoring or maintaining nutritional needs  Description: Monitor and assess patient's nutrition/hydration status for malnutrition. Collaborate with interdisciplinary team and initiate plan and interventions as ordered.  Monitor patient's weight and dietary intake as ordered or per policy. Utilize nutrition screening tool and intervene as necessary. Determine patient's food preferences and provide high-protein, high-caloric foods as appropriate. INTERVENTIONS:- Monitor oral intake, urinary output, labs, and treatment plans- Assess nutrition and hydration status and recommend course of action- Evaluate amount of meals eaten- Assist patient with eating if necessary - Allow adequate time for meals- Recommend/ encourage appropriate diets, oral nutritional supplements, and vitamin/mineral supplements- Order, calculate, and assess calorie counts as needed- Recommend, monitor, and adjust tube feedings and TPN/PPN based on assessed needs- Assess need for intravenous fluids- Provide specific nutrition/hydration education as appropriate- Include patient/family/caregiver in decisions related to nutrition  Outcome: Progressing     Problem: CARDIOVASCULAR - ADULT  Goal: Maintains optimal cardiac output and  hemodynamic stability  Description: INTERVENTIONS:- Monitor I/O, vital signs and rhythm- Monitor for S/S and trends of decreased cardiac output- Administer and titrate ordered vasoactive medications to optimize hemodynamic stability- Assess quality of pulses, skin color and temperature- Assess for signs of decreased coronary artery perfusion- Instruct patient to report change in severity of symptoms  Outcome: Progressing  Goal: Absence of cardiac dysrhythmias or at baseline rhythm  Description: INTERVENTIONS:- Continuous cardiac monitoring, vital signs, obtain 12 lead EKG if ordered- Administer antiarrhythmic and heart rate control medications as ordered- Monitor electrolytes and administer replacement therapy as ordered  Outcome: Progressing

## 2025-04-01 NOTE — PLAN OF CARE
Pt presented with s/s suggestive of mild oral dysphagia (inconsistent ability to use straw, mildly slowed oral processing and transfer time, occasions of mild oral residue.  Suspect no significant pharyngeal dysphagia.      Risk/s for Aspiration: present 2* lethargy    Recommended Diet: CCD Dysphagia 3 (advanced) diet and thin liquids   Recommended Form of Meds: as best tolerated  Aspiration precautions and swallowing strategies: upright posture, only feed when fully alert, slow rate of feeding, and provide ample stimulation throughout meal to ensure engagement and straw use  Other Recommendations: Continue frequent oral care Of upper/lower dentures)

## 2025-04-01 NOTE — OCCUPATIONAL THERAPY NOTE
Occupational Therapy Evaluation     Patient Name: Pernell Covarrubias  Today's Date: 4/1/2025  Problem List  Principal Problem:    Toxic metabolic encephalopathy  Active Problems:    Coronary artery disease involving native coronary artery of native heart with angina pectoris ([facility])    CKD stage 3 due to type 2 diabetes mellitus ([facility])    Type 2 diabetes mellitus with diabetic neuropathy ([facility])    Benign essential hypertension    Insomnia, persistent    Orthostatic hypotension    Hypothermia    Abnormal TSH    Anemia of chronic disease    UTI (urinary tract infection)    Incidental Finding: Lung nodule    Incidental Finding: Chronic sinusitis    Compression fracture of L3 vertebra ([facility])    Pressure injury of skin of buttock    Ambulatory dysfunction    Cognitive decline    Frailty syndrome in geriatric patient    Dysphagia    Past Medical History  Past Medical History:   Diagnosis Date    Acquired hallux valgus     last assessed: 8/1/2013    Allergic rhinitis     Anemia 10/26/2010    Atrophy of nail     last assessed: 7/7/2015    Cancer ([facility]) 2020    Chronic kidney disease     chronic renal insufficiency    Coronary artery disease     Deformity of ankle and foot, acquired     last assessed: 2/22/2016    Diabetes mellitus ([facility]) 1994    Difficulty walking     last assessed: 12/14/2015    Dysesthesia     last assessed: 11/25/2016    Hammer toe     unspecified laterality; last assessed: 8/1/2013    Hypertension     Neuropathy in diabetes ([facility]) 1994    Onychomycosis of toenail     last assessed: 2/22/2016    Peripheral vascular disease ([facility])     left SFA stent in 2010    Pes planus     unspecified laterality; last assessed: 8/1/2013    Pneumonia     last assessed: 2/27/2016    Prostate cancer ([facility])     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     last assessed: 8/15/2016    Type 2 diabetes mellitus ([facility]) 07/26/2010    Viral warts     last assessed: 7/24/2015     Past Surgical History  Past Surgical History:    Procedure Laterality Date    CARDIAC CATHETERIZATION  07/29/2010    CATARACT EXTRACTION, BILATERAL Bilateral     R 1999, L 2008    COLONOSCOPY  2011    FEMORAL ARTERY - POPLITEAL ARTERY BYPASS GRAFT  09/23/2013    FOOT SURGERY      bone spur removed    LEG SURGERY Bilateral     repair; L 8/20/2010 and 7/26/2012    ME PLMT INTERSTITIAL DEV RADIAT TX PROSTATE 1/MULT N/A 6/22/2021    Procedure: INSERTION OF FIDUCIAL MARKER (TRANSRECTAL ULTRASOUND GUIDANCE), SPACEOAR;  Surgeon: Jero Loomis MD;  Location: [facility];  Service: Urology    ROTATOR CUFF REPAIR Left 2009    SHOULDER SURGERY Right 2005    collar bone         04/01/25 1100   OT Last Visit   OT Visit Date 04/01/25   Note Type   Note type Evaluation  (& Treat)   Pain Assessment   Pain Assessment Tool 0-10   Pain Score No Pain   Restrictions/Precautions   Weight Bearing Precautions Per Order No   Other Precautions Cognitive;Chair Alarm;Bed Alarm;Fall Risk   Home Living   Type of Home House   Home Layout One level;Stairs to enter with rails   Bathroom Shower/Tub Tub/shower unit   Bathroom Toilet Raised   Bathroom Equipment Shower chair;Hand-held shower;Grab bars in shower   Bathroom Accessibility Accessible   Home Equipment Walker   Additional Comments Pt presents from STR. Pt lethargic/minimally verbal. Above details obtained from chart/wife at bedside.   Prior Function   Level of [place] Independent with ADLs;Needs assistance with ADLs;Needs assistance with functional mobility;Needs assistance with IADLS   Lives With Spouse   Receives Help From Family   IADLs Family/Friend/Other provides transportation;Family/Friend/Other provides meals;Family/Friend/Other provides medication management   Falls in the last 6 months   (unknown # of falls)   Subjective   Subjective Pt received in supine. Pt difficult to arouse/maintain arousal levels.   ADL   Eating Assistance 3  Moderate Assistance   Grooming Assistance 3  Moderate Assistance   UB Bathing Assistance  2  Maximal Assistance   LB Bathing Assistance 1  Total Assistance   UB Dressing Assistance 2  Maximal Assistance   LB Dressing Assistance 1  Total Assistance   Toileting Assistance  1  Total Assistance   Bed Mobility   Supine to Sit 2  Maximal assistance   Additional items Assist x 2;Verbal cues;Increased time required   Transfers   Sit to Stand 2  Maximal assistance   Additional items Assist x 2;Verbal cues;Increased time required   Stand to Sit 2  Maximal assistance   Additional items Assist x 2;Verbal cues;Increased time required   Toilet transfer 2  Maximal assistance   Additional items Assist x 2;Standard toilet;Verbal cues;Increased time required  (Rw)   Additional Comments Pt with noted truncal rigidity.   Functional Mobility   Functional Mobility 3  Moderate assistance   Additional Comments x2 with RW. Pt significantly retropulsive. Requred assist to propel RW forward.   Balance   Static Sitting Fair -   Dynamic Sitting Poor +   Static Standing Poor   Dynamic Standing Poor -   Activity Tolerance   Activity Tolerance Patient limited by fatigue   Medical Staff Made Aware PT Gigi   RUE Assessment   RUE Assessment   (b/l shoulde flexion limited to ~45 degrees and WFL distally.)   LUE Assessment   LUE Assessment   (b/l shoulde flexion limited to ~45 degrees and WFL distally.)   Cognition   Overall Cognitive Status Impaired   Arousal/Participation Arousable   Attention Difficulty attending to directions   Orientation Level Oriented to place;Oriented to time;Oriented to situation   Memory Decreased recall of precautions;Decreased recall of recent events;Decreased short term memory   Following Commands Follows one step commands with increased time or repetition   Assessment   Limitation Decreased ADL status;Decreased self-care trans;Decreased high-level ADLs;Decreased endurance;Decreased cognition;Decreased UE ROM;Decreased UE strength   Prognosis Guarded   Assessment Pt is a 79 y.o. male seen for OT evaluation at  [facility], admitted 3/30/2025 w/ progressive cognitive decline, lethargy, and visual hallucination. Pt found to have Toxic metabolic encephalopathy.  Comorbidities affecting pt's functional performance at time of assessment include: diabetes, chronic kidney disease, CAD, hypertension and months along gradual decline in mental state.  Pt most [facility] presents from [facility]. Prior to admission, pt was living with wife in a 1 SH with steps to manage.  Pt was I w/  ADLS and IADLS,  & required walker PTA. Upon evaluation, pt appears to be performing below baseline functional status. Pt currently requires max ax 2 for bed mobility, mod-total A x 2 for functional mobility/transfers,  mod-max A for UB ADLs and total A for LB ADLS 2* the following deficits impacting occupational performance: weakness, decreased strength, decreased balance, decreased tolerance, impaired arousal, impaired attention, impaired initiation, impaired memory, impaired sequencing, impaired problem solving, and decreased safety awareness. Full objective findings from OT assessment regarding body systems outlined above. Personal factors affecting pt at time of IE include:steps to enter environment, difficulty performing ADLS, difficulty performing IADLS , decreased initiation and engagement , and decreased functional mobility . These impairments, as well as pt's decreased caregiver support and risk for falls  limit pt's ability to safely engage in all baseline areas of occupation and mobility. Pt to benefit from continued skilled OT tx while in the hospital to address deficits as defined above and maximize level of functional independence w ADL's and functional mobility. Occupational Performance areas to address include: bathing/shower, toilet hygiene, dressing, and functional mobility.  This evaluation required an extensive review of medical and/or therapy records and additional review of physical, cognitive and psychosocial history related  to functional performance. Based upon functional performance deficits and assessments, this evaluation has been identified as a high complexity evaluation.  At this time, OT recommendations at time of discharge are level 2 mod intensity resources.   Goals   Patient Goals Pt did not verbalize any goals. Pt's wife at bedside wishes for pt to improve functional mobility   Plan   Treatment Interventions ADL retraining;Functional transfer training;Cognitive reorientation;Patient/family training;Endurance training;UE strengthening/ROM;Equipment evaluation/education;Compensatory technique education;Activityengagement   Goal Expiration Date 04/11/25   OT Treatment Day 0   OT Frequency 3-5x/wk   Discharge Recommendation   Rehab Resource Intensity Level, OT II (Moderate Resource Intensity)   Additional Comments  The patient's raw score on the AM-PAC Daily Activity inpatient short form is 10, standardized score is 24.79 , less than 39.4. Patients at this level are likely to benefit from DC to post-acute rehabilitation services. However please refer to therapist recommendation for discharge planning given other factors that may influence destination.   [facility] Daily Activity Inpatient   Lower Body Dressing 1   Bathing 2   Toileting 1   Upper Body Dressing 2   Grooming 2   Eating 2   Daily Activity Raw Score 10   Turning Head Towards Sound 2   Follow Simple Instructions 2   Low Function Daily Activity Raw Score 14   Low Function Daily Activity Standardized Score  24.79   AM-[facility] Applied Cognition Inpatient   Following a Speech/Presentation 1   Understanding Ordinary Conversation 2   Taking Medications 1   Remembering Where Things Are Placed or Put Away 1   Remembering List of 4-5 Errands 1   Taking Care of Complicated Tasks 1   Applied Cognition Raw Score 7   Applied Cognition Standardized Score 15.17   Additional Treatment Session   Start Time 1030   End Time 1100   Treatment Assessment Pt seated on toilet. Pt noted to be  increasingly lethargic. Performed sit>stsand with total A x 2 and stand pivot to recliner with max Ax 2. BP assessed at this time. 88/29. Pt performed dependent slide over transfer to bed via drawsheet. /33. Pt increasingly alert once in supine./   End of Consult   Education Provided Yes;Family or social support of family present for education by provider   Patient Position at End of Consult Supine;Bed/Chair alarm activated;All needs within reach   Nurse Communication Nurse aware of consult         Pt will achieve the following goals within 10 days.    *Pt will complete grooming with sup.    *Pt will complete UB bathing and dressing with min A.    *Pt will complete LB bathing and dressing with mod A .    * Pt will complete toileting w/ mod A w/ G hygiene/thoroughness using DME PRN    *Pt will complete bed mobility with mod AX 1, with bed flat and no side rail to prep for purposeful tasks    *Pt will perform functional transfers with on/off all surfaces with mod Ax 1 using DME as needed w/ G balance/safety.    *Pt will increase standing tolerance x 5  minutes for inc'd tolerance with standing purposeful tasks.    *Pt will participate in UE therapeutic exercise in order to maximize strength for ADL transfers.     *Pt will sit on EOB for 20 minutes for increased safety with seated activity tolerance during ADL tasks.    *Pt will increase static/dynamic sitting balance to good to improve the ability to sit at edge of bed or on a chair for ADLS;      *Pt will increase static/dynamic standing balance to fair- to improve postural stability and decrease fall risk during standing ADLS and transfers.          *Pt will improve functional mobility during ADL/IADL/leisure tasks to mod A x 1 using DME as needed w/ G balance/safety.              Kaylen Maldonado, OTR/L

## 2025-04-01 NOTE — ASSESSMENT & PLAN NOTE
Antibiotic -  Ceftriaxone 1 g IV daily --->  Cefepime  Preliminary urine culture is showing greater than 100,000 Pseudomonas aeruginosa will switch to cefepime currently.  This is a preliminary result.  Follow-up final results and adjust antibiotics accordingly.    There is also growth of Enterococcus faecalis and will need to check to see if this is resistant or not.  Recommend a total of 7 days of antibiotic.  Follow-up susceptibility data to see if any oral regimens are available.  Antipyretic -Tylenol as needed.  IVF -   Given 1 L isolate bolus in emergency department.  The patient was given normal saline at 75 mL/h for the first day of hospitalization.   Resume mIVF  Antiemetic - Zofran as needed.  Pain control -   Tylenol as needed  Monitor pain levels.  Evaluations -   CT abdomen/pelvis -thickened urinary bladder though collapsed.  Clinical correlation for acute cystitis recommended.  Urinalysis -   During prior admission for hypothermia earlier in March 2025, urinalysis showed only trace WBC (1-2/hpf), occasional bacteria, and trace protein.  However, the urinalysis done in the emergency department on 3/30/2025 shows positive nitrates, 1+ leukocyte (innumerable/hpf), moderate bacteria, and occasional mucus threads and 2-4 hyaline casts which represents a large change.  F/u  UCx:: Preliminary results showing 2 bacteria, Pseudomonas aeruginosa and Enterococcus faecalis.    F/u Bcx: negative.  COVID/influenza/RSV PCR testing negative.

## 2025-04-01 NOTE — PLAN OF CARE
Problem: Potential for Falls  Goal: Patient will remain free of falls  Description: INTERVENTIONS:- Educate patient/family on patient safety including physical limitations- Instruct patient to call for assistance with activity - Consult OT/PT to assist with strengthening/mobility - Keep Call bell within reach- Keep bed low and locked with side rails adjusted as appropriate- Keep care items and personal belongings within reach- Initiate and maintain comfort rounds- Make Fall Risk Sign visible to staff- s and bracelet for high fall risk patients- Consider moving patient to room near nurses station  Outcome: Progressing     Problem: Prexisting or High Potential for Compromised Skin Integrity  Goal: Skin integrity is maintained or improved  Description: INTERVENTIONS:- Identify patients at risk for skin breakdown- Assess and monitor skin integrity- Assess and monitor nutrition and hydration status- Monitor labs - Assess for incontinence - Turn and reposition patient- Assist with mobility/ambulation- Relieve pressure over bony prominences- Avoid friction and shearing- Provide appropriate hygiene as needed including keeping skin clean and dry- Evaluate need for skin moisturizer/barrier cream- Collaborate with interdisciplinary team - Patient/family teaching- Consider wound care consult   Outcome: Progressing

## 2025-04-02 VITALS
DIASTOLIC BLOOD PRESSURE: 51 MMHG | SYSTOLIC BLOOD PRESSURE: 107 MMHG | OXYGEN SATURATION: 99 % | HEART RATE: 74 BPM | WEIGHT: 154.98 LBS | TEMPERATURE: 97.5 F | BODY MASS INDEX: 21.7 KG/M2 | HEIGHT: 71 IN | RESPIRATION RATE: 15 BRPM

## 2025-04-02 PROBLEM — A41.9 SEPSIS (HCC): Status: ACTIVE | Noted: 2025-03-30

## 2025-04-02 LAB
ANION GAP SERPL CALCULATED.3IONS-SCNC: 8 MMOL/L (ref 4–13)
BASOPHILS # BLD AUTO: 0.03 THOUSANDS/ÂΜL (ref 0–0.1)
BASOPHILS NFR BLD AUTO: 0 % (ref 0–1)
BUN SERPL-MCNC: 29 MG/DL (ref 5–25)
CALCIUM SERPL-MCNC: 8.5 MG/DL (ref 8.4–10.2)
CHLORIDE SERPL-SCNC: 108 MMOL/L (ref 96–108)
CO2 SERPL-SCNC: 22 MMOL/L (ref 21–32)
CREAT SERPL-MCNC: 1.2 MG/DL (ref 0.6–1.3)
EOSINOPHIL # BLD AUTO: 0.24 THOUSAND/ÂΜL (ref 0–0.61)
EOSINOPHIL NFR BLD AUTO: 3 % (ref 0–6)
ERYTHROCYTE [DISTWIDTH] IN BLOOD BY AUTOMATED COUNT: 15.7 % (ref 11.6–15.1)
GFR SERPL CREATININE-BSD FRML MDRD: 56 ML/MIN/1.73SQ M
GLUCOSE SERPL-MCNC: 136 MG/DL (ref 65–140)
GLUCOSE SERPL-MCNC: 150 MG/DL (ref 65–140)
GLUCOSE SERPL-MCNC: 161 MG/DL (ref 65–140)
HCT VFR BLD AUTO: 23.2 % (ref 36.5–49.3)
HGB BLD-MCNC: 7.4 G/DL (ref 12–17)
IMM GRANULOCYTES # BLD AUTO: 0.05 THOUSAND/UL (ref 0–0.2)
IMM GRANULOCYTES NFR BLD AUTO: 1 % (ref 0–2)
LYMPHOCYTES # BLD AUTO: 0.35 THOUSANDS/ÂΜL (ref 0.6–4.47)
LYMPHOCYTES NFR BLD AUTO: 5 % (ref 14–44)
MCH RBC QN AUTO: 28.7 PG (ref 26.8–34.3)
MCHC RBC AUTO-ENTMCNC: 31.9 G/DL (ref 31.4–37.4)
MCV RBC AUTO: 90 FL (ref 82–98)
MONOCYTES # BLD AUTO: 0.66 THOUSAND/ÂΜL (ref 0.17–1.22)
MONOCYTES NFR BLD AUTO: 9 % (ref 4–12)
MRSA NOSE QL CULT: NORMAL
NEUTROPHILS # BLD AUTO: 6.37 THOUSANDS/ÂΜL (ref 1.85–7.62)
NEUTS SEG NFR BLD AUTO: 82 % (ref 43–75)
NRBC BLD AUTO-RTO: 0 /100 WBCS
PLATELET # BLD AUTO: 135 THOUSANDS/UL (ref 149–390)
PMV BLD AUTO: 10.3 FL (ref 8.9–12.7)
POTASSIUM SERPL-SCNC: 4.4 MMOL/L (ref 3.5–5.3)
RBC # BLD AUTO: 2.58 MILLION/UL (ref 3.88–5.62)
SODIUM SERPL-SCNC: 138 MMOL/L (ref 135–147)
WBC # BLD AUTO: 7.7 THOUSAND/UL (ref 4.31–10.16)

## 2025-04-02 PROCEDURE — 99239 HOSP IP/OBS DSCHRG MGMT >30: CPT | Performed by: HOSPITALIST

## 2025-04-02 PROCEDURE — 85025 COMPLETE CBC W/AUTO DIFF WBC: CPT | Performed by: HOSPITALIST

## 2025-04-02 PROCEDURE — 80048 BASIC METABOLIC PNL TOTAL CA: CPT | Performed by: HOSPITALIST

## 2025-04-02 PROCEDURE — 82948 REAGENT STRIP/BLOOD GLUCOSE: CPT

## 2025-04-02 RX ORDER — PANTOPRAZOLE SODIUM 40 MG/1
40 TABLET, DELAYED RELEASE ORAL
Start: 2025-04-03

## 2025-04-02 RX ORDER — FUROSEMIDE 20 MG/1
20 TABLET ORAL DAILY
Start: 2025-04-02

## 2025-04-02 RX ORDER — LEVOFLOXACIN 750 MG/1
750 TABLET, FILM COATED ORAL EVERY 24 HOURS
Start: 2025-04-02 | End: 2025-04-09

## 2025-04-02 RX ORDER — FERROUS SULFATE 325(65) MG
325 TABLET ORAL 2 TIMES DAILY WITH MEALS
Start: 2025-04-02

## 2025-04-02 RX ORDER — MICONAZOLE NITRATE 20 MG/G
CREAM TOPICAL 2 TIMES DAILY
Status: DISCONTINUED | OUTPATIENT
Start: 2025-04-02 | End: 2025-04-02 | Stop reason: HOSPADM

## 2025-04-02 RX ORDER — TAMSULOSIN HYDROCHLORIDE 0.4 MG/1
0.4 CAPSULE ORAL
Qty: 90 CAPSULE | Refills: 0
Start: 2025-04-02

## 2025-04-02 RX ADMIN — PANTOPRAZOLE SODIUM 40 MG: 40 TABLET, DELAYED RELEASE ORAL at 05:30

## 2025-04-02 RX ADMIN — ATORVASTATIN CALCIUM 20 MG: 20 TABLET, FILM COATED ORAL at 08:19

## 2025-04-02 RX ADMIN — MICONAZOLE NITRATE: 20 CREAM TOPICAL at 14:45

## 2025-04-02 RX ADMIN — ASPIRIN 81 MG: 81 TABLET, COATED ORAL at 08:19

## 2025-04-02 RX ADMIN — INSULIN LISPRO 1 UNITS: 100 INJECTION, SOLUTION INTRAVENOUS; SUBCUTANEOUS at 12:03

## 2025-04-02 RX ADMIN — HEPARIN SODIUM 5000 UNITS: 5000 INJECTION INTRAVENOUS; SUBCUTANEOUS at 14:45

## 2025-04-02 RX ADMIN — HEPARIN SODIUM 5000 UNITS: 5000 INJECTION INTRAVENOUS; SUBCUTANEOUS at 05:29

## 2025-04-02 RX ADMIN — CEFEPIME HYDROCHLORIDE 1000 MG: 1 INJECTION, SOLUTION INTRAVENOUS at 04:44

## 2025-04-02 RX ADMIN — FERROUS SULFATE TAB 325 MG (65 MG ELEMENTAL FE) 325 MG: 325 (65 FE) TAB at 08:19

## 2025-04-02 RX ADMIN — CARVEDILOL 12.5 MG: 12.5 TABLET, FILM COATED ORAL at 08:19

## 2025-04-02 NOTE — CASE MANAGEMENT
Case Management Discharge Planning Note    Patient name Pernell Covarrubias  Location /-01 MRN [id number]  : 1945 Date 2025       Current Admission Date: 3/30/2025  Current Admission Diagnosis:Toxic metabolic encephalopathy   Patient Active Problem List    Diagnosis Date Noted Date Diagnosed    Ambulatory dysfunction 2025     Cognitive decline 2025     Frailty syndrome in geriatric patient 2025     Dysphagia 2025     Toxic metabolic encephalopathy 2025     Sepsis ([facility]) due to UTI 2025     Incidental Finding: Lung nodule 2025     Incidental Finding: Chronic sinusitis 2025     Compression fracture of L3 vertebra ([facility]) 2025     Pressure injury of skin of buttock 2025     Bradycardia 2025     Fall 2025     Hypothermia 2025     Abnormal TSH 2025     Generalized weakness 2025     Chronic combined systolic and diastolic CHF (congestive heart failure) ([facility]) 2025     Anemia of chronic disease 2025     Urinary frequency 2025     Anemia 2024     History of vitamin D deficiency 2024     Other fatigue 2024     Type 2 diabetes mellitus with stage 2 chronic kidney disease, without long-term current use of insulin  ([facility]) 2024     Dry skin dermatitis 2023     Uses roller walker 2023     Iron deficiency anemia 2023     Peripheral arterial disease ([facility]) 2023     Gait disorder 2022     Edema of both feet 2022     Chronic combined systolic and diastolic heart failure, NYHA class 2 ([facility]) 2022     Heart failure, left, 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% ([facility]) 2022     Acute on chronic combined systolic and diastolic congestive heart failure ([facility]) 2022     Chronic right-sided low back pain without sciatica 2021     Orthostatic hypotension 2021     Hyponatremia 2021     Insomnia, persistent 2021     Prostate cancer ([facility])  03/03/2021     Benign essential hypertension 06/15/2020     Bilateral leg weakness 06/15/2020     Type 2 diabetes mellitus with hyperglycemia, without long-term current use of insulin ([facility]) 05/25/2019     Other hyperlipidemia 09/13/2018     Coronary artery disease involving native coronary artery of native heart with angina pectoris ([facility])      CKD stage 3 due to type 2 diabetes mellitus ([facility]) 09/20/2017     Lumbar radiculopathy 11/25/2016     Type 2 diabetes mellitus with diabetic neuropathy ([facility]) 01/11/2015     Diabetic neuropathy ([facility]) 10/10/2013     Chronic GERD 06/16/2013       LOS (days): 3  Geometric Mean LOS (GMLOS) (days): 3.8  Days to GMLOS:0.8     OBJECTIVE:  Risk of Unplanned Readmission Score: 26.11     Current admission status: Inpatient   Preferred Pharmacy:   [facility] - [place], [place] - [address]  [address] [address]  Phone: [phone] Fax: [phone]    [facility] [address] [place] [address] - [address]  [address]  [facility] [address]  Phone: [phone] Fax: [phone]    Primary Care Provider: Felipe Travis,     Primary Insurance: MEDICARE  Secondary Insurance: [facility]    DISCHARGE DETAILS:    Discharge planning discussed with:: Patient, Patient's Wife (Ruth)  Freedom of Choice: Yes  Comments - Freedom of Choice: Choice for [facility]  CM contacted family/caregiver?: Yes  Were Treatment Team discharge recommendations reviewed with patient/caregiver?: Yes  Did patient/caregiver verbalize understanding of patient care needs?: N/A- going to facility     Contacts  Patient Contacts: Ruth Satish (spouse)  Relationship to Patient:: Family  Contact Method: In Person  Reason/Outcome: Emergency Contact, Discharge Planning    Requested Home Health Care         Is the patient interested in HHC at discharge?: No    DME Referral Provided  Referral made for DME?: No    Would you like to participate in our [facility]  service program?  : No - Declined    Treatment Team Recommendation: Short Term Rehab  Discharge Destination Plan:: Short Term Rehab  Transport at Discharge : Edita holley  ETA of Transport (Date): 04/02/25  ETA of Transport (Time): 1530    Accepting Facility Name, City & State : [facility]: [address] [place] [address]  Receiving Facility/Agency Phone Number: [phone]  Facility/Agency Fax Number: [phone]

## 2025-04-02 NOTE — ASSESSMENT & PLAN NOTE
Lab Results   Component Value Date    HGBA1C 6.9 (H) 02/11/2025     Recent Labs     04/01/25  1552 04/01/25  2033 04/02/25  0728 04/02/25  1100   POCGLU 146* 165* 136 161*       Blood Sugar Average: Last 72 hrs:  (P) 132  Home regimen -   Metformin 1000 mg twice daily.  Januvia 25 mg by mouth daily.  Inpatient regimen -   Basal insulin -none currently but if his p.o. intake increases significantly and his sugars start going up we may need to add basal insulin.  However, which sugars noted above, no need to add currently.  Insulin sliding scale  Adjust regimen as needed.  Monitor blood sugars.  Not currently taking any medications for the neuropathy.

## 2025-04-02 NOTE — ASSESSMENT & PLAN NOTE
Noted by PT during session; wife also thinks it happened at rehab.   Improved with compression stockings  Consider low dose midodrine if this continues to be an issue

## 2025-04-02 NOTE — ASSESSMENT & PLAN NOTE
Present on admission.  Turn/reposition frequently.  Wound care consultation.  Jenna barrier cream recommended   Nutrition -   Glucerna recommended twice daily.

## 2025-04-02 NOTE — ASSESSMENT & PLAN NOTE
Home regimen -   Carvedilol 12.5 mg by mouth twice daily.  Losartan 25 mg by mouth daily.  Spironolactone 25 mg by mouth daily.  Patient is also on furosemide 20 mg by mouth daily.  Inpatient regimen -   Continue carvedilol as heart rate is currently not bradycardic but we will use caution and have hold parameters.  hold losartan as renal function remains stable.  Hold spironolactone and furosemide to allow for hydration in the setting of the infection  Resume furosemide; dc spironolactone   Monitor blood pressures.

## 2025-04-02 NOTE — ASSESSMENT & PLAN NOTE
PT / OT evaluation.  Ambulatory referral placed for patient to see movement specialist with the [facility] neurology group due to expressed concerns for possibility of Parkinson Disease.

## 2025-04-02 NOTE — PLAN OF CARE
Problem: Potential for Falls  Goal: Patient will remain free of falls  Description: INTERVENTIONS:- Educate patient/family on patient safety including physical limitations- Instruct patient to call for assistance with activity - Consult OT/PT to assist with strengthening/mobility - Keep Call bell within reach- Keep bed low and locked with side rails adjusted as appropriate- Keep care items and personal belongings within reach- Initiate and maintain comfort rounds- Make Fall Risk Sign visible to staff- Offer Toileting every 2 Hours, in advance of need- Initiate/Maintain bed alarm- Obtain necessary fall risk management equipment: floor cushion- Apply yellow socks and bracelet for high fall risk patients- Consider moving patient to room near nurses station  INTERVENTIONS:- Educate patient/family on patient safety including physical limitations- Instruct patient to call for assistance with activity - Consult OT/PT to assist with strengthening/mobility - Keep Call bell within reach- Keep bed low and locked with side rails adjusted as appropriate- Keep care items and personal belongings within reach- Initiate and maintain comfort rounds- Make Fall Risk Sign visible to staff- Offer Toileting every 2 Hours, in advance of need- Initiate/Maintain bed alarm- Obtain necessary fall risk management equipment: floor cushion- Apply yellow socks and bracelet for high fall risk patients- Consider moving patient to room near nurses station  Outcome: Progressing     Problem: Prexisting or High Potential for Compromised Skin Integrity  Goal: Skin integrity is maintained or improved  Description: INTERVENTIONS:- Identify patients at risk for skin breakdown- Assess and monitor skin integrity- Assess and monitor nutrition and hydration status- Monitor labs - Assess for incontinence - Turn and reposition patient- Assist with mobility/ambulation- Relieve pressure over bony prominences- Avoid friction and shearing- Provide appropriate  hygiene as needed including keeping skin clean and dry- Evaluate need for skin moisturizer/barrier cream- Collaborate with interdisciplinary team - Patient/family teaching- Consider wound care consult   Outcome: Progressing     Problem: PAIN - ADULT  Goal: Verbalizes/displays adequate comfort level or baseline comfort level  Description: Interventions:- Encourage patient to monitor pain and request assistance- Assess pain using appropriate pain scale- Administer analgesics based on type and severity of pain and evaluate response- Implement non-pharmacological measures as appropriate and evaluate response- Consider cultural and social influences on pain and pain management- Notify physician/advanced practitioner if interventions unsuccessful or patient reports new pain  Outcome: Progressing     Problem: INFECTION - ADULT  Goal: Absence or prevention of progression during hospitalization  Description: INTERVENTIONS:- Assess and monitor for signs and symptoms of infection- Monitor lab/diagnostic results- Monitor all insertion sites, i.e. indwelling lines, tubes, and drains- Monitor endotracheal if appropriate and nasal secretions for changes in amount and color- [place] appropriate cooling/warming therapies per order- Administer medications as ordered- Instruct and encourage patient and family to use good hand hygiene technique- Identify and instruct in appropriate isolation precautions for identified infection/condition  Outcome: Progressing  Goal: Absence of fever/infection during neutropenic period  Description: INTERVENTIONS:- Monitor WBC  Outcome: Progressing     Problem: SAFETY ADULT  Goal: Patient will remain free of falls  Description: INTERVENTIONS:- Educate patient/family on patient safety including physical limitations- Instruct patient to call for assistance with activity - Consult OT/PT to assist with strengthening/mobility - Keep Call bell within reach- Keep bed low and locked with side rails adjusted  as appropriate- Keep care items and personal belongings within reach- Initiate and maintain comfort rounds- Make Fall Risk Sign visible to staff- Offer Toileting every 2 Hours, in advance of need- Initiate/Maintain bed alarm- Obtain necessary fall risk management equipment: floor cushion- Apply yellow socks and bracelet for high fall risk patients- Consider moving patient to room near nurses station  INTERVENTIONS:- Educate patient/family on patient safety including physical limitations- Instruct patient to call for assistance with activity - Consult OT/PT to assist with strengthening/mobility - Keep Call bell within reach- Keep bed low and locked with side rails adjusted as appropriate- Keep care items and personal belongings within reach- Initiate and maintain comfort rounds- Make Fall Risk Sign visible to staff- Offer Toileting every 2 Hours, in advance of need- Initiate/Maintain bed alarm- Obtain necessary fall risk management equipment: floor cushion- Apply yellow socks and bracelet for high fall risk patients- Consider moving patient to room near nurses station  Outcome: Progressing     Problem: DISCHARGE PLANNING  Goal: Discharge to home or other facility with appropriate resources  Description: INTERVENTIONS:- Identify barriers to discharge w/patient and caregiver- Arrange for needed discharge resources and transportation as appropriate- Identify discharge learning needs (meds, wound care, etc.)- Arrange for interpretive services to assist at discharge as needed- Refer to Case Management Department for coordinating discharge planning if the patient needs post-hospital services based on physician/advanced practitioner order or complex needs related to functional status, cognitive ability, or social support system  Outcome: Progressing     Problem: Knowledge Deficit  Goal: Patient/family/caregiver demonstrates understanding of disease process, treatment plan, medications, and discharge  instructions  Description: Complete learning assessment and assess knowledge base.Interventions:- Provide teaching at level of understanding- Provide teaching via preferred learning methods  Outcome: Progressing     Problem: Nutrition/Hydration-ADULT  Goal: Nutrient/Hydration intake appropriate for improving, restoring or maintaining nutritional needs  Description: Monitor and assess patient's nutrition/hydration status for malnutrition. Collaborate with interdisciplinary team and initiate plan and interventions as ordered.  Monitor patient's weight and dietary intake as ordered or per policy. Utilize nutrition screening tool and intervene as necessary. Determine patient's food preferences and provide high-protein, high-caloric foods as appropriate. INTERVENTIONS:- Monitor oral intake, urinary output, labs, and treatment plans- Assess nutrition and hydration status and recommend course of action- Evaluate amount of meals eaten- Assist patient with eating if necessary - Allow adequate time for meals- Recommend/ encourage appropriate diets, oral nutritional supplements, and vitamin/mineral supplements- Order, calculate, and assess calorie counts as needed- Recommend, monitor, and adjust tube feedings and TPN/PPN based on assessed needs- Assess need for intravenous fluids- Provide specific nutrition/hydration education as appropriate- Include patient/family/caregiver in decisions related to nutrition  Outcome: Progressing

## 2025-04-02 NOTE — ASSESSMENT & PLAN NOTE
A/e/b hypothermia and tachypnea   Antibiotic -  Ceftriaxone 1 g IV daily --->  Cefepime  Ucx with pseudomonas and enteroccocus  Susceptible to levofloxacin   Will treat for 7 additional days of levofloxacin as cefepime would not cover enteroccoccus   Antipyretic -Tylenol as needed.  Antiemetic - Zofran as needed.  Pain control -   Tylenol as needed  Monitor pain levels.  Evaluations -   CT abdomen/pelvis -thickened urinary bladder though collapsed.  Clinical correlation for acute cystitis recommended.  Urinalysis -   During prior admission for hypothermia earlier in March 2025, urinalysis showed only trace WBC (1-2/hpf), occasional bacteria, and trace protein.  However, the urinalysis done in the emergency department on 3/30/2025 shows positive nitrates, 1+ leukocyte (innumerable/hpf), moderate bacteria, and occasional mucus threads and 2-4 hyaline casts which represents a large change.  F/u  UCx:: Preliminary results showing 2 bacteria, Pseudomonas aeruginosa and Enterococcus faecalis.    F/u Bcx: negative.  COVID/influenza/RSV PCR testing negative.

## 2025-04-02 NOTE — DISCHARGE SUMMARY
Discharge Summary - Hospitalist   Name: Pernell Covarrubias 79 y.o. male I MRN: [id number]  Unit/Bed#: -01 I Date of Admission: 3/30/2025   Date of Service: 4/2/2025 I Hospital Day: 3     Assessment & Plan  Toxic metabolic encephalopathy  Differential Diagnosis -urinary infection with hypothermia is a clear front running cause.  Worsening dementia is another possibility as well.  Small possibility of thyroid being a cause.  Dehydration can also be a potential etiology as well.  Rule out hypoglycemia.  Baseline Mental Status -there has been some milder more chronic decline over the last few months but recently since beginning in March there is been significant worsening including lethargy, forgetfulness, increased difficulty ambulating  Medication Review (potential causes / contributors) -   None of the patient's home medications are typically associated with altered mental status.  The beta-blocker, carvedilol theoretically has some potential risk.  Abnormal Testing Thus Far -   Urinalysis very concerning for infection.  TSH is abnormal but not bad when corrected for age.  Negative Testing Thus Far -   CT head shows no acute pathology.  Notably, there are chronic microangiopathic changes with stable right basal ganglier lacunar infarct.  This was compared with 3/9/2025.  WBC, lactic acid, and procalcitonin normal.  Creatinine is at baseline.  Sodium and calcium are normal.  COVID testing negative.  Additional Testing Being Requested -   None just yet as I would like to see the patient's response to treating the urinary infection first.  Consultations - Geriatric medicine consultation   Patient safety -   Not currently requiring any restraints.  Redirection and reorientation when needed.  Fall precautions and bed alarm.  PT/OT evaluations.  Other -   Assure adequate sleep hygiene.  Minimize interruptions to sleep.  Avoid constipation.  Avoid delirium inducing medications.  Ensure proper hydration and  nutrition.  Serial Mental Status Exams.  Sepsis (HCC) due to UTI  A/e/b hypothermia and tachypnea   Antibiotic -  Ceftriaxone 1 g IV daily --->  Cefepime  Ucx with pseudomonas and enteroccocus  Susceptible to levofloxacin   Will treat for 7 additional days of levofloxacin as cefepime would not cover enteroccoccus   Antipyretic -Tylenol as needed.  Antiemetic - Zofran as needed.  Pain control -   Tylenol as needed  Monitor pain levels.  Evaluations -   CT abdomen/pelvis -thickened urinary bladder though collapsed.  Clinical correlation for acute cystitis recommended.  Urinalysis -   During prior admission for hypothermia earlier in March 2025, urinalysis showed only trace WBC (1-2/hpf), occasional bacteria, and trace protein.  However, the urinalysis done in the emergency department on 3/30/2025 shows positive nitrates, 1+ leukocyte (innumerable/hpf), moderate bacteria, and occasional mucus threads and 2-4 hyaline casts which represents a large change.  F/u  UCx:: Preliminary results showing 2 bacteria, Pseudomonas aeruginosa and Enterococcus faecalis.    F/u Bcx: negative.  COVID/influenza/RSV PCR testing negative.  Hypothermia  Differential Diagnosis -   UTI is the most favored diagnosis as a cause.  Need to consider the potential of hypoglycemia or poor p.o. intake as alternate etiologies.  Potentially, since this is not the first time that this has occurred, and the previous time did not have clear signs of infection, the patient could have a reset thermostat centrally.  Hypothalamic dysfunction?  Neuropathy can also be an etiology especially in the setting of his diabetes.  And only if the patient were to show symptoms of Parkinson would this be another potential neurological etiology.  Previously was assessed for hypoadrenalism and hypothyroidism.  Prior workup -   Cortisol normal.  During previous hospitalization, although the TSH was abnormal but the free T4 was normal.  Previous admission showed the patient  to be bradycardic but that is not currently the case.  No reported hypoglycemia.  He is diabetic though.  Current workup -   Treat urinary infection.  Not suspected currently to be thyroid based on workup.  See abnormal TSH below.  Monitor blood sugars closely.  Most recent A1c is 6.9% so would not expect a ton of hypoglycemia.  Consider endocrinology evaluation if persists  Monitor temperatures.  Temperature probe urinary catheter placed in the ER.   Stella jasser until temp at least 97.5.  Abnormal TSH  TSH 9.021.  For adults over 51, the upper limit of normal is 8.9 mIU/L.  Therefore, this 9.021 level might really be fairly insignificant.  Just like prior hospitalization, the free T4 is normal and the T3 is slightly low.  Of note, during recent hospitalization the TSH was 6.684 (during which time the T4 was normal) but back in August 2024 and prior the levels were completely normal.  Therefore, the TSH above represents an increasing TSH.  Thyroid exam no palpable thyromegaly/nodularity.   Type 2 diabetes mellitus with diabetic neuropathy (HCC)  Lab Results   Component Value Date    HGBA1C 6.9 (H) 02/11/2025     Recent Labs     04/01/25  1552 04/01/25  2033 04/02/25  0728 04/02/25  1100   POCGLU 146* 165* 136 161*       Blood Sugar Average: Last 72 hrs:  (P) 132  Home regimen -   Metformin 1000 mg twice daily.  Januvia 25 mg by mouth daily.  Inpatient regimen -   Basal insulin -none currently but if his p.o. intake increases significantly and his sugars start going up we may need to add basal insulin.  However, which sugars noted above, no need to add currently.  Insulin sliding scale  Adjust regimen as needed.  Monitor blood sugars.  Not currently taking any medications for the neuropathy.  CKD stage 3 due to type 2 diabetes mellitus (HCC)    Baseline creatinine seems to range between 0.8 and 1.2.  Monitor I's/O.  Monitor weights.  Currently at baseline on admission.  BMP in AM.  Avoid nephrotoxins.  Avoid  hypotension.  Anemia of chronic disease  Baseline hemoglobin tends to be in the 9s range.  Monitor hemoglobin levels.  Coronary artery disease involving native coronary artery of native heart with angina pectoris (HCC)  Antiplatelet - Aspirin  Beta-blocker -carvedilol  Statin -atorvastatin  Benign essential hypertension  Home regimen -   Carvedilol 12.5 mg by mouth twice daily.  Losartan 25 mg by mouth daily.  Spironolactone 25 mg by mouth daily.  Patient is also on furosemide 20 mg by mouth daily.  Inpatient regimen -   Continue carvedilol as heart rate is currently not bradycardic but we will use caution and have hold parameters.  hold losartan as renal function remains stable.  Hold spironolactone and furosemide to allow for hydration in the setting of the infection  Resume furosemide; dc spironolactone   Monitor blood pressures.  Compression fracture of L3 vertebra (HCC)  Age-indeterminate on imaging.  Monitor for any pain symptoms.  Supportive care.  Outpatient follow-up.  Incidental Finding: Lung nodule  Given patient's history of prostate cancer in the past, a 3-month follow-up CT chest recommended.  Incidental findings note completed   Incidental Finding: Chronic sinusitis  CT head -   Near complete opacification of the left maxillary sinus with opacification of posterior left ethmoid air cells and the left sphenoid sinus. There is high density material again seen either representing inspissated secretions or allergic fungal sinusitis. There is hypertrophy of the sinus walls   Outpatient follow-up with PCP and/or ENT.  Pressure injury of skin of buttock  Present on admission.  Turn/reposition frequently.  Wound care consultation.  Jenna barrier cream recommended   Nutrition -   Glucerna recommended twice daily.  Ambulatory dysfunction  PT / OT evaluation.  Ambulatory referral placed for patient to see movement specialist with the [facility] neurology group due to expressed concerns for possibility of  Parkinson Disease.   Orthostatic hypotension  Noted by PT during session; wife also thinks it happened at rehab.   Improved with compression stockings  Consider low dose midodrine if this continues to be an issue      Medical Problems       Resolved Problems  Date Reviewed: 2/10/2025   None       Discharging Physician / Practitioner: Ramiro Minor MD  PCP: Felipe Travis DO  Admission Date:   Admission Orders (From admission, onward)       Ordered        03/30/25 1435  INPATIENT ADMISSION  Once                          Discharge Date: 04/02/25    Consultations During Hospital Stay:  Geriatrics   Wound care     Procedures Performed:   None    Significant Findings / Test Results:   Ucx: pseudomonas and enterococcus     Incidental Findings:   Pulmonary nodule   I reviewed the above mentioned incidental findings with the patient and/or family and they expressed understanding.    Test Results Pending at Discharge (will require follow up):   None      Outpatient Tests Requested:  Neurology referral      Complications:  none     Reason for Admission: toxic metabolic encephalopathy     Hospital Course:   Pernell Covarrubias is a 79 y.o. male patient history of CKD, CAD hypertension who originally presented to the hospital on 3/30/2025 due to altered mental status.  Patient did meet sepsis criteria on admission with hypothermia and tachypnea with UTI as suspected source.  Patient was started on broad-spectrum antibiotics while cultures were followed.  Urine culture ultimately grew Pseudomonas and Enterococcus.  Susceptibilities returned and patient can be discharged on levofloxacin.  Will plan to complete 7 additional days.  Patient's mental status gradually improved during his hospitalization.  On day of discharge patient was more awake and alert and able to participate in self-care.  This is in the background of a more gradual decline and concern for possible underlying Parkinson's disease.  Discussed this concern with  "patient's spouse and recommended a neurology referral once patient is cleared from rehab.  While in the hospital patient did have an episode of orthostatic hypotension which could be related to the suspected Parkinson's disease.  Compression stockings were added to patient's regimen and he had improvement in his symptoms but was still mildly orthostatic.  If orthostatic hypotension continues to be an issue while in rehab would consider addition of midodrine.    .     Condition at Discharge: fair    Discharge Day Visit / Exam:     Subjective: Patient feels well.  Was able to sit up in bed and feed himself breakfast and lunch.  He denied pain or discomfort.    Vitals: Blood Pressure: 107/51 (04/02/25 1222)  Pulse: 74 (04/02/25 1222)  Temperature: 97.5 °F (36.4 °C) (04/02/25 0735)  Temp Source: Oral (04/02/25 0735)  Respirations: 15 (04/02/25 0735)  Height: 5' 11\" (180.3 cm) (03/30/25 1505)  Weight - Scale: 70.3 kg (154 lb 15.7 oz) (03/30/25 1505)  SpO2: 99 % (04/02/25 0735)  Physical Exam  Vitals and nursing note reviewed.   Constitutional:       General: He is not in acute distress.     Appearance: Normal appearance. He is not ill-appearing or toxic-appearing.   HENT:      Head: Normocephalic and atraumatic.   Cardiovascular:      Rate and Rhythm: Normal rate and regular rhythm.      Pulses: Normal pulses.      Heart sounds: No murmur heard.  Pulmonary:      Effort: Pulmonary effort is normal. No respiratory distress.      Breath sounds: Normal breath sounds. No wheezing.   Abdominal:      General: Abdomen is flat. There is no distension.      Palpations: Abdomen is soft.      Tenderness: There is no abdominal tenderness. There is no guarding or rebound.   Genitourinary:     Comments: Soto in place  Skin:     General: Skin is warm and dry.   Neurological:      General: No focal deficit present.      Mental Status: He is alert. Mental status is at baseline.          Discussion with Family: Updated  " (wife) at bedside.    Discharge instructions/Information to patient and family:   See after visit summary for information provided to patient and family.      Provisions for Follow-Up Care:  See after visit summary for information related to follow-up care and any pertinent home health orders.      Mobility at time of Discharge:   Basic Mobility Inpatient Raw Score: 11  JH-HLM Goal: 4: Move to chair/commode  JH-HLM Achieved: 4: Move to chair/commode  HLM Goal achieved. Continue to encourage appropriate mobility.     Disposition:   Other Skilled Nursing Facility at [facility]    Planned Readmission: none    Discharge Medications:  See after visit summary for reconciled discharge medications provided to patient and/or family.      Administrative Statements   Discharge Statement:  I have spent a total time of 40 minutes in caring for this patient on the day of the visit/encounter. >30 minutes of time was spent on: Diagnostic results, Risks and benefits of tx options, Instructions for management, Patient and family education, Counseling / Coordination of care, and Documenting in the medical record.    **Please Note: This note may have been constructed using a voice recognition system**

## 2025-04-02 NOTE — DISCHARGE INSTR - DIET
Consistent Carbohydrate Diet Level 2 (5 carb servings/75 grams CHO/meal); Dysphagia 3-Dental Soft; Thin Liquid  Supervise or Feed as needed (lethargic and not able to feed self during hospital stay, NDD3 2* lethargy)   watch carbs and sweets in his diet and will recheck his fasting blood sugar next office visit

## 2025-04-02 NOTE — DISCHARGE INSTR - OTHER ORDERS
1.Sacrum and bilateral buttocks beefy red areas of dry flaky tissue noted. Recommend CHERELLE antifungal cream/protective barrier.     2. Multiple partial thickness skin loss skin tears , right elbow , left hand , right lateral tibia      3.Multiple dry intact scabs to LE       Orders listed below and wound care will continue to follow, call or Secure Chat with questions.         Skin care plans:  1-Apply CHERELLE to sacrum, buttocks BID and PRN  2-Hydraguard to bilateral heel BID and PRN  3-Elevate heels to offload pressure  4-Ehob cushion when out of bed.  5-Turn/repoisiton q2h or when medically stable for pressure re-distribution on skin.  6-Moisturize skin daily with skin nourishing cream   7-Cleanse lower extremity wound with NSS apply xeroform then apply DSD ABD wrap with kerlix. Change mon wed Friday and prn   8-Cleanse skin tears with NSS apply Dermagran then foam dressing. Change every other day and prn

## 2025-04-02 NOTE — WOUND OSTOMY CARE
Consult Note - Wound   Pernell Covarrubias 79 y.o. male MRN: [id number]  Unit/Bed#: -01 Encounter: [id number]        History and Present Illness:  Patient is an 80 yo male that was admitted to [facility]  for treatment of Toxic metabolic encephalopathy. Patient has a PMH of Dysphagia, cognitive decline, pressure injury of buttock, compression fracture of L3, Type 2 Dm ,CKD . Wound Care was consulted for sacral and leg wounds    Assessment Findings:   Wound consult remote.Secure chat with Rn and assessment of photo and PMH    1.Sacrum and bilateral buttocks beefy red areas of dry flaky tissue noted. Recommend CHERELLE antifungal cream/protective barrier.    2. Multiple partial thickness skin loss skin tears , right elbow , left hand , right lateral tibia     3.Multiple dry intact scabs to LE      Orders listed below and wound care will continue to follow, call or Secure Chat with questions.       Skin care plans:  1-Apply CHERELLE to sacrum, buttocks BID and PRN  2-Hydraguard to bilateral heel BID and PRN  3-Elevate heels to offload pressure  4-Ehob cushion when out of bed.  5-Turn/repoisiton q2h or when medically stable for pressure re-distribution on skin.  6-Moisturize skin daily with skin nourishing cream   7-Cleanse lower extremity wound with NSS apply xeroform then apply DSD ABD wrap with kerlix. Change mon wed Friday and prn   8-Cleanse skin tears with NSS apply Dermagran then foam dressing. Change every other day and prn          Wounds:  Wound 07/30/21 Pressure Injury Buttocks Left (Active)   Wound Image   04/01/25 1358   Wound Description PEDRITO 04/02/25 0800   Dressing Foam, Silicon (eg. Allevyn, etc) 04/02/25 0800   Dressing Status Clean;Dry;Intact 04/02/25 0800   Wound 03/31/25 Pretibial Left (Active)   Wound Image   03/31/25 2035   Wound 03/31/25 Traumatic Skin Tear Arm Left;Posterior;Proximal;Inferior (Active)   Wound Image   04/01/25 1351   Wound Description Beefy red;Bleeding 04/02/25 0800   Romy-wound Assessment  Fragile 04/02/25 0800   Wound 04/01/25 Ankle Anterior;Left (Active)   Wound Image   04/01/25 1415   Wound 04/01/25 Ankle Anterior;Right (Active)   Wound Image   04/01/25 1416     Call or Secure Chat with any questions  Wound Care will continue to follow weekly    Shante MARIN RN CWON

## 2025-04-02 NOTE — PLAN OF CARE
Problem: Potential for Falls  Goal: Patient will remain free of falls  Description: INTERVENTIONS:- Educate patient/family on patient safety including physical limitations- Instruct patient to call for assistance with activity - Consult OT/PT to assist with strengthening/mobility - Keep Call bell within reach- Keep bed low and locked with side rails adjusted as appropriate- Keep care items and personal belongings within reach- Initiate and maintain comfort rounds- Make Fall Risk Sign visible to staff- Offer Toileting every 2 Hours, in advance of need- Initiate/Maintain bed alarm- Obtain necessary fall risk management equipment: walker- Apply yellow socks and bracelet for high fall risk patients- Consider moving patient to room near nurses station  Outcome: Progressing     Problem: PAIN - ADULT  Goal: Verbalizes/displays adequate comfort level or baseline comfort level  Description: Interventions:- Encourage patient to monitor pain and request assistance- Assess pain using appropriate pain scale- Administer analgesics based on type and severity of pain and evaluate response- Implement non-pharmacological measures as appropriate and evaluate response- Consider cultural and social influences on pain and pain management- Notify physician/advanced practitioner if interventions unsuccessful or patient reports new pain  Outcome: Progressing

## 2025-04-03 ENCOUNTER — PATIENT OUTREACH (OUTPATIENT)
Dept: CASE MANAGEMENT | Facility: OTHER | Age: 80
End: 2025-04-03

## 2025-04-03 DIAGNOSIS — R26.9 GAIT DISORDER: ICD-10-CM

## 2025-04-03 NOTE — PROGRESS NOTES
Update obtained from [facility] the patient discharged 4/2/25 to [facility]. Email sent to facility to inform them I will be following the patient during their skilled stay.  This Admin Coordinator will continue to monitor via chart review.

## 2025-04-04 LAB
BACTERIA BLD CULT: NORMAL
BACTERIA BLD CULT: NORMAL

## 2025-04-11 ENCOUNTER — PATIENT OUTREACH (OUTPATIENT)
Dept: CASE MANAGEMENT | Facility: OTHER | Age: 80
End: 2025-04-11

## 2025-04-11 NOTE — PROGRESS NOTES
Chart review complete update obtained from [facility] the patient is currently admitted to SNF for STR. This Admin Coordinator will continue to monitor via chart review.     Update received the patient continues with therapy at [facility] at this time.

## 2025-04-18 ENCOUNTER — PATIENT OUTREACH (OUTPATIENT)
Dept: CASE MANAGEMENT | Facility: OTHER | Age: 80
End: 2025-04-18

## 2025-04-24 ENCOUNTER — TELEPHONE (OUTPATIENT)
Dept: NEUROLOGY | Facility: CLINIC | Age: 80
End: 2025-04-24

## 2025-04-24 NOTE — TELEPHONE ENCOUNTER
Spoke to Sharri the unit manager to r/s pt's appt w/ Dr. Holley to [facility] in [place] on 5/12/25 at 1 due to patient being scheduled with neuromuscular provider when hospital note said to follow up with movement provider. Provided address to the office.

## 2025-04-26 ENCOUNTER — APPOINTMENT (EMERGENCY)
Dept: RADIOLOGY | Facility: HOSPITAL | Age: 80
DRG: 871 | End: 2025-04-26
Payer: MEDICARE

## 2025-04-26 ENCOUNTER — HOSPITAL ENCOUNTER (INPATIENT)
Facility: HOSPITAL | Age: 80
LOS: 10 days | Discharge: NURSING, REHAB OR LONG-TERM CARE | DRG: 871 | End: 2025-05-07
Admitting: FAMILY MEDICINE
Payer: MEDICARE

## 2025-04-26 DIAGNOSIS — B95.61 MSSA BACTEREMIA: ICD-10-CM

## 2025-04-26 DIAGNOSIS — R41.82 ALTERED MENTAL STATUS: Primary | ICD-10-CM

## 2025-04-26 DIAGNOSIS — N17.9 AKI (ACUTE KIDNEY INJURY) (HCC): ICD-10-CM

## 2025-04-26 DIAGNOSIS — J81.1 PULMONARY EDEMA: ICD-10-CM

## 2025-04-26 DIAGNOSIS — R33.9 URINARY RETENTION: ICD-10-CM

## 2025-04-26 DIAGNOSIS — D64.9 ANEMIA: ICD-10-CM

## 2025-04-26 DIAGNOSIS — R78.81 MSSA BACTEREMIA: ICD-10-CM

## 2025-04-26 PROBLEM — R65.10 SIRS (SYSTEMIC INFLAMMATORY RESPONSE SYNDROME) (HCC): Status: ACTIVE | Noted: 2025-04-26

## 2025-04-26 LAB
ACANTHOCYTES BLD QL SMEAR: PRESENT
ALBUMIN SERPL BCG-MCNC: 2.9 G/DL (ref 3.5–5)
ALP SERPL-CCNC: 83 U/L (ref 34–104)
ALT SERPL W P-5'-P-CCNC: 48 U/L (ref 7–52)
AMMONIA PLAS-SCNC: 33 UMOL/L (ref 18–72)
ANION GAP SERPL CALCULATED.3IONS-SCNC: 7 MMOL/L (ref 4–13)
ANISOCYTOSIS BLD QL SMEAR: PRESENT
APAP SERPL-MCNC: 4 UG/ML (ref 10–20)
APTT PPP: 35 SECONDS (ref 23–34)
AST SERPL W P-5'-P-CCNC: 37 U/L (ref 13–39)
ATRIAL RATE: 67 BPM
ATRIAL RATE: 68 BPM
BASOPHILS # BLD AUTO: 0.01 THOUSANDS/ÂΜL (ref 0–0.1)
BASOPHILS NFR BLD AUTO: 0 % (ref 0–1)
BILIRUB SERPL-MCNC: 0.76 MG/DL (ref 0.2–1)
BILIRUB UR QL STRIP: NEGATIVE
BUN SERPL-MCNC: 49 MG/DL (ref 5–25)
CALCIUM ALBUM COR SERPL-MCNC: 9.2 MG/DL (ref 8.3–10.1)
CALCIUM SERPL-MCNC: 8.3 MG/DL (ref 8.4–10.2)
CHLORIDE SERPL-SCNC: 112 MMOL/L (ref 96–108)
CLARITY UR: CLEAR
CO2 SERPL-SCNC: 20 MMOL/L (ref 21–32)
COLOR UR: YELLOW
CREAT SERPL-MCNC: 1.12 MG/DL (ref 0.6–1.3)
EOSINOPHIL # BLD AUTO: 0.07 THOUSAND/ÂΜL (ref 0–0.61)
EOSINOPHIL NFR BLD AUTO: 1 % (ref 0–6)
ERYTHROCYTE [DISTWIDTH] IN BLOOD BY AUTOMATED COUNT: 17.7 % (ref 11.6–15.1)
ETHANOL SERPL-MCNC: <10 MG/DL
GFR SERPL CREATININE-BSD FRML MDRD: 61 ML/MIN/1.73SQ M
GLUCOSE SERPL-MCNC: 75 MG/DL (ref 65–140)
GLUCOSE SERPL-MCNC: 76 MG/DL (ref 65–140)
GLUCOSE UR STRIP-MCNC: NEGATIVE MG/DL
HCT VFR BLD AUTO: 20.6 % (ref 36.5–49.3)
HGB BLD-MCNC: 6.7 G/DL (ref 12–17)
HGB UR QL STRIP.AUTO: NEGATIVE
IMM GRANULOCYTES # BLD AUTO: 0.03 THOUSAND/UL (ref 0–0.2)
IMM GRANULOCYTES NFR BLD AUTO: 1 % (ref 0–2)
INR PPP: 1.09 (ref 0.85–1.19)
KETONES UR STRIP-MCNC: NEGATIVE MG/DL
LACTATE SERPL-SCNC: 0.9 MMOL/L (ref 0.5–2)
LEUKOCYTE ESTERASE UR QL STRIP: NEGATIVE
LYMPHOCYTES # BLD AUTO: 0.45 THOUSANDS/ÂΜL (ref 0.6–4.47)
LYMPHOCYTES NFR BLD AUTO: 9 % (ref 14–44)
MACROCYTES BLD QL AUTO: PRESENT
MCH RBC QN AUTO: 29.4 PG (ref 26.8–34.3)
MCHC RBC AUTO-ENTMCNC: 32.5 G/DL (ref 31.4–37.4)
MCV RBC AUTO: 90 FL (ref 82–98)
MONOCYTES # BLD AUTO: 0.4 THOUSAND/ÂΜL (ref 0.17–1.22)
MONOCYTES NFR BLD AUTO: 8 % (ref 4–12)
NEUTROPHILS # BLD AUTO: 4.13 THOUSANDS/ÂΜL (ref 1.85–7.62)
NEUTS SEG NFR BLD AUTO: 81 % (ref 43–75)
NITRITE UR QL STRIP: NEGATIVE
NRBC BLD AUTO-RTO: 0 /100 WBCS
P AXIS: 60 DEGREES
P AXIS: 76 DEGREES
PH UR STRIP.AUTO: 5.5 [PH]
PLATELET # BLD AUTO: 94 THOUSANDS/UL (ref 149–390)
PLATELET BLD QL SMEAR: ABNORMAL
PMV BLD AUTO: 10.7 FL (ref 8.9–12.7)
POTASSIUM SERPL-SCNC: 4.7 MMOL/L (ref 3.5–5.3)
PR INTERVAL: 226 MS
PR INTERVAL: 258 MS
PROT SERPL-MCNC: 4.7 G/DL (ref 6.4–8.4)
PROT UR STRIP-MCNC: NEGATIVE MG/DL
PROTHROMBIN TIME: 14.6 SECONDS (ref 12.3–15)
QRS AXIS: -3 DEGREES
QRS AXIS: -6 DEGREES
QRSD INTERVAL: 82 MS
QRSD INTERVAL: 82 MS
QT INTERVAL: 404 MS
QT INTERVAL: 412 MS
QTC INTERVAL: 427 MS
QTC INTERVAL: 439 MS
RBC # BLD AUTO: 2.28 MILLION/UL (ref 3.88–5.62)
RBC MORPH BLD: PRESENT
SALICYLATES SERPL-MCNC: <5 MG/DL (ref 3–20)
SODIUM SERPL-SCNC: 139 MMOL/L (ref 135–147)
SP GR UR STRIP.AUTO: 1.01 (ref 1–1.03)
T WAVE AXIS: -31 DEGREES
T WAVE AXIS: 14 DEGREES
TSH SERPL DL<=0.05 MIU/L-ACNC: 15.95 UIU/ML (ref 0.45–4.5)
UROBILINOGEN UR STRIP-ACNC: <2 MG/DL
VENTRICULAR RATE: 67 BPM
VENTRICULAR RATE: 68 BPM
WBC # BLD AUTO: 5.09 THOUSAND/UL (ref 4.31–10.16)

## 2025-04-26 PROCEDURE — 99223 1ST HOSP IP/OBS HIGH 75: CPT | Performed by: INTERNAL MEDICINE

## 2025-04-26 PROCEDURE — 85025 COMPLETE CBC W/AUTO DIFF WBC: CPT

## 2025-04-26 PROCEDURE — 85730 THROMBOPLASTIN TIME PARTIAL: CPT

## 2025-04-26 PROCEDURE — 82948 REAGENT STRIP/BLOOD GLUCOSE: CPT

## 2025-04-26 PROCEDURE — 84439 ASSAY OF FREE THYROXINE: CPT

## 2025-04-26 PROCEDURE — 82077 ASSAY SPEC XCP UR&BREATH IA: CPT

## 2025-04-26 PROCEDURE — 99285 EMERGENCY DEPT VISIT HI MDM: CPT

## 2025-04-26 PROCEDURE — 85610 PROTHROMBIN TIME: CPT

## 2025-04-26 PROCEDURE — 83605 ASSAY OF LACTIC ACID: CPT

## 2025-04-26 PROCEDURE — 93005 ELECTROCARDIOGRAM TRACING: CPT

## 2025-04-26 PROCEDURE — 82140 ASSAY OF AMMONIA: CPT

## 2025-04-26 PROCEDURE — 80053 COMPREHEN METABOLIC PANEL: CPT

## 2025-04-26 PROCEDURE — 80179 DRUG ASSAY SALICYLATE: CPT

## 2025-04-26 PROCEDURE — 80143 DRUG ASSAY ACETAMINOPHEN: CPT

## 2025-04-26 PROCEDURE — 71045 X-RAY EXAM CHEST 1 VIEW: CPT

## 2025-04-26 PROCEDURE — 84481 FREE ASSAY (FT-3): CPT | Performed by: INTERNAL MEDICINE

## 2025-04-26 PROCEDURE — 70450 CT HEAD/BRAIN W/O DYE: CPT

## 2025-04-26 PROCEDURE — 84443 ASSAY THYROID STIM HORMONE: CPT

## 2025-04-26 PROCEDURE — 36415 COLL VENOUS BLD VENIPUNCTURE: CPT

## 2025-04-26 PROCEDURE — 81003 URINALYSIS AUTO W/O SCOPE: CPT

## 2025-04-26 RX ORDER — INSULIN LISPRO 100 [IU]/ML
1-6 INJECTION, SOLUTION INTRAVENOUS; SUBCUTANEOUS
Status: DISCONTINUED | OUTPATIENT
Start: 2025-04-27 | End: 2025-05-01

## 2025-04-26 RX ORDER — LOSARTAN POTASSIUM 25 MG/1
25 TABLET ORAL DAILY
Status: DISCONTINUED | OUTPATIENT
Start: 2025-04-27 | End: 2025-05-02

## 2025-04-26 RX ORDER — CARVEDILOL 12.5 MG/1
12.5 TABLET ORAL 2 TIMES DAILY
Status: DISCONTINUED | OUTPATIENT
Start: 2025-04-26 | End: 2025-05-07 | Stop reason: HOSPADM

## 2025-04-26 RX ORDER — FERROUS SULFATE 325(65) MG
325 TABLET ORAL 2 TIMES DAILY WITH MEALS
Status: DISCONTINUED | OUTPATIENT
Start: 2025-04-27 | End: 2025-05-07 | Stop reason: HOSPADM

## 2025-04-26 RX ORDER — TAMSULOSIN HYDROCHLORIDE 0.4 MG/1
0.4 CAPSULE ORAL
Status: DISCONTINUED | OUTPATIENT
Start: 2025-04-27 | End: 2025-05-07 | Stop reason: HOSPADM

## 2025-04-26 RX ORDER — ENOXAPARIN SODIUM 100 MG/ML
40 INJECTION SUBCUTANEOUS DAILY
Status: DISCONTINUED | OUTPATIENT
Start: 2025-04-27 | End: 2025-04-28

## 2025-04-26 RX ORDER — ATORVASTATIN CALCIUM 20 MG/1
20 TABLET, FILM COATED ORAL DAILY
Status: DISCONTINUED | OUTPATIENT
Start: 2025-04-27 | End: 2025-05-07 | Stop reason: HOSPADM

## 2025-04-26 RX ORDER — INSULIN LISPRO 100 [IU]/ML
1-5 INJECTION, SOLUTION INTRAVENOUS; SUBCUTANEOUS
Status: DISCONTINUED | OUTPATIENT
Start: 2025-04-26 | End: 2025-05-01

## 2025-04-26 RX ORDER — ASPIRIN 81 MG/1
81 TABLET, CHEWABLE ORAL DAILY
Status: DISCONTINUED | OUTPATIENT
Start: 2025-04-27 | End: 2025-05-07 | Stop reason: HOSPADM

## 2025-04-26 RX ORDER — PANTOPRAZOLE SODIUM 40 MG/1
40 TABLET, DELAYED RELEASE ORAL
Status: DISCONTINUED | OUTPATIENT
Start: 2025-04-27 | End: 2025-05-07 | Stop reason: HOSPADM

## 2025-04-26 RX ORDER — SODIUM CHLORIDE 9 MG/ML
75 INJECTION, SOLUTION INTRAVENOUS CONTINUOUS
Status: DISCONTINUED | OUTPATIENT
Start: 2025-04-26 | End: 2025-04-27

## 2025-04-26 RX ORDER — BISACODYL 5 MG/1
10 TABLET, DELAYED RELEASE ORAL DAILY PRN
Status: DISCONTINUED | OUTPATIENT
Start: 2025-04-26 | End: 2025-05-07 | Stop reason: HOSPADM

## 2025-04-26 RX ORDER — UBIDECARENONE 30 MG
100 CAPSULE ORAL DAILY
Status: DISCONTINUED | OUTPATIENT
Start: 2025-04-27 | End: 2025-05-07 | Stop reason: HOSPADM

## 2025-04-26 RX ORDER — ACETAMINOPHEN 325 MG/1
650 TABLET ORAL EVERY 6 HOURS PRN
Status: DISCONTINUED | OUTPATIENT
Start: 2025-04-26 | End: 2025-05-07 | Stop reason: HOSPADM

## 2025-04-26 RX ADMIN — SODIUM CHLORIDE 500 ML: 0.9 INJECTION, SOLUTION INTRAVENOUS at 18:04

## 2025-04-26 RX ADMIN — SODIUM CHLORIDE 75 ML/HR: 0.9 INJECTION, SOLUTION INTRAVENOUS at 21:45

## 2025-04-26 NOTE — H&P
H&P - Hospitalist   Name: Pernell Covarrubias 79 y.o. male I MRN: [id number]  Unit/Bed#: ED 03 I Date of Admission: 4/26/2025   Date of Service: 4/26/2025 I Hospital Day: 0     Assessment & Plan  Altered mental status  Patient was brought in from care facility with decreased responsiveness  Patient has declining mental status since noon and has been waxing and waning in the ED  Patient had a similar admission about a month ago during which all the workup was negative other than UTI.  Etiology not clear  Patient noted to have mild dehydration with elevated TSH with anemia  Ammonia level was normal  CAT scan of the brain was unremarkable  Monitor mental status with IV hydration   speech and swallow evaluation    SIRS (systemic inflammatory response syndrome) (HCC)  Patient not have hypothermia and tachypnea  No clinical evidence of any infection  Monitor for signs of  infection  Patient with similar presentation about a month ago  During that time cortisol level was normal    Anemia  Patient's baseline hemoglobin is around 9-10  Hemoglobin has been dropping recently and was 7.4 earlier this month  As per ED provider patient's wife refused transfusion  Coronary artery disease involving native coronary artery of native heart with angina pectoris (HCC)  Status post multivessel stenting  Continue aspirin, Lipitor and Coreg as tolerated  Type 2 diabetes mellitus with diabetic neuropathy (HCC)    Lab Results   Component Value Date    HGBA1C 6.9 (H) 02/11/2025   Continue Humalog sliding scale with Accu-Cheks before every meal and at bedtime  Speech and swallow evaluation  Hold metformin and Januvia  Patient will be on Humalog sliding scale with Accu-Cheks before every meal and at bedtime  Speech and swallow evaluation followed by diabetic diet as tolerated  Other hyperlipidemia  Continue statin as tolerated  Benign essential hypertension  Continue Coreg, losartan.  Hold Aldactone and Lasix  Chronic combined systolic and  "diastolic heart failure, NYHA class 2 (HCC)  Wt Readings from Last 3 Encounters:   04/26/25 77.8 kg (171 lb 8.3 oz)   03/30/25 70.3 kg (154 lb 15.7 oz)   03/09/25 72.2 kg (159 lb 3.2 oz)             Abnormal TSH  Patient noted to have elevated TSH at 15  Check free T3 and free T4 level  CKD stage 3 due to type 2 diabetes mellitus (HCC)  Lab Results   Component Value Date    HGBA1C 6.9 (H) 02/11/2025   Baseline creatinine 1-1.3  Creatinine close to baseline but patient not have elevated BUN    No results for input(s): \"POCGLU\" in the last 72 hours.    Blood Sugar Average: Last 72 hrs:          VTE Pharmacologic Prophylaxis:   High Risk (Score >/= 5) - Pharmacological DVT Prophylaxis Ordered: enoxaparin (Lovenox). Sequential Compression Devices Ordered.  Code Status:  DNR/DNI  Discussion with family: Attempted to update  (wife) via phone. Left voicemail.     Anticipated Length of Stay: Patient will be admitted on an observation basis with an anticipated length of stay of less than 2 midnights secondary to altered mental status, SIRS.    History of Present Illness   Chief Complaint: Change in mental status    Pernell Covarrubias is a 79 y.o. male with a PMH of CAD status post PCI, prostate cancer, PAD, skin cancer, diabetes mellitus type 2, hypertension, hyperlipidemia, neuropathy, CKD who presents with change in mental status from the nursing home.  Most of the history obtained from ED provider as I could not reach the wife.  Per wife, patient reportedly had breakfast at the care facility and later mentation declined when she saw him around noon.  In the ED patient had waxing and waning mental status with sometimes withdrawing from pain to speaking and answering questions.  In the ED patient's workup has been unremarkable.    Review of systems:     limited as patient is not responsive  Decreased responsiveness    Historical Information   Past Medical History:   Diagnosis Date    Acquired hallux valgus     " last assessed: 8/1/2013    Allergic rhinitis     Anemia 10/26/2010    Atrophy of nail     last assessed: 7/7/2015    Cancer ([facility]) 2020    Chronic kidney disease     chronic renal insufficiency    Coronary artery disease     Deformity of ankle and foot, acquired     last assessed: 2/22/2016    Diabetes mellitus ([facility]) 1994    Difficulty walking     last assessed: 12/14/2015    Dysesthesia     last assessed: 11/25/2016    Hammer toe     unspecified laterality; last assessed: 8/1/2013    Hypertension     Neuropathy in diabetes ([facility]) 1994    Onychomycosis of toenail     last assessed: 2/22/2016    Peripheral vascular disease ([facility])     left SFA stent in 2010    Pes planus     unspecified laterality; last assessed: 8/1/2013    Pneumonia     last assessed: 2/27/2016    Prostate cancer ([facility])     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     last assessed: 8/15/2016    Type 2 diabetes mellitus ([facility]) 07/26/2010    Viral warts     last assessed: 7/24/2015     Past Surgical History:   Procedure Laterality Date    CARDIAC CATHETERIZATION  07/29/2010    CATARACT EXTRACTION, BILATERAL Bilateral     R 1999, L 2008    COLONOSCOPY  2011    FEMORAL ARTERY - POPLITEAL ARTERY BYPASS GRAFT  09/23/2013    FOOT SURGERY      bone spur removed    LEG SURGERY Bilateral     repair; L 8/20/2010 and 7/26/2012    TX PLMT INTERSTITIAL DEV RADIAT TX PROSTATE 1/MULT N/A 6/22/2021    Procedure: INSERTION OF FIDUCIAL MARKER (TRANSRECTAL ULTRASOUND GUIDANCE), SPACEOAR;  Surgeon: Jero Loomis MD;  Location: [facility];  Service: Urology    ROTATOR CUFF REPAIR Left 2009    SHOULDER SURGERY Right 2005    collar bone     Social History     Tobacco Use    Smoking status: Never     Passive exposure: Past    Smokeless tobacco: Never   Vaping Use    Vaping status: Never Used   Substance and Sexual Activity    Alcohol use: Yes     Alcohol/week: 1.0 standard drink of alcohol     Types: 1 Glasses of wine per week     Comment: Stopped in 1986    Drug  use: Never    Sexual activity: Not Currently     Partners: Female     E-Cigarette/Vaping    E-Cigarette Use Never User      E-Cigarette/Vaping Substances    Nicotine No     THC No     CBD No     Flavoring No     Other No     Unknown No      Family History   Problem Relation Age of Onset    Aortic aneurysm Mother     Heart disease Father     Heart attack Father         acute myocardial infarction    Heart disease Sister     Heart attack Sister         acute myocardial infarction    Throat cancer Maternal Grandfather     Heart disease Paternal Grandfather     No Known Problems Son      Social History:  Marital Status: /Civil Union   Patient Pre-hospital Living Situation: Skilled Nursing Facility: [facility]  Patient Pre-hospital Level of Mobility: non-ambulatory/bed bound  Patient Pre-hospital Diet Restrictions: Diabetic diet    Meds/Allergies   I have reveiwed home medications using records provided by [facility].  Prior to Admission medications    Medication Sig Start Date End Date Taking? Authorizing Provider   acetaminophen (TYLENOL) 325 mg tablet Take 2 tablets (650 mg total) by mouth every 6 (six) hours as needed for mild pain, headaches or fever 2/24/22   NEO Camacho   Ascorbic Acid (Vitamin C) 500 MG CAPS Take 500 mg by mouth daily 8/4/21   Historical Provider, MD   aspirin (ECOTRIN LOW STRENGTH) 81 mg EC tablet Take 81 mg by mouth daily 8/4/21   Historical Provider, MD   atorvastatin (LIPITOR) 20 mg tablet TAKE 1 TABLET EVERY DAY 2/24/25   Felipe Travis DO   bisacodyl (DULCOLAX) 5 mg EC tablet Take 10 mg by mouth daily as needed for constipation    Historical Provider, MD   carvedilol (COREG) 12.5 mg tablet Take 1 tablet (12.5 mg total) by mouth 2 (two) times a day 12/12/24   Felipe Travis DO   Cholecalciferol (Vitamin D3) 50 MCG (2000 UT) TABS Take 2,000 Units by mouth daily 8/4/21   Historical Provider, MD   co-enzyme Q-10 100 mg capsule Take 100 mg by mouth daily 8/4/21   Historical  Provider, MD   ferrous sulfate 325 (65 Fe) mg tablet Take 1 tablet (325 mg total) by mouth 2 (two) times a day with meals 4/2/25   Ramiro Minor MD   furosemide (LASIX) 20 mg tablet Take 1 tablet (20 mg total) by mouth daily 4/2/25   Ramiro Minor MD   glucose 40 % Take 1 mL by mouth daily as needed for low blood sugar 3/13/25   Historical Provider, MD   glucose blood (OneTouch Ultra) test strip Test blood sugar once a day 9/6/24   NEO Lombardo   [employer] (Follow-Up) POWD HANDICAP PLACARD, medically recommended, <taxonomy [id number]> due to arthritic/orthopedic condition (#2,#5) 4/3/25 2/22/26  Felipe Travis DO   losartan (COZAAR) 25 mg tablet TAKE 1 TABLET (25 MG TOTAL) BY MOUTH DAILY 1/29/25   Olayinka Begum MD   multivitamin (THERAGRAN) TABS Take 1 tablet by mouth daily    Historical Provider, MD   pantoprazole (PROTONIX) 40 mg tablet Take 1 tablet (40 mg total) by mouth daily in the early morning 4/3/25   Ramiro Minor MD   tamsulosin (FLOMAX) 0.4 mg Take 1 capsule (0.4 mg total) by mouth daily with dinner 4/2/25   Ramiro Minor MD   TRUEplus Lancets 28G MISC Test 1x/d, E11.29 9/19/23   NEO Lombardo     No Known Allergies    Objective :  Temp:  [96.4 °F (35.8 °C)] 96.4 °F (35.8 °C)  HR:  [64-66] 66  BP: (106-135)/(51-80) 106/51  Resp:  [18-19] 18  SpO2:  [94 %-95 %] 94 %  O2 Device: None (Room air)    Physical Exam  Constitutional:       Appearance: He is ill-appearing.      Comments: Patient not opening eyes or responding to questions but withdrawing to pain   HENT:      Head: Normocephalic and atraumatic.   Cardiovascular:      Rate and Rhythm: Normal rate and regular rhythm.      Heart sounds: No murmur heard.     No gallop.   Pulmonary:      Effort: Pulmonary effort is normal.      Breath sounds: Normal breath sounds.   Abdominal:      General: Bowel sounds are normal.      Palpations: Abdomen is soft.      Tenderness: There is no abdominal tenderness.   Genitourinary:     Comments: Soto  with clear urine  Musculoskeletal:         General: No swelling or deformity. Normal range of motion.      Cervical back: Normal range of motion and neck supple.   Skin:     General: Skin is warm and dry.      Comments: Multiple bruises and skin tears with dressings in place   Neurological:      General: No focal deficit present.          Lines/Drains:  Lines/Drains/Airways       Active Status       Name Placement date Placement time Site Days    Urethral Catheter Straight-tip 16 Fr. 04/26/25  1558  Straight-tip  less than 1                  Urinary Catheter:  Goal for removal: N/A - Chronic Soto               Lab Results: I have reviewed the following results:  Results from last 7 days   Lab Units 04/26/25  1419   WBC Thousand/uL 5.09   HEMOGLOBIN g/dL 6.7*   HEMATOCRIT % 20.6*   PLATELETS Thousands/uL 94*   SEGS PCT % 81*   LYMPHO PCT % 9*   MONO PCT % 8   EOS PCT % 1     Results from last 7 days   Lab Units 04/26/25  1419   SODIUM mmol/L 139   POTASSIUM mmol/L 4.7   CHLORIDE mmol/L 112*   CO2 mmol/L 20*   BUN mg/dL 49*   CREATININE mg/dL 1.12   ANION GAP mmol/L 7   CALCIUM mg/dL 8.3*   ALBUMIN g/dL 2.9*   TOTAL BILIRUBIN mg/dL 0.76   ALK PHOS U/L 83   ALT U/L 48   AST U/L 37   GLUCOSE RANDOM mg/dL 76     Results from last 7 days   Lab Units 04/26/25  1419   INR  1.09         Lab Results   Component Value Date    HGBA1C 6.9 (H) 02/11/2025    HGBA1C 6.6 (A) 11/21/2024    HGBA1C 7.6 (H) 08/02/2024     Results from last 7 days   Lab Units 04/26/25  1419   LACTIC ACID mmol/L 0.9       Imaging Results Review: I reviewed radiology reports from this admission including: CT head.  Chest x-ray was unremarkable as read by me  Other Study Results Review: EKG was reviewed.  Sinus rhythm with first-degree AV block and PVCs    Administrative Statements       ** Please Note: This note has been constructed using a voice recognition system. **

## 2025-04-26 NOTE — ASSESSMENT & PLAN NOTE
Patient was brought in from care facility with decreased responsiveness  Patient has declining mental status since noon and has been waxing and waning in the ED  Patient had a similar admission about a month ago during which all the workup was negative other than UTI.  Etiology not clear  Patient noted to have mild dehydration with elevated TSH with anemia  Ammonia level was normal  CAT scan of the brain was unremarkable  Monitor mental status with IV hydration   speech and swallow evaluation

## 2025-04-26 NOTE — ASSESSMENT & PLAN NOTE
Patient's baseline hemoglobin is around 9-10  Hemoglobin has been dropping recently and was 7.4 earlier this month  As per ED provider patient's wife refused transfusion

## 2025-04-26 NOTE — ASSESSMENT & PLAN NOTE
Lab Results   Component Value Date    HGBA1C 6.9 (H) 02/11/2025   Continue Humalog sliding scale with Accu-Cheks before every meal and at bedtime  Speech and swallow evaluation  Hold metformin and Januvia  Patient will be on Humalog sliding scale with Accu-Cheks before every meal and at bedtime  Speech and swallow evaluation followed by diabetic diet as tolerated

## 2025-04-26 NOTE — ASSESSMENT & PLAN NOTE
Patient not have hypothermia and tachypnea  No clinical evidence of any infection  Monitor for signs of  infection  Patient with similar presentation about a month ago  During that time cortisol level was normal

## 2025-04-26 NOTE — ASSESSMENT & PLAN NOTE
Wt Readings from Last 3 Encounters:   04/26/25 77.8 kg (171 lb 8.3 oz)   03/30/25 70.3 kg (154 lb 15.7 oz)   03/09/25 72.2 kg (159 lb 3.2 oz)

## 2025-04-26 NOTE — ASSESSMENT & PLAN NOTE
"Lab Results   Component Value Date    HGBA1C 6.9 (H) 02/11/2025   Baseline creatinine 1-1.3  Creatinine close to baseline but patient not have elevated BUN    No results for input(s): \"POCGLU\" in the last 72 hours.    Blood Sugar Average: Last 72 hrs:      "

## 2025-04-26 NOTE — ED PROVIDER NOTES
Time reflects when diagnosis was documented in both MDM as applicable and the Disposition within this note       Time User Action Codes Description Comment    4/26/2025  5:08 PM Brian Rene Add [R41.82] Altered mental status     4/26/2025  5:26 PM Brian Rene Add [D64.9] Anemia           ED Disposition       ED Disposition   Admit    Condition   Stable    Date/Time   Sat Apr 26, 2025  5:08 PM    Comment   Case was discussed with МАРИНА and the patient's admission status was agreed to be Admission Status: inpatient status to the service of [facility].               Assessment & Plan       Medical Decision Making  Amount and/or Complexity of Data Reviewed  Labs: ordered. Decision-making details documented in ED Course.  Radiology: ordered and independent interpretation performed.    Risk  Decision regarding hospitalization.      78 y/o F presents for evaluation of altered mentation. EMS reporting today pt now verbally unresponsive. EMS reporting patient was recently taken off Parkinson's medications and also had a Soto removed within the past 1-2 days. EMS reporting facility placed IV for IV fluids as pts blood pressure was lower than normal.   Pts wife ultimately came to bedside and reports she was told by facility pt had breakfast this AM but by the time she came around noon he was unresponsive. She notes similar story that pt was started and stopped on Parkinson's meds recently though he has no diagnosis of Parkinsons. She does not note any other focal finding she can point to to explain situation. She states he was admitted a few weeks ago for confusion but he wasn't unresponsive like he is now.   Pts vitals are stable on arrival  Pt has decreased GCS, breathing spontaneously with no focal bacterial signs of illness  AMS w/u including CT head, coma panel, ammonia without focal findings. Pt hypothyroid on TSH though do not feel likely to be primary contributor to current state.   UA without infection  Pts wife  updated to findings so far. See ED course.   Ultimately pt admitted for acute mental status change for further evaluation.     ED Course as of 04/27/25 1007   Sat Apr 26, 2025   1504 Hemoglobin(!): 6.7  This is near pts baseline and unlikely contributing to current presentation. Pt is not able to consent for transfusion    1527 Wife declining transfusion at this time   1527 While talking with wife at bedside, pt had 2 episodes of lucidity, picked head up off bed and eyes tracked appropriately. Pt stated he needed to use bathroom       Medications   sodium chloride 0.9 % bolus 500 mL (500 mL Intravenous New Bag 4/26/25 1804)       ED Risk Strat Scores                    No data recorded        SBIRT 22yo+      Flowsheet Row Most Recent Value   Initial Alcohol Screen: US AUDIT-C     1. How often do you have a drink containing alcohol? 0 Filed at: 04/26/2025 1641   2. How many drinks containing alcohol do you have on a typical day you are drinking?  0 Filed at: 04/26/2025 1641   3a. Male UNDER 65: How often do you have five or more drinks on one occasion? 0 Filed at: 04/26/2025 1641   3b. FEMALE Any Age, or MALE 65+: How often do you have 4 or more drinks on one occassion? 0 Filed at: 04/26/2025 1641   Audit-C Score 0 Filed at: 04/26/2025 1641   TUAN: How many times in the past year have you...    Used an illegal drug or used a prescription medication for non-medical reasons? Never Filed at: 04/26/2025 1641                            History of Present Illness       Chief Complaint   Patient presents with    Altered Mental Status     Pt sent from nursing home, last known normal was noon today.  Per report pt is normally A&OX3, stopped parkinsons meds yesterday        Past Medical History:   Diagnosis Date    Acquired hallux valgus     last assessed: 8/1/2013    Allergic rhinitis     Anemia 10/26/2010    Atrophy of nail     last assessed: 7/7/2015    Cancer (HCC) 2020    Chronic kidney disease     chronic renal  insufficiency    Coronary artery disease     Deformity of ankle and foot, acquired     last assessed: 2/22/2016    Diabetes mellitus ([facility]) 1994    Difficulty walking     last assessed: 12/14/2015    Dysesthesia     last assessed: 11/25/2016    Hammer toe     unspecified laterality; last assessed: 8/1/2013    Hypertension     Neuropathy in diabetes ([facility]) 1994    Onychomycosis of toenail     last assessed: 2/22/2016    Peripheral vascular disease ([facility])     left SFA stent in 2010    Pes planus     unspecified laterality; last assessed: 8/1/2013    Pneumonia     last assessed: 2/27/2016    Prostate cancer ([facility])     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     last assessed: 8/15/2016    Type 2 diabetes mellitus ([facility]) 07/26/2010    Viral warts     last assessed: 7/24/2015      Past Surgical History:   Procedure Laterality Date    CARDIAC CATHETERIZATION  07/29/2010    CATARACT EXTRACTION, BILATERAL Bilateral     R 1999, L 2008    COLONOSCOPY  2011    FEMORAL ARTERY - POPLITEAL ARTERY BYPASS GRAFT  09/23/2013    FOOT SURGERY      bone spur removed    LEG SURGERY Bilateral     repair; L 8/20/2010 and 7/26/2012    NC PLMT INTERSTITIAL DEV RADIAT TX PROSTATE 1/MULT N/A 6/22/2021    Procedure: INSERTION OF FIDUCIAL MARKER (TRANSRECTAL ULTRASOUND GUIDANCE), SPACEOAR;  Surgeon: Jero Loomis MD;  Location: [facility];  Service: Urology    ROTATOR CUFF REPAIR Left 2009    SHOULDER SURGERY Right 2005    collar bone      Family History   Problem Relation Age of Onset    Aortic aneurysm Mother     Heart disease Father     Heart attack Father         acute myocardial infarction    Heart disease Sister     Heart attack Sister         acute myocardial infarction    Throat cancer Maternal Grandfather     Heart disease Paternal Grandfather     No Known Problems Son       Social History     Tobacco Use    Smoking status: Never     Passive exposure: Past    Smokeless tobacco: Never   Vaping Use    Vaping status: Never Used    Substance Use Topics    Alcohol use: Yes     Alcohol/week: 1.0 standard drink of alcohol     Types: 1 Glasses of wine per week     Comment: Stopped in 1986    Drug use: Never      E-Cigarette/Vaping    E-Cigarette Use Never User       E-Cigarette/Vaping Substances    Nicotine No     THC No     CBD No     Flavoring No     Other No     Unknown No       I have reviewed and agree with the history as documented.     HPI  See mdm  Review of Systems   Unable to perform ROS: Mental status change           Objective       ED Triage Vitals   Temperature Pulse Blood Pressure Respirations SpO2 Patient Position - Orthostatic VS   04/26/25 1358 04/26/25 1356 04/26/25 1356 04/26/25 1356 04/26/25 1356 04/26/25 1631   (!) 96.4 °F (35.8 °C) 64 135/80 19 95 % Lying      Temp Source Heart Rate Source BP Location FiO2 (%) Pain Score    04/26/25 1358 04/26/25 1356 04/26/25 1356 -- --    Temporal Monitor Left arm        Vitals      Date and Time Temp Pulse SpO2 Resp BP Pain Score FACES Pain Rating User   04/27/25 0748 99 °F (37.2 °C) -- -- 18 117/58 -- -- DII   04/27/25 0229 97.5 °F (36.4 °C) 91 92 % 16 125/57 -- -- DII   04/27/25 0131 -- 82 90 % -- 109/84 -- -- DII   04/27/25 0118 -- 86 96 % -- 60/40 -- -- DII   04/26/25 2238 -- -- -- 14 -- -- -- TA   04/26/25 2238 -- 83 90 % -- 93/66 -- -- DII   04/26/25 2036 96.8 °F (36 °C) -- -- -- -- -- -- TA   04/26/25 2019 96.8 °F (36 °C) -- -- 14 -- -- -- TA   04/26/25 2019 -- 73 96 % -- 135/62 -- -- DII   04/26/25 2019 98.6 °F (37 °C) 73 96 % 14 135/62 -- -- TA   04/26/25 1928 -- 73 99 % 12 99/65 -- -- DII   04/26/25 1830 -- 70 98 % -- 94/58 -- -- MK   04/26/25 1815 -- 72 99 % 20 138/79 -- -- MK   04/26/25 1800 -- -- -- --  88/64 Provider aware -- -- KR   04/26/25 1700 -- 60 92 % 16 99/50 -- -- MB   04/26/25 1631 -- 66 94 % 18 106/51 -- -- MB   04/26/25 1358 96.4 °F (35.8 °C) -- -- -- -- -- -- JK   04/26/25 1356 -- 64 95 % 19 135/80 -- -- JK            Physical Exam  Constitutional:        "Appearance: He is not toxic-appearing.   HENT:      Head: Normocephalic and atraumatic.      Mouth/Throat:      Mouth: Mucous membranes are moist.   Eyes:      Pupils: Pupils are equal, round, and reactive to light.   Cardiovascular:      Rate and Rhythm: Normal rate and regular rhythm.      Heart sounds: Normal heart sounds.   Pulmonary:      Effort: Pulmonary effort is normal. No respiratory distress.      Breath sounds: No wheezing, rhonchi or rales.   Abdominal:      General: There is no distension.      Palpations: Abdomen is soft.   Musculoskeletal:         General: Swelling present. No tenderness.      Cervical back: Normal range of motion.   Skin:     General: Skin is warm and dry.   Neurological:      GCS: GCS eye subscore is 2. GCS verbal subscore is 2. GCS motor subscore is 4.         Results Reviewed       Procedure Component Value Units Date/Time    T4, free [[id number]]  (Normal) Collected: 04/26/25 1419    Lab Status: Final result Specimen: Blood from Arm, Right Updated: 04/27/25 0035     Free T4 0.83 ng/dL     Narrative:        \"Therapeutic range for patients medicated with thyroid disorders: 0.61-1.24 ng/dL.\"    TSH, 3rd generation with Free T4 reflex [[id number]]  (Abnormal) Collected: 04/26/25 1419    Lab Status: Final result Specimen: Blood from Arm, Right Updated: 04/26/25 1658     TSH 3RD GENERATON 15.954 uIU/mL     UA w Reflex to Microscopic w Reflex to Culture [[id number]] Collected: 04/26/25 1556    Lab Status: Final result Specimen: Urine, Straight Cath Updated: 04/26/25 1611     Color, UA Yellow     Clarity, UA Clear     Specific Gravity, UA 1.015     pH, UA 5.5     Leukocytes, UA Negative     Nitrite, UA Negative     Protein, UA Negative mg/dl      Glucose, UA Negative mg/dl      Ketones, UA Negative mg/dl      Urobilinogen, UA <2.0 mg/dl      Bilirubin, UA Negative     Occult Blood, UA Negative    Salicylate level [[id number]]  (Normal) Collected: 04/26/25 1419    Lab Status: Final result " Specimen: Blood from Arm, Right Updated: 04/26/25 1609     Salicylate Lvl <5 mg/dL     Acetaminophen level-If concentration is detectable, please discuss with medical  on call. [[id number]]  (Abnormal) Collected: 04/26/25 1419    Lab Status: Final result Specimen: Blood from Arm, Right Updated: 04/26/25 1609     Acetaminophen Level 4 ug/mL     CBC and differential [[id number]]  (Abnormal) Collected: 04/26/25 1419    Lab Status: Final result Specimen: Blood from Arm, Right Updated: 04/26/25 1502     WBC 5.09 Thousand/uL      RBC 2.28 Million/uL      Hemoglobin 6.7 g/dL      Hematocrit 20.6 %      MCV 90 fL      MCH 29.4 pg      MCHC 32.5 g/dL      RDW 17.7 %      MPV 10.7 fL      Platelets 94 Thousands/uL      nRBC 0 /100 WBCs      Segmented % 81 %      Immature Grans % 1 %      Lymphocytes % 9 %      Monocytes % 8 %      Eosinophils Relative 1 %      Basophils Relative 0 %      Absolute Neutrophils 4.13 Thousands/µL      Absolute Immature Grans 0.03 Thousand/uL      Absolute Lymphocytes 0.45 Thousands/µL      Absolute Monocytes 0.40 Thousand/µL      Eosinophils Absolute 0.07 Thousand/µL      Basophils Absolute 0.01 Thousands/µL     Narrative:      This is an appended report.  These results have been appended to a previously verified report.    Smear Review(Phlebs Do Not Order) [[id number]]  (Abnormal) Collected: 04/26/25 1419    Lab Status: Final result Specimen: Blood from Arm, Right Updated: 04/26/25 1502     RBC Morphology Present     Platelet Estimate Decreased     Acanthocytes Present     Anisocytosis Present     Macrocytes Present    Comprehensive metabolic panel [[id number]]  (Abnormal) Collected: 04/26/25 1419    Lab Status: Final result Specimen: Blood from Arm, Right Updated: 04/26/25 1456     Sodium 139 mmol/L      Potassium 4.7 mmol/L      Chloride 112 mmol/L      CO2 20 mmol/L      ANION GAP 7 mmol/L      BUN 49 mg/dL      Creatinine 1.12 mg/dL      Glucose 76 mg/dL      Calcium 8.3 mg/dL       Corrected Calcium 9.2 mg/dL      AST 37 U/L      ALT 48 U/L      Alkaline Phosphatase 83 U/L      Total Protein 4.7 g/dL      Albumin 2.9 g/dL      Total Bilirubin 0.76 mg/dL      eGFR 61 ml/min/1.73sq m     Narrative:      National Kid[employer] Foundation guidelines for Chronic Kidney Disease (CKD):     Stage 1 with normal or high GFR (GFR > 90 mL/min/1.73 square meters)    Stage 2 Mild CKD (GFR = 60-89 mL/min/1.73 square meters)    Stage 3A Moderate CKD (GFR = 45-59 mL/min/1.73 square meters)    Stage 3B Moderate CKD (GFR = 30-44 mL/min/1.73 square meters)    Stage 4 Severe CKD (GFR = 15-29 mL/min/1.73 square meters)    Stage 5 End Stage CKD (GFR <15 mL/min/1.73 square meters)  Note: GFR calculation is accurate only with a steady state creatinine    Protime-INR [[id number]]  (Normal) Collected: 04/26/25 1419    Lab Status: Final result Specimen: Blood from Arm, Right Updated: 04/26/25 1453     Protime 14.6 seconds      INR 1.09    Narrative:      INR Therapeutic Range    Indication                                             INR Range      Atrial Fibrillation                                               2.0-3.0  Hypercoagulable State                                    2.0.2.3  Left Ventricular Asist Device                            2.0-3.0  Mechanical Heart Valve                                  -    Aortic(with afib, MI, embolism, HF, LA enlargement,    and/or coagulopathy)                                     2.0-3.0 (2.5-3.5)     Mitral                                                             2.5-3.5  Prosthetic/Bioprosthetic Heart Valve               2.0-3.0  Venous thromboembolism (VTE: VT, PE        2.0-3.0    APTT [[id number]]  (Abnormal) Collected: 04/26/25 1419    Lab Status: Final result Specimen: Blood from Arm, Right Updated: 04/26/25 1453     PTT 35 seconds     Ammonia [[id number]]  (Normal) Collected: 04/26/25 1419    Lab Status: Final result Specimen: Blood from Arm, Right Updated: 04/26/25 1442      Ammonia 33 umol/L     Ethanol [[id number]]  (Normal) Collected: 04/26/25 1419    Lab Status: Final result Specimen: Blood from Arm, Right Updated: 04/26/25 1448     Ethanol Lvl <10 mg/dL     Lactic acid, plasma (w/reflex if result > 2.0) [[id number]]  (Normal) Collected: 04/26/25 1419    Lab Status: Final result Specimen: Blood from Arm, Right Updated: 04/26/25 1448     LACTIC ACID 0.9 mmol/L     Narrative:      Result may be elevated if tourniquet was used during collection.            XR chest 1 view portable   ED Interpretation by Brian Rene MD (04/26 9197)   No acute cardiopulmonary process per my interpretation       Final Interpretation by Sonya Lopez MD (04/27 0741)      No acute cardiopulmonary disease.            Workstation performed: [id number]         CT head without contrast   Final Interpretation by Tarsha Zhao MD (04/26 3789)         1. No acute intracranial abnormality.  Stable chronic microangiopathic changes within the brain.         2. No interval change in chronic sinusitis involving the left maxillary, ethmoid and sphenoid sinuses with high density secretions and thickening of the sinus walls.            Workstation performed: [id number]             Procedures    ED Medication and Procedure Management   Prior to Admission Medications   Prescriptions Last Dose Informant Patient Reported? Taking?   Ascorbic Acid (Vitamin C) 500 MG CAPS 4/26/2025 at  9:00 AM Self Yes Yes   Sig: Take 500 mg by mouth daily   Cholecalciferol (Vitamin D3) 50 MCG (2000 UT) TABS 4/26/2025 at  9:00 AM Self Yes Yes   Sig: Take 2,000 Units by mouth daily   Infant Foods (Follow-Up) POWD   No No   Sig: HANDICAP PLACARD, medically recommended, <taxonomy [id number]> due to arthritic/orthopedic condition (#2,#5)   TRUEplus Lancets 28G MISC  Self No No   Sig: Test 1x/d, E11.29   acetaminophen (TYLENOL) 325 mg tablet  Self No No   Sig: Take 2 tablets (650 mg total) by mouth every 6 (six) hours as needed for  mild pain, headaches or fever   aspirin (ECOTRIN LOW STRENGTH) 81 mg EC tablet 4/26/2025 at  9:00 AM Self Yes Yes   Sig: Take 81 mg by mouth daily   atorvastatin (LIPITOR) 20 mg tablet 4/26/2025 at  9:00 AM Self No Yes   Sig: TAKE 1 TABLET EVERY DAY   bisacodyl (DULCOLAX) 5 mg EC tablet   Yes No   Sig: Take 10 mg by mouth daily as needed for constipation   carvedilol (COREG) 12.5 mg tablet 4/26/2025 at  9:00 AM Self No Yes   Sig: Take 1 tablet (12.5 mg total) by mouth 2 (two) times a day   co-enzyme Q-10 100 mg capsule 4/26/2025 Morning Self Yes Yes   Sig: Take 100 mg by mouth daily   ferrous sulfate 325 (65 Fe) mg tablet 4/26/2025 at  9:00 AM  No Yes   Sig: Take 1 tablet (325 mg total) by mouth 2 (two) times a day with meals   furosemide (LASIX) 20 mg tablet 4/25/2025 at  9:00 AM  No Yes   Sig: Take 1 tablet (20 mg total) by mouth daily   glucose 40 %   Yes No   Sig: Take 1 mL by mouth daily as needed for low blood sugar   glucose blood (OneTouch Ultra) test strip  Self No No   Sig: Test blood sugar once a day   losartan (COZAAR) 25 mg tablet 4/26/2025 at  9:00 AM Self No Yes   Sig: TAKE 1 TABLET (25 MG TOTAL) BY MOUTH DAILY   multivitamin (THERAGRAN) TABS 4/26/2025 Morning Self Yes Yes   Sig: Take 1 tablet by mouth daily   pantoprazole (PROTONIX) 40 mg tablet 4/26/2025 at  6:00 AM  No Yes   Sig: Take 1 tablet (40 mg total) by mouth daily in the early morning   tamsulosin (FLOMAX) 0.4 mg 4/25/2025 at  6:00 PM  No Yes   Sig: Take 1 capsule (0.4 mg total) by mouth daily with dinner      Facility-Administered Medications: None     Current Discharge Medication List        CONTINUE these medications which have NOT CHANGED    Details   Ascorbic Acid (Vitamin C) 500 MG CAPS Take 500 mg by mouth daily      aspirin (ECOTRIN LOW STRENGTH) 81 mg EC tablet Take 81 mg by mouth daily      atorvastatin (LIPITOR) 20 mg tablet TAKE 1 TABLET EVERY DAY  Qty: 90 tablet, Refills: 1    Associated Diagnoses: Coronary artery disease  involving native coronary artery of native heart without angina pectoris      carvedilol (COREG) 12.5 mg tablet Take 1 tablet (12.5 mg total) by mouth 2 (two) times a day  Qty: 180 tablet, Refills: 1    Associated Diagnoses: Benign essential hypertension      Cholecalciferol (Vitamin D3) 50 MCG (2000 UT) TABS Take 2,000 Units by mouth daily      co-enzyme Q-10 100 mg capsule Take 100 mg by mouth daily      ferrous sulfate 325 (65 Fe) mg tablet Take 1 tablet (325 mg total) by mouth 2 (two) times a day with meals    Associated Diagnoses: Anemia of chronic disease      furosemide (LASIX) 20 mg tablet Take 1 tablet (20 mg total) by mouth daily    Associated Diagnoses: Chronic combined systolic and diastolic CHF (congestive heart failure) ([facility])      losartan (COZAAR) 25 mg tablet TAKE 1 TABLET (25 MG TOTAL) BY MOUTH DAILY  Qty: 90 tablet, Refills: 1    Associated Diagnoses: Benign essential hypertension      multivitamin (THERAGRAN) TABS Take 1 tablet by mouth daily      pantoprazole (PROTONIX) 40 mg tablet Take 1 tablet (40 mg total) by mouth daily in the early morning    Associated Diagnoses: Dysphagia      tamsulosin (FLOMAX) 0.4 mg Take 1 capsule (0.4 mg total) by mouth daily with dinner  Qty: 90 capsule, Refills: 0    Associated Diagnoses: Prostate cancer ([facility])      acetaminophen (TYLENOL) 325 mg tablet Take 2 tablets (650 mg total) by mouth every 6 (six) hours as needed for mild pain, headaches or fever  Refills: 0    Associated Diagnoses: Chronic right-sided low back pain without sciatica      bisacodyl (DULCOLAX) 5 mg EC tablet Take 10 mg by mouth daily as needed for constipation      glucose 40 % Take 1 mL by mouth daily as needed for low blood sugar      glucose blood (OneTouch Ultra) test strip Test blood sugar once a day  Qty: 100 strip, Refills: 1    Associated Diagnoses: Type 2 diabetes mellitus with diabetic neuropathy, without long-term current use of insulin ([facility])      Infant Foods (Follow-Up) POWD  HANDICAP SERENA, medically recommended, <taxonomy [id number]> due to arthritic/orthopedic condition (#2,#5)  Qty: 99 g, Refills: 1    Associated Diagnoses: Gait disorder      TRUEplus Lancets 28G MISC Test 1x/d, E11.29  Qty: 200 each, Refills: 3    Associated Diagnoses: Type 2 diabetes mellitus with diabetic neuropathy, without long-term current use of insulin (HCC)           No discharge procedures on file.  ED SEPSIS DOCUMENTATION   Time reflects when diagnosis was documented in both MDM as applicable and the Disposition within this note       Time User Action Codes Description Comment    4/26/2025  5:08 PM Brian Rene [R41.82] Altered mental status     4/26/2025  5:26 PM Brian Rene [D64.9] Anemia                  Brian Rene MD  04/27/25 1024

## 2025-04-27 LAB
ABO GROUP BLD: NORMAL
ABO GROUP BLD: NORMAL
ANION GAP SERPL CALCULATED.3IONS-SCNC: 7 MMOL/L (ref 4–13)
ANION GAP SERPL CALCULATED.3IONS-SCNC: 8 MMOL/L (ref 4–13)
BASOPHILS # BLD AUTO: 0.01 THOUSANDS/ÂΜL (ref 0–0.1)
BASOPHILS NFR BLD AUTO: 0 % (ref 0–1)
BLD GP AB SCN SERPL QL: NEGATIVE
BUN SERPL-MCNC: 36 MG/DL (ref 5–25)
BUN SERPL-MCNC: 40 MG/DL (ref 5–25)
CALCIUM SERPL-MCNC: 8.2 MG/DL (ref 8.4–10.2)
CALCIUM SERPL-MCNC: 8.4 MG/DL (ref 8.4–10.2)
CHLORIDE SERPL-SCNC: 115 MMOL/L (ref 96–108)
CHLORIDE SERPL-SCNC: 117 MMOL/L (ref 96–108)
CO2 SERPL-SCNC: 18 MMOL/L (ref 21–32)
CO2 SERPL-SCNC: 19 MMOL/L (ref 21–32)
CREAT SERPL-MCNC: 1.08 MG/DL (ref 0.6–1.3)
CREAT SERPL-MCNC: 1.25 MG/DL (ref 0.6–1.3)
EOSINOPHIL # BLD AUTO: 0.05 THOUSAND/ÂΜL (ref 0–0.61)
EOSINOPHIL NFR BLD AUTO: 1 % (ref 0–6)
ERYTHROCYTE [DISTWIDTH] IN BLOOD BY AUTOMATED COUNT: 17.7 % (ref 11.6–15.1)
FLUAV RNA RESP QL NAA+PROBE: NEGATIVE
FLUBV RNA RESP QL NAA+PROBE: NEGATIVE
GFR SERPL CREATININE-BSD FRML MDRD: 54 ML/MIN/1.73SQ M
GFR SERPL CREATININE-BSD FRML MDRD: 64 ML/MIN/1.73SQ M
GLUCOSE P FAST SERPL-MCNC: 57 MG/DL (ref 65–99)
GLUCOSE SERPL-MCNC: 105 MG/DL (ref 65–140)
GLUCOSE SERPL-MCNC: 114 MG/DL (ref 65–140)
GLUCOSE SERPL-MCNC: 116 MG/DL (ref 65–140)
GLUCOSE SERPL-MCNC: 123 MG/DL (ref 65–140)
GLUCOSE SERPL-MCNC: 56 MG/DL (ref 65–140)
GLUCOSE SERPL-MCNC: 57 MG/DL (ref 65–140)
GLUCOSE SERPL-MCNC: 70 MG/DL (ref 65–140)
GLUCOSE SERPL-MCNC: 79 MG/DL (ref 65–140)
HCT VFR BLD AUTO: 20.7 % (ref 36.5–49.3)
HGB BLD-MCNC: 6.6 G/DL (ref 12–17)
IMM GRANULOCYTES # BLD AUTO: 0.01 THOUSAND/UL (ref 0–0.2)
IMM GRANULOCYTES NFR BLD AUTO: 0 % (ref 0–2)
LYMPHOCYTES # BLD AUTO: 0.52 THOUSANDS/ÂΜL (ref 0.6–4.47)
LYMPHOCYTES NFR BLD AUTO: 9 % (ref 14–44)
MCH RBC QN AUTO: 28.9 PG (ref 26.8–34.3)
MCHC RBC AUTO-ENTMCNC: 31.9 G/DL (ref 31.4–37.4)
MCV RBC AUTO: 91 FL (ref 82–98)
MONOCYTES # BLD AUTO: 0.45 THOUSAND/ÂΜL (ref 0.17–1.22)
MONOCYTES NFR BLD AUTO: 8 % (ref 4–12)
NEUTROPHILS # BLD AUTO: 4.56 THOUSANDS/ÂΜL (ref 1.85–7.62)
NEUTS SEG NFR BLD AUTO: 82 % (ref 43–75)
NRBC BLD AUTO-RTO: 0 /100 WBCS
PLATELET # BLD AUTO: 97 THOUSANDS/UL (ref 149–390)
PMV BLD AUTO: 11.2 FL (ref 8.9–12.7)
POTASSIUM SERPL-SCNC: 4.1 MMOL/L (ref 3.5–5.3)
POTASSIUM SERPL-SCNC: 4.3 MMOL/L (ref 3.5–5.3)
RBC # BLD AUTO: 2.28 MILLION/UL (ref 3.88–5.62)
RH BLD: POSITIVE
RH BLD: POSITIVE
RSV RNA RESP QL NAA+PROBE: NEGATIVE
SARS-COV-2 RNA RESP QL NAA+PROBE: NEGATIVE
SODIUM SERPL-SCNC: 141 MMOL/L (ref 135–147)
SODIUM SERPL-SCNC: 143 MMOL/L (ref 135–147)
SPECIMEN EXPIRATION DATE: NORMAL
T4 FREE SERPL-MCNC: 0.83 NG/DL (ref 0.61–1.12)
WBC # BLD AUTO: 5.6 THOUSAND/UL (ref 4.31–10.16)

## 2025-04-27 PROCEDURE — 80048 BASIC METABOLIC PNL TOTAL CA: CPT | Performed by: INTERNAL MEDICINE

## 2025-04-27 PROCEDURE — 85025 COMPLETE CBC W/AUTO DIFF WBC: CPT | Performed by: INTERNAL MEDICINE

## 2025-04-27 PROCEDURE — 82948 REAGENT STRIP/BLOOD GLUCOSE: CPT

## 2025-04-27 PROCEDURE — 99232 SBSQ HOSP IP/OBS MODERATE 35: CPT | Performed by: NURSE PRACTITIONER

## 2025-04-27 PROCEDURE — 86923 COMPATIBILITY TEST ELECTRIC: CPT

## 2025-04-27 PROCEDURE — 86850 RBC ANTIBODY SCREEN: CPT | Performed by: NURSE PRACTITIONER

## 2025-04-27 PROCEDURE — P9016 RBC LEUKOCYTES REDUCED: HCPCS

## 2025-04-27 PROCEDURE — 80048 BASIC METABOLIC PNL TOTAL CA: CPT | Performed by: NURSE PRACTITIONER

## 2025-04-27 PROCEDURE — 0241U HB NFCT DS VIR RESP RNA 4 TRGT: CPT | Performed by: NURSE PRACTITIONER

## 2025-04-27 PROCEDURE — 30233N1 TRANSFUSION OF NONAUTOLOGOUS RED BLOOD CELLS INTO PERIPHERAL VEIN, PERCUTANEOUS APPROACH: ICD-10-PCS | Performed by: NURSE PRACTITIONER

## 2025-04-27 PROCEDURE — 87081 CULTURE SCREEN ONLY: CPT | Performed by: INTERNAL MEDICINE

## 2025-04-27 PROCEDURE — 86901 BLOOD TYPING SEROLOGIC RH(D): CPT | Performed by: NURSE PRACTITIONER

## 2025-04-27 PROCEDURE — 86900 BLOOD TYPING SEROLOGIC ABO: CPT | Performed by: NURSE PRACTITIONER

## 2025-04-27 RX ORDER — DEXTROSE MONOHYDRATE 25 G/50ML
INJECTION, SOLUTION INTRAVENOUS
Status: COMPLETED
Start: 2025-04-27 | End: 2025-04-27

## 2025-04-27 RX ORDER — DEXTROSE MONOHYDRATE AND SODIUM CHLORIDE 5; .45 G/100ML; G/100ML
75 INJECTION, SOLUTION INTRAVENOUS CONTINUOUS
Status: DISCONTINUED | OUTPATIENT
Start: 2025-04-27 | End: 2025-04-28

## 2025-04-27 RX ADMIN — DEXTROSE MONOHYDRATE 25 ML: 25 INJECTION, SOLUTION INTRAVENOUS at 07:20

## 2025-04-27 RX ADMIN — DEXTROSE AND SODIUM CHLORIDE 50 ML/HR: 5; .45 INJECTION, SOLUTION INTRAVENOUS at 07:51

## 2025-04-27 RX ADMIN — ENOXAPARIN SODIUM 40 MG: 40 INJECTION SUBCUTANEOUS at 11:38

## 2025-04-27 RX ADMIN — DEXTROSE AND SODIUM CHLORIDE 75 ML/HR: 5; .45 INJECTION, SOLUTION INTRAVENOUS at 22:14

## 2025-04-27 NOTE — PROGRESS NOTES
Progress Note - Hospitalist   Name: Pernell Covarrubias 79 y.o. male I MRN: [id number]  Unit/Bed#: [address] [place] [address] I Date of Admission: 4/26/2025   Date of Service: 4/27/2025 I Hospital Day: 0    Assessment & Plan  Altered mental status  Patient was brought in from care facility with decreased responsiveness  Patient has declining mental status since noon and has been waxing and waning in the ED  Patient had a similar admission about a month ago during which all the workup was negative other than UTI.  Etiology not clear  Patient noted to have mild dehydration with elevated TSH with anemia  Ammonia level was normal  CAT scan of the brain was unremarkable  Monitor mental status with IV hydration   speech and swallow evaluation    SIRS (systemic inflammatory response syndrome) (HCC)  Patient not have hypothermia and tachypnea  No clinical evidence of any infection  Monitor for signs of  infection  Patient with similar presentation about a month ago  During that time cortisol level was normal    Anemia  Patient's baseline hemoglobin is around 9-10  Hemoglobin has been dropping recently and was 7.4 earlier this month  As per ED provider patient's wife refused transfusion  Coronary artery disease involving native coronary artery of native heart with angina pectoris (HCC)  Status post multivessel stenting  Continue aspirin, Lipitor and Coreg as tolerated  Type 2 diabetes mellitus with diabetic neuropathy (HCC)    Lab Results   Component Value Date    HGBA1C 6.9 (H) 02/11/2025   Continue Humalog sliding scale with Accu-Cheks before every meal and at bedtime  Speech and swallow evaluation  Hold metformin and Januvia  Patient will be on Humalog sliding scale with Accu-Cheks before every meal and at bedtime  Speech and swallow evaluation followed by diabetic diet as tolerated  Other hyperlipidemia  Continue statin as tolerated  Benign essential hypertension  Continue Coreg, losartan.  Hold Aldactone and Lasix  Chronic combined  systolic and diastolic heart failure, NYHA class 2 (HCC)  Wt Readings from Last 3 Encounters:   04/26/25 72.5 kg (159 lb 14.4 oz)   03/30/25 70.3 kg (154 lb 15.7 oz)   03/09/25 72.2 kg (159 lb 3.2 oz)             Abnormal TSH  Patient noted to have elevated TSH at 15, T4 is 0.83  T3 is pending   CKD stage 3 due to type 2 diabetes mellitus (HCC)  Lab Results   Component Value Date    HGBA1C 6.9 (H) 02/11/2025   Baseline creatinine 1-1.3  Creatinine close to baseline but patient not have elevated BUN    Recent Labs     04/26/25  2259 04/27/25  0108 04/27/25  0717 04/27/25  0744   POCGLU 75 70 56* 116       Blood Sugar Average: Last 72 hrs:  (P) 79.25      VTE Pharmacologic Prophylaxis: VTE Score: 8 Moderate Risk (Score 3-4) - Pharmacological DVT Prophylaxis Ordered: enoxaparin (Lovenox).    Mobility:   Basic Mobility Inpatient Raw Score: 8  -HLM Goal: 3: Sit at edge of bed  JH-HLM Achieved: 2: Bed activities/Dependent transfer  JH-HLM Goal NOT achieved. Continue with multidisciplinary rounding and encourage appropriate mobility to improve upon JH-HLM goals.    Patient Centered Rounds: I performed bedside rounds with nursing staff today.   Discussions with Specialists or Other Care Team Provider: attending    Education and Discussions with Family / Patient: Updated  (wife) via phone.    Current Length of Stay: 0 day(s)  Current Patient Status: Observation   Certification Statement: The patient will continue to require additional inpatient hospital stay due to AMS  Discharge Plan: Anticipate discharge in 48 hrs to rehab facility.    Code Status: Level 3 - DNAR and DNI    Subjective   Patient laying in bed, minimal interaction.  Wife called - she is unsure if she would like a blood transfusion for [place], risks and benefits explained.  She is coming to the hospital with her son and would like to defer conversation regarding blood transfusion and escalating treatment until then.  In the interim, she does  not want any invasive procedures and confirms his DNR status.      Objective :  Temp:  [96.4 °F (35.8 °C)-99 °F (37.2 °C)] 99 °F (37.2 °C)  HR:  [60-91] 91  BP: ()/(40-84) 117/58  Resp:  [12-20] 18  SpO2:  [90 %-99 %] 92 %  O2 Device: Nasal cannula  Nasal Cannula O2 Flow Rate (L/min):  [2 L/min] 2 L/min    Body mass index is 22.3 kg/m².     Input and Output Summary (last 24 hours):     Intake/Output Summary (Last 24 hours) at 4/27/2025 0813  Last data filed at 4/27/2025 0114  Gross per 24 hour   Intake --   Output 1100 ml   Net -1100 ml       Physical Exam  Vitals and nursing note reviewed.   Constitutional:       Appearance: He is ill-appearing.   HENT:      Head: Normocephalic.      Nose: Nose normal.      Mouth/Throat:      Mouth: Mucous membranes are dry.   Cardiovascular:      Rate and Rhythm: Normal rate and regular rhythm.   Pulmonary:      Effort: Pulmonary effort is normal.      Breath sounds: Normal breath sounds.   Abdominal:      General: Abdomen is flat.      Palpations: Abdomen is soft.   Skin:     General: Skin is warm and dry.   Neurological:      Comments: Does not awake or follow commands           Lines/Drains:  Lines/Drains/Airways       Active Status       Name Placement date Placement time Site Days    Urethral Catheter Straight-tip 16 Fr. 04/26/25  1558  Straight-tip  less than 1                  Urinary Catheter:  Goal for removal: Remove after 48 hrs of I/O monitoring                 Lab Results: I have reviewed the following results:   Results from last 7 days   Lab Units 04/27/25  0455   WBC Thousand/uL 5.60   HEMOGLOBIN g/dL 6.6*   HEMATOCRIT % 20.7*   PLATELETS Thousands/uL 97*   SEGS PCT % 82*   LYMPHO PCT % 9*   MONO PCT % 8   EOS PCT % 1     Results from last 7 days   Lab Units 04/27/25  0455 04/26/25  1419   SODIUM mmol/L 141 139   POTASSIUM mmol/L 4.3 4.7   CHLORIDE mmol/L 115* 112*   CO2 mmol/L 18* 20*   BUN mg/dL 40* 49*   CREATININE mg/dL 1.08 1.12   ANION GAP mmol/L 8 7    CALCIUM mg/dL 8.4 8.3*   ALBUMIN g/dL  --  2.9*   TOTAL BILIRUBIN mg/dL  --  0.76   ALK PHOS U/L  --  83   ALT U/L  --  48   AST U/L  --  37   GLUCOSE RANDOM mg/dL 57* 76     Results from last 7 days   Lab Units 04/26/25  1419   INR  1.09     Results from last 7 days   Lab Units 04/27/25  0744 04/27/25  0717 04/27/25  0108 04/26/25  2259   POC GLUCOSE mg/dl 116 56* 70 75         Results from last 7 days   Lab Units 04/26/25  1419   LACTIC ACID mmol/L 0.9       Recent Cultures (last 7 days):               Last 24 Hours Medication List:     Current Facility-Administered Medications:     acetaminophen (TYLENOL) tablet 650 mg, Q6H PRN    aspirin chewable tablet 81 mg, Daily    atorvastatin (LIPITOR) tablet 20 mg, Daily    bisacodyl (DULCOLAX) EC tablet 10 mg, Daily PRN    carvedilol (COREG) tablet 12.5 mg, BID    Cholecalciferol (VITAMIN D3) tablet 2,000 Units, Daily    co-enzyme Q-10 capsule 100 mg, Daily    dextrose 5 % and sodium chloride 0.45 % infusion, Continuous, Last Rate: 50 mL/hr (04/27/25 0751)    enoxaparin (LOVENOX) subcutaneous injection 40 mg, Daily    ferrous sulfate tablet 325 mg, BID With Meals    insulin lispro (HumALOG/ADMELOG) 100 units/mL subcutaneous injection 1-5 Units, HS    insulin lispro (HumALOG/ADMELOG) 100 units/mL subcutaneous injection 1-6 Units, TID AC **AND** Fingerstick Glucose (POCT), TID AC    losartan (COZAAR) tablet 25 mg, Daily    multivitamin stress formula tablet 1 tablet, Daily    pantoprazole (PROTONIX) EC tablet 40 mg, Early Morning    tamsulosin (FLOMAX) capsule 0.4 mg, Daily With Dinner    Administrative Statements   Today, Patient Was Seen By: NEO Calles      **Please Note: This note may have been constructed using a voice recognition system.**

## 2025-04-27 NOTE — ASSESSMENT & PLAN NOTE
Lab Results   Component Value Date    HGBA1C 6.9 (H) 02/11/2025   Baseline creatinine 1-1.3  Creatinine close to baseline but patient not have elevated BUN    Recent Labs     04/26/25  2259 04/27/25  0108 04/27/25  0717 04/27/25  0744   POCGLU 75 70 56* 116       Blood Sugar Average: Last 72 hrs:  (P) 79.25

## 2025-04-27 NOTE — ASSESSMENT & PLAN NOTE
Wt Readings from Last 3 Encounters:   04/26/25 72.5 kg (159 lb 14.4 oz)   03/30/25 70.3 kg (154 lb 15.7 oz)   03/09/25 72.2 kg (159 lb 3.2 oz)

## 2025-04-27 NOTE — PLAN OF CARE
Problem: Potential for Falls  Goal: Patient will remain free of falls  Description: INTERVENTIONS:- Educate patient/family on patient safety including physical limitations- Instruct patient to call for assistance with activity - Consult OT/PT to assist with strengthening/mobility - Keep Call bell within reach- Keep bed low and locked with side rails adjusted as appropriate- Keep care items and personal belongings within reach- Initiate and maintain comfort rounds- Make Fall Risk Sign visible to staff- Offer Toileting every 2 Hours, in advance of need- Initiate/Maintain bed alarm- Obtain necessary fall risk management equipment: slipper socks- Apply yellow socks and bracelet for high fall risk patients- Consider moving patient to room near nurses station  INTERVENTIONS:- Educate patient/family on patient safety including physical limitations- Instruct patient to call for assistance with activity - Consult OT/PT to assist with strengthening/mobility - Keep Call bell within reach- Keep bed low and locked with side rails adjusted as appropriate- Keep care items and personal belongings within reach- Initiate and maintain comfort rounds- Make Fall Risk Sign visible to staff- Offer Toileting every 2 Hours, in advance of need- Initiate/Maintain bed alarm- Obtain necessary fall risk management equipment: slipper socks- Apply yellow socks and bracelet for high fall risk patients- Consider moving patient to room near nurses station  Outcome: Progressing     Problem: PAIN - ADULT  Goal: Verbalizes/displays adequate comfort level or baseline comfort level  Description: Interventions:- Encourage patient to monitor pain and request assistance- Assess pain using appropriate pain scale- Administer analgesics based on type and severity of pain and evaluate response- Implement non-pharmacological measures as appropriate and evaluate response- Consider cultural and social influences on pain and pain management- Notify  physician/advanced practitioner if interventions unsuccessful or patient reports new pain  Outcome: Progressing     Problem: INFECTION - ADULT  Goal: Absence or prevention of progression during hospitalization  Description: INTERVENTIONS:- Assess and monitor for signs and symptoms of infection- Monitor lab/diagnostic results- Monitor all insertion sites, i.e. indwelling lines, tubes, and drains- Monitor endotracheal if appropriate and nasal secretions for changes in amount and color- [place] appropriate cooling/warming therapies per order- Administer medications as ordered- Instruct and encourage patient and family to use good hand hygiene technique- Identify and instruct in appropriate isolation precautions for identified infection/condition  Outcome: Progressing  Goal: Absence of fever/infection during neutropenic period  Description: INTERVENTIONS:- Monitor WBC  Outcome: Progressing     Problem: SAFETY ADULT  Goal: Patient will remain free of falls  Description: INTERVENTIONS:- Educate patient/family on patient safety including physical limitations- Instruct patient to call for assistance with activity - Consult OT/PT to assist with strengthening/mobility - Keep Call bell within reach- Keep bed low and locked with side rails adjusted as appropriate- Keep care items and personal belongings within reach- Initiate and maintain comfort rounds- Make Fall Risk Sign visible to staff- Offer Toileting every 2 Hours, in advance of need- Initiate/Maintain bed alarm- Obtain necessary fall risk management equipment: slipper socks- Apply yellow socks and bracelet for high fall risk patients- Consider moving patient to room near nurses station  INTERVENTIONS:- Educate patient/family on patient safety including physical limitations- Instruct patient to call for assistance with activity - Consult OT/PT to assist with strengthening/mobility - Keep Call bell within reach- Keep bed low and locked with side rails adjusted as  appropriate- Keep care items and personal belongings within reach- Initiate and maintain comfort rounds- Make Fall Risk Sign visible to staff- Offer Toileting every 2 Hours, in advance of need- Initiate/Maintain bed alarm- Obtain necessary fall risk management equipment: slipper socks- Apply yellow socks and bracelet for high fall risk patients- Consider moving patient to room near nurses station  Outcome: Progressing  Goal: Maintain or return to baseline ADL function  Description: INTERVENTIONS:-  Assess patient's ability to carry out ADLs; assess patient's baseline for ADL function and identify physical deficits which impact ability to perform ADLs (bathing, care of mouth/teeth, toileting, grooming, dressing, etc.)- Assess/evaluate cause of self-care deficits - Assess range of motion- Assess patient's mobility; develop plan if impaired- Assess patient's need for assistive devices and provide as appropriate- Encourage maximum independence but intervene and supervise when necessary- Involve family in performance of ADLs- Assess for home care needs following discharge - Consider OT consult to assist with ADL evaluation and planning for discharge- Provide patient education as appropriate  Outcome: Progressing  Goal: Maintains/Returns to pre admission functional level  Description: INTERVENTIONS:- Perform AM-PAC 6 Click Basic Mobility/ Daily Activity assessment daily.- Set and communicate daily mobility goal to care team and patient/family/caregiver. - Collaborate with rehabilitation services on mobility goals if consulted- Perform Range of Motion 3 times a day.- Reposition patient every 2 hours.- Dangle patient 3 times a day- Stand patient 3 times a day- Ambulate patient 3 times a day- Out of bed to chair 3 times a day - Out of bed for meals 3 times a day- Out of bed for toileting- Record patient progress and toleration of activity level   Outcome: Progressing     Problem: DISCHARGE PLANNING  Goal: Discharge to home  or other facility with appropriate resources  Description: INTERVENTIONS:- Identify barriers to discharge w/patient and caregiver- Arrange for needed discharge resources and transportation as appropriate- Identify discharge learning needs (meds, wound care, etc.)- Arrange for interpretive services to assist at discharge as needed- Refer to Case Management Department for coordinating discharge planning if the patient needs post-hospital services based on physician/advanced practitioner order or complex needs related to functional status, cognitive ability, or social support system  Outcome: Progressing     Problem: Knowledge Deficit  Goal: Patient/family/caregiver demonstrates understanding of disease process, treatment plan, medications, and discharge instructions  Description: Complete learning assessment and assess knowledge base.Interventions:- Provide teaching at level of understanding- Provide teaching via preferred learning methods  Outcome: Progressing     Problem: Nutrition/Hydration-ADULT  Goal: Nutrient/Hydration intake appropriate for improving, restoring or maintaining nutritional needs  Description: Monitor and assess patient's nutrition/hydration status for malnutrition. Collaborate with interdisciplinary team and initiate plan and interventions as ordered.  Monitor patient's weight and dietary intake as ordered or per policy. Utilize nutrition screening tool and intervene as necessary. Determine patient's food preferences and provide high-protein, high-caloric foods as appropriate. INTERVENTIONS:- Monitor oral intake, urinary output, labs, and treatment plans- Assess nutrition and hydration status and recommend course of action- Evaluate amount of meals eaten- Assist patient with eating if necessary - Allow adequate time for meals- Recommend/ encourage appropriate diets, oral nutritional supplements, and vitamin/mineral supplements- Order, calculate, and assess calorie counts as needed- Recommend,  monitor, and adjust tube feedings and TPN/PPN based on assessed needs- Assess need for intravenous fluids- Provide specific nutrition/hydration education as appropriate- Include patient/family/caregiver in decisions related to nutrition  Outcome: Progressing     Problem: Prexisting or High Potential for Compromised Skin Integrity  Goal: Skin integrity is maintained or improved  Description: INTERVENTIONS:- Identify patients at risk for skin breakdown- Assess and monitor skin integrity- Assess and monitor nutrition and hydration status- Monitor labs - Assess for incontinence - Turn and reposition patient- Assist with mobility/ambulation- Relieve pressure over bony prominences- Avoid friction and shearing- Provide appropriate hygiene as needed including keeping skin clean and dry- Evaluate need for skin moisturizer/barrier cream- Collaborate with interdisciplinary team - Patient/family teaching- Consider wound care consult   Outcome: Progressing

## 2025-04-27 NOTE — PLAN OF CARE
Problem: SAFETY ADULT  Goal: Patient will remain free of falls  Description: INTERVENTIONS:- Educate patient/family on patient safety including physical limitations- Instruct patient to call for assistance with activity - Consult OT/PT to assist with strengthening/mobility - Keep Call bell within reach- Keep bed low and locked with side rails adjusted as appropriate- Keep care items and personal belongings within reach- Initiate and maintain comfort rounds- Make Fall Risk Sign visible to staff- Offer Toileting every 2 Hours, in advance of need- Initiate/Maintain bed alarm- Obtain necessary fall risk management equipment: yellow socks - Apply yellow socks and bracelet for high fall risk patients- Consider moving patient to room near nurses station  INTERVENTIONS:- Educate patient/family on patient safety including physical limitations- Instruct patient to call for assistance with activity - Consult OT/PT to assist with strengthening/mobility - Keep Call bell within reach- Keep bed low and locked with side rails adjusted as appropriate- Keep care items and personal belongings within reach- Initiate and maintain comfort rounds- Make Fall Risk Sign visible to staff- Offer Toileting every 2 Hours, in advance of need- Initiate/Maintain bed alarm- Obtain necessary fall risk management equipment: yellow socks- Apply yellow socks and bracelet for high fall risk patients- Consider moving patient to room near nurses station  Outcome: Progressing     Problem: INFECTION - ADULT  Goal: Absence of fever/infection during neutropenic period  Description: INTERVENTIONS:- Monitor WBC  Outcome: Progressing

## 2025-04-27 NOTE — QUICK NOTE
Extensive conversation had with wife at bedside.  She expresses frustration at patient's decline over the last several months and is concerned that he will not return to baseline.  She is amenable to blood transfusion at this time but confirms patient should remain a DNR/DNI.  She is requesting a neurology consultation.

## 2025-04-28 ENCOUNTER — PATIENT OUTREACH (OUTPATIENT)
Dept: CASE MANAGEMENT | Facility: OTHER | Age: 80
End: 2025-04-28

## 2025-04-28 PROBLEM — D69.6 THROMBOCYTOPENIA (HCC): Status: ACTIVE | Noted: 2025-04-28

## 2025-04-28 LAB
ABO GROUP BLD BPU: NORMAL
ANION GAP SERPL CALCULATED.3IONS-SCNC: -2 MMOL/L (ref 4–13)
ATRIAL RATE: 67 BPM
ATRIAL RATE: 68 BPM
BASOPHILS # BLD AUTO: 0.02 THOUSANDS/ÂΜL (ref 0–0.1)
BASOPHILS NFR BLD AUTO: 0 % (ref 0–1)
BPU ID: NORMAL
BUN SERPL-MCNC: 33 MG/DL (ref 5–25)
CALCIUM SERPL-MCNC: 8.3 MG/DL (ref 8.4–10.2)
CHLORIDE SERPL-SCNC: 119 MMOL/L (ref 96–108)
CO2 SERPL-SCNC: 18 MMOL/L (ref 21–32)
CREAT SERPL-MCNC: 1.21 MG/DL (ref 0.6–1.3)
CROSSMATCH: NORMAL
EOSINOPHIL # BLD AUTO: 0.05 THOUSAND/ÂΜL (ref 0–0.61)
EOSINOPHIL NFR BLD AUTO: 1 % (ref 0–6)
ERYTHROCYTE [DISTWIDTH] IN BLOOD BY AUTOMATED COUNT: 18.3 % (ref 11.6–15.1)
FERRITIN SERPL-MCNC: 323 NG/ML (ref 30–336)
FOLATE SERPL-MCNC: 17.3 NG/ML
GFR SERPL CREATININE-BSD FRML MDRD: 56 ML/MIN/1.73SQ M
GLUCOSE SERPL-MCNC: 106 MG/DL (ref 65–140)
GLUCOSE SERPL-MCNC: 140 MG/DL (ref 65–140)
GLUCOSE SERPL-MCNC: 143 MG/DL (ref 65–140)
GLUCOSE SERPL-MCNC: 154 MG/DL (ref 65–140)
GLUCOSE SERPL-MCNC: 174 MG/DL (ref 65–140)
HCT VFR BLD AUTO: 22.6 % (ref 36.5–49.3)
HGB BLD-MCNC: 7.4 G/DL (ref 12–17)
IMM GRANULOCYTES # BLD AUTO: 0.03 THOUSAND/UL (ref 0–0.2)
IMM GRANULOCYTES NFR BLD AUTO: 0 % (ref 0–2)
IRON SATN MFR SERPL: 18 % (ref 15–50)
IRON SERPL-MCNC: 37 UG/DL (ref 50–212)
LYMPHOCYTES # BLD AUTO: 0.4 THOUSANDS/ÂΜL (ref 0.6–4.47)
LYMPHOCYTES NFR BLD AUTO: 5 % (ref 14–44)
MCH RBC QN AUTO: 29.2 PG (ref 26.8–34.3)
MCHC RBC AUTO-ENTMCNC: 32.7 G/DL (ref 31.4–37.4)
MCV RBC AUTO: 89 FL (ref 82–98)
MONOCYTES # BLD AUTO: 0.78 THOUSAND/ÂΜL (ref 0.17–1.22)
MONOCYTES NFR BLD AUTO: 10 % (ref 4–12)
MRSA NOSE QL CULT: NORMAL
NEUTROPHILS # BLD AUTO: 6.2 THOUSANDS/ÂΜL (ref 1.85–7.62)
NEUTS SEG NFR BLD AUTO: 84 % (ref 43–75)
NRBC BLD AUTO-RTO: 0 /100 WBCS
P AXIS: 60 DEGREES
P AXIS: 76 DEGREES
PLATELET # BLD AUTO: 93 THOUSANDS/UL (ref 149–390)
PMV BLD AUTO: 10 FL (ref 8.9–12.7)
POTASSIUM SERPL-SCNC: 3.6 MMOL/L (ref 3.5–5.3)
PR INTERVAL: 226 MS
PR INTERVAL: 258 MS
QRS AXIS: -3 DEGREES
QRS AXIS: -6 DEGREES
QRSD INTERVAL: 82 MS
QRSD INTERVAL: 82 MS
QT INTERVAL: 404 MS
QT INTERVAL: 412 MS
QTC INTERVAL: 427 MS
QTC INTERVAL: 439 MS
RBC # BLD AUTO: 2.53 MILLION/UL (ref 3.88–5.62)
SODIUM SERPL-SCNC: 135 MMOL/L (ref 135–147)
T WAVE AXIS: -31 DEGREES
T WAVE AXIS: 14 DEGREES
TIBC SERPL-MCNC: 211.4 UG/DL (ref 250–450)
TRANSFERRIN SERPL-MCNC: 151 MG/DL (ref 203–362)
UIBC SERPL-MCNC: 174 UG/DL (ref 155–355)
UNIT DISPENSE STATUS: NORMAL
UNIT PRODUCT CODE: NORMAL
UNIT PRODUCT VOLUME: 350 ML
UNIT RH: NORMAL
VENTRICULAR RATE: 67 BPM
VENTRICULAR RATE: 68 BPM
VIT B12 SERPL-MCNC: 561 PG/ML (ref 180–914)
WBC # BLD AUTO: 7.48 THOUSAND/UL (ref 4.31–10.16)

## 2025-04-28 PROCEDURE — 87154 CUL TYP ID BLD PTHGN 6+ TRGT: CPT | Performed by: INTERNAL MEDICINE

## 2025-04-28 PROCEDURE — 82607 VITAMIN B-12: CPT | Performed by: INTERNAL MEDICINE

## 2025-04-28 PROCEDURE — 97167 OT EVAL HIGH COMPLEX 60 MIN: CPT

## 2025-04-28 PROCEDURE — 82728 ASSAY OF FERRITIN: CPT | Performed by: INTERNAL MEDICINE

## 2025-04-28 PROCEDURE — 82948 REAGENT STRIP/BLOOD GLUCOSE: CPT

## 2025-04-28 PROCEDURE — 93010 ELECTROCARDIOGRAM REPORT: CPT | Performed by: INTERNAL MEDICINE

## 2025-04-28 PROCEDURE — 83540 ASSAY OF IRON: CPT | Performed by: INTERNAL MEDICINE

## 2025-04-28 PROCEDURE — 87186 SC STD MICRODIL/AGAR DIL: CPT | Performed by: INTERNAL MEDICINE

## 2025-04-28 PROCEDURE — 85025 COMPLETE CBC W/AUTO DIFF WBC: CPT | Performed by: NURSE PRACTITIONER

## 2025-04-28 PROCEDURE — 92610 EVALUATE SWALLOWING FUNCTION: CPT

## 2025-04-28 PROCEDURE — 82746 ASSAY OF FOLIC ACID SERUM: CPT | Performed by: INTERNAL MEDICINE

## 2025-04-28 PROCEDURE — 87040 BLOOD CULTURE FOR BACTERIA: CPT | Performed by: INTERNAL MEDICINE

## 2025-04-28 PROCEDURE — 99232 SBSQ HOSP IP/OBS MODERATE 35: CPT | Performed by: INTERNAL MEDICINE

## 2025-04-28 PROCEDURE — 80048 BASIC METABOLIC PNL TOTAL CA: CPT | Performed by: NURSE PRACTITIONER

## 2025-04-28 PROCEDURE — 83550 IRON BINDING TEST: CPT | Performed by: INTERNAL MEDICINE

## 2025-04-28 RX ORDER — DEXTROSE MONOHYDRATE AND SODIUM CHLORIDE 5; .45 G/100ML; G/100ML
75 INJECTION, SOLUTION INTRAVENOUS CONTINUOUS
Status: DISCONTINUED | OUTPATIENT
Start: 2025-04-28 | End: 2025-04-28

## 2025-04-28 RX ORDER — DEXTROSE, SODIUM CHLORIDE, SODIUM LACTATE, POTASSIUM CHLORIDE, AND CALCIUM CHLORIDE 5; .6; .31; .03; .02 G/100ML; G/100ML; G/100ML; G/100ML; G/100ML
75 INJECTION, SOLUTION INTRAVENOUS CONTINUOUS
Status: DISCONTINUED | OUTPATIENT
Start: 2025-04-28 | End: 2025-04-28

## 2025-04-28 RX ORDER — METOCLOPRAMIDE 10 MG/1
10 TABLET ORAL
Status: DISCONTINUED | OUTPATIENT
Start: 2025-04-28 | End: 2025-05-07 | Stop reason: HOSPADM

## 2025-04-28 RX ORDER — SODIUM CHLORIDE, SODIUM LACTATE, POTASSIUM CHLORIDE, CALCIUM CHLORIDE 600; 310; 30; 20 MG/100ML; MG/100ML; MG/100ML; MG/100ML
75 INJECTION, SOLUTION INTRAVENOUS CONTINUOUS
Status: DISCONTINUED | OUTPATIENT
Start: 2025-04-28 | End: 2025-04-28

## 2025-04-28 RX ORDER — MICONAZOLE NITRATE 20 MG/G
CREAM TOPICAL 2 TIMES DAILY
Status: DISCONTINUED | OUTPATIENT
Start: 2025-04-28 | End: 2025-05-07 | Stop reason: HOSPADM

## 2025-04-28 RX ADMIN — CHOLECALCIFEROL TAB 25 MCG (1000 UNIT) 2000 UNITS: 25 TAB at 08:31

## 2025-04-28 RX ADMIN — FERROUS SULFATE TAB 325 MG (65 MG ELEMENTAL FE) 325 MG: 325 (65 FE) TAB at 16:41

## 2025-04-28 RX ADMIN — INSULIN LISPRO 1 UNITS: 100 INJECTION, SOLUTION INTRAVENOUS; SUBCUTANEOUS at 13:21

## 2025-04-28 RX ADMIN — INSULIN LISPRO 1 UNITS: 100 INJECTION, SOLUTION INTRAVENOUS; SUBCUTANEOUS at 21:19

## 2025-04-28 RX ADMIN — ASPIRIN 81 MG: 81 TABLET, CHEWABLE ORAL at 08:31

## 2025-04-28 RX ADMIN — ATORVASTATIN CALCIUM 20 MG: 20 TABLET, FILM COATED ORAL at 08:31

## 2025-04-28 RX ADMIN — Medication 100 MG: at 08:31

## 2025-04-28 RX ADMIN — TAMSULOSIN HYDROCHLORIDE 0.4 MG: 0.4 CAPSULE ORAL at 16:41

## 2025-04-28 RX ADMIN — CARVEDILOL 12.5 MG: 12.5 TABLET, FILM COATED ORAL at 08:31

## 2025-04-28 RX ADMIN — METOCLOPRAMIDE 10 MG: 10 TABLET ORAL at 16:42

## 2025-04-28 RX ADMIN — ENOXAPARIN SODIUM 40 MG: 40 INJECTION SUBCUTANEOUS at 08:31

## 2025-04-28 RX ADMIN — DEXTROSE, SODIUM CHLORIDE, SODIUM LACTATE, POTASSIUM CHLORIDE, AND CALCIUM CHLORIDE 75 ML/HR: 5; .6; .31; .03; .02 INJECTION, SOLUTION INTRAVENOUS at 15:07

## 2025-04-28 RX ADMIN — Medication 1 TABLET: at 08:31

## 2025-04-28 RX ADMIN — CARVEDILOL 12.5 MG: 12.5 TABLET, FILM COATED ORAL at 18:21

## 2025-04-28 RX ADMIN — MICONAZOLE NITRATE 1 APPLICATION: 20 CREAM TOPICAL at 18:21

## 2025-04-28 RX ADMIN — LOSARTAN POTASSIUM 25 MG: 25 TABLET, FILM COATED ORAL at 08:31

## 2025-04-28 NOTE — PROGRESS NOTES
Update obtained from [facility] the patient Admitted 4/26/25 to [facility], [place]. This Admin Coordinator will continue to monitor via chart review.

## 2025-04-28 NOTE — OCCUPATIONAL THERAPY NOTE
OT EVALUATION       04/28/25 1035   OT Last Visit   OT Visit Date 04/28/25   Note Type   Note type Evaluation   Pain Assessment   Pain Assessment Tool FLACC   Pain Rating: FLACC (Rest) - Face 0   Pain Rating: FLACC (Rest) - Legs 0   Pain Rating: FLACC (Rest) - Activity 0   Pain Rating: FLACC (Rest) - Cry 0   Pain Rating: FLACC (Rest) - Consolability 0   Score: FLACC (Rest) 0   Pain Rating: FLACC (Activity) - Face 0   Pain Rating: FLACC (Activity) - Legs 0   Pain Rating: FLACC (Activity) - Activity 0   Pain Rating: FLACC (Activity) - Cry 0   Pain Rating: FLACC (Activity) - Consolability 0   Score: FLACC (Activity) 0   Restrictions/Precautions   Other Precautions Chair Alarm;Bed Alarm;Cognitive;Fall Risk  (lethargic)   Home Living   Type of Home SNF  (admitted from [facility])   Additional Comments per wife patient was able to transfer with assist prior to admisssion.  Patient was living at home 2 months ago   Prior Function   Level of [place] Needs assistance with ADLs;Needs assistance with functional mobility;Needs assistance with IADLS   Lives With Facility staff   Receives Help From Personal care attendant   IADLs Family/Friend/Other provides transportation;Family/Friend/Other provides meals;Family/Friend/Other provides medication management   Lifestyle   Reciprocal Relationships wife present nursing home through session, reports patient is more alert today   ADL   Eating Assistance 1  Total Assistance   Grooming Assistance 1  Total Assistance   UB Bathing Assistance 1  Total Assistance   LB Bathing Assistance 1  Total Assistance   UB Dressing Assistance 1  Total Assistance   LB Dressing Assistance 1  Total Assistance   Toileting Assistance  1  Total Assistance   Bed Mobility   Rolling R 2  Maximal assistance   Additional items Assist x 1;Verbal cues;LE management   Rolling L 2  Maximal assistance   Additional items Assist x 1;LE management;Verbal cues   Supine to Sit 1  Dependent   Additional Comments  assisted nursing aide with bathing, pt unable to assist in any care   Activity Tolerance   Activity Tolerance Patient limited by fatigue;Treatment limited secondary to medical complications (Comment)  (lethargic)   Nurse Made Aware yes, Aureliano ZEE Assessment   RUE Assessment   (AAROM WFL, pt able to initiate shoulder flexion to 20 degrees, AAROM elbow/)   LUE Assessment   LUE Assessment   (AAROM WFL, pt able to initiate shoulder flexion to 20 degrees, AAROM elbow/)   Cognition   Overall Cognitive Status Impaired   Arousal/Participation Lethargic   Attention Attends with cues to redirect   Orientation Level Oriented to person;Oriented to place;Disoriented to time;Disoriented to situation   Following Commands Follows one step commands inconsistently   Comments pt able to answer some questions, lethargic throughout session, able to follow a few commands and did have eyes open briefly during session   Assessment   Limitation Decreased ADL status;Decreased UE ROM;Decreased UE strength;Decreased Safe judgement during ADL;Decreased endurance;Decreased cognition;Decreased self-care trans;Decreased high-level ADLs   Prognosis Fair   Assessment Patient evaluated by Occupational Therapy.  Patient admitted with <principal problem not specified>.  The patients occupational profile, medical and therapy history includes a extensive additional review of physical, cognitive, or psychosocial history related to current functional performance.  Comorbidities affecting functional mobility and ADLS include: anemia, CAD, cancer, CKD, diabetes, hypertension, and neuropathy.  Prior to admission, patient was requiring assist for functional mobility with walker, requiring assist for ADLS, requiring assist for IADLS, and in short term rehab.  The evaluation identifies the following performance deficits: weakness, decreased ROM, impaired balance, decreased endurance, increased fall risk, new onset of impairment of functional mobility,  decreased ADLS, decreased IADLS, decreased activity tolerance, decreased safety awareness, impaired judgement, decreased cognition, and decreased strength, that result in activity limitations and/or participation restrictions. This evaluation requires clinical decision making of high complexity, because the patient presents with comorbidites that affect occupational performance and required significant modification of tasks or assistance with consideration of multiple treatment options.  The Barthel Index was used as a functional outcome tool presenting with a score of Barthel Index Score: 0, indicating marked limitations of functional mobility and ADLS.  The patient's raw score on the -PAC Daily Activity Inpatient Short Form is 6. A raw score of less than 19 suggests the patient may benefit from discharge to post-acute rehabilitation services. Please refer to the recommendation of the Occupational Therapist for safe discharge planning.  Patient will benefit from skilled Occupational Therapy services to address above deficits and facilitate a safe return to prior level of function.   Goals   Patient Goals unable to state, wife reports to get patient moving and stronger   STG Time Frame   (1-7 days)   Short Term Goal  Goals established to promote Patient Goals: unable to state, wife reports to get patient moving and stronger:  Eating: mod assist; Grooming: mod assist seated; Bathing: mod assist; Upper Body Dressing mod assist; Lower Body Dressing: mod assist; Toileting: mod assist; Patient will increase stand pivot commode transfer to mod assist of 2 with rolling walker to increase performance and safety with ADLS and functional mobility; Patient will increase standing tolerance to 2 minutes during ADL task to decrease assistance level and decrease fall risk; Patient will increase bed mobility to mod assist in preparation for ADLS and transfers; Patient will increase functional mobility to and from bathroom with  rolling walker with mod assist of 2 to increase performance with ADLS and to use a toilet; Patient will tolerate 5 minutes of UE ROM/strengthening to increase general activity tolerance and performance in ADLS/IADLS; Patient will improve functional activity tolerance to 10 minutes of sustained functional tasks to increase participation in basic self-care and decrease assistance level;  Patient will increase dynamic sitting balance to poor+ to improve the ability to sit at edge of bed or on a chair for ADLS;  Patient will increase dynamic standing balance to poor to improve postural stability and decrease fall risk during standing ADLS and transfers.   LTG Time Frame   (8-14 days)   Long Term Goal Eating: min assist; Grooming: min assist seated; Bathing: min assist; Upper Body Dressing min assist; Lower Body Dressing: min assist; Toileting: min assist; Patient will increase stand pivot commode transfer to mod assist with rolling walker to increase performance and safety with ADLS and functional mobility; Patient will increase standing tolerance to 4 minutes during ADL task to decrease assistance level and decrease fall risk; Patient will increase bed mobility to min assist in preparation for ADLS and transfers; Patient will increase functional mobility to and from bathroom with rolling walker with mod assist to increase performance with ADLS and to use a toilet; Patient will tolerate 10 minutes of UE ROM/strengthening to increase general activity tolerance and performance in ADLS/IADLS; Patient will improve functional activity tolerance to 20 minutes of sustained functional tasks to increase participation in basic self-care and decrease assistance level;  Patient will increase dynamic sitting balance to fair- to improve the ability to sit at edge of bed or on a chair for ADLS;  Patient will increase dynamic standing balance to poor+ to improve postural stability and decrease fall risk during standing ADLS and  transfers.  Pt will score >/= 11/24 on [facility] Daily Activity Inpatient scale to promote safe independence with ADLs and functional mobility; Pt will score >/= 30/100 on Barthel Index in order to decrease caregiver assistance needed and increase ability to perform ADLs and functional mobility.   Plan   Treatment Interventions ADL retraining;Functional transfer training;UE strengthening/ROM;Endurance training;Patient/family training;Equipment evaluation/education;Cognitive reorientation;Activityengagement;Compensatory technique education   Goal Expiration Date 05/12/25   OT Frequency 3-5x/wk   Discharge Recommendation   Rehab Resource Intensity Level, OT II (Moderate Resource Intensity)   AM-PAC Daily Activity Inpatient   Lower Body Dressing 1   Bathing 1   Toileting 1   Upper Body Dressing 1   Grooming 1   Eating 1   Daily Activity Raw Score 6   Turning Head Towards Sound 3   Follow Simple Instructions 3   Low Function Daily Activity Raw Score 12   Low Function Daily Activity Standardized Score  21.38   AM-PAC Applied Cognition Inpatient   Following a Speech/Presentation 1   Understanding Ordinary Conversation 3   Taking Medications 1   Remembering Where Things Are Placed or Put Away 1   Remembering List of 4-5 Errands 1   Taking Care of Complicated Tasks 1   Applied Cognition Raw Score 8   Applied Cognition Standardized Score 19.32   Barthel Index   Feeding 0   Bathing 0   Grooming Score 0   Dressing Score 0   Bladder Score 0   Bowels Score 0   Toilet Use Score 0   Transfers (Bed/Chair) Score 0   Mobility (Level Surface) Score 0   Stairs Score 0   Barthel Index Score 0   Licensure   [place] License Number  Camelia Webb MS OTR/L [id number]

## 2025-04-28 NOTE — PHYSICAL THERAPY NOTE
PT Cancellation Note       04/28/25 1316   Note Type   Note type Cancelled Session   Cancel Reasons Medical status   Additional Comments Attempted to see pt this afternoon for PT evaluation. Per RADHA Bedoya pt mentation has been waxing/waning. When attempting to see pt he currently is not following any commands, unable to open eyes, briefly responds to noxious stimuli with UE/hand movment but not maintained. Pt not appropriate for PT evaluation at this time. Will follow-up as appropriate.   Licensure   [place] License Number  Camelia Lackey [id number]

## 2025-04-28 NOTE — ASSESSMENT & PLAN NOTE
Acute on chronic. Patient's baseline hemoglobin is around 9 range. Has been noted to trend down to 7-8 over this past month  No evidence of active bleeding  4/26/25: Hemoglobin trended down 6.7  4/27/25: S/p 1U PRBC after discussion with wife, now agreeable  Obtain iron panel, B12, folate levels  Monitor daily CBC. Transfuse for hemoglobin <7

## 2025-04-28 NOTE — ASSESSMENT & PLAN NOTE
Noted to have intermittent hypothermia. With similar situation during recent hospitalization  Cortisol level checked 3/10/25 which was normal 10.7  TSH elevated at 15 but with normal T4  Negative infectious work up so far  Possibly in the setting of autonomic dysfunction from diabetes, questionable parkinson disease, poor PO intake  Stella jasser for temperatures below 97F

## 2025-04-28 NOTE — ASSESSMENT & PLAN NOTE
POA as evidenced by hypothermia and tachypnea  COVID/FLU negative. UA negative. Lactic acid negative. CXR without evidence of infection. Without leukocytosis.  Follow up BC x2  Trend WBC and fever curve  Monitor off antibiotics

## 2025-04-28 NOTE — ASSESSMENT & PLAN NOTE
Per chart review, TSH elevated during last hospitalization but with normal T4  4/26/25: TSH 15.954, T4 0.83. Follow up T3

## 2025-04-28 NOTE — ASSESSMENT & PLAN NOTE
BP acceptable  PTA medications include Carvedilol 12.5mg BID, Losartan 25mg QD, Furosemide 20mg QD. Spironolactone 25mg QD was discontinued during recent hospitalization  Continue Carvedilol and Losartan  Holding Furosemide until oral intake improves  Monitor vitals per routine

## 2025-04-28 NOTE — PROGRESS NOTES
Patient:  CHASITY MELTON    MRN:  [id number]    Aidin Request ID:  [id number]    Level of care reserved:  Skilled Nursing Facility    Partner Reserved:  [place] [address] [phone]    Clinical needs requested:    Geography searched:  10 miles around [id number]    Start of Service:    Request sent:  12:56pm EDT on 4/28/2025 by Rosalee Temple    Partner reserved:  1:05pm EDT on 4/28/2025 by Rosalee Temple    Choice list shared:  1:05pm EDT on 4/28/2025 by Rosalee Temple

## 2025-04-28 NOTE — PROGRESS NOTES
Progress Note - Hospitalist   Name: Pernell Covarrubias 79 y.o. male I MRN: [id number]  Unit/Bed#: [address] [place] [address] I Date of Admission: 4/26/2025   Date of Service: 4/28/2025 I Hospital Day: 1    Assessment & Plan  Altered mental status  Patient brought in from [facility] for decreased responsiveness. Mental status waxing and waning. Recently hospitalization earlier in the month for AMS in the setting of dehydration and UTI with concern of underlying dementia (wife reports questionable parkinson diagnosis)   CT head: 1. No acute intracranial abnormality.  Stable chronic microangiopathic changes within the brain. 2. No interval change in chronic sinusitis involving the left maxillary, ethmoid and sphenoid sinuses with high density secretions and thickening of the sinus walls.  COVID/FLU negative. UA negative. Lactic acid negative. CXR without evidence of infection.   TSH elevated but normal T4  Acute on chronic anemia on admission. Hemoglobin stabilized following transfusion  Coma panel negative. Ammonia level WNL  Neuro checks  Speech therapy consulted. Recommending dysphagia 1 diet with thin liquids. Continue oral care  Maintain sleep/wake cycle. Delirium precautions  Etiology not clear  Consult to Neurology, recommendations are appreciated    SIRS (systemic inflammatory response syndrome) (HCC)  POA as evidenced by hypothermia and tachypnea  COVID/FLU negative. UA negative. Lactic acid negative. CXR without evidence of infection. Without leukocytosis.  Follow up BC x2  Trend WBC and fever curve  Monitor off antibiotics    Anemia  Acute on chronic. Patient's baseline hemoglobin is around 9 range. Has been noted to trend down to 7-8 over this past month  No evidence of active bleeding  4/26/25: Hemoglobin trended down 6.7  4/27/25: S/p 1U PRBC after discussion with wife, now agreeable  Obtain iron panel, B12, folate levels  Monitor daily CBC. Transfuse for hemoglobin <7    Hypothermia  Noted to have intermittent  hypothermia. With similar situation during recent hospitalization  Cortisol level checked 3/10/25 which was normal 10.7  TSH elevated at 15 but with normal T4  Negative infectious work up so far  Possibly in the setting of autonomic dysfunction from diabetes, questionable parkinson disease, poor PO intake  Stella rigo for temperatures below 97F  Thrombocytopenia (HCC)  Per chart review, appears chronic  Slowly down-trending   No evidence of active bleeding  Holding DVT prophylaxis  Daily CBC  Coronary artery disease involving native coronary artery of native heart with angina pectoris ([facility])  Status post multivessel stenting  Continue aspirin, Lipitor and Coreg as tolerated  Type 2 diabetes mellitus with diabetic neuropathy ([facility])    Lab Results   Component Value Date    HGBA1C 6.9 (H) 02/11/2025   Hold oral metformin and Januvia during hospitalization  SSI with accu checks  Hypoglycemia protocol  Carb controlled diet  Benign essential hypertension  BP acceptable  PTA medications include Carvedilol 12.5mg BID, Losartan 25mg QD, Furosemide 20mg QD. Spironolactone 25mg QD was discontinued during recent hospitalization  Continue Carvedilol and Losartan  Holding Furosemide until oral intake improves  Monitor vitals per routine    Chronic combined systolic and diastolic heart failure, NYHA class 2 ([facility])  Wt Readings from Last 3 Encounters:   04/26/25 72.5 kg (159 lb 14.4 oz)   03/30/25 70.3 kg (154 lb 15.7 oz)   03/09/25 72.2 kg (159 lb 3.2 oz)     Echo (1/22/25): The left ventricular ejection fraction is 55 to 60% by visual estimation. Systolic function is normal. Wall motion is normal. Diastolic function is mildly abnormal, consistent with grade I (abnormal) relaxation.   Currently dry on exam with decreased PO intake  Holding home furosemide  Daily weights, I&O  Outpatient follow up with Cardiology    Abnormal TSH  Per chart review, TSH elevated during last hospitalization but with normal T4  4/26/25: TSH 15.954, T4  0.83. Follow up T3  CKD stage 3 due to type 2 diabetes mellitus (HCC)  Baseline creatinine around 0.8-1.2  Creatinine currently stable  Avoid hypotension and nephrotoxic agents  Daily BMP      VTE Pharmacologic Prophylaxis: VTE Score: 8 High Risk (Score >/= 5) - Pharmacological DVT Prophylaxis Contraindicated. Sequential Compression Devices Ordered.    Mobility:   Basic Mobility Inpatient Raw Score: 6  JH-HLM Goal: 2: Bed activities/Dependent transfer  JH-HLM Achieved: 2: Bed activities/Dependent transfer  JH-HLM Goal NOT achieved. Continue with multidisciplinary rounding and encourage appropriate mobility to improve upon JH-HLM goals.    Patient Centered Rounds: I performed bedside rounds with nursing staff today.   Discussions with Specialists or Other Care Team Provider: Nursing, Neurology    Education and Discussions with Family / Patient:  Called wife via phone, left voicemail.     Current Length of Stay: 1 day(s)  Current Patient Status: Inpatient   Certification Statement: The patient will continue to require additional inpatient hospital stay due to AMS, hypothermia  Discharge Plan: Anticipate discharge in 48-72 hrs to rehab facility.    Code Status: Level 3 - DNAR and DNI    Subjective   Patient resting in bed this morning. Arousable to verbal stimuli. Answering questions appropriately. No acute events overnight.     Objective :  Temp:  [96.1 °F (35.6 °C)-99.4 °F (37.4 °C)] 96.1 °F (35.6 °C)  HR:  [64-88] 64  BP: (109-144)/(43-85) 138/58  Resp:  [14-18] 18  SpO2:  [96 %-100 %] 96 %  O2 Device: None (Room air)  Nasal Cannula O2 Flow Rate (L/min):  [1.5 L/min] 1.5 L/min    Body mass index is 22.3 kg/m².     Input and Output Summary (last 24 hours):     Intake/Output Summary (Last 24 hours) at 4/28/2025 1303  Last data filed at 4/27/2025 2251  Gross per 24 hour   Intake 350 ml   Output 350 ml   Net 0 ml       Physical Exam  Vitals and nursing note reviewed.   Constitutional:       General: He is not in acute  distress.     Appearance: He is well-developed. He is ill-appearing.   HENT:      Head: Normocephalic and atraumatic.      Mouth/Throat:      Mouth: Mucous membranes are dry.      Pharynx: Oropharynx is clear.   Eyes:      Conjunctiva/sclera: Conjunctivae normal.   Cardiovascular:      Rate and Rhythm: Normal rate and regular rhythm.      Heart sounds: No murmur heard.  Pulmonary:      Effort: Pulmonary effort is normal. No respiratory distress.      Breath sounds: Wheezing present.   Abdominal:      General: Bowel sounds are normal.      Palpations: Abdomen is soft.      Tenderness: There is no abdominal tenderness.   Musculoskeletal:         General: No swelling.      Cervical back: Neck supple.   Skin:     General: Skin is warm and dry.   Neurological:      Mental Status: He is alert and oriented to person, place, and time.           Lines/Drains:  Lines/Drains/Airways       Active Status       Name Placement date Placement time Site Days    Urethral Catheter Straight-tip 16 Fr. 04/26/25  1558  Straight-tip  1                  Urinary Catheter:  Goal for removal: N/A- Discharging with [place]                 Lab Results: I have reviewed the following results:   Results from last 7 days   Lab Units 04/28/25  0448   WBC Thousand/uL 7.48   HEMOGLOBIN g/dL 7.4*   HEMATOCRIT % 22.6*   PLATELETS Thousands/uL 93*   SEGS PCT % 84*   LYMPHO PCT % 5*   MONO PCT % 10   EOS PCT % 1     Results from last 7 days   Lab Units 04/28/25  0448 04/27/25  0455 04/26/25  1419   SODIUM mmol/L 135   < > 139   POTASSIUM mmol/L 3.6   < > 4.7   CHLORIDE mmol/L 119*   < > 112*   CO2 mmol/L 18*   < > 20*   BUN mg/dL 33*   < > 49*   CREATININE mg/dL 1.21   < > 1.12   ANION GAP mmol/L -2*   < > 7   CALCIUM mg/dL 8.3*   < > 8.3*   ALBUMIN g/dL  --   --  2.9*   TOTAL BILIRUBIN mg/dL  --   --  0.76   ALK PHOS U/L  --   --  83   ALT U/L  --   --  48   AST U/L  --   --  37   GLUCOSE RANDOM mg/dL 140   < > 76    < > = values in this interval not  displayed.     Results from last 7 days   Lab Units 04/26/25  1419   INR  1.09     Results from last 7 days   Lab Units 04/28/25  1115 04/28/25  0720 04/27/25  2108 04/27/25  1649 04/27/25  1145 04/27/25  0744 04/27/25  0717 04/27/25  0108 04/26/25  2259   POC GLUCOSE mg/dl 174* 143* 123 105 79 116 56* 70 75         Results from last 7 days   Lab Units 04/26/25  1419   LACTIC ACID mmol/L 0.9       Recent Cultures (last 7 days):         Imaging Results Review: I reviewed radiology reports from this admission including: chest xray and CT head.  Other Study Results Review: No additional pertinent studies reviewed.    Last 24 Hours Medication List:     Current Facility-Administered Medications:     acetaminophen (TYLENOL) tablet 650 mg, Q6H PRN    aspirin chewable tablet 81 mg, Daily    atorvastatin (LIPITOR) tablet 20 mg, Daily    bisacodyl (DULCOLAX) EC tablet 10 mg, Daily PRN    carvedilol (COREG) tablet 12.5 mg, BID    Cholecalciferol (VITAMIN D3) tablet 2,000 Units, Daily    co-enzyme Q-10 capsule 100 mg, Daily    ferrous sulfate tablet 325 mg, BID With Meals    insulin lispro (HumALOG/ADMELOG) 100 units/mL subcutaneous injection 1-5 Units, HS    insulin lispro (HumALOG/ADMELOG) 100 units/mL subcutaneous injection 1-6 Units, TID AC **AND** Fingerstick Glucose (POCT), TID AC    lactated ringers infusion, Continuous    losartan (COZAAR) tablet 25 mg, Daily    metoclopramide (REGLAN) tablet 10 mg, TID AC    multivitamin stress formula tablet 1 tablet, Daily    pantoprazole (PROTONIX) EC tablet 40 mg, Early Morning    tamsulosin (FLOMAX) capsule 0.4 mg, Daily With Dinner    Administrative Statements   Today, Patient Was Seen By: Alexandra Mckenzie PA-C  I have spent a total time of 30 minutes in caring for this patient on the day of the visit/encounter including Diagnostic results, Risks and benefits of tx options, Instructions for management, Patient and family education, Importance of tx compliance, Counseling /  Coordination of care, Documenting in the medical record, Reviewing/placing orders in the medical record (including tests, medications, and/or procedures), Obtaining or reviewing history  , and Communicating with other healthcare professionals .    **Please Note: This note may have been constructed using a voice recognition system.**

## 2025-04-28 NOTE — SPEECH THERAPY NOTE
Speech-Language Pathology Bedside Swallow Evaluation      Patient Name: Pernell Covarrubias    Today's Date: 4/28/2025     Problem List  Active Problems:    Coronary artery disease involving native coronary artery of native heart with angina pectoris (HCC)    CKD stage 3 due to type 2 diabetes mellitus (HCC)    Type 2 diabetes mellitus with diabetic neuropathy ([facility])    Other hyperlipidemia    Benign essential hypertension    Prostate cancer ([facility])    Chronic combined systolic and diastolic heart failure, NYHA class 2 (HCC)    Anemia    Abnormal TSH    Altered mental status    SIRS (systemic inflammatory response syndrome) ([facility])      Past Medical History  Past Medical History:   Diagnosis Date    Acquired hallux valgus     last assessed: 8/1/2013    Allergic rhinitis     Anemia 10/26/2010    Atrophy of nail     last assessed: 7/7/2015    Cancer ([facility]) 2020    Chronic kidney disease     chronic renal insufficiency    Coronary artery disease     Deformity of ankle and foot, acquired     last assessed: 2/22/2016    Diabetes mellitus ([facility]) 1994    Difficulty walking     last assessed: 12/14/2015    Dysesthesia     last assessed: 11/25/2016    Hammer toe     unspecified laterality; last assessed: 8/1/2013    Hypertension     Neuropathy in diabetes ([facility]) 1994    Onychomycosis of toenail     last assessed: 2/22/2016    Peripheral vascular disease ([facility])     left SFA stent in 2010    Pes planus     unspecified laterality; last assessed: 8/1/2013    Pneumonia     last assessed: 2/27/2016    Prostate cancer ([facility])     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     last assessed: 8/15/2016    Type 2 diabetes mellitus ([facility]) 07/26/2010    Viral warts     last assessed: 7/24/2015       Summary   Pt presented as lethargic appearing but with s/s moderate oral dysphagia but functional appearing pharyngeal stage swallowing skills with limited amounts of puree, honey/nectar thick and thin clear liquids today (~2 ozs of each  material)    Recommended Diet: puree/level 1 diet and thin liquids   Recommended Form of Meds:cut or crushed with puree if large  Aspiration precautions and swallowing strategies: upright posture, only feed when fully alert, slow rate of feeding, and small bites/sips  Other Recommendations: Continue frequent oral care        Current Medical Status  Pernell Covarrubias is a 79 y.o. male with a PMH of CAD status post PCI, prostate cancer, PAD, skin cancer, diabetes mellitus type 2, hypertension, hyperlipidemia, neuropathy, CKD who presented  pm with AMS.   DX: SIRS, AMS (Etiology not clear;  similar admission about a month ago during which all the workup was negative other than UTI), CAD. T2DM, anemia, abnormal TSH, Stage 3 CKD 2* T2DM. NPO maintaiend 2* AMS. SLP Swallowing Evaluation requested.     Current Precautions: Fall,  Aspiration    Allergies:  No known food allergies    Past medical history:  Please see H&P for details    Special Studies:   CT of head:   1. No acute intracranial abnormality.  Stable chronic microangiopathic changes within the brain.  2. No interval change in chronic sinusitis involving the left maxillary, ethmoid and sphenoid sinuses with high density secretions and thickening of the sinus walls.     CXR:  No acute cardiopulmonary disease.    Social/Education/Vocational Hx:  Pt lives in SNF/F ([facility])    Swallow Information   Current Risks for Dysphagia & Aspiration: AMS  Current Diet: NPO   Baseline Diet: soft/level 3 diet and thin liquids      Baseline Assessment   Behavior/Cognition: lethargic and low alertness level  Speech/Language Status: able to follow commands inconsistently at best.  Able to provide name and  with no gross s/s dysarthria but mod reduced vocal volume  Patient Positioning: upright in bed  Pain Status/Interventions/Response to Interventions: No report of or nonverbal indications of pain.    Oral Care:  completed to begin 2* presence of small amt of  dried secretions    Swallow Mechanism Exam- unable to consistently follow commands for OME, hence cursory assessment at rest, during speech and eating acts:   Facial: symmetrical  Labial: WFL- pursed lips for straw  Lingual: min assessment  Velum: unable to visualize  Mandible: fair ROM  Dentition: edentulous (has upper/lower dentures but not available at time of evaluation)  Vocal quality:weak   Volitional Cough: weak   Respiratory Status: on RA      Consistencies Assessed and Performance   Consistencies Administered: thin liquids, nectar thick, honey thick, and puree (accepted only ~2 ozs each)    Oral Stage:   Bolus retrieval:  weak. Bolus formation and transfer:  mod slowed with puree & thick liquid.  No significant oral residue noted.  No overt s/s reduced oral control but I feel risk is present given weakness and reduced alertness    Pharyngeal Stage:   Swallow Mechanics:  Swallowing initiation appeared prompt.  Laryngeal rise was palpated and judged to be within functional limits.  No coughing, throat clearing, change in vocal quality or respiratory status noted today.     Esophageal Concerns: none reported    Summary and Recommendations (see above)    Results Reviewed with: RN    Treatment Recommended: yes    Frequency of treatment: min of 3x weekly (close f/u to begin)    Patient Stated Goal: unable to state this am    Dysphagia LTG  -Patient will demonstrate safe and effective oral intake (without overt s/s significant oral/pharyngeal dysphagia including s/s penetration or aspiration) for the highest appropriate diet level.     Short Term Goals:  -Pt will tolerate Dysphagia 1/pureed food, nectar thick and thin liquid with no significant s/s oral or pharyngeal dysphagia across a min of 2-3 diagnostic session/s    -Patient will tolerate trials of upgraded food texture with no significant s/s of oral or pharyngeal dysphagia including aspiration across 3-5 diagnostic sessions    -Caregivers will utilize  aspiration precautions as trained.     Es Jarrell MS,SLP  [place] License [id number]

## 2025-04-28 NOTE — ASSESSMENT & PLAN NOTE
Patient brought in from [facility] at [facility] for decreased responsiveness. Mental status waxing and waning. Recently hospitalization earlier in the month for AMS in the setting of dehydration and UTI with concern of underlying dementia (wife reports questionable parkinson diagnosis)   CT head: 1. No acute intracranial abnormality.  Stable chronic microangiopathic changes within the brain. 2. No interval change in chronic sinusitis involving the left maxillary, ethmoid and sphenoid sinuses with high density secretions and thickening of the sinus walls.  COVID/FLU negative. UA negative. Lactic acid negative. CXR without evidence of infection.   TSH elevated but normal T4  Acute on chronic anemia on admission. Hemoglobin stabilized following transfusion  Coma panel negative. Ammonia level WNL  Neuro checks  Speech therapy consulted. Recommending dysphagia 1 diet with thin liquids. Continue oral care  Maintain sleep/wake cycle. Delirium precautions  Etiology not clear  Consult to Neurology, recommendations are appreciated

## 2025-04-28 NOTE — PLAN OF CARE
Problem: Potential for Falls  Goal: Patient will remain free of falls  Description: INTERVENTIONS:- Educate patient/family on patient safety including physical limitations- Instruct patient to call for assistance with activity - Consult OT/PT to assist with strengthening/mobility - Keep Call bell within reach- Keep bed low and locked with side rails adjusted as appropriate- Keep care items and personal belongings within reach- Initiate and maintain comfort rounds- Make Fall Risk Sign visible to staff- Offer Toileting every 2 Hours, in advance of need- Initiate/Maintain bed alarm- Obtain necessary fall risk management equipment: slipper socks- Apply yellow socks and bracelet for high fall risk patients- Consider moving patient to room near nurses station  INTERVENTIONS:- Educate patient/family on patient safety including physical limitations- Instruct patient to call for assistance with activity - Consult OT/PT to assist with strengthening/mobility - Keep Call bell within reach- Keep bed low and locked with side rails adjusted as appropriate- Keep care items and personal belongings within reach- Initiate and maintain comfort rounds- Make Fall Risk Sign visible to staff- Offer Toileting every 2 Hours, in advance of need- Initiate/Maintain bed alarm- Obtain necessary fall risk management equipment: slipper socks- Apply yellow socks and bracelet for high fall risk patients- Consider moving patient to room near nurses station  Outcome: Progressing     Problem: PAIN - ADULT  Goal: Verbalizes/displays adequate comfort level or baseline comfort level  Description: Interventions:- Encourage patient to monitor pain and request assistance- Assess pain using appropriate pain scale- Administer analgesics based on type and severity of pain and evaluate response- Implement non-pharmacological measures as appropriate and evaluate response- Consider cultural and social influences on pain and pain management- Notify  physician/advanced practitioner if interventions unsuccessful or patient reports new pain  Outcome: Progressing     Problem: INFECTION - ADULT  Goal: Absence or prevention of progression during hospitalization  Description: INTERVENTIONS:- Assess and monitor for signs and symptoms of infection- Monitor lab/diagnostic results- Monitor all insertion sites, i.e. indwelling lines, tubes, and drains- Monitor endotracheal if appropriate and nasal secretions for changes in amount and color- [place] appropriate cooling/warming therapies per order- Administer medications as ordered- Instruct and encourage patient and family to use good hand hygiene technique- Identify and instruct in appropriate isolation precautions for identified infection/condition  Outcome: Progressing  Goal: Absence of fever/infection during neutropenic period  Description: INTERVENTIONS:- Monitor WBC  Outcome: Progressing     Problem: SAFETY ADULT  Goal: Patient will remain free of falls  Description: INTERVENTIONS:- Educate patient/family on patient safety including physical limitations- Instruct patient to call for assistance with activity - Consult OT/PT to assist with strengthening/mobility - Keep Call bell within reach- Keep bed low and locked with side rails adjusted as appropriate- Keep care items and personal belongings within reach- Initiate and maintain comfort rounds- Make Fall Risk Sign visible to staff- Offer Toileting every 2 Hours, in advance of need- Initiate/Maintain bed alarm- Obtain necessary fall risk management equipment: slipper socks- Apply yellow socks and bracelet for high fall risk patients- Consider moving patient to room near nurses station  INTERVENTIONS:- Educate patient/family on patient safety including physical limitations- Instruct patient to call for assistance with activity - Consult OT/PT to assist with strengthening/mobility - Keep Call bell within reach- Keep bed low and locked with side rails adjusted as  appropriate- Keep care items and personal belongings within reach- Initiate and maintain comfort rounds- Make Fall Risk Sign visible to staff- Offer Toileting every 2 Hours, in advance of need- Initiate/Maintain bed alarm- Obtain necessary fall risk management equipment: slipper socks- Apply yellow socks and bracelet for high fall risk patients- Consider moving patient to room near nurses station  Outcome: Progressing  Goal: Maintain or return to baseline ADL function  Description: INTERVENTIONS:-  Assess patient's ability to carry out ADLs; assess patient's baseline for ADL function and identify physical deficits which impact ability to perform ADLs (bathing, care of mouth/teeth, toileting, grooming, dressing, etc.)- Assess/evaluate cause of self-care deficits - Assess range of motion- Assess patient's mobility; develop plan if impaired- Assess patient's need for assistive devices and provide as appropriate- Encourage maximum independence but intervene and supervise when necessary- Involve family in performance of ADLs- Assess for home care needs following discharge - Consider OT consult to assist with ADL evaluation and planning for discharge- Provide patient education as appropriate  Outcome: Progressing  Goal: Maintains/Returns to pre admission functional level  Description: INTERVENTIONS:- Perform AM-PAC 6 Click Basic Mobility/ Daily Activity assessment daily.- Set and communicate daily mobility goal to care team and patient/family/caregiver. - Collaborate with rehabilitation services on mobility goals if consulted- Perform Range of Motion 3 times a day.- Reposition patient every 2 hours.- Dangle patient 3 times a day- Stand patient 3 times a day- Ambulate patient 3 times a day- Out of bed to chair 3 times a day - Out of bed for meals 3 times a day- Out of bed for toileting- Record patient progress and toleration of activity level   Outcome: Progressing     Problem: DISCHARGE PLANNING  Goal: Discharge to home  or other facility with appropriate resources  Description: INTERVENTIONS:- Identify barriers to discharge w/patient and caregiver- Arrange for needed discharge resources and transportation as appropriate- Identify discharge learning needs (meds, wound care, etc.)- Arrange for interpretive services to assist at discharge as needed- Refer to Case Management Department for coordinating discharge planning if the patient needs post-hospital services based on physician/advanced practitioner order or complex needs related to functional status, cognitive ability, or social support system  Outcome: Progressing     Problem: Knowledge Deficit  Goal: Patient/family/caregiver demonstrates understanding of disease process, treatment plan, medications, and discharge instructions  Description: Complete learning assessment and assess knowledge base.Interventions:- Provide teaching at level of understanding- Provide teaching via preferred learning methods  Outcome: Progressing     Problem: Nutrition/Hydration-ADULT  Goal: Nutrient/Hydration intake appropriate for improving, restoring or maintaining nutritional needs  Description: Monitor and assess patient's nutrition/hydration status for malnutrition. Collaborate with interdisciplinary team and initiate plan and interventions as ordered.  Monitor patient's weight and dietary intake as ordered or per policy. Utilize nutrition screening tool and intervene as necessary. Determine patient's food preferences and provide high-protein, high-caloric foods as appropriate. INTERVENTIONS:- Monitor oral intake, urinary output, labs, and treatment plans- Assess nutrition and hydration status and recommend course of action- Evaluate amount of meals eaten- Assist patient with eating if necessary - Allow adequate time for meals- Recommend/ encourage appropriate diets, oral nutritional supplements, and vitamin/mineral supplements- Order, calculate, and assess calorie counts as needed- Recommend,  monitor, and adjust tube feedings and TPN/PPN based on assessed needs- Assess need for intravenous fluids- Provide specific nutrition/hydration education as appropriate- Include patient/family/caregiver in decisions related to nutrition  Outcome: Progressing     Problem: Prexisting or High Potential for Compromised Skin Integrity  Goal: Skin integrity is maintained or improved  Description: INTERVENTIONS:- Identify patients at risk for skin breakdown- Assess and monitor skin integrity- Assess and monitor nutrition and hydration status- Monitor labs - Assess for incontinence - Turn and reposition patient- Assist with mobility/ambulation- Relieve pressure over bony prominences- Avoid friction and shearing- Provide appropriate hygiene as needed including keeping skin clean and dry- Evaluate need for skin moisturizer/barrier cream- Collaborate with interdisciplinary team - Patient/family teaching- Consider wound care consult   Outcome: Progressing

## 2025-04-28 NOTE — PLAN OF CARE
Swallowing Evaluation process begun.   Pt presented as lethargic appearing but with s/s moderate oral dysphagia but functional appearing pharyngeal stage swallowing skills with limited amounts of puree, honey/nectar thick and thin clear liquids today (~2 ozs of each material)    Recommended Diet: puree/level 1 diet and thin liquids   Recommended Form of Meds:cut or crushed with puree if large  Aspiration precautions and swallowing strategies: upright posture, only feed when fully alert, slow rate of feeding, and small bites/sips  Other Recommendations: Continue frequent oral care

## 2025-04-28 NOTE — PLAN OF CARE
Problem: Potential for Falls  Goal: Patient will remain free of falls  Description: INTERVENTIONS:- Educate patient/family on patient safety including physical limitations- Instruct patient to call for assistance with activity - Consult OT/PT to assist with strengthening/mobility - Keep Call bell within reach- Keep bed low and locked with side rails adjusted as appropriate- Keep care items and personal belongings within reach- Initiate and maintain comfort rounds- Make Fall Risk Sign visible to staff- Offer Toileting every 2 Hours, in advance of need- Initiate/Maintain bed alarm- Obtain necessary fall risk management equipment: slipper socks- Apply yellow socks and bracelet for high fall risk patients- Consider moving patient to room near nurses station  INTERVENTIONS:- Educate patient/family on patient safety including physical limitations- Instruct patient to call for assistance with activity - Consult OT/PT to assist with strengthening/mobility - Keep Call bell within reach- Keep bed low and locked with side rails adjusted as appropriate- Keep care items and personal belongings within reach- Initiate and maintain comfort rounds- Make Fall Risk Sign visible to staff- Offer Toileting every 2 Hours, in advance of need- Initiate/Maintain bed alarm- Obtain necessary fall risk management equipment: slipper socks- Apply yellow socks and bracelet for high fall risk patients- Consider moving patient to room near nurses station  Outcome: Progressing     Problem: PAIN - ADULT  Goal: Verbalizes/displays adequate comfort level or baseline comfort level  Description: Interventions:- Encourage patient to monitor pain and request assistance- Assess pain using appropriate pain scale- Administer analgesics based on type and severity of pain and evaluate response- Implement non-pharmacological measures as appropriate and evaluate response- Consider cultural and social influences on pain and pain management- Notify  physician/advanced practitioner if interventions unsuccessful or patient reports new pain  Outcome: Progressing     Problem: INFECTION - ADULT  Goal: Absence or prevention of progression during hospitalization  Description: INTERVENTIONS:- Assess and monitor for signs and symptoms of infection- Monitor lab/diagnostic results- Monitor all insertion sites, i.e. indwelling lines, tubes, and drains- Monitor endotracheal if appropriate and nasal secretions for changes in amount and color- Damar appropriate cooling/warming therapies per order- Administer medications as ordered- Instruct and encourage patient and family to use good hand hygiene technique- Identify and instruct in appropriate isolation precautions for identified infection/condition  Outcome: Progressing  Goal: Absence of fever/infection during neutropenic period  Description: INTERVENTIONS:- Monitor WBC  Outcome: Progressing     Problem: SAFETY ADULT  Goal: Patient will remain free of falls  Description: INTERVENTIONS:- Educate patient/family on patient safety including physical limitations- Instruct patient to call for assistance with activity - Consult OT/PT to assist with strengthening/mobility - Keep Call bell within reach- Keep bed low and locked with side rails adjusted as appropriate- Keep care items and personal belongings within reach- Initiate and maintain comfort rounds- Make Fall Risk Sign visible to staff- Offer Toileting every 2 Hours, in advance of need- Initiate/Maintain bed alarm- Obtain necessary fall risk management equipment: slipper socks- Apply yellow socks and bracelet for high fall risk patients- Consider moving patient to room near nurses station  INTERVENTIONS:- Educate patient/family on patient safety including physical limitations- Instruct patient to call for assistance with activity - Consult OT/PT to assist with strengthening/mobility - Keep Call bell within reach- Keep bed low and locked with side rails adjusted as  appropriate- Keep care items and personal belongings within reach- Initiate and maintain comfort rounds- Make Fall Risk Sign visible to staff- Offer Toileting every 2 Hours, in advance of need- Initiate/Maintain bed alarm- Obtain necessary fall risk management equipment: slipper socks- Apply yellow socks and bracelet for high fall risk patients- Consider moving patient to room near nurses station  Outcome: Progressing  Goal: Maintain or return to baseline ADL function  Description: INTERVENTIONS:-  Assess patient's ability to carry out ADLs; assess patient's baseline for ADL function and identify physical deficits which impact ability to perform ADLs (bathing, care of mouth/teeth, toileting, grooming, dressing, etc.)- Assess/evaluate cause of self-care deficits - Assess range of motion- Assess patient's mobility; develop plan if impaired- Assess patient's need for assistive devices and provide as appropriate- Encourage maximum independence but intervene and supervise when necessary- Involve family in performance of ADLs- Assess for home care needs following discharge - Consider OT consult to assist with ADL evaluation and planning for discharge- Provide patient education as appropriate  Outcome: Progressing  Goal: Maintains/Returns to pre admission functional level  Description: INTERVENTIONS:- Perform AM-PAC 6 Click Basic Mobility/ Daily Activity assessment daily.- Set and communicate daily mobility goal to care team and patient/family/caregiver. - Collaborate with rehabilitation services on mobility goals if consulted- Perform Range of Motion 3 times a day.- Reposition patient every 2 hours.- Dangle patient 3 times a day- Stand patient 3 times a day- Ambulate patient 3 times a day- Out of bed to chair 3 times a day - Out of bed for meals 3 times a day- Out of bed for toileting- Record patient progress and toleration of activity level   Outcome: Progressing     Problem: DISCHARGE PLANNING  Goal: Discharge to home  or other facility with appropriate resources  Description: INTERVENTIONS:- Identify barriers to discharge w/patient and caregiver- Arrange for needed discharge resources and transportation as appropriate- Identify discharge learning needs (meds, wound care, etc.)- Arrange for interpretive services to assist at discharge as needed- Refer to Case Management Department for coordinating discharge planning if the patient needs post-hospital services based on physician/advanced practitioner order or complex needs related to functional status, cognitive ability, or social support system  Outcome: Progressing     Problem: Knowledge Deficit  Goal: Patient/family/caregiver demonstrates understanding of disease process, treatment plan, medications, and discharge instructions  Description: Complete learning assessment and assess knowledge base.Interventions:- Provide teaching at level of understanding- Provide teaching via preferred learning methods  Outcome: Progressing     Problem: Nutrition/Hydration-ADULT  Goal: Nutrient/Hydration intake appropriate for improving, restoring or maintaining nutritional needs  Description: Monitor and assess patient's nutrition/hydration status for malnutrition. Collaborate with interdisciplinary team and initiate plan and interventions as ordered.  Monitor patient's weight and dietary intake as ordered or per policy. Utilize nutrition screening tool and intervene as necessary. Determine patient's food preferences and provide high-protein, high-caloric foods as appropriate. INTERVENTIONS:- Monitor oral intake, urinary output, labs, and treatment plans- Assess nutrition and hydration status and recommend course of action- Evaluate amount of meals eaten- Assist patient with eating if necessary - Allow adequate time for meals- Recommend/ encourage appropriate diets, oral nutritional supplements, and vitamin/mineral supplements- Order, calculate, and assess calorie counts as needed- Recommend,  monitor, and adjust tube feedings and TPN/PPN based on assessed needs- Assess need for intravenous fluids- Provide specific nutrition/hydration education as appropriate- Include patient/family/caregiver in decisions related to nutrition  Outcome: Progressing     Problem: Prexisting or High Potential for Compromised Skin Integrity  Goal: Skin integrity is maintained or improved  Description: INTERVENTIONS:- Identify patients at risk for skin breakdown- Assess and monitor skin integrity- Assess and monitor nutrition and hydration status- Monitor labs - Assess for incontinence - Turn and reposition patient- Assist with mobility/ambulation- Relieve pressure over bony prominences- Avoid friction and shearing- Provide appropriate hygiene as needed including keeping skin clean and dry- Evaluate need for skin moisturizer/barrier cream- Collaborate with interdisciplinary team - Patient/family teaching- Consider wound care consult   Outcome: Progressing

## 2025-04-28 NOTE — ASSESSMENT & PLAN NOTE
Per chart review, appears chronic  Slowly down-trending   No evidence of active bleeding  Holding DVT prophylaxis  Daily CBC

## 2025-04-28 NOTE — ASSESSMENT & PLAN NOTE
Baseline creatinine around 0.8-1.2  Creatinine currently stable  Avoid hypotension and nephrotoxic agents  Daily BMP

## 2025-04-28 NOTE — ASSESSMENT & PLAN NOTE
Lab Results   Component Value Date    HGBA1C 6.9 (H) 02/11/2025   Hold oral metformin and Januvia during hospitalization  SSI with accu checks  Hypoglycemia protocol  Carb controlled diet

## 2025-04-28 NOTE — ASSESSMENT & PLAN NOTE
Wt Readings from Last 3 Encounters:   04/26/25 72.5 kg (159 lb 14.4 oz)   03/30/25 70.3 kg (154 lb 15.7 oz)   03/09/25 72.2 kg (159 lb 3.2 oz)     Echo (1/22/25): The left ventricular ejection fraction is 55 to 60% by visual estimation. Systolic function is normal. Wall motion is normal. Diastolic function is mildly abnormal, consistent with grade I (abnormal) relaxation.   Currently dry on exam with decreased PO intake  Holding home furosemide  Daily weights, I&O  Outpatient follow up with Cardiology

## 2025-04-28 NOTE — CASE MANAGEMENT
Case Management Assessment & Discharge Planning Note    Patient name Pernell Covarrubias  Location [address] [address]/[address] [address]-* MRN [id number]  : 1945 Date 2025       Current Admission Date: 2025  Current Admission Diagnosis:Coronary artery disease involving native coronary artery of native heart with angina pectoris ([facility])   Patient Active Problem List    Diagnosis Date Noted Date Diagnosed    Thrombocytopenia ([facility]) 2025     Altered mental status 2025     SIRS (systemic inflammatory response syndrome) ([facility]) 2025     Ambulatory dysfunction 2025     Cognitive decline 2025     Frailty syndrome in geriatric patient 2025     Dysphagia 2025     Toxic metabolic encephalopathy 2025     Sepsis ([facility]) due to UTI 2025     Incidental Finding: Lung nodule 2025     Incidental Finding: Chronic sinusitis 2025     Compression fracture of L3 vertebra ([facility]) 2025     Pressure injury of skin of buttock 2025     Bradycardia 2025     Fall 2025     Hypothermia 2025     Abnormal TSH 2025     Generalized weakness 2025     Chronic combined systolic and diastolic CHF (congestive heart failure) ([facility]) 2025     Anemia of chronic disease 2025     Urinary frequency 2025     Anemia 2024     History of vitamin D deficiency 2024     Other fatigue 2024     Type 2 diabetes mellitus with stage 2 chronic kidney disease, without long-term current use of insulin  ([facility]) 2024     Dry skin dermatitis 2023     Uses roller walker 2023     Iron deficiency anemia 2023     Peripheral arterial disease ([facility]) 2023     Gait disorder 2022     Edema of both feet 2022     Chronic combined systolic and diastolic heart failure, NYHA class 2 ([facility]) 2022     Heart failure, left, 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% ([facility]) 2022     Acute on chronic combined systolic and diastolic  congestive heart failure (HCC) 02/18/2022     Chronic right-sided low back pain without sciatica 08/30/2021     Orthostatic hypotension 08/18/2021     Hyponatremia 07/20/2021     Insomnia, persistent 05/21/2021     Prostate cancer (HCC) 03/03/2021     Benign essential hypertension 06/15/2020     Bilateral leg weakness 06/15/2020     Type 2 diabetes mellitus with hyperglycemia, without long-term current use of insulin ([facility]) 05/25/2019     Other hyperlipidemia 09/13/2018     Coronary artery disease involving native coronary artery of native heart with angina pectoris ([facility])      CKD stage 3 due to type 2 diabetes mellitus ([facility]) 09/20/2017     Lumbar radiculopathy 11/25/2016     Type 2 diabetes mellitus with diabetic neuropathy ([facility]) 01/11/2015     Diabetic neuropathy ([facility]) 10/10/2013     Chronic GERD 06/16/2013       LOS (days): 1  Geometric Mean LOS (GMLOS) (days): 2.8  Days to GMLOS:1.6     OBJECTIVE:  PATIENT READMITTED TO HOSPITAL  Risk of Unplanned Readmission Score: 33.09         Current admission status: Inpatient  Referral Reason: Other (Discharge planning)    Preferred Pharmacy:   [facility] - [place], [place] - [address]  [address] [address]  Phone: [phone] Fax: [phone]    [facility] [address] [place] [address] - [address]  [address]  [facility] [address]  Phone: [phone] Fax: [phone]    Primary Care Provider: Felipe Travis,     Primary Insurance: MEDICARE  Secondary Insurance: AARP    ASSESSMENT:  Active Health Care Proxies    There are no active Health Care Proxies on file.          Readmission Root Cause  30 Day Readmission: Yes  During your hospital stay, did someone (provider, nurse, ) explain your care to you in a way you could understand?: Yes  Did you feel medically stable to leave the hospital?: Yes  Were you able to pay for your medication at the pharmacy?: Yes  Did you have reliable transportation to  take you to your appointments?: Yes  During previous admission, was a post-acute recommendation made?: Yes  What post-acute resources were offered?: STR  Patient was readmitted due to: Altered mental status  Action Plan: Medical management, plan for return to [facility] when medically cleared    Patient Information  Admitted from:: Facility ([facility])  Mental Status: Confused  During Assessment patient was accompanied by: Spouse  Assessment information provided by:: Spouse  Primary Caregiver: Spouse  Caregiver's Name:: Ruth Covarrubias (spouse)  Caregiver's Relationship to Patient:: Family Member  Caregiver's Telephone Number:: [phone]  Support Systems: Spouse/significant other, Son  County of Residence: [place]  What city do you live in?: [place]  Home entry access options. Select all that apply.: Stairs  Number of steps to enter home.: 4  Type of Current Residence: [facility]  Living Arrangements: Lives w/ Spouse/significant other  Is patient a ?: Yes ([employer])  Is patient active with [facility] ([place] [facility])?: No    Activities of Daily Living Prior to Admission  Functional Status: Assistance  Completes ADLs independently?: No  Level of ADL dependence: Assistance  Ambulates independently?: No  Level of ambulatory dependence: Assistance  Does patient use assisted devices?: Yes  Assisted Devices (DME) used: Walker, Shower Chair, Rollator  Does patient currently own DME?: Yes  What DME does the patient currently own?: Walker, Shower Chair, Rollator  Does the patient have a history of Short-Term Rehab?: Yes ([facility])  Does patient have a history of HHC?: Yes  Does patient currently have HHC?: No    Patient Information Continued  Income Source: Pension/snf  Does patient have prescription coverage?: Yes  Can the patient afford their medications and any related supplies (such as glucometers or test strips)?: Yes  Does patient receive dialysis  treatments?: No  Does patient have a history of substance abuse?: No  Does patient have a history of Mental Health Diagnosis?: No    Means of Transportation  Means of Transport to [facility]:: Family transport      DISCHARGE DETAILS:    Discharge planning discussed with:: Ruthhal Covarrubias (spouse)  Freedom of Choice: Yes    Comments - Freedom of Choice: SW spoke with wife Ruth at bedside to discuss discharge planning.  Patient was admitted from [facility] where he had been placed for STR.  Wife's preference is for patient to return to facility to continue STR.  SW placed referral in [facility] and patient has been accepted by facility.  SW will continue to follow.      CM contacted family/caregiver?: Yes  Were Treatment Team discharge recommendations reviewed with patient/caregiver?: Yes  Did patient/caregiver verbalize understanding of patient care needs?: N/A- going to facility  Were patient/caregiver advised of the risks associated with not following Treatment Team discharge recommendations?: Yes    Contacts  Patient Contacts: Ruth Covarrubias (spouse)  Relationship to Patient:: Family  Contact Method: In Person  Reason/Outcome: Emergency Contact, Discharge Planning    Requested Home Health Care         Is the patient interested in HHC at discharge?: No    DME Referral Provided  Referral made for DME?: No    Other Referral/Resources/Interventions Provided:  Interventions: Facility Return, Short Term Rehab    Would you like to participate in our [facility] service program?  : No - Declined    Treatment Team Recommendation: Facility Return, Short Term Rehab  Discharge Destination Plan:: Facility Return, Short Term Rehab  Transport at Discharge : BLS Ambulance         IMM Given (Date):: 04/27/25

## 2025-04-29 LAB
ANION GAP SERPL CALCULATED.3IONS-SCNC: 10 MMOL/L (ref 4–13)
BASOPHILS # BLD AUTO: 0.01 THOUSANDS/ÂΜL (ref 0–0.1)
BASOPHILS NFR BLD AUTO: 0 % (ref 0–1)
BUN SERPL-MCNC: 34 MG/DL (ref 5–25)
CALCIUM SERPL-MCNC: 8.5 MG/DL (ref 8.4–10.2)
CHLORIDE SERPL-SCNC: 113 MMOL/L (ref 96–108)
CO2 SERPL-SCNC: 18 MMOL/L (ref 21–32)
CREAT SERPL-MCNC: 1.18 MG/DL (ref 0.6–1.3)
EOSINOPHIL # BLD AUTO: 0.07 THOUSAND/ÂΜL (ref 0–0.61)
EOSINOPHIL NFR BLD AUTO: 1 % (ref 0–6)
ERYTHROCYTE [DISTWIDTH] IN BLOOD BY AUTOMATED COUNT: 18.4 % (ref 11.6–15.1)
GFR SERPL CREATININE-BSD FRML MDRD: 57 ML/MIN/1.73SQ M
GLUCOSE SERPL-MCNC: 104 MG/DL (ref 65–140)
GLUCOSE SERPL-MCNC: 106 MG/DL (ref 65–140)
GLUCOSE SERPL-MCNC: 156 MG/DL (ref 65–140)
GLUCOSE SERPL-MCNC: 167 MG/DL (ref 65–140)
GLUCOSE SERPL-MCNC: 218 MG/DL (ref 65–140)
HCT VFR BLD AUTO: 22.8 % (ref 36.5–49.3)
HGB BLD-MCNC: 7.4 G/DL (ref 12–17)
IMM GRANULOCYTES # BLD AUTO: 0.07 THOUSAND/UL (ref 0–0.2)
IMM GRANULOCYTES NFR BLD AUTO: 1 % (ref 0–2)
LYMPHOCYTES # BLD AUTO: 0.52 THOUSANDS/ÂΜL (ref 0.6–4.47)
LYMPHOCYTES NFR BLD AUTO: 6 % (ref 14–44)
MCH RBC QN AUTO: 29 PG (ref 26.8–34.3)
MCHC RBC AUTO-ENTMCNC: 32.5 G/DL (ref 31.4–37.4)
MCV RBC AUTO: 89 FL (ref 82–98)
MONOCYTES # BLD AUTO: 0.78 THOUSAND/ÂΜL (ref 0.17–1.22)
MONOCYTES NFR BLD AUTO: 9 % (ref 4–12)
NEUTROPHILS # BLD AUTO: 7.06 THOUSANDS/ÂΜL (ref 1.85–7.62)
NEUTS SEG NFR BLD AUTO: 83 % (ref 43–75)
NRBC BLD AUTO-RTO: 0 /100 WBCS
PLATELET # BLD AUTO: 100 THOUSANDS/UL (ref 149–390)
PMV BLD AUTO: 11.2 FL (ref 8.9–12.7)
POTASSIUM SERPL-SCNC: 3.8 MMOL/L (ref 3.5–5.3)
RBC # BLD AUTO: 2.55 MILLION/UL (ref 3.88–5.62)
SODIUM SERPL-SCNC: 141 MMOL/L (ref 135–147)
T3FREE SERPL-MCNC: 2.35 PG/ML (ref 2.5–3.9)
WBC # BLD AUTO: 8.51 THOUSAND/UL (ref 4.31–10.16)

## 2025-04-29 PROCEDURE — 82948 REAGENT STRIP/BLOOD GLUCOSE: CPT

## 2025-04-29 PROCEDURE — 80048 BASIC METABOLIC PNL TOTAL CA: CPT | Performed by: INTERNAL MEDICINE

## 2025-04-29 PROCEDURE — 99232 SBSQ HOSP IP/OBS MODERATE 35: CPT | Performed by: INTERNAL MEDICINE

## 2025-04-29 PROCEDURE — 85025 COMPLETE CBC W/AUTO DIFF WBC: CPT | Performed by: INTERNAL MEDICINE

## 2025-04-29 PROCEDURE — 97163 PT EVAL HIGH COMPLEX 45 MIN: CPT

## 2025-04-29 RX ORDER — CEFAZOLIN SODIUM 2 G/50ML
2000 SOLUTION INTRAVENOUS EVERY 8 HOURS
Status: DISCONTINUED | OUTPATIENT
Start: 2025-04-29 | End: 2025-05-07 | Stop reason: HOSPADM

## 2025-04-29 RX ADMIN — MICONAZOLE NITRATE: 20 CREAM TOPICAL at 18:34

## 2025-04-29 RX ADMIN — INSULIN LISPRO 1 UNITS: 100 INJECTION, SOLUTION INTRAVENOUS; SUBCUTANEOUS at 22:00

## 2025-04-29 RX ADMIN — FERROUS SULFATE TAB 325 MG (65 MG ELEMENTAL FE) 325 MG: 325 (65 FE) TAB at 09:59

## 2025-04-29 RX ADMIN — MICONAZOLE NITRATE: 20 CREAM TOPICAL at 10:00

## 2025-04-29 RX ADMIN — ASPIRIN 81 MG: 81 TABLET, CHEWABLE ORAL at 09:59

## 2025-04-29 RX ADMIN — FERROUS SULFATE TAB 325 MG (65 MG ELEMENTAL FE) 325 MG: 325 (65 FE) TAB at 16:33

## 2025-04-29 RX ADMIN — CARVEDILOL 12.5 MG: 12.5 TABLET, FILM COATED ORAL at 18:34

## 2025-04-29 RX ADMIN — Medication 100 MG: at 09:59

## 2025-04-29 RX ADMIN — INSULIN LISPRO 1 UNITS: 100 INJECTION, SOLUTION INTRAVENOUS; SUBCUTANEOUS at 12:36

## 2025-04-29 RX ADMIN — METOCLOPRAMIDE 10 MG: 10 TABLET ORAL at 06:26

## 2025-04-29 RX ADMIN — LOSARTAN POTASSIUM 25 MG: 25 TABLET, FILM COATED ORAL at 09:59

## 2025-04-29 RX ADMIN — INSULIN LISPRO 2 UNITS: 100 INJECTION, SOLUTION INTRAVENOUS; SUBCUTANEOUS at 16:34

## 2025-04-29 RX ADMIN — PANTOPRAZOLE SODIUM 40 MG: 40 TABLET, DELAYED RELEASE ORAL at 06:26

## 2025-04-29 RX ADMIN — CEFAZOLIN SODIUM 2000 MG: 2 SOLUTION INTRAVENOUS at 16:31

## 2025-04-29 RX ADMIN — METOCLOPRAMIDE 10 MG: 10 TABLET ORAL at 12:34

## 2025-04-29 RX ADMIN — ATORVASTATIN CALCIUM 20 MG: 20 TABLET, FILM COATED ORAL at 09:59

## 2025-04-29 RX ADMIN — Medication 1 TABLET: at 09:59

## 2025-04-29 RX ADMIN — CARVEDILOL 12.5 MG: 12.5 TABLET, FILM COATED ORAL at 09:59

## 2025-04-29 RX ADMIN — METOCLOPRAMIDE 10 MG: 10 TABLET ORAL at 16:33

## 2025-04-29 RX ADMIN — TAMSULOSIN HYDROCHLORIDE 0.4 MG: 0.4 CAPSULE ORAL at 16:32

## 2025-04-29 RX ADMIN — CHOLECALCIFEROL TAB 25 MCG (1000 UNIT) 2000 UNITS: 25 TAB at 09:59

## 2025-04-29 NOTE — ASSESSMENT & PLAN NOTE
Patient brought in from [facility] for decreased responsiveness. Mental status waxing and waning. Recently hospitalization earlier in the month for AMS in the setting of dehydration and UTI with concern of underlying dementia (wife reports questionable parkinson diagnosis)   CT head: 1. No acute intracranial abnormality.  Stable chronic microangiopathic changes within the brain. 2. No interval change in chronic sinusitis involving the left maxillary, ethmoid and sphenoid sinuses with high density secretions and thickening of the sinus walls.  COVID/FLU negative. UA negative. Lactic acid negative. CXR without evidence of infection.   TSH elevated but normal T4  Acute on chronic anemia on admission. Hemoglobin stabilized following transfusion  Coma panel negative. Ammonia level WNL  Neuro checks  Speech therapy consulted. Recommending dysphagia 1 diet with thin liquids. Continue oral care  Maintain sleep/wake cycle. Delirium precautions  Etiology not clear  Consult to Neurology, recommendations are appreciated

## 2025-04-29 NOTE — PROGRESS NOTES
Progress Note - Hospitalist   Name: Pernell Covarrubias 79 y.o. male I MRN: [id number]  Unit/Bed#: [address] [place] [address] I Date of Admission: 4/26/2025   Date of Service: 4/29/2025 I Hospital Day: 2    Assessment & Plan  Altered mental status  Patient brought in from [facility] at [facility] for decreased responsiveness. Mental status waxing and waning. Recently hospitalization earlier in the month for AMS in the setting of dehydration and UTI with concern of underlying dementia (wife reports questionable parkinson diagnosis)   CT head: 1. No acute intracranial abnormality.  Stable chronic microangiopathic changes within the brain. 2. No interval change in chronic sinusitis involving the left maxillary, ethmoid and sphenoid sinuses with high density secretions and thickening of the sinus walls.  COVID/FLU negative. UA negative. Lactic acid negative. CXR without evidence of infection.   TSH elevated but normal T4  Acute on chronic anemia on admission. Hemoglobin stabilized following transfusion  Coma panel negative. Ammonia level WNL  Neuro checks  Speech therapy consulted. Recommending dysphagia 1 diet with thin liquids. Continue oral care  Maintain sleep/wake cycle. Delirium precautions  Etiology not clear  Consult to Neurology, recommendations are appreciated    SIRS (systemic inflammatory response syndrome) (HCC)  POA as evidenced by hypothermia and tachypnea  COVID/FLU negative. UA negative. Lactic acid negative. CXR without evidence of infection. Without leukocytosis.  Follow up BC x2  Trend WBC and fever curve  Monitor off antibiotics    Anemia  Acute on chronic. Patient's baseline hemoglobin is around 9 range. Has been noted to trend down to 7-8 over this past month  No evidence of active bleeding  4/26/25: Hemoglobin trended down 6.7  4/27/25: S/p 1U PRBC after discussion with wife, now agreeable  Iron panel, B12, folate stable  Hemoglobin remains stable 7.4  Monitor daily CBC. Transfuse for hemoglobin  <7    Hypothermia  Noted to have intermittent hypothermia. With similar situation during recent hospitalizations  Cortisol level checked 3/10/25 which was normal 10.7  TSH elevated at 15 but with normal T4  Negative infectious work up so far  Possibly in the setting of autonomic dysfunction from diabetes, age, questionable parkinson disease, poor PO intake  Stella rigo for temperatures below 97F  Thrombocytopenia (HCC)  Per chart review, appears chronic  No evidence of active bleeding  Holding DVT prophylaxis  Daily CBC  Coronary artery disease involving native coronary artery of native heart with angina pectoris (HCC)  Status post multivessel stenting  Continue aspirin, Lipitor and Coreg as tolerated  Type 2 diabetes mellitus with diabetic neuropathy ([facility])    Lab Results   Component Value Date    HGBA1C 6.9 (H) 02/11/2025   Hold oral metformin and Januvia during hospitalization  SSI with accu checks  Hypoglycemia protocol  Carb controlled diet  Benign essential hypertension  BP acceptable  PTA medications include Carvedilol 12.5mg BID, Losartan 25mg QD, Furosemide 20mg QD. Spironolactone 25mg QD was discontinued during recent hospitalization  Continue Carvedilol and Losartan  Holding Furosemide until oral intake improves  Monitor vitals per routine    Chronic combined systolic and diastolic heart failure, NYHA class 2 ([facility])  Wt Readings from Last 3 Encounters:   04/26/25 72.5 kg (159 lb 14.4 oz)   03/30/25 70.3 kg (154 lb 15.7 oz)   03/09/25 72.2 kg (159 lb 3.2 oz)     Echo (1/22/25): The left ventricular ejection fraction is 55 to 60% by visual estimation. Systolic function is normal. Wall motion is normal. Diastolic function is mildly abnormal, consistent with grade I (abnormal) relaxation.   Currently dry on exam   Holding home furosemide  Daily weights, I&O  Outpatient follow up with Cardiology    Abnormal TSH  Per chart review, TSH elevated during last hospitalization but with normal T4  4/26/25: TSH 15.954,  T4 0.83. Follow up T3  CKD stage 3 due to type 2 diabetes mellitus (HCC)  Baseline creatinine around 0.8-1.2  Creatinine currently stable  Avoid hypotension and nephrotoxic agents  Daily BMP      VTE Pharmacologic Prophylaxis: VTE Score: 8 High Risk (Score >/= 5) - Pharmacological DVT Prophylaxis Contraindicated. Sequential Compression Devices Ordered.    Mobility:   Basic Mobility Inpatient Raw Score: 6  JH-HLM Goal: 2: Bed activities/Dependent transfer  JH-HLM Achieved: 2: Bed activities/Dependent transfer  JH-HLM Goal NOT achieved. Continue with multidisciplinary rounding and encourage appropriate mobility to improve upon JH-HLM goals.    Patient Centered Rounds: I performed bedside rounds with nursing staff today.   Discussions with Specialists or Other Care Team Provider: Nursing    Education and Discussions with Family / Patient:  Yes, wife.     Current Length of Stay: 2 day(s)  Current Patient Status: Inpatient   Certification Statement: The patient will continue to require additional inpatient hospital stay due to AMS, hypothermia, decreased oral intake  Discharge Plan: Anticipate discharge in 48-72 hrs to rehab facility.    Code Status: Level 3 - DNAR and DNI    Subjective   Patient resting comfortably in bed. Denies any complaints. Reports that he is hungry and wants to try and eat breakfast. Noted to have hypothermia overnight requiring thelma hugger.     Objective :  Temp:  [96 °F (35.6 °C)-98.8 °F (37.1 °C)] 98.8 °F (37.1 °C)  HR:  [60-85] 85  BP: (113-130)/(58-59) 113/59  Resp:  [14-21] 14  SpO2:  [95 %-100 %] 95 %    Body mass index is 22.3 kg/m².     Input and Output Summary (last 24 hours):     Intake/Output Summary (Last 24 hours) at 4/29/2025 0909  Last data filed at 4/28/2025 1650  Gross per 24 hour   Intake 200 ml   Output 450 ml   Net -250 ml       Physical Exam  Vitals and nursing note reviewed.   Constitutional:       General: He is not in acute distress.     Appearance: He is well-developed.  He is ill-appearing.   HENT:      Head: Normocephalic and atraumatic.      Mouth/Throat:      Mouth: Mucous membranes are dry.      Pharynx: Oropharynx is clear.   Eyes:      Conjunctiva/sclera: Conjunctivae normal.   Cardiovascular:      Rate and Rhythm: Normal rate and regular rhythm.      Heart sounds: No murmur heard.  Pulmonary:      Effort: Pulmonary effort is normal. No respiratory distress.      Breath sounds: Wheezing present.   Abdominal:      General: Bowel sounds are normal.      Palpations: Abdomen is soft.      Tenderness: There is no abdominal tenderness.   Musculoskeletal:         General: No swelling.      Cervical back: Neck supple.   Skin:     General: Skin is warm and dry.   Neurological:      Mental Status: He is alert.      Comments: AAO x2 (oriented to person and time but not place)           Lines/Drains:  Lines/Drains/Airways       Active Status       Name Placement date Placement time Site Days    Urethral Catheter Straight-tip 16 Fr. 04/26/25  1558  Straight-tip  2                  Urinary Catheter:  Goal for removal: N/A- Discharging with [place]                 Lab Results: I have reviewed the following results:   Results from last 7 days   Lab Units 04/29/25  0454   WBC Thousand/uL 8.51   HEMOGLOBIN g/dL 7.4*   HEMATOCRIT % 22.8*   PLATELETS Thousands/uL 100*   SEGS PCT % 83*   LYMPHO PCT % 6*   MONO PCT % 9   EOS PCT % 1     Results from last 7 days   Lab Units 04/29/25  0454 04/27/25  0455 04/26/25  1419   SODIUM mmol/L 141   < > 139   POTASSIUM mmol/L 3.8   < > 4.7   CHLORIDE mmol/L 113*   < > 112*   CO2 mmol/L 18*   < > 20*   BUN mg/dL 34*   < > 49*   CREATININE mg/dL 1.18   < > 1.12   ANION GAP mmol/L 10   < > 7   CALCIUM mg/dL 8.5   < > 8.3*   ALBUMIN g/dL  --   --  2.9*   TOTAL BILIRUBIN mg/dL  --   --  0.76   ALK PHOS U/L  --   --  83   ALT U/L  --   --  48   AST U/L  --   --  37   GLUCOSE RANDOM mg/dL 104   < > 76    < > = values in this interval not displayed.     Results from  last 7 days   Lab Units 04/26/25  1419   INR  1.09     Results from last 7 days   Lab Units 04/29/25  0732 04/28/25  2103 04/28/25  1619 04/28/25  1115 04/28/25  0720 04/27/25  2108 04/27/25  1649 04/27/25  1145 04/27/25  0744 04/27/25  0717 04/27/25  0108 04/26/25  2259   POC GLUCOSE mg/dl 106 154* 106 174* 143* 123 105 79 116 56* 70 75         Results from last 7 days   Lab Units 04/26/25  1419   LACTIC ACID mmol/L 0.9       Recent Cultures (last 7 days):   Results from last 7 days   Lab Units 04/28/25  1840   BLOOD CULTURE  Received in Microbiology Lab. Culture in Progress.  Received in Microbiology Lab. Culture in Progress.       Imaging Results Review: No pertinent imaging studies reviewed.  Other Study Results Review: No additional pertinent studies reviewed.    Last 24 Hours Medication List:     Current Facility-Administered Medications:     acetaminophen (TYLENOL) tablet 650 mg, Q6H PRN    aspirin chewable tablet 81 mg, Daily    atorvastatin (LIPITOR) tablet 20 mg, Daily    bisacodyl (DULCOLAX) EC tablet 10 mg, Daily PRN    carvedilol (COREG) tablet 12.5 mg, BID    Cholecalciferol (VITAMIN D3) tablet 2,000 Units, Daily    co-enzyme Q-10 capsule 100 mg, Daily    ferrous sulfate tablet 325 mg, BID With Meals    insulin lispro (HumALOG/ADMELOG) 100 units/mL subcutaneous injection 1-5 Units, HS    insulin lispro (HumALOG/ADMELOG) 100 units/mL subcutaneous injection 1-6 Units, TID AC **AND** Fingerstick Glucose (POCT), TID AC    losartan (COZAAR) tablet 25 mg, Daily    metoclopramide (REGLAN) tablet 10 mg, TID AC    moisture barrier miconazole 2% cream (aka CHERELLE MOISTURE BARRIER ANTIFUNGAL CREAM), BID    multivitamin stress formula tablet 1 tablet, Daily    pantoprazole (PROTONIX) EC tablet 40 mg, Early Morning    tamsulosin (FLOMAX) capsule 0.4 mg, Daily With Dinner    Administrative Statements   Today, Patient Was Seen By: Alexandra Mckenzie PA-C  I have spent a total time of 30 minutes in caring for this patient  on the day of the visit/encounter including Diagnostic results, Risks and benefits of tx options, Instructions for management, Patient and family education, Importance of tx compliance, Counseling / Coordination of care, Documenting in the medical record, Reviewing/placing orders in the medical record (including tests, medications, and/or procedures), Obtaining or reviewing history  , and Communicating with other healthcare professionals .    **Please Note: This note may have been constructed using a voice recognition system.**

## 2025-04-29 NOTE — PHYSICAL THERAPY NOTE
PT EVALUATION    NAME:  Pernell Covarrubias  AGE:   79 y.o.  Mrn:   [id number]  Length Of Stay: 2    ADMIT DX:  Altered mental status [R41.82]  Anemia [D64.9]  AMS (altered mental status) [R41.82]    Past Medical History:   Diagnosis Date    Acquired hallux valgus     last assessed: 8/1/2013    Allergic rhinitis     Anemia 10/26/2010    Atrophy of nail     last assessed: 7/7/2015    Cancer ([facility]) 2020    Chronic kidney disease     chronic renal insufficiency    Coronary artery disease     Deformity of ankle and foot, acquired     last assessed: 2/22/2016    Diabetes mellitus ([facility]) 1994    Difficulty walking     last assessed: 12/14/2015    Dysesthesia     last assessed: 11/25/2016    Hammer toe     unspecified laterality; last assessed: 8/1/2013    Hypertension     Neuropathy in diabetes ([facility]) 1994    Onychomycosis of toenail     last assessed: 2/22/2016    Peripheral vascular disease ([facility])     left SFA stent in 2010    Pes planus     unspecified laterality; last assessed: 8/1/2013    Pneumonia     last assessed: 2/27/2016    Prostate cancer ([facility])     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     last assessed: 8/15/2016    Type 2 diabetes mellitus ([facility]) 07/26/2010    Viral warts     last assessed: 7/24/2015     Past Surgical History:   Procedure Laterality Date    CARDIAC CATHETERIZATION  07/29/2010    CATARACT EXTRACTION, BILATERAL Bilateral     R 1999, L 2008    COLONOSCOPY  2011    FEMORAL ARTERY - POPLITEAL ARTERY BYPASS GRAFT  09/23/2013    FOOT SURGERY      bone spur removed    LEG SURGERY Bilateral     repair; L 8/20/2010 and 7/26/2012    ND PLMT INTERSTITIAL DEV RADIAT TX PROSTATE 1/MULT N/A 6/22/2021    Procedure: INSERTION OF FIDUCIAL MARKER (TRANSRECTAL ULTRASOUND GUIDANCE), SPACEOAR;  Surgeon: Jero Loomis MD;  Location: [facility];  Service: Urology    ROTATOR CUFF REPAIR Left 2009    SHOULDER SURGERY Right 2005    collar bone        04/29/25 1507   PT Last Visit   PT Visit Date 04/29/25  "  Note Type   Note type Evaluation   Pain Assessment   Pain Assessment Tool Tinajero-Baker FACES   Tinajero-Baker FACES Pain Rating 0   Restrictions/Precautions   Other Precautions Cognitive;Fall Risk;Bed Alarm;Chair Alarm  (Jose A on room air;meza cath)   Home Living   Type of Home House   Home Layout One level;Able to live on main level with bedroom/bathroom;Performs ADLs on one level;Stairs to enter with rails  (1+4 JAMES)   Bathroom Shower/Tub Walk-in shower   Bathroom Toilet Raised   Bathroom Equipment Hand-held shower;Grab bars in shower;Shower chair   Bathroom Accessibility Accessible   Home Equipment Walker  (Std walker;RW;Rollator)   Additional Comments Pt is adm from STR at [facility]   Prior Function   Level of [place] Needs assistance with ADLs;Needs assistance with functional mobility;Needs assistance with IADLS   Lives With Facility staff  (Spouse 2 months ago)   Receives Help From Personal care attendant  (Family 2 months ago)   IADLs Family/Friend/Other provides meals;Family/Friend/Other provides medication management;Family/Friend/Other provides transportation   Falls in the last 6 months 1 to 4  (at least 3 recent falls per EMR/previous therapy notes)   Comments Pt amb with a RW when home 2 months ago, unsure if pt was mobilizing at STR   General   Additional Pertinent History Pt is adm with change in mental status and decreased responsivness   Family/Caregiver Present No   Cognition   Overall Cognitive Status Impaired   Arousal/Participation Lethargic   Attention Difficulty dividing attention   Orientation Level Oriented to person;Disoriented to place;Disoriented to time;Disoriented to situation   Memory Unable to assess   Following Commands Unable to follow one step commands   Comments At least 2 pt identifiers including name and    Subjective   Subjective Pt with little verbalization during PT session. \"hi\"   RLE Assessment   RLE Assessment WFL  (PROM;no MMT)   LLE Assessment   LLE Assessment " WFL  (PROM;no MMT)   Bed Mobility   Rolling R 2  Maximal assistance   Additional items Assist x 1;Verbal cues;Increased time required;LE management;Bedrails  (trunk management)   Rolling L 2  Maximal assistance   Additional items Assist x 1;Verbal cues;Increased time required;Bedrails;LE management  (trunk management)   Supine to Sit 2  Maximal assistance   Additional items Assist x 1;Verbal cues;Increased time required;HOB elevated;LE management  (trunk management)   Sit to Supine 2  Maximal assistance   Additional items Assist x 2;Verbal cues;Increased time required;LE management  (trunk management)   Additional Comments Pt sat EOB working on static sitting balance x 5 mins with max A + 1. Pt with marked LOB post and to the L - able to correct briefly with cues   Transfers   Sit to Stand Unable to assess  (NA - pt with poor sitting balance)   Ambulation/Elevation   Gait pattern Not tested;Not appropriate   Balance   Static Sitting Poor -   Endurance Deficit   Endurance Deficit Yes   Endurance Deficit Description Limited overall activity and sitting tolerance   Activity Tolerance   Activity Tolerance Patient limited by fatigue;Treatment limited secondary to medical complications (Comment)  (impaired cognition;weakness and deconditioning)   Nurse Made Aware RADHA Garcia   Assessment   Problem List Decreased strength;Decreased endurance;Impaired balance;Decreased mobility;Decreased coordination;Decreased cognition;Impaired judgement;Decreased safety awareness   Assessment Patient seen for Physical Therapy evaluation. Patient admitted with AMS.  Comorbidities affecting patient's physical performance include: CAD, CKD3, DM2, HTN, CHF, anemia, HLD, PAD, L3 compression Fx, sepsis, UTI, TME.  Personal factors affecting patient at time of initial evaluation include: inability to ambulate household distances, inability to navigate community distances, inability to navigate level surfaces without external assistance, inability  to perform dynamic tasks in community, decreased cognition, positive fall history, and decreased initiation and engagement. Prior to admission, patient was dependent for mobility, requiring assist for ADLS, requiring assist for IADLS, and in short term rehab.  Please find objective findings from Physical Therapy assessment regarding body systems outlined above with impairments and limitations including weakness, impaired balance, decreased endurance, impaired coordination, decreased activity tolerance, decreased functional mobility tolerance, decreased safety awareness, impaired judgement, fall risk, decreased skin integrity, and decreased cognition.  The Barthel Index was used as a functional outcome tool presenting with a score of Barthel Index Score: 0 today indicating marked limitations of functional mobility and ADLS.  Patient's clinical presentation is currently unstable/unpredictable as seen in patient's presentation of vital sign response, varying levels of cognitive performance, increased fall risk, new onset of impairment of functional mobility, decreased endurance, and new onset of weakness. Pt would benefit from continued Physical Therapy treatment to address deficits as defined above and maximize level of functional mobility. As demonstrated by objective findings, the assigned level of complexity for this evaluation is high.    The patient's -[facility] Basic Mobility Inpatient Short Form Raw Score is 8. A Raw score of less than or equal to 16 suggests the patient may benefit from discharge to post-acute rehabilitation services. Please also refer to the recommendation of the Physical Therapist for safe discharge planning.   Goals   Patient Goals Pt unable to state 2/2 impaired cognition   STG Expiration Date 05/13/25   Short Term Goal #1 Patient will: Increase bilateral LE strength 3/5 or greater to facilitate independent mobility, Perform all bed mobility tasks w/ minx1 to improve pt's independence w/  repositioning for decrease risk of skin breakdown, Perform all transfers mod A + 2 consistently from various height surfaces in order to improve I w/ engagement w/ real-world environments/situations, Ambulate at least 5-10 ft. with roller walker mod A + 2 w/o LOB to facilitate return and engagement w/ previous living environment, Increase all balance 1 grade to decrease risk for falls, Tolerate at least 30 consecutive minutes of activity to demonstrate improved activity tolerance and endurance, and Tolerate 3 hr OOB to faciliate upright tolerance   Plan   Treatment/Interventions ADL retraining;Functional transfer training;LE strengthening/ROM;Therapeutic exercise;Endurance training;Cognitive reorientation;Patient/family training;Equipment eval/education;Bed mobility;Gait training;Compensatory technique education;Spoke to nursing;Spoke to case management   PT Frequency 3-5x/wk   Discharge Recommendation   Rehab Resource Intensity Level, PT II (Moderate Resource Intensity)   AM-PAC Basic Mobility Inpatient   Turning in Flat Bed Without Bedrails 2   Lying on Back to Sitting on Edge of Flat Bed Without Bedrails 2   Moving Bed to Chair 1   Standing Up From Chair Using Arms 1   Walk in Room 1   Climb 3-5 Stairs With Railing 1   Basic Mobility Inpatient Raw Score 8   Turning Head Towards Sound 2   Follow Simple Instructions 1   Low Function Basic Mobility Raw Score  11   Low Function Basic Mobility Standardized Score  16.55   [facility] Highest Level Of Mobility   -HL Goal 3: Sit at edge of bed   -[facility] Achieved 3: Sit at edge of bed   Barthel Index   Feeding 0   Bathing 0   Grooming Score 0   Dressing Score 0   Bladder Score 0   Bowels Score 0   Toilet Use Score 0   Transfers (Bed/Chair) Score 0   Mobility (Level Surface) Score 0   Stairs Score 0   Barthel Index Score 0   End of Consult   Patient Position at End of Consult Supine;All needs within reach;Bed/Chair alarm activated   Licensure   [place] License Number   Ellen Dunham, PT [id number]

## 2025-04-29 NOTE — DISCHARGE INSTR - OTHER ORDERS
Wound Care Plan:    1-Cleanse wound to left 4th finger, left lateral lower leg, right anterior lower leg, and left elbow with VASHE solution and pat dry. Apply THIN layer of Normlgel to wounds, Adaptic cut to size of wounds, ABD cut to size of wounds, rolled gauze and tape. Change 3x/week and as needed for soilage/dislodgement. PLEASE NO BANDAIDS OR FOAM DRESSINGS TO THESE WOUNDS DUE TO FRAGILE SKIN.   2-Cleanse wound to left lateral back with VASHE solution and pat dry. Apply THIN layer of Normlgel to wound, Adaptic cut to size of wound, ABD and gentle paper tape OR may use small bordered foam to this wound only. Change 3x/week and as needed for soilage/dislodgement. Please remove gently.  3-Cleanse wounds to bilateral heels with VASHE solution and pat dry. Apply Betadine to wounds, 1/2 ABD, rolled gauze and tape. Change 3x/week and as needed for soilage/dislodgement.  4-Cleanse wound to right dorsal foot, right 4th finger, left posterior lower leg and left 2nd toe with mild soap and water & pat dry. Apply 3M No Sting daily for protection. Apply to right dorsal foot 3x/week with dressing changes.  5-Cleanse perineal/gluteal fold and left axilla with foaming cleanser or dimethicone wipes and pat dry. Apply Jenna antifungal cream to perineal/gluteal fold 2x/day as directed.  6-Apply Prevent barrier cream or equivalent to intact sacrobuttock areas 2x/day and as needed (please do not apply to fungal rashes).  7-Heel lift boots to be worn to bilateral heels at all times in bed and after transfer to reclining chair if able. If patient unable to tolerate, must float heels on 2 pillows to offload pressure so heels are not in contact with mattress or pillows.  8-Use pressure redistribution cushion in chair when out of bed if able. Avoid prolonged sitting.  9-Moisturize skin daily with skin nourishing cream.  10-Use pressure redistribution cushion in chair when out of bed if able. Avoid prolonged sitting.  11-Moisturize skin  daily with skin nourishing cream.  12-Turn/reposition every 2 hours in bed and every hour when out of bed to chair for pressure re-distribution on skin.   13-You will receive a call from Central Scheduling for patient to follow up at [facility]. No need to call unless you wish to reschedule: [phone].

## 2025-04-29 NOTE — ASSESSMENT & PLAN NOTE
Acute on chronic. Patient's baseline hemoglobin is around 9 range. Has been noted to trend down to 7-8 over this past month  No evidence of active bleeding  4/26/25: Hemoglobin trended down 6.7  4/27/25: S/p 1U PRBC after discussion with wife, now agreeable  Iron panel, B12, folate stable  Hemoglobin remains stable 7.4  Monitor daily CBC. Transfuse for hemoglobin <7

## 2025-04-29 NOTE — QUICK NOTE
Notified of positive blood culture for gram + cocci with identification panel suspicious for MSSA bacteremia. Will start on IV cefazolin. Consult to ID.

## 2025-04-29 NOTE — ASSESSMENT & PLAN NOTE
Wt Readings from Last 3 Encounters:   04/26/25 72.5 kg (159 lb 14.4 oz)   03/30/25 70.3 kg (154 lb 15.7 oz)   03/09/25 72.2 kg (159 lb 3.2 oz)     Echo (1/22/25): The left ventricular ejection fraction is 55 to 60% by visual estimation. Systolic function is normal. Wall motion is normal. Diastolic function is mildly abnormal, consistent with grade I (abnormal) relaxation.   Currently dry on exam   Holding home furosemide  Daily weights, I&O  Outpatient follow up with Cardiology

## 2025-04-29 NOTE — WOUND OSTOMY CARE
Progress Note - Wound   Pernell Covarrubias 79 y.o. male MRN: [id number]  Unit/Bed#: [address] [place] [address] Encounter: [id number]        Assessment:   This is an 79 year old male patient admitted on 4/26/25 with altered mental status s/p fall at home. Patient was extremely lethargic and non-verbal but was more awake and alert when wife was present in room. He was turned and repositioned with assist of 2 persons. Patient had meza to gravity in place with no record of BM.    Assessment Findings:  1-Multiple traumatic wounds present on admission to bilateral lower extremities, upper extremities and left 4th finger s/p fall. Orders are in place for wound care. Please see below for detailed assessments.  2-Unstageable pressure injuries to bilateral heels, present on admission. Orders are in place for wound care and for prevention.   3-Intact fungal rash to perineal and gluteal fold. Order for Jenna antifungal cream requested from Provider.  4-Sacrobuttocks are intact - orders are in place for skin care and for prevention.    Wound Care Plan:  1-Cleanse wound to left 4th finger, right upper arm, left upper arm, left lateral lower leg, right anterior lower leg, and left elbow eith VASHE solution and pat dry. Apply THIN layer of Normlgel to wounds, Adaptic cut to size of wounds, ABD cut to size of wounds, rolled gauze and tape. Change every other day & prn soilage/dislodgement. PLEASE NO BANDAIDS OR FOAM DRESSINGS DUE TO FRAGILE SKIN.   2-Cleanse wounds to bilateral heels with VASHE solution and pat dry. Apply Betadine to wounds, 1/2 ABD, rolled gauze and tape. Change every other day & prn soilage/dislodgement.  3-Cleanse perineal/gluteal fold with foaming cleanser or dimethicone wipes and pat dry. Apply Jenna antifungal cream to perineal/gluteal fold 2x/day as directed.  4-Apply Prevent barrier cream to intact sacrobuttock areas BID and PRN (please do not apply to fungal rashes).  5-Heel lift boots to be worn to bilateral heels at  all times in bed and after transfer to reclining chair if able. If patient unable to tolerate, must float heels on 2 pillows to offload pressure so heels are not in contact with mattress or pillows.  6-Ehob pressure redistribution cushion in chair when out of bed if able. Avoid prolonged sitting.  7-Moisturize skin daily with skin nourishing cream.  8-Turn/reposition q2h or when medically stable for pressure re-distribution on skin.     Wound 04/27/25 Traumatic Leg Right;Anterior (Active)   Wound Image   04/28/25 1501   Wound Description Granulation tissue;Necrotic;Beefy red 04/28/25 1501   Non-staged Wound Description Full thickness 04/28/25 1501   Wound Length (cm) 1.5 cm 04/28/25 1501   Wound Width (cm) 1 cm 04/28/25 1501   Wound Depth (cm) 0.1 cm 04/28/25 1501   Wound Surface Area (cm^2) 1.5 cm^2 04/28/25 1501   Wound Volume (cm^3) 0.15 cm^3 04/28/25 1501   Calculated Wound Volume (cm^3) 0.15 cm^3 04/28/25 1501   Change in Wound Size % 83.33 04/28/25 1501   Drainage Amount Small 04/28/25 1501   Drainage Description Serosanguineous 04/28/25 1501   Romy-wound Assessment Fragile;Purple 04/28/25 1501   Treatments Cleansed 04/28/25 1501   Dressing ABD; Dry dressing; Normlgel;Adaptic 04/28/25 1501   Dressing Changed New 04/28/25 1501   Dressing Status Clean;Dry;Intact 04/28/25 1501       Wound 04/27/25 Leg Left;Lateral (Active)   Wound Image    04/28/25 1529   Wound Description Slough;Yellow;Granulation tissue;Pink 04/28/25 1529   Non-staged Wound Description Full thickness 04/28/25 1529   Wound Length (cm) 17.5 cm 04/28/25 1529   Wound Width (cm) 1 cm 04/28/25 1529   Wound Depth (cm) 0.1 cm 04/28/25 1529   Wound Surface Area (cm^2) 17.5 cm^2 04/28/25 1529   Wound Volume (cm^3) 1.75 cm^3 04/28/25 1529   Calculated Wound Volume (cm^3) 1.75 cm^3 04/28/25 1529   Drainage Amount Small 04/28/25 1529   Drainage Description Green;Serosanguineous 04/28/25 1529   Romy-wound Assessment Intact;Edema 04/28/25 1529   Treatments  Cleansed 04/28/25 1529   Dressing ABD;Dry dressing;Normlgel;Adaptic 04/28/25 1529   Dressing Changed New 04/28/25 1529   Dressing Status Clean;Dry;Intact 04/28/25 1529       Wound 04/01/25 Ankle Anterior;Left (Active)   Wound Image   04/28/25 1542   Wound Description Pink;Pale 04/28/25 1542   Non-staged Wound Description Partial thickness 04/28/25 1542   Wound Length (cm) 0.4 cm 04/28/25 1542   Wound Width (cm) 0.8 cm 04/28/25 1542   Wound Depth (cm) 0.1 cm 04/28/25 1542   Wound Surface Area (cm^2) 0.32 cm^2 04/28/25 1542   Wound Volume (cm^3) 0.032 cm^3 04/28/25 1542   Calculated Wound Volume (cm^3) 0.03 cm^3 04/28/25 1542   Drainage Amount None 04/28/25 1542   Romy-wound Assessment Intact 04/28/25 1542   Treatments Cleansed 04/28/25 1542   Dressing ABD;Dry dressing; Normlgel;Adaptic 04/28/25 1542   Dressing Status Clean;Dry;Intact 04/28/25 1542      Wound 04/27/25 Finger D4, ring Left;Posterior (Active)   Wound Image   04/28/25 1601   Wound Description Dry;Black;Eschar;Granulation tissue;Beefy red 04/28/25 1601   Non-staged Wound Description Full thickness 04/28/25 1601   Wound Length (cm) 2.2 cm 04/28/25 1601   Wound Width (cm) 2 cm 04/28/25 1601   Wound Depth (cm) 0.1 cm 04/28/25 1601   Wound Surface Area (cm^2) 4.4 cm^2 04/28/25 1601   Wound Volume (cm^3) 0.44 cm^3 04/28/25 1601   Calculated Wound Volume (cm^3) 0.44 cm^3 04/28/25 1601   Drainage Amount Small 04/28/25 1601   Drainage Description Serosanguineous 04/28/25 1601   Treatments Cleansed 04/28/25 1601   Dressing Dry dressing;Gauze;Normlgel;  Adaptic 04/28/25 1601   Dressing Changed New 04/28/25 1601   Dressing Status Clean;Dry;Intact 04/28/25 1601       Wound 04/27/25 Arm Anterior;Right;Upper (Active)   Wound Image   04/28/25 1514   Wound Description Granulation tissue;Pink;Black;Eschar 04/28/25 1514   Non-staged Wound Description Full thickness 04/28/25 1514   Wound Length (cm) 3 cm 04/28/25 1514   Wound Width (cm) 2.5 cm 04/28/25 1514   Wound Depth  (cm) 0.1 cm 04/28/25 1514   Wound Surface Area (cm^2) 7.5 cm^2 04/28/25 1514   Wound Volume (cm^3) 0.75 cm^3 04/28/25 1514   Calculated Wound Volume (cm^3) 0.75 cm^3 04/28/25 1514   Drainage Amount Small 04/28/25 1514   Drainage Description Serosanguineous 04/28/25 1514   Romy-wound Assessment Dry;Excoriated 04/28/25 1514   Treatments Cleansed 04/28/25 1514   Dressing ABD;Dry dressing; Normlgel;Adaptic 04/28/25 1514   Dressing Changed New 04/28/25 1514   Dressing Status Clean;Dry;Intact 04/28/25 1514       Wound 04/27/25 Arm Anterior;Left;Upper (Active)   Wound Image   04/28/25 1521   Wound Description Black;Eschar;Granulation tissue;Pink 04/28/25 1521   Non-staged Wound Description Full thickness 04/28/25 1521   Wound Length (cm) 1 cm 04/28/25 1521   Wound Width (cm) 1 cm 04/28/25 1521   Wound Depth (cm) 0.1 cm 04/28/25 1521   Wound Surface Area (cm^2) 1 cm^2 04/28/25 1521   Wound Volume (cm^3) 0.1 cm^3 04/28/25 1521   Calculated Wound Volume (cm^3) 0.1 cm^3 04/28/25 1521   Drainage Amount Small 04/28/25 1521   Drainage Description Serosanguineous 04/28/25 1521   Romy-wound Assessment Intact;Purple 04/28/25 1521   Treatments Cleansed 04/28/25 1521   Dressing Normlgel;Adaptic;ABD;Dry dressing 04/28/25 1521   Dressing Changed New 04/28/25 1521   Dressing Status Clean;Dry;Intact 04/28/25 1521       Wound 04/27/25 Pressure Injury Heel Left (Active)   Wound Image   04/28/25 1546   Wound Description Brown;Eschar;Yellow;Slough 04/28/25 1546   Pressure Injury Stage Unstageable 04/28/25 1546   Wound Length (cm) 2 cm 04/28/25 1546   Wound Width (cm) 2 cm 04/28/25 1546   Wound Depth (cm) 0.1 cm 04/28/25 1546   Wound Surface Area (cm^2) 4 cm^2 04/28/25 1546   Wound Volume (cm^3) 0.4 cm^3 04/28/25 1546   Calculated Wound Volume (cm^3) 0.4 cm^3 04/28/25 1546   Drainage Amount Small 04/28/25 1546   Drainage Description Serosanguineous 04/28/25 1546   Romy-wound Assessment Dry;Callus 04/28/25 1546   Treatments Cleansed 04/28/25  1546   Dressing ABD;Dry dressing;Betadine 04/28/25 1546   Dressing Changed New 04/28/25 1546   Dressing Status Clean;Dry;Intact 04/28/25 1546       Wound 04/27/25 Pressure Injury Heel Right (Active)   Wound Image   04/28/25 1544   Wound Description Brown;Eschar;Yellow;Slough 04/28/25 1544   Pressure Injury Stage Unstageable 04/28/25 1544   Wound Length (cm) 3.5 cm 04/28/25 1544   Wound Width (cm) 2.5 cm 04/28/25 1544   Wound Depth (cm) 0.1 cm 04/28/25 1544   Wound Surface Area (cm^2) 8.75 cm^2 04/28/25 1544   Wound Volume (cm^3) 0.875 cm^3 04/28/25 1544   Calculated Wound Volume (cm^3) 0.88 cm^3 04/28/25 1544   Drainage Amount Small 04/28/25 1544   Drainage Description Serosanguineous 04/28/25 1544   Romy-wound Assessment Intact;Callus 04/28/25 1544   Treatments Cleansed 04/28/25 1544   Dressing ABD;Dry dressing;Betadine 04/28/25 1544   Dressing Changed New 04/28/25 1544   Dressing Status Clean;Dry;Intact 04/28/25 1544     Wound 04/27/25 Traumatic Leg Right;Anterior (Active)   Wound Image   04/28/25 1501   Wound Description Granulation tissue;Necrotic;Beefy red 04/28/25 1501   Non-staged Wound Description Full thickness 04/28/25 1501   Wound Length (cm) 1.5 cm 04/28/25 1501   Wound Width (cm) 1 cm 04/28/25 1501   Wound Depth (cm) 0.1 cm 04/28/25 1501   Wound Surface Area (cm^2) 1.5 cm^2 04/28/25 1501   Wound Volume (cm^3) 0.15 cm^3 04/28/25 1501   Calculated Wound Volume (cm^3) 0.15 cm^3 04/28/25 1501   Change in Wound Size % 83.33 04/28/25 1501   Drainage Amount Small 04/28/25 1501   Drainage Description Serosanguineous 04/28/25 1501   Romy-wound Assessment Fragile;Purple 04/28/25 1501   Treatments Cleansed 04/28/25 1501   Dressing ABD;Dry dressing; Normlgel;Adaptic 04/28/25 1501   Dressing Changed New 04/28/25 1501   Dressing Status Clean;Dry;Intact 04/28/25 1501       Wound 04/27/25 Leg Left;Lateral (Active)   Wound Image    04/28/25 1527   Wound Description Slough;Yellow;Granulation tissue;Pink 04/28/25 0366    Non-staged Wound Description Full thickness 04/28/25 1529   Wound Length (cm) 17.5 cm 04/28/25 1529   Wound Width (cm) 1 cm 04/28/25 1529   Wound Depth (cm) 0.1 cm 04/28/25 1529   Wound Surface Area (cm^2) 17.5 cm^2 04/28/25 1529   Wound Volume (cm^3) 1.75 cm^3 04/28/25 1529   Calculated Wound Volume (cm^3) 1.75 cm^3 04/28/25 1529   Drainage Amount Small 04/28/25 1529   Drainage Description Green;Serosanguineous 04/28/25 1529   Romy-wound Assessment Intact;Edema 04/28/25 1529   Treatments Cleansed 04/28/25 1529   Dressing Normlgel;Adaptic;ABD;Dry dressing 04/28/25 1529   Dressing Changed New 04/28/25 1529   Dressing Status Clean;Dry;Intact 04/28/25 1529       Wound 03/31/25 Traumatic Skin Tear Arm Left;Posterior;Proximal;Inferior (Active)   Wound Image   04/28/25 1525   Wound Description Slough;Yellow;Granulation tissue;Pink 04/28/25 1525   Non-staged Wound Description Full thickness 04/28/25 1525   Wound Length (cm) 2.3 cm 04/28/25 1525   Wound Width (cm) 2 cm 04/28/25 1525   Wound Depth (cm) 0.1 cm 04/28/25 1525   Wound Surface Area (cm^2) 4.6 cm^2 04/28/25 1525   Wound Volume (cm^3) 0.46 cm^3 04/28/25 1525   Calculated Wound Volume (cm^3) 0.46 cm^3 04/28/25 1525   Drainage Amount Moderate 04/28/25 1525   Drainage Description Green;Purulent;  Serosanguineous 04/28/25 1525   Romy-wound Assessment Intact 04/28/25 1525   Treatments Cleansed 04/28/25 1525   Dressing Normlgel;Adaptic;ABD;Dry dressing 04/28/25 1525   Dressing Changed New 04/28/25 1525   Dressing Status Clean;Dry;Intact 04/28/25 1525        Fungal rash to sacrobuttocks - order for Jenna antifungal requested from Provider      Discussed assessment findings, and plan of care/recommendations with Aureliano GARCIA.    Wound care will follow along with patient weekly, please call or text with questions and concerns.    Recommendations written as orders.  Sintia Bass MSN, RN, CWON

## 2025-04-29 NOTE — PLAN OF CARE
Problem: PHYSICAL THERAPY ADULT  Goal: Performs mobility at highest level of function for planned discharge setting.  See evaluation for individualized goals.  Description: Treatment/Interventions: ADL retraining, Functional transfer training, LE strengthening/ROM, Therapeutic exercise, Endurance training, Cognitive reorientation, Patient/family training, Equipment eval/education, Bed mobility, Gait training, Compensatory technique education, Spoke to nursing, Spoke to case management          See flowsheet documentation for full assessment, interventions and recommendations.  Note:    Problem List: Decreased strength, Decreased endurance, Impaired balance, Decreased mobility, Decreased coordination, Decreased cognition, Impaired judgement, Decreased safety awareness  Assessment: Patient seen for Physical Therapy evaluation. Patient admitted with AMS.  Comorbidities affecting patient's physical performance include: CAD, CKD3, DM2, HTN, CHF, anemia, HLD, PAD, L3 compression Fx, sepsis, UTI, TME.  Personal factors affecting patient at time of initial evaluation include: inability to ambulate household distances, inability to navigate community distances, inability to navigate level surfaces without external assistance, inability to perform dynamic tasks in community, decreased cognition, positive fall history, and decreased initiation and engagement. Prior to admission, patient was dependent for mobility, requiring assist for ADLS, requiring assist for IADLS, and in short term rehab.  Please find objective findings from Physical Therapy assessment regarding body systems outlined above with impairments and limitations including weakness, impaired balance, decreased endurance, impaired coordination, decreased activity tolerance, decreased functional mobility tolerance, decreased safety awareness, impaired judgement, fall risk, decreased skin integrity, and decreased cognition.  The Barthel Index was used as a  functional outcome tool presenting with a score of Barthel Index Score: 0 today indicating marked limitations of functional mobility and ADLS.  Patient's clinical presentation is currently unstable/unpredictable as seen in patient's presentation of vital sign response, varying levels of cognitive performance, increased fall risk, new onset of impairment of functional mobility, decreased endurance, and new onset of weakness. Pt would benefit from continued Physical Therapy treatment to address deficits as defined above and maximize level of functional mobility. As demonstrated by objective findings, the assigned level of complexity for this evaluation is high.The patient's [facility] Basic Mobility Inpatient Short Form Raw Score is 8. A Raw score of less than or equal to 16 suggests the patient may benefit from discharge to post-acute rehabilitation services. Please also refer to the recommendation of the Physical Therapist for safe discharge planning.        Rehab Resource Intensity Level, PT: II (Moderate Resource Intensity)    See flowsheet documentation for full assessment.

## 2025-04-29 NOTE — PLAN OF CARE
Problem: Potential for Falls  Goal: Patient will remain free of falls  Description: INTERVENTIONS:- Educate patient/family on patient safety including physical limitations- Instruct patient to call for assistance with activity - Consult OT/PT to assist with strengthening/mobility - Keep Call bell within reach- Keep bed low and locked with side rails adjusted as appropriate- Keep care items and personal belongings within reach- Initiate and maintain comfort rounds- Make Fall Risk Sign visible to staff- Offer Toileting every 2 Hours, in advance of need- Initiate/Maintain bed alarm- Obtain necessary fall risk management equipment: slipper socks- Apply yellow socks and bracelet for high fall risk patients- Consider moving patient to room near nurses station  INTERVENTIONS:- Educate patient/family on patient safety including physical limitations- Instruct patient to call for assistance with activity - Consult OT/PT to assist with strengthening/mobility - Keep Call bell within reach- Keep bed low and locked with side rails adjusted as appropriate- Keep care items and personal belongings within reach- Initiate and maintain comfort rounds- Make Fall Risk Sign visible to staff- Offer Toileting every 2 Hours, in advance of need- Initiate/Maintain bed alarm- Obtain necessary fall risk management equipment: slipper socks- Apply yellow socks and bracelet for high fall risk patients- Consider moving patient to room near nurses station  Outcome: Progressing     Problem: PAIN - ADULT  Goal: Verbalizes/displays adequate comfort level or baseline comfort level  Description: Interventions:- Encourage patient to monitor pain and request assistance- Assess pain using appropriate pain scale- Administer analgesics based on type and severity of pain and evaluate response- Implement non-pharmacological measures as appropriate and evaluate response- Consider cultural and social influences on pain and pain management- Notify  physician/advanced practitioner if interventions unsuccessful or patient reports new pain  Outcome: Progressing     Problem: INFECTION - ADULT  Goal: Absence or prevention of progression during hospitalization  Description: INTERVENTIONS:- Assess and monitor for signs and symptoms of infection- Monitor lab/diagnostic results- Monitor all insertion sites, i.e. indwelling lines, tubes, and drains- Monitor endotracheal if appropriate and nasal secretions for changes in amount and color- [place]or appropriate cooling/warming therapies per order- Administer medications as ordered- Instruct and encourage patient and family to use good hand hygiene technique- Identify and instruct in appropriate isolation precautions for identified infection/condition  Outcome: Progressing  Goal: Absence of fever/infection during neutropenic period  Description: INTERVENTIONS:- Monitor WBC  Outcome: Progressing     Problem: SAFETY ADULT  Goal: Patient will remain free of falls  Description: INTERVENTIONS:- Educate patient/family on patient safety including physical limitations- Instruct patient to call for assistance with activity - Consult OT/PT to assist with strengthening/mobility - Keep Call bell within reach- Keep bed low and locked with side rails adjusted as appropriate- Keep care items and personal belongings within reach- Initiate and maintain comfort rounds- Make Fall Risk Sign visible to staff- Offer Toileting every 2 Hours, in advance of need- Initiate/Maintain bed alarm- Obtain necessary fall risk management equipment: slipper socks- Apply yellow socks and bracelet for high fall risk patients- Consider moving patient to room near nurses station  INTERVENTIONS:- Educate patient/family on patient safety including physical limitations- Instruct patient to call for assistance with activity - Consult OT/PT to assist with strengthening/mobility - Keep Call bell within reach- Keep bed low and locked with side rails adjusted as  appropriate- Keep care items and personal belongings within reach- Initiate and maintain comfort rounds- Make Fall Risk Sign visible to staff- Offer Toileting every 2 Hours, in advance of need- Initiate/Maintain bed alarm- Obtain necessary fall risk management equipment: slipper socks- Apply yellow socks and bracelet for high fall risk patients- Consider moving patient to room near nurses station  Outcome: Progressing  Goal: Maintain or return to baseline ADL function  Description: INTERVENTIONS:-  Assess patient's ability to carry out ADLs; assess patient's baseline for ADL function and identify physical deficits which impact ability to perform ADLs (bathing, care of mouth/teeth, toileting, grooming, dressing, etc.)- Assess/evaluate cause of self-care deficits - Assess range of motion- Assess patient's mobility; develop plan if impaired- Assess patient's need for assistive devices and provide as appropriate- Encourage maximum independence but intervene and supervise when necessary- Involve family in performance of ADLs- Assess for home care needs following discharge - Consider OT consult to assist with ADL evaluation and planning for discharge- Provide patient education as appropriate  Outcome: Progressing  Goal: Maintains/Returns to pre admission functional level  Description: INTERVENTIONS:- Perform AM-PAC 6 Click Basic Mobility/ Daily Activity assessment daily.- Set and communicate daily mobility goal to care team and patient/family/caregiver. - Collaborate with rehabilitation services on mobility goals if consulted- Perform Range of Motion 3 times a day.- Reposition patient every 2 hours.- Dangle patient 3 times a day- Stand patient 3 times a day- Ambulate patient 3 times a day- Out of bed to chair 3 times a day - Out of bed for meals 3 times a day- Out of bed for toileting- Record patient progress and toleration of activity level   Outcome: Progressing     Problem: DISCHARGE PLANNING  Goal: Discharge to home  or other facility with appropriate resources  Description: INTERVENTIONS:- Identify barriers to discharge w/patient and caregiver- Arrange for needed discharge resources and transportation as appropriate- Identify discharge learning needs (meds, wound care, etc.)- Arrange for interpretive services to assist at discharge as needed- Refer to Case Management Department for coordinating discharge planning if the patient needs post-hospital services based on physician/advanced practitioner order or complex needs related to functional status, cognitive ability, or social support system  Outcome: Progressing     Problem: Knowledge Deficit  Goal: Patient/family/caregiver demonstrates understanding of disease process, treatment plan, medications, and discharge instructions  Description: Complete learning assessment and assess knowledge base.Interventions:- Provide teaching at level of understanding- Provide teaching via preferred learning methods  Outcome: Progressing     Problem: Nutrition/Hydration-ADULT  Goal: Nutrient/Hydration intake appropriate for improving, restoring or maintaining nutritional needs  Description: Monitor and assess patient's nutrition/hydration status for malnutrition. Collaborate with interdisciplinary team and initiate plan and interventions as ordered.  Monitor patient's weight and dietary intake as ordered or per policy. Utilize nutrition screening tool and intervene as necessary. Determine patient's food preferences and provide high-protein, high-caloric foods as appropriate. INTERVENTIONS:- Monitor oral intake, urinary output, labs, and treatment plans- Assess nutrition and hydration status and recommend course of action- Evaluate amount of meals eaten- Assist patient with eating if necessary - Allow adequate time for meals- Recommend/ encourage appropriate diets, oral nutritional supplements, and vitamin/mineral supplements- Order, calculate, and assess calorie counts as needed- Recommend,  monitor, and adjust tube feedings and TPN/PPN based on assessed needs- Assess need for intravenous fluids- Provide specific nutrition/hydration education as appropriate- Include patient/family/caregiver in decisions related to nutrition  Outcome: Progressing     Problem: Prexisting or High Potential for Compromised Skin Integrity  Goal: Skin integrity is maintained or improved  Description: INTERVENTIONS:- Identify patients at risk for skin breakdown- Assess and monitor skin integrity- Assess and monitor nutrition and hydration status- Monitor labs - Assess for incontinence - Turn and reposition patient- Assist with mobility/ambulation- Relieve pressure over bony prominences- Avoid friction and shearing- Provide appropriate hygiene as needed including keeping skin clean and dry- Evaluate need for skin moisturizer/barrier cream- Collaborate with interdisciplinary team - Patient/family teaching- Consider wound care consult   Outcome: Progressing

## 2025-04-29 NOTE — ASSESSMENT & PLAN NOTE
Noted to have intermittent hypothermia. With similar situation during recent hospitalizations  Cortisol level checked 3/10/25 which was normal 10.7  TSH elevated at 15 but with normal T4  Negative infectious work up so far  Possibly in the setting of autonomic dysfunction from diabetes, age, questionable parkinson disease, poor PO intake  Stella rigo for temperatures below 97F

## 2025-04-30 ENCOUNTER — APPOINTMENT (INPATIENT)
Dept: NON INVASIVE DIAGNOSTICS | Facility: HOSPITAL | Age: 80
DRG: 871 | End: 2025-04-30
Payer: MEDICARE

## 2025-04-30 PROBLEM — R78.81 MSSA BACTEREMIA: Status: ACTIVE | Noted: 2025-04-30

## 2025-04-30 PROBLEM — B95.61 MSSA BACTEREMIA: Status: ACTIVE | Noted: 2025-04-30

## 2025-04-30 LAB
ANION GAP SERPL CALCULATED.3IONS-SCNC: 10 MMOL/L (ref 4–13)
AORTIC ROOT: 3.3 CM
AV LVOT PEAK GRADIENT: 4 MMHG
AV PEAK GRADIENT: 16 MMHG
BASOPHILS # BLD AUTO: 0.01 THOUSANDS/ÂΜL (ref 0–0.1)
BASOPHILS NFR BLD AUTO: 0 % (ref 0–1)
BSA FOR ECHO PROCEDURE: 1.91 M2
BUN SERPL-MCNC: 36 MG/DL (ref 5–25)
CALCIUM SERPL-MCNC: 8.1 MG/DL (ref 8.4–10.2)
CHLORIDE SERPL-SCNC: 116 MMOL/L (ref 96–108)
CO2 SERPL-SCNC: 17 MMOL/L (ref 21–32)
CREAT SERPL-MCNC: 1.68 MG/DL (ref 0.6–1.3)
DOP CALC LVOT AREA: 2.83 CM2
DOP CALC LVOT DIAMETER: 1.9 CM
E WAVE DECELERATION TIME: 148 MS
E/A RATIO: 1.2
EOSINOPHIL # BLD AUTO: 0.01 THOUSAND/ÂΜL (ref 0–0.61)
EOSINOPHIL NFR BLD AUTO: 0 % (ref 0–6)
ERYTHROCYTE [DISTWIDTH] IN BLOOD BY AUTOMATED COUNT: 18.2 % (ref 11.6–15.1)
FRACTIONAL SHORTENING: 28 (ref 28–44)
GFR SERPL CREATININE-BSD FRML MDRD: 37 ML/MIN/1.73SQ M
GLUCOSE SERPL-MCNC: 132 MG/DL (ref 65–140)
GLUCOSE SERPL-MCNC: 144 MG/DL (ref 65–140)
GLUCOSE SERPL-MCNC: 147 MG/DL (ref 65–140)
GLUCOSE SERPL-MCNC: 150 MG/DL (ref 65–140)
GLUCOSE SERPL-MCNC: 99 MG/DL (ref 65–140)
HCT VFR BLD AUTO: 20.1 % (ref 36.5–49.3)
HGB BLD-MCNC: 6.6 G/DL (ref 12–17)
IMM GRANULOCYTES # BLD AUTO: 0.06 THOUSAND/UL (ref 0–0.2)
IMM GRANULOCYTES NFR BLD AUTO: 1 % (ref 0–2)
INTERVENTRICULAR SEPTUM IN DIASTOLE (PARASTERNAL SHORT AXIS VIEW): 0.7 CM
INTERVENTRICULAR SEPTUM: 0.7 CM (ref 0.6–1.1)
LAAS-AP2: 25.2 CM2
LAAS-AP4: 22.3 CM2
LEFT ATRIUM SIZE: 3.9 CM
LEFT ATRIUM VOLUME (MOD BIPLANE): 82 ML
LEFT ATRIUM VOLUME INDEX (MOD BIPLANE): 42.7 ML/M2
LEFT INTERNAL DIMENSION IN SYSTOLE: 3.6 CM (ref 2.1–4)
LEFT VENTRICULAR INTERNAL DIMENSION IN DIASTOLE: 5 CM (ref 3.5–6)
LEFT VENTRICULAR POSTERIOR WALL IN END DIASTOLE: 0.8 CM
LEFT VENTRICULAR STROKE VOLUME: 65 ML
LV EF US.2D.A4C+ESTIMATED: 59 %
LVSV (TEICH): 65 ML
LYMPHOCYTES # BLD AUTO: 0.56 THOUSANDS/ÂΜL (ref 0.6–4.47)
LYMPHOCYTES NFR BLD AUTO: 7 % (ref 14–44)
MCH RBC QN AUTO: 29.3 PG (ref 26.8–34.3)
MCHC RBC AUTO-ENTMCNC: 32.8 G/DL (ref 31.4–37.4)
MCV RBC AUTO: 89 FL (ref 82–98)
MONOCYTES # BLD AUTO: 1.07 THOUSAND/ÂΜL (ref 0.17–1.22)
MONOCYTES NFR BLD AUTO: 12 % (ref 4–12)
MV E'TISSUE VEL-SEP: 9 CM/S
MV PEAK A VEL: 0.75 M/S
MV PEAK E VEL: 90 CM/S
MV STENOSIS PRESSURE HALF TIME: 43 MS
MV VALVE AREA P 1/2 METHOD: 5.12
NEUTROPHILS # BLD AUTO: 6.97 THOUSANDS/ÂΜL (ref 1.85–7.62)
NEUTS SEG NFR BLD AUTO: 80 % (ref 43–75)
NRBC BLD AUTO-RTO: 0 /100 WBCS
PLATELET # BLD AUTO: 104 THOUSANDS/UL (ref 149–390)
PMV BLD AUTO: 10.6 FL (ref 8.9–12.7)
POTASSIUM SERPL-SCNC: 3.8 MMOL/L (ref 3.5–5.3)
RBC # BLD AUTO: 2.25 MILLION/UL (ref 3.88–5.62)
RIGHT ATRIUM AREA SYSTOLE A4C: 16.5 CM2
RIGHT VENTRICLE ID DIMENSION: 3.8 CM
SL CV LEFT ATRIUM LENGTH A2C: 5.9 CM
SL CV LV EF: 55
SL CV PED ECHO LEFT VENTRICLE DIASTOLIC VOLUME (MOD BIPLANE) 2D: 119 ML
SL CV PED ECHO LEFT VENTRICLE SYSTOLIC VOLUME (MOD BIPLANE) 2D: 54 ML
SODIUM SERPL-SCNC: 143 MMOL/L (ref 135–147)
TRICUSPID ANNULAR PLANE SYSTOLIC EXCURSION: 1.7 CM
WBC # BLD AUTO: 8.68 THOUSAND/UL (ref 4.31–10.16)

## 2025-04-30 PROCEDURE — 93306 TTE W/DOPPLER COMPLETE: CPT

## 2025-04-30 PROCEDURE — 82948 REAGENT STRIP/BLOOD GLUCOSE: CPT

## 2025-04-30 PROCEDURE — 86923 COMPATIBILITY TEST ELECTRIC: CPT

## 2025-04-30 PROCEDURE — 92526 ORAL FUNCTION THERAPY: CPT

## 2025-04-30 PROCEDURE — 93306 TTE W/DOPPLER COMPLETE: CPT | Performed by: INTERNAL MEDICINE

## 2025-04-30 PROCEDURE — 99223 1ST HOSP IP/OBS HIGH 75: CPT | Performed by: INTERNAL MEDICINE

## 2025-04-30 PROCEDURE — NC001 PR NO CHARGE: Performed by: INTERNAL MEDICINE

## 2025-04-30 PROCEDURE — P9016 RBC LEUKOCYTES REDUCED: HCPCS

## 2025-04-30 PROCEDURE — 99232 SBSQ HOSP IP/OBS MODERATE 35: CPT

## 2025-04-30 PROCEDURE — 85025 COMPLETE CBC W/AUTO DIFF WBC: CPT | Performed by: INTERNAL MEDICINE

## 2025-04-30 PROCEDURE — G0545 PR INHERENT VISIT TO INPT: HCPCS | Performed by: INTERNAL MEDICINE

## 2025-04-30 PROCEDURE — 80048 BASIC METABOLIC PNL TOTAL CA: CPT | Performed by: INTERNAL MEDICINE

## 2025-04-30 PROCEDURE — 99223 1ST HOSP IP/OBS HIGH 75: CPT | Performed by: PSYCHIATRY & NEUROLOGY

## 2025-04-30 RX ORDER — SODIUM CHLORIDE 9 MG/ML
60 INJECTION, SOLUTION INTRAVENOUS CONTINUOUS
Status: DISCONTINUED | OUTPATIENT
Start: 2025-04-30 | End: 2025-05-01

## 2025-04-30 RX ADMIN — METOCLOPRAMIDE 10 MG: 10 TABLET ORAL at 18:00

## 2025-04-30 RX ADMIN — FERROUS SULFATE TAB 325 MG (65 MG ELEMENTAL FE) 325 MG: 325 (65 FE) TAB at 17:59

## 2025-04-30 RX ADMIN — FERROUS SULFATE TAB 325 MG (65 MG ELEMENTAL FE) 325 MG: 325 (65 FE) TAB at 10:02

## 2025-04-30 RX ADMIN — CARVEDILOL 12.5 MG: 12.5 TABLET, FILM COATED ORAL at 10:02

## 2025-04-30 RX ADMIN — CEFAZOLIN SODIUM 2000 MG: 2 SOLUTION INTRAVENOUS at 00:25

## 2025-04-30 RX ADMIN — CEFAZOLIN SODIUM 2000 MG: 2 SOLUTION INTRAVENOUS at 18:03

## 2025-04-30 RX ADMIN — CARVEDILOL 12.5 MG: 12.5 TABLET, FILM COATED ORAL at 17:59

## 2025-04-30 RX ADMIN — ASPIRIN 81 MG: 81 TABLET, CHEWABLE ORAL at 10:02

## 2025-04-30 RX ADMIN — METOCLOPRAMIDE 10 MG: 10 TABLET ORAL at 12:11

## 2025-04-30 RX ADMIN — METOCLOPRAMIDE 10 MG: 10 TABLET ORAL at 06:04

## 2025-04-30 RX ADMIN — SODIUM CHLORIDE 60 ML/HR: 0.9 INJECTION, SOLUTION INTRAVENOUS at 12:07

## 2025-04-30 RX ADMIN — TAMSULOSIN HYDROCHLORIDE 0.4 MG: 0.4 CAPSULE ORAL at 17:59

## 2025-04-30 RX ADMIN — ACETAMINOPHEN 650 MG: 325 TABLET, FILM COATED ORAL at 13:17

## 2025-04-30 RX ADMIN — ACETAMINOPHEN 650 MG: 325 TABLET, FILM COATED ORAL at 06:15

## 2025-04-30 RX ADMIN — INSULIN LISPRO 1 UNITS: 100 INJECTION, SOLUTION INTRAVENOUS; SUBCUTANEOUS at 18:03

## 2025-04-30 RX ADMIN — ATORVASTATIN CALCIUM 20 MG: 20 TABLET, FILM COATED ORAL at 10:02

## 2025-04-30 RX ADMIN — MICONAZOLE NITRATE: 20 CREAM TOPICAL at 18:00

## 2025-04-30 RX ADMIN — PANTOPRAZOLE SODIUM 40 MG: 40 TABLET, DELAYED RELEASE ORAL at 06:04

## 2025-04-30 RX ADMIN — CEFAZOLIN SODIUM 2000 MG: 2 SOLUTION INTRAVENOUS at 10:03

## 2025-04-30 RX ADMIN — MICONAZOLE NITRATE: 20 CREAM TOPICAL at 10:03

## 2025-04-30 NOTE — PLAN OF CARE
Problem: Potential for Falls  Goal: Patient will remain free of falls  Description: INTERVENTIONS:- Educate patient/family on patient safety including physical limitations- Instruct patient to call for assistance with activity - Consult OT/PT to assist with strengthening/mobility - Keep Call bell within reach- Keep bed low and locked with side rails adjusted as appropriate- Keep care items and personal belongings within reach- Initiate and maintain comfort rounds- Make Fall Risk Sign visible to staff- Offer Toileting every 2 Hours, in advance of need- Initiate/Maintain bed alarm- Obtain necessary fall risk management equipment: slipper socks- Apply yellow socks and bracelet for high fall risk patients- Consider moving patient to room near nurses station  INTERVENTIONS:- Educate patient/family on patient safety including physical limitations- Instruct patient to call for assistance with activity - Consult OT/PT to assist with strengthening/mobility - Keep Call bell within reach- Keep bed low and locked with side rails adjusted as appropriate- Keep care items and personal belongings within reach- Initiate and maintain comfort rounds- Make Fall Risk Sign visible to staff- Offer Toileting every 2 Hours, in advance of need- Initiate/Maintain bed alarm- Obtain necessary fall risk management equipment: slipper socks- Apply yellow socks and bracelet for high fall risk patients- Consider moving patient to room near nurses station  Outcome: Progressing     Problem: PAIN - ADULT  Goal: Verbalizes/displays adequate comfort level or baseline comfort level  Description: Interventions:- Encourage patient to monitor pain and request assistance- Assess pain using appropriate pain scale- Administer analgesics based on type and severity of pain and evaluate response- Implement non-pharmacological measures as appropriate and evaluate response- Consider cultural and social influences on pain and pain management- Notify  physician/advanced practitioner if interventions unsuccessful or patient reports new pain  Outcome: Progressing     Problem: INFECTION - ADULT  Goal: Absence or prevention of progression during hospitalization  Description: INTERVENTIONS:- Assess and monitor for signs and symptoms of infection- Monitor lab/diagnostic results- Monitor all insertion sites, i.e. indwelling lines, tubes, and drains- Monitor endotracheal if appropriate and nasal secretions for changes in amount and color- Acra appropriate cooling/warming therapies per order- Administer medications as ordered- Instruct and encourage patient and family to use good hand hygiene technique- Identify and instruct in appropriate isolation precautions for identified infection/condition  Outcome: Progressing  Goal: Absence of fever/infection during neutropenic period  Description: INTERVENTIONS:- Monitor WBC  Outcome: Progressing     Problem: SAFETY ADULT  Goal: Patient will remain free of falls  Description: INTERVENTIONS:- Educate patient/family on patient safety including physical limitations- Instruct patient to call for assistance with activity - Consult OT/PT to assist with strengthening/mobility - Keep Call bell within reach- Keep bed low and locked with side rails adjusted as appropriate- Keep care items and personal belongings within reach- Initiate and maintain comfort rounds- Make Fall Risk Sign visible to staff- Offer Toileting every 2 Hours, in advance of need- Initiate/Maintain bed alarm- Obtain necessary fall risk management equipment: slipper socks- Apply yellow socks and bracelet for high fall risk patients- Consider moving patient to room near nurses station  INTERVENTIONS:- Educate patient/family on patient safety including physical limitations- Instruct patient to call for assistance with activity - Consult OT/PT to assist with strengthening/mobility - Keep Call bell within reach- Keep bed low and locked with side rails adjusted as  appropriate- Keep care items and personal belongings within reach- Initiate and maintain comfort rounds- Make Fall Risk Sign visible to staff- Offer Toileting every 2 Hours, in advance of need- Initiate/Maintain bed alarm- Obtain necessary fall risk management equipment: slipper socks- Apply yellow socks and bracelet for high fall risk patients- Consider moving patient to room near nurses station  Outcome: Progressing  Goal: Maintain or return to baseline ADL function  Description: INTERVENTIONS:-  Assess patient's ability to carry out ADLs; assess patient's baseline for ADL function and identify physical deficits which impact ability to perform ADLs (bathing, care of mouth/teeth, toileting, grooming, dressing, etc.)- Assess/evaluate cause of self-care deficits - Assess range of motion- Assess patient's mobility; develop plan if impaired- Assess patient's need for assistive devices and provide as appropriate- Encourage maximum independence but intervene and supervise when necessary- Involve family in performance of ADLs- Assess for home care needs following discharge - Consider OT consult to assist with ADL evaluation and planning for discharge- Provide patient education as appropriate  Outcome: Progressing  Goal: Maintains/Returns to pre admission functional level  Description: INTERVENTIONS:- Perform AM-PAC 6 Click Basic Mobility/ Daily Activity assessment daily.- Set and communicate daily mobility goal to care team and patient/family/caregiver. - Collaborate with rehabilitation services on mobility goals if consulted- Perform Range of Motion 3 times a day.- Reposition patient every 2 hours.- Dangle patient 3 times a day- Stand patient 3 times a day- Ambulate patient 3 times a day- Out of bed to chair 3 times a day - Out of bed for meals 3 times a day- Out of bed for toileting- Record patient progress and toleration of activity level   Outcome: Progressing     Problem: DISCHARGE PLANNING  Goal: Discharge to home  or other facility with appropriate resources  Description: INTERVENTIONS:- Identify barriers to discharge w/patient and caregiver- Arrange for needed discharge resources and transportation as appropriate- Identify discharge learning needs (meds, wound care, etc.)- Arrange for interpretive services to assist at discharge as needed- Refer to Case Management Department for coordinating discharge planning if the patient needs post-hospital services based on physician/advanced practitioner order or complex needs related to functional status, cognitive ability, or social support system  Outcome: Progressing     Problem: Knowledge Deficit  Goal: Patient/family/caregiver demonstrates understanding of disease process, treatment plan, medications, and discharge instructions  Description: Complete learning assessment and assess knowledge base.Interventions:- Provide teaching at level of understanding- Provide teaching via preferred learning methods  Outcome: Progressing     Problem: Prexisting or High Potential for Compromised Skin Integrity  Goal: Skin integrity is maintained or improved  Description: INTERVENTIONS:- Identify patients at risk for skin breakdown- Assess and monitor skin integrity- Assess and monitor nutrition and hydration status- Monitor labs - Assess for incontinence - Turn and reposition patient- Assist with mobility/ambulation- Relieve pressure over bony prominences- Avoid friction and shearing- Provide appropriate hygiene as needed including keeping skin clean and dry- Evaluate need for skin moisturizer/barrier cream- Collaborate with interdisciplinary team - Patient/family teaching- Consider wound care consult   Outcome: Progressing     Problem: Nutrition/Hydration-ADULT  Goal: Nutrient/Hydration intake appropriate for improving, restoring or maintaining nutritional needs  Description: Monitor and assess patient's nutrition/hydration status for malnutrition. Collaborate with interdisciplinary team and  initiate plan and interventions as ordered.  Monitor patient's weight and dietary intake as ordered or per policy. Utilize nutrition screening tool and intervene as necessary. Determine patient's food preferences and provide high-protein, high-caloric foods as appropriate. INTERVENTIONS:- Monitor oral intake, urinary output, labs, and treatment plans- Assess nutrition and hydration status and recommend course of action- Evaluate amount of meals eaten- Assist patient with eating if necessary - Allow adequate time for meals- Recommend/ encourage appropriate diets, oral nutritional supplements, and vitamin/mineral supplements- Order, calculate, and assess calorie counts as needed- Recommend, monitor, and adjust tube feedings and TPN/PPN based on assessed needs- Assess need for intravenous fluids- Provide specific nutrition/hydration education as appropriate- Include patient/family/caregiver in decisions related to nutrition  Outcome: Progressing

## 2025-04-30 NOTE — ASSESSMENT & PLAN NOTE
Patient brought in from [facility] at [facility] for decreased responsiveness. Mental status waxing and waning. Recently hospitalization earlier in the month for AMS in the setting of dehydration and UTI with concern of underlying dementia (wife reports questionable parkinson diagnosis)   CT head: 1. No acute intracranial abnormality.  Stable chronic microangiopathic changes within the brain. 2. No interval change in chronic sinusitis involving the left maxillary, ethmoid and sphenoid sinuses with high density secretions and thickening of the sinus walls.  COVID/FLU negative. UA negative. Lactic acid negative. CXR without evidence of infection.   TSH elevated but normal T4  Acute on chronic anemia on admission. Hemoglobin stabilized following transfusion  Coma panel negative. Ammonia level WNL  Neuro checks  Speech therapy consulted. Recommending dysphagia 1 diet with thin liquids. Continue oral care  Maintain sleep/wake cycle. Delirium precautions  Consult to Neurology, recommendations are appreciated  MRI brain has been ordered  Suspect acute metabolic encephalopathy in setting of sepsis due to MSSA bacteremia

## 2025-04-30 NOTE — ASSESSMENT & PLAN NOTE
1/2 blood cultures 4/28 positive for MSSA  ID consulted  Possible translocation in setting of chronic skin wounds, patient has no devices or hardware  Continue cefazolin  No evidence of vegetation on ECHO report  Consider CT c/a/p if no improvement  Repeat blood cultures ordered Friday morning

## 2025-04-30 NOTE — ASSESSMENT & PLAN NOTE
Acute on chronic. Patient's baseline hemoglobin is around 9 range. Has been noted to trend down to 7-8 over this past month  No evidence of active bleeding  4/26/25: hgb 6.7 s/p 1 unit PRBC  Iron panel, B12, folate stable  Hgb 6.6 this am, wife agreeable to additional unit PRBC  likely in setting of sepsis  Monitor daily CBC. Transfuse for hemoglobin <7

## 2025-04-30 NOTE — ASSESSMENT & PLAN NOTE
Wt Readings from Last 3 Encounters:   04/30/25 72.1 kg (159 lb)   03/30/25 70.3 kg (154 lb 15.7 oz)   03/09/25 72.2 kg (159 lb 3.2 oz)     Echo (1/22/25): The left ventricular ejection fraction is 55 to 60% by visual estimation. Systolic function is normal. Wall motion is normal. Diastolic function is mildly abnormal, consistent with grade I (abnormal) relaxation.   Currently dry on exam   Holding home furosemide, monitor with IVF given decreased oral intake and sepsis  Daily weights, I&O  Outpatient follow up with Cardiology

## 2025-04-30 NOTE — PROGRESS NOTES
Progress Note - Hospitalist   Name: Pernell Covarrubias 79 y.o. male I MRN: [id number]  Unit/Bed#: [address] [place] [address] I Date of Admission: 4/26/2025   Date of Service: 4/30/2025 I Hospital Day: 3    Assessment & Plan  Altered mental status  Patient brought in from [facility] at [facility] [place] for decreased responsiveness. Mental status waxing and waning. Recently hospitalization earlier in the month for AMS in the setting of dehydration and UTI with concern of underlying dementia (wife reports questionable parkinson diagnosis)   CT head: 1. No acute intracranial abnormality.  Stable chronic microangiopathic changes within the brain. 2. No interval change in chronic sinusitis involving the left maxillary, ethmoid and sphenoid sinuses with high density secretions and thickening of the sinus walls.  COVID/FLU negative. UA negative. Lactic acid negative. CXR without evidence of infection.   TSH elevated but normal T4  Acute on chronic anemia on admission. Hemoglobin stabilized following transfusion  Coma panel negative. Ammonia level WNL  Neuro checks  Speech therapy consulted. Recommending dysphagia 1 diet with thin liquids. Continue oral care  Maintain sleep/wake cycle. Delirium precautions  Consult to Neurology, recommendations are appreciated  MRI brain has been ordered  Suspect acute metabolic encephalopathy in setting of sepsis due to MSSA bacteremia  MSSA bacteremia  1/2 blood cultures 4/28 positive for MSSA  ID consulted  Possible translocation in setting of chronic skin wounds, patient has no devices or hardware  Continue cefazolin  No evidence of vegetation on ECHO report  Consider CT c/a/p if no improvement  Repeat blood cultures ordered Friday morning  BRIAN on CKD  Baseline creatinine around 0.8-1.2  Increased to 1.68 this morning  Likely in setting of poor oral intake/sepsis  Continue to hold diuretics  Patient with meza catheter  Monitor with IVF  Avoid hypotension and nephrotoxic agents  Daily BMP  Sepsis  Evidenced by  hypothermia and tachypnea now with high fever on 4/30 in setting of MSSA bacteremia  COVID/FLU negative. UA negative. Lactic acid negative. CXR without evidence of infection. Without leukocytosis.  Continue cefazolin as above  Consider CT c/a/p  Anemia  Acute on chronic. Patient's baseline hemoglobin is around 9 range. Has been noted to trend down to 7-8 over this past month  No evidence of active bleeding  4/26/25: hgb 6.7 s/p 1 unit PRBC  Iron panel, B12, folate stable  Hgb 6.6 this am, wife agreeable to additional unit PRBC  likely in setting of sepsis  Monitor daily CBC. Transfuse for hemoglobin <7  Hypothermia  Noted to have intermittent hypothermia. With similar situation during recent hospitalizations  Cortisol level checked 3/10/25 which was normal 10.7  TSH elevated at 15 but with normal T4  Possibly in the setting of autonomic dysfunction from diabetes, age, questionable parkinson disease, poor PO intake, sepsis/bacteremia  Stella hugger for temperatures below 97F  Thrombocytopenia (HCC)  Per chart review, appears chronic  No evidence of active bleeding  Holding DVT prophylaxis  Daily CBC  Coronary artery disease involving native coronary artery of native heart with angina pectoris (HCC)  Status post multivessel stenting  Continue aspirin, Lipitor and Coreg as tolerated  Type 2 diabetes mellitus with diabetic neuropathy (HCC)    Lab Results   Component Value Date    HGBA1C 6.9 (H) 02/11/2025   Hold oral metformin and Januvia during hospitalization  SSI with accu checks  Hypoglycemia protocol  Carb controlled diet  Benign essential hypertension  BP acceptable  PTA medications include Carvedilol 12.5mg BID, Losartan 25mg QD, Furosemide 20mg QD. Spironolactone 25mg QD was discontinued during recent hospitalization  Continue Carvedilol and Losartan  Holding Furosemide until oral intake improves  Monitor vitals per routine  Chronic combined systolic and diastolic heart failure, NYHA class 2 (HCC)  Wt Readings  "from Last 3 Encounters:   04/30/25 72.1 kg (159 lb)   03/30/25 70.3 kg (154 lb 15.7 oz)   03/09/25 72.2 kg (159 lb 3.2 oz)     Echo (1/22/25): The left ventricular ejection fraction is 55 to 60% by visual estimation. Systolic function is normal. Wall motion is normal. Diastolic function is mildly abnormal, consistent with grade I (abnormal) relaxation.   Currently dry on exam   Holding home furosemide, monitor with IVF given decreased oral intake and sepsis  Daily weights, I&O  Outpatient follow up with Cardiology  Abnormal TSH  Per chart review, TSH elevated during last hospitalization but with normal T4  4/26/25: TSH 15.954, T4 0.83, T3 2.35    VTE Pharmacologic Prophylaxis: VTE Score: 8 High Risk (Score >/= 5) - Pharmacological DVT Prophylaxis Contraindicated. Sequential Compression Devices Ordered.    Mobility:   Basic Mobility Inpatient Raw Score: 8  JH-HLM Goal: 3: Sit at edge of bed  JH-HLM Achieved: 2: Bed activities/Dependent transfer  JH-HLM Goal NOT achieved. Continue with multidisciplinary rounding and encourage appropriate mobility to improve upon JH-HLM goals.    Patient Centered Rounds: I performed bedside rounds with nursing staff today.   Discussions with Specialists or Other Care Team Provider: ID, neuro, rn    Education and Discussions with Family / Patient: Updated  (wife) at bedside.    Current Length of Stay: 3 day(s)  Current Patient Status: Inpatient   Certification Statement: The patient will continue to require additional inpatient hospital stay due to metabolic encephalopathy, bacteremia, BRIAN  Discharge Plan: Anticipate discharge in >72 hrs to discharge location to be determined pending rehab evaluations.    Code Status: Level 3 - DNAR and DNI    Subjective   Patient appears lethargic this morning. Does not open eyes. Occasionally nods head \"yes\" or \"no\" to questions. Denied current pain or shortness of breath. Nodded \"yes\" when asked if he felt okay and if he was tired. He " "was able to take his pills this morning.     Objective :  Temp:  [100.1 °F (37.8 °C)-101.8 °F (38.8 °C)] 100.1 °F (37.8 °C)  HR:  [73-94] 81  BP: ()/(47-84) 133/50  Resp:  [16-20] 18  SpO2:  [95 %-100 %] 96 %  O2 Device: Nasal cannula  Nasal Cannula O2 Flow Rate (L/min):  [2 L/min] 2 L/min    Body mass index is 22.18 kg/m².     Input and Output Summary (last 24 hours):     Intake/Output Summary (Last 24 hours) at 4/30/2025 1747  Last data filed at 4/30/2025 1633  Gross per 24 hour   Intake 350 ml   Output 675 ml   Net -325 ml       Physical Exam  Vitals and nursing note reviewed.   Constitutional:       General: He is not in acute distress.     Appearance: He is well-developed. He is ill-appearing.   Cardiovascular:      Rate and Rhythm: Normal rate and regular rhythm.   Pulmonary:      Effort: Pulmonary effort is normal. No respiratory distress.      Breath sounds: Normal breath sounds.   Abdominal:      Palpations: Abdomen is soft.      Tenderness: There is no abdominal tenderness.   Genitourinary:     Comments: Soto draining clear yellow urine  Musculoskeletal:         General: No swelling.   Skin:     General: Skin is warm and dry.      Capillary Refill: Capillary refill takes less than 2 seconds.   Neurological:      Mental Status: He is alert.      Comments: Will not open eyes, responds to sternal rub, occasionally nods head \"yes\" or \"no\" to questions   Psychiatric:         Mood and Affect: Mood normal.           Lines/Drains:  Lines/Drains/Airways       Active Status       Name Placement date Placement time Site Days    Urethral Catheter Straight-tip 16 Fr. 04/26/25  1558  Straight-tip  4                  Urinary Catheter:  Goal for removal: Voiding trial when ambulation improves                 Lab Results: I have reviewed the following results:   Results from last 7 days   Lab Units 04/30/25  0601   WBC Thousand/uL 8.68   HEMOGLOBIN g/dL 6.6*   HEMATOCRIT % 20.1*   PLATELETS Thousands/uL 104*   SEGS " PCT % 80*   LYMPHO PCT % 7*   MONO PCT % 12   EOS PCT % 0     Results from last 7 days   Lab Units 04/30/25  0601 04/27/25  0455 04/26/25  1419   SODIUM mmol/L 143   < > 139   POTASSIUM mmol/L 3.8   < > 4.7   CHLORIDE mmol/L 116*   < > 112*   CO2 mmol/L 17*   < > 20*   BUN mg/dL 36*   < > 49*   CREATININE mg/dL 1.68*   < > 1.12   ANION GAP mmol/L 10   < > 7   CALCIUM mg/dL 8.1*   < > 8.3*   ALBUMIN g/dL  --   --  2.9*   TOTAL BILIRUBIN mg/dL  --   --  0.76   ALK PHOS U/L  --   --  83   ALT U/L  --   --  48   AST U/L  --   --  37   GLUCOSE RANDOM mg/dL 132   < > 76    < > = values in this interval not displayed.     Results from last 7 days   Lab Units 04/26/25  1419   INR  1.09     Results from last 7 days   Lab Units 04/30/25  1702 04/30/25  1055 04/30/25  0712 04/29/25  2007 04/29/25  1606 04/29/25  1110 04/29/25  0732 04/28/25  2103 04/28/25  1619 04/28/25  1115 04/28/25  0720 04/27/25  2108   POC GLUCOSE mg/dl 150* 147* 144* 156* 218* 167* 106 154* 106 174* 143* 123         Results from last 7 days   Lab Units 04/26/25  1419   LACTIC ACID mmol/L 0.9       Recent Cultures (last 7 days):   Results from last 7 days   Lab Units 04/28/25  1840   BLOOD CULTURE  No Growth at 24 hrs.   GRAM STAIN RESULT  Gram positive cocci in clusters*       Imaging Results Review: I reviewed radiology reports from this admission including: chest xray.  Other Study Results Review: No additional pertinent studies reviewed.    Last 24 Hours Medication List:     Current Facility-Administered Medications:     acetaminophen (TYLENOL) tablet 650 mg, Q6H PRN    aspirin chewable tablet 81 mg, Daily    atorvastatin (LIPITOR) tablet 20 mg, Daily    bisacodyl (DULCOLAX) EC tablet 10 mg, Daily PRN    carvedilol (COREG) tablet 12.5 mg, BID    ceFAZolin (ANCEF) IVPB (premix in dextrose) 2,000 mg 50 mL, Q8H, Last Rate: 2,000 mg (04/30/25 1003)    Cholecalciferol (VITAMIN D3) tablet 2,000 Units, Daily    co-enzyme Q-10 capsule 100 mg, Daily    ferrous  sulfate tablet 325 mg, BID With Meals    insulin lispro (HumALOG/ADMELOG) 100 units/mL subcutaneous injection 1-5 Units, HS    insulin lispro (HumALOG/ADMELOG) 100 units/mL subcutaneous injection 1-6 Units, TID AC **AND** Fingerstick Glucose (POCT), TID AC    [Held by provider] losartan (COZAAR) tablet 25 mg, Daily    metoclopramide (REGLAN) tablet 10 mg, TID AC    moisture barrier miconazole 2% cream (aka CHERELLE MOISTURE BARRIER ANTIFUNGAL CREAM), BID    multivitamin stress formula tablet 1 tablet, Daily    pantoprazole (PROTONIX) EC tablet 40 mg, Early Morning    sodium chloride 0.9 % infusion, Continuous, Last Rate: 60 mL/hr (04/30/25 1207)    tamsulosin (FLOMAX) capsule 0.4 mg, Daily With Dinner    Administrative Statements   Today, Patient Was Seen By: Deirdre Luo PA-C    **Please Note: This note may have been constructed using a voice recognition system.**

## 2025-04-30 NOTE — ASSESSMENT & PLAN NOTE
Baseline creatinine around 0.8-1.2  Increased to 1.68 this morning  Likely in setting of poor oral intake/sepsis  Continue to hold diuretics  Patient with meza catheter  Monitor with IVF  Avoid hypotension and nephrotoxic agents  Daily BMP

## 2025-04-30 NOTE — ASSESSMENT & PLAN NOTE
Evidenced by hypothermia and tachypnea now with high fever on 4/30 in setting of MSSA bacteremia  COVID/FLU negative. UA negative. Lactic acid negative. CXR without evidence of infection. Without leukocytosis.  Continue cefazolin as above  Consider CT c/a/p

## 2025-04-30 NOTE — ASSESSMENT & PLAN NOTE
1 of 2 blood cultures positive from 4/28. No cultures were sent on admission for comparison. Patient does have systemic signs of infection including hypothermia and now fever thus suspect a true infection.  Source could be skin translocation as patient has multiple areas of open skin abrasions, though none appear acutely infected.  No other obvious source of infection at this time. Patient has no cardiac devices or endovascular hardware.  -Continue IV Cefazolin 2g every 8 hours  -Monitor serum Cr closely as antibiotic dose may need to be adjusted if worsens  -Repeat blood cultures X2 Friday AM to assess for bacteremia clearance  -Follow up TTE  -Consider CT chest/abdomen/pelvis to further assess for metastatic foci of infection  -At this time treatment duration unclear, will need to await further workup

## 2025-04-30 NOTE — ASSESSMENT & PLAN NOTE
Patient is an 79-year-old male who presented to the ER with altered mental status, less responsiveness at the skilled nursing facility.  Patient had some confusion a few weeks ago however verbally he was responsive at that time.  Since that time patient has been having ongoing issues with anemia, he also had some low blood sugars upon his arrival to the ER with an elevated BUN.  His TSH is also abnormal with an abnormal T3.  He had a CT of the head which showed left-sided sinusitis with possible fungal sinusitis, possibly chronic in nature but appeared to be stable.  Initially patient had some drops in his blood pressure, as low as 60/40 on 4/27/2025.  He continues to run a lower although more sustainable blood pressure today.  Patient has been on IV antibiotics at 1 point had 3 days of IV cefepime however was during his last hospitalization in March.  Patient has been on Ancef during his hospitalization for further coverage of MSSA.  Met with patient at bedside, he is obtunded, lethargic however did arouse to slight sternal rub, his eyes were open but was not following commands.  He is moving his extremities spontaneously.  Unable to provide any neurological assessment for subjective feedback.  Patient was receiving packed cells, his hemoglobin had been in the 6 range, according to notations his baseline had been 9 although seems over the course of the last month or 2 has been in the lower ranges.  There were questions with regards to parkinsonism, he had previously been on Sinemet trial for about 1 week, evidently taken off by the [facility] nursing facility medical team.  Did not seem to have any improvements.  No significant tremors or movements noted during his neurological exam today.  He does have a mild rhythmic movement of the lower extremities although very minimal and present with stimulation.    Medical team to continue working on metabolic etiologies of altered mental status  -Will get MRI of the brain for  further evaluation of underlying ischemic events.

## 2025-04-30 NOTE — ASSESSMENT & PLAN NOTE
Noted to have intermittent hypothermia. With similar situation during recent hospitalizations  Cortisol level checked 3/10/25 which was normal 10.7  TSH elevated at 15 but with normal T4  Possibly in the setting of autonomic dysfunction from diabetes, age, questionable parkinson disease, poor PO intake, sepsis/bacteremia  Stella sierra for temperatures below 97F

## 2025-04-30 NOTE — ASSESSMENT & PLAN NOTE
Per chart review, TSH elevated during last hospitalization but with normal T4  4/26/25: TSH 15.954, T4 0.83, T3 2.35

## 2025-04-30 NOTE — ASSESSMENT & PLAN NOTE
Patient initially with intermittent hypothermia and tachypnea, now with high fever on 4/30.  Suspect this is due to MSSA bacteremia as below.  At this time no other clear source of infection.  Chest x-ray shows clear lung fields.  Fortunately patient is currently hemodynamically stable.  -Antibiotic as below  -Follow-up blood cultures  -Monitor temperatures and hemodynamics closely  -Repeat CBC tomorrow to trend WBC

## 2025-04-30 NOTE — ASSESSMENT & PLAN NOTE
Per chart review, appears chronic  No evidence of active bleeding  Holding DVT prophylaxis  Daily CBC

## 2025-04-30 NOTE — CONSULTS
[facility]   Neurology Initial Consult    Pernell Covarrubias is a 79 y.o. male  [address] [place] [address]          Information obtained from:   Chief Complaint   Patient presents with    Altered Mental Status     Pt sent from nursing home, last known normal was noon today.  Per report pt is normally A&OX3, stopped parkinsons meds yesterday        Assessment & Plan  Altered mental status  Patient is an 79-year-old male who presented to the ER with altered mental status, less responsiveness at the [facility] nursing facility.  Patient had some confusion a few weeks ago however verbally he was responsive at that time.  Since that time patient has been having ongoing issues with anemia, he also had some low blood sugars upon his arrival to the ER with an elevated BUN.  His TSH is also abnormal with an abnormal T3.  He had a CT of the head which showed left-sided sinusitis with possible fungal sinusitis, possibly chronic in nature but appeared to be stable.  Initially patient had some drops in his blood pressure, as low as 60/40 on 4/27/2025.  He continues to run a lower although more sustainable blood pressure today.  Patient has been on IV antibiotics at 1 point had 3 days of IV cefepime however was during his last hospitalization in March.  Patient has been on Ancef during his hospitalization for further coverage of MSSA.  Met with patient at bedside, he is obtunded, lethargic however did arouse to slight sternal rub, his eyes were open but was not following commands.  He is moving his extremities spontaneously.  Unable to provide any neurological assessment for subjective feedback.  Patient was receiving packed cells, his hemoglobin had been in the 6 range, according to notations his baseline had been 9 although seems over the course of the last month or 2 has been in the lower ranges.  There were questions with regards to parkinsonism, he had previously been on Sinemet trial for about 1 week, evidently  taken off by the [facility] medical team.  Did not seem to have any improvements.  No significant tremors or movements noted during his neurological exam today.  He does have a mild rhythmic movement of the lower extremities although very minimal and present with stimulation.    Medical team to continue working on metabolic etiologies of altered mental status  -Will get MRI of the brain for further evaluation of underlying ischemic events.  Anemia  Patient receiving packed blood cells today  Medical team to continue management.  Hypothermia  Medical team management of SIRS, antibiotics.  Infectious disease consultation, recs appreciated  Abnormal TSH  Medical team management for thyroid disease.  SIRS (systemic inflammatory response syndrome) (HCC)    Thrombocytopenia (HCC)    MSSA bacteremia  Recommendations from infectious disease appreciated, management per ID.      Pernell Covarrubias will need follow-up in  8-10 weeks  with general neurology team for Other in 30 minute appointment. They will not require outpatient neurological testing.      HPI:  Arcadio Covarrubias is an 80yo male with PMH of anemia, CKD, CAD, DM, HTN, dive Bittick neuropathy, PVD, prostate and squamous cell carcinomas and evidence of chronic right basal ganglia infarct who was brought to the ER on 4/26/2025 after being evaluated by EMS at the [facility].  EMS reported patient verbally unresponsive, noted low blood pressures receiving IV fluid.  Had previously been on Parkinson's medication and had a Soto catheter which were both removed and stopped approximately 1 to 2 days prior to this event.  Patient's wife was able to report to the ER staff that patient was normal in the morning, when they came around to check him at noon he was unresponsive.  She reports that patient had no diagnosis of Parkinson's therefore was removed from the Parkinson's medications.  She reported some confusion a few weeks ago but was  verbally responsive at that time.  On arrival patient's blood pressure was 135/80, he had a blood sugar of 57, BUN was 49, hemoglobin 6.7 with a platelet count of 94.  Patient also had thyroid assessment, TSH was 15.954, T3 was 2.35 with a normal T4.  Patient was sent for CT of the head, showed left-sided sinusitis with possible fungal sinusitis to be chronic in nature, appeared to be stable.  No evidence of mass, mass effect or midline shift or acute parenchymal hemorrhage.  During his time in the ER, his blood pressure did drop to 88/64, received IV fluids and did increase to 138/79.  Is also noted to have a drop in blood pressure early a.m. on 4/27/2025 where his blood pressure dropped to 60/40.  2-1/2 hours prior to that his blood pressure was 93/66.  Reviewed patient's record, initially patient was at skilled nursing facility in [place], had hospitalization on 3/30/2025 with altered mental status, was placed on IV cefepime x 3 days however was subsequently switched to levofloxacin and discharged back to skilled nursing facility.  He had chronic sinusitis noted at that time as well as urinary tract infection.  Patient did have hypothermia during that time as well, noted UTI to be the cause however consider hypoglycemia with poor oral intake to potentiate or be alternate etiology.  Patient's hemoglobin range typically in the nines, during this hospitalization proved to be slightly lower.  I remained on baby aspirin, atorvastatin and beta-blockers for CAD and hypertension.  Patient has a history of prostate cancer, had incidental finding of lung nodule was recommended 3-month follow-up of his chest CT.  Had a CT of the head, MRI of the brain as well showing chronic lacunar infarct in the right basal ganglion, also chronic infarct right cerebellar hemisphere.  Paranasal sinus disease however no contrast study completed.  Patient was discharged from his hospitalization with a referral to follow-up with outpatient  neurology, has a pending appointment scheduled for 5/12/2025 with neuro movement for the possibility of parkinsonism.  Somewhere between his discharge and 4/17/2025, patient was started on Sinemet possibly as a trial through the skilled nursing facility.  Patient has not yet been seen by neurology for further examination.  To note patient was previously treated for infection, possibly UTI versus sinusitis.  He also has multiple wounds noted via media file, question the possibility of infection.  Patient does not have evidence of leukocytosis however during prior hospitalization his WBC count remain normal as well.  Patient did have blood cultures drawn, first with gram-positive cocci in cluster, second study negative.  Staphylococcal aureus probable MSSA was detected, cefazolin or nafcillin recommended as well as ID consultation.  Appreciate ID input.  Met with patient at bedside, he had onset of fever 101.8, nursing at bedside delivering blood.  Patient had temperature prior to the onset of transfusion according to nursing.  Patient was able to open his eyes to a baig chest rub, he opened his eyes, groans however did not provide any verbal feedback and was not following any commands.  Patient is hard of hearing however appeared to be relatively lethargic and obtunded during this exam.  Raise patient's extremities, he did meet with some resistance in all of his extremities and moves spontaneously upon the release of these extremities.  He has adequate reflexes but unable to attest to any subjective feedback that may be needed for his neurological assessment.  Patient has had abnormal findings with MSSA bacteremia, clinical source for infection remains pending.  Patient continues on IV cefazolin every 8 hours for the time being, planning on repeating blood cultures for bacteremia per infectious disease.  MRI of the brain was completed in March, no new MRI has been ordered.  Discussed with attending neurology MD,  ordered MRI for follow-up regarding any possible ischemic changes from a neurological perspective.  Suspect however patient with metabolic encephalopathy related to MSSA bacteremia..      Past Medical History:   Diagnosis Date    Acquired hallux valgus     last assessed: 8/1/2013    Allergic rhinitis     Anemia 10/26/2010    Atrophy of nail     last assessed: 7/7/2015    Cancer ([facility]) 2020    Chronic kidney disease     chronic renal insufficiency    Coronary artery disease     Deformity of ankle and foot, acquired     last assessed: 2/22/2016    Diabetes mellitus ([facility]) 1994    Difficulty walking     last assessed: 12/14/2015    Dysesthesia     last assessed: 11/25/2016    Hammer toe     unspecified laterality; last assessed: 8/1/2013    Hypertension     Neuropathy in diabetes ([facility]) 1994    Onychomycosis of toenail     last assessed: 2/22/2016    Peripheral vascular disease ([facility])     left SFA stent in 2010    Pes planus     unspecified laterality; last assessed: 8/1/2013    Pneumonia     last assessed: 2/27/2016    Prostate cancer ([facility])     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     last assessed: 8/15/2016    Type 2 diabetes mellitus ([facility]) 07/26/2010    Viral warts     last assessed: 7/24/2015       Past Surgical History:   Procedure Laterality Date    CARDIAC CATHETERIZATION  07/29/2010    CATARACT EXTRACTION, BILATERAL Bilateral     R 1999, L 2008    COLONOSCOPY  2011    FEMORAL ARTERY - POPLITEAL ARTERY BYPASS GRAFT  09/23/2013    FOOT SURGERY      bone spur removed    LEG SURGERY Bilateral     repair; L 8/20/2010 and 7/26/2012    SC PLMT INTERSTITIAL DEV RADIAT TX PROSTATE 1/MULT N/A 6/22/2021    Procedure: INSERTION OF FIDUCIAL MARKER (TRANSRECTAL ULTRASOUND GUIDANCE), SPACEOAR;  Surgeon: Jero Loomis MD;  Location:  Endo;  Service: Urology    ROTATOR CUFF REPAIR Left 2009    SHOULDER SURGERY Right 2005    collar bone       No Known Allergies      Current Facility-Administered Medications:      acetaminophen (TYLENOL) tablet 650 mg, 650 mg, Oral, Q6H PRN, Kristen Hinkle MD, 650 mg at 04/30/25 0615    aspirin chewable tablet 81 mg, 81 mg, Oral, Daily, Kristen Hinkle MD, 81 mg at 04/30/25 1002    atorvastatin (LIPITOR) tablet 20 mg, 20 mg, Oral, Daily, Kristen Hinkle MD, 20 mg at 04/30/25 1002    bisacodyl (DULCOLAX) EC tablet 10 mg, 10 mg, Oral, Daily PRN, Kristen Hinkle MD    carvedilol (COREG) tablet 12.5 mg, 12.5 mg, Oral, BID, Kristen Hinkle MD, 12.5 mg at 04/30/25 1002    ceFAZolin (ANCEF) IVPB (premix in dextrose) 2,000 mg 50 mL, 2,000 mg, Intravenous, Q8H, Alexandra Mckenzie PA-C, Last Rate: 100 mL/hr at 04/30/25 1003, 2,000 mg at 04/30/25 1003    Cholecalciferol (VITAMIN D3) tablet 2,000 Units, 2,000 Units, Oral, Daily, Kristen Hinkle MD, 2,000 Units at 04/29/25 0959    co-enzyme Q-10 capsule 100 mg, 100 mg, Oral, Daily, Kristen Hinkle MD, 100 mg at 04/29/25 0959    ferrous sulfate tablet 325 mg, 325 mg, Oral, BID With Meals, Kristen Hinkle MD, 325 mg at 04/30/25 1002    insulin lispro (HumALOG/ADMELOG) 100 units/mL subcutaneous injection 1-5 Units, 1-5 Units, Subcutaneous, HS, Kristen Hinkle MD, 1 Units at 04/29/25 2200    insulin lispro (HumALOG/ADMELOG) 100 units/mL subcutaneous injection 1-6 Units, 1-6 Units, Subcutaneous, TID AC, 2 Units at 04/29/25 1634 **AND** Fingerstick Glucose (POCT), , , TID AC, Kristen Hinkle MD    [Held by provider] losartan (COZAAR) tablet 25 mg, 25 mg, Oral, Daily, Kristen Hinkle MD, 25 mg at 04/29/25 0959    metoclopramide (REGLAN) tablet 10 mg, 10 mg, Oral, TID AC, Alexandra Mckenzie PA-C, 10 mg at 04/30/25 1211    moisture barrier miconazole 2% cream (aka CHERELLE MOISTURE BARRIER ANTIFUNGAL CREAM), , Topical, BID, Alexandra Mckenzie PA-C, Given at 04/30/25 1003    multivitamin stress formula tablet 1 tablet, 1 tablet, Oral, Daily, Kristen Hinkle MD, 1 tablet at 04/29/25 0959    pantoprazole (PROTONIX) EC tablet 40 mg, 40 mg, Oral,  Early Morning, Kristen Hinkle MD, 40 mg at 04/30/25 0604    sodium chloride 0.9 % infusion, 60 mL/hr, Intravenous, Continuous, Deirdre Luo PA-C, Last Rate: 60 mL/hr at 04/30/25 1207, 60 mL/hr at 04/30/25 1207    tamsulosin (FLOMAX) capsule 0.4 mg, 0.4 mg, Oral, Daily With Dinner, Kristen Hinkle MD, 0.4 mg at 04/29/25 1632    Social History     Socioeconomic History    Marital status: /Civil Union     Spouse name: Not on file    Number of children: 1    Years of education: Not on file    Highest education level: Not on file   Occupational History    Occupation: retired from  work   Tobacco Use    Smoking status: Never     Passive exposure: Past    Smokeless tobacco: Never   Vaping Use    Vaping status: Never Used   Substance and Sexual Activity    Alcohol use: Yes     Alcohol/week: 1.0 standard drink of alcohol     Types: 1 Glasses of wine per week     Comment: Stopped in 1986    Drug use: Never    Sexual activity: Not Currently     Partners: Female   Other Topics Concern    Not on file   Social History Narrative    Not on file     Social Drivers of Health     Financial Resource Strain: Low Risk  (6/21/2023)    Overall Financial Resource Strain (CARDIA)     Difficulty of Paying Living Expenses: Not hard at all   Food Insecurity: Patient Unable To Answer (4/26/2025)    Nursing - Inadequate Food Risk Classification     Worried About Running Out of Food in the Last Year: Never true     Ran Out of Food in the Last Year: Never true     Ran Out of Food in the Last Year: Patient unable to answer   Transportation Needs: Patient Unable To Answer (4/26/2025)    Nursing - Transportation Risk Classification     Lack of Transportation: Not on file     Lack of Transportation: Patient unable to answer   Physical Activity: Not on file   Stress: Not on file   Social Connections: Not on file   Intimate Partner Violence: Patient Unable To Answer (4/26/2025)    Nursing IPS     Feels Physically and Emotionally Safe: Not  "on file     Physically Hurt by Someone: Not on file     Humiliated or Emotionally Abused by Someone: Not on file     Physically Hurt by Someone: Patient unable to answer     Hurt or Threatened by Someone: Patient unable to answer   Housing Stability: Patient Unable To Answer (2025)    Nursing: Inadequate Housing Risk Classification     Has Housing: Not on file     Worried About Losing Housing: Not on file     Unable to Get Utilities: Not on file     Unable to Pay for Housing in the Last Year: Patient unable to answer     Has Housin       Family History   Problem Relation Age of Onset    Aortic aneurysm Mother     Heart disease Father     Heart attack Father         acute myocardial infarction    Heart disease Sister     Heart attack Sister         acute myocardial infarction    Throat cancer Maternal Grandfather     Heart disease Paternal Grandfather     No Known Problems Son          Review of systems:  Patient is unable to participate in subjective feedback    Physical examination:  BP 94/55   Pulse 94   Temp (!) 101.7 °F (38.7 °C)   Resp 20   Ht 5' 11\" (1.803 m)   Wt 72.1 kg (159 lb)   SpO2 100%   BMI 22.18 kg/m²     GENERAL APPEARANCE:  The patient is lethargic and obtunded.    HEENT:  Head is normocephalic.  Pupils are equal and reactive.    NECK:  Supple without lymphadenopathy.   HEART:  Regular rate and rhythm.  LUNGS: No audible stridor or wheezing heard.  ABDOMEN:  Soft,  nondistended  EXTREMITIES:  Without cyanosis, clubbing or edema.     Mental status:  The patient is lethargic and obtunded, orientation is unable to be assessed.   Cranial nerves:  CN II: Visual fields are unable to be assessed  Pupils are 1 mm   CN III, IV, VI: At primary gaze, there is no eye deviation.   CN V: Corneal responses are intact.  CN VII: Face is symmetric with normal eye closure.  CN VIII: Hearing is unable to be assessed  CN IX, X: Palate and phonation are unable to be assessed   CN XI: Head turning and " shoulder shrug are unable to be assessed  CN XII: Tongue is midline.  Motor:  Patient unable to follow directive for motor exam.  Does meet against resistance in the upper extremities and moves all 4 extremities spontaneously although slowly and weakly.  Reflexes    RJ BJ TJ KJ AJ Plantars Cordon's   Right 2+ 2+  2+ Tr  Downgoing Not present   Left 2+ 2+  2+ tr Downgoing Not present      Sensory:  Deferred patient unable to participate in subjective feedback for testing  Coordination:  Unable to assess  Romberg deferred  Gait/Stance:  Deferred    Lab Results   Component Value Date    WBC 8.68 04/30/2025    HGB 6.6 (L) 04/30/2025    HCT 20.1 (L) 04/30/2025    MCV 89 04/30/2025     (L) 04/30/2025     Lab Results   Component Value Date    HGBA1C 6.9 (H) 02/11/2025     Lab Results   Component Value Date    ALT 48 04/26/2025    AST 37 04/26/2025    ALKPHOS 83 04/26/2025     Lab Results   Component Value Date    GLUCOSE 137 04/17/2014    CALCIUM 8.1 (L) 04/30/2025     04/17/2014    K 3.8 04/30/2025    CO2 17 (L) 04/30/2025     (H) 04/30/2025    BUN 36 (H) 04/30/2025    CREATININE 1.68 (H) 04/30/2025         Review of reports and notes reveal:  Independent Interpretation of images or specimens:  XR chest 1 view portable  Result Date: 4/27/2025  No acute cardiopulmonary disease. Workstation performed: [id number]     CT head without contrast  Result Date: 4/26/2025  1. No acute intracranial abnormality.  Stable chronic microangiopathic changes within the brain. 2. No interval change in chronic sinusitis involving the left maxillary, ethmoid and sphenoid sinuses with high density secretions and thickening of the sinus walls. Workstation performed: [id number]           Thank you for this consult.    Total time of encounter: 70 Minutes  More than 50% of time was spent in counseling and coordination of care of patient.  Discussed with patient at bedside, medical team and attending neurology MD.

## 2025-04-30 NOTE — ASSESSMENT & PLAN NOTE
Lab Results   Component Value Date    HGBA1C 6.9 (H) 02/11/2025   Risk factor for infection.  -Tight glycemic control per primary team

## 2025-04-30 NOTE — PHYSICAL THERAPY NOTE
PT Cancellation Note       04/30/25 1983   Note Type   Note Type Cancelled Session   Cancel Reasons Other  (Attempted to see pt this afternoon for PT treatment. Pt continues to be lethargic + getting blood, will follow-up as appropriate/tolerated)   Licensure   [place] License Number  Camelia Lackey [id number]

## 2025-04-30 NOTE — PROGRESS NOTES
Brief visit with pt who engages in pleasant conversation. Introduced self and role, offered supportive conversation with pt who denies any specific needs at this time. Visit was met with gratitude.      Available to follow upon request.      Thank you!       04/30/25 1200   Clinical Encounter Type   Visited With Patient   Routine Visit Introduction

## 2025-04-30 NOTE — CONSULTS
Consultation - Infectious Disease   Name: Pernell Covarrubias 79 y.o. male I MRN: [id number]  Unit/Bed#: 4 [address] I Date of Admission: 4/26/2025   Date of Service: 4/30/2025 I Hospital Day: 3   Inpatient consult to Infectious Diseases  Consult performed by: Rudy Knowles MD  Consult ordered by: Alexandra Mckenzie PA-C        Physician Requesting Evaluation: Sukhi Felix MD   Reason for Evaluation / Principal Problem: MSSA bacteremia    Assessment & Plan  SIRS (systemic inflammatory response syndrome) (HCC)  Patient initially with intermittent hypothermia and tachypnea, now with high fever on 4/30.  Suspect this is due to MSSA bacteremia as below.  At this time no other clear source of infection.  Chest x-ray shows clear lung fields.  Fortunately patient is currently hemodynamically stable.  -Antibiotic as below  -Follow-up blood cultures  -Monitor temperatures and hemodynamics closely  -Repeat CBC tomorrow to trend WBC  MSSA bacteremia  1 of 2 blood cultures positive from 4/28. No cultures were sent on admission for comparison. Patient does have systemic signs of infection including hypothermia and now fever thus suspect a true infection.  Source could be skin translocation as patient has multiple areas of open skin abrasions, though none appear acutely infected.  No other obvious source of infection at this time. Patient has no cardiac devices or endovascular hardware.  -Continue IV Cefazolin 2g every 8 hours  -Monitor serum Cr closely as antibiotic dose may need to be adjusted if worsens  -Repeat blood cultures X2 Friday AM to assess for bacteremia clearance  -Follow up TTE  -Consider CT chest/abdomen/pelvis to further assess for metastatic foci of infection  -At this time treatment duration unclear, will need to await further workup  Altered mental status  This was patient's presenting symptom.  He has had multiple admissions for the same issue over the last month and concern for possible underlying dementia  versus delirium.  Now likely worsened by acute infection as above. No obvious nuchal rigidity on exam.  -Treatment of bacteremia as above  -Monitor mental status closely  -If does not improve, consider repeat imaging of head (MRI)  Hypothermia  Likely related to bacteremia.  -Antibiotic plan as above  -Monitor temperatures closely  CKD stage 3 due to type 2 diabetes mellitus (HCC)  -Dose adjust antibiotic  -Monitor serum Cr  Type 2 diabetes mellitus with diabetic neuropathy (HCC)  Lab Results   Component Value Date    HGBA1C 6.9 (H) 02/11/2025   Risk factor for infection.  -Tight glycemic control per primary team  Thrombocytopenia (HCC)  Appears chronic per chart review.  - Continue to monitor CBC    I have discussed the above management plan in detail with the primary service.     Antibiotics:  Cefazolin    History of Present Illness   Pernell Covarrubias is a 79 y.o. year old male with coronary artery disease, just of heart failure, type 2 diabetes, CKD stage III, history of prostate cancer, peripheral arterial disease.  He has had several recent admissions.  He was admitted on March 8 evaluation after a fall and generalized weakness with some confusion.  During that admission he was found to be hypothermic and with hyponatremia.  He was admitted for encephalopathy.  He had a negative infectious workup at that time including blood cultures.  He was admitted more recently on March 30 again for toxic metabolic encephalopathy and hypothermia.  He reportedly had a normal cortisol level.  He was treated for possible UTI.  He was brought to the ER on 4/26 for evaluation of altered mental status.  Per EMS, was verbally unresponsive and there was report that he may have recently been taken off Parkinson's medications.  Also had a Soto catheter previously which had been removed several days before the confusion.  Residing at a living facility.  He did meet SIRS criteria initially as he was hypothermic and tachypneic though  without leukocytosis.  CT head was unremarkable other than report of chronic sinusitis in the left maxillary, ethmoid and sphenoid sinuses.  Chest x-ray showed clear lung fields.  Initially was no major concern for active infection.  He has been undergoing a delirium workup.  He had required Stella hugger intermittently for bouts of hypothermia.  Blood cultures were sent on 4/28, after admission, now with 1 of 2 sets growing MSSA.  This morning patient developed high fever to 101.8.  ID consulted given Staphylococcus aureus bacteremia.    On my assessment today, patient is rather encephalopathic.  He was able to deny pain, but unable to participate much further nor answer other questions.  He appeared very lethargic.       A complete review of systems is negative other than that noted in the HPI.    Medical History Review: I have reviewed the patient's PMH, PSH, Social History, Family History, Meds, and Allergies     Objective :  Temp:  [101.7 °F (38.7 °C)-101.8 °F (38.8 °C)] 101.7 °F (38.7 °C)  HR:  [82-94] 94  BP: ()/() 94/55  Resp:  [20] 20  SpO2:  [98 %-100 %] 100 %    General: Elderly appearing,   Neurologic: Lethargic, awakens to voice but unable to answer most questions or hold a conversation  Pulmonary:  Normal respiratory excursion without accessory muscle use  Abdomen:  Soft, nontender  : Soto catheter present  Extremities:  No edema  Skin: Multiple areas of skin abrasions, scabbed lesions on both arms and legs      Lab Results: I have reviewed the following results:  Results from last 7 days   Lab Units 04/30/25  0601 04/29/25 0454 04/28/25 0448   WBC Thousand/uL 8.68 8.51 7.48   HEMOGLOBIN g/dL 6.6* 7.4* 7.4*   PLATELETS Thousands/uL 104* 100* 93*     Results from last 7 days   Lab Units 04/30/25  0601 04/29/25  0454 04/28/25 0448 04/27/25  0455 04/26/25  1419   SODIUM mmol/L 143 141 135   < > 139   POTASSIUM mmol/L 3.8 3.8 3.6   < > 4.7   CHLORIDE mmol/L 116* 113* 119*   < > 112*   CO2  mmol/L 17* 18* 18*   < > 20*   BUN mg/dL 36* 34* 33*   < > 49*   CREATININE mg/dL 1.68* 1.18 1.21   < > 1.12   EGFR ml/min/1.73sq m 37 57 56   < > 61   CALCIUM mg/dL 8.1* 8.5 8.3*   < > 8.3*   AST U/L  --   --   --   --  37   ALT U/L  --   --   --   --  48   ALK PHOS U/L  --   --   --   --  83   ALBUMIN g/dL  --   --   --   --  2.9*    < > = values in this interval not displayed.     Results from last 7 days   Lab Units 04/28/25  1840 04/27/25  1121   BLOOD CULTURE  No Growth at 24 hrs.  --    GRAM STAIN RESULT  Gram positive cocci in clusters*  --    MRSA CULTURE ONLY   --  No Methicillin Resistant Staphlyococcus aureus (MRSA) isolated             Results from last 7 days   Lab Units 04/28/25  0448   FERRITIN ng/mL 323           Imaging Results Review: I personally reviewed the following image studies in PACS and associated radiology reports: chest xray. My interpretation of the radiology images/reports is: Clear lung fields.

## 2025-04-30 NOTE — ASSESSMENT & PLAN NOTE
This was patient's presenting symptom.  He has had multiple admissions for the same issue over the last month and concern for possible underlying dementia versus delirium.  Now likely worsened by acute infection as above. No obvious nuchal rigidity on exam.  -Treatment of bacteremia as above  -Monitor mental status closely  -If does not improve, consider repeat imaging of head (MRI)

## 2025-04-30 NOTE — SPEECH THERAPY NOTE
"SLP Progress Note    Patient Name: Pernell Covarrubias  Today's Date: 4/30/2025     Problem List  Principal Problem:    Altered mental status  Active Problems:    Coronary artery disease involving native coronary artery of native heart with angina pectoris (HCC)    CKD stage 3 due to type 2 diabetes mellitus (HCC)    Type 2 diabetes mellitus with diabetic neuropathy (HCC)    Benign essential hypertension    Chronic combined systolic and diastolic heart failure, NYHA class 2 (HCC)    Anemia    Hypothermia    Abnormal TSH    SIRS (systemic inflammatory response syndrome) (HCC)    Thrombocytopenia (HCC)    MSSA bacteremia    Subjective:  \" He is not as awake today and he only had a few teaspoons for breakfast.  He is going to be getting some blood so may be elevated little better after that.\"    Objective:  Patient was seen for diagnostic dysphagia therapy.  Patient sleeping on arrival and required maximum stimulation to increase alertness.  Oral care completed to maximize alertness and ensure clear oral cavity (small amount of secretions removed from the hard palate and cheeks).  He followed in the aware command.  He was essentially unable to produce any voicing today.  After stimulation, he was provided with a limited number of teaspoons of purée, thick liquid and 2 teaspoons of ice cream with crushed med.  He presented with moderately prolonged oral transfer time and mildly delayed appearing swallow initiation.  He presented with cough only with ice cream.  When offered more than 5 teaspoons, he shook his head no to declined more.    Assessment:  Patient with reduced alertness and strength today with essentially no voicing.  It is not appropriate for full oral meal and evidence cough  on the 1 to 2 teaspoons of ice cream with crushed meds.  I spoke with RN s/p session and discussed risk and recommendations.  Patient to be fed only if needed alert    Plan/Recommendations:  Patient to be fed only if more alert (s " transfusion).   I recommend change in diet to include nectar thick liquid and administration by teaspoon to maximize safety of oral intake.  SLP to continue diagnostic therapy to maximize safety of oral intake.    Es Jarrell MS,SLP  [place] License [id number]

## 2025-05-01 ENCOUNTER — APPOINTMENT (INPATIENT)
Dept: RADIOLOGY | Facility: HOSPITAL | Age: 80
DRG: 871 | End: 2025-05-01
Payer: MEDICARE

## 2025-05-01 PROBLEM — E87.0 HYPERNATREMIA: Status: ACTIVE | Noted: 2025-05-01

## 2025-05-01 LAB
ABO GROUP BLD BPU: NORMAL
ALBUMIN SERPL BCG-MCNC: 2.6 G/DL (ref 3.5–5)
ALP SERPL-CCNC: 123 U/L (ref 34–104)
ALT SERPL W P-5'-P-CCNC: 23 U/L (ref 7–52)
ANION GAP SERPL CALCULATED.3IONS-SCNC: 12 MMOL/L (ref 4–13)
AST SERPL W P-5'-P-CCNC: 54 U/L (ref 13–39)
BACTERIA BLD CULT: ABNORMAL
BILIRUB SERPL-MCNC: 1.5 MG/DL (ref 0.2–1)
BPU ID: NORMAL
BUN SERPL-MCNC: 36 MG/DL (ref 5–25)
CALCIUM ALBUM COR SERPL-MCNC: 9.4 MG/DL (ref 8.3–10.1)
CALCIUM SERPL-MCNC: 8.3 MG/DL (ref 8.4–10.2)
CHLORIDE SERPL-SCNC: 119 MMOL/L (ref 96–108)
CO2 SERPL-SCNC: 17 MMOL/L (ref 21–32)
CREAT SERPL-MCNC: 1.71 MG/DL (ref 0.6–1.3)
CROSSMATCH: NORMAL
ERYTHROCYTE [DISTWIDTH] IN BLOOD BY AUTOMATED COUNT: 17.8 % (ref 11.6–15.1)
GFR SERPL CREATININE-BSD FRML MDRD: 36 ML/MIN/1.73SQ M
GLUCOSE SERPL-MCNC: 101 MG/DL (ref 65–140)
GLUCOSE SERPL-MCNC: 108 MG/DL (ref 65–140)
GLUCOSE SERPL-MCNC: 158 MG/DL (ref 65–140)
GLUCOSE SERPL-MCNC: 174 MG/DL (ref 65–140)
GLUCOSE SERPL-MCNC: 96 MG/DL (ref 65–140)
GRAM STN SPEC: ABNORMAL
HCT VFR BLD AUTO: 23.8 % (ref 36.5–49.3)
HGB BLD-MCNC: 7.6 G/DL (ref 12–17)
MCH RBC QN AUTO: 29.3 PG (ref 26.8–34.3)
MCHC RBC AUTO-ENTMCNC: 31.9 G/DL (ref 31.4–37.4)
MCV RBC AUTO: 92 FL (ref 82–98)
PLATELET # BLD AUTO: 107 THOUSANDS/UL (ref 149–390)
PMV BLD AUTO: 10.1 FL (ref 8.9–12.7)
POTASSIUM SERPL-SCNC: 3.8 MMOL/L (ref 3.5–5.3)
PROT SERPL-MCNC: 5.1 G/DL (ref 6.4–8.4)
RBC # BLD AUTO: 2.59 MILLION/UL (ref 3.88–5.62)
S AUREUS DNA BLD POS QL NAA+NON-PROBE: DETECTED
SODIUM SERPL-SCNC: 148 MMOL/L (ref 135–147)
UNIT DISPENSE STATUS: NORMAL
UNIT PRODUCT CODE: NORMAL
UNIT PRODUCT VOLUME: 350 ML
UNIT RH: NORMAL
WBC # BLD AUTO: 9.21 THOUSAND/UL (ref 4.31–10.16)

## 2025-05-01 PROCEDURE — 85027 COMPLETE CBC AUTOMATED: CPT

## 2025-05-01 PROCEDURE — 97530 THERAPEUTIC ACTIVITIES: CPT

## 2025-05-01 PROCEDURE — NC001 PR NO CHARGE: Performed by: INTERNAL MEDICINE

## 2025-05-01 PROCEDURE — 70551 MRI BRAIN STEM W/O DYE: CPT

## 2025-05-01 PROCEDURE — 82948 REAGENT STRIP/BLOOD GLUCOSE: CPT

## 2025-05-01 PROCEDURE — 99232 SBSQ HOSP IP/OBS MODERATE 35: CPT

## 2025-05-01 PROCEDURE — 74176 CT ABD & PELVIS W/O CONTRAST: CPT

## 2025-05-01 PROCEDURE — G0545 PR INHERENT VISIT TO INPT: HCPCS | Performed by: INTERNAL MEDICINE

## 2025-05-01 PROCEDURE — 99232 SBSQ HOSP IP/OBS MODERATE 35: CPT | Performed by: INTERNAL MEDICINE

## 2025-05-01 PROCEDURE — 97110 THERAPEUTIC EXERCISES: CPT

## 2025-05-01 PROCEDURE — 80053 COMPREHEN METABOLIC PANEL: CPT

## 2025-05-01 PROCEDURE — 71250 CT THORAX DX C-: CPT

## 2025-05-01 RX ORDER — INSULIN LISPRO 100 [IU]/ML
1-5 INJECTION, SOLUTION INTRAVENOUS; SUBCUTANEOUS EVERY 6 HOURS SCHEDULED
Status: DISCONTINUED | OUTPATIENT
Start: 2025-05-01 | End: 2025-05-05

## 2025-05-01 RX ORDER — DEXTROSE MONOHYDRATE AND SODIUM CHLORIDE 5; .45 G/100ML; G/100ML
50 INJECTION, SOLUTION INTRAVENOUS CONTINUOUS
Status: DISCONTINUED | OUTPATIENT
Start: 2025-05-01 | End: 2025-05-02

## 2025-05-01 RX ADMIN — TAMSULOSIN HYDROCHLORIDE 0.4 MG: 0.4 CAPSULE ORAL at 17:10

## 2025-05-01 RX ADMIN — ATORVASTATIN CALCIUM 20 MG: 20 TABLET, FILM COATED ORAL at 08:12

## 2025-05-01 RX ADMIN — METOCLOPRAMIDE 10 MG: 10 TABLET ORAL at 08:12

## 2025-05-01 RX ADMIN — METOCLOPRAMIDE 10 MG: 10 TABLET ORAL at 17:10

## 2025-05-01 RX ADMIN — CARVEDILOL 12.5 MG: 12.5 TABLET, FILM COATED ORAL at 17:09

## 2025-05-01 RX ADMIN — MICONAZOLE NITRATE: 20 CREAM TOPICAL at 17:11

## 2025-05-01 RX ADMIN — SODIUM CHLORIDE 60 ML/HR: 0.9 INJECTION, SOLUTION INTRAVENOUS at 05:55

## 2025-05-01 RX ADMIN — ASPIRIN 81 MG: 81 TABLET, CHEWABLE ORAL at 08:12

## 2025-05-01 RX ADMIN — INSULIN LISPRO 1 UNITS: 100 INJECTION, SOLUTION INTRAVENOUS; SUBCUTANEOUS at 17:12

## 2025-05-01 RX ADMIN — CEFAZOLIN SODIUM 2000 MG: 2 SOLUTION INTRAVENOUS at 17:08

## 2025-05-01 RX ADMIN — CEFAZOLIN SODIUM 2000 MG: 2 SOLUTION INTRAVENOUS at 01:34

## 2025-05-01 RX ADMIN — MICONAZOLE NITRATE: 20 CREAM TOPICAL at 08:13

## 2025-05-01 RX ADMIN — CEFAZOLIN SODIUM 2000 MG: 2 SOLUTION INTRAVENOUS at 08:06

## 2025-05-01 RX ADMIN — DEXTROSE AND SODIUM CHLORIDE 75 ML/HR: 5; .45 INJECTION, SOLUTION INTRAVENOUS at 10:27

## 2025-05-01 RX ADMIN — CARVEDILOL 12.5 MG: 12.5 TABLET, FILM COATED ORAL at 08:12

## 2025-05-01 NOTE — ASSESSMENT & PLAN NOTE
Wt Readings from Last 3 Encounters:   04/30/25 72.1 kg (159 lb)   03/30/25 70.3 kg (154 lb 15.7 oz)   03/09/25 72.2 kg (159 lb 3.2 oz)     Echo (1/22/25): The left ventricular ejection fraction is 55 to 60% by visual estimation. Systolic function is normal. Wall motion is normal. Diastolic function is mildly abnormal, consistent with grade I (abnormal) relaxation.   Currently dry on exam  Holding home furosemide, monitor with IVF given decreased oral intake, sepsis, and mild hypernatremia  Daily weights, I&O

## 2025-05-01 NOTE — ASSESSMENT & PLAN NOTE
Patient brought in from [facility] at [facility] for decreased responsiveness. Mental status waxing and waning. Recently hospitalization earlier in the month for AMS in the setting of dehydration and UTI with concern of underlying dementia (wife reports questionable parkinson diagnosis)   CT head: 1. No acute intracranial abnormality.  Stable chronic microangiopathic changes within the brain.  COVID/FLU negative. UA negative. Lactic acid negative. CXR without evidence of infection.   TSH elevated but normal T4  Acute on chronic anemia on admission. Hemoglobin stabilized following transfusion  Coma panel negative. Ammonia level WNL  Neuro checks  Speech therapy consulted. Recommending dysphagia 1 diet with thin liquids. Continue oral care  Consult to Neurology, recommendations are appreciated  MRI brain negative for acute abnormality  Suspect acute metabolic encephalopathy in setting of sepsis due to MSSA bacteremia contributing

## 2025-05-01 NOTE — PHYSICAL THERAPY NOTE
PT TREATMENT       25 1353   PT Last Visit   PT Visit Date 25   Note Type   Note Type Treatment   Pain Assessment   Pain Assessment Tool FLACC   Pain Location/Orientation   (Pt unable to specify ; minimal to no verbalizations throughout session)   Hospital Pain Intervention(s) Repositioned;Ambulation/increased activity   Pain Rating: FLACC (Rest) - Face 0   Pain Rating: FLACC (Rest) - Legs 0   Pain Rating: FLACC (Rest) - Activity 0   Pain Rating: FLACC (Rest) - Cry 0   Pain Rating: FLACC (Rest) - Consolability 0   Score: FLACC (Rest) 0   Pain Rating: FLACC (Activity) - Face 1   Pain Rating: FLACC (Activity) - Legs 1   Pain Rating: FLACC (Activity) - Activity 1   Pain Rating: FLACC (Activity) - Cry 0   Pain Rating: FLACC (Activity) - Consolability 0   Score: FLACC (Activity) 3   Restrictions/Precautions   Weight Bearing Precautions Per Order No   Other Precautions Bed Alarm;Chair Alarm;Fall Risk;Pain;Cognitive;Multiple lines   General   Chart Reviewed Yes   Family/Caregiver Present No   Cognition   Overall Cognitive Status Impaired   Arousal/Participation Lethargic   Orientation Level Unable to assess  (Pt turns head/responds to name, unable to report , last name)   Following Commands Unable to follow one step commands   Subjective   Subjective Pt with minimal to no verbalizations throughout session   Bed Mobility   Supine to Sit 1  Dependent   Additional items Assist x 2;Verbal cues;Increased time required;HOB elevated;LE management   Sit to Supine 1  Dependent   Additional items Assist x 2;Verbal cues;Increased time required;HOB elevated;LE management   Additional Comments Able to tolerate sitting at EOB for approx 3-4 minutes with Max A   Transfers   Sit to Stand Unable to assess   Ambulation/Elevation   Gait pattern Not appropriate;Not tested   Balance   Static Sitting Poor -   Dynamic Sitting Poor -   Static Standing Zero   Endurance Deficit   Endurance Deficit Yes   Activity Tolerance    Activity Tolerance Patient limited by fatigue  (Limited by cognition, lethargy)   Nurse Made Aware yes RADHA Amaral   Exercises   Neuro re-ed PROM performed in supine including ankle DF/PF, knee flex/ext, Hip flex/ext, abd/add x 10-15 reps bilat   Assessment   Prognosis Guarded   Problem List Decreased strength;Decreased range of motion;Decreased endurance;Impaired balance;Decreased mobility;Decreased coordination;Decreased cognition;Impaired judgement;Decreased safety awareness;Decreased skin integrity;Pain   Assessment Pt seen for PT session this afternoon. Pt continues to present with lethargy, decreased activity tolerance. PROM performed in supine x 10-15 reps as mentioned above ; pt unable to follow commands to perform exercise. Pt dependent x 2 to sit at EOB + Max A to maintain sitting balance with significant posterior lean. Able to tolerate sitting for approx 3-4 minutes with eye contact ; pt then with increased fatigue, difficulty maintaining eyes open and returned to supine. Repositioned for comfort with all needs within reach. Continue to recommend level 2 moderate resource intensity.  The patient's AM-PAC Basic Mobility Inpatient Short Form Raw Score is 7. A Raw score of less than or equal to 16 suggests the patient may benefit from discharge to post-acute rehabilitation services. Please also refer to the recommendation of the Physical Therapist for safe discharge planning.     Goals   Patient Goals unable to state due to cognition   Plan   Treatment/Interventions ADL retraining;Functional transfer training;LE strengthening/ROM;Endurance training;Therapeutic exercise;Bed mobility;Gait training;Spoke to nursing;OT   Progress Slow progress, medical status limitations   PT Frequency 3-5x/wk   Discharge Recommendation   Rehab Resource Intensity Level, PT II (Moderate Resource Intensity)   AM-PAC Basic Mobility Inpatient   Turning in Flat Bed Without Bedrails 2   Lying on Back to Sitting on Edge of Flat Bed  Without Bedrails 1   Moving Bed to Chair 1   Standing Up From Chair Using Arms 1   Walk in Room 1   Climb 3-5 Stairs With Railing 1   Basic Mobility Inpatient Raw Score 7   Turning Head Towards Sound 2   Follow Simple Instructions 1   Low Function Basic Mobility Raw Score  10   Low Function Basic Mobility Standardized Score  14.65   [facility] Highest Level Of Mobility   -[facility] Goal 2: Bed activities/Dependent transfer   -[facility] Achieved 3: Sit at edge of bed   End of Consult   Patient Position at End of Consult Supine;Bed/Chair alarm activated;All needs within reach   Licensure   [place] License Number  Camelia Lackey [id number]

## 2025-05-01 NOTE — ASSESSMENT & PLAN NOTE
May be related to bacteremia, though patient has had noted hypothermia during previous recent admissions as well. He is now febrile.   -Antibiotic plan as above  -Monitor temperatures closely

## 2025-05-01 NOTE — OCCUPATIONAL THERAPY NOTE
OT TREATMENT         05/01/25 1403   OT Last Visit   OT Visit Date 05/01/25   Note Type   Note Type Treatment   Pain Assessment   Pain Assessment Tool FLACC   Pain Rating: FLACC (Rest) - Face 0   Pain Rating: FLACC (Rest) - Legs 0   Pain Rating: FLACC (Rest) - Activity 0   Pain Rating: FLACC (Rest) - Cry 0   Pain Rating: FLACC (Rest) - Consolability 0   Score: FLACC (Rest) 0   Pain Rating: FLACC (Activity) - Face 1   Pain Rating: FLACC (Activity) - Legs 1   Pain Rating: FLACC (Activity) - Activity 1   Pain Rating: FLACC (Activity) - Cry 0   Pain Rating: FLACC (Activity) - Consolability 0   Score: FLACC (Activity) 3   Restrictions/Precautions   Other Precautions Chair Alarm;Bed Alarm;Fall Risk;Cognitive  (lethargic)   ADL   Grooming Assistance 1  Total Assistance   Grooming Deficit Wash/dry face   Grooming Comments seated on edge of bed with max assist for sitting balance   Bed Mobility   Rolling R 1  Dependent   Supine to Sit 1  Dependent   Additional items Assist x 2   Sit to Supine 1  Dependent   Additional items Assist x 2   Additional Comments Sitting on edge of bed with max assist x 3 minutes   Transfers   Sit to Stand Unable to assess   ROM- Right Upper Extremities   R Shoulder PROM;Flexion   R Elbow PROM;Elbow flexion;Elbow extension   R Wrist PROM;Wrist flexion;Wrist extension   R Hand PROM;Index finger;Thumb;Long finger;Ring finger;Little finger   R Position Supine   R Weight/Reps/Sets 10 times each   ROM - Left Upper Extremities    L Shoulder PROM;Flexion   L Elbow PROM;Elbow flexion;Elbow extension   L Wrist PROM;Wrist flexion;Wrist extension   L Hand PROM;Thumb;Index finger;Long finger;Ring finger;Little finger   L Position Supine   L Weight/Reps/Sets 10 times each   Cognition   Overall Cognitive Status Impaired   Arousal/Participation Lethargic  (eyes open only briefly initially sitting on edge of bed)   Orientation Level Unable to assess  (no verbalization during session)   Following Commands Unable to  follow one step commands   Assessment   Assessment Patient seen for OT treatment.  Patient lethargic throughout session.  Patient unable to particiapte.  Patient will benefit from continued OT services to maximize functional performance with ADLS. The patient's raw score on the AM-PAC Daily Activity Inpatient Short Form is 6. A raw score of less than 19 suggests the patient may benefit from discharge to post-acute rehabilitation services. Please refer to the recommendation of the Occupational Therapist for safe discharge planning.   Plan   Treatment Interventions Functional transfer training;UE strengthening/ROM;ADL retraining;Activityengagement   OT Frequency 2-3x/wk   Discharge Recommendation   Rehab Resource Intensity Level, OT II (Moderate Resource Intensity)   AM-PAC Daily Activity Inpatient   Lower Body Dressing 1   Bathing 1   Toileting 1   Upper Body Dressing 1   Grooming 1   Eating 1   Daily Activity Raw Score 6   Turning Head Towards Sound 1   Follow Simple Instructions 1   Low Function Daily Activity Raw Score 8   Low Function Daily Activity Standardized Score  12.06   AM-[facility] Applied Cognition Inpatient   Following a Speech/Presentation 1   Understanding Ordinary Conversation 1   Taking Medications 1   Remembering Where Things Are Placed or Put Away 1   Remembering List of 4-5 Errands 1   Taking Care of Complicated Tasks 1   Applied Cognition Raw Score 6   Applied Cognition Standardized Score 7.69   Licensure   [place] License Number  Camelia Webb MS [id number] [id number]

## 2025-05-01 NOTE — ASSESSMENT & PLAN NOTE
Lab Results   Component Value Date    HGBA1C 6.9 (H) 02/11/2025   Hold oral metformin and Januvia during hospitalization  SSI with accu checks q6h while poor oral intake  Hypoglycemia protocol  Carb controlled diet

## 2025-05-01 NOTE — PROGRESS NOTES
Progress Note - Infectious Disease   Name: Pernell Covarrubias 79 y.o. male I MRN: [id number]  Unit/Bed#: [address] [place] [address] Date of Admission: 4/26/2025   Date of Service: 5/1/2025 I Hospital Day: 4     Assessment & Plan  Sepsis  Patient initially with intermittent hypothermia and tachypnea, now with high fever on 4/30.  Suspect this is due to MSSA bacteremia as below.  At this time no other clear source of infection.  Chest x-ray shows clear lung fields, CT chest showing only pulmonary edema, no acute process on CT A/P.  Fortunately patient is currently hemodynamically stable.  -Antibiotic as below  -Follow-up blood cultures  -Monitor temperatures and hemodynamics closely  -Repeat CBC tomorrow to trend WBC  MSSA bacteremia  1 of 2 blood cultures positive from 4/28. No cultures were sent on admission for comparison. Patient does have systemic signs of infection including hypothermia and now fever thus suspect a true infection.  Source could be skin translocation as patient has multiple areas of open skin abrasions, though none appear acutely infected.  No other obvious source of infection at this time. CT C/A/P is without signs of metastatic infection. Brain MRI negative for emboli, though was non-contrast. Patient has no cardiac devices or endovascular hardware. TTE, adequate study, is without vegetation.  -Continue IV Cefazolin 2g every 8 hours  -Monitor serum Cr closely as antibiotic dose may need to be adjusted if worsens  -Repeat blood cultures X2 tomorrow AM to assess for bacteremia clearance  -At this time treatment duration unclear, will need to await further workup  Altered mental status  This was patient's presenting symptom.  He has had multiple admissions for the same issue over the last month and concern for possible underlying dementia versus delirium.  Now likely worsened by acute infection as above. No obvious nuchal rigidity on exam. Brain MRI is without evidence of stroke or emboli, though was  "non-contrast.  -Treatment of bacteremia as above  -Neurology recs appreciated  -Monitor mental status closely  Hypothermia  May be related to bacteremia, though patient has had noted hypothermia during previous recent admissions as well. He is now febrile.   -Antibiotic plan as above  -Monitor temperatures closely  BRIAN on CKD  -Dose adjust antibiotic  -Monitor serum Cr  Type 2 diabetes mellitus with diabetic neuropathy (HCC)  Lab Results   Component Value Date    HGBA1C 6.9 (H) 02/11/2025   Risk factor for infection.  -Tight glycemic control per primary team  Thrombocytopenia (HCC)  Appears chronic per chart review.  - Continue to monitor CBC    I have discussed the above management plan in detail with the primary service.  They agree with plan to continue IV Cefazolin.    Antibiotics:  Cefazolin: 3    Subjective   Patient remains encephalopathic. Does open eyes to voice and answers some simple questions. Said \"yes\" when asked if he could hear me. Said \"no\" when asked if having any pain. Further history limited due to mental status.     Objective :  Temp:  [99.5 °F (37.5 °C)-101.3 °F (38.5 °C)] 100.5 °F (38.1 °C)  HR:  [73-94] 80  BP: ()/(47-84) 131/50  Resp:  [16-20] 18  SpO2:  [95 %-98 %] 96 %  O2 Device: Nasal cannula  Nasal Cannula O2 Flow Rate (L/min):  [2 L/min] 2 L/min    General:  Appears chronically ill, patient lethargic lying in bed with eyes closed  Neurologic: opens to voice, answers simple Yes/No questions with repetitive asking, goes back to sleep quickly  Pulmonary:  Normal respiratory excursion without accessory muscle use  Abdomen:  Soft, nontender  Extremities:  No edema  Skin:  Scattered abrasions on arms and legs      Lab Results: I have reviewed the following results:  Results from last 7 days   Lab Units 05/01/25  0545 04/30/25  0601 04/29/25  0454   WBC Thousand/uL 9.21 8.68 8.51   HEMOGLOBIN g/dL 7.6* 6.6* 7.4*   PLATELETS Thousands/uL 107* 104* 100*     Results from last 7 days   Lab " Units 05/01/25  0545 04/30/25  0601 04/29/25  0454 04/27/25  0455 04/26/25  1419   SODIUM mmol/L 148* 143 141   < > 139   POTASSIUM mmol/L 3.8 3.8 3.8   < > 4.7   CHLORIDE mmol/L 119* 116* 113*   < > 112*   CO2 mmol/L 17* 17* 18*   < > 20*   BUN mg/dL 36* 36* 34*   < > 49*   CREATININE mg/dL 1.71* 1.68* 1.18   < > 1.12   EGFR ml/min/1.73sq m 36 37 57   < > 61   CALCIUM mg/dL 8.3* 8.1* 8.5   < > 8.3*   AST U/L 54*  --   --   --  37   ALT U/L 23  --   --   --  48   ALK PHOS U/L 123*  --   --   --  83   ALBUMIN g/dL 2.6*  --   --   --  2.9*    < > = values in this interval not displayed.     Results from last 7 days   Lab Units 04/28/25  1840 04/27/25  1121   BLOOD CULTURE  No Growth at 48 hrs.  --    GRAM STAIN RESULT  Gram positive cocci in clusters*  --    MRSA CULTURE ONLY   --  No Methicillin Resistant Staphlyococcus aureus (MRSA) isolated             Results from last 7 days   Lab Units 04/28/25  0448   FERRITIN ng/mL 323

## 2025-05-01 NOTE — ASSESSMENT & PLAN NOTE
1/2 blood cultures 4/28 positive for MSSA  ID consulted  Possible translocation in setting of chronic skin wounds, patient has no devices or hardware  No evidence of vegetation on ECHO report  Follow up results of CT c/a/p  Continue cefazolin  Repeat blood cultures ordered Friday morning

## 2025-05-01 NOTE — ASSESSMENT & PLAN NOTE
Evidenced by hypothermia and tachypnea now with high fever on 4/30 in setting of MSSA bacteremia  COVID/FLU negative. UA negative. Lactic acid negative. CXR without evidence of infection. Without leukocytosis.  Continue cefazolin as above  Follow up CT c/a/p  Follow up repeat blood cultures

## 2025-05-01 NOTE — ASSESSMENT & PLAN NOTE
This was patient's presenting symptom.  He has had multiple admissions for the same issue over the last month and concern for possible underlying dementia versus delirium.  Now likely worsened by acute infection as above. No obvious nuchal rigidity on exam. Brain MRI is without evidence of stroke or emboli, though was non-contrast.  -Treatment of bacteremia as above  -Neurology recs appreciated  -Monitor mental status closely

## 2025-05-01 NOTE — ASSESSMENT & PLAN NOTE
Acute on chronic. Patient's baseline hemoglobin is around 9 range. Has been noted to trend down to 7-8 over this past month  No evidence of active bleeding  4/26/25: hgb 6.7 s/p 1 unit PRBC  4/30/25: hgb 6.6 s/p 1 unit PRBC  Iron panel, B12, folate stable  likely in setting of sepsis  Monitor daily CBC. Transfuse for hemoglobin <7

## 2025-05-01 NOTE — PROGRESS NOTES
Progress Note - Hospitalist   Name: Pernell Covarrubias 79 y.o. male I MRN: [id number]  Unit/Bed#: [address] [place] [address] Date of Admission: 4/26/2025   Date of Service: 5/1/2025 I Hospital Day: 4    Assessment & Plan  Altered mental status  Patient brought in from [facility] for decreased responsiveness. Mental status waxing and waning. Recently hospitalization earlier in the month for AMS in the setting of dehydration and UTI with concern of underlying dementia (wife reports questionable parkinson diagnosis)   CT head: 1. No acute intracranial abnormality.  Stable chronic microangiopathic changes within the brain.  COVID/FLU negative. UA negative. Lactic acid negative. CXR without evidence of infection.   TSH elevated but normal T4  Acute on chronic anemia on admission. Hemoglobin stabilized following transfusion  Coma panel negative. Ammonia level WNL  Neuro checks  Speech therapy consulted. Recommending dysphagia 1 diet with thin liquids. Continue oral care  Consult to Neurology, recommendations are appreciated  MRI brain negative for acute abnormality  Suspect acute metabolic encephalopathy in setting of sepsis due to MSSA bacteremia contributing  MSSA bacteremia  1/2 blood cultures 4/28 positive for MSSA  ID consulted  Possible translocation in setting of chronic skin wounds, patient has no devices or hardware  No evidence of vegetation on ECHO report  Follow up results of CT c/a/p  Continue cefazolin  Repeat blood cultures ordered Friday morning  BRIAN on CKD  Baseline creatinine around 0.8-1.2  Increased to 1.68 on 4/30  Likely in setting of poor oral intake/sepsis  Continue to hold lasix  Patient with meza catheter, monitor Is/Os  Monitor with IVF  Follow up CT a/p  Avoid hypotension and nephrotoxic agents  Daily BMP  Sepsis  Evidenced by hypothermia and tachypnea now with high fever on 4/30 in setting of MSSA bacteremia  COVID/FLU negative. UA negative. Lactic acid negative. CXR without evidence of  infection. Without leukocytosis.  Continue cefazolin as above  Follow up CT c/a/p  Follow up repeat blood cultures  Anemia  Acute on chronic. Patient's baseline hemoglobin is around 9 range. Has been noted to trend down to 7-8 over this past month  No evidence of active bleeding  4/26/25: hgb 6.7 s/p 1 unit PRBC  4/30/25: hgb 6.6 s/p 1 unit PRBC  Iron panel, B12, folate stable  likely in setting of sepsis  Monitor daily CBC. Transfuse for hemoglobin <7  Hypothermia  Noted to have intermittent hypothermia. 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 Spironolactone 25mg QD was discontinued during recent hospitalization  Continue Carvedilol and Losartan  Holding Furosemide until oral intake improves  Monitor vitals per routine  Chronic combined systolic and diastolic heart failure, NYHA class 2 ([facility])  Wt Readings from Last 3 Encounters:   04/30/25 72.1 kg (159 lb)   03/30/25 70.3 kg (154 lb 15.7 oz)   03/09/25 72.2 kg (159 lb 3.2 oz)     Echo (1/22/25):  The left ventricular ejection fraction is 55 to 60% by visual estimation. Systolic function is normal. Wall motion is normal. Diastolic function is mildly abnormal, consistent with grade I (abnormal) relaxation.   Currently dry on exam  Holding home furosemide, monitor with IVF given decreased oral intake, sepsis, and mild hypernatremia  Daily weights, I&O  Abnormal TSH  Per chart review, TSH elevated during last hospitalization but with normal T4  4/26/25: TSH 15.954, T4 0.83, T3 2.35  Hypernatremia  Mild hypernatremia 148, in setting of poor oral intake  Switch fluids to D5 1/2 NS  Repeat bmp in am    VTE Pharmacologic Prophylaxis: VTE Score: 8 High Risk (Score >/= 5) - Pharmacological DVT Prophylaxis Contraindicated. Sequential Compression Devices Ordered.    Mobility:   Basic Mobility Inpatient Raw Score: 6  JH-HLM Goal: 2: Bed activities/Dependent transfer  JH-HLM Achieved: 1: Laying in bed  JH-HLM Goal NOT achieved. Continue with multidisciplinary rounding and encourage appropriate mobility to improve upon JH-HLM goals.    Patient Centered Rounds: I performed bedside rounds with nursing staff today.   Discussions with Specialists or Other Care Team Provider: ID, neurology, rn    Education and Discussions with Family / Patient: Updated  (wife) at bedside.    Current Length of Stay: 4 day(s)  Current Patient Status: Inpatient   Certification Statement: The patient will continue to require additional inpatient hospital stay due to bacteremia, BRIAN, AMS  Discharge Plan: Anticipate discharge in >72 hrs to discharge location to be determined pending rehab evaluations.    Code Status: Level 3 - DNAR and DNI    Subjective   Patient continues to appear lethargic, will not open eyes. RN reports earlier this morning he was more alert, smiling, mouthing some words. He did take some of his pills this morning.    Objective :  Temp:  [99.5 °F (37.5 °C)-101.3 °F (38.5 °C)] 100.5 °F (38.1 °C)  HR:  [73-87] 80  BP:  (107-133)/(47-84) 131/50  Resp:  [16-20] 18  SpO2:  [95 %-98 %] 96 %  O2 Device: Nasal cannula  Nasal Cannula O2 Flow Rate (L/min):  [2 L/min] 2 L/min    Body mass index is 22.18 kg/m².     Input and Output Summary (last 24 hours):     Intake/Output Summary (Last 24 hours) at 5/1/2025 1234  Last data filed at 5/1/2025 0900  Gross per 24 hour   Intake 350 ml   Output 650 ml   Net -300 ml       Physical Exam  Vitals and nursing note reviewed.   Constitutional:       General: He is not in acute distress.     Appearance: He is well-developed. He is ill-appearing.   Cardiovascular:      Rate and Rhythm: Normal rate and regular rhythm.   Pulmonary:      Effort: Pulmonary effort is normal. No respiratory distress.      Breath sounds: Decreased breath sounds present.   Abdominal:      General: Abdomen is flat. Bowel sounds are normal. There is no distension.      Palpations: Abdomen is soft.   Genitourinary:     Comments: Soto draining clear yellow urine  Musculoskeletal:         General: No swelling.   Skin:     General: Skin is warm and dry.      Capillary Refill: Capillary refill takes less than 2 seconds.   Neurological:      Mental Status: He is alert.      Comments: Lethargic, spontaneously moving extremities, responds to sternal rub   Psychiatric:         Mood and Affect: Mood normal.           Lines/Drains:  Lines/Drains/Airways       Active Status       Name Placement date Placement time Site Days    Urethral Catheter Straight-tip 16 Fr. 04/26/25  1558  Straight-tip  4                  Urinary Catheter:  Goal for removal: Voiding trial when ambulation improves             Lab Results: I have reviewed the following results:   Results from last 7 days   Lab Units 05/01/25  0545 04/30/25  0601   WBC Thousand/uL 9.21 8.68   HEMOGLOBIN g/dL 7.6* 6.6*   HEMATOCRIT % 23.8* 20.1*   PLATELETS Thousands/uL 107* 104*   SEGS PCT %  --  80*   LYMPHO PCT %  --  7*   MONO PCT %  --  12   EOS PCT %  --  0     Results from last 7  days   Lab Units 05/01/25  0545   SODIUM mmol/L 148*   POTASSIUM mmol/L 3.8   CHLORIDE mmol/L 119*   CO2 mmol/L 17*   BUN mg/dL 36*   CREATININE mg/dL 1.71*   ANION GAP mmol/L 12   CALCIUM mg/dL 8.3*   ALBUMIN g/dL 2.6*   TOTAL BILIRUBIN mg/dL 1.50*   ALK PHOS U/L 123*   ALT U/L 23   AST U/L 54*   GLUCOSE RANDOM mg/dL 101     Results from last 7 days   Lab Units 04/26/25  1419   INR  1.09     Results from last 7 days   Lab Units 05/01/25  1127 05/01/25  0724 04/30/25  2102 04/30/25  1702 04/30/25  1055 04/30/25  0712 04/29/25  2007 04/29/25  1606 04/29/25  1110 04/29/25  0732 04/28/25  2103 04/28/25  1619   POC GLUCOSE mg/dl 108 96 99 150* 147* 144* 156* 218* 167* 106 154* 106         Results from last 7 days   Lab Units 04/26/25  1419   LACTIC ACID mmol/L 0.9       Recent Cultures (last 7 days):   Results from last 7 days   Lab Units 04/28/25  1840   BLOOD CULTURE  Staphylococcus aureus*  No Growth at 48 hrs.   GRAM STAIN RESULT  Gram positive cocci in clusters*       Imaging Results Review: I reviewed radiology reports from this admission including: MRI brain.  Other Study Results Review: No additional pertinent studies reviewed.    Last 24 Hours Medication List:     Current Facility-Administered Medications:     acetaminophen (TYLENOL) tablet 650 mg, Q6H PRN    aspirin chewable tablet 81 mg, Daily    atorvastatin (LIPITOR) tablet 20 mg, Daily    bisacodyl (DULCOLAX) EC tablet 10 mg, Daily PRN    carvedilol (COREG) tablet 12.5 mg, BID    ceFAZolin (ANCEF) IVPB (premix in dextrose) 2,000 mg 50 mL, Q8H, Last Rate: 2,000 mg (05/01/25 0806)    Cholecalciferol (VITAMIN D3) tablet 2,000 Units, Daily    co-enzyme Q-10 capsule 100 mg, Daily    dextrose 5 % and sodium chloride 0.45 % infusion, Continuous, Last Rate: 75 mL/hr (05/01/25 1027)    ferrous sulfate tablet 325 mg, BID With Meals    insulin lispro (HumALOG/ADMELOG) 100 units/mL subcutaneous injection 1-5 Units, Q6H ENRIQUE **AND** Fingerstick Glucose (POCT), Q6H     [Held by provider] losartan (COZAAR) tablet 25 mg, Daily    metoclopramide (REGLAN) tablet 10 mg, TID AC    moisture barrier miconazole 2% cream (aka CHERELLE MOISTURE BARRIER ANTIFUNGAL CREAM), BID    multivitamin stress formula tablet 1 tablet, Daily    pantoprazole (PROTONIX) EC tablet 40 mg, Early Morning    tamsulosin (FLOMAX) capsule 0.4 mg, Daily With Dinner    Administrative Statements   Today, Patient Was Seen By: Deirdre Luo PA-C    **Please Note: This note may have been constructed using a voice recognition system.**

## 2025-05-01 NOTE — ASSESSMENT & PLAN NOTE
Patient initially with intermittent hypothermia and tachypnea, now with high fever on 4/30.  Suspect this is due to MSSA bacteremia as below.  At this time no other clear source of infection.  Chest x-ray shows clear lung fields, CT chest showing only pulmonary edema, no acute process on CT A/P.  Fortunately patient is currently hemodynamically stable.  -Antibiotic as below  -Follow-up blood cultures  -Monitor temperatures and hemodynamics closely  -Repeat CBC tomorrow to trend WBC

## 2025-05-01 NOTE — PLAN OF CARE
Problem: Potential for Falls  Goal: Patient will remain free of falls  Description: INTERVENTIONS:- Educate patient/family on patient safety including physical limitations- Instruct patient to call for assistance with activity - Consult OT/PT to assist with strengthening/mobility - Keep Call bell within reach- Keep bed low and locked with side rails adjusted as appropriate- Keep care items and personal belongings within reach- Initiate and maintain comfort rounds- Make Fall Risk Sign visible to staff- Offer Toileting every 2 Hours, in advance of need- Initiate/Maintain bed alarm- Obtain necessary fall risk management equipment: slipper socks- Apply yellow socks and bracelet for high fall risk patients- Consider moving patient to room near nurses station  INTERVENTIONS:- Educate patient/family on patient safety including physical limitations- Instruct patient to call for assistance with activity - Consult OT/PT to assist with strengthening/mobility - Keep Call bell within reach- Keep bed low and locked with side rails adjusted as appropriate- Keep care items and personal belongings within reach- Initiate and maintain comfort rounds- Make Fall Risk Sign visible to staff- Offer Toileting every 2 Hours, in advance of need- Initiate/Maintain bed alarm- Obtain necessary fall risk management equipment: slipper socks- Apply yellow socks and bracelet for high fall risk patients- Consider moving patient to room near nurses station  Outcome: Progressing     Problem: PAIN - ADULT  Goal: Verbalizes/displays adequate comfort level or baseline comfort level  Description: Interventions:- Encourage patient to monitor pain and request assistance- Assess pain using appropriate pain scale- Administer analgesics based on type and severity of pain and evaluate response- Implement non-pharmacological measures as appropriate and evaluate response- Consider cultural and social influences on pain and pain management- Notify  physician/advanced practitioner if interventions unsuccessful or patient reports new pain  Outcome: Progressing     Problem: INFECTION - ADULT  Goal: Absence or prevention of progression during hospitalization  Description: INTERVENTIONS:- Assess and monitor for signs and symptoms of infection- Monitor lab/diagnostic results- Monitor all insertion sites, i.e. indwelling lines, tubes, and drains- Monitor endotracheal if appropriate and nasal secretions for changes in amount and color- [place] appropriate cooling/warming therapies per order- Administer medications as ordered- Instruct and encourage patient and family to use good hand hygiene technique- Identify and instruct in appropriate isolation precautions for identified infection/condition  Outcome: Progressing  Goal: Absence of fever/infection during neutropenic period  Description: INTERVENTIONS:- Monitor WBC  Outcome: Progressing     Problem: SAFETY ADULT  Goal: Patient will remain free of falls  Description: INTERVENTIONS:- Educate patient/family on patient safety including physical limitations- Instruct patient to call for assistance with activity - Consult OT/PT to assist with strengthening/mobility - Keep Call bell within reach- Keep bed low and locked with side rails adjusted as appropriate- Keep care items and personal belongings within reach- Initiate and maintain comfort rounds- Make Fall Risk Sign visible to staff- Offer Toileting every 2 Hours, in advance of need- Initiate/Maintain bed alarm- Obtain necessary fall risk management equipment: slipper socks- Apply yellow socks and bracelet for high fall risk patients- Consider moving patient to room near nurses station  INTERVENTIONS:- Educate patient/family on patient safety including physical limitations- Instruct patient to call for assistance with activity - Consult OT/PT to assist with strengthening/mobility - Keep Call bell within reach- Keep bed low and locked with side rails adjusted as  appropriate- Keep care items and personal belongings within reach- Initiate and maintain comfort rounds- Make Fall Risk Sign visible to staff- Offer Toileting every 2 Hours, in advance of need- Initiate/Maintain bed alarm- Obtain necessary fall risk management equipment: slipper socks- Apply yellow socks and bracelet for high fall risk patients- Consider moving patient to room near nurses station  Outcome: Progressing  Goal: Maintain or return to baseline ADL function  Description: INTERVENTIONS:-  Assess patient's ability to carry out ADLs; assess patient's baseline for ADL function and identify physical deficits which impact ability to perform ADLs (bathing, care of mouth/teeth, toileting, grooming, dressing, etc.)- Assess/evaluate cause of self-care deficits - Assess range of motion- Assess patient's mobility; develop plan if impaired- Assess patient's need for assistive devices and provide as appropriate- Encourage maximum independence but intervene and supervise when necessary- Involve family in performance of ADLs- Assess for home care needs following discharge - Consider OT consult to assist with ADL evaluation and planning for discharge- Provide patient education as appropriate  Outcome: Progressing  Goal: Maintains/Returns to pre admission functional level  Description: INTERVENTIONS:- Perform AM-PAC 6 Click Basic Mobility/ Daily Activity assessment daily.- Set and communicate daily mobility goal to care team and patient/family/caregiver. - Collaborate with rehabilitation services on mobility goals if consulted- Perform Range of Motion 3 times a day.- Reposition patient every 2 hours.- Dangle patient 3 times a day- Stand patient 3 times a day- Ambulate patient 3 times a day- Out of bed to chair 3 times a day - Out of bed for meals 3 times a day- Out of bed for toileting- Record patient progress and toleration of activity level   Outcome: Progressing     Problem: Knowledge Deficit  Goal:  Patient/family/caregiver demonstrates understanding of disease process, treatment plan, medications, and discharge instructions  Description: Complete learning assessment and assess knowledge base.Interventions:- Provide teaching at level of understanding- Provide teaching via preferred learning methods  Outcome: Progressing     Problem: Nutrition/Hydration-ADULT  Goal: Nutrient/Hydration intake appropriate for improving, restoring or maintaining nutritional needs  Description: Monitor and assess patient's nutrition/hydration status for malnutrition. Collaborate with interdisciplinary team and initiate plan and interventions as ordered.  Monitor patient's weight and dietary intake as ordered or per policy. Utilize nutrition screening tool and intervene as necessary. Determine patient's food preferences and provide high-protein, high-caloric foods as appropriate. INTERVENTIONS:- Monitor oral intake, urinary output, labs, and treatment plans- Assess nutrition and hydration status and recommend course of action- Evaluate amount of meals eaten- Assist patient with eating if necessary - Allow adequate time for meals- Recommend/ encourage appropriate diets, oral nutritional supplements, and vitamin/mineral supplements- Order, calculate, and assess calorie counts as needed- Recommend, monitor, and adjust tube feedings and TPN/PPN based on assessed needs- Assess need for intravenous fluids- Provide specific nutrition/hydration education as appropriate- Include patient/family/caregiver in decisions related to nutrition  Outcome: Progressing     Problem: Prexisting or High Potential for Compromised Skin Integrity  Goal: Skin integrity is maintained or improved  Description: INTERVENTIONS:- Identify patients at risk for skin breakdown- Assess and monitor skin integrity- Assess and monitor nutrition and hydration status- Monitor labs - Assess for incontinence - Turn and reposition patient- Assist with mobility/ambulation-  Relieve pressure over bony prominences- Avoid friction and shearing- Provide appropriate hygiene as needed including keeping skin clean and dry- Evaluate need for skin moisturizer/barrier cream- Collaborate with interdisciplinary team - Patient/family teaching- Consider wound care consult   Outcome: Progressing

## 2025-05-01 NOTE — ASSESSMENT & PLAN NOTE
Baseline creatinine around 0.8-1.2  Increased to 1.68 on 4/30  Likely in setting of poor oral intake/sepsis  Continue to hold lasix  Patient with meza catheter, monitor Is/Os  Monitor with IVF  Follow up CT a/p  Avoid hypotension and nephrotoxic agents  Daily BMP

## 2025-05-01 NOTE — ASSESSMENT & PLAN NOTE
1 of 2 blood cultures positive from 4/28. No cultures were sent on admission for comparison. Patient does have systemic signs of infection including hypothermia and now fever thus suspect a true infection.  Source could be skin translocation as patient has multiple areas of open skin abrasions, though none appear acutely infected.  No other obvious source of infection at this time. CT C/A/P is without signs of metastatic infection. Brain MRI negative for emboli, though was non-contrast. Patient has no cardiac devices or endovascular hardware. TTE, adequate study, is without vegetation.  -Continue IV Cefazolin 2g every 8 hours  -Monitor serum Cr closely as antibiotic dose may need to be adjusted if worsens  -Repeat blood cultures X2 tomorrow AM to assess for bacteremia clearance  -At this time treatment duration unclear, will need to await further workup

## 2025-05-01 NOTE — PLAN OF CARE
Problem: OCCUPATIONAL THERAPY ADULT  Goal: Performs self-care activities at highest level of function for planned discharge setting.  See evaluation for individualized goals.  Description: Treatment Interventions: ADL retraining, Functional transfer training, UE strengthening/ROM, Endurance training, Patient/family training, Equipment evaluation/education, Cognitive reorientation, Activityengagement, Compensatory technique education          See flowsheet documentation for full assessment, interventions and recommendations.   Outcome: Not Progressing  Note: Limitation: Decreased ADL status, Decreased UE ROM, Decreased UE strength, Decreased Safe judgement during ADL, Decreased endurance, Decreased cognition, Decreased self-care trans, Decreased high-level ADLs  Prognosis: Fair  Assessment: Patient seen for OT treatment.  Patient lethargic throughout session.  Patient unable to particiapte.  Patient will benefit from continued OT services to maximize functional performance with ADLS.     Rehab Resource Intensity Level, OT: II (Moderate Resource Intensity)

## 2025-05-01 NOTE — PLAN OF CARE
Problem: Potential for Falls  Goal: Patient will remain free of falls  Description: INTERVENTIONS:- Educate patient/family on patient safety including physical limitations- Instruct patient to call for assistance with activity - Consult OT/PT to assist with strengthening/mobility - Keep Call bell within reach- Keep bed low and locked with side rails adjusted as appropriate- Keep care items and personal belongings within reach- Initiate and maintain comfort rounds- Make Fall Risk Sign visible to staff- Offer Toileting every 2 Hours, in advance of need- Initiate/Maintain bed alarm- Obtain necessary fall risk management equipment: slipper socks- Apply yellow socks and bracelet for high fall risk patients- Consider moving patient to room near nurses station  INTERVENTIONS:- Educate patient/family on patient safety including physical limitations- Instruct patient to call for assistance with activity - Consult OT/PT to assist with strengthening/mobility - Keep Call bell within reach- Keep bed low and locked with side rails adjusted as appropriate- Keep care items and personal belongings within reach- Initiate and maintain comfort rounds- Make Fall Risk Sign visible to staff- Offer Toileting every 2 Hours, in advance of need- Initiate/Maintain bed alarm- Obtain necessary fall risk management equipment: slipper socks- Apply yellow socks and bracelet for high fall risk patients- Consider moving patient to room near nurses station  Outcome: Progressing     Problem: PAIN - ADULT  Goal: Verbalizes/displays adequate comfort level or baseline comfort level  Description: Interventions:- Encourage patient to monitor pain and request assistance- Assess pain using appropriate pain scale- Administer analgesics based on type and severity of pain and evaluate response- Implement non-pharmacological measures as appropriate and evaluate response- Consider cultural and social influences on pain and pain management- Notify  physician/advanced practitioner if interventions unsuccessful or patient reports new pain  Outcome: Progressing     Problem: INFECTION - ADULT  Goal: Absence or prevention of progression during hospitalization  Description: INTERVENTIONS:- Assess and monitor for signs and symptoms of infection- Monitor lab/diagnostic results- Monitor all insertion sites, i.e. indwelling lines, tubes, and drains- Monitor endotracheal if appropriate and nasal secretions for changes in amount and color- [place] appropriate cooling/warming therapies per order- Administer medications as ordered- Instruct and encourage patient and family to use good hand hygiene technique- Identify and instruct in appropriate isolation precautions for identified infection/condition  Outcome: Progressing  Goal: Absence of fever/infection during neutropenic period  Description: INTERVENTIONS:- Monitor WBC  Outcome: Progressing     Problem: SAFETY ADULT  Goal: Patient will remain free of falls  Description: INTERVENTIONS:- Educate patient/family on patient safety including physical limitations- Instruct patient to call for assistance with activity - Consult OT/PT to assist with strengthening/mobility - Keep Call bell within reach- Keep bed low and locked with side rails adjusted as appropriate- Keep care items and personal belongings within reach- Initiate and maintain comfort rounds- Make Fall Risk Sign visible to staff- Offer Toileting every 2 Hours, in advance of need- Initiate/Maintain bed alarm- Obtain necessary fall risk management equipment: slipper socks- Apply yellow socks and bracelet for high fall risk patients- Consider moving patient to room near nurses station  INTERVENTIONS:- Educate patient/family on patient safety including physical limitations- Instruct patient to call for assistance with activity - Consult OT/PT to assist with strengthening/mobility - Keep Call bell within reach- Keep bed low and locked with side rails adjusted as  appropriate- Keep care items and personal belongings within reach- Initiate and maintain comfort rounds- Make Fall Risk Sign visible to staff- Offer Toileting every 2 Hours, in advance of need- Initiate/Maintain bed alarm- Obtain necessary fall risk management equipment: slipper socks- Apply yellow socks and bracelet for high fall risk patients- Consider moving patient to room near nurses station  Outcome: Progressing  Goal: Maintain or return to baseline ADL function  Description: INTERVENTIONS:-  Assess patient's ability to carry out ADLs; assess patient's baseline for ADL function and identify physical deficits which impact ability to perform ADLs (bathing, care of mouth/teeth, toileting, grooming, dressing, etc.)- Assess/evaluate cause of self-care deficits - Assess range of motion- Assess patient's mobility; develop plan if impaired- Assess patient's need for assistive devices and provide as appropriate- Encourage maximum independence but intervene and supervise when necessary- Involve family in performance of ADLs- Assess for home care needs following discharge - Consider OT consult to assist with ADL evaluation and planning for discharge- Provide patient education as appropriate  Outcome: Progressing  Goal: Maintains/Returns to pre admission functional level  Description: INTERVENTIONS:- Perform AM-PAC 6 Click Basic Mobility/ Daily Activity assessment daily.- Set and communicate daily mobility goal to care team and patient/family/caregiver. - Collaborate with rehabilitation services on mobility goals if consulted- Perform Range of Motion 3 times a day.- Reposition patient every 2 hours.- Dangle patient 3 times a day- Stand patient 3 times a day- Ambulate patient 3 times a day- Out of bed to chair 3 times a day - Out of bed for meals 3 times a day- Out of bed for toileting- Record patient progress and toleration of activity level   Outcome: Progressing     Problem: DISCHARGE PLANNING  Goal: Discharge to home  or other facility with appropriate resources  Description: INTERVENTIONS:- Identify barriers to discharge w/patient and caregiver- Arrange for needed discharge resources and transportation as appropriate- Identify discharge learning needs (meds, wound care, etc.)- Arrange for interpretive services to assist at discharge as needed- Refer to Case Management Department for coordinating discharge planning if the patient needs post-hospital services based on physician/advanced practitioner order or complex needs related to functional status, cognitive ability, or social support system  Outcome: Progressing     Problem: Knowledge Deficit  Goal: Patient/family/caregiver demonstrates understanding of disease process, treatment plan, medications, and discharge instructions  Description: Complete learning assessment and assess knowledge base.Interventions:- Provide teaching at level of understanding- Provide teaching via preferred learning methods  Outcome: Progressing     Problem: Nutrition/Hydration-ADULT  Goal: Nutrient/Hydration intake appropriate for improving, restoring or maintaining nutritional needs  Description: Monitor and assess patient's nutrition/hydration status for malnutrition. Collaborate with interdisciplinary team and initiate plan and interventions as ordered.  Monitor patient's weight and dietary intake as ordered or per policy. Utilize nutrition screening tool and intervene as necessary. Determine patient's food preferences and provide high-protein, high-caloric foods as appropriate. INTERVENTIONS:- Monitor oral intake, urinary output, labs, and treatment plans- Assess nutrition and hydration status and recommend course of action- Evaluate amount of meals eaten- Assist patient with eating if necessary - Allow adequate time for meals- Recommend/ encourage appropriate diets, oral nutritional supplements, and vitamin/mineral supplements- Order, calculate, and assess calorie counts as needed- Recommend,  monitor, and adjust tube feedings and TPN/PPN based on assessed needs- Assess need for intravenous fluids- Provide specific nutrition/hydration education as appropriate- Include patient/family/caregiver in decisions related to nutrition  Outcome: Progressing     Problem: Prexisting or High Potential for Compromised Skin Integrity  Goal: Skin integrity is maintained or improved  Description: INTERVENTIONS:- Identify patients at risk for skin breakdown- Assess and monitor skin integrity- Assess and monitor nutrition and hydration status- Monitor labs - Assess for incontinence - Turn and reposition patient- Assist with mobility/ambulation- Relieve pressure over bony prominences- Avoid friction and shearing- Provide appropriate hygiene as needed including keeping skin clean and dry- Evaluate need for skin moisturizer/barrier cream- Collaborate with interdisciplinary team - Patient/family teaching- Consider wound care consult   Outcome: Progressing

## 2025-05-01 NOTE — ASSESSMENT & PLAN NOTE
Mild hypernatremia 148, in setting of poor oral intake  Switch fluids to D5 1/2 NS  Repeat bmp in am

## 2025-05-02 ENCOUNTER — PATIENT OUTREACH (OUTPATIENT)
Dept: CASE MANAGEMENT | Facility: OTHER | Age: 80
End: 2025-05-02

## 2025-05-02 PROBLEM — A41.9 SEPSIS (HCC): Status: RESOLVED | Noted: 2025-03-30 | Resolved: 2025-05-02

## 2025-05-02 LAB
ALBUMIN SERPL BCG-MCNC: 2.8 G/DL (ref 3.5–5)
ALP SERPL-CCNC: 125 U/L (ref 34–104)
ALT SERPL W P-5'-P-CCNC: 13 U/L (ref 7–52)
ANION GAP SERPL CALCULATED.3IONS-SCNC: 9 MMOL/L (ref 4–13)
AST SERPL W P-5'-P-CCNC: 48 U/L (ref 13–39)
BILIRUB SERPL-MCNC: 0.99 MG/DL (ref 0.2–1)
BUN SERPL-MCNC: 39 MG/DL (ref 5–25)
CALCIUM ALBUM COR SERPL-MCNC: 9.3 MG/DL (ref 8.3–10.1)
CALCIUM SERPL-MCNC: 8.3 MG/DL (ref 8.4–10.2)
CHLORIDE SERPL-SCNC: 118 MMOL/L (ref 96–108)
CO2 SERPL-SCNC: 16 MMOL/L (ref 21–32)
CREAT SERPL-MCNC: 1.5 MG/DL (ref 0.6–1.3)
ERYTHROCYTE [DISTWIDTH] IN BLOOD BY AUTOMATED COUNT: 17.9 % (ref 11.6–15.1)
GFR SERPL CREATININE-BSD FRML MDRD: 43 ML/MIN/1.73SQ M
GLUCOSE SERPL-MCNC: 162 MG/DL (ref 65–140)
GLUCOSE SERPL-MCNC: 169 MG/DL (ref 65–140)
GLUCOSE SERPL-MCNC: 174 MG/DL (ref 65–140)
GLUCOSE SERPL-MCNC: 241 MG/DL (ref 65–140)
GLUCOSE SERPL-MCNC: 279 MG/DL (ref 65–140)
HCT VFR BLD AUTO: 25.3 % (ref 36.5–49.3)
HGB BLD-MCNC: 7.9 G/DL (ref 12–17)
MCH RBC QN AUTO: 29.5 PG (ref 26.8–34.3)
MCHC RBC AUTO-ENTMCNC: 31.2 G/DL (ref 31.4–37.4)
MCV RBC AUTO: 94 FL (ref 82–98)
PLATELET # BLD AUTO: 126 THOUSANDS/UL (ref 149–390)
PMV BLD AUTO: 10.5 FL (ref 8.9–12.7)
POTASSIUM SERPL-SCNC: 3.4 MMOL/L (ref 3.5–5.3)
PROT SERPL-MCNC: 5.4 G/DL (ref 6.4–8.4)
RBC # BLD AUTO: 2.68 MILLION/UL (ref 3.88–5.62)
SODIUM SERPL-SCNC: 143 MMOL/L (ref 135–147)
WBC # BLD AUTO: 9.28 THOUSAND/UL (ref 4.31–10.16)

## 2025-05-02 PROCEDURE — G0545 PR INHERENT VISIT TO INPT: HCPCS | Performed by: INTERNAL MEDICINE

## 2025-05-02 PROCEDURE — 97530 THERAPEUTIC ACTIVITIES: CPT

## 2025-05-02 PROCEDURE — 97110 THERAPEUTIC EXERCISES: CPT

## 2025-05-02 PROCEDURE — 99232 SBSQ HOSP IP/OBS MODERATE 35: CPT | Performed by: INTERNAL MEDICINE

## 2025-05-02 PROCEDURE — 85027 COMPLETE CBC AUTOMATED: CPT

## 2025-05-02 PROCEDURE — 87040 BLOOD CULTURE FOR BACTERIA: CPT | Performed by: INTERNAL MEDICINE

## 2025-05-02 PROCEDURE — NC001 PR NO CHARGE: Performed by: INTERNAL MEDICINE

## 2025-05-02 PROCEDURE — 82948 REAGENT STRIP/BLOOD GLUCOSE: CPT

## 2025-05-02 PROCEDURE — 80053 COMPREHEN METABOLIC PANEL: CPT

## 2025-05-02 RX ORDER — DEXTROSE, SODIUM CHLORIDE, SODIUM LACTATE, POTASSIUM CHLORIDE, AND CALCIUM CHLORIDE 5; .6; .31; .03; .02 G/100ML; G/100ML; G/100ML; G/100ML; G/100ML
50 INJECTION, SOLUTION INTRAVENOUS CONTINUOUS
Status: DISCONTINUED | OUTPATIENT
Start: 2025-05-02 | End: 2025-05-04

## 2025-05-02 RX ORDER — SODIUM BICARBONATE 650 MG/1
650 TABLET ORAL
Status: DISCONTINUED | OUTPATIENT
Start: 2025-05-02 | End: 2025-05-07 | Stop reason: HOSPADM

## 2025-05-02 RX ORDER — POTASSIUM CHLORIDE 20MEQ/15ML
40 LIQUID (ML) ORAL ONCE
Status: COMPLETED | OUTPATIENT
Start: 2025-05-02 | End: 2025-05-02

## 2025-05-02 RX ADMIN — MICONAZOLE NITRATE: 20 CREAM TOPICAL at 17:03

## 2025-05-02 RX ADMIN — INSULIN LISPRO 3 UNITS: 100 INJECTION, SOLUTION INTRAVENOUS; SUBCUTANEOUS at 12:15

## 2025-05-02 RX ADMIN — MICONAZOLE NITRATE: 20 CREAM TOPICAL at 08:18

## 2025-05-02 RX ADMIN — CEFAZOLIN SODIUM 2000 MG: 2 SOLUTION INTRAVENOUS at 08:18

## 2025-05-02 RX ADMIN — Medication 1 TABLET: at 08:12

## 2025-05-02 RX ADMIN — METOCLOPRAMIDE 10 MG: 10 TABLET ORAL at 16:49

## 2025-05-02 RX ADMIN — DEXTROSE, SODIUM CHLORIDE, SODIUM LACTATE, POTASSIUM CHLORIDE, AND CALCIUM CHLORIDE 50 ML/HR: 5; .6; .31; .03; .02 INJECTION, SOLUTION INTRAVENOUS at 11:45

## 2025-05-02 RX ADMIN — INSULIN LISPRO 1 UNITS: 100 INJECTION, SOLUTION INTRAVENOUS; SUBCUTANEOUS at 05:43

## 2025-05-02 RX ADMIN — ASPIRIN 81 MG: 81 TABLET, CHEWABLE ORAL at 08:13

## 2025-05-02 RX ADMIN — SODIUM BICARBONATE 650 MG TABLET 650 MG: at 11:46

## 2025-05-02 RX ADMIN — TAMSULOSIN HYDROCHLORIDE 0.4 MG: 0.4 CAPSULE ORAL at 16:49

## 2025-05-02 RX ADMIN — CARVEDILOL 12.5 MG: 12.5 TABLET, FILM COATED ORAL at 17:02

## 2025-05-02 RX ADMIN — POTASSIUM CHLORIDE 40 MEQ: 1.5 SOLUTION ORAL at 08:13

## 2025-05-02 RX ADMIN — METOCLOPRAMIDE 10 MG: 10 TABLET ORAL at 08:13

## 2025-05-02 RX ADMIN — Medication 100 MG: at 08:12

## 2025-05-02 RX ADMIN — INSULIN LISPRO 2 UNITS: 100 INJECTION, SOLUTION INTRAVENOUS; SUBCUTANEOUS at 17:03

## 2025-05-02 RX ADMIN — CEFAZOLIN SODIUM 2000 MG: 2 SOLUTION INTRAVENOUS at 00:15

## 2025-05-02 RX ADMIN — INSULIN LISPRO 1 UNITS: 100 INJECTION, SOLUTION INTRAVENOUS; SUBCUTANEOUS at 00:09

## 2025-05-02 RX ADMIN — SODIUM BICARBONATE 650 MG TABLET 650 MG: at 16:49

## 2025-05-02 RX ADMIN — CEFAZOLIN SODIUM 2000 MG: 2 SOLUTION INTRAVENOUS at 16:49

## 2025-05-02 RX ADMIN — CHOLECALCIFEROL TAB 25 MCG (1000 UNIT) 2000 UNITS: 25 TAB at 08:12

## 2025-05-02 RX ADMIN — DEXTROSE AND SODIUM CHLORIDE 50 ML/HR: 5; .45 INJECTION, SOLUTION INTRAVENOUS at 05:42

## 2025-05-02 RX ADMIN — CARVEDILOL 12.5 MG: 12.5 TABLET, FILM COATED ORAL at 08:18

## 2025-05-02 RX ADMIN — METOCLOPRAMIDE 10 MG: 10 TABLET ORAL at 11:46

## 2025-05-02 RX ADMIN — ATORVASTATIN CALCIUM 20 MG: 20 TABLET, FILM COATED ORAL at 08:12

## 2025-05-02 NOTE — ASSESSMENT & PLAN NOTE
This was patient's presenting symptom.  He has had multiple admissions for the same issue over the last month and concern for possible underlying dementia versus delirium.  Now likely worsened by acute infection as above. No obvious nuchal rigidity on exam. Brain MRI is without evidence of stroke or emboli, though was non-contrast.  He has had marked improvement in his mental status as of 5/2.  -Treatment of bacteremia as above  -Neurology recs appreciated  -Monitor mental status closely

## 2025-05-02 NOTE — PROGRESS NOTES
Progress Note - Infectious Disease   Name: Pernell Covarrubias 79 y.o. male I MRN: [id number]  Unit/Bed#: [address] [place] [address] I Date of Admission: 4/26/2025   Date of Service: 5/2/2025 I Hospital Day: 5     Assessment & Plan  Sepsis  Patient initially with intermittent hypothermia and tachypnea, now with high fever on 4/30.  Suspect this is due to MSSA bacteremia as below.  At this time no other clear source of infection.  Chest x-ray shows clear lung fields, CT chest showing only pulmonary edema, no acute process on CT A/P.  Fortunately patient is currently hemodynamically stable.  -Antibiotic as below  -Follow-up blood cultures  -Monitor temperatures and hemodynamics closely  -Repeat CBC tomorrow to trend WBC  MSSA bacteremia  1 of 2 blood cultures positive from 4/28. No cultures were sent on admission for comparison. Patient does have systemic signs of infection including hypothermia and now fever thus suspect a true infection.  Source could be skin translocation as patient has multiple areas of open skin abrasions, though none appear acutely infected.  No other obvious source of infection at this time. CT C/A/P is without signs of metastatic infection. Brain MRI negative for emboli, though was non-contrast. Patient has no cardiac devices or endovascular hardware. TTE, adequate study, is without vegetation.  -Continue IV Cefazolin 2g every 8 hours  -Monitor serum Cr closely as antibiotic dose may need to be adjusted if worsens  -Follow up repeat blood cultures to assess for bacteremia clearance  - If repeat blood cultures are positive, patient may require ANKUR  Altered mental status  This was patient's presenting symptom.  He has had multiple admissions for the same issue over the last month and concern for possible underlying dementia versus delirium.  Now likely worsened by acute infection as above. No obvious nuchal rigidity on exam. Brain MRI is without evidence of stroke or emboli, though was non-contrast.  He has  had marked improvement in his mental status as of 5/2.  -Treatment of bacteremia as above  -Neurology recs appreciated  -Monitor mental status closely  Hypothermia  May be related to bacteremia, though patient has had noted hypothermia during previous recent admissions as well. Temperatures now improved.  -Antibiotic plan as above  -Monitor temperatures closely  BRIAN on CKD  -Dose adjust antibiotic  -Monitor serum Cr  Type 2 diabetes mellitus with diabetic neuropathy (HCC)  Lab Results   Component Value Date    HGBA1C 6.9 (H) 02/11/2025   Risk factor for infection.  -Tight glycemic control per primary team  Thrombocytopenia (HCC)  Appears chronic per chart review.  - Continue to monitor CBC    I have discussed the above management plan in detail with the primary service.  They agree with plan to continue IV cefazolin, follow-up repeat blood cultures.    Antibiotics:  Cefazolin: 4    Subjective   Patient is much more awake and alert today.  He is able to answer questions much more easily.  He denies any current neck or back pain.  Denies fevers, chills, abdominal pain.  He reports he is always hungry.    Objective :  Temp:  [98 °F (36.7 °C)-99.3 °F (37.4 °C)] 98.4 °F (36.9 °C)  HR:  [51-59] 55  BP: (142-145)/(56-57) 143/57  Resp:  [20-22] 20  SpO2:  [98 %-100 %] 100 %    General:  No acute distress  Psychiatric:  Awake and alert  Pulmonary:  Normal respiratory excursion without accessory muscle use  Abdomen:  Soft, nontender  Extremities:  No edema  Skin: Scattered abrasions on arms and legs      Lab Results: I have reviewed the following results:  Results from last 7 days   Lab Units 05/02/25  0541 05/01/25  0545 04/30/25  0601   WBC Thousand/uL 9.28 9.21 8.68   HEMOGLOBIN g/dL 7.9* 7.6* 6.6*   PLATELETS Thousands/uL 126* 107* 104*     Results from last 7 days   Lab Units 05/02/25  0541 05/01/25  0545 04/30/25  0601 04/27/25  0455 04/26/25  1419   SODIUM mmol/L 143 148* 143   < > 139   POTASSIUM mmol/L 3.4* 3.8 3.8    < > 4.7   CHLORIDE mmol/L 118* 119* 116*   < > 112*   CO2 mmol/L 16* 17* 17*   < > 20*   BUN mg/dL 39* 36* 36*   < > 49*   CREATININE mg/dL 1.50* 1.71* 1.68*   < > 1.12   EGFR ml/min/1.73sq m 43 36 37   < > 61   CALCIUM mg/dL 8.3* 8.3* 8.1*   < > 8.3*   AST U/L 48* 54*  --   --  37   ALT U/L 13 23  --   --  48   ALK PHOS U/L 125* 123*  --   --  83   ALBUMIN g/dL 2.8* 2.6*  --   --  2.9*    < > = values in this interval not displayed.     Results from last 7 days   Lab Units 04/28/25  1840 04/27/25  1121   BLOOD CULTURE  No Growth at 72 hrs.  Staphylococcus aureus*  --    GRAM STAIN RESULT  Gram positive cocci in clusters*  --    MRSA CULTURE ONLY   --  No Methicillin Resistant Staphlyococcus aureus (MRSA) isolated             Results from last 7 days   Lab Units 04/28/25  0448   FERRITIN ng/mL 323

## 2025-05-02 NOTE — ASSESSMENT & PLAN NOTE
Wt Readings from Last 3 Encounters:   04/30/25 72.1 kg (159 lb)   03/30/25 70.3 kg (154 lb 15.7 oz)   03/09/25 72.2 kg (159 lb 3.2 oz)     Echo (1/22/25): The left ventricular ejection fraction is 55 to 60% by visual estimation. Systolic function is normal. Wall motion is normal. Diastolic function is mildly abnormal, consistent with grade I (abnormal) relaxation.   Currently dry on exam  Holding home furosemide, monitor with IVF given decreased oral intake, sepsis, BRIAN  Daily weights, I&O

## 2025-05-02 NOTE — ASSESSMENT & PLAN NOTE
Evidenced by hypothermia and tachypnea now with high fever on 4/30 in setting of MSSA bacteremia  COVID/FLU negative. UA negative. Lactic acid negative. CXR without evidence of infection. Without leukocytosis.  Continue cefazolin as above  CT C/A/P without evidence of acute infectious process  Follow up repeat blood cultures

## 2025-05-02 NOTE — ASSESSMENT & PLAN NOTE
Baseline creatinine around 0.8-1.2  Peaked at 1.71 on 5/1/25  Likely in setting of poor oral intake/sepsis  Patient with meza catheter. CT wo hydronephrosis  Holding PTA lasix and losartan  Creatinine improving 1.50 today, continue gentle IVF  Monitor I&O  Daily BMP

## 2025-05-02 NOTE — PHYSICAL THERAPY NOTE
PT TREATMENT       05/02/25 0941   PT Last Visit   PT Visit Date 05/02/25   Note Type   Note Type Treatment   Pain Assessment   Pain Assessment Tool 0-10   Pain Score No Pain   Patient's Stated Pain Goal No pain   Multiple Pain Sites No   Restrictions/Precautions   Weight Bearing Precautions Per Order No   Other Precautions Bed Alarm;Chair Alarm;Fall Risk;Pain;Cognitive   General   Chart Reviewed Yes   Family/Caregiver Present No   Cognition   Arousal/Participation Cooperative   Orientation Level Oriented to person;Oriented to place;Disoriented to time;Disoriented to situation  (Pt more alert/oriented today ; able to report the year is 2025, knows he is in the hospital, requires increased time)   Following Commands Follows one step commands with increased time or repetition   Subjective   Subjective Pt agreeable to PT session this AM   Bed Mobility   Supine to Sit 2  Maximal assistance   Additional items Assist x 2;Verbal cues;Increased time required;HOB elevated;LE management   Sit to Supine Unable to assess   Additional Comments transferred to bedside chair   Transfers   Sit to Stand 2  Maximal assistance   Additional items Assist x 2;Verbal cues;Increased time required   Stand to Sit 2  Maximal assistance   Additional items Assist x 2;Verbal cues;Increased time required   Stand pivot 2  Maximal assistance   Additional items Assist x 2;Verbal cues;Increased time required   Ambulation/Elevation   Gait Assistance Not tested  (Pt presents with weakness/fatigue ; unable to take steps and stand pivot transfer performed to bedside chair)   Balance   Static Sitting Poor   Dynamic Sitting Poor -   Static Standing Poor -   Ambulatory Zero   Endurance Deficit   Endurance Deficit Yes   Activity Tolerance   Activity Tolerance Patient tolerated treatment well;Patient limited by fatigue   Nurse Made Aware yes RADHA Amaral   Exercises   Neuro re-ed Able to perform exercise with assist in sitting including ankle pumps, LAQ, seated  hip marches, hip add squeezed x 5-10 reps bilat   Assessment   Prognosis Fair   Problem List Decreased strength;Decreased range of motion;Decreased endurance;Impaired balance;Decreased mobility;Decreased coordination;Decreased cognition;Impaired judgement;Decreased safety awareness;Decreased skin integrity;Pain   Assessment Pt seen this AM for PT treatment. Pt with improved mentation/alertness today and making progress towards IP PT goals. Continues to require max A x 2 to perform supine <> sit transfer ; pt with mild improvement in sitting balance + able to tolerate sitting EOB for approx 3-4 minutes with Mod-Max A. Able to progress to perform bed > chair transfer  with Max A x 2. Exercise with assist performed once sitting in chair + repositioned for comfort with all needs within reach. Continue to recommend level 2 moderate resource intensity.  The patient's AM-PAC Basic Mobility Inpatient Short Form Raw Score is 8. A Raw score of less than or equal to 16 suggests the patient may benefit from discharge to post-acute rehabilitation services. Please also refer to the recommendation of the Physical Therapist for safe discharge planning.     Goals   Patient Goals unable to state due to cognition   Plan   Treatment/Interventions LE strengthening/ROM;Functional transfer training;ADL retraining;Therapeutic exercise;Endurance training;Bed mobility;Gait training;Spoke to nursing;OT   Progress Progressing toward goals   PT Frequency 3-5x/wk   Discharge Recommendation   Rehab Resource Intensity Level, PT II (Moderate Resource Intensity)   AM-PAC Basic Mobility Inpatient   Turning in Flat Bed Without Bedrails 2   Lying on Back to Sitting on Edge of Flat Bed Without Bedrails 2   Moving Bed to Chair 1   Standing Up From Chair Using Arms 1   Walk in Room 1   Climb 3-5 Stairs With Railing 1   Basic Mobility Inpatient Raw Score 8   Turning Head Towards Sound 2   Follow Simple Instructions 2   Low Function Basic Mobility Raw Score   12   Low Function Basic Mobility Standardized Score  18.33   [facility] Highest Level Of Mobility   -[facility] Goal 3: Sit at edge of bed   -[facility] Achieved 4: Move to chair/commode   End of Consult   Patient Position at End of Consult Bedside chair;Bed/Chair alarm activated;All needs within reach   Licensure   [place] License Number  Camelia Lackey [id number]

## 2025-05-02 NOTE — PLAN OF CARE
Problem: Potential for Falls  Goal: Patient will remain free of falls  Description: INTERVENTIONS:- Educate patient/family on patient safety including physical limitations- Instruct patient to call for assistance with activity - Consult OT/PT to assist with strengthening/mobility - Keep Call bell within reach- Keep bed low and locked with side rails adjusted as appropriate- Keep care items and personal belongings within reach- Initiate and maintain comfort rounds- Make Fall Risk Sign visible to staff- Offer Toileting every 2 Hours, in advance of need- Initiate/Maintain bed alarm- Obtain necessary fall risk management equipment: slipper socks- Apply yellow socks and bracelet for high fall risk patients- Consider moving patient to room near nurses station  INTERVENTIONS:- Educate patient/family on patient safety including physical limitations- Instruct patient to call for assistance with activity - Consult OT/PT to assist with strengthening/mobility - Keep Call bell within reach- Keep bed low and locked with side rails adjusted as appropriate- Keep care items and personal belongings within reach- Initiate and maintain comfort rounds- Make Fall Risk Sign visible to staff- Offer Toileting every 2 Hours, in advance of need- Initiate/Maintain bed alarm- Obtain necessary fall risk management equipment: slipper socks- Apply yellow socks and bracelet for high fall risk patients- Consider moving patient to room near nurses station  Outcome: Progressing     Problem: PAIN - ADULT  Goal: Verbalizes/displays adequate comfort level or baseline comfort level  Description: Interventions:- Encourage patient to monitor pain and request assistance- Assess pain using appropriate pain scale- Administer analgesics based on type and severity of pain and evaluate response- Implement non-pharmacological measures as appropriate and evaluate response- Consider cultural and social influences on pain and pain management- Notify  physician/advanced practitioner if interventions unsuccessful or patient reports new pain  Outcome: Progressing     Problem: INFECTION - ADULT  Goal: Absence or prevention of progression during hospitalization  Description: INTERVENTIONS:- Assess and monitor for signs and symptoms of infection- Monitor lab/diagnostic results- Monitor all insertion sites, i.e. indwelling lines, tubes, and drains- Monitor endotracheal if appropriate and nasal secretions for changes in amount and color- [place] appropriate cooling/warming therapies per order- Administer medications as ordered- Instruct and encourage patient and family to use good hand hygiene technique- Identify and instruct in appropriate isolation precautions for identified infection/condition  Outcome: Progressing  Goal: Absence of fever/infection during neutropenic period  Description: INTERVENTIONS:- Monitor WBC  Outcome: Progressing     Problem: SAFETY ADULT  Goal: Patient will remain free of falls  Description: INTERVENTIONS:- Educate patient/family on patient safety including physical limitations- Instruct patient to call for assistance with activity - Consult OT/PT to assist with strengthening/mobility - Keep Call bell within reach- Keep bed low and locked with side rails adjusted as appropriate- Keep care items and personal belongings within reach- Initiate and maintain comfort rounds- Make Fall Risk Sign visible to staff- Offer Toileting every 2 Hours, in advance of need- Initiate/Maintain bed alarm- Obtain necessary fall risk management equipment: slipper socks- Apply yellow socks and bracelet for high fall risk patients- Consider moving patient to room near nurses station  INTERVENTIONS:- Educate patient/family on patient safety including physical limitations- Instruct patient to call for assistance with activity - Consult OT/PT to assist with strengthening/mobility - Keep Call bell within reach- Keep bed low and locked with side rails adjusted as  appropriate- Keep care items and personal belongings within reach- Initiate and maintain comfort rounds- Make Fall Risk Sign visible to staff- Offer Toileting every 2 Hours, in advance of need- Initiate/Maintain bed alarm- Obtain necessary fall risk management equipment: slipper socks- Apply yellow socks and bracelet for high fall risk patients- Consider moving patient to room near nurses station  Outcome: Progressing  Goal: Maintain or return to baseline ADL function  Description: INTERVENTIONS:-  Assess patient's ability to carry out ADLs; assess patient's baseline for ADL function and identify physical deficits which impact ability to perform ADLs (bathing, care of mouth/teeth, toileting, grooming, dressing, etc.)- Assess/evaluate cause of self-care deficits - Assess range of motion- Assess patient's mobility; develop plan if impaired- Assess patient's need for assistive devices and provide as appropriate- Encourage maximum independence but intervene and supervise when necessary- Involve family in performance of ADLs- Assess for home care needs following discharge - Consider OT consult to assist with ADL evaluation and planning for discharge- Provide patient education as appropriate  Outcome: Progressing  Goal: Maintains/Returns to pre admission functional level  Description: INTERVENTIONS:- Perform AM-PAC 6 Click Basic Mobility/ Daily Activity assessment daily.- Set and communicate daily mobility goal to care team and patient/family/caregiver. - Collaborate with rehabilitation services on mobility goals if consulted- Perform Range of Motion 3 times a day.- Reposition patient every 2 hours.- Dangle patient 3 times a day- Stand patient 3 times a day- Ambulate patient 3 times a day- Out of bed to chair 3 times a day - Out of bed for meals 3 times a day- Out of bed for toileting- Record patient progress and toleration of activity level   Outcome: Progressing     Problem: DISCHARGE PLANNING  Goal: Discharge to home  or other facility with appropriate resources  Description: INTERVENTIONS:- Identify barriers to discharge w/patient and caregiver- Arrange for needed discharge resources and transportation as appropriate- Identify discharge learning needs (meds, wound care, etc.)- Arrange for interpretive services to assist at discharge as needed- Refer to Case Management Department for coordinating discharge planning if the patient needs post-hospital services based on physician/advanced practitioner order or complex needs related to functional status, cognitive ability, or social support system  Outcome: Progressing     Problem: Knowledge Deficit  Goal: Patient/family/caregiver demonstrates understanding of disease process, treatment plan, medications, and discharge instructions  Description: Complete learning assessment and assess knowledge base.Interventions:- Provide teaching at level of understanding- Provide teaching via preferred learning methods  Outcome: Progressing     Problem: Nutrition/Hydration-ADULT  Goal: Nutrient/Hydration intake appropriate for improving, restoring or maintaining nutritional needs  Description: Monitor and assess patient's nutrition/hydration status for malnutrition. Collaborate with interdisciplinary team and initiate plan and interventions as ordered.  Monitor patient's weight and dietary intake as ordered or per policy. Utilize nutrition screening tool and intervene as necessary. Determine patient's food preferences and provide high-protein, high-caloric foods as appropriate. INTERVENTIONS:- Monitor oral intake, urinary output, labs, and treatment plans- Assess nutrition and hydration status and recommend course of action- Evaluate amount of meals eaten- Assist patient with eating if necessary - Allow adequate time for meals- Recommend/ encourage appropriate diets, oral nutritional supplements, and vitamin/mineral supplements- Order, calculate, and assess calorie counts as needed- Recommend,  monitor, and adjust tube feedings and TPN/PPN based on assessed needs- Assess need for intravenous fluids- Provide specific nutrition/hydration education as appropriate- Include patient/family/caregiver in decisions related to nutrition  Outcome: Progressing     Problem: Prexisting or High Potential for Compromised Skin Integrity  Goal: Skin integrity is maintained or improved  Description: INTERVENTIONS:- Identify patients at risk for skin breakdown- Assess and monitor skin integrity- Assess and monitor nutrition and hydration status- Monitor labs - Assess for incontinence - Turn and reposition patient- Assist with mobility/ambulation- Relieve pressure over bony prominences- Avoid friction and shearing- Provide appropriate hygiene as needed including keeping skin clean and dry- Evaluate need for skin moisturizer/barrier cream- Collaborate with interdisciplinary team - Patient/family teaching- Consider wound care consult   Outcome: Progressing

## 2025-05-02 NOTE — OCCUPATIONAL THERAPY NOTE
.OT TREATMENT         05/02/25 0910   OT Last Visit   OT Visit Date 05/02/25   Note Type   Note Type Treatment   Pain Assessment   Pain Assessment Tool 0-10   Pain Score No Pain   Restrictions/Precautions   Other Precautions Chair Alarm;Bed Alarm;Cognitive;Fall Risk   ADL   Grooming Assistance 4  Minimal Assistance   Grooming Deficit Wash/dry face   Grooming Comments seated in chair with setup, mostly only wiped mouth and nose   UB Dressing Assistance 3  Moderate Assistance   UB Dressing Deficit Thread RUE;Thread LUE;Pull around back   UB Dressing Comments gown change   LB Dressing Assistance 1  Total Assistance   LB Dressing Deficit Don/doff R sock;Don/doff L sock   Bed Mobility   Supine to Sit 2  Maximal assistance   Additional items Assist x 2;Verbal cues   Transfers   Sit to Stand 2  Maximal assistance   Additional items Assist x 2;Verbal cues   Stand to Sit 2  Maximal assistance   Additional items Assist x 2;Verbal cues   Stand pivot 2  Maximal assistance   Additional items Assist x 2;Verbal cues  (bed to chair with hand hold assist)   ROM- Right Upper Extremities   RUE ROM Comment shoulder flexion AROM to 10 degrees, discomfort in shoulder with ROM, elbow/ AROM WFL   ROM - Left Upper Extremities    LUE ROM Comment shoulder flexion AROM to 10 degrees, discomfort in shoulder with ROM, elbow/ AROM WFL   Cognition   Overall Cognitive Status Impaired   Arousal/Participation Cooperative   Attention Attends with cues to redirect   Orientation Level Oriented to person;Oriented to place  (know year is 2025)   Following Commands Follows one step commands with increased time or repetition   Activity Tolerance   Activity Tolerance Patient limited by fatigue  (cognition)   Assessment   Assessment Patient seen for OT treatment.  Patient is more awake today and able to participate in OT session.  Patient requires max assist of 2 for transfers and bed mobility.  Patient will benefit from continued OT services to  maximize functional performance with ADLS. The patient's raw score on the AM-PAC Daily Activity Inpatient Short Form is 9. A raw score of less than 19 suggests the patient may benefit from discharge to post-acute rehabilitation services. Please refer to the recommendation of the Occupational Therapist for safe discharge planning.   Plan   Treatment Interventions ADL retraining;Functional transfer training;UE strengthening/ROM;Endurance training;Patient/family training;Equipment evaluation/education;Activityengagement;Compensatory technique education   OT Frequency 3-5x/wk   Discharge Recommendation   Rehab Resource Intensity Level, OT II (Moderate Resource Intensity)   AM-PAC Daily Activity Inpatient   Lower Body Dressing 1   Bathing 1   Toileting 1   Upper Body Dressing 2   Grooming 2   Eating 2   Daily Activity Raw Score 9   Turning Head Towards Sound 3   Follow Simple Instructions 3   Low Function Daily Activity Raw Score 15   Low Function Daily Activity Standardized Score  26.28   AM-[facility] Applied Cognition Inpatient   Following a Speech/Presentation 2   Understanding Ordinary Conversation 3   Taking Medications 1   Remembering Where Things Are Placed or Put Away 1   Remembering List of 4-5 Errands 1   Taking Care of Complicated Tasks 1   Applied Cognition Raw Score 9   Applied Cognition Standardized Score 22.48   Licensure   [place] License Number  Camelia Webb MS OTR/L [id number]

## 2025-05-02 NOTE — ASSESSMENT & PLAN NOTE
4/28/25: 1/2 blood cultures positive for MSSA  CT C/A/P without evidence of acute infectious process  Possible translocation in setting of chronic skin wounds, patient has no devices or hardware  No evidence of vegetation on ECHO report  Continue cefazolin 2g Q8H  5/2/25: Follow up repeat BC x2  ID consulted, recommendations are appreciated

## 2025-05-02 NOTE — ASSESSMENT & PLAN NOTE
1 of 2 blood cultures positive from 4/28. No cultures were sent on admission for comparison. Patient does have systemic signs of infection including hypothermia and now fever thus suspect a true infection.  Source could be skin translocation as patient has multiple areas of open skin abrasions, though none appear acutely infected.  No other obvious source of infection at this time. CT C/A/P is without signs of metastatic infection. Brain MRI negative for emboli, though was non-contrast. Patient has no cardiac devices or endovascular hardware. TTE, adequate study, is without vegetation.  -Continue IV Cefazolin 2g every 8 hours  -Monitor serum Cr closely as antibiotic dose may need to be adjusted if worsens  -Follow up repeat blood cultures to assess for bacteremia clearance  - If repeat blood cultures are positive, patient may require ANKUR

## 2025-05-02 NOTE — ASSESSMENT & PLAN NOTE
Mild hypernatremia 148, in setting of poor oral intake  Sodium improved 143 today  Continue gentle IVF  Daily BMP

## 2025-05-02 NOTE — PLAN OF CARE
Problem: PHYSICAL THERAPY ADULT  Goal: Performs mobility at highest level of function for planned discharge setting.  See evaluation for individualized goals.  Description: Treatment/Interventions: ADL retraining, Functional transfer training, LE strengthening/ROM, Therapeutic exercise, Endurance training, Cognitive reorientation, Patient/family training, Equipment eval/education, Bed mobility, Gait training, Compensatory technique education, Spoke to nursing, Spoke to case management        See flowsheet documentation for full assessment, interventions and recommendations.  Outcome: Progressing  Note: Prognosis: Fair  Problem List: Decreased strength, Decreased range of motion, Decreased endurance, Impaired balance, Decreased mobility, Decreased coordination, Decreased cognition, Impaired judgement, Decreased safety awareness, Decreased skin integrity, Pain  Assessment: Pt seen this AM for PT treatment. Pt with improved mentation/alertness today and making progress towards IP PT goals. Continues to require max A x 2 to perform supine <> sit transfer ; pt with mild improvement in sitting balance + able to tolerate sitting EOB for approx 3-4 minutes with Mod-Max A. Able to progress to perform bed > chair transfer  with Max A x 2. Exercise with assist performed once sitting in chair + repositioned for comfort with all needs within reach. Continue to recommend level 2 moderate resource intensity.        Rehab Resource Intensity Level, PT: II (Moderate Resource Intensity)    See flowsheet documentation for full assessment.

## 2025-05-02 NOTE — PLAN OF CARE
Problem: Potential for Falls  Goal: Patient will remain free of falls  Description: INTERVENTIONS:- Educate patient/family on patient safety including physical limitations- Instruct patient to call for assistance with activity - Consult OT/PT to assist with strengthening/mobility - Keep Call bell within reach- Keep bed low and locked with side rails adjusted as appropriate- Keep care items and personal belongings within reach- Initiate and maintain comfort rounds- Make Fall Risk Sign visible to staff- Offer Toileting every 2 Hours, in advance of need- Initiate/Maintain bed alarm- Obtain necessary fall risk management equipment: slipper socks- Apply yellow socks and bracelet for high fall risk patients- Consider moving patient to room near nurses station  INTERVENTIONS:- Educate patient/family on patient safety including physical limitations- Instruct patient to call for assistance with activity - Consult OT/PT to assist with strengthening/mobility - Keep Call bell within reach- Keep bed low and locked with side rails adjusted as appropriate- Keep care items and personal belongings within reach- Initiate and maintain comfort rounds- Make Fall Risk Sign visible to staff- Offer Toileting every 2 Hours, in advance of need- Initiate/Maintain bed alarm- Obtain necessary fall risk management equipment: slipper socks- Apply yellow socks and bracelet for high fall risk patients- Consider moving patient to room near nurses station  Outcome: Progressing     Problem: PAIN - ADULT  Goal: Verbalizes/displays adequate comfort level or baseline comfort level  Description: Interventions:- Encourage patient to monitor pain and request assistance- Assess pain using appropriate pain scale- Administer analgesics based on type and severity of pain and evaluate response- Implement non-pharmacological measures as appropriate and evaluate response- Consider cultural and social influences on pain and pain management- Notify  physician/advanced practitioner if interventions unsuccessful or patient reports new pain  Outcome: Progressing     Problem: INFECTION - ADULT  Goal: Absence or prevention of progression during hospitalization  Description: INTERVENTIONS:- Assess and monitor for signs and symptoms of infection- Monitor lab/diagnostic results- Monitor all insertion sites, i.e. indwelling lines, tubes, and drains- Monitor endotracheal if appropriate and nasal secretions for changes in amount and color- [place] appropriate cooling/warming therapies per order- Administer medications as ordered- Instruct and encourage patient and family to use good hand hygiene technique- Identify and instruct in appropriate isolation precautions for identified infection/condition  Outcome: Progressing  Goal: Absence of fever/infection during neutropenic period  Description: INTERVENTIONS:- Monitor WBC  Outcome: Progressing     Problem: SAFETY ADULT  Goal: Patient will remain free of falls  Description: INTERVENTIONS:- Educate patient/family on patient safety including physical limitations- Instruct patient to call for assistance with activity - Consult OT/PT to assist with strengthening/mobility - Keep Call bell within reach- Keep bed low and locked with side rails adjusted as appropriate- Keep care items and personal belongings within reach- Initiate and maintain comfort rounds- Make Fall Risk Sign visible to staff- Offer Toileting every 2 Hours, in advance of need- Initiate/Maintain bed alarm- Obtain necessary fall risk management equipment: slipper socks- Apply yellow socks and bracelet for high fall risk patients- Consider moving patient to room near nurses station  INTERVENTIONS:- Educate patient/family on patient safety including physical limitations- Instruct patient to call for assistance with activity - Consult OT/PT to assist with strengthening/mobility - Keep Call bell within reach- Keep bed low and locked with side rails adjusted as  appropriate- Keep care items and personal belongings within reach- Initiate and maintain comfort rounds- Make Fall Risk Sign visible to staff- Offer Toileting every 2 Hours, in advance of need- Initiate/Maintain bed alarm- Obtain necessary fall risk management equipment: slipper socks- Apply yellow socks and bracelet for high fall risk patients- Consider moving patient to room near nurses station  Outcome: Progressing  Goal: Maintain or return to baseline ADL function  Description: INTERVENTIONS:-  Assess patient's ability to carry out ADLs; assess patient's baseline for ADL function and identify physical deficits which impact ability to perform ADLs (bathing, care of mouth/teeth, toileting, grooming, dressing, etc.)- Assess/evaluate cause of self-care deficits - Assess range of motion- Assess patient's mobility; develop plan if impaired- Assess patient's need for assistive devices and provide as appropriate- Encourage maximum independence but intervene and supervise when necessary- Involve family in performance of ADLs- Assess for home care needs following discharge - Consider OT consult to assist with ADL evaluation and planning for discharge- Provide patient education as appropriate  Outcome: Progressing  Goal: Maintains/Returns to pre admission functional level  Description: INTERVENTIONS:- Perform AM-PAC 6 Click Basic Mobility/ Daily Activity assessment daily.- Set and communicate daily mobility goal to care team and patient/family/caregiver. - Collaborate with rehabilitation services on mobility goals if consulted- Perform Range of Motion 3 times a day.- Reposition patient every 2 hours.- Dangle patient 3 times a day- Stand patient 3 times a day- Ambulate patient 3 times a day- Out of bed to chair 3 times a day - Out of bed for meals 3 times a day- Out of bed for toileting- Record patient progress and toleration of activity level   Outcome: Progressing     Problem: DISCHARGE PLANNING  Goal: Discharge to home  or other facility with appropriate resources  Description: INTERVENTIONS:- Identify barriers to discharge w/patient and caregiver- Arrange for needed discharge resources and transportation as appropriate- Identify discharge learning needs (meds, wound care, etc.)- Arrange for interpretive services to assist at discharge as needed- Refer to Case Management Department for coordinating discharge planning if the patient needs post-hospital services based on physician/advanced practitioner order or complex needs related to functional status, cognitive ability, or social support system  Outcome: Progressing     Problem: Knowledge Deficit  Goal: Patient/family/caregiver demonstrates understanding of disease process, treatment plan, medications, and discharge instructions  Description: Complete learning assessment and assess knowledge base.Interventions:- Provide teaching at level of understanding- Provide teaching via preferred learning methods  Outcome: Progressing     Problem: Nutrition/Hydration-ADULT  Goal: Nutrient/Hydration intake appropriate for improving, restoring or maintaining nutritional needs  Description: Monitor and assess patient's nutrition/hydration status for malnutrition. Collaborate with interdisciplinary team and initiate plan and interventions as ordered.  Monitor patient's weight and dietary intake as ordered or per policy. Utilize nutrition screening tool and intervene as necessary. Determine patient's food preferences and provide high-protein, high-caloric foods as appropriate. INTERVENTIONS:- Monitor oral intake, urinary output, labs, and treatment plans- Assess nutrition and hydration status and recommend course of action- Evaluate amount of meals eaten- Assist patient with eating if necessary - Allow adequate time for meals- Recommend/ encourage appropriate diets, oral nutritional supplements, and vitamin/mineral supplements- Order, calculate, and assess calorie counts as needed- Recommend,  monitor, and adjust tube feedings and TPN/PPN based on assessed needs- Assess need for intravenous fluids- Provide specific nutrition/hydration education as appropriate- Include patient/family/caregiver in decisions related to nutrition  Outcome: Progressing     Problem: Prexisting or High Potential for Compromised Skin Integrity  Goal: Skin integrity is maintained or improved  Description: INTERVENTIONS:- Identify patients at risk for skin breakdown- Assess and monitor skin integrity- Assess and monitor nutrition and hydration status- Monitor labs - Assess for incontinence - Turn and reposition patient- Assist with mobility/ambulation- Relieve pressure over bony prominences- Avoid friction and shearing- Provide appropriate hygiene as needed including keeping skin clean and dry- Evaluate need for skin moisturizer/barrier cream- Collaborate with interdisciplinary team - Patient/family teaching- Consider wound care consult   Outcome: Progressing

## 2025-05-02 NOTE — SPEECH THERAPY NOTE
Tx session planned this pm.  When I arrived, pt had already consumed 100% of lunch (was fed pureed tray and nectar thick liquid by wife). RN reported pt significantly more alert today than the past few days and that she fed breakfast and no s/s aspiration with puree/NTL. I offered more drinks (nectar and thin liquid) but pt declined (was quite sleepy and with v weak voice).   Continue current conservative diet of pureed food, nectar thick liquid for maximized safety given general debility, weak voicing, weak cough.  Es Jarrell MS,SLP  [place] License [id number]

## 2025-05-02 NOTE — ASSESSMENT & PLAN NOTE
Patient brought in from [facility] for decreased responsiveness. Mental status waxing and waning. Recently hospitalization earlier in the month for AMS in the setting of dehydration and UTI with concern of underlying dementia (wife reports questionable parkinson diagnosis)   CT head: 1. No acute intracranial abnormality.  Stable chronic microangiopathic changes within the brain.  COVID/FLU negative. UA negative. Lactic acid negative. CXR without evidence of infection.   TSH elevated but normal T4  Acute on chronic anemia on admission. Hemoglobin stabilized following transfusion  Coma panel negative. Ammonia level WNL  Speech therapy consulted. Recommending dysphagia 1 diet with nectar thick liquids. Continue oral care  Consult to Neurology, recommendations are appreciated  MRI brain negative for acute abnormality  Suspect acute metabolic encephalopathy in setting of sepsis due to MSSA bacteremia contributing  Patient more awake and alert today. Monitor neuro checks.

## 2025-05-02 NOTE — PROGRESS NOTES
Update obtained from [facility] the patient Admitted 4/26/25 to [facility]. This Admin Coordinator will continue to monitor via chart review.

## 2025-05-02 NOTE — ASSESSMENT & PLAN NOTE
Acute on chronic. Patient's baseline hemoglobin is around 9 range. Has been noted to trend down to 7-8 over this past month  No evidence of active bleeding  4/26/25: hgb 6.7 s/p 1 unit PRBC  4/30/25: hgb 6.6 s/p 1 unit PRBC  B12, folate stable  Continue ferrous sulfate for DAYANA  Monitor daily CBC. Transfuse for hemoglobin <7

## 2025-05-02 NOTE — PLAN OF CARE
Problem: OCCUPATIONAL THERAPY ADULT  Goal: Performs self-care activities at highest level of function for planned discharge setting.  See evaluation for individualized goals.  Description: Treatment Interventions: ADL retraining, Functional transfer training, UE strengthening/ROM, Endurance training, Patient/family training, Equipment evaluation/education, Cognitive reorientation, Activityengagement, Compensatory technique education          See flowsheet documentation for full assessment, interventions and recommendations.   Outcome: Progressing  Note: Limitation: Decreased ADL status, Decreased UE ROM, Decreased UE strength, Decreased Safe judgement during ADL, Decreased endurance, Decreased cognition, Decreased self-care trans, Decreased high-level ADLs  Prognosis: Fair  Assessment: Patient seen for OT treatment.  Patient is more awake today and able to participate in OT session.  Patient requires max assist of 2 for transfers and bed mobility.  Patient will benefit from continued OT services to maximize functional performance with ADLS.     Rehab Resource Intensity Level, OT: II (Moderate Resource Intensity)

## 2025-05-02 NOTE — PROGRESS NOTES
Progress Note - Hospitalist   Name: Pernell Covarrubias 79 y.o. male I MRN: [id number]  Unit/Bed#: [address] [place] [address] Date of Admission: 4/26/2025   Date of Service: 5/2/2025 I Hospital Day: 5    Assessment & Plan  Altered mental status  Patient brought in from [facility] at [facility] for decreased responsiveness. Mental status waxing and waning. Recently hospitalization earlier in the month for AMS in the setting of dehydration and UTI with concern of underlying dementia (wife reports questionable parkinson diagnosis)   CT head: 1. No acute intracranial abnormality.  Stable chronic microangiopathic changes within the brain.  COVID/FLU negative. UA negative. Lactic acid negative. CXR without evidence of infection.   TSH elevated but normal T4  Acute on chronic anemia on admission. Hemoglobin stabilized following transfusion  Coma panel negative. Ammonia level WNL  Speech therapy consulted. Recommending dysphagia 1 diet with nectar thick liquids. Continue oral care  Consult to Neurology, recommendations are appreciated  MRI brain negative for acute abnormality  Suspect acute metabolic encephalopathy in setting of sepsis due to MSSA bacteremia contributing  Patient more awake and alert today. Monitor neuro checks.   MSSA bacteremia  4/28/25: 1/2 blood cultures positive for MSSA  CT C/A/P without evidence of acute infectious process  Possible translocation in setting of chronic skin wounds, patient has no devices or hardware  No evidence of vegetation on ECHO report  Continue cefazolin 2g Q8H  5/2/25: Follow up repeat BC x2  ID consulted, recommendations are appreciated  BRIAN on CKD  Baseline creatinine around 0.8-1.2  Peaked at 1.71 on 5/1/25  Likely in setting of poor oral intake/sepsis  Patient with meza catheter. CT wo hydronephrosis  Holding PTA lasix and losartan  Creatinine improving 1.50 today, continue gentle IVF  Monitor I&O  Daily BMP  Sepsis  Evidenced by hypothermia and tachypnea now with high fever on 4/30 in  setting of MSSA bacteremia  COVID/FLU negative. UA negative. Lactic acid negative. CXR without evidence of infection. Without leukocytosis.  Continue cefazolin as above  CT C/A/P without evidence of acute infectious process  Follow up repeat blood cultures  Anemia  Acute on chronic. Patient's baseline hemoglobin is around 9 range. Has been noted to trend down to 7-8 over this past month  No evidence of active bleeding  4/26/25: hgb 6.7 s/p 1 unit PRBC  4/30/25: hgb 6.6 s/p 1 unit PRBC  B12, folate stable  Continue ferrous sulfate for DAYANA  Monitor daily CBC. Transfuse for hemoglobin <7  Hypothermia  Noted to have intermittent hypothermia. With similar situation during recent hospitalizations  Cortisol level checked 3/10/25 which was normal 10.7  TSH elevated at 15 but with normal T4  Possibly in the setting of autonomic dysfunction from diabetes, age, questionable parkinson disease, poor PO intake, sepsis/bacteremia  Stella jasser for temperatures below 97F  Thrombocytopenia (HCC)  Per chart review, appears chronic  No evidence of active bleeding  Holding DVT prophylaxis  Daily CBC  Coronary artery disease involving native coronary artery of native heart with angina pectoris (HCC)  Status post multivessel stenting  Continue aspirin, Lipitor and Coreg  Type 2 diabetes mellitus with diabetic neuropathy (HCC)    Lab Results   Component Value Date    HGBA1C 6.9 (H) 02/11/2025   Hold oral metformin and Januvia during hospitalization  SSI with accu checks  Hypoglycemia protocol  Carb controlled diet  Benign essential hypertension  BP acceptable  PTA medications include Carvedilol 12.5mg BID, Losartan 25mg QD, Furosemide 20mg QD. Spironolactone 25mg QD was discontinued during recent hospitalization  Continue Carvedilol  Holding Furosemide and Losartan given BRIAN  Monitor vitals per routine  Chronic combined systolic and diastolic heart failure, NYHA class 2 (HCC)  Wt Readings from Last 3 Encounters:   04/30/25 72.1 kg (159 lb)    03/30/25 70.3 kg (154 lb 15.7 oz)   03/09/25 72.2 kg (159 lb 3.2 oz)     Echo (1/22/25): The left ventricular ejection fraction is 55 to 60% by visual estimation. Systolic function is normal. Wall motion is normal. Diastolic function is mildly abnormal, consistent with grade I (abnormal) relaxation.   Currently dry on exam  Holding home furosemide, monitor with IVF given decreased oral intake, sepsis, BRIAN  Daily weights, I&O  Abnormal TSH  Per chart review, TSH elevated during last hospitalization but with normal T4  4/26/25: TSH 15.954, T4 0.83, T3 2.35  Hypernatremia  Mild hypernatremia 148, in setting of poor oral intake  Sodium improved 143 today  Continue gentle IVF  Daily BMP    VTE Pharmacologic Prophylaxis: VTE Score: 8 High Risk (Score >/= 5) - Pharmacological DVT Prophylaxis Contraindicated. Sequential Compression Devices Ordered.    Mobility:   Basic Mobility Inpatient Raw Score: 7  JH-HLM Goal: 2: Bed activities/Dependent transfer  JH-HLM Achieved: 3: Sit at edge of bed  JH-HLM Goal NOT achieved. Continue with multidisciplinary rounding and encourage appropriate mobility to improve upon JH-HLM goals.    Patient Centered Rounds: I performed bedside rounds with nursing staff today.   Discussions with Specialists or Other Care Team Provider: Nursing, ID    Education and Discussions with Family / Patient:  Yes, wife.     Current Length of Stay: 5 day(s)  Current Patient Status: Inpatient   Certification Statement: The patient will continue to require additional inpatient hospital stay due to MSSA bacteremia, AMS  Discharge Plan: Anticipate discharge in 48-72 hrs to prior assisted or independent living facility.    Code Status: Level 3 - DNAR and DNI    Subjective   Patient awake and alert lying in bed. Ate his full breakfast with nurse assistance. Denies any complaints at this time.     Objective :  Temp:  [97.5 °F (36.4 °C)-99.3 °F (37.4 °C)] 97.5 °F (36.4 °C)  HR:  [51-59] 54  BP: (134-145)/(56-58)  134/58  Resp:  [20-22] 20  SpO2:  [95 %-100 %] 95 %    Body mass index is 22.18 kg/m².     Input and Output Summary (last 24 hours):     Intake/Output Summary (Last 24 hours) at 5/2/2025 0931  Last data filed at 5/2/2025 0854  Gross per 24 hour   Intake 0 ml   Output 750 ml   Net -750 ml       Physical Exam  Vitals and nursing note reviewed.   Constitutional:       General: He is not in acute distress.     Appearance: He is well-developed.      Comments: Chronically ill appearing   HENT:      Head: Normocephalic and atraumatic.      Mouth/Throat:      Mouth: Mucous membranes are dry.   Eyes:      Conjunctiva/sclera: Conjunctivae normal.   Cardiovascular:      Rate and Rhythm: Normal rate and regular rhythm.      Heart sounds: No murmur heard.  Pulmonary:      Effort: Pulmonary effort is normal. No respiratory distress.      Comments: Decreased breath sounds bilaterally  Abdominal:      Palpations: Abdomen is soft.      Tenderness: There is no abdominal tenderness.   Musculoskeletal:         General: No swelling.      Cervical back: Neck supple.   Skin:     General: Skin is warm and dry.   Neurological:      Mental Status: He is alert. He is disoriented.      Comments: AAO x2 (person and place but not time)   Psychiatric:         Mood and Affect: Mood normal.           Lines/Drains:  Lines/Drains/Airways       Active Status       Name Placement date Placement time Site Days    Urethral Catheter Straight-tip 16 Fr. 04/26/25  1558  Straight-tip  5                  Urinary Catheter:  Goal for removal: N/A- Discharging with [place]                 Lab Results: I have reviewed the following results:   Results from last 7 days   Lab Units 05/02/25  0541 05/01/25  0545 04/30/25  0601   WBC Thousand/uL 9.28   < > 8.68   HEMOGLOBIN g/dL 7.9*   < > 6.6*   HEMATOCRIT % 25.3*   < > 20.1*   PLATELETS Thousands/uL 126*   < > 104*   SEGS PCT %  --   --  80*   LYMPHO PCT %  --   --  7*   MONO PCT %  --   --  12   EOS PCT %  --   --  0     < > = values in this interval not displayed.     Results from last 7 days   Lab Units 05/02/25  0541   SODIUM mmol/L 143   POTASSIUM mmol/L 3.4*   CHLORIDE mmol/L 118*   CO2 mmol/L 16*   BUN mg/dL 39*   CREATININE mg/dL 1.50*   ANION GAP mmol/L 9   CALCIUM mg/dL 8.3*   ALBUMIN g/dL 2.8*   TOTAL BILIRUBIN mg/dL 0.99   ALK PHOS U/L 125*   ALT U/L 13   AST U/L 48*   GLUCOSE RANDOM mg/dL 169*     Results from last 7 days   Lab Units 04/26/25  1419   INR  1.09     Results from last 7 days   Lab Units 05/02/25  0536 05/02/25  0013 05/01/25  1953 05/01/25  1529 05/01/25  1127 05/01/25  0724 04/30/25  2102 04/30/25  1702 04/30/25  1055 04/30/25  0712 04/29/25  2007 04/29/25  1606   POC GLUCOSE mg/dl 162* 174* 158* 174* 108 96 99 150* 147* 144* 156* 218*         Results from last 7 days   Lab Units 04/26/25  1419   LACTIC ACID mmol/L 0.9       Recent Cultures (last 7 days):   Results from last 7 days   Lab Units 04/28/25  1840   BLOOD CULTURE  No Growth at 72 hrs.  Staphylococcus aureus*   GRAM STAIN RESULT  Gram positive cocci in clusters*       Imaging Results Review: I reviewed radiology reports from this admission including: CT chest and CT abdomen/pelvis.  Other Study Results Review: No additional pertinent studies reviewed.    Last 24 Hours Medication List:     Current Facility-Administered Medications:     acetaminophen (TYLENOL) tablet 650 mg, Q6H PRN    aspirin chewable tablet 81 mg, Daily    atorvastatin (LIPITOR) tablet 20 mg, Daily    bisacodyl (DULCOLAX) EC tablet 10 mg, Daily PRN    carvedilol (COREG) tablet 12.5 mg, BID    ceFAZolin (ANCEF) IVPB (premix in dextrose) 2,000 mg 50 mL, Q8H, Last Rate: 2,000 mg (05/02/25 0818)    Cholecalciferol (VITAMIN D3) tablet 2,000 Units, Daily    co-enzyme Q-10 capsule 100 mg, Daily    dextrose 5 % in lactated Ringer's infusion, Continuous    ferrous sulfate tablet 325 mg, BID With Meals    insulin lispro (HumALOG/ADMELOG) 100 units/mL subcutaneous injection 1-5 Units,  Q6H ENRIQUE **AND** Fingerstick Glucose (POCT), Q6H    metoclopramide (REGLAN) tablet 10 mg, TID AC    moisture barrier miconazole 2% cream (aka CHERELLE MOISTURE BARRIER ANTIFUNGAL CREAM), BID    multivitamin stress formula tablet 1 tablet, Daily    pantoprazole (PROTONIX) EC tablet 40 mg, Early Morning    sodium bicarbonate tablet 650 mg, TID after meals    tamsulosin (FLOMAX) capsule 0.4 mg, Daily With Dinner    Administrative Statements   Today, Patient Was Seen By: Alexandra Mckenzie PA-C  I have spent a total time of 30 minutes in caring for this patient on the day of the visit/encounter including Diagnostic results, Risks and benefits of tx options, Instructions for management, Patient and family education, Importance of tx compliance, Counseling / Coordination of care, Documenting in the medical record, Reviewing/placing orders in the medical record (including tests, medications, and/or procedures), Obtaining or reviewing history  , and Communicating with other healthcare professionals .    **Please Note: This note may have been constructed using a voice recognition system.**

## 2025-05-02 NOTE — ASSESSMENT & PLAN NOTE
BP acceptable  PTA medications include Carvedilol 12.5mg BID, Losartan 25mg QD, Furosemide 20mg QD. Spironolactone 25mg QD was discontinued during recent hospitalization  Continue Carvedilol  Holding Furosemide and Losartan given BRIAN  Monitor vitals per routine

## 2025-05-02 NOTE — ASSESSMENT & PLAN NOTE
May be related to bacteremia, though patient has had noted hypothermia during previous recent admissions as well. Temperatures now improved.  -Antibiotic plan as above  -Monitor temperatures closely

## 2025-05-03 ENCOUNTER — APPOINTMENT (INPATIENT)
Dept: RADIOLOGY | Facility: HOSPITAL | Age: 80
DRG: 871 | End: 2025-05-03
Payer: MEDICARE

## 2025-05-03 PROBLEM — T68.XXXA HYPOTHERMIA: Status: RESOLVED | Noted: 2025-03-09 | Resolved: 2025-05-03

## 2025-05-03 PROBLEM — R50.9 FEVER: Status: ACTIVE | Noted: 2025-05-03

## 2025-05-03 PROBLEM — E87.20 METABOLIC ACIDOSIS: Status: ACTIVE | Noted: 2025-05-03

## 2025-05-03 LAB
ALBUMIN SERPL BCG-MCNC: 2.6 G/DL (ref 3.5–5)
ALP SERPL-CCNC: 161 U/L (ref 34–104)
ALT SERPL W P-5'-P-CCNC: 29 U/L (ref 7–52)
ANION GAP SERPL CALCULATED.3IONS-SCNC: 5 MMOL/L (ref 4–13)
AST SERPL W P-5'-P-CCNC: 111 U/L (ref 13–39)
BACTERIA BLD CULT: NORMAL
BILIRUB SERPL-MCNC: 0.7 MG/DL (ref 0.2–1)
BUN SERPL-MCNC: 40 MG/DL (ref 5–25)
CALCIUM ALBUM COR SERPL-MCNC: 9.3 MG/DL (ref 8.3–10.1)
CALCIUM SERPL-MCNC: 8.2 MG/DL (ref 8.4–10.2)
CHLORIDE SERPL-SCNC: 120 MMOL/L (ref 96–108)
CO2 SERPL-SCNC: 18 MMOL/L (ref 21–32)
CREAT SERPL-MCNC: 1.42 MG/DL (ref 0.6–1.3)
ERYTHROCYTE [DISTWIDTH] IN BLOOD BY AUTOMATED COUNT: 17.8 % (ref 11.6–15.1)
GFR SERPL CREATININE-BSD FRML MDRD: 46 ML/MIN/1.73SQ M
GLUCOSE SERPL-MCNC: 164 MG/DL (ref 65–140)
GLUCOSE SERPL-MCNC: 174 MG/DL (ref 65–140)
GLUCOSE SERPL-MCNC: 205 MG/DL (ref 65–140)
GLUCOSE SERPL-MCNC: 208 MG/DL (ref 65–140)
GLUCOSE SERPL-MCNC: 209 MG/DL (ref 65–140)
GLUCOSE SERPL-MCNC: 226 MG/DL (ref 65–140)
GLUCOSE SERPL-MCNC: 227 MG/DL (ref 65–140)
GLUCOSE SERPL-MCNC: 261 MG/DL (ref 65–140)
HCT VFR BLD AUTO: 23.6 % (ref 36.5–49.3)
HGB BLD-MCNC: 7.7 G/DL (ref 12–17)
MCH RBC QN AUTO: 29.7 PG (ref 26.8–34.3)
MCHC RBC AUTO-ENTMCNC: 32.6 G/DL (ref 31.4–37.4)
MCV RBC AUTO: 91 FL (ref 82–98)
PLATELET # BLD AUTO: 148 THOUSANDS/UL (ref 149–390)
PMV BLD AUTO: 10.6 FL (ref 8.9–12.7)
POTASSIUM SERPL-SCNC: 4 MMOL/L (ref 3.5–5.3)
PROT SERPL-MCNC: 5.2 G/DL (ref 6.4–8.4)
RBC # BLD AUTO: 2.59 MILLION/UL (ref 3.88–5.62)
SODIUM SERPL-SCNC: 143 MMOL/L (ref 135–147)
T4 FREE SERPL-MCNC: 1.26 NG/DL (ref 0.61–1.12)
TSH SERPL DL<=0.05 MIU/L-ACNC: 5.03 UIU/ML (ref 0.45–4.5)
WBC # BLD AUTO: 9.09 THOUSAND/UL (ref 4.31–10.16)

## 2025-05-03 PROCEDURE — 84439 ASSAY OF FREE THYROXINE: CPT

## 2025-05-03 PROCEDURE — 99232 SBSQ HOSP IP/OBS MODERATE 35: CPT

## 2025-05-03 PROCEDURE — 71045 X-RAY EXAM CHEST 1 VIEW: CPT

## 2025-05-03 PROCEDURE — 80053 COMPREHEN METABOLIC PANEL: CPT | Performed by: INTERNAL MEDICINE

## 2025-05-03 PROCEDURE — NC001 PR NO CHARGE: Performed by: INTERNAL MEDICINE

## 2025-05-03 PROCEDURE — 84443 ASSAY THYROID STIM HORMONE: CPT

## 2025-05-03 PROCEDURE — 99223 1ST HOSP IP/OBS HIGH 75: CPT | Performed by: INTERNAL MEDICINE

## 2025-05-03 PROCEDURE — 85027 COMPLETE CBC AUTOMATED: CPT | Performed by: INTERNAL MEDICINE

## 2025-05-03 PROCEDURE — 82948 REAGENT STRIP/BLOOD GLUCOSE: CPT

## 2025-05-03 RX ORDER — ACETAMINOPHEN 10 MG/ML
1000 INJECTION, SOLUTION INTRAVENOUS ONCE
Status: COMPLETED | OUTPATIENT
Start: 2025-05-03 | End: 2025-05-03

## 2025-05-03 RX ORDER — INSULIN GLARGINE 100 [IU]/ML
5 INJECTION, SOLUTION SUBCUTANEOUS EVERY MORNING
Status: DISCONTINUED | OUTPATIENT
Start: 2025-05-03 | End: 2025-05-07 | Stop reason: HOSPADM

## 2025-05-03 RX ADMIN — METOCLOPRAMIDE 10 MG: 10 TABLET ORAL at 11:58

## 2025-05-03 RX ADMIN — CHOLECALCIFEROL TAB 25 MCG (1000 UNIT) 2000 UNITS: 25 TAB at 09:33

## 2025-05-03 RX ADMIN — CEFAZOLIN SODIUM 2000 MG: 2 SOLUTION INTRAVENOUS at 09:31

## 2025-05-03 RX ADMIN — CEFAZOLIN SODIUM 2000 MG: 2 SOLUTION INTRAVENOUS at 00:30

## 2025-05-03 RX ADMIN — ATORVASTATIN CALCIUM 20 MG: 20 TABLET, FILM COATED ORAL at 09:34

## 2025-05-03 RX ADMIN — INSULIN LISPRO 1 UNITS: 100 INJECTION, SOLUTION INTRAVENOUS; SUBCUTANEOUS at 12:00

## 2025-05-03 RX ADMIN — METOCLOPRAMIDE 10 MG: 10 TABLET ORAL at 16:24

## 2025-05-03 RX ADMIN — INSULIN LISPRO 2 UNITS: 100 INJECTION, SOLUTION INTRAVENOUS; SUBCUTANEOUS at 17:17

## 2025-05-03 RX ADMIN — MICONAZOLE NITRATE: 20 CREAM TOPICAL at 17:18

## 2025-05-03 RX ADMIN — Medication 1 TABLET: at 09:33

## 2025-05-03 RX ADMIN — CARVEDILOL 12.5 MG: 12.5 TABLET, FILM COATED ORAL at 09:33

## 2025-05-03 RX ADMIN — INSULIN LISPRO 1 UNITS: 100 INJECTION, SOLUTION INTRAVENOUS; SUBCUTANEOUS at 06:40

## 2025-05-03 RX ADMIN — SODIUM BICARBONATE 650 MG TABLET 650 MG: at 11:58

## 2025-05-03 RX ADMIN — FERROUS SULFATE TAB 325 MG (65 MG ELEMENTAL FE) 325 MG: 325 (65 FE) TAB at 09:33

## 2025-05-03 RX ADMIN — CEFAZOLIN SODIUM 2000 MG: 2 SOLUTION INTRAVENOUS at 16:22

## 2025-05-03 RX ADMIN — INSULIN GLARGINE 5 UNITS: 100 INJECTION, SOLUTION SUBCUTANEOUS at 11:58

## 2025-05-03 RX ADMIN — THIAMINE HYDROCHLORIDE: 100 INJECTION, SOLUTION INTRAMUSCULAR; INTRAVENOUS at 18:24

## 2025-05-03 RX ADMIN — CARVEDILOL 12.5 MG: 12.5 TABLET, FILM COATED ORAL at 17:16

## 2025-05-03 RX ADMIN — ACETAMINOPHEN 1000 MG: 10 INJECTION INTRAVENOUS at 11:59

## 2025-05-03 RX ADMIN — INSULIN LISPRO 1 UNITS: 100 INJECTION, SOLUTION INTRAVENOUS; SUBCUTANEOUS at 23:58

## 2025-05-03 RX ADMIN — SODIUM BICARBONATE 650 MG TABLET 650 MG: at 09:33

## 2025-05-03 RX ADMIN — SODIUM BICARBONATE 650 MG TABLET 650 MG: at 17:16

## 2025-05-03 RX ADMIN — CEFAZOLIN SODIUM 2000 MG: 2 SOLUTION INTRAVENOUS at 23:53

## 2025-05-03 RX ADMIN — MICONAZOLE NITRATE: 20 CREAM TOPICAL at 09:35

## 2025-05-03 RX ADMIN — FERROUS SULFATE TAB 325 MG (65 MG ELEMENTAL FE) 325 MG: 325 (65 FE) TAB at 16:23

## 2025-05-03 RX ADMIN — INSULIN LISPRO 2 UNITS: 100 INJECTION, SOLUTION INTRAVENOUS; SUBCUTANEOUS at 00:29

## 2025-05-03 RX ADMIN — METOCLOPRAMIDE 10 MG: 10 TABLET ORAL at 08:04

## 2025-05-03 RX ADMIN — TAMSULOSIN HYDROCHLORIDE 0.4 MG: 0.4 CAPSULE ORAL at 16:23

## 2025-05-03 RX ADMIN — ACETAMINOPHEN 650 MG: 325 TABLET, FILM COATED ORAL at 08:02

## 2025-05-03 RX ADMIN — ASPIRIN 81 MG: 81 TABLET, CHEWABLE ORAL at 09:33

## 2025-05-03 NOTE — ASSESSMENT & PLAN NOTE
Lab Results   Component Value Date    HGBA1C 6.9 (H) 02/11/2025   Acute kidney injury  -Baseline creatinine: 1.0-1.2 mg/dl  -Admission creatinine: 1.12 mg/dl  - Work up:   UA with microscopy: UA from 4/26 was bland  Imaging: CT chest abdomen pelvis without contrast from 5/1 did not show any hydronephrosis.  Diffuse groundglass opacities and bilateral pleural effusion consistent with pulmonary edema.  Stable 5 mm right upper lobe nodule  -Etiology: Acute kidney injury likely due to volume depletion in the setting of autoregulatory failure from being on losartan on 4/28 and 4/29 and also due to component of hypotension On 4/28 and 29.  Would also consider possibility of AIN in the setting of use of Ancef but less likely as renal function already improving  -Hospital Course: Renal function worsened during the hospital stay, peak creatinine 1.71 on 5/1  -Plan:   Hold losartan in the setting of acute kidney injury, continue to hold diuretics  Continue current IV fluid D5 and LR at 50 mL/h as sodium level already improving and renal function improving and clinically appears euvolemic.  Also metabolic acidosis improving.  If acidosis worsens would consider switching to bicarb drip.  Avoid nephrotoxins and dose all medications per EGFR.    Avoid hypotension.                                            Chronic Kidney Disease Stage 3  -Outpatient Nephrologist: Dr. 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         Recent Labs     05/02/25  1624 05/03/25  0028 05/03/25  0617 05/03/25  0710   POCGLU 241* 261* 208* 205*       Blood Sugar Average: Last 72 hrs:  (P) [id number]

## 2025-05-03 NOTE — ASSESSMENT & PLAN NOTE
Lab Results   Component Value Date    HGBA1C 6.9 (H) 02/11/2025   Hold oral metformin and Januvia during hospitalization  SSI with accu checks  Lantus 5 U Qam  Hypoglycemia protocol  Carb controlled diet

## 2025-05-03 NOTE — ASSESSMENT & PLAN NOTE
-Blood culture positive for Staph aureus.  Was initially found to be hypothermic, with tachypnea and fever in the setting of MSSA bacteremia.  Continue antibiotic per primary team

## 2025-05-03 NOTE — PROGRESS NOTES
Progress Note - Hospitalist   Name: Pernell Covarrubias 79 y.o. male I MRN: [id number]  Unit/Bed#: [address] [place] [address] Date of Admission: 4/26/2025   Date of Service: 5/3/2025 I Hospital Day: 6    Assessment & Plan  Altered mental status  Patient brought in from [facility] for decreased responsiveness. Mental status waxing and waning. Recently hospitalization earlier in the month for AMS in the setting of dehydration and UTI with concern of underlying dementia (wife reports questionable parkinson diagnosis)   CT head: 1. No acute intracranial abnormality.  Stable chronic microangiopathic changes within the brain.  COVID/FLU negative. UA negative. Lactic acid negative. CXR without evidence of infection.   TSH elevated but normal T4  Acute on chronic anemia on admission. Hemoglobin stabilized following transfusion  Coma panel negative. Ammonia level WNL  Speech therapy consulted. Recommending dysphagia 1 diet with nectar thick liquids. Continue oral care  Consult to Neurology, recommendations are appreciated  MRI brain negative for acute abnormality  Suspect acute metabolic encephalopathy in setting of sepsis due to MSSA bacteremia contributing  Patient more awake and alert today.answering questions.  Having breakfast during my encounter.  Monitor neuro checks.   MSSA bacteremia  4/28/25: 1/2 blood cultures positive for MSSA  CT C/A/P without evidence of acute infectious process  Possible translocation in setting of chronic skin wounds, patient has no devices or hardware  No evidence of vegetation on ECHO report  Continue cefazolin 2g Q8H  5/2/25: Follow up repeat BC x2 pending  ID consulted, recommendations are appreciated  BRIAN on CKD  Baseline creatinine around 0.8-1.2  Peaked at 1.71 on 5/1/25  Likely in setting of poor oral intake/sepsis  Patient with meza catheter. CT wo hydronephrosis  Holding PTA lasix and losartan  Creatinine improving 1.50 today, continue gentle IVF  Monitor I&O  Daily BMP  Nephrology team  consulted; appreciate input  Sepsis  Evidenced by hypothermia and tachypnea now with high fever on 4/30 in setting of MSSA bacteremia  COVID/FLU negative. UA negative. Lactic acid negative. CXR without evidence of infection. Without leukocytosis.  Continue cefazolin as above  CT C/A/P without evidence of acute infectious process  Follow up repeat blood cultures  Anemia  Acute on chronic. Patient's baseline hemoglobin is around 9 range. Has been noted to trend down to 7-8 over this past month  No evidence of active bleeding  4/26/25: hgb 6.7 s/p 1 unit PRBC  4/30/25: hgb 6.6 s/p 1 unit PRBC  B12, folate stable  Continue ferrous sulfate for DAYANA  Monitor daily CBC. Transfuse for hemoglobin <7  Hypothermia (Resolved: 5/3/2025)  Noted to have intermittent hypothermia. With similar situation during recent hospitalizations  Cortisol level checked 3/10/25 which was normal 10.7  TSH elevated at 15 but with normal T4  Possibly in the setting of autonomic dysfunction from diabetes, age, questionable parkinson disease, poor PO intake, sepsis/bacteremia  Stella jasser for temperatures below 97F  Thrombocytopenia (HCC)  Per chart review, appears chronic  No evidence of active bleeding  Holding DVT prophylaxis  Daily CBC  Coronary artery disease involving native coronary artery of native heart with angina pectoris (HCC)  Status post multivessel stenting  Continue aspirin, Lipitor and Coreg  Type 2 diabetes mellitus with diabetic neuropathy (HCC)    Lab Results   Component Value Date    HGBA1C 6.9 (H) 02/11/2025   Hold oral metformin and Januvia during hospitalization  SSI with accu checks  Lantus 5 U Qam  Hypoglycemia protocol  Carb controlled diet  Benign essential hypertension  BP acceptable  PTA medications include Carvedilol 12.5mg BID, Losartan 25mg QD, Furosemide 20mg QD. Spironolactone 25mg QD was discontinued during recent hospitalization  Continue Carvedilol  Holding Furosemide and Losartan given BRIAN  Monitor vitals per  routine  Chronic combined systolic and diastolic heart failure, NYHA class 2 ([facility])  Wt Readings from Last 3 Encounters:   04/30/25 72.1 kg (159 lb)   03/30/25 70.3 kg (154 lb 15.7 oz)   03/09/25 72.2 kg (159 lb 3.2 oz)     Echo (1/22/25): The left ventricular ejection fraction is 55 to 60% by visual estimation. Systolic function is normal. Wall motion is normal. Diastolic function is mildly abnormal, consistent with grade I (abnormal) relaxation.   Currently dry on exam  Holding home furosemide, monitor with IVF given decreased oral intake, sepsis, BRIAN  Daily weights, I&O  Abnormal TSH  Per chart review, TSH elevated during last hospitalization but with normal T4  4/26/25: TSH 15.954, T4 0.83, T3 2.35  Hypernatremia  Mild hypernatremia 148, in setting of poor oral intake  Sodium improved 143 today  Continue gentle IVF  Daily BMP  Metabolic acidosis  Management per nephrologist   Fever  Initially with hypothermia however currently spiked a fever 101.7.    - Discontinue hypothermia protocol  -Monitor vitals  -Refer to assessment and plan for sepsis    VTE Pharmacologic Prophylaxis: VTE Score: 8 Moderate Risk (Score 3-4) - Pharmacological DVT Prophylaxis Contraindicated. Sequential Compression Devices Ordered.    Mobility:   Basic Mobility Inpatient Raw Score: 8  JH-HLM Goal: 3: Sit at edge of bed  JH-HLM Achieved: 4: Move to chair/commode  JH-HLM Goal achieved. Continue to encourage appropriate mobility.    Patient Centered Rounds: I performed bedside rounds with nursing staff today.   Discussions with Specialists or Other Care Team Provider: infectious disease, nephrology     Education and Discussions with Family / Patient: Patient declined call to .     Current Length of Stay: 6 day(s)  Current Patient Status: Inpatient   Certification Statement: The patient will continue to require additional inpatient hospital stay due to sepsis   Discharge Plan: Anticipate discharge in 24-48 hrs to rehab  facility.    Code Status: Level 3 - DNAR and DNI    Subjective   Patient seen today at bedside.  He is alert and oriented with limitations.  Answering questions.  Following simple commands.  He was having breakfast when he was checking on him.  Denies any active complaints.        Objective :  Temp:  [97.5 °F (36.4 °C)-101.7 °F (38.7 °C)] 101.1 °F (38.4 °C)  HR:  [55-90] 90  BP: (112-155)/(46-59) 136/54  Resp:  [17-20] 17  SpO2:  [92 %-96 %] 95 %    Body mass index is 22.18 kg/m².     Input and Output Summary (last 24 hours):     Intake/Output Summary (Last 24 hours) at 5/3/2025 1033  Last data filed at 5/3/2025 0802  Gross per 24 hour   Intake 700 ml   Output 650 ml   Net 50 ml       Physical Exam  Vitals and nursing note reviewed.   Constitutional:       General: He is not in acute distress.     Appearance: Normal appearance.   HENT:      Head: Normocephalic and atraumatic.      Mouth/Throat:      Mouth: Mucous membranes are moist.   Eyes:      Conjunctiva/sclera: Conjunctivae normal.      Pupils: Pupils are equal, round, and reactive to light.   Cardiovascular:      Rate and Rhythm: Normal rate and regular rhythm.      Pulses: Normal pulses.           Carotid pulses are 2+ on the right side and 2+ on the left side.       Radial pulses are 2+ on the right side and 2+ on the left side.      Heart sounds: Normal heart sounds, S1 normal and S2 normal. No murmur heard.  Pulmonary:      Effort: No tachypnea, bradypnea or accessory muscle usage.      Breath sounds: Normal breath sounds and air entry. No decreased breath sounds, wheezing, rhonchi or rales.   Abdominal:      General: Abdomen is flat. Bowel sounds are normal. There is no distension.      Palpations: Abdomen is soft.      Tenderness: There is no abdominal tenderness.   Musculoskeletal:      Right lower leg: No edema.      Left lower leg: No edema.   Neurological:      General: No focal deficit present.      Mental Status: He is alert.   Psychiatric:          Mood and Affect: Mood normal.         Behavior: Behavior normal.           Lines/Drains:  Lines/Drains/Airways       Active Status       Name Placement date Placement time Site Days    Urethral Catheter Straight-tip 16 Fr. 04/26/25  1558  Straight-tip  6                  Urinary Catheter:  Goal for removal: Voiding trial when ambulation improves                 Lab Results: I have reviewed the following results:   Results from last 7 days   Lab Units 05/03/25  0434 05/01/25  0545 04/30/25  0601   WBC Thousand/uL 9.09   < > 8.68   HEMOGLOBIN g/dL 7.7*   < > 6.6*   HEMATOCRIT % 23.6*   < > 20.1*   PLATELETS Thousands/uL 148*   < > 104*   SEGS PCT %  --   --  80*   LYMPHO PCT %  --   --  7*   MONO PCT %  --   --  12   EOS PCT %  --   --  0    < > = values in this interval not displayed.     Results from last 7 days   Lab Units 05/03/25  0434   SODIUM mmol/L 143   POTASSIUM mmol/L 4.0   CHLORIDE mmol/L 120*   CO2 mmol/L 18*   BUN mg/dL 40*   CREATININE mg/dL 1.42*   ANION GAP mmol/L 5   CALCIUM mg/dL 8.2*   ALBUMIN g/dL 2.6*   TOTAL BILIRUBIN mg/dL 0.70   ALK PHOS U/L 161*   ALT U/L 29   AST U/L 111*   GLUCOSE RANDOM mg/dL 226*     Results from last 7 days   Lab Units 04/26/25  1419   INR  1.09     Results from last 7 days   Lab Units 05/03/25  0710 05/03/25  0617 05/03/25  0028 05/02/25  1624 05/02/25  1213 05/02/25  0536 05/02/25  0013 05/01/25  1953 05/01/25  1529 05/01/25  1127 05/01/25  0724 04/30/25  2102   POC GLUCOSE mg/dl 205* 208* 261* 241* 279* 162* 174* 158* 174* 108 96 99         Results from last 7 days   Lab Units 04/26/25  1419   LACTIC ACID mmol/L 0.9       Recent Cultures (last 7 days):   Results from last 7 days   Lab Units 05/02/25  0539 04/28/25  1840   BLOOD CULTURE  Received in Microbiology Lab. Culture in Progress.  Received in Microbiology Lab. Culture in Progress. No Growth After 4 Days.  Staphylococcus aureus*   GRAM STAIN RESULT   --  Gram positive cocci in clusters*       Imaging Results  Review: No pertinent imaging studies reviewed.  Other Study Results Review: No additional pertinent studies reviewed.    Last 24 Hours Medication List:     Current Facility-Administered Medications:     acetaminophen (Ofirmev) injection 1,000 mg, Once    acetaminophen (TYLENOL) tablet 650 mg, Q6H PRN    aspirin chewable tablet 81 mg, Daily    atorvastatin (LIPITOR) tablet 20 mg, Daily    bisacodyl (DULCOLAX) EC tablet 10 mg, Daily PRN    carvedilol (COREG) tablet 12.5 mg, BID    ceFAZolin (ANCEF) IVPB (premix in dextrose) 2,000 mg 50 mL, Q8H, Last Rate: 2,000 mg (05/03/25 0931)    Cholecalciferol (VITAMIN D3) tablet 2,000 Units, Daily    co-enzyme Q-10 capsule 100 mg, Daily    dextrose 5 % in lactated Ringer's infusion, Continuous, Last Rate: 50 mL/hr (05/02/25 1145)    ferrous sulfate tablet 325 mg, BID With Meals    insulin lispro (HumALOG/ADMELOG) 100 units/mL subcutaneous injection 1-5 Units, Q6H ENRIQUE **AND** Fingerstick Glucose (POCT), Q6H    metoclopramide (REGLAN) tablet 10 mg, TID AC    moisture barrier miconazole 2% cream (aka CHERELLE MOISTURE BARRIER ANTIFUNGAL CREAM), BID    multivitamin stress formula tablet 1 tablet, Daily    pantoprazole (PROTONIX) EC tablet 40 mg, Early Morning    sodium bicarbonate tablet 650 mg, TID after meals    tamsulosin (FLOMAX) capsule 0.4 mg, Daily With Dinner    Administrative Statements   Today, Patient Was Seen By: Sukhi Felix MD  I have spent a total time of 30 minutes in caring for this patient on the day of the visit/encounter including Diagnostic results, Prognosis, Risks and benefits of tx options, and Instructions for management.    **Please Note: This note may have been constructed using a voice recognition system.**

## 2025-05-03 NOTE — PLAN OF CARE
Problem: Potential for Falls  Goal: Patient will remain free of falls  Description: INTERVENTIONS:- Educate patient/family on patient safety including physical limitations- Instruct patient to call for assistance with activity - Consult OT/PT to assist with strengthening/mobility - Keep Call bell within reach- Keep bed low and locked with side rails adjusted as appropriate- Keep care items and personal belongings within reach- Initiate and maintain comfort rounds- Make Fall Risk Sign visible to staff- Offer Toileting every 2 Hours, in advance of need- Initiate/Maintain bed alarm- Obtain necessary fall risk management equipment: slipper socks- Apply yellow socks and bracelet for high fall risk patients- Consider moving patient to room near nurses station  INTERVENTIONS:- Educate patient/family on patient safety including physical limitations- Instruct patient to call for assistance with activity - Consult OT/PT to assist with strengthening/mobility - Keep Call bell within reach- Keep bed low and locked with side rails adjusted as appropriate- Keep care items and personal belongings within reach- Initiate and maintain comfort rounds- Make Fall Risk Sign visible to staff- Offer Toileting every 2 Hours, in advance of need- Initiate/Maintain bed alarm- Obtain necessary fall risk management equipment: slipper socks- Apply yellow socks and bracelet for high fall risk patients- Consider moving patient to room near nurses station  Outcome: Progressing     Problem: PAIN - ADULT  Goal: Verbalizes/displays adequate comfort level or baseline comfort level  Description: Interventions:- Encourage patient to monitor pain and request assistance- Assess pain using appropriate pain scale- Administer analgesics based on type and severity of pain and evaluate response- Implement non-pharmacological measures as appropriate and evaluate response- Consider cultural and social influences on pain and pain management- Notify  physician/advanced practitioner if interventions unsuccessful or patient reports new pain  Outcome: Progressing     Problem: INFECTION - ADULT  Goal: Absence or prevention of progression during hospitalization  Description: INTERVENTIONS:- Assess and monitor for signs and symptoms of infection- Monitor lab/diagnostic results- Monitor all insertion sites, i.e. indwelling lines, tubes, and drains- Monitor endotracheal if appropriate and nasal secretions for changes in amount and color- [place] appropriate cooling/warming therapies per order- Administer medications as ordered- Instruct and encourage patient and family to use good hand hygiene technique- Identify and instruct in appropriate isolation precautions for identified infection/condition  Outcome: Progressing  Goal: Absence of fever/infection during neutropenic period  Description: INTERVENTIONS:- Monitor WBC  Outcome: Progressing     Problem: SAFETY ADULT  Goal: Patient will remain free of falls  Description: INTERVENTIONS:- Educate patient/family on patient safety including physical limitations- Instruct patient to call for assistance with activity - Consult OT/PT to assist with strengthening/mobility - Keep Call bell within reach- Keep bed low and locked with side rails adjusted as appropriate- Keep care items and personal belongings within reach- Initiate and maintain comfort rounds- Make Fall Risk Sign visible to staff- Offer Toileting every 2 Hours, in advance of need- Initiate/Maintain bed alarm- Obtain necessary fall risk management equipment: slipper socks- Apply yellow socks and bracelet for high fall risk patients- Consider moving patient to room near nurses station  INTERVENTIONS:- Educate patient/family on patient safety including physical limitations- Instruct patient to call for assistance with activity - Consult OT/PT to assist with strengthening/mobility - Keep Call bell within reach- Keep bed low and locked with side rails adjusted as  appropriate- Keep care items and personal belongings within reach- Initiate and maintain comfort rounds- Make Fall Risk Sign visible to staff- Offer Toileting every 2 Hours, in advance of need- Initiate/Maintain bed alarm- Obtain necessary fall risk management equipment: slipper socks- Apply yellow socks and bracelet for high fall risk patients- Consider moving patient to room near nurses station  Outcome: Progressing  Goal: Maintain or return to baseline ADL function  Description: INTERVENTIONS:-  Assess patient's ability to carry out ADLs; assess patient's baseline for ADL function and identify physical deficits which impact ability to perform ADLs (bathing, care of mouth/teeth, toileting, grooming, dressing, etc.)- Assess/evaluate cause of self-care deficits - Assess range of motion- Assess patient's mobility; develop plan if impaired- Assess patient's need for assistive devices and provide as appropriate- Encourage maximum independence but intervene and supervise when necessary- Involve family in performance of ADLs- Assess for home care needs following discharge - Consider OT consult to assist with ADL evaluation and planning for discharge- Provide patient education as appropriate  Outcome: Progressing  Goal: Maintains/Returns to pre admission functional level  Description: INTERVENTIONS:- Perform AM-PAC 6 Click Basic Mobility/ Daily Activity assessment daily.- Set and communicate daily mobility goal to care team and patient/family/caregiver. - Collaborate with rehabilitation services on mobility goals if consulted- Perform Range of Motion 3 times a day.- Reposition patient every 2 hours.- Dangle patient 3 times a day- Stand patient 3 times a day- Ambulate patient 3 times a day- Out of bed to chair 3 times a day - Out of bed for meals 3 times a day- Out of bed for toileting- Record patient progress and toleration of activity level   Outcome: Progressing     Problem: DISCHARGE PLANNING  Goal: Discharge to home  or other facility with appropriate resources  Description: INTERVENTIONS:- Identify barriers to discharge w/patient and caregiver- Arrange for needed discharge resources and transportation as appropriate- Identify discharge learning needs (meds, wound care, etc.)- Arrange for interpretive services to assist at discharge as needed- Refer to Case Management Department for coordinating discharge planning if the patient needs post-hospital services based on physician/advanced practitioner order or complex needs related to functional status, cognitive ability, or social support system  Outcome: Progressing     Problem: Knowledge Deficit  Goal: Patient/family/caregiver demonstrates understanding of disease process, treatment plan, medications, and discharge instructions  Description: Complete learning assessment and assess knowledge base.Interventions:- Provide teaching at level of understanding- Provide teaching via preferred learning methods  Outcome: Progressing     Problem: Nutrition/Hydration-ADULT  Goal: Nutrient/Hydration intake appropriate for improving, restoring or maintaining nutritional needs  Description: Monitor and assess patient's nutrition/hydration status for malnutrition. Collaborate with interdisciplinary team and initiate plan and interventions as ordered.  Monitor patient's weight and dietary intake as ordered or per policy. Utilize nutrition screening tool and intervene as necessary. Determine patient's food preferences and provide high-protein, high-caloric foods as appropriate. INTERVENTIONS:- Monitor oral intake, urinary output, labs, and treatment plans- Assess nutrition and hydration status and recommend course of action- Evaluate amount of meals eaten- Assist patient with eating if necessary - Allow adequate time for meals- Recommend/ encourage appropriate diets, oral nutritional supplements, and vitamin/mineral supplements- Order, calculate, and assess calorie counts as needed- Recommend,  monitor, and adjust tube feedings and TPN/PPN based on assessed needs- Assess need for intravenous fluids- Provide specific nutrition/hydration education as appropriate- Include patient/family/caregiver in decisions related to nutrition  Outcome: Progressing     Problem: Prexisting or High Potential for Compromised Skin Integrity  Goal: Skin integrity is maintained or improved  Description: INTERVENTIONS:- Identify patients at risk for skin breakdown- Assess and monitor skin integrity- Assess and monitor nutrition and hydration status- Monitor labs - Assess for incontinence - Turn and reposition patient- Assist with mobility/ambulation- Relieve pressure over bony prominences- Avoid friction and shearing- Provide appropriate hygiene as needed including keeping skin clean and dry- Evaluate need for skin moisturizer/barrier cream- Collaborate with interdisciplinary team - Patient/family teaching- Consider wound care consult   Outcome: Progressing

## 2025-05-03 NOTE — ASSESSMENT & PLAN NOTE
Lab Results   Component Value Date    HGBA1C 6.9 (H) 02/11/2025       Recent Labs     05/02/25  1624 05/03/25  0028 05/03/25  0617 05/03/25  0710   POCGLU 241* 261* 208* 205*       Blood Sugar Average: Last 72 hrs:  (P) [id number]  -Continue management per primary team

## 2025-05-03 NOTE — ASSESSMENT & PLAN NOTE
Wt Readings from Last 3 Encounters:   04/30/25 72.1 kg (159 lb)   03/30/25 70.3 kg (154 lb 15.7 oz)   03/09/25 72.2 kg (159 lb 3.2 oz)   -Clinically appears euvolemic.  Echo with ejection fraction 55 to 60% with grade 1 diastolic dysfunction  - At home on Lasix 20 mg daily which is currently on hold.  Clinically appears euvolemic, no need for diuretics at this time.  Monitor response to IV fluid

## 2025-05-03 NOTE — ASSESSMENT & PLAN NOTE
-Blood pressure is currently stable and is at goal.  Continue carvedilol at current dose with hold parameters, continue to hold losartan

## 2025-05-03 NOTE — ASSESSMENT & PLAN NOTE
-Admission hemoglobin was 6.7 g/dL and received PRBC  -Hemoglobin slightly trended down to 7.7 g/dL.  Continue ferrous sulfate but also with thrombocytopenia in the setting of sepsis

## 2025-05-03 NOTE — ASSESSMENT & PLAN NOTE
4/28/25: 1/2 blood cultures positive for MSSA  CT C/A/P without evidence of acute infectious process  Possible translocation in setting of chronic skin wounds, patient has no devices or hardware  No evidence of vegetation on ECHO report  Continue cefazolin 2g Q8H  5/2/25: Follow up repeat BC x2 pending  ID consulted, recommendations are appreciated

## 2025-05-03 NOTE — ASSESSMENT & PLAN NOTE
Patient brought in from [facility] at [facility] for decreased responsiveness. Mental status waxing and waning. Recently hospitalization earlier in the month for AMS in the setting of dehydration and UTI with concern of underlying dementia (wife reports questionable parkinson diagnosis)   CT head: 1. No acute intracranial abnormality.  Stable chronic microangiopathic changes within the brain.  COVID/FLU negative. UA negative. Lactic acid negative. CXR without evidence of infection.   TSH elevated but normal T4  Acute on chronic anemia on admission. Hemoglobin stabilized following transfusion  Coma panel negative. Ammonia level WNL  Speech therapy consulted. Recommending dysphagia 1 diet with nectar thick liquids. Continue oral care  Consult to Neurology, recommendations are appreciated  MRI brain negative for acute abnormality  Suspect acute metabolic encephalopathy in setting of sepsis due to MSSA bacteremia contributing  Patient more awake and alert today.answering questions.  Having breakfast during my encounter.  Monitor neuro checks.

## 2025-05-03 NOTE — ASSESSMENT & PLAN NOTE
-Patient was brought in from nursing facility for decreased responsiveness with waxing and waning mental status.  Does have underlying dementia.  CT head on admission did not show any intracranial abnormality, COVID/flu test was negative.  Was found to have elevated TSH but normal T4.  Ammonia was found to be normal.  MRI brain was ordered after consultation with neurology-no acute infarct or any significant mass effect  -Continue workup and management per primary team

## 2025-05-03 NOTE — CONSULTS
Consultation - Nephrology   Pernell Covarrubias 79 y.o. male MRN: [id number]  Unit/Bed#: [address] [place] [address] Encounter: [id number]    ASSESSMENT and PLAN:     Assessment & Plan  BRIAN on CKD  Lab Results   Component Value Date    HGBA1C 6.9 (H) 02/11/2025   Acute kidney injury  -Baseline creatinine: 1.0-1.2 mg/dl  -Admission creatinine: 1.12 mg/dl  - Work up:   UA with microscopy: UA from 4/26 was bland  Imaging: CT chest abdomen pelvis without contrast from 5/1 did not show any hydronephrosis.  Diffuse groundglass opacities and bilateral pleural effusion consistent with pulmonary edema.  Stable 5 mm right upper lobe nodule  -Etiology: Acute kidney injury likely due to volume depletion in the setting of autoregulatory failure from being on losartan on 4/28 and 4/29 and also due to component of hypotension On 4/28 and 29.  Would also consider possibility of AIN in the setting of use of Ancef but less likely as renal function already improving  -Hospital Course: Renal function worsened during the hospital stay, peak creatinine 1.71 on 5/1  -Plan:   Hold losartan in the setting of acute kidney injury, continue to hold diuretics  Continue current IV fluid D5 and LR at 50 mL/h as sodium level already improving and renal function improving and clinically appears euvolemic.  Also metabolic acidosis improving.  If acidosis worsens would consider switching to bicarb drip.  Avoid nephrotoxins and dose all medications per EGFR.    Avoid hypotension.                                            Chronic Kidney Disease Stage 3  -Outpatient Nephrologist: Dr. 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         Recent Labs     05/02/25  1624 05/03/25  0028 05/03/25  0617 05/03/25  0710   POCGLU 241* 261* 208* 205*       Blood Sugar Average: Last 72  hrs:  (P) [id number]    Benign essential hypertension  -Blood pressure is currently stable and is at goal.  Continue carvedilol at current dose with hold parameters, continue to hold losartan  Altered mental status  -Patient was brought in from nursing facility for decreased responsiveness with waxing and waning mental status.  Does have underlying dementia.  CT head on admission did not show any intracranial abnormality, COVID/flu test was negative.  Was found to have elevated TSH but normal T4.  Ammonia was found to be normal.  MRI brain was ordered after consultation with neurology-no acute infarct or any significant mass effect  -Continue workup and management per primary team  Chronic combined systolic and diastolic heart failure, NYHA class 2 ([facility])  Wt Readings from Last 3 Encounters:   04/30/25 72.1 kg (159 lb)   03/30/25 70.3 kg (154 lb 15.7 oz)   03/09/25 72.2 kg (159 lb 3.2 oz)   -Clinically appears euvolemic.  Echo with ejection fraction 55 to 60% with grade 1 diastolic dysfunction  - At home on Lasix 20 mg daily which is currently on hold.  Clinically appears euvolemic, no need for diuretics at this time.  Monitor response to IV fluid      Metabolic acidosis  -Bicarb level is low at 18 with normal anion gap.  No diarrhea  Hypernatremia  -Resolved, sodium currently at 143, peak sodium was 148 on 5/1  Type 2 diabetes mellitus with diabetic neuropathy ([facility])  Lab Results   Component Value Date    HGBA1C 6.9 (H) 02/11/2025       Recent Labs     05/02/25  1624 05/03/25  0028 05/03/25  0617 05/03/25  0710   POCGLU 241* 261* 208* 205*       Blood Sugar Average: Last 72 hrs:  (P) [id number]  -Continue management per primary team  Anemia  -Admission hemoglobin was 6.7 g/dL and received PRBC  -Hemoglobin slightly trended down to 7.7 g/dL.  Continue ferrous sulfate but also with thrombocytopenia in the setting of sepsis  Sepsis  -Blood culture positive for Staph aureus.  Was initially found to be  hypothermic, with tachypnea and fever in the setting of MSSA bacteremia.  Continue antibiotic per primary team  MSSA bacteremia  -Continue antibiotics per primary team and ID.  No vegetations found on echocardiogram report         Discussed with primary team that renal function improving and acidosis improving and sodium level improving and plan to continue current IV fluid and agreed with the plan    HISTORY OF PRESENT ILLNESS:  Requesting Physician: Sukhi Felix MD  Reason for Consult: Acute kidney injury, pulmonary edema    Pernell Covarrubias is a 79 y.o. male with history of chronic kidney disease stage III with baseline creatinine of 1.2, follows with Dr. Montejo, chronic combined systolic and diastolic CHF, hypertension, orthostatic hypotension, anemia who was admitted to [facility] after presenting with complaint of change in mental status from nursing home.  In the ED was found to have waxing and waning mental status.  Patient was admitted on 4/26/2025.  CT brain was unremarkable was started on IV fluids.    PAST MEDICAL HISTORY:  Past Medical History:   Diagnosis Date    Acquired hallux valgus     last assessed: 8/1/2013    Allergic rhinitis     Anemia 10/26/2010    Atrophy of nail     last assessed: 7/7/2015    Cancer ([facility]) 2020    Chronic kidney disease     chronic renal insufficiency    Coronary artery disease     Deformity of ankle and foot, acquired     last assessed: 2/22/2016    Diabetes mellitus ([facility]) 1994    Difficulty walking     last assessed: 12/14/2015    Dysesthesia     last assessed: 11/25/2016    Hammer toe     unspecified laterality; last assessed: 8/1/2013    Hypertension     Neuropathy in diabetes ([facility]) 1994    Onychomycosis of toenail     last assessed: 2/22/2016    Peripheral vascular disease ([facility])     left SFA stent in 2010    Pes planus     unspecified laterality; last assessed: 8/1/2013    Pneumonia     last assessed: 2/27/2016    Prostate cancer ([facility])     Squamous cell  carcinoma of left upper extremity     last assessed: 8/15/2016    Type 2 diabetes mellitus ([facility]) 07/26/2010    Viral warts     last assessed: 7/24/2015       PAST SURGICAL HISTORY:  Past Surgical History:   Procedure Laterality Date    CARDIAC CATHETERIZATION  07/29/2010    CATARACT EXTRACTION, BILATERAL Bilateral     R 1999, L 2008    COLONOSCOPY  2011    FEMORAL ARTERY - POPLITEAL ARTERY BYPASS GRAFT  09/23/2013    FOOT SURGERY      bone spur removed    LEG SURGERY Bilateral     repair; L 8/20/2010 and 7/26/2012    [place] PLMT INTERSTITIAL DEV RADIAT TX PROSTATE 1/MULT N/A 6/22/2021    Procedure: INSERTION OF FIDUCIAL MARKER (TRANSRECTAL ULTRASOUND GUIDANCE), SPACEOAR;  Surgeon: Jero Loomis MD;  Location: [facility];  Service: Urology    ROTATOR CUFF REPAIR Left 2009    SHOULDER SURGERY Right 2005    collar bone       SOCIAL HISTORY:  Social History     Substance and Sexual Activity   Alcohol Use Yes    Alcohol/week: 1.0 standard drink of alcohol    Types: 1 Glasses of wine per week    Comment: Stopped in 1986     Social History     Substance and Sexual Activity   Drug Use Never     Social History     Tobacco Use   Smoking Status Never    Passive exposure: Past   Smokeless Tobacco Never       FAMILY HISTORY:  Family History   Problem Relation Age of Onset    Aortic aneurysm Mother     Heart disease Father     Heart attack Father         acute myocardial infarction    Heart disease Sister     Heart attack Sister         acute myocardial infarction    Throat cancer Maternal Grandfather     Heart disease Paternal Grandfather     No Known Problems Son        ALLERGIES:  No Known Allergies    MEDICATIONS:    Current Facility-Administered Medications:     acetaminophen (TYLENOL) tablet 650 mg, 650 mg, Oral, Q6H PRN, Kristen Hinkle MD, 650 mg at 05/03/25 0802    aspirin chewable tablet 81 mg, 81 mg, Oral, Daily, Kristen Hinkle MD, 81 mg at 05/02/25 0813    atorvastatin (LIPITOR) tablet 20 mg, 20 mg, Oral,  Daily, Kristen Hinkle MD, 20 mg at 05/02/25 0812    bisacodyl (DULCOLAX) EC tablet 10 mg, 10 mg, Oral, Daily PRN, Kristen Hinkle MD    carvedilol (COREG) tablet 12.5 mg, 12.5 mg, Oral, BID, Kristen Hinkle MD, 12.5 mg at 05/02/25 1702    ceFAZolin (ANCEF) IVPB (premix in dextrose) 2,000 mg 50 mL, 2,000 mg, Intravenous, Q8H, Alexandra Mckenzie PA-C, Last Rate: 100 mL/hr at 05/03/25 0030, 2,000 mg at 05/03/25 0030    Cholecalciferol (VITAMIN D3) tablet 2,000 Units, 2,000 Units, Oral, Daily, Krsiten Hinkle MD, 2,000 Units at 05/02/25 0812    co-enzyme Q-10 capsule 100 mg, 100 mg, Oral, Daily, Kristen Hinkle MD, 100 mg at 05/02/25 0812    dextrose 5 % in lactated Ringer's infusion, 50 mL/hr, Intravenous, Continuous, Alexandra Mckenzie PA-C, Last Rate: 50 mL/hr at 05/02/25 1145, 50 mL/hr at 05/02/25 1145    ferrous sulfate tablet 325 mg, 325 mg, Oral, BID With Meals, Kristen Hinkle MD, 325 mg at 04/30/25 1759    insulin lispro (HumALOG/ADMELOG) 100 units/mL subcutaneous injection 1-5 Units, 1-5 Units, Subcutaneous, Q6H ENRIQUE, 1 Units at 05/03/25 0640 **AND** Fingerstick Glucose (POCT), , , Q6H, Deirdre Luo PA-C    metoclopramide (REGLAN) tablet 10 mg, 10 mg, Oral, TID AC, Alexandra Mckenzie PA-C, 10 mg at 05/03/25 0804    moisture barrier miconazole 2% cream (aka CHERELLE MOISTURE BARRIER ANTIFUNGAL CREAM), , Topical, BID, Alexandra Mckenzie PA-C, Given at 05/02/25 1703    multivitamin stress formula tablet 1 tablet, 1 tablet, Oral, Daily, Kristen Hinkle MD, 1 tablet at 05/02/25 0812    pantoprazole (PROTONIX) EC tablet 40 mg, 40 mg, Oral, Early Morning, Kristen Hinkle MD, 40 mg at 04/30/25 0604    sodium bicarbonate tablet 650 mg, 650 mg, Oral, TID after meals, Alexandra Mckenzie PA-C, 650 mg at 05/02/25 1649    tamsulosin (FLOMAX) capsule 0.4 mg, 0.4 mg, Oral, Daily With Dinner, Kristen Hinkle MD, 0.4 mg at 05/02/25 1649    REVIEW OF SYSTEMS:   Review of Systems   Unable to perform ROS: Mental status change           PHYSICAL EXAM:  Current Weight: Weight - Scale: 72.1 kg (159 lb)  First Weight: Weight - Scale: 77.8 kg (171 lb 8.3 oz)  Vitals:    05/02/25 0901 05/02/25 1441 05/02/25 1908 05/03/25 0756   BP: 134/58 (!) 112/46 155/59 136/54   Pulse: (!) 54 55 73 84   Resp:    20   Temp: 97.5 °F (36.4 °C) 97.5 °F (36.4 °C) 98.9 °F (37.2 °C) (!) 101.7 °F (38.7 °C)   TempSrc:       SpO2: 95% 96% 95% 92%   Weight:       Height:           Intake/Output Summary (Last 24 hours) at 5/3/2025 0837  Last data filed at 5/3/2025 0802  Gross per 24 hour   Intake 1110 ml   Output 1050 ml   Net 60 ml     Physical Exam  General:  Ill looking, awake.  Eyes: Conjunctivae pink,  Sclera anicteric  ENT: lips and mucous membranes moist  Neck: supple   Chest: Clear to Auscultation both lungs,  no crackles, ronchus or wheezing.  CVS: S1 & S2 present, normal rate, regular rhythm, no murmur.  Abdomen: soft, non-tender, non-distended, Bowel sounds normoactive  Extremities: no edema of  legs  Skin: no rash  Neuro: awake, alert, oriented to self, not completely oriented  Psych: Not able to assess as patient is speaking softly and not completely oriented    Invasive Devices:   Urethral Catheter Straight-tip 16 Fr. (Active)   Amt returned on insertion(mL) 350 mL 05/01/25 1708   Site Assessment Clean 04/30/25 0601   Soto Care Done 05/02/25 2100   Collection Container Standard drainage bag 04/30/25 2100   Securement Method Securing device (Describe) 04/30/25 2100   Output (mL) 500 mL 05/03/25 0802       Lab Results:   Results from last 7 days   Lab Units 05/03/25  0434 05/02/25  0541 05/01/25  0545   WBC Thousand/uL 9.09 9.28 9.21   HEMOGLOBIN g/dL 7.7* 7.9* 7.6*   HEMATOCRIT % 23.6* 25.3* 23.8*   PLATELETS Thousands/uL 148* 126* 107*   POTASSIUM mmol/L 4.0 3.4* 3.8   CHLORIDE mmol/L 120* 118* 119*   CO2 mmol/L 18* 16* 17*   BUN mg/dL 40* 39* 36*   CREATININE mg/dL 1.42* 1.50* 1.71*   CALCIUM mg/dL 8.2* 8.3* 8.3*   ALK PHOS U/L 161* 125* 123*   ALT U/L 29 13  "23   AST U/L 111* 48* 54*       Other Studies: MRI brain without contrast, no acute infarct or significant mass effect or midline shift      Portions of the record may have been created with voice recognition software. Occasional wrong word or \"sound a like\" substitutions may have occurred due to the inherent limitations of voice recognition software. Read the chart carefully and recognize, using context, where substitutions have occurred.If you have any questions, please contact the dictating provider.    "

## 2025-05-03 NOTE — ASSESSMENT & PLAN NOTE
Baseline creatinine around 0.8-1.2  Peaked at 1.71 on 5/1/25  Likely in setting of poor oral intake/sepsis  Patient with meza catheter. CT wo hydronephrosis  Holding PTA lasix and losartan  Creatinine improving 1.50 today, continue gentle IVF  Monitor I&O  Daily BMP  Nephrology team consulted; appreciate input

## 2025-05-03 NOTE — PLAN OF CARE
Problem: Potential for Falls  Goal: Patient will remain free of falls  Description: INTERVENTIONS:- Educate patient/family on patient safety including physical limitations- Instruct patient to call for assistance with activity - Consult OT/PT to assist with strengthening/mobility - Keep Call bell within reach- Keep bed low and locked with side rails adjusted as appropriate- Keep care items and personal belongings within reach- Initiate and maintain comfort rounds- Make Fall Risk Sign visible to staff- Offer Toileting every 2 Hours, in advance of need- Initiate/Maintain bed alarm- Obtain necessary fall risk management equipment: slipper socks- Apply yellow socks and bracelet for high fall risk patients- Consider moving patient to room near nurses station  INTERVENTIONS:- Educate patient/family on patient safety including physical limitations- Instruct patient to call for assistance with activity - Consult OT/PT to assist with strengthening/mobility - Keep Call bell within reach- Keep bed low and locked with side rails adjusted as appropriate- Keep care items and personal belongings within reach- Initiate and maintain comfort rounds- Make Fall Risk Sign visible to staff- Offer Toileting every 2 Hours, in advance of need- Initiate/Maintain bed alarm- Obtain necessary fall risk management equipment: slipper socks- Apply yellow socks and bracelet for high fall risk patients- Consider moving patient to room near nurses station  Outcome: Progressing

## 2025-05-03 NOTE — ASSESSMENT & PLAN NOTE
Initially with hypothermia however currently spiked a fever 101.7.    - Discontinue hypothermia protocol  -Monitor vitals  -Refer to assessment and plan for sepsis

## 2025-05-04 PROBLEM — D69.6 THROMBOCYTOPENIA (HCC): Status: RESOLVED | Noted: 2025-04-28 | Resolved: 2025-05-04

## 2025-05-04 LAB
ANION GAP SERPL CALCULATED.3IONS-SCNC: 9 MMOL/L (ref 4–13)
BASOPHILS # BLD AUTO: 0.02 THOUSANDS/ÂΜL (ref 0–0.1)
BASOPHILS NFR BLD AUTO: 0 % (ref 0–1)
BUN SERPL-MCNC: 35 MG/DL (ref 5–25)
CALCIUM SERPL-MCNC: 8.2 MG/DL (ref 8.4–10.2)
CHLORIDE SERPL-SCNC: 119 MMOL/L (ref 96–108)
CO2 SERPL-SCNC: 20 MMOL/L (ref 21–32)
CREAT SERPL-MCNC: 1.28 MG/DL (ref 0.6–1.3)
EOSINOPHIL # BLD AUTO: 0.41 THOUSAND/ÂΜL (ref 0–0.61)
EOSINOPHIL NFR BLD AUTO: 4 % (ref 0–6)
ERYTHROCYTE [DISTWIDTH] IN BLOOD BY AUTOMATED COUNT: 17.9 % (ref 11.6–15.1)
GFR SERPL CREATININE-BSD FRML MDRD: 52 ML/MIN/1.73SQ M
GLUCOSE SERPL-MCNC: 148 MG/DL (ref 65–140)
GLUCOSE SERPL-MCNC: 165 MG/DL (ref 65–140)
GLUCOSE SERPL-MCNC: 169 MG/DL (ref 65–140)
GLUCOSE SERPL-MCNC: 204 MG/DL (ref 65–140)
GLUCOSE SERPL-MCNC: 227 MG/DL (ref 65–140)
GLUCOSE SERPL-MCNC: 253 MG/DL (ref 65–140)
HCT VFR BLD AUTO: 24.4 % (ref 36.5–49.3)
HGB BLD-MCNC: 7.7 G/DL (ref 12–17)
IMM GRANULOCYTES # BLD AUTO: 0.08 THOUSAND/UL (ref 0–0.2)
IMM GRANULOCYTES NFR BLD AUTO: 1 % (ref 0–2)
LDH SERPL-CCNC: 159 U/L (ref 140–271)
LYMPHOCYTES # BLD AUTO: 0.58 THOUSANDS/ÂΜL (ref 0.6–4.47)
LYMPHOCYTES NFR BLD AUTO: 6 % (ref 14–44)
MAGNESIUM SERPL-MCNC: 1.8 MG/DL (ref 1.9–2.7)
MCH RBC QN AUTO: 29.6 PG (ref 26.8–34.3)
MCHC RBC AUTO-ENTMCNC: 31.6 G/DL (ref 31.4–37.4)
MCV RBC AUTO: 94 FL (ref 82–98)
MONOCYTES # BLD AUTO: 0.65 THOUSAND/ÂΜL (ref 0.17–1.22)
MONOCYTES NFR BLD AUTO: 7 % (ref 4–12)
NEUTROPHILS # BLD AUTO: 7.72 THOUSANDS/ÂΜL (ref 1.85–7.62)
NEUTS SEG NFR BLD AUTO: 82 % (ref 43–75)
NRBC BLD AUTO-RTO: 0 /100 WBCS
PLATELET # BLD AUTO: 181 THOUSANDS/UL (ref 149–390)
PMV BLD AUTO: 10.2 FL (ref 8.9–12.7)
POTASSIUM SERPL-SCNC: 3.7 MMOL/L (ref 3.5–5.3)
RBC # BLD AUTO: 2.6 MILLION/UL (ref 3.88–5.62)
SODIUM SERPL-SCNC: 148 MMOL/L (ref 135–147)
URATE SERPL-MCNC: 5.2 MG/DL (ref 3.5–8.5)
WBC # BLD AUTO: 9.46 THOUSAND/UL (ref 4.31–10.16)

## 2025-05-04 PROCEDURE — 84550 ASSAY OF BLOOD/URIC ACID: CPT

## 2025-05-04 PROCEDURE — 85025 COMPLETE CBC W/AUTO DIFF WBC: CPT

## 2025-05-04 PROCEDURE — 82948 REAGENT STRIP/BLOOD GLUCOSE: CPT

## 2025-05-04 PROCEDURE — 83735 ASSAY OF MAGNESIUM: CPT

## 2025-05-04 PROCEDURE — 30233N1 TRANSFUSION OF NONAUTOLOGOUS RED BLOOD CELLS INTO PERIPHERAL VEIN, PERCUTANEOUS APPROACH: ICD-10-PCS

## 2025-05-04 PROCEDURE — 84425 ASSAY OF VITAMIN B-1: CPT

## 2025-05-04 PROCEDURE — 80048 BASIC METABOLIC PNL TOTAL CA: CPT | Performed by: INTERNAL MEDICINE

## 2025-05-04 PROCEDURE — 99232 SBSQ HOSP IP/OBS MODERATE 35: CPT

## 2025-05-04 PROCEDURE — 99232 SBSQ HOSP IP/OBS MODERATE 35: CPT | Performed by: INTERNAL MEDICINE

## 2025-05-04 PROCEDURE — 83615 LACTATE (LD) (LDH) ENZYME: CPT

## 2025-05-04 RX ORDER — MAGNESIUM SULFATE 1 G/100ML
1 INJECTION INTRAVENOUS ONCE
Status: COMPLETED | OUTPATIENT
Start: 2025-05-04 | End: 2025-05-05

## 2025-05-04 RX ORDER — DEXTROSE MONOHYDRATE 50 MG/ML
50 INJECTION, SOLUTION INTRAVENOUS CONTINUOUS
Status: DISCONTINUED | OUTPATIENT
Start: 2025-05-04 | End: 2025-05-06

## 2025-05-04 RX ADMIN — INSULIN LISPRO 2 UNITS: 100 INJECTION, SOLUTION INTRAVENOUS; SUBCUTANEOUS at 18:00

## 2025-05-04 RX ADMIN — CARVEDILOL 12.5 MG: 12.5 TABLET, FILM COATED ORAL at 18:14

## 2025-05-04 RX ADMIN — TAMSULOSIN HYDROCHLORIDE 0.4 MG: 0.4 CAPSULE ORAL at 16:33

## 2025-05-04 RX ADMIN — DEXTROSE, SODIUM CHLORIDE, SODIUM LACTATE, POTASSIUM CHLORIDE, AND CALCIUM CHLORIDE 50 ML/HR: 5; .6; .31; .03; .02 INJECTION, SOLUTION INTRAVENOUS at 06:14

## 2025-05-04 RX ADMIN — MAGNESIUM SULFATE HEPTAHYDRATE 1 G: 1 INJECTION, SOLUTION INTRAVENOUS at 09:56

## 2025-05-04 RX ADMIN — CEFAZOLIN SODIUM 2000 MG: 2 SOLUTION INTRAVENOUS at 09:07

## 2025-05-04 RX ADMIN — SODIUM BICARBONATE 650 MG TABLET 650 MG: at 16:33

## 2025-05-04 RX ADMIN — FERROUS SULFATE TAB 325 MG (65 MG ELEMENTAL FE) 325 MG: 325 (65 FE) TAB at 16:33

## 2025-05-04 RX ADMIN — INSULIN GLARGINE 5 UNITS: 100 INJECTION, SOLUTION SUBCUTANEOUS at 09:10

## 2025-05-04 RX ADMIN — DEXTROSE 50 ML/HR: 5 SOLUTION INTRAVENOUS at 09:11

## 2025-05-04 RX ADMIN — CARVEDILOL 12.5 MG: 12.5 TABLET, FILM COATED ORAL at 09:10

## 2025-05-04 RX ADMIN — MICONAZOLE NITRATE: 20 CREAM TOPICAL at 18:14

## 2025-05-04 RX ADMIN — SODIUM BICARBONATE 650 MG TABLET 650 MG: at 11:43

## 2025-05-04 RX ADMIN — CHOLECALCIFEROL TAB 25 MCG (1000 UNIT) 2000 UNITS: 25 TAB at 09:10

## 2025-05-04 RX ADMIN — CEFAZOLIN SODIUM 2000 MG: 2 SOLUTION INTRAVENOUS at 15:46

## 2025-05-04 RX ADMIN — INSULIN LISPRO 1 UNITS: 100 INJECTION, SOLUTION INTRAVENOUS; SUBCUTANEOUS at 11:44

## 2025-05-04 RX ADMIN — INSULIN LISPRO 1 UNITS: 100 INJECTION, SOLUTION INTRAVENOUS; SUBCUTANEOUS at 06:24

## 2025-05-04 RX ADMIN — ASPIRIN 81 MG: 81 TABLET, CHEWABLE ORAL at 09:10

## 2025-05-04 RX ADMIN — MICONAZOLE NITRATE 1 APPLICATION: 20 CREAM TOPICAL at 09:11

## 2025-05-04 RX ADMIN — FERROUS SULFATE TAB 325 MG (65 MG ELEMENTAL FE) 325 MG: 325 (65 FE) TAB at 09:10

## 2025-05-04 RX ADMIN — ATORVASTATIN CALCIUM 20 MG: 20 TABLET, FILM COATED ORAL at 09:10

## 2025-05-04 RX ADMIN — PANTOPRAZOLE SODIUM 40 MG: 40 TABLET, DELAYED RELEASE ORAL at 05:32

## 2025-05-04 RX ADMIN — SODIUM BICARBONATE 650 MG TABLET 650 MG: at 09:10

## 2025-05-04 RX ADMIN — Medication 1 TABLET: at 09:10

## 2025-05-04 NOTE — ASSESSMENT & PLAN NOTE
- Sodium level again trended up to 148 with use of isotonic fluid, will change IV fluid to D5 water at 50 mL/h and monitor.  If blood glucose trends up can switch to half NS

## 2025-05-04 NOTE — PROGRESS NOTES
Progress Note - Nephrology   Name: Pernell Covarrubias 79 y.o. male I MRN: [id number]  Unit/Bed#: [address] [place] [address] I Date of Admission: 4/26/2025   Date of Service: 5/4/2025 I Hospital Day: 7    Assessment & Plan  BRIAN on CKD  Lab Results   Component Value Date    HGBA1C 6.9 (H) 02/11/2025   Acute kidney injury  -Baseline creatinine: 1.0-1.2 mg/dl  -Admission creatinine: 1.12 mg/dl  - Work up:   UA with microscopy: UA from 4/26 was bland  Imaging: CT chest abdomen pelvis without contrast from 5/1 did not show any hydronephrosis.  Diffuse groundglass opacities and bilateral pleural effusion consistent with pulmonary edema.  Stable 5 mm right upper lobe nodule  -Etiology: Acute kidney injury likely due to volume depletion in the setting of autoregulatory failure from being on losartan on 4/28 and 4/29 and also due to component of hypotension On 4/28 and 29.   Less likely AIN as renal function improving  -Hospital Course: Renal function worsened during the hospital stay, peak creatinine 1.71 on 5/1 and since then improving  -Plan:   Renal function improved to creatinine 1.28 mg/dL.  Bicarb level improved to 20.  In the setting of Hypernatremia, changed IV fluid to D5 water at 50 mL/h, recommend oral intake  Hold losartan in the setting of acute kidney injury, continue to hold diuretics.   Avoid nephrotoxins and dose all medications per EGFR.    Avoid hypotension.                                            Chronic Kidney Disease Stage 3  -Outpatient Nephrologist: Dr. 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    Benign essential hypertension  -Blood pressure is slightly elevated but has been labile.  Continue carvedilol at current dose with hold parameters, continue to hold losartan  Altered mental status  -Patient was brought in  from nursing facility for decreased responsiveness with waxing and waning mental status.  Does have underlying dementia.  CT head on admission did not show any intracranial abnormality, COVID/flu test was negative.  Was found to have elevated TSH but normal T4.  Ammonia was found to be normal.  MRI brain was ordered after consultation with neurology-no acute infarct or any significant mass effect  -Continue workup and management per primary team.  As per my discussion with nursing staff, still with fluctuating mental status  Chronic combined systolic and diastolic heart failure, NYHA class 2 ([facility])  Wt Readings from Last 3 Encounters:   04/30/25 72.1 kg (159 lb)   03/30/25 70.3 kg (154 lb 15.7 oz)   03/09/25 72.2 kg (159 lb 3.2 oz)   -Clinically appears euvolemic.  Echo with ejection fraction 55 to 60% with grade 1 diastolic dysfunction  - At home on Lasix 20 mg daily which is currently on hold.   Clinically appears euvolemic, no need for diuretic, monitor volume status with use of IV fluids    Metabolic acidosis  -Bicarb level improved to 20 status post IV fluids.  Continue to monitor.  Denies any history of diarrhea.  Also on oral sodium bicarbonate tablet, continue at current dose  Hypernatremia  - Sodium level again trended up to 148 with use of isotonic fluid, will change IV fluid to D5 water at 50 mL/h and monitor.  If blood glucose trends up can switch to half NS  Type 2 diabetes mellitus with diabetic neuropathy ([facility])  Lab Results   Component Value Date    HGBA1C 6.9 (H) 02/11/2025       Recent Labs     05/03/25  2018 05/03/25  2356 05/04/25  0622 05/04/25  0730   POCGLU 174* 164* 165* 169*       Blood Sugar Average: Last 72 hrs:  (P) [id number]  -Continue management per primary team  Anemia  -Admission hemoglobin was 6.7 g/dL and received PRBC  -Hemoglobin stable at 7.7 g/dL.  Continue ferrous sulfate but also had thrombocytopenia in the setting of sepsis.  Platelet count improving  Sepsis  -Blood  culture positive for Staph aureus.  Was initially found to be hypothermic, with tachypnea and fever in the setting of MSSA bacteremia.  Continue antibiotic per primary team.  MSSA bacteremia  -Continue antibiotics per primary team and ID.  No vegetations found on echocardiogram report  Fever  -Had temperature 101.1 on 5/3, currently afebrile, continue antibiotics per primary team and ID  Hypomagnesemia  -Magnesium level was low, replaced by primary team    I have reviewed the nephrology recommendations including renal function improving, acidosis improving but in the setting of worsening hypernatremia switch IV fluid to D5 water, with primary team, and we are in agreement with renal plan including the information outlined above.     Subjective   No new complaints, patient is not completely oriented, is sleepy    Objective :  Temp:  [99.3 °F (37.4 °C)-101.1 °F (38.4 °C)] 99.8 °F (37.7 °C)  HR:  [58-90] 65  BP: (102-161)/(57-73) 157/72  Resp:  [15-21] 15  SpO2:  [92 %-97 %] 96 %    Current Weight: Weight - Scale: 72.1 kg (159 lb)  First Weight: Weight - Scale: 77.8 kg (171 lb 8.3 oz)  I/O         05/02 0701  05/03 0700 05/03 0701  05/04 0700 05/04 0701  05/05 0700    P.O. 1100      I.V. (mL/kg) 10 (0.1)      Total Intake(mL/kg) 1110 (15.4)      Urine (mL/kg/hr) 550 (0.3) 900 (0.5)     Total Output 550 900     Net +560 -900                  Physical Exam   General:  Ill looking, sleepy and lethargic  Eyes: Conjunctivae pink,  Sclera anicteric  ENT: lips and mucous membranes moist  Neck: supple   Chest: Clear to Auscultation both lungs,  no crackles, ronchus or wheezing.  CVS: S1 & S2 present, normal rate, regular rhythm, no murmur.  Abdomen: soft, non-tender, non-distended, Bowel sounds normoactive  Extremities: no edema of  legs  Skin: no rash  Neuro: awake, alert, oriented x 1   Psych: Mood and affect flat  Medications:    Current Facility-Administered Medications:     acetaminophen (TYLENOL) tablet 650 mg, 650 mg,  Oral, Q6H PRN, Kristen Hinkle MD, 650 mg at 05/03/25 0802    aspirin chewable tablet 81 mg, 81 mg, Oral, Daily, Kristen Hinkle MD, 81 mg at 05/03/25 0933    atorvastatin (LIPITOR) tablet 20 mg, 20 mg, Oral, Daily, Kristen Hinkle MD, 20 mg at 05/03/25 0934    bisacodyl (DULCOLAX) EC tablet 10 mg, 10 mg, Oral, Daily PRN, Kristen Hinkle MD    carvedilol (COREG) tablet 12.5 mg, 12.5 mg, Oral, BID, Kristen Hinkle MD, 12.5 mg at 05/03/25 1716    ceFAZolin (ANCEF) IVPB (premix in dextrose) 2,000 mg 50 mL, 2,000 mg, Intravenous, Q8H, Alexandra Mckenzie PA-C, Last Rate: 100 mL/hr at 05/03/25 2353, 2,000 mg at 05/03/25 2353    Cholecalciferol (VITAMIN D3) tablet 2,000 Units, 2,000 Units, Oral, Daily, Kristen Hinkle MD, 2,000 Units at 05/03/25 0933    co-enzyme Q-10 capsule 100 mg, 100 mg, Oral, Daily, Kristen Hinkle MD, 100 mg at 05/02/25 0812    dextrose 5 % in lactated Ringer's infusion, 50 mL/hr, Intravenous, Continuous, Alexandra Mckenzie PA-C, Last Rate: 50 mL/hr at 05/04/25 0614, 50 mL/hr at 05/04/25 0614    ferrous sulfate tablet 325 mg, 325 mg, Oral, BID With Meals, Kristen Hinkle MD, 325 mg at 05/03/25 1623    insulin glargine (LANTUS) subcutaneous injection 5 Units 0.05 mL, 5 Units, Subcutaneous, QAM, Sukhi Felix MD, 5 Units at 05/03/25 1158    insulin lispro (HumALOG/ADMELOG) 100 units/mL subcutaneous injection 1-5 Units, 1-5 Units, Subcutaneous, Q6H ENRIQUE, 1 Units at 05/04/25 0624 **AND** Fingerstick Glucose (POCT), , , Q6H, Deirdre Luo PA-C    magnesium sulfate IVPB (premix) SOLN 1 g, 1 g, Intravenous, Once, Sukhi Felix MD    [Held by provider] metoclopramide (REGLAN) tablet 10 mg, 10 mg, Oral, TID AC, Alexandra Mckenzie PA-C, 10 mg at 05/03/25 1624    moisture barrier miconazole 2% cream (aka CHERELLE MOISTURE BARRIER ANTIFUNGAL CREAM), , Topical, BID, Alexandra Mckenzie PA-C, Given at 05/03/25 1718    multivitamin stress formula tablet 1 tablet, 1 tablet, Oral, Daily, Kristen Hinkle MD, 1  "tablet at 05/03/25 0933    pantoprazole (PROTONIX) EC tablet 40 mg, 40 mg, Oral, Early Morning, Kristen Hinkle MD, 40 mg at 05/04/25 0532    sodium bicarbonate tablet 650 mg, 650 mg, Oral, TID after meals, Alexandra Mckenzie PA-C, 650 mg at 05/03/25 1716    tamsulosin (FLOMAX) capsule 0.4 mg, 0.4 mg, Oral, Daily With Dinner, Sukhi Felix MD, 0.4 mg at 05/03/25 1623      Lab Results: I have reviewed the following results:  Results from last 7 days   Lab Units 05/04/25  0531 05/03/25  0434 05/02/25  0541 05/01/25  0545 04/30/25  0601 04/29/25  0454 04/28/25  0448   WBC Thousand/uL 9.46 9.09 9.28 9.21 8.68 8.51 7.48   HEMOGLOBIN g/dL 7.7* 7.7* 7.9* 7.6* 6.6* 7.4* 7.4*   HEMATOCRIT % 24.4* 23.6* 25.3* 23.8* 20.1* 22.8* 22.6*   PLATELETS Thousands/uL 181 148* 126* 107* 104* 100* 93*   POTASSIUM mmol/L 3.7 4.0 3.4* 3.8 3.8 3.8 3.6   CHLORIDE mmol/L 119* 120* 118* 119* 116* 113* 119*   CO2 mmol/L 20* 18* 16* 17* 17* 18* 18*   BUN mg/dL 35* 40* 39* 36* 36* 34* 33*   CREATININE mg/dL 1.28 1.42* 1.50* 1.71* 1.68* 1.18 1.21   CALCIUM mg/dL 8.2* 8.2* 8.3* 8.3* 8.1* 8.5 8.3*   MAGNESIUM mg/dL 1.8*  --   --   --   --   --   --    ALBUMIN g/dL  --  2.6* 2.8* 2.6*  --   --   --        Administrative Statements     Portions of the record may have been created with voice recognition software. Occasional wrong word or \"sound a like\" substitutions may have occurred due to the inherent limitations of voice recognition software. Read the chart carefully and recognize, using context, where substitutions have occurred.If you have any questions, please contact the dictating provider.  "

## 2025-05-04 NOTE — ASSESSMENT & PLAN NOTE
4/28/25: 1/2 blood cultures positive for MSSA  CT C/A/P without evidence of acute infectious process  Possible translocation in setting of chronic skin wounds, patient has no devices or hardware  No evidence of vegetation on ECHO report  Continue cefazolin 2g Q8H  5/2/25: Follow up repeat BC x2 negative at 24 hrs  ID consulted, recommendations are appreciated

## 2025-05-04 NOTE — ASSESSMENT & PLAN NOTE
Per chart review, appears chronic  No evidence of active bleeding  Holding DVT prophylaxis for today   Administer PRBC x 1 today   Daily CBC

## 2025-05-04 NOTE — ASSESSMENT & PLAN NOTE
Lab Results   Component Value Date    HGBA1C 6.9 (H) 02/11/2025   Acute kidney injury  -Baseline creatinine: 1.0-1.2 mg/dl  -Admission creatinine: 1.12 mg/dl  - Work up:   UA with microscopy: UA from 4/26 was bland  Imaging: CT chest abdomen pelvis without contrast from 5/1 did not show any hydronephrosis.  Diffuse groundglass opacities and bilateral pleural effusion consistent with pulmonary edema.  Stable 5 mm right upper lobe nodule  -Etiology: Acute kidney injury likely due to volume depletion in the setting of autoregulatory failure from being on losartan on 4/28 and 4/29 and also due to component of hypotension On 4/28 and 29.   Less likely AIN as renal function improving  -Hospital Course: Renal function worsened during the hospital stay, peak creatinine 1.71 on 5/1 and since then improving  -Plan:   Renal function improved to creatinine 1.28 mg/dL.  Bicarb level improved to 20.  In the setting of Hypernatremia, changed IV fluid to D5 water at 50 mL/h, recommend oral intake  Hold losartan in the setting of acute kidney injury, continue to hold diuretics.   Avoid nephrotoxins and dose all medications per EGFR.    Avoid hypotension.                                            Chronic Kidney Disease Stage 3  -Outpatient Nephrologist: Dr. 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

## 2025-05-04 NOTE — ASSESSMENT & PLAN NOTE
Initially with hypothermia however currently spiked a fever 101.7 on 5/3/25    - Discontinue hypothermia protocol  -remains afebrile since 5/3/25 9 am   -Monitor vitals  -Refer to assessment and plan for sepsis

## 2025-05-04 NOTE — ASSESSMENT & PLAN NOTE
-Bicarb level improved to 20 status post IV fluids.  Continue to monitor.  Denies any history of diarrhea.  Also on oral sodium bicarbonate tablet, continue at current dose

## 2025-05-04 NOTE — ASSESSMENT & PLAN NOTE
-Admission hemoglobin was 6.7 g/dL and received PRBC  -Hemoglobin stable at 7.7 g/dL.  Continue ferrous sulfate but also had thrombocytopenia in the setting of sepsis.  Platelet count improving

## 2025-05-04 NOTE — ASSESSMENT & PLAN NOTE
-Blood culture positive for Staph aureus.  Was initially found to be hypothermic, with tachypnea and fever in the setting of MSSA bacteremia.  Continue antibiotic per primary team.

## 2025-05-04 NOTE — ASSESSMENT & PLAN NOTE
-Blood pressure is slightly elevated but has been labile.  Continue carvedilol at current dose with hold parameters, continue to hold losartan

## 2025-05-04 NOTE — PROGRESS NOTES
Progress Note - Hospitalist   Name: Pernell Covarrubias 79 y.o. male I MRN: [id number]  Unit/Bed#: [address] [place] [address] Date of Admission: 4/26/2025   Date of Service: 5/4/2025 I Hospital Day: 7    Assessment & Plan  Altered mental status  Patient brought in from [facility] at [facility] for decreased responsiveness. Mental status waxing and waning. Recently hospitalization earlier in the month for AMS in the setting of dehydration and UTI with concern of underlying dementia (wife reports questionable parkinson diagnosis)   CT head: 1. No acute intracranial abnormality.  Stable chronic microangiopathic changes within the brain.  COVID/FLU negative. UA negative. Lactic acid negative. CXR without evidence of infection.   TSH elevated but normal T4  Acute on chronic anemia on admission. Hemoglobin stabilized following transfusion  Coma panel negative. Ammonia level WNL  Speech therapy consulted. Recommending dysphagia 1 diet with nectar thick liquids. Continue oral care  Consult to Neurology, recommendations are appreciated  MRI brain negative for acute abnormality  Suspect acute metabolic encephalopathy in setting of sepsis due to MSSA bacteremia contributing  Medication list reviewed; will d/c metoclopromide. Unclear indication with possible neuro side effects including parkinsonism. Monitor off medication    Patient more awake and alert today.answering questions.  Having breakfast during my encounter.  Monitor neuro checks.   Will discuss with neurologist on 5/5   MSSA bacteremia  4/28/25: 1/2 blood cultures positive for MSSA  CT C/A/P without evidence of acute infectious process  Possible translocation in setting of chronic skin wounds, patient has no devices or hardware  No evidence of vegetation on ECHO report  Continue cefazolin 2g Q8H  5/2/25: Follow up repeat BC x2 negative at 24 hrs  ID consulted, recommendations are appreciated  BRIAN on CKD  Baseline creatinine around 0.8-1.2  Peaked at 1.71 on 5/1/25  Likely in setting  of poor oral intake/sepsis  Patient with meza catheter. CT wo hydronephrosis  Holding PTA lasix and losartan  Creatinine improving 1.28 today, continue gentle IVF  Monitor I&O  Daily BMP  Nephrology team consulted; appreciate input  Sepsis  Evidenced by hypothermia and tachypnea now with high fever on 4/30 in setting of MSSA bacteremia  COVID/FLU negative. UA negative. Lactic acid negative. CXR without evidence of infection. Without leukocytosis.  Continue cefazolin as above  CT C/A/P without evidence of acute infectious process  Follow up repeat blood cultures  Anemia  Acute on chronic. Patient's baseline hemoglobin is around 9 range. Has been noted to trend down to 7-8 over this past month  No evidence of active bleeding  4/26/25: hgb 6.7 s/p 1 unit PRBC  4/30/25: hgb 6.6 s/p 1 unit PRBC  B12, folate stable  Continue ferrous sulfate for DAYANA  Monitor daily CBC. Transfuse for hemoglobin <7  Thrombocytopenia (HCC) (Resolved: 5/4/2025)  Per chart review, appears chronic  No evidence of active bleeding  Holding DVT prophylaxis for today   Administer PRBC x 1 today   Daily CBC  Coronary artery disease involving native coronary artery of native heart with angina pectoris (HCC)  Status post multivessel stenting  Continue aspirin, Lipitor and Coreg  Type 2 diabetes mellitus with diabetic neuropathy (HCC)    Lab Results   Component Value Date    HGBA1C 6.9 (H) 02/11/2025   Hold oral metformin and Januvia during hospitalization  SSI with accu checks  Lantus 5 U Qam  Hypoglycemia protocol  Carb controlled diet  Benign essential hypertension  BP acceptable  PTA medications include Carvedilol 12.5mg BID, Losartan 25mg QD, Furosemide 20mg QD. Spironolactone 25mg QD was discontinued during recent hospitalization  Continue Carvedilol  Holding Furosemide and Losartan given BRIAN  Monitor vitals per routine  Chronic combined systolic and diastolic heart failure, NYHA class 2 (HCC)  Wt Readings from Last 3 Encounters:   04/30/25 72.1  kg (159 lb)   03/30/25 70.3 kg (154 lb 15.7 oz)   03/09/25 72.2 kg (159 lb 3.2 oz)     Echo (1/22/25): The left ventricular ejection fraction is 55 to 60% by visual estimation. Systolic function is normal. Wall motion is normal. Diastolic function is mildly abnormal, consistent with grade I (abnormal) relaxation.   Currently dry on exam  Holding home furosemide, monitor with IVF given decreased oral intake, sepsis, BRIAN  Daily weights, I&O  Abnormal TSH  Per chart review, TSH elevated during last hospitalization but with normal T4  4/26/25: TSH 15.954, T4 0.83, T3 2.35  Hypernatremia  Mild hypernatremia 148, in setting of poor oral intake  Sodium improved 143 today  Continue gentle IVF  Daily BMP  Metabolic acidosis  Management per nephrologist   Fever  Initially with hypothermia however currently spiked a fever 101.7 on 5/3/25    - Discontinue hypothermia protocol  -remains afebrile since 5/3/25 9 am   -Monitor vitals  -Refer to assessment and plan for sepsis  Hypomagnesemia  Replete per protocol     VTE Pharmacologic Prophylaxis: VTE Score: 8 Moderate Risk (Score 3-4) - Pharmacological DVT Prophylaxis Contraindicated. Sequential Compression Devices Ordered. Given anemia with progression ,symptomatic .     Mobility:   Basic Mobility Inpatient Raw Score: 8  JH-HLM Goal: 3: Sit at edge of bed  JH-HLM Achieved: 4: Move to chair/commode  JH-HLM Goal achieved. Continue to encourage appropriate mobility.    Patient Centered Rounds: I performed bedside rounds with nursing staff today.   Discussions with Specialists or Other Care Team Provider: Nephrology     Education and Discussions with Family / Patient: Updated  (significant other) via phone.    Current Length of Stay: 7 day(s)  Current Patient Status: Inpatient   Certification Statement: The patient will continue to require additional inpatient hospital stay due to sepsis, anemia, BRIAN , MSSA   Discharge Plan: Anticipate discharge in 24-48 hrs to rehab  facility.    Code Status: Level 3 - DNAR and DNI    Subjective   Patient seen today during rounds. Noted to be sleeping. Wakes up and opens his eyes when called. Eating apple sauce.     No incidents per nursing staff     Objective :  Temp:  [99.3 °F (37.4 °C)-100.2 °F (37.9 °C)] 99.8 °F (37.7 °C)  HR:  [58-77] 65  BP: (102-161)/(57-73) 157/72  Resp:  [15-21] 15  SpO2:  [92 %-97 %] 96 %    Body mass index is 22.18 kg/m².     Input and Output Summary (last 24 hours):     Intake/Output Summary (Last 24 hours) at 5/4/2025 1016  Last data filed at 5/4/2025 0614  Gross per 24 hour   Intake --   Output 400 ml   Net -400 ml       Physical Exam  Vitals and nursing note reviewed.   Constitutional:       General: He is not in acute distress.     Appearance: He is ill-appearing.   HENT:      Head: Normocephalic and atraumatic.      Mouth/Throat:      Mouth: Mucous membranes are moist.   Eyes:      Conjunctiva/sclera: Conjunctivae normal.      Pupils: Pupils are equal, round, and reactive to light.   Cardiovascular:      Rate and Rhythm: Normal rate and regular rhythm.      Pulses: Normal pulses.           Carotid pulses are 2+ on the right side and 2+ on the left side.       Radial pulses are 2+ on the right side and 2+ on the left side.        Dorsalis pedis pulses are 2+ on the right side and 2+ on the left side.      Heart sounds: Normal heart sounds, S1 normal and S2 normal. No murmur heard.  Pulmonary:      Effort: No tachypnea, bradypnea or accessory muscle usage.      Breath sounds: Normal breath sounds and air entry. No decreased breath sounds, wheezing, rhonchi or rales.   Abdominal:      General: Abdomen is flat. Bowel sounds are normal. There is no distension.      Palpations: Abdomen is soft.      Tenderness: There is no abdominal tenderness.   Musculoskeletal:      Right lower leg: No edema.      Left lower leg: No edema.   Skin:     General: Skin is warm.   Neurological:      Mental Status: He is alert.       Motor: Weakness present.           Lines/Drains:  Lines/Drains/Airways       Active Status       Name Placement date Placement time Site Days    Urethral Catheter Straight-tip 16 Fr. 04/26/25  1558  Straight-tip  7                  Urinary Catheter:  Goal for removal: N/A - Chronic [place]                 Lab Results: I have reviewed the following results:   Results from last 7 days   Lab Units 05/04/25  0531   WBC Thousand/uL 9.46   HEMOGLOBIN g/dL 7.7*   HEMATOCRIT % 24.4*   PLATELETS Thousands/uL 181   SEGS PCT % 82*   LYMPHO PCT % 6*   MONO PCT % 7   EOS PCT % 4     Results from last 7 days   Lab Units 05/04/25  0531 05/03/25  0434   SODIUM mmol/L 148* 143   POTASSIUM mmol/L 3.7 4.0   CHLORIDE mmol/L 119* 120*   CO2 mmol/L 20* 18*   BUN mg/dL 35* 40*   CREATININE mg/dL 1.28 1.42*   ANION GAP mmol/L 9 5   CALCIUM mg/dL 8.2* 8.2*   ALBUMIN g/dL  --  2.6*   TOTAL BILIRUBIN mg/dL  --  0.70   ALK PHOS U/L  --  161*   ALT U/L  --  29   AST U/L  --  111*   GLUCOSE RANDOM mg/dL 148* 226*         Results from last 7 days   Lab Units 05/04/25  0730 05/04/25  0622 05/03/25  2356 05/03/25  2018 05/03/25  1623 05/03/25  1133 05/03/25  0710 05/03/25  0617 05/03/25  0028 05/02/25  1624 05/02/25  1213 05/02/25  0536   POC GLUCOSE mg/dl 169* 165* 164* 174* 227* 209* 205* 208* 261* 241* 279* 162*               Recent Cultures (last 7 days):   Results from last 7 days   Lab Units 05/02/25  0539 04/28/25  1840   BLOOD CULTURE  No Growth at 24 hrs.  No Growth at 24 hrs. No Growth After 5 Days.  Staphylococcus aureus*   GRAM STAIN RESULT   --  Gram positive cocci in clusters*       Imaging Results Review: No pertinent imaging studies reviewed.  Other Study Results Review: No additional pertinent studies reviewed.    Last 24 Hours Medication List:     Current Facility-Administered Medications:     acetaminophen (TYLENOL) tablet 650 mg, Q6H PRN    aspirin chewable tablet 81 mg, Daily    atorvastatin (LIPITOR) tablet 20 mg, Daily     bisacodyl (DULCOLAX) EC tablet 10 mg, Daily PRN    carvedilol (COREG) tablet 12.5 mg, BID    ceFAZolin (ANCEF) IVPB (premix in dextrose) 2,000 mg 50 mL, Q8H, Last Rate: 2,000 mg (05/04/25 0907)    Cholecalciferol (VITAMIN D3) tablet 2,000 Units, Daily    co-enzyme Q-10 capsule 100 mg, Daily    dextrose 5 % infusion, Continuous, Last Rate: 50 mL/hr (05/04/25 0911)    ferrous sulfate tablet 325 mg, BID With Meals    insulin glargine (LANTUS) subcutaneous injection 5 Units 0.05 mL, QAM    insulin lispro (HumALOG/ADMELOG) 100 units/mL subcutaneous injection 1-5 Units, Q6H ENRIQUE **AND** Fingerstick Glucose (POCT), Q6H    magnesium sulfate IVPB (premix) SOLN 1 g, Once    [Held by provider] metoclopramide (REGLAN) tablet 10 mg, TID AC    moisture barrier miconazole 2% cream (aka CHERELLE MOISTURE BARRIER ANTIFUNGAL CREAM), BID    multivitamin stress formula tablet 1 tablet, Daily    pantoprazole (PROTONIX) EC tablet 40 mg, Early Morning    sodium bicarbonate tablet 650 mg, TID after meals    tamsulosin (FLOMAX) capsule 0.4 mg, Daily With Dinner    Administrative Statements   Today, Patient Was Seen By: Sukhi Felix MD  I have spent a total time of 30 minutes in caring for this patient on the day of the visit/encounter including Diagnostic results, Prognosis, Risks and benefits of tx options, and Instructions for management.    **Please Note: This note may have been constructed using a voice recognition system.**

## 2025-05-04 NOTE — ASSESSMENT & PLAN NOTE
Lab Results   Component Value Date    HGBA1C 6.9 (H) 02/11/2025       Recent Labs     05/03/25 2018 05/03/25  2356 05/04/25  0622 05/04/25  0730   POCGLU 174* 164* 165* 169*       Blood Sugar Average: Last 72 hrs:  (P) [id number]  -Continue management per primary team

## 2025-05-04 NOTE — ASSESSMENT & PLAN NOTE
Patient brought in from [facility] at [facility] for decreased responsiveness. Mental status waxing and waning. Recently hospitalization earlier in the month for AMS in the setting of dehydration and UTI with concern of underlying dementia (wife reports questionable parkinson diagnosis)   CT head: 1. No acute intracranial abnormality.  Stable chronic microangiopathic changes within the brain.  COVID/FLU negative. UA negative. Lactic acid negative. CXR without evidence of infection.   TSH elevated but normal T4  Acute on chronic anemia on admission. Hemoglobin stabilized following transfusion  Coma panel negative. Ammonia level WNL  Speech therapy consulted. Recommending dysphagia 1 diet with nectar thick liquids. Continue oral care  Consult to Neurology, recommendations are appreciated  MRI brain negative for acute abnormality  Suspect acute metabolic encephalopathy in setting of sepsis due to MSSA bacteremia contributing  Medication list reviewed; will d/c metoclopromide. Unclear indication with possible neuro side effects including parkinsonism. Monitor off medication    Patient more awake and alert today.answering questions.  Having breakfast during my encounter.  Monitor neuro checks.   Will discuss with neurologist on 5/5

## 2025-05-04 NOTE — ASSESSMENT & PLAN NOTE
Wt Readings from Last 3 Encounters:   04/30/25 72.1 kg (159 lb)   03/30/25 70.3 kg (154 lb 15.7 oz)   03/09/25 72.2 kg (159 lb 3.2 oz)   -Clinically appears euvolemic.  Echo with ejection fraction 55 to 60% with grade 1 diastolic dysfunction  - At home on Lasix 20 mg daily which is currently on hold.   Clinically appears euvolemic, no need for diuretic, monitor volume status with use of IV fluids

## 2025-05-04 NOTE — ASSESSMENT & PLAN NOTE
Baseline creatinine around 0.8-1.2  Peaked at 1.71 on 5/1/25  Likely in setting of poor oral intake/sepsis  Patient with meza catheter. CT wo hydronephrosis  Holding PTA lasix and losartan  Creatinine improving 1.28 today, continue gentle IVF  Monitor I&O  Daily BMP  Nephrology team consulted; appreciate input

## 2025-05-04 NOTE — ASSESSMENT & PLAN NOTE
-Patient was brought in from nursing facility for decreased responsiveness with waxing and waning mental status.  Does have underlying dementia.  CT head on admission did not show any intracranial abnormality, COVID/flu test was negative.  Was found to have elevated TSH but normal T4.  Ammonia was found to be normal.  MRI brain was ordered after consultation with neurology-no acute infarct or any significant mass effect  -Continue workup and management per primary team.  As per my discussion with nursing staff, still with fluctuating mental status

## 2025-05-04 NOTE — PLAN OF CARE
Problem: Potential for Falls  Goal: Patient will remain free of falls  Description: INTERVENTIONS:- Educate patient/family on patient safety including physical limitations- Instruct patient to call for assistance with activity - Consult OT/PT to assist with strengthening/mobility - Keep Call bell within reach- Keep bed low and locked with side rails adjusted as appropriate- Keep care items and personal belongings within reach- Initiate and maintain comfort rounds- Make Fall Risk Sign visible to staff- Offer Toileting every 2 Hours, in advance of need- Initiate/Maintain bed alarm- Obtain necessary fall risk management equipment: slipper socks- Apply yellow socks and bracelet for high fall risk patients- Consider moving patient to room near nurses station  INTERVENTIONS:- Educate patient/family on patient safety including physical limitations- Instruct patient to call for assistance with activity - Consult OT/PT to assist with strengthening/mobility - Keep Call bell within reach- Keep bed low and locked with side rails adjusted as appropriate- Keep care items and personal belongings within reach- Initiate and maintain comfort rounds- Make Fall Risk Sign visible to staff- Offer Toileting every 2 Hours, in advance of need- Initiate/Maintain bed alarm- Obtain necessary fall risk management equipment: slipper socks- Apply yellow socks and bracelet for high fall risk patients- Consider moving patient to room near nurses station  Outcome: Progressing     Problem: PAIN - ADULT  Goal: Verbalizes/displays adequate comfort level or baseline comfort level  Description: Interventions:- Encourage patient to monitor pain and request assistance- Assess pain using appropriate pain scale- Administer analgesics based on type and severity of pain and evaluate response- Implement non-pharmacological measures as appropriate and evaluate response- Consider cultural and social influences on pain and pain management- Notify  physician/advanced practitioner if interventions unsuccessful or patient reports new pain  Outcome: Progressing     Problem: INFECTION - ADULT  Goal: Absence or prevention of progression during hospitalization  Description: INTERVENTIONS:- Assess and monitor for signs and symptoms of infection- Monitor lab/diagnostic results- Monitor all insertion sites, i.e. indwelling lines, tubes, and drains- Monitor endotracheal if appropriate and nasal secretions for changes in amount and color- [place] appropriate cooling/warming therapies per order- Administer medications as ordered- Instruct and encourage patient and family to use good hand hygiene technique- Identify and instruct in appropriate isolation precautions for identified infection/condition  Outcome: Progressing  Goal: Absence of fever/infection during neutropenic period  Description: INTERVENTIONS:- Monitor WBC  Outcome: Progressing     Problem: SAFETY ADULT  Goal: Patient will remain free of falls  Description: INTERVENTIONS:- Educate patient/family on patient safety including physical limitations- Instruct patient to call for assistance with activity - Consult OT/PT to assist with strengthening/mobility - Keep Call bell within reach- Keep bed low and locked with side rails adjusted as appropriate- Keep care items and personal belongings within reach- Initiate and maintain comfort rounds- Make Fall Risk Sign visible to staff- Offer Toileting every 2 Hours, in advance of need- Initiate/Maintain bed alarm- Obtain necessary fall risk management equipment: slipper socks- Apply yellow socks and bracelet for high fall risk patients- Consider moving patient to room near nurses station  INTERVENTIONS:- Educate patient/family on patient safety including physical limitations- Instruct patient to call for assistance with activity - Consult OT/PT to assist with strengthening/mobility - Keep Call bell within reach- Keep bed low and locked with side rails adjusted as  appropriate- Keep care items and personal belongings within reach- Initiate and maintain comfort rounds- Make Fall Risk Sign visible to staff- Offer Toileting every 2 Hours, in advance of need- Initiate/Maintain bed alarm- Obtain necessary fall risk management equipment: slipper socks- Apply yellow socks and bracelet for high fall risk patients- Consider moving patient to room near nurses station  Outcome: Progressing  Goal: Maintain or return to baseline ADL function  Description: INTERVENTIONS:-  Assess patient's ability to carry out ADLs; assess patient's baseline for ADL function and identify physical deficits which impact ability to perform ADLs (bathing, care of mouth/teeth, toileting, grooming, dressing, etc.)- Assess/evaluate cause of self-care deficits - Assess range of motion- Assess patient's mobility; develop plan if impaired- Assess patient's need for assistive devices and provide as appropriate- Encourage maximum independence but intervene and supervise when necessary- Involve family in performance of ADLs- Assess for home care needs following discharge - Consider OT consult to assist with ADL evaluation and planning for discharge- Provide patient education as appropriate  Outcome: Progressing  Goal: Maintains/Returns to pre admission functional level  Description: INTERVENTIONS:- Perform AM-PAC 6 Click Basic Mobility/ Daily Activity assessment daily.- Set and communicate daily mobility goal to care team and patient/family/caregiver. - Collaborate with rehabilitation services on mobility goals if consulted- Perform Range of Motion 3 times a day.- Reposition patient every 2 hours.- Dangle patient 3 times a day- Stand patient 3 times a day- Ambulate patient 3 times a day- Out of bed to chair 3 times a day - Out of bed for meals 3 times a day- Out of bed for toileting- Record patient progress and toleration of activity level   Outcome: Progressing     Problem: DISCHARGE PLANNING  Goal: Discharge to home  or other facility with appropriate resources  Description: INTERVENTIONS:- Identify barriers to discharge w/patient and caregiver- Arrange for needed discharge resources and transportation as appropriate- Identify discharge learning needs (meds, wound care, etc.)- Arrange for interpretive services to assist at discharge as needed- Refer to Case Management Department for coordinating discharge planning if the patient needs post-hospital services based on physician/advanced practitioner order or complex needs related to functional status, cognitive ability, or social support system  Outcome: Progressing     Problem: Knowledge Deficit  Goal: Patient/family/caregiver demonstrates understanding of disease process, treatment plan, medications, and discharge instructions  Description: Complete learning assessment and assess knowledge base.Interventions:- Provide teaching at level of understanding- Provide teaching via preferred learning methods  Outcome: Progressing     Problem: Nutrition/Hydration-ADULT  Goal: Nutrient/Hydration intake appropriate for improving, restoring or maintaining nutritional needs  Description: Monitor and assess patient's nutrition/hydration status for malnutrition. Collaborate with interdisciplinary team and initiate plan and interventions as ordered.  Monitor patient's weight and dietary intake as ordered or per policy. Utilize nutrition screening tool and intervene as necessary. Determine patient's food preferences and provide high-protein, high-caloric foods as appropriate. INTERVENTIONS:- Monitor oral intake, urinary output, labs, and treatment plans- Assess nutrition and hydration status and recommend course of action- Evaluate amount of meals eaten- Assist patient with eating if necessary - Allow adequate time for meals- Recommend/ encourage appropriate diets, oral nutritional supplements, and vitamin/mineral supplements- Order, calculate, and assess calorie counts as needed- Recommend,  monitor, and adjust tube feedings and TPN/PPN based on assessed needs- Assess need for intravenous fluids- Provide specific nutrition/hydration education as appropriate- Include patient/family/caregiver in decisions related to nutrition  Outcome: Progressing     Problem: Prexisting or High Potential for Compromised Skin Integrity  Goal: Skin integrity is maintained or improved  Description: INTERVENTIONS:- Identify patients at risk for skin breakdown- Assess and monitor skin integrity- Assess and monitor nutrition and hydration status- Monitor labs - Assess for incontinence - Turn and reposition patient- Assist with mobility/ambulation- Relieve pressure over bony prominences- Avoid friction and shearing- Provide appropriate hygiene as needed including keeping skin clean and dry- Evaluate need for skin moisturizer/barrier cream- Collaborate with interdisciplinary team - Patient/family teaching- Consider wound care consult   Outcome: Progressing

## 2025-05-04 NOTE — ASSESSMENT & PLAN NOTE
-Continue antibiotics per primary team and ID.  No vegetations found on echocardiogram report   Image guided suprapubic catheter insertion completed. Dr. Resendiz placed 12 Croatian 25 cm Argon Skater drain LOT # [id number] EXP 1/25/29. Drain/tube secured with 1 suture. 10 milliliters of dion colored withdrawn immediately. Specimen sent to lab for culture. Drain/tube dressing clean, dry, and intact. Pt tolerated procedure without any signs or symptoms of distress. Vital signs stable. Report called to \[facility]\"" RN. Pt transported back to \[facility]\"" in stable condition via bed by transport.     Medications  Versed: 2 mg  Fentanyl: 100 mcg    Vital Signs  Vitals:    05/21/24 0854   BP: (!) 144/66   Pulse: 75   Resp: 14   Temp:    SpO2: 100%    (vital signs in table format)    Post Nitish  2 - Able to move 4 extremities voluntarily on command  2 - BP+/- 20mmHg of normal  2 - Fully awake  2 - Able to maintain oxygen saturation >92% on room air  2 - Able to breathe deeply and cough freely

## 2025-05-05 PROBLEM — E83.42 HYPOMAGNESEMIA: Status: RESOLVED | Noted: 2021-07-20 | Resolved: 2025-05-05

## 2025-05-05 PROBLEM — R50.9 FEVER: Status: RESOLVED | Noted: 2025-05-03 | Resolved: 2025-05-05

## 2025-05-05 LAB
ALBUMIN SERPL BCG-MCNC: 2.5 G/DL (ref 3.5–5)
ALP SERPL-CCNC: 140 U/L (ref 34–104)
ALT SERPL W P-5'-P-CCNC: 6 U/L (ref 7–52)
ANION GAP SERPL CALCULATED.3IONS-SCNC: 7 MMOL/L (ref 4–13)
AST SERPL W P-5'-P-CCNC: 29 U/L (ref 13–39)
BASOPHILS # BLD AUTO: 0.02 THOUSANDS/ÂΜL (ref 0–0.1)
BASOPHILS NFR BLD AUTO: 0 % (ref 0–1)
BILIRUB SERPL-MCNC: 0.65 MG/DL (ref 0.2–1)
BUN SERPL-MCNC: 31 MG/DL (ref 5–25)
CALCIUM ALBUM COR SERPL-MCNC: 9.1 MG/DL (ref 8.3–10.1)
CALCIUM SERPL-MCNC: 7.9 MG/DL (ref 8.4–10.2)
CHLORIDE SERPL-SCNC: 116 MMOL/L (ref 96–108)
CO2 SERPL-SCNC: 21 MMOL/L (ref 21–32)
CREAT SERPL-MCNC: 1.16 MG/DL (ref 0.6–1.3)
EOSINOPHIL # BLD AUTO: 0.4 THOUSAND/ÂΜL (ref 0–0.61)
EOSINOPHIL NFR BLD AUTO: 4 % (ref 0–6)
ERYTHROCYTE [DISTWIDTH] IN BLOOD BY AUTOMATED COUNT: 17.4 % (ref 11.6–15.1)
GFR SERPL CREATININE-BSD FRML MDRD: 59 ML/MIN/1.73SQ M
GLUCOSE SERPL-MCNC: 168 MG/DL (ref 65–140)
GLUCOSE SERPL-MCNC: 174 MG/DL (ref 65–140)
GLUCOSE SERPL-MCNC: 180 MG/DL (ref 65–140)
GLUCOSE SERPL-MCNC: 196 MG/DL (ref 65–140)
GLUCOSE SERPL-MCNC: 244 MG/DL (ref 65–140)
GLUCOSE SERPL-MCNC: 274 MG/DL (ref 65–140)
GLUCOSE SERPL-MCNC: 285 MG/DL (ref 65–140)
HCT VFR BLD AUTO: 23.8 % (ref 36.5–49.3)
HGB BLD-MCNC: 7.5 G/DL (ref 12–17)
IMM GRANULOCYTES # BLD AUTO: 0.09 THOUSAND/UL (ref 0–0.2)
IMM GRANULOCYTES NFR BLD AUTO: 1 % (ref 0–2)
LYMPHOCYTES # BLD AUTO: 0.56 THOUSANDS/ÂΜL (ref 0.6–4.47)
LYMPHOCYTES NFR BLD AUTO: 5 % (ref 14–44)
MAGNESIUM SERPL-MCNC: 2 MG/DL (ref 1.9–2.7)
MCH RBC QN AUTO: 29.9 PG (ref 26.8–34.3)
MCHC RBC AUTO-ENTMCNC: 31.5 G/DL (ref 31.4–37.4)
MCV RBC AUTO: 95 FL (ref 82–98)
MONOCYTES # BLD AUTO: 0.59 THOUSAND/ÂΜL (ref 0.17–1.22)
MONOCYTES NFR BLD AUTO: 6 % (ref 4–12)
NEUTROPHILS # BLD AUTO: 8.77 THOUSANDS/ÂΜL (ref 1.85–7.62)
NEUTS SEG NFR BLD AUTO: 84 % (ref 43–75)
NRBC BLD AUTO-RTO: 0 /100 WBCS
PLATELET # BLD AUTO: 197 THOUSANDS/UL (ref 149–390)
PMV BLD AUTO: 9.8 FL (ref 8.9–12.7)
POTASSIUM SERPL-SCNC: 3.6 MMOL/L (ref 3.5–5.3)
PROT SERPL-MCNC: 4.9 G/DL (ref 6.4–8.4)
RBC # BLD AUTO: 2.51 MILLION/UL (ref 3.88–5.62)
SODIUM SERPL-SCNC: 144 MMOL/L (ref 135–147)
WBC # BLD AUTO: 10.43 THOUSAND/UL (ref 4.31–10.16)

## 2025-05-05 PROCEDURE — G0545 PR INHERENT VISIT TO INPT: HCPCS | Performed by: INTERNAL MEDICINE

## 2025-05-05 PROCEDURE — 80053 COMPREHEN METABOLIC PANEL: CPT

## 2025-05-05 PROCEDURE — 97110 THERAPEUTIC EXERCISES: CPT

## 2025-05-05 PROCEDURE — 99232 SBSQ HOSP IP/OBS MODERATE 35: CPT | Performed by: INTERNAL MEDICINE

## 2025-05-05 PROCEDURE — 99232 SBSQ HOSP IP/OBS MODERATE 35: CPT

## 2025-05-05 PROCEDURE — 97530 THERAPEUTIC ACTIVITIES: CPT

## 2025-05-05 PROCEDURE — 83735 ASSAY OF MAGNESIUM: CPT

## 2025-05-05 PROCEDURE — 82948 REAGENT STRIP/BLOOD GLUCOSE: CPT

## 2025-05-05 PROCEDURE — 85025 COMPLETE CBC W/AUTO DIFF WBC: CPT

## 2025-05-05 RX ORDER — SODIUM CHLORIDE 9 MG/ML
75 INJECTION, SOLUTION INTRAVENOUS CONTINUOUS
Status: CANCELLED | OUTPATIENT
Start: 2025-05-05

## 2025-05-05 RX ORDER — HEPARIN SODIUM 5000 [USP'U]/ML
5000 INJECTION, SOLUTION INTRAVENOUS; SUBCUTANEOUS EVERY 8 HOURS SCHEDULED
Status: DISCONTINUED | OUTPATIENT
Start: 2025-05-05 | End: 2025-05-07 | Stop reason: HOSPADM

## 2025-05-05 RX ORDER — INSULIN LISPRO 100 [IU]/ML
1-5 INJECTION, SOLUTION INTRAVENOUS; SUBCUTANEOUS
Status: DISCONTINUED | OUTPATIENT
Start: 2025-05-05 | End: 2025-05-07 | Stop reason: HOSPADM

## 2025-05-05 RX ADMIN — INSULIN LISPRO 2 UNITS: 100 INJECTION, SOLUTION INTRAVENOUS; SUBCUTANEOUS at 16:46

## 2025-05-05 RX ADMIN — PANTOPRAZOLE SODIUM 40 MG: 40 TABLET, DELAYED RELEASE ORAL at 06:09

## 2025-05-05 RX ADMIN — CEFAZOLIN SODIUM 2000 MG: 2 SOLUTION INTRAVENOUS at 16:31

## 2025-05-05 RX ADMIN — CHOLECALCIFEROL TAB 25 MCG (1000 UNIT) 2000 UNITS: 25 TAB at 12:09

## 2025-05-05 RX ADMIN — CARVEDILOL 12.5 MG: 12.5 TABLET, FILM COATED ORAL at 12:10

## 2025-05-05 RX ADMIN — HEPARIN SODIUM 5000 UNITS: 5000 INJECTION INTRAVENOUS; SUBCUTANEOUS at 20:10

## 2025-05-05 RX ADMIN — INSULIN GLARGINE 5 UNITS: 100 INJECTION, SOLUTION SUBCUTANEOUS at 12:04

## 2025-05-05 RX ADMIN — INSULIN LISPRO 1 UNITS: 100 INJECTION, SOLUTION INTRAVENOUS; SUBCUTANEOUS at 12:05

## 2025-05-05 RX ADMIN — MICONAZOLE NITRATE: 20 CREAM TOPICAL at 17:46

## 2025-05-05 RX ADMIN — FERROUS SULFATE TAB 325 MG (65 MG ELEMENTAL FE) 325 MG: 325 (65 FE) TAB at 09:08

## 2025-05-05 RX ADMIN — MICONAZOLE NITRATE: 20 CREAM TOPICAL at 12:11

## 2025-05-05 RX ADMIN — CARVEDILOL 12.5 MG: 12.5 TABLET, FILM COATED ORAL at 17:45

## 2025-05-05 RX ADMIN — FERROUS SULFATE TAB 325 MG (65 MG ELEMENTAL FE) 325 MG: 325 (65 FE) TAB at 16:31

## 2025-05-05 RX ADMIN — CEFAZOLIN SODIUM 2000 MG: 2 SOLUTION INTRAVENOUS at 23:52

## 2025-05-05 RX ADMIN — CEFAZOLIN SODIUM 2000 MG: 2 SOLUTION INTRAVENOUS at 09:08

## 2025-05-05 RX ADMIN — CEFAZOLIN SODIUM 2000 MG: 2 SOLUTION INTRAVENOUS at 00:52

## 2025-05-05 RX ADMIN — HEPARIN SODIUM 5000 UNITS: 5000 INJECTION INTRAVENOUS; SUBCUTANEOUS at 12:07

## 2025-05-05 RX ADMIN — SODIUM BICARBONATE 650 MG TABLET 650 MG: at 12:12

## 2025-05-05 RX ADMIN — DEXTROSE 50 ML/HR: 5 SOLUTION INTRAVENOUS at 08:20

## 2025-05-05 RX ADMIN — TAMSULOSIN HYDROCHLORIDE 0.4 MG: 0.4 CAPSULE ORAL at 16:31

## 2025-05-05 RX ADMIN — SODIUM BICARBONATE 650 MG TABLET 650 MG: at 09:09

## 2025-05-05 RX ADMIN — ASPIRIN 81 MG: 81 TABLET, CHEWABLE ORAL at 12:10

## 2025-05-05 RX ADMIN — Medication 1 TABLET: at 12:09

## 2025-05-05 RX ADMIN — INSULIN LISPRO 1 UNITS: 100 INJECTION, SOLUTION INTRAVENOUS; SUBCUTANEOUS at 00:53

## 2025-05-05 RX ADMIN — Medication 100 MG: at 12:09

## 2025-05-05 RX ADMIN — SODIUM BICARBONATE 650 MG TABLET 650 MG: at 17:45

## 2025-05-05 RX ADMIN — ATORVASTATIN CALCIUM 20 MG: 20 TABLET, FILM COATED ORAL at 12:10

## 2025-05-05 RX ADMIN — INSULIN LISPRO 3 UNITS: 100 INJECTION, SOLUTION INTRAVENOUS; SUBCUTANEOUS at 21:34

## 2025-05-05 RX ADMIN — INSULIN LISPRO 1 UNITS: 100 INJECTION, SOLUTION INTRAVENOUS; SUBCUTANEOUS at 08:13

## 2025-05-05 NOTE — ASSESSMENT & PLAN NOTE
Serum bicarbonate level today 21  Current Rx: Sodium bicarbonate 650 mg 3 times daily  Changes: None

## 2025-05-05 NOTE — ASSESSMENT & PLAN NOTE
Acute on chronic. Patient's baseline hemoglobin is around 9 range. Has been noted to trend down to 7-8 over this past month  No evidence of active bleeding  4/26/25: hgb 6.7 s/p 1 unit PRBC  4/30/25: hgb 6.6 s/p 1 unit PRBC  B12, folate stable  Continue ferrous sulfate for DAYANA  Monitor daily CBC. Transfuse for hemoglobin <7  Restart Heparin for DVT PPE , check CBC Q12

## 2025-05-05 NOTE — PROGRESS NOTES
NEPHROLOGY HOSPITAL PROGRESS NOTE   Pernell Covarrubias 79 y.o. male MRN: [id number]  Unit/Bed#: [address] [place] [address] Encounter: [id number]  Reason for Consult: BRIAN on CKD  Assessment & Plan  BRIAN on CKD  BRIAN  Etiology: Prerenal in setting of volume depletion +/- autoregulatory failure from RAAS blocker  Baseline creatinine 1.0-1.2  Creatinine on admission 1.12  Peak creatinine 1.71 on 05/01  Creatinine today 1.16  Current Rx: Hypotonic fluid with D5W at 50 cc/h due to development of hypernatremia  Changes: Continue hypotonic fluid at 50 cc/h  Trend BMP daily    CKD stage III  Outpatient nephrologist: Dr. Montejo  Baseline creatinine 1.1-1.2  Etiology: Diabetes, hypertension, age-related nephron loss  Metabolic acidosis  Serum bicarbonate level today 21  Current Rx: Sodium bicarbonate 650 mg 3 times daily  Changes: None  Hypernatremia  Sodium level increased to 148 on 05/04  Sodium level today improved to 144  Current Rx: D5W at 50 cc/h  Changes: Continue D5W at 50 cc/h  Chronic combined systolic and diastolic heart failure, NYHA class 2 (HCC)  LVEF 55 to 60%, G1 DD  Home Rx: Lasix 20 mg daily, currently on hold  Changes: Keep diuretic on hold  Benign essential hypertension  Blood pressure is currently acceptable  Continue carvedilol with hold parameters  Keep holding losartan in setting of BRIAN  Altered mental status  History of underlying dementia  Workup positive for MSSA bacteremia  Management per primary team  Type 2 diabetes mellitus with diabetic neuropathy (HCC)  Management per primary team  Anemia  Hemoglobin 6.7 on admission status post PRBC  Hemoglobin today 7.5  Management per primary team  MSSA bacteremia  Management per primary team and ID    I have reviewed the nephrology recommendations including continuation of hypotonic fluids for high normal sodium level, with internal medicine team, and we are in agreement with renal plan including the information outlined above.    SUBJECTIVE / 24H INTERVAL HISTORY:  Denies  dyspnea.  Denies leg swelling.    OBJECTIVE:  Current Weight: Weight - Scale: 72.1 kg (159 lb)  Vitals:    05/04/25 1805 05/04/25 2214 05/05/25 0817 05/05/25 0818   BP: 148/64 107/51 156/65 156/65   BP Location:    Left arm   Pulse: 68 (!) 52 61 61   Resp: 20 20  19   Temp: 99.3 °F (37.4 °C) 99 °F (37.2 °C) 98.2 °F (36.8 °C) 98.2 °F (36.8 °C)   TempSrc:    Axillary   SpO2: 97% 97% 96% 96%   Weight:       Height:           Intake/Output Summary (Last 24 hours) at 5/5/2025 0853  Last data filed at 5/4/2025 1401  Gross per 24 hour   Intake --   Output 225 ml   Net -225 ml     Review of Systems   Constitutional:  Negative for chills and fever.   HENT:  Negative for ear pain and sore throat.    Eyes:  Negative for pain and visual disturbance.   Respiratory:  Negative for cough and shortness of breath.    Cardiovascular:  Negative for chest pain and palpitations.   Gastrointestinal:  Negative for abdominal pain and vomiting.   Genitourinary:  Negative for dysuria and hematuria.   Musculoskeletal:  Negative for arthralgias and back pain.   Skin:  Negative for color change and rash.   Neurological:  Negative for seizures and syncope.   All other systems reviewed and are negative.    Physical Exam  Vitals and nursing note reviewed.   Constitutional:       General: He is not in acute distress.     Appearance: He is well-developed.   HENT:      Head: Normocephalic and atraumatic.   Eyes:      Conjunctiva/sclera: Conjunctivae normal.   Cardiovascular:      Rate and Rhythm: Normal rate and regular rhythm.      Pulses: Normal pulses.      Heart sounds: Normal heart sounds. No murmur heard.  Pulmonary:      Effort: Pulmonary effort is normal. No respiratory distress.      Breath sounds: Normal breath sounds.   Abdominal:      Palpations: Abdomen is soft.      Tenderness: There is no abdominal tenderness.   Musculoskeletal:         General: No swelling.      Cervical back: Neck supple.      Right lower leg: No edema.      Left  lower leg: No edema.   Skin:     General: Skin is warm and dry.      Capillary Refill: Capillary refill takes less than 2 seconds.   Neurological:      Mental Status: He is alert.   Psychiatric:         Mood and Affect: Mood normal.         Medications:    Current Facility-Administered Medications:     acetaminophen (TYLENOL) tablet 650 mg, 650 mg, Oral, Q6H PRN, Kristen Hinkle MD, 650 mg at 05/03/25 0802    aspirin chewable tablet 81 mg, 81 mg, Oral, Daily, Kristen Hinkle MD, 81 mg at 05/04/25 0910    atorvastatin (LIPITOR) tablet 20 mg, 20 mg, Oral, Daily, Kristen Hinkle MD, 20 mg at 05/04/25 0910    bisacodyl (DULCOLAX) EC tablet 10 mg, 10 mg, Oral, Daily PRN, Kristen Hinkle MD    carvedilol (COREG) tablet 12.5 mg, 12.5 mg, Oral, BID, Kristen Hinkle MD, 12.5 mg at 05/04/25 1814    ceFAZolin (ANCEF) IVPB (premix in dextrose) 2,000 mg 50 mL, 2,000 mg, Intravenous, Q8H, Alexandra Mckenzie PA-C, Last Rate: 100 mL/hr at 05/05/25 0052, 2,000 mg at 05/05/25 0052    Cholecalciferol (VITAMIN D3) tablet 2,000 Units, 2,000 Units, Oral, Daily, Kristen Hinkle MD, 2,000 Units at 05/04/25 0910    co-enzyme Q-10 capsule 100 mg, 100 mg, Oral, Daily, Kristen Hinkle MD, 100 mg at 05/02/25 0812    dextrose 5 % infusion, 50 mL/hr, Intravenous, Continuous, Mike Ricardo MD, Last Rate: 50 mL/hr at 05/05/25 0820, 50 mL/hr at 05/05/25 0820    ferrous sulfate tablet 325 mg, 325 mg, Oral, BID With Meals, Kristen Hinkle MD, 325 mg at 05/04/25 1633    insulin glargine (LANTUS) subcutaneous injection 5 Units 0.05 mL, 5 Units, Subcutaneous, QAM, Sukhi Felix MD, 5 Units at 05/04/25 0910    insulin lispro (HumALOG/ADMELOG) 100 units/mL subcutaneous injection 1-5 Units, 1-5 Units, Subcutaneous, 4x Daily (AC & HS), 1 Units at 05/05/25 0813 **AND** Fingerstick Glucose (POCT), , , 4x Daily AC and at bedtime, Radha Low MD    [Held by provider] metoclopramide (REGLAN) tablet 10 mg, 10 mg, Oral, TID AC, Alexandra Mckenzie,  "ERIN, 10 mg at 05/03/25 1624    moisture barrier miconazole 2% cream (aka CHERELLE MOISTURE BARRIER ANTIFUNGAL CREAM), , Topical, BID, Alexandra Mckenzie PA-C, Given at 05/04/25 1814    multivitamin stress formula tablet 1 tablet, 1 tablet, Oral, Daily, Kristen Hinkle MD, 1 tablet at 05/04/25 0910    pantoprazole (PROTONIX) EC tablet 40 mg, 40 mg, Oral, Early Morning, Kristen Hinkle MD, 40 mg at 05/05/25 0609    sodium bicarbonate tablet 650 mg, 650 mg, Oral, TID after meals, Alexandra Mckenzie PA-C, 650 mg at 05/04/25 1633    tamsulosin (FLOMAX) capsule 0.4 mg, 0.4 mg, Oral, Daily With Dinner, Sukhi Felix MD, 0.4 mg at 05/04/25 1633    Laboratory Results:  Results from last 7 days   Lab Units 05/05/25  0510 05/04/25  0531 05/03/25  0434 05/02/25  0541 05/01/25  0545 04/30/25  0601 04/29/25  0454   WBC Thousand/uL 10.43* 9.46 9.09 9.28 9.21 8.68 8.51   HEMOGLOBIN g/dL 7.5* 7.7* 7.7* 7.9* 7.6* 6.6* 7.4*   HEMATOCRIT % 23.8* 24.4* 23.6* 25.3* 23.8* 20.1* 22.8*   PLATELETS Thousands/uL 197 181 148* 126* 107* 104* 100*   POTASSIUM mmol/L 3.6 3.7 4.0 3.4* 3.8 3.8 3.8   CHLORIDE mmol/L 116* 119* 120* 118* 119* 116* 113*   CO2 mmol/L 21 20* 18* 16* 17* 17* 18*   BUN mg/dL 31* 35* 40* 39* 36* 36* 34*   CREATININE mg/dL 1.16 1.28 1.42* 1.50* 1.71* 1.68* 1.18   CALCIUM mg/dL 7.9* 8.2* 8.2* 8.3* 8.3* 8.1* 8.5   MAGNESIUM mg/dL  --  1.8*  --   --   --   --   --        Portions of the record may have been created with voice recognition software. Occasional wrong word or \"sound a like\" substitutions may have occurred due to the inherent limitations of voice recognition software. Read the chart carefully and recognize, using context, where substitutions have occurred.If you have any questions, please contact the dictating provider.    "

## 2025-05-05 NOTE — PLAN OF CARE
Problem: OCCUPATIONAL THERAPY ADULT  Goal: Performs self-care activities at highest level of function for planned discharge setting.  See evaluation for individualized goals.  Description: Treatment Interventions: ADL retraining, Functional transfer training, UE strengthening/ROM, Endurance training, Patient/family training, Equipment evaluation/education, Cognitive reorientation, Activityengagement, Compensatory technique education          See flowsheet documentation for full assessment, interventions and recommendations.   Outcome: Progressing  Note: Limitation: Decreased ADL status, Decreased UE ROM, Decreased UE strength, Decreased Safe judgement during ADL, Decreased endurance, Decreased cognition, Decreased self-care trans, Decreased high-level ADLs  Prognosis: Fair  Assessment: Patient seen for OT treatment.  Patient is cooperative, pleasant and alert.  Patient requires max assist of 2 for transfers and bed mobility.  Patient is generally weak and deconditioned.  Patient will benefit from continued OT services to maximize functional performance with ADLS.     Rehab Resource Intensity Level, OT: II (Moderate Resource Intensity)

## 2025-05-05 NOTE — ASSESSMENT & PLAN NOTE
LVEF 55 to 60%, G1 DD  Home Rx: Lasix 20 mg daily, currently on hold  Changes: Keep diuretic on hold

## 2025-05-05 NOTE — ASSESSMENT & PLAN NOTE
Baseline creatinine around 0.8-1.2  Peaked at 1.71 on 5/1/25  Likely in setting of poor oral intake/sepsis  Patient with meza catheter. CT wo hydronephrosis  Holding PTA lasix and losartan  Monitor I&O  Daily BMP  Nephrology team consulted; appreciate input

## 2025-05-05 NOTE — ASSESSMENT & PLAN NOTE
4/28/25: 1/2 blood cultures positive for MSSA  CT C/A/P without evidence of acute infectious process  Possible translocation in setting of chronic skin wounds, patient has no devices or hardware  No evidence of vegetation on ECHO report  Continue cefazolin 2g Q8H  5/2/25: Follow up repeat BC x2 negative at 48 hrs  ID consulted, recommendations are appreciated

## 2025-05-05 NOTE — ASSESSMENT & PLAN NOTE
Patient brought in from [facility] at [facility] for decreased responsiveness. Mental status waxing and waning. Recently hospitalization earlier in the month for AMS in the setting of dehydration and UTI with concern of underlying dementia (wife reports questionable parkinson diagnosis)   CT head: 1. No acute intracranial abnormality.  Stable chronic microangiopathic changes within the brain.  COVID/FLU negative. UA negative. Lactic acid negative. CXR without evidence of infection.   TSH elevated but normal T4  Acute on chronic anemia on admission. Hemoglobin stabilized following transfusion  Coma panel negative. Ammonia level WNL  Speech therapy consulted. Recommending dysphagia 1 diet with nectar thick liquids. Continue oral care  Consult to Neurology, recommendations are appreciated  MRI brain negative for acute abnormality  Suspect acute metabolic encephalopathy in setting of sepsis due to MSSA bacteremia contributing  Medication list reviewed; will d/c metoclopromide. Unclear indication with possible neuro side effects including parkinsonism. Monitor off medication    Since metoclopramide was discontinued.  Patient more awake and alert today.answering questions.  Having breakfast during my encounter.  Monitor neuro checks.

## 2025-05-05 NOTE — ASSESSMENT & PLAN NOTE
This was patient's presenting symptom.  He has had multiple admissions for the same issue over the last month and concern for possible underlying dementia versus delirium.  Now likely worsened by acute infection as above. No obvious nuchal rigidity on exam. Brain MRI is without evidence of stroke or emboli, though was non-contrast.  He has had marked improvement in his mental status as of 5/5.  -Treatment of bacteremia as above  -Neurology recs appreciated  -Monitor mental status closely

## 2025-05-05 NOTE — OCCUPATIONAL THERAPY NOTE
OT TREATMENT         05/05/25 1421   OT Last Visit   OT Visit Date 05/05/25   Note Type   Note Type Treatment   Pain Assessment   Pain Assessment Tool 0-10   Pain Score No Pain   Restrictions/Precautions   Other Precautions Chair Alarm;Bed Alarm;Cognitive;Fall Risk;O2   Lifestyle   Reciprocal Relationships wife present for session   ADL   Grooming Assistance 4  Minimal Assistance   Grooming Deficit Wash/dry face   Grooming Comments seated in chair with setup   Bed Mobility   Supine to Sit 2  Maximal assistance   Additional items Assist x 2   Transfers   Sit to Stand 2  Maximal assistance  (2 times completed from a raised bed)   Additional items Assist x 2;Verbal cues   Stand to Sit 2  Maximal assistance   Additional items Assist x 2;Verbal cues   Stand pivot 2  Maximal assistance   Additional items Assist x 2  (bed to chair with hand hold assist)   ROM- Right Upper Extremities   R Shoulder AAROM;Flexion;Horizontal ABduction   R Elbow AROM;Elbow flexion;Elbow extension   R Hand AROM;Thumb;Index finger;Long finger;Ring finger;Little finger   R Weight/Reps/Sets 10 times each seated in chair   ROM - Left Upper Extremities    L Shoulder AAROM;Flexion;Horizontal ABduction   L Elbow AROM;Elbow flexion;Elbow extension   L Hand AROM;Thumb;Index finger;Long finger;Ring finger;Little finger   L Weight/Reps/Sets 10 times each seated in chair   Cognition   Overall Cognitive Status Impaired   Arousal/Participation Cooperative   Attention Attends with cues to redirect   Orientation Level Oriented to person;Oriented to place   Following Commands Follows one step commands with increased time or repetition   Activity Tolerance   Activity Tolerance Patient limited by fatigue  (cognition)   Assessment   Assessment Patient seen for OT treatment.  Patient is cooperative, pleasant and alert.  Patient requires max assist of 2 for transfers and bed mobility.  Patient is generally weak and deconditioned.  Patient will benefit from continued  OT services to maximize functional performance with ADLS. The patient's raw score on the AM-PAC Daily Activity Inpatient Short Form is 8. A raw score of less than 19 suggests the patient may benefit from discharge to post-acute rehabilitation services. Please refer to the recommendation of the Occupational Therapist for safe discharge planning.   Plan   Treatment Interventions ADL retraining;Functional transfer training;UE strengthening/ROM;Endurance training;Cognitive reorientation;Patient/family training;Equipment evaluation/education;Activityengagement   OT Frequency 3-5x/wk   Discharge Recommendation   Rehab Resource Intensity Level, OT II (Moderate Resource Intensity)   AM-PAC Daily Activity Inpatient   Lower Body Dressing 1   Bathing 1   Toileting 1   Upper Body Dressing 1   Grooming 2   Eating 2   Daily Activity Raw Score 8   Turning Head Towards Sound 3   Follow Simple Instructions 3   Low Function Daily Activity Raw Score 14   Low Function Daily Activity Standardized Score  24.79   AM-PAC Applied Cognition Inpatient   Following a Speech/Presentation 2   Understanding Ordinary Conversation 3   Taking Medications 1   Remembering Where Things Are Placed or Put Away 1   Remembering List of 4-5 Errands 1   Taking Care of Complicated Tasks 1   Applied Cognition Raw Score 9   Applied Cognition Standardized Score 22.48   Licensure   [place] License Number  Camelia Webb MS OTR/L [id number]

## 2025-05-05 NOTE — ASSESSMENT & PLAN NOTE
Evidenced by hypothermia and tachypnea now with high fever on 4/30 in setting of MSSA bacteremia  COVID/FLU negative. UA negative. Lactic acid negative. CXR without evidence of infection. Without leukocytosis.  Continue cefazolin as above  CT C/A/P without evidence of acute infectious process  Follow up repeat blood cultures negative at 48

## 2025-05-05 NOTE — ASSESSMENT & PLAN NOTE
Blood pressure is currently acceptable  Continue carvedilol with hold parameters  Keep holding losartan in setting of BRIAN

## 2025-05-05 NOTE — CASE MANAGEMENT
Case Management Discharge Planning Note    Patient name Pernell Covarrubias  Location [address] [address]/[address] [address] MRN [id number]  : 1945 Date 2025       Current Admission Date: 2025  Current Admission Diagnosis:Altered mental status   Patient Active Problem List    Diagnosis Date Noted Date Diagnosed    Metabolic acidosis 2025     Hypernatremia 2025     MSSA bacteremia 2025     Altered mental status 2025     Sepsis 2025     Ambulatory dysfunction 2025     Cognitive decline 2025     Frailty syndrome in geriatric patient 2025     Dysphagia 2025     Toxic metabolic encephalopathy 2025     Incidental Finding: Lung nodule 2025     Incidental Finding: Chronic sinusitis 2025     Compression fracture of L3 vertebra (HCC) 2025     Pressure injury of skin of buttock 2025     Bradycardia 2025     Fall 2025     Abnormal TSH 2025     Generalized weakness 2025     Chronic combined systolic and diastolic CHF (congestive heart failure) ([facility]) 2025     Anemia of chronic disease 2025     Urinary frequency 2025     Anemia 2024     History of vitamin D deficiency 2024     Other fatigue 2024     Type 2 diabetes mellitus with stage 2 chronic kidney disease, without long-term current use of insulin  ([facility]) 2024     Dry skin dermatitis 2023     Uses roller walker 2023     Iron deficiency anemia 2023     Peripheral arterial disease ([facility]) 2023     Gait disorder 2022     Edema of both feet 2022     Chronic combined systolic and diastolic heart failure, NYHA class 2 ([facility]) 2022     Heart failure, left, 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% ([facility]) 2022     Acute on chronic combined systolic and diastolic congestive heart failure ([facility]) 2022     Chronic right-sided low back pain without sciatica 2021     Orthostatic hypotension 2021      Hyponatremia 07/20/2021     Insomnia, persistent 05/21/2021     Prostate cancer (HCC) 03/03/2021     Benign essential hypertension 06/15/2020     Bilateral leg weakness 06/15/2020     Type 2 diabetes mellitus with hyperglycemia, without long-term current use of insulin (HCC) 05/25/2019     Other hyperlipidemia 09/13/2018     Coronary artery disease involving native coronary artery of native heart with angina pectoris ([facility])      BRIAN on CKD 09/20/2017     Lumbar radiculopathy 11/25/2016     Type 2 diabetes mellitus with diabetic neuropathy (HCC) 01/11/2015     Diabetic neuropathy ([facility]) 10/10/2013     Chronic GERD 06/16/2013       LOS (days): 8  Geometric Mean LOS (GMLOS) (days): 4.9  Days to GMLOS:-3.4     OBJECTIVE:  Risk of Unplanned Readmission Score: 37.08         Current admission status: Inpatient   Preferred Pharmacy:   [facility] - [facility] [address]  [address] [facility] [address]  Phone: [phone] Fax: [phone]    [facility] [address] [place] [address] - [address]  [address]  [facility] [address]  Phone: [phone] Fax: [phone]    Primary Care Provider: Felipe Travis DO    Primary Insurance: MEDICARE  Secondary Insurance: [facility]    DISCHARGE DETAILS:    Discharge planning discussed with:: Ruth Covarrubias (wife)  Freedom of Choice: Yes    Comments - Freedom of Choice: Plan is for patient to return to [facility] when medically cleared.  SW notified facility of need for long-term IV antibiotics and will notify facility when PICC is placed.      CM contacted family/caregiver?: Yes  Were Treatment Team discharge recommendations reviewed with patient/caregiver?: Yes  Did patient/caregiver verbalize understanding of patient care needs?: N/A- going to facility  Were patient/caregiver advised of the risks associated with not following Treatment Team discharge recommendations?: Yes    Contacts  Patient Contacts: Ruth Covarrubias  (spouse)  Relationship to Patient:: Family  Contact Method: In Person  Reason/Outcome: Emergency Contact, Discharge Planning    Requested Home Health Care         Is the patient interested in HHC at discharge?: No    DME Referral Provided  Referral made for DME?: No    Other Referral/Resources/Interventions Provided:  Interventions: Facility Return, Short Term Rehab    Would you like to participate in our [facility] service program?  : No - Declined    Treatment Team Recommendation: Facility Return, Short Term Rehab  Discharge Destination Plan:: Facility Return, Short Term Rehab  Transport at Discharge : BLS Ambulance         IMM Given (Date):: 05/05/25  IMM Given to:: Family (IMM reviewed with and signed by wife Ruth.  Copy given to wife and copy placed in scan bin for chart.)

## 2025-05-05 NOTE — ASSESSMENT & PLAN NOTE
BRIAN  Etiology: Prerenal in setting of volume depletion +/- autoregulatory failure from RAAS blocker  Baseline creatinine 1.0-1.2  Creatinine on admission 1.12  Peak creatinine 1.71 on 05/01  Creatinine today 1.16  Current Rx: Hypotonic fluid with D5W at 50 cc/h due to development of hypernatremia  Changes: Continue hypotonic fluid at 50 cc/h  Trend BMP daily    CKD stage III  Outpatient nephrologist: Dr. Montejo  Baseline creatinine 1.1-1.2  Etiology: Diabetes, hypertension, age-related nephron loss

## 2025-05-05 NOTE — PROGRESS NOTES
Progress Note - Hospitalist   Name: Pernell Covarrubias 79 y.o. male I MRN: [id number]  Unit/Bed#: [address] [place] [address] Date of Admission: 4/26/2025   Date of Service: 5/5/2025 I Hospital Day: 8    Assessment & Plan  Altered mental status  Patient brought in from [facility] for decreased responsiveness. Mental status waxing and waning. Recently hospitalization earlier in the month for AMS in the setting of dehydration and UTI with concern of underlying dementia (wife reports questionable parkinson diagnosis)   CT head: 1. No acute intracranial abnormality.  Stable chronic microangiopathic changes within the brain.  COVID/FLU negative. UA negative. Lactic acid negative. CXR without evidence of infection.   TSH elevated but normal T4  Acute on chronic anemia on admission. Hemoglobin stabilized following transfusion  Coma panel negative. Ammonia level WNL  Speech therapy consulted. Recommending dysphagia 1 diet with nectar thick liquids. Continue oral care  Consult to Neurology, recommendations are appreciated  MRI brain negative for acute abnormality  Suspect acute metabolic encephalopathy in setting of sepsis due to MSSA bacteremia contributing  Medication list reviewed; will d/c metoclopromide. Unclear indication with possible neuro side effects including parkinsonism. Monitor off medication    Since metoclopramide was discontinued.  Patient more awake and alert today.answering questions.  Having breakfast during my encounter.  Monitor neuro checks.     MSSA bacteremia  4/28/25: 1/2 blood cultures positive for MSSA  CT C/A/P without evidence of acute infectious process  Possible translocation in setting of chronic skin wounds, patient has no devices or hardware  No evidence of vegetation on ECHO report  Continue cefazolin 2g Q8H  5/2/25: Follow up repeat BC x2 negative at 48 hrs  ID consulted, recommendations are appreciated  BRIAN on CKD  Baseline creatinine around 0.8-1.2  Peaked at 1.71 on 5/1/25  Likely in  setting of poor oral intake/sepsis  Patient with meza catheter. CT wo hydronephrosis  Holding PTA lasix and losartan  Monitor I&O  Daily BMP  Nephrology team consulted; appreciate input  Sepsis  Evidenced by hypothermia and tachypnea now with high fever on 4/30 in setting of MSSA bacteremia  COVID/FLU negative. UA negative. Lactic acid negative. CXR without evidence of infection. Without leukocytosis.  Continue cefazolin as above  CT C/A/P without evidence of acute infectious process  Follow up repeat blood cultures negative at 48  Anemia  Acute on chronic. Patient's baseline hemoglobin is around 9 range. Has been noted to trend down to 7-8 over this past month  No evidence of active bleeding  4/26/25: hgb 6.7 s/p 1 unit PRBC  4/30/25: hgb 6.6 s/p 1 unit PRBC  B12, folate stable  Continue ferrous sulfate for DAYANA  Monitor daily CBC. Transfuse for hemoglobin <7  Restart Heparin for DVT PPE , check CBC Q12   Coronary artery disease involving native coronary artery of native heart with angina pectoris (HCC)  Status post multivessel stenting  Continue aspirin, Lipitor and Coreg  Type 2 diabetes mellitus with diabetic neuropathy ([facility])    Lab Results   Component Value Date    HGBA1C 6.9 (H) 02/11/2025   Hold oral metformin and Januvia during hospitalization  SSI with accu checks  Lantus 5 U Qam  Hypoglycemia protocol  Carb controlled diet  Benign essential hypertension  BP acceptable  PTA medications include Carvedilol 12.5mg BID, Losartan 25mg QD, Furosemide 20mg QD. Spironolactone 25mg QD was discontinued during recent hospitalization  Continue Carvedilol  Holding Furosemide and Losartan given BRIAN  Monitor vitals per routine  Chronic combined systolic and diastolic heart failure, NYHA class 2 ([facility])  Wt Readings from Last 3 Encounters:   04/30/25 72.1 kg (159 lb)   03/30/25 70.3 kg (154 lb 15.7 oz)   03/09/25 72.2 kg (159 lb 3.2 oz)     Echo (1/22/25): The left ventricular ejection fraction is 55 to 60% by visual  estimation. Systolic function is normal. Wall motion is normal. Diastolic function is mildly abnormal, consistent with grade I (abnormal) relaxation.   Currently dry on exam  Holding home furosemide, monitor with IVF given decreased oral intake, sepsis, BRIAN  Daily weights, I&O  Abnormal TSH  Per chart review, TSH elevated during last hospitalization but with normal T4  4/26/25: TSH 15.954, T4 0.83, T3 2.35<< repeat TSH : 5   Hypernatremia  Mild hypernatremia 148, in setting of poor oral intake  Sodium improved 143 today  Continue gentle IVF  Daily BMP  Metabolic acidosis  Management per nephrologist   Fever (Resolved: 5/5/2025)  Initially with hypothermia however currently spiked a fever 101.7 on 5/3/25    - Discontinue hypothermia protocol  -remains afebrile since 5/3/25 9 am   -Monitor vitals  -Refer to assessment and plan for sepsis  Hypomagnesemia (Resolved: 5/5/2025)  Replete per protocol     VTE Pharmacologic Prophylaxis: VTE Score: 8 Moderate Risk (Score 3-4) - Pharmacological DVT Prophylaxis Ordered: heparin.    Mobility:   Basic Mobility Inpatient Raw Score: 8  JH-HLM Goal: 3: Sit at edge of bed  JH-HLM Achieved: 1: Laying in bed  JH-HLM Goal achieved. Continue to encourage appropriate mobility.    Patient Centered Rounds: I performed bedside rounds with nursing staff today.   Discussions with Specialists or Other Care Team Provider: infectious disease     Education and Discussions with Family / Patient: Updated  (significant other) at bedside.    Current Length of Stay: 8 day(s)  Current Patient Status: Inpatient   Certification Statement: The patient will continue to require additional inpatient hospital stay due to sepsis, MSSA   Discharge Plan: Anticipate discharge in 24-48 hrs to rehab facility.    Code Status: Level 3 - DNAR and DNI    Subjective   Patient seen today at bedside. He is waking up when called. Talks in words. Doesn't report any active complaints. No incidents per nursing  from overnight.   Seems comfortable in bed     Objective :  Temp:  [98.2 °F (36.8 °C)-99.6 °F (37.6 °C)] 98.2 °F (36.8 °C)  HR:  [52-68] 61  BP: (107-156)/(51-65) 156/65  Resp:  [19-20] 19  SpO2:  [96 %-97 %] 96 %  O2 Device: None (Room air)    Body mass index is 22.18 kg/m².     Input and Output Summary (last 24 hours):     Intake/Output Summary (Last 24 hours) at 5/5/2025 0902  Last data filed at 5/4/2025 1401  Gross per 24 hour   Intake --   Output 225 ml   Net -225 ml           Physical Exam  Vitals and nursing note reviewed.   Constitutional:       General: He is not in acute distress.     Appearance: He is ill-appearing.   HENT:      Head: Normocephalic and atraumatic.      Mouth/Throat:      Mouth: Mucous membranes are moist.   Eyes:      Conjunctiva/sclera: Conjunctivae normal.      Pupils: Pupils are equal, round, and reactive to light.   Cardiovascular:      Rate and Rhythm: Normal rate and regular rhythm.      Pulses: Normal pulses.           Carotid pulses are 2+ on the right side and 2+ on the left side.       Radial pulses are 2+ on the right side and 2+ on the left side.        Dorsalis pedis pulses are 2+ on the right side and 2+ on the left side.      Heart sounds: Normal heart sounds, S1 normal and S2 normal. No murmur heard.  Pulmonary:      Effort: No tachypnea, bradypnea or accessory muscle usage.      Breath sounds: Normal breath sounds and air entry. No decreased breath sounds, wheezing, rhonchi or rales.   Abdominal:      General: Abdomen is flat. Bowel sounds are normal. There is no distension.      Palpations: Abdomen is soft.      Tenderness: There is no abdominal tenderness.   Musculoskeletal:      Right lower leg: No edema.      Left lower leg: No edema.   Skin:     General: Skin is warm.   Neurological:      Mental Status: He is alert.      Motor: Weakness present.      Lines/Drains:  Lines/Drains/Airways       Active Status       Name Placement date Placement time Site Days    Urethral  Catheter Straight-tip 16 Fr. 04/26/25  1558  Straight-tip  8                  Urinary Catheter:  Goal for removal: N/A- Discharging with [place]                 Lab Results: I have reviewed the following results:   Results from last 7 days   Lab Units 05/05/25  0510   WBC Thousand/uL 10.43*   HEMOGLOBIN g/dL 7.5*   HEMATOCRIT % 23.8*   PLATELETS Thousands/uL 197   SEGS PCT % 84*   LYMPHO PCT % 5*   MONO PCT % 6   EOS PCT % 4     Results from last 7 days   Lab Units 05/05/25  0510   SODIUM mmol/L 144   POTASSIUM mmol/L 3.6   CHLORIDE mmol/L 116*   CO2 mmol/L 21   BUN mg/dL 31*   CREATININE mg/dL 1.16   ANION GAP mmol/L 7   CALCIUM mg/dL 7.9*   ALBUMIN g/dL 2.5*   TOTAL BILIRUBIN mg/dL 0.65   ALK PHOS U/L 140*   ALT U/L 6*   AST U/L 29   GLUCOSE RANDOM mg/dL 174*         Results from last 7 days   Lab Units 05/05/25  0723 05/05/25  0022 05/04/25  2003 05/04/25  1558 05/04/25  1131 05/04/25  0730 05/04/25  0622 05/03/25  2356 05/03/25  2018 05/03/25  1623 05/03/25  1133 05/03/25  0710   POC GLUCOSE mg/dl 168* 196* 227* 253* 204* 169* 165* 164* 174* 227* 209* 205*               Recent Cultures (last 7 days):   Results from last 7 days   Lab Units 05/02/25  0539 04/28/25  1840   BLOOD CULTURE  No Growth at 48 hrs.  No Growth at 48 hrs. No Growth After 5 Days.  Staphylococcus aureus*   GRAM STAIN RESULT   --  Gram positive cocci in clusters*       Imaging Results Review: No pertinent imaging studies reviewed.  Other Study Results Review: No additional pertinent studies reviewed.    Last 24 Hours Medication List:     Current Facility-Administered Medications:     acetaminophen (TYLENOL) tablet 650 mg, Q6H PRN    aspirin chewable tablet 81 mg, Daily    atorvastatin (LIPITOR) tablet 20 mg, Daily    bisacodyl (DULCOLAX) EC tablet 10 mg, Daily PRN    carvedilol (COREG) tablet 12.5 mg, BID    ceFAZolin (ANCEF) IVPB (premix in dextrose) 2,000 mg 50 mL, Q8H, Last Rate: 2,000 mg (05/05/25 0052)    Cholecalciferol (VITAMIN D3)  tablet 2,000 Units, Daily    co-enzyme Q-10 capsule 100 mg, Daily    dextrose 5 % infusion, Continuous, Last Rate: 50 mL/hr (05/05/25 0820)    ferrous sulfate tablet 325 mg, BID With Meals    insulin glargine (LANTUS) subcutaneous injection 5 Units 0.05 mL, QAM    insulin lispro (HumALOG/ADMELOG) 100 units/mL subcutaneous injection 1-5 Units, 4x Daily (AC & HS) **AND** Fingerstick Glucose (POCT), 4x Daily AC and at bedtime    [Held by provider] metoclopramide (REGLAN) tablet 10 mg, TID AC    moisture barrier miconazole 2% cream (aka CHERELLE MOISTURE BARRIER ANTIFUNGAL CREAM), BID    multivitamin stress formula tablet 1 tablet, Daily    pantoprazole (PROTONIX) EC tablet 40 mg, Early Morning    sodium bicarbonate tablet 650 mg, TID after meals    tamsulosin (FLOMAX) capsule 0.4 mg, Daily With Dinner    Administrative Statements   Today, Patient Was Seen By: Sukhi Felix MD  I have spent a total time of 30 minutes in caring for this patient on the day of the visit/encounter including Diagnostic results, Prognosis, Risks and benefits of tx options, and Instructions for management.    **Please Note: This note may have been constructed using a voice recognition system.**

## 2025-05-05 NOTE — ASSESSMENT & PLAN NOTE
1 of 2 blood cultures positive from 4/28. No cultures were sent on admission for comparison. Patient did have systemic signs of infection including hypothermia and fever thus suspect a true infection.  Source could be skin translocation as patient has multiple areas of open skin abrasions, though none appear acutely infected.  No other obvious source of infection at this time. CT C/A/P is without signs of metastatic infection. Brain MRI negative for emboli, though was non-contrast. Patient has no cardiac devices or endovascular hardware. TTE, adequate study, is without vegetation. Given unclear source, favor treating for at least 4 weeks of IV antibiotic.   -Continue IV Cefazolin 2g every 8 hours  -Continue to monitor serum creatinine while on IV antibiotics  -Follow up repeat blood cultures to assess for bacteremia clearance  -OK for PICC placement at this time given blood cultures are NGTD x 72h  -Plan for 4 week total antibiotic course from clearance of cultures (through 5/30/25)  -Will need outpatient ID follow-up and weekly labs including CBCd and BMP in the outpatient setting  -ID office staff aware of outpatient follow-up needs

## 2025-05-05 NOTE — PLAN OF CARE
Problem: PHYSICAL THERAPY ADULT  Goal: Performs mobility at highest level of function for planned discharge setting.  See evaluation for individualized goals.  Description: Treatment/Interventions: ADL retraining, Functional transfer training, LE strengthening/ROM, Therapeutic exercise, Endurance training, Cognitive reorientation, Patient/family training, Equipment eval/education, Bed mobility, Gait training, Compensatory technique education, Spoke to nursing, Spoke to case management          See flowsheet documentation for full assessment, interventions and recommendations.  Outcome: Progressing  Note: Prognosis: Fair  Problem List: Decreased strength, Decreased range of motion, Decreased endurance, Impaired balance, Decreased mobility, Decreased coordination, Decreased cognition, Impaired judgement, Decreased safety awareness, Decreased skin integrity, Pain  Assessment: Patient cooperative and pleasant although confused.  Demonstrating improving sitting and standing balance with gait activity very short distances this session.  Patient will benefit from continued physical therapy with progression as tolerated and increasing functional mobility with clinical staff as well        Rehab Resource Intensity Level, PT: II (Moderate Resource Intensity)    See flowsheet documentation for full assessment.

## 2025-05-05 NOTE — CONSULTS
e-Consult (IPC)  - Interventional Radiology  Pernell Covarrubias 79 y.o. male MRN: [id number]  Unit/Bed#: [address] [place] [address] Encounter: [id number]          Interventional Radiology has been consulted to evaluate Pernell Covarrubias    We were consulted by SLIM/infectious disease/nephrology concerning this patient with CKD and need for prolonged IV atbx.    Inpatient Consult to IR  Consult performed by: Sam Chen MD  Consult ordered by: Sukhi Felix MD        05/05/25    Assessment/Recommendation:   78 yo male with T2DM, HTN, CKD stage III, chronic combined S/D CHF, presented with change in MS 4/26, BRIAN, dehydration, hypernatremia, sepsis with MSSA bacteremia (1/2 positive on 4/28; repeat from 5/2 NGTD @ 72 hrs), medically improving, with ID recommendation for IV cephazolin for 4 weeks. Tunneled single lumen central venous catheter requested.    11-20 minutes, >50% of the total time devoted to medical consultative verbal/EMR discussion between providers. Written report will be generated in the EMR.     Thank you for allowing Interventional Radiology to participate in the care of Pernell Covarrubias. Please don't hesitate to call or TigerText us with any questions.     Sam Chen MD

## 2025-05-05 NOTE — PHYSICAL THERAPY NOTE
PT TREATMENT     05/05/25 1355   PT Last Visit   PT Visit Date 05/05/25   Note Type   Note Type Treatment   Pain Assessment   Pain Assessment Tool Tinajero-Baker FACES   Tinajero-Baker FACES Pain Rating 0   Restrictions/Precautions   Other Precautions Chair Alarm;Bed Alarm;Cognitive;Fall Risk;O2   General   Chart Reviewed Yes   Family/Caregiver Present Yes  (Wife present)   Cognition   Overall Cognitive Status Impaired   Arousal/Participation Cooperative   Subjective   Subjective Patient without pain complaints   Bed Mobility   Supine to Sit 2  Maximal assistance   Additional items Assist x 2   Transfers   Sit to Stand 2  Maximal assistance   Additional items Assist x 2   Stand to Sit 2  Maximal assistance   Additional items Assist x 2   Stand pivot 2  Maximal assistance   Additional items Assist x 2   Ambulation/Elevation   Gait Assistance 2  Maximal assist   Additional items Assist x 2   Assistive Device Rolling walker   Distance 2-3 steps with max assist for weight shifting to advance lower extremities.  Shuffling type steps taken with forward flexed head posture   Balance   Static Sitting Poor +   Dynamic Sitting Poor   Static Standing Poor -   Dynamic Standing Poor -   Ambulatory Poor -   Activity Tolerance   Activity Tolerance Patient limited by fatigue;Treatment limited secondary to medical complications (Comment)  (Limited by weakness and fatigue)   Nurse Made Aware Yes   Exercises   Hamstring Stretch Supine;5 reps;PROM;Bilateral   Heelslides 5 reps;AAROM;Bilateral   Hip Flexion Sitting;5 reps;AAROM;Bilateral   Knee AROM Long Arc Quad Sitting;5 reps;AAROM;PROM;Bilateral   Ankle Pumps Sitting;10 reps;AAROM;AROM;Bilateral   Balance training  sitting balance activity completed on bed edge   Assessment   Assessment Patient cooperative and pleasant although confused.  Demonstrating improving sitting and standing balance with gait activity very short distances this session.  Patient will benefit from continued physical  therapy with progression as tolerated and increasing functional mobility with clinical staff as well. The patient's -[facility] Basic Mobility Inpatient Short Form Raw Score is 11. A Raw score of less than or equal to 16 suggests the patient may benefit from discharge to post-acute rehabilitation services. Please also refer to the recommendation of the Physical Therapist for safe discharge planning.         Plan   Treatment/Interventions ADL retraining;Functional transfer training;LE strengthening/ROM;Therapeutic exercise;Endurance training;Cognitive reorientation;Patient/family training;Equipment eval/education;Bed mobility;Gait training;Compensatory technique education   PT Frequency 3-5x/wk   Discharge Recommendation   Rehab Resource Intensity Level, PT II (Moderate Resource Intensity)   -PAC Basic Mobility Inpatient   Turning in Flat Bed Without Bedrails 2   Lying on Back to Sitting on Edge of Flat Bed Without Bedrails 2   Moving Bed to Chair 2   Standing Up From Chair Using Arms 2   Walk in Room 2   Climb 3-5 Stairs With Railing 1   Basic Mobility Inpatient Raw Score 11   Basic Mobility Standardized Score 30.25   [facility] Highest Level Of Mobility   -HLM Goal 4: Move to chair/commode   -HLM Achieved 4: Move to chair/commode   Education   Patient Demonstrates acceptance/verbal understanding;Explanation/teachback used;Demonstrates verbal understanding;Reinforcement needed   Licensure   [place] License Number  Shante Alan PT [id number]

## 2025-05-05 NOTE — PLAN OF CARE
Problem: Potential for Falls  Goal: Patient will remain free of falls  Description: INTERVENTIONS:- Educate patient/family on patient safety including physical limitations- Instruct patient to call for assistance with activity - Consult OT/PT to assist with strengthening/mobility - Keep Call bell within reach- Keep bed low and locked with side rails adjusted as appropriate- Keep care items and personal belongings within reach- Initiate and maintain comfort rounds- Make Fall Risk Sign visible to staff- Offer Toileting every 2 Hours, in advance of need- Initiate/Maintain bed alarm- Obtain necessary fall risk management equipment: slipper socks- Apply yellow socks and bracelet for high fall risk patients- Consider moving patient to room near nurses station  INTERVENTIONS:- Educate patient/family on patient safety including physical limitations- Instruct patient to call for assistance with activity - Consult OT/PT to assist with strengthening/mobility - Keep Call bell within reach- Keep bed low and locked with side rails adjusted as appropriate- Keep care items and personal belongings within reach- Initiate and maintain comfort rounds- Make Fall Risk Sign visible to staff- Offer Toileting every 2 Hours, in advance of need- Initiate/Maintain bed alarm- Obtain necessary fall risk management equipment: slipper socks- Apply yellow socks and bracelet for high fall risk patients- Consider moving patient to room near nurses station  Outcome: Progressing     Problem: PAIN - ADULT  Goal: Verbalizes/displays adequate comfort level or baseline comfort level  Description: Interventions:- Encourage patient to monitor pain and request assistance- Assess pain using appropriate pain scale- Administer analgesics based on type and severity of pain and evaluate response- Implement non-pharmacological measures as appropriate and evaluate response- Consider cultural and social influences on pain and pain management- Notify  physician/advanced practitioner if interventions unsuccessful or patient reports new pain  Outcome: Progressing     Problem: INFECTION - ADULT  Goal: Absence or prevention of progression during hospitalization  Description: INTERVENTIONS:- Assess and monitor for signs and symptoms of infection- Monitor lab/diagnostic results- Monitor all insertion sites, i.e. indwelling lines, tubes, and drains- Monitor endotracheal if appropriate and nasal secretions for changes in amount and color- [place] appropriate cooling/warming therapies per order- Administer medications as ordered- Instruct and encourage patient and family to use good hand hygiene technique- Identify and instruct in appropriate isolation precautions for identified infection/condition  Outcome: Progressing  Goal: Absence of fever/infection during neutropenic period  Description: INTERVENTIONS:- Monitor WBC  Outcome: Progressing     Problem: SAFETY ADULT  Goal: Patient will remain free of falls  Description: INTERVENTIONS:- Educate patient/family on patient safety including physical limitations- Instruct patient to call for assistance with activity - Consult OT/PT to assist with strengthening/mobility - Keep Call bell within reach- Keep bed low and locked with side rails adjusted as appropriate- Keep care items and personal belongings within reach- Initiate and maintain comfort rounds- Make Fall Risk Sign visible to staff- Offer Toileting every 2 Hours, in advance of need- Initiate/Maintain bed alarm- Obtain necessary fall risk management equipment: slipper socks- Apply yellow socks and bracelet for high fall risk patients- Consider moving patient to room near nurses station  INTERVENTIONS:- Educate patient/family on patient safety including physical limitations- Instruct patient to call for assistance with activity - Consult OT/PT to assist with strengthening/mobility - Keep Call bell within reach- Keep bed low and locked with side rails adjusted as  appropriate- Keep care items and personal belongings within reach- Initiate and maintain comfort rounds- Make Fall Risk Sign visible to staff- Offer Toileting every 2 Hours, in advance of need- Initiate/Maintain bed alarm- Obtain necessary fall risk management equipment: slipper socks- Apply yellow socks and bracelet for high fall risk patients- Consider moving patient to room near nurses station  Outcome: Progressing  Goal: Maintain or return to baseline ADL function  Description: INTERVENTIONS:-  Assess patient's ability to carry out ADLs; assess patient's baseline for ADL function and identify physical deficits which impact ability to perform ADLs (bathing, care of mouth/teeth, toileting, grooming, dressing, etc.)- Assess/evaluate cause of self-care deficits - Assess range of motion- Assess patient's mobility; develop plan if impaired- Assess patient's need for assistive devices and provide as appropriate- Encourage maximum independence but intervene and supervise when necessary- Involve family in performance of ADLs- Assess for home care needs following discharge - Consider OT consult to assist with ADL evaluation and planning for discharge- Provide patient education as appropriate  Outcome: Progressing  Goal: Maintains/Returns to pre admission functional level  Description: INTERVENTIONS:- Perform AM-PAC 6 Click Basic Mobility/ Daily Activity assessment daily.- Set and communicate daily mobility goal to care team and patient/family/caregiver. - Collaborate with rehabilitation services on mobility goals if consulted- Perform Range of Motion 3 times a day.- Reposition patient every 2 hours.- Dangle patient 3 times a day- Stand patient 3 times a day- Ambulate patient 3 times a day- Out of bed to chair 3 times a day - Out of bed for meals 3 times a day- Out of bed for toileting- Record patient progress and toleration of activity level   Outcome: Progressing     Problem: DISCHARGE PLANNING  Goal: Discharge to home  or other facility with appropriate resources  Description: INTERVENTIONS:- Identify barriers to discharge w/patient and caregiver- Arrange for needed discharge resources and transportation as appropriate- Identify discharge learning needs (meds, wound care, etc.)- Arrange for interpretive services to assist at discharge as needed- Refer to Case Management Department for coordinating discharge planning if the patient needs post-hospital services based on physician/advanced practitioner order or complex needs related to functional status, cognitive ability, or social support system  Outcome: Progressing     Problem: Knowledge Deficit  Goal: Patient/family/caregiver demonstrates understanding of disease process, treatment plan, medications, and discharge instructions  Description: Complete learning assessment and assess knowledge base.Interventions:- Provide teaching at level of understanding- Provide teaching via preferred learning methods  Outcome: Progressing     Problem: Nutrition/Hydration-ADULT  Goal: Nutrient/Hydration intake appropriate for improving, restoring or maintaining nutritional needs  Description: Monitor and assess patient's nutrition/hydration status for malnutrition. Collaborate with interdisciplinary team and initiate plan and interventions as ordered.  Monitor patient's weight and dietary intake as ordered or per policy. Utilize nutrition screening tool and intervene as necessary. Determine patient's food preferences and provide high-protein, high-caloric foods as appropriate. INTERVENTIONS:- Monitor oral intake, urinary output, labs, and treatment plans- Assess nutrition and hydration status and recommend course of action- Evaluate amount of meals eaten- Assist patient with eating if necessary - Allow adequate time for meals- Recommend/ encourage appropriate diets, oral nutritional supplements, and vitamin/mineral supplements- Order, calculate, and assess calorie counts as needed- Recommend,  monitor, and adjust tube feedings and TPN/PPN based on assessed needs- Assess need for intravenous fluids- Provide specific nutrition/hydration education as appropriate- Include patient/family/caregiver in decisions related to nutrition  Outcome: Progressing     Problem: Prexisting or High Potential for Compromised Skin Integrity  Goal: Skin integrity is maintained or improved  Description: INTERVENTIONS:- Identify patients at risk for skin breakdown- Assess and monitor skin integrity- Assess and monitor nutrition and hydration status- Monitor labs - Assess for incontinence - Turn and reposition patient- Assist with mobility/ambulation- Relieve pressure over bony prominences- Avoid friction and shearing- Provide appropriate hygiene as needed including keeping skin clean and dry- Evaluate need for skin moisturizer/barrier cream- Collaborate with interdisciplinary team - Patient/family teaching- Consider wound care consult   Outcome: Progressing

## 2025-05-05 NOTE — PLAN OF CARE
Problem: Potential for Falls  Goal: Patient will remain free of falls  Description: INTERVENTIONS:- Educate patient/family on patient safety including physical limitations- Instruct patient to call for assistance with activity - Consult OT/PT to assist with strengthening/mobility - Keep Call bell within reach- Keep bed low and locked with side rails adjusted as appropriate- Keep care items and personal belongings within reach- Initiate and maintain comfort rounds- Make Fall Risk Sign visible to staff- Offer Toileting every 2 Hours, in advance of need- Initiate/Maintain bed alarm- Obtain necessary fall risk management equipment: slipper socks- Apply yellow socks and bracelet for high fall risk patients- Consider moving patient to room near nurses station  INTERVENTIONS:- Educate patient/family on patient safety including physical limitations- Instruct patient to call for assistance with activity - Consult OT/PT to assist with strengthening/mobility - Keep Call bell within reach- Keep bed low and locked with side rails adjusted as appropriate- Keep care items and personal belongings within reach- Initiate and maintain comfort rounds- Make Fall Risk Sign visible to staff- Offer Toileting every 2 Hours, in advance of need- Initiate/Maintain bed alarm- Obtain necessary fall risk management equipment: slipper socks- Apply yellow socks and bracelet for high fall risk patients- Consider moving patient to room near nurses station  Outcome: Progressing     Problem: PAIN - ADULT  Goal: Verbalizes/displays adequate comfort level or baseline comfort level  Description: Interventions:- Encourage patient to monitor pain and request assistance- Assess pain using appropriate pain scale- Administer analgesics based on type and severity of pain and evaluate response- Implement non-pharmacological measures as appropriate and evaluate response- Consider cultural and social influences on pain and pain management- Notify  physician/advanced practitioner if interventions unsuccessful or patient reports new pain  Outcome: Progressing     Problem: INFECTION - ADULT  Goal: Absence or prevention of progression during hospitalization  Description: INTERVENTIONS:- Assess and monitor for signs and symptoms of infection- Monitor lab/diagnostic results- Monitor all insertion sites, i.e. indwelling lines, tubes, and drains- Monitor endotracheal if appropriate and nasal secretions for changes in amount and color- [place] appropriate cooling/warming therapies per order- Administer medications as ordered- Instruct and encourage patient and family to use good hand hygiene technique- Identify and instruct in appropriate isolation precautions for identified infection/condition  Outcome: Progressing  Goal: Absence of fever/infection during neutropenic period  Description: INTERVENTIONS:- Monitor WBC  Outcome: Progressing     Problem: SAFETY ADULT  Goal: Patient will remain free of falls  Description: INTERVENTIONS:- Educate patient/family on patient safety including physical limitations- Instruct patient to call for assistance with activity - Consult OT/PT to assist with strengthening/mobility - Keep Call bell within reach- Keep bed low and locked with side rails adjusted as appropriate- Keep care items and personal belongings within reach- Initiate and maintain comfort rounds- Make Fall Risk Sign visible to staff- Offer Toileting every 2 Hours, in advance of need- Initiate/Maintain bed alarm- Obtain necessary fall risk management equipment: slipper socks- Apply yellow socks and bracelet for high fall risk patients- Consider moving patient to room near nurses station  INTERVENTIONS:- Educate patient/family on patient safety including physical limitations- Instruct patient to call for assistance with activity - Consult OT/PT to assist with strengthening/mobility - Keep Call bell within reach- Keep bed low and locked with side rails adjusted as  appropriate- Keep care items and personal belongings within reach- Initiate and maintain comfort rounds- Make Fall Risk Sign visible to staff- Offer Toileting every 2 Hours, in advance of need- Initiate/Maintain bed alarm- Obtain necessary fall risk management equipment: slipper socks- Apply yellow socks and bracelet for high fall risk patients- Consider moving patient to room near nurses station  Outcome: Progressing  Goal: Maintain or return to baseline ADL function  Description: INTERVENTIONS:-  Assess patient's ability to carry out ADLs; assess patient's baseline for ADL function and identify physical deficits which impact ability to perform ADLs (bathing, care of mouth/teeth, toileting, grooming, dressing, etc.)- Assess/evaluate cause of self-care deficits - Assess range of motion- Assess patient's mobility; develop plan if impaired- Assess patient's need for assistive devices and provide as appropriate- Encourage maximum independence but intervene and supervise when necessary- Involve family in performance of ADLs- Assess for home care needs following discharge - Consider OT consult to assist with ADL evaluation and planning for discharge- Provide patient education as appropriate  Outcome: Progressing  Goal: Maintains/Returns to pre admission functional level  Description: INTERVENTIONS:- Perform AM-PAC 6 Click Basic Mobility/ Daily Activity assessment daily.- Set and communicate daily mobility goal to care team and patient/family/caregiver. - Collaborate with rehabilitation services on mobility goals if consulted- Perform Range of Motion 3 times a day.- Reposition patient every 2 hours.- Dangle patient 3 times a day- Stand patient 3 times a day- Ambulate patient 3 times a day- Out of bed to chair 3 times a day - Out of bed for meals 3 times a day- Out of bed for toileting- Record patient progress and toleration of activity level   Outcome: Progressing     Problem: DISCHARGE PLANNING  Goal: Discharge to home  or other facility with appropriate resources  Description: INTERVENTIONS:- Identify barriers to discharge w/patient and caregiver- Arrange for needed discharge resources and transportation as appropriate- Identify discharge learning needs (meds, wound care, etc.)- Arrange for interpretive services to assist at discharge as needed- Refer to Case Management Department for coordinating discharge planning if the patient needs post-hospital services based on physician/advanced practitioner order or complex needs related to functional status, cognitive ability, or social support system  Outcome: Progressing     Problem: Knowledge Deficit  Goal: Patient/family/caregiver demonstrates understanding of disease process, treatment plan, medications, and discharge instructions  Description: Complete learning assessment and assess knowledge base.Interventions:- Provide teaching at level of understanding- Provide teaching via preferred learning methods  Outcome: Progressing     Problem: Nutrition/Hydration-ADULT  Goal: Nutrient/Hydration intake appropriate for improving, restoring or maintaining nutritional needs  Description: Monitor and assess patient's nutrition/hydration status for malnutrition. Collaborate with interdisciplinary team and initiate plan and interventions as ordered.  Monitor patient's weight and dietary intake as ordered or per policy. Utilize nutrition screening tool and intervene as necessary. Determine patient's food preferences and provide high-protein, high-caloric foods as appropriate. INTERVENTIONS:- Monitor oral intake, urinary output, labs, and treatment plans- Assess nutrition and hydration status and recommend course of action- Evaluate amount of meals eaten- Assist patient with eating if necessary - Allow adequate time for meals- Recommend/ encourage appropriate diets, oral nutritional supplements, and vitamin/mineral supplements-  Problem: Prexisting or High Potential for Compromised Skin  Integrity  Goal: Skin integrity is maintained or improved  Description: INTERVENTIONS:- Identify patients at risk for skin breakdown- Assess and monitor skin integrity- Assess and monitor nutrition and hydration status- Monitor labs - Assess for incontinence - Turn and reposition patient- Assist with mobility/ambulation- Relieve pressure over bony prominences- Avoid friction and shearing- Provide appropriate hygiene as needed including keeping skin clean and dry- Evaluate need for skin moisturizer/barrier cream- Collaborate with interdisciplinary team - Patient/family teaching- Consider wound care consult   Outcome: Progressing   - Assess need for intravenous fluids- Provide specific nutrition/hydration education as appropriate- Include patient/family/caregiver in decisions related to nutrition  Outcome: Progressing

## 2025-05-05 NOTE — ASSESSMENT & PLAN NOTE
Per chart review, TSH elevated during last hospitalization but with normal T4  4/26/25: TSH 15.954, T4 0.83, T3 2.35<< repeat TSH : 5

## 2025-05-05 NOTE — PROGRESS NOTES
Progress Note - Infectious Disease   Name: Pernell Covarrubias 79 y.o. male I MRN: [id number]  Unit/Bed#: [address] [place] [address] I Date of Admission: 4/26/2025   Date of Service: 5/5/2025 I Hospital Day: 8     Assessment & Plan  Sepsis  Patient initially with intermittent hypothermia and tachypnea, now with high fever on 4/30.  Suspect this is due to MSSA bacteremia as below.  At this time no other clear source of infection.  Chest x-ray shows clear lung fields, CT chest showing only pulmonary edema, no acute process on CT A/P.  Fortunately patient is currently hemodynamically stable. Fever curve improved.   -Antibiotic as below  -Follow-up blood cultures  -Monitor temperatures and hemodynamics closely  -Repeat CBC tomorrow to trend WBC  -Additional supportive care per primary team  MSSA bacteremia  1 of 2 blood cultures positive from 4/28. No cultures were sent on admission for comparison. Patient did have systemic signs of infection including hypothermia and fever thus suspect a true infection.  Source could be skin translocation as patient has multiple areas of open skin abrasions, though none appear acutely infected.  No other obvious source of infection at this time. CT C/A/P is without signs of metastatic infection. Brain MRI negative for emboli, though was non-contrast. Patient has no cardiac devices or endovascular hardware. TTE, adequate study, is without vegetation. Given unclear source, favor treating for at least 4 weeks of IV antibiotic.   -Continue IV Cefazolin 2g every 8 hours  -Continue to monitor serum creatinine while on IV antibiotics  -Follow up repeat blood cultures to assess for bacteremia clearance  -OK for PICC placement at this time given blood cultures are NGTD x 72h  -Plan for 4 week total antibiotic course from clearance of cultures (through 5/30/25)  -Will need outpatient ID follow-up and weekly labs including CBCd and BMP in the outpatient setting  -ID office staff aware of outpatient follow-up  needs  Altered mental status  This was patient's presenting symptom.  He has had multiple admissions for the same issue over the last month and concern for possible underlying dementia versus delirium.  Now likely worsened by acute infection as above. No obvious nuchal rigidity on exam. Brain MRI is without evidence of stroke or emboli, though was non-contrast.  He has had marked improvement in his mental status as of 5/5.  -Treatment of bacteremia as above  -Neurology recs appreciated  -Monitor mental status closely  BRIAN on CKD  -Dose adjust antibiotic  -Monitor serum Cr  Type 2 diabetes mellitus with diabetic neuropathy (HCC)  Lab Results   Component Value Date    HGBA1C 6.9 (H) 02/11/2025   Risk factor for infection.  -Tight glycemic control per primary team    I have discussed the above management plan in detail with the primary service.  They agree with plan to continue IV cefazolin, follow-up repeat blood cultures until finalized.   ID consult service will continue to follow.     Antibiotics:  Cefazolin: 7    Subjective   Patient denies having any fevers, chills, N/V/D, CP, SOB, abdominal pain. Denies neck or back pain. Denies having any indwelling devices or hardware. Patient's wife is at bedside. She states his mental status is much improved. Tolerating IV cefazolin without difficulty.     Objective :  Temp:  [97.9 °F (36.6 °C)-99.3 °F (37.4 °C)] 97.9 °F (36.6 °C)  HR:  [52-68] 57  BP: (107-156)/(51-65) 151/60  Resp:  [18-20] 18  SpO2:  [95 %-97 %] 95 %  O2 Device: None (Room air)    General:  No acute distress  Psychiatric:  Awake and alert  Pulmonary:  Normal respiratory excursion without accessory muscle use, on room air  Heart: RRR  Abdomen:  Soft, nontender  Extremities:  No edema  Skin: Scattered abrasions on arms and legs, dry skin      Lab Results: I have reviewed the following results:  Results from last 7 days   Lab Units 05/05/25  0510 05/04/25  0531 05/03/25  0434   WBC Thousand/uL 10.43* 9.46  9.09   HEMOGLOBIN g/dL 7.5* 7.7* 7.7*   PLATELETS Thousands/uL 197 181 148*     Results from last 7 days   Lab Units 05/05/25  0510 05/04/25  0531 05/03/25  0434 05/02/25  0541   SODIUM mmol/L 144 148* 143 143   POTASSIUM mmol/L 3.6 3.7 4.0 3.4*   CHLORIDE mmol/L 116* 119* 120* 118*   CO2 mmol/L 21 20* 18* 16*   BUN mg/dL 31* 35* 40* 39*   CREATININE mg/dL 1.16 1.28 1.42* 1.50*   EGFR ml/min/1.73sq m 59 52 46 43   CALCIUM mg/dL 7.9* 8.2* 8.2* 8.3*   AST U/L 29  --  111* 48*   ALT U/L 6*  --  29 13   ALK PHOS U/L 140*  --  161* 125*   ALBUMIN g/dL 2.5*  --  2.6* 2.8*     Results from last 7 days   Lab Units 05/02/25  0539 04/28/25  1840   BLOOD CULTURE  No Growth at 72 hrs.  No Growth at 72 hrs. No Growth After 5 Days.  Staphylococcus aureus*   GRAM STAIN RESULT   --  Gram positive cocci in clusters*                   Sydney Bell PA-C  [facility]

## 2025-05-05 NOTE — ASSESSMENT & PLAN NOTE
Patient initially with intermittent hypothermia and tachypnea, now with high fever on 4/30.  Suspect this is due to MSSA bacteremia as below.  At this time no other clear source of infection.  Chest x-ray shows clear lung fields, CT chest showing only pulmonary edema, no acute process on CT A/P.  Fortunately patient is currently hemodynamically stable. Fever curve improved.   -Antibiotic as below  -Follow-up blood cultures  -Monitor temperatures and hemodynamics closely  -Repeat CBC tomorrow to trend WBC  -Additional supportive care per primary team

## 2025-05-05 NOTE — ASSESSMENT & PLAN NOTE
Sodium level increased to 148 on 05/04  Sodium level today improved to 144  Current Rx: D5W at 50 cc/h  Changes: Continue D5W at 50 cc/h

## 2025-05-06 ENCOUNTER — APPOINTMENT (INPATIENT)
Dept: NON INVASIVE DIAGNOSTICS | Facility: HOSPITAL | Age: 80
DRG: 871 | End: 2025-05-06
Attending: RADIOLOGY
Payer: MEDICARE

## 2025-05-06 LAB
ALBUMIN SERPL BCG-MCNC: 2.4 G/DL (ref 3.5–5)
ALP SERPL-CCNC: 139 U/L (ref 34–104)
ALT SERPL W P-5'-P-CCNC: 12 U/L (ref 7–52)
ANION GAP SERPL CALCULATED.3IONS-SCNC: 9 MMOL/L (ref 4–13)
AST SERPL W P-5'-P-CCNC: 49 U/L (ref 13–39)
BASOPHILS # BLD AUTO: 0.02 THOUSANDS/ÂΜL (ref 0–0.1)
BASOPHILS NFR BLD AUTO: 0 % (ref 0–1)
BILIRUB SERPL-MCNC: 0.62 MG/DL (ref 0.2–1)
BUN SERPL-MCNC: 27 MG/DL (ref 5–25)
CALCIUM ALBUM COR SERPL-MCNC: 9 MG/DL (ref 8.3–10.1)
CALCIUM SERPL-MCNC: 7.7 MG/DL (ref 8.4–10.2)
CHLORIDE SERPL-SCNC: 113 MMOL/L (ref 96–108)
CO2 SERPL-SCNC: 19 MMOL/L (ref 21–32)
CREAT SERPL-MCNC: 0.99 MG/DL (ref 0.6–1.3)
EOSINOPHIL # BLD AUTO: 0.22 THOUSAND/ÂΜL (ref 0–0.61)
EOSINOPHIL NFR BLD AUTO: 2 % (ref 0–6)
ERYTHROCYTE [DISTWIDTH] IN BLOOD BY AUTOMATED COUNT: 17.2 % (ref 11.6–15.1)
GFR SERPL CREATININE-BSD FRML MDRD: 71 ML/MIN/1.73SQ M
GLUCOSE SERPL-MCNC: 129 MG/DL (ref 65–140)
GLUCOSE SERPL-MCNC: 171 MG/DL (ref 65–140)
GLUCOSE SERPL-MCNC: 191 MG/DL (ref 65–140)
GLUCOSE SERPL-MCNC: 194 MG/DL (ref 65–140)
GLUCOSE SERPL-MCNC: 197 MG/DL (ref 65–140)
HCT VFR BLD AUTO: 23.8 % (ref 36.5–49.3)
HGB BLD-MCNC: 7.4 G/DL (ref 12–17)
IMM GRANULOCYTES # BLD AUTO: 0.11 THOUSAND/UL (ref 0–0.2)
IMM GRANULOCYTES NFR BLD AUTO: 1 % (ref 0–2)
LYMPHOCYTES # BLD AUTO: 0.53 THOUSANDS/ÂΜL (ref 0.6–4.47)
LYMPHOCYTES NFR BLD AUTO: 6 % (ref 14–44)
MCH RBC QN AUTO: 29.1 PG (ref 26.8–34.3)
MCHC RBC AUTO-ENTMCNC: 31.1 G/DL (ref 31.4–37.4)
MCV RBC AUTO: 94 FL (ref 82–98)
MONOCYTES # BLD AUTO: 0.53 THOUSAND/ÂΜL (ref 0.17–1.22)
MONOCYTES NFR BLD AUTO: 6 % (ref 4–12)
NEUTROPHILS # BLD AUTO: 7.9 THOUSANDS/ÂΜL (ref 1.85–7.62)
NEUTS SEG NFR BLD AUTO: 85 % (ref 43–75)
NRBC BLD AUTO-RTO: 0 /100 WBCS
PLATELET # BLD AUTO: 251 THOUSANDS/UL (ref 149–390)
PMV BLD AUTO: 10.3 FL (ref 8.9–12.7)
POTASSIUM SERPL-SCNC: 4 MMOL/L (ref 3.5–5.3)
PROT SERPL-MCNC: 5 G/DL (ref 6.4–8.4)
RBC # BLD AUTO: 2.54 MILLION/UL (ref 3.88–5.62)
SODIUM SERPL-SCNC: 141 MMOL/L (ref 135–147)
WBC # BLD AUTO: 9.31 THOUSAND/UL (ref 4.31–10.16)

## 2025-05-06 PROCEDURE — 85025 COMPLETE CBC W/AUTO DIFF WBC: CPT

## 2025-05-06 PROCEDURE — 99232 SBSQ HOSP IP/OBS MODERATE 35: CPT | Performed by: INTERNAL MEDICINE

## 2025-05-06 PROCEDURE — 82948 REAGENT STRIP/BLOOD GLUCOSE: CPT

## 2025-05-06 PROCEDURE — 76937 US GUIDE VASCULAR ACCESS: CPT | Performed by: STUDENT IN AN ORGANIZED HEALTH CARE EDUCATION/TRAINING PROGRAM

## 2025-05-06 PROCEDURE — 77001 FLUOROGUIDE FOR VEIN DEVICE: CPT

## 2025-05-06 PROCEDURE — 76937 US GUIDE VASCULAR ACCESS: CPT

## 2025-05-06 PROCEDURE — 80053 COMPREHEN METABOLIC PANEL: CPT

## 2025-05-06 PROCEDURE — 99232 SBSQ HOSP IP/OBS MODERATE 35: CPT | Performed by: FAMILY MEDICINE

## 2025-05-06 PROCEDURE — 77001 FLUOROGUIDE FOR VEIN DEVICE: CPT | Performed by: STUDENT IN AN ORGANIZED HEALTH CARE EDUCATION/TRAINING PROGRAM

## 2025-05-06 PROCEDURE — 36558 INSERT TUNNELED CV CATH: CPT | Performed by: STUDENT IN AN ORGANIZED HEALTH CARE EDUCATION/TRAINING PROGRAM

## 2025-05-06 PROCEDURE — 36558 INSERT TUNNELED CV CATH: CPT

## 2025-05-06 PROCEDURE — C1751 CATH, INF, PER/CENT/MIDLINE: HCPCS

## 2025-05-06 PROCEDURE — G0545 PR INHERENT VISIT TO INPT: HCPCS | Performed by: INTERNAL MEDICINE

## 2025-05-06 PROCEDURE — 02HV33Z INSERTION OF INFUSION DEVICE INTO SUPERIOR VENA CAVA, PERCUTANEOUS APPROACH: ICD-10-PCS | Performed by: STUDENT IN AN ORGANIZED HEALTH CARE EDUCATION/TRAINING PROGRAM

## 2025-05-06 RX ORDER — LIDOCAINE WITH 8.4% SOD BICARB 0.9%(10ML)
SYRINGE (ML) INJECTION AS NEEDED
Status: COMPLETED | OUTPATIENT
Start: 2025-05-06 | End: 2025-05-06

## 2025-05-06 RX ADMIN — CHOLECALCIFEROL TAB 25 MCG (1000 UNIT) 2000 UNITS: 25 TAB at 09:23

## 2025-05-06 RX ADMIN — HEPARIN SODIUM 5000 UNITS: 5000 INJECTION INTRAVENOUS; SUBCUTANEOUS at 21:25

## 2025-05-06 RX ADMIN — CARVEDILOL 12.5 MG: 12.5 TABLET, FILM COATED ORAL at 09:24

## 2025-05-06 RX ADMIN — MICONAZOLE NITRATE: 20 CREAM TOPICAL at 19:01

## 2025-05-06 RX ADMIN — SODIUM BICARBONATE 650 MG TABLET 650 MG: at 08:19

## 2025-05-06 RX ADMIN — CEFAZOLIN SODIUM 2000 MG: 2 SOLUTION INTRAVENOUS at 16:46

## 2025-05-06 RX ADMIN — DEXTROSE 50 ML/HR: 5 SOLUTION INTRAVENOUS at 05:15

## 2025-05-06 RX ADMIN — ATORVASTATIN CALCIUM 20 MG: 20 TABLET, FILM COATED ORAL at 09:24

## 2025-05-06 RX ADMIN — SODIUM BICARBONATE 650 MG TABLET 650 MG: at 19:01

## 2025-05-06 RX ADMIN — PANTOPRAZOLE SODIUM 40 MG: 40 TABLET, DELAYED RELEASE ORAL at 05:13

## 2025-05-06 RX ADMIN — TAMSULOSIN HYDROCHLORIDE 0.4 MG: 0.4 CAPSULE ORAL at 16:46

## 2025-05-06 RX ADMIN — INSULIN LISPRO 1 UNITS: 100 INJECTION, SOLUTION INTRAVENOUS; SUBCUTANEOUS at 12:57

## 2025-05-06 RX ADMIN — FERROUS SULFATE TAB 325 MG (65 MG ELEMENTAL FE) 325 MG: 325 (65 FE) TAB at 08:19

## 2025-05-06 RX ADMIN — INSULIN LISPRO 1 UNITS: 100 INJECTION, SOLUTION INTRAVENOUS; SUBCUTANEOUS at 08:16

## 2025-05-06 RX ADMIN — CEFAZOLIN SODIUM 2000 MG: 2 SOLUTION INTRAVENOUS at 08:19

## 2025-05-06 RX ADMIN — ASPIRIN 81 MG: 81 TABLET, CHEWABLE ORAL at 09:23

## 2025-05-06 RX ADMIN — FERROUS SULFATE TAB 325 MG (65 MG ELEMENTAL FE) 325 MG: 325 (65 FE) TAB at 16:46

## 2025-05-06 RX ADMIN — HEPARIN SODIUM 5000 UNITS: 5000 INJECTION INTRAVENOUS; SUBCUTANEOUS at 04:58

## 2025-05-06 RX ADMIN — Medication 1 TABLET: at 09:23

## 2025-05-06 RX ADMIN — HEPARIN SODIUM 5000 UNITS: 5000 INJECTION INTRAVENOUS; SUBCUTANEOUS at 13:07

## 2025-05-06 RX ADMIN — CARVEDILOL 12.5 MG: 12.5 TABLET, FILM COATED ORAL at 19:01

## 2025-05-06 RX ADMIN — MICONAZOLE NITRATE: 20 CREAM TOPICAL at 09:25

## 2025-05-06 RX ADMIN — Medication 10 ML: at 15:31

## 2025-05-06 RX ADMIN — INSULIN GLARGINE 5 UNITS: 100 INJECTION, SOLUTION SUBCUTANEOUS at 09:23

## 2025-05-06 RX ADMIN — INSULIN LISPRO 1 UNITS: 100 INJECTION, SOLUTION INTRAVENOUS; SUBCUTANEOUS at 21:25

## 2025-05-06 NOTE — DISCHARGE INSTR - AVS FIRST PAGE
Please continue taking all medications as prescribed. Please follow up with your primary medical doctor within 1 week of discharge. Please follow up with neurology in 1 month. Please return to the nearest emergency department if you develop any signs or symptoms of worsening disease or infection, including but not limited to chest pain, shortness of breath, abdominal pain, lightheadedness, dizziness, palpitations, fevers or chills.     Infectious Disease Discharge Instructions  You are being discharged on an IV antibiotic called cefazolin. This infusion is to be given every 8 hours (3 times a day) through your central venous catheter (CVC).     You will be set up with an appointment in the outpatient Infectious Disease office. It is important for you to keep this appointment in order for us to monitor you while you are on IV antibiotics.  This will include evaluating your lab work, examining your CVC, and making sure you are not having toxicities or side effects of the medication(s) you are receiving.     Weekly labs include: CBCd and BMP    Tunneled CVC to be removed at completion of IV antibiotics. Will need referral to IR in outpatient setting for removal.    Please call the ID office ([phone]) with any questions or concerns such as antibiotic side effects including nausea, vomiting, diarrhea, rashes, etc. Any sustained fevers over 100.4F, please go to the ER for evaluation.     Sincerely,    Sydney Bell PA-C  [facility]

## 2025-05-06 NOTE — ASSESSMENT & PLAN NOTE
Acute on chronic. Patient's baseline hemoglobin is around 9 range. Has been noted to trend down to 7-8 over this past month  No evidence of active bleeding  4/26/25: hgb 6.7 s/p 1 unit PRBC  4/30/25: hgb 6.6 s/p 1 unit PRBC  B12, folate stable  Continue ferrous sulfate for DAYANA  Monitor daily CBC. Transfuse for hemoglobin <7  Restart Heparin for DVT PPE , check CBC Q12   5/6 - Hemoglobin 7.4. No evidence of acute bleeding. Will continue to monitor.

## 2025-05-06 NOTE — ASSESSMENT & PLAN NOTE
BP acceptable  PTA medications include Carvedilol 12.5mg BID, Losartan 25mg QD, Furosemide 20mg QD. Spironolactone 25mg QD was discontinued during recent hospitalization  Continue Carvedilol  Holding Furosemide and Losartan given recent BRIAN  Monitor vitals per routine

## 2025-05-06 NOTE — QUICK NOTE
Chart reviewed.  Vitals reviewed.  Labs reviewed.  Creatinine stable at baseline.  Sodium level improved to 141.  Discontinue hypotonic fluids.  Encourage p.o. intake.  Trend BMP daily.  We will follow peripherally.  Please call with questions in interim.

## 2025-05-06 NOTE — ASSESSMENT & PLAN NOTE
1 of 2 blood cultures positive from 4/28. No cultures were sent on admission for comparison. Patient did have systemic signs of infection including hypothermia and fever thus suspect a true infection.  Source could be skin translocation as patient has multiple areas of open skin abrasions, though none appear acutely infected.  No other obvious source of infection at this time. CT C/A/P is without signs of metastatic infection. Brain MRI negative for emboli, though was non-contrast. Patient has no cardiac devices or endovascular hardware. TTE, adequate study, is without vegetation. Given unclear source, favor treating for at least 4 weeks of IV antibiotic.   -Continue IV Cefazolin 2g every 8 hours  -Continue to monitor serum creatinine while on IV antibiotics  -Follow up repeat blood cultures to assess for bacteremia clearance; currently NGTD x 4d  -OK for tunneled CVC placement given blood cultures are NGTD x 4d. Will need referral to IR in outpatient setting for removal.  -Plan for 4 week total antibiotic course from clearance of cultures (through 5/30/25)  -Will need outpatient ID follow-up and weekly labs including CBCd and BMP in the outpatient setting  -ID office staff aware of outpatient follow-up needs  -Outpatient ID follow-up scheduled for 5/21/25

## 2025-05-06 NOTE — ASSESSMENT & PLAN NOTE
Patient brought in from [facility] for decreased responsiveness. Mental status waxing and waning. Recently hospitalization earlier in the month for AMS in the setting of dehydration and UTI with concern of underlying dementia (wife reports questionable parkinson diagnosis)   CT head: 1. No acute intracranial abnormality.  Stable chronic microangiopathic changes within the brain.  COVID/FLU negative. UA negative. Lactic acid negative. CXR without evidence of infection.   TSH elevated but normal T4  Acute on chronic anemia on admission. Hemoglobin stabilized following transfusion  Coma panel negative. Ammonia level WNL  Speech therapy consulted. Recommending dysphagia 1 diet with nectar thick liquids. Continue oral care  Consult to Neurology, recommendations are appreciated  MRI brain negative for acute abnormality  Suspect acute metabolic encephalopathy in setting of sepsis due to MSSA bacteremia contributing  Medication list reviewed; will d/c metoclopromide. Unclear indication with possible neuro side effects including parkinsonism. Monitor off medication  Since metoclopramide was discontinued.  Patient more awake and alert today.   5/6 - No acute changes at this time. Likely multifactorial as noted above.

## 2025-05-06 NOTE — ASSESSMENT & PLAN NOTE
Wt Readings from Last 3 Encounters:   04/30/25 72.1 kg (159 lb)   03/30/25 70.3 kg (154 lb 15.7 oz)   03/09/25 72.2 kg (159 lb 3.2 oz)     Echo (1/22/25): The left ventricular ejection fraction is 55 to 60% by visual estimation. Systolic function is normal. Wall motion is normal. Diastolic function is mildly abnormal, consistent with grade I (abnormal) relaxation.   Currently dry on exam  Holding home furosemide,  IVF discontinued  Daily weights, I&O

## 2025-05-06 NOTE — WOUND OSTOMY CARE
Progress Note - Wound   Pernell Covarrubias 79 y.o. male MRN: [id number]  Unit/Bed#: [address] [place] [address] Encounter: [id number]         Assessment:   This is a follow up visit for this 79 year old male patient admitted on 4/26/25 with altered mental status s/p fall at home. Patient was sitting in reclining chair extremely lethargic and non-verbal with wife at bedside. He was transferred from reclining chair back to bed with slide board and assist of 3 persons. Patient had meza to gravity in place and was given care for fecal incontinence.     Assessment Findings:  1-Multiple traumatic wounds present on admission to bilateral lower extremities, upper extremities and left 4th finger s/p fall. Orders are in place for wound care. Please see below for detailed assessments.  2-Unstageable pressure injuries to bilateral heels, present on admission. Orders are in place for wound care and for prevention.   3-Intact fungal rash to perineal/gluteal fold and to left axilla. Order in place for Jenna antifungal cream.  4-Sacrobuttocks are intact - orders are in place for skin care and for prevention.  5-Neuropathic wound to right dorsal foot with intact purple and maroon non-blanchable discoloration not present on admission. Orders are in place for wound care/skin protection.  6-Traumatic wound to right 4th finger with intact purple and maroon non-blanchable discoloration not present on admission. Orders are in place for wound care/protection.  7-Neuropathic wound to left posterior lower leg with intact purple and maroon non-blanchable discoloration not present on admission. Orders are in place for wound care/protection.  8-Neuropathic wound to left 2nd toe plantar aspect with intact purple and maroon non-blanchable discoloration not present on admission. Orders are in place for wound care/protection.  9-Traumatic full thickness wound to left lateral upper back with pink granulation tissue, yellow slough, black eschar and no drainage  not present on admission. Orders are in place for wound care.    Wound Care Plan:  1-Cleanse wound to left 4th finger, left lateral lower leg, right anterior lower leg, and left elbow with VASHE solution and pat dry. Apply THIN layer of Normlgel to wounds, Adaptic cut to size of wounds, ABD cut to size of wounds, rolled gauze and tape. Change every other day & prn soilage/dislodgement. PLEASE NO BANDAIDS OR FOAM DRESSINGS TO THESE WOUNDS DUE TO FRAGILE SKIN.   2-Cleanse wound to left lateral back with VASHE solution and pat dry. Apply THIN layer of Normlgel to wound, Adaptic cut to size of wound, ABD and gentle paper tape OR may use small bordered foam to this wound only. Change every other day & prn soilage/dislodgement. Please remove gently.  3-Cleanse wounds to bilateral heels with VASHE solution and pat dry. Apply Betadine to wounds, 1/2 ABD, rolled gauze and tape. Change every other day & prn soilage/dislodgement.  4-Cleanse wound to right dorsal foot, right 4th finger, left posterior lower leg and left 2nd toe with NSS & pat dry. Apply 3M No Sting daily for protection. Apply to right dorsal foot every other day with dressing changes.  5-Cleanse perineal/gluteal fold and left axilla with foaming cleanser or dimethicone wipes and pat dry. Apply Jenna antifungal cream to perineal/gluteal fold 2x/day as directed.  6-Apply Prevent barrier cream to intact sacrobuttock areas BID and PRN (please do not apply to fungal rashes).  7-Heel lift boots to be worn to bilateral heels at all times in bed and after transfer to reclining chair if able. If patient unable to tolerate, must float heels on 2 pillows to offload pressure so heels are not in contact with mattress or pillows.  8-Ehob pressure redistribution cushion in chair when out of bed if able. Avoid prolonged sitting.  9-Moisturize skin daily with skin nourishing cream.  10-Turn/reposition q2h or when medically stable for pressure re-distribution on skin.     Wound  04/27/25 Traumatic Leg Right;Anterior (Active)   Wound Image   05/05/25 1518   Wound Description Granulation tissue;Beefy red 05/05/25 1518   Non-staged Wound Description Full thickness 05/05/25 1518   Wound Length (cm) 1.5 cm 05/05/25 1518   Wound Width (cm) 1 cm 05/05/25 1518   Wound Depth (cm) 0.1 cm 05/05/25 1518   Wound Surface Area (cm^2) 1.5 cm^2 05/05/25 1518   Wound Volume (cm^3) 0.15 cm^3 05/05/25 1518   Calculated Wound Volume (cm^3) 0.15 cm^3 05/05/25 1518   Change in Wound Size % 83.33 05/05/25 1518   Drainage Amount Moderate 05/05/25 1518   Drainage Description Sanguineous 05/05/25 1518   Romy-wound Assessment Intact 05/05/25 1518   Treatments Cleansed 05/05/25 1518   Dressing Normlgel;Adaptic;ABD;Dry dressing 05/05/25 1518   Dressing Changed Changed 05/05/25 1518   Dressing Status Clean;Dry;Intact 05/05/25 1518       Wound 04/27/25 Leg Left;Lateral (Active)   Wound Image   05/05/25 1521   Wound Description Granulation tissue;Pink;Slough;Yellow 05/05/25 1521   Non-staged Wound Description Full thickness 05/05/25 1521   Wound Length (cm) 11.2 cm 05/05/25 1521   Wound Width (cm) 1 cm 05/05/25 1521   Wound Depth (cm) 0.1 cm 05/05/25 1521   Wound Surface Area (cm^2) 11.2 cm^2 05/05/25 1521   Wound Volume (cm^3) 1.12 cm^3 05/05/25 1521   Calculated Wound Volume (cm^3) 1.12 cm^3 05/05/25 1521   Change in Wound Size % 36 05/05/25 1521   Drainage Amount Moderate 05/05/25 1521   Drainage Description Serosanguineous;Yellow 05/05/25 1521   Romy-wound Assessment Intact 05/05/25 1521   Treatments Cleansed 05/05/25 1521   Dressing Normlgel;Adaptic;ABD;Dry dressing 05/05/25 1521   Dressing Changed Changed 05/05/25 1521   Dressing Status Clean;Dry;Intact 05/05/25 1521       Wound 03/31/25 Traumatic Skin Tear Arm Left;Posterior (Active)   Wound Image   05/05/25 1503   Wound Description Granulation tissue;Pink;Slough;Yellow 05/05/25 1503   Non-staged Wound Description Full thickness 05/05/25 1503   Wound Length (cm) 2  cm 05/05/25 1503   Wound Width (cm) 1 cm 05/05/25 1503   Wound Depth (cm) 1 cm 05/05/25 1503   Wound Surface Area (cm^2) 2 cm^2 05/05/25 1503   Wound Volume (cm^3) 2 cm^3 05/05/25 1503   Calculated Wound Volume (cm^3) 2 cm^3 05/05/25 1503   Change in Wound Size % -334.78 05/05/25 1503   Drainage Amount Small 05/05/25 1503   Drainage Description Yellow 05/05/25 1503   Romy-wound Assessment Intact 05/05/25 1503   Treatments Cleansed 05/05/25 1503   Dressing ABD 05/05/25 1503   Dressing Changed Changed 05/05/25 1503   Dressing Status Clean;Dry;Intact 05/05/25 1503       Wound 04/27/25 Finger D4, ring Left;Posterior (Active)   Wound Image   05/05/25 1512   Wound Description Dry;Black;Eschar 05/05/25 1512   Non-staged Wound Description Full thickness 05/05/25 1512   Wound Length (cm) 1.7 cm 05/05/25 1512   Wound Width (cm) 0.7 cm 05/05/25 1512   Wound Depth (cm) 0.1 cm 05/05/25 1512   Wound Surface Area (cm^2) 1.19 cm^2 05/05/25 1512   Wound Volume (cm^3) 0.119 cm^3 05/05/25 1512   Calculated Wound Volume (cm^3) 0.12 cm^3 05/05/25 1512   Change in Wound Size % 72.73 05/05/25 1512   Drainage Amount None 05/05/25 1512   Treatments Cleansed 05/05/25 1512   Dressing Changed New 05/01/25 1512   Dressing Status Clean;Dry;Intact 05/05/25 1512       Wound 04/27/25 Pressure Injury Heel Left (Active)   Wound Image   05/05/25 1552   Wound Description Dry;Black;Eschar 05/05/25 1552   Pressure Injury Stage Unstageable 05/05/25 1552   Wound Length (cm) 2 cm 05/05/25 1552   Wound Width (cm) 2.5 cm 05/05/25 1552   Wound Depth (cm) 0.1 cm 05/05/25 1552   Wound Surface Area (cm^2) 5 cm^2 05/05/25 1552   Wound Volume (cm^3) 0.5 cm^3 05/05/25 1552   Calculated Wound Volume (cm^3) 0.5 cm^3 05/05/25 1552   Change in Wound Size % -25 05/05/25 1552   Drainage Amount None 05/05/25 1552   Romy-wound Assessment Erythema;Purple 05/05/25 1552   Treatments Cleansed 05/05/25 1552   Dressing Betadine;Adaptic;ABD;Dry dressing 05/05/25 1552   Dressing  Changed Changed 05/05/25 1552   Dressing Status Clean;Dry;Intact 05/05/25 1552       Wound 04/27/25 Pressure Injury Heel Right (Active)   Wound Image   05/05/25 1550   Wound Description Dry;Black;Eshchar 05/05/25 1550   Pressure Injury Stage Unstageable 05/05/25 1550   Wound Length (cm) 3 cm 05/05/25 1550   Wound Width (cm) 2.5 cm 05/05/25 1550   Wound Depth (cm) 0.1 cm 05/05/25 1550   Wound Surface Area (cm^2) 7.5 cm^2 05/05/25 1550   Wound Volume (cm^3) 0.75 cm^3 05/05/25 1550   Calculated Wound Volume (cm^3) 0.75 cm^3 05/05/25 1550   Change in Wound Size % 14.77 05/05/25 1550   Drainage Amount None 05/05/25 1550   Romy-wound Assessment Intact;Callus 05/05/25 1550   Treatments Cleansed 05/05/25 1550   Dressing Betadine;ABD;Dry Dressing 05/05/25 1550   Dressing Changed Changed 05/05/25 1550   Dressing Status Dry;Intact 05/05/25 1550       Wound 04/30/25 Traumatic Back Lateral;Left;Upper (Active)   Wound Image   05/05/25 1458   Wound Description Slough;Yellow;Granulation tissue;Pink;Black;Brown;Eschar 05/05/25 1458   Pressure Injury Stage N/A 05/05/25 1458   Non-staged Wound Description Full thickness 05/05/25 1458   Wound Length (cm) 1.2 cm 05/05/25 1458   Wound Width (cm) 1.5 cm 05/05/25 1458   Wound Depth (cm) 0.1 cm 05/05/25 1458   Wound Surface Area (cm^2) 1.8 cm^2 05/05/25 1458   Wound Volume (cm^3) 0.18 cm^3 05/05/25 1458   Calculated Wound Volume (cm^3) 0.18 cm^3 05/05/25 1458   Drainage Amount None 05/05/25 1458   Romy-wound Assessment Excoriated 05/05/25 1458   Treatments Cleansed 05/05/25 1458   Dressing Normlgel;Adaptic;Foam 05/05/25 1458   Dressing Changed New 05/05/25 1458   Dressing Status Clean;Dry;Intact 05/05/25 1458       Wound 05/05/25 Neuropathic Ulcer Foot Anterior;Right (Active)   Wound Image   05/05/25 1547   Wound Description Intact;Non-blanchable erythema 05/05/25 1547   Wound Length (cm) 1 cm 05/05/25 1547   Wound Width (cm) 0.6 cm 05/05/25 1547   Wound Depth (cm) 0 cm 05/05/25 1547    Wound Surface Area (cm^2) 0.6 cm^2 05/05/25 1547   Wound Volume (cm^3) 0 cm^3 05/05/25 1547   Calculated Wound Volume (cm^3) 0 cm^3 05/05/25 1547   Drainage Amount None 05/05/25 1547   Romy-wound Assessment Intact 05/05/25 1547   Treatments Cleansed 05/05/25 1547   Dressing 3M No Sting 05/05/25 1547   Dressing Changed New 05/05/25 1547   Dressing Status Intact 05/05/25 1547       Wound 05/05/25 Traumatic Finger D4, ring Posterior;Right (Active)   Wound Image   05/05/25 1549   Wound Description Intact;Purple;Maroon;  Non-blanchable 05/05/25 1549   Non-staged Wound Description Not applicable 05/05/25 1549   Wound Length (cm) 1 cm 05/05/25 1549   Wound Width (cm) 0.8 cm 05/05/25 1549   Wound Depth (cm) 0 cm 05/05/25 1549   Wound Surface Area (cm^2) 0.8 cm^2 05/05/25 1549   Wound Volume (cm^3) 0 cm^3 05/05/25 1549   Calculated Wound Volume (cm^3) 0 cm^3 05/05/25 1549   Drainage Amount None 05/05/25 1549   Romy-wound Assessment Intact 05/05/25 1549   Treatments Cleansed 05/05/25 1549   Dressing 3M No Sting 05/05/25 1549   Dressing Changed New 05/05/25 1549   Dressing Status Intact 05/05/25 1549       Wound 05/05/25 Neuropathic Ulcer Leg Left;Posterior (Active)   Wound Image   05/05/25 1554   Wound Description Intact;Purple;Maroon;  Non-blanchable 05/05/25 1554   Non-staged Wound Description Not applicable 05/05/25 1554   Wound Length (cm) 4.5 cm 05/05/25 1554   Wound Width (cm) 2 cm 05/05/25 1554   Wound Depth (cm) 0 cm 05/05/25 1554   Wound Surface Area (cm^2) 9 cm^2 05/05/25 1554   Wound Volume (cm^3) 0 cm^3 05/05/25 1554   Calculated Wound Volume (cm^3) 0 cm^3 05/05/25 1554   Drainage Amount None 05/05/25 1554   Romy-wound Assessment Intact 05/05/25 1554   Treatments Cleansed 05/05/25 1554   Dressing 3M No Sting 05/05/25 1554   Dressing Changed New 05/05/25 1554   Dressing Status Intact 05/05/25 1554       Wound 05/05/25 Neuropathic Ulcer Toe D2, second Left;Posterior (Active)   Wound Image   05/05/25 1557   Wound  Description Intact;Purple;Maroon;  Non-blanchable 05/05/25 1557   Non-staged Wound Description Not applicable 05/05/25 1557   Wound Length (cm) 1 cm 05/05/25 1557   Wound Width (cm) 1.7 cm 05/05/25 1557   Wound Depth (cm) 0 cm 05/05/25 1557   Wound Surface Area (cm^2) 1.7 cm^2 05/05/25 1557   Wound Volume (cm^3) 0 cm^3 05/05/25 1557   Calculated Wound Volume (cm^3) 0 cm^3 05/05/25 1557   Drainage Amount None 05/05/25 1557   Romy-wound Assessment Intact 05/05/25 1557   Treatments Cleansed 05/05/25 1557   Dressing 3M No Sting 05/05/25 1557   Dressing Status Intact 05/05/25 1557     Discussed assessment findings, and plan of care/recommendations with Maxine GARCIA.    Wound care will follow along with patient weekly, please call or text with questions and concerns.    Recommendations written as orders.  Sintia Bass MSN, RN, CWON

## 2025-05-06 NOTE — PROGRESS NOTES
Progress Note - Hospitalist   Name: Pernell Covarrubias 79 y.o. male I MRN: [id number]  Unit/Bed#: [address] [place] [address] I Date of Admission: 4/26/2025   Date of Service: 5/6/2025 I Hospital Day: 9     Assessment & Plan  Altered mental status  Patient brought in from [facility] at [facility] for decreased responsiveness. Mental status waxing and waning. Recently hospitalization earlier in the month for AMS in the setting of dehydration and UTI with concern of underlying dementia (wife reports questionable parkinson diagnosis)   CT head: 1. No acute intracranial abnormality.  Stable chronic microangiopathic changes within the brain.  COVID/FLU negative. UA negative. Lactic acid negative. CXR without evidence of infection.   TSH elevated but normal T4  Acute on chronic anemia on admission. Hemoglobin stabilized following transfusion  Coma panel negative. Ammonia level WNL  Speech therapy consulted. Recommending dysphagia 1 diet with nectar thick liquids. Continue oral care  Consult to Neurology, recommendations are appreciated  MRI brain negative for acute abnormality  Suspect acute metabolic encephalopathy in setting of sepsis due to MSSA bacteremia contributing  Medication list reviewed; will d/c metoclopromide. Unclear indication with possible neuro side effects including parkinsonism. Monitor off medication  Since metoclopramide was discontinued.  Patient more awake and alert today.   5/6 - No acute changes at this time. Likely multifactorial as noted above.    MSSA bacteremia  4/28/25: 1/2 blood cultures positive for MSSA  CT C/A/P without evidence of acute infectious process  Possible translocation in setting of chronic skin wounds, patient has no devices or hardware  No evidence of vegetation on ECHO report  Continue cefazolin 2g Q8H  5/2/25: Follow up repeat BC x2 negative at 48 hrs  ID consulted, recommendations are appreciated  5/6 - Plan for PICC line today. Will need IV ancef through 5/30/25 per infectious disease.  BRIAN  on CKD  Baseline creatinine around 0.8-1.2  Peaked at 1.71 on 5/1/25  Likely in setting of poor oral intake/sepsis  Patient with meza catheter. CT wo hydronephrosis  Holding PTA lasix and losartan  Monitor I&O  Daily BMP  Nephrology team consulted; appreciate input  5/6 - Nephrology following. Creatinine 0.99 today.  Sepsis  Evidenced by hypothermia and tachypnea now with high fever on 4/30 in setting of MSSA bacteremia  COVID/FLU negative. UA negative. Lactic acid negative. CXR without evidence of infection. Without leukocytosis.  Continue cefazolin as above  CT C/A/P without evidence of acute infectious process  Follow up repeat blood cultures negative at 48  5/6 - Continue IV Ancef as noted above.  Anemia  Acute on chronic. Patient's baseline hemoglobin is around 9 range. Has been noted to trend down to 7-8 over this past month  No evidence of active bleeding  4/26/25: hgb 6.7 s/p 1 unit PRBC  4/30/25: hgb 6.6 s/p 1 unit PRBC  B12, folate stable  Continue ferrous sulfate for DAYANA  Monitor daily CBC. Transfuse for hemoglobin <7  Restart Heparin for DVT PPE , check CBC Q12   5/6 - Hemoglobin 7.4. No evidence of acute bleeding. Will continue to monitor.  Coronary artery disease involving native coronary artery of native heart with angina pectoris (HCC)  Status post multivessel stenting  Continue aspirin, Lipitor and Coreg  Type 2 diabetes mellitus with diabetic neuropathy (HCC)    Lab Results   Component Value Date    HGBA1C 6.9 (H) 02/11/2025   Hold oral metformin and Januvia during hospitalization  SSI with accu checks  Lantus 5 U Qam  Hypoglycemia protocol  Carb controlled diet  Benign essential hypertension  BP acceptable  PTA medications include Carvedilol 12.5mg BID, Losartan 25mg QD, Furosemide 20mg QD. Spironolactone 25mg QD was discontinued during recent hospitalization  Continue Carvedilol  Holding Furosemide and Losartan given recent BRIAN  Monitor vitals per routine  Chronic combined systolic and diastolic  heart failure, NYHA class 2 (HCC)  Wt Readings from Last 3 Encounters:   04/30/25 72.1 kg (159 lb)   03/30/25 70.3 kg (154 lb 15.7 oz)   03/09/25 72.2 kg (159 lb 3.2 oz)     Echo (1/22/25): The left ventricular ejection fraction is 55 to 60% by visual estimation. Systolic function is normal. Wall motion is normal. Diastolic function is mildly abnormal, consistent with grade I (abnormal) relaxation.   Currently dry on exam  Holding home furosemide,  IVF discontinued  Daily weights, I&O  Abnormal TSH  Per chart review, TSH elevated during last hospitalization but with normal T4  4/26/25: TSH 15.954, T4 0.83, T3 2.35<< repeat TSH : 5   Hypernatremia  Sodium improved to 141 today  Metabolic acidosis  Management per nephrologist   VTE Pharmacologic Prophylaxis: VTE Score: 8 Heparin prophylaxis    Mobility:   Basic Mobility Inpatient Raw Score: 11  -[facility] Goal: 4: Move to chair/commode  -[facility] Achieved: 3: Sit at edge of bed    Patient Centered Rounds: I performed bedside rounds with nursing staff today.     Education and Discussions with Family / Patient: Discussed with patient's wife at bedside.    Current Length of Stay: 9 day(s)  Current Patient Status: Inpatient   Certification Statement: The patient will continue to require additional inpatient hospital stay due to above  Discharge Plan: Rehab    Code Status: Level 3 - DNAR and DNI    Subjective   Patient was seen and examined at bedside. No acute events overnight. No new complaints.    Objective :  Temp:  [97.6 °F (36.4 °C)-98.8 °F (37.1 °C)] 98.8 °F (37.1 °C)  HR:  [57-76] 76  BP: (141-165)/(60-80) 141/64  Resp:  [18-19] 18  SpO2:  [94 %-97 %] 94 %  O2 Device: None (Room air)    Body mass index is 22.18 kg/m².     Input and Output Summary (last 24 hours):   No intake or output data in the 24 hours ending 05/06/25 1323    Physical Exam  Constitutional:       Comments: Chronically ill appearing   HENT:      Head: Normocephalic.      Mouth/Throat:      Mouth: Mucous  membranes are moist.   Eyes:      Extraocular Movements: Extraocular movements intact.   Cardiovascular:      Rate and Rhythm: Normal rate.   Pulmonary:      Effort: Pulmonary effort is normal.   Abdominal:      Palpations: Abdomen is soft.      Tenderness: There is no abdominal tenderness.   Skin:     General: Skin is warm.   Neurological:      Mental Status: He is alert.   Psychiatric:         Mood and Affect: Mood normal.       Lines/Drains:  Lines/Drains/Airways       Active Status       Name Placement date Placement time Site Days    Urethral Catheter Straight-tip 16 Fr. 04/26/25  1558  Straight-tip  9                    Lab Results: I have reviewed the following results:   Results from last 7 days   Lab Units 05/06/25  0504   WBC Thousand/uL 9.31   HEMOGLOBIN g/dL 7.4*   HEMATOCRIT % 23.8*   PLATELETS Thousands/uL 251   SEGS PCT % 85*   LYMPHO PCT % 6*   MONO PCT % 6   EOS PCT % 2     Results from last 7 days   Lab Units 05/06/25  0504   SODIUM mmol/L 141   POTASSIUM mmol/L 4.0   CHLORIDE mmol/L 113*   CO2 mmol/L 19*   BUN mg/dL 27*   CREATININE mg/dL 0.99   ANION GAP mmol/L 9   CALCIUM mg/dL 7.7*   ALBUMIN g/dL 2.4*   TOTAL BILIRUBIN mg/dL 0.62   ALK PHOS U/L 139*   ALT U/L 12   AST U/L 49*   GLUCOSE RANDOM mg/dL 197*     Results from last 7 days   Lab Units 05/06/25  1201 05/06/25  0802 05/05/25 2015 05/05/25  1606 05/05/25  1552 05/05/25  1058 05/05/25  0723 05/05/25  0022 05/04/25 2003 05/04/25  1558 05/04/25  1131 05/04/25  0730   POC GLUCOSE mg/dl 171* 191* 285* 244* 274* 180* 168* 196* 227* 253* 204* 169*     Recent Cultures (last 7 days):   Results from last 7 days   Lab Units 05/02/25  0539   BLOOD CULTURE  No Growth After 4 Days.  No Growth After 4 Days.     Last 24 Hours Medication List:     Current Facility-Administered Medications:     acetaminophen (TYLENOL) tablet 650 mg, Q6H PRN    aspirin chewable tablet 81 mg, Daily    atorvastatin (LIPITOR) tablet 20 mg, Daily    bisacodyl (DULCOLAX) EC  tablet 10 mg, Daily PRN    carvedilol (COREG) tablet 12.5 mg, BID    ceFAZolin (ANCEF) IVPB (premix in dextrose) 2,000 mg 50 mL, Q8H, Last Rate: 2,000 mg (05/06/25 0819)    Cholecalciferol (VITAMIN D3) tablet 2,000 Units, Daily    co-enzyme Q-10 capsule 100 mg, Daily    ferrous sulfate tablet 325 mg, BID With Meals    heparin (porcine) subcutaneous injection 5,000 Units, Q8H ENRIQUE    insulin glargine (LANTUS) subcutaneous injection 5 Units 0.05 mL, QAM    insulin lispro (HumALOG/ADMELOG) 100 units/mL subcutaneous injection 1-5 Units, 4x Daily (AC & HS) **AND** Fingerstick Glucose (POCT), 4x Daily AC and at bedtime    [Held by provider] metoclopramide (REGLAN) tablet 10 mg, TID AC    moisture barrier miconazole 2% cream (aka CHERELLE MOISTURE BARRIER ANTIFUNGAL CREAM), BID    multivitamin stress formula tablet 1 tablet, Daily    pantoprazole (PROTONIX) EC tablet 40 mg, Early Morning    sodium bicarbonate tablet 650 mg, TID after meals    tamsulosin (FLOMAX) capsule 0.4 mg, Daily With Dinner    Administrative Statements   Today, Patient Was Seen By: Vipul Taylor MD    **Please Note: This note may have been constructed using a voice recognition system.**

## 2025-05-06 NOTE — PROGRESS NOTES
Progress Note - Infectious Disease   Name: Pernell Covarrubias 79 y.o. male I MRN: [id number]  Unit/Bed#: [address] [address]01 I Date of Admission: 4/26/2025   Date of Service: 5/6/2025 I Hospital Day: 9     Assessment & Plan  Sepsis  Patient initially with intermittent hypothermia and tachypnea, now with high fever on 4/30.  Suspect this is due to MSSA bacteremia as below.  At this time no other clear source of infection.  Chest x-ray shows clear lung fields, CT chest showing only pulmonary edema, no acute process on CT A/P.  Fortunately patient is currently hemodynamically stable. Fever curve improved.   -Antibiotic as below  -Follow-up blood cultures  -Monitor temperatures and hemodynamics closely  -Repeat CBC tomorrow to trend WBC  -Additional supportive care per primary team  MSSA bacteremia  1 of 2 blood cultures positive from 4/28. No cultures were sent on admission for comparison. Patient did have systemic signs of infection including hypothermia and fever thus suspect a true infection.  Source could be skin translocation as patient has multiple areas of open skin abrasions, though none appear acutely infected.  No other obvious source of infection at this time. CT C/A/P is without signs of metastatic infection. Brain MRI negative for emboli, though was non-contrast. Patient has no cardiac devices or endovascular hardware. TTE, adequate study, is without vegetation. Given unclear source, favor treating for at least 4 weeks of IV antibiotic.   -Continue IV Cefazolin 2g every 8 hours  -Continue to monitor serum creatinine while on IV antibiotics  -Follow up repeat blood cultures to assess for bacteremia clearance; currently NGTD x 4d  -OK for tunneled CVC placement given blood cultures are NGTD x 4d. Will need referral to IR in outpatient setting for removal.  -Plan for 4 week total antibiotic course from clearance of cultures (through 5/30/25)  -Will need outpatient ID follow-up and weekly labs including CBCd and  BMP in the outpatient setting  -ID office staff aware of outpatient follow-up needs  -Outpatient ID follow-up scheduled for 5/21/25  Altered mental status  This was patient's presenting symptom.  He has had multiple admissions for the same issue over the last month and concern for possible underlying dementia versus delirium.  Now likely worsened by acute infection as above. No obvious nuchal rigidity on exam. Brain MRI is without evidence of stroke or emboli, though was non-contrast.  He has had marked improvement in his mental status as of 5/5.  -Treatment of bacteremia as above  -Neurology recs appreciated  -Monitor mental status closely  BRIAN on CKD  -Dose adjust antibiotic  -Monitor serum Cr  Type 2 diabetes mellitus with diabetic neuropathy (HCC)  Lab Results   Component Value Date    HGBA1C 6.9 (H) 02/11/2025   Risk factor for infection.  -Tight glycemic control per primary team    I have discussed the above management plan in detail with the primary service.  They agree with plan to continue IV cefazolin, follow-up repeat blood cultures until finalized.   ID consult service will continue to follow.     Antibiotics:  Cefazolin: 8    Subjective   Patient has no acute complaints. Reports he is feeling well currently.  No fevers, chills, N/V/D, CP, SOB, abdominal pain. Tolerating IV cefazolin without difficulty. IR consulted for placement of tunneled CVC.    Objective :  Temp:  [97.6 °F (36.4 °C)-98.8 °F (37.1 °C)] 98.8 °F (37.1 °C)  HR:  [57-76] 76  BP: (141-165)/(60-80) 141/64  Resp:  [18-19] 18  SpO2:  [94 %-97 %] 94 %  O2 Device: None (Room air)    General:  No acute distress  Psychiatric:  Awake and alert  Pulmonary:  Normal respiratory excursion without accessory muscle use, on room air  Heart: RRR  Abdomen:  Soft, nontender  Extremities:  No edema  Skin: Scattered abrasions on arms and legs, skin tears, dry skin. Pressure injuries to heels. No apparent surrounding cellulitis. Left ring finger skin wound  wrapped in bandages.      Lab Results: I have reviewed the following results:  Results from last 7 days   Lab Units 05/06/25  0504 05/05/25  0510 05/04/25  0531   WBC Thousand/uL 9.31 10.43* 9.46   HEMOGLOBIN g/dL 7.4* 7.5* 7.7*   PLATELETS Thousands/uL 251 197 181     Results from last 7 days   Lab Units 05/06/25  0504 05/05/25  0510 05/04/25  0531 05/03/25  0434   SODIUM mmol/L 141 144 148* 143   POTASSIUM mmol/L 4.0 3.6 3.7 4.0   CHLORIDE mmol/L 113* 116* 119* 120*   CO2 mmol/L 19* 21 20* 18*   BUN mg/dL 27* 31* 35* 40*   CREATININE mg/dL 0.99 1.16 1.28 1.42*   EGFR ml/min/1.73sq m 71 59 52 46   CALCIUM mg/dL 7.7* 7.9* 8.2* 8.2*   AST U/L 49* 29  --  111*   ALT U/L 12 6*  --  29   ALK PHOS U/L 139* 140*  --  161*   ALBUMIN g/dL 2.4* 2.5*  --  2.6*     Results from last 7 days   Lab Units 05/02/25  0539   BLOOD CULTURE  No Growth After 4 Days.  No Growth After 4 Days.                   Sydney Bell PA-C  [facility]

## 2025-05-06 NOTE — ASSESSMENT & PLAN NOTE
Evidenced by hypothermia and tachypnea now with high fever on 4/30 in setting of MSSA bacteremia  COVID/FLU negative. UA negative. Lactic acid negative. CXR without evidence of infection. Without leukocytosis.  Continue cefazolin as above  CT C/A/P without evidence of acute infectious process  Follow up repeat blood cultures negative at 48  5/6 - Continue IV Ancef as noted above.

## 2025-05-06 NOTE — ASSESSMENT & PLAN NOTE
Baseline creatinine around 0.8-1.2  Peaked at 1.71 on 5/1/25  Likely in setting of poor oral intake/sepsis  Patient with meza catheter. CT wo hydronephrosis  Holding PTA lasix and losartan  Monitor I&O  Daily BMP  Nephrology team consulted; appreciate input  5/6 - Nephrology following. Creatinine 0.99 today.

## 2025-05-06 NOTE — BRIEF OP NOTE (RAD/CATH)
INTERVENTIONAL RADIOLOGY PROCEDURE NOTE    Date: 5/6/2025    Procedure:   Procedure Summary       Date:  Room / Location:     Anesthesia Start:  Anesthesia Stop:     Procedure:  Diagnosis:     Scheduled Providers:  Responsible Provider:     Anesthesia Type: Not recorded ASA Status: Not recorded            Preoperative diagnosis:   1. Altered mental status    2. Anemia    3. MSSA bacteremia    4. BRIAN (acute kidney injury) (HCC)    5. Pulmonary edema    6. Urinary retention         Postoperative diagnosis: Same.    Surgeon: Junior Gordon MD     Assistant: None. No qualified resident was available.    Blood loss: Minimal    Specimens: none     Findings:   Uncomplicated placement of right chest/right internal jugular vein single lumen tunneled central venous catheter.    Tip terminates at the superior cavoatrial junction.    Catheter ready for immediate use.    Complications: None immediate.    Anesthesia: local

## 2025-05-06 NOTE — PLAN OF CARE
Problem: Potential for Falls  Goal: Patient will remain free of falls  Description: INTERVENTIONS:- Educate patient/family on patient safety including physical limitations- Instruct patient to call for assistance with activity - Consult OT/PT to assist with strengthening/mobility - Keep Call bell within reach- Keep bed low and locked with side rails adjusted as appropriate- Keep care items and personal belongings within reach- Initiate and maintain comfort rounds- Make Fall Risk Sign visible to staff- Offer Toileting every 2 Hours, in advance of need- Initiate/Maintain bed alarm- Obtain necessary fall risk management equipment: slipper socks- Apply yellow socks and bracelet for high fall risk patients- Consider moving patient to room near nurses station  INTERVENTIONS:- Educate patient/family on patient safety including physical limitations- Instruct patient to call for assistance with activity - Consult OT/PT to assist with strengthening/mobility - Keep Call bell within reach- Keep bed low and locked with side rails adjusted as appropriate- Keep care items and personal belongings within reach- Initiate and maintain comfort rounds- Make Fall Risk Sign visible to staff- Offer Toileting every 2 Hours, in advance of need- Initiate/Maintain bed alarm- Obtain necessary fall risk management equipment: slipper socks- Apply yellow socks and bracelet for high fall risk patients- Consider moving patient to room near nurses station  Outcome: Progressing     Problem: PAIN - ADULT  Goal: Verbalizes/displays adequate comfort level or baseline comfort level  Description: Interventions:- Encourage patient to monitor pain and request assistance- Assess pain using appropriate pain scale- Administer analgesics based on type and severity of pain and evaluate response- Implement non-pharmacological measures as appropriate and evaluate response- Consider cultural and social influences on pain and pain management- Notify  physician/advanced practitioner if interventions unsuccessful or patient reports new pain  Outcome: Progressing     Problem: INFECTION - ADULT  Goal: Absence or prevention of progression during hospitalization  Description: INTERVENTIONS:- Assess and monitor for signs and symptoms of infection- Monitor lab/diagnostic results- Monitor all insertion sites, i.e. indwelling lines, tubes, and drains- Monitor endotracheal if appropriate and nasal secretions for changes in amount and color- [place] appropriate cooling/warming therapies per order- Administer medications as ordered- Instruct and encourage patient and family to use good hand hygiene technique- Identify and instruct in appropriate isolation precautions for identified infection/condition  Outcome: Progressing  Goal: Absence of fever/infection during neutropenic period  Description: INTERVENTIONS:- Monitor WBC  Outcome: Progressing     Problem: SAFETY ADULT  Goal: Patient will remain free of falls  Description: INTERVENTIONS:- Educate patient/family on patient safety including physical limitations- Instruct patient to call for assistance with activity - Consult OT/PT to assist with strengthening/mobility - Keep Call bell within reach- Keep bed low and locked with side rails adjusted as appropriate- Keep care items and personal belongings within reach- Initiate and maintain comfort rounds- Make Fall Risk Sign visible to staff- Offer Toileting every 2 Hours, in advance of need- Initiate/Maintain bed alarm- Obtain necessary fall risk management equipment: slipper socks- Apply yellow socks and bracelet for high fall risk patients- Consider moving patient to room near nurses station  INTERVENTIONS:- Educate patient/family on patient safety including physical limitations- Instruct patient to call for assistance with activity - Consult OT/PT to assist with strengthening/mobility - Keep Call bell within reach- Keep bed low and locked with side rails adjusted as  appropriate- Keep care items and personal belongings within reach- Initiate and maintain comfort rounds- Make Fall Risk Sign visible to staff- Offer Toileting every 2 Hours, in advance of need- Initiate/Maintain bed alarm- Obtain necessary fall risk management equipment: slipper socks- Apply yellow socks and bracelet for high fall risk patients- Consider moving patient to room near nurses station  Outcome: Progressing  Goal: Maintain or return to baseline ADL function  Description: INTERVENTIONS:-  Assess patient's ability to carry out ADLs; assess patient's baseline for ADL function and identify physical deficits which impact ability to perform ADLs (bathing, care of mouth/teeth, toileting, grooming, dressing, etc.)- Assess/evaluate cause of self-care deficits - Assess range of motion- Assess patient's mobility; develop plan if impaired- Assess patient's need for assistive devices and provide as appropriate- Encourage maximum independence but intervene and supervise when necessary- Involve family in performance of ADLs- Assess for home care needs following discharge - Consider OT consult to assist with ADL evaluation and planning for discharge- Provide patient education as appropriate  Outcome: Progressing  Goal: Maintains/Returns to pre admission functional level  Description: INTERVENTIONS:- Perform AM-PAC 6 Click Basic Mobility/ Daily Activity assessment daily.- Set and communicate daily mobility goal to care team and patient/family/caregiver. - Collaborate with rehabilitation services on mobility goals if consulted- Perform Range of Motion 3 times a day.- Reposition patient every 2 hours.- Dangle patient 3 times a day- Stand patient 3 times a day- Ambulate patient 3 times a day- Out of bed to chair 3 times a day - Out of bed for meals 3 times a day- Out of bed for toileting- Record patient progress and toleration of activity level   Outcome: Progressing     Problem: DISCHARGE PLANNING  Goal: Discharge to home  or other facility with appropriate resources  Description: INTERVENTIONS:- Identify barriers to discharge w/patient and caregiver- Arrange for needed discharge resources and transportation as appropriate- Identify discharge learning needs (meds, wound care, etc.)- Arrange for interpretive services to assist at discharge as needed- Refer to Case Management Department for coordinating discharge planning if the patient needs post-hospital services based on physician/advanced practitioner order or complex needs related to functional status, cognitive ability, or social support system  Outcome: Progressing     Problem: Knowledge Deficit  Goal: Patient/family/caregiver demonstrates understanding of disease process, treatment plan, medications, and discharge instructions  Description: Complete learning assessment and assess knowledge base.Interventions:- Provide teaching at level of understanding- Provide teaching via preferred learning methods  Outcome: Progressing     Problem: Nutrition/Hydration-ADULT  Goal: Nutrient/Hydration intake appropriate for improving, restoring or maintaining nutritional needs  Description: Monitor and assess patient's nutrition/hydration status for malnutrition. Collaborate with interdisciplinary team and initiate plan and interventions as ordered.  Monitor patient's weight and dietary intake as ordered or per policy. Utilize nutrition screening tool and intervene as necessary. Determine patient's food preferences and provide high-protein, high-caloric foods as appropriate. INTERVENTIONS:- Monitor oral intake, urinary output, labs, and treatment plans- Assess nutrition and hydration status and recommend course of action- Evaluate amount of meals eaten- Assist patient with eating if necessary - Allow adequate time for meals- Recommend/ encourage appropriate diets, oral nutritional supplements, and vitamin/mineral supplements- Order, calculate, and assess calorie counts as needed-  Assess need  for intravenous fluids- Provide specific nutrition/hydration education as appropriate- Include patient/family/caregiver in decisions related to nutrition  Outcome: Progressing     Problem: Prexisting or High Potential for Compromised Skin Integrity  Goal: Skin integrity is maintained or improved  Description: INTERVENTIONS:- Identify patients at risk for skin breakdown- Assess and monitor skin integrity- Assess and monitor nutrition and hydration status- Monitor labs - Assess for incontinence - Turn and reposition patient- Assist with mobility/ambulation- Relieve pressure over bony prominences- Avoid friction and shearing- Provide appropriate hygiene as needed including keeping skin clean and dry- Evaluate need for skin moisturizer/barrier cream- Collaborate with interdisciplinary team - Patient/family teaching- Consider wound care consult   Outcome: Progressing

## 2025-05-06 NOTE — PLAN OF CARE
Problem: Potential for Falls  Goal: Patient will remain free of falls  Description: INTERVENTIONS:- Educate patient/family on patient safety including physical limitations- Instruct patient to call for assistance with activity - Consult OT/PT to assist with strengthening/mobility - Keep Call bell within reach- Keep bed low and locked with side rails adjusted as appropriate- Keep care items and personal belongings within reach- Initiate and maintain comfort rounds- Make Fall Risk Sign visible to staff- Offer Toileting every 2 Hours, in advance of need- Initiate/Maintain bed alarm- Obtain necessary fall risk management equipment: slipper socks- Apply yellow socks and bracelet for high fall risk patients- Consider moving patient to room near nurses station  INTERVENTIONS:- Educate patient/family on patient safety including physical limitations- Instruct patient to call for assistance with activity - Consult OT/PT to assist with strengthening/mobility - Keep Call bell within reach- Keep bed low and locked with side rails adjusted as appropriate- Keep care items and personal belongings within reach- Initiate and maintain comfort rounds- Make Fall Risk Sign visible to staff- Offer Toileting every 2 Hours, in advance of need- Initiate/Maintain bed alarm- Obtain necessary fall risk management equipment: slipper socks- Apply yellow socks and bracelet for high fall risk patients- Consider moving patient to room near nurses station  Outcome: Progressing     Problem: PAIN - ADULT  Goal: Verbalizes/displays adequate comfort level or baseline comfort level  Description: Interventions:- Encourage patient to monitor pain and request assistance- Assess pain using appropriate pain scale- Administer analgesics based on type and severity of pain and evaluate response- Implement non-pharmacological measures as appropriate and evaluate response- Consider cultural and social influences on pain and pain management- Notify  physician/advanced practitioner if interventions unsuccessful or patient reports new pain  Outcome: Progressing     Problem: INFECTION - ADULT  Goal: Absence or prevention of progression during hospitalization  Description: INTERVENTIONS:- Assess and monitor for signs and symptoms of infection- Monitor lab/diagnostic results- Monitor all insertion sites, i.e. indwelling lines, tubes, and drains- Monitor endotracheal if appropriate and nasal secretions for changes in amount and color- [place] appropriate cooling/warming therapies per order- Administer medications as ordered- Instruct and encourage patient and family to use good hand hygiene technique- Identify and instruct in appropriate isolation precautions for identified infection/condition  Outcome: Progressing  Goal: Absence of fever/infection during neutropenic period  Description: INTERVENTIONS:- Monitor WBC  Outcome: Progressing     Problem: SAFETY ADULT  Goal: Patient will remain free of falls  Description: INTERVENTIONS:- Educate patient/family on patient safety including physical limitations- Instruct patient to call for assistance with activity - Consult OT/PT to assist with strengthening/mobility - Keep Call bell within reach- Keep bed low and locked with side rails adjusted as appropriate- Keep care items and personal belongings within reach- Initiate and maintain comfort rounds- Make Fall Risk Sign visible to staff- Offer Toileting every 2 Hours, in advance of need- Initiate/Maintain bed alarm- Obtain necessary fall risk management equipment: slipper socks- Apply yellow socks and bracelet for high fall risk patients- Consider moving patient to room near nurses station  INTERVENTIONS:- Educate patient/family on patient safety including physical limitations- Instruct patient to call for assistance with activity - Consult OT/PT to assist with strengthening/mobility - Keep Call bell within reach- Keep bed low and locked with side rails adjusted as  appropriate- Keep care items and personal belongings within reach- Initiate and maintain comfort rounds- Make Fall Risk Sign visible to staff- Offer Toileting every 2 Hours, in advance of need- Initiate/Maintain bed alarm- Obtain necessary fall risk management equipment: slipper socks- Apply yellow socks and bracelet for high fall risk patients- Consider moving patient to room near nurses station  Outcome: Progressing     Problem: Nutrition/Hydration-ADULT  Goal: Nutrient/Hydration intake appropriate for improving, restoring or maintaining nutritional needs  Description: Monitor and assess patient's nutrition/hydration status for malnutrition. Collaborate with interdisciplinary team and initiate plan and interventions as ordered.  Monitor patient's weight and dietary intake as ordered or per policy. Utilize nutrition screening tool and intervene as necessary. Determine patient's food preferences and provide high-protein, high-caloric foods as appropriate. INTERVENTIONS:- Monitor oral intake, urinary output, labs, and treatment plans- Assess nutrition and hydration status and recommend course of action- Evaluate amount of meals eaten- Assist patient with eating if necessary - Allow adequate time for meals- Recommend/ encourage appropriate diets, oral nutritional supplements, and vitamin/mineral supplements- Order, calculate, and assess calorie counts as needed- Recommend, monitor, and adjust tube feedings and TPN/PPN based on assessed needs- Assess need for intravenous fluids- Provide specific nutrition/hydration education as appropriate- Include patient/family/caregiver in decisions related to nutrition  Outcome: Progressing     Problem: Prexisting or High Potential for Compromised Skin Integrity  Goal: Skin integrity is maintained or improved  Description: INTERVENTIONS:- Identify patients at risk for skin breakdown- Assess and monitor skin integrity- Assess and monitor nutrition and hydration status- Monitor labs  - Assess for incontinence - Turn and reposition patient- Assist with mobility/ambulation- Relieve pressure over bony prominences- Avoid friction and shearing- Provide appropriate hygiene as needed including keeping skin clean and dry- Evaluate need for skin moisturizer/barrier cream- Collaborate with interdisciplinary team - Patient/family teaching- Consider wound care consult   Outcome: Progressing

## 2025-05-06 NOTE — OCCUPATIONAL THERAPY NOTE
OT TREATMENT   05/06/25 1502   Note Type   Note Type Cancelled Session   Cancel Reasons Patient off floor/test  (Patient in IR.  Will follow)   Licensure   [place] License Number  Camelia Webb MS OTR/L [id number]

## 2025-05-06 NOTE — ASSESSMENT & PLAN NOTE
4/28/25: 1/2 blood cultures positive for MSSA  CT C/A/P without evidence of acute infectious process  Possible translocation in setting of chronic skin wounds, patient has no devices or hardware  No evidence of vegetation on ECHO report  Continue cefazolin 2g Q8H  5/2/25: Follow up repeat BC x2 negative at 48 hrs  ID consulted, recommendations are appreciated  5/6 - Plan for PICC line today. Will need IV ancef through 5/30/25 per infectious disease.

## 2025-05-06 NOTE — SEDATION DOCUMENTATION
Procedure ended , right tunneled line placed. Pt tolerated with local only.  Pt back to room in stable condition.

## 2025-05-07 VITALS
TEMPERATURE: 98.5 F | HEIGHT: 71 IN | DIASTOLIC BLOOD PRESSURE: 93 MMHG | RESPIRATION RATE: 17 BRPM | BODY MASS INDEX: 22.26 KG/M2 | HEART RATE: 59 BPM | WEIGHT: 159 LBS | OXYGEN SATURATION: 94 % | SYSTOLIC BLOOD PRESSURE: 150 MMHG

## 2025-05-07 LAB
ANION GAP SERPL CALCULATED.3IONS-SCNC: 8 MMOL/L (ref 4–13)
BACTERIA BLD CULT: NORMAL
BACTERIA BLD CULT: NORMAL
BASOPHILS # BLD AUTO: 0.03 THOUSANDS/ÂΜL (ref 0–0.1)
BASOPHILS NFR BLD AUTO: 0 % (ref 0–1)
BUN SERPL-MCNC: 24 MG/DL (ref 5–25)
CALCIUM SERPL-MCNC: 7.6 MG/DL (ref 8.4–10.2)
CHLORIDE SERPL-SCNC: 111 MMOL/L (ref 96–108)
CO2 SERPL-SCNC: 22 MMOL/L (ref 21–32)
CREAT SERPL-MCNC: 0.96 MG/DL (ref 0.6–1.3)
EOSINOPHIL # BLD AUTO: 0.14 THOUSAND/ÂΜL (ref 0–0.61)
EOSINOPHIL NFR BLD AUTO: 2 % (ref 0–6)
ERYTHROCYTE [DISTWIDTH] IN BLOOD BY AUTOMATED COUNT: 17.2 % (ref 11.6–15.1)
GFR SERPL CREATININE-BSD FRML MDRD: 74 ML/MIN/1.73SQ M
GLUCOSE SERPL-MCNC: 161 MG/DL (ref 65–140)
GLUCOSE SERPL-MCNC: 163 MG/DL (ref 65–140)
GLUCOSE SERPL-MCNC: 181 MG/DL (ref 65–140)
HCT VFR BLD AUTO: 23.9 % (ref 36.5–49.3)
HGB BLD-MCNC: 7.5 G/DL (ref 12–17)
IMM GRANULOCYTES # BLD AUTO: 0.09 THOUSAND/UL (ref 0–0.2)
IMM GRANULOCYTES NFR BLD AUTO: 1 % (ref 0–2)
LYMPHOCYTES # BLD AUTO: 0.57 THOUSANDS/ÂΜL (ref 0.6–4.47)
LYMPHOCYTES NFR BLD AUTO: 8 % (ref 14–44)
MAGNESIUM SERPL-MCNC: 1.7 MG/DL (ref 1.9–2.7)
MCH RBC QN AUTO: 29.6 PG (ref 26.8–34.3)
MCHC RBC AUTO-ENTMCNC: 31.4 G/DL (ref 31.4–37.4)
MCV RBC AUTO: 95 FL (ref 82–98)
MONOCYTES # BLD AUTO: 0.48 THOUSAND/ÂΜL (ref 0.17–1.22)
MONOCYTES NFR BLD AUTO: 7 % (ref 4–12)
NEUTROPHILS # BLD AUTO: 6.03 THOUSANDS/ÂΜL (ref 1.85–7.62)
NEUTS SEG NFR BLD AUTO: 82 % (ref 43–75)
NRBC BLD AUTO-RTO: 0 /100 WBCS
PLATELET # BLD AUTO: 285 THOUSANDS/UL (ref 149–390)
PMV BLD AUTO: 9.8 FL (ref 8.9–12.7)
POTASSIUM SERPL-SCNC: 4 MMOL/L (ref 3.5–5.3)
RBC # BLD AUTO: 2.53 MILLION/UL (ref 3.88–5.62)
SODIUM SERPL-SCNC: 141 MMOL/L (ref 135–147)
WBC # BLD AUTO: 7.34 THOUSAND/UL (ref 4.31–10.16)

## 2025-05-07 PROCEDURE — 99232 SBSQ HOSP IP/OBS MODERATE 35: CPT | Performed by: INTERNAL MEDICINE

## 2025-05-07 PROCEDURE — 82948 REAGENT STRIP/BLOOD GLUCOSE: CPT

## 2025-05-07 PROCEDURE — 85025 COMPLETE CBC W/AUTO DIFF WBC: CPT | Performed by: FAMILY MEDICINE

## 2025-05-07 PROCEDURE — 80048 BASIC METABOLIC PNL TOTAL CA: CPT | Performed by: FAMILY MEDICINE

## 2025-05-07 PROCEDURE — 83735 ASSAY OF MAGNESIUM: CPT | Performed by: FAMILY MEDICINE

## 2025-05-07 PROCEDURE — NC001 PR NO CHARGE: Performed by: INTERNAL MEDICINE

## 2025-05-07 PROCEDURE — G0545 PR INHERENT VISIT TO INPT: HCPCS | Performed by: INTERNAL MEDICINE

## 2025-05-07 PROCEDURE — 99238 HOSP IP/OBS DSCHRG MGMT 30/<: CPT | Performed by: FAMILY MEDICINE

## 2025-05-07 RX ORDER — INSULIN LISPRO 100 [IU]/ML
1-5 INJECTION, SOLUTION INTRAVENOUS; SUBCUTANEOUS
Start: 2025-05-07

## 2025-05-07 RX ORDER — MAGNESIUM SULFATE HEPTAHYDRATE 40 MG/ML
2 INJECTION, SOLUTION INTRAVENOUS ONCE
Status: COMPLETED | OUTPATIENT
Start: 2025-05-07 | End: 2025-05-07

## 2025-05-07 RX ORDER — CEFAZOLIN SODIUM 2 G/50ML
2000 SOLUTION INTRAVENOUS EVERY 8 HOURS
Start: 2025-05-07 | End: 2025-05-30

## 2025-05-07 RX ORDER — SODIUM BICARBONATE 650 MG/1
650 TABLET ORAL 2 TIMES DAILY
Start: 2025-05-07

## 2025-05-07 RX ORDER — INSULIN GLARGINE 100 [IU]/ML
5 INJECTION, SOLUTION SUBCUTANEOUS EVERY MORNING
Start: 2025-05-08

## 2025-05-07 RX ADMIN — CEFAZOLIN SODIUM 2000 MG: 2 SOLUTION INTRAVENOUS at 00:18

## 2025-05-07 RX ADMIN — FERROUS SULFATE TAB 325 MG (65 MG ELEMENTAL FE) 325 MG: 325 (65 FE) TAB at 08:44

## 2025-05-07 RX ADMIN — ASPIRIN 81 MG: 81 TABLET, CHEWABLE ORAL at 11:11

## 2025-05-07 RX ADMIN — INSULIN LISPRO 1 UNITS: 100 INJECTION, SOLUTION INTRAVENOUS; SUBCUTANEOUS at 08:44

## 2025-05-07 RX ADMIN — HEPARIN SODIUM 5000 UNITS: 5000 INJECTION INTRAVENOUS; SUBCUTANEOUS at 04:20

## 2025-05-07 RX ADMIN — ATORVASTATIN CALCIUM 20 MG: 20 TABLET, FILM COATED ORAL at 11:04

## 2025-05-07 RX ADMIN — Medication 1 TABLET: at 11:03

## 2025-05-07 RX ADMIN — SODIUM BICARBONATE 650 MG TABLET 650 MG: at 08:44

## 2025-05-07 RX ADMIN — CHOLECALCIFEROL TAB 25 MCG (1000 UNIT) 2000 UNITS: 25 TAB at 11:02

## 2025-05-07 RX ADMIN — INSULIN LISPRO 1 UNITS: 100 INJECTION, SOLUTION INTRAVENOUS; SUBCUTANEOUS at 12:05

## 2025-05-07 RX ADMIN — MAGNESIUM SULFATE HEPTAHYDRATE 2 G: 40 INJECTION, SOLUTION INTRAVENOUS at 11:08

## 2025-05-07 RX ADMIN — HEPARIN SODIUM 5000 UNITS: 5000 INJECTION INTRAVENOUS; SUBCUTANEOUS at 11:10

## 2025-05-07 RX ADMIN — MICONAZOLE NITRATE: 20 CREAM TOPICAL at 11:04

## 2025-05-07 RX ADMIN — CARVEDILOL 12.5 MG: 12.5 TABLET, FILM COATED ORAL at 11:02

## 2025-05-07 RX ADMIN — CEFAZOLIN SODIUM 2000 MG: 2 SOLUTION INTRAVENOUS at 08:44

## 2025-05-07 RX ADMIN — INSULIN GLARGINE 5 UNITS: 100 INJECTION, SOLUTION SUBCUTANEOUS at 11:01

## 2025-05-07 RX ADMIN — PANTOPRAZOLE SODIUM 40 MG: 40 TABLET, DELAYED RELEASE ORAL at 05:17

## 2025-05-07 NOTE — NURSING NOTE
The patient is discharged to [facility] in stable condition, PICC and meza catheter remain in place, hand off report given to the nurse at [facility], patient's wife visiting and aware of discharge to [facility].

## 2025-05-07 NOTE — PLAN OF CARE
Problem: Potential for Falls  Goal: Patient will remain free of falls  Description: INTERVENTIONS:- Educate patient/family on patient safety including physical limitations- Instruct patient to call for assistance with activity - Consult OT/PT to assist with strengthening/mobility - Keep Call bell within reach- Keep bed low and locked with side rails adjusted as appropriate- Keep care items and personal belongings within reach- Initiate and maintain comfort rounds- Make Fall Risk Sign visible to staff- Offer Toileting every 2 Hours, in advance of need- Initiate/Maintain bed alarm- Obtain necessary fall risk management equipment: slipper socks- Apply yellow socks and bracelet for high fall risk patients- Consider moving patient to room near nurses station  INTERVENTIONS:- Educate patient/family on patient safety including physical limitations- Instruct patient to call for assistance with activity - Consult OT/PT to assist with strengthening/mobility - Keep Call bell within reach- Keep bed low and locked with side rails adjusted as appropriate- Keep care items and personal belongings within reach- Initiate and maintain comfort rounds- Make Fall Risk Sign visible to staff- Offer Toileting every 2 Hours, in advance of need- Initiate/Maintain bed alarm- Obtain necessary fall risk management equipment: slipper socks- Apply yellow socks and bracelet for high fall risk patients- Consider moving patient to room near nurses station  Outcome: Progressing     Problem: PAIN - ADULT  Goal: Verbalizes/displays adequate comfort level or baseline comfort level  Description: Interventions:- Encourage patient to monitor pain and request assistance- Assess pain using appropriate pain scale- Administer analgesics based on type and severity of pain and evaluate response- Implement non-pharmacological measures as appropriate and evaluate response- Consider cultural and social influences on pain and pain management- Notify  physician/advanced practitioner if interventions unsuccessful or patient reports new pain  Outcome: Progressing     Problem: SAFETY ADULT  Goal: Patient will remain free of falls  Description: INTERVENTIONS:- Educate patient/family on patient safety including physical limitations- Instruct patient to call for assistance with activity - Consult OT/PT to assist with strengthening/mobility - Keep Call bell within reach- Keep bed low and locked with side rails adjusted as appropriate- Keep care items and personal belongings within reach- Initiate and maintain comfort rounds- Make Fall Risk Sign visible to staff- Offer Toileting every 2 Hours, in advance of need- Initiate/Maintain bed alarm- Obtain necessary fall risk management equipment: slipper socks- Apply yellow socks and bracelet for high fall risk patients- Consider moving patient to room near nurses station  INTERVENTIONS:- Educate patient/family on patient safety including physical limitations- Instruct patient to call for assistance with activity - Consult OT/PT to assist with strengthening/mobility - Keep Call bell within reach- Keep bed low and locked with side rails adjusted as appropriate- Keep care items and personal belongings within reach- Initiate and maintain comfort rounds- Make Fall Risk Sign visible to staff- Offer Toileting every 2 Hours, in advance of need- Initiate/Maintain bed alarm- Obtain necessary fall risk management equipment: slipper socks- Apply yellow socks and bracelet for high fall risk patients- Consider moving patient to room near nurses station  Outcome: Progressing  Goal: Maintain or return to baseline ADL function  Description: INTERVENTIONS:-  Assess patient's ability to carry out ADLs; assess patient's baseline for ADL function and identify physical deficits which impact ability to perform ADLs (bathing, care of mouth/teeth, toileting, grooming, dressing, etc.)- Assess/evaluate cause of self-care deficits - Assess range of  motion- Assess patient's mobility; develop plan if impaired- Assess patient's need for assistive devices and provide as appropriate- Encourage maximum independence but intervene and supervise when necessary- Involve family in performance of ADLs- Assess for home care needs following discharge - Consider OT consult to assist with ADL evaluation and planning for discharge- Provide patient education as appropriate  Outcome: Progressing  Goal: Maintains/Returns to pre admission functional level  Description: INTERVENTIONS:- Perform AM-PAC 6 Click Basic Mobility/ Daily Activity assessment daily.- Set and communicate daily mobility goal to care team and patient/family/caregiver. - Collaborate with rehabilitation services on mobility goals if consulted- Perform Range of Motion 3 times a day.- Reposition patient every 2 hours.- Dangle patient 3 times a day- Stand patient 3 times a day- Ambulate patient 3 times a day- Out of bed to chair 3 times a day - Out of bed for meals 3 times a day- Out of bed for toileting- Record patient progress and toleration of activity level   Outcome: Progressing     Problem: DISCHARGE PLANNING  Goal: Discharge to home or other facility with appropriate resources  Description: INTERVENTIONS:- Identify barriers to discharge w/patient and caregiver- Arrange for needed discharge resources and transportation as appropriate- Identify discharge learning needs (meds, wound care, etc.)- Arrange for interpretive services to assist at discharge as needed- Refer to Case Management Department for coordinating discharge planning if the patient needs post-hospital services based on physician/advanced practitioner order or complex needs related to functional status, cognitive ability, or social support system  Outcome: Progressing     Problem: Knowledge Deficit  Goal: Patient/family/caregiver demonstrates understanding of disease process, treatment plan, medications, and discharge instructions  Description:  Complete learning assessment and assess knowledge base.Interventions:- Provide teaching at level of understanding- Provide teaching via preferred learning methods  Outcome: Progressing     Problem: Nutrition/Hydration-ADULT  Goal: Nutrient/Hydration intake appropriate for improving, restoring or maintaining nutritional needs  Description: Monitor and assess patient's nutrition/hydration status for malnutrition. Collaborate with interdisciplinary team and initiate plan and interventions as ordered.  Monitor patient's weight and dietary intake as ordered or per policy. Utilize nutrition screening tool and intervene as necessary. Determine patient's food preferences and provide high-protein, high-caloric foods as appropriate. INTERVENTIONS:- Monitor oral intake, urinary output, labs, and treatment plans- Assess nutrition and hydration status and recommend course of action- Evaluate amount of meals eaten- Assist patient with eating if necessary - Allow adequate time for meals- Recommend/ encourage appropriate diets, oral nutritional supplements, and vitamin/mineral supplements- Order, calculate, and assess calorie counts as needed- Recommend, monitor, and adjust tube feedings and TPN/PPN based on assessed needs- Assess need for intravenous fluids- Provide specific nutrition/hydration education as appropriate- Include patient/family/caregiver in decisions related to nutrition  Outcome: Progressing

## 2025-05-07 NOTE — PROGRESS NOTES
Progress Note - Infectious Disease   Name: Pernell Covarrubias 79 y.o. male I MRN: [id number]  Unit/Bed#: 4 [place] [address]-01 I Date of Admission: 4/26/2025   Date of Service: 5/7/2025 I Hospital Day: 10     Assessment & Plan  Sepsis  Patient initially with intermittent hypothermia and tachypnea, now with high fever on 4/30.  Suspect this is due to MSSA bacteremia as below.  At this time no other clear source of infection.  Chest x-ray shows clear lung fields, CT chest showing only pulmonary edema, no acute process on CT A/P.  Fortunately patient is currently hemodynamically stable. Fever curve improved.   -Antibiotic as below  -Follow-up blood cultures  -Monitor temperatures and hemodynamics closely  -Repeat CBC tomorrow to trend WBC  -Additional supportive care per primary team  MSSA bacteremia  1 of 2 blood cultures positive from 4/28. No cultures were sent on admission for comparison. Patient did have systemic signs of infection including hypothermia and fever thus suspect a true infection.  Source could be skin translocation as patient has multiple areas of open skin abrasions, though none appear acutely infected.  No other obvious source of infection at this time. CT C/A/P is without signs of metastatic infection. Brain MRI negative for emboli, though was non-contrast. Patient has no cardiac devices or endovascular hardware. TTE, adequate study, is without vegetation. Given unclear source, favor treating for at least 4 weeks of IV antibiotic.   -Continue IV Cefazolin 2g every 8 hours  -Continue to monitor serum creatinine while on IV antibiotics  -Follow up repeat blood cultures to assess for bacteremia clearance; currently NGTD x 4d  -S/p tunneled CVC placement for antibiotic administration in the outpatient setting. Will need referral to IR in outpatient setting for removal.  -Plan for 4 week total antibiotic course from clearance of cultures (through 5/30/25)  -Will need outpatient ID follow-up and weekly labs  including CBCd and BMP in the outpatient setting  -ID office staff aware of outpatient follow-up needs  -Outpatient ID follow-up scheduled for 5/21/25  Altered mental status  This was patient's presenting symptom.  He has had multiple admissions for the same issue over the last month and concern for possible underlying dementia versus delirium.  Now likely worsened by acute infection as above. No obvious nuchal rigidity on exam. Brain MRI is without evidence of stroke or emboli, though was non-contrast.  Mental status improved.   -Treatment of bacteremia as above  -Neurology recs appreciated  -Monitor mental status closely  BRIAN on CKD  Serum creatinine remains stable.   -Dose adjust antibiotic  -Monitor serum Cr  Type 2 diabetes mellitus with diabetic neuropathy (HCC)  Lab Results   Component Value Date    HGBA1C 6.9 (H) 02/11/2025   Risk factor for infection.  -Tight glycemic control per primary team    I have discussed the above management plan in detail with the primary service.  They agree with plan to continue IV cefazolin for 4 week course as above.   ID consult service will continue to follow.     Antibiotics:  Cefazolin: 9    Subjective   Patient has no acute complaints. More sleepy this morning, but opens eyes to voice. Has no complaints. S/p RIJ CVC placement yesterday.     Objective :  Temp:  [98.4 °F (36.9 °C)-98.5 °F (36.9 °C)] 98.5 °F (36.9 °C)  HR:  [51-73] 59  BP: (142-179)/(52-93) 150/93  Resp:  [17-18] 17  SpO2:  [94 %-98 %] 94 %  O2 Device: None (Room air)    General:  No acute distress, nontoxic appearing  Psychiatric:  mostly sleeping throughout encounter, opens eyes to voice  Pulmonary:  Normal respiratory excursion without accessory muscle use, on room air  Heart: RRR  Abdomen:  Soft, nontender  Extremities:  No edema  Skin: Scattered abrasions on arms and legs, skin tears, dry skin. Pressure injuries to heels. No apparent surrounding cellulitis.      Lab Results: I have reviewed the following  results:  Results from last 7 days   Lab Units 05/07/25  0515 05/06/25  0504 05/05/25  0510   WBC Thousand/uL 7.34 9.31 10.43*   HEMOGLOBIN g/dL 7.5* 7.4* 7.5*   PLATELETS Thousands/uL 285 251 197     Results from last 7 days   Lab Units 05/07/25  0515 05/06/25  0504 05/05/25  0510 05/04/25  0531 05/03/25  0434   SODIUM mmol/L 141 141 144   < > 143   POTASSIUM mmol/L 4.0 4.0 3.6   < > 4.0   CHLORIDE mmol/L 111* 113* 116*   < > 120*   CO2 mmol/L 22 19* 21   < > 18*   BUN mg/dL 24 27* 31*   < > 40*   CREATININE mg/dL 0.96 0.99 1.16   < > 1.42*   EGFR ml/min/1.73sq m 74 71 59   < > 46   CALCIUM mg/dL 7.6* 7.7* 7.9*   < > 8.2*   AST U/L  --  49* 29  --  111*   ALT U/L  --  12 6*  --  29   ALK PHOS U/L  --  139* 140*  --  161*   ALBUMIN g/dL  --  2.4* 2.5*  --  2.6*    < > = values in this interval not displayed.     Results from last 7 days   Lab Units 05/02/25  0539   BLOOD CULTURE  No Growth After 4 Days.  No Growth After 4 Days.                   Sydney Bell PA-C  [facility]

## 2025-05-07 NOTE — ASSESSMENT & PLAN NOTE
Acute on chronic. Patient's baseline hemoglobin is around 9 range. Has been noted to trend down to 7-8 over this past month  No evidence of active bleeding  4/26/25: hgb 6.7 s/p 1 unit PRBC  4/30/25: hgb 6.6 s/p 1 unit PRBC  B12, folate stable  Continue ferrous sulfate for DAYANA  Monitor daily CBC. Transfuse for hemoglobin <7  Restart Heparin for DVT PPE , check CBC Q12   5/7 - Hemoglobin 7.5. No evidence of acute bleeding. Will continue to monitor.

## 2025-05-07 NOTE — PLAN OF CARE
Problem: Potential for Falls  Goal: Patient will remain free of falls  Description: INTERVENTIONS:- Educate patient/family on patient safety including physical limitations- Instruct patient to call for assistance with activity - Consult OT/PT to assist with strengthening/mobility - Keep Call bell within reach- Keep bed low and locked with side rails adjusted as appropriate- Keep care items and personal belongings within reach- Initiate and maintain comfort rounds- Make Fall Risk Sign visible to staff- Offer Toileting every 2 Hours, in advance of need- Initiate/Maintain bed alarm- Obtain necessary fall risk management equipment: slipper socks- Apply yellow socks and bracelet for high fall risk patients- Consider moving patient to room near nurses station  INTERVENTIONS:- Educate patient/family on patient safety including physical limitations- Instruct patient to call for assistance with activity - Consult OT/PT to assist with strengthening/mobility - Keep Call bell within reach- Keep bed low and locked with side rails adjusted as appropriate- Keep care items and personal belongings within reach- Initiate and maintain comfort rounds- Make Fall Risk Sign visible to staff- Offer Toileting every 2 Hours, in advance of need- Initiate/Maintain bed alarm- Obtain necessary fall risk management equipment: slipper socks- Apply yellow socks and bracelet for high fall risk patients- Consider moving patient to room near nurses station  Outcome: Progressing     Problem: PAIN - ADULT  Goal: Verbalizes/displays adequate comfort level or baseline comfort level  Description: Interventions:- Encourage patient to monitor pain and request assistance- Assess pain using appropriate pain scale- Administer analgesics based on type and severity of pain and evaluate response- Implement non-pharmacological measures as appropriate and evaluate response- Consider cultural and social influences on pain and pain management- Notify  physician/advanced practitioner if interventions unsuccessful or patient reports new pain  Outcome: Progressing     Problem: INFECTION - ADULT  Goal: Absence or prevention of progression during hospitalization  Description: INTERVENTIONS:- Assess and monitor for signs and symptoms of infection- Monitor lab/diagnostic results- Monitor all insertion sites, i.e. indwelling lines, tubes, and drains- Monitor endotracheal if appropriate and nasal secretions for changes in amount and color- Freedom appropriate cooling/warming therapies per order- Administer medications as ordered- Instruct and encourage patient and family to use good hand hygiene technique- Identify and instruct in appropriate isolation precautions for identified infection/condition  Outcome: Progressing  Goal: Absence of fever/infection during neutropenic period  Description: INTERVENTIONS:- Monitor WBC  Outcome: Progressing     Problem: SAFETY ADULT  Goal: Patient will remain free of falls  Description: INTERVENTIONS:- Educate patient/family on patient safety including physical limitations- Instruct patient to call for assistance with activity - Consult OT/PT to assist with strengthening/mobility - Keep Call bell within reach- Keep bed low and locked with side rails adjusted as appropriate- Keep care items and personal belongings within reach- Initiate and maintain comfort rounds- Make Fall Risk Sign visible to staff- Offer Toileting every 2 Hours, in advance of need- Initiate/Maintain bed alarm- Obtain necessary fall risk management equipment: slipper socks- Apply yellow socks and bracelet for high fall risk patients- Consider moving patient to room near nurses station  INTERVENTIONS:- Educate patient/family on patient safety including physical limitations- Instruct patient to call for assistance with activity - Consult OT/PT to assist with strengthening/mobility - Keep Call bell within reach- Keep bed low and locked with side rails adjusted as  appropriate- Keep care items and personal belongings within reach- Initiate and maintain comfort rounds- Make Fall Risk Sign visible to staff- Offer Toileting every 2 Hours, in advance of need- Initiate/Maintain bed alarm- Obtain necessary fall risk management equipment: slipper socks- Apply yellow socks and bracelet for high fall risk patients- Consider moving patient to room near nurses station  Outcome: Progressing  Goal: Maintain or return to baseline ADL function  Description: INTERVENTIONS:-  Assess patient's ability to carry out ADLs; assess patient's baseline for ADL function and identify physical deficits which impact ability to perform ADLs (bathing, care of mouth/teeth, toileting, grooming, dressing, etc.)- Assess/evaluate cause of self-care deficits - Assess range of motion- Assess patient's mobility; develop plan if impaired- Assess patient's need for assistive devices and provide as appropriate- Encourage maximum independence but intervene and supervise when necessary- Involve family in performance of ADLs- Assess for home care needs following discharge - Consider OT consult to assist with ADL evaluation and planning for discharge- Provide patient education as appropriate  Outcome: Progressing  Goal: Maintains/Returns to pre admission functional level  Description: INTERVENTIONS:- Perform AM-PAC 6 Click Basic Mobility/ Daily Activity assessment daily.- Set and communicate daily mobility goal to care team and patient/family/caregiver. - Collaborate with rehabilitation services on mobility goals if consulted- Perform Range of Motion 3 times a day.- Reposition patient every 2 hours.- Dangle patient 3 times a day- Stand patient 3 times a day- Ambulate patient 3 times a day- Out of bed to chair 3 times a day - Out of bed for meals 3 times a day- Out of bed for toileting- Record patient progress and toleration of activity level   Outcome: Progressing     Problem: DISCHARGE PLANNING  Goal: Discharge to home  or other facility with appropriate resources  Description: INTERVENTIONS:- Identify barriers to discharge w/patient and caregiver- Arrange for needed discharge resources and transportation as appropriate- Identify discharge learning needs (meds, wound care, etc.)- Arrange for interpretive services to assist at discharge as needed- Refer to Case Management Department for coordinating discharge planning if the patient needs post-hospital services based on physician/advanced practitioner order or complex needs related to functional status, cognitive ability, or social support system  Outcome: Progressing     Problem: Knowledge Deficit  Goal: Patient/family/caregiver demonstrates understanding of disease process, treatment plan, medications, and discharge instructions  Description: Complete learning assessment and assess knowledge base.Interventions:- Provide teaching at level of understanding- Provide teaching via preferred learning methods  Outcome: Progressing     Problem: Nutrition/Hydration-ADULT  Goal: Nutrient/Hydration intake appropriate for improving, restoring or maintaining nutritional needs  Description: Monitor and assess patient's nutrition/hydration status for malnutrition. Collaborate with interdisciplinary team and initiate plan and interventions as ordered.  Monitor patient's weight and dietary intake as ordered or per policy. Utilize nutrition screening tool and intervene as necessary. Determine patient's food preferences and provide high-protein, high-caloric foods as appropriate. INTERVENTIONS:- Monitor oral intake, urinary output, labs, and treatment plans- Assess nutrition and hydration status and recommend course of action- Evaluate amount of meals eaten- Assist patient with eating if necessary - Allow adequate time for meals- Recommend/ encourage appropriate diets, oral nutritional supplements, and vitamin/mineral supplements- Order, calculate, and assess calorie counts as needed- Recommend,  monitor, and adjust tube feedings and TPN/PPN based on assessed needs- Assess need for intravenous fluids- Provide specific nutrition/hydration education as appropriate- Include patient/family/caregiver in decisions related to nutrition  Outcome: Progressing     Problem: Prexisting or High Potential for Compromised Skin Integrity  Goal: Skin integrity is maintained or improved  Description: INTERVENTIONS:- Identify patients at risk for skin breakdown- Assess and monitor skin integrity- Assess and monitor nutrition and hydration status- Monitor labs - Assess for incontinence - Turn and reposition patient- Assist with mobility/ambulation- Relieve pressure over bony prominences- Avoid friction and shearing- Provide appropriate hygiene as needed including keeping skin clean and dry- Evaluate need for skin moisturizer/barrier cream- Collaborate with interdisciplinary team - Patient/family teaching- Consider wound care consult   Outcome: Progressing

## 2025-05-07 NOTE — [place] UNIVERSAL TRANSFER FORM
"[place] UNIVERSAL TRANSFER FORM  (ALL ITEMS MUST BE COMPLETED)    1. TRANSFER FROM: [facility]      TRANSFER TO: [facility]    2. DATE OF TRANSFER: 5/7/2025                        TIME OF TRANSFER: 1300    3. PATIENT NAME: Pernell Covarrubias J      YOB: 1945                             GENDER: male    4. LANGUAGE:   English    5. PHYSICIAN NAME:  Vipul Taylor MD                   PHONE: [phone]    6. CODE STATUS: Level 3 - DNAR and DNI        Out of Hospital DNR Attached: No    7. :                                      :  Extended Emergency Contact Information  Primary Emergency Contact: SatishRuth  Address: [address] [place] [address]            [place], [place] [address] [place] [address]  Mobile Phone: [phone]  Relation: Spouse  Secondary Emergency Contact: Phu Covarrubias  Mobile Phone: [phone]  Relation: Son           Health Care Representative/Proxy:  Yes           Legal Guardian:  No             NAME OF:           HEALTH CARE REPRESENTATIVE/PROXY:                                         OR           LEGAL GUARDIAN, IF NOT :                                               PHONE:  (Day)           (Night)                        (Cell)    8. REASON FOR TRANSFER: (Must include brief medical history and recent changes in physical function or cognition.) Stable for discharge to [facility]            V/S: /93 (BP Location: Left arm)   Pulse 59   Temp 98.5 °F (36.9 °C) (Axillary)   Resp 17   Ht 5' 11\" (1.803 m)   Wt 72.1 kg (159 lb)   SpO2 94%   BMI 22.18 kg/m²           PAIN: None    9. PRIMARY DIAGNOSIS: Altered mental status      Secondary Diagnosis:         Pacemaker: No      Internal Defib: No          Mental Health Diagnosis (if Applicable):    10. RESTRAINTS: No     11. RESPIRATORY NEEDS: None    12. ISOLATION/PRECAUTION: None    13. ALLERGY: Patient has no known allergies.    14. SENSORY:    "    Vision Good, hearing good, speech clear    15. SKIN CONDITION: 2nd Wound:  Pressure, B/L heels, skin tear L elbow, R/L lower legpressure stage 1 sacrum, skin tear on r buttockAxilla, perineal, gleuteal folds reddenned, L upper back skin tear    16. DIET: Mechanically Altered Diet, puree, nectar thick liquids, crush pills in puree    17. IV ACCESS: PICC    18. PERSONAL ITEMS SENT WITH PATIENT: None    19. ATTACHED DOCUMENTS: MUST ATTACH CURRENT MEDICATION INFORMATION Face Sheet, Med rec, labs, history, PT,OT,SP, wound care orders    20. AT RISK ALERTS:Falls, ulcers        HARM TO: N/A    21. WEIGHT BEARING STATUS:         Left Leg: None        Right Leg: None    22. MENTAL STATUS:Alert, fatigued    23. FUNCTION:        Walk: Not Able        Transfer: Not Able        Toilet: Not Able        Feed: Not Able    24. IMMUNIZATIONS/SCREENING:     Immunization History   Administered Date(s) Administered    COVID-19 MODERNA VACC 0.25 ML IM BOOSTER 11/12/2021    COVID-19 MODERNA VACC 0.5 ML IM 02/10/2021, 03/10/2021, 11/12/2021, 06/02/2022    COVID-19 Pfizer Vac BIVALENT Cecilio-sucrose 12 Yr+ IM 10/12/2022    Influenza Split High Dose Preservative Free IM 09/18/2012, 09/16/2013, 10/13/2014, 10/07/2016, 09/20/2017    Influenza, high dose seasonal 0.7 mL 09/24/2018, 10/25/2019    Influenza, seasonal, injectable 09/21/2011, 09/25/2015    Pneumococcal Conjugate 13-Valent 10/12/2015    Pneumococcal Polysaccharide PPV23 08/25/2010    Tdap 02/22/2012, 07/10/2022, 07/10/2022    Zoster 09/18/2012       25. BOWEL: Incontinent , BM 5/6/25    26. BLADDER: Soto Catheter    27. SENDING FACILITY CONTACT: Keli Ziegler                  Title: RN        Unit: 4N        Phone: [phone]          REC'G FACILITY CONTACT (if known):        Title:        Unit:         Phone:         FORM PREFILLED BY (if applicable)       Title:       Unit:        Phone:         FORM COMPLETED BY Maxine Zielger RN      Title:       Phone:

## 2025-05-07 NOTE — ASSESSMENT & PLAN NOTE
This was patient's presenting symptom.  He has had multiple admissions for the same issue over the last month and concern for possible underlying dementia versus delirium.  Now likely worsened by acute infection as above. No obvious nuchal rigidity on exam. Brain MRI is without evidence of stroke or emboli, though was non-contrast.  Mental status improved.   -Treatment of bacteremia as above  -Neurology recs appreciated  -Monitor mental status closely

## 2025-05-07 NOTE — ASSESSMENT & PLAN NOTE
Patient brought in from [facility] at [facility] for decreased responsiveness. Mental status waxing and waning. Recently hospitalization earlier in the month for AMS in the setting of dehydration and UTI with concern of underlying dementia (wife reports questionable parkinson diagnosis)   CT head: 1. No acute intracranial abnormality.  Stable chronic microangiopathic changes within the brain.  COVID/FLU negative. UA negative. Lactic acid negative. CXR without evidence of infection.   TSH elevated but normal T4  Acute on chronic anemia on admission. Hemoglobin stabilized following transfusion  Coma panel negative. Ammonia level WNL  Speech therapy consulted. Recommending dysphagia 1 diet with nectar thick liquids. Continue oral care  Consult to Neurology, recommendations are appreciated  MRI brain negative for acute abnormality  Suspect acute metabolic encephalopathy in setting of sepsis due to MSSA bacteremia contributing  Medication list reviewed; will d/c metoclopromide. Unclear indication with possible neuro side effects including parkinsonism. Monitor off medication  Since metoclopramide was discontinued.  Patient more awake and alert today.   5/7 - No acute changes at this time. Likely multifactorial as noted above.

## 2025-05-07 NOTE — DISCHARGE SUMMARY
Discharge Summary - Hospitalist   Name: Pernell Covarrubias 79 y.o. male I MRN: [id number]  Unit/Bed#: [address] [place] [address] I Date of Admission: 4/26/2025   Date of Service: 5/7/2025 I Hospital Day: 10     Assessment & Plan  Altered mental status  Patient brought in from [facility] for decreased responsiveness. Mental status waxing and waning. Recently hospitalization earlier in the month for AMS in the setting of dehydration and UTI with concern of underlying dementia (wife reports questionable parkinson diagnosis)   CT head: 1. No acute intracranial abnormality.  Stable chronic microangiopathic changes within the brain.  COVID/FLU negative. UA negative. Lactic acid negative. CXR without evidence of infection.   TSH elevated but normal T4  Acute on chronic anemia on admission. Hemoglobin stabilized following transfusion  Coma panel negative. Ammonia level WNL  Speech therapy consulted. Recommending dysphagia 1 diet with nectar thick liquids. Continue oral care  Consult to Neurology, recommendations are appreciated  MRI brain negative for acute abnormality  Suspect acute metabolic encephalopathy in setting of sepsis due to MSSA bacteremia contributing  Medication list reviewed; will d/c metoclopromide. Unclear indication with possible neuro side effects including parkinsonism. Monitor off medication  Since metoclopramide was discontinued.  Patient more awake and alert today.   5/7 - No acute changes at this time. Likely multifactorial as noted above.    MSSA bacteremia  4/28/25: 1/2 blood cultures positive for MSSA  CT C/A/P without evidence of acute infectious process  Possible translocation in setting of chronic skin wounds, patient has no devices or hardware  No evidence of vegetation on ECHO report  Continue cefazolin 2g Q8H  5/2/25: Follow up repeat BC x2 negative at 48 hrs  ID consulted, recommendations are appreciated  5/7 - PICC line in place. Plan for discharge to rehab today. Will need IV ancef through  5/30/25 per infectious disease.  BRIAN on CKD  Baseline creatinine around 0.8-1.2  Peaked at 1.71 on 5/1/25  Likely in setting of poor oral intake/sepsis  Patient with meza catheter. CT wo hydronephrosis  Holding PTA lasix and losartan  Monitor I&O  Daily BMP  Nephrology team consulted; appreciate input  5/7 - Nephrology following. Creatinine 0.96 today.  Sepsis  Evidenced by hypothermia and tachypnea now with high fever on 4/30 in setting of MSSA bacteremia  COVID/FLU negative. UA negative. Lactic acid negative. CXR without evidence of infection. Without leukocytosis.  Continue cefazolin as above  CT C/A/P without evidence of acute infectious process  Follow up repeat blood cultures negative at 48  5/7 - Continue IV Ancef as noted above.  Anemia  Acute on chronic. Patient's baseline hemoglobin is around 9 range. Has been noted to trend down to 7-8 over this past month  No evidence of active bleeding  4/26/25: hgb 6.7 s/p 1 unit PRBC  4/30/25: hgb 6.6 s/p 1 unit PRBC  B12, folate stable  Continue ferrous sulfate for DAYANA  Monitor daily CBC. Transfuse for hemoglobin <7  Restart Heparin for DVT PPE , check CBC Q12   5/7 - Hemoglobin 7.5. No evidence of acute bleeding. Will continue to monitor.  Coronary artery disease involving native coronary artery of native heart with angina pectoris (HCC)  Status post multivessel stenting  Continue aspirin, Lipitor and Coreg  Type 2 diabetes mellitus with diabetic neuropathy (HCC)    Lab Results   Component Value Date    HGBA1C 6.9 (H) 02/11/2025   Hold oral metformin and Januvia during hospitalization  SSI with accu checks  Lantus 5 U Qam  Hypoglycemia protocol  Carb controlled diet  Benign essential hypertension  BP acceptable  PTA medications include Carvedilol 12.5mg BID, Losartan 25mg QD, Furosemide 20mg QD. Spironolactone 25mg QD was discontinued during recent hospitalization  Continue Carvedilol  Holding Furosemide and Losartan given recent BRIAN  Monitor vitals per  routine  Chronic combined systolic and diastolic heart failure, NYHA class 2 (HCC)  Wt Readings from Last 3 Encounters:   04/30/25 72.1 kg (159 lb)   03/30/25 70.3 kg (154 lb 15.7 oz)   03/09/25 72.2 kg (159 lb 3.2 oz)     Echo (1/22/25): The left ventricular ejection fraction is 55 to 60% by visual estimation. Systolic function is normal. Wall motion is normal. Diastolic function is mildly abnormal, consistent with grade I (abnormal) relaxation.   Currently dry on exam  Holding home furosemide,  IVF discontinued  Daily weights, I&O  Abnormal TSH  Per chart review, TSH elevated during last hospitalization but with normal T4  4/26/25: TSH 15.954, T4 0.83, T3 2.35<< repeat TSH : 5   Hypernatremia  Sodium improved to 141 today  Metabolic acidosis  Will continue sodium bicarbonate tablets on discharge twice daily per recommendations from nephrology.     Medical Problems       Resolved Problems  Date Reviewed: 5/1/2025          Resolved    Hypomagnesemia 5/5/2025     Resolved by  Sukhi Felix MD    Hypothermia 5/3/2025     Resolved by  Sukhi Felix MD    Thrombocytopenia (HCC) 5/4/2025     Resolved by  Sukhi Felix MD    Fever 5/5/2025     Resolved by  Sukhi Felix MD        Discharging Physician / Practitioner: Vipul Taylor MD  PCP: Felipe Travis DO  Admission Date:   Admission Orders (From admission, onward)       Ordered        04/27/25 0951  INPATIENT ADMISSION  Once            04/26/25 1721  Place in Observation  Once                          Discharge Date: 05/07/25    Consultations During Hospital Stay:  Nephrology  Infectious disease  Neurology    Reason for Admission: 79 y.o. male with a PMH of CAD status post PCI, prostate cancer, PAD, skin cancer, diabetes mellitus type 2, hypertension, hyperlipidemia, neuropathy, CKD who presents with change in mental status from the nursing home.  Most of the history obtained from ED provider as I could not reach the wife.  Per wife, patient  "reportedly had breakfast at the care facility and later mentation declined when she saw him around noon.  In the ED patient had waxing and waning mental status with sometimes withdrawing from pain to speaking and answering questions. In the ED patient's workup has been unremarkable.    Hospital Course: Patient was seen and examined in the emergency department and was admitted to the hospital for further evaluation and management. Patient presenting with altered mental status from [facility].  Patient found to have evidence of acute metabolic encephalopathy in the setting of sepsis secondary to underlying MSSA bacteremia.  Patient was also on metoclopramide which was discontinued which may have also been contributing to his confusion.  Overall, patient has shown slow but steady improvement.  PICC line has been placed with plans for 4 weeks of IV antibiotic therapy per recommendations from infectious disease.  At the time of discharge patient appear comfortable and currently denies any acute complaints or concerns including chest pain or shortness of breath.    Please see above list of diagnoses and related plan for additional information.     Condition at Discharge: stable    Discharge Day Visit / Exam:   Vitals: Blood Pressure: 150/93 (05/07/25 0809)  Pulse: 59 (05/07/25 0809)  Temperature: 98.5 °F (36.9 °C) (05/07/25 0809)  Temp Source: Axillary (05/07/25 0809)  Respirations: 17 (05/07/25 0809)  Height: 5' 11\" (180.3 cm) (04/30/25 0900)  Weight - Scale: 72.1 kg (159 lb) (04/30/25 0900)  SpO2: 94 % (05/07/25 0809)  Physical Exam  Constitutional:       Comments: Chronically ill appearing   HENT:      Head: Normocephalic.      Mouth/Throat:      Mouth: Mucous membranes are moist.   Eyes:      Extraocular Movements: Extraocular movements intact.   Cardiovascular:      Rate and Rhythm: Normal rate.   Pulmonary:      Effort: Pulmonary effort is normal. No respiratory distress.   Abdominal:      Palpations: Abdomen is " soft.      Tenderness: There is no abdominal tenderness.   Skin:     General: Skin is warm.   Neurological:      Mental Status: He is alert.   Psychiatric:         Mood and Affect: Mood normal.        Discussion with Family: Discussed with patient's wife (Ruth) at bedside.    Discharge instructions/Information to patient and family:   See after visit summary for information provided to patient and family.      Provisions for Follow-Up Care:  See after visit summary for information related to follow-up care and any pertinent home health orders.      Mobility at time of Discharge:   Basic Mobility Inpatient Raw Score: 6  -Health system Goal: 2: Bed activities/Dependent transfer  -HLM Achieved: 3: Sit at edge of bed     Disposition:   Rehab    Discharge Medications:  See after visit summary for reconciled discharge medications provided to patient and/or family.      Administrative Statements   Discharge Statement:  I have spent a total time of 35 minutes in caring for this patient on the day of the visit/encounter. >30 minutes of time was spent on: Diagnostic results, Prognosis, Risks and benefits of tx options, Instructions for management, Patient and family education, Importance of tx compliance, Risk factor reductions, Impressions, Counseling / Coordination of care, Documenting in the medical record, Reviewing / ordering tests, medicine, procedures and Communicating with other healthcare professionals.    **Please Note: This note may have been constructed using a voice recognition system**

## 2025-05-07 NOTE — ASSESSMENT & PLAN NOTE
Will continue sodium bicarbonate tablets on discharge twice daily per recommendations from nephrology.

## 2025-05-07 NOTE — ASSESSMENT & PLAN NOTE
Evidenced by hypothermia and tachypnea now with high fever on 4/30 in setting of MSSA bacteremia  COVID/FLU negative. UA negative. Lactic acid negative. CXR without evidence of infection. Without leukocytosis.  Continue cefazolin as above  CT C/A/P without evidence of acute infectious process  Follow up repeat blood cultures negative at 48  5/7 - Continue IV Ancef as noted above.

## 2025-05-07 NOTE — CASE MANAGEMENT
Case Management Discharge Planning Note    Patient name Pernell Covarrubias  Location [address] [address] [address] MRN [id number]  : 1945 Date 2025       Current Admission Date: 2025  Current Admission Diagnosis:Altered mental status   Patient Active Problem List    Diagnosis Date Noted Date Diagnosed    Metabolic acidosis 2025     Hypernatremia 2025     MSSA bacteremia 2025     Altered mental status 2025     Sepsis 2025     Ambulatory dysfunction 2025     Cognitive decline 2025     Frailty syndrome in geriatric patient 2025     Dysphagia 2025     Toxic metabolic encephalopathy 2025     Incidental Finding: Lung nodule 2025     Incidental Finding: Chronic sinusitis 2025     Compression fracture of L3 vertebra (HCC) 2025     Pressure injury of skin of buttock 2025     Bradycardia 2025     Fall 2025     Abnormal TSH 2025     Generalized weakness 2025     Chronic combined systolic and diastolic CHF (congestive heart failure) ([facility]) 2025     Anemia of chronic disease 2025     Urinary frequency 2025     Anemia 2024     History of vitamin D deficiency 2024     Other fatigue 2024     Type 2 diabetes mellitus with stage 2 chronic kidney disease, without long-term current use of insulin  ([facility]) 2024     Dry skin dermatitis 2023     Uses roller walker 2023     Iron deficiency anemia 2023     Peripheral arterial disease ([facility]) 2023     Gait disorder 2022     Edema of both feet 2022     Chronic combined systolic and diastolic heart failure, NYHA class 2 ([facility]) 2022     Heart failure, left, 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% ([facility]) 2022     Acute on chronic combined systolic and diastolic congestive heart failure ([facility]) 2022     Chronic right-sided low back pain without sciatica 2021     Orthostatic hypotension 2021      Hyponatremia 07/20/2021     Insomnia, persistent 05/21/2021     Prostate cancer (HCC) 03/03/2021     Benign essential hypertension 06/15/2020     Bilateral leg weakness 06/15/2020     Type 2 diabetes mellitus with hyperglycemia, without long-term current use of insulin (HCC) 05/25/2019     Other hyperlipidemia 09/13/2018     Coronary artery disease involving native coronary artery of native heart with angina pectoris (HCC)      BRIAN on CKD 09/20/2017     Lumbar radiculopathy 11/25/2016     Type 2 diabetes mellitus with diabetic neuropathy (HCC) 01/11/2015     Diabetic neuropathy (HCC) 10/10/2013     Chronic GERD 06/16/2013       LOS (days): 10  Geometric Mean LOS (GMLOS) (days): 4.9  Days to GMLOS:-5.2     OBJECTIVE:  Risk of Unplanned Readmission Score: 34.18         Current admission status: Inpatient   Preferred Pharmacy:   [facility] - [facility] [address]  [address] [facility] [address]  Phone: [phone] Fax: [phone]    [facility] [address] [place] [address] - [address]  [address]  [facility] [address]  Phone: [phone] Fax: [phone]    Primary Care Provider: Felipe Travis, DO    Primary Insurance: MEDICARE  Secondary Insurance: [facility]    DISCHARGE DETAILS:    Discharge planning discussed with:: Ruth Covarrubias (wife)        CM contacted family/caregiver?: Yes (Voice mail left for wife Ruth to notify of transport time, provider confirmed that she is aware of time)  Were Treatment Team discharge recommendations reviewed with patient/caregiver?: Yes  Did patient/caregiver verbalize understanding of patient care needs?: N/A- going to facility  Were patient/caregiver advised of the risks associated with not following Treatment Team discharge recommendations?: Yes    Contacts  Patient Contacts: Ruthhal Covarrubias (spouse)  Relationship to Patient:: Family  Contact Method: Phone  Phone Number: [phone]  Reason/Outcome: Discharge  Planning       Other Referral/Resources/Interventions Provided:  Interventions: Short Term Rehab, Transportation    Would you like to participate in our [facility] service program?  : No - Declined    Treatment Team Recommendation: Facility Return, Short Term Rehab  Discharge Destination Plan:: Facility Return, Short Term Rehab  Transport at Discharge : [facility]     Number/Name of Dispatcher: [facility] via [employer]  Transported by (Company and Unit #): [facility]  ETA of Transport (Date): 05/07/25  ETA of Transport (Time): 1230        Accepting Facility Name, City & State : [facility]  Receiving Facility/Agency Phone Number: [phone]       Patient is medically cleared per provider and plan is for return to [facility].  [facility] transport was requested in [employer] and pickup is scheduled for 1230.  Attending, nursing and facility were notified of pickup time and SW left a voice mail for wife Ruth to update her.  Wife came to bedside shortly after and is aware of and in agreement with plan.

## 2025-05-07 NOTE — ASSESSMENT & PLAN NOTE
Baseline creatinine around 0.8-1.2  Peaked at 1.71 on 5/1/25  Likely in setting of poor oral intake/sepsis  Patient with meza catheter. CT wo hydronephrosis  Holding PTA lasix and losartan  Monitor I&O  Daily BMP  Nephrology team consulted; appreciate input  5/7 - Nephrology following. Creatinine 0.96 today.

## 2025-05-07 NOTE — ASSESSMENT & PLAN NOTE
1 of 2 blood cultures positive from 4/28. No cultures were sent on admission for comparison. Patient did have systemic signs of infection including hypothermia and fever thus suspect a true infection.  Source could be skin translocation as patient has multiple areas of open skin abrasions, though none appear acutely infected.  No other obvious source of infection at this time. CT C/A/P is without signs of metastatic infection. Brain MRI negative for emboli, though was non-contrast. Patient has no cardiac devices or endovascular hardware. TTE, adequate study, is without vegetation. Given unclear source, favor treating for at least 4 weeks of IV antibiotic.   -Continue IV Cefazolin 2g every 8 hours  -Continue to monitor serum creatinine while on IV antibiotics  -Follow up repeat blood cultures to assess for bacteremia clearance; currently NGTD x 4d  -S/p tunneled CVC placement for antibiotic administration in the outpatient setting. Will need referral to IR in outpatient setting for removal.  -Plan for 4 week total antibiotic course from clearance of cultures (through 5/30/25)  -Will need outpatient ID follow-up and weekly labs including CBCd and BMP in the outpatient setting  -ID office staff aware of outpatient follow-up needs  -Outpatient ID follow-up scheduled for 5/21/25

## 2025-05-07 NOTE — ASSESSMENT & PLAN NOTE
4/28/25: 1/2 blood cultures positive for MSSA  CT C/A/P without evidence of acute infectious process  Possible translocation in setting of chronic skin wounds, patient has no devices or hardware  No evidence of vegetation on ECHO report  Continue cefazolin 2g Q8H  5/2/25: Follow up repeat BC x2 negative at 48 hrs  ID consulted, recommendations are appreciated  5/7 - PICC line in place. Plan for discharge to rehab today. Will need IV ancef through 5/30/25 per infectious disease.

## 2025-05-08 ENCOUNTER — TELEPHONE (OUTPATIENT)
Dept: INFECTIOUS DISEASES | Facility: CLINIC | Age: 80
End: 2025-05-08

## 2025-05-08 ENCOUNTER — PATIENT OUTREACH (OUTPATIENT)
Dept: CASE MANAGEMENT | Facility: OTHER | Age: 80
End: 2025-05-08

## 2025-05-08 DIAGNOSIS — E11.22 CKD STAGE 3 DUE TO TYPE 2 DIABETES MELLITUS (HCC): ICD-10-CM

## 2025-05-08 DIAGNOSIS — R78.81 MSSA BACTEREMIA: Primary | ICD-10-CM

## 2025-05-08 DIAGNOSIS — B95.61 MSSA BACTEREMIA: Primary | ICD-10-CM

## 2025-05-08 DIAGNOSIS — N18.30 CKD STAGE 3 DUE TO TYPE 2 DIABETES MELLITUS (HCC): ICD-10-CM

## 2025-05-08 DIAGNOSIS — Z79.2 LONG TERM (CURRENT) USE OF ANTIBIOTICS: ICD-10-CM

## 2025-05-08 LAB — VIT B1 BLD-SCNC: 194.9 NMOL/L (ref 66.5–200)

## 2025-05-08 NOTE — DISCHARGE INSTRUCTIONS
Tunneled Central Lines in Adult   WHAT YOU NEED TO KNOW:     A tunneled central line is a type of IV catheter. A catheter is a flexible tube used to give treatments and to take blood. A tunneled central line is a catheter you can see under your skin before it enters a vein near your heart. A tunneled central line can stay in for several months.  DISCHARGE INSTRUCTIONS:   Prevent catheter-associated infections:  The area around your catheter may get infected, or you may get an infection in your bloodstream. A catheter-associated infection is caused by bacteria getting into your bloodstream through your catheter. Infections from catheters can lead to severe illness. The following are ways you can help prevent an infection:  Wash your hands:  Use soap or an alcohol-based hand rub to clean your hands. Clean your hands before and after you touch the catheter or the catheter site. Ask your healthcare provider for information on how to wash your hands. Remind anyone who cares for your catheter to wash their hands.     Wear medical gloves:  Wear clean medical gloves when you touch your catheter or change bandages.     Limit contact:  Do not touch or handle your catheter unless you need to care for it. Do not pull, push on, or move the catheter when you clean your skin or change the bandage.    .     Check for infection:  Check your skin every day for signs of infection, such as pain, redness, swelling, and oozing. Contact your healthcare provider if you see these signs.    Cover the area:  Keep a sterile bandage over the catheter site for as long as your healthcare provider directs.      Keep the area dry:  Do not let your catheter or catheter site get wet.Cover with plastic and seal with medical tape before you bathe. Ask if you should take showers instead of bath.     Follow up with your healthcare provider as directed:  Your healthcare provider may need to take out the stitches used to keep the catheter in place. Write  down your questions so you remember to ask them during your visits.    Call 911 for any of the following:   You feel lightheaded, short of breath, and have chest pain.    Your catheter comes out    Contact your healthcare provider if:  You have a fever.     The area around where the catheter enters your skin is red, warm, painful, or oozing fluid.    You see blood on your bandage and the amount is increasing.     The veins in your neck or chest bulge. .     You see that the catheter is getting shorter, or it falls out. Put pressure over the site with a clean towel.    You see a hole or a crack in your catheter. Clamp your catheter above the damage before you contact your healthcare provider.     You have questions about how to care for your catheter.         Contact Interventional Radiology at [phone] (RALF PATIENTS: Contact Interventional Radiology at [phone]) (JAMIE PATIENTS: Contact Interventional Radiology at [phone]) if:  Blood soaks through your bandage.   You have new swelling in your arm, neck, face, or chest on your right side.  Your catheter gets wet.    Your bruises or pain get worse.   You have a fever or chills.  Persistent nausea or vomiting.   Your incision is red, swollen, or draining pus.   You have questions or concerns about your condition or care.  Self-care:     Resume your normal diet.  Keep your dressings dry. Do not take a shower or swim. You may take a tub bath, but do not get your dressings wet. Water in your wound can cause bacteria to grow and cause an infection. If your dressing gets wet, dry it off and cover it with dry sterile gauze. Call your healthcare provider. Do not use soaps or ointments.  Follow up with your healthcare provider as directed: Write down your questions so you remember to ask them during your visits.

## 2025-05-08 NOTE — PROGRESS NOTES
OPAT NOTE    AP ONLY CAMPUSES ARE: [place] and [place].   In these cases, physician is only cosigning notes.    Supervising/Discharge provider: Rudy Knowles    Diagnosis: MSSA Bacteremia    Drug: Cefazolin    Dose/Route/Frequency: 2g IV Q8H    Labs/Frequency: CBCD BMP weekly    End Date: 5/30    Infusion/VNA/SNF contact: Carloz    Next appointment: 5/21, 1:30pm

## 2025-05-09 ENCOUNTER — TELEPHONE (OUTPATIENT)
Dept: NEPHROLOGY | Facility: CLINIC | Age: 80
End: 2025-05-09

## 2025-05-09 DIAGNOSIS — E11.22 CKD STAGE 3 DUE TO TYPE 2 DIABETES MELLITUS (HCC): Primary | ICD-10-CM

## 2025-05-09 DIAGNOSIS — N18.30 CKD STAGE 3 DUE TO TYPE 2 DIABETES MELLITUS (HCC): Primary | ICD-10-CM

## 2025-05-09 DIAGNOSIS — E87.1 HYPONATREMIA: ICD-10-CM

## 2025-05-09 NOTE — TELEPHONE ENCOUNTER
BMP in system    ----- Message from Blaine Mederos MD sent at 5/9/2025  2:32 PM EDT -----  Regarding: Hospital discharge follow-up and labs  Dr. Montejo's patient was seen in the hospital for BRIAN and hypernatremia.  Will need BMP in 1 week.  Please call the SNF and have them weigh him daily and report a weight gain of more than 3 pounds in a day or more than 5 pounds in a week in which case his diuretic would need to be resumed.  He will need follow-up in the office within 4 weeks.    Arcadio Sam was seen in the hospital when he presented with altered mental status.  He was found to have MSSA bacteremia and was treated with antibiotics.  He also developed BRIAN and hypernatremia and was treated with IV fluids.  Diuretic and losartan were held on discharge.  Losartan can be resumed based on results of BMP and diuretic can be resumed on an as-needed basis (he was looking very weak and was also on a modified diet and I am not sure how much of an intake he would be having in the next few weeks).  Let me know if you have any questions.  Thank you.

## 2025-05-09 NOTE — TELEPHONE ENCOUNTER
Attempted to contact [facility] coordinate with their facility regarding patient's upcoming follow-up appointment, and inform them of active lab orders placed following patient's recent discharge.    No answer, no option to leave voicemail. Will attempt to call back at a later time.

## 2025-05-09 NOTE — TELEPHONE ENCOUNTER
[facility]'s [facility]-up appointment, and inform them of active lab orders placed following patient's recent discharge.    Spoke to nurse, Pao. I informed Pao I was calling to send over lab orders and appointment details for the patient. Pao provided me with the following fax number: [phone], so I may fax over documentation from our office.     All documents were verbally relayed and reviewed over the phone. Pao verbalizes understanding, and has no further questions at this time.

## 2025-05-12 ENCOUNTER — OFFICE VISIT (OUTPATIENT)
Dept: NEUROLOGY | Facility: CLINIC | Age: 80
End: 2025-05-12
Attending: INTERNAL MEDICINE
Payer: MEDICARE

## 2025-05-12 VITALS
BODY MASS INDEX: 22.26 KG/M2 | HEART RATE: 56 BPM | OXYGEN SATURATION: 95 % | DIASTOLIC BLOOD PRESSURE: 80 MMHG | HEIGHT: 71 IN | SYSTOLIC BLOOD PRESSURE: 140 MMHG | WEIGHT: 159 LBS

## 2025-05-12 DIAGNOSIS — R26.9 GAIT DISORDER: ICD-10-CM

## 2025-05-12 DIAGNOSIS — R26.2 AMBULATORY DYSFUNCTION: ICD-10-CM

## 2025-05-12 PROCEDURE — 99204 OFFICE O/P NEW MOD 45 MIN: CPT | Performed by: NURSE PRACTITIONER

## 2025-05-12 RX ORDER — ZINC OXIDE
OINTMENT (GRAM) TOPICAL AS NEEDED
COMMUNITY

## 2025-05-12 RX ORDER — OMEPRAZOLE 20 MG/1
20 TABLET, DELAYED RELEASE ORAL DAILY
COMMUNITY

## 2025-05-12 RX ORDER — ONDANSETRON HYDROCHLORIDE 4 MG/5ML
SOLUTION ORAL AS NEEDED
COMMUNITY

## 2025-05-12 RX ORDER — DEXTROSE 40 G/100ML
INJECTION, SOLUTION INTRAVENOUS
COMMUNITY

## 2025-05-12 NOTE — ASSESSMENT & PLAN NOTE
Patient with abrupt  decline in ambulation with multiple hospitalizations over the past 2 to 3 months.  Prior to his hospitalizations his wife did note a shuffling type gait at times and moving more slowly.  He was hospitalized for fall and altered mental status several times over the last few months, now resides in rehab facility.  He continues to have difficulty with his gait and standing, is no longer ambulatory per his wife's report.  She is concerned about underlying parkinsonism.    He does have bilateral upper extremity rigidity and left-sided upper extremity bradykinesia.  Some mild hypophonia.  His ambulatory dysfunction may be multifactorial due to deconditioning, multiple hospitalizations, possible parkinsonism, neuropathy, changes in the lumbar spine. No myelopathic features noted on exam.    It does appear he was on a 1 week Sinemet trial in the past, benefit is unclear.  Can retrial Sinemet 25/100 mg as follows  Week 1: Take half tab 3 times daily  Week 2: Take 1 tab 3 times daily  Week 3: Take 1.5 tabs 3 times daily    If no improvement or side effects facility should contact the office.  He should continue with PT.  We did discuss if no improvement with Sinemet and there is still concern for an underlying Parkinson's disease can consider DaTscan or syn one skin biopsy.   Orders:    Ambulatory Referral to Neurology

## 2025-05-12 NOTE — PROGRESS NOTES
Name: Pernell Covarrubias      : 1945      MRN: [id number]  Encounter Provider: NEO Otero  Encounter Date: 2025   Encounter department: [facility]  :  Assessment & Plan  Ambulatory dysfunction  Patient with abrupt  decline in ambulation with multiple hospitalizations over the past 2 to 3 months.  Prior to his hospitalizations his wife did note a shuffling type gait at times and moving more slowly.  He was hospitalized for fall and altered mental status several times over the last few months, now resides in rehab facility.  He continues to have difficulty with his gait and standing, is no longer ambulatory per his wife's report.  She is concerned about underlying parkinsonism.    He does have bilateral upper extremity rigidity and left-sided upper extremity bradykinesia.  Some mild hypophonia.  His ambulatory dysfunction may be multifactorial due to deconditioning, multiple hospitalizations, possible parkinsonism, neuropathy, changes in the lumbar spine. No myelopathic features noted on exam.    It does appear he was on a 1 week Sinemet trial in the past, benefit is unclear.  Can retrial Sinemet 25/100 mg as follows  Week 1: Take half tab 3 times daily  Week 2: Take 1 tab 3 times daily  Week 3: Take 1.5 tabs 3 times daily    If no improvement or side effects facility should contact the office.  He should continue with PT.  We did discuss if no improvement with Sinemet and there is still concern for an underlying Parkinson's disease can consider DaTscan or syn one skin biopsy.   Orders:    Ambulatory Referral to Neurology    Gait disorder    Orders:    Ambulatory Referral to Neurology          History of Present Illness   HPI   Arcadio Covarrubias is an 80 yo male with PMH of anemia, CKD, CAD, DM, HTN,  neuropathy, PVD, prostate and squamous cell carcinomas and evidence of chronic right basal ganglia infarct who presents for evaluation of possible parkinsonism.  "    He was hospitalized in march 2025 for AMS due to UTI/sepsis. While in the hospital there was a concern for an underlying parkinsonism, has had a gradual decline over the years. He did have orthostatic hypotension while he as hospitalized. He was admitted again in April for encephalopathy    He has been non ambulatory for 2-3 months with his recent hospitalizations. When he did ambulate he would use a walker and wife noted that he would shuffle and walk slowly. Over the years his wife feels he has been walking more slowly and shuffling more.     He was living at home with his wife until he was admitted for a fall 3/8. No other falls.     Prior to hospitalizations no significant memory complaints. There have been times between admissions that they feel his memory is better other times where he is confused, hard to understand what he is saying.   He was having hallucinations while in the hospital, and some in rehab. Lessening over time since discharge from the hospital.     His wife has noted shakey hands b/l since his hospitalizations.     No loss of smell.  Occasional yelling in his sleep.   Handwriting is slightly small.     He is currently has PT/OT but he is \"very rigid\" per his wife's report and hard to get him to stand.   Generalized weakness since his hospitalizations.   No weakness prior.   No numbness or tingling.     No family hx of parkinsons.     B12 561    There was a trial of sinemet in the facility with no improvement/unclear diagnosis of parkinsonism?    He currently resides at [facility]     MRI brain 3/2025: 1. No evidence of acute infarct, intracranial hemorrhage or mass.  2. Paranasal sinus disease.  Chronic lacunar infarct in the right basal ganglia. Small, chronic infarct in the right cerebellar hemisphere.       Review of Systems     Review of Systems   Constitutional:  Negative for appetite change, chills, fatigue and fever.   HENT: Negative.  Negative for ear pain, hearing loss, sore " "throat, tinnitus, trouble swallowing and voice change.    Eyes: Negative.  Negative for photophobia, pain and visual disturbance.   Respiratory: Negative.  Negative for cough and shortness of breath.    Cardiovascular: Negative.  Negative for chest pain and palpitations.   Gastrointestinal: Negative.  Negative for abdominal pain, nausea and vomiting.   Endocrine: Negative.  Negative for cold intolerance.   Genitourinary: Negative.  Negative for dysuria, frequency, hematuria and urgency.   Musculoskeletal:  Negative for arthralgias, back pain, gait problem, myalgias, neck pain and neck stiffness.   Skin: Negative.  Negative for color change and rash.   Allergic/Immunologic: Negative.    Neurological:  Negative for dizziness, tremors, seizures, syncope, facial asymmetry, speech difficulty, weakness, light-headedness, numbness and headaches.   Hematological: Negative.  Does not bruise/bleed easily.   Psychiatric/Behavioral: Negative.  Negative for confusion, hallucinations and sleep disturbance.    All other systems reviewed and are negative.     I have personally reviewed the MA's review of systems and made changes as necessary.         Objective   /80 (BP Location: Left arm, Patient Position: Sitting, Cuff Size: Adult)   Pulse 56   Ht 5' 11\" (1.803 m)   Wt 72.1 kg (159 lb)   SpO2 95%   BMI 22.18 kg/m²     Physical Exam  Constitutional:       General: He is awake.   HENT:      Right Ear: Hearing normal.      Left Ear: Hearing normal.   Eyes:      General: Lids are normal.      Extraocular Movements: Extraocular movements intact.   Neurological:      Mental Status: He is alert.      Deep Tendon Reflexes:      Reflex Scores:       Bicep reflexes are 2+ on the right side and 2+ on the left side.       Brachioradialis reflexes are 2+ on the right side and 2+ on the left side.       Patellar reflexes are 1+ on the right side and 1+ on the left side.       Achilles reflexes are 0 on the right side and 0 on the " left side.      Neurological Exam  Mental Status  Awake and alert. Oriented only to person and situation. Speech: hypophonia. Language is fluent with no aphasia.    Cranial Nerves  CN III, IV, VI: Extraocular movements intact bilaterally. Normal lids and orbits bilaterally.   Right pupil: Pinpoint.   Left pupil: Pinpoint.  CN V:  Right: Facial sensation is normal.  Left: Facial sensation is normal on the left.  CN VII:  Right: There is no facial weakness.  Left: There is no facial weakness.  CN VIII:  Right: Hearing is normal.  Left: Hearing is normal.  CN XI:  Right: Trapezius strength is normal.  Left: Trapezius strength is normal.  CN XII: Tongue midline without atrophy or fasciculations.    Motor    Strength: Strength 5/5 upper and lower extremities.    Sensory  Light touch is normal in upper and lower extremities. Temperature abnormality: Normal in bilateral UE, diminished to the mid shin in b/l LE  . Vibration abnormality: Reduced at the ankles and knees.     Reflexes                                            Right                      Left  Brachioradialis                    2+                         2+  Biceps                                 2+                         2+  Patellar                                1+                         1+  Achilles                                0                         0    Coordination  Right: Finger-to-nose normal.Left: Finger-to-nose normal.  B/l UE rigidity L>R  Bradykinesia with FT and pronation supination, unable to perform LE testing due to stretcher     No tremors noted.    Gait    Patient in stretcher, not ambulated .

## 2025-05-12 NOTE — PROGRESS NOTES
Review of Systems   Constitutional:  Negative for appetite change, chills, fatigue and fever.   HENT: Negative.  Negative for ear pain, hearing loss, sore throat, tinnitus, trouble swallowing and voice change.    Eyes: Negative.  Negative for photophobia, pain and visual disturbance.   Respiratory: Negative.  Negative for cough and shortness of breath.    Cardiovascular: Negative.  Negative for chest pain and palpitations.   Gastrointestinal: Negative.  Negative for abdominal pain, nausea and vomiting.   Endocrine: Negative.  Negative for cold intolerance.   Genitourinary: Negative.  Negative for dysuria, frequency, hematuria and urgency.   Musculoskeletal:  Negative for arthralgias, back pain, gait problem, myalgias, neck pain and neck stiffness.   Skin: Negative.  Negative for color change and rash.   Allergic/Immunologic: Negative.    Neurological:  Negative for dizziness, tremors, seizures, syncope, facial asymmetry, speech difficulty, weakness, light-headedness, numbness and headaches.   Hematological: Negative.  Does not bruise/bleed easily.   Psychiatric/Behavioral: Negative.  Negative for confusion, hallucinations and sleep disturbance.    All other systems reviewed and are negative.

## 2025-05-12 NOTE — PATIENT INSTRUCTIONS
Potential parkinsonism-  Would recommend to retrial carbidopa levodopa 25/100 mg as follows:  Week 1 take 1/2 tab TID  Week 2 Take 1 tab TID  Week 3 Take 1.5 tabs TID    If no improvement or side effects stop med and contact office.   May also be an element of lumbar spine, neuropathy contributing to ambulatory dysfunction.     If no improvement with medication Other options for testing for parkinson's disease include skin biopsy and DAVID scan

## 2025-05-12 NOTE — PROGRESS NOTES
"He has been non ambulatory for 2-3 months with his recent hospitalizations. When he did ambulate he would use a walker and wife noted that he would shuffle and walk slowly. Over the years his wife feels he has been walking more slowly and shuffling more.     He was living at home with his wife until he was admitted for a fall 3/8. No other falls.     Prior to hospitalizations no significant memory complaints. There have been times between admissions that they feel his memory is better other times where he is confused, hard to understand what he is saying.   He was having hallucinations while in the hospital, and some in rehab. Lessening over time since discharge from the hospital.     His wife has noted shakey hands b/l since his hospitalizations.     No loss of smell.  Occasional yelling in his sleep.   Handwriting is slightly small.     He is currently has PT/OT but he is \"very rigid\" per his wife's report and hard to get him to stand.   Generalized weakness since his hospitalizations.   No weakness prior.   No numbness or tingling.     No family hx of parkinsons.     B12 561  "

## 2025-05-14 NOTE — TELEPHONE ENCOUNTER
He will need follow-up in the office within 4 weeks around 6/6/25.     Called and left Ruth a voicemail.  Try to schedule in [place] with Steffany wed 6/25 3:30 pm. Repeat BMP on chart from 5/9.

## 2025-05-15 ENCOUNTER — TELEPHONE (OUTPATIENT)
Dept: NEUROLOGY | Facility: CLINIC | Age: 80
End: 2025-05-15

## 2025-05-15 ENCOUNTER — TELEPHONE (OUTPATIENT)
Age: 80
End: 2025-05-15

## 2025-05-15 NOTE — TELEPHONE ENCOUNTER
Phone call from patient's wife to confirm appt for 5/22/25 was cancelled (patient in rehab facility). Informed patient's wife that appt is cancelled.     Patient's wife declined to reschedule at the present time until patient discharged from Rehab. Patient being followed for his diabetes at Rehab facility.     Advise to call office with any concerns. Patient's wife agreeable.

## 2025-05-16 ENCOUNTER — PATIENT OUTREACH (OUTPATIENT)
Dept: CASE MANAGEMENT | Facility: OTHER | Age: 80
End: 2025-05-16

## 2025-05-16 ENCOUNTER — TELEPHONE (OUTPATIENT)
Dept: INFECTIOUS DISEASES | Facility: CLINIC | Age: 80
End: 2025-05-16

## 2025-05-16 NOTE — TELEPHONE ENCOUNTER
Called and spoke with Ruth who advised he is permanently staying at  [facility]  ,[facility] in [place], [place] phone # [phone].     I called and spoke with Ella and was able to schedule a hospital follow up in [place] Friday 7/11 2:30 pm with Pao Gutierrez and will fax labs to [phone].

## 2025-05-16 NOTE — TELEPHONE ENCOUNTER
Spoke with Britni at [facility].  They have labs from yesterday. They will fax those results to us.

## 2025-05-16 NOTE — PROGRESS NOTES
Call placed to [facility] the patient is currently admitted to [facility] for STR no LCD at this time. This Admin Coordinator will continue to monitor via chart review.

## 2025-05-19 ENCOUNTER — TELEPHONE (OUTPATIENT)
Dept: NEUROLOGY | Facility: CLINIC | Age: 80
End: 2025-05-19

## 2025-05-19 NOTE — TELEPHONE ENCOUNTER
Spoke to the patient's wife (Ruth) regarding scheduling an appointment. The wife states that the patient has passed away on 05/16/2025      ----- Message from Amanda BENITEZ sent at 5/13/2025  1:16 PM EDT -----  Regarding: RE: JAIRO  I attached them to the message to see if they are okay with seeing patient.  ----- Message -----  From: NEO Otero  Sent: 5/13/2025   1:06 PM EDT  To: Amanda Jimenez MA  Subject: FW: JAIRO                                          Do you know who the MA juancho hilton and dr corado are because they can see this patient for follow up, would need to be in 3-4 months OVL.  ----- Message -----  From: Kavon Corado MD  Sent: 5/12/2025   4:20 PM EDT  To: NEO Lopez; #  Subject: RE: JAIRO                                          I'm fine with that.  ----- Message -----  From: NEO Otero  Sent: 5/12/2025   3:14 PM EDT  To: NEO Lopez; #  Subject: JAIRO                                              Hi,  I saw this patient today for eval for ambulatory dysfunction/potential parkinsonism. Both of you did see him during a recent hospitalization. He lives closer to the [facility] and due to the fact I will be leaving neurology for a different AP position in July I was wonder if either of you would be willing to follow up with this patient.    Thanks!  Debra

## 2025-05-22 ENCOUNTER — PATIENT OUTREACH (OUTPATIENT)
Dept: CASE MANAGEMENT | Facility: OTHER | Age: 80
End: 2025-05-22

## 2025-05-22 NOTE — PROGRESS NOTES
Update obtained from [facility] the patient  25. I have removed myself from the care team, updated the Care Coordination note, and closed the Care Transitions program.

## (undated) DEVICE — THE SPACEOAR SYSTEM CONSISTS OF COMPONENTS FOR PREPARATION OF A SYNTHETIC, ABSORBABLE HYDROGEL SPACER AND A DELIVERY SYSTEM PACKAGED FOR SINGLE USE.: Brand: SPACEOAR SYSTEM